# Patient Record
Sex: MALE | Race: BLACK OR AFRICAN AMERICAN | NOT HISPANIC OR LATINO | Employment: FULL TIME | URBAN - METROPOLITAN AREA
[De-identification: names, ages, dates, MRNs, and addresses within clinical notes are randomized per-mention and may not be internally consistent; named-entity substitution may affect disease eponyms.]

---

## 2019-06-07 ENCOUNTER — OUTSIDE PLACE OF SERVICE (OUTPATIENT)
Dept: CARDIOLOGY | Facility: CLINIC | Age: 59
End: 2019-06-07
Payer: COMMERCIAL

## 2019-06-07 PROCEDURE — 93010 ELECTROCARDIOGRAM REPORT: CPT | Mod: ,,, | Performed by: INTERNAL MEDICINE

## 2019-06-07 PROCEDURE — 93010 PR ELECTROCARDIOGRAM REPORT: ICD-10-PCS | Mod: ,,, | Performed by: INTERNAL MEDICINE

## 2020-09-11 DIAGNOSIS — D43.4 NEOPLASM OF UNCERTAIN BEHAVIOR OF SPINAL CORD: Primary | ICD-10-CM

## 2020-09-11 DIAGNOSIS — C90.00 MULTIPLE MYELOMA, REMISSION STATUS UNSPECIFIED: ICD-10-CM

## 2020-09-11 DIAGNOSIS — Z74.09 MOBILITY IMPAIRED: ICD-10-CM

## 2020-09-11 DIAGNOSIS — R53.1 WEAKNESS: ICD-10-CM

## 2020-09-13 ENCOUNTER — HOSPITAL ENCOUNTER (EMERGENCY)
Facility: HOSPITAL | Age: 60
End: 2020-09-14
Attending: EMERGENCY MEDICINE
Payer: COMMERCIAL

## 2020-09-13 DIAGNOSIS — C80.1 MALIGNANT NEOPLASM METASTATIC TO LUMBAR SPINE WITH UNKNOWN PRIMARY SITE: Primary | ICD-10-CM

## 2020-09-13 DIAGNOSIS — M54.31 BILATERAL SCIATICA: ICD-10-CM

## 2020-09-13 DIAGNOSIS — M54.32 BILATERAL SCIATICA: ICD-10-CM

## 2020-09-13 DIAGNOSIS — C79.51 MALIGNANT NEOPLASM METASTATIC TO LUMBAR SPINE WITH UNKNOWN PRIMARY SITE: Primary | ICD-10-CM

## 2020-09-13 PROCEDURE — 63600175 PHARM REV CODE 636 W HCPCS: Mod: ER | Performed by: EMERGENCY MEDICINE

## 2020-09-13 PROCEDURE — 99285 EMERGENCY DEPT VISIT HI MDM: CPT | Mod: 25,ER

## 2020-09-13 PROCEDURE — 96372 THER/PROPH/DIAG INJ SC/IM: CPT | Mod: ER

## 2020-09-13 RX ORDER — ATENOLOL 100 MG/1
100 TABLET ORAL DAILY
Status: ON HOLD | COMMUNITY
Start: 2020-09-04 | End: 2020-09-16 | Stop reason: HOSPADM

## 2020-09-13 RX ORDER — BENAZEPRIL HYDROCHLORIDE 20 MG/1
20 TABLET ORAL DAILY
Status: ON HOLD | COMMUNITY
Start: 2020-08-10 | End: 2020-09-16 | Stop reason: HOSPADM

## 2020-09-13 RX ORDER — HYDROCODONE BITARTRATE AND ACETAMINOPHEN 7.5; 325 MG/1; MG/1
1 TABLET ORAL 4 TIMES DAILY PRN
Status: ON HOLD | COMMUNITY
Start: 2020-09-08 | End: 2020-09-25 | Stop reason: HOSPADM

## 2020-09-13 RX ORDER — CLONIDINE HYDROCHLORIDE 0.3 MG/1
0.3 TABLET ORAL NIGHTLY
Status: ON HOLD | COMMUNITY
Start: 2020-09-04 | End: 2020-09-17 | Stop reason: HOSPADM

## 2020-09-13 RX ORDER — MORPHINE SULFATE 4 MG/ML
4 INJECTION, SOLUTION INTRAMUSCULAR; INTRAVENOUS
Status: DISCONTINUED | OUTPATIENT
Start: 2020-09-13 | End: 2020-09-13

## 2020-09-13 RX ORDER — ORPHENADRINE CITRATE 30 MG/ML
60 INJECTION INTRAMUSCULAR; INTRAVENOUS
Status: COMPLETED | OUTPATIENT
Start: 2020-09-13 | End: 2020-09-13

## 2020-09-13 RX ORDER — MORPHINE SULFATE 4 MG/ML
4 INJECTION, SOLUTION INTRAMUSCULAR; INTRAVENOUS
Status: COMPLETED | OUTPATIENT
Start: 2020-09-13 | End: 2020-09-13

## 2020-09-13 RX ADMIN — ORPHENADRINE CITRATE 60 MG: 30 INJECTION INTRAMUSCULAR; INTRAVENOUS at 11:09

## 2020-09-13 RX ADMIN — MORPHINE SULFATE 4 MG: 4 INJECTION INTRAVENOUS at 11:09

## 2020-09-14 ENCOUNTER — HOSPITAL ENCOUNTER (OUTPATIENT)
Facility: HOSPITAL | Age: 60
Discharge: HOME | End: 2020-09-17
Attending: EMERGENCY MEDICINE | Admitting: STUDENT IN AN ORGANIZED HEALTH CARE EDUCATION/TRAINING PROGRAM
Payer: COMMERCIAL

## 2020-09-14 DIAGNOSIS — C79.9 METASTATIC DISEASE: ICD-10-CM

## 2020-09-14 DIAGNOSIS — Z01.818 PRE-OP EXAM: ICD-10-CM

## 2020-09-14 DIAGNOSIS — C90.00 MULTIPLE MYELOMA, REMISSION STATUS UNSPECIFIED: ICD-10-CM

## 2020-09-14 DIAGNOSIS — R93.89 ABNORMAL FINDING ON IMAGING: Primary | ICD-10-CM

## 2020-09-14 DIAGNOSIS — F41.9 ANXIETY: ICD-10-CM

## 2020-09-14 DIAGNOSIS — E88.09 PLASMA CELL DYSCRASIA: ICD-10-CM

## 2020-09-14 DIAGNOSIS — M48.061 SPINAL STENOSIS OF LUMBAR REGION WITHOUT NEUROGENIC CLAUDICATION: ICD-10-CM

## 2020-09-14 PROBLEM — I10 ESSENTIAL HYPERTENSION: Status: ACTIVE | Noted: 2020-09-14

## 2020-09-14 LAB
ABO + RH BLD: NORMAL
ALBUMIN SERPL BCP-MCNC: 4.4 G/DL (ref 3.5–5.2)
ALP SERPL-CCNC: 81 U/L (ref 38–126)
ALT SERPL W/O P-5'-P-CCNC: 17 U/L (ref 10–44)
ANION GAP SERPL CALC-SCNC: 10 MMOL/L (ref 8–16)
APTT BLDCRRT: 24.6 SEC (ref 21–32)
AST SERPL-CCNC: 36 U/L (ref 15–46)
BASOPHILS # BLD AUTO: 0.02 K/UL (ref 0–0.2)
BASOPHILS # BLD AUTO: 0.06 K/UL (ref 0–0.2)
BASOPHILS NFR BLD: 0.3 % (ref 0–1.9)
BASOPHILS NFR BLD: 0.8 % (ref 0–1.9)
BILIRUB SERPL-MCNC: 0.9 MG/DL (ref 0.1–1)
BILIRUB UR QL STRIP: NEGATIVE
BLD GP AB SCN CELLS X3 SERPL QL: NORMAL
BUN SERPL-MCNC: 20 MG/DL (ref 2–20)
CALCIUM SERPL-MCNC: 9.5 MG/DL (ref 8.7–10.5)
CHLORIDE SERPL-SCNC: 105 MMOL/L (ref 95–110)
CLARITY UR REFRACT.AUTO: CLEAR
CO2 SERPL-SCNC: 25 MMOL/L (ref 23–29)
COLOR UR AUTO: ABNORMAL
COMPLEXED PSA SERPL-MCNC: 0.58 NG/ML (ref 0–4)
CREAT SERPL-MCNC: 1.08 MG/DL (ref 0.5–1.4)
DIFFERENTIAL METHOD: ABNORMAL
DIFFERENTIAL METHOD: ABNORMAL
EOSINOPHIL # BLD AUTO: 0 K/UL (ref 0–0.5)
EOSINOPHIL # BLD AUTO: 0.1 K/UL (ref 0–0.5)
EOSINOPHIL NFR BLD: 0.3 % (ref 0–8)
EOSINOPHIL NFR BLD: 1.3 % (ref 0–8)
ERYTHROCYTE [DISTWIDTH] IN BLOOD BY AUTOMATED COUNT: 13.2 % (ref 11.5–14.5)
ERYTHROCYTE [DISTWIDTH] IN BLOOD BY AUTOMATED COUNT: 13.2 % (ref 11.5–14.5)
EST. GFR  (AFRICAN AMERICAN): >60 ML/MIN/1.73 M^2
EST. GFR  (NON AFRICAN AMERICAN): >60 ML/MIN/1.73 M^2
GLUCOSE SERPL-MCNC: 107 MG/DL (ref 70–110)
GLUCOSE UR QL STRIP: ABNORMAL
HCT VFR BLD AUTO: 40.5 % (ref 40–54)
HCT VFR BLD AUTO: 43.6 % (ref 40–54)
HGB BLD-MCNC: 12.9 G/DL (ref 14–18)
HGB BLD-MCNC: 14 G/DL (ref 14–18)
HGB UR QL STRIP: NEGATIVE
IMM GRANULOCYTES # BLD AUTO: 0.03 K/UL (ref 0–0.04)
IMM GRANULOCYTES # BLD AUTO: 0.03 K/UL (ref 0–0.04)
IMM GRANULOCYTES NFR BLD AUTO: 0.4 % (ref 0–0.5)
IMM GRANULOCYTES NFR BLD AUTO: 0.5 % (ref 0–0.5)
INR PPP: 1.1 (ref 0.8–1.2)
KETONES UR QL STRIP: NEGATIVE
LEUKOCYTE ESTERASE UR QL STRIP: NEGATIVE
LYMPHOCYTES # BLD AUTO: 1.5 K/UL (ref 1–4.8)
LYMPHOCYTES # BLD AUTO: 3.7 K/UL (ref 1–4.8)
LYMPHOCYTES NFR BLD: 24.4 % (ref 18–48)
LYMPHOCYTES NFR BLD: 48.8 % (ref 18–48)
MCH RBC QN AUTO: 29.9 PG (ref 27–31)
MCH RBC QN AUTO: 30.1 PG (ref 27–31)
MCHC RBC AUTO-ENTMCNC: 31.9 G/DL (ref 32–36)
MCHC RBC AUTO-ENTMCNC: 32.1 G/DL (ref 32–36)
MCV RBC AUTO: 94 FL (ref 82–98)
MCV RBC AUTO: 94 FL (ref 82–98)
MONOCYTES # BLD AUTO: 0.4 K/UL (ref 0.3–1)
MONOCYTES # BLD AUTO: 0.6 K/UL (ref 0.3–1)
MONOCYTES NFR BLD: 6.8 % (ref 4–15)
MONOCYTES NFR BLD: 8.1 % (ref 4–15)
NEUTROPHILS # BLD AUTO: 3.1 K/UL (ref 1.8–7.7)
NEUTROPHILS # BLD AUTO: 4.1 K/UL (ref 1.8–7.7)
NEUTROPHILS NFR BLD: 40.6 % (ref 38–73)
NEUTROPHILS NFR BLD: 67.7 % (ref 38–73)
NITRITE UR QL STRIP: NEGATIVE
NRBC BLD-RTO: 0 /100 WBC
NRBC BLD-RTO: 0 /100 WBC
PATH REV BLD -IMP: NORMAL
PH UR STRIP: 6 [PH] (ref 5–8)
PLATELET # BLD AUTO: 168 K/UL (ref 150–350)
PLATELET # BLD AUTO: 176 K/UL (ref 150–350)
PMV BLD AUTO: 11.8 FL (ref 9.2–12.9)
PMV BLD AUTO: 12.4 FL (ref 9.2–12.9)
POTASSIUM SERPL-SCNC: 4.5 MMOL/L (ref 3.5–5.1)
PROT SERPL-MCNC: 10.7 G/DL (ref 6–8.4)
PROT UR QL STRIP: NEGATIVE
PROTHROMBIN TIME: 11.6 SEC (ref 9–12.5)
RBC # BLD AUTO: 4.31 M/UL (ref 4.6–6.2)
RBC # BLD AUTO: 4.65 M/UL (ref 4.6–6.2)
SARS-COV-2 RDRP RESP QL NAA+PROBE: NEGATIVE
SODIUM SERPL-SCNC: 140 MMOL/L (ref 136–145)
SP GR UR STRIP: 1.02 (ref 1–1.03)
URN SPEC COLLECT METH UR: ABNORMAL
WBC # BLD AUTO: 6.06 K/UL (ref 3.9–12.7)
WBC # BLD AUTO: 7.67 K/UL (ref 3.9–12.7)

## 2020-09-14 PROCEDURE — 85025 COMPLETE CBC W/AUTO DIFF WBC: CPT | Mod: 91

## 2020-09-14 PROCEDURE — G0378 HOSPITAL OBSERVATION PER HR: HCPCS

## 2020-09-14 PROCEDURE — 99285 PR EMERGENCY DEPT VISIT,LEVEL V: ICD-10-PCS | Mod: ,,, | Performed by: PHYSICIAN ASSISTANT

## 2020-09-14 PROCEDURE — 25500020 PHARM REV CODE 255: Performed by: EMERGENCY MEDICINE

## 2020-09-14 PROCEDURE — 25000003 PHARM REV CODE 250: Performed by: STUDENT IN AN ORGANIZED HEALTH CARE EDUCATION/TRAINING PROGRAM

## 2020-09-14 PROCEDURE — 25000003 PHARM REV CODE 250: Performed by: EMERGENCY MEDICINE

## 2020-09-14 PROCEDURE — 96375 TX/PRO/DX INJ NEW DRUG ADDON: CPT

## 2020-09-14 PROCEDURE — 84165 PROTEIN E-PHORESIS SERUM: CPT

## 2020-09-14 PROCEDURE — U0002 COVID-19 LAB TEST NON-CDC: HCPCS | Mod: ER

## 2020-09-14 PROCEDURE — A9585 GADOBUTROL INJECTION: HCPCS | Performed by: EMERGENCY MEDICINE

## 2020-09-14 PROCEDURE — 80053 COMPREHEN METABOLIC PANEL: CPT | Mod: ER

## 2020-09-14 PROCEDURE — 81003 URINALYSIS AUTO W/O SCOPE: CPT

## 2020-09-14 PROCEDURE — 99204 PR OFFICE/OUTPT VISIT, NEW, LEVL IV, 45-59 MIN: ICD-10-PCS | Mod: ,,, | Performed by: NEUROLOGICAL SURGERY

## 2020-09-14 PROCEDURE — 85060 PATHOLOGIST REVIEW: ICD-10-PCS | Mod: ,,, | Performed by: PATHOLOGY

## 2020-09-14 PROCEDURE — 84165 PATHOLOGIST INTERPRETATION SPE: ICD-10-PCS | Mod: 26,,, | Performed by: PATHOLOGY

## 2020-09-14 PROCEDURE — 99220 PR INITIAL OBSERVATION CARE,LEVL III: CPT | Mod: ,,, | Performed by: STUDENT IN AN ORGANIZED HEALTH CARE EDUCATION/TRAINING PROGRAM

## 2020-09-14 PROCEDURE — 99220 PR INITIAL OBSERVATION CARE,LEVL III: ICD-10-PCS | Mod: ,,, | Performed by: STUDENT IN AN ORGANIZED HEALTH CARE EDUCATION/TRAINING PROGRAM

## 2020-09-14 PROCEDURE — 96374 THER/PROPH/DIAG INJ IV PUSH: CPT

## 2020-09-14 PROCEDURE — 96376 TX/PRO/DX INJ SAME DRUG ADON: CPT | Mod: 59 | Performed by: EMERGENCY MEDICINE

## 2020-09-14 PROCEDURE — 96372 THER/PROPH/DIAG INJ SC/IM: CPT | Mod: 59 | Performed by: EMERGENCY MEDICINE

## 2020-09-14 PROCEDURE — 86334 IMMUNOFIX E-PHORESIS SERUM: CPT | Mod: 26,,, | Performed by: PATHOLOGY

## 2020-09-14 PROCEDURE — 99204 OFFICE O/P NEW MOD 45 MIN: CPT | Mod: ,,, | Performed by: NEUROLOGICAL SURGERY

## 2020-09-14 PROCEDURE — 96374 THER/PROPH/DIAG INJ IV PUSH: CPT | Mod: ER

## 2020-09-14 PROCEDURE — 97161 PT EVAL LOW COMPLEX 20 MIN: CPT

## 2020-09-14 PROCEDURE — 86850 RBC ANTIBODY SCREEN: CPT

## 2020-09-14 PROCEDURE — 83520 IMMUNOASSAY QUANT NOS NONAB: CPT | Mod: 59

## 2020-09-14 PROCEDURE — 85610 PROTHROMBIN TIME: CPT

## 2020-09-14 PROCEDURE — 63600175 PHARM REV CODE 636 W HCPCS: Mod: ER | Performed by: EMERGENCY MEDICINE

## 2020-09-14 PROCEDURE — 99285 EMERGENCY DEPT VISIT HI MDM: CPT | Mod: 25

## 2020-09-14 PROCEDURE — 63600175 PHARM REV CODE 636 W HCPCS: Performed by: PHYSICIAN ASSISTANT

## 2020-09-14 PROCEDURE — 85025 COMPLETE CBC W/AUTO DIFF WBC: CPT | Mod: ER

## 2020-09-14 PROCEDURE — 63600175 PHARM REV CODE 636 W HCPCS: Performed by: STUDENT IN AN ORGANIZED HEALTH CARE EDUCATION/TRAINING PROGRAM

## 2020-09-14 PROCEDURE — 85060 BLOOD SMEAR INTERPRETATION: CPT | Mod: ,,, | Performed by: PATHOLOGY

## 2020-09-14 PROCEDURE — 84153 ASSAY OF PSA TOTAL: CPT

## 2020-09-14 PROCEDURE — 85730 THROMBOPLASTIN TIME PARTIAL: CPT

## 2020-09-14 PROCEDURE — 25000003 PHARM REV CODE 250: Performed by: PHYSICIAN ASSISTANT

## 2020-09-14 PROCEDURE — 86334 PATHOLOGIST INTERPRETATION IFE: ICD-10-PCS | Mod: 26,,, | Performed by: PATHOLOGY

## 2020-09-14 PROCEDURE — 51798 US URINE CAPACITY MEASURE: CPT

## 2020-09-14 PROCEDURE — 96375 TX/PRO/DX INJ NEW DRUG ADDON: CPT | Mod: ER

## 2020-09-14 PROCEDURE — 86334 IMMUNOFIX E-PHORESIS SERUM: CPT

## 2020-09-14 PROCEDURE — 84165 PROTEIN E-PHORESIS SERUM: CPT | Mod: 26,,, | Performed by: PATHOLOGY

## 2020-09-14 PROCEDURE — 99285 EMERGENCY DEPT VISIT HI MDM: CPT | Mod: ,,, | Performed by: PHYSICIAN ASSISTANT

## 2020-09-14 RX ORDER — IBUPROFEN 200 MG
16 TABLET ORAL
Status: CANCELLED | OUTPATIENT
Start: 2020-09-14

## 2020-09-14 RX ORDER — GADOBUTROL 604.72 MG/ML
10 INJECTION INTRAVENOUS
Status: COMPLETED | OUTPATIENT
Start: 2020-09-14 | End: 2020-09-14

## 2020-09-14 RX ORDER — NIFEDIPINE 30 MG/1
30 TABLET, EXTENDED RELEASE ORAL DAILY
Status: DISCONTINUED | OUTPATIENT
Start: 2020-09-15 | End: 2020-09-15

## 2020-09-14 RX ORDER — ATENOLOL 50 MG/1
100 TABLET ORAL DAILY
Status: DISCONTINUED | OUTPATIENT
Start: 2020-09-14 | End: 2020-09-14

## 2020-09-14 RX ORDER — BENAZEPRIL HYDROCHLORIDE 20 MG/1
20 TABLET ORAL DAILY
Status: DISCONTINUED | OUTPATIENT
Start: 2020-09-14 | End: 2020-09-14

## 2020-09-14 RX ORDER — GLUCAGON 1 MG
1 KIT INJECTION
Status: CANCELLED | OUTPATIENT
Start: 2020-09-14

## 2020-09-14 RX ORDER — BENAZEPRIL HYDROCHLORIDE 40 MG/1
40 TABLET ORAL DAILY
Status: DISCONTINUED | OUTPATIENT
Start: 2020-09-14 | End: 2020-09-14

## 2020-09-14 RX ORDER — MORPHINE SULFATE 4 MG/ML
4 INJECTION, SOLUTION INTRAMUSCULAR; INTRAVENOUS
Status: COMPLETED | OUTPATIENT
Start: 2020-09-14 | End: 2020-09-14

## 2020-09-14 RX ORDER — LABETALOL HCL 20 MG/4 ML
10 SYRINGE (ML) INTRAVENOUS EVERY 6 HOURS PRN
Status: DISCONTINUED | OUTPATIENT
Start: 2020-09-14 | End: 2020-09-14

## 2020-09-14 RX ORDER — HYDRALAZINE HYDROCHLORIDE 20 MG/ML
10 INJECTION INTRAMUSCULAR; INTRAVENOUS
Status: COMPLETED | OUTPATIENT
Start: 2020-09-14 | End: 2020-09-14

## 2020-09-14 RX ORDER — MORPHINE SULFATE 2 MG/ML
6 INJECTION, SOLUTION INTRAMUSCULAR; INTRAVENOUS
Status: COMPLETED | OUTPATIENT
Start: 2020-09-14 | End: 2020-09-14

## 2020-09-14 RX ORDER — CARVEDILOL 25 MG/1
25 TABLET ORAL 2 TIMES DAILY
Status: DISCONTINUED | OUTPATIENT
Start: 2020-09-14 | End: 2020-09-17 | Stop reason: HOSPADM

## 2020-09-14 RX ORDER — BENAZEPRIL HYDROCHLORIDE 20 MG/1
40 TABLET ORAL DAILY
Status: DISCONTINUED | OUTPATIENT
Start: 2020-09-14 | End: 2020-09-14

## 2020-09-14 RX ORDER — CLONIDINE HYDROCHLORIDE 0.3 MG/1
0.3 TABLET ORAL
Status: DISCONTINUED | OUTPATIENT
Start: 2020-09-14 | End: 2020-09-14

## 2020-09-14 RX ORDER — LABETALOL HCL 20 MG/4 ML
10 SYRINGE (ML) INTRAVENOUS EVERY 6 HOURS PRN
Status: DISCONTINUED | OUTPATIENT
Start: 2020-09-14 | End: 2020-09-17 | Stop reason: HOSPADM

## 2020-09-14 RX ORDER — LABETALOL HCL 20 MG/4 ML
10 SYRINGE (ML) INTRAVENOUS ONCE
Status: COMPLETED | OUTPATIENT
Start: 2020-09-14 | End: 2020-09-14

## 2020-09-14 RX ORDER — IBUPROFEN 200 MG
24 TABLET ORAL
Status: CANCELLED | OUTPATIENT
Start: 2020-09-14

## 2020-09-14 RX ORDER — LABETALOL HCL 20 MG/4 ML
10 SYRINGE (ML) INTRAVENOUS
Status: COMPLETED | OUTPATIENT
Start: 2020-09-14 | End: 2020-09-14

## 2020-09-14 RX ORDER — IBUPROFEN 200 MG
16 TABLET ORAL
Status: DISCONTINUED | OUTPATIENT
Start: 2020-09-14 | End: 2020-09-17 | Stop reason: HOSPADM

## 2020-09-14 RX ORDER — ENOXAPARIN SODIUM 100 MG/ML
40 INJECTION SUBCUTANEOUS EVERY 24 HOURS
Status: DISCONTINUED | OUTPATIENT
Start: 2020-09-14 | End: 2020-09-15

## 2020-09-14 RX ORDER — LORAZEPAM 2 MG/ML
0.5 INJECTION INTRAMUSCULAR
Status: COMPLETED | OUTPATIENT
Start: 2020-09-14 | End: 2020-09-14

## 2020-09-14 RX ORDER — SODIUM CHLORIDE 0.9 % (FLUSH) 0.9 %
10 SYRINGE (ML) INJECTION
Status: CANCELLED | OUTPATIENT
Start: 2020-09-14

## 2020-09-14 RX ORDER — CLONIDINE HYDROCHLORIDE 0.3 MG/1
0.3 TABLET ORAL
Status: COMPLETED | OUTPATIENT
Start: 2020-09-14 | End: 2020-09-14

## 2020-09-14 RX ORDER — SODIUM CHLORIDE 0.9 % (FLUSH) 0.9 %
10 SYRINGE (ML) INJECTION
Status: DISCONTINUED | OUTPATIENT
Start: 2020-09-14 | End: 2020-09-17 | Stop reason: HOSPADM

## 2020-09-14 RX ORDER — LISINOPRIL 20 MG/1
20 TABLET ORAL DAILY
Status: DISCONTINUED | OUTPATIENT
Start: 2020-09-14 | End: 2020-09-15

## 2020-09-14 RX ORDER — ENOXAPARIN SODIUM 100 MG/ML
40 INJECTION SUBCUTANEOUS EVERY 24 HOURS
Status: CANCELLED | OUTPATIENT
Start: 2020-09-14

## 2020-09-14 RX ORDER — LABETALOL HCL 20 MG/4 ML
20 SYRINGE (ML) INTRAVENOUS EVERY 6 HOURS PRN
Status: DISCONTINUED | OUTPATIENT
Start: 2020-09-14 | End: 2020-09-14

## 2020-09-14 RX ORDER — GLUCAGON 1 MG
1 KIT INJECTION
Status: DISCONTINUED | OUTPATIENT
Start: 2020-09-14 | End: 2020-09-17 | Stop reason: HOSPADM

## 2020-09-14 RX ORDER — LISINOPRIL 20 MG/1
40 TABLET ORAL DAILY
Status: DISCONTINUED | OUTPATIENT
Start: 2020-09-14 | End: 2020-09-14

## 2020-09-14 RX ORDER — BENAZEPRIL HYDROCHLORIDE 20 MG/1
40 TABLET ORAL DAILY
Status: CANCELLED | OUTPATIENT
Start: 2020-09-14

## 2020-09-14 RX ORDER — IBUPROFEN 200 MG
24 TABLET ORAL
Status: DISCONTINUED | OUTPATIENT
Start: 2020-09-14 | End: 2020-09-17 | Stop reason: HOSPADM

## 2020-09-14 RX ORDER — ATENOLOL 25 MG/1
100 TABLET ORAL DAILY
Status: DISCONTINUED | OUTPATIENT
Start: 2020-09-14 | End: 2020-09-14

## 2020-09-14 RX ADMIN — Medication 10 MG: at 06:09

## 2020-09-14 RX ADMIN — CARVEDILOL 25 MG: 25 TABLET, FILM COATED ORAL at 08:09

## 2020-09-14 RX ADMIN — CLONIDINE HYDROCHLORIDE 0.3 MG: 0.3 TABLET ORAL at 05:09

## 2020-09-14 RX ADMIN — Medication 10 MG: at 04:09

## 2020-09-14 RX ADMIN — HYDRALAZINE HYDROCHLORIDE 10 MG: 20 INJECTION INTRAMUSCULAR; INTRAVENOUS at 02:09

## 2020-09-14 RX ADMIN — MORPHINE SULFATE 6 MG: 2 INJECTION, SOLUTION INTRAMUSCULAR; INTRAVENOUS at 04:09

## 2020-09-14 RX ADMIN — Medication 10 MG: at 02:09

## 2020-09-14 RX ADMIN — LORAZEPAM 0.5 MG: 2 INJECTION INTRAMUSCULAR; INTRAVENOUS at 02:09

## 2020-09-14 RX ADMIN — ENOXAPARIN SODIUM 40 MG: 40 INJECTION SUBCUTANEOUS at 04:09

## 2020-09-14 RX ADMIN — Medication 10 MG: at 12:09

## 2020-09-14 RX ADMIN — HYDRALAZINE HYDROCHLORIDE 10 MG: 20 INJECTION INTRAMUSCULAR; INTRAVENOUS at 12:09

## 2020-09-14 RX ADMIN — IOHEXOL 75 ML: 350 INJECTION, SOLUTION INTRAVENOUS at 09:09

## 2020-09-14 RX ADMIN — BENAZEPRIL HYDROCHLORIDE 40 MG: 20 TABLET ORAL at 06:09

## 2020-09-14 RX ADMIN — MORPHINE SULFATE 4 MG: 4 INJECTION INTRAVENOUS at 12:09

## 2020-09-14 RX ADMIN — GADOBUTROL 10 ML: 604.72 INJECTION INTRAVENOUS at 03:09

## 2020-09-14 RX ADMIN — ATENOLOL 100 MG: 50 TABLET ORAL at 06:09

## 2020-09-14 NOTE — ASSESSMENT & PLAN NOTE
-- hx of uncontrolled HTN  -- on atenolol, benazepril and clonidine  -- compliant with meds  -- SBP >200 in ED, given labetalol 10 mg x2, hydralazine 10 mg x1, home dose clonidine x1      Plan:  -- resume home BP meds  -- increased benazepril from 20 mg->40 mg   -- consider alternative to clonidine as can cause rebound HTN

## 2020-09-14 NOTE — ED NOTES
"Per [facility] transfer center, " [facility] [place]. Number for report  [phone]"  Julia aware of pt BP being treated at this time.   Primary nurse aware.   "

## 2020-09-14 NOTE — CONSULTS
[facility]  Neurosurgery  Consult Note    Consults  Subjective:     Chief Complaint/Reason for Admission: Lumbar sacral mass    History of Present Illness: Jaspreet comer is a 61 yo male with a PMH of HTN with no known oncologic history who presents to the ED for 1 month of back pain and muscle soreness to the lower extremities. He states that he has cramping pain to bilateral thighs and calves for about 1 month and back pain started 2 weeks ago without radiation to the midline lumbar back. Back pain is worse when sitting on the couch vs. Laying down, also somewhat worse when walking. He has no weakness in his legs and is ambulatory. No radicular component to his back pain.  No numbness or tingling in the lower extremities. No bowel or bladder incontinence. He has not had difficulty using his hand or coordinating movements of the upper extremities, no neck pain. [place] [facility] [place] revealed lumbar-sacral mass lesion concerning for malignancy. Denies any blood thinner use.     Medical history significant for occasional smoking for about 2 years total, no drinking or illicit substances. No known cancers, did not receive a screening colonoscopy. Family history of 'bone cancer' in his father who passed away in 2016.      (Not in a hospital admission)      Review of patient's allergies indicates:  No Known Allergies    Past Medical History:   Diagnosis Date    Arthritis     Hypertension      Past Surgical History:   Procedure Laterality Date    KNEE SURGERY Left 06/2019     Family History     None        Tobacco Use    Smoking status: Former Smoker   Substance and Sexual Activity    Alcohol use: Not Currently    Drug use: Never    Sexual activity: Not on file     Review of Systems   Constitutional: Positive for activity change. Negative for appetite change.   HENT: Negative for congestion and dental problem.    Eyes: Negative for discharge and itching.   Respiratory:  Negative for apnea and chest tightness.    Cardiovascular: Negative for chest pain and leg swelling.   Gastrointestinal: Negative for abdominal distention and abdominal pain.   Endocrine: Negative for cold intolerance and heat intolerance.   Genitourinary: Negative for difficulty urinating and dysuria.   Musculoskeletal: Positive for arthralgias and back pain. Negative for gait problem and joint swelling.   Allergic/Immunologic: Negative for environmental allergies and food allergies.   Neurological: Negative for dizziness and facial asymmetry.   Hematological: Negative for adenopathy. Does not bruise/bleed easily.   Psychiatric/Behavioral: Negative for agitation and behavioral problems.     Objective:     Weight: 97.5 kg (215 lb)  Body mass index is 33.67 kg/m².  Vital Signs (Most Recent):  Temp: 97.9 °F (36.6 °C) (09/14/20 0216)  Pulse: 87 (09/14/20 0443)  Resp: 16 (09/14/20 0433)  BP: (!) 158/91 (09/14/20 0443)  SpO2: 98 % (09/14/20 0443) Vital Signs (24h Range):  Temp:  [97.9 °F (36.6 °C)-98 °F (36.7 °C)] 97.9 °F (36.6 °C)  Pulse:  [56-96] 87  Resp:  [16-18] 16  SpO2:  [96 %-100 %] 98 %  BP: (158-270)/() 158/91                          Neurosurgery Physical Exam    General: well developed, well nourished, no distress.   Head: normocephalic, atraumatic  Neurologic:   GCS 15, A+Ox3  CN 2-12 intact  Skin: Skin is warm, dry and intact.  Sensory: intact to light touch throughout    Strength  Deltoids Triceps Biceps Wrist Extension Wrist Flexion Hand    Upper: R 5/5 5/5 5/5 5/5 5/5 5/5    L 5/5 5/5 5/5 5/5 5/5 5/5     Iliopsoas Quadriceps Knee  Flexion Tibialis  anterior Gastro- cnemius EHL   Lower: R 5/5 5/5 5/5 5/5 5/5 5/5    L 5/5 5/5 5/5 5/5 5/5 5/5     Reflexes:   DTR: 2+upper extremities Candelaria's: Negative.  1+ Lower extremities  Clonus: Negative.   Rectal tone present    Cervical:   Midline TTP: Negative.     Thoracic:  Midline TTP: Negative.     Lumbar:  Midline TTP:  Negative.                        Significant Labs:  Recent Labs   Lab 09/14/20  0055         K 4.5      CO2 25   BUN 20   CREATININE 1.08   CALCIUM 9.5     Recent Labs   Lab 09/14/20  0055   WBC 7.67   HGB 14.0   HCT 43.6        Recent Labs   Lab 09/14/20  0251   INR 1.1   APTT 24.6     Microbiology Results (last 7 days)     ** No results found for the last 168 hours. **        All pertinent labs from the last 24 hours have been reviewed.    Significant Diagnostics:  I have reviewed all pertinent imaging results/findings within the past 24 hours.    Assessment/Plan:     Lumbar stenosis  Jaspreet Walsh is a 59 yo male with PMH HTN who presents with 1 month of LE cramping and 2 weeks of back pain; full strength without numbness or b/b symptoms on exam, rectal tone present. CT L Spine with extensive lytic destruction of vertebral bodies with large L4-S1 mass involving the vertebral body as well as transverse processes and left posterior aspects of spinal canal extending to the sacrum. 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     --Admit to medicine with hematology consult for further work-up  --Full pre-operative labs  --Obtain CT C/A/P for further work-up; follow up hematology recommendations and lab work-up  --No emergent neurosurgical intervention, keep NPO at this time  D/w attending staff, Dr. Khan            Thank you for your consult. I will follow-up with patient. Please contact us if you have any additional questions.    Michel Wheeler MD  Neurosurgery  [facility]

## 2020-09-14 NOTE — ED PROVIDER NOTES
"Encounter Date: 9/14/2020       History     Chief Complaint   Patient presents with    Transfer     From [facility]. Newly diagnosed spinal lesions     2:17 AM  Patient is a 60-year-old male with a history of HTN compliant with three HTN medication, arthritis, [place] [facility] [place] [facility] for neurosurgery consultation. CT at outside ED showed "extensive lytic lesions throughout and high-grade spinal canal stenosis about L4-5. Additional probable pathologic fractures through the left aspect of the sacrum".      Patient states for the past 1 month he has been having left lateral thigh and sometimes knee pain.  Reports previous history of L knee surgery.  He denies ever having any severe or constant back pain, lower extremity weakness, or lower extremity paresthesias. He has had back pain in the past before (maybe last episode 2 months ago?), but it always resolved.   He states that his left leg pain was worse today which prompted him to go to outside ED.  To note, he states that he reached out to his orthopedic surgeon who did his left knee surgery who ordered an MRI of his lumbar spine in the outpatient setting, but it hasn't been done yet.  Patient denies any previous history of cancer.    He denies any weakness, fatigue, or unusual weight loss.  Has never had any abdominal pain.  He denies any urinary or bowel incontinence.    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     Currently he has little LLE pain since receiving medication at the previous ED.    No future appointments.          Review of patient's allergies indicates:  No Known Allergies  Past Medical History:   Diagnosis Date    Arthritis     Hypertension      Past Surgical History:   Procedure Laterality Date    KNEE SURGERY Left 06/2019     History reviewed. No pertinent family history.  Social History     Tobacco Use    Smoking status: Former Smoker   Substance Use Topics    Alcohol use: Not Currently    Drug use: Never "     Review of Systems   Constitutional: Negative for chills, diaphoresis, fatigue, fever and unexpected weight change.   HENT: Negative for sore throat.    Respiratory: Negative for shortness of breath.    Cardiovascular: Negative for chest pain.   Gastrointestinal: Negative for abdominal pain, diarrhea (no bowel incontinence) and nausea.   Genitourinary: Negative for difficulty urinating (no incontinence) and dysuria.   Musculoskeletal: Positive for myalgias. Negative for back pain and gait problem.   Skin: Negative for rash.   Neurological: Negative for weakness and numbness (no saddle anesthesias or LE paresthesias).   Hematological: Does not bruise/bleed easily.       Physical Exam     Initial Vitals [09/14/20 0216]   BP Pulse Resp Temp SpO2   (!) 224/105 78 16 97.9 °F (36.6 °C) 98 %      MAP       --         Physical Exam    Vitals reviewed.  Constitutional: He appears well-developed and well-nourished. He is not diaphoretic. He is cooperative.  Non-toxic appearance. He does not have a sickly appearance. He does not appear ill. No distress. Face mask in place.   HENT:   Head: Normocephalic and atraumatic.   Nose: Nose normal.   Eyes: Conjunctivae and EOM are normal.   Neck: Normal range of motion.   Cardiovascular: Normal rate.   No murmur heard.  Pulmonary/Chest: Breath sounds normal. No respiratory distress. He has no wheezes. He has no rales.   Abdominal: Soft. Bowel sounds are normal. He exhibits no distension. There is no abdominal tenderness.   Musculoskeletal: Normal range of motion.   Neurological: He is alert. He has normal strength.   Skin: Skin is warm and dry. No erythema.   Psychiatric: He has a normal mood and affect.         ED Course   Procedures  Labs Reviewed   PROTIME-INR   APTT   TYPE & SCREEN          Imaging Results          MRI Thoracic Spine W WO Cont (Final result)  Result time 09/14/20 04:57:29    Final result by Kaleb Townsend MD (09/14/20 04:57:29)                 Impression:       Significant bone marrow heterogeneity and numerous enhancing lesions throughout the thoracic and lumbar spine, as well as the visualized sacrum and pelvis, concerning for diffuse metastatic disease.    A large soft tissue mass encompasses almost the entire L4 through S1 vertebral bodies and to a lesser degree the posterior elements resulting in severe spinal canal stenosis and variable moderate to severe neural foraminal narrowing, as detailed above.    There is an ill-defined heterogeneouscollection within the posterior aspect of the intradural extramedullary space extending approximately from T3 through T8 resulting in mass effect on the cord, most prominent at T7 associated with increased cord edema signal at T6-T7.  No associated enhancement.  Findings are favored to reflect a subacute to chronic hematoma.    COMMUNICATION  This critical result was discovered/received at 04:30.  The critical information above was relayed directly by Dr. Ryland MD by telephone to Michel Calderon MD on 09/14/2020 at 04:46.    Electronically signed by resident: Meaghan Marcelino  Date:    09/14/2020  Time:    03:58    Electronically signed by: Kaleb Townsend MD  Date:    09/14/2020  Time:    04:57             Narrative:    EXAMINATION:  MRI THORACIC SPINE W WO CONTRAST; MRI LUMBAR SPINE W WO CONTRAST    CLINICAL HISTORY:  concern for metastatic disease;; L/S-spine canal stenosis;Cancer of unknown primary, assess treatment response;    TECHNIQUE:  Multiplanar, multisequence MR images were acquired of the thoracic and lumbar spine before and after the intravenous administration of 10 cc of Gadavist contrast.    COMPARISON:  CT lumbar spine 09/13/2020    FINDINGS:  THORACIC SPINE:    Thoracic alignment is within normal limits.  No significant spondylolisthesis.  There is diffuse marrow heterogeneity and numerous enhancing lesions scattered throughout the thoracic vertebral bodies including the posterior elements.  No evidence of  acute fracture.  Multilevel degenerative disc disease including disc desiccation and moderate loss of disc height space most prominent at T3-T4 through T9-T10.    There is a nonenhancing, ill-defined heterogeneous collection of increased T2/decreased T1 signal within the intradural extramedullary space posterior to the cord extending from approximately T3 through T8 resulting in ventral displacement of the spinal cord at these levels.  Mass effect is most prominent on the posterior aspect of the cord at T7 associated with subtle increased T2 signal within the central cord proximally at T6-T7.    No significant spinal canal stenosis elsewhere.  No neural foraminal narrowing.    Limited evaluation of the intrathoracic structures is unremarkable.    LUMBAR SPINE:    Diffuse marrow heterogeneity and numerous enhancing lesions throughout the lumbar and visualized sacral spine and pelvis.  There is a large enhancing soft tissue component involving essentially the entire L4 through S1 vertebral bodies with extension into the left sacral ala, posterior elements, and anterior epidural space at these levels.  There is also encroachment of the neural foramina and extension into the prevertebral soft tissues.  Mass results in severe spinal canal stenosis extending from L3-L4 through S1-S2, moderate to severe bilateral neural foraminal narrowing, right greater than left, at L4-L5 and L5-S1, and severe left S1-S2 neural foraminal narrowing.    L4-L5 and L5-S1 disc spaces are obliterated secondary to the large mass.  L3-L4 disc space remains intact but there is partial loss of the cortical margins of the L4 superior endplate.    Conus medullaris is unremarkable, terminating at L1-L2.    Limited evaluation of the intra-abdominal structures is unremarkable.                               MRI Lumbar Spine W WO Cont (Final result)  Result time 09/14/20 04:57:29    Final result by Kaleb Townsend MD (09/14/20 04:57:29)                  Impression:      Significant bone marrow heterogeneity and numerous enhancing lesions throughout the thoracic and lumbar spine, as well as the visualized sacrum and pelvis, concerning for diffuse metastatic disease.    A large soft tissue mass encompasses almost the entire L4 through S1 vertebral bodies and to a lesser degree the posterior elements resulting in severe spinal canal stenosis and variable moderate to severe neural foraminal narrowing, as detailed above.    There is an ill-defined heterogeneouscollection within the posterior aspect of the intradural extramedullary space extending approximately from T3 through T8 resulting in mass effect on the cord, most prominent at T7 associated with increased cord edema signal at T6-T7.  No associated enhancement.  Findings are favored to reflect a subacute to chronic hematoma.    COMMUNICATION  This critical result was discovered/received at 04:30.  The critical information above was relayed directly by Dr. Ryland MD by telephone to Michel Calderon MD on 09/14/2020 at 04:46.    Electronically signed by resident: Meaghan Marcelino  Date:    09/14/2020  Time:    03:58    Electronically signed by: Kaleb Townsend MD  Date:    09/14/2020  Time:    04:57             Narrative:    EXAMINATION:  MRI THORACIC SPINE W WO CONTRAST; MRI LUMBAR SPINE W WO CONTRAST    CLINICAL HISTORY:  concern for metastatic disease;; L/S-spine canal stenosis;Cancer of unknown primary, assess treatment response;    TECHNIQUE:  Multiplanar, multisequence MR images were acquired of the thoracic and lumbar spine before and after the intravenous administration of 10 cc of Gadavist contrast.    COMPARISON:  CT lumbar spine 09/13/2020    FINDINGS:  THORACIC SPINE:    Thoracic alignment is within normal limits.  No significant spondylolisthesis.  There is diffuse marrow heterogeneity and numerous enhancing lesions scattered throughout the thoracic vertebral bodies including the posterior elements.   No evidence of acute fracture.  Multilevel degenerative disc disease including disc desiccation and moderate loss of disc height space most prominent at T3-T4 through T9-T10.    There is a nonenhancing, ill-defined heterogeneous collection of increased T2/decreased T1 signal within the intradural extramedullary space posterior to the cord extending from approximately T3 through T8 resulting in ventral displacement of the spinal cord at these levels.  Mass effect is most prominent on the posterior aspect of the cord at T7 associated with subtle increased T2 signal within the central cord proximally at T6-T7.    No significant spinal canal stenosis elsewhere.  No neural foraminal narrowing.    Limited evaluation of the intrathoracic structures is unremarkable.    LUMBAR SPINE:    Diffuse marrow heterogeneity and numerous enhancing lesions throughout the lumbar and visualized sacral spine and pelvis.  There is a large enhancing soft tissue component involving essentially the entire L4 through S1 vertebral bodies with extension into the left sacral ala, posterior elements, and anterior epidural space at these levels.  There is also encroachment of the neural foramina and extension into the prevertebral soft tissues.  Mass results in severe spinal canal stenosis extending from L3-L4 through S1-S2, moderate to severe bilateral neural foraminal narrowing, right greater than left, at L4-L5 and L5-S1, and severe left S1-S2 neural foraminal narrowing.    L4-L5 and L5-S1 disc spaces are obliterated secondary to the large mass.  L3-L4 disc space remains intact but there is partial loss of the cortical margins of the L4 superior endplate.    Conus medullaris is unremarkable, terminating at L1-L2.    Limited evaluation of the intra-abdominal structures is unremarkable.                                 Medical Decision Making:   History:   Old Medical Records: I decided to obtain old medical records.  Old Records Summarized:  "records from another hospital.  Initial Assessment:   Patient is a 60-year-old male with a history of HTN compliant with three HTN medication, arthritis, presents to [facility] ED from [facility] ED for neurosurgery consultation. CT at outside ED showed "extensive lytic lesions throughout and high-grade spinal canal stenosis about L4-5. Additional probable pathologic fractures through the left aspect of the sacrum".    Differential Diagnosis:   Includes but is not limited to metastatic disease, arthritis, spinal canal stenosis, strain, sprain.  Patient does not have any red flag symptoms to suggest cauda equina syndrome at this time.  Clinical Tests:   Radiological Study: Reviewed  ED Management:  Case was discussed with Neurosurgery who recommends obtaining MRIs of thoracic and lumbar spine.  They will evaluate and give recommendations.    Neurosurgery has evaluated patient. Refer to their note. No indication for urgent intervention at this time, however due to extent of patient's findings today, they recommend admission for oncology consultation and further workup.  Case was discussed with Hospital Medicine who will place in observation for further evaluation.  Other:   I have discussed this case with another health care provider.    I have reviewed patient's chart and discussed this case with my supervising MD.     Nemo Villa PA-C  Emergent Department  [facility]  Spectralink #[id number] or #[phone]                Attending Attestation:     Physician Attestation Statement for NP/PA:   I discussed this assessment and plan of this patient with the NP/PA, but I did not personally examine the patient. The face to face encounter was performed by the NP/PA.    Other NP/PA Attestation Additions:      Medical Decision Making: No clinical evidence of cauda equina syndrome.  Case discussed with Neurosurgery, admitted to Internal Medicine for malignancy workup.                           Clinical Impression:       " ICD-10-CM ICD-9-CM   1. Abnormal finding on imaging  R93.89 793.99   2. Metastatic disease  C79.9 199.1         Disposition:   Disposition: Placed in Observation  Condition: Stable     ED Disposition Condition    Observation                             Nemo Villa PA-C  09/14/20 0534       Gianna Rai MD  09/14/20 0549

## 2020-09-14 NOTE — HPI
Jaspreet comer is a 59 yo male with a PMH of HTN with no known oncologic history who presents to the ED for 1 month of back pain and muscle soreness to the lower extremities. He states that he has cramping pain to bilateral thighs and calves for about 1 month and back pain started 2 weeks ago without radiation to the midline lumbar back. Back pain is worse when sitting on the couch vs. Laying down, also somewhat worse when walking. He has no weakness in his legs and is ambulatory. No radicular component to his back pain.  No numbness or tingling in the lower extremities. No bowel or bladder incontinence. He has not had difficulty using his hand or coordinating movements of the upper extremities, no neck pain. Transferred to [facility] after CT L spine at OSH revealed lumbar-sacral mass lesion concerning for malignancy. Denies any blood thinner use.     Medical history significant for occasional smoking for about 2 years total, no drinking or illicit substances. No known cancers, did not receive a screening colonoscopy. Family history of 'bone cancer' in his father who passed away in 2016.

## 2020-09-14 NOTE — ED NOTES
The patient is awake, alert and acting age appropriately.  No apparent distress noted. Airway is open and patent.  Respirations with normal effort and rate noted. Explanation of care provided to patient. No needs at this time. Will continue to monitor.

## 2020-09-14 NOTE — PT/OT/SLP EVAL
Physical Therapy Evaluation and Discharge    Patient Name:  Jaspreet Walsh Jr.   MRN:  [id number]  Admit Date: 9/14/2020  Admitting Diagnosis:  Metastatic disease  Length of Stay: 0 days  Recent Surgery: * No surgery found *      Recommendations:     Discharge Recommendations:  home   Discharge Equipment Recommendations: Rolling walker   Barriers to discharge: None    Assessment:     Jaspreet Walsh Jr. is a 60 y.o. male admitted with a medical diagnosis of Metastatic disease. Patient was asleep in bed upon entering. Patient stated different living situation upon questioning than what  has on file. Patient demonstrated good functional mobility and strength in BLE and needed supervision with bed mobility and all transfers. Patient demonstrated good exercise endurance and dynamic standing balance while ambulating 320ft with rolling walker. Acute PT no longer needed for this patient.    Problem List: (None noted)  Rehab Prognosis: Good; patient would benefit from acute skilled PT services to address these deficits and reach maximum level of function.      Plan:     During this hospitalization, patient to be seen 1 x/week to address the identified rehab impairments via   and progress towards the established goals.    · Plan of Care Expires:  10/14/20    Subjective   Communicated with RN prior to session.  Patient found HOB elevated upon PT entry to room, agreeable to evaluation. Jaspreet Walsh Jr. was alone present during session.    Chief Complaint: Transfer (From [facility]. Newly diagnosed spinal lesions)    Patient/Family Comments/goals: To get better and return home  Pain/Comfort:  · Pain Rating 1: 2/10  · Location 1: back  · Pain Addressed 1: Reposition, Distraction  · Pain Rating Post-Intervention 1: 2/10    Living Environment:  Patient lives with alone in a single family home with 0 AHMET.   Prior Level of Function:   Patient reports being IND with mobility & with ADLs. Hand Dominance: right.  "Patient uses DME as follows: cane, straight. DME owned (not currently used): none.  Roles/Repsonsibilities:   Work: Employed. Drive: yes. Managing Medicines/Managing Home: yes.     Patient reports they will have assistance from Mother who lives close upon discharge.    Objective:   Patient found with: (No lines attatched)     General Precautions: Standard, Cardiac fall   Orthopedic Precautions:N/A   Braces: N/A   Oxygen Device: Room Air  Vitals: /87   Pulse 67   Temp 98.3 °F (36.8 °C) (Oral)   Resp 16   Ht 5' 7" (1.702 m)   Wt 95.6 kg (210 lb 12.2 oz)   SpO2 95%   BMI 33.01 kg/m²     Exams:  · Cognition:   · Alert and Cooperative  · AxOx4  · Command following: Follows multistep  commands  · Fluency: clear/fluent  · Hearing: Intact  · Vision:  Intact visual fields    · RLE ROM: WFL  · RLE Strength: WFL  · LLE ROM: WFL  · LLE Strength: WFL    Outcome Measures:  AM-PAC 6 CLICK MOBILITY  Turning over in bed (including adjusting bedclothes, sheets and blankets)?: 4  Sitting down on and standing up from a chair with arms (e.g., wheelchair, bedside commode, etc.): 4  Moving from lying on back to sitting on the side of the bed?: 4  Moving to and from a bed to a chair (including a wheelchair)?: 4  Need to walk in hospital room?: 4  Climbing 3-5 steps with a railing?: 4  Basic Mobility Total Score: 24     Functional Mobility:  Additional staff present: N/A  Bed Mobility:  · Rolling/Turning to Left: supervision  · Scooting to HOB via supine bridge: supervision  · Supine to Sit: supervision; HOB elevated  · Scooting anteriorly to EOB to have both feet planted on floor: supervision  · Sit to Supine: supervision; HOB elevated    Sitting Balance at Edge of Bed:   Assistance Level Required: Supervision   o Patient able to don foot ware while sitting EOB    Transfers:   · Sit <> Stand Transfer: supervision with no assistive device from EOB      Gait:   · Patient ambulated: 320ft   · Patient required: standy by " assistance  · Patient used: rolling walker  · Gait Pattern observed: 2-point gait  · Gait Deviation(s): steady gait  · Comments: Patient demonstrated good reciprocal gait pattern with good speed and no LOB      Therapeutic Activities, Exercises, & Education:   Patient educated on PT role/POC  Patient educated on importance of progressive mobility      Patient left HOB elevated with call button in reach and RN notified.    GOALS:   Multidisciplinary Problems     Physical Therapy Goals     Not on file          Multidisciplinary Problems (Resolved)        Problem: Physical Therapy Goal    Goal Priority Disciplines Outcome Goal Variances Interventions   Physical Therapy Goal   (Resolved)     PT, PT/OT Met                     History:     Past Medical History:   Diagnosis Date    Arthritis     Hypertension        Past Surgical History:   Procedure Laterality Date    KNEE SURGERY Left 06/2019       Time Tracking:     PT Received On: 09/14/20  PT Start Time: 1457     PT Stop Time: 1510  PT Total Time (min): 13 min     Billable Minutes: Evaluation 13    Ayala Berman PT, DPT  9/14/2020  [phone]

## 2020-09-14 NOTE — ASSESSMENT & PLAN NOTE
"Jaspreet Walsh is a 59 y/o M with a PMHx of uncontrolled HTN on three BP medications who was transferred from [facility] presented with back and L leg pain x1 month. CT scan concern for metastatic disease of the spine and spinal stenosis. 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 Father with hx of "bone cancer." No reported oncologic hx. Never had c-scope. Former smoker. Labs with T protein of 10.7, otherwise unremarkable. Imaging findings concerning for metastatic dz vs possible MM.      Plan:  -- consult heme/onc  -- neurosurgery on board  -- CT chest/abd/pelvis w/IV and po contrast   -- daily labs  -- MM workup     "

## 2020-09-14 NOTE — CARE UPDATE
--Imaging reviewed, remains full strength in lower extremities with subacute back pain  --No immediate plan for surgery, OK for diet  --Recommend IR transpedicular biopsy to further characterize hyperintense lumbar sacral mass with diffuse spread to vertebral bodies and bony structures  --Follow up hematology recommendations / lab work-up     Michel Wheeler MD  NSGY PGY III

## 2020-09-14 NOTE — NURSING
Pt is AAOx4, arrived on the unit, able to ambulate independently, BP is elevated, pt states he is anxious, med team 2 paged, made aware about /110, PRN medications will be ordered. Will recheck BP in a little bit.

## 2020-09-14 NOTE — HPI
"Jaspreet Walsh is a 61 y/o M with a PMHx of uncontrolled HTN on three BP medications who was transferred from [facility] for neurosurgery evaluation after CT scan revealed "extensive lytic lesions throughout with high grade spinal stenosis at L4-L5. Additional probable pathologic fractures through the left aspect of the sacrum". Pt states for the past month he has been experiencing intermittent sharp lower back pain and pain to the left lateral thigh. The back pain is exacerbated with sitting for prolonged periods and with walking. Tylenol often relieves the pain. Denies similar pain previously. No reported oncologic hx. His father  of "bone cancer" in his 80s, but no other known family hx of cancer. Denies any fevers, chills, weight loss, night sweats, CP, SOB, n/v, bowel or bladder incontinence, numbness, weakness, dizziness. Former smoker x10 years, approximately 2-3 cigarettes daily, quit 8 years ago. No EtOH or illicit drug use.    In the ED, vitals significant for SBP >200. Pertinent labs include total protein of 10.7. Neurosurgery consulted--recommended MRI thoracic and lumbar spine. 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  "

## 2020-09-14 NOTE — PLAN OF CARE
SW met with patient at bedside. Patient verified information on FS. Patient was calm and cooperative during dc planning assessment. Patient arrived from [facility]. Patient denies use of illicit drugs/ETOH. Patient lives at home with his spouse. There are no steps to enter the home. Patient has family supports at home at DC. Patient has transportation at DC. SW/CM to follow and assist with needs as indicated.     Con Patel Jr, MD  No Pharmacies Listed       09/14/20 6917   Discharge Assessment   Assessment Type Discharge Planning Assessment   Confirmed/corrected address and phone number on facesheet? Yes   Assessment information obtained from? Patient   Expected Length of Stay (days) 2   Communicated expected length of stay with patient/caregiver yes   Prior to hospitilization cognitive status: Alert/Oriented   Prior to hospitalization functional status: Independent;Assistive Equipment;Needs Assistance   Current cognitive status: Alert/Oriented   Current Functional Status: Independent;Assistive Equipment;Needs Assistance   Facility Arrived From: [facility]   Lives With spouse   Able to Return to Prior Arrangements yes   Is patient able to care for self after discharge? Yes   Patient's perception of discharge disposition home or selfcare   Patient currently being followed by outpatient case management? No   Patient currently receives any other outside agency services? No   Equipment Currently Used at Home cane, straight;wheelchair;walker, rolling   Do you have any problems affording any of your prescribed medications? No   Is the patient taking medications as prescribed? yes   Does the patient have transportation home? No  (TBD)   Transportation Anticipated family or friend will provide   Does the patient receive services at the [facility]? No   Discharge Plan A Home;Home with family   Discharge Plan B Home;Home with family   DME Needed Upon Discharge  cane, straight;walker,  rolling;wheelchair   Patient/Family in Agreement with Plan yes       Jo Ann Pérez, MALGORZATASW [facility]  [id number]

## 2020-09-14 NOTE — ED PROVIDER NOTES
Encounter Date: 9/13/2020       History     Chief Complaint   Patient presents with    Leg Pain     Pt reports left leg pain d/t extreme pain. Pt reports he was seen by his PCP on Friday and was told that he had arthritis. Pt was prescribed hydrocodone 7.5mg for pain and the medication is not helping.      Patient currently presents with concern regarding BLE pain.  This has been present for several weeks but has worsened over the past few days.  Patient notes he was seen recently by an orthopedist and was advised that this was likely the result of arthritis.  Patient reports that this pain shoots down the posterolateral aspects of both thighs and appears to worsen with hip flexion.        Review of patient's allergies indicates:  No Known Allergies  Past Medical History:   Diagnosis Date    Arthritis     Hypertension      Past Surgical History:   Procedure Laterality Date    KNEE SURGERY Left 06/2019     History reviewed. No pertinent family history.  Social History     Tobacco Use    Smoking status: Former Smoker   Substance Use Topics    Alcohol use: Not Currently    Drug use: Never     Review of Systems   Constitutional: Negative for chills and fever.   HENT: Negative for congestion.    Respiratory: Negative for chest tightness and shortness of breath.    Cardiovascular: Negative for chest pain and leg swelling.   Gastrointestinal: Negative for abdominal pain, constipation, diarrhea, nausea and vomiting.   Genitourinary: Negative for dysuria, frequency and urgency.   Musculoskeletal: Positive for back pain. Negative for neck pain.   Skin: Negative for color change and rash.   Allergic/Immunologic: Negative for immunocompromised state.   Neurological: Negative for weakness and numbness.   Hematological: Negative for adenopathy. Does not bruise/bleed easily.   All other systems reviewed and are negative.      Physical Exam     Initial Vitals [09/13/20 2229]   BP Pulse Resp Temp SpO2   (!) 270/130 64 18 98  "°F (36.7 °C) 100 %      MAP       --         Vitals:    09/13/20 2229 09/13/20 2231 09/13/20 2235 09/13/20 2303   BP: (!) 270/130 (!) 250/118 (!) 250/116 (!) 213/145   Pulse: 64 64 63 (!) 56   Resp: 18      Temp: 98 °F (36.7 °C)      TempSrc: Oral      SpO2: 100% 100% 100% 99%   Weight: 97.5 kg (215 lb)      Height: 5' 7" (1.702 m)       09/13/20 2306 09/13/20 2323 09/13/20 2332 09/14/20 0022   BP:  (!) 186/87 (!) 218/100 (S) (!) 236/102   Pulse:  (!) 59 (!) 59 62   Resp: 16   17   Temp:       TempSrc:       SpO2:  98% 98% 97%   Weight:       Height:        09/14/20 0025 09/14/20 0031 09/14/20 0050 09/14/20 0108   BP: (S) (!) 225/109 (!) 241/112  (!) 241/112   Pulse: 61 64  68   Resp: 17  17    Temp:       TempSrc:       SpO2: 99% 99%  98%   Weight:       Height:        09/14/20 0118   BP: (!) 227/97   Pulse: 77   Resp: 16   Temp:    TempSrc:    SpO2: 98%   Weight:    Height:          Physical Exam    Nursing note and vitals reviewed.  Constitutional: He appears well-developed and well-nourished. He is not diaphoretic. No distress.   HENT:   Head: Normocephalic and atraumatic.   Eyes: Conjunctivae are normal. No scleral icterus.   Neck: Neck supple. No JVD present.   Cardiovascular: Normal rate, regular rhythm, normal heart sounds and intact distal pulses.   Pulmonary/Chest: Breath sounds normal. No respiratory distress.   Abdominal: Soft. He exhibits no distension. There is no abdominal tenderness.   Musculoskeletal: Normal range of motion. No edema.   Neurological: He is alert and oriented to person, place, and time. He has normal strength. No sensory deficit. Coordination and gait normal. GCS score is 15. GCS eye subscore is 4. GCS verbal subscore is 5. GCS motor subscore is 6.   Skin: Skin is warm and dry. No rash noted.   Psychiatric: He has a normal mood and affect. His behavior is normal.       ED Course   Procedures  Labs Reviewed   CBC W/ AUTO DIFFERENTIAL - Abnormal; Notable for the following components: "       Result Value    Lymph% 48.8 (*)     All other components within normal limits   COMPREHENSIVE METABOLIC PANEL - Abnormal; Notable for the following components:    Total Protein 10.7 (*)     All other components within normal limits   SARS-COV-2 RNA AMPLIFICATION, QUAL          Imaging Results          CT Lumbar Spine Without Contrast (Final result)  Result time 09/13/20 23:43:13    Final result by Brigido Rodriges MD (09/13/20 23:43:13)                 Impression:      Extensive lytic lesions throughout the spine favored to represent metastatic disease throughout the thoracolumbar spine, sacrum, and iliac bones with additional metastatic lesions in the ribs bilaterally.    The L4 through S1 vertebral bodies are largely replaced.  Pathologic fractures through these areas are suspected although the minimal remaining cortex somewhat degrades evaluation.  Likely high-grade spinal canal stenosis about L4-5.    Additional probable pathologic fractures through the left aspect of the sacrum best seen on coronal imaging.    Consultation with neurosurgery and further evaluation could be considered.    COMMUNICATION  This critical result was discovered/received at 23:32.  The critical information above was relayed directly by me by telephone to Dr. Rosas on 09/13/2020 at 23:40.      Electronically signed by: Brigido Rodriges  Date:    09/13/2020  Time:    23:43             Narrative:    EXAMINATION:  CT LUMBAR SPINE WITHOUT CONTRAST    CLINICAL HISTORY:  Back pain or radiculopathy, > 6 wks;    TECHNIQUE:  Low-dose CT images obtained throughout the region of the lumbar spine.  Axial, sagittal and coronal reformations were performed.  Contrast was not administered.    COMPARISON:  None.    FINDINGS:  Extensive lytic lesions throughout this metastatic disease throughout the thoracolumbar spine, sacrum, and iliac bones with additional metastatic lesions in the ribs bilaterally.    The L4 through S1 vertebral bodies are  largely replaced.  Pathologic fractures through these areas are suspected although the minimal remaining cortex somewhat degrades evaluation. Likely high-grade spinal canal stenosis about L4-5.    Additional probable pathologic fractures through the left aspect of the sacrum best seen on coronal imaging.    Included portion of the lungs appear unremarkable.  Liver and kidneys without definite abnormality.  Bladder appears unremarkable.                                 Medical Decision Making:   ED Management:  All historical, clinical, radiographic, and laboratory findings were reviewed with the patient/family in detail along with the indications for transfer to an outside facility (rather than admission to our facility in [place]) secondary to need for Oncology and Neurosurgical consultation given the diagnosis of extensive spinal metastases.  All remaining questions and concerns were addressed at that time and the patient/family communicates understanding and agrees to proceed accordingly.  Similarly all pertinent details of the encounter were discussed with Dr Rai at [facility] via [facility] Coordinator Julia who agrees to accept the patient in transfer based on the needs/patient preferences outlined above.  Patient will be transferred by [facility] ambulance services.  Carlyle Rosas MD  8:52 AM                                 Clinical Impression:       ICD-10-CM ICD-9-CM   1. 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 Bilateral sciatica  M54.31 724.3    M54.32              ED Disposition Condition    Transfer to Another Facility Stable                            Carlyle Rosas MD  09/14/20 0856

## 2020-09-14 NOTE — SUBJECTIVE & OBJECTIVE
(Not in a hospital admission)      Review of patient's allergies indicates:  No Known Allergies    Past Medical History:   Diagnosis Date    Arthritis     Hypertension      Past Surgical History:   Procedure Laterality Date    KNEE SURGERY Left 06/2019     Family History     None        Tobacco Use    Smoking status: Former Smoker   Substance and Sexual Activity    Alcohol use: Not Currently    Drug use: Never    Sexual activity: Not on file     Review of Systems   Constitutional: Positive for activity change. Negative for appetite change.   HENT: Negative for congestion and dental problem.    Eyes: Negative for discharge and itching.   Respiratory: Negative for apnea and chest tightness.    Cardiovascular: Negative for chest pain and leg swelling.   Gastrointestinal: Negative for abdominal distention and abdominal pain.   Endocrine: Negative for cold intolerance and heat intolerance.   Genitourinary: Negative for difficulty urinating and dysuria.   Musculoskeletal: Positive for arthralgias and back pain. Negative for gait problem and joint swelling.   Allergic/Immunologic: Negative for environmental allergies and food allergies.   Neurological: Negative for dizziness and facial asymmetry.   Hematological: Negative for adenopathy. Does not bruise/bleed easily.   Psychiatric/Behavioral: Negative for agitation and behavioral problems.     Objective:     Weight: 97.5 kg (215 lb)  Body mass index is 33.67 kg/m².  Vital Signs (Most Recent):  Temp: 97.9 °F (36.6 °C) (09/14/20 0216)  Pulse: 87 (09/14/20 0443)  Resp: 16 (09/14/20 0433)  BP: (!) 158/91 (09/14/20 0443)  SpO2: 98 % (09/14/20 0443) Vital Signs (24h Range):  Temp:  [97.9 °F (36.6 °C)-98 °F (36.7 °C)] 97.9 °F (36.6 °C)  Pulse:  [56-96] 87  Resp:  [16-18] 16  SpO2:  [96 %-100 %] 98 %  BP: (158-270)/() 158/91                          Neurosurgery Physical Exam    General: well developed, well nourished, no distress.   Head: normocephalic,  atraumatic  Neurologic:   GCS 15, A+Ox3  CN 2-12 intact  Skin: Skin is warm, dry and intact.  Sensory: intact to light touch throughout    Strength  Deltoids Triceps Biceps Wrist Extension Wrist Flexion Hand    Upper: R 5/5 5/5 5/5 5/5 5/5 5/5    L 5/5 5/5 5/5 5/5 5/5 5/5     Iliopsoas Quadriceps Knee  Flexion Tibialis  anterior Gastro- cnemius EHL   Lower: R 5/5 5/5 5/5 5/5 5/5 5/5    L 5/5 5/5 5/5 5/5 5/5 5/5     Reflexes:   DTR: 2+upper extremities Candelaria's: Negative.  1+ Lower extremities  Clonus: Negative.   Rectal tone present    Cervical:   Midline TTP: Negative.     Thoracic:  Midline TTP: Negative.     Lumbar:  Midline TTP: Negative.                        Significant Labs:  Recent Labs   Lab 09/14/20  0055         K 4.5      CO2 25   BUN 20   CREATININE 1.08   CALCIUM 9.5     Recent Labs   Lab 09/14/20  0055   WBC 7.67   HGB 14.0   HCT 43.6        Recent Labs   Lab 09/14/20  0251   INR 1.1   APTT 24.6     Microbiology Results (last 7 days)     ** No results found for the last 168 hours. **        All pertinent labs from the last 24 hours have been reviewed.    Significant Diagnostics:  I have reviewed all pertinent imaging results/findings within the past 24 hours.

## 2020-09-14 NOTE — SUBJECTIVE & OBJECTIVE
Past Medical History:   Diagnosis Date    Arthritis     Hypertension        Past Surgical History:   Procedure Laterality Date    KNEE SURGERY Left 06/2019       Review of patient's allergies indicates:  No Known Allergies    Current Facility-Administered Medications on File Prior to Encounter   Medication    [COMPLETED] hydrALAZINE injection 10 mg    [COMPLETED] morphine injection 4 mg    [COMPLETED] morphine injection 4 mg    [COMPLETED] orphenadrine injection 60 mg    [DISCONTINUED] morphine injection 4 mg     Current Outpatient Medications on File Prior to Encounter   Medication Sig    atenoloL (TENORMIN) 100 MG tablet Take 100 mg by mouth once daily.    benazepriL (LOTENSIN) 20 MG tablet Take 20 mg by mouth once daily.    cloNIDine (CATAPRES) 0.3 MG tablet Take 0.3 mg by mouth every evening.    HYDROcodone-acetaminophen (NORCO) 7.5-325 mg per tablet Take 1 tablet by mouth 4 (four) times daily as needed for Pain.     Family History     None        Tobacco Use    Smoking status: Former Smoker   Substance and Sexual Activity    Alcohol use: Not Currently    Drug use: Never    Sexual activity: Not on file     Review of Systems   Constitutional: Negative for activity change, appetite change, chills, diaphoresis, fatigue, fever and unexpected weight change.   Eyes: Negative for visual disturbance.   Respiratory: Negative for shortness of breath.    Cardiovascular: Negative for chest pain.   Gastrointestinal: Negative for abdominal pain, diarrhea, nausea and vomiting.   Genitourinary: Negative for decreased urine volume and difficulty urinating.   Musculoskeletal: Positive for arthralgias, back pain and myalgias. Negative for gait problem, joint swelling, neck pain and neck stiffness.   Neurological: Negative for dizziness, speech difficulty, weakness, light-headedness, numbness and headaches.   Hematological: Negative for adenopathy.     Objective:     Vital Signs (Most Recent):  Temp: 97.9 °F (36.6  °C) (09/14/20 0216)  Pulse: 84 (09/14/20 0622)  Resp: 16 (09/14/20 0433)  BP: (!) 176/91 (09/14/20 0622)  SpO2: 96 % (09/14/20 0622) Vital Signs (24h Range):  Temp:  [97.9 °F (36.6 °C)-98 °F (36.7 °C)] 97.9 °F (36.6 °C)  Pulse:  [56-96] 84  Resp:  [16-18] 16  SpO2:  [95 %-100 %] 96 %  BP: (158-270)/() 176/91     Weight: 97.5 kg (215 lb)  Body mass index is 33.67 kg/m².    Physical Exam  Vitals signs reviewed.   Constitutional:       General: He is not in acute distress.     Appearance: He is not ill-appearing, toxic-appearing or diaphoretic.   HENT:      Head: Normocephalic.      Nose: Nose normal.      Mouth/Throat:      Mouth: Mucous membranes are moist.   Eyes:      Extraocular Movements: Extraocular movements intact.      Pupils: Pupils are equal, round, and reactive to light.   Neck:      Musculoskeletal: Normal range of motion.   Cardiovascular:      Rate and Rhythm: Normal rate.      Pulses: Normal pulses.      Heart sounds: No murmur. No friction rub. No gallop.    Pulmonary:      Effort: Pulmonary effort is normal. No respiratory distress.      Breath sounds: Normal breath sounds.   Abdominal:      General: Bowel sounds are normal. There is no distension.      Palpations: Abdomen is soft. There is no mass.      Tenderness: There is no abdominal tenderness. There is no guarding or rebound.      Hernia: No hernia is present.   Musculoskeletal: Normal range of motion.         General: No swelling, tenderness, deformity or signs of injury.   Skin:     General: Skin is warm.      Findings: No rash.   Neurological:      Mental Status: He is alert and oriented to person, place, and time. Mental status is at baseline.      Cranial Nerves: No cranial nerve deficit.      Sensory: No sensory deficit.           CRANIAL NERVES     CN III, IV, VI   Pupils are equal, round, and reactive to light.       Significant Labs:   CBC:   Recent Labs   Lab 09/14/20  0055   WBC 7.67   HGB 14.0   HCT 43.6        CMP:    Recent Labs   Lab 09/14/20  0055      K 4.5      CO2 25      BUN 20   CREATININE 1.08   CALCIUM 9.5   PROT 10.7*   ALBUMIN 4.4   BILITOT 0.9   ALKPHOS 81   AST 36   ALT 17   ANIONGAP 10   EGFRNONAA >60.0       Significant Imaging:   CT LUMBAR SPINE WITHOUT CONTRAST     CLINICAL HISTORY:  Back pain or radiculopathy, > 6 wks;     TECHNIQUE:  Low-dose CT images obtained throughout the region of the lumbar spine.  Axial, sagittal and coronal reformations were performed.  Contrast was not administered.     COMPARISON:  None.     FINDINGS:  Extensive lytic lesions throughout this metastatic disease throughout the thoracolumbar spine, sacrum, and iliac bones with additional metastatic lesions in the ribs bilaterally.     The L4 through S1 vertebral bodies are largely replaced.  Pathologic fractures through these areas are suspected although the minimal remaining cortex somewhat degrades evaluation. Likely high-grade spinal canal stenosis about L4-5.     Additional probable pathologic fractures through the left aspect of the sacrum best seen on coronal imaging.     Included portion of the lungs appear unremarkable.  Liver and kidneys without definite abnormality.  Bladder appears unremarkable.     Impression:     Extensive lytic lesions throughout the spine favored to represent metastatic disease throughout the thoracolumbar spine, sacrum, and iliac bones with additional metastatic lesions in the ribs bilaterally.     The L4 through S1 vertebral bodies are largely replaced.  Pathologic fractures through these areas are suspected although the minimal remaining cortex somewhat degrades evaluation.  Likely high-grade spinal canal stenosis about L4-5.     Additional probable pathologic fractures through the left aspect of the sacrum best seen on coronal imaging.     Consultation with neurosurgery and further evaluation could be considered.     COMMUNICATION  This critical result was discovered/received at  23:32.  The critical information above was relayed directly by me by telephone to Dr. Rosas on 09/13/2020 at 23:40.        Electronically signed by: Brigido Rodriges  Date:                                            09/13/2020  Time:                                           23:43      MRI THORACIC SPINE W WO CONTRAST; MRI LUMBAR SPINE W WO CONTRAST     CLINICAL HISTORY:  concern for metastatic disease;; L/S-spine canal stenosis;Cancer of unknown primary, assess treatment response;     TECHNIQUE:  Multiplanar, multisequence MR images were acquired of the thoracic and lumbar spine before and after the intravenous administration of 10 cc of Gadavist contrast.     COMPARISON:  CT lumbar spine 09/13/2020     FINDINGS:  THORACIC SPINE:     Thoracic alignment is within normal limits.  No significant spondylolisthesis.  There is diffuse marrow heterogeneity and numerous enhancing lesions scattered throughout the thoracic vertebral bodies including the posterior elements.  No evidence of acute fracture.  Multilevel degenerative disc disease including disc desiccation and moderate loss of disc height space most prominent at T3-T4 through T9-T10.     There is a nonenhancing, ill-defined heterogeneous collection of increased T2/decreased T1 signal within the intradural extramedullary space posterior to the cord extending from approximately T3 through T8 resulting in ventral displacement of the spinal cord at these levels.  Mass effect is most prominent on the posterior aspect of the cord at T7 associated with subtle increased T2 signal within the central cord proximally at T6-T7.     No significant spinal canal stenosis elsewhere.  No neural foraminal narrowing.     Limited evaluation of the intrathoracic structures is unremarkable.     LUMBAR SPINE:     Diffuse marrow heterogeneity and numerous enhancing lesions throughout the lumbar and visualized sacral spine and pelvis.  There is a large enhancing soft tissue component  involving essentially the entire L4 through S1 vertebral bodies with extension into the left sacral ala, posterior elements, and anterior epidural space at these levels.  There is also encroachment of the neural foramina and extension into the prevertebral soft tissues.  Mass results in severe spinal canal stenosis extending from L3-L4 through S1-S2, moderate to severe bilateral neural foraminal narrowing, right greater than left, at L4-L5 and L5-S1, and severe left S1-S2 neural foraminal narrowing.     L4-L5 and L5-S1 disc spaces are obliterated secondary to the large mass.  L3-L4 disc space remains intact but there is partial loss of the cortical margins of the L4 superior endplate.     Conus medullaris is unremarkable, terminating at L1-L2.     Limited evaluation of the intra-abdominal structures is unremarkable.     Impression:     Significant bone marrow heterogeneity and numerous enhancing lesions throughout the thoracic and lumbar spine, as well as the visualized sacrum and pelvis, concerning for diffuse metastatic disease.     A large soft tissue mass encompasses almost the entire L4 through S1 vertebral bodies and to a lesser degree the posterior elements resulting in severe spinal canal stenosis and variable moderate to severe neural foraminal narrowing, as detailed above.     There is an ill-defined heterogeneouscollection within the posterior aspect of the intradural extramedullary space extending approximately from T3 through T8 resulting in mass effect on the cord, most prominent at T7 associated with increased cord edema signal at T6-T7.  No associated enhancement.  Findings are favored to reflect a subacute to chronic hematoma.     COMMUNICATION  This critical result was discovered/received at 04:30.  The critical information above was relayed directly by Dr. Ryland MD by telephone to Michel Calderon MD on 09/14/2020 at 04:46.     Electronically signed by resident: Meaghan Marcelino  Date:                                             09/14/2020  Time:                                           03:58     Electronically signed by: Kaleb Townsend MD  Date:                                            09/14/2020  Time:                                           04:57    MRI THORACIC SPINE W WO CONTRAST; MRI LUMBAR SPINE W WO CONTRAST     CLINICAL HISTORY:  concern for metastatic disease;; L/S-spine canal stenosis;Cancer of unknown primary, assess treatment response;     TECHNIQUE:  Multiplanar, multisequence MR images were acquired of the thoracic and lumbar spine before and after the intravenous administration of 10 cc of Gadavist contrast.     COMPARISON:  CT lumbar spine 09/13/2020     FINDINGS:  THORACIC SPINE:     Thoracic alignment is within normal limits.  No significant spondylolisthesis.  There is diffuse marrow heterogeneity and numerous enhancing lesions scattered throughout the thoracic vertebral bodies including the posterior elements.  No evidence of acute fracture.  Multilevel degenerative disc disease including disc desiccation and moderate loss of disc height space most prominent at T3-T4 through T9-T10.     There is a nonenhancing, ill-defined heterogeneous collection of increased T2/decreased T1 signal within the intradural extramedullary space posterior to the cord extending from approximately T3 through T8 resulting in ventral displacement of the spinal cord at these levels.  Mass effect is most prominent on the posterior aspect of the cord at T7 associated with subtle increased T2 signal within the central cord proximally at T6-T7.     No significant spinal canal stenosis elsewhere.  No neural foraminal narrowing.     Limited evaluation of the intrathoracic structures is unremarkable.     LUMBAR SPINE:     Diffuse marrow heterogeneity and numerous enhancing lesions throughout the lumbar and visualized sacral spine and pelvis.  There is a large enhancing soft tissue component involving essentially  the entire L4 through S1 vertebral bodies with extension into the left sacral ala, posterior elements, and anterior epidural space at these levels.  There is also encroachment of the neural foramina and extension into the prevertebral soft tissues.  Mass results in severe spinal canal stenosis extending from L3-L4 through S1-S2, moderate to severe bilateral neural foraminal narrowing, right greater than left, at L4-L5 and L5-S1, and severe left S1-S2 neural foraminal narrowing.     L4-L5 and L5-S1 disc spaces are obliterated secondary to the large mass.  L3-L4 disc space remains intact but there is partial loss of the cortical margins of the L4 superior endplate.     Conus medullaris is unremarkable, terminating at L1-L2.     Limited evaluation of the intra-abdominal structures is unremarkable.     Impression:     Significant bone marrow heterogeneity and numerous enhancing lesions throughout the thoracic and lumbar spine, as well as the visualized sacrum and pelvis, concerning for diffuse metastatic disease.     A large soft tissue mass encompasses almost the entire L4 through S1 vertebral bodies and to a lesser degree the posterior elements resulting in severe spinal canal stenosis and variable moderate to severe neural foraminal narrowing, as detailed above.     There is an ill-defined heterogeneouscollection within the posterior aspect of the intradural extramedullary space extending approximately from T3 through T8 resulting in mass effect on the cord, most prominent at T7 associated with increased cord edema signal at T6-T7.  No associated enhancement.  Findings are favored to reflect a subacute to chronic hematoma.     COMMUNICATION  This critical result was discovered/received at 04:30.  The critical information above was relayed directly by Dr. Ryland MD by telephone to Michel Calderon MD on 09/14/2020 at 04:46.     Electronically signed by resident: Meaghan Marcelino  Date:                                             09/14/2020  Time:                                           03:58     Electronically signed by: Kaleb Townsend MD  Date:                                            09/14/2020  Time:                                           04:57

## 2020-09-14 NOTE — PLAN OF CARE
Problem: Physical Therapy Goal  Goal: Physical Therapy Goal  Outcome: Met     Eval completed. Acute PT no longer needed.

## 2020-09-14 NOTE — H&P
"[facility] Medicine  History & Physical    Patient Name: Jaspreet Walsh Jr.  MRN: [id number]  Admission Date: 2020  Attending Physician: Gianna Rai MD   Primary Care Provider: Con Patel Jr, MD    [facility] Medicine Team: [facility] HOSP MED 2 Peace Smith MD     Patient information was obtained from patient and ER records.     Subjective:     Principal Problem:Metastatic disease    Chief Complaint:   Chief Complaint   Patient presents with    Transfer     From [facility]. Newly diagnosed spinal lesions        HPI: Jaspreet Walsh is a 61 y/o M with a PMHx of uncontrolled HTN on three BP medications who was transferred from [facility] for neurosurgery evaluation after CT scan revealed "extensive lytic lesions throughout with high grade spinal stenosis at L4-L5. Additional probable pathologic fractures through the left aspect of the sacrum". Pt states for the past month he has been experiencing intermittent sharp lower back pain and pain to the left lateral thigh. The back pain is exacerbated with sitting for prolonged periods and with walking. Tylenol often relieves the pain. Denies similar pain previously. No reported oncologic hx. His father  of "bone cancer" in his 80s, but no other known family hx of cancer. Denies any fevers, chills, weight loss, night sweats, CP, SOB, n/v, bowel or bladder incontinence, numbness, weakness, dizziness. Former smoker x10 years, approximately 2-3 cigarettes daily, quit 8 years ago. No EtOH or illicit drug use.    In the ED, vitals significant for SBP >200, treated with labetalol 10 mg x2, hydralazine 10 mg x1, home dose clonidine x1. Pertinent labs include total protein of 10.7. Neurosurgery consulted--recommended MRI thoracic and lumbar spine. 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    Past Medical History: "   Diagnosis Date    Arthritis     Hypertension        Past Surgical History:   Procedure Laterality Date    KNEE SURGERY Left 06/2019       Review of patient's allergies indicates:  No Known Allergies    Current Facility-Administered Medications on File Prior to Encounter   Medication    [COMPLETED] hydrALAZINE injection 10 mg    [COMPLETED] morphine injection 4 mg    [COMPLETED] morphine injection 4 mg    [COMPLETED] orphenadrine injection 60 mg    [DISCONTINUED] morphine injection 4 mg     Current Outpatient Medications on File Prior to Encounter   Medication Sig    atenoloL (TENORMIN) 100 MG tablet Take 100 mg by mouth once daily.    benazepriL (LOTENSIN) 20 MG tablet Take 20 mg by mouth once daily.    cloNIDine (CATAPRES) 0.3 MG tablet Take 0.3 mg by mouth every evening.    HYDROcodone-acetaminophen (NORCO) 7.5-325 mg per tablet Take 1 tablet by mouth 4 (four) times daily as needed for Pain.     Family History     None        Tobacco Use    Smoking status: Former Smoker   Substance and Sexual Activity    Alcohol use: Not Currently    Drug use: Never    Sexual activity: Not on file     Review of Systems   Constitutional: Negative for activity change, appetite change, chills, diaphoresis, fatigue, fever and unexpected weight change.   Eyes: Negative for visual disturbance.   Respiratory: Negative for shortness of breath.    Cardiovascular: Negative for chest pain.   Gastrointestinal: Negative for abdominal pain, diarrhea, nausea and vomiting.   Genitourinary: Negative for decreased urine volume and difficulty urinating.   Musculoskeletal: Positive for arthralgias, back pain and myalgias. Negative for gait problem, joint swelling, neck pain and neck stiffness.   Neurological: Negative for dizziness, speech difficulty, weakness, light-headedness, numbness and headaches.   Hematological: Negative for adenopathy.     Objective:     Vital Signs (Most Recent):  Temp: 97.9 °F (36.6 °C) (09/14/20  0216)  Pulse: 84 (09/14/20 0622)  Resp: 16 (09/14/20 0433)  BP: (!) 176/91 (09/14/20 0622)  SpO2: 96 % (09/14/20 0622) Vital Signs (24h Range):  Temp:  [97.9 °F (36.6 °C)-98 °F (36.7 °C)] 97.9 °F (36.6 °C)  Pulse:  [56-96] 84  Resp:  [16-18] 16  SpO2:  [95 %-100 %] 96 %  BP: (158-270)/() 176/91     Weight: 97.5 kg (215 lb)  Body mass index is 33.67 kg/m².    Physical Exam  Vitals signs reviewed.   Constitutional:       General: He is not in acute distress.     Appearance: He is not ill-appearing, toxic-appearing or diaphoretic.   HENT:      Head: Normocephalic.      Nose: Nose normal.      Mouth/Throat:      Mouth: Mucous membranes are moist.   Eyes:      Extraocular Movements: Extraocular movements intact. Arcus senilis.      Pupils: Pupils are equal, round, and reactive to light.   Neck:      Musculoskeletal: Normal range of motion.   Cardiovascular:      Rate and Rhythm: Normal rate.      Pulses: Normal pulses.      Heart sounds: No murmur. No friction rub. No gallop.    Pulmonary:      Effort: Pulmonary effort is normal. No respiratory distress.      Breath sounds: Normal breath sounds.   Abdominal:      General: Bowel sounds are normal. There is no distension.      Palpations: Abdomen is soft. There is no mass.      Tenderness: There is no abdominal tenderness. There is no guarding or rebound.      Hernia: No hernia is present.   Musculoskeletal: Normal range of motion.         General: No swelling, tenderness, deformity or signs of injury.   Skin:     General: Skin is warm.      Findings: No rash.   Neurological:      Mental Status: He is alert and oriented to person, place, and time. Mental status is at baseline.      Cranial Nerves: No cranial nerve deficit.      Sensory: No sensory deficit.           CRANIAL NERVES     CN III, IV, VI   Pupils are equal, round, and reactive to light.       Significant Labs:   CBC:   Recent Labs   Lab 09/14/20  0055   WBC 7.67   HGB 14.0   HCT 43.6        CMP:    Recent Labs   Lab 09/14/20  0055      K 4.5      CO2 25      BUN 20   CREATININE 1.08   CALCIUM 9.5   PROT 10.7*   ALBUMIN 4.4   BILITOT 0.9   ALKPHOS 81   AST 36   ALT 17   ANIONGAP 10   EGFRNONAA >60.0       Significant Imaging:   CT LUMBAR SPINE WITHOUT CONTRAST     CLINICAL HISTORY:  Back pain or radiculopathy, > 6 wks;     TECHNIQUE:  Low-dose CT images obtained throughout the region of the lumbar spine.  Axial, sagittal and coronal reformations were performed.  Contrast was not administered.     COMPARISON:  None.     FINDINGS:  Extensive lytic lesions throughout this metastatic disease throughout the thoracolumbar spine, sacrum, and iliac bones with additional metastatic lesions in the ribs bilaterally.     The L4 through S1 vertebral bodies are largely replaced.  Pathologic fractures through these areas are suspected although the minimal remaining cortex somewhat degrades evaluation. Likely high-grade spinal canal stenosis about L4-5.     Additional probable pathologic fractures through the left aspect of the sacrum best seen on coronal imaging.     Included portion of the lungs appear unremarkable.  Liver and kidneys without definite abnormality.  Bladder appears unremarkable.     Impression:     Extensive lytic lesions throughout the spine favored to represent metastatic disease throughout the thoracolumbar spine, sacrum, and iliac bones with additional metastatic lesions in the ribs bilaterally.     The L4 through S1 vertebral bodies are largely replaced.  Pathologic fractures through these areas are suspected although the minimal remaining cortex somewhat degrades evaluation.  Likely high-grade spinal canal stenosis about L4-5.     Additional probable pathologic fractures through the left aspect of the sacrum best seen on coronal imaging.     Consultation with neurosurgery and further evaluation could be considered.     COMMUNICATION  This critical result was discovered/received at  23:32.  The critical information above was relayed directly by me by telephone to Dr. Rosas on 09/13/2020 at 23:40.        Electronically signed by: Brigido Rodriges  Date:                                            09/13/2020  Time:                                           23:43      MRI THORACIC SPINE W WO CONTRAST; MRI LUMBAR SPINE W WO CONTRAST     CLINICAL HISTORY:  concern for metastatic disease;; L/S-spine canal stenosis;Cancer of unknown primary, assess treatment response;     TECHNIQUE:  Multiplanar, multisequence MR images were acquired of the thoracic and lumbar spine before and after the intravenous administration of 10 cc of Gadavist contrast.     COMPARISON:  CT lumbar spine 09/13/2020     FINDINGS:  THORACIC SPINE:     Thoracic alignment is within normal limits.  No significant spondylolisthesis.  There is diffuse marrow heterogeneity and numerous enhancing lesions scattered throughout the thoracic vertebral bodies including the posterior elements.  No evidence of acute fracture.  Multilevel degenerative disc disease including disc desiccation and moderate loss of disc height space most prominent at T3-T4 through T9-T10.     There is a nonenhancing, ill-defined heterogeneous collection of increased T2/decreased T1 signal within the intradural extramedullary space posterior to the cord extending from approximately T3 through T8 resulting in ventral displacement of the spinal cord at these levels.  Mass effect is most prominent on the posterior aspect of the cord at T7 associated with subtle increased T2 signal within the central cord proximally at T6-T7.     No significant spinal canal stenosis elsewhere.  No neural foraminal narrowing.     Limited evaluation of the intrathoracic structures is unremarkable.     LUMBAR SPINE:     Diffuse marrow heterogeneity and numerous enhancing lesions throughout the lumbar and visualized sacral spine and pelvis.  There is a large enhancing soft tissue component  involving essentially the entire L4 through S1 vertebral bodies with extension into the left sacral ala, posterior elements, and anterior epidural space at these levels.  There is also encroachment of the neural foramina and extension into the prevertebral soft tissues.  Mass results in severe spinal canal stenosis extending from L3-L4 through S1-S2, moderate to severe bilateral neural foraminal narrowing, right greater than left, at L4-L5 and L5-S1, and severe left S1-S2 neural foraminal narrowing.     L4-L5 and L5-S1 disc spaces are obliterated secondary to the large mass.  L3-L4 disc space remains intact but there is partial loss of the cortical margins of the L4 superior endplate.     Conus medullaris is unremarkable, terminating at L1-L2.     Limited evaluation of the intra-abdominal structures is unremarkable.     Impression:     Significant bone marrow heterogeneity and numerous enhancing lesions throughout the thoracic and lumbar spine, as well as the visualized sacrum and pelvis, concerning for diffuse metastatic disease.     A large soft tissue mass encompasses almost the entire L4 through S1 vertebral bodies and to a lesser degree the posterior elements resulting in severe spinal canal stenosis and variable moderate to severe neural foraminal narrowing, as detailed above.     There is an ill-defined heterogeneouscollection within the posterior aspect of the intradural extramedullary space extending approximately from T3 through T8 resulting in mass effect on the cord, most prominent at T7 associated with increased cord edema signal at T6-T7.  No associated enhancement.  Findings are favored to reflect a subacute to chronic hematoma.     COMMUNICATION  This critical result was discovered/received at 04:30.  The critical information above was relayed directly by Dr. Ryland MD by telephone to Michel Calderon MD on 09/14/2020 at 04:46.     Electronically signed by resident: Meaghan Marcelino  Date:                                             09/14/2020  Time:                                           03:58     Electronically signed by: Kaleb Townsend MD  Date:                                            09/14/2020  Time:                                           04:57    MRI THORACIC SPINE W WO CONTRAST; MRI LUMBAR SPINE W WO CONTRAST     CLINICAL HISTORY:  concern for metastatic disease;; L/S-spine canal stenosis;Cancer of unknown primary, assess treatment response;     TECHNIQUE:  Multiplanar, multisequence MR images were acquired of the thoracic and lumbar spine before and after the intravenous administration of 10 cc of Gadavist contrast.     COMPARISON:  CT lumbar spine 09/13/2020     FINDINGS:  THORACIC SPINE:     Thoracic alignment is within normal limits.  No significant spondylolisthesis.  There is diffuse marrow heterogeneity and numerous enhancing lesions scattered throughout the thoracic vertebral bodies including the posterior elements.  No evidence of acute fracture.  Multilevel degenerative disc disease including disc desiccation and moderate loss of disc height space most prominent at T3-T4 through T9-T10.     There is a nonenhancing, ill-defined heterogeneous collection of increased T2/decreased T1 signal within the intradural extramedullary space posterior to the cord extending from approximately T3 through T8 resulting in ventral displacement of the spinal cord at these levels.  Mass effect is most prominent on the posterior aspect of the cord at T7 associated with subtle increased T2 signal within the central cord proximally at T6-T7.     No significant spinal canal stenosis elsewhere.  No neural foraminal narrowing.     Limited evaluation of the intrathoracic structures is unremarkable.     LUMBAR SPINE:     Diffuse marrow heterogeneity and numerous enhancing lesions throughout the lumbar and visualized sacral spine and pelvis.  There is a large enhancing soft tissue component involving essentially  the entire L4 through S1 vertebral bodies with extension into the left sacral ala, posterior elements, and anterior epidural space at these levels.  There is also encroachment of the neural foramina and extension into the prevertebral soft tissues.  Mass results in severe spinal canal stenosis extending from L3-L4 through S1-S2, moderate to severe bilateral neural foraminal narrowing, right greater than left, at L4-L5 and L5-S1, and severe left S1-S2 neural foraminal narrowing.     L4-L5 and L5-S1 disc spaces are obliterated secondary to the large mass.  L3-L4 disc space remains intact but there is partial loss of the cortical margins of the L4 superior endplate.     Conus medullaris is unremarkable, terminating at L1-L2.     Limited evaluation of the intra-abdominal structures is unremarkable.     Impression:     Significant bone marrow heterogeneity and numerous enhancing lesions throughout the thoracic and lumbar spine, as well as the visualized sacrum and pelvis, concerning for diffuse metastatic disease.     A large soft tissue mass encompasses almost the entire L4 through S1 vertebral bodies and to a lesser degree the posterior elements resulting in severe spinal canal stenosis and variable moderate to severe neural foraminal narrowing, as detailed above.     There is an ill-defined heterogeneouscollection within the posterior aspect of the intradural extramedullary space extending approximately from T3 through T8 resulting in mass effect on the cord, most prominent at T7 associated with increased cord edema signal at T6-T7.  No associated enhancement.  Findings are favored to reflect a subacute to chronic hematoma.     COMMUNICATION  This critical result was discovered/received at 04:30.  The critical information above was relayed directly by Dr. Ryland MD by telephone to Michel Calderon MD on 09/14/2020 at 04:46.     Electronically signed by resident: Meaghan Marcelino  Date:                                   "          09/14/2020  Time:                                           03:58     Electronically signed by: Kaleb Townsend MD  Date:                                            09/14/2020  Time:                                           04:57    Assessment/Plan:     * Metastatic disease  Jaspreet Walsh is a 59 y/o M with a PMHx of uncontrolled HTN on three BP medications who was transferred from [facility] presented with back and L leg pain x1 month. CT scan concern for metastatic disease of the spine and spinal stenosis. 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 Father with hx of "bone cancer." No reported oncologic hx. Never had c-scope. Former smoker. Labs with T protein of 10.7, otherwise unremarkable. Imaging findings concerning for metastatic dz vs possible MM.      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    -- no need for emergent surgical intervention   -- recommend heme/onc consult   -- recommend CT chest/abd/plevis with po and IV contrast     Plan:  -- f/u neurosurgery recs  -- keep npo    Essential hypertension  -- hx of uncontrolled HTN  -- on atenolol, benazepril and clonidine  -- compliant with meds  -- SBP >200 in ED, given labetalol 10 " mg x2, hydralazine 10 mg x1, home dose clonidine x1      Plan:  -- resume home BP meds  -- increased benazepril from 20 mg->40 mg   -- consider alternative to clonidine as can cause rebound HTN        VTE Risk Mitigation (From admission, onward)         Ordered     enoxaparin injection 40 mg  Every 24 hours      09/14/20 0618     IP VTE HIGH RISK PATIENT  Once      09/14/20 0618     Place sequential compression device  Until discontinued      09/14/20 0618                   Peace Smith MD  Department of Hospital Medicine   [facility]

## 2020-09-14 NOTE — ED TRIAGE NOTES
Pt transferred from [facility] via [facility] for Neurosurgery Consult. Pt c/o bilat lower ext pain and weakness. Pt with newly diagnosed Extensive Lumbar Metastasis with pathologic fractures of L4, L5, S1 and worsening neuropathic pain.

## 2020-09-14 NOTE — ASSESSMENT & PLAN NOTE
Jaspreet Walsh is a 61 yo male with PMH HTN who presents with 1 month of LE cramping and 2 weeks of back pain; full strength without numbness or b/b symptoms on exam, rectal tone present. CT L Spine with extensive lytic destruction of vertebral bodies with large L4-S1 mass involving the vertebral body as well as transverse processes and left posterior aspects of spinal canal extending to the sacrum. 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     --Admit to medicine with hematology consult for further work-up  --Full pre-operative labs  --Obtain CT C/A/P for further work-up; follow up hematology recommendations and lab work-up  --No emergent neurosurgical intervention, keep NPO at this time  D/w attending staff, Dr. Khan

## 2020-09-15 VITALS
HEIGHT: 67 IN | RESPIRATION RATE: 20 BRPM | WEIGHT: 215 LBS | TEMPERATURE: 97 F | BODY MASS INDEX: 33.74 KG/M2 | DIASTOLIC BLOOD PRESSURE: 89 MMHG | SYSTOLIC BLOOD PRESSURE: 140 MMHG | OXYGEN SATURATION: 96 % | HEART RATE: 77 BPM

## 2020-09-15 DIAGNOSIS — C90.00 MULTIPLE MYELOMA, REMISSION STATUS UNSPECIFIED: Primary | ICD-10-CM

## 2020-09-15 PROBLEM — F41.9 ANXIETY: Status: ACTIVE | Noted: 2020-09-15

## 2020-09-15 PROBLEM — E88.09 PLASMA CELL DYSCRASIA: Status: ACTIVE | Noted: 2020-09-15

## 2020-09-15 LAB
ALBUMIN SERPL BCP-MCNC: 3.5 G/DL (ref 3.5–5.2)
ALBUMIN SERPL ELPH-MCNC: 4.17 G/DL (ref 3.35–5.55)
ALP SERPL-CCNC: 74 U/L (ref 55–135)
ALPHA1 GLOB SERPL ELPH-MCNC: 0.33 G/DL (ref 0.17–0.41)
ALPHA2 GLOB SERPL ELPH-MCNC: 0.73 G/DL (ref 0.43–0.99)
ALT SERPL W/O P-5'-P-CCNC: 10 U/L (ref 10–44)
ANION GAP SERPL CALC-SCNC: 8 MMOL/L (ref 8–16)
AST SERPL-CCNC: 17 U/L (ref 10–40)
B-GLOBULIN SERPL ELPH-MCNC: 0.87 G/DL (ref 0.5–1.1)
BASOPHILS # BLD AUTO: 0.03 K/UL (ref 0–0.2)
BASOPHILS NFR BLD: 0.5 % (ref 0–1.9)
BILIRUB SERPL-MCNC: 0.9 MG/DL (ref 0.1–1)
BUN SERPL-MCNC: 15 MG/DL (ref 8–23)
CALCIUM SERPL-MCNC: 9.7 MG/DL (ref 8.7–10.5)
CHLORIDE SERPL-SCNC: 99 MMOL/L (ref 95–110)
CO2 SERPL-SCNC: 27 MMOL/L (ref 23–29)
CREAT SERPL-MCNC: 1 MG/DL (ref 0.5–1.4)
DIFFERENTIAL METHOD: ABNORMAL
EOSINOPHIL # BLD AUTO: 0.1 K/UL (ref 0–0.5)
EOSINOPHIL NFR BLD: 0.8 % (ref 0–8)
ERYTHROCYTE [DISTWIDTH] IN BLOOD BY AUTOMATED COUNT: 13.6 % (ref 11.5–14.5)
EST. GFR  (AFRICAN AMERICAN): >60 ML/MIN/1.73 M^2
EST. GFR  (NON AFRICAN AMERICAN): >60 ML/MIN/1.73 M^2
GAMMA GLOB SERPL ELPH-MCNC: 3.2 G/DL (ref 0.67–1.58)
GLUCOSE SERPL-MCNC: 100 MG/DL (ref 70–110)
HCT VFR BLD AUTO: 42.5 % (ref 40–54)
HGB BLD-MCNC: 13.4 G/DL (ref 14–18)
IMM GRANULOCYTES # BLD AUTO: 0.02 K/UL (ref 0–0.04)
IMM GRANULOCYTES NFR BLD AUTO: 0.3 % (ref 0–0.5)
KAPPA LC SER QL IA: 104.43 MG/DL (ref 0.33–1.94)
KAPPA LC/LAMBDA SER IA: 171.2 (ref 0.26–1.65)
LAMBDA LC SER QL IA: 0.61 MG/DL (ref 0.57–2.63)
LYMPHOCYTES # BLD AUTO: 2.8 K/UL (ref 1–4.8)
LYMPHOCYTES NFR BLD: 43.4 % (ref 18–48)
MAGNESIUM SERPL-MCNC: 1.9 MG/DL (ref 1.6–2.6)
MCH RBC QN AUTO: 29.8 PG (ref 27–31)
MCHC RBC AUTO-ENTMCNC: 31.5 G/DL (ref 32–36)
MCV RBC AUTO: 94 FL (ref 82–98)
MONOCYTES # BLD AUTO: 0.5 K/UL (ref 0.3–1)
MONOCYTES NFR BLD: 7.3 % (ref 4–15)
NEUTROPHILS # BLD AUTO: 3 K/UL (ref 1.8–7.7)
NEUTROPHILS NFR BLD: 47.7 % (ref 38–73)
NRBC BLD-RTO: 0 /100 WBC
PATH REV BLD -IMP: NORMAL
PATHOLOGIST INTERPRETATION SPE: NORMAL
PHOSPHATE SERPL-MCNC: 4.6 MG/DL (ref 2.7–4.5)
PLATELET # BLD AUTO: 155 K/UL (ref 150–350)
PMV BLD AUTO: 11.1 FL (ref 9.2–12.9)
POTASSIUM SERPL-SCNC: 3.9 MMOL/L (ref 3.5–5.1)
PROT 24H UR-MRATE: 138 MG/SPEC (ref 0–100)
PROT SERPL-MCNC: 9.2 G/DL (ref 6–8.4)
PROT SERPL-MCNC: 9.3 G/DL (ref 6–8.4)
PROT UR-MCNC: 11 MG/DL (ref 0–15)
RBC # BLD AUTO: 4.5 M/UL (ref 4.6–6.2)
SODIUM SERPL-SCNC: 134 MMOL/L (ref 136–145)
URINE COLLECTION DURATION: 24 HR
URINE VOLUME: 1250 ML
WBC # BLD AUTO: 6.34 K/UL (ref 3.9–12.7)

## 2020-09-15 PROCEDURE — G0378 HOSPITAL OBSERVATION PER HR: HCPCS

## 2020-09-15 PROCEDURE — 99213 PR OFFICE/OUTPT VISIT, EST, LEVL III, 20-29 MIN: ICD-10-PCS | Mod: ,,, | Performed by: PHYSICIAN ASSISTANT

## 2020-09-15 PROCEDURE — 36415 COLL VENOUS BLD VENIPUNCTURE: CPT

## 2020-09-15 PROCEDURE — 99226 PR SUBSEQUENT OBSERVATION CARE,LEVEL III: CPT | Mod: ,,, | Performed by: STUDENT IN AN ORGANIZED HEALTH CARE EDUCATION/TRAINING PROGRAM

## 2020-09-15 PROCEDURE — 83735 ASSAY OF MAGNESIUM: CPT

## 2020-09-15 PROCEDURE — 84156 ASSAY OF PROTEIN URINE: CPT

## 2020-09-15 PROCEDURE — 86335 IMMUNFIX E-PHORSIS/URINE/CSF: CPT | Mod: 26,,, | Performed by: PATHOLOGY

## 2020-09-15 PROCEDURE — 90791 PR PSYCHIATRIC DIAGNOSTIC EVALUATION: ICD-10-PCS | Mod: ,,, | Performed by: PSYCHOLOGIST

## 2020-09-15 PROCEDURE — 90791 PSYCH DIAGNOSTIC EVALUATION: CPT | Mod: ,,, | Performed by: PSYCHOLOGIST

## 2020-09-15 PROCEDURE — 96372 THER/PROPH/DIAG INJ SC/IM: CPT | Mod: 59 | Performed by: EMERGENCY MEDICINE

## 2020-09-15 PROCEDURE — 25000003 PHARM REV CODE 250: Performed by: STUDENT IN AN ORGANIZED HEALTH CARE EDUCATION/TRAINING PROGRAM

## 2020-09-15 PROCEDURE — 99205 OFFICE O/P NEW HI 60 MIN: CPT | Mod: ,,, | Performed by: INTERNAL MEDICINE

## 2020-09-15 PROCEDURE — 86335 PATHOLOGIST INTERPRETATION UIFE: ICD-10-PCS | Mod: 26,,, | Performed by: PATHOLOGY

## 2020-09-15 PROCEDURE — 99226 PR SUBSEQUENT OBSERVATION CARE,LEVEL III: ICD-10-PCS | Mod: ,,, | Performed by: STUDENT IN AN ORGANIZED HEALTH CARE EDUCATION/TRAINING PROGRAM

## 2020-09-15 PROCEDURE — 97165 OT EVAL LOW COMPLEX 30 MIN: CPT

## 2020-09-15 PROCEDURE — 80053 COMPREHEN METABOLIC PANEL: CPT

## 2020-09-15 PROCEDURE — 99205 PR OFFICE/OUTPT VISIT, NEW, LEVL V, 60-74 MIN: ICD-10-PCS | Mod: ,,, | Performed by: INTERNAL MEDICINE

## 2020-09-15 PROCEDURE — 84166 PROTEIN E-PHORESIS/URINE/CSF: CPT | Mod: 26,,, | Performed by: PATHOLOGY

## 2020-09-15 PROCEDURE — 84100 ASSAY OF PHOSPHORUS: CPT

## 2020-09-15 PROCEDURE — 99213 OFFICE O/P EST LOW 20 MIN: CPT | Mod: ,,, | Performed by: PHYSICIAN ASSISTANT

## 2020-09-15 PROCEDURE — 96376 TX/PRO/DX INJ SAME DRUG ADON: CPT | Performed by: EMERGENCY MEDICINE

## 2020-09-15 PROCEDURE — 63600175 PHARM REV CODE 636 W HCPCS: Performed by: STUDENT IN AN ORGANIZED HEALTH CARE EDUCATION/TRAINING PROGRAM

## 2020-09-15 PROCEDURE — 84166 PATHOLOGIST INTERPRETATION UPE: ICD-10-PCS | Mod: 26,,, | Performed by: PATHOLOGY

## 2020-09-15 PROCEDURE — 84166 PROTEIN E-PHORESIS/URINE/CSF: CPT

## 2020-09-15 PROCEDURE — 86335 IMMUNFIX E-PHORSIS/URINE/CSF: CPT

## 2020-09-15 PROCEDURE — 85025 COMPLETE CBC W/AUTO DIFF WBC: CPT

## 2020-09-15 RX ORDER — HEPARIN SODIUM 5000 [USP'U]/ML
5000 INJECTION, SOLUTION INTRAVENOUS; SUBCUTANEOUS ONCE
Status: COMPLETED | OUTPATIENT
Start: 2020-09-15 | End: 2020-09-15

## 2020-09-15 RX ORDER — LIDOCAINE HYDROCHLORIDE 20 MG/ML
20 INJECTION, SOLUTION EPIDURAL; INFILTRATION; INTRACAUDAL; PERINEURAL ONCE
Status: COMPLETED | OUTPATIENT
Start: 2020-09-15 | End: 2020-09-15

## 2020-09-15 RX ORDER — CLONIDINE HYDROCHLORIDE 0.1 MG/1
0.1 TABLET ORAL DAILY
Status: DISCONTINUED | OUTPATIENT
Start: 2020-09-15 | End: 2020-09-17 | Stop reason: HOSPADM

## 2020-09-15 RX ORDER — NIFEDIPINE 30 MG/1
60 TABLET, EXTENDED RELEASE ORAL DAILY
Status: DISCONTINUED | OUTPATIENT
Start: 2020-09-15 | End: 2020-09-17 | Stop reason: HOSPADM

## 2020-09-15 RX ORDER — LOSARTAN POTASSIUM 25 MG/1
25 TABLET ORAL DAILY
Status: DISCONTINUED | OUTPATIENT
Start: 2020-09-15 | End: 2020-09-17

## 2020-09-15 RX ADMIN — NIFEDIPINE 60 MG: 30 TABLET, FILM COATED, EXTENDED RELEASE ORAL at 09:09

## 2020-09-15 RX ADMIN — Medication 10 MG: at 05:09

## 2020-09-15 RX ADMIN — CARVEDILOL 25 MG: 25 TABLET, FILM COATED ORAL at 09:09

## 2020-09-15 RX ADMIN — HEPARIN SODIUM 5000 UNITS: 5000 INJECTION INTRAVENOUS; SUBCUTANEOUS at 07:09

## 2020-09-15 RX ADMIN — LOSARTAN POTASSIUM 25 MG: 25 TABLET, FILM COATED ORAL at 09:09

## 2020-09-15 RX ADMIN — CLONIDINE HYDROCHLORIDE 0.1 MG: 0.1 TABLET ORAL at 12:09

## 2020-09-15 NOTE — SUBJECTIVE & OBJECTIVE
Pain: 2  Distress: 4  Sleep: Impacted by pain, improved since admission    Symptoms:   · Mood: denied  · Anxiety: worry about health, some fear of unknown  · Substance Abuse: denied  · Cognitive Functioning: denied  · Health Behaviors: noncontributory    Psychiatric History: none    Past Medical History:   Diagnosis Date    Anxiety     Arthritis     Hypertension        Family History of Psychiatric Illness: not known    Social History (marriage, employment, etc.): Patient currently lives alone in [place]. Works for the city of [place] as a . He is  with 3 adult children, and 4 grandchildren. He ascribed to S[employer] Christianity and enjoys sports, football, and fishing.     Substance Use:   Alcohol: none   Drugs: none   Tobacco: none   Caffeine: none    Current Medications and Drug Reactions (include OTC, herbal):   See medication list.     Psychotherapeutics (From admission, onward)    None          Strengths and Liabilities: Strength: Patient accepts guidance/feedback, Strength: Patient is expressive/articulate., Strength: Patient is intelligent., Strength: Patient is motivated for change., Strength: Patient is physically healthy., Strength: Patient has positive support network., Strength: Patient has reasonable judgment., Strength: Patient is stable.    Current Evaluation:     Mental Status Exam:  General Appearance:  unremarkable, age appropriate   Speech: normal tone, normal rate, normal pitch, normal volume      Level of Cooperation: cooperative      Thought Processes: normal and logical   Mood: mildly anxious but euthymic      Thought Content: normal, no suicidality, no homicidality, delusions, or paranoia   Affect: mood-congruent   Orientation: Oriented x3   Memory: recent >  intact, remote >  intact   Attention Span & Concentration: intact   Fund of General Knowledge: intact and appropriate to age and level of education   Judgment & Insight: good     Language  intact

## 2020-09-15 NOTE — PHARMACY MED REC
"Admission Medication Reconciliation - Pharmacy Consult Note    The home medication history was taken by Kari Pittman, Pharmacy Tech.     You may go to "Admission" then "Reconcile Home Medications" tabs to review and/or act upon these items.      No issues noted with the medication reconciliation.      Ana Luisa Yun, PharmD, BCPS  [id number]                  .    .          "

## 2020-09-15 NOTE — CONSULTS
Consult Note    Inpatient consult to Hematology/Oncology  Consult performed by: Steven Cornejo MD  Consult ordered by: Josiah Menon MD        SUBJECTIVE:     History of Present Illness:  Jaspreet Walsh Jr. is a 59 y/o M [place] [facility] [place] [facility] fir NSGY evaluation of spinal stenosis and several lytic lesions.     PMH significant for HTN.     Hematology have been asked to evaluate likely neoplastic cause of lytic lesions.     On interview the patient reports sharp pain in both legs for ~ 1 month. A [facility] ER CT spine revealed lumbar/sacral mass concerning for malignancy. Accepted for transfer.    MRI spine 20 revealed : numerous enhancing lesions throughout the thoracic, lumbar spine, sacrum and pelvis, concerning for diffuse metastatic disease. A large soft tissue mass encompasses L4 - S1 vertebral bodies resulting in severe spinal canal stenosis and variable moderate to severe neural foraminal narrowing.     NSGY is following closely, he is neurologically intact, no immediate surgical interventions are planned. IR have been consulted to obtain tissue sample. Tentative plan for de bulking surgery and fixation in OR on .     FLC ratio 171, involved light chains 104. SPEP and SIFE reads are pending.      On interview the pt denies recent fevers, chills, weight loss, night sweats, CP, SOB, bowel or bladder incontinence, constipation, numbness, focal weakness. Denies macroglossia, easy bruising, CTS, PN, PND, orthopnea, FARFAN.     Family history : Father  of unknown 'bone cancer'.    Social History : Lives alone in [place], about one hour from [place]. Mothers house is close to his own.     Past Medical History:   Diagnosis Date    Arthritis     Hypertension      Past Surgical History:   Procedure Laterality Date    KNEE SURGERY Left 2019     History reviewed. No pertinent family history.  Social History     Tobacco Use    Smoking status: Former  Smoker   Substance Use Topics    Alcohol use: Not Currently    Drug use: Never     Review of Systems   Constitutional: Positive for malaise/fatigue. Negative for chills, fever and weight loss.   HENT: Negative for nosebleeds.    Eyes: Negative for blurred vision and double vision.   Respiratory: Negative for shortness of breath.    Cardiovascular: Negative for chest pain and palpitations.   Gastrointestinal: Negative for abdominal pain, blood in stool and constipation.   Genitourinary: Negative for dysuria.   Musculoskeletal: Positive for back pain and myalgias.   Skin: Negative for rash.   Neurological: Negative for focal weakness, seizures, loss of consciousness and weakness.   Endo/Heme/Allergies: Does not bruise/bleed easily.   Psychiatric/Behavioral: The patient is not nervous/anxious.        OBJECTIVE:     Vital Signs:  Temp:  [96.4 °F (35.8 °C)-98.5 °F (36.9 °C)]   Pulse:  [58-81]   Resp:  [15-16]   BP: (180-239)/()   SpO2:  [94 %-99 %]     Physical Exam  Constitutional:       General: He is not in acute distress.     Appearance: He is not ill-appearing.   HENT:      Head: Normocephalic and atraumatic.      Mouth/Throat:      Mouth: Mucous membranes are moist.   Eyes:      General: No scleral icterus.  Neck:      Musculoskeletal: Normal range of motion.   Cardiovascular:      Rate and Rhythm: Normal rate and regular rhythm.   Pulmonary:      Effort: Pulmonary effort is normal. No respiratory distress.   Abdominal:      General: Abdomen is flat. There is no distension.      Tenderness: There is no abdominal tenderness.   Musculoskeletal: Normal range of motion.   Lymphadenopathy:      Cervical: No cervical adenopathy.   Skin:     General: Skin is warm and dry.   Neurological:      General: No focal deficit present.      Mental Status: He is alert and oriented to person, place, and time. Mental status is at baseline.      Motor: No weakness.   Psychiatric:         Mood and Affect: Mood normal.        Laboratory:  Recent Labs   Lab 09/15/20  0232   WBC 6.34   RBC 4.50*   HGB 13.4*   HCT 42.5      MCV 94   MCH 29.8   MCHC 31.5*     Recent Labs   Lab 09/15/20  0232   *   K 3.9   CL 99   CO2 27   BUN 15   CREATININE 1.0   MG 1.9        Diagnostic Results:    ASSESSMENT/PLAN:     Jaspreet Walsh Jr. is a  61 y/o M admitted with diffuse lytic lesions in the bone. Hematology have been consulted to evaluate likely MM.     ECOG 0 - 1  R-ISS and CG risk stratification pending.    Plan :     1. Recommend checking serum LDH, beta 2 microglobulin, uric acid and vitamin D levels (I have taken the liberty of ordering these tests).  2. Hematology will follow SPEP, SIFE, urine SADAF, UPEP and 24 hour urine protein.  3. Screen for HIV, HCV and HBV.   4. Will plan for inpatient bone marrow biopsy tomorrow to run FISH.   5. Follow biopsy of lumbar mass, suspect that mass may be a plasmacytoma and there could be a role for radiation to debulk.    6. Patient would like to follow at our center, will place order for out patient treatment with Velcade + Revlimid + Dexamethasone +/- Daratumamab.                                                  7. Pt will need either out patient PET/CT or whole body MRI/low dose CT.   8. I will arrange out patient follow up.   9. Start Allopurinol 300mg qD.  10. Start PO Dexamethasone 40mg qD for 4 days tomorrow.      Supportive care :    Will need out patient therapy plan for IV Zoledronic acid [id number] for first year after obtaining dental clearance.  Will need ppx with Acyclovir 400mg BID, Levofloxacin 500mg qD and Bactrim while undergoing induction.   Will need daily ASA 81mg qD when treated with Revlimid.   Referral to psych onc.     I have low suspicion for concurrent amyloidosis ; denies signs and symptoms of CHF, peripheral neuropathy, macroglossia but I will ask the pathologist to stain bone marrow for congo red. I do not think fat pad biopsy is needed at this point.     The  patient will be discussed with the attending physician . Recommendations outlined in this note are not final until attending attestation. Thank you for consulting hematology, we will follow, please page with questions.    Steven Cornejo MD  Clinical Fellow  Hematology and Medical Oncology.  09/15/2020

## 2020-09-15 NOTE — ASSESSMENT & PLAN NOTE
Patient with appropriate level of anxiety given new cancer diagnosis with mets. Patient reported some shock and concern about what next steps, but reported overall adjusting appropriately. No depressive symptoms at present. Declined need for medication.    Will be scheduled for follow-up outpatient with HEM/ONC/Psych in 4 weeks for continued support throughout process.

## 2020-09-15 NOTE — HPI
Jaspreet Walsh Jr. is a  59 y/o M admitted with diffuse lytic lesions in the bone. New Diagnosis likely MM.     Patient was seen at beside due to new diagnosis of likely MM with diffuse mets. Catie, patient's partner, was also at bedside for a portion of the appointment. Patient reported beign in shock to news of diagnosis, but is willing to do whatever his providers recommend for treatment. Patient denied history of non adherence tendencies, stating that he has not been chronically sick. Patient is awaiting final plan before telling his children and extended family. Patient reporting having excellent social support. Denied psych history. Some anxiety present during eval. Denied need for psych meds.

## 2020-09-15 NOTE — ASSESSMENT & PLAN NOTE
-- hx of uncontrolled HTN  -- on atenolol, benazepril and clonidine  -- compliant with meds  -- SBP >200 in ED, given labetalol 10 mg x2, hydralazine 10 mg x1, home dose clonidine x1      Plan:  -- revamped home BP meds  -- Now on Coreg, Nifedipine, Losartan, and weaning Clonidine as he is rebounding without it     We received another medication refill request for the same medication.  Denied that request since this one is still open.  Once we hear from pt on what pharmacy she would like, Suzi Rose stated we can fill for 3 months until she is able to be seen in December:  Suzi Villar APRN CNP        10:59 AM   Note      Looking at 8/30/18 encounter-patient not able to come in to be seen until home in December. OB team has been attempting to contact patient/mother to get pharmacy of preference. If we can get that information, I will do 3 month extension until she is home. Should plan visit in December. Thank you. Suzi Marcelo RN

## 2020-09-15 NOTE — ASSESSMENT & PLAN NOTE
"Jaspreet Walsh is a 61 y/o M with a PMHx of uncontrolled HTN on three BP medications who was transferred from [facility] presented with back and L leg pain x1 month. CT scan concern for metastatic disease of the spine and spinal stenosis. 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 Father with hx of "bone cancer." No reported oncologic hx. Never had c-scope. Former smoker. Labs with T protein of 10.7, otherwise unremarkable. Imaging findings concerning for metastatic dz vs possible MM.      Plan:  -- consult heme/onc - myeloma labs ordered. Plan for BM Bx tomorrow. IR bx of spine today  -- neurosurgery on board - spinal fusion + tumor de-bulking scheduled for 9/23  -- daily labs    "

## 2020-09-15 NOTE — SUBJECTIVE & OBJECTIVE
Interval History: No acute events overnight. Adjusting BP regimen today. Plan for IR bx of spine today, BM bx with H/O tomorrow. Pt neurologically intact with no major complaints.     Review of Systems   Constitutional: Negative for activity change, appetite change, chills, diaphoresis, fatigue, fever and unexpected weight change.   Eyes: Negative for visual disturbance.   Respiratory: Negative for shortness of breath.    Cardiovascular: Negative for chest pain.   Gastrointestinal: Negative for abdominal pain, diarrhea, nausea and vomiting.   Genitourinary: Negative for decreased urine volume and difficulty urinating.   Musculoskeletal: Positive for arthralgias, back pain and myalgias. Negative for gait problem, joint swelling, neck pain and neck stiffness.   Neurological: Negative for dizziness, speech difficulty, weakness, light-headedness, numbness and headaches.   Hematological: Negative for adenopathy.     Objective:     Vital Signs (Most Recent):  Temp: 98.5 °F (36.9 °C) (09/15/20 1129)  Pulse: 65 (09/15/20 1224)  Resp: 16 (09/15/20 1129)  BP: (!) 192/97 (09/15/20 1224)  SpO2: 97 % (09/15/20 1224) Vital Signs (24h Range):  Temp:  [96.4 °F (35.8 °C)-98.5 °F (36.9 °C)] 98.5 °F (36.9 °C)  Pulse:  [58-81] 65  Resp:  [15-16] 16  SpO2:  [93 %-99 %] 97 %  BP: (139-239)/() 192/97     Weight: 95.6 kg (210 lb 12.2 oz)  Body mass index is 33.01 kg/m².    Intake/Output Summary (Last 24 hours) at 9/15/2020 1249  Last data filed at 9/15/2020 0900  Gross per 24 hour   Intake --   Output 700 ml   Net -700 ml      Physical Exam  Vitals signs reviewed.   Constitutional:       General: He is not in acute distress.     Appearance: He is not ill-appearing, toxic-appearing or diaphoretic.   HENT:      Head: Normocephalic.      Nose: Nose normal.      Mouth/Throat:      Mouth: Mucous membranes are moist.   Eyes:      Extraocular Movements: Extraocular movements intact.      Pupils: Pupils are equal, round, and reactive to  light.   Neck:      Musculoskeletal: Normal range of motion.   Cardiovascular:      Rate and Rhythm: Normal rate.      Pulses: Normal pulses.      Heart sounds: No murmur. No friction rub. No gallop.    Pulmonary:      Effort: Pulmonary effort is normal. No respiratory distress.      Breath sounds: Normal breath sounds.   Abdominal:      General: Bowel sounds are normal. There is no distension.      Palpations: Abdomen is soft. There is no mass.      Tenderness: There is no abdominal tenderness. There is no guarding or rebound.      Hernia: No hernia is present.   Musculoskeletal: Normal range of motion.         General: No swelling, tenderness, deformity or signs of injury.   Skin:     General: Skin is warm.      Findings: No rash.   Neurological:      Mental Status: He is alert and oriented to person, place, and time. Mental status is at baseline.      Cranial Nerves: No cranial nerve deficit.      Sensory: No sensory deficit.         Significant Labs: All pertinent labs within the past 24 hours have been reviewed.    Significant Imaging: I have reviewed all pertinent imaging results/findings within the past 24 hours.

## 2020-09-15 NOTE — PLAN OF CARE
START ON PATHWAY REGIMEN - Multiple Myeloma and Other Plasma Cell Dyscrasias    [id number]        Bortezomib (Velcade)       Lenalidomide (Revlimid)       Dexamethasone (Decadron)           Additional Orders: Herpes zoster prophylaxis recommended. Antithrombotic   therapy: aspirin  mg PO daily for patients at low risk; enoxaparin 40 mg   subcut daily or warfarin to an INR of 2-3 for patients at higher risk.    **Always confirm dose/schedule in your pharmacy ordering system**    Patient Characteristics:  Newly Diagnosed, Transplant Eligible, Unknown or Awaiting Test Results  R-ISS Staging: Unknown  Disease Classification: Newly Diagnosed  Is Patient Eligible for Transplant<= Transplant Eligible  Risk Status: Awaiting Test Results  Intent of Therapy:  Non-Curative / Palliative Intent, Discussed with Patient

## 2020-09-15 NOTE — PROGRESS NOTES
[facility]  Neurosurgery  Progress Note    Subjective:     History of Present Illness: Jaspreet comer is a 59 yo male with a PMH of HTN with no known oncologic history who presents to the ED for 1 month of back pain and muscle soreness to the lower extremities. He states that he has cramping pain to bilateral thighs and calves for about 1 month and back pain started 2 weeks ago without radiation to the midline lumbar back. Back pain is worse when sitting on the couch vs. Laying down, also somewhat worse when walking. He has no weakness in his legs and is ambulatory. No radicular component to his back pain.  No numbness or tingling in the lower extremities. No bowel or bladder incontinence. He has not had difficulty using his hand or coordinating movements of the upper extremities, no neck pain. Transferred to [facility] after CT L spine at OSH revealed lumbar-sacral mass lesion concerning for malignancy. Denies any blood thinner use.     Medical history significant for occasional smoking for about 2 years total, no drinking or illicit substances. No known cancers, did not receive a screening colonoscopy. Family history of 'bone cancer' in his father who passed away in 2016.      Post-Op Info:  Procedure(s) (LRB):  FUSION, SPINE, LUMBAR L2-pelvis. Depuy. Plastic surgery closure w/ Dr. Bellamy (N/A)         Interval History: NAEON. Pending IR eval for transpedicular biopsy. Patient remains full strength, with no urinary/bowel issues. Denies paresthesias.     Medications:  Continuous Infusions:  Scheduled Meds:   carvediloL  25 mg Oral BID    enoxaparin  40 mg Subcutaneous Q24H    lidocaine (PF) 20 mg/ml (2%)  20 mL Other Once    losartan  25 mg Oral Daily    NIFEdipine  60 mg Oral Daily     PRN Meds:dextrose 50%, dextrose 50%, glucagon (human recombinant), glucose, glucose, labetaloL, sodium chloride 0.9%     Review of Systems  Objective:     Weight: 95.6 kg (210 lb 12.2 oz)  Body mass index is 33.01  kg/m².  Vital Signs (Most Recent):  Temp: 98.5 °F (36.9 °C) (09/15/20 1129)  Pulse: 63 (09/15/20 1129)  Resp: 16 (09/15/20 1129)  BP: (!) 199/100 (09/15/20 1129)  SpO2: (!) 94 % (09/15/20 1129) Vital Signs (24h Range):  Temp:  [96.4 °F (35.8 °C)-98.5 °F (36.9 °C)] 98.5 °F (36.9 °C)  Pulse:  [58-81] 63  Resp:  [15-16] 16  SpO2:  [93 %-99 %] 94 %  BP: (139-239)/() 199/100     Date 09/15/20 0700 - 09/16/20 0659   Shift 3177-0089 1523-4728 9870-4819 24 Hour Total   INTAKE   Shift Total(mL/kg)       OUTPUT   Urine(mL/kg/hr) 200   200   Shift Total(mL/kg) 200(2.1)   200(2.1)   Weight (kg) 95.6 95.6 95.6 95.6                        Neurosurgery Physical Exam  General: well developed, well nourished, no distress.   Head: normocephalic, atraumatic  Neurologic: Alert and oriented. Thought content appropriate.  GCS: Motor: 6/Verbal: 5/Eyes: 4 GCS Total: 15  Mental Status: Awake, Alert, Oriented x 4  Language: No aphasia  Speech: No dysarthria  Cranial nerves: face symmetric, tongue midline, CN II-XII grossly intact.   Eyes: pupils equal, round, reactive to light with accommodation, EOMI.   Pulmonary: normal respirations, no signs of respiratory distress  Abdomen: soft, non-distended, not tender to palpation  Skin: Skin is warm, dry and intact.  Sensory: intact to light touch throughout    Motor Strength:Moves all extremities spontaneously with good tone.  Full strength upper and lower extremities. No abnormal movements seen.     Strength  Deltoids Triceps Biceps Wrist Extension Wrist Flexion Hand    Upper: R 5/5 5/5 5/5 5/5 5/5 5/5    L 5/5 5/5 5/5 5/5 5/5 5/5     Iliopsoas Quadriceps Knee  Flexion Tibialis  anterior Gastro- cnemius EHL   Lower: R 5/5 5/5 5/5 5/5 5/5 5/5    L 5/5 5/5 5/5 5/5 5/5 5/5     Reflexes:   Candelaria's: Negative.  Babinski's: Negative.  Clonus: Negative.       Significant Labs:  Recent Labs   Lab 09/14/20  0055 09/15/20  0232    100    134*   K 4.5 3.9    99   CO2 25 27   BUN  20 15   CREATININE 1.08 1.0   CALCIUM 9.5 9.7   MG  --  1.9     Recent Labs   Lab 09/14/20  0055 09/14/20  0640 09/15/20  0232   WBC 7.67 6.06 6.34   HGB 14.0 12.9* 13.4*   HCT 43.6 40.5 42.5    168 155     Recent Labs   Lab 09/14/20  0251   INR 1.1   APTT 24.6     Microbiology Results (last 7 days)     ** No results found for the last 168 hours. **        All pertinent labs from the last 24 hours have been reviewed.    Significant Diagnostics:  No results found in the last 24 hours.      Assessment/Plan:     Lumbar stenosis  Jaspreet Walsh is a 61 yo male with PMH HTN who presents with 1 month of LE cramping and 2 weeks of back pain; full strength without numbness or b/b symptoms on exam, rectal tone present. CT L Spine with extensive lytic destruction of vertebral bodies with large L4-S1 mass involving the vertebral body as well as transverse processes and left posterior aspects of spinal canal extending to the sacrum. 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     - Neurologically intact on exam  - No acute neurosurgical intervention at this time  - NPO for possible IR biopsy of L4-5 today  - CT C/A/P with diffuse osseus lytic lesions  - Recommend hematology/oncology consult  - High suspicion for multiple myeloma- labs pending  - Recommend IR biopsy, tentative plan for OR 9/23 for L2-pelvis posterior fusion for tumor debulking and fixation with Dr. Coyle.   - Rest of medical management per primary     Please contact NSGY with any acute exam changes or concerns        Naomi Ibanez PA-C  Neurosurgery  [facility]

## 2020-09-15 NOTE — ASSESSMENT & PLAN NOTE
-- hx of back pain x1 month  -- minimal relief with tylenol  -- CT imaging at OSH with lytic lesions throughout and high grade spinal cord stenosis at L4-L5  -- no focal weakness, numbness, bowel or bladder incontinence     Back pain 2/2 to spinal stenosis +/- lumbar mass with extensive lytic lesions concerning for metastatic disease vs MM    Neurosurgery recs:   -- MRI T/L spine-- 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    -- no need for emergent surgical intervention    Plan:  -- f/u neurosurgery recs

## 2020-09-15 NOTE — SUBJECTIVE & OBJECTIVE
Interval History: NAEON. Pending IR eval for transpedicular biopsy. Patient remains full strength, with no urinary/bowel issues. Denies paresthesias.     Medications:  Continuous Infusions:  Scheduled Meds:   carvediloL  25 mg Oral BID    enoxaparin  40 mg Subcutaneous Q24H    lidocaine (PF) 20 mg/ml (2%)  20 mL Other Once    losartan  25 mg Oral Daily    NIFEdipine  60 mg Oral Daily     PRN Meds:dextrose 50%, dextrose 50%, glucagon (human recombinant), glucose, glucose, labetaloL, sodium chloride 0.9%     Review of Systems  Objective:     Weight: 95.6 kg (210 lb 12.2 oz)  Body mass index is 33.01 kg/m².  Vital Signs (Most Recent):  Temp: 98.5 °F (36.9 °C) (09/15/20 1129)  Pulse: 63 (09/15/20 1129)  Resp: 16 (09/15/20 1129)  BP: (!) 199/100 (09/15/20 1129)  SpO2: (!) 94 % (09/15/20 1129) Vital Signs (24h Range):  Temp:  [96.4 °F (35.8 °C)-98.5 °F (36.9 °C)] 98.5 °F (36.9 °C)  Pulse:  [58-81] 63  Resp:  [15-16] 16  SpO2:  [93 %-99 %] 94 %  BP: (139-239)/() 199/100     Date 09/15/20 0700 - 09/16/20 0659   Shift 0987-4112 8339-5136 1001-3742 24 Hour Total   INTAKE   Shift Total(mL/kg)       OUTPUT   Urine(mL/kg/hr) 200   200   Shift Total(mL/kg) 200(2.1)   200(2.1)   Weight (kg) 95.6 95.6 95.6 95.6                        Neurosurgery Physical Exam  General: well developed, well nourished, no distress.   Head: normocephalic, atraumatic  Neurologic: Alert and oriented. Thought content appropriate.  GCS: Motor: 6/Verbal: 5/Eyes: 4 GCS Total: 15  Mental Status: Awake, Alert, Oriented x 4  Language: No aphasia  Speech: No dysarthria  Cranial nerves: face symmetric, tongue midline, CN II-XII grossly intact.   Eyes: pupils equal, round, reactive to light with accommodation, EOMI.   Pulmonary: normal respirations, no signs of respiratory distress  Abdomen: soft, non-distended, not tender to palpation  Skin: Skin is warm, dry and intact.  Sensory: intact to light touch throughout    Motor Strength:Moves all  extremities spontaneously with good tone.  Full strength upper and lower extremities. No abnormal movements seen.     Strength  Deltoids Triceps Biceps Wrist Extension Wrist Flexion Hand    Upper: R 5/5 5/5 5/5 5/5 5/5 5/5    L 5/5 5/5 5/5 5/5 5/5 5/5     Iliopsoas Quadriceps Knee  Flexion Tibialis  anterior Gastro- cnemius EHL   Lower: R 5/5 5/5 5/5 5/5 5/5 5/5    L 5/5 5/5 5/5 5/5 5/5 5/5     Reflexes:   Candelaria's: Negative.  Babinski's: Negative.  Clonus: Negative.       Significant Labs:  Recent Labs   Lab 09/14/20  0055 09/15/20  0232    100    134*   K 4.5 3.9    99   CO2 25 27   BUN 20 15   CREATININE 1.08 1.0   CALCIUM 9.5 9.7   MG  --  1.9     Recent Labs   Lab 09/14/20  0055 09/14/20  0640 09/15/20  0232   WBC 7.67 6.06 6.34   HGB 14.0 12.9* 13.4*   HCT 43.6 40.5 42.5    168 155     Recent Labs   Lab 09/14/20  0251   INR 1.1   APTT 24.6     Microbiology Results (last 7 days)     ** No results found for the last 168 hours. **        All pertinent labs from the last 24 hours have been reviewed.    Significant Diagnostics:  No results found in the last 24 hours.

## 2020-09-15 NOTE — PROGRESS NOTES
"[facility] Medicine  Progress Note    Patient Name: Jaspreet Walsh Jr.  MRN: [id number]  Patient Class: OP- Observation   Admission Date: 2020  Length of Stay: 0 days  Attending Physician: Dameon Ridley MD  Primary Care Provider: Con Patel Jr, MD    [facility] Medicine Team: [facility] MED 2 Josiah Menon MD    Subjective:     Principal Problem:Metastatic disease       HPI:  Jaspreet Walsh is a 59 y/o M with a PMHx of uncontrolled HTN on three BP medications who was transferred from [facility] for neurosurgery evaluation after CT scan revealed "extensive lytic lesions throughout with high grade spinal stenosis at L4-L5. Additional probable pathologic fractures through the left aspect of the sacrum". Pt states for the past month he has been experiencing intermittent sharp lower back pain and pain to the left lateral thigh. The back pain is exacerbated with sitting for prolonged periods and with walking. Tylenol often relieves the pain. Denies similar pain previously. No reported oncologic hx. His father  of "bone cancer" in his 80s, but no other known family hx of cancer. Denies any fevers, chills, weight loss, night sweats, CP, SOB, n/v, bowel or bladder incontinence, numbness, weakness, dizziness. Former smoker x10 years, approximately 2-3 cigarettes daily, quit 8 years ago. No EtOH or illicit drug use.    In the ED, vitals significant for SBP >200. Pertinent labs include total protein of 10.7. Neurosurgery consulted--recommended MRI thoracic and lumbar spine. 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      Interval History: No acute events overnight. Adjusting BP regimen today. Plan for IR bx of spine today, BM bx with H/O tomorrow. Pt neurologically intact with no major complaints.     Review of Systems   Constitutional: Negative for " activity change, appetite change, chills, diaphoresis, fatigue, fever and unexpected weight change.   Eyes: Negative for visual disturbance.   Respiratory: Negative for shortness of breath.    Cardiovascular: Negative for chest pain.   Gastrointestinal: Negative for abdominal pain, diarrhea, nausea and vomiting.   Genitourinary: Negative for decreased urine volume and difficulty urinating.   Musculoskeletal: Positive for arthralgias, back pain and myalgias. Negative for gait problem, joint swelling, neck pain and neck stiffness.   Neurological: Negative for dizziness, speech difficulty, weakness, light-headedness, numbness and headaches.   Hematological: Negative for adenopathy.     Objective:     Vital Signs (Most Recent):  Temp: 98.5 °F (36.9 °C) (09/15/20 1129)  Pulse: 65 (09/15/20 1224)  Resp: 16 (09/15/20 1129)  BP: (!) 192/97 (09/15/20 1224)  SpO2: 97 % (09/15/20 1224) Vital Signs (24h Range):  Temp:  [96.4 °F (35.8 °C)-98.5 °F (36.9 °C)] 98.5 °F (36.9 °C)  Pulse:  [58-81] 65  Resp:  [15-16] 16  SpO2:  [93 %-99 %] 97 %  BP: (139-239)/() 192/97     Weight: 95.6 kg (210 lb 12.2 oz)  Body mass index is 33.01 kg/m².    Intake/Output Summary (Last 24 hours) at 9/15/2020 1249  Last data filed at 9/15/2020 0900  Gross per 24 hour   Intake --   Output 700 ml   Net -700 ml      Physical Exam  Vitals signs reviewed.   Constitutional:       General: He is not in acute distress.     Appearance: He is not ill-appearing, toxic-appearing or diaphoretic.   HENT:      Head: Normocephalic.      Nose: Nose normal.      Mouth/Throat:      Mouth: Mucous membranes are moist.   Eyes:      Extraocular Movements: Extraocular movements intact.      Pupils: Pupils are equal, round, and reactive to light.   Neck:      Musculoskeletal: Normal range of motion.   Cardiovascular:      Rate and Rhythm: Normal rate.      Pulses: Normal pulses.      Heart sounds: No murmur. No friction rub. No gallop.    Pulmonary:      Effort: Pulmonary  "effort is normal. No respiratory distress.      Breath sounds: Normal breath sounds.   Abdominal:      General: Bowel sounds are normal. There is no distension.      Palpations: Abdomen is soft. There is no mass.      Tenderness: There is no abdominal tenderness. There is no guarding or rebound.      Hernia: No hernia is present.   Musculoskeletal: Normal range of motion.         General: No swelling, tenderness, deformity or signs of injury.   Skin:     General: Skin is warm.      Findings: No rash.   Neurological:      Mental Status: He is alert and oriented to person, place, and time. Mental status is at baseline.      Cranial Nerves: No cranial nerve deficit.      Sensory: No sensory deficit.         Significant Labs: All pertinent labs within the past 24 hours have been reviewed.    Significant Imaging: I have reviewed all pertinent imaging results/findings within the past 24 hours.      Assessment/Plan:      * Metastatic disease  Jaspreet Walsh is a 59 y/o M with a PMHx of uncontrolled HTN on three BP medications who was transferred from [facility] presented with back and L leg pain x1 month. CT scan concern for metastatic disease of the spine and spinal stenosis. 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 Father with hx of "bone cancer." No reported oncologic hx. Never had c-scope. Former smoker. Labs with T protein of 10.7, otherwise unremarkable. Imaging findings concerning for metastatic dz vs possible MM.      Plan:  -- consult heme/onc - myeloma labs ordered. Plan for BM Bx tomorrow. IR bx of spine today  -- neurosurgery on board - spinal fusion + tumor de-bulking scheduled for 9/23  -- daily labs      Essential hypertension  -- hx of uncontrolled HTN  -- on atenolol, benazepril and clonidine  -- compliant with meds  -- SBP >200 in ED, given labetalol 10 mg x2, hydralazine 10 mg x1, home " dose clonidine x1      Plan:  -- revamped home BP meds  -- Now on Coreg, Nifedipine, Losartan, and weaning Clonidine as he is rebounding without it      Lumbar stenosis  -- hx of back pain x1 month  -- minimal relief with tylenol  -- CT imaging at [facility] with lytic lesions throughout and high grade spinal cord stenosis at L4-L5  -- no focal weakness, numbness, bowel or bladder incontinence     Back pain 2/2 to spinal stenosis +/- lumbar mass with extensive lytic lesions concerning for metastatic disease vs MM    Neurosurgery recs:   -- MRI T/L spine-- 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    -- no need for emergent surgical intervention    Plan:  -- f/u neurosurgery recs    VTE Risk Mitigation (From admission, onward)         Ordered     enoxaparin injection 40 mg  Every 24 hours      09/14/20 0618     IP VTE HIGH RISK PATIENT  Once      09/14/20 0618     Place sequential compression device  Until discontinued      09/14/20 0618                Discharge Planning   FRANCISCA: 9/16/2020     Code Status: Full Code   Is the patient medically ready for discharge?:     Reason for patient still in hospital (select all that apply): Treatment  Discharge Plan A: Home, Home with family            Josiah Menon MD  Department of Hospital Medicine   [facility]

## 2020-09-15 NOTE — PT/OT/SLP EVAL
Occupational Therapy   Evaluation and Discharge Note    Name: Jaspreet Walsh Jr.  MRN: [id number]  Admitting Diagnosis:  Metastatic disease      Recommendations:     Discharge Recommendations: home  Discharge Equipment Recommendations:  none  Barriers to discharge:  None    Assessment:     Jaspreet Walsh Jr. is a 60 y.o. male with a medical diagnosis of Metastatic disease. At this time, patient is functioning at their prior level of function and does not require further acute OT services.     Plan:     During this hospitalization, patient does not require further acute OT services.  Please re-consult if situation changes.    · Plan of Care Reviewed with: patient    Subjective     Occupational Profile:  Living Environment: Pt lives alone in a H with 3 steps/LHR and a tub setup in place.  Previous level of function: Independent - was working FT.  Equipment Used at home:  none  Assistance upon Discharge: S/O; mother    Pain/Comfort:  · Pain Rating 1: 0/10    Patients cultural, spiritual, Mormonism conflicts given the current situation:      Objective:     Communicated with: rn prior to session.  Patient found standing with   upon OT entry to room.    General Precautions: Standard,       Occupational Performance:    Bed Mobility:    Independent    Functional Mobility/Transfers:  Independent    Activities of Daily Living:  Independent    Cognitive/Visual Perceptual:  Cognitive/Psychosocial Skills:     -       Oriented to: Person, Place, Time and Situation   -       Safety awareness/insight to disability: intact     Physical Exam:  BUE AROM/MMT: WNL    AMPAC 6 Click ADL:  AMPAC Total Score: 24    Treatment & Education:  UE ROM/MMT  Bed mobility training / assessment  Functional mobility assessment  Sit/standing balance assessment  Educated on importance of sitting OOB in bedside chair to promote increased strength, endurance & breathing.  Discussed D/C of OT services.  Education:    Patient left supine with call button  in reach    GOALS:   Multidisciplinary Problems     Occupational Therapy Goals     Not on file          Multidisciplinary Problems (Resolved)        Problem: Occupational Therapy Goal    Goal Priority Disciplines Outcome Interventions   Occupational Therapy Goal   (Resolved)     OT, PT/OT Met                    History:     Past Medical History:   Diagnosis Date    Arthritis     Hypertension        Past Surgical History:   Procedure Laterality Date    KNEE SURGERY Left 06/2019       Time Tracking:     OT Date of Treatment: 09/15/20  OT Start Time: 0901  OT Stop Time: 0911  OT Total Time (min): 10 min    Billable Minutes:Evaluation 10    JANAE Mullins  9/15/2020

## 2020-09-15 NOTE — CONSULTS
[facility]  Psychology  Consult Note    Diagnostic Interview - CPT 50259    Patient Name: Jaspreet Walsh Jr.  MRN: [id number]   Patient Class: OP- Observation  Admission Date: 9/14/2020  Hospital Length of Stay: 0 days  Attending Physician: Dameon Ridley MD  Primary Care Provider: Con Patel Jr, MD    Inpatient consult to Hematology/Oncology Psychology  Consult performed by: Roxie Lobo, PhD  Consult ordered by: Steven Cornejo MD  Reason for consult: New cancer diagnosis  Assessment/Recommendations: Mild anxiety present, appropriate to situation. Patient may seem stoic/blunted affect, but after conversation this seems to be his general personality. He has appropriate knowledge, understands, and reporting high willingness to follow up. Has excellent social support. No Psych history. I will follow outpatient. Mood and protective strategies were discussed.           History of Present Illness:   Jaspreet Walsh Jr. is a  61 y/o M admitted with diffuse lytic lesions in the bone. New Diagnosis likely MM.     Patient was seen at beside due to new diagnosis of likely MM with diffuse mets. Catie, patient's partner, was also at bedside for a portion of the appointment. Patient reported beign in shock to news of diagnosis, but is willing to do whatever his providers recommend for treatment. Patient denied history of non adherence tendencies, stating that he has not been chronically sick. Patient is awaiting final plan before telling his children and extended family. Patient reporting having excellent social support. Denied psych history. Some anxiety present during eval. Denied need for psych meds.      Pain: 2  Distress: 4  Sleep: Impacted by pain, improved since admission    Symptoms:   · Mood: denied  · Anxiety: worry about health, some fear of unknown  · Substance Abuse: denied  · Cognitive Functioning: denied  · Health Behaviors: noncontributory    Psychiatric History:  none    Past Medical History:   Diagnosis Date    Anxiety     Arthritis     Hypertension        Family History of Psychiatric Illness: not known    Social History (marriage, employment, etc.): Patient currently lives alone in [place]. Works for the city of [place] as a . He is  with 3 adult children, and 4 grandchildren. He ascribed to [employer]ism and enjoys sports, football, and fishing.     Substance Use:   Alcohol: none   Drugs: none   Tobacco: none   Caffeine: none    Current Medications and Drug Reactions (include OTC, herbal):   See medication list.     Psychotherapeutics (From admission, onward)    None          Strengths and Liabilities: Strength: Patient accepts guidance/feedback, Strength: Patient is expressive/articulate., Strength: Patient is intelligent., Strength: Patient is motivated for change., Strength: Patient is physically healthy., Strength: Patient has positive support network., Strength: Patient has reasonable judgment., Strength: Patient is stable.    Current Evaluation:     Mental Status Exam:  General Appearance:  unremarkable, age appropriate   Speech: normal tone, normal rate, normal pitch, normal volume      Level of Cooperation: cooperative      Thought Processes: normal and logical   Mood: mildly anxious but euthymic      Thought Content: normal, no suicidality, no homicidality, delusions, or paranoia   Affect: mood-congruent   Orientation: Oriented x3   Memory: recent >  intact, remote >  intact   Attention Span & Concentration: intact   Fund of General Knowledge: intact and appropriate to age and level of education   Judgment & Insight: good     Language  intact     Diagnostic Impression - Plan:       ICD-10-CM ICD-9-CM   1. Abnormal finding on imaging  R93.89 793.99   2. Metastatic disease  C79.9 199.1   3. Spinal stenosis of lumbar region without neurogenic claudication  M48.061 724.02   4. Anxiety  F41.9 300.00       Anxiety  Patient with  appropriate level of anxiety given new cancer diagnosis with mets. Patient reported some shock and concern about what next steps, but reported overall adjusting appropriately. No depressive symptoms at present. Declined need for medication.    Will be scheduled for follow-up outpatient with HEM/ONC/Psych in 4 weeks for continued support throughout process.         Length of Service (minutes): 45    Roxie Lobo, PhD  Psychology  [facility]

## 2020-09-15 NOTE — H&P
Inpatient Radiology Pre-procedure Note    History of Present Illness:  Jaspreet Walsh Jr. is a 60 y.o. male who presents for biopsy of L4 - 5 vertebral body.    Admission H&P reviewed.  Past Medical History:   Diagnosis Date    Arthritis     Hypertension      Past Surgical History:   Procedure Laterality Date    KNEE SURGERY Left 06/2019       Review of Systems:   As documented in primary team H&P    Home Meds:   Prior to Admission medications    Medication Sig Start Date End Date Taking? Authorizing Provider   atenoloL (TENORMIN) 100 MG tablet Take 100 mg by mouth once daily. 9/4/20   Historical Provider   benazepriL (LOTENSIN) 20 MG tablet Take 20 mg by mouth once daily. 8/10/20   Historical Provider   cloNIDine (CATAPRES) 0.3 MG tablet Take 0.3 mg by mouth every evening. 9/4/20   Historical Provider   HYDROcodone-acetaminophen (NORCO) 7.5-325 mg per tablet Take 1 tablet by mouth 4 (four) times daily as needed for Pain. 9/8/20   Historical Provider     Scheduled Meds:    carvediloL  25 mg Oral BID    enoxaparin  40 mg Subcutaneous Q24H    lidocaine (PF) 20 mg/ml (2%)  20 mL Other Once    losartan  25 mg Oral Daily    NIFEdipine  60 mg Oral Daily     Continuous Infusions:   PRN Meds:dextrose 50%, dextrose 50%, glucagon (human recombinant), glucose, glucose, labetaloL, sodium chloride 0.9%  Anticoagulants/Antiplatelets: no anticoagulation    Allergies: Review of patient's allergies indicates:  No Known Allergies  Sedation Hx: have not been any systemic reactions    Labs:  Recent Labs   Lab 09/14/20  0251   INR 1.1       Recent Labs   Lab 09/15/20  0232   WBC 6.34   HGB 13.4*   HCT 42.5   MCV 94         Recent Labs   Lab 09/15/20  0232      *   K 3.9   CL 99   CO2 27   BUN 15   CREATININE 1.0   CALCIUM 9.7   MG 1.9   ALT 10   AST 17   ALBUMIN 3.5   BILITOT 0.9         Vitals:  Temp: 98.5 °F (36.9 °C) (09/15/20 1129)  Pulse: 63 (09/15/20 1129)  Resp: 16 (09/15/20 1129)  BP: (!) 199/100  (09/15/20 1129)  SpO2: (!) 94 % (09/15/20 1129)     Physical Exam:  ASA: 2  Mallampati: 2    General: no acute distress  Mental Status: alert and oriented to person, place and time  HEENT: normocephalic, atraumatic  Chest: unlabored breathing  Heart: regular heart rate  Abdomen: nondistended  Extremity: moves all extremities    Plan: L 4 - 5 vertebral body biopsy  Sedation Plan: moderate sedation    Cole Loera MD  NeuroIR fellow.

## 2020-09-15 NOTE — ASSESSMENT & PLAN NOTE
Jaspreet Walsh is a 61 yo male with PMH HTN who presents with 1 month of LE cramping and 2 weeks of back pain; full strength without numbness or b/b symptoms on exam, rectal tone present. CT L Spine with extensive lytic destruction of vertebral bodies with large L4-S1 mass involving the vertebral body as well as transverse processes and left posterior aspects of spinal canal extending to the sacrum. 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     - Neurologically intact on exam  - No acute neurosurgical intervention at this time  - NPO for possible IR biopsy of L4-5 today  - CT C/A/P with diffuse osseus lytic lesions  - Recommend hematology/oncology consult  - High suspicion for multiple myeloma- labs pending  - Recommend IR biopsy, tentative plan for OR 9/23 for L2-pelvis posterior fusion for tumor debulking and fixation with Dr. Coyle.   - Rest of medical management per primary     Please contact NSGY with any acute exam changes or concerns

## 2020-09-16 DIAGNOSIS — E88.09 PLASMA CELL DYSCRASIA: Primary | ICD-10-CM

## 2020-09-16 LAB
25(OH)D3+25(OH)D2 SERPL-MCNC: 12 NG/ML (ref 30–96)
ALBUMIN SERPL BCP-MCNC: 3.6 G/DL (ref 3.5–5.2)
ALP SERPL-CCNC: 78 U/L (ref 55–135)
ALT SERPL W/O P-5'-P-CCNC: 14 U/L (ref 10–44)
ANION GAP SERPL CALC-SCNC: 6 MMOL/L (ref 8–16)
AST SERPL-CCNC: 22 U/L (ref 10–40)
B2 MICROGLOB SERPL-MCNC: 2.4 UG/ML (ref 0–2.5)
BASOPHILS # BLD AUTO: 0.03 K/UL (ref 0–0.2)
BASOPHILS NFR BLD: 0.5 % (ref 0–1.9)
BILIRUB SERPL-MCNC: 0.9 MG/DL (ref 0.1–1)
BUN SERPL-MCNC: 20 MG/DL (ref 8–23)
CALCIUM SERPL-MCNC: 9.9 MG/DL (ref 8.7–10.5)
CHLORIDE SERPL-SCNC: 101 MMOL/L (ref 95–110)
CO2 SERPL-SCNC: 27 MMOL/L (ref 23–29)
CREAT SERPL-MCNC: 1 MG/DL (ref 0.5–1.4)
DIFFERENTIAL METHOD: ABNORMAL
EOSINOPHIL # BLD AUTO: 0 K/UL (ref 0–0.5)
EOSINOPHIL NFR BLD: 0.6 % (ref 0–8)
ERYTHROCYTE [DISTWIDTH] IN BLOOD BY AUTOMATED COUNT: 13.5 % (ref 11.5–14.5)
EST. GFR  (AFRICAN AMERICAN): >60 ML/MIN/1.73 M^2
EST. GFR  (NON AFRICAN AMERICAN): >60 ML/MIN/1.73 M^2
GLUCOSE SERPL-MCNC: 97 MG/DL (ref 70–110)
HBV CORE AB SERPL QL IA: NEGATIVE
HBV SURFACE AG SERPL QL IA: NEGATIVE
HCT VFR BLD AUTO: 45.4 % (ref 40–54)
HCV AB SERPL QL IA: NEGATIVE
HGB BLD-MCNC: 14.2 G/DL (ref 14–18)
HIV 1+2 AB+HIV1 P24 AG SERPL QL IA: NEGATIVE
IMM GRANULOCYTES # BLD AUTO: 0.02 K/UL (ref 0–0.04)
IMM GRANULOCYTES NFR BLD AUTO: 0.3 % (ref 0–0.5)
INTERPRETATION UR IFE-IMP: NORMAL
LDH SERPL L TO P-CCNC: 171 U/L (ref 110–260)
LYMPHOCYTES # BLD AUTO: 2.3 K/UL (ref 1–4.8)
LYMPHOCYTES NFR BLD: 35.8 % (ref 18–48)
MAGNESIUM SERPL-MCNC: 1.9 MG/DL (ref 1.6–2.6)
MCH RBC QN AUTO: 30 PG (ref 27–31)
MCHC RBC AUTO-ENTMCNC: 31.3 G/DL (ref 32–36)
MCV RBC AUTO: 96 FL (ref 82–98)
MONOCYTES # BLD AUTO: 0.5 K/UL (ref 0.3–1)
MONOCYTES NFR BLD: 8.2 % (ref 4–15)
NEUTROPHILS # BLD AUTO: 3.5 K/UL (ref 1.8–7.7)
NEUTROPHILS NFR BLD: 54.6 % (ref 38–73)
NRBC BLD-RTO: 0 /100 WBC
PATHOLOGIST INTERPRETATION UIFE: NORMAL
PHOSPHATE SERPL-MCNC: 4.2 MG/DL (ref 2.7–4.5)
PLATELET # BLD AUTO: 172 K/UL (ref 150–350)
PMV BLD AUTO: 11.2 FL (ref 9.2–12.9)
POTASSIUM SERPL-SCNC: 3.9 MMOL/L (ref 3.5–5.1)
PROT SERPL-MCNC: 9.7 G/DL (ref 6–8.4)
RBC # BLD AUTO: 4.73 M/UL (ref 4.6–6.2)
SODIUM SERPL-SCNC: 134 MMOL/L (ref 136–145)
URATE SERPL-MCNC: 6.8 MG/DL (ref 3.4–7)
WBC # BLD AUTO: 6.46 K/UL (ref 3.9–12.7)

## 2020-09-16 PROCEDURE — 88341 IMHCHEM/IMCYTCHM EA ADD ANTB: CPT | Mod: 26,,, | Performed by: PATHOLOGY

## 2020-09-16 PROCEDURE — 84550 ASSAY OF BLOOD/URIC ACID: CPT

## 2020-09-16 PROCEDURE — 86803 HEPATITIS C AB TEST: CPT

## 2020-09-16 PROCEDURE — 87340 HEPATITIS B SURFACE AG IA: CPT

## 2020-09-16 PROCEDURE — 85097 PR  BONE MARROW,SMEAR INTERPRETATION: ICD-10-PCS | Mod: ,,, | Performed by: PATHOLOGY

## 2020-09-16 PROCEDURE — 82306 VITAMIN D 25 HYDROXY: CPT

## 2020-09-16 PROCEDURE — 25000003 PHARM REV CODE 250: Performed by: STUDENT IN AN ORGANIZED HEALTH CARE EDUCATION/TRAINING PROGRAM

## 2020-09-16 PROCEDURE — 83735 ASSAY OF MAGNESIUM: CPT

## 2020-09-16 PROCEDURE — 88189 PR  FLOWCYTOMETRY/READ, 16 & > MARKERS: ICD-10-PCS | Mod: ,,, | Performed by: PATHOLOGY

## 2020-09-16 PROCEDURE — 88342 IMHCHEM/IMCYTCHM 1ST ANTB: CPT | Mod: 59 | Performed by: PATHOLOGY

## 2020-09-16 PROCEDURE — 88305 TISSUE EXAM BY PATHOLOGIST: CPT | Mod: 26,,, | Performed by: PATHOLOGY

## 2020-09-16 PROCEDURE — 99225 PR SUBSEQUENT OBSERVATION CARE,LEVEL II: CPT | Mod: ,,, | Performed by: STUDENT IN AN ORGANIZED HEALTH CARE EDUCATION/TRAINING PROGRAM

## 2020-09-16 PROCEDURE — 86704 HEP B CORE ANTIBODY TOTAL: CPT

## 2020-09-16 PROCEDURE — 88304 TISSUE EXAM BY PATHOLOGIST: CPT | Mod: 26,,, | Performed by: PATHOLOGY

## 2020-09-16 PROCEDURE — 88341 PR IHC OR ICC EACH ADD'L SINGLE ANTIBODY  STAINPR: ICD-10-PCS | Mod: 26,,, | Performed by: PATHOLOGY

## 2020-09-16 PROCEDURE — G0378 HOSPITAL OBSERVATION PER HR: HCPCS

## 2020-09-16 PROCEDURE — 99225 PR SUBSEQUENT OBSERVATION CARE,LEVEL II: ICD-10-PCS | Mod: ,,, | Performed by: STUDENT IN AN ORGANIZED HEALTH CARE EDUCATION/TRAINING PROGRAM

## 2020-09-16 PROCEDURE — 88342 IMHCHEM/IMCYTCHM 1ST ANTB: CPT | Mod: 26,,, | Performed by: PATHOLOGY

## 2020-09-16 PROCEDURE — 63600175 PHARM REV CODE 636 W HCPCS: Performed by: STUDENT IN AN ORGANIZED HEALTH CARE EDUCATION/TRAINING PROGRAM

## 2020-09-16 PROCEDURE — 99213 PR OFFICE/OUTPT VISIT, EST, LEVL III, 20-29 MIN: ICD-10-PCS | Mod: ,,, | Performed by: PHYSICIAN ASSISTANT

## 2020-09-16 PROCEDURE — 88365 INSITU HYBRIDIZATION (FISH): CPT | Performed by: PATHOLOGY

## 2020-09-16 PROCEDURE — 88365 INSITU HYBRIDIZATION (FISH): CPT | Mod: 26,,, | Performed by: PATHOLOGY

## 2020-09-16 PROCEDURE — 83615 LACTATE (LD) (LDH) ENZYME: CPT

## 2020-09-16 PROCEDURE — 88364 INSITU HYBRIDIZATION (FISH): CPT | Performed by: PATHOLOGY

## 2020-09-16 PROCEDURE — 85025 COMPLETE CBC W/AUTO DIFF WBC: CPT

## 2020-09-16 PROCEDURE — 88311 PR  DECALCIFY TISSUE: ICD-10-PCS | Mod: 26,,, | Performed by: PATHOLOGY

## 2020-09-16 PROCEDURE — 88271 CYTOGENETICS DNA PROBE: CPT

## 2020-09-16 PROCEDURE — 88305 TISSUE EXAM BY PATHOLOGIST: ICD-10-PCS | Mod: 26,,, | Performed by: PATHOLOGY

## 2020-09-16 PROCEDURE — 88237 TISSUE CULTURE BONE MARROW: CPT

## 2020-09-16 PROCEDURE — 88271 CYTOGENETICS DNA PROBE: CPT | Mod: 59

## 2020-09-16 PROCEDURE — 82232 ASSAY OF BETA-2 PROTEIN: CPT

## 2020-09-16 PROCEDURE — 88305 TISSUE EXAM BY PATHOLOGIST: CPT | Performed by: PATHOLOGY

## 2020-09-16 PROCEDURE — 88313 PR  SPECIAL STAINS,GROUP II: ICD-10-PCS | Mod: 26,,, | Performed by: PATHOLOGY

## 2020-09-16 PROCEDURE — 84100 ASSAY OF PHOSPHORUS: CPT

## 2020-09-16 PROCEDURE — 88313 SPECIAL STAINS GROUP 2: CPT | Mod: 59 | Performed by: PATHOLOGY

## 2020-09-16 PROCEDURE — 85097 BONE MARROW INTERPRETATION: CPT | Mod: ,,, | Performed by: PATHOLOGY

## 2020-09-16 PROCEDURE — 88184 FLOWCYTOMETRY/ TC 1 MARKER: CPT | Performed by: PATHOLOGY

## 2020-09-16 PROCEDURE — 88342 CHG IMMUNOCYTOCHEMISTRY: ICD-10-PCS | Mod: 26,,, | Performed by: PATHOLOGY

## 2020-09-16 PROCEDURE — 88304 PR  SURG PATH,LEVEL III: ICD-10-PCS | Mod: 26,,, | Performed by: PATHOLOGY

## 2020-09-16 PROCEDURE — 86703 HIV-1/HIV-2 1 RESULT ANTBDY: CPT

## 2020-09-16 PROCEDURE — 88311 DECALCIFY TISSUE: CPT | Mod: 26,,, | Performed by: PATHOLOGY

## 2020-09-16 PROCEDURE — 88189 FLOWCYTOMETRY/READ 16 & >: CPT | Mod: ,,, | Performed by: PATHOLOGY

## 2020-09-16 PROCEDURE — 80053 COMPREHEN METABOLIC PANEL: CPT

## 2020-09-16 PROCEDURE — 88264 CHROMOSOME ANALYSIS 20-25: CPT

## 2020-09-16 PROCEDURE — 88341 IMHCHEM/IMCYTCHM EA ADD ANTB: CPT | Performed by: PATHOLOGY

## 2020-09-16 PROCEDURE — 88299 UNLISTED CYTOGENETIC STUDY: CPT

## 2020-09-16 PROCEDURE — 88304 TISSUE EXAM BY PATHOLOGIST: CPT | Performed by: PATHOLOGY

## 2020-09-16 PROCEDURE — 88311 DECALCIFY TISSUE: CPT | Performed by: PATHOLOGY

## 2020-09-16 PROCEDURE — 38222 DX BONE MARROW BX & ASPIR: CPT

## 2020-09-16 PROCEDURE — 88365 PR  TISSUE HYBRIDIZATION: ICD-10-PCS | Mod: 26,,, | Performed by: PATHOLOGY

## 2020-09-16 PROCEDURE — 36415 COLL VENOUS BLD VENIPUNCTURE: CPT

## 2020-09-16 PROCEDURE — 88313 SPECIAL STAINS GROUP 2: CPT | Mod: 26,,, | Performed by: PATHOLOGY

## 2020-09-16 PROCEDURE — 99213 OFFICE O/P EST LOW 20 MIN: CPT | Mod: ,,, | Performed by: PHYSICIAN ASSISTANT

## 2020-09-16 PROCEDURE — 88185 FLOWCYTOMETRY/TC ADD-ON: CPT | Mod: 59 | Performed by: PATHOLOGY

## 2020-09-16 PROCEDURE — 88274 CYTOGENETICS 25-99: CPT | Mod: 59

## 2020-09-16 RX ORDER — NAPROXEN SODIUM 220 MG/1
81 TABLET, FILM COATED ORAL DAILY
Qty: 90 TABLET | Refills: 3 | Status: ON HOLD | OUTPATIENT
Start: 2020-09-16 | End: 2021-05-15

## 2020-09-16 RX ORDER — LORAZEPAM 2 MG/ML
1 INJECTION INTRAMUSCULAR
Status: DISCONTINUED | OUTPATIENT
Start: 2020-09-16 | End: 2020-09-17 | Stop reason: HOSPADM

## 2020-09-16 RX ORDER — DEXAMETHASONE 4 MG/1
40 TABLET ORAL DAILY
Status: DISCONTINUED | OUTPATIENT
Start: 2020-09-16 | End: 2020-09-17 | Stop reason: HOSPADM

## 2020-09-16 RX ORDER — LEVOFLOXACIN 500 MG/1
500 TABLET, FILM COATED ORAL DAILY
Qty: 90 TABLET | Refills: 3 | Status: SHIPPED | OUTPATIENT
Start: 2020-09-16 | End: 2020-12-21 | Stop reason: SDUPTHER

## 2020-09-16 RX ORDER — HYDROMORPHONE HYDROCHLORIDE 1 MG/ML
1 INJECTION, SOLUTION INTRAMUSCULAR; INTRAVENOUS; SUBCUTANEOUS EVERY 30 MIN PRN
Status: DISCONTINUED | OUTPATIENT
Start: 2020-09-16 | End: 2020-09-17 | Stop reason: HOSPADM

## 2020-09-16 RX ORDER — FENTANYL CITRATE 50 UG/ML
INJECTION, SOLUTION INTRAMUSCULAR; INTRAVENOUS CODE/TRAUMA/SEDATION MEDICATION
Status: COMPLETED | OUTPATIENT
Start: 2020-09-16 | End: 2020-09-16

## 2020-09-16 RX ORDER — SULFAMETHOXAZOLE AND TRIMETHOPRIM 400; 80 MG/1; MG/1
1 TABLET ORAL DAILY
Qty: 90 TABLET | Refills: 3 | Status: SHIPPED | OUTPATIENT
Start: 2020-09-16 | End: 2021-04-05 | Stop reason: CLARIF

## 2020-09-16 RX ORDER — MIDAZOLAM HYDROCHLORIDE 1 MG/ML
INJECTION INTRAMUSCULAR; INTRAVENOUS CODE/TRAUMA/SEDATION MEDICATION
Status: COMPLETED | OUTPATIENT
Start: 2020-09-16 | End: 2020-09-16

## 2020-09-16 RX ORDER — ALLOPURINOL 300 MG/1
300 TABLET ORAL DAILY
Status: DISCONTINUED | OUTPATIENT
Start: 2020-09-16 | End: 2020-09-17 | Stop reason: HOSPADM

## 2020-09-16 RX ORDER — ACYCLOVIR 400 MG/1
400 TABLET ORAL 2 TIMES DAILY
Qty: 60 TABLET | Refills: 11 | Status: ON HOLD | OUTPATIENT
Start: 2020-09-16 | End: 2021-05-31 | Stop reason: HOSPADM

## 2020-09-16 RX ORDER — LIDOCAINE HYDROCHLORIDE 20 MG/ML
20 INJECTION, SOLUTION EPIDURAL; INFILTRATION; INTRACAUDAL; PERINEURAL ONCE
Status: COMPLETED | OUTPATIENT
Start: 2020-09-16 | End: 2020-09-16

## 2020-09-16 RX ORDER — DEXAMETHASONE 4 MG/1
40 TABLET ORAL WEEKLY
Qty: 80 TABLET | Refills: 2 | OUTPATIENT
Start: 2020-09-16 | End: 2020-09-17

## 2020-09-16 RX ADMIN — LOSARTAN POTASSIUM 25 MG: 25 TABLET, FILM COATED ORAL at 08:09

## 2020-09-16 RX ADMIN — FENTANYL CITRATE 50 MCG: 50 INJECTION INTRAMUSCULAR; INTRAVENOUS at 04:09

## 2020-09-16 RX ADMIN — LIDOCAINE HYDROCHLORIDE 400 MG: 20 INJECTION, SOLUTION EPIDURAL; INFILTRATION; INTRACAUDAL; PERINEURAL at 11:09

## 2020-09-16 RX ADMIN — CARVEDILOL 25 MG: 25 TABLET, FILM COATED ORAL at 08:09

## 2020-09-16 RX ADMIN — MIDAZOLAM HYDROCHLORIDE 1 MG: 1 INJECTION, SOLUTION INTRAMUSCULAR; INTRAVENOUS at 04:09

## 2020-09-16 RX ADMIN — ALLOPURINOL 300 MG: 300 TABLET ORAL at 08:09

## 2020-09-16 RX ADMIN — DEXAMETHASONE 40 MG: 4 TABLET ORAL at 08:09

## 2020-09-16 RX ADMIN — CLONIDINE HYDROCHLORIDE 0.1 MG: 0.1 TABLET ORAL at 08:09

## 2020-09-16 RX ADMIN — NIFEDIPINE 60 MG: 30 TABLET, FILM COATED, EXTENDED RELEASE ORAL at 08:09

## 2020-09-16 NOTE — PROCEDURES
PROCEDURE NOTE:     Date of Procedure: 09/16/2020  Bone Marrow Biopsy and Aspiration   Indication: elevated serum light chains  Consent: Informed consent was obtained from patient.   Timeout: Done and documented.   Site: Right posterior illiac crest.   Prep: Betadine.   Needle used: 11 gauge Jamshidi needle.   Anesthetic: 2% lidocaine 5 cc.   Biopsy: The biopsy needle was introduced into the marrow cavity and an aspirate was obtained without complications and sent for flow cytometry and cytogenetics. Core biopsy obtained without difficulty and sent for routine histologic examination.   Complications: None.   Disposition: The patient was placed supine for 20min following procedure. RN to assess bandaid for bleeding. Can removed bandaid in 24hrs   Blood loss: Minimal.     Steven Cornejo MD  Clinical Fellow  Hematology and Medical Oncology.  09/16/2020

## 2020-09-16 NOTE — PLAN OF CARE
Pt progressing towards goals.remains on telemetry,NSR.reports adequate pain control.NPO for Bone Biopsy in am.safety precautions maintained.pt free of falls or injuries.continue plan of care.

## 2020-09-16 NOTE — SUBJECTIVE & OBJECTIVE
Interval History: NAEON. Diagnosis confirmed of multiple myeloma. Heme/onc following with plans to start chemotherapy. Bone marrow biopsy performed this AM, pending IR bone biopsy this afternoon. Scheduled for L2-pelvis 9/23 for spine stabilization.     Medications:  Continuous Infusions:  Scheduled Meds:   allopurinoL  300 mg Oral Daily    carvediloL  25 mg Oral BID    cloNIDine  0.1 mg Oral Daily    dexAMETHasone  40 mg Oral Daily    losartan  25 mg Oral Daily    NIFEdipine  60 mg Oral Daily     PRN Meds:dextrose 50%, dextrose 50%, glucagon (human recombinant), glucose, glucose, HYDROmorphone, labetaloL, lorazepam, sodium chloride 0.9%     Review of Systems  Objective:     Weight: 95.6 kg (210 lb 12.2 oz)  Body mass index is 33.01 kg/m².  Vital Signs (Most Recent):  Temp: 96.7 °F (35.9 °C) (09/16/20 1338)  Pulse: 70 (09/16/20 1338)  Resp: 16 (09/16/20 1338)  BP: (!) 147/85 (09/16/20 1338)  SpO2: (!) 92 % (09/16/20 1338) Vital Signs (24h Range):  Temp:  [96.7 °F (35.9 °C)-98.3 °F (36.8 °C)] 96.7 °F (35.9 °C)  Pulse:  [61-80] 70  Resp:  [16-20] 16  SpO2:  [92 %-99 %] 92 %  BP: (133-159)/(85-92) 147/85                          Neurosurgery Physical Exam  General: well developed, well nourished, no distress.   Head: normocephalic, atraumatic  Neurologic: Alert and oriented. Thought content appropriate.  GCS: Motor: 6/Verbal: 5/Eyes: 4 GCS Total: 15  Mental Status: Awake, Alert, Oriented x 4  Language: No aphasia  Speech: No dysarthria  Cranial nerves: face symmetric, tongue midline, CN II-XII grossly intact.   Eyes: pupils equal, round, reactive to light with accommodation, EOMI.   Pulmonary: normal respirations, no signs of respiratory distress  Abdomen: soft, non-distended, not tender to palpation  Skin: Skin is warm, dry and intact.  Sensory: intact to light touch throughout     Motor Strength:Moves all extremities spontaneously with good tone.  Full strength upper and lower extremities. No abnormal movements  seen.      Strength   Deltoids Triceps Biceps Wrist Extension Wrist Flexion Hand    Upper: R 5/5 5/5 5/5 5/5 5/5 5/5     L 5/5 5/5 5/5 5/5 5/5 5/5       Iliopsoas Quadriceps Knee  Flexion Tibialis  anterior Gastro- cnemius EHL   Lower: R 5/5 5/5 5/5 5/5 5/5 5/5     L 5/5 5/5 5/5 5/5 5/5 5/5      Reflexes:   Candelaria's: Negative.  Babinski's: Negative.  Clonus: Negative.       Significant Labs:  Recent Labs   Lab 09/15/20  0232 09/16/20  0541    97   * 134*   K 3.9 3.9   CL 99 101   CO2 27 27   BUN 15 20   CREATININE 1.0 1.0   CALCIUM 9.7 9.9   MG 1.9 1.9     Recent Labs   Lab 09/15/20  0232 09/16/20  0540   WBC 6.34 6.46   HGB 13.4* 14.2   HCT 42.5 45.4    172     No results for input(s): LABPT, INR, APTT in the last 48 hours.  Microbiology Results (last 7 days)     ** No results found for the last 168 hours. **        All pertinent labs from the last 24 hours have been reviewed.    Significant Diagnostics:  No results found in the last 24 hours.

## 2020-09-16 NOTE — PLAN OF CARE
Patient arrived to [facility]. Patient stable, VS stable, bedside report given by Meghan AGUILA. Patient has no s/s of distress or pain at this time. Patient to be monitored an hour until sent back up to his room per doctors order.

## 2020-09-16 NOTE — PLAN OF CARE
- Tolerated bone marrow aspiration and biopsy well.   - Hematology follow up appointment scheduled for 09/24 at 8AM. Will get Velcade injection after appointment.   - Explained that I have sent rx for the following medications to his pharmacy : Asprin 81mg qD, Bactrim SS qD, Levofloxacin 500mg qD and Acyclovir 400mg BID. He will  the medications but not start taking until his appointment in clinic.  - Explained that he may get Revlimid delivered to his apartment before appointment with me.   - Encouraged dental visit for bisphosphonate clearance.    - Recommend discharge on Dexamethasone 40mg qD for 4 days to prevent expansion of sacral mass.     Steven Cornejo MD  Clinical Fellow  Hematology and Medical Oncology.  09/16/2020

## 2020-09-16 NOTE — PLAN OF CARE
Patient completed recovering. Patient to be taken back to room by Christine AGUILA. Patient stable, no s/s of distress or pain at this time.

## 2020-09-16 NOTE — NURSING
Pt arrived to IR Room 173 for L4 - 5 bone biopsy . Pt oriented to unit and staff. Plan of care reviewed with patient, patient verbalizes understanding. Comfort measures utilized. Pt safely transferred from stretcher to procedural table. Fall risk reviewed with patient, fall risk interventions maintained. Safety strap applied, positioner pillows utilized to minimize pressure points. Blankets applied. Pt prepped and draped utilizing standard sterile technique. Patient placed on continuous monitoring, as required by sedation policy. Timeouts completed utilizing standard universal time-out, per department and facility policy. RN to remain at bedside, continuous monitoring maintained. Pt resting comfortably. Denies pain/discomfort. Will continue to monitor. See flow sheets for monitoring, medication administration, and updates.

## 2020-09-16 NOTE — H&P
Inpatient Radiology Pre-procedure Note    History of Present Illness:  Jaspreet Walsh Jr. is a 60 y.o. male with HTN, admitted with newly discovered diffuse osseous lesions concerning for metastatic disease, spinal stenosis and pathological fractures. IR consulted for  bone biopsy.  Admission H&P reviewed.  Past Medical History:   Diagnosis Date    Anxiety     Arthritis     Hypertension      Past Surgical History:   Procedure Laterality Date    KNEE SURGERY Left 06/2019       Review of Systems:   As documented in primary team H&P    Home Meds:   Prior to Admission medications    Medication Sig Start Date End Date Taking? Authorizing Provider   cloNIDine (CATAPRES) 0.3 MG tablet Take 0.3 mg by mouth every evening. 9/4/20  Yes Historical Provider   HYDROcodone-acetaminophen (NORCO) 7.5-325 mg per tablet Take 1 tablet by mouth 4 (four) times daily as needed for Pain. 9/8/20  Yes Historical Provider   atenoloL (TENORMIN) 100 MG tablet Take 100 mg by mouth once daily. 9/4/20 9/16/20 Yes Historical Provider   benazepriL (LOTENSIN) 20 MG tablet Take 20 mg by mouth once daily. 8/10/20 9/16/20 Yes Historical Provider   acyclovir (ZOVIRAX) 400 MG tablet Take 1 tablet (400 mg total) by mouth 2 (two) times daily. 9/16/20 9/16/21  Steven Cornejo MD   aspirin 81 MG Chew Take 1 tablet (81 mg total) by mouth once daily. 9/16/20 9/16/21  Steven Cornejo MD   dexAMETHasone (DECADRON) 4 MG Tab Take 10 tablets (40 mg total) by mouth once a week. for 10 days 9/16/20 9/26/20  Steven Cornejo MD   levoFLOXacin (LEVAQUIN) 500 MG tablet Take 1 tablet (500 mg total) by mouth once daily. 9/16/20   Steven Cornejo MD   sulfamethoxazole-trimethoprim 400-80mg (BACTRIM,SEPTRA) 400-80 mg per tablet Take 1 tablet by mouth once daily. 9/16/20   Steven Cornejo MD     Scheduled Meds:    allopurinoL  300 mg Oral Daily    carvediloL  25 mg Oral BID    cloNIDine  0.1 mg Oral Daily    dexAMETHasone  40 mg Oral Daily     losartan  25 mg Oral Daily    NIFEdipine  60 mg Oral Daily     Continuous Infusions:   PRN Meds:dextrose 50%, dextrose 50%, glucagon (human recombinant), glucose, glucose, HYDROmorphone, labetaloL, lorazepam, sodium chloride 0.9%  Anticoagulants/Antiplatelets: no anticoagulation    Allergies: Review of patient's allergies indicates:  No Known Allergies  Sedation Hx: have not been any systemic reactions    Labs:  Recent Labs   Lab 09/14/20  0251   INR 1.1       Recent Labs   Lab 09/16/20  0540   WBC 6.46   HGB 14.2   HCT 45.4   MCV 96         Recent Labs   Lab 09/16/20  0541   GLU 97   *   K 3.9      CO2 27   BUN 20   CREATININE 1.0   CALCIUM 9.9   MG 1.9   ALT 14   AST 22   ALBUMIN 3.6   BILITOT 0.9         Vitals:  Temp: 98 °F (36.7 °C) (09/16/20 1557)  Pulse: 78 (09/16/20 1557)  Resp: 18 (09/16/20 1557)  BP: (!) 146/84 (09/16/20 1557)  SpO2: 97 % (09/16/20 1557)     Physical Exam:  ASA: II  Mallampati: II    General: no acute distress  Mental Status: alert and oriented to person, place and time  HEENT: normocephalic, atraumatic  Chest: unlabored breathing  Heart: regular heart rate  Abdomen: nondistended  Extremity: moves all extremities    Plan:  Vertebral bone biopsy 9/16.  Sedation Plan: jerica Singh DO  PGY-II  Diagnostic and Interventional Radiology  [facility]

## 2020-09-16 NOTE — PROGRESS NOTES
[facility]  Neurosurgery  Progress Note    Subjective:     History of Present Illness: Jaspreet comer is a 59 yo male with a PMH of HTN with no known oncologic history who presents to the ED for 1 month of back pain and muscle soreness to the lower extremities. He states that he has cramping pain to bilateral thighs and calves for about 1 month and back pain started 2 weeks ago without radiation to the midline lumbar back. Back pain is worse when sitting on the couch vs. Laying down, also somewhat worse when walking. He has no weakness in his legs and is ambulatory. No radicular component to his back pain.  No numbness or tingling in the lower extremities. No bowel or bladder incontinence. He has not had difficulty using his hand or coordinating movements of the upper extremities, no neck pain. Transferred to [facility] after [place] L spine at OSH revealed lumbar-sacral mass lesion concerning for malignancy. Denies any blood thinner use.     Medical history significant for occasional smoking for about 2 years total, no drinking or illicit substances. No known cancers, did not receive a screening colonoscopy. Family history of 'bone cancer' in his father who passed away in 2016.      Post-Op Info:  Procedure(s) (LRB):  FUSION, SPINE, LUMBAR L2-pelvis. Depuy. Plastic surgery closure w/ Dr. Bellamy (N/A)         Interval History: NAEON. Diagnosis confirmed of multiple myeloma. Heme/onc following with plans to start chemotherapy. Bone marrow biopsy performed this AM, pending IR bone biopsy this afternoon. Scheduled for L2-pelvis 9/23 for spine stabilization.     Medications:  Continuous Infusions:  Scheduled Meds:   allopurinoL  300 mg Oral Daily    carvediloL  25 mg Oral BID    cloNIDine  0.1 mg Oral Daily    dexAMETHasone  40 mg Oral Daily    losartan  25 mg Oral Daily    NIFEdipine  60 mg Oral Daily     PRN Meds:dextrose 50%, dextrose 50%, glucagon (human recombinant), glucose, glucose, HYDROmorphone,  labetaloL, lorazepam, sodium chloride 0.9%     Review of Systems  Objective:     Weight: 95.6 kg (210 lb 12.2 oz)  Body mass index is 33.01 kg/m².  Vital Signs (Most Recent):  Temp: 96.7 °F (35.9 °C) (09/16/20 1338)  Pulse: 70 (09/16/20 1338)  Resp: 16 (09/16/20 1338)  BP: (!) 147/85 (09/16/20 1338)  SpO2: (!) 92 % (09/16/20 1338) Vital Signs (24h Range):  Temp:  [96.7 °F (35.9 °C)-98.3 °F (36.8 °C)] 96.7 °F (35.9 °C)  Pulse:  [61-80] 70  Resp:  [16-20] 16  SpO2:  [92 %-99 %] 92 %  BP: (133-159)/(85-92) 147/85                          Neurosurgery Physical Exam  General: well developed, well nourished, no distress.   Head: normocephalic, atraumatic  Neurologic: Alert and oriented. Thought content appropriate.  GCS: Motor: 6/Verbal: 5/Eyes: 4 GCS Total: 15  Mental Status: Awake, Alert, Oriented x 4  Language: No aphasia  Speech: No dysarthria  Cranial nerves: face symmetric, tongue midline, CN II-XII grossly intact.   Eyes: pupils equal, round, reactive to light with accommodation, EOMI.   Pulmonary: normal respirations, no signs of respiratory distress  Abdomen: soft, non-distended, not tender to palpation  Skin: Skin is warm, dry and intact.  Sensory: intact to light touch throughout     Motor Strength:Moves all extremities spontaneously with good tone.  Full strength upper and lower extremities. No abnormal movements seen.      Strength   Deltoids Triceps Biceps Wrist Extension Wrist Flexion Hand    Upper: R 5/5 5/5 5/5 5/5 5/5 5/5     L 5/5 5/5 5/5 5/5 5/5 5/5       Iliopsoas Quadriceps Knee  Flexion Tibialis  anterior Gastro- cnemius EHL   Lower: R 5/5 5/5 5/5 5/5 5/5 5/5     L 5/5 5/5 5/5 5/5 5/5 5/5      Reflexes:   Candelaria's: Negative.  Babinski's: Negative.  Clonus: Negative.       Significant Labs:  Recent Labs   Lab 09/15/20  0232 09/16/20  0541    97   * 134*   K 3.9 3.9   CL 99 101   CO2 27 27   BUN 15 20   CREATININE 1.0 1.0   CALCIUM 9.7 9.9   MG 1.9 1.9     Recent Labs   Lab  09/15/20  0232 09/16/20  0540   WBC 6.34 6.46   HGB 13.4* 14.2   HCT 42.5 45.4    172     No results for input(s): LABPT, INR, APTT in the last 48 hours.  Microbiology Results (last 7 days)     ** No results found for the last 168 hours. **        All pertinent labs from the last 24 hours have been reviewed.    Significant Diagnostics:  No results found in the last 24 hours.      Assessment/Plan:     Lumbar stenosis  Jaspreet Walsh is a 61 yo male with PMH HTN who presents with 1 month of LE cramping and 2 weeks of back pain; full strength without numbness or b/b symptoms on exam, rectal tone present. CT L Spine with extensive lytic destruction of vertebral bodies with large L4-S1 mass involving the vertebral body as well as transverse processes and left posterior aspects of spinal canal extending to the sacrum. 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     - Neurologically intact on exam  - NPO for IR biopsy of L4-5 today. S/p bone marrow biopsy this AM by Heme/onc  - CT C/A/P with diffuse osseus lytic lesions  - Per Heme/onc: Meets multiple myeloma diagnosis criteria with light chain burden. Bone marrow biopsy performed this AM. Plans to start chemotherapy next week.   -  Patient will require fusion for stabilization of spine. Plan for OR 9/23 for L2-pelvis posterior fusion with Dr. Coyle. Will require plastic surgery assistance for closure. Procedure explained along with risks, benefits, and alternatives. Informed consent obtained    - If patient discharged prior to surgery next week, please document medical clearance and risk stratification prior to discharge.  - Rest of medical management per primary     Please contact NSGY with any acute exam changes or concerns        Naomi Ibanez PA-C  Neurosurgery  [facility]

## 2020-09-16 NOTE — PROCEDURES
"  Pre Op Diagnosis: lumbar spine mass  Post Op Diagnosis: Same    Procedure: Lumbar spine mass biopsy    Procedure performed by: Flores    Written Informed Consent Obtained: Yes  Specimen Removed: YES 4 18 G cores  Estimated Blood Loss: Minimal    Findings:   Successful CT guided biopsy of L4 mass. 4 18g cores removed    Patient tolerated procedure well.    Tom Tanner MD ([facility])  Interventional Radiology  [phone]        "

## 2020-09-16 NOTE — ASSESSMENT & PLAN NOTE
Jaspreet Walsh is a 61 yo male with PMH HTN who presents with 1 month of LE cramping and 2 weeks of back pain; full strength without numbness or b/b symptoms on exam, rectal tone present. CT L Spine with extensive lytic destruction of vertebral bodies with large L4-S1 mass involving the vertebral body as well as transverse processes and left posterior aspects of spinal canal extending to the sacrum. 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     - Neurologically intact on exam  - NPO for IR biopsy of L4-5 today. S/p bone marrow biopsy this AM by Heme/onc  - CT C/A/P with diffuse osseus lytic lesions  - Per Heme/onc: Meets multiple myeloma diagnosis criteria with light chain burden. Bone marrow biopsy performed this AM. Plans to start chemotherapy next week.   -  Patient will require fusion for stabilization of spine. Plan for OR 9/23 for L2-pelvis posterior fusion with Dr. Coyle. Will require plastic surgery assistance for closure. Procedure explained along with risks, benefits, and alternatives. Informed consent obtained    - If patient discharged prior to surgery next week, please document medical clearance and risk stratification prior to discharge.  - Rest of medical management per primary     Please contact NSGY with any acute exam changes or concerns

## 2020-09-16 NOTE — NURSING
L4-L5 bone biopsy completed, specimen sent to pathology, pt to recover in ROCU for 1hr then back to bed, dressing CDI, no bleeding, no hematoma noted, pt tolerated well

## 2020-09-17 ENCOUNTER — TELEPHONE (OUTPATIENT)
Dept: NEUROSURGERY | Facility: CLINIC | Age: 60
End: 2020-09-17

## 2020-09-17 VITALS
SYSTOLIC BLOOD PRESSURE: 139 MMHG | OXYGEN SATURATION: 98 % | DIASTOLIC BLOOD PRESSURE: 84 MMHG | HEIGHT: 67 IN | BODY MASS INDEX: 33.08 KG/M2 | TEMPERATURE: 98 F | HEART RATE: 75 BPM | WEIGHT: 210.75 LBS | RESPIRATION RATE: 18 BRPM

## 2020-09-17 DIAGNOSIS — C90.00 MULTIPLE MYELOMA, REMISSION STATUS UNSPECIFIED: Primary | ICD-10-CM

## 2020-09-17 DIAGNOSIS — Z03.818 ENCOUNTER FOR OBSERVATION FOR SUSPECTED EXPOSURE TO OTHER BIOLOGICAL AGENTS RULED OUT: ICD-10-CM

## 2020-09-17 DIAGNOSIS — Z01.818 PRE-OPERATIVE CLEARANCE: Primary | ICD-10-CM

## 2020-09-17 PROBLEM — E55.9 VITAMIN D DEFICIENCY: Status: ACTIVE | Noted: 2020-09-17

## 2020-09-17 LAB
ALBUMIN SERPL BCP-MCNC: 3.4 G/DL (ref 3.5–5.2)
ALP SERPL-CCNC: 77 U/L (ref 55–135)
ALT SERPL W/O P-5'-P-CCNC: 17 U/L (ref 10–44)
ANION GAP SERPL CALC-SCNC: 10 MMOL/L (ref 8–16)
AST SERPL-CCNC: 23 U/L (ref 10–40)
BASOPHILS # BLD AUTO: 0.01 K/UL (ref 0–0.2)
BASOPHILS NFR BLD: 0.1 % (ref 0–1.9)
BILIRUB SERPL-MCNC: 0.6 MG/DL (ref 0.1–1)
BODY SITE - BONE MARROW: NORMAL
BUN SERPL-MCNC: 29 MG/DL (ref 8–23)
CALCIUM SERPL-MCNC: 9.1 MG/DL (ref 8.7–10.5)
CHLORIDE SERPL-SCNC: 102 MMOL/L (ref 95–110)
CLINICAL DIAGNOSIS - BONE MARROW: NORMAL
CO2 SERPL-SCNC: 22 MMOL/L (ref 23–29)
CREAT SERPL-MCNC: 1 MG/DL (ref 0.5–1.4)
DIFFERENTIAL METHOD: ABNORMAL
EOSINOPHIL # BLD AUTO: 0 K/UL (ref 0–0.5)
EOSINOPHIL NFR BLD: 0 % (ref 0–8)
ERYTHROCYTE [DISTWIDTH] IN BLOOD BY AUTOMATED COUNT: 13.2 % (ref 11.5–14.5)
EST. GFR  (AFRICAN AMERICAN): >60 ML/MIN/1.73 M^2
EST. GFR  (NON AFRICAN AMERICAN): >60 ML/MIN/1.73 M^2
FLOW CYTOMETRY ANTIBODIES ANALYZED - BONE MARROW: NORMAL
FLOW CYTOMETRY COMMENT - BONE MARROW: NORMAL
FLOW CYTOMETRY INTERPRETATION - BONE MARROW: NORMAL
GLUCOSE SERPL-MCNC: 144 MG/DL (ref 70–110)
HCT VFR BLD AUTO: 43 % (ref 40–54)
HGB BLD-MCNC: 13.8 G/DL (ref 14–18)
IMM GRANULOCYTES # BLD AUTO: 0.03 K/UL (ref 0–0.04)
IMM GRANULOCYTES NFR BLD AUTO: 0.3 % (ref 0–0.5)
INTERPRETATION SERPL IFE-IMP: NORMAL
LYMPHOCYTES # BLD AUTO: 1.3 K/UL (ref 1–4.8)
LYMPHOCYTES NFR BLD: 15 % (ref 18–48)
MAGNESIUM SERPL-MCNC: 2 MG/DL (ref 1.6–2.6)
MCH RBC QN AUTO: 30 PG (ref 27–31)
MCHC RBC AUTO-ENTMCNC: 32.1 G/DL (ref 32–36)
MCV RBC AUTO: 94 FL (ref 82–98)
MONOCYTES # BLD AUTO: 0.2 K/UL (ref 0.3–1)
MONOCYTES NFR BLD: 2.5 % (ref 4–15)
NEUTROPHILS # BLD AUTO: 7.3 K/UL (ref 1.8–7.7)
NEUTROPHILS NFR BLD: 82.1 % (ref 38–73)
NRBC BLD-RTO: 0 /100 WBC
PATHOLOGIST INTERPRETATION IFE: NORMAL
PATHOLOGIST INTERPRETATION UPE: NORMAL
PHOSPHATE SERPL-MCNC: 3.9 MG/DL (ref 2.7–4.5)
PLATELET # BLD AUTO: 181 K/UL (ref 150–350)
PMV BLD AUTO: 11.3 FL (ref 9.2–12.9)
POTASSIUM SERPL-SCNC: 3.9 MMOL/L (ref 3.5–5.1)
PROT PATTERN UR ELPH-IMP: NORMAL
PROT SERPL-MCNC: 9.3 G/DL (ref 6–8.4)
RBC # BLD AUTO: 4.6 M/UL (ref 4.6–6.2)
SODIUM SERPL-SCNC: 134 MMOL/L (ref 136–145)
WBC # BLD AUTO: 8.86 K/UL (ref 3.9–12.7)

## 2020-09-17 PROCEDURE — 83735 ASSAY OF MAGNESIUM: CPT

## 2020-09-17 PROCEDURE — 63600175 PHARM REV CODE 636 W HCPCS: Performed by: STUDENT IN AN ORGANIZED HEALTH CARE EDUCATION/TRAINING PROGRAM

## 2020-09-17 PROCEDURE — 36415 COLL VENOUS BLD VENIPUNCTURE: CPT

## 2020-09-17 PROCEDURE — G0378 HOSPITAL OBSERVATION PER HR: HCPCS

## 2020-09-17 PROCEDURE — 25000003 PHARM REV CODE 250: Performed by: STUDENT IN AN ORGANIZED HEALTH CARE EDUCATION/TRAINING PROGRAM

## 2020-09-17 PROCEDURE — 99217 PR OBSERVATION CARE DISCHARGE: CPT | Mod: ,,, | Performed by: INTERNAL MEDICINE

## 2020-09-17 PROCEDURE — 85025 COMPLETE CBC W/AUTO DIFF WBC: CPT

## 2020-09-17 PROCEDURE — 80053 COMPREHEN METABOLIC PANEL: CPT

## 2020-09-17 PROCEDURE — 84100 ASSAY OF PHOSPHORUS: CPT

## 2020-09-17 PROCEDURE — 99217 PR OBSERVATION CARE DISCHARGE: ICD-10-PCS | Mod: ,,, | Performed by: INTERNAL MEDICINE

## 2020-09-17 PROCEDURE — 99214 PR OFFICE/OUTPT VISIT, EST, LEVL IV, 30-39 MIN: ICD-10-PCS | Mod: ,,, | Performed by: PHYSICIAN ASSISTANT

## 2020-09-17 PROCEDURE — 99214 OFFICE O/P EST MOD 30 MIN: CPT | Mod: ,,, | Performed by: PHYSICIAN ASSISTANT

## 2020-09-17 RX ORDER — LOSARTAN POTASSIUM 50 MG/1
50 TABLET ORAL DAILY
Qty: 90 TABLET | Refills: 1 | Status: CANCELLED | OUTPATIENT
Start: 2020-09-18

## 2020-09-17 RX ORDER — ALLOPURINOL 300 MG/1
300 TABLET ORAL DAILY
Qty: 30 TABLET | Refills: 2 | Status: SHIPPED | OUTPATIENT
Start: 2020-09-17 | End: 2020-12-21 | Stop reason: SDUPTHER

## 2020-09-17 RX ORDER — ERGOCALCIFEROL 1.25 MG/1
50000 CAPSULE ORAL
Qty: 4 CAPSULE | Refills: 1 | Status: SHIPPED | OUTPATIENT
Start: 2020-09-17 | End: 2021-06-02 | Stop reason: SDUPTHER

## 2020-09-17 RX ORDER — CLONIDINE HYDROCHLORIDE 0.1 MG/1
0.1 TABLET ORAL DAILY
Qty: 30 TABLET | Refills: 2 | Status: CANCELLED | OUTPATIENT
Start: 2020-09-17

## 2020-09-17 RX ORDER — CARVEDILOL 25 MG/1
25 TABLET ORAL 2 TIMES DAILY
Qty: 60 TABLET | Refills: 2 | Status: SHIPPED | OUTPATIENT
Start: 2020-09-17 | End: 2020-12-21 | Stop reason: SDUPTHER

## 2020-09-17 RX ORDER — ERGOCALCIFEROL 1.25 MG/1
50000 CAPSULE ORAL
Status: DISCONTINUED | OUTPATIENT
Start: 2020-09-17 | End: 2020-09-17

## 2020-09-17 RX ORDER — LOSARTAN POTASSIUM 25 MG/1
25 TABLET ORAL DAILY
Qty: 90 TABLET | Refills: 0 | Status: CANCELLED | OUTPATIENT
Start: 2020-09-17 | End: 2020-12-16

## 2020-09-17 RX ORDER — LENALIDOMIDE 25 MG/1
25 CAPSULE ORAL DAILY
Qty: 21 EACH | Refills: 0 | Status: SHIPPED | OUTPATIENT
Start: 2020-09-17 | End: 2020-10-09

## 2020-09-17 RX ORDER — NIFEDIPINE 60 MG/1
60 TABLET, EXTENDED RELEASE ORAL DAILY
Qty: 30 TABLET | Refills: 2 | Status: SHIPPED | OUTPATIENT
Start: 2020-09-17 | End: 2020-12-16

## 2020-09-17 RX ORDER — LOSARTAN POTASSIUM 50 MG/1
50 TABLET ORAL DAILY
Qty: 90 TABLET | Refills: 3 | Status: SHIPPED | OUTPATIENT
Start: 2020-09-18 | End: 2021-04-01 | Stop reason: SDUPTHER

## 2020-09-17 RX ORDER — ERGOCALCIFEROL 1.25 MG/1
50000 CAPSULE ORAL
Status: DISCONTINUED | OUTPATIENT
Start: 2020-09-17 | End: 2020-09-17 | Stop reason: HOSPADM

## 2020-09-17 RX ORDER — LOSARTAN POTASSIUM 50 MG/1
50 TABLET ORAL DAILY
Status: DISCONTINUED | OUTPATIENT
Start: 2020-09-18 | End: 2020-09-17 | Stop reason: HOSPADM

## 2020-09-17 RX ORDER — CLONIDINE HYDROCHLORIDE 0.1 MG/1
0.1 TABLET ORAL DAILY
Qty: 30 TABLET | Refills: 11 | Status: SHIPPED | OUTPATIENT
Start: 2020-09-18 | End: 2021-02-11

## 2020-09-17 RX ORDER — DEXAMETHASONE 4 MG/1
40 TABLET ORAL DAILY
Qty: 20 TABLET | Refills: 0 | Status: SHIPPED | OUTPATIENT
Start: 2020-09-18 | End: 2020-09-20

## 2020-09-17 RX ADMIN — DEXAMETHASONE 40 MG: 4 TABLET ORAL at 08:09

## 2020-09-17 RX ADMIN — CARVEDILOL 25 MG: 25 TABLET, FILM COATED ORAL at 08:09

## 2020-09-17 RX ADMIN — LOSARTAN POTASSIUM 25 MG: 25 TABLET, FILM COATED ORAL at 08:09

## 2020-09-17 RX ADMIN — NIFEDIPINE 60 MG: 30 TABLET, FILM COATED, EXTENDED RELEASE ORAL at 08:09

## 2020-09-17 RX ADMIN — ALLOPURINOL 300 MG: 300 TABLET ORAL at 08:09

## 2020-09-17 RX ADMIN — CLONIDINE HYDROCHLORIDE 0.1 MG: 0.1 TABLET ORAL at 08:09

## 2020-09-17 NOTE — PLAN OF CARE
09/17/20 1523   Final Note   Assessment Type Final Discharge Note   Anticipated Discharge Disposition Home   Right Care Referral Info   Post Acute Recommendation No Care       Jo Ann Pérez LMSW  [facility]  [id number]

## 2020-09-17 NOTE — PROGRESS NOTES
[facility]  Neurosurgery  Progress Note    Subjective:     History of Present Illness: Jaspreet comer is a 61 yo male with a PMH of HTN with no known oncologic history who presents to the ED for 1 month of back pain and muscle soreness to the lower extremities. He states that he has cramping pain to bilateral thighs and calves for about 1 month and back pain started 2 weeks ago without radiation to the midline lumbar back. Back pain is worse when sitting on the couch vs. Laying down, also somewhat worse when walking. He has no weakness in his legs and is ambulatory. No radicular component to his back pain.  No numbness or tingling in the lower extremities. No bowel or bladder incontinence. He has not had difficulty using his hand or coordinating movements of the upper extremities, no neck pain. Transferred to [facility] [place] L spine at OSH revealed lumbar-sacral mass lesion concerning for malignancy. Denies any blood thinner use.     Medical history significant for occasional smoking for about 2 years total, no drinking or illicit substances. No known cancers, did not receive a screening colonoscopy. Family history of 'bone cancer' in his father who passed away in 2016.      Post-Op Info:  Procedure(s) (LRB):  FUSION, SPINE, LUMBAR L2-pelvis. Depuy. Plastic surgery closure w/ Dr. Bellamy (N/A)         Interval History: NAEON. IR biopsy completed yesterday. Path pending. Risk stratification for surgery to be documented by primary team prior to discharge.       Medications:  Continuous Infusions:  Scheduled Meds:   allopurinoL  300 mg Oral Daily    carvediloL  25 mg Oral BID    cloNIDine  0.1 mg Oral Daily    dexAMETHasone  40 mg Oral Daily    [START ON 9/18/2020] losartan  50 mg Oral Daily    NIFEdipine  60 mg Oral Daily     PRN Meds:dextrose 50%, dextrose 50%, glucagon (human recombinant), glucose, glucose, HYDROmorphone, labetaloL, lorazepam, sodium chloride 0.9%     Review of  Systems  Objective:     Weight: 95.6 kg (210 lb 12.2 oz)  Body mass index is 33.01 kg/m².  Vital Signs (Most Recent):  Temp: 96.9 °F (36.1 °C) (09/17/20 0545)  Pulse: 80 (09/17/20 1132)  Resp: 18 (09/17/20 0545)  BP: (!) 173/86 (09/17/20 0545)  SpO2: 98 % (09/17/20 0545) Vital Signs (24h Range):  Temp:  [96.7 °F (35.9 °C)-98.4 °F (36.9 °C)] 96.9 °F (36.1 °C)  Pulse:  [] 80  Resp:  [15-18] 18  SpO2:  [92 %-100 %] 98 %  BP: (133-184)/(80-98) 173/86                          Neurosurgery Physical Exam  General: well developed, well nourished, no distress.   Head: normocephalic, atraumatic  Neurologic: Alert and oriented. Thought content appropriate.  GCS: Motor: 6/Verbal: 5/Eyes: 4 GCS Total: 15  Mental Status: Awake, Alert, Oriented x 4  Language: No aphasia  Speech: No dysarthria  Cranial nerves: face symmetric, tongue midline, CN II-XII grossly intact.   Eyes: pupils equal, round, reactive to light with accommodation, EOMI.   Pulmonary: normal respirations, no signs of respiratory distress  Abdomen: soft, non-distended, not tender to palpation  Skin: Skin is warm, dry and intact.  Sensory: intact to light touch throughout     Motor Strength:Moves all extremities spontaneously with good tone.  Full strength upper and lower extremities. No abnormal movements seen.      Strength   Deltoids Triceps Biceps Wrist Extension Wrist Flexion Hand    Upper: R 5/5 5/5 5/5 5/5 5/5 5/5     L 5/5 5/5 5/5 5/5 5/5 5/5       Iliopsoas Quadriceps Knee  Flexion Tibialis  anterior Gastro- cnemius EHL   Lower: R 5/5 5/5 5/5 5/5 5/5 5/5     L 5/5 5/5 5/5 5/5 5/5 5/5      Reflexes:   Candelaria's: Negative.  Babinski's: Negative.  Clonus: Negative.    Significant Labs:  Recent Labs   Lab 09/16/20  0541 09/17/20  0441   GLU 97 144*   * 134*   K 3.9 3.9    102   CO2 27 22*   BUN 20 29*   CREATININE 1.0 1.0   CALCIUM 9.9 9.1   MG 1.9 2.0     Recent Labs   Lab 09/16/20  0540 09/17/20  0441   WBC 6.46 8.86   HGB 14.2 13.8*    HCT 45.4 43.0    181     No results for input(s): LABPT, INR, APTT in the last 48 hours.  Microbiology Results (last 7 days)     ** No results found for the last 168 hours. **        All pertinent labs from the last 24 hours have been reviewed.    Significant Diagnostics:  Ir Biopsy Bone Deep    Result Date: 9/16/2020  Image-guided L4 bone biopsy. Plan: Specimen(s) sent for evaluation. _______________________________________________________________ Electronically signed by: Tom Tanner Date:    09/16/2020 Time:    17:49      Assessment/Plan:     Lumbar stenosis  Jaspreet Walsh is a 59 yo male with PMH HTN who presents with 1 month of LE cramping and 2 weeks of back pain; full strength without numbness or b/b symptoms on exam, rectal tone present. CT L Spine with extensive lytic destruction of vertebral bodies with large L4-S1 mass involving the vertebral body as well as transverse processes and left posterior aspects of spinal canal extending to the sacrum. 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     - Neurologically intact on exam  - S/p L4 mass biopsy with IR. Path pending.   - CT C/A/P with diffuse osseus lytic lesions  - Per Heme/onc: Meets multiple myeloma diagnosis criteria with light chain burden. Bone marrow biopsy pending. Plans to start chemotherapy next week.   -  Patient will require fusion for stabilization of spine. Plan for OR 9/23 for L2-pelvis posterior fusion with Dr. Coyle. Will require plastic surgery assistance for closure. Procedure explained along with risks, benefits, and alternatives. Informed consent obtained    - Discussed with primary team about obtaining risk stratification prior to discharge for surgery next week. Appreciate their assistance.   - Rest of medical management per primary     Please contact NSGY with any acute exam changes or  concerns        Naomi Ibanez PA-C  Neurosurgery  [facility]

## 2020-09-17 NOTE — SUBJECTIVE & OBJECTIVE
Interval History: No acute events overnight. Continues hypertensive with SBP in 150s. IR bx of spine later today. Heme/onc bone-marrow biopsy today. Pt neurologically intact with no major complaints.     Review of Systems   Constitutional: Negative for activity change, appetite change, chills, diaphoresis, fatigue, fever and unexpected weight change.   Eyes: Negative for visual disturbance.   Respiratory: Negative for shortness of breath.    Cardiovascular: Negative for chest pain.   Gastrointestinal: Negative for abdominal pain, diarrhea, nausea and vomiting.   Genitourinary: Negative for decreased urine volume and difficulty urinating.   Musculoskeletal: Positive for arthralgias, back pain and myalgias. Negative for gait problem, joint swelling, neck pain and neck stiffness.   Neurological: Negative for dizziness, speech difficulty, weakness, light-headedness, numbness and headaches.   Hematological: Negative for adenopathy.     Objective:     Vital Signs (Most Recent):  Temp: 97.4 °F (36.3 °C) (09/16/20 1705)  Pulse: 90 (09/16/20 1750)  Resp: 15 (09/16/20 1750)  BP: (!) 154/80 (09/16/20 1750)  SpO2: 100 % (09/16/20 1750) Vital Signs (24h Range):  Temp:  [96.7 °F (35.9 °C)-98.3 °F (36.8 °C)] 97.4 °F (36.3 °C)  Pulse:  [] 90  Resp:  [15-20] 15  SpO2:  [92 %-100 %] 100 %  BP: (133-184)/(80-98) 154/80     Weight: 95.6 kg (210 lb 12.2 oz)  Body mass index is 33.01 kg/m².  No intake or output data in the 24 hours ending 09/16/20 2007   Physical Exam  Vitals signs reviewed.   Constitutional:       General: He is not in acute distress.     Appearance: He is not ill-appearing, toxic-appearing or diaphoretic.   HENT:      Head: Normocephalic.      Nose: Nose normal.      Mouth/Throat:      Mouth: Mucous membranes are moist.   Eyes:      Extraocular Movements: Extraocular movements intact.      Pupils: Pupils are equal, round, and reactive to light.   Neck:      Musculoskeletal: Normal range of motion.    Cardiovascular:      Rate and Rhythm: Normal rate.      Pulses: Normal pulses.      Heart sounds: No murmur. No friction rub. No gallop.    Pulmonary:      Effort: Pulmonary effort is normal. No respiratory distress.      Breath sounds: Normal breath sounds.   Abdominal:      General: Bowel sounds are normal. There is no distension.      Palpations: Abdomen is soft. There is no mass.      Tenderness: There is no abdominal tenderness. There is no guarding or rebound.      Hernia: No hernia is present.   Musculoskeletal: Normal range of motion.         General: No swelling, tenderness, deformity or signs of injury.   Skin:     General: Skin is warm.      Findings: No rash.   Neurological:      Mental Status: He is alert and oriented to person, place, and time. Mental status is at baseline.      Cranial Nerves: No cranial nerve deficit.      Sensory: No sensory deficit.       Significant Labs: All pertinent labs within the past 24 hours have been reviewed.    Significant Imaging: I have reviewed all pertinent imaging results/findings within the past 24 hours.

## 2020-09-17 NOTE — ASSESSMENT & PLAN NOTE
-- hx of back pain x1 month  -- minimal relief with tylenol  -- CT imaging at OSH with lytic lesions throughout and high grade spinal cord stenosis at L4-L5  -- no focal weakness, numbness, bowel or bladder incontinence     Back pain 2/2 to spinal stenosis +/- lumbar mass with extensive lytic lesions concerning for metastatic disease 2/2 MM    Neurosurgery recs:   -- MRI T/L spine-- 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    -- no need for emergent surgical intervention    Plan:  - debulking surgery scheduled 9/23 per NSGY: may be d/c   - EKG for surgical risk stratification   - f/u outpt heme/onc

## 2020-09-17 NOTE — ASSESSMENT & PLAN NOTE
"Jaspreet Walsh is a 59 y/o M with a PMHx of uncontrolled HTN on three BP medications who was transferred from [facility] presented with back and L leg pain x1 month. CT scan concern for metastatic disease of the spine and spinal stenosis. 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 Father with hx of "bone cancer." No reported oncologic hx. Never had c-scope. Former smoker. Labs with T protein of 10.7, otherwise unremarkable. Imaging findings concerning for metastatic dz, possible MM. IR biopsy of sacral mass 9/16. Heme/onc bone bx 9/16.     Plan:  - d/c with outpt heme-onc f/u, 4 days of dexamethasone 40 mg PO, and allopurinol.   - neurosurgery on board - spinal fusion + tumor de-bulking scheduled for 9/23  - daily labs    "

## 2020-09-17 NOTE — DISCHARGE INSTRUCTIONS
You were evaluated for abnormal imaging findings concerning for cancer. We will know more once after the results of the biopsies you received while here. Follow up with hematology/oncology doctors, the neurosurgeons for your scheduled tumor de-bulking surgery, and your primary care provider.   - take 2 more days of dexamethasone 40 mg (they come in 4 mg tablets so take 10 of them each day)  - we are starting you on a medication for low vitamin D, please take it as prescribed  - we changed your hypertension treatment, take medications as prescribed and check your BP at home  - if your symptoms worsen or fail to return, you may always return

## 2020-09-17 NOTE — ASSESSMENT & PLAN NOTE
Jaspreet Walsh is a 59 yo male with PMH HTN who presents with 1 month of LE cramping and 2 weeks of back pain; full strength without numbness or b/b symptoms on exam, rectal tone present. CT L Spine with extensive lytic destruction of vertebral bodies with large L4-S1 mass involving the vertebral body as well as transverse processes and left posterior aspects of spinal canal extending to the sacrum. 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     - Neurologically intact on exam  - S/p L4 mass biopsy with IR. Path pending.   - CT C/A/P with diffuse osseus lytic lesions  - Per Heme/onc: Meets multiple myeloma diagnosis criteria with light chain burden. Bone marrow biopsy pending. Plans to start chemotherapy next week.   -  Patient will require fusion for stabilization of spine. Plan for OR 9/23 for L2-pelvis posterior fusion with Dr. Coyle. Will require plastic surgery assistance for closure. Procedure explained along with risks, benefits, and alternatives. Informed consent obtained    - Discussed with primary team about obtaining risk stratification prior to discharge for surgery next week. Appreciate their assistance.   - Rest of medical management per primary     Please contact NSGY with any acute exam changes or concerns

## 2020-09-17 NOTE — PROGRESS NOTES
"[facility] Hospital Medicine  Progress Note    Patient Name: Jaspreet Walsh Jr.  MRN: [id number]  Patient Class: OP- Observation   Admission Date: 2020  Length of Stay: 0 days  Attending Physician: Dameon Ridley MD  Primary Care Provider: Con Patel Jr, MD    Hospital Medicine Team: [facility] 2 Josee Hernandez MD    Subjective:     Principal Problem:Metastatic disease        HPI:  Jaspreet Walsh is a 59 y/o M with a PMHx of uncontrolled HTN on three BP medications who was transferred from [facility] for neurosurgery evaluation after CT scan revealed "extensive lytic lesions throughout with high grade spinal stenosis at L4-L5. Additional probable pathologic fractures through the left aspect of the sacrum". Pt states for the past month he has been experiencing intermittent sharp lower back pain and pain to the left lateral thigh. The back pain is exacerbated with sitting for prolonged periods and with walking. Tylenol often relieves the pain. Denies similar pain previously. No reported oncologic hx. His father  of "bone cancer" in his 80s, but no other known family hx of cancer. Denies any fevers, chills, weight loss, night sweats, CP, SOB, n/v, bowel or bladder incontinence, numbness, weakness, dizziness. Former smoker x10 years, approximately 2-3 cigarettes daily, quit 8 years ago. No EtOH or illicit drug use.    In the ED, vitals significant for SBP >200. Pertinent labs include total protein of 10.7. Neurosurgery consulted--recommended MRI thoracic and lumbar spine. 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    Overview/Hospital Course:  No notes on file    Interval History: No acute events overnight. Continues hypertensive with SBP in 150s. IR bx of spine later today. Heme/onc bone-marrow biopsy today. Pt neurologically intact with no major " complaints.     Review of Systems   Constitutional: Negative for activity change, appetite change, chills, diaphoresis, fatigue, fever and unexpected weight change.   Eyes: Negative for visual disturbance.   Respiratory: Negative for shortness of breath.    Cardiovascular: Negative for chest pain.   Gastrointestinal: Negative for abdominal pain, diarrhea, nausea and vomiting.   Genitourinary: Negative for decreased urine volume and difficulty urinating.   Musculoskeletal: Positive for arthralgias, back pain and myalgias. Negative for gait problem, joint swelling, neck pain and neck stiffness.   Neurological: Negative for dizziness, speech difficulty, weakness, light-headedness, numbness and headaches.   Hematological: Negative for adenopathy.     Objective:     Vital Signs (Most Recent):  Temp: 97.4 °F (36.3 °C) (09/16/20 1705)  Pulse: 90 (09/16/20 1750)  Resp: 15 (09/16/20 1750)  BP: (!) 154/80 (09/16/20 1750)  SpO2: 100 % (09/16/20 1750) Vital Signs (24h Range):  Temp:  [96.7 °F (35.9 °C)-98.3 °F (36.8 °C)] 97.4 °F (36.3 °C)  Pulse:  [] 90  Resp:  [15-20] 15  SpO2:  [92 %-100 %] 100 %  BP: (133-184)/(80-98) 154/80     Weight: 95.6 kg (210 lb 12.2 oz)  Body mass index is 33.01 kg/m².  No intake or output data in the 24 hours ending 09/16/20 2007   Physical Exam  Vitals signs reviewed.   Constitutional:       General: He is not in acute distress.     Appearance: He is not ill-appearing, toxic-appearing or diaphoretic.   HENT:      Head: Normocephalic.      Nose: Nose normal.      Mouth/Throat:      Mouth: Mucous membranes are moist.   Eyes:      Extraocular Movements: Extraocular movements intact.      Pupils: Pupils are equal, round, and reactive to light.   Neck:      Musculoskeletal: Normal range of motion.   Cardiovascular:      Rate and Rhythm: Normal rate.      Pulses: Normal pulses.      Heart sounds: No murmur. No friction rub. No gallop.    Pulmonary:      Effort: Pulmonary effort is normal. No  "respiratory distress.      Breath sounds: Normal breath sounds.   Abdominal:      General: Bowel sounds are normal. There is no distension.      Palpations: Abdomen is soft. There is no mass.      Tenderness: There is no abdominal tenderness. There is no guarding or rebound.      Hernia: No hernia is present.   Musculoskeletal: Normal range of motion.         General: No swelling, tenderness, deformity or signs of injury.   Skin:     General: Skin is warm.      Findings: No rash.   Neurological:      Mental Status: He is alert and oriented to person, place, and time. Mental status is at baseline.      Cranial Nerves: No cranial nerve deficit.      Sensory: No sensory deficit.       Significant Labs: All pertinent labs within the past 24 hours have been reviewed.    Significant Imaging: I have reviewed all pertinent imaging results/findings within the past 24 hours.      Assessment/Plan:      * Metastatic disease  Jaspreet Walsh is a 61 y/o M with a PMHx of uncontrolled HTN on three BP medications who was transferred from [facility] presented with back and L leg pain x1 month. CT scan concern for metastatic disease of the spine and spinal stenosis. 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 Father with hx of "bone cancer." No reported oncologic hx. Never had c-scope. Former smoker. Labs with T protein of 10.7, otherwise unremarkable. Imaging findings concerning for metastatic dz, possible MM. IR biopsy of sacral mass 9/16. Heme/onc bone bx 9/16.     Plan:  - d/c with outpt heme-onc f/u, 4 days of dexamethasone 40 mg PO, and allopurinol.   - neurosurgery on board - spinal fusion + tumor de-bulking scheduled for 9/23  - daily labs      Multiple myeloma  See metastatic disease.      Plasma cell dyscrasia  See metastatic disease.       Anxiety  Evaluated by psych, appropriate given situation.   - f/u " heme/onc/psych in 4 wks upon d/c      Essential hypertension  -- hx of uncontrolled HTN  -- on atenolol, benazepril and clonidine  -- compliant with meds  -- SBP >200 in ED, given labetalol 10 mg x2, hydralazine 10 mg x1, home dose clonidine x1      Plan:  -- revamped home BP meds  -- Now on Coreg, Nifedipine, Losartan, and weaning Clonidine as he is rebounding without it    Lumbar stenosis  -- hx of back pain x1 month  -- minimal relief with tylenol  -- CT imaging at [facility] with lytic lesions throughout and high grade spinal cord stenosis at L4-L5  -- no focal weakness, numbness, bowel or bladder incontinence     Back pain 2/2 to spinal stenosis +/- lumbar mass with extensive lytic lesions concerning for metastatic disease 2/2 MM    Neurosurgery recs:   -- MRI T/L spine-- 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    -- no need for emergent surgical intervention    Plan:  - debulking surgery scheduled 9/23 per NSGY: may be d/c   - EKG for surgical risk stratification   - f/u outpt heme/onc      VTE Risk Mitigation (From admission, onward)         Ordered     IP VTE HIGH RISK PATIENT  Once      09/14/20 0618     Place sequential compression device  Until discontinued      09/14/20 0618                Discharge Planning   FRANCISCA: 9/17/2020     Code Status: Full Code   Is the patient medically ready for discharge?:     Reason for patient still in hospital (select all that apply): Patient trending condition  Discharge Plan A: Home, Home with family          Josee Hernandez MD  Department of Hospital Medicine   [facility]

## 2020-09-17 NOTE — SUBJECTIVE & OBJECTIVE
Interval History: NAEON. IR biopsy completed yesterday. Path pending. Risk stratification for surgery to be documented by primary team prior to discharge.       Medications:  Continuous Infusions:  Scheduled Meds:   allopurinoL  300 mg Oral Daily    carvediloL  25 mg Oral BID    cloNIDine  0.1 mg Oral Daily    dexAMETHasone  40 mg Oral Daily    [START ON 9/18/2020] losartan  50 mg Oral Daily    NIFEdipine  60 mg Oral Daily     PRN Meds:dextrose 50%, dextrose 50%, glucagon (human recombinant), glucose, glucose, HYDROmorphone, labetaloL, lorazepam, sodium chloride 0.9%     Review of Systems  Objective:     Weight: 95.6 kg (210 lb 12.2 oz)  Body mass index is 33.01 kg/m².  Vital Signs (Most Recent):  Temp: 96.9 °F (36.1 °C) (09/17/20 0545)  Pulse: 80 (09/17/20 1132)  Resp: 18 (09/17/20 0545)  BP: (!) 173/86 (09/17/20 0545)  SpO2: 98 % (09/17/20 0545) Vital Signs (24h Range):  Temp:  [96.7 °F (35.9 °C)-98.4 °F (36.9 °C)] 96.9 °F (36.1 °C)  Pulse:  [] 80  Resp:  [15-18] 18  SpO2:  [92 %-100 %] 98 %  BP: (133-184)/(80-98) 173/86                          Neurosurgery Physical Exam  General: well developed, well nourished, no distress.   Head: normocephalic, atraumatic  Neurologic: Alert and oriented. Thought content appropriate.  GCS: Motor: 6/Verbal: 5/Eyes: 4 GCS Total: 15  Mental Status: Awake, Alert, Oriented x 4  Language: No aphasia  Speech: No dysarthria  Cranial nerves: face symmetric, tongue midline, CN II-XII grossly intact.   Eyes: pupils equal, round, reactive to light with accommodation, EOMI.   Pulmonary: normal respirations, no signs of respiratory distress  Abdomen: soft, non-distended, not tender to palpation  Skin: Skin is warm, dry and intact.  Sensory: intact to light touch throughout     Motor Strength:Moves all extremities spontaneously with good tone.  Full strength upper and lower extremities. No abnormal movements seen.      Strength   Deltoids Triceps Biceps Wrist Extension Wrist  Flexion Hand    Upper: R 5/5 5/5 5/5 5/5 5/5 5/5     L 5/5 5/5 5/5 5/5 5/5 5/5       Iliopsoas Quadriceps Knee  Flexion Tibialis  anterior Gastro- cnemius EHL   Lower: R 5/5 5/5 5/5 5/5 5/5 5/5     L 5/5 5/5 5/5 5/5 5/5 5/5      Reflexes:   Candelaria's: Negative.  Babinski's: Negative.  Clonus: Negative.    Significant Labs:  Recent Labs   Lab 09/16/20  0541 09/17/20  0441   GLU 97 144*   * 134*   K 3.9 3.9    102   CO2 27 22*   BUN 20 29*   CREATININE 1.0 1.0   CALCIUM 9.9 9.1   MG 1.9 2.0     Recent Labs   Lab 09/16/20  0540 09/17/20  0441   WBC 6.46 8.86   HGB 14.2 13.8*   HCT 45.4 43.0    181     No results for input(s): LABPT, INR, APTT in the last 48 hours.  Microbiology Results (last 7 days)     ** No results found for the last 168 hours. **        All pertinent labs from the last 24 hours have been reviewed.    Significant Diagnostics:  Ir Biopsy Bone Deep    Result Date: 9/16/2020  Image-guided L4 bone biopsy. Plan: Specimen(s) sent for evaluation. _______________________________________________________________ Electronically signed by: Tom Tanner Date:    09/16/2020 Time:    17:49

## 2020-09-17 NOTE — ASSESSMENT & PLAN NOTE
-- hx of uncontrolled HTN  -- on atenolol, benazepril and clonidine  -- compliant with meds  -- SBP >200 in ED, given labetalol 10 mg x2, hydralazine 10 mg x1, home dose clonidine x1      Plan:  -- revamped home BP meds  -- Now on Coreg, Nifedipine, Losartan, and weaning Clonidine as he is rebounding without it

## 2020-09-17 NOTE — PLAN OF CARE
Pt progressing towards goals.remains on telemetry,NSR. Dressing and band aid intact to lower back.denies pain.safety precautions maintained.pt free of falls or injuries.plan for discharge this am.continue plan of care.

## 2020-09-17 NOTE — PROGRESS NOTES
60 y.o. male here for pre-op evaluation of L2-pelvis posterior fusion by Dr. Coyle.    History of prior anesthetic complications: n/a  Chronic Steroid usage: yes  former tobacco use, no EtOH, no Illicit substances     Surgical Risk Assessment     Active cardiac issues:  Active decompensated heart failure? No   Unstable angina?  No   Significant uncontrolled arrhythmias? No   Severe valvular heart disease-Aortic or Mitral Stenosis? No   Recent MI or coronary revascularization < 30 days? No     Clinical risk factors predicting perioperative major adverse cardiac events per [facility]CRI  High risk surgery (suprainguinal vascular, intraperitoneal, or intrathoracic surgery)? No   History of CAD/ischemic heart disease? No   History of cerebrovascular disease (CVA or TIA)? No   History of compensated heart failure? No   Type 2 diabetes requiring insulin? No   Serum Creatinine > 2? No   Total cardiac risk factors 0     0 predictors = 0.4%, 1 predictor = 0.9%, 2 predictors = 6.6%, ?3 predictors = >11%    According to the revised cardiac risk index, the risk of evie-procedural major cardiac complications (cardiac death, nonfatal MI, nonfatal cardiac arrest, postoperative cardiogenic pulmonary edema, complete heart block) is: 0.4%     Patient going for moderate risk procedure, low risk for adverse periop mortality.  Ok to proceed with surgery.      Sonja Orosco MD  Hospital Medicine Staff  [phone] pager

## 2020-09-18 ENCOUNTER — TELEPHONE (OUTPATIENT)
Dept: PHARMACY | Facility: CLINIC | Age: 60
End: 2020-09-18

## 2020-09-18 NOTE — DISCHARGE SUMMARY
"[facility] Hospital Medicine  Discharge Summary      Patient Name: Jaspreet Walsh Jr.  MRN: [id number]  Admission Date: 2020  Hospital Length of Stay: 0 days  Discharge Date and Time:  2020 7:40 PM  Attending Physician: No att. providers found   Discharging Provider: Josee Hernandez MD  Primary Care Provider: Con Patel Jr, MD  [facility] Medicine Team: [facility] MED 2 Josee Hernandez MD    HPI:   Jaspreet Walsh is a 59 y/o M with a PMHx of uncontrolled HTN on three BP medications who was transferred from [facility] for neurosurgery evaluation after CT scan revealed "extensive lytic lesions throughout with high grade spinal stenosis at L4-L5. Additional probable pathologic fractures through the left aspect of the sacrum". Pt states for the past month he has been experiencing intermittent sharp lower back pain and pain to the left lateral thigh. The back pain is exacerbated with sitting for prolonged periods and with walking. Tylenol often relieves the pain. Denies similar pain previously. No reported oncologic hx. His father  of "bone cancer" in his 80s, but no other known family hx of cancer. Denies any fevers, chills, weight loss, night sweats, CP, SOB, n/v, bowel or bladder incontinence, numbness, weakness, dizziness. Former smoker x10 years, approximately 2-3 cigarettes daily, quit 8 years ago. No EtOH or illicit drug use.    In the ED, vitals significant for SBP >200. Pertinent labs include total protein of 10.7. Neurosurgery consulted--recommended MRI thoracic and lumbar spine. 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    * No surgery found *      Hospital Course:   Admitted to hospital medicine for evaluation of lytic lesions and lumbar mass with metastases. Elevated protein gap and SPEP concerning for Multiple Myeloma, additional labs and bone-marrow " "biopsy as well as pathology from lumbar mass biopsy are pending. Patient remained asymptomatic aside for the chronic back pain he has been having. Hypertensive throughout stay, revamped antihypertensive management, however when attempted to d/c clonidine, patient had rebound hypertension and was restarted at a lower dose. Improved hypertension with lisinopril 20, carvedilol 25, losartan 25, and nifedipine 60. Losartan increased up to 50 with BP 140s/80s at discharge.      Consults:   Consults (From admission, onward)        Status Ordering Provider     Inpatient consult to Hematology/Oncology  Once     Provider:  (Not yet assigned)    Completed NABEEL THORNTON     Inpatient consult to Hematology/Oncology Psychology  Once     Provider:  (Not yet assigned)    Completed CHRISTOS JACOBS     Inpatient consult to Interventional Radiology  Once     Provider:  (Not yet assigned)    Completed PAT VILLA          /84   Pulse 75   Temp 98 °F (36.7 °C)   Resp 18   Ht 5' 7" (1.702 m)   Wt 95.6 kg (210 lb 12.2 oz)   SpO2 98%   BMI 33.01 kg/m²     Physical Exam   Constitutional: He appears obese.  Non-toxic appearance. He does not appear ill. No distress.   HENT:   Head: Normocephalic and atraumatic.   Nose: No rhinorrhea or nasal congestion.   Eyes: Pupils are equal, round, and reactive to light. Conjunctivae are normal. No scleral icterus.   Neck: Normal range of motion. Neck supple. No muscular tenderness present. No neck rigidity.   Cardiovascular: Normal rate, regular rhythm, normal heart sounds and normal pulses.   No murmur heard.  Pulmonary/Chest: Effort normal and breath sounds normal. No respiratory distress. He has no wheezes.   Abdominal: Soft. Normal appearance and bowel sounds are normal. He exhibits no distension. There is no abdominal tenderness.   Musculoskeletal:         General: No swelling, tenderness (none at site of biopsy) or signs of injury.   Neurological: He is alert. He " "displays no weakness (no lower extremity weakness). No cranial nerve deficit.   Skin: No jaundice or pallor.   Psychiatric: His behavior is normal. Mood, judgment and thought content normal.         * Metastatic disease  Jaspreet Walsh is a 61 y/o M with a PMHx of uncontrolled HTN on three BP medications who was transferred from [facility] presented with back and L leg pain x1 month. CT scan concern for metastatic disease of the spine and spinal stenosis. 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 Father with hx of "bone cancer." No reported oncologic hx. Never had c-scope. Former smoker. Labs with T protein of 10.7, otherwise unremarkable. Imaging findings concerning for metastatic dz, possible MM. IR biopsy of sacral mass 9/16. Heme/onc bone bx 9/16.   - s/p lumbar mass biopsy per NSGY and bone-marrow bx per heme/onc on 9/16  - d/c with outpt heme-onc f/u  - 4 total days of dexamethasone 40 mg PO, completed 2 days, d/c with rx for 2 additional days  - continue allopurinol  - start acyclovir, bactrim, levofloxacin, and aspirin at d/c per heme/onc  - neurosurgery on board - spinal fusion + tumor de-bulking scheduled for 9/23    Vitamin D deficiency  Vit D low at 12.   - ergocalciferol 09542 U every 7 days      Multiple myeloma  See metastatic disease.      Plasma cell dyscrasia  See metastatic disease.       Anxiety  Evaluated by psych, appropriate given situation.   - f/u heme/onc/psych in 4 wks upon d/c      Essential hypertension  Hx of uncontrolled HTN, takes atenolol, benazepril and clonidine pta. Does not check blood pressures at home but states compliance with meds. Rebound hypertension with attempts to d/c clonidine.      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    -- no need for emergent surgical intervention    Plan:  - debulking surgery scheduled 9/23 per NSGY: may be d/c   - f/u outpt heme/onc      Final Active Diagnoses:    Diagnosis Date Noted POA    PRINCIPAL PROBLEM:  Metastatic disease [C79.9] 09/14/2020 Yes    Vitamin D deficiency [E55.9] 09/17/2020 Unknown    Multiple myeloma [C90.00]  Yes    Anxiety [F41.9] 09/15/2020 Yes    Plasma cell dyscrasia [E88.09] 09/15/2020 No    Lumbar stenosis [M48.061] 09/14/2020 Unknown    Essential hypertension [I10] 09/14/2020 Yes      Problems Resolved During this Admission:       Discharged Condition: good    Disposition: Home or Self Care    Follow Up:  Follow-up Information     Con Patel Jr, MD.    Specialty: Family Medicine  Why: 9/24/20 at 230pm   Contact information:  [facility]  [place] [address] [phone]                 Patient Instructions:      WALKER FOR HOME USE     Order Specific Question Answer Comments   Type of Walker: Adult (5'4"-6'6")    With wheels? Yes    Height: 5' 7" (1.702 m)    Weight: 95.6 kg (210 lb 12.2 oz)    Length of need (1-99 months): 99    Does patient have medical equipment at home? none    Please check all that apply: Patient's condition impairs ambulation.    Please check all that apply: Patient is unable to safely ambulate without equipment.    Please check all that apply: Patient needs help to get in and out of chair.    Vendor: [facility]    Expected " Date of Delivery: 9/17/2020      Notify your health care provider if you experience any of the following:  temperature >100.4     Notify your health care provider if you experience any of the following:  severe uncontrolled pain     Notify your health care provider if you experience any of the following:  redness, tenderness, or signs of infection (pain, swelling, redness, odor or green/yellow discharge around incision site)     Notify your health care provider if you experience any of the following:  difficulty breathing or increased cough     Notify your health care provider if you experience any of the following:  severe persistent headache     Notify your health care provider if you experience any of the following:  persistent dizziness, light-headedness, or visual disturbances     Notify your health care provider if you experience any of the following:  increased confusion or weakness     Activity as tolerated       Significant Diagnostic Studies: Labs:   CMP   Recent Labs   Lab 09/16/20  0541 09/17/20  0441   * 134*   K 3.9 3.9    102   CO2 27 22*   GLU 97 144*   BUN 20 29*   CREATININE 1.0 1.0   CALCIUM 9.9 9.1   PROT 9.7* 9.3*   ALBUMIN 3.6 3.4*   BILITOT 0.9 0.6   ALKPHOS 78 77   AST 22 23   ALT 14 17   ANIONGAP 6* 10   ESTGFRAFRICA >60.0 >60.0   EGFRNONAA >60.0 >60.0    and CBC   Recent Labs   Lab 09/16/20  0540 09/17/20  0441   WBC 6.46 8.86   HGB 14.2 13.8*   HCT 45.4 43.0    181       Pending Diagnostic Studies:     Procedure Component Value Units Date/Time    Heme Disorders DNA/RNA Hold, Bone Marrow [[id number]] Collected: 09/16/20 1130    Order Status: Sent Lab Status: In process Updated: 09/16/20 1443    Specimen: Bone Marrow     Specimen to Pathology, Bone Marrow Aspiration/Biopsy [[id number]] Collected: 09/16/20 1131    Order Status: Sent Lab Status: In process Updated: 09/16/20 1222    Specimen to Pathology, Radiology Other, please specify (L4 - 5 bone biopsy) [[id number]]  Collected: 09/16/20 2403    Order Status: Sent Lab Status: In process Updated: 09/16/20 5985         Medications:  Reconciled Home Medications:      Medication List      START taking these medications    acyclovir 400 MG tablet  Commonly known as: ZOVIRAX  Take 1 tablet (400 mg total) by mouth 2 (two) times daily.     allopurinoL 300 MG tablet  Commonly known as: ZYLOPRIM  Take 1 tablet (300 mg total) by mouth once daily.     aspirin 81 MG Chew  Chew and swallow 1 tablet (81 mg total) by mouth once daily.     carvediloL 25 MG tablet  Commonly known as: COREG  Take 1 tablet (25 mg total) by mouth 2 (two) times daily.     dexAMETHasone 4 MG Tab  Commonly known as: DECADRON  Take 10 tablets daily for 2 days. for 2 days  Start taking on: September 18, 2020     lenalidomide 25 mg Cap  Take 1 capsule (25 mg total) by mouth once daily On Days 1-21 of a 28 Day Cycle..     levoFLOXacin 500 MG tablet  Commonly known as: LEVAQUIN  Take 1 tablet (500 mg total) by mouth once daily.     losartan 50 MG tablet  Commonly known as: COZAAR  Take 1 tablet (50 mg total) by mouth once daily.  Start taking on: September 18, 2020     NIFEdipine 60 MG (OSM) 24 hr tablet  Commonly known as: PROCARDIA-XL  Take 1 tablet (60 mg total) by mouth once daily.     sulfamethoxazole-trimethoprim 400-80mg 400-80 mg per tablet  Commonly known as: BACTRIM,SEPTRA  Take 1 tablet by mouth once daily.     VITAMIN D2 50,000 unit Cap  Generic drug: ergocalciferol  Take 1 capsule (50,000 Units total) by mouth every 7 days.        CHANGE how you take these medications    cloNIDine 0.1 MG tablet  Commonly known as: CATAPRES  Take 1 tablet (0.1 mg total) by mouth once daily.  Start taking on: September 18, 2020  What changed:   · medication strength  · how much to take  · when to take this        CONTINUE taking these medications    HYDROcodone-acetaminophen 7.5-325 mg per tablet  Commonly known as: NORCO  Take 1 tablet by mouth 4 (four) times daily as needed for  Pain.        STOP taking these medications    atenoloL 100 MG tablet  Commonly known as: TENORMIN     benazepriL 20 MG tablet  Commonly known as: LOTENSIN            Indwelling Lines/Drains at time of discharge:   Lines/Drains/Airways     None                 Time spent on the discharge of patient: 35 minutes  Patient was seen and examined on the date of discharge and determined to be suitable for discharge.      Josee Hernandez MD  Department of Hospital Medicine  [facility]

## 2020-09-18 NOTE — ASSESSMENT & PLAN NOTE
Hx of uncontrolled HTN, takes atenolol, benazepril and clonidine pta. Does not check blood pressures at home but states compliance with meds. Rebound hypertension with attempts to d/c clonidine.      Plan:  -- revamped home BP meds  -- Now on Lisinopril, Coreg, Nifedipine, Losartan, and weaning Clonidine as he is rebounding without it  - reassess at PCP f/u

## 2020-09-18 NOTE — ASSESSMENT & PLAN NOTE
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    -- no need for emergent surgical intervention    Plan:  - debulking surgery scheduled 9/23 per NSGY: may be d/c   - f/u outpt heme/onc

## 2020-09-18 NOTE — HOSPITAL COURSE
Admitted to hospital medicine for evaluation of lytic lesions and lumbar mass with metastases. Elevated protein gap and SPEP concerning for Multiple Myeloma, additional labs and bone-marrow biopsy as well as pathology from lumbar mass biopsy are pending. Patient remained asymptomatic aside for the chronic back pain he has been having. Hypertensive throughout stay, revamped antihypertensive management, however when attempted to d/c clonidine, patient had rebound hypertension and was restarted at a lower dose. Improved hypertension with lisinopril 20, carvedilol 25, losartan 25, and nifedipine 60. Losartan increased up to 50 with BP 140s/80s at discharge.

## 2020-09-18 NOTE — ASSESSMENT & PLAN NOTE
"Jaspreet Walsh is a 61 y/o M with a PMHx of uncontrolled HTN on three BP medications who was transferred from [facility] presented with back and L leg pain x1 month. CT scan concern for metastatic disease of the spine and spinal stenosis. 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 Father with hx of "bone cancer." No reported oncologic hx. Never had c-scope. Former smoker. Labs with T protein of 10.7, otherwise unremarkable. Imaging findings concerning for metastatic dz, possible MM. IR biopsy of sacral mass 9/16. Heme/onc bone bx 9/16.   - s/p lumbar mass biopsy per NSGY and bone-marrow bx per heme/onc on 9/16  - d/c with outpt heme-onc f/u  - 4 total days of dexamethasone 40 mg PO, completed 2 days, d/c with rx for 2 additional days  - continue allopurinol  - start acyclovir, bactrim, levofloxacin, and aspirin at d/c per heme/onc  - neurosurgery on board - spinal fusion + tumor de-bulking scheduled for 9/23  "

## 2020-09-22 ENCOUNTER — TELEPHONE (OUTPATIENT)
Dept: NEUROSURGERY | Facility: CLINIC | Age: 60
End: 2020-09-22

## 2020-09-22 LAB
CHROM BANDING METHOD: NORMAL
CHROMOSOME ANALYSIS BM ADDITIONAL INFORMATION: NORMAL
CHROMOSOME ANALYSIS BM RELEASED BY: NORMAL
CHROMOSOME ANALYSIS BM RESULT SUMMARY: NORMAL
CLINICAL CYTOGENETICIST REVIEW: NORMAL
DNA/RNA EXTRACT AND HOLD RESULT: NORMAL
DNA/RNA EXTRACTION: NORMAL
EXHR SPECIMEN TYPE: NORMAL
KARYOTYP MAR: NORMAL
REASON FOR REFERRAL (NARRATIVE): NORMAL
REF LAB TEST METHOD: NORMAL
SPECIMEN SOURCE: NORMAL
SPECIMEN: NORMAL

## 2020-09-22 RX ORDER — MUPIROCIN 20 MG/G
1 OINTMENT TOPICAL 2 TIMES DAILY
Status: CANCELLED | OUTPATIENT
Start: 2020-09-22 | End: 2020-09-23

## 2020-09-22 NOTE — PRE ADMISSION SCREENING
Reviewed pre-op instructions w/pt and spouse:  NPO after midnight, bathe in Dial or Hibiclens, and the visitor policy.  Pt states he was told to arrive at 0800 on 09.23.2020.  Provided driving and hospital navigation instructions.  Denies any needs at this time, V/U.

## 2020-09-22 NOTE — H&P
Jaspreet comer is a 61 yo male with a PMH of HTN with no known oncologic history who presents to the ED for 1 month of back pain and muscle soreness to the lower extremities. He states that he has cramping pain to bilateral thighs and calves for about 1 month and back pain started 2 weeks ago without radiation to the midline lumbar back. Back pain is worse when sitting on the couch vs. Laying down, also somewhat worse when walking. He has no weakness in his legs and is ambulatory. No radicular component to his back pain.  No numbness or tingling in the lower extremities. No bowel or bladder incontinence. He has not had difficulty using his hand or coordinating movements of the upper extremities, no neck pain. [facility] [facility] [place] L spine at OSH revealed lumbar-sacral mass lesion concerning for malignancy. Denies any blood thinner use.      Medical history significant for occasional smoking for about 2 years total, no drinking or illicit substances. No known cancers, did not receive a screening colonoscopy. Family history of 'bone cancer' in his father who passed away in 2016.       Post-Op Info:  Procedure(s) (LRB):  FUSION, SPINE, LUMBAR L2-pelvis. Depuy. Plastic surgery closure w/ Dr. Bellamy (N/A)         Interval History: NAEON. IR biopsy completed yesterday. Path pending. Risk stratification for surgery to be documented by primary team prior to discharge.         Medications:  Continuous Infusions:  Scheduled Meds:   allopurinoL  300 mg Oral Daily    carvediloL  25 mg Oral BID    cloNIDine  0.1 mg Oral Daily    dexAMETHasone  40 mg Oral Daily    [START ON 9/18/2020] losartan  50 mg Oral Daily    NIFEdipine  60 mg Oral Daily      PRN Meds:dextrose 50%, dextrose 50%, glucagon (human recombinant), glucose, glucose, HYDROmorphone, labetaloL, lorazepam, sodium chloride 0.9%      Review of Systems  Objective:      Weight: 95.6 kg (210 lb 12.2 oz)  Body mass index is 33.01 kg/m².  Vital Signs (Most  Recent):  Temp: 96.9 °F (36.1 °C) (09/17/20 0545)  Pulse: 80 (09/17/20 1132)  Resp: 18 (09/17/20 0545)  BP: (!) 173/86 (09/17/20 0545)  SpO2: 98 % (09/17/20 0545) Vital Signs (24h Range):  Temp:  [96.7 °F (35.9 °C)-98.4 °F (36.9 °C)] 96.9 °F (36.1 °C)  Pulse:  [] 80  Resp:  [15-18] 18  SpO2:  [92 %-100 %] 98 %  BP: (133-184)/(80-98) 173/86                               Neurosurgery Physical Exam  General: well developed, well nourished, no distress.   Head: normocephalic, atraumatic  Neurologic: Alert and oriented. Thought content appropriate.  GCS: Motor: 6/Verbal: 5/Eyes: 4 GCS Total: 15  Mental Status: Awake, Alert, Oriented x 4  Language: No aphasia  Speech: No dysarthria  Cranial nerves: face symmetric, tongue midline, CN II-XII grossly intact.   Eyes: pupils equal, round, reactive to light with accommodation, EOMI.   Pulmonary: normal respirations, no signs of respiratory distress  Abdomen: soft, non-distended, not tender to palpation  Skin: Skin is warm, dry and intact.  Sensory: intact to light touch throughout     Motor Strength:Moves all extremities spontaneously with good tone.  Full strength upper and lower extremities. No abnormal movements seen.      Strength   Deltoids Triceps Biceps Wrist Extension Wrist Flexion Hand    Upper: R 5/5 5/5 5/5 5/5 5/5 5/5     L 5/5 5/5 5/5 5/5 5/5 5/5       Iliopsoas Quadriceps Knee  Flexion Tibialis  anterior Gastro- cnemius EHL   Lower: R 5/5 5/5 5/5 5/5 5/5 5/5     L 5/5 5/5 5/5 5/5 5/5 5/5      Reflexes:   Candelaria's: Negative.  Babinski's: Negative.  Clonus: Negative.     Significant Labs:       Recent Labs   Lab 09/16/20  0541 09/17/20  0441   GLU 97 144*   * 134*   K 3.9 3.9    102   CO2 27 22*   BUN 20 29*   CREATININE 1.0 1.0   CALCIUM 9.9 9.1   MG 1.9 2.0           Recent Labs   Lab 09/16/20  0540 09/17/20  0441   WBC 6.46 8.86   HGB 14.2 13.8*   HCT 45.4 43.0    181      No results for input(s): LABPT, INR, APTT in the last 48  hours.      Microbiology Results (last 7 days)      ** No results found for the last 168 hours. **          All pertinent labs from the last 24 hours have been reviewed.     Significant Diagnostics:  Ir Biopsy Bone Deep     Result Date: 9/16/2020  Image-guided L4 bone biopsy. Plan: Specimen(s) sent for evaluation. _______________________________________________________________ Electronically signed by:          Tom Tanner Date:                                      09/16/2020 Time:                                            17:49        Assessment/Plan:      Lumbar stenosis  Jaspreet Walsh is a 61 yo male with PMH HTN who presents with 1 month of LE cramping and 2 weeks of back pain; full strength without numbness or b/b symptoms on exam, rectal tone present. CT L Spine with extensive lytic destruction of vertebral bodies with large L4-S1 mass involving the vertebral body as well as transverse processes and left posterior aspects of spinal canal extending to the sacrum. 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      - OR for L2-pelvis fusion and tumor resection.

## 2020-09-22 NOTE — PRE-PROCEDURE INSTRUCTIONS
PREOP INSTRUCTIONS:No solid food ,milk or milk products for 8 hours prior to procedure.Clear liquids are allowed up to 2 hours before procedure.Clear liquids are:water,apple juice,gatorade & powerade.Patient instructed to follow the surgeon's instructions if they differ from these.Shower instructions as well as directions to the Surgery Center were given.Patient encouraged to wear loose fitting,comfortable clothing.Medication instructions for pm prior to and am of procedure reviewed.Instructed patient to avoid taking vitamins,supplements,aspirin and ibuprofen the morning of surgery. Patient stated an understanding.Patient informed of the current visitor policy and advised patient that one visitor may accompany each patient into the hospital and wait (socially distanced) until a member of the medical team provides an update at the conclusion of the procedure.When they enter the hospital both patient and visitor will have their temperature checked.All visitors are asked to arrive with a mask and to keep their mask on throughout the visit.Each inpatient is allowed 1 visitor per day between the hours of 8am and 6pm.This visitor must remain in the patient's room and not gather in common areas such as waiting rooms or cafeterias.The visitor must be 18 years or older and arrive with a mask and keep the mask on throughout the visit.    Patient denies any side effects or issues with anesthesia or sedation.

## 2020-09-23 ENCOUNTER — HOSPITAL ENCOUNTER (INPATIENT)
Facility: HOSPITAL | Age: 60
LOS: 5 days | Discharge: HOME | DRG: 820 | End: 2020-09-28
Attending: NEUROLOGICAL SURGERY | Admitting: NEUROLOGICAL SURGERY
Payer: COMMERCIAL

## 2020-09-23 ENCOUNTER — ANESTHESIA (OUTPATIENT)
Dept: SURGERY | Facility: HOSPITAL | Age: 60
DRG: 820 | End: 2020-09-23
Payer: COMMERCIAL

## 2020-09-23 ENCOUNTER — ANESTHESIA EVENT (OUTPATIENT)
Dept: SURGERY | Facility: HOSPITAL | Age: 60
DRG: 820 | End: 2020-09-23
Payer: COMMERCIAL

## 2020-09-23 DIAGNOSIS — I95.9 HYPOTENSION: ICD-10-CM

## 2020-09-23 DIAGNOSIS — C79.49 METASTASIS OF NEOPLASM TO SPINAL CANAL: Primary | ICD-10-CM

## 2020-09-23 LAB
ABO + RH BLD: NORMAL
ANION GAP SERPL CALC-SCNC: 9 MMOL/L (ref 8–16)
APTT BLDCRRT: 21.1 SEC (ref 21–32)
BASOPHILS # BLD AUTO: 0.03 K/UL (ref 0–0.2)
BASOPHILS NFR BLD: 0.4 % (ref 0–1.9)
BLD GP AB SCN CELLS X3 SERPL QL: NORMAL
BUN SERPL-MCNC: 34 MG/DL (ref 8–23)
CALCIUM SERPL-MCNC: 9.3 MG/DL (ref 8.7–10.5)
CHLORIDE SERPL-SCNC: 104 MMOL/L (ref 95–110)
CO2 SERPL-SCNC: 23 MMOL/L (ref 23–29)
COMMENT: NORMAL
CREAT SERPL-MCNC: 1.2 MG/DL (ref 0.5–1.4)
DIFFERENTIAL METHOD: ABNORMAL
EOSINOPHIL # BLD AUTO: 0.1 K/UL (ref 0–0.5)
EOSINOPHIL NFR BLD: 1 % (ref 0–8)
ERYTHROCYTE [DISTWIDTH] IN BLOOD BY AUTOMATED COUNT: 13.2 % (ref 11.5–14.5)
EST. GFR  (AFRICAN AMERICAN): >60 ML/MIN/1.73 M^2
EST. GFR  (NON AFRICAN AMERICAN): >60 ML/MIN/1.73 M^2
FINAL PATHOLOGIC DIAGNOSIS: NORMAL
GLUCOSE SERPL-MCNC: 119 MG/DL (ref 70–110)
GROSS: NORMAL
HCT VFR BLD AUTO: 47.1 % (ref 40–54)
HGB BLD-MCNC: 15.1 G/DL (ref 14–18)
IMM GRANULOCYTES # BLD AUTO: 0.22 K/UL (ref 0–0.04)
IMM GRANULOCYTES NFR BLD AUTO: 2.7 % (ref 0–0.5)
INR PPP: 1.1 (ref 0.8–1.2)
LYMPHOCYTES # BLD AUTO: 2.4 K/UL (ref 1–4.8)
LYMPHOCYTES NFR BLD: 29.2 % (ref 18–48)
Lab: NORMAL
MCH RBC QN AUTO: 29.7 PG (ref 27–31)
MCHC RBC AUTO-ENTMCNC: 32.1 G/DL (ref 32–36)
MCV RBC AUTO: 93 FL (ref 82–98)
MICROSCOPIC EXAM: NORMAL
MONOCYTES # BLD AUTO: 0.6 K/UL (ref 0.3–1)
MONOCYTES NFR BLD: 7.8 % (ref 4–15)
NEUTROPHILS # BLD AUTO: 4.8 K/UL (ref 1.8–7.7)
NEUTROPHILS NFR BLD: 58.9 % (ref 38–73)
NRBC BLD-RTO: 0 /100 WBC
PLATELET # BLD AUTO: 223 K/UL (ref 150–350)
PMV BLD AUTO: 10.5 FL (ref 9.2–12.9)
POTASSIUM SERPL-SCNC: 4.1 MMOL/L (ref 3.5–5.1)
PROTHROMBIN TIME: 11.9 SEC (ref 9–12.5)
RBC # BLD AUTO: 5.08 M/UL (ref 4.6–6.2)
SARS-COV-2 RDRP RESP QL NAA+PROBE: NEGATIVE
SODIUM SERPL-SCNC: 136 MMOL/L (ref 136–145)
WBC # BLD AUTO: 8.21 K/UL (ref 3.9–12.7)

## 2020-09-23 PROCEDURE — 63277 BX/EXC XDRL SPINE LESN LMBR: CPT | Mod: 62,51,, | Performed by: ORTHOPAEDIC SURGERY

## 2020-09-23 PROCEDURE — 86901 BLOOD TYPING SEROLOGIC RH(D): CPT

## 2020-09-23 PROCEDURE — 22842 INSERT SPINE FIXATION DEVICE: CPT | Mod: ,,, | Performed by: NEUROLOGICAL SURGERY

## 2020-09-23 PROCEDURE — 63277 BX/EXC XDRL SPINE LESN LMBR: CPT | Mod: 62,51,, | Performed by: NEUROLOGICAL SURGERY

## 2020-09-23 PROCEDURE — 25000003 PHARM REV CODE 250: Performed by: STUDENT IN AN ORGANIZED HEALTH CARE EDUCATION/TRAINING PROGRAM

## 2020-09-23 PROCEDURE — 22614 PR ARTHRODESIS, POST/POSTLAT, SNGL INTERSPACE, EA ADDTL: ICD-10-PCS | Mod: 62,,, | Performed by: NEUROLOGICAL SURGERY

## 2020-09-23 PROCEDURE — 37000008 HC ANESTHESIA 1ST 15 MINUTES: Performed by: NEUROLOGICAL SURGERY

## 2020-09-23 PROCEDURE — 25000003 PHARM REV CODE 250: Performed by: NURSE ANESTHETIST, CERTIFIED REGISTERED

## 2020-09-23 PROCEDURE — 88305 TISSUE EXAM BY PATHOLOGIST: CPT | Mod: 59 | Performed by: PATHOLOGY

## 2020-09-23 PROCEDURE — 86920 COMPATIBILITY TEST SPIN: CPT

## 2020-09-23 PROCEDURE — 63600175 PHARM REV CODE 636 W HCPCS: Performed by: NURSE ANESTHETIST, CERTIFIED REGISTERED

## 2020-09-23 PROCEDURE — D9220A PRA ANESTHESIA: Mod: ANES,,, | Performed by: ANESTHESIOLOGY

## 2020-09-23 PROCEDURE — 63600175 PHARM REV CODE 636 W HCPCS: Performed by: STUDENT IN AN ORGANIZED HEALTH CARE EDUCATION/TRAINING PROGRAM

## 2020-09-23 PROCEDURE — 22842 PR POSTERIOR SEGMENTAL INSTRUMENTATION 3-6 VRT SEG: ICD-10-PCS | Mod: 82,,, | Performed by: ORTHOPAEDIC SURGERY

## 2020-09-23 PROCEDURE — 71000015 HC POSTOP RECOV 1ST HR: Performed by: NEUROLOGICAL SURGERY

## 2020-09-23 PROCEDURE — 88342 IMHCHEM/IMCYTCHM 1ST ANTB: CPT | Mod: 26,,, | Performed by: PATHOLOGY

## 2020-09-23 PROCEDURE — 85610 PROTHROMBIN TIME: CPT

## 2020-09-23 PROCEDURE — 97606 PR NEG PRESS WOUND THERAPY (NPWT) W/NON-DISPOSABLE WOUND VAC DEVICE (DME), >=50 CM: ICD-10-PCS | Mod: ,,, | Performed by: SURGERY

## 2020-09-23 PROCEDURE — 36000710: Performed by: NEUROLOGICAL SURGERY

## 2020-09-23 PROCEDURE — 88305 TISSUE EXAM BY PATHOLOGIST: ICD-10-PCS | Mod: 26,,, | Performed by: PATHOLOGY

## 2020-09-23 PROCEDURE — 85025 COMPLETE CBC W/AUTO DIFF WBC: CPT

## 2020-09-23 PROCEDURE — D9220A PRA ANESTHESIA: Mod: CRNA,,, | Performed by: STUDENT IN AN ORGANIZED HEALTH CARE EDUCATION/TRAINING PROGRAM

## 2020-09-23 PROCEDURE — 88364 INSITU HYBRIDIZATION (FISH): CPT | Performed by: PATHOLOGY

## 2020-09-23 PROCEDURE — 88365 INSITU HYBRIDIZATION (FISH): CPT | Performed by: PATHOLOGY

## 2020-09-23 PROCEDURE — 37000009 HC ANESTHESIA EA ADD 15 MINS: Performed by: NEUROLOGICAL SURGERY

## 2020-09-23 PROCEDURE — 22614 PR ARTHRODESIS, POST/POSTLAT, SNGL INTERSPACE, EA ADDTL: ICD-10-PCS | Mod: 62,,, | Performed by: ORTHOPAEDIC SURGERY

## 2020-09-23 PROCEDURE — 22842 PR POSTERIOR SEGMENTAL INSTRUMENTATION 3-6 VRT SEG: ICD-10-PCS | Mod: ,,, | Performed by: NEUROLOGICAL SURGERY

## 2020-09-23 PROCEDURE — 63277 PR BX/EXCIS SPINAL TUMOR,XDURAL,LUMB: ICD-10-PCS | Mod: 62,51,, | Performed by: NEUROLOGICAL SURGERY

## 2020-09-23 PROCEDURE — 27800903 OPTIME MED/SURG SUP & DEVICES OTHER IMPLANTS: Performed by: NEUROLOGICAL SURGERY

## 2020-09-23 PROCEDURE — 22848 INSERT PELV FIXATION DEVICE: CPT | Mod: ,,, | Performed by: NEUROLOGICAL SURGERY

## 2020-09-23 PROCEDURE — D9220A PRA ANESTHESIA: ICD-10-PCS | Mod: CRNA,,, | Performed by: STUDENT IN AN ORGANIZED HEALTH CARE EDUCATION/TRAINING PROGRAM

## 2020-09-23 PROCEDURE — C1713 ANCHOR/SCREW BN/BN,TIS/BN: HCPCS | Performed by: NEUROLOGICAL SURGERY

## 2020-09-23 PROCEDURE — 85730 THROMBOPLASTIN TIME PARTIAL: CPT

## 2020-09-23 PROCEDURE — 36620 INSERTION CATHETER ARTERY: CPT | Mod: 59,,, | Performed by: STUDENT IN AN ORGANIZED HEALTH CARE EDUCATION/TRAINING PROGRAM

## 2020-09-23 PROCEDURE — 22848 PR PELVIC FIXATION OTHER THAN SACRUM: ICD-10-PCS | Mod: ,,, | Performed by: NEUROLOGICAL SURGERY

## 2020-09-23 PROCEDURE — 36000711: Performed by: NEUROLOGICAL SURGERY

## 2020-09-23 PROCEDURE — 22612 PR ARTHRODESIS, POST/POSTLAT, SNGL INTERSPACE, LUMBAR: ICD-10-PCS | Mod: 22,62,, | Performed by: NEUROLOGICAL SURGERY

## 2020-09-23 PROCEDURE — 20930 SP BONE ALGRFT MORSEL ADD-ON: CPT | Mod: ,,, | Performed by: NEUROLOGICAL SURGERY

## 2020-09-23 PROCEDURE — 97606 NEG PRS WND THER DME>50 SQCM: CPT | Mod: ,,, | Performed by: SURGERY

## 2020-09-23 PROCEDURE — 22612 ARTHRD PST TQ 1NTRSPC LUMBAR: CPT | Mod: 22,62,, | Performed by: ORTHOPAEDIC SURGERY

## 2020-09-23 PROCEDURE — 22842 INSERT SPINE FIXATION DEVICE: CPT | Mod: 82,,, | Performed by: ORTHOPAEDIC SURGERY

## 2020-09-23 PROCEDURE — 25000003 PHARM REV CODE 250: Performed by: NEUROLOGICAL SURGERY

## 2020-09-23 PROCEDURE — 20930 PR ALLOGRAFT FOR SPINE SURGERY ONLY MORSELIZED: ICD-10-PCS | Mod: ,,, | Performed by: NEUROLOGICAL SURGERY

## 2020-09-23 PROCEDURE — 63600175 PHARM REV CODE 636 W HCPCS: Performed by: NEUROLOGICAL SURGERY

## 2020-09-23 PROCEDURE — 88342 IMHCHEM/IMCYTCHM 1ST ANTB: CPT | Performed by: PATHOLOGY

## 2020-09-23 PROCEDURE — 22848 INSERT PELV FIXATION DEVICE: CPT | Mod: 82,,, | Performed by: ORTHOPAEDIC SURGERY

## 2020-09-23 PROCEDURE — 63600175 PHARM REV CODE 636 W HCPCS: Performed by: ANESTHESIOLOGY

## 2020-09-23 PROCEDURE — 22848 PR PELVIC FIXATION OTHER THAN SACRUM: ICD-10-PCS | Mod: 82,,, | Performed by: ORTHOPAEDIC SURGERY

## 2020-09-23 PROCEDURE — 88365 INSITU HYBRIDIZATION (FISH): CPT | Mod: 26,,, | Performed by: PATHOLOGY

## 2020-09-23 PROCEDURE — 27201423 OPTIME MED/SURG SUP & DEVICES STERILE SUPPLY: Performed by: NEUROLOGICAL SURGERY

## 2020-09-23 PROCEDURE — 71000033 HC RECOVERY, INTIAL HOUR: Performed by: NEUROLOGICAL SURGERY

## 2020-09-23 PROCEDURE — 15734 PR MUSCLE-SKIN FLAP,TRUNK: ICD-10-PCS | Mod: ,,, | Performed by: SURGERY

## 2020-09-23 PROCEDURE — 88342 CHG IMMUNOCYTOCHEMISTRY: ICD-10-PCS | Mod: 26,,, | Performed by: PATHOLOGY

## 2020-09-23 PROCEDURE — D9220A PRA ANESTHESIA: ICD-10-PCS | Mod: ANES,,, | Performed by: ANESTHESIOLOGY

## 2020-09-23 PROCEDURE — 22614 ARTHRD PST TQ 1NTRSPC EA ADD: CPT | Mod: 62,,, | Performed by: ORTHOPAEDIC SURGERY

## 2020-09-23 PROCEDURE — 63277 PR BX/EXCIS SPINAL TUMOR,XDURAL,LUMB: ICD-10-PCS | Mod: 62,51,, | Performed by: ORTHOPAEDIC SURGERY

## 2020-09-23 PROCEDURE — 15734 MUSCLE-SKIN GRAFT TRUNK: CPT | Mod: ,,, | Performed by: SURGERY

## 2020-09-23 PROCEDURE — 11000001 HC ACUTE MED/SURG PRIVATE ROOM

## 2020-09-23 PROCEDURE — 22612 ARTHRD PST TQ 1NTRSPC LUMBAR: CPT | Mod: 22,62,, | Performed by: NEUROLOGICAL SURGERY

## 2020-09-23 PROCEDURE — 88305 TISSUE EXAM BY PATHOLOGIST: CPT | Mod: 26,,, | Performed by: PATHOLOGY

## 2020-09-23 PROCEDURE — 36620 ARTERIAL: ICD-10-PCS | Mod: 59,,, | Performed by: STUDENT IN AN ORGANIZED HEALTH CARE EDUCATION/TRAINING PROGRAM

## 2020-09-23 PROCEDURE — 27201037 HC PRESSURE MONITORING SET UP

## 2020-09-23 PROCEDURE — U0002 COVID-19 LAB TEST NON-CDC: HCPCS

## 2020-09-23 PROCEDURE — 88365 PR  TISSUE HYBRIDIZATION: ICD-10-PCS | Mod: 26,,, | Performed by: PATHOLOGY

## 2020-09-23 PROCEDURE — C1729 CATH, DRAINAGE: HCPCS | Performed by: NEUROLOGICAL SURGERY

## 2020-09-23 PROCEDURE — 80048 BASIC METABOLIC PNL TOTAL CA: CPT

## 2020-09-23 PROCEDURE — 22612 PR ARTHRODESIS, POST/POSTLAT, SNGL INTERSPACE, LUMBAR: ICD-10-PCS | Mod: 22,62,, | Performed by: ORTHOPAEDIC SURGERY

## 2020-09-23 PROCEDURE — 22614 ARTHRD PST TQ 1NTRSPC EA ADD: CPT | Mod: 62,,, | Performed by: NEUROLOGICAL SURGERY

## 2020-09-23 DEVICE — SCREW INNER SINGLE SET TITANIU: Type: IMPLANTABLE DEVICE | Site: SPINE LUMBAR | Status: FUNCTIONAL

## 2020-09-23 DEVICE — MATRIX BONE CELLR VIVIGEN 5CC: Type: IMPLANTABLE DEVICE | Site: SPINE LUMBAR | Status: FUNCTIONAL

## 2020-09-23 DEVICE — SET SCREW EXPEDIUM VERSE UNITZ: Type: IMPLANTABLE DEVICE | Site: SPINE LUMBAR | Status: FUNCTIONAL

## 2020-09-23 DEVICE — PUTTY BONE GRAFTON DBM 10CC: Type: IMPLANTABLE DEVICE | Site: SPINE LUMBAR | Status: FUNCTIONAL

## 2020-09-23 DEVICE — CONNECTOR EXPEDIUM 5.5 30MM: Type: IMPLANTABLE DEVICE | Site: SPINE LUMBAR | Status: FUNCTIONAL

## 2020-09-23 RX ORDER — SUCCINYLCHOLINE CHLORIDE 20 MG/ML
INJECTION INTRAMUSCULAR; INTRAVENOUS
Status: DISCONTINUED | OUTPATIENT
Start: 2020-09-23 | End: 2020-09-23

## 2020-09-23 RX ORDER — SODIUM CHLORIDE 9 MG/ML
INJECTION, SOLUTION INTRAVENOUS CONTINUOUS PRN
Status: DISCONTINUED | OUTPATIENT
Start: 2020-09-23 | End: 2020-09-23

## 2020-09-23 RX ORDER — LEVOFLOXACIN 500 MG/1
500 TABLET, FILM COATED ORAL DAILY
Status: DISCONTINUED | OUTPATIENT
Start: 2020-09-24 | End: 2020-09-28 | Stop reason: HOSPADM

## 2020-09-23 RX ORDER — TRANEXAMIC ACID 100 MG/ML
INJECTION, SOLUTION INTRAVENOUS CONTINUOUS PRN
Status: DISCONTINUED | OUTPATIENT
Start: 2020-09-23 | End: 2020-09-23

## 2020-09-23 RX ORDER — PROPOFOL 10 MG/ML
VIAL (ML) INTRAVENOUS CONTINUOUS PRN
Status: DISCONTINUED | OUTPATIENT
Start: 2020-09-23 | End: 2020-09-23

## 2020-09-23 RX ORDER — ROCURONIUM BROMIDE 10 MG/ML
INJECTION, SOLUTION INTRAVENOUS
Status: DISCONTINUED | OUTPATIENT
Start: 2020-09-23 | End: 2020-09-23

## 2020-09-23 RX ORDER — BISACODYL 10 MG
10 SUPPOSITORY, RECTAL RECTAL DAILY
Status: DISCONTINUED | OUTPATIENT
Start: 2020-09-24 | End: 2020-09-28 | Stop reason: HOSPADM

## 2020-09-23 RX ORDER — DIAZEPAM 5 MG/1
5 TABLET ORAL EVERY 6 HOURS PRN
Status: DISCONTINUED | OUTPATIENT
Start: 2020-09-23 | End: 2020-09-28 | Stop reason: HOSPADM

## 2020-09-23 RX ORDER — ACYCLOVIR 200 MG/1
400 CAPSULE ORAL 2 TIMES DAILY
Status: DISCONTINUED | OUTPATIENT
Start: 2020-09-23 | End: 2020-09-28 | Stop reason: HOSPADM

## 2020-09-23 RX ORDER — CEFAZOLIN SODIUM 1 G/3ML
2 INJECTION, POWDER, FOR SOLUTION INTRAMUSCULAR; INTRAVENOUS
Status: COMPLETED | OUTPATIENT
Start: 2020-09-23 | End: 2020-09-25

## 2020-09-23 RX ORDER — MAG HYDROX/ALUMINUM HYD/SIMETH 200-200-20
30 SUSPENSION, ORAL (FINAL DOSE FORM) ORAL EVERY 4 HOURS PRN
Status: DISCONTINUED | OUTPATIENT
Start: 2020-09-23 | End: 2020-09-28 | Stop reason: HOSPADM

## 2020-09-23 RX ORDER — LIDOCAINE HCL/PF 100 MG/5ML
SYRINGE (ML) INTRAVENOUS
Status: DISCONTINUED | OUTPATIENT
Start: 2020-09-23 | End: 2020-09-23

## 2020-09-23 RX ORDER — MORPHINE SULFATE 2 MG/ML
2 INJECTION, SOLUTION INTRAMUSCULAR; INTRAVENOUS EVERY 4 HOURS PRN
Status: DISCONTINUED | OUTPATIENT
Start: 2020-09-23 | End: 2020-09-28 | Stop reason: HOSPADM

## 2020-09-23 RX ORDER — MIDAZOLAM HYDROCHLORIDE 1 MG/ML
INJECTION, SOLUTION INTRAMUSCULAR; INTRAVENOUS
Status: DISCONTINUED | OUTPATIENT
Start: 2020-09-23 | End: 2020-09-23

## 2020-09-23 RX ORDER — CEFAZOLIN SODIUM 1 G/3ML
2 INJECTION, POWDER, FOR SOLUTION INTRAMUSCULAR; INTRAVENOUS
Status: COMPLETED | OUTPATIENT
Start: 2020-09-23 | End: 2020-09-23

## 2020-09-23 RX ORDER — CLONIDINE HYDROCHLORIDE 0.1 MG/1
0.1 TABLET ORAL DAILY
Status: DISCONTINUED | OUTPATIENT
Start: 2020-09-24 | End: 2020-09-28 | Stop reason: HOSPADM

## 2020-09-23 RX ORDER — NIFEDIPINE 30 MG/1
60 TABLET, EXTENDED RELEASE ORAL DAILY
Status: DISCONTINUED | OUTPATIENT
Start: 2020-09-24 | End: 2020-09-28 | Stop reason: HOSPADM

## 2020-09-23 RX ORDER — NEOSTIGMINE METHYLSULFATE 0.5 MG/ML
INJECTION, SOLUTION INTRAVENOUS
Status: DISCONTINUED | OUTPATIENT
Start: 2020-09-23 | End: 2020-09-23

## 2020-09-23 RX ORDER — VANCOMYCIN HYDROCHLORIDE 1 G/20ML
INJECTION, POWDER, LYOPHILIZED, FOR SOLUTION INTRAVENOUS
Status: DISCONTINUED | OUTPATIENT
Start: 2020-09-23 | End: 2020-09-23 | Stop reason: HOSPADM

## 2020-09-23 RX ORDER — SODIUM CHLORIDE 0.9 % (FLUSH) 0.9 %
3 SYRINGE (ML) INJECTION
Status: DISCONTINUED | OUTPATIENT
Start: 2020-09-23 | End: 2020-09-23 | Stop reason: HOSPADM

## 2020-09-23 RX ORDER — SULFAMETHOXAZOLE AND TRIMETHOPRIM 400; 80 MG/1; MG/1
1 TABLET ORAL DAILY
Status: DISCONTINUED | OUTPATIENT
Start: 2020-09-24 | End: 2020-09-28 | Stop reason: HOSPADM

## 2020-09-23 RX ORDER — MUPIROCIN 20 MG/G
OINTMENT TOPICAL 2 TIMES DAILY
Status: COMPLETED | OUTPATIENT
Start: 2020-09-23 | End: 2020-09-28

## 2020-09-23 RX ORDER — ALLOPURINOL 100 MG/1
300 TABLET ORAL DAILY
Status: DISCONTINUED | OUTPATIENT
Start: 2020-09-24 | End: 2020-09-28 | Stop reason: HOSPADM

## 2020-09-23 RX ORDER — AMOXICILLIN 250 MG
2 CAPSULE ORAL NIGHTLY PRN
Status: DISCONTINUED | OUTPATIENT
Start: 2020-09-23 | End: 2020-09-28 | Stop reason: HOSPADM

## 2020-09-23 RX ORDER — OXYCODONE HYDROCHLORIDE 10 MG/1
10 TABLET ORAL EVERY 4 HOURS PRN
Status: DISCONTINUED | OUTPATIENT
Start: 2020-09-23 | End: 2020-09-28 | Stop reason: HOSPADM

## 2020-09-23 RX ORDER — CARVEDILOL 25 MG/1
25 TABLET ORAL 2 TIMES DAILY
Status: DISCONTINUED | OUTPATIENT
Start: 2020-09-23 | End: 2020-09-28 | Stop reason: HOSPADM

## 2020-09-23 RX ORDER — DEXAMETHASONE SODIUM PHOSPHATE 4 MG/ML
INJECTION, SOLUTION INTRA-ARTICULAR; INTRALESIONAL; INTRAMUSCULAR; INTRAVENOUS; SOFT TISSUE
Status: DISCONTINUED | OUTPATIENT
Start: 2020-09-23 | End: 2020-09-23

## 2020-09-23 RX ORDER — ONDANSETRON 2 MG/ML
INJECTION INTRAMUSCULAR; INTRAVENOUS
Status: DISCONTINUED | OUTPATIENT
Start: 2020-09-23 | End: 2020-09-23

## 2020-09-23 RX ORDER — KETAMINE HCL IN 0.9 % NACL 50 MG/5 ML
SYRINGE (ML) INTRAVENOUS
Status: DISCONTINUED | OUTPATIENT
Start: 2020-09-23 | End: 2020-09-23

## 2020-09-23 RX ORDER — MUPIROCIN 20 MG/G
OINTMENT TOPICAL
Status: DISCONTINUED | OUTPATIENT
Start: 2020-09-23 | End: 2020-09-23 | Stop reason: HOSPADM

## 2020-09-23 RX ORDER — DEXMEDETOMIDINE HYDROCHLORIDE 100 UG/ML
INJECTION, SOLUTION INTRAVENOUS
Status: DISCONTINUED | OUTPATIENT
Start: 2020-09-23 | End: 2020-09-23

## 2020-09-23 RX ORDER — PROPOFOL 10 MG/ML
VIAL (ML) INTRAVENOUS
Status: DISCONTINUED | OUTPATIENT
Start: 2020-09-23 | End: 2020-09-23

## 2020-09-23 RX ORDER — ACETAMINOPHEN 325 MG/1
650 TABLET ORAL EVERY 6 HOURS PRN
Status: DISCONTINUED | OUTPATIENT
Start: 2020-09-23 | End: 2020-09-28 | Stop reason: HOSPADM

## 2020-09-23 RX ORDER — LOSARTAN POTASSIUM 50 MG/1
50 TABLET ORAL DAILY
Status: DISCONTINUED | OUTPATIENT
Start: 2020-09-24 | End: 2020-09-28 | Stop reason: HOSPADM

## 2020-09-23 RX ORDER — SODIUM CHLORIDE 9 MG/ML
INJECTION, SOLUTION INTRAVENOUS CONTINUOUS
Status: DISCONTINUED | OUTPATIENT
Start: 2020-09-23 | End: 2020-09-24

## 2020-09-23 RX ORDER — HYDROMORPHONE HYDROCHLORIDE 1 MG/ML
0.2 INJECTION, SOLUTION INTRAMUSCULAR; INTRAVENOUS; SUBCUTANEOUS EVERY 5 MIN PRN
Status: DISCONTINUED | OUTPATIENT
Start: 2020-09-23 | End: 2020-09-23 | Stop reason: HOSPADM

## 2020-09-23 RX ORDER — ONDANSETRON 8 MG/1
8 TABLET, ORALLY DISINTEGRATING ORAL EVERY 6 HOURS PRN
Status: DISCONTINUED | OUTPATIENT
Start: 2020-09-23 | End: 2020-09-28 | Stop reason: HOSPADM

## 2020-09-23 RX ORDER — HALOPERIDOL 5 MG/ML
0.5 INJECTION INTRAMUSCULAR
Status: DISCONTINUED | OUTPATIENT
Start: 2020-09-23 | End: 2020-09-23 | Stop reason: HOSPADM

## 2020-09-23 RX ORDER — FENTANYL CITRATE 50 UG/ML
INJECTION, SOLUTION INTRAMUSCULAR; INTRAVENOUS
Status: DISCONTINUED | OUTPATIENT
Start: 2020-09-23 | End: 2020-09-23

## 2020-09-23 RX ADMIN — SODIUM CHLORIDE, SODIUM GLUCONATE, SODIUM ACETATE, POTASSIUM CHLORIDE, MAGNESIUM CHLORIDE, SODIUM PHOSPHATE, DIBASIC, AND POTASSIUM PHOSPHATE: .53; .5; .37; .037; .03; .012; .00082 INJECTION, SOLUTION INTRAVENOUS at 03:09

## 2020-09-23 RX ADMIN — DEXMEDETOMIDINE HYDROCHLORIDE 8 MCG: 100 INJECTION, SOLUTION, CONCENTRATE INTRAVENOUS at 07:09

## 2020-09-23 RX ADMIN — ONDANSETRON 4 MG: 2 INJECTION, SOLUTION INTRAMUSCULAR; INTRAVENOUS at 06:09

## 2020-09-23 RX ADMIN — PROPOFOL 200 MG: 10 INJECTION, EMULSION INTRAVENOUS at 02:09

## 2020-09-23 RX ADMIN — NEOSTIGMINE METHYLSULFATE 5 MG: 0.5 INJECTION INTRAVENOUS at 04:09

## 2020-09-23 RX ADMIN — SODIUM CHLORIDE, SODIUM GLUCONATE, SODIUM ACETATE, POTASSIUM CHLORIDE, MAGNESIUM CHLORIDE, SODIUM PHOSPHATE, DIBASIC, AND POTASSIUM PHOSPHATE: .53; .5; .37; .037; .03; .012; .00082 INJECTION, SOLUTION INTRAVENOUS at 02:09

## 2020-09-23 RX ADMIN — KETAMINE HYDROCHLORIDE 2.5 MCG/KG/MIN: 50 INJECTION INTRAMUSCULAR; INTRAVENOUS at 02:09

## 2020-09-23 RX ADMIN — SODIUM CHLORIDE: 0.9 INJECTION, SOLUTION INTRAVENOUS at 07:09

## 2020-09-23 RX ADMIN — DEXMEDETOMIDINE HYDROCHLORIDE 8 MCG: 100 INJECTION, SOLUTION, CONCENTRATE INTRAVENOUS at 06:09

## 2020-09-23 RX ADMIN — TRANEXAMIC ACID 1 MG/KG/HR: 100 INJECTION, SOLUTION INTRAVENOUS at 02:09

## 2020-09-23 RX ADMIN — ACYCLOVIR 400 MG: 200 CAPSULE ORAL at 09:09

## 2020-09-23 RX ADMIN — ROCURONIUM BROMIDE 10 MG: 10 INJECTION, SOLUTION INTRAVENOUS at 03:09

## 2020-09-23 RX ADMIN — MUPIROCIN: 20 OINTMENT TOPICAL at 08:09

## 2020-09-23 RX ADMIN — PROPOFOL 125 MCG/KG/MIN: 10 INJECTION, EMULSION INTRAVENOUS at 02:09

## 2020-09-23 RX ADMIN — MIDAZOLAM HYDROCHLORIDE 2 MG: 1 INJECTION, SOLUTION INTRAMUSCULAR; INTRAVENOUS at 02:09

## 2020-09-23 RX ADMIN — SODIUM CHLORIDE: 0.9 INJECTION, SOLUTION INTRAVENOUS at 02:09

## 2020-09-23 RX ADMIN — OXYCODONE HYDROCHLORIDE 10 MG: 10 TABLET ORAL at 08:09

## 2020-09-23 RX ADMIN — FENTANYL CITRATE 100 MCG: 50 INJECTION, SOLUTION INTRAMUSCULAR; INTRAVENOUS at 02:09

## 2020-09-23 RX ADMIN — DEXAMETHASONE SODIUM PHOSPHATE 4 MG: 4 INJECTION, SOLUTION INTRAMUSCULAR; INTRAVENOUS at 03:09

## 2020-09-23 RX ADMIN — MUPIROCIN: 20 OINTMENT TOPICAL at 11:09

## 2020-09-23 RX ADMIN — ROCURONIUM BROMIDE 50 MG: 10 INJECTION, SOLUTION INTRAVENOUS at 03:09

## 2020-09-23 RX ADMIN — SUCCINYLCHOLINE CHLORIDE 140 MG: 20 INJECTION, SOLUTION INTRAMUSCULAR; INTRAVENOUS at 02:09

## 2020-09-23 RX ADMIN — CARVEDILOL 25 MG: 25 TABLET, FILM COATED ORAL at 08:09

## 2020-09-23 RX ADMIN — REMIFENTANIL HYDROCHLORIDE 0.2 MCG/KG/MIN: 1 INJECTION, POWDER, LYOPHILIZED, FOR SOLUTION INTRAVENOUS at 02:09

## 2020-09-23 RX ADMIN — PROPOFOL 100 MG: 10 INJECTION, EMULSION INTRAVENOUS at 07:09

## 2020-09-23 RX ADMIN — HYDROMORPHONE HYDROCHLORIDE 0.2 MG: 1 INJECTION, SOLUTION INTRAMUSCULAR; INTRAVENOUS; SUBCUTANEOUS at 07:09

## 2020-09-23 RX ADMIN — Medication 50 MG: at 03:09

## 2020-09-23 RX ADMIN — CEFAZOLIN 2 G: 1 INJECTION, POWDER, FOR SOLUTION INTRAMUSCULAR; INTRAVENOUS at 09:09

## 2020-09-23 RX ADMIN — CEFAZOLIN 2 G: 330 INJECTION, POWDER, FOR SOLUTION INTRAMUSCULAR; INTRAVENOUS at 02:09

## 2020-09-23 RX ADMIN — LIDOCAINE HYDROCHLORIDE 90 MG: 20 INJECTION, SOLUTION INTRAVENOUS at 02:09

## 2020-09-23 RX ADMIN — SODIUM CHLORIDE 0.3 MCG/KG/MIN: 9 INJECTION, SOLUTION INTRAVENOUS at 03:09

## 2020-09-23 NOTE — BRIEF OP NOTE
[facility]  Neurosurgery  Operative Note    SUMMARY      Date of Procedure: 9/23/2020     Procedure: Procedure(s) (LRB):  FUSION, SPINE, LUMBAR L2-pelvis. Depuy. Plastic surgery closure w/ Dr. Bellamy (N/A)  CREATION, FLAP, MUSCLE ROTATION (N/A)     Surgeon(s) and Role:  Panel 1:     * Nick Coyle MD - Primary     * Jaspreet Armstrong MD     * Vimal Braun DO  Panel 2:     * Kamlesh Bellamy MD - Primary    Assisting Surgeon: None    Pre-Operative Diagnosis: Metastatic disease [C79.9]    Post-Operative Diagnosis: Post-Op Diagnosis Codes:     * Metastatic disease [C79.9]    Anesthesia: General    Technical Procedures Used: na    Description of the Findings of the Procedure: L2-iliac fusion with tumor resection    Significant Surgical Tasks Conducted by the Assistant(s), if Applicable: na    Complications: No    Estimated Blood Loss (EBL): 200 mL           Specimens:   Specimen (12h ago, onward)    None           Implants:   Implant Name Type Inv. Item Serial No.  Lot No. LRB No. Used Action   FENERSTRATED CORTICAL FIX POLYAXIAL SCREW 7.0 X 45MM     [id number] N/A 1 Implanted   FENERSTRATED CORTICAL FIX POLYAXIAL SCREW 7.0 X45MM     [id number] N/A 1 Implanted   FENERSTRATED CORTICAL FIX POLYAXIAL SCREW 7.0 X 45MM     [id number] N/A 1 Implanted   FENERSTRATED CORTICAL FIX POLYAXIAL SCREW 7.0 45MM     [id number] N/A 1 Implanted   FENERSTRATED CORTICAL FIX POLYAXIAL SCREW 8.0 X 80MM     [id number] N/A 1 Implanted   FENERSTRATED CORTICAL FIX POLYAXIAL SCREW 8.0 80MM     [id number] N/A 1 Implanted   MATRIX BONE CELLR VIVIGEN [facility] - [id number]  MATRIX BONE CELLR VIVIGEN [facility] [id number] N/A 1 Implanted   PUTTY BONE ELOY DBM 10CC - [id number]  PUTTY BONE ELOY DBM 10 [id number] [employer] [facility]  N/A 1 Implanted              Condition: Stable    Disposition: PACU - hemodynamically stable.    Attestation: I was present and scrubbed for the entire procedure.

## 2020-09-23 NOTE — ANESTHESIA PREPROCEDURE EVALUATION
09/23/2020  Jaspreet Walsh Jr. is a 60 y.o., male c Hx/o uncontroled HTN, metastatic dz to his spine thought to be multiple myeloma, lumbar stenosis, and presents for L2-pelvis with intradural exploration around T6-T8.     Active Problem List with Overview Notes    Diagnosis Date Noted    Metastasis of neoplasm to spinal canal 09/23/2020    Vitamin D deficiency 09/17/2020    Multiple myeloma     Anxiety 09/15/2020    Plasma cell dyscrasia 09/15/2020    Lumbar stenosis 09/14/2020    Metastatic disease 09/14/2020    Essential hypertension 09/14/2020     Medications Prior to Admission   Medication Sig Dispense Refill Last Dose    acyclovir (ZOVIRAX) 400 MG tablet Take 1 tablet (400 mg total) by mouth 2 (two) times daily. 60 tablet 11 9/23/2020 at 0530    allopurinoL (ZYLOPRIM) 300 MG tablet Take 1 tablet (300 mg total) by mouth once daily. 30 tablet 2 9/23/2020 at 0530    aspirin 81 MG Chew Chew and swallow 1 tablet (81 mg total) by mouth once daily. 90 tablet 3 9/22/2020 at 0800    carvediloL (COREG) 25 MG tablet Take 1 tablet (25 mg total) by mouth 2 (two) times daily. 60 tablet 2 9/23/2020 at 0530    cloNIDine (CATAPRES) 0.1 MG tablet Take 1 tablet (0.1 mg total) by mouth once daily. 30 tablet 11 9/23/2020 at 0530    ergocalciferol (ERGOCALCIFEROL) 50,000 unit Cap Take 1 capsule (50,000 Units total) by mouth every 7 days. 4 capsule 1 Past Week at Unknown time    HYDROcodone-acetaminophen (NORCO) 7.5-325 mg per tablet Take 1 tablet by mouth 4 (four) times daily as needed for Pain.   Past Week at Unknown time    levoFLOXacin (LEVAQUIN) 500 MG tablet Take 1 tablet (500 mg total) by mouth once daily. 90 tablet 3 9/23/2020 at 0530    losartan (COZAAR) 50 MG tablet Take 1 tablet (50 mg total) by mouth once daily. 90 tablet 3 9/22/2020 at 0800    NIFEdipine (PROCARDIA-XL) 60 MG (OSM) 24 hr tablet  Take 1 tablet (60 mg total) by mouth once daily. 30 tablet 2 9/23/2020 at 0530    sulfamethoxazole-trimethoprim 400-80mg (BACTRIM,SEPTRA) 400-80 mg per tablet Take 1 tablet by mouth once daily. 90 tablet 3 9/23/2020 at 0530    lenalidomide 25 mg Cap Take 1 capsule (25 mg total) by mouth once daily On Days 1-21 of a 28 Day Cycle.. 21 each 0 Unknown at Unknown time       Review of patient's allergies indicates:  No Known Allergies    Past Medical History:   Diagnosis Date    Anxiety     Arthritis     Hypertension      Past Surgical History:   Procedure Laterality Date    KNEE SURGERY Left 06/2019     Tobacco Use    Smoking status: Former Smoker     Quit date: 2017     Years since quitting: 3.7   Substance and Sexual Activity    Alcohol use: Not Currently    Drug use: Never    Sexual activity: Yes     Partners: Female       Objective:     Vital Signs (Most Recent):  Temp: 37 °C (98.6 °F) (09/23/20 1113)  Pulse: 78 (09/23/20 1113)  Resp: 16 (09/23/20 1113)  BP: (!) 155/94 (09/23/20 1113)  SpO2: 98 % (09/23/20 1113) Vital Signs (24h Range):  Temp:  [37 °C (98.6 °F)] 37 °C (98.6 °F)  Pulse:  [78] 78  Resp:  [16] 16  SpO2:  [98 %] 98 %  BP: (155)/(94) 155/94     Weight: 97.5 kg (215 lb)  Body mass index is 33.67 kg/m².        Significant Labs:  All pertinent labs from the last 24 hours have been reviewed.    CBC:   Recent Labs     09/23/20  1130   WBC 8.21   RBC 5.08   HGB 15.1   HCT 47.1      MCV 93   MCH 29.7   MCHC 32.1       INR  Recent Labs     09/23/20  1130   INR 1.1   APTT 21.1         Anesthesia Evaluation    I have reviewed the Patient Summary Reports.    I have reviewed the Nursing Notes.    I have reviewed the Medications.     Review of Systems  Anesthesia Hx:  Denies Family Hx of Anesthesia complications.   Denies Personal Hx of Anesthesia complications.       Physical Exam  General:  Well nourished    Airway/Jaw/Neck:  Airway Findings: Mouth Opening: Normal Tongue: Normal  General Airway  Assessment: Adult  Mallampati: I  TM Distance: Normal, at least 6 cm  Jaw/Neck Findings:     Neck ROM: Normal ROM      Dental:  Dental Findings: In tact   Chest/Lungs:  Chest/Lungs Findings: Clear to auscultation, Normal Respiratory Rate     Heart/Vascular:  Heart Findings: Rate: Normal  Rhythm: Regular Rhythm  Sounds: Normal        Mental Status:  Mental Status Findings:  Cooperative, Alert and Oriented         Anesthesia Plan  Type of Anesthesia, risks & benefits discussed:  Anesthesia Type:  general  Patient's Preference:   Intra-op Monitoring Plan: standard ASA monitors and arterial line  Intra-op Monitoring Plan Comments:   Post Op Pain Control Plan: IV/PO Opioids PRN, per primary service following discharge from PACU and multimodal analgesia  Post Op Pain Control Plan Comments:   Induction:   IV  Beta Blocker:  Patient is not currently on a Beta-Blocker (No further documentation required).       Informed Consent: Patient understands risks and agrees with Anesthesia plan.  Questions answered. Anesthesia consent signed with patient.  ASA Score: 3     Day of Surgery Review of History & Physical:    H&P update referred to the surgeon.         Ready For Surgery From Anesthesia Perspective.

## 2020-09-23 NOTE — ANESTHESIA PROCEDURE NOTES
Arterial    Diagnosis: Neoplasm of spinal canal    Patient location during procedure: done in OR  Procedure start time: 9/23/2020 3:00 PM  Timeout: 9/23/2020 3:00 PM  Procedure end time: 9/23/2020 3:03 PM    Staffing  Authorizing Provider: Wale Marie MD  Performing Provider: Luli Gonzalez CRNA    Anesthesiologist was present at the time of the procedure.    Preanesthetic Checklist  Completed: patient identified, site marked, surgical consent, pre-op evaluation, timeout performed, IV checked, risks and benefits discussed, monitors and equipment checked and anesthesia consent givenArterial  Skin Prep: chlorhexidine gluconate  Local Infiltration: none  Orientation: left  Location: radial  Catheter Size: 20 G  Catheter placement by Anatomical landmarks. Heme positive aspiration all ports.Insertion Attempts: 1  Assessment  Dressing: secured with tape and tegaderm  Patient: Tolerated well

## 2020-09-23 NOTE — ANESTHESIA PROCEDURE NOTES
Intubation  Performed by: Luli Gonzalez CRNA  Authorized by: Wale Marie MD     Intubation:     Induction:  Intravenous    Intubated:  Postinduction    Mask Ventilation:  Easy mask    Attempts:  1    Attempted By:  CRNA    Method of Intubation:  Direct    Blade:  Eason 2    Laryngeal View Grade: Grade I - full view of chords      Difficult Airway Encountered?: No      Complications:  None    Airway Device:  Oral endotracheal tube    Airway Device Size:  7.5    Style/Cuff Inflation:  Cuffed (inflated to minimal occlusive pressure)    Tube secured:  23    Secured at:  The lips    Placement Verified By:  Capnometry    Complicating Factors:  None    Findings Post-Intubation:  BS equal bilateral and atraumatic/condition of teeth unchanged

## 2020-09-24 ENCOUNTER — TELEPHONE (OUTPATIENT)
Dept: PHARMACY | Facility: CLINIC | Age: 60
End: 2020-09-24

## 2020-09-24 LAB
ALBUMIN SERPL BCP-MCNC: 2.5 G/DL (ref 3.5–5.2)
ANION GAP SERPL CALC-SCNC: 2 MMOL/L (ref 8–16)
ANION GAP SERPL CALC-SCNC: 7 MMOL/L (ref 8–16)
BASOPHILS # BLD AUTO: 0.01 K/UL (ref 0–0.2)
BASOPHILS NFR BLD: 0.1 % (ref 0–1.9)
BUN SERPL-MCNC: 23 MG/DL (ref 8–23)
BUN SERPL-MCNC: 23 MG/DL (ref 8–23)
CA-I BLDV-SCNC: 1.1 MMOL/L (ref 1.06–1.42)
CA-I BLDV-SCNC: 1.13 MMOL/L (ref 1.06–1.42)
CALCIUM SERPL-MCNC: 7.4 MG/DL (ref 8.7–10.5)
CALCIUM SERPL-MCNC: 7.7 MG/DL (ref 8.7–10.5)
CHLORIDE SERPL-SCNC: 103 MMOL/L (ref 95–110)
CHLORIDE SERPL-SCNC: 105 MMOL/L (ref 95–110)
CO2 SERPL-SCNC: 23 MMOL/L (ref 23–29)
CO2 SERPL-SCNC: 25 MMOL/L (ref 23–29)
CREAT SERPL-MCNC: 0.9 MG/DL (ref 0.5–1.4)
CREAT SERPL-MCNC: 1 MG/DL (ref 0.5–1.4)
DIFFERENTIAL METHOD: ABNORMAL
EOSINOPHIL # BLD AUTO: 0 K/UL (ref 0–0.5)
EOSINOPHIL # BLD AUTO: 0.1 K/UL (ref 0–0.5)
EOSINOPHIL # BLD AUTO: 0.1 K/UL (ref 0–0.5)
EOSINOPHIL NFR BLD: 0.2 % (ref 0–8)
EOSINOPHIL NFR BLD: 1 % (ref 0–8)
EOSINOPHIL NFR BLD: 1.2 % (ref 0–8)
ERYTHROCYTE [DISTWIDTH] IN BLOOD BY AUTOMATED COUNT: 12.9 % (ref 11.5–14.5)
ERYTHROCYTE [DISTWIDTH] IN BLOOD BY AUTOMATED COUNT: 13 % (ref 11.5–14.5)
ERYTHROCYTE [DISTWIDTH] IN BLOOD BY AUTOMATED COUNT: 13.3 % (ref 11.5–14.5)
EST. GFR  (AFRICAN AMERICAN): >60 ML/MIN/1.73 M^2
EST. GFR  (AFRICAN AMERICAN): >60 ML/MIN/1.73 M^2
EST. GFR  (NON AFRICAN AMERICAN): >60 ML/MIN/1.73 M^2
EST. GFR  (NON AFRICAN AMERICAN): >60 ML/MIN/1.73 M^2
GENETICIST REVIEW: NORMAL
GLUCOSE SERPL-MCNC: 140 MG/DL (ref 70–110)
GLUCOSE SERPL-MCNC: 145 MG/DL (ref 70–110)
HCT VFR BLD AUTO: 33.6 % (ref 40–54)
HCT VFR BLD AUTO: 35.6 % (ref 40–54)
HCT VFR BLD AUTO: 36.7 % (ref 40–54)
HGB BLD-MCNC: 10.7 G/DL (ref 14–18)
HGB BLD-MCNC: 11.8 G/DL (ref 14–18)
HGB BLD-MCNC: 12.1 G/DL (ref 14–18)
IMM GRANULOCYTES # BLD AUTO: 0.14 K/UL (ref 0–0.04)
IMM GRANULOCYTES # BLD AUTO: 0.17 K/UL (ref 0–0.04)
IMM GRANULOCYTES # BLD AUTO: 0.32 K/UL (ref 0–0.04)
IMM GRANULOCYTES NFR BLD AUTO: 1.1 % (ref 0–0.5)
IMM GRANULOCYTES NFR BLD AUTO: 1.7 % (ref 0–0.5)
IMM GRANULOCYTES NFR BLD AUTO: 2.3 % (ref 0–0.5)
LYMPHOCYTES # BLD AUTO: 1.7 K/UL (ref 1–4.8)
LYMPHOCYTES # BLD AUTO: 1.8 K/UL (ref 1–4.8)
LYMPHOCYTES # BLD AUTO: 2.3 K/UL (ref 1–4.8)
LYMPHOCYTES NFR BLD: 14.6 % (ref 18–48)
LYMPHOCYTES NFR BLD: 16.3 % (ref 18–48)
LYMPHOCYTES NFR BLD: 16.6 % (ref 18–48)
MAGNESIUM SERPL-MCNC: 1.9 MG/DL (ref 1.6–2.6)
MCH RBC QN AUTO: 30.1 PG (ref 27–31)
MCH RBC QN AUTO: 30.5 PG (ref 27–31)
MCH RBC QN AUTO: 30.6 PG (ref 27–31)
MCHC RBC AUTO-ENTMCNC: 31.8 G/DL (ref 32–36)
MCHC RBC AUTO-ENTMCNC: 33 G/DL (ref 32–36)
MCHC RBC AUTO-ENTMCNC: 33.1 G/DL (ref 32–36)
MCV RBC AUTO: 92 FL (ref 82–98)
MCV RBC AUTO: 93 FL (ref 82–98)
MCV RBC AUTO: 94 FL (ref 82–98)
MONOCYTES # BLD AUTO: 0.7 K/UL (ref 0.3–1)
MONOCYTES # BLD AUTO: 0.8 K/UL (ref 0.3–1)
MONOCYTES # BLD AUTO: 1.2 K/UL (ref 0.3–1)
MONOCYTES NFR BLD: 5.7 % (ref 4–15)
MONOCYTES NFR BLD: 8.2 % (ref 4–15)
MONOCYTES NFR BLD: 8.9 % (ref 4–15)
NEUTROPHILS # BLD AUTO: 7.5 K/UL (ref 1.8–7.7)
NEUTROPHILS # BLD AUTO: 9.6 K/UL (ref 1.8–7.7)
NEUTROPHILS # BLD AUTO: 9.7 K/UL (ref 1.8–7.7)
NEUTROPHILS NFR BLD: 71.1 % (ref 38–73)
NEUTROPHILS NFR BLD: 72.5 % (ref 38–73)
NEUTROPHILS NFR BLD: 78.3 % (ref 38–73)
NRBC BLD-RTO: 0 /100 WBC
PHOSPHATE SERPL-MCNC: 3.3 MG/DL (ref 2.7–4.5)
PLASMA CELL PROLIF RELEASED BY: NORMAL
PLASMA CELL PROLIF RESULT SUMMARY: NORMAL
PLASMA CELL PROLIF RESULT TABLE: NORMAL
PLATELET # BLD AUTO: 140 K/UL (ref 150–350)
PLATELET # BLD AUTO: 144 K/UL (ref 150–350)
PLATELET # BLD AUTO: 179 K/UL (ref 150–350)
PMV BLD AUTO: 10.2 FL (ref 9.2–12.9)
PMV BLD AUTO: 10.3 FL (ref 9.2–12.9)
PMV BLD AUTO: 10.5 FL (ref 9.2–12.9)
POCT GLUCOSE: 153 MG/DL (ref 70–110)
POTASSIUM SERPL-SCNC: 4.2 MMOL/L (ref 3.5–5.1)
POTASSIUM SERPL-SCNC: 5.1 MMOL/L (ref 3.5–5.1)
RBC # BLD AUTO: 3.56 M/UL (ref 4.6–6.2)
RBC # BLD AUTO: 3.85 M/UL (ref 4.6–6.2)
RBC # BLD AUTO: 3.97 M/UL (ref 4.6–6.2)
REASON FOR REFERRAL, PLASMA CELL PROLIF (PCPD), FISH: NORMAL
REF LAB TEST METHOD: NORMAL
RESULTS, PLASMA CELL PROLIF (PCPD), FISH: NORMAL
SERVICE CMNT-IMP: NORMAL
SERVICE CMNT-IMP: NORMAL
SODIUM SERPL-SCNC: 132 MMOL/L (ref 136–145)
SODIUM SERPL-SCNC: 133 MMOL/L (ref 136–145)
SPECIMEN SOURCE: NORMAL
SPECIMEN, PLASMA CELL PROLIF (PCPD), FISH: NORMAL
WBC # BLD AUTO: 10.29 K/UL (ref 3.9–12.7)
WBC # BLD AUTO: 12.21 K/UL (ref 3.9–12.7)
WBC # BLD AUTO: 13.64 K/UL (ref 3.9–12.7)

## 2020-09-24 PROCEDURE — 83735 ASSAY OF MAGNESIUM: CPT

## 2020-09-24 PROCEDURE — P9016 RBC LEUKOCYTES REDUCED: HCPCS

## 2020-09-24 PROCEDURE — 93010 EKG 12-LEAD: ICD-10-PCS | Mod: ,,, | Performed by: INTERNAL MEDICINE

## 2020-09-24 PROCEDURE — 25000003 PHARM REV CODE 250: Performed by: STUDENT IN AN ORGANIZED HEALTH CARE EDUCATION/TRAINING PROGRAM

## 2020-09-24 PROCEDURE — 63600175 PHARM REV CODE 636 W HCPCS: Performed by: STUDENT IN AN ORGANIZED HEALTH CARE EDUCATION/TRAINING PROGRAM

## 2020-09-24 PROCEDURE — 82330 ASSAY OF CALCIUM: CPT

## 2020-09-24 PROCEDURE — 82040 ASSAY OF SERUM ALBUMIN: CPT

## 2020-09-24 PROCEDURE — 80048 BASIC METABOLIC PNL TOTAL CA: CPT | Mod: 91

## 2020-09-24 PROCEDURE — 84100 ASSAY OF PHOSPHORUS: CPT

## 2020-09-24 PROCEDURE — 36415 COLL VENOUS BLD VENIPUNCTURE: CPT

## 2020-09-24 PROCEDURE — 11000001 HC ACUTE MED/SURG PRIVATE ROOM

## 2020-09-24 PROCEDURE — 80048 BASIC METABOLIC PNL TOTAL CA: CPT

## 2020-09-24 PROCEDURE — 99024 POSTOP FOLLOW-UP VISIT: CPT | Mod: ,,, | Performed by: PHYSICIAN ASSISTANT

## 2020-09-24 PROCEDURE — 63600175 PHARM REV CODE 636 W HCPCS: Performed by: PHYSICIAN ASSISTANT

## 2020-09-24 PROCEDURE — 93005 ELECTROCARDIOGRAM TRACING: CPT

## 2020-09-24 PROCEDURE — 85025 COMPLETE CBC W/AUTO DIFF WBC: CPT | Mod: 91

## 2020-09-24 PROCEDURE — 36430 TRANSFUSION BLD/BLD COMPNT: CPT

## 2020-09-24 PROCEDURE — 94761 N-INVAS EAR/PLS OXIMETRY MLT: CPT

## 2020-09-24 PROCEDURE — 93010 ELECTROCARDIOGRAM REPORT: CPT | Mod: ,,, | Performed by: INTERNAL MEDICINE

## 2020-09-24 PROCEDURE — 82330 ASSAY OF CALCIUM: CPT | Mod: 91

## 2020-09-24 PROCEDURE — 99024 PR POST-OP FOLLOW-UP VISIT: ICD-10-PCS | Mod: ,,, | Performed by: PHYSICIAN ASSISTANT

## 2020-09-24 RX ORDER — HEPARIN SODIUM 5000 [USP'U]/ML
5000 INJECTION, SOLUTION INTRAVENOUS; SUBCUTANEOUS EVERY 8 HOURS
Status: DISCONTINUED | OUTPATIENT
Start: 2020-09-24 | End: 2020-09-28 | Stop reason: HOSPADM

## 2020-09-24 RX ORDER — HYDROCODONE BITARTRATE AND ACETAMINOPHEN 500; 5 MG/1; MG/1
TABLET ORAL
Status: DISCONTINUED | OUTPATIENT
Start: 2020-09-24 | End: 2020-09-28 | Stop reason: HOSPADM

## 2020-09-24 RX ORDER — SODIUM CHLORIDE 9 MG/ML
INJECTION, SOLUTION INTRAVENOUS CONTINUOUS
Status: ACTIVE | OUTPATIENT
Start: 2020-09-24 | End: 2020-09-24

## 2020-09-24 RX ADMIN — LEVOFLOXACIN 500 MG: 500 TABLET, FILM COATED ORAL at 08:09

## 2020-09-24 RX ADMIN — CARVEDILOL 25 MG: 25 TABLET, FILM COATED ORAL at 08:09

## 2020-09-24 RX ADMIN — SODIUM CHLORIDE 1000 ML: 0.9 INJECTION, SOLUTION INTRAVENOUS at 01:09

## 2020-09-24 RX ADMIN — ACYCLOVIR 400 MG: 200 CAPSULE ORAL at 08:09

## 2020-09-24 RX ADMIN — MUPIROCIN: 20 OINTMENT TOPICAL at 01:09

## 2020-09-24 RX ADMIN — CLONIDINE HYDROCHLORIDE 0.1 MG: 0.1 TABLET ORAL at 08:09

## 2020-09-24 RX ADMIN — SODIUM CHLORIDE: 0.9 INJECTION, SOLUTION INTRAVENOUS at 05:09

## 2020-09-24 RX ADMIN — LOSARTAN POTASSIUM 50 MG: 50 TABLET, FILM COATED ORAL at 08:09

## 2020-09-24 RX ADMIN — NIFEDIPINE 60 MG: 30 TABLET, FILM COATED, EXTENDED RELEASE ORAL at 08:09

## 2020-09-24 RX ADMIN — CEFAZOLIN 2 G: 1 INJECTION, POWDER, FOR SOLUTION INTRAMUSCULAR; INTRAVENOUS at 01:09

## 2020-09-24 RX ADMIN — ACYCLOVIR 400 MG: 200 CAPSULE ORAL at 09:09

## 2020-09-24 RX ADMIN — CEFAZOLIN 2 G: 1 INJECTION, POWDER, FOR SOLUTION INTRAMUSCULAR; INTRAVENOUS at 05:09

## 2020-09-24 RX ADMIN — SULFAMETHOXAZOLE AND TRIMETHOPRIM 1 TABLET: 400; 80 TABLET ORAL at 08:09

## 2020-09-24 RX ADMIN — CEFAZOLIN 2 G: 1 INJECTION, POWDER, FOR SOLUTION INTRAMUSCULAR; INTRAVENOUS at 09:09

## 2020-09-24 RX ADMIN — CALCIUM GLUCONATE 500 MG: 98 INJECTION, SOLUTION INTRAVENOUS at 02:09

## 2020-09-24 RX ADMIN — ACETAMINOPHEN 650 MG: 325 TABLET ORAL at 01:09

## 2020-09-24 RX ADMIN — HEPARIN SODIUM 5000 UNITS: 5000 INJECTION INTRAVENOUS; SUBCUTANEOUS at 01:09

## 2020-09-24 RX ADMIN — SODIUM CHLORIDE: 0.9 INJECTION, SOLUTION INTRAVENOUS at 02:09

## 2020-09-24 RX ADMIN — DIAZEPAM 5 MG: 5 TABLET ORAL at 05:09

## 2020-09-24 RX ADMIN — OXYCODONE HYDROCHLORIDE 10 MG: 10 TABLET ORAL at 09:09

## 2020-09-24 RX ADMIN — ALUMINUM HYDROXIDE, MAGNESIUM HYDROXIDE, AND SIMETHICONE 30 ML: 200; 200; 20 SUSPENSION ORAL at 09:09

## 2020-09-24 RX ADMIN — MUPIROCIN: 20 OINTMENT TOPICAL at 09:09

## 2020-09-24 RX ADMIN — ONDANSETRON 8 MG: 8 TABLET, ORALLY DISINTEGRATING ORAL at 10:09

## 2020-09-24 RX ADMIN — ALLOPURINOL 300 MG: 100 TABLET ORAL at 08:09

## 2020-09-24 NOTE — OP NOTE
[facility]  Plastic Surgery  Operative Note    SUMMARY     Date of Procedure: 9/23/2020     Procedure: Procedure(s) (LRB):  FUSION, SPINE, LUMBAR L2-pelvis. Depuy. Plastic surgery closure w/ Dr. Bellamy (N/A)  CREATION, FLAP, MUSCLE ROTATION (N/A)     Bilateral paraspinous muscle flap closure  Complex closure of back wound 15 cm  Prevena incisional wound vac    Surgeon(s) and Role:  Panel 1:     * Nick Coyle MD - Primary     * Jaspreet Armstrong MD     * Vimal Braun,   Panel 2:     * Kamlesh Bellamy MD - Primary    Pre-Operative Diagnosis:   Metastatic disease [C79.9]  Lytic lesion of spine  S/p spine decompression and fusion    Post-Operative Diagnosis:   Same    Anesthesia:   General    Indications:   60 year old male underwent spinal decompression.  There is a possible need for radiotherapy in the future.  I explained to the patient the indications for surgery.  Written consent was obtained.      Procedure:   Following neurosurgery portion of the procedure, I was called to examine the wound.  The iliac screws were visible through the bilateral paraspinous muscles.  There was depth under the fusion construct.  Parapsinous flaps were mobilized to obliterate dead space along the length of the incision.  Skin flaps were elevated above the fascia.  The fascia over the muscle was imbricated with looped #1 PDS suture to permit a vest over pants construct and to imbricate the muscle into the space.  Prior to this portion of the operation, we confirmed that the deep hemovac drains were mobile.  These drains were secured with a drain stitch 3-0 nylon.  Subcutaneous drains were placed and secured with 3-0 nylon.  The skin was closed with ) quilting sutures to the deep dermis.  The bolts were covered with paraspinous fascia approximated with figure of 8 0 vicryl suture.  The skin was closed with meticulous 2-0 nylon suture.  All counts were correct.  A prevena wound vac was left in  place.           Complications: No    Estimated Blood Loss (EBL): 300 mL           Implants:   Implant Name Type Inv. Item Serial No.  Lot No. LRB No. Used Action   FENERSTRATED CORTICAL FIX POLYAXIAL SCREW 7.0 X 45MM     [id number] N/A 1 Implanted   FENERSTRATED CORTICAL FIX POLYAXIAL SCREW 7.0 X45MM     [id number] N/A 1 Implanted   FENERSTRATED CORTICAL FIX POLYAXIAL SCREW 7.0 X 45MM     [id number] N/A 1 Implanted   FENERSTRATED CORTICAL FIX POLYAXIAL SCREW 7.0 45MM     [id number] N/A 1 Implanted   FENERSTRATED CORTICAL FIX POLYAXIAL SCREW 8.0 X 80MM     [id number] N/A 1 Implanted   FENERSTRATED CORTICAL FIX POLYAXIAL SCREW 8.0 80MM     [id number] N/A 1 Implanted   MATRIX BONE CELLR VIVIGEN 5CC - [id number]  MATRIX BONE CELLR VIVIGEN 5CC  [facility] [id number] N/A 1 Implanted   PUTTY BONE ELOY DBM 10CC - [id number]  PUTTY BONE ELOY DBM 10CC [id number] [facility]  N/A 1 Implanted   SET SCREW EXPEDIUM VERSE UNITZ - [id number]  SET SCREW EXPEDIUM VERSE UNITZ  [employer]  N/A 12 Implanted   SET SCREW EXPEDIUM VERSE UNITZ - [id number]  SET SCREW EXPEDIUM VERSE UNITZ  [employer]  N/A 1 Explanted   SCREW INNER SINGLE SET TITANIU - [id number]  SCREW INNER SINGLE SET TITANIU  [employer]  N/A 4 Implanted   SCREW INNER SINGLE SET TITANIU - [id number]  SCREW INNER SINGLE SET TITANIU  [employer]  N/A 3 Explanted   CONNECTOR EXPEDIUM 5.5 30MM - [id number]  CONNECTOR EXPEDIUM 5.5 30MM  [employer]  N/A 4 Implanted   CONNECTOR EXPEDIUM 5.5 30MM - [id number]  CONNECTOR EXPEDIUM 5.5 30MM  [employer]  N/A 1 Explanted   135mm KIRILL      N/A 1 Implanted   140mm KIRILL      N/A 1 Implanted   8 x70mm VERSE SCREW      N/A 2 Implanted       Specimens:   Specimen (12h ago, onward)    None                  Condition: Good    Disposition: PACU, stable    Attestation: I was present and scrubbed for the entire procedure

## 2020-09-24 NOTE — TRANSFER OF CARE
Anesthesia Transfer of Care Note    Patient: Jaspreet Walsh Jr.    Procedure(s) Performed: Procedure(s) (LRB):  FUSION, SPINE, LUMBAR L2-pelvis. Depuy. Plastic surgery closure w/ Dr. Bellamy (N/A)  CREATION, FLAP, MUSCLE ROTATION (N/A)    Patient location: PACU    Anesthesia Type: general    Transport from OR: Transported from OR on 6-10 L/min O2 by face mask with adequate spontaneous ventilation    Post pain: adequate analgesia    Post assessment: no apparent anesthetic complications and tolerated procedure well    Post vital signs: stable    Level of consciousness: awake and alert    Nausea/Vomiting: no nausea/vomiting    Complications: none    Transfer of care protocol was followed      Last vitals:   Visit Vitals  /68   Pulse 88   Temp 36.4 °C (97.5 °F) (Temporal)   Resp 14   Wt 97.5 kg (215 lb)   SpO2 100%   BMI 33.67 kg/m²

## 2020-09-24 NOTE — PT/OT/SLP PROGRESS
Physical Therapy      Patient Name:  Jaspreet Walsh Jr.   MRN:  [id number]    Patient not seen today secondary to needed TLSO in am and had low BP in pm, rapid response called and nurse held PT.  Pt's home situation and prior level of function: Pt lives with his wife in a 1 story home with 3 AHMET with L UE rail. Pt has a tub/shower combo with grab bar, low toilet, and no equipment. Pt was working prior to admit.     Jo Ann Foreman, PT 9/24/20

## 2020-09-24 NOTE — HPI
Patient is a 59 yo male with a PMH of HTN, anxiety with no known oncologic history who presented to the ED 9/14 c/o 1 month of back pain and muscle soreness to the lower extremities. He states that he has cramping pain to bilateral thighs and calves for about 1 month and back pain started 2 weeks ago without radiation to the midline lumbar back. Back pain is worse when sitting on the couch vs. Laying down, also somewhat worse when walking. He has no weakness in his legs and is ambulatory. No radicular component to his back pain.  No numbness or tingling in the lower extremities. No bowel or bladder incontinence. He has not had difficulty using his hand or coordinating movements of the upper extremities, no neck pain. Transferred to C after CT L spine at OSH revealed lumbar-sacral mass lesion concerning for malignancy. Denies any blood thinner use.      Medical history significant for occasional smoking for about 2 years total, no drinking or illicit substances. No known cancers, did not receive a screening colonoscopy. Family history of 'bone cancer' in his father who passed away in 2016.      Patient underwent bone marrow biopsy on 9/15 and L4-5 bone biopsy 9/16 with final pathology of plasma cell neoplasm. He was discharged from the hospital on 9/18 with close follow-up arranged on 10/1 with hematology/oncology. Presents today for L2-iliac fusion with tumor resection.

## 2020-09-24 NOTE — SUBJECTIVE & OBJECTIVE
Interval History: NAEON. Patient tolerating diet, no passage of flatus. Denies n/v/f/c. Otherwise doing well this AM.    Medications:  Continuous Infusions:   sodium chloride 0.9% 100 mL/hr at 09/24/20 0532     Scheduled Meds:   acyclovir  400 mg Oral BID    allopurinoL  300 mg Oral Daily    bisacodyL  10 mg Rectal Daily    carvediloL  25 mg Oral BID    ceFAZolin (ANCEF) IVPB  2 g Intravenous Q8H    cloNIDine  0.1 mg Oral Daily    levoFLOXacin  500 mg Oral Daily    losartan  50 mg Oral Daily    mupirocin   Nasal BID    NIFEdipine  60 mg Oral Daily    sulfamethoxazole-trimethoprim 400-80mg  1 tablet Oral Daily     PRN Meds:acetaminophen, aluminum-magnesium hydroxide-simethicone, diazePAM, morphine, ondansetron, oxyCODONE, promethazine (PHENERGAN) IVPB, senna-docusate 8.6-50 mg     Review of patient's allergies indicates:  No Known Allergies  Objective:     Vital Signs (Most Recent):  Temp: 98.6 °F (37 °C) (09/24/20 0330)  Pulse: 66 (09/24/20 0330)  Resp: 18 (09/24/20 0330)  BP: (!) 164/92 (09/24/20 0330)  SpO2: 97 % (09/24/20 0330) Vital Signs (24h Range):  Temp:  [97.3 °F (36.3 °C)-99.1 °F (37.3 °C)] 98.6 °F (37 °C)  Pulse:  [66-88] 66  Resp:  [14-20] 18  SpO2:  [95 %-100 %] 97 %  BP: (102-164)/(65-94) 164/92  Arterial Line BP: (116-152)/(66-81) 152/81     Weight: 97.3 kg (214 lb 8.1 oz)  Body mass index is 33.6 kg/m².    Intake/Output - Last 3 Shifts       09/22 0700 - 09/23 0659 09/23 0700 - 09/24 0659    I.V. (mL/kg)  2660 (27.3)    Total Intake(mL/kg)  2660 (27.3)    Urine (mL/kg/hr)  1575 (0.7)    Drains  257    Other  0    Stool  0    Blood  300    Total Output  2132    Net  +528          Stool Occurrence  0 x          Physical Exam  Constitutional:       General: He is not in acute distress.     Appearance: Normal appearance.   HENT:      Head: Normocephalic and atraumatic.      Right Ear: External ear normal.      Left Ear: External ear normal.      Nose: Nose normal.      Mouth/Throat:       Mouth: Mucous membranes are moist.   Eyes:      Extraocular Movements: Extraocular movements intact.   Neck:      Musculoskeletal: Normal range of motion.   Cardiovascular:      Rate and Rhythm: Normal rate.   Pulmonary:      Effort: Pulmonary effort is normal. No respiratory distress.   Abdominal:      General: There is no distension.      Palpations: Abdomen is soft.   Genitourinary:     Comments: Guadarrama catheter in place  Musculoskeletal:      Comments: 4 drains in back, all draining SS fluid. Incisional vac in place, good suction, no leak.    Skin:     General: Skin is warm and dry.   Neurological:      General: No focal deficit present.      Mental Status: He is alert and oriented to person, place, and time.   Psychiatric:         Mood and Affect: Mood normal.         Behavior: Behavior normal.         Significant Labs:  CBC:   Recent Labs   Lab 09/24/20  0550   WBC 12.21   RBC 3.97*   HGB 12.1*   HCT 36.7*      MCV 92   MCH 30.5   MCHC 33.0     CMP:   Recent Labs   Lab 09/23/20  1130   *   CALCIUM 9.3      K 4.1   CO2 23      BUN 34*   CREATININE 1.2

## 2020-09-24 NOTE — PLAN OF CARE
CM met with patient to obtain discharge planning assessment.  Patient is POD 1 for FUSION, SPINE, LUMBAR L2-pelvis. Depuy. Plastic surgery closure w/ Dr. Bellamy (N/A)  CREATION, FLAP, MUSCLE ROTATION .  Planned discharge is home with family with home health - Plan (A) or Rehab - Plan (B).    PCP:  Con Patel Jr, MD     Payor:  Payor: [employer] / Plan: [facility] CHOICE PLUS / Product Type: Commercial /      Pharmacy:    [id number] - [place] - [address] AT [facility] [address]  [address]  [place] [address]  Phone: [phone] Fax: [phone]    [facility] - [place], [place] - [address]  [address] [facility] [place] [address]  Phone: [phone] Fax: [phone]    09/24/20 4340   Discharge Assessment   Assessment Type Discharge Planning Assessment   Confirmed/corrected address and phone number on facesheet? Yes   Assessment information obtained from? Patient   Expected Length of Stay (days) 5   Communicated expected length of stay with patient/caregiver yes   Prior to hospitilization cognitive status: Alert/Oriented   Prior to hospitalization functional status: Independent   Current cognitive status: Alert/Oriented   Current Functional Status: Independent   Lives With significant other   Able to Return to Prior Arrangements yes   Is patient able to care for self after discharge? Yes   Who are your caregiver(s) and their phone number(s)? Catie Vasquez - S/O [phone]   Patient's perception of discharge disposition home health   Readmission Within the Last 30 Days planned readmission   If yes, most recent facility [facility]: [facility]   Patient currently being followed by outpatient case management? No   Patient currently receives any other outside agency services? No   Equipment Currently Used at Home none   Do you have any problems affording any of your prescribed medications? No   Is the patient taking medications as  prescribed? yes   Does the patient have transportation home? Yes   Transportation Anticipated family or friend will provide   Does the patient receive services at the [facility]? No   Discharge Plan A Home with family;Home Health   Discharge Plan B Rehab   DME Needed Upon Discharge  other (see comments)  (tbd)   Patient/Family in Agreement with Plan yes   Readmission Questionnaire   At the time of your discharge, did someone talk to you about what your health problems were? Yes   At the time of discharge, did someone talk to you about what to watch out for regarding worsening of your health problem? Yes   At the time of discharge, did someone talk to you about what to do if you experienced worsening of your health problem? Yes   At the time of discharge, did someone talk to you about which medication to take when you left the hospital and which ones to stop taking? Yes   At the time of discharge, did someone talk to you about when and where to follow up with a doctor after you left the hospital? Yes   What do you believe caused you to be sick enough to be re-admitted? planned surgery   How often do you need to have someone help you when you read instructions, pamphlets, or other written material from your doctor or pharmacy? Often   Do you have problems taking your medications as prescribed? No   Do you have any problems affording any of  your prescribed medications? No   Do you have problems obtaining/receiving your medications? No   Does the patient have transportation to healthcare appointments? Yes   Does the patient have family/friends to help with healtcare needs after discharge? yes   Does your caregiver provide all the help you need? Yes

## 2020-09-24 NOTE — NURSING
1250 Patient notified staff of feeling dizzy/lightheaded. Pt BP 73/50. Patient HOB flat--/78. Patient stated no longer feeling dizzy. Patient HOB elevated to 30 degrees and patient remained asymptomatic.    1320 patient notified staff of symptoms returning, including numbness and tingling to fingertips, and vision changes. BP 66/45. Patient placed in trendelenburg. RR called. Arnie ALCANTAR to bedside. All drains remain intact, incision appears at baseline. 1L NS bolus given. CBC, BMP, calcium, magnesium, phosphorus levels collected.     Post bolus: /86 w/ HOB at 25 degrees. Patient reporting no longer experiencing dizziness, or vision change. Patient reporting slight tingling to L fingertips, but improving.    Currently, patient receiving calcium gluconate and NS at 100ml/hr. /86.      Will continue to monitor for changes in POC and clinical condition.

## 2020-09-24 NOTE — RESPIRATORY THERAPY
Rapid Response Respiratory Therapy Note      Code Status: Prior   : 1960  Bed: 960/960 A:   MRN: [id number]  Time page Received: 1332   Time Rapid Response RT at Bedside: 1334  Time Rapid Response RT left Bedside: 1342  Report given to: Celina    SITUATION     Evaluated patient for: hypotension    BACKGROUND     Patient has a past medical history of Anxiety, Arthritis, and Hypertension.    ASSESSMENT/INTERVENTIONS     Upon arrival in room pt in trendelumburg position surrounded by RN and MDx1. Pt noted to have systolic in the 60s when previously 150s. Pt given bolus of fluid and BP resolved. Pt will be on maintenance fluids to maintain pressure.    Pulse: 75 Respiratory rate: 20 BP: BP: 117/75 SpO2:97  Level of Consciousness: Level of Consciousness (AVPU): alert  Respiratory Effort: Respiratory Effort: Unlabored  Expansion/Accessory Muscle Usage: Expansion/Accessory Muscles/Retractions: expansion symmetric, no retractions, no use of accessory muscles  All Lung Field Breath Sounds: All Lung Fields Breath Sounds: clear, equal bilaterally  O2 Device/Concentration: room air/21%  Most recent blood gas: No results for input(s): PH, PCO2, PO2, HCO3, POCSATURATED, BE, LACTATE in the last 72 hours.  Current Respiratory Care Orders:   20 193  Oxygen Continuous Continuous     Comments: Discontinue when Sp02 is greater than or equal to 95% of equal to Preop Sp02   References: Oxygen Titration Protocol   Question Answer Comment   Device type: Low flow    Device: Simple Face Mask    Titrate O2 per Oxygen Titration Protocol: Yes    Notify MD of: Inability to achieve desired SpO2        20 1929   20 1739  Incentive spirometry Every hour (31 of 151 released)    Release   Comments: While awake   References: [facility] Clinical Practice Guidelines    20 1741      IP Meds - Nasal and Inhaled  Comment  Hide  (From admission, onward)     Last edited by  on  at    Start   Stop Status Route Frequency Ordered    09/23/20 2100  mupirocin 2 % ointment    Discontinue    09/28 2059           NIPPV: No Surgical airway: No Vent: No  ETCO2 monitored:    Ambu at bedside: Ambu bag with the patient?: Yes, Adult Ambu    RECOMMENDATIONS   ?  We recommend: continue plan of care    ESCALATION      Discussed plan of care with primary RT, Celina.     FOLLOW-UP     Disposition: Remain in room 960.    Please call back the Rapid Response RT, Debbie Gonsalves, RT at x [phone] for any questions or concerns.

## 2020-09-24 NOTE — PLAN OF CARE
Problem: Adult Inpatient Plan of Care  Goal: Plan of Care Review  Outcome: Ongoing, Progressing  Flowsheets (Taken 9/24/2020 1758)  Plan of Care Reviewed With:   patient   spouse  Goal: Patient-Specific Goal (Individualization)  Outcome: Ongoing, Progressing  Flowsheets (Taken 9/24/2020 1758)  Individualized Care Needs:   Keep wife updated   loves football  Anxieties, Fears or Concerns: anxiety over low blood pressure and need for blood  Goal: Optimal Comfort and Wellbeing  Outcome: Ongoing, Progressing  Intervention: Provide Person-Centered Care  Flowsheets (Taken 9/24/2020 1758)  Trust Relationship/Rapport:   care explained   choices provided   emotional support provided   empathic listening provided   questions answered   questions encouraged   reassurance provided   thoughts/feelings acknowledged     Problem: Fall Injury Risk  Goal: Absence of Fall and Fall-Related Injury  Outcome: Ongoing, Progressing  Intervention: Identify and Manage Contributors to Fall Injury Risk  Flowsheets (Taken 9/24/2020 1758)  Self-Care Promotion:   independence encouraged   BADL personal routines maintained   safe use of adaptive equipment encouraged  Medication Review/Management:   medications reviewed   high risk medications identified   infusion initiated   provider consulted  Intervention: Promote Injury-Free Environment  Flowsheets (Taken 9/24/2020 1758)  Safety Promotion/Fall Prevention:   assistive device/personal item within reach   bed alarm set   Fall Risk reviewed with patient/family   commode/urinal/bedpan at bedside   high risk medications identified   lighting adjusted   medications reviewed   muscle strengthening facilitated   pulse ox   side rails raised x 3   toileting scheduled   supervised activity   nonskid shoes/socks when out of bed  Environmental Safety Modification:   assistive device/personal items within reach   clutter free environment maintained   lighting adjusted      Problem: Skin Injury Risk Increased  Goal: Skin Health and Integrity  Outcome: Ongoing, Progressing  Intervention: Optimize Skin Protection  Flowsheets (Taken 9/24/2020 1758)  Pressure Reduction Techniques:   frequent weight shift encouraged   weight shift assistance provided  Pressure Reduction Devices:   foam padding utilized   positioning supports utilized  Skin Protection:   adhesive use limited   electrode sites changed   incontinence pads utilized   pouching devices used   pulse oximeter probe site changed   transparent dressing maintained   tubing/devices free from skin contact  Head of Bed (HOB): HOB at 30 degrees  Intervention: Promote and Optimize Oral Intake  Flowsheets (Taken 9/24/2020 1758)  Oral Nutrition Promotion:   rest periods promoted   safe use of adaptive equipment encouraged       POC reviewed with Pt/wife and Pt/wife verbalized understanding of POC. All comments and concerns addressed. Pt progressing towards goals. Bed locked in lowest position with bed alarm set. Visimobile in place. Guadarrama removed this shift -- 225 ml urine output post removal via urinal. PIV x 1 infusing 1 unit of PRBCs. 2 ARIEL drain to bulb suction, 2 accordian drain to gravity, hemovac in place. Incision sites clean, dry, intact; transparent dressing in place. 1 L NS bolus given d/t hypotensive episode (see rapid response note for details) -- now on 100ml/h NS infusion. Encouraging PO fluid intake. Abdominal CT performed this shift, awaiting results. VS and assessment in flowsheets -- VS stable and neuro asx at baseline. Will continue to monitor for changes to POC and clinical condition.

## 2020-09-24 NOTE — ANESTHESIA POSTPROCEDURE EVALUATION
Anesthesia Post Evaluation    Patient: Jaspreet Walsh Jr.    Procedure(s) Performed: Procedure(s) (LRB):  FUSION, SPINE, LUMBAR L2-pelvis. Depuy. Plastic surgery closure w/ Dr. Bellamy (N/A)  CREATION, FLAP, MUSCLE ROTATION (N/A)    Final Anesthesia Type: general    Patient location during evaluation: PACU  Patient participation: Yes- Able to Participate  Level of consciousness: awake and alert and oriented  Post-procedure vital signs: reviewed and stable  Pain management: adequate  Airway patency: patent    PONV status at discharge: No PONV  Anesthetic complications: no      Cardiovascular status: hemodynamically stable and blood pressure returned to baseline  Respiratory status: room air, unassisted and spontaneous ventilation  Hydration status: euvolemic  Follow-up not needed.          Vitals Value Taken Time   /95 09/23/20 2102   Temp 36.3 °C (97.3 °F) 09/23/20 1955   Pulse 67 09/23/20 2109   Resp 17 09/23/20 2109   SpO2 99 % 09/23/20 2101   Vitals shown include unvalidated device data.      Event Time   Out of Recovery 20:45:00         Pain/Caro Score: Pain Rating Prior to Med Admin: 7 (9/23/2020  8:37 PM)  Pain Rating Post Med Admin: 0 (9/23/2020  8:15 PM)  Caro Score: 10 (9/23/2020  8:15 PM)

## 2020-09-24 NOTE — NURSING TRANSFER
Nursing Transfer Note      9/23/2020     Transfer to room 960    Transfer via bed    Transfer with belongings    Transported by nurse and pct    Medicines sent: iv fluids    Chart send with patient: yes    Notified: family    Patient reassessed at: 2045   17

## 2020-09-24 NOTE — PT/OT/SLP PROGRESS
Occupational Therapy      Patient Name:  Jaspreet Walsh Jr.   MRN:  [id number]    OT order for Evaluation and treatment received, chart review completed and session attempted.    Patient not seen today secondary to in the am -- awaiting TLSO, in the pm patient with low BP and rapid called, nursing hold on this date, patient not appropriate to participate with therapy. Will follow-up per schedule.    Social info: Pt lives with his wife in a 1 story home with 3 AHMET with L UE rail. Pt has a tub/shower combo with grab bar, low toilet, and no equipment. Pt was working prior to admit.     Shikha Linda, OT  9/24/2020

## 2020-09-24 NOTE — PROGRESS NOTES
[facility]  Plastic Surgery  Progress Note    Subjective:     History of Present Illness:  No notes on file    Post-Op Info:  Procedure(s) (LRB):  FUSION, SPINE, LUMBAR L2-pelvis. Depuy. Plastic surgery closure w/ Dr. Bellamy (N/A)  CREATION, FLAP, MUSCLE ROTATION (N/A)   1 Day Post-Op     Interval History: NAEON. Patient tolerating diet, no passage of flatus. Denies n/v/f/c. Otherwise doing well this AM.    Medications:  Continuous Infusions:   sodium chloride 0.9% 100 mL/hr at 09/24/20 0532     Scheduled Meds:   acyclovir  400 mg Oral BID    allopurinoL  300 mg Oral Daily    bisacodyL  10 mg Rectal Daily    carvediloL  25 mg Oral BID    ceFAZolin (ANCEF) IVPB  2 g Intravenous Q8H    cloNIDine  0.1 mg Oral Daily    levoFLOXacin  500 mg Oral Daily    losartan  50 mg Oral Daily    mupirocin   Nasal BID    NIFEdipine  60 mg Oral Daily    sulfamethoxazole-trimethoprim 400-80mg  1 tablet Oral Daily     PRN Meds:acetaminophen, aluminum-magnesium hydroxide-simethicone, diazePAM, morphine, ondansetron, oxyCODONE, promethazine (PHENERGAN) IVPB, senna-docusate 8.6-50 mg     Review of patient's allergies indicates:  No Known Allergies  Objective:     Vital Signs (Most Recent):  Temp: 98.6 °F (37 °C) (09/24/20 0330)  Pulse: 66 (09/24/20 0330)  Resp: 18 (09/24/20 0330)  BP: (!) 164/92 (09/24/20 0330)  SpO2: 97 % (09/24/20 0330) Vital Signs (24h Range):  Temp:  [97.3 °F (36.3 °C)-99.1 °F (37.3 °C)] 98.6 °F (37 °C)  Pulse:  [66-88] 66  Resp:  [14-20] 18  SpO2:  [95 %-100 %] 97 %  BP: (102-164)/(65-94) 164/92  Arterial Line BP: (116-152)/(66-81) 152/81     Weight: 97.3 kg (214 lb 8.1 oz)  Body mass index is 33.6 kg/m².    Intake/Output - Last 3 Shifts       09/22 0700 - 09/23 0659 09/23 0700 - 09/24 0659    I.V. (mL/kg)  2660 (27.3)    Total Intake(mL/kg)  2660 (27.3)    Urine (mL/kg/hr)  1575 (0.7)    Drains  257    Other  0    Stool  0    Blood  300    Total Output  2132    Net  +528           Stool Occurrence  0 x          Physical Exam  Constitutional:       General: He is not in acute distress.     Appearance: Normal appearance.   HENT:      Head: Normocephalic and atraumatic.      Right Ear: External ear normal.      Left Ear: External ear normal.      Nose: Nose normal.      Mouth/Throat:      Mouth: Mucous membranes are moist.   Eyes:      Extraocular Movements: Extraocular movements intact.   Neck:      Musculoskeletal: Normal range of motion.   Cardiovascular:      Rate and Rhythm: Normal rate.   Pulmonary:      Effort: Pulmonary effort is normal. No respiratory distress.   Abdominal:      General: There is no distension.      Palpations: Abdomen is soft.   Genitourinary:     Comments: Guadarrama catheter in place  Musculoskeletal:      Comments: 4 drains in back, all draining SS fluid. Incisional vac in place, good suction, no leak.    Skin:     General: Skin is warm and dry.   Neurological:      General: No focal deficit present.      Mental Status: He is alert and oriented to person, place, and time.   Psychiatric:         Mood and Affect: Mood normal.         Behavior: Behavior normal.         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  - continue incisional vac for at least 5 days  - continue to monitor and record drain output  - consider nutritional supplements with meals  - remainder of care per primary  - plastics to continue to follow        Kj Sapp MD  Plastic Surgery  [facility]

## 2020-09-24 NOTE — PLAN OF CARE
Problem: Adult Inpatient Plan of Care  Goal: Optimal Comfort and Wellbeing  9/24/2020 0400 by Radha Lima RN  Outcome: Ongoing, Progressing    Problem: Infection  Goal: Infection Symptom Resolution  9/24/2020 0400 by Radha Lima RN  Outcome: Ongoing, Progressing      Problem: Skin Injury Risk Increased  Goal: Skin Health and Integrity  9/24/2020 0400 by Radha Lima RN  Outcome: Ongoing, Progressing    Patient is AAO x4. POC reviewed with patient. Patient verbalized understanding. Patient's breathing is unlabored with equal chest expansion. Patient has an incision on midline back. Incision is covered with a dressing that is connected to a wound vac. Dressing is intact, dry, and clean. Patient has two accordion drains and two ARIEL drains. Patient denies any numbness and tingling. Patient remained free from falls. Patient rested well through shift. Bed in lowest position,bed alarm on, side rails up x3, no complaints or signs of distress. WCTM.  See flowsheets for full assessment and VS info. No acute occurrences happened throughout this shift.

## 2020-09-24 NOTE — ASSESSMENT & PLAN NOTE
Jaspreet Husseindmitri is a 60 year old male with a history of lytic spine lesions concerning for metastasis now s/p lumbar L2-pelvis fusion with muscle flaps for closure.  - continue incisional vac for at least 5 days  - continue to monitor and record drain output  - consider nutritional supplements with meals  - remainder of care per primary  - plastics to continue to follow

## 2020-09-24 NOTE — PROGRESS NOTES
[facility]  Neurosurgery  Progress Note    Subjective:     History of Present Illness: Patient is a 61 yo male with a PMH of HTN, anxiety with no known oncologic history who presented to the ED 9/14 c/o 1 month of back pain and muscle soreness to the lower extremities. He states that he has cramping pain to bilateral thighs and calves for about 1 month and back pain started 2 weeks ago without radiation to the midline lumbar back. Back pain is worse when sitting on the couch vs. Laying down, also somewhat worse when walking. He has no weakness in his legs and is ambulatory. No radicular component to his back pain.  No numbness or tingling in the lower extremities. No bowel or bladder incontinence. He has not had difficulty using his hand or coordinating movements of the upper extremities, no neck pain. Transferred to [facility] after CT L spine at OSH revealed lumbar-sacral mass lesion concerning for malignancy. Denies any blood thinner use.      Medical history significant for occasional smoking for about 2 years total, no drinking or illicit substances. No known cancers, did not receive a screening colonoscopy. Family history of 'bone cancer' in his father who passed away in 2016.      Patient underwent bone marrow biopsy on 9/15 and L4-5 bone biopsy 9/16 with final pathology of plasma cell neoplasm. He was discharged from the hospital on 9/18 with close follow-up arranged on 10/1 with hematology/oncology. Presents today for L2-iliac fusion with tumor resection.     Post-Op Info:  Procedure(s) (LRB):  FUSION, SPINE, LUMBAR L2-pelvis. Depuy. Plastic surgery closure w/ Dr. Bellamy (N/A)  CREATION, FLAP, MUSCLE ROTATION (N/A)   1 Day Post-Op     Interval History: ESMER. LAURE. Patient seen and examined this morning. Pain controlled with current regimen. Dc kaye today. Denies weakness, paresthesias, chest pain, SOB, calf tenderness. Hypotensive episode this afternoon after kaye removed. Patient reports  dizziness, lightheadedness, and paresthesias at the finger tips at the time of the episode. BP improved after bolus of fluids and cIVF. Repeat CBC showed continued drop in H/H, now 10/33. Patient remained neurologically stable without LOC. Tolerating PO. HV x 2, ARIEL x 2 in place.         Medications:  Continuous Infusions:   sodium chloride 0.9% 100 mL/hr at 09/24/20 1441     Scheduled Meds:   acyclovir  400 mg Oral BID    allopurinoL  300 mg Oral Daily    bisacodyL  10 mg Rectal Daily    calcium gluconate IVPB  500 mg Intravenous Once    carvediloL  25 mg Oral BID    ceFAZolin (ANCEF) IVPB  2 g Intravenous Q8H    cloNIDine  0.1 mg Oral Daily    heparin (porcine)  5,000 Units Subcutaneous Q8H    levoFLOXacin  500 mg Oral Daily    losartan  50 mg Oral Daily    mupirocin   Nasal BID    NIFEdipine  60 mg Oral Daily    sulfamethoxazole-trimethoprim 400-80mg  1 tablet Oral Daily     PRN Meds:acetaminophen, aluminum-magnesium hydroxide-simethicone, diazePAM, morphine, ondansetron, oxyCODONE, promethazine (PHENERGAN) IVPB, senna-docusate 8.6-50 mg       Objective:     Weight: 97.3 kg (214 lb 8.1 oz)  Body mass index is 33.6 kg/m².  Vital Signs (Most Recent):  Temp: 99.8 °F (37.7 °C) (09/24/20 1119)  Pulse: 83 (09/24/20 1454)  Resp: (!) 21 (09/24/20 1454)  BP: 123/86 (09/24/20 1454)  SpO2: 96 % (09/24/20 1454) Vital Signs (24h Range):  Temp:  [97.3 °F (36.3 °C)-99.8 °F (37.7 °C)] 99.8 °F (37.7 °C)  Pulse:  [66-91] 83  Resp:  [14-21] 21  SpO2:  [94 %-100 %] 96 %  BP: ()/(45-92) 123/86  Arterial Line BP: (116-152)/(66-81) 152/81     Date 09/24/20 0700 - 09/25/20 0659   Shift 0523-4551 0689-2083 7859-1772 24 Hour Total   INTAKE   P.O. 595   595   IV Piggyback 1000   1000   Shift Total(mL/kg) 1595(16.4)   1595(16.4)   OUTPUT   Urine(mL/kg/hr) 500(0.6)   500   Drains 80   80   Shift Total(mL/kg) 580(6)   580(6)   Weight (kg) 97.3 97.3 97.3 97.3                        Closed/Suction Drain 09/23/20 1724 Left  Back Accordion 10 Fr. (Active)   Site Description Healing 09/24/20 0400   Dressing Type No biopatch (patient < 2 mos) 09/24/20 0400   Dressing Status Other (Comment) 09/24/20 0400   Drainage Serosanguineous 09/24/20 0400   Status Open to gravity drainage 09/24/20 0400   Output (mL) 0 mL 09/24/20 0557            Closed/Suction Drain 09/23/20 1726 Right Back Accordion 10 Fr. (Active)   Site Description Healing 09/24/20 0400   Dressing Type No biopatch (patient < 2 mos) 09/24/20 0400   Dressing Status Other (Comment) 09/24/20 0400   Drainage Serosanguineous 09/24/20 0400   Status Open to gravity drainage 09/24/20 0400   Output (mL) 20 mL 09/24/20 0557            Closed/Suction Drain 09/23/20 1758 Back Bulb 15 Fr. (Active)   Site Description Healing 09/24/20 0400   Dressing Type No biopatch (patient < 2 mos) 09/24/20 0400   Dressing Status Other (Comment) 09/24/20 0400   Drainage Serosanguineous 09/24/20 0400   Status To bulb suction 09/24/20 0400   Output (mL) 60 mL 09/24/20 1200            Closed/Suction Drain 09/23/20 1758 Back Bulb 15 Fr. (Active)   Site Description Healing 09/24/20 0400   Dressing Type No biopatch (patient < 2 mos) 09/24/20 0400   Dressing Status Other (Comment) 09/24/20 0400   Drainage Serosanguineous 09/24/20 0400   Status To bulb suction 09/24/20 0400   Output (mL) 20 mL 09/24/20 1200       Neurosurgery Physical Exam    General: well developed, well nourished, no distress.   Head: normocephalic, atraumatic  Neck: No tracheal deviation. No palpable masses.   Neurologic: Alert and oriented. Thought content appropriate.  GCS: Motor: 6/Verbal: 5/Eyes: 4 GCS Total: 15  Mental Status: Awake, Alert, Oriented x 4  Language: No aphasia  Speech: No dysarthria  Cranial nerves: face symmetric, tongue midline, CN II-XII grossly intact.   Eyes: pupils equal, round, reactive to light with accomodation, EOMI.   Ears: No drainage.   Pulmonary: normal respirations, no signs of respiratory distress  Abdomen: soft,  non-distended, not tender to palpation  Sensory: intact to light touch throughout  Motor Strength: Moves all extremities spontaneously with good tone.  Full strength upper and lower extremities. No abnormal movements seen.     Strength  Deltoids Triceps Biceps Wrist Extension Wrist Flexion Hand    Upper: R 5/5 5/5 5/5 5/5 5/5 5/5    L 5/5 5/5 5/5 5/5 5/5 5/5     Iliopsoas Quadriceps Knee  Flexion Tibialis  anterior Gastro- cnemius EHL   Lower: R 5/5 5/5 5/5 5/5 5/5 5/5    L 5/5 5/5 5/5 5/5 5/5 5/5     Clonus: absent  Vascular: No LE edema.   Skin: Skin is warm, dry and intact.    Incisional wound vac in place with good seal. HV x 2, ARIEL x 2 in place.      Significant Labs:  Recent Labs   Lab 09/23/20  1130 09/24/20  0550   * 145*    133*   K 4.1 5.1    103   CO2 23 23   BUN 34* 23   CREATININE 1.2 1.0   CALCIUM 9.3 7.7*     Recent Labs   Lab 09/23/20  1130 09/24/20  0550 09/24/20  1400   WBC 8.21 12.21 10.29   HGB 15.1 12.1* 10.7*   HCT 47.1 36.7* 33.6*    179 144*     Recent Labs   Lab 09/23/20  1130   INR 1.1   APTT 21.1     Microbiology Results (last 7 days)     ** No results found for the last 168 hours. **        Recent Lab Results       09/24/20  1400   09/24/20  1332   09/24/20  1007   09/24/20  0550        Albumin     2.5       Anion Gap       7     Baso # 0.01     0.01     Basophil% 0.1     0.1     BUN, Bld       23     Calcium       7.7     Calcium, Ion     1.13       Chloride       103     CO2       23     Creatinine       1.0     Differential Method Automated     Automated     eGFR if        >60.0     eGFR if non        >60.0  Comment:  Calculation used to obtain the estimated glomerular filtration  rate (eGFR) is the CKD-EPI equation.        Eos # 0.1     0.0     Eosinophil% 1.2     0.2     Glucose       145     Gran # (ANC) 7.5     9.6     Gran% 72.5     78.3     Hematocrit 33.6     36.7     Hemoglobin 10.7     12.1     Immature Grans (Abs)  0.17  Comment:  Mild elevation in immature granulocytes is non specific and   can be seen in a variety of conditions including stress response,   acute inflammation, trauma and pregnancy. Correlation with other   laboratory and clinical findings is essential.       0.14  Comment:  Mild elevation in immature granulocytes is non specific and   can be seen in a variety of conditions including stress response,   acute inflammation, trauma and pregnancy. Correlation with other   laboratory and clinical findings is essential.       Immature Granulocytes 1.7     1.1     Lymph # 1.7     1.8     Lymph% 16.3     14.6     MCH 30.1     30.5     MCHC 31.8     33.0     MCV 94     92     Mono # 0.8     0.7     Mono% 8.2     5.7     MPV 10.5     10.3     nRBC 0     0     Platelets 144     179     POCT Glucose   153         Potassium       5.1     RBC 3.56     3.97     RDW 12.9     13.0     Sodium       133     WBC 10.29     12.21         All pertinent labs from the last 24 hours have been reviewed.    Significant Diagnostics:  I have reviewed all pertinent imaging results/findings within the past 24 hours.    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    - Neurologically stable  - Hypotensive episode this afternoon after kaye removed. Patient reports dizziness, lightheadedness, and paresthesias at the finger tips at the time of the episode. BP improved after bolus of fluids and cIVF. Repeat CBC showed continued drop in H/H, now 10/33. No significant output from drains. Will transfuse 1 Unit RBC. F/u stat CT abdomen/pelvis to r/o hemorrhage.   - Corrected calcium 8.9, continue to monitor.   - Pain controlled with current regimen.   - Wound vac per plastic surgery.  - Drains: Continue HV x 2. ARIEL drains per plastic surgery.  - Post op imaging showing satisfactory placement of  hardware.   - LSO brace when up/OOB.  - Continue PT/OT/OOB. OOB > 6hrs/day  - GI: Diet as tolerated. Continue bowel regimen.   - Guadarrama removed, f/u spontaneous voiding  - DVT ppx: SQH, Compression stockings, SCDs  - Atelectasis ppx: IS at bedside. Encourage use every hour while awake.    - Discussed with Dr. Coyle     - Dispo: Pending therapy recs and medical stability               Angela Giles PA-C  Neurosurgery  [facility]

## 2020-09-24 NOTE — SUBJECTIVE & OBJECTIVE
Interval History: NAEON. AFVSS. Patient seen and examined this morning. Pain controlled with current regimen. Dc kaye today. Denies weakness, paresthesias, chest pain, SOB, calf tenderness. Hypotensive episode this afternoon after kaye removed. Patient reports dizziness, lightheadedness, and paresthesias at the finger tips at the time of the episode. BP improved after bolus of fluids and cIVF. Repeat CBC showed continued drop in H/H, now 10/33. Patient remained neurologically stable without LOC. Tolerating PO. HV x 2, ARIEL x 2 in place.         Medications:  Continuous Infusions:   sodium chloride 0.9% 100 mL/hr at 09/24/20 1441     Scheduled Meds:   acyclovir  400 mg Oral BID    allopurinoL  300 mg Oral Daily    bisacodyL  10 mg Rectal Daily    calcium gluconate IVPB  500 mg Intravenous Once    carvediloL  25 mg Oral BID    ceFAZolin (ANCEF) IVPB  2 g Intravenous Q8H    cloNIDine  0.1 mg Oral Daily    heparin (porcine)  5,000 Units Subcutaneous Q8H    levoFLOXacin  500 mg Oral Daily    losartan  50 mg Oral Daily    mupirocin   Nasal BID    NIFEdipine  60 mg Oral Daily    sulfamethoxazole-trimethoprim 400-80mg  1 tablet Oral Daily     PRN Meds:acetaminophen, aluminum-magnesium hydroxide-simethicone, diazePAM, morphine, ondansetron, oxyCODONE, promethazine (PHENERGAN) IVPB, senna-docusate 8.6-50 mg       Objective:     Weight: 97.3 kg (214 lb 8.1 oz)  Body mass index is 33.6 kg/m².  Vital Signs (Most Recent):  Temp: 99.8 °F (37.7 °C) (09/24/20 1119)  Pulse: 83 (09/24/20 1454)  Resp: (!) 21 (09/24/20 1454)  BP: 123/86 (09/24/20 1454)  SpO2: 96 % (09/24/20 1454) Vital Signs (24h Range):  Temp:  [97.3 °F (36.3 °C)-99.8 °F (37.7 °C)] 99.8 °F (37.7 °C)  Pulse:  [66-91] 83  Resp:  [14-21] 21  SpO2:  [94 %-100 %] 96 %  BP: ()/(45-92) 123/86  Arterial Line BP: (116-152)/(66-81) 152/81     Date 09/24/20 0700 - 09/25/20 0659   Shift 9530-4435 9996-7724 4575-4583 24 Hour Total   INTAKE   P.O. 595   595   IV  Piggyback 1000   1000   Shift Total(mL/kg) 1595(16.4)   1595(16.4)   OUTPUT   Urine(mL/kg/hr) 500(0.6)   500   Drains 80   80   Shift Total(mL/kg) 580(6)   580(6)   Weight (kg) 97.3 97.3 97.3 97.3                        Closed/Suction Drain 09/23/20 1724 Left Back Accordion 10 Fr. (Active)   Site Description Healing 09/24/20 0400   Dressing Type No biopatch (patient < 2 mos) 09/24/20 0400   Dressing Status Other (Comment) 09/24/20 0400   Drainage Serosanguineous 09/24/20 0400   Status Open to gravity drainage 09/24/20 0400   Output (mL) 0 mL 09/24/20 0557            Closed/Suction Drain 09/23/20 1726 Right Back Accordion 10 Fr. (Active)   Site Description Healing 09/24/20 0400   Dressing Type No biopatch (patient < 2 mos) 09/24/20 0400   Dressing Status Other (Comment) 09/24/20 0400   Drainage Serosanguineous 09/24/20 0400   Status Open to gravity drainage 09/24/20 0400   Output (mL) 20 mL 09/24/20 0557            Closed/Suction Drain 09/23/20 1758 Back Bulb 15 Fr. (Active)   Site Description Healing 09/24/20 0400   Dressing Type No biopatch (patient < 2 mos) 09/24/20 0400   Dressing Status Other (Comment) 09/24/20 0400   Drainage Serosanguineous 09/24/20 0400   Status To bulb suction 09/24/20 0400   Output (mL) 60 mL 09/24/20 1200            Closed/Suction Drain 09/23/20 1758 Back Bulb 15 Fr. (Active)   Site Description Healing 09/24/20 0400   Dressing Type No biopatch (patient < 2 mos) 09/24/20 0400   Dressing Status Other (Comment) 09/24/20 0400   Drainage Serosanguineous 09/24/20 0400   Status To bulb suction 09/24/20 0400   Output (mL) 20 mL 09/24/20 1200       Neurosurgery Physical Exam    General: well developed, well nourished, no distress.   Head: normocephalic, atraumatic  Neck: No tracheal deviation. No palpable masses.   Neurologic: Alert and oriented. Thought content appropriate.  GCS: Motor: 6/Verbal: 5/Eyes: 4 GCS Total: 15  Mental Status: Awake, Alert, Oriented x 4  Language: No aphasia  Speech: No  dysarthria  Cranial nerves: face symmetric, tongue midline, CN II-XII grossly intact.   Eyes: pupils equal, round, reactive to light with accomodation, EOMI.   Ears: No drainage.   Pulmonary: normal respirations, no signs of respiratory distress  Abdomen: soft, non-distended, not tender to palpation  Sensory: intact to light touch throughout  Motor Strength: Moves all extremities spontaneously with good tone.  Full strength upper and lower extremities. No abnormal movements seen.     Strength  Deltoids Triceps Biceps Wrist Extension Wrist Flexion Hand    Upper: R 5/5 5/5 5/5 5/5 5/5 5/5    L 5/5 5/5 5/5 5/5 5/5 5/5     Iliopsoas Quadriceps Knee  Flexion Tibialis  anterior Gastro- cnemius EHL   Lower: R 5/5 5/5 5/5 5/5 5/5 5/5    L 5/5 5/5 5/5 5/5 5/5 5/5     Clonus: absent  Vascular: No LE edema.   Skin: Skin is warm, dry and intact.    Incisional wound vac in place with good seal. HV x 2, ARIEL x 2 in place.      Significant Labs:  Recent Labs   Lab 09/23/20  1130 09/24/20  0550   * 145*    133*   K 4.1 5.1    103   CO2 23 23   BUN 34* 23   CREATININE 1.2 1.0   CALCIUM 9.3 7.7*     Recent Labs   Lab 09/23/20  1130 09/24/20  0550 09/24/20  1400   WBC 8.21 12.21 10.29   HGB 15.1 12.1* 10.7*   HCT 47.1 36.7* 33.6*    179 144*     Recent Labs   Lab 09/23/20  1130   INR 1.1   APTT 21.1     Microbiology Results (last 7 days)     ** No results found for the last 168 hours. **        Recent Lab Results       09/24/20  1400   09/24/20  1332   09/24/20  1007   09/24/20  0550        Albumin     2.5       Anion Gap       7     Baso # 0.01     0.01     Basophil% 0.1     0.1     BUN, Bld       23     Calcium       7.7     Calcium, Ion     1.13       Chloride       103     CO2       23     Creatinine       1.0     Differential Method Automated     Automated     eGFR if        >60.0     eGFR if non        >60.0  Comment:  Calculation used to obtain the estimated glomerular  filtration  rate (eGFR) is the CKD-EPI equation.        Eos # 0.1     0.0     Eosinophil% 1.2     0.2     Glucose       145     Gran # (ANC) 7.5     9.6     Gran% 72.5     78.3     Hematocrit 33.6     36.7     Hemoglobin 10.7     12.1     Immature Grans (Abs) 0.17  Comment:  Mild elevation in immature granulocytes is non specific and   can be seen in a variety of conditions including stress response,   acute inflammation, trauma and pregnancy. Correlation with other   laboratory and clinical findings is essential.       0.14  Comment:  Mild elevation in immature granulocytes is non specific and   can be seen in a variety of conditions including stress response,   acute inflammation, trauma and pregnancy. Correlation with other   laboratory and clinical findings is essential.       Immature Granulocytes 1.7     1.1     Lymph # 1.7     1.8     Lymph% 16.3     14.6     MCH 30.1     30.5     MCHC 31.8     33.0     MCV 94     92     Mono # 0.8     0.7     Mono% 8.2     5.7     MPV 10.5     10.3     nRBC 0     0     Platelets 144     179     POCT Glucose   153         Potassium       5.1     RBC 3.56     3.97     RDW 12.9     13.0     Sodium       133     WBC 10.29     12.21         All pertinent labs from the last 24 hours have been reviewed.    Significant Diagnostics:  I have reviewed all pertinent imaging results/findings within the past 24 hours.

## 2020-09-24 NOTE — HOSPITAL COURSE
9/24: NAEON. AFVSS. Patient seen and examined this morning. Pain controlled with current regimen. Dc kaye today. Denies weakness, paresthesias, chest pain, SOB. Hypotensive episode this afternoon after kaye removed. Patient reports dizziness, lightheadedness, and paresthesias at the finger tips at the time of the episode. BP improved after bolus of fluids and cIVF. Repeat CBC showed continued drop in H/H, now 10/33. Patient remained neurologically stable without LOC.  9/25: NAEON. AFVSS. BP stable, home BP medications held this morning. Dc cIVF. Patient sitting up in chair in no acute distress. Denies weakness, numbness, paresthesias, headache, dizziness, lightheadedness. Pain controlled with current regimen. Voiding s/p kaye removal.   9/26: NAEON. Patient tolerating diet, no passage of flatus. Denies n/v/f/c. Otherwise doing well this AM.  9/27: NAEON. BM today. Drains in place with provena.   9/28: NAEON. Prevena discontinued by Plastics and replaced with Aquacel dressing. R HV drain removed without complication. ARIEL drains remain in place per plastics. H/H with gradual drop, patient denies SOB, chest pain, or dizziness. Iron studies reveal iron deficiency anemia. WBC down trending, leukocytosis work up negative remains afebrile.  Patient to follow up with PCP in one week. Discharge instructions reviewed with patient. He voiced understanding. All of his questions were answered.

## 2020-09-24 NOTE — ASSESSMENT & PLAN NOTE
Patient is a 59 yo male with a PMH of HTN, anxiety, and recently diagnosed multiple myeloma now s/p L2-iliac fusion with tumor resection.    - Neurologically stable  - Hypotensive episode this afternoon after kaye removed. Patient reports dizziness, lightheadedness, and paresthesias at the finger tips at the time of the episode. BP improved after bolus of fluids and cIVF. Repeat CBC showed continued drop in H/H, now 10/33. No significant output from drains. Will transfuse 1 Unit RBC. F/u stat CT abdomen/pelvis to r/o hemorrhage.   - Corrected calcium 8.9, continue to monitor.   - Pain controlled with current regimen.   - Wound vac per plastic surgery.  - Drains: Continue HV x 2. ARIEL drains per plastic surgery.  - Post op imaging showing satisfactory placement of hardware.   - LSO brace when up/OOB.  - Continue PT/OT/OOB. OOB > 6hrs/day  - GI: Diet as tolerated. Continue bowel regimen.   - Kaye removed, f/u spontaneous voiding  - DVT ppx: SQH, Compression stockings, SCDs  - Atelectasis ppx: IS at bedside. Encourage use every hour while awake.    - Discussed with Dr. Coyle     - Dispo: Pending therapy recs and medical stability

## 2020-09-24 NOTE — CODE/ RAPID DOCUMENTATION
"RAPID RESPONSE NURSE NOTE     Admit Date: 2020  LOS: 1  Code Status: Prior   Date of Consult: 2020  : 1960  Age: 60 y.o.  Weight:   Wt Readings from Last 1 Encounters:   20 97.3 kg (214 lb 8.1 oz)     Sex: male  Race: Black or    Bed: [address] [place] [address] A:   MRN: [id number]  Time Rapid Response Team page Received: 1331  Time Rapid Response Team at Bedside: 1335  Time Rapid Response Team left Bedside: 1355  Was the patient discharged from an ICU this admission?   no  Was the patient discharged from a PACU within last 24 hours?  yes  Did the patient receive conscious sedation/general anesthesia within last 24 hours?  yes  Was the patient in the ED within the past 24 hours?  no  Was the patient started on NIPPV within the past 24 hours?  no  Did this progress into an ARC or CPA:  no  Attending Physician: Nick Coyle MD  Primary Service: Networked reference to record PCT   Consult Requested By: Nick Coyle MD     SITUATION     Notified by code pager.  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    ASSESSMENT/INTERVENTIONS     /74 (BP Location: Left arm, Patient Position: Lying)   Pulse 83   Temp 99.4 °F (37.4 °C) (Oral)   Resp 18   Ht 5' 7" (1.702 m)   Wt 97.3 kg (214 lb 8.1 oz)   SpO2 (!) 91%   BMI 33.60 kg/m²     What did you find: Pt lying in reverse trendelenburg, AAOX4, calm ad cooperative. BP 66/45 before my arrival. Pt also symptomatic w/ c/o dizzinessDr Gaitan @ bedside. MD ordered 1 Liter NS bolus-currently infusing.  No evidence of bleeding, all drains intact and insertion site clean, dry, intact. CBC, BMP, and Mag/phos labs ordered. BP improving @ 90/50's after fluids started.     RECOMMENDATIONS     We recommend:   Continue fluid bolus  Monitor VS and BP very " closely.   Monitor Neuro status closely.      FOLLOW-UP/CONTINGENCY PLAN     Call the Rapid Response Nurse, Vani Andrea RN at x [phone] for additional questions or concerns.    PHYSICIAN ESCALATION     Orders received and case discussed with Dr Gaitan    Disposition: Remain in room 960.

## 2020-09-24 NOTE — TELEPHONE ENCOUNTER
DOCUMENTATION ONLY:  Prior authorization for Revlimid approved from 9/23/2020 to 9/23/2021.  Case ID# [id number]    Co-pay: estimate $45.00    Patient Assistance IS required.     Forward to patient assistance for review.      [facility]   [phone] (Phone)  [phone] (Fax)

## 2020-09-25 LAB
ANION GAP SERPL CALC-SCNC: 5 MMOL/L (ref 8–16)
BASOPHILS # BLD AUTO: 0.01 K/UL (ref 0–0.2)
BASOPHILS NFR BLD: 0.1 % (ref 0–1.9)
BUN SERPL-MCNC: 15 MG/DL (ref 8–23)
CALCIUM SERPL-MCNC: 7.6 MG/DL (ref 8.7–10.5)
CHLORIDE SERPL-SCNC: 104 MMOL/L (ref 95–110)
CO2 SERPL-SCNC: 23 MMOL/L (ref 23–29)
CREAT SERPL-MCNC: 0.8 MG/DL (ref 0.5–1.4)
DIFFERENTIAL METHOD: ABNORMAL
EOSINOPHIL # BLD AUTO: 0.2 K/UL (ref 0–0.5)
EOSINOPHIL NFR BLD: 1.4 % (ref 0–8)
ERYTHROCYTE [DISTWIDTH] IN BLOOD BY AUTOMATED COUNT: 13.5 % (ref 11.5–14.5)
EST. GFR  (AFRICAN AMERICAN): >60 ML/MIN/1.73 M^2
EST. GFR  (NON AFRICAN AMERICAN): >60 ML/MIN/1.73 M^2
GLUCOSE SERPL-MCNC: 120 MG/DL (ref 70–110)
HCT VFR BLD AUTO: 34.6 % (ref 40–54)
HGB BLD-MCNC: 11.2 G/DL (ref 14–18)
IMM GRANULOCYTES # BLD AUTO: 0.27 K/UL (ref 0–0.04)
IMM GRANULOCYTES NFR BLD AUTO: 1.8 % (ref 0–0.5)
LYMPHOCYTES # BLD AUTO: 2.4 K/UL (ref 1–4.8)
LYMPHOCYTES NFR BLD: 15.6 % (ref 18–48)
MCH RBC QN AUTO: 30.1 PG (ref 27–31)
MCHC RBC AUTO-ENTMCNC: 32.4 G/DL (ref 32–36)
MCV RBC AUTO: 93 FL (ref 82–98)
MONOCYTES # BLD AUTO: 1.5 K/UL (ref 0.3–1)
MONOCYTES NFR BLD: 9.9 % (ref 4–15)
NEUTROPHILS # BLD AUTO: 10.9 K/UL (ref 1.8–7.7)
NEUTROPHILS NFR BLD: 71.2 % (ref 38–73)
NRBC BLD-RTO: 0 /100 WBC
OSMOLALITY SERPL: 289 MOSM/KG (ref 280–300)
OSMOLALITY UR: 663 MOSM/KG (ref 50–1200)
PLATELET # BLD AUTO: 151 K/UL (ref 150–350)
PMV BLD AUTO: 10.4 FL (ref 9.2–12.9)
POTASSIUM SERPL-SCNC: 4.4 MMOL/L (ref 3.5–5.1)
RBC # BLD AUTO: 3.72 M/UL (ref 4.6–6.2)
SODIUM SERPL-SCNC: 132 MMOL/L (ref 136–145)
SODIUM UR-SCNC: 89 MMOL/L (ref 20–250)
WBC # BLD AUTO: 15.25 K/UL (ref 3.9–12.7)

## 2020-09-25 PROCEDURE — 25000003 PHARM REV CODE 250: Performed by: STUDENT IN AN ORGANIZED HEALTH CARE EDUCATION/TRAINING PROGRAM

## 2020-09-25 PROCEDURE — 36415 COLL VENOUS BLD VENIPUNCTURE: CPT

## 2020-09-25 PROCEDURE — 11000001 HC ACUTE MED/SURG PRIVATE ROOM

## 2020-09-25 PROCEDURE — 85025 COMPLETE CBC W/AUTO DIFF WBC: CPT

## 2020-09-25 PROCEDURE — 63600175 PHARM REV CODE 636 W HCPCS: Performed by: PHYSICIAN ASSISTANT

## 2020-09-25 PROCEDURE — 99024 PR POST-OP FOLLOW-UP VISIT: ICD-10-PCS | Mod: ,,, | Performed by: PHYSICIAN ASSISTANT

## 2020-09-25 PROCEDURE — 63600175 PHARM REV CODE 636 W HCPCS: Performed by: STUDENT IN AN ORGANIZED HEALTH CARE EDUCATION/TRAINING PROGRAM

## 2020-09-25 PROCEDURE — 97165 OT EVAL LOW COMPLEX 30 MIN: CPT

## 2020-09-25 PROCEDURE — 97116 GAIT TRAINING THERAPY: CPT

## 2020-09-25 PROCEDURE — 84300 ASSAY OF URINE SODIUM: CPT

## 2020-09-25 PROCEDURE — 97161 PT EVAL LOW COMPLEX 20 MIN: CPT

## 2020-09-25 PROCEDURE — 83930 ASSAY OF BLOOD OSMOLALITY: CPT

## 2020-09-25 PROCEDURE — 97535 SELF CARE MNGMENT TRAINING: CPT

## 2020-09-25 PROCEDURE — 99024 POSTOP FOLLOW-UP VISIT: CPT | Mod: ,,, | Performed by: PHYSICIAN ASSISTANT

## 2020-09-25 PROCEDURE — 25000003 PHARM REV CODE 250: Performed by: PHYSICIAN ASSISTANT

## 2020-09-25 PROCEDURE — 80048 BASIC METABOLIC PNL TOTAL CA: CPT

## 2020-09-25 PROCEDURE — 83935 ASSAY OF URINE OSMOLALITY: CPT

## 2020-09-25 RX ORDER — DIAZEPAM 5 MG/1
5 TABLET ORAL EVERY 8 HOURS PRN
Qty: 30 TABLET | Refills: 0 | Status: SHIPPED | OUTPATIENT
Start: 2020-09-25 | End: 2020-10-19 | Stop reason: SDUPTHER

## 2020-09-25 RX ORDER — AMOXICILLIN 250 MG
1 CAPSULE ORAL 2 TIMES DAILY
Status: DISCONTINUED | OUTPATIENT
Start: 2020-09-25 | End: 2020-09-28 | Stop reason: HOSPADM

## 2020-09-25 RX ORDER — CALCIUM CARBONATE 500(1250)
500 TABLET ORAL
Status: COMPLETED | OUTPATIENT
Start: 2020-09-25 | End: 2020-09-26

## 2020-09-25 RX ORDER — OXYCODONE HYDROCHLORIDE 10 MG/1
10 TABLET ORAL EVERY 6 HOURS PRN
Qty: 50 TABLET | Refills: 0 | Status: SHIPPED | OUTPATIENT
Start: 2020-09-25 | End: 2020-10-20

## 2020-09-25 RX ORDER — PANTOPRAZOLE SODIUM 40 MG/1
40 TABLET, DELAYED RELEASE ORAL DAILY
Status: DISCONTINUED | OUTPATIENT
Start: 2020-09-25 | End: 2020-09-28 | Stop reason: HOSPADM

## 2020-09-25 RX ORDER — CALCIUM CARBONATE 500(1250)
500 TABLET ORAL
Status: DISCONTINUED | OUTPATIENT
Start: 2020-09-25 | End: 2020-09-25

## 2020-09-25 RX ADMIN — SULFAMETHOXAZOLE AND TRIMETHOPRIM 1 TABLET: 400; 80 TABLET ORAL at 08:09

## 2020-09-25 RX ADMIN — ACETAMINOPHEN 650 MG: 325 TABLET ORAL at 04:09

## 2020-09-25 RX ADMIN — DOCUSATE SODIUM 50MG AND SENNOSIDES 8.6MG 1 TABLET: 8.6; 5 TABLET, FILM COATED ORAL at 08:09

## 2020-09-25 RX ADMIN — CEFAZOLIN 2 G: 1 INJECTION, POWDER, FOR SOLUTION INTRAMUSCULAR; INTRAVENOUS at 09:09

## 2020-09-25 RX ADMIN — PANTOPRAZOLE SODIUM 40 MG: 40 TABLET, DELAYED RELEASE ORAL at 08:09

## 2020-09-25 RX ADMIN — CARVEDILOL 25 MG: 25 TABLET, FILM COATED ORAL at 08:09

## 2020-09-25 RX ADMIN — CEFAZOLIN 2 G: 1 INJECTION, POWDER, FOR SOLUTION INTRAMUSCULAR; INTRAVENOUS at 02:09

## 2020-09-25 RX ADMIN — CEFAZOLIN 2 G: 1 INJECTION, POWDER, FOR SOLUTION INTRAMUSCULAR; INTRAVENOUS at 06:09

## 2020-09-25 RX ADMIN — ACYCLOVIR 400 MG: 200 CAPSULE ORAL at 08:09

## 2020-09-25 RX ADMIN — ALLOPURINOL 300 MG: 100 TABLET ORAL at 08:09

## 2020-09-25 RX ADMIN — HEPARIN SODIUM 5000 UNITS: 5000 INJECTION INTRAVENOUS; SUBCUTANEOUS at 02:09

## 2020-09-25 RX ADMIN — HEPARIN SODIUM 5000 UNITS: 5000 INJECTION INTRAVENOUS; SUBCUTANEOUS at 08:09

## 2020-09-25 RX ADMIN — MUPIROCIN: 20 OINTMENT TOPICAL at 08:09

## 2020-09-25 RX ADMIN — CALCIUM 500 MG: 500 TABLET ORAL at 11:09

## 2020-09-25 RX ADMIN — CALCIUM 500 MG: 500 TABLET ORAL at 04:09

## 2020-09-25 RX ADMIN — LEVOFLOXACIN 500 MG: 500 TABLET, FILM COATED ORAL at 08:09

## 2020-09-25 NOTE — PLAN OF CARE
Problem: Occupational Therapy Goal  Goal: Occupational Therapy Goal  Description: Goals to be met by: 10/25/2020    Patient will increase functional independence with ADLs by performing:    UE Dressing with Set-up Assistance assist for TSLO following Patient/caregiver training.  LE Dressing with Stand-by Assistance.  Grooming while standing at sink with Set-up Assistance.  Toileting from toilet with Stand-by Assistance for hygiene and clothing management.   Bathing from  standing at sink with Set-up Assistance UB and evie care sponge  Toilet transfer to toilet with Stand-by Assistance.    Outcome: Ongoing, Progressing

## 2020-09-25 NOTE — PT/OT/SLP EVAL
Physical Therapy Evaluation and treatment    Patient Name:  Jaspreet Walsh Jr.   MRN:  [id number]    Recommendations:     Discharge Recommendations:  (home no needs)   Discharge Equipment Recommendations: none   Barriers to discharge: None    Assessment:     Jaspreet Walsh Jr. is a 60 y.o. 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  He presents with the following impairments/functional limitations:  impaired endurance, impaired functional mobilty, gait instability pt wendy treatment well and will benefit from skilled PT 4x/wk to progress physically. Pt will be able to discharge home with no needs when medically stable. Pt will be able to discharge home when medically stable with no needs. Pt is s/p fusion L2-pelvis, creation muscle flap 9/23/20.    Rehab Prognosis: Good; patient would benefit from acute skilled PT services to address these deficits and reach maximum level of function.    Recent Surgery: Procedure(s) (LRB):  FUSION, SPINE, LUMBAR L2-pelvis. Depuy. Plastic surgery closure w/ Dr. Bellamy (N/A)  CREATION, FLAP, MUSCLE ROTATION (N/A) 2 Days Post-Op    Plan:     During this hospitalization, patient to be seen 4 x/week to address the identified rehab impairments via gait training, therapeutic activities and progress toward the following goals:    · Plan of Care Expires:  10/22/20    Subjective     Chief Complaint: pt c/o slight pain during treatment.   Patient/Family Comments/goals: to get better and go home.   Pain/Comfort:  · Pain Rating 1: 0/10  · Pain Rating Post-Intervention 1: 0/10    Patients cultural, spiritual, Denominational conflicts given the current situation:  none    Living Environment:  Pt works full-time maintenance for [employer]. Pt lives with his wife who is homemaker.   Prior to admission, patients level of function was Independent .  Equipment used at home: none.  DME owned (not currently used): none.  Upon discharge, patient will have  assistance from wife.    Objective:     Communicated with nurse prior to session.  Patient found supine with telemetry, pulse ox (continuous), blood pressure cuff, hemovac, ARIEL drain, wound vac, SCD(hep lock IV)  upon PT entry to room.    General Precautions: Standard, fall   Orthopedic Precautions:    Braces: TLSO     Exams:  · Cognitive Exam:  Patient is oriented to Person, Place, Time and Situation  · RLE ROM: WFL  · RLE Strength: WFL  · LLE ROM: WFL  · LLE Strength: WFL    Functional Mobility:  · Bed Mobility:   Pt received verbal cues for hand placement and sequencing for log rolling.   · Rolling Left:  contact guard assistance  · Supine to Sit: contact guard assistance  ·   · Transfers:     · Sit to Stand:  contact guard assistance with hand-held assist. Pt was total assist to don TLSO.   ·   · Gait: pt received gait training  ~ 120 ft with TSLO brace, RW, CGA and mask on.     Therapeutic Activities and Exercises:   pt received verbal cues for role of PT and POC. Pt verbalized understanding of such.     AM-PAC 6 CLICK MOBILITY  Total Score:16     Patient left up in chair with all lines intact and call button in reach.    GOALS:   Multidisciplinary Problems     Physical Therapy Goals        Problem: Physical Therapy Goal    Goal Priority Disciplines Outcome Goal Variances Interventions   Physical Therapy Goal     PT, PT/OT Ongoing, Progressing     Description: Goals to be met by: 10/2/20    Patient will increase functional independence with mobility by performin. Supine to sit with Stand by Assistance -not met  2. Sit to stand transfer with Supervision -not met  3. Gait  x 250 feet with Supervision using AD if needed. -not met  4. Ascend/descend 3 stair with left Handrails Stand-by Assistance -not met                   History:     Past Medical History:   Diagnosis Date    Anxiety     Arthritis     Hypertension        Past Surgical History:   Procedure Laterality Date    KNEE SURGERY Left 2019        Time Tracking:     PT Received On: 09/25/20  PT Start Time: 0833     PT Stop Time: 0857  PT Total Time (min): 24 min     Billable Minutes: Evaluation 8 min and Gait Training 16 min      Leticia Cochran, PT  09/25/2020

## 2020-09-25 NOTE — PLAN OF CARE
Problem: Physical Therapy Goal  Goal: Physical Therapy Goal  Description: Goals to be met by: 10/2/20    Patient will increase functional independence with mobility by performin. Supine to sit with Stand by Assistance -not met  2. Sit to stand transfer with Supervision -not met  3. Gait  x 250 feet with Supervision using AD if needed. -not met  4. Ascend/descend 3 stair with left Handrails Stand-by Assistance -not met  Outcome: Ongoing, Progressing   Evaluation completed and goals appropriate. Leticia Cochran, PT  2020

## 2020-09-25 NOTE — PROGRESS NOTES
[facility]  Neurosurgery  Progress Note    Subjective:     History of Present Illness: Patient is a 61 yo male with a PMH of HTN, anxiety with no known oncologic history who presented to the ED 9/14 c/o 1 month of back pain and muscle soreness to the lower extremities. He states that he has cramping pain to bilateral thighs and calves for about 1 month and back pain started 2 weeks ago without radiation to the midline lumbar back. Back pain is worse when sitting on the couch vs. Laying down, also somewhat worse when walking. He has no weakness in his legs and is ambulatory. No radicular component to his back pain.  No numbness or tingling in the lower extremities. No bowel or bladder incontinence. He has not had difficulty using his hand or coordinating movements of the upper extremities, no neck pain. Transferred to [facility] after CT L spine at OSH revealed lumbar-sacral mass lesion concerning for malignancy. Denies any blood thinner use.      Medical history significant for occasional smoking for about 2 years total, no drinking or illicit substances. No known cancers, did not receive a screening colonoscopy. Family history of 'bone cancer' in his father who passed away in 2016.      Patient underwent bone marrow biopsy on 9/15 and L4-5 bone biopsy 9/16 with final pathology of plasma cell neoplasm. He was discharged from the hospital on 9/18 with close follow-up arranged on 10/1 with hematology/oncology. Presents today for L2-iliac fusion with tumor resection.     Post-Op Info:  Procedure(s) (LRB):  FUSION, SPINE, LUMBAR L2-pelvis. Depuy. Plastic surgery closure w/ Dr. Bellamy (N/A)  CREATION, FLAP, MUSCLE ROTATION (N/A)   2 Days Post-Op     Interval History:  ESMER. AFVSS. BP stable, home BP medications held this morning. Dc cIVF. Patient sitting up in chair in no acute distress. Denies weakness, numbness, paresthesias, headache, dizziness, lightheadedness, chest pain, SOB. Pain controlled with  current regimen. Voiding s/p kaye removal.     Medications:  Continuous Infusions:  Scheduled Meds:   acyclovir  400 mg Oral BID    allopurinoL  300 mg Oral Daily    bisacodyL  10 mg Rectal Daily    calcium carbonate  500 mg Oral TID WM    carvediloL  25 mg Oral BID    ceFAZolin (ANCEF) IVPB  2 g Intravenous Q8H    cloNIDine  0.1 mg Oral Daily    heparin (porcine)  5,000 Units Subcutaneous Q8H    levoFLOXacin  500 mg Oral Daily    losartan  50 mg Oral Daily    mupirocin   Nasal BID    NIFEdipine  60 mg Oral Daily    pantoprazole  40 mg Oral Daily    senna-docusate 8.6-50 mg  1 tablet Oral BID    sulfamethoxazole-trimethoprim 400-80mg  1 tablet Oral Daily     PRN Meds:sodium chloride, acetaminophen, aluminum-magnesium hydroxide-simethicone, diazePAM, morphine, ondansetron, oxyCODONE, promethazine (PHENERGAN) IVPB, senna-docusate 8.6-50 mg       Objective:     Weight: 97.3 kg (214 lb 8.1 oz)  Body mass index is 33.6 kg/m².  Vital Signs (Most Recent):  Temp: 99.5 °F (37.5 °C) (09/25/20 1337)  Pulse: 87 (09/25/20 1337)  Resp: (!) 23 (09/25/20 1337)  BP: (!) 117/90 (09/25/20 1337)  SpO2: 96 % (09/25/20 1337) Vital Signs (24h Range):  Temp:  [96.9 °F (36.1 °C)-99.5 °F (37.5 °C)] 99.5 °F (37.5 °C)  Pulse:  [79-93] 87  Resp:  [15-24] 23  SpO2:  [91 %-98 %] 96 %  BP: (110-138)/(66-93) 117/90     Date 09/25/20 0700 - 09/26/20 0659   Shift 3437-4500 9533-6100 6340-3073 24 Hour Total   INTAKE   P.O. 360   360   Shift Total(mL/kg) 360(3.7)   360(3.7)   OUTPUT   Urine(mL/kg/hr) 550   550   Drains 0   0   Shift Total(mL/kg) 550(5.7)   550(5.7)   Weight (kg) 97.3 97.3 97.3 97.3                        Closed/Suction Drain 09/23/20 1724 Left Back Accordion 10 Fr. (Active)   Site Description Other (Comment) 09/25/20 0800   Dressing Type Transparent (Tegaderm) 09/25/20 0800   Dressing Status Clean;Dry;Intact 09/25/20 0800   Dressing Intervention Integrity maintained 09/25/20 0800   Drainage Serosanguineous 09/25/20 0800    Status Other (Comment) 09/25/20 0800   Output (mL) 0 mL 09/25/20 0800            Closed/Suction Drain 09/23/20 1726 Right Back Accordion 10 Fr. (Active)   Site Description Other (Comment) 09/25/20 0800   Dressing Type Transparent (Tegaderm) 09/25/20 0800   Dressing Status Clean;Dry;Intact 09/25/20 0800   Dressing Intervention Integrity maintained 09/25/20 0800   Drainage Serosanguineous 09/25/20 0800   Status Other (Comment) 09/25/20 0800   Output (mL) 0 mL 09/25/20 0800            Closed/Suction Drain 09/23/20 1758 Back Bulb 15 Fr. (Active)   Site Description Other (Comment) 09/25/20 0800   Dressing Type Transparent (Tegaderm) 09/25/20 0800   Dressing Status Clean;Dry;Intact 09/25/20 0800   Dressing Intervention Integrity maintained 09/25/20 0800   Drainage Serosanguineous 09/25/20 0800   Status To bulb suction 09/25/20 0800   Output (mL) 0 mL 09/25/20 0800            Closed/Suction Drain 09/23/20 1758 Back Bulb 15 Fr. (Active)   Site Description Other (Comment) 09/25/20 0800   Dressing Type Transparent (Tegaderm) 09/25/20 0800   Dressing Status Clean;Dry;Intact 09/25/20 0800   Dressing Intervention Integrity maintained 09/25/20 0800   Drainage Serosanguineous 09/25/20 0800   Status To bulb suction 09/25/20 0800   Output (mL) 0 mL 09/25/20 0800       Neurosurgery Physical Exam    General: well developed, well nourished, no distress.   Head: normocephalic, atraumatic  Neck: No tracheal deviation. No palpable masses.  Neurologic: Alert and oriented. Thought content appropriate.  GCS: Motor: 6/Verbal: 5/Eyes: 4 GCS Total: 15  Mental Status: Awake, Alert, Oriented x 4  Language: No aphasia  Speech: No dysarthria  Cranial nerves: face symmetric, tongue midline, CN II-XII grossly intact.   Eyes: pupils equal, round, reactive to light with accomodation, EOMI.  Ears: No drainage.   Pulmonary: normal respirations, no signs of respiratory distress  Abdomen: soft, non-distended, not tender to palpation  Sensory: intact to  light touch throughout  Motor Strength: Moves all extremities spontaneously with good tone.  Full strength upper and lower extremities. No abnormal movements seen.     Strength  Deltoids Triceps Biceps Wrist Extension Wrist Flexion Hand    Upper: R 5/5 5/5 5/5 5/5 5/5 5/5    L 5/5 5/5 5/5 5/5 5/5 5/5     Iliopsoas Quadriceps Knee  Flexion Tibialis  anterior Gastro- cnemius EHL   Lower: R 5/5 5/5 5/5 5/5 5/5 5/5    L 5/5 5/5 5/5 5/5 5/5 5/5     Vascular: No LE edema.   Skin: Skin is warm, dry and intact.    Incisional wound vac in place, good seal. No surrounding erythema or edema. No drainage from incision. No palpable hematoma.        Significant Labs:  Recent Labs   Lab 09/24/20  0550 09/24/20  1400 09/25/20  0354   * 140* 120*   * 132* 132*   K 5.1 4.2 4.4    105 104   CO2 23 25 23   BUN 23 23 15   CREATININE 1.0 0.9 0.8   CALCIUM 7.7* 7.4* 7.6*   MG  --  1.9  --      Recent Labs   Lab 09/24/20  1400 09/24/20 2119 09/25/20  0354   WBC 10.29 13.64* 15.25*   HGB 10.7* 11.8* 11.2*   HCT 33.6* 35.6* 34.6*   * 140* 151     No results for input(s): LABPT, INR, APTT in the last 48 hours.  Microbiology Results (last 7 days)     ** No results found for the last 168 hours. **        Recent Lab Results       09/25/20  0844   09/25/20  0822   09/25/20  0354   09/24/20 2119        Anion Gap     5       Baso #     0.01 0.01     Basophil%     0.1 0.1     BUN, Bld     15       Calcium     7.6       Chloride     104       CO2     23       Creatinine     0.8       Differential Method     Automated Automated     eGFR if      >60.0       eGFR if non      >60.0  Comment:  Calculation used to obtain the estimated glomerular filtration  rate (eGFR) is the CKD-EPI equation.          Eos #     0.2 0.1     Eosinophil%     1.4 1.0     Glucose     120       Gran # (ANC)     10.9 9.7     Gran%     71.2 71.1     Hematocrit     34.6 35.6     Hemoglobin     11.2 11.8     Immature  Grans (Abs)     0.27  Comment:  Mild elevation in immature granulocytes is non specific and   can be seen in a variety of conditions including stress response,   acute inflammation, trauma and pregnancy. Correlation with other   laboratory and clinical findings is essential.   0.32  Comment:  Mild elevation in immature granulocytes is non specific and   can be seen in a variety of conditions including stress response,   acute inflammation, trauma and pregnancy. Correlation with other   laboratory and clinical findings is essential.       Immature Granulocytes     1.8 2.3     Lymph #     2.4 2.3     Lymph%     15.6 16.6     MCH     30.1 30.6     MCHC     32.4 33.1     MCV     93 93     Mono #     1.5 1.2     Mono%     9.9 8.9     MPV     10.4 10.2     nRBC     0 0     Osmolality   289         Osmolality, Ur 663  Comment:  The random urine reference ranges provided were established   for 24 hour urine collections.  No reference ranges exist for  random urine specimens.  Correlate clinically.             Platelets     151 140     Potassium     4.4       RBC     3.72 3.85     RDW     13.5 13.3     Sodium     132       Sodium Urine Random 89  Comment:  The random urine reference ranges provided were established   for 24 hour urine collections.  No reference ranges exist for  random urine specimens.  Correlate clinically.             WBC     15.25 13.64         All pertinent labs from the last 24 hours have been reviewed.    Significant Diagnostics:  I have reviewed all pertinent imaging results/findings within the past 24 hours.    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    - Neurologically stable  - BP stable after 1L bolus fluids yesterday afternoon and cIVF @ 100mL/hr. Home BP medications held, /70s throughout the day. He is asymptomatic. H/H stable at  11.2/34.6 s/p transfusion of 1 unit. CT abdomen/pelvis with stable postop findings, no evidence of acute hemorrhage.   - Pain controlled with current regimen.   - Wound vac per plastic surgery.  - Drains: Will remove L HV today, continue R HV. ARIEL drains per plastic surgery.  - Post op imaging showing satisfactory placement of hardware.   - LSO brace when up/OOB.  - Continue PT/OT/OOB. OOB > 6hrs/day  - GI: Diet as tolerated. Continue bowel regimen. + flatus, no BM.   - Voiding s/p kaye removal.   - DVT ppx: SQH, Compression stockings, SCDs  - Atelectasis ppx: IS at bedside. Encourage use every hour while awake.    - Discussed with Dr. Coyle     - Dispo: Pending drain removal and medical stability              Angela Giles PA-C  Neurosurgery  [facility]

## 2020-09-25 NOTE — PLAN OF CARE
NPU Care Plan    POC reviewed with Jaspreet Walsh Jr.at 1400. Pt verbalized understanding. Questions and concerns addressed. T max 100.6- NSY team made aware. US to r/o DVT completed. CXR completed. No acute events today. Pt progressing toward goals. Will continue to monitor. See below and flowsheets for full assessment and VS info.       Neuro:  Jay Coma Scale  Best Eye Response: 4-->(E4) spontaneous  Best Motor Response: 6-->(M6) obeys commands  Best Verbal Response: 5-->(V5) oriented  [place] Coma Scale Score: 15  Assessment Qualifiers: patient not sedated/intubated  Pupil PERRLA: yes     24 hr Temp:  [96.9 °F (36.1 °C)-100.6 °F (38.1 °C)]     CV:     BP goals:   SBP < 160  BP meds held this AM per orders    Resp:   O2 Device (Oxygen Therapy): room air       Plan: N/A    GI/:     Diet/Nutrition Received: regular  Last Bowel Movement: 09/22/20  Voiding Characteristics: voids spontaneously without difficulty    Intake/Output Summary (Last 24 hours) at 9/25/2020 1847  Last data filed at 9/25/2020 1600  Gross per 24 hour   Intake 1584.17 ml   Output 2375 ml   Net -790.83 ml     Unmeasured Output  Stool Occurrence: 0    Labs/Accuchecks:  Recent Labs   Lab 09/25/20  0354   WBC 15.25*   RBC 3.72*   HGB 11.2*   HCT 34.6*         Recent Labs   Lab 09/25/20  0354   *   K 4.4   CO2 23      BUN 15   CREATININE 0.8      Recent Labs   Lab 09/23/20  1130   INR 1.1   APTT 21.1    No results for input(s): CPK, CPKMB, TROPONINI, MB in the last 168 hours.    Electrolytes: N/A - electrolytes WDL  Accuchecks: none    Gtts:       LDA/Wounds:  Lines/Drains/Airways       Drain                   Closed/Suction Drain 09/23/20 1724 Left Back Accordion 10 Fr. 2 days         Closed/Suction Drain 09/23/20 1726 Right Back Accordion 10 Fr. 2 days         Closed/Suction Drain 09/23/20 1758 Back Bulb 15 Fr. 2 days         Closed/Suction Drain 09/23/20 1758 Back Bulb 15 Fr. 2 days              Peripheral Intravenous Line                    Peripheral IV - Single Lumen 09/23/20 1445 16 G Right Wrist 2 days                  Wounds: Yes  Wound care consulted: No

## 2020-09-25 NOTE — PROGRESS NOTES
Plastic Update    Patient can follow up on Tuesday 9/29  at [facility] at 10 am for vac takedown, wound and drain check.  He can call the office Mon to confirm appointment if sicharged this weekend [phone]

## 2020-09-25 NOTE — SUBJECTIVE & OBJECTIVE
Interval History: NAEON. Patient tolerating diet, passing flatus, no BM. Able to void volitionally after kaye removed. Pain controlled with current regimen.     Medications:  Continuous Infusions:  Scheduled Meds:   acyclovir  400 mg Oral BID    allopurinoL  300 mg Oral Daily    bisacodyL  10 mg Rectal Daily    calcium carbonate  500 mg Oral TID WM    carvediloL  25 mg Oral BID    ceFAZolin (ANCEF) IVPB  2 g Intravenous Q8H    cloNIDine  0.1 mg Oral Daily    heparin (porcine)  5,000 Units Subcutaneous Q8H    levoFLOXacin  500 mg Oral Daily    losartan  50 mg Oral Daily    mupirocin   Nasal BID    NIFEdipine  60 mg Oral Daily    pantoprazole  40 mg Oral Daily    senna-docusate 8.6-50 mg  1 tablet Oral BID    sulfamethoxazole-trimethoprim 400-80mg  1 tablet Oral Daily     PRN Meds:sodium chloride, acetaminophen, aluminum-magnesium hydroxide-simethicone, diazePAM, morphine, ondansetron, oxyCODONE, promethazine (PHENERGAN) IVPB, senna-docusate 8.6-50 mg     Review of patient's allergies indicates:  No Known Allergies  Objective:     Vital Signs (Most Recent):  Temp: 96.9 °F (36.1 °C) (09/25/20 0528)  Pulse: 80 (09/25/20 0528)  Resp: 18 (09/25/20 0528)  BP: 128/78 (09/25/20 0528)  SpO2: 96 % (09/25/20 0528) Vital Signs (24h Range):  Temp:  [96.9 °F (36.1 °C)-99.8 °F (37.7 °C)] 96.9 °F (36.1 °C)  Pulse:  [68-93] 80  Resp:  [15-24] 18  SpO2:  [91 %-98 %] 96 %  BP: ()/(45-93) 128/78     Weight: 97.3 kg (214 lb 8.1 oz)  Body mass index is 33.6 kg/m².    Intake/Output - Last 3 Shifts       09/23 0700 - 09/24 0659 09/24 0700 - 09/25 0659 09/25 0700 - 09/26 0659    P.O.  950     I.V. (mL/kg) 2660 (27.3)      Blood  1094.2     IV Piggyback  1000     Total Intake(mL/kg) 2660 (27.3) 3044.2 (31.3)     Urine (mL/kg/hr) 1575 (0.7) 2000 (0.9)     Drains 257 410     Other 0 0     Stool 0      Blood 300      Total Output 2132 2410     Net +528 +634.2            Stool Occurrence 0 x            Physical  Exam  Constitutional:       General: He is not in acute distress.     Appearance: Normal appearance.   HENT:      Head: Normocephalic and atraumatic.      Right Ear: External ear normal.      Left Ear: External ear normal.      Nose: Nose normal.      Mouth/Throat:      Mouth: Mucous membranes are moist.   Eyes:      Extraocular Movements: Extraocular movements intact.   Neck:      Musculoskeletal: Normal range of motion.   Cardiovascular:      Rate and Rhythm: Normal rate.   Pulmonary:      Effort: Pulmonary effort is normal. No respiratory distress.   Abdominal:      General: There is no distension.      Palpations: Abdomen is soft.   Genitourinary:     Comments: Guadarrama catheter in place  Musculoskeletal:      Comments: 4 drains in back, all draining SS fluid. Incisional vac in place, good suction, no leak.    Skin:     General: Skin is warm and dry.   Neurological:      General: No focal deficit present.      Mental Status: He is alert and oriented to person, place, and time.   Psychiatric:         Mood and Affect: Mood normal.         Behavior: Behavior normal.         Significant Labs:  CBC:   Recent Labs   Lab 09/25/20  0354   WBC 15.25*   RBC 3.72*   HGB 11.2*   HCT 34.6*      MCV 93   MCH 30.1   MCHC 32.4     CMP:   Recent Labs   Lab 09/24/20  1007  09/25/20  0354   GLU  --    < > 120*   CALCIUM  --    < > 7.6*   ALBUMIN 2.5*  --   --    NA  --    < > 132*   K  --    < > 4.4   CO2  --    < > 23   CL  --    < > 104   BUN  --    < > 15   CREATININE  --    < > 0.8    < > = values in this interval not displayed.

## 2020-09-25 NOTE — OP NOTE
DATE OF SURGERY: 9/23/2020     PREOPERATIVE DIAGNOSIS:  1. 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 New diagnosis of multiple myeloma  4. 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     PROCEDURE PERFORMED:  1. Posterior spinal fusion, L2 to pelvis   2. Posterior segmental spinal fixation, L2 to ilium (DePuy Synthes)  3. Pelvic fixation with 4 iliac bolts (Depuy Synthes)  4. Cement augmentation of all screws and bolts  5. L4 to S1 laminectomy for resection of intradural tumor causing severe thecal sac compression  6.  Use of intraoperative fluoroscopy  7.  Use of intraoperative neuro-monitoring with MEPs  8. Vivigen and [place] putty bone grafting     PRIMARY SURGEON: Nick Coyle M.D.     CO-SURGEONS:    Jaspreet Armstrong M.D. -- Dr. Armstrong assisted with the placement of all spinal instrumentation because a qualified resident was not available for this portion of the procedure.    René Bellamy M.D. of Plastic Surgery for wound closure. See his separate note for details.     ANESTHESIA: GETA     ESTIMATED BLOOD LOSS: 300mL     COMPLICATIONS: None     DRAINS: Two deep     SPECIMENS SENT: Epidural tumor     FINDINGS: None     INDICATIONS:     60M with severe acute low back pain compromising ambulation. He was neurointact on exam but with severe pain and tenderness of the lumbosacral region. Imaging showed erosive mass lesions that had destroyed and replaced the vertebral bodies and intervertebral discs from L4 to S1. Further workup established a new diagnosis of multiple myeloma. Additionally workup revealed an intradural mass lesion at T7 causing spinal cord compression and edema, which was consistent with an incidental dorsal arachnoid web. Since he was not myelopathic but did have lumbosacral instability I recommended an instrumented fusion from L2 to the ilium,  and to continue to follow his cord compression in the thoracic spine. R/B/A/I/M were reviewed in detail and he wished to proceed. All questions were answered and no guarantees were made.  .  PROCEDURE:     The patient was brought into the operating room where he was intubated and placed under general anesthesia without difficulty.  All lines were placed. He was repositioned prone onto the operating room table with appropriate padding all pressure points. The region of interest from L2 to the pelvis was localized with AP and lateral x-ray.  The skin was marked and the area was prepped and draped in the usual sterile fashion.  A timeout was performed prior to procedure.  20 mL's of lidocaine with epinephrine were injected in the skin.     A midline linear incision was made with a 10 blade from approximately L2 to S2. Bilateral PSIS's were exposed laterally through the fascia with the Bovie in anticipation of our pelvic fixation. Next midline subfascial dissection was carried out with Bovie electrocautery.  Subperiosteal dissection was carried out with Bovie electrocautery and Weiss elevators to expose the posterior elements from L2 to the sacral plate.  A pedicle finder was placed into the presumed right L3 pedicle and confirmed on lateral x-ray. Pedicle screws were placed bilaterally at L2 and L3 using freehand technique. Next we achieved bilateral pelvic fixation with 4 iliac bolts (2 per side) using freehand technique. Xrays showed good screw and bolt position. We stimulated all hardware and found thresholds above 20mAmps. Next we augmented all hardware with cement using flouroscopy. No extravasation was seen.    Next we performed a laminectomy from L4 to S1 in standard fashion to resect his compressive epidural tumor. Extensive epidural tumor was seen at L5-S1 and was biopsied and resected. At the end of our decompression the thecal sac was no longer compressed, as confirmed by palpation with a Alfonso  probe.    Bilateral titanium rods were sized, contoured and reduced into the tulip heads. Side connectors from the iliac bolts to the primary rods were placed on both sides. Set screws were tightened.  Final xrays showed excellent position of all hardware.  The wound was copiously irrigated with sterile normal saline and a dilute Betadine solution. Exposed bony surfaces from L2 to the sacral ala on the right side were decorticated with a high-speed drill.  Vivigen and [place] putty were placed posteriorly on the right side for arthrodesis from L2 to the sacrum. Two deep Hemovacs were placed.     The wound was then handed over to Plastic surgery for closure. During my involvement all counts were correct and the patient tolerated the procedure well.    JUSTIFICATION OF CO-SURGEON AND MODIFIER 22: This was a complex spinal fixation and decompression of the lumbosacral junction in a patient with significant tumor burden, poor bone quality and few points of bony fixation. His underlying tumor and its location made the risks of wound healing problems and surgical site infection signficantly elevated. For this reason plastic surgery was enlisted for wound closure.  Two attending spine surgeons were indicated to reduce operative times, blood loss, and improve patient outcomes.

## 2020-09-25 NOTE — NURSING
Patient transported to Xray department via wheelchair, accompanied by patient escort. TLSO brace worn by patient.

## 2020-09-25 NOTE — PROGRESS NOTES
[facility]  Plastic Surgery  Progress Note    Subjective:     History of Present Illness:  No notes on file    Post-Op Info:  Procedure(s) (LRB):  FUSION, SPINE, LUMBAR L2-pelvis. Depuy. Plastic surgery closure w/ Dr. Bellamy (N/A)  CREATION, FLAP, MUSCLE ROTATION (N/A)   2 Days Post-Op     Interval History: NAEON. Patient tolerating diet, passing flatus, no BM. Able to void volitionally after kaye removed. Pain controlled with current regimen.     Medications:  Continuous Infusions:  Scheduled Meds:   acyclovir  400 mg Oral BID    allopurinoL  300 mg Oral Daily    bisacodyL  10 mg Rectal Daily    calcium carbonate  500 mg Oral TID WM    carvediloL  25 mg Oral BID    ceFAZolin (ANCEF) IVPB  2 g Intravenous Q8H    cloNIDine  0.1 mg Oral Daily    heparin (porcine)  5,000 Units Subcutaneous Q8H    levoFLOXacin  500 mg Oral Daily    losartan  50 mg Oral Daily    mupirocin   Nasal BID    NIFEdipine  60 mg Oral Daily    pantoprazole  40 mg Oral Daily    senna-docusate 8.6-50 mg  1 tablet Oral BID    sulfamethoxazole-trimethoprim 400-80mg  1 tablet Oral Daily     PRN Meds:sodium chloride, acetaminophen, aluminum-magnesium hydroxide-simethicone, diazePAM, morphine, ondansetron, oxyCODONE, promethazine (PHENERGAN) IVPB, senna-docusate 8.6-50 mg     Review of patient's allergies indicates:  No Known Allergies  Objective:     Vital Signs (Most Recent):  Temp: 96.9 °F (36.1 °C) (09/25/20 0528)  Pulse: 80 (09/25/20 0528)  Resp: 18 (09/25/20 0528)  BP: 128/78 (09/25/20 0528)  SpO2: 96 % (09/25/20 0528) Vital Signs (24h Range):  Temp:  [96.9 °F (36.1 °C)-99.8 °F (37.7 °C)] 96.9 °F (36.1 °C)  Pulse:  [68-93] 80  Resp:  [15-24] 18  SpO2:  [91 %-98 %] 96 %  BP: ()/(45-93) 128/78     Weight: 97.3 kg (214 lb 8.1 oz)  Body mass index is 33.6 kg/m².    Intake/Output - Last 3 Shifts       09/23 0700 - 09/24 0659 09/24 0700 - 09/25 0659 09/25 0700 - 09/26 0659    P.O.  950     I.V. (mL/kg) 7952  (27.3)      Blood  1094.2     IV Piggyback  1000     Total Intake(mL/kg) 2660 (27.3) 3044.2 (31.3)     Urine (mL/kg/hr) 1575 (0.7) 2000 (0.9)     Drains 257 410     Other 0 0     Stool 0      Blood 300      Total Output 2132 2410     Net +528 +634.2            Stool Occurrence 0 x            Physical Exam  Constitutional:       General: He is not in acute distress.     Appearance: Normal appearance.   HENT:      Head: Normocephalic and atraumatic.      Right Ear: External ear normal.      Left Ear: External ear normal.      Nose: Nose normal.      Mouth/Throat:      Mouth: Mucous membranes are moist.   Eyes:      Extraocular Movements: Extraocular movements intact.   Neck:      Musculoskeletal: Normal range of motion.   Cardiovascular:      Rate and Rhythm: Normal rate.   Pulmonary:      Effort: Pulmonary effort is normal. No respiratory distress.   Abdominal:      General: There is no distension.      Palpations: Abdomen is soft.   Genitourinary:     Comments: Guadarrama catheter in place  Musculoskeletal:      Comments: 4 drains in back, all draining SS fluid. Incisional vac in place, good suction, no leak.    Skin:     General: Skin is warm and dry.   Neurological:      General: No focal deficit present.      Mental Status: He is alert and oriented to person, place, and time.   Psychiatric:         Mood and Affect: Mood normal.         Behavior: Behavior normal.         Significant Labs:  CBC:   Recent Labs   Lab 09/25/20  0354   WBC 15.25*   RBC 3.72*   HGB 11.2*   HCT 34.6*      MCV 93   MCH 30.1   MCHC 32.4     CMP:   Recent Labs   Lab 09/24/20  1007  09/25/20  0354   GLU  --    < > 120*   CALCIUM  --    < > 7.6*   ALBUMIN 2.5*  --   --    NA  --    < > 132*   K  --    < > 4.4   CO2  --    < > 23   CL  --    < > 104   BUN  --    < > 15   CREATININE  --    < > 0.8    < > = values in this interval not displayed.     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  - continue incisional vac for at least 5 days  - continue to monitor and record drain output  - consider nutritional supplements with meals  - remainder of care per primary  - plastics to continue to follow        Kj Sapp MD  Plastic Surgery  [facility]

## 2020-09-25 NOTE — ASSESSMENT & PLAN NOTE
Patient is a 59 yo male with a PMH of HTN, anxiety, and recently diagnosed multiple myeloma now s/p L2-iliac fusion with tumor resection.    - Neurologically stable  - BP stable after 1L bolus fluids yesterday afternoon and cIVF @ 100mL/hr. Home BP medications held, /70s throughout the day. He is asymptomatic. H/H stable at 11.2/34.6 s/p transfusion of 1 unit. CT abdomen/pelvis with stable postop findings, no evidence of acute hemorrhage.   - Pain controlled with current regimen.   - Wound vac per plastic surgery.  - Drains: Will remove L HV today, continue R HV. ARIEL drains per plastic surgery.  - Post op imaging showing satisfactory placement of hardware.   - LSO brace when up/OOB.  - Continue PT/OT/OOB. OOB > 6hrs/day  - GI: Diet as tolerated. Continue bowel regimen. + flatus, no BM.   - Voiding s/p kaye removal.   - DVT ppx: SQH, Compression stockings, SCDs  - Atelectasis ppx: IS at bedside. Encourage use every hour while awake.    - Discussed with Dr. Coyle     - Dispo: Pending drain removal and medical stability

## 2020-09-25 NOTE — NURSING
Patient returned from Xray- assisted back in bed. No complaints at this time. Call bell placed within easy reach.

## 2020-09-25 NOTE — NURSING
1745: Patient transported per stretcher to US for BLE ultrasound, accompanied by patient escort.    1845: Patient returned in room, no acute distress noted.

## 2020-09-25 NOTE — PT/OT/SLP EVAL
"Occupational Therapy   Evaluation    Name: Jaspreet Walsh Jr.  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 is a 60 y.o. 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 Performance deficits affecting function: weakness, impaired endurance, impaired functional mobilty, impaired balance.      Rehab Prognosis: Good; patient would benefit from acute skilled OT services to address these deficits and reach maximum level of function.       Plan:     Patient to be seen 3 x/week to address the above listed problems via self-care/home management, therapeutic activities, therapeutic exercises, neuromuscular re-education  · Plan of Care Expires:    · Plan of Care Reviewed with: patient    Subjective     Chief Complaint: Brace donning difficulty, decreased LB access in it.   Patient/Family Comments/goals: Home w/ Spouse (trained caregiver)     Occupational Profile:  Living Environment: [facility] w/ employed Spouse, 3 stps, (L) side rails as one enters home, "low" step in tub, bath rails, (I) F/T employment w/ [employer] as . (I) all Self care, home management, driving.   Equipment Used at Home:  none  Assistance upon Discharge: Spouse, well, able.   Pain/Comfort:  · Pain Rating 1: 0/10    Patients cultural, spiritual, Sikhism conflicts given the current situation: no    Objective:     Communicated with: RN prior to session.  Patient found up in chair with blood pressure cuff, SCD, ARIEL drain, hemovac, TLSO upon OT entry to room.    General Precautions: Standard, aspiration, fall   Orthopedic Precautions:N/A   Braces: TLSO     Occupational Performance:    Bed Mobility:  Deferred 2* Patient encountered  up in " chair.    Activities of Daily Living:  · Feeding: set up OOB  · Grooming: set up OOB  · Bathing: max a seated UB sponge  · Upper Body Dressing: max a gown. TSLO  · Lower Body Dressing: max a non skid socks.         Toileting: NT  Cognitive/Visual Perceptual: Intact, able to follow complex direction, good POC related information integration, patient able to make complex needs known, pleasant, cooperative throughout session.     Physical Exam:  (B) UE AROM WFL in sit OOB  (B) UE MMT, not tested formally, however spontaneous for seated pressure relief WFL  (B) Fine/Gross motor coordiantion WFL.     [facility] 6   Treatment & Education:  SKILLED EDUCATION WAS PROVIDED TO PATIENT FOR:    *Patient education provided re: role of OT, and OTreatment rationales / protocols  *Importance of OOB activities daily in conjunction w/ edu related to importance of performing BUE AROM exercises even while in bed.  *Patient agreeable to therapy session only  *Lights turned on / shade open for session   *Basic self-care tasks and rudimentary functional mobility undertaken -- assist levels noted above, Safe swallow ensured, positioned on high fowlers.  *General in room safety and (S)'d mobility advised, Call button use reviewed  *Communication board up to date, questions/concerns within OT scope addressed  *Patient further engaged in there ex to BUE / BLE for all major planes of tolerable motion x 1 set 10 reps while seated for AROM seated all joints/allplanes as indicated for recovery of effective respiration in functional tasks, and to bolster proper circulation.   INITIAL TRAINING: Intro deep relaxed breathing tech as needed for non Rx pain management/MM relaxationEducation:  .    Patient left with bed in chair position with all lines intact, call button in reach and RN present    GOALS:   Multidisciplinary Problems     Occupational Therapy Goals        Problem: Occupational Therapy Goal    Goal Priority Disciplines Outcome Interventions    Occupational Therapy Goal     OT, PT/OT Ongoing, Progressing    Description: Goals to be met by: 10/25/2020    Patient will increase functional independence with ADLs by performing:    UE Dressing with Set-up Assistance assist for TSLO following Patient/caregiver training.  LE Dressing with Stand-by Assistance.  Grooming while standing at sink with Set-up Assistance.  Toileting from toilet with Stand-by Assistance for hygiene and clothing management.   Bathing from  standing at sink with Set-up Assistance UB and evie care sponge  Toilet transfer to toilet with Stand-by Assistance.                     History:     Past Medical History:   Diagnosis Date    Anxiety     Arthritis     Hypertension        Past Surgical History:   Procedure Laterality Date    KNEE SURGERY Left 06/2019       Time Tracking:     OT Date of Treatment: 09/25/20  OT Start Time: 1401  OT Stop Time: 1437  OT Total Time (min): 36 min    Billable Minutes:Evaluation 20  Self Care/Home Management 16    JANAE Guillermo  9/25/2020

## 2020-09-26 LAB
ANION GAP SERPL CALC-SCNC: 7 MMOL/L (ref 8–16)
BASOPHILS # BLD AUTO: 0.02 K/UL (ref 0–0.2)
BASOPHILS NFR BLD: 0.1 % (ref 0–1.9)
BUN SERPL-MCNC: 16 MG/DL (ref 8–23)
CALCIUM SERPL-MCNC: 8 MG/DL (ref 8.7–10.5)
CHLORIDE SERPL-SCNC: 101 MMOL/L (ref 95–110)
CO2 SERPL-SCNC: 22 MMOL/L (ref 23–29)
CREAT SERPL-MCNC: 0.8 MG/DL (ref 0.5–1.4)
DIFFERENTIAL METHOD: ABNORMAL
EOSINOPHIL # BLD AUTO: 0.1 K/UL (ref 0–0.5)
EOSINOPHIL NFR BLD: 0.5 % (ref 0–8)
ERYTHROCYTE [DISTWIDTH] IN BLOOD BY AUTOMATED COUNT: 13.2 % (ref 11.5–14.5)
EST. GFR  (AFRICAN AMERICAN): >60 ML/MIN/1.73 M^2
EST. GFR  (NON AFRICAN AMERICAN): >60 ML/MIN/1.73 M^2
GLUCOSE SERPL-MCNC: 146 MG/DL (ref 70–110)
HCT VFR BLD AUTO: 31.6 % (ref 40–54)
HGB BLD-MCNC: 10.4 G/DL (ref 14–18)
IMM GRANULOCYTES # BLD AUTO: 0.37 K/UL (ref 0–0.04)
IMM GRANULOCYTES NFR BLD AUTO: 2.5 % (ref 0–0.5)
LYMPHOCYTES # BLD AUTO: 1.6 K/UL (ref 1–4.8)
LYMPHOCYTES NFR BLD: 10.3 % (ref 18–48)
MCH RBC QN AUTO: 30.6 PG (ref 27–31)
MCHC RBC AUTO-ENTMCNC: 32.9 G/DL (ref 32–36)
MCV RBC AUTO: 93 FL (ref 82–98)
MONOCYTES # BLD AUTO: 1.1 K/UL (ref 0.3–1)
MONOCYTES NFR BLD: 7.1 % (ref 4–15)
NEUTROPHILS # BLD AUTO: 12 K/UL (ref 1.8–7.7)
NEUTROPHILS NFR BLD: 79.5 % (ref 38–73)
NRBC BLD-RTO: 0 /100 WBC
PLATELET # BLD AUTO: 147 K/UL (ref 150–350)
PMV BLD AUTO: 10.6 FL (ref 9.2–12.9)
POTASSIUM SERPL-SCNC: 3.7 MMOL/L (ref 3.5–5.1)
RBC # BLD AUTO: 3.4 M/UL (ref 4.6–6.2)
SODIUM SERPL-SCNC: 130 MMOL/L (ref 136–145)
WBC # BLD AUTO: 15.03 K/UL (ref 3.9–12.7)

## 2020-09-26 PROCEDURE — 85025 COMPLETE CBC W/AUTO DIFF WBC: CPT

## 2020-09-26 PROCEDURE — 63600175 PHARM REV CODE 636 W HCPCS: Performed by: PHYSICIAN ASSISTANT

## 2020-09-26 PROCEDURE — 80048 BASIC METABOLIC PNL TOTAL CA: CPT

## 2020-09-26 PROCEDURE — 11000001 HC ACUTE MED/SURG PRIVATE ROOM

## 2020-09-26 PROCEDURE — 25000003 PHARM REV CODE 250: Performed by: STUDENT IN AN ORGANIZED HEALTH CARE EDUCATION/TRAINING PROGRAM

## 2020-09-26 PROCEDURE — 25000003 PHARM REV CODE 250: Performed by: PHYSICIAN ASSISTANT

## 2020-09-26 PROCEDURE — 36415 COLL VENOUS BLD VENIPUNCTURE: CPT

## 2020-09-26 RX ADMIN — SULFAMETHOXAZOLE AND TRIMETHOPRIM 1 TABLET: 400; 80 TABLET ORAL at 09:09

## 2020-09-26 RX ADMIN — HEPARIN SODIUM 5000 UNITS: 5000 INJECTION INTRAVENOUS; SUBCUTANEOUS at 06:09

## 2020-09-26 RX ADMIN — HEPARIN SODIUM 5000 UNITS: 5000 INJECTION INTRAVENOUS; SUBCUTANEOUS at 11:09

## 2020-09-26 RX ADMIN — ALUMINUM HYDROXIDE, MAGNESIUM HYDROXIDE, AND SIMETHICONE 30 ML: 200; 200; 20 SUSPENSION ORAL at 02:09

## 2020-09-26 RX ADMIN — LOSARTAN POTASSIUM 50 MG: 50 TABLET, FILM COATED ORAL at 09:09

## 2020-09-26 RX ADMIN — CLONIDINE HYDROCHLORIDE 0.1 MG: 0.1 TABLET ORAL at 09:09

## 2020-09-26 RX ADMIN — CALCIUM 500 MG: 500 TABLET ORAL at 09:09

## 2020-09-26 RX ADMIN — ACYCLOVIR 400 MG: 200 CAPSULE ORAL at 09:09

## 2020-09-26 RX ADMIN — PANTOPRAZOLE SODIUM 40 MG: 40 TABLET, DELAYED RELEASE ORAL at 09:09

## 2020-09-26 RX ADMIN — MUPIROCIN: 20 OINTMENT TOPICAL at 09:09

## 2020-09-26 RX ADMIN — NIFEDIPINE 60 MG: 30 TABLET, FILM COATED, EXTENDED RELEASE ORAL at 09:09

## 2020-09-26 RX ADMIN — OXYCODONE HYDROCHLORIDE 10 MG: 10 TABLET ORAL at 02:09

## 2020-09-26 RX ADMIN — CARVEDILOL 25 MG: 25 TABLET, FILM COATED ORAL at 09:09

## 2020-09-26 RX ADMIN — DOCUSATE SODIUM 50MG AND SENNOSIDES 8.6MG 1 TABLET: 8.6; 5 TABLET, FILM COATED ORAL at 11:09

## 2020-09-26 RX ADMIN — MUPIROCIN: 20 OINTMENT TOPICAL at 11:09

## 2020-09-26 RX ADMIN — DIAZEPAM 5 MG: 5 TABLET ORAL at 02:09

## 2020-09-26 RX ADMIN — ALLOPURINOL 300 MG: 100 TABLET ORAL at 09:09

## 2020-09-26 RX ADMIN — HEPARIN SODIUM 5000 UNITS: 5000 INJECTION INTRAVENOUS; SUBCUTANEOUS at 02:09

## 2020-09-26 RX ADMIN — LEVOFLOXACIN 500 MG: 500 TABLET, FILM COATED ORAL at 09:09

## 2020-09-26 RX ADMIN — ONDANSETRON 8 MG: 8 TABLET, ORALLY DISINTEGRATING ORAL at 02:09

## 2020-09-26 RX ADMIN — DOCUSATE SODIUM 50MG AND SENNOSIDES 8.6MG 1 TABLET: 8.6; 5 TABLET, FILM COATED ORAL at 09:09

## 2020-09-26 NOTE — NURSING
"Patient states his stomach is giving him problems. Generalized description of pain/discomfort. MAR choices given. States he will try the Maalox first. Given.  Within the hour, states he is nauseated and "man it's hurting." MAR choices given again. Patient states whatever you can do to make it stop." Valium and Oxycodone given with Zofran ODT.  Patient asleep and pain controlled within minutes.       "

## 2020-09-27 LAB
ANION GAP SERPL CALC-SCNC: 6 MMOL/L (ref 8–16)
BASOPHILS # BLD AUTO: 0.04 K/UL (ref 0–0.2)
BASOPHILS NFR BLD: 0.3 % (ref 0–1.9)
BLD PROD TYP BPU: NORMAL
BLOOD UNIT EXPIRATION DATE: NORMAL
BLOOD UNIT TYPE CODE: 6200
BLOOD UNIT TYPE: NORMAL
BUN SERPL-MCNC: 24 MG/DL (ref 8–23)
CALCIUM SERPL-MCNC: 8.2 MG/DL (ref 8.7–10.5)
CHLORIDE SERPL-SCNC: 99 MMOL/L (ref 95–110)
CO2 SERPL-SCNC: 24 MMOL/L (ref 23–29)
CODING SYSTEM: NORMAL
CREAT SERPL-MCNC: 1.2 MG/DL (ref 0.5–1.4)
DIFFERENTIAL METHOD: ABNORMAL
DISPENSE STATUS: NORMAL
EOSINOPHIL # BLD AUTO: 0.2 K/UL (ref 0–0.5)
EOSINOPHIL NFR BLD: 1.2 % (ref 0–8)
ERYTHROCYTE [DISTWIDTH] IN BLOOD BY AUTOMATED COUNT: 13.2 % (ref 11.5–14.5)
EST. GFR  (AFRICAN AMERICAN): >60 ML/MIN/1.73 M^2
EST. GFR  (NON AFRICAN AMERICAN): >60 ML/MIN/1.73 M^2
GLUCOSE SERPL-MCNC: 117 MG/DL (ref 70–110)
HCT VFR BLD AUTO: 27.7 % (ref 40–54)
HGB BLD-MCNC: 9.2 G/DL (ref 14–18)
IMM GRANULOCYTES # BLD AUTO: 0.59 K/UL (ref 0–0.04)
IMM GRANULOCYTES NFR BLD AUTO: 3.8 % (ref 0–0.5)
LYMPHOCYTES # BLD AUTO: 2.6 K/UL (ref 1–4.8)
LYMPHOCYTES NFR BLD: 16.8 % (ref 18–48)
MCH RBC QN AUTO: 30.6 PG (ref 27–31)
MCHC RBC AUTO-ENTMCNC: 33.2 G/DL (ref 32–36)
MCV RBC AUTO: 92 FL (ref 82–98)
MONOCYTES # BLD AUTO: 1.3 K/UL (ref 0.3–1)
MONOCYTES NFR BLD: 8.6 % (ref 4–15)
NEUTROPHILS # BLD AUTO: 10.9 K/UL (ref 1.8–7.7)
NEUTROPHILS NFR BLD: 69.3 % (ref 38–73)
NRBC BLD-RTO: 0 /100 WBC
NUM UNITS TRANS PACKED RBC: NORMAL
PLATELET # BLD AUTO: 164 K/UL (ref 150–350)
PMV BLD AUTO: 10.5 FL (ref 9.2–12.9)
POTASSIUM SERPL-SCNC: 4 MMOL/L (ref 3.5–5.1)
RBC # BLD AUTO: 3.01 M/UL (ref 4.6–6.2)
SODIUM SERPL-SCNC: 129 MMOL/L (ref 136–145)
WBC # BLD AUTO: 15.64 K/UL (ref 3.9–12.7)

## 2020-09-27 PROCEDURE — 80048 BASIC METABOLIC PNL TOTAL CA: CPT

## 2020-09-27 PROCEDURE — 25000003 PHARM REV CODE 250: Performed by: STUDENT IN AN ORGANIZED HEALTH CARE EDUCATION/TRAINING PROGRAM

## 2020-09-27 PROCEDURE — 36415 COLL VENOUS BLD VENIPUNCTURE: CPT

## 2020-09-27 PROCEDURE — 11000001 HC ACUTE MED/SURG PRIVATE ROOM

## 2020-09-27 PROCEDURE — 97110 THERAPEUTIC EXERCISES: CPT | Mod: CQ

## 2020-09-27 PROCEDURE — 99900035 HC TECH TIME PER 15 MIN (STAT)

## 2020-09-27 PROCEDURE — 85025 COMPLETE CBC W/AUTO DIFF WBC: CPT

## 2020-09-27 PROCEDURE — 63600175 PHARM REV CODE 636 W HCPCS: Performed by: PHYSICIAN ASSISTANT

## 2020-09-27 PROCEDURE — 97116 GAIT TRAINING THERAPY: CPT | Mod: CQ

## 2020-09-27 PROCEDURE — 25000003 PHARM REV CODE 250: Performed by: PHYSICIAN ASSISTANT

## 2020-09-27 RX ADMIN — MUPIROCIN: 20 OINTMENT TOPICAL at 09:09

## 2020-09-27 RX ADMIN — ACYCLOVIR 400 MG: 200 CAPSULE ORAL at 10:09

## 2020-09-27 RX ADMIN — HEPARIN SODIUM 5000 UNITS: 5000 INJECTION INTRAVENOUS; SUBCUTANEOUS at 03:09

## 2020-09-27 RX ADMIN — CARVEDILOL 25 MG: 25 TABLET, FILM COATED ORAL at 10:09

## 2020-09-27 RX ADMIN — SULFAMETHOXAZOLE AND TRIMETHOPRIM 1 TABLET: 400; 80 TABLET ORAL at 10:09

## 2020-09-27 RX ADMIN — HEPARIN SODIUM 5000 UNITS: 5000 INJECTION INTRAVENOUS; SUBCUTANEOUS at 09:09

## 2020-09-27 RX ADMIN — ACYCLOVIR 400 MG: 200 CAPSULE ORAL at 09:09

## 2020-09-27 RX ADMIN — LEVOFLOXACIN 500 MG: 500 TABLET, FILM COATED ORAL at 10:09

## 2020-09-27 RX ADMIN — ALLOPURINOL 300 MG: 100 TABLET ORAL at 10:09

## 2020-09-27 RX ADMIN — NIFEDIPINE 60 MG: 30 TABLET, FILM COATED, EXTENDED RELEASE ORAL at 10:09

## 2020-09-27 RX ADMIN — HEPARIN SODIUM 5000 UNITS: 5000 INJECTION INTRAVENOUS; SUBCUTANEOUS at 05:09

## 2020-09-27 RX ADMIN — LOSARTAN POTASSIUM 50 MG: 50 TABLET, FILM COATED ORAL at 10:09

## 2020-09-27 RX ADMIN — CARVEDILOL 25 MG: 25 TABLET, FILM COATED ORAL at 09:09

## 2020-09-27 RX ADMIN — PANTOPRAZOLE SODIUM 40 MG: 40 TABLET, DELAYED RELEASE ORAL at 10:09

## 2020-09-27 RX ADMIN — CLONIDINE HYDROCHLORIDE 0.1 MG: 0.1 TABLET ORAL at 10:09

## 2020-09-27 RX ADMIN — DOCUSATE SODIUM 50MG AND SENNOSIDES 8.6MG 1 TABLET: 8.6; 5 TABLET, FILM COATED ORAL at 10:09

## 2020-09-27 NOTE — PROGRESS NOTES
No overnight acute events     Vitals:    09/26/20 2231   BP: 104/75   Pulse: 79   Resp: (!) 28   Temp:        Neurosurgery Physical Exam     General: well developed, well nourished, no distress.   Head: normocephalic, atraumatic  Neck: No tracheal deviation. No palpable masses.  Neurologic: Alert and oriented. Thought content appropriate.  GCS: Motor: 6/Verbal: 5/Eyes: 4 GCS Total: 15  Mental Status: Awake, Alert, Oriented x 4  Language: No aphasia  Speech: No dysarthria  Cranial nerves: face symmetric, tongue midline, CN II-XII grossly intact.   Eyes: pupils equal, round, reactive to light with accomodation, EOMI.  Ears: No drainage.   Pulmonary: normal respirations, no signs of respiratory distress  Abdomen: soft, non-distended, not tender to palpation  Sensory: intact to light touch throughout  Motor Strength: Moves all extremities spontaneously with good tone.  Full strength upper and lower extremities. No abnormal movements seen.      Strength   Deltoids Triceps Biceps Wrist Extension Wrist Flexion Hand    Upper: R 5/5 5/5 5/5 5/5 5/5 5/5     L 5/5 5/5 5/5 5/5 5/5 5/5       Iliopsoas Quadriceps Knee  Flexion Tibialis  anterior Gastro- cnemius EHL   Lower: R 5/5 5/5 5/5 5/5 5/5 5/5     L 5/5 5/5 5/5 5/5 5/5 5/5      Vascular: No LE edema.   Skin: Skin is warm, dry and intact.     Incisional wound vac in place, good seal. No surrounding erythema or edema. No drainage from incision. No palpable hematoma.        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     - Neurologically stable  - BP stable after 1L bolus fluids yesterday afternoon and cIVF @ 100mL/hr. Home BP medications held, /70s throughout the day. He is asymptomatic. H/H stable at 11.2/34.6 s/p transfusion of 1 unit. CT abdomen/pelvis with stable postop findings, no evidence of acute hemorrhage.   - Pain  controlled with current regimen.   - Wound vac per plastic surgery.  - Drains:  continue R HV. ARIEL drains per plastic surgery.  - Post op imaging showing satisfactory placement of hardware.   - LSO brace when up/OOB.  - Continue PT/OT/OOB. OOB > 6hrs/day  - GI: Diet as tolerated. Continue bowel regimen. + flatus, no BM.   - Voiding s/p kaye removal.   - DVT ppx: SQH, Compression stockings, SCDs  - Atelectasis ppx: IS at bedside. Encourage use every hour while awake.     Drain output > 100cc.  Will monitor again tomorrow.

## 2020-09-27 NOTE — CARE UPDATE
Rapid Response Nurse Chart Check     Chart check completed, abnormal VS noted. Bedside RN, Tom contacted. No concerns verbalized at this time. Instructed to call [phone] for further concerns or assistance.

## 2020-09-27 NOTE — PT/OT/SLP PROGRESS
Physical Therapy Treatment    Patient Name:  Jaspreet Walsh Jr.   MRN:  [id number]    Recommendations:     Discharge Recommendations:  home   Discharge Equipment Recommendations: none   Barriers to discharge: None    Assessment:     Jaspreet Walsh Jr. is a 60 y.o. 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  He presents with the following impairments/functional limitations:  weakness, impaired endurance, gait instability, impaired balance, impaired functional mobilty, impaired self care skills, decreased ROM, orthopedic precautions . Patient showed good initiative and participation. Patient ambulates with min unsteady pattern, but improves with the us of the RW.    Rehab Prognosis: Good; patient would benefit from acute skilled PT services to address these deficits and reach maximum level of function.    Recent Surgery: Procedure(s) (LRB):  FUSION, SPINE, LUMBAR L2-pelvis. Depuy. Plastic surgery closure w/ Dr. Bellamy (N/A)  CREATION, FLAP, MUSCLE ROTATION (N/A) 4 Days Post-Op    Plan:     During this hospitalization, patient to be seen 4 x/week to address the identified rehab impairments via gait training, therapeutic activities, therapeutic exercises, neuromuscular re-education and progress toward the following goals:    · Plan of Care Expires:  10/22/20    Subjective     Chief Complaint: min weakness  Patient/Family Comments/goals: to heal well and to go home soon.  Pain/Comfort:  · Pain Rating 1: 0/10  · Location - Orientation 1: generalized  · Location 1: back  · Pain Addressed 1: Reposition  · Pain Rating Post-Intervention 1: 0/10      Objective:     Communicated with NSG prior to session.  Patient found supine with hemovac, ARIEL drain upon PT entry to room.     General Precautions: Standard, aspiration, fall   Orthopedic Precautions:spinal precautions   Braces: TLSO     Functional Mobility:  · Bed Mobility:     · Rolling Left:  supervision  · Scooting: supervision  · Supine  to Sit: supervision  · Transfers:     · Sit to Stand:  contact guard assistance with rolling walker  · Bed to Chair: contact guard assistance with  rolling walker  using  Stand Pivot  · Gait: ~120 ft with RW and CGA, with cues to correct posture and proper RW management.      AM-PAC 6 CLICK MOBILITY  Turning over in bed (including adjusting bedclothes, sheets and blankets)?: 4  Sitting down on and standing up from a chair with arms (e.g., wheelchair, bedside commode, etc.): 4  Moving from lying on back to sitting on the side of the bed?: 4  Moving to and from a bed to a chair (including a wheelchair)?: 3  Need to walk in hospital room?: 3  Climbing 3-5 steps with a railing?: 1  Basic Mobility Total Score: 19       Therapeutic Activities and Exercises:   Donned a second gown and TLSO. Patient sat at EOB to manage medical lines. There ex in sitting: LAQ, HIP FLEX AND HEEL/TOE RAISES 2X15 REPS B LE.    Patient left up in chair with all lines intact, call button in reach and SPOUSE present..    GOALS:   Multidisciplinary Problems     Physical Therapy Goals        Problem: Physical Therapy Goal    Goal Priority Disciplines Outcome Goal Variances Interventions   Physical Therapy Goal     PT, PT/OT Ongoing, Progressing     Description: Goals to be met by: 10/2/20    Patient will increase functional independence with mobility by performin. Supine to sit with Stand by Assistance -not met  2. Sit to stand transfer with Supervision -not met  3. Gait  x 250 feet with Supervision using AD if needed. -not met  4. Ascend/descend 3 stair with left Handrails Stand-by Assistance -not met                   Time Tracking:     PT Received On: 20  PT Start Time: 1100     PT Stop Time: 1124  PT Total Time (min): 24 min     Billable Minutes: Gait Training 15 and Therapeutic Exercise 9    Treatment Type: Treatment  PT/PTA: PTA     PTA Visit Number: Kwame Bañuelos PTA  2020

## 2020-09-27 NOTE — NURSING
MD notified about sepsis alert and WBC count. No new orders received. Patient assessment is at baseline with no neuro changes.

## 2020-09-28 ENCOUNTER — TELEPHONE (OUTPATIENT)
Dept: PSYCHIATRY | Facility: CLINIC | Age: 60
End: 2020-09-28

## 2020-09-28 VITALS
WEIGHT: 214.5 LBS | HEIGHT: 67 IN | OXYGEN SATURATION: 96 % | DIASTOLIC BLOOD PRESSURE: 73 MMHG | BODY MASS INDEX: 33.67 KG/M2 | SYSTOLIC BLOOD PRESSURE: 125 MMHG | HEART RATE: 94 BPM | RESPIRATION RATE: 22 BRPM | TEMPERATURE: 99 F

## 2020-09-28 LAB
ANION GAP SERPL CALC-SCNC: 9 MMOL/L (ref 8–16)
BASOPHILS # BLD AUTO: 0.02 K/UL (ref 0–0.2)
BASOPHILS NFR BLD: 0.2 % (ref 0–1.9)
BILIRUB UR QL STRIP: NEGATIVE
BUN SERPL-MCNC: 26 MG/DL (ref 8–23)
CA-I BLDV-SCNC: 1.17 MMOL/L (ref 1.06–1.42)
CALCIUM SERPL-MCNC: 7.9 MG/DL (ref 8.7–10.5)
CHLORIDE SERPL-SCNC: 100 MMOL/L (ref 95–110)
CLARITY UR REFRACT.AUTO: CLEAR
CO2 SERPL-SCNC: 21 MMOL/L (ref 23–29)
COLOR UR AUTO: YELLOW
CREAT SERPL-MCNC: 1 MG/DL (ref 0.5–1.4)
DIFFERENTIAL METHOD: ABNORMAL
EOSINOPHIL # BLD AUTO: 0.2 K/UL (ref 0–0.5)
EOSINOPHIL NFR BLD: 1.2 % (ref 0–8)
ERYTHROCYTE [DISTWIDTH] IN BLOOD BY AUTOMATED COUNT: 13.1 % (ref 11.5–14.5)
EST. GFR  (AFRICAN AMERICAN): >60 ML/MIN/1.73 M^2
EST. GFR  (NON AFRICAN AMERICAN): >60 ML/MIN/1.73 M^2
FERRITIN SERPL-MCNC: 2221 NG/ML (ref 20–300)
GLUCOSE SERPL-MCNC: 104 MG/DL (ref 70–110)
GLUCOSE UR QL STRIP: NEGATIVE
HCT VFR BLD AUTO: 25.7 % (ref 40–54)
HGB BLD-MCNC: 8.8 G/DL (ref 14–18)
HGB UR QL STRIP: NEGATIVE
IMM GRANULOCYTES # BLD AUTO: 0.57 K/UL (ref 0–0.04)
IMM GRANULOCYTES NFR BLD AUTO: 4.4 % (ref 0–0.5)
IRON SERPL-MCNC: 35 UG/DL (ref 45–160)
KETONES UR QL STRIP: NEGATIVE
LEUKOCYTE ESTERASE UR QL STRIP: NEGATIVE
LYMPHOCYTES # BLD AUTO: 2.4 K/UL (ref 1–4.8)
LYMPHOCYTES NFR BLD: 18.3 % (ref 18–48)
MCH RBC QN AUTO: 31.1 PG (ref 27–31)
MCHC RBC AUTO-ENTMCNC: 34.2 G/DL (ref 32–36)
MCV RBC AUTO: 91 FL (ref 82–98)
MONOCYTES # BLD AUTO: 1 K/UL (ref 0.3–1)
MONOCYTES NFR BLD: 7.6 % (ref 4–15)
NEUTROPHILS # BLD AUTO: 8.9 K/UL (ref 1.8–7.7)
NEUTROPHILS NFR BLD: 68.3 % (ref 38–73)
NITRITE UR QL STRIP: NEGATIVE
NRBC BLD-RTO: 0 /100 WBC
PH UR STRIP: 6 [PH] (ref 5–8)
PLATELET # BLD AUTO: 175 K/UL (ref 150–350)
PMV BLD AUTO: 10.2 FL (ref 9.2–12.9)
POCT GLUCOSE: 141 MG/DL (ref 70–110)
POTASSIUM SERPL-SCNC: 3.5 MMOL/L (ref 3.5–5.1)
PROT UR QL STRIP: NEGATIVE
RBC # BLD AUTO: 2.83 M/UL (ref 4.6–6.2)
SATURATED IRON: 23 % (ref 20–50)
SODIUM SERPL-SCNC: 130 MMOL/L (ref 136–145)
SP GR UR STRIP: 1.02 (ref 1–1.03)
TOTAL IRON BINDING CAPACITY: 154 UG/DL (ref 250–450)
TRANSFERRIN SERPL-MCNC: 104 MG/DL (ref 200–375)
TRANSFERRIN SERPL-MCNC: 104 MG/DL (ref 200–375)
URN SPEC COLLECT METH UR: NORMAL
WBC # BLD AUTO: 13.07 K/UL (ref 3.9–12.7)

## 2020-09-28 PROCEDURE — 63600175 PHARM REV CODE 636 W HCPCS: Performed by: PHYSICIAN ASSISTANT

## 2020-09-28 PROCEDURE — 81003 URINALYSIS AUTO W/O SCOPE: CPT

## 2020-09-28 PROCEDURE — 36415 COLL VENOUS BLD VENIPUNCTURE: CPT

## 2020-09-28 PROCEDURE — 25000003 PHARM REV CODE 250: Performed by: PHYSICIAN ASSISTANT

## 2020-09-28 PROCEDURE — 25000003 PHARM REV CODE 250: Performed by: STUDENT IN AN ORGANIZED HEALTH CARE EDUCATION/TRAINING PROGRAM

## 2020-09-28 PROCEDURE — 80048 BASIC METABOLIC PNL TOTAL CA: CPT

## 2020-09-28 PROCEDURE — 99024 POSTOP FOLLOW-UP VISIT: CPT | Mod: ,,, | Performed by: PHYSICIAN ASSISTANT

## 2020-09-28 PROCEDURE — 97535 SELF CARE MNGMENT TRAINING: CPT

## 2020-09-28 PROCEDURE — 97530 THERAPEUTIC ACTIVITIES: CPT

## 2020-09-28 PROCEDURE — 99024 PR POST-OP FOLLOW-UP VISIT: ICD-10-PCS | Mod: ,,, | Performed by: PHYSICIAN ASSISTANT

## 2020-09-28 PROCEDURE — 85025 COMPLETE CBC W/AUTO DIFF WBC: CPT

## 2020-09-28 PROCEDURE — 82728 ASSAY OF FERRITIN: CPT

## 2020-09-28 PROCEDURE — 82330 ASSAY OF CALCIUM: CPT

## 2020-09-28 PROCEDURE — 83540 ASSAY OF IRON: CPT

## 2020-09-28 RX ORDER — FERROUS SULFATE 325(65) MG
325 TABLET, DELAYED RELEASE (ENTERIC COATED) ORAL DAILY
Status: DISCONTINUED | OUTPATIENT
Start: 2020-09-28 | End: 2020-09-28 | Stop reason: HOSPADM

## 2020-09-28 RX ORDER — SODIUM CHLORIDE 9 MG/ML
INJECTION, SOLUTION INTRAVENOUS CONTINUOUS
Status: DISCONTINUED | OUTPATIENT
Start: 2020-09-28 | End: 2020-09-28 | Stop reason: HOSPADM

## 2020-09-28 RX ORDER — POLYETHYLENE GLYCOL 3350 17 G/17G
17 POWDER, FOR SOLUTION ORAL DAILY
Status: DISCONTINUED | OUTPATIENT
Start: 2020-09-28 | End: 2020-09-28 | Stop reason: HOSPADM

## 2020-09-28 RX ORDER — FERROUS SULFATE 325(65) MG
325 TABLET, DELAYED RELEASE (ENTERIC COATED) ORAL DAILY
Qty: 30 TABLET | Refills: 1 | Status: SHIPPED | OUTPATIENT
Start: 2020-09-28 | End: 2020-11-27 | Stop reason: SDUPTHER

## 2020-09-28 RX ADMIN — ALLOPURINOL 300 MG: 100 TABLET ORAL at 11:09

## 2020-09-28 RX ADMIN — HEPARIN SODIUM 5000 UNITS: 5000 INJECTION INTRAVENOUS; SUBCUTANEOUS at 06:09

## 2020-09-28 RX ADMIN — SODIUM CHLORIDE: 0.9 INJECTION, SOLUTION INTRAVENOUS at 11:09

## 2020-09-28 RX ADMIN — PANTOPRAZOLE SODIUM 40 MG: 40 TABLET, DELAYED RELEASE ORAL at 11:09

## 2020-09-28 RX ADMIN — ACYCLOVIR 400 MG: 200 CAPSULE ORAL at 11:09

## 2020-09-28 RX ADMIN — NIFEDIPINE 60 MG: 30 TABLET, FILM COATED, EXTENDED RELEASE ORAL at 11:09

## 2020-09-28 RX ADMIN — CARVEDILOL 25 MG: 25 TABLET, FILM COATED ORAL at 11:09

## 2020-09-28 RX ADMIN — SULFAMETHOXAZOLE AND TRIMETHOPRIM 1 TABLET: 400; 80 TABLET ORAL at 11:09

## 2020-09-28 RX ADMIN — MUPIROCIN: 20 OINTMENT TOPICAL at 11:09

## 2020-09-28 RX ADMIN — LEVOFLOXACIN 500 MG: 500 TABLET, FILM COATED ORAL at 11:09

## 2020-09-28 RX ADMIN — BISACODYL 10 MG: 10 SUPPOSITORY RECTAL at 11:09

## 2020-09-28 RX ADMIN — LOSARTAN POTASSIUM 50 MG: 50 TABLET, FILM COATED ORAL at 11:09

## 2020-09-28 RX ADMIN — CLONIDINE HYDROCHLORIDE 0.1 MG: 0.1 TABLET ORAL at 11:09

## 2020-09-28 NOTE — SUBJECTIVE & OBJECTIVE
Interval History:  NAEON. Prevena discontinued by Plastics and replaced with Aquacel dressing. R HV drain removed without complication. ARIEL drains remain in place per plastics. H/H with gradual drop, patient denies SOB, chest pain, or dizziness. Iron studies reveal iron deficiency anemia. WBC down trending, leukocytosis work up negative remains afebrile.  Patient to follow up with PCP in one week. Discharge instructions reviewed with patient. He voiced understanding. All of his questions were answered.     Medications:  Continuous Infusions:   sodium chloride 0.9% 75 mL/hr at 09/28/20 1125     Scheduled Meds:   acyclovir  400 mg Oral BID    allopurinoL  300 mg Oral Daily    bisacodyL  10 mg Rectal Daily    carvediloL  25 mg Oral BID    cloNIDine  0.1 mg Oral Daily    ferrous sulfate  325 mg Oral Daily    heparin (porcine)  5,000 Units Subcutaneous Q8H    levoFLOXacin  500 mg Oral Daily    losartan  50 mg Oral Daily    NIFEdipine  60 mg Oral Daily    pantoprazole  40 mg Oral Daily    polyethylene glycol  17 g Oral Daily    senna-docusate 8.6-50 mg  1 tablet Oral BID    sulfamethoxazole-trimethoprim 400-80mg  1 tablet Oral Daily     PRN Meds:sodium chloride, acetaminophen, aluminum-magnesium hydroxide-simethicone, diazePAM, morphine, ondansetron, oxyCODONE, promethazine (PHENERGAN) IVPB, senna-docusate 8.6-50 mg     Review of Systems  Objective:     Weight: 97.3 kg (214 lb 8.1 oz)  Body mass index is 33.6 kg/m².  Vital Signs (Most Recent):  Temp: 98.9 °F (37.2 °C) (09/28/20 1213)  Pulse: 94 (09/28/20 1213)  Resp: (!) 22 (09/28/20 1213)  BP: 125/73 (09/28/20 1213)  SpO2: 96 % (09/28/20 1213) Vital Signs (24h Range):  Temp:  [97.8 °F (36.6 °C)-98.9 °F (37.2 °C)] 98.9 °F (37.2 °C)  Pulse:  [78-94] 94  Resp:  [17-28] 22  SpO2:  [94 %-98 %] 96 %  BP: (100-125)/(70-91) 125/73                          Closed/Suction Drain 09/23/20 1758 Back Bulb 15 Fr. (Active)   Site Description Other (Comment) 09/26/20 8913    Dressing Type Transparent (Tegaderm) 09/26/20 0815   Dressing Status Clean;Dry;Intact 09/27/20 0808   Dressing Intervention Integrity maintained 09/26/20 0815   Drainage Serosanguineous 09/26/20 0543   Status To bulb suction 09/26/20 0543   Output (mL) 18 mL 09/28/20 0636            Closed/Suction Drain 09/23/20 1758 Back Bulb 15 Fr. (Active)   Site Description Other (Comment) 09/26/20 0815   Dressing Type Transparent (Tegaderm) 09/26/20 0815   Dressing Status Clean;Dry;Intact 09/27/20 0808   Dressing Intervention Integrity maintained 09/26/20 0815   Drainage Serosanguineous 09/26/20 0815   Status To bulb suction 09/26/20 0815   Output (mL) 10 mL 09/28/20 0636       Neurosurgery Physical Exam     General: well developed, well nourished, no distress.   Head: normocephalic, atraumatic  Neck: No tracheal deviation. No palpable masses.  Neurologic: Alert and oriented. Thought content appropriate.  GCS: Motor: 6/Verbal: 5/Eyes: 4 GCS Total: 15  Mental Status: Awake, Alert, Oriented x 4  Language: No aphasia  Speech: No dysarthria  Cranial nerves: face symmetric, tongue midline, CN II-XII grossly intact.   Eyes: pupils equal, round, reactive to light with accomodation, EOMI.  Ears: No drainage.   Pulmonary: normal respirations, no signs of respiratory distress  Abdomen: soft, non-distended, not tender to palpation  Sensory: intact to light touch throughout  Motor Strength: Moves all extremities spontaneously with good tone.  Full strength upper and lower extremities. No abnormal movements seen.      Strength   Deltoids Triceps Biceps Wrist Extension Wrist Flexion Hand    Upper: R 5/5 5/5 5/5 5/5 5/5 5/5     L 5/5 5/5 5/5 5/5 5/5 5/5       Iliopsoas Quadriceps Knee  Flexion Tibialis  anterior Gastro- cnemius EHL   Lower: R 5/5 5/5 5/5 5/5 5/5 5/5     L 5/5 5/5 5/5 5/5 5/5 5/5      Vascular: No LE edema.   Skin: Skin is warm, dry and intact.     Incisional wound vac removed by plastic. Replaced with aquacel  dressing. No surrounding erythema or edema. No drainage from incision. No palpable hematoma.       Significant Labs:  Recent Labs   Lab 09/27/20  0350 09/28/20  0454   * 104   * 130*   K 4.0 3.5   CL 99 100   CO2 24 21*   BUN 24* 26*   CREATININE 1.2 1.0   CALCIUM 8.2* 7.9*     Recent Labs   Lab 09/27/20  0350 09/28/20  0454   WBC 15.64* 13.07*   HGB 9.2* 8.8*   HCT 27.7* 25.7*    175     No results for input(s): LABPT, INR, APTT in the last 48 hours.  Microbiology Results (last 7 days)     ** No results found for the last 168 hours. **        All pertinent labs from the last 24 hours have been reviewed.    Significant Diagnostics:  No results found in the last 24 hours.

## 2020-09-28 NOTE — PLAN OF CARE
Problem: Adult Inpatient Plan of Care  Goal: Plan of Care Review  Outcome: Ongoing, Progressing   Plan of care reviewed with pt. Pt voiced understanding. Pt AAOx3. Pt denies any c/o during the shift. No apparent distress noted. Fall precautions maintained. Bed in lowest position, and locked. Call light within reach and advised to call for assistance. Side rails x 2 and slip resistant socks on at this time.

## 2020-09-28 NOTE — DISCHARGE INSTRUCTIONS
Post op Spine Patient Instructions    Activity Restrictions:  [x]  Return to work will be determined on an individual basis.  [x]  No lifting greater than 5-10 pounds.  [x]  Avoid bending and twisting the area of your surgery more than 45 degrees from neutral position in any direction.  [x]  No driving or operating machinery:  [x]  until cleared by your surgeon.  [x]  while taking narcotic pain medications or muscle relaxants.  [x]  Wear your brace at all times except when lying in bed, showering, using the restroom, or performing hygiene tasks.   [x]  Increase ambulation over the next 2 weeks so that you are walking 2 miles per day at 2 weeks post-operatively.  [x]  Walk on paved surfaces only. It is okay to walk up and down stairs while holding onto a side rail.  [x]  No sexual activity for 2 weeks.    Discharge Medication/Follow-up:  [x]  Please refer to discharge medication reconciliation form.  [x]  Do not take ANY Aspirin or Aspirin containing products for 1 week after surgery.   [x]  Do not take ANY herbal supplements for 1 week after surgery.    [x]  Do not take ANY non-steroidal anti-inflammatory drugs (NSAIDS), including the following: ibuprofen, naprosyn, Aleve, Advil, Indocin, Mobic, or Celebrex for 12 weeks after surgery.   [x]  Prescriptions for appropriate medication will be given upon discharge.  [x]  Take docusate (Colace 100 mg): take one capsule a day as needed for constipation. You can get this over the counter.  [x]  Follow-up appointment:  [x]  10-14 days post-op for wound check by physician assistant/nurse  [x]  4-6 weeks with MD:  [x]  with x-rays  [x]  An appointment will be mailed to you.    Wound Care:  [x]  Aquacel dressing to incision, change every other day. You may go home with surgical drains. These will need to be emptied with the output recorded as they get full. You will follow-up 1 week after discharge from the hospital with Plastic surgery for drain removal.   [x]  You may shower  on the 2nd day after your surgery. Keep the incision clean and dry at all times. Please cover the incision while showering and REMOVE once you have completed taking your shower. Do not allow the force of water to hit the incision. If the incision gets damp, pat it dry. Do not rub or scrub the incision.  [x]  You cannot take a bath until 8 weeks after surgery.  [x]  Apply bacitracin to incision twice a day for 2 weeks.     Call your doctor or go to the Emergency Room for any signs of infection, including: increased redness, drainage, pain, or fever (temperature ?101.5 for 24 hours). Call your doctor or go to the Emergency Room if there are any localized neurological changes; problems with speech, vision, numbness, tingling, weakness, or severe headache; or for other concerns.      Special Instructions:  [x]  No use of tobacco products.  [x]  Diet: Please eat a regular diet as tolerated.      Physicians need 3 days' notice for pain medicine to be refilled. Pain medicine will only be refilled between 8 AM and 5 PM, Monday through Friday, due to Food and Drug Administration regulation of documentation.    Your iron levels were found to be low while in the hospital. Please take ferrous sulfate 325 mg daily to replace. Iron can cause constipation so it is important that you take an over the counter laxative to prevent constipation.     Please follow up with your PCP within one week for re-check of your labs.    You are scheduled for an appointment with plastic surgery on 9/29 for removal of the remaining drains. Please keep this appointment.     If you have any questions about this form, please call [phone].    Form No. 69202 (Revised 10/31/2013)

## 2020-09-28 NOTE — PROGRESS NOTES
[facility]  Neurosurgery  Progress Note    Subjective:     History of Present Illness: Patient is a 59 yo male with a PMH of HTN, anxiety with no known oncologic history who presented to the ED 9/14 c/o 1 month of back pain and muscle soreness to the lower extremities. He states that he has cramping pain to bilateral thighs and calves for about 1 month and back pain started 2 weeks ago without radiation to the midline lumbar back. Back pain is worse when sitting on the couch vs. Laying down, also somewhat worse when walking. He has no weakness in his legs and is ambulatory. No radicular component to his back pain.  No numbness or tingling in the lower extremities. No bowel or bladder incontinence. He has not had difficulty using his hand or coordinating movements of the upper extremities, no neck pain. Transferred to [facility] after CT L spine at OSH revealed lumbar-sacral mass lesion concerning for malignancy. Denies any blood thinner use.      Medical history significant for occasional smoking for about 2 years total, no drinking or illicit substances. No known cancers, did not receive a screening colonoscopy. Family history of 'bone cancer' in his father who passed away in 2016.      Patient underwent bone marrow biopsy on 9/15 and L4-5 bone biopsy 9/16 with final pathology of plasma cell neoplasm. He was discharged from the hospital on 9/18 with close follow-up arranged on 10/1 with hematology/oncology. Presents today for L2-iliac fusion with tumor resection.     Post-Op Info:  Procedure(s) (LRB):  FUSION, SPINE, LUMBAR L2-pelvis. Depuy. Plastic surgery closure w/ Dr. Bellamy (N/A)  CREATION, FLAP, MUSCLE ROTATION (N/A)   5 Days Post-Op     Interval History:  NAEON. Prevena discontinued by Plastics and replaced with Aquacel dressing. R HV drain removed without complication. ARIEL drains remain in place per plastics. H/H with gradual drop, patient denies SOB, chest pain, or dizziness. Iron studies  reveal iron deficiency anemia. WBC down trending, leukocytosis work up negative remains afebrile.  Patient to follow up with PCP in one week. Discharge instructions reviewed with patient. He voiced understanding. All of his questions were answered.     Medications:  Continuous Infusions:   sodium chloride 0.9% 75 mL/hr at 09/28/20 1125     Scheduled Meds:   acyclovir  400 mg Oral BID    allopurinoL  300 mg Oral Daily    bisacodyL  10 mg Rectal Daily    carvediloL  25 mg Oral BID    cloNIDine  0.1 mg Oral Daily    ferrous sulfate  325 mg Oral Daily    heparin (porcine)  5,000 Units Subcutaneous Q8H    levoFLOXacin  500 mg Oral Daily    losartan  50 mg Oral Daily    NIFEdipine  60 mg Oral Daily    pantoprazole  40 mg Oral Daily    polyethylene glycol  17 g Oral Daily    senna-docusate 8.6-50 mg  1 tablet Oral BID    sulfamethoxazole-trimethoprim 400-80mg  1 tablet Oral Daily     PRN Meds:sodium chloride, acetaminophen, aluminum-magnesium hydroxide-simethicone, diazePAM, morphine, ondansetron, oxyCODONE, promethazine (PHENERGAN) IVPB, senna-docusate 8.6-50 mg     Review of Systems  Objective:     Weight: 97.3 kg (214 lb 8.1 oz)  Body mass index is 33.6 kg/m².  Vital Signs (Most Recent):  Temp: 98.9 °F (37.2 °C) (09/28/20 1213)  Pulse: 94 (09/28/20 1213)  Resp: (!) 22 (09/28/20 1213)  BP: 125/73 (09/28/20 1213)  SpO2: 96 % (09/28/20 1213) Vital Signs (24h Range):  Temp:  [97.8 °F (36.6 °C)-98.9 °F (37.2 °C)] 98.9 °F (37.2 °C)  Pulse:  [78-94] 94  Resp:  [17-28] 22  SpO2:  [94 %-98 %] 96 %  BP: (100-125)/(70-91) 125/73                          Closed/Suction Drain 09/23/20 1758 Back Bulb 15 Fr. (Active)   Site Description Other (Comment) 09/26/20 0815   Dressing Type Transparent (Tegaderm) 09/26/20 0815   Dressing Status Clean;Dry;Intact 09/27/20 0808   Dressing Intervention Integrity maintained 09/26/20 0815   Drainage Serosanguineous 09/26/20 0543   Status To bulb suction 09/26/20 0543   Output (mL) 18  mL 09/28/20 0636            Closed/Suction Drain 09/23/20 1758 Back Bulb 15 Fr. (Active)   Site Description Other (Comment) 09/26/20 0815   Dressing Type Transparent (Tegaderm) 09/26/20 0815   Dressing Status Clean;Dry;Intact 09/27/20 0808   Dressing Intervention Integrity maintained 09/26/20 0815   Drainage Serosanguineous 09/26/20 0815   Status To bulb suction 09/26/20 0815   Output (mL) 10 mL 09/28/20 0636       Neurosurgery Physical Exam     General: well developed, well nourished, no distress.   Head: normocephalic, atraumatic  Neck: No tracheal deviation. No palpable masses.  Neurologic: Alert and oriented. Thought content appropriate.  GCS: Motor: 6/Verbal: 5/Eyes: 4 GCS Total: 15  Mental Status: Awake, Alert, Oriented x 4  Language: No aphasia  Speech: No dysarthria  Cranial nerves: face symmetric, tongue midline, CN II-XII grossly intact.   Eyes: pupils equal, round, reactive to light with accomodation, EOMI.  Ears: No drainage.   Pulmonary: normal respirations, no signs of respiratory distress  Abdomen: soft, non-distended, not tender to palpation  Sensory: intact to light touch throughout  Motor Strength: Moves all extremities spontaneously with good tone.  Full strength upper and lower extremities. No abnormal movements seen.      Strength   Deltoids Triceps Biceps Wrist Extension Wrist Flexion Hand    Upper: R 5/5 5/5 5/5 5/5 5/5 5/5     L 5/5 5/5 5/5 5/5 5/5 5/5       Iliopsoas Quadriceps Knee  Flexion Tibialis  anterior Gastro- cnemius EHL   Lower: R 5/5 5/5 5/5 5/5 5/5 5/5     L 5/5 5/5 5/5 5/5 5/5 5/5      Vascular: No LE edema.   Skin: Skin is warm, dry and intact.     Incisional wound vac removed by plastic. Replaced with aquacel dressing. No surrounding erythema or edema. No drainage from incision. No palpable hematoma.       Significant Labs:  Recent Labs   Lab 09/27/20  0350 09/28/20  0454   * 104   * 130*   K 4.0 3.5   CL 99 100   CO2 24 21*   BUN 24* 26*   CREATININE 1.2 1.0    CALCIUM 8.2* 7.9*     Recent Labs   Lab 09/27/20  0350 09/28/20  0454   WBC 15.64* 13.07*   HGB 9.2* 8.8*   HCT 27.7* 25.7*    175     No results for input(s): LABPT, INR, APTT in the last 48 hours.  Microbiology Results (last 7 days)     ** No results found for the last 168 hours. **        All pertinent labs from the last 24 hours have been reviewed.    Significant Diagnostics:  No results found in the last 24 hours.      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    - Neurologically stable  - Post-op anemia: H/H 8.8/25.7. Iron studies consistent with iron deficiency anemia. Begin ferrous sulfate 325 mg daily. Follow up with PCP within one week for continued management. CT abdomen/pelvis with stable postop findings, no evidence of acute hemorrhage.  - Leukocytosis: downtrending, Afebrile. CXR, BLE U/S, and UA all negative. Most likely reactive 2/2 procedure. Follow up with PCP in one week for repeat labs. Patient knows he is responsible for this appointment.    - Pain controlled with current regimen.   - Wound vac discontinued 9/28 per plastics. Replaced with Aquacel dressing.   - Drains: Remaining HV drain removed 9/28 without complication. ARIEL drains remain in place per plastics. Plastics to arrange outpt follow up for drain removal.   - Post op imaging showing satisfactory placement of hardware.   - LSO brace when up/OOB.  - Continue PT/OT/OOB. OOB > 6hrs/day  - Voiding s/p kaye removal.   - DVT ppx: SQH, Compression stockings, SCDs  - Atelectasis ppx: IS at bedside. Encourage use every hour while awake.  - Hold daily ASA until 7 days post op.  - Stable for discharge. Discharge instructions reviewed with patient and his wife. They voiced understanding. All of their questions were answered.   - Follow up with plastics for suture removal and ARIEL  drain removal. To be made by plastics.   - 6 week follow up with Dr. Coyle scheduled for 11/5 @ 1:30 pm.     - Discussed with CHARISMA AragonC  Neurosurgery  [facility]

## 2020-09-28 NOTE — ASSESSMENT & PLAN NOTE
Patient is a 61 yo male with a PMH of HTN, anxiety, and recently diagnosed multiple myeloma now s/p L2-iliac fusion with tumor resection.    - Neurologically stable  - Post-op anemia: H/H 8.8/25.7. Iron studies consistent with iron deficiency anemia. Begin ferrous sulfate 325 mg daily. Follow up with PCP within one week for continued management. CT abdomen/pelvis with stable postop findings, no evidence of acute hemorrhage.  - Leukocytosis: downtrending, Afebrile. CXR, BLE U/S, and UA all negative. Most likely reactive 2/2 procedure. Follow up with PCP in one week for repeat labs. Patient knows he is responsible for this appointment.    - Pain controlled with current regimen.   - Wound vac discontinued 9/28 per plastics. Replaced with Aquacel dressing.   - Drains: Remaining HV drain removed 9/28 without complication. ARIEL drains remain in place per plastics. Plastics to arrange outpt follow up for drain removal.   - Post op imaging showing satisfactory placement of hardware.   - LSO brace when up/OOB.  - Continue PT/OT/OOB. OOB > 6hrs/day  - Voiding s/p kaye removal.   - DVT ppx: SQH, Compression stockings, SCDs  - Atelectasis ppx: IS at bedside. Encourage use every hour while awake.  - Hold daily ASA until 7 days post op.  - Stable for discharge. Discharge instructions reviewed with patient and his wife. They voiced understanding. All of their questions were answered.   - Follow up with plastics for suture removal and ARIEL drain removal. To be made by plastics.   - 6 week follow up with Dr. Coyle scheduled for 11/5 @ 1:30 pm.     - Discussed with Dr. Coyle

## 2020-09-28 NOTE — PLAN OF CARE
09/28/20 1309   Post-Acute Status   Post-Acute Authorization Other   Other Status No Post-Acute Service Needs       No SW needs noted upon D/C. SW in contact with CM and Medical staff. Will continue to follow and offer support as needed.     Vik Barrett, JARETH  [facility]   Ext. [phone]

## 2020-09-28 NOTE — PROGRESS NOTES
[facility]  General Surgery  Progress Note    Subjective:     History of Present Illness:  No notes on file    Post-Op Info:  Procedure(s) (LRB):  FUSION, SPINE, LUMBAR L2-pelvis. Depuy. Plastic surgery closure w/ Dr. Bellamy (N/A)  CREATION, FLAP, MUSCLE ROTATION (N/A)   5 Days Post-Op     Interval History: NEAON. Sleeping, voiding well with regular diet.     Medications:  Continuous Infusions:  Scheduled Meds:   acyclovir  400 mg Oral BID    allopurinoL  300 mg Oral Daily    bisacodyL  10 mg Rectal Daily    carvediloL  25 mg Oral BID    cloNIDine  0.1 mg Oral Daily    heparin (porcine)  5,000 Units Subcutaneous Q8H    levoFLOXacin  500 mg Oral Daily    losartan  50 mg Oral Daily    mupirocin   Nasal BID    NIFEdipine  60 mg Oral Daily    pantoprazole  40 mg Oral Daily    senna-docusate 8.6-50 mg  1 tablet Oral BID    sulfamethoxazole-trimethoprim 400-80mg  1 tablet Oral Daily     PRN Meds:sodium chloride, acetaminophen, aluminum-magnesium hydroxide-simethicone, diazePAM, morphine, ondansetron, oxyCODONE, promethazine (PHENERGAN) IVPB, senna-docusate 8.6-50 mg     Review of patient's allergies indicates:  No Known Allergies  Objective:     Vital Signs (Most Recent):  Temp: 98.6 °F (37 °C) (09/28/20 0434)  Pulse: 78 (09/28/20 0434)  Resp: 19 (09/28/20 0434)  BP: 100/74 (09/28/20 0434)  SpO2: 95 % (09/28/20 0248) Vital Signs (24h Range):  Temp:  [98.2 °F (36.8 °C)-98.8 °F (37.1 °C)] 98.6 °F (37 °C)  Pulse:  [] 78  Resp:  [17-30] 19  SpO2:  [94 %-98 %] 95 %  BP: (100-154)/() 100/74     Weight: 97.3 kg (214 lb 8.1 oz)  Body mass index is 33.6 kg/m².    Intake/Output - Last 3 Shifts       09/26 0700 - 09/27 0659 09/27 0700 - 09/28 0659    P.O. 714     Total Intake(mL/kg) 714 (7.3)     Urine (mL/kg/hr) 850 (0.4) 2750 (1.2)    Drains  65    Other  0    Stool 0 0    Total Output 850 2815    Net -136 -2815          Stool Occurrence 0 x 1 x          Physical Exam  Vitals signs and  nursing note reviewed.   HENT:      Head: Normocephalic and atraumatic.   Abdominal:      General: Abdomen is flat.      Palpations: Abdomen is soft.   Musculoskeletal:      Comments: Wound vac to L2 spinal incision, seal intact without surrounding skin changes.   Right sided ARIEL drains (2) with ss output  Left sided ARIEL with ss output   Skin:     General: Skin is warm and dry.   Neurological:      General: No focal deficit present.      Mental Status: He is oriented to person, place, and time.   Psychiatric:         Mood and Affect: Mood normal.         Behavior: Behavior normal.         Significant Labs:  CBC:   Recent Labs   Lab 09/28/20  0454   WBC 13.07*   RBC 2.83*   HGB 8.8*   HCT 25.7*      MCV 91   MCH 31.1*   MCHC 34.2     CMP:   Recent Labs   Lab 09/24/20  1007  09/28/20 0454   GLU  --    < > 104   CALCIUM  --    < > 7.9*   ALBUMIN 2.5*  --   --    NA  --    < > 130*   K  --    < > 3.5   CO2  --    < > 21*   CL  --    < > 100   BUN  --    < > 26*   CREATININE  --    < > 1.0    < > = values in this interval not displayed.       Significant Diagnostics:  I have reviewed all pertinent imaging results/findings within the past 24 hours.    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  - Continue incisional vac for at least 5 days (due for take-down Tuesday 9/29); plan to have pt follow up in clinic for wound vac removal.   - Will remove wound vac tomorrow if pt still admitted   - continue to monitor and record drain output  - remainder of care per primary  - plastics to continue to follow           Flora Montgomery MD  General Surgery  [facility]

## 2020-09-28 NOTE — PHYSICIAN QUERY
PT Name: Jaspreet Walsh Jr.  MR #: [id number]    HEMATOLOGY CLARIFICATION      CDS: Leticia French RN, CCDS       Contact information: [email or web address]    This form is a permanent document in the medical record.      Query Date: September 28, 2020    By submitting this query, we are merely seeking further clarification of documentation. Please utilize your independent clinical judgment when addressing the question(s) below.    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   9/24-NSGY PN   X Treatments ferrous sulfate EC tablet 325 mg   Ordered Dose: 325 mg   Route: Oral   Frequency: Daily  Administration Dose: 325 mg     9/28-MAR   X        X        X    X Other Repeat CBC showed continued drop in H/H, now 10/33. No significant output from drains. Will transfuse 1 Unit RBC. F/u stat CT abdomen/pelvis to r/o hemorrhage.      H/H stable at 11.2/34.6 s/p transfusion of 1 unit. CT abdomen/pelvis with stable postop findings, no evidence of acute hemorrhage.     ESTIMATED BLOOD LOSS: 300mL    Iron=35, SISH=165, Sat Iron=23, Ucjmwpusnhz=380, Ferritin=2,221   9/24-NSGY PN        9/25-NSGY PN        9/23-Op Note    9/28-Labs     Provider, please specify diagnosis or diagnoses associated with above clinical findings.   [  X ] Acute blood loss anemia expected post-operatively    [   ] Iron deficiency anemia    [   ] Anemia due to neoplastic disease   [   ] Anemia due to chronic disease  (please specify): _________________   [   ] Other Hematological Diagnosis (please specify): _________________   [   ] Clinically Undetermined            Please document in your progress notes daily for the duration of treatment, until resolved, and include in your discharge summary.

## 2020-09-28 NOTE — SUBJECTIVE & OBJECTIVE
Interval History: NEAON. Sleeping, voiding well with regular diet.     Medications:  Continuous Infusions:  Scheduled Meds:   acyclovir  400 mg Oral BID    allopurinoL  300 mg Oral Daily    bisacodyL  10 mg Rectal Daily    carvediloL  25 mg Oral BID    cloNIDine  0.1 mg Oral Daily    heparin (porcine)  5,000 Units Subcutaneous Q8H    levoFLOXacin  500 mg Oral Daily    losartan  50 mg Oral Daily    mupirocin   Nasal BID    NIFEdipine  60 mg Oral Daily    pantoprazole  40 mg Oral Daily    senna-docusate 8.6-50 mg  1 tablet Oral BID    sulfamethoxazole-trimethoprim 400-80mg  1 tablet Oral Daily     PRN Meds:sodium chloride, acetaminophen, aluminum-magnesium hydroxide-simethicone, diazePAM, morphine, ondansetron, oxyCODONE, promethazine (PHENERGAN) IVPB, senna-docusate 8.6-50 mg     Review of patient's allergies indicates:  No Known Allergies  Objective:     Vital Signs (Most Recent):  Temp: 98.6 °F (37 °C) (09/28/20 0434)  Pulse: 78 (09/28/20 0434)  Resp: 19 (09/28/20 0434)  BP: 100/74 (09/28/20 0434)  SpO2: 95 % (09/28/20 0248) Vital Signs (24h Range):  Temp:  [98.2 °F (36.8 °C)-98.8 °F (37.1 °C)] 98.6 °F (37 °C)  Pulse:  [] 78  Resp:  [17-30] 19  SpO2:  [94 %-98 %] 95 %  BP: (100-154)/() 100/74     Weight: 97.3 kg (214 lb 8.1 oz)  Body mass index is 33.6 kg/m².    Intake/Output - Last 3 Shifts       09/26 0700 - 09/27 0659 09/27 0700 - 09/28 0659    P.O. 714     Total Intake(mL/kg) 714 (7.3)     Urine (mL/kg/hr) 850 (0.4) 2750 (1.2)    Drains  65    Other  0    Stool 0 0    Total Output 850 2815    Net -136 -2815          Stool Occurrence 0 x 1 x          Physical Exam  Vitals signs and nursing note reviewed.   HENT:      Head: Normocephalic and atraumatic.   Abdominal:      General: Abdomen is flat.      Palpations: Abdomen is soft.   Musculoskeletal:      Comments: Wound vac to L2 spinal incision, seal intact without surrounding skin changes.   Right sided ARIEL drains (2) with ss output  Left  sided ARIEL with ss output   Skin:     General: Skin is warm and dry.   Neurological:      General: No focal deficit present.      Mental Status: He is oriented to person, place, and time.   Psychiatric:         Mood and Affect: Mood normal.         Behavior: Behavior normal.         Significant Labs:  CBC:   Recent Labs   Lab 09/28/20 0454   WBC 13.07*   RBC 2.83*   HGB 8.8*   HCT 25.7*      MCV 91   MCH 31.1*   MCHC 34.2     CMP:   Recent Labs   Lab 09/24/20  1007  09/28/20 0454   GLU  --    < > 104   CALCIUM  --    < > 7.9*   ALBUMIN 2.5*  --   --    NA  --    < > 130*   K  --    < > 3.5   CO2  --    < > 21*   CL  --    < > 100   BUN  --    < > 26*   CREATININE  --    < > 1.0    < > = values in this interval not displayed.       Significant Diagnostics:  I have reviewed all pertinent imaging results/findings within the past 24 hours.

## 2020-09-28 NOTE — PROGRESS NOTES
No overnight acute events     Vitals:    09/27/20 1510   BP:    Pulse:    Resp:    Temp: 98.5 °F (36.9 °C)       Neurosurgery Physical Exam     General: well developed, well nourished, no distress.   Head: normocephalic, atraumatic  Neck: No tracheal deviation. No palpable masses.  Neurologic: Alert and oriented. Thought content appropriate.  GCS: Motor: 6/Verbal: 5/Eyes: 4 GCS Total: 15  Mental Status: Awake, Alert, Oriented x 4  Language: No aphasia  Speech: No dysarthria  Cranial nerves: face symmetric, tongue midline, CN II-XII grossly intact.   Eyes: pupils equal, round, reactive to light with accomodation, EOMI.  Ears: No drainage.   Pulmonary: normal respirations, no signs of respiratory distress  Abdomen: soft, non-distended, not tender to palpation  Sensory: intact to light touch throughout  Motor Strength: Moves all extremities spontaneously with good tone.  Full strength upper and lower extremities. No abnormal movements seen.      Strength   Deltoids Triceps Biceps Wrist Extension Wrist Flexion Hand    Upper: R 5/5 5/5 5/5 5/5 5/5 5/5     L 5/5 5/5 5/5 5/5 5/5 5/5       Iliopsoas Quadriceps Knee  Flexion Tibialis  anterior Gastro- cnemius EHL   Lower: R 5/5 5/5 5/5 5/5 5/5 5/5     L 5/5 5/5 5/5 5/5 5/5 5/5      Vascular: No LE edema.   Skin: Skin is warm, dry and intact.     Incisional wound vac in place, good seal. No surrounding erythema or edema. No drainage from incision. No palpable hematoma.        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     - Neurologically stable  - CT abdomen/pelvis with stable postop findings, no evidence of acute hemorrhage.   - Pain controlled with current regimen.   - Wound vac per plastic surgery.  - Drains:  continue R HV. ARIEL drains per plastic surgery.  - Post op imaging showing satisfactory placement of hardware.   - LSO brace  when up/OOB.  - Continue PT/OT/OOB. OOB > 6hrs/day  - GI: Diet as tolerated. Continue bowel regimen. + flatus, no BM.   - Voiding s/p kaye removal.   - DVT ppx: SQH, Compression stockings, SCDs  - Atelectasis ppx: IS at bedside. Encourage use every hour while awake.     Drain output > 100cc.  Will monitor again tomorrow.

## 2020-09-28 NOTE — PLAN OF CARE
Patient discharged home.  The patient does not have any home needs.  Family provided transportation home. Neurosurgery clinic to schedule follow up appointment.    Future Appointments   Date Time Provider Department Center   10/1/2020  9:00 AM Steven Cornejo MD [facility] HC BMT [facility]   10/1/2020  9:30 AM [facility]JE[facility]FUS[facility]   11/5/2020  1:30 PM Nick Coyle MD [facility] NEUROS7 [facility]       09/28/20 2502   Final Note   Assessment Type Final Discharge Note   Anticipated Discharge Disposition Home   Hospital Follow Up  Appt(s) scheduled?   (Neurosurgery clinic to schedule follow up appointment.)   Discharge plans and expectations educations in teach back method with documentation complete? Yes   Right Care Referral Info   Post Acute Recommendation No Care   Post-Acute Status   Other Status No Post-Acute Service Needs   Discharge Delays None known at this time

## 2020-09-28 NOTE — PLAN OF CARE
Problem: Occupational Therapy Goal  Goal: Occupational Therapy Goal  Description: Goals to be met by: 10/25/2020    Patient will increase functional independence with ADLs by performing:    UE Dressing with Set-up Assistance assist for TSLO following Patient/caregiver training.  LE Dressing with Stand-by Assistance.  Grooming while standing at sink with Set-up Assistance.  Toileting from toilet with Stand-by Assistance for hygiene and clothing management.   Bathing from  standing at sink with Set-up Assistance UB and evie care sponge  Toilet transfer to toilet with Stand-by Assistance.    Outcome: Ongoing, Progressing  OT goals remain     Radha Miller OT  9/28/2020

## 2020-09-28 NOTE — PSYCH
Courtesy visit to patient from [facility] PSYCH at request of patient. Patient requests to be connected with HEM/ONC SW. Note forwarded. No other needs communicated. Patient reported overall doing well- needing financial assistance. Stated he will f/u with me PRN.

## 2020-09-28 NOTE — ASSESSMENT & PLAN NOTE
Jaspreet Walsh is a 60 year old male with a history of lytic spine lesions concerning for metastasis now s/p lumbar L2-pelvis fusion with muscle flaps for closure.  - Continue incisional vac for at least 5 days (due for take-down Tuesday 9/29); plan to have pt follow up in clinic for wound vac removal.   - Will remove wound vac tomorrow if pt still admitted   - continue to monitor and record drain output  - remainder of care per primary  - plastics to continue to follow

## 2020-09-28 NOTE — PT/OT/SLP PROGRESS
Occupational Therapy   Treatment    Name: Jaspreet Walsh Jr.  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 is a 60 y.o. 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  Patient did well with OT session today. He required MIN A for donning TLSO while sitting edge of bed. Patient completed grooming task at stand by assistance standing at sink. Patient ambulated 200 feet with rolling walker at stand by assistance to increased endurance for ADLs. Performance deficits affecting function are weakness, impaired self care skills, impaired functional mobilty, gait instability, impaired balance, decreased ROM, orthopedic precautions.     Rehab Prognosis:  Good; patient would benefit from acute skilled OT services to address these deficits and reach maximum level of function.       Plan:     Patient to be seen 5 x/week to address the above listed problems via therapeutic activities, therapeutic exercises, self-care/home management  · Plan of Care Expires:    · Plan of Care Reviewed with: patient, spouse    Subjective     Patient reported he was doing well today.     Pain/Comfort:  · Pain Rating 1: 0/10    Objective:     Communicated with: RN prior to session.  Patient found supine with hemovac, ARIEL drain, telemetry, SCD, wound vac upon OT entry to room.    General Precautions: Standard, fall   Orthopedic Precautions:spinal precautions   Braces: TLSO     Occupational Performance:     Bed Mobility:  Log roll   · Patient completed Rolling/Turning to Left with  stand by assistance  · Patient completed Supine to Sit with stand by assistance     Functional Mobility/Transfers:  · Patient completed Sit <> Stand Transfer with stand by assistance  with  rolling walker    · Patient completed  chair transfer with stand by assistance with rolling walker   · Functional Mobility: patient ambulated 200 feet with stand by assistance and rolling walker to increase endurance for ADLs     Activities of Daily Living:  · Grooming: stand by assistance completed standing at sink to wash face and brush teeth  · Upper Body Dressing: stand by assistance to don gown for back, minimal assistance to don TLSO while sitting edge of bed    AMPA 6 Click ADL: 17    Treatment & Education:  -Patient educated on spinal precautions: no bending, lifting, twisting   -Patient educated to bring legs to opposite knees for lower body dressing   -Patient educated on positioning of items for grooming and toilet task to adhere to spinal precautions   -Patient educated on use of TLSO for all activities when out of bed   -Patient educated on role of OT and plan of care     Patient left up in chair with all lines intact, call button in reach and wife presentEducation:      GOALS:   Multidisciplinary Problems     Occupational Therapy Goals        Problem: Occupational Therapy Goal    Goal Priority Disciplines Outcome Interventions   Occupational Therapy Goal     OT, PT/OT Ongoing, Progressing    Description: Goals to be met by: 10/25/2020    Patient will increase functional independence with ADLs by performing:    UE Dressing with Set-up Assistance assist for TSLO following Patient/caregiver training.  LE Dressing with Stand-by Assistance.  Grooming while standing at sink with Set-up Assistance.  Toileting from toilet with Stand-by Assistance for hygiene and clothing management.   Bathing from  standing at sink with Set-up Assistance UB and evie care sponge  Toilet transfer to toilet with Stand-by Assistance.                     Time Tracking:     OT Date of Treatment: 09/28/20  OT Start Time: 1004  OT Stop Time: 1032  OT Total Time (min): 28 min    Billable Minutes:Self Care/Home Management 15  Therapeutic Activity 13    Radha Miller  OT  9/28/2020

## 2020-09-29 ENCOUNTER — DOCUMENTATION ONLY (OUTPATIENT)
Dept: HEMATOLOGY/ONCOLOGY | Facility: CLINIC | Age: 60
End: 2020-09-29

## 2020-09-29 ENCOUNTER — HOSPITAL ENCOUNTER (EMERGENCY)
Facility: HOSPITAL | Age: 60
Discharge: HOME | End: 2020-09-29
Attending: EMERGENCY MEDICINE
Payer: COMMERCIAL

## 2020-09-29 VITALS
RESPIRATION RATE: 20 BRPM | DIASTOLIC BLOOD PRESSURE: 65 MMHG | BODY MASS INDEX: 33.74 KG/M2 | OXYGEN SATURATION: 98 % | TEMPERATURE: 99 F | HEIGHT: 67 IN | WEIGHT: 215 LBS | HEART RATE: 82 BPM | SYSTOLIC BLOOD PRESSURE: 105 MMHG

## 2020-09-29 DIAGNOSIS — R07.89 ATYPICAL CHEST PAIN: ICD-10-CM

## 2020-09-29 DIAGNOSIS — R10.13 EPIGASTRIC ABDOMINAL PAIN: Primary | ICD-10-CM

## 2020-09-29 LAB
COMMENT: NORMAL
FINAL PATHOLOGIC DIAGNOSIS: NORMAL
GROSS: NORMAL
MICROSCOPIC EXAM: NORMAL
SUPPLEMENTAL DIAGNOSIS: NORMAL

## 2020-09-29 PROCEDURE — 25000003 PHARM REV CODE 250: Mod: ER | Performed by: EMERGENCY MEDICINE

## 2020-09-29 PROCEDURE — 93010 ELECTROCARDIOGRAM REPORT: CPT | Mod: ,,, | Performed by: INTERNAL MEDICINE

## 2020-09-29 PROCEDURE — 93010 EKG 12-LEAD: ICD-10-PCS | Mod: ,,, | Performed by: INTERNAL MEDICINE

## 2020-09-29 PROCEDURE — 93005 ELECTROCARDIOGRAM TRACING: CPT | Mod: ER

## 2020-09-29 PROCEDURE — 99284 EMERGENCY DEPT VISIT MOD MDM: CPT | Mod: 25,ER

## 2020-09-29 RX ORDER — ONDANSETRON 4 MG/1
8 TABLET, ORALLY DISINTEGRATING ORAL
Status: COMPLETED | OUTPATIENT
Start: 2020-09-29 | End: 2020-09-29

## 2020-09-29 RX ORDER — LIDOCAINE HYDROCHLORIDE 20 MG/ML
10 SOLUTION OROPHARYNGEAL
Status: COMPLETED | OUTPATIENT
Start: 2020-09-29 | End: 2020-09-29

## 2020-09-29 RX ORDER — PANTOPRAZOLE SODIUM 20 MG/1
40 TABLET, DELAYED RELEASE ORAL DAILY
Qty: 60 TABLET | Refills: 0 | Status: SHIPPED | OUTPATIENT
Start: 2020-09-29 | End: 2021-02-11

## 2020-09-29 RX ORDER — FAMOTIDINE 20 MG/1
20 TABLET, FILM COATED ORAL 2 TIMES DAILY
Qty: 20 TABLET | Refills: 0 | Status: SHIPPED | OUTPATIENT
Start: 2020-09-29 | End: 2021-02-11

## 2020-09-29 RX ORDER — FAMOTIDINE 20 MG/1
20 TABLET, FILM COATED ORAL
Status: COMPLETED | OUTPATIENT
Start: 2020-09-29 | End: 2020-09-29

## 2020-09-29 RX ORDER — MAG HYDROX/ALUMINUM HYD/SIMETH 200-200-20
30 SUSPENSION, ORAL (FINAL DOSE FORM) ORAL
Status: COMPLETED | OUTPATIENT
Start: 2020-09-29 | End: 2020-09-29

## 2020-09-29 RX ADMIN — ONDANSETRON 8 MG: 4 TABLET, ORALLY DISINTEGRATING ORAL at 01:09

## 2020-09-29 RX ADMIN — LIDOCAINE HYDROCHLORIDE 10 ML: 20 SOLUTION ORAL; TOPICAL at 01:09

## 2020-09-29 RX ADMIN — ALUMINUM HYDROXIDE, MAGNESIUM HYDROXIDE, AND SIMETHICONE 30 ML: 200; 200; 20 SUSPENSION ORAL at 01:09

## 2020-09-29 RX ADMIN — FAMOTIDINE 20 MG: 20 TABLET ORAL at 01:09

## 2020-09-29 NOTE — DISCHARGE SUMMARY
[facility]  Neurosurgery  Discharge Summary      Patient Name: Jaspreet Walsh Jr.  MRN: [id number]  Admission Date: 9/23/2020  Hospital Length of Stay: 5 days  Discharge Date and Time: 9/28/2020  3:53 PM  Attending Physician: No att. providers found   Discharging Provider: Naomi Ibanez PA-C  Primary Care Provider: Con Patel Jr, MD    HPI:   Patient is a 59 yo male with a PMH of HTN, anxiety with no known oncologic history who presented to the ED 9/14 c/o 1 month of back pain and muscle soreness to the lower extremities. He states that he has cramping pain to bilateral thighs and calves for about 1 month and back pain started 2 weeks ago without radiation to the midline lumbar back. Back pain is worse when sitting on the couch vs. Laying down, also somewhat worse when walking. He has no weakness in his legs and is ambulatory. No radicular component to his back pain.  No numbness or tingling in the lower extremities. No bowel or bladder incontinence. He has not had difficulty using his hand or coordinating movements of the upper extremities, no neck pain. [facility] L spine at OSH revealed lumbar-sacral mass lesion concerning for malignancy. Denies any blood thinner use.      Medical history significant for occasional smoking for about 2 years total, no drinking or illicit substances. No known cancers, did not receive a screening colonoscopy. Family history of 'bone cancer' in his father who passed away in 2016.      Patient underwent bone marrow biopsy on 9/15 and L4-5 bone biopsy 9/16 with final pathology of plasma cell neoplasm. He was discharged from the hospital on 9/18 with close follow-up arranged on 10/1 with hematology/oncology. Presents today for L2-iliac fusion with tumor resection.     Procedure(s) (LRB):  FUSION, SPINE, LUMBAR L2-pelvis. Depuy. Plastic surgery closure w/ Dr. Bellamy (N/A)  CREATION, FLAP, MUSCLE ROTATION (N/A)     Hospital Course:  9/24: NAEON. AFVSS. Patient seen and examined this morning. Pain controlled with current regimen. Dc kaye today. Denies weakness, paresthesias, chest pain, SOB. Hypotensive episode this afternoon after kaye removed. Patient reports dizziness, lightheadedness, and paresthesias at the finger tips at the time of the episode. BP improved after bolus of fluids and cIVF. Repeat CBC showed continued drop in H/H, now 10/33. Patient remained neurologically stable without LOC.  9/25: NAEON. AFVSS. BP stable, home BP medications held this morning. Dc cIVF. Patient sitting up in chair in no acute distress. Denies weakness, numbness, paresthesias, headache, dizziness, lightheadedness. Pain controlled with current regimen. Voiding s/p kaye removal.   9/26: NAEON. Patient tolerating diet, no passage of flatus. Denies n/v/f/c. Otherwise doing well this AM.  9/27: NAEON. BM today. Drains in place with provena.   9/28: NAEON. Prevena discontinued by Plastics and replaced with Aquacel dressing. R HV drain removed without complication. ARIEL drains remain in place per plastics. H/H with gradual drop, patient denies SOB, chest pain, or dizziness. Iron studies reveal iron deficiency anemia. WBC down trending, leukocytosis work up negative remains afebrile.  Patient to follow up with PCP in one week. Discharge instructions reviewed with patient. He voiced understanding. All of his questions were answered.      Consults: PT/OT    Significant Diagnostic Studies: Labs:   CMP   Recent Labs   Lab 09/28/20  0454   *   K 3.5      CO2 21*      BUN 26*   CREATININE 1.0   CALCIUM 7.9*   ANIONGAP 9   ESTGFRAFRICA >60.0   EGFRNONAA >60.0    and CBC   Recent Labs   Lab 09/28/20  0454   WBC 13.07*   HGB 8.8*   HCT 25.7*          Pending Diagnostic Studies:     Procedure Component Value Units Date/Time    Specimen to Pathology, Surgery Neurosurgery [[id number]] Collected: 09/23/20 9581    Order Status: Sent Lab Status: In  process Updated: 09/24/20 1130        Final Active Diagnoses:    Diagnosis Date Noted POA    PRINCIPAL PROBLEM:  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    Specialty: Family Medicine  Why: Outpatient Services  Contact information:  [address] [place] [address]  [phone]             [facility] - Neurosurgery 7th Fl In 2 weeks.    Specialty: Neurosurgery  Why: For wound re-check  Contact information:  [address]  [facility] [address] [phone]  Additional information:  7th Floor           Kamlesh Bellamy MD In 1 week.    Specialty: Plastic Surgery  Why: for drain removal  Contact information:  [address]  [facility] [id number] [phone]                 Patient Instructions:      Leave dressing on - Keep it clean, dry, and intact until clinic visit   Order Comments: The dressing on your incision is waterproof. You may shower tomorrow. We will take the dressing down in clinic.     Please record the daily outputs of your ARIEL bulb drains and strip them twice daily.     Notify your health care provider if you experience any of the following:  temperature >100.4     Notify your health care provider if you experience any of the following:  persistent nausea and vomiting or diarrhea     Notify your health care provider if you experience any of the following:  severe uncontrolled pain     Notify your health care provider if you experience any of the following:  redness, tenderness, or signs of infection (pain, swelling, redness, odor or green/yellow discharge around incision site)     Notify your health care provider if you experience any of the following:  difficulty breathing or increased cough     Notify your health care provider if you experience any of the following:  severe  persistent headache     Notify your health care provider if you experience any of the following:  worsening rash     Notify your health care provider if you experience any of the following:  persistent dizziness, light-headedness, or visual disturbances     Notify your health care provider if you experience any of the following:  increased confusion or weakness     Activity as tolerated     Medications:  Reconciled Home Medications:      Medication List      START taking these medications    diazePAM 5 MG tablet  Commonly known as: VALIUM  Take 1 tablet (5 mg total) by mouth every 8 (eight) hours as needed (muscle spasm).     ferrous sulfate 325 (65 FE) MG EC tablet  Take 1 tablet (325 mg total) by mouth once daily.     oxyCODONE 10 mg Tab immediate release tablet  Commonly known as: ROXICODONE  Take 1 tablet (10 mg total) by mouth every 6 (six) hours as needed (pain 6-7/10).        CONTINUE taking these medications    acyclovir 400 MG tablet  Commonly known as: ZOVIRAX  Take 1 tablet (400 mg total) by mouth 2 (two) times daily.     allopurinoL 300 MG tablet  Commonly known as: ZYLOPRIM  Take 1 tablet (300 mg total) by mouth once daily.     carvediloL 25 MG tablet  Commonly known as: COREG  Take 1 tablet (25 mg total) by mouth 2 (two) times daily.     cloNIDine 0.1 MG tablet  Commonly known as: CATAPRES  Take 1 tablet (0.1 mg total) by mouth once daily.     levoFLOXacin 500 MG tablet  Commonly known as: LEVAQUIN  Take 1 tablet (500 mg total) by mouth once daily.     losartan 50 MG tablet  Commonly known as: COZAAR  Take 1 tablet (50 mg total) by mouth once daily.     NIFEdipine 60 MG (OSM) 24 hr tablet  Commonly known as: PROCARDIA-XL  Take 1 tablet (60 mg total) by mouth once daily.     sulfamethoxazole-trimethoprim 400-80mg 400-80 mg per tablet  Commonly known as: BACTRIM,SEPTRA  Take 1 tablet by mouth once daily.     VITAMIN D2 50,000 unit Cap  Generic drug: ergocalciferol  Take 1 capsule (50,000 Units total) by  mouth every 7 days.        STOP taking these medications    HYDROcodone-acetaminophen 7.5-325 mg per tablet  Commonly known as: NORCO        ASK your doctor about these medications    aspirin 81 MG Chew  Chew and swallow 1 tablet (81 mg total) by mouth once daily.     lenalidomide 25 mg Cap  Take 1 capsule (25 mg total) by mouth once daily On Days 1-21 of a 28 Day Cycle..            Naomi Ibanez PA-C  Neurosurgery  [facility]

## 2020-09-29 NOTE — ED NOTES
Incision sites under clean dry dressing midline back with one ARIEL drain left intact with scant amount blood in bulb and one ARIEL right with saturated 2x2 gauze dressing and scant amount blood in bulb.

## 2020-09-29 NOTE — NURSING
Patient discharged. IV access was removed and instructions reviewed. The patients vitals and assessment are stable.

## 2020-09-29 NOTE — PROGRESS NOTES
Received request to assist with questions relating to FMLA, disability, and financial assistance, as well as a request for a cane.  Spoke to patient who is unsure of his degree of need for financial assistance and requests follow-up after first visit with Hematology/Oncology.  He declines the order of a cane and denies the need for the walker he has already received.  Will re-assess needs on 10/1 following visit.  Will follow.

## 2020-09-30 ENCOUNTER — OFFICE VISIT (OUTPATIENT)
Dept: PLASTIC SURGERY | Facility: CLINIC | Age: 60
End: 2020-09-30
Payer: COMMERCIAL

## 2020-09-30 ENCOUNTER — NURSE TRIAGE (OUTPATIENT)
Dept: ADMINISTRATIVE | Facility: CLINIC | Age: 60
End: 2020-09-30

## 2020-09-30 DIAGNOSIS — C79.49 METASTASIS OF NEOPLASM TO SPINAL CANAL: Primary | ICD-10-CM

## 2020-09-30 LAB
COMMENT: NORMAL
FINAL PATHOLOGIC DIAGNOSIS: NORMAL
GROSS: NORMAL
Lab: NORMAL
MICROSCOPIC EXAM: NORMAL

## 2020-09-30 PROCEDURE — 99024 PR POST-OP FOLLOW-UP VISIT: ICD-10-PCS | Mod: S$GLB,,, | Performed by: SURGERY

## 2020-09-30 PROCEDURE — 99024 POSTOP FOLLOW-UP VISIT: CPT | Mod: S$GLB,,, | Performed by: SURGERY

## 2020-09-30 PROCEDURE — 99999 PR PBB SHADOW E&M-EST. PATIENT-LVL III: ICD-10-PCS | Mod: PBBFAC,,, | Performed by: SURGERY

## 2020-09-30 PROCEDURE — 99999 PR PBB SHADOW E&M-EST. PATIENT-LVL III: CPT | Mod: PBBFAC,,, | Performed by: SURGERY

## 2020-09-30 NOTE — TELEPHONE ENCOUNTER
Patient on [facility] post procedure symptom tracker.  Patient had office visit today.  No contact required

## 2020-09-30 NOTE — TELEPHONE ENCOUNTER
Reason for Disposition   Caller has already spoken with another triager or PCP AND has further questions AND triager able to answer questions.    Additional Information   Negative: Caller is angry or rude (e.g., hangs up, verbally abusive, yelling)   Negative: Caller hangs up   Negative: Caller has already spoken with another triager and has no further questions.   Negative: Caller has already spoken with the PCP and has no further questions.    Protocols used: NO CONTACT OR DUPLICATE CONTACT CALL-A-

## 2020-09-30 NOTE — PROGRESS NOTES
Plastic Update    Incision clean, dry in tact  No fluid collection  Old blood in drains  Resolving brusing in left lower quadrant  Has concavity to his back wound    Clean wound with soap and water after removing dressing  Recommend aquacel or mepilex minimally irritating dressing to lower back  Advised against staying in bed all day with back flat  Removed 1 drain  Follow up in 1 week to examine left sided drain    Plastic & Reconstructive Surgery  [facility]  c/o Kamlesh Bellamy M.D.  [facility]  [address]  [address] [place] [address]    Work [phone]  Toll free [phone]  If no answer [phone]

## 2020-10-01 ENCOUNTER — TELEPHONE (OUTPATIENT)
Dept: HEMATOLOGY/ONCOLOGY | Facility: CLINIC | Age: 60
End: 2020-10-01

## 2020-10-01 ENCOUNTER — OFFICE VISIT (OUTPATIENT)
Dept: HEMATOLOGY/ONCOLOGY | Facility: CLINIC | Age: 60
End: 2020-10-01
Payer: COMMERCIAL

## 2020-10-01 ENCOUNTER — INFUSION (OUTPATIENT)
Dept: INFUSION THERAPY | Facility: HOSPITAL | Age: 60
End: 2020-10-01
Attending: INTERNAL MEDICINE
Payer: COMMERCIAL

## 2020-10-01 VITALS
RESPIRATION RATE: 20 BRPM | BODY MASS INDEX: 33.74 KG/M2 | DIASTOLIC BLOOD PRESSURE: 79 MMHG | WEIGHT: 215 LBS | TEMPERATURE: 98 F | HEART RATE: 91 BPM | OXYGEN SATURATION: 98 % | HEIGHT: 67 IN | SYSTOLIC BLOOD PRESSURE: 140 MMHG

## 2020-10-01 VITALS
SYSTOLIC BLOOD PRESSURE: 134 MMHG | HEART RATE: 86 BPM | DIASTOLIC BLOOD PRESSURE: 78 MMHG | RESPIRATION RATE: 20 BRPM | TEMPERATURE: 98 F

## 2020-10-01 DIAGNOSIS — E88.09 PLASMA CELL DYSCRASIA: Primary | ICD-10-CM

## 2020-10-01 DIAGNOSIS — G95.89 LUMBAR EPIDURAL MASS: ICD-10-CM

## 2020-10-01 DIAGNOSIS — C90.00 MULTIPLE MYELOMA NOT HAVING ACHIEVED REMISSION: Primary | ICD-10-CM

## 2020-10-01 PROCEDURE — 99999 PR PBB SHADOW E&M-EST. PATIENT-LVL IV: CPT | Mod: PBBFAC,,, | Performed by: STUDENT IN AN ORGANIZED HEALTH CARE EDUCATION/TRAINING PROGRAM

## 2020-10-01 PROCEDURE — 99215 OFFICE O/P EST HI 40 MIN: CPT | Mod: S$GLB,,, | Performed by: STUDENT IN AN ORGANIZED HEALTH CARE EDUCATION/TRAINING PROGRAM

## 2020-10-01 PROCEDURE — 3008F BODY MASS INDEX DOCD: CPT | Mod: CPTII,S$GLB,, | Performed by: STUDENT IN AN ORGANIZED HEALTH CARE EDUCATION/TRAINING PROGRAM

## 2020-10-01 PROCEDURE — 63600175 PHARM REV CODE 636 W HCPCS: Mod: JG | Performed by: INTERNAL MEDICINE

## 2020-10-01 PROCEDURE — 99215 PR OFFICE/OUTPT VISIT, EST, LEVL V, 40-54 MIN: ICD-10-PCS | Mod: S$GLB,,, | Performed by: STUDENT IN AN ORGANIZED HEALTH CARE EDUCATION/TRAINING PROGRAM

## 2020-10-01 PROCEDURE — 99999 PR PBB SHADOW E&M-EST. PATIENT-LVL IV: ICD-10-PCS | Mod: PBBFAC,,, | Performed by: STUDENT IN AN ORGANIZED HEALTH CARE EDUCATION/TRAINING PROGRAM

## 2020-10-01 PROCEDURE — 3008F PR BODY MASS INDEX (BMI) DOCUMENTED: ICD-10-PCS | Mod: CPTII,S$GLB,, | Performed by: STUDENT IN AN ORGANIZED HEALTH CARE EDUCATION/TRAINING PROGRAM

## 2020-10-01 PROCEDURE — 96401 CHEMO ANTI-NEOPL SQ/IM: CPT

## 2020-10-01 RX ORDER — BORTEZOMIB 3.5 MG/1
1.3 INJECTION, POWDER, LYOPHILIZED, FOR SOLUTION INTRAVENOUS; SUBCUTANEOUS
Status: CANCELLED | OUTPATIENT
Start: 2020-10-01

## 2020-10-01 RX ORDER — HEPARIN 100 UNIT/ML
500 SYRINGE INTRAVENOUS
Status: CANCELLED | OUTPATIENT
Start: 2020-10-09

## 2020-10-01 RX ORDER — HEPARIN 100 UNIT/ML
500 SYRINGE INTRAVENOUS
Status: CANCELLED | OUTPATIENT
Start: 2020-10-16

## 2020-10-01 RX ORDER — BORTEZOMIB 3.5 MG/1
1.3 INJECTION, POWDER, LYOPHILIZED, FOR SOLUTION INTRAVENOUS; SUBCUTANEOUS
Status: CANCELLED | OUTPATIENT
Start: 2020-10-16

## 2020-10-01 RX ORDER — HEPARIN 100 UNIT/ML
500 SYRINGE INTRAVENOUS
Status: CANCELLED | OUTPATIENT
Start: 2020-10-01

## 2020-10-01 RX ORDER — SODIUM CHLORIDE 0.9 % (FLUSH) 0.9 %
10 SYRINGE (ML) INJECTION
Status: CANCELLED | OUTPATIENT
Start: 2020-10-16

## 2020-10-01 RX ORDER — BORTEZOMIB 3.5 MG/1
1.3 INJECTION, POWDER, LYOPHILIZED, FOR SOLUTION INTRAVENOUS; SUBCUTANEOUS
Status: CANCELLED | OUTPATIENT
Start: 2020-10-02

## 2020-10-01 RX ORDER — HEPARIN 100 UNIT/ML
500 SYRINGE INTRAVENOUS
Status: CANCELLED | OUTPATIENT
Start: 2020-10-02

## 2020-10-01 RX ORDER — ONDANSETRON HYDROCHLORIDE 8 MG/1
8 TABLET, FILM COATED ORAL EVERY 8 HOURS PRN
Qty: 60 TABLET | Refills: 2 | Status: SHIPPED | OUTPATIENT
Start: 2020-10-01 | End: 2021-02-11

## 2020-10-01 RX ORDER — SODIUM CHLORIDE 0.9 % (FLUSH) 0.9 %
10 SYRINGE (ML) INJECTION
Status: CANCELLED | OUTPATIENT
Start: 2020-10-09

## 2020-10-01 RX ORDER — DEXAMETHASONE 4 MG/1
40 TABLET ORAL WEEKLY
Qty: 160 TABLET | Refills: 0 | Status: SHIPPED | OUTPATIENT
Start: 2020-10-01 | End: 2020-10-19 | Stop reason: SDUPTHER

## 2020-10-01 RX ORDER — SODIUM CHLORIDE 0.9 % (FLUSH) 0.9 %
10 SYRINGE (ML) INJECTION
Status: CANCELLED | OUTPATIENT
Start: 2020-10-01

## 2020-10-01 RX ORDER — BORTEZOMIB 3.5 MG/1
1.3 INJECTION, POWDER, LYOPHILIZED, FOR SOLUTION INTRAVENOUS; SUBCUTANEOUS
Status: CANCELLED | OUTPATIENT
Start: 2020-10-09

## 2020-10-01 RX ORDER — BORTEZOMIB 3.5 MG/1
1.3 INJECTION, POWDER, LYOPHILIZED, FOR SOLUTION INTRAVENOUS; SUBCUTANEOUS
Status: COMPLETED | OUTPATIENT
Start: 2020-10-01 | End: 2020-10-01

## 2020-10-01 RX ORDER — SODIUM CHLORIDE 0.9 % (FLUSH) 0.9 %
10 SYRINGE (ML) INJECTION
Status: CANCELLED | OUTPATIENT
Start: 2020-10-02

## 2020-10-01 RX ADMIN — BORTEZOMIB 2.8 MG: 3.5 INJECTION, POWDER, LYOPHILIZED, FOR SOLUTION INTRAVENOUS; SUBCUTANEOUS at 10:10

## 2020-10-01 NOTE — Clinical Note
[facility] plan :  1. Schedule velcade injection on 10/08, 10/15 10/22 and 10/29  2. Check cbc and cmp before velcade on 10/15  3. Return to clinic on 10/29 with following labs cbc, cmp, t+s, uric acid, serum free light chains, quantitative immunoglobulins, serum electropheresis, serum immunofixation and appt with Dr. Marti prior to velcade in 4 weeks    Chelsie - diaz

## 2020-10-01 NOTE — OP NOTE
DATE OF SURGERY: 9/23/2020     SURGEON: Jaspreet Armstrong M.D.     CO-SURGEON: Nick Coyle M.D., Neurosurgery.    PREOPERATIVE DIAGNOSIS:  1. 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 New diagnosis of multiple myeloma  4. 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 New diagnosis of multiple myeloma  4. 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 Posterior spinal fusion, L2 to pelvis   2. Posterior segmental spinal fixation, L2 to ilium (DePuy Synthes)  3. Pelvic fixation with 4 iliac bolts (Depuy Synthes)  4. Cement augmentation of all screws and bolts  5. L4 to S1 laminectomy for resection of intradural tumor causing severe thecal sac compression  6.  Use of intraoperative fluoroscopy  7.  Use of intraoperative neuro-monitoring with MEPs  8. Vivigen and Jaxon putty bone grafting     ANESTHESIA: GETA     ESTIMATED BLOOD LOSS: 300mL     COMPLICATIONS: None     DRAINS: per neurosurgery     SPECIMENS SENT: Epidural tumor for permanent pathology     FINDINGS: None     INDICATIONS:     Please see Dr. Coyle' note for description of the indications for this operation  .  PROCEDURE:     The patient was brought into the operating room where he was intubated and placed under general anesthesia without difficulty.  All lines were placed. He was repositioned prone onto the operating room table with appropriate padding all pressure points. The region of interest from L2 to the pelvis was localized with AP and lateral x-ray.  The skin was marked and the area was prepped and draped in the usual sterile fashion.  A timeout was performed prior to procedure.  20 mL's of lidocaine with epinephrine were  injected in the skin.     We then made a midline linear incision with a 10 blade from approximately L2 to S2. Bilateral PSIS's were exposed laterally through the fascia with the Bovie in anticipation of our pelvic fixation. Next midline subfascial dissection was carried out with Bovie electrocautery.  Subperiosteal dissection was carried out with Bovie electrocautery and Weiss elevators to expose the posterior elements from L2 to the sacral plate.  A pedicle finder was placed into the presumed right L3 pedicle and confirmed on lateral x-ray. Pedicle screws were placed bilaterally at L2 and L3 using freehand technique. Next we achieved bilateral pelvic fixation with 4 iliac bolts (2 per side) using freehand technique. Xrays showed good screw and bolt position. We stimulated all hardware and found thresholds above 20mAmps. Next we augmented all hardware with cement using flouroscopy. No extravasation was seen.     Next we performed a laminectomy from L4 to S1 in standard fashion to resect his compressive epidural tumor. Extensive epidural tumor was seen at L5-S1 and was biopsied and resected. At the end of our decompression the thecal sac was no longer compressed, as confirmed by palpation with a [place] probe.     Bilateral titanium rods were sized, contoured and reduced into the tulip heads. Side connectors from the iliac bolts to the primary rods were placed on both sides. Set screws were tightened.  Final xrays showed excellent position of all hardware.  The wound was copiously irrigated with sterile normal saline and a dilute Betadine solution. Exposed bony surfaces from L2 to the sacral ala on the right side were decorticated with a high-speed drill.  Vivigen and Jaxon putty were placed posteriorly on the right side for arthrodesis from L2 to the sacrum. Two deep Hemovacs were placed , and the wound was then closed by the Plastic surgery service     JUSTIFICATION OF CO-SURGEON AND MODIFIER 22: This was a  complex spinal fixation and decompression of the lumbosacral junction in a patient with significant tumor burden, poor bone quality and few points of bony fixation. His underlying tumor and its location made the risks of wound healing problems and surgical site infection signficantly elevated. For this reason plastic surgery was enlisted for wound closure.  Two attending spine surgeons were indicated to reduce operative times, blood loss, and improve patient outcomes.

## 2020-10-01 NOTE — PROGRESS NOTES
Agree with Dr. Cornejo's  history, physical, assessment, and plan with the following addendums.      New standard risk r-iss stage 2 IgG kappa MM; standard risk CG with trisomies  Complicated by anemia, lytic bone disease, and symptomatic extradural lumbarsacral mass  Normal Ca, Cr; some likely related low level proteinuria with +UIEF   Sp resection of epidural mass; healing nicely with improved pain; follows with neurosx  Sp pulse dex inpt  Initiate VRd C1D1 today ; prescribed dex to  and start today   Awaiting revlimid delivery   Supportive med: acyc, bactrim, levaquin, allopurinol  Requested he get DDS clearance to start zometa   Fu uric acid level next appt and dc allopurinol if uric acid normal  Discussed planned 12-16 weeks of therapy >restaging and likely recommendation for transplant; appears to be excellent transplant candidate    FU: weekly thur velvade injection for next 4 weeks  -cbc, cmp, t+S prior to velcade in 2 weeks  -cbc, cmp, serum free light chains, quantitative immunoglobulins, serum electropheresis, serum immunofixation and appt with Dr. Marti prior to velcade in 4 weeks

## 2020-10-01 NOTE — NURSING
Patient received Velcade injection SQ to ABD.  Tolerated well.  Discussed medication and when to call the MD prior to administration. All questions answered. Vitals stable.  No apparent distress noted.  Patient assisted off unit with w/c accompanied by spouse.

## 2020-10-01 NOTE — PHYSICIAN QUERY
PT Name: Jaspreet Walsh Jr.  MR #: [id number]    Pathology Findings Clarification     CDS: Leticia French RN, CCDS       Contact information: [email or web address]    This form is a permanent document in the medical record.     Query Date: October 1, 2020    By submitting this query, we are merely seeking further clarification of documentation.  Please utilize your independent clinical judgment when addressing the question(s) below.    The medical record contains the following:  Pathology Findings Location in Medical Record   Final Pathologic Diagnosis  1.  SPINE TUMOR, L4-S1, LAMINECTOMY WITH RESECTION:   -- SKELETAL MUSCLE AND ADIPOSE TISSUE.   2.  EPIDURAL TUMOR, L4-S1, LAMINECTOMY WITH RESECTION:   -- PLASMA CELL NEOPLASM (SEE COMMENT).     Comment: Flow cytometric analysis is not performed.   Immunohistochemical stains are performed on part 2 with adequate controls. Plasma cells are positive for  and show kappa light chain restriction by immunoglobulin kappa and lambda light chain in situ hybridization study. LICO in situ hybridization for EBV is negative. Recent bone marrow study (09/16/2020) showed plasma cell neoplasm.      9/23-Pathology   Final result (9/30/2020 0959)       Please clarify:  [x  ] Pathology findings noted above are ruled in/confirmed as diagnoses   [  ] Pathology findings noted above are not confirmed as diagnoses   [  ] Other diagnosis (please specify): ___________   [  ] Clinically Undetermined       Please document in your progress notes daily for the duration of treatment until resolved and include in your discharge summary.

## 2020-10-01 NOTE — TELEPHONE ENCOUNTER
Informed pt wife that prescription has been sent to [facility] in [place]. Provided wife with contact information for disability desk.

## 2020-10-01 NOTE — PROGRESS NOTES
PATIENT: Jaspreet Walsh Jr.  MRN: [id number]  DATE: 10/1/2020      Diagnosis:   1. Multiple myeloma not having achieved remission    2. Lumbar epidural mass        Chief Complaint: Follow-up    Jaspreet Walsh Jr. is a 61 y/o M who presents to clinic for evaluation of newly diagnosed IgG kappa multiple myeloma (standard risk cg per msmart criteria, r-iss stage II). Plan to initiate treatment with velcade and dexamethasone today. Still awaiting delivery of revlimid.    PMH significant for HTN.       - 2020 : Admitted to [facility] [place] for evaluation of lytic lesions. Reported sharp pain in both legs for ~ 1 month. At the [facility] ER, CT spine revealed lumbar/sacral mass concerning for malignancy. Transferred for NSGY evaluation of spinal stenosis and several lytic lesions. Hematology consult service asked to evaluate likely neoplastic cause of lytic lesions. MRI spine 20 revealed : numerous enhancing lesions throughout the thoracic, lumbar spine, sacrum and pelvis, concerning for diffuse metastatic disease. A large soft tissue mass encompasses L4 - S1 vertebral bodies resulting in severe spinal canal stenosis and variable moderate to severe neural foraminal narrowing. Neurologically intact, no immediate surgical intervention done. FLC ratio 171, involved light chains 104. SPEP and SIFE found 2.86g/dL IgG kappa para protein band. Underwent bone marrow biopsy and discharged with dexamethasone 40mg x 4 day burst.       20 : underwent debulking of spinal mass.    On interview feels well. Denies back pain after surgery. No recent fevers, chills, weight loss, night sweats, CP, SOB, bowel or bladder incontinence, constipation, numbness, focal weakness. Denies macroglossia, easy bruising, PN, PND, orthopnea, FARFAN. He is here with his wife.      Family history : Father  of unknown 'bone cancer'.     Social History : Lives in [facility] [place], about one [place] from [place]. Mothers house is close to his own.      Past Medical History:   Past Medical History:   Diagnosis Date    Anxiety     Arthritis     Hypertension        Past Surgical HIstory:   Past Surgical History:   Procedure Laterality Date    CREATION OF MUSCLE ROTATIONAL FLAP N/A 9/23/2020    Procedure: CREATION, FLAP, MUSCLE ROTATION;  Surgeon: Kamlesh Bellamy MD;  Location: [address];  Service: Plastics;  Laterality: N/A;    KNEE SURGERY Left 06/2019    LUMBAR FUSION N/A 9/23/2020    Procedure: FUSION, SPINE, LUMBAR L2-pelvis. Depuy. Plastic surgery closure w/ Dr. Bellamy;  Surgeon: Nick Coyle MD;  Location: [address];  Service: Neurosurgery;  Laterality: N/A;    Metastatic neoplasum removed from spine         Family History: No family history on file.    Social History:  reports that he quit smoking about 3 years ago. He has never used smokeless tobacco. He reports previous alcohol use. He reports that he does not use drugs.    Allergies:  Review of patient's allergies indicates:  No Known Allergies    Medications:  Current Outpatient Medications   Medication Sig Dispense Refill    acyclovir (ZOVIRAX) 400 MG tablet Take 1 tablet (400 mg total) by mouth 2 (two) times daily. 60 tablet 11    allopurinoL (ZYLOPRIM) 300 MG tablet Take 1 tablet (300 mg total) by mouth once daily. 30 tablet 2    aspirin 81 MG Chew Chew and swallow 1 tablet (81 mg total) by mouth once daily. 90 tablet 3    carvediloL (COREG) 25 MG tablet Take 1 tablet (25 mg total) by mouth 2 (two) times daily. 60 tablet 2    cloNIDine (CATAPRES) 0.1 MG tablet Take 1 tablet (0.1 mg total) by mouth once daily. 30 tablet 11    diazePAM (VALIUM) 5 MG tablet Take 1 tablet (5 mg total) by mouth every 8 (eight) hours as needed (muscle spasm). 30 tablet 0    ergocalciferol (ERGOCALCIFEROL) 50,000 unit Cap Take 1 capsule (50,000 Units total) by mouth every 7 days. 4 capsule 1    famotidine (PEPCID) 20 MG tablet Take 1 tablet (20 mg total) by mouth 2 (two) times daily. 20  tablet 0    ferrous sulfate 325 (65 FE) MG EC tablet Take 1 tablet (325 mg total) by mouth once daily. 30 tablet 1    lenalidomide 25 mg Cap Take 1 capsule (25 mg total) by mouth once daily On Days 1-21 of a 28 Day Cycle.. 21 each 0    levoFLOXacin (LEVAQUIN) 500 MG tablet Take 1 tablet (500 mg total) by mouth once daily. 90 tablet 3    losartan (COZAAR) 50 MG tablet Take 1 tablet (50 mg total) by mouth once daily. 90 tablet 3    NIFEdipine (PROCARDIA-XL) 60 MG (OSM) 24 hr tablet Take 1 tablet (60 mg total) by mouth once daily. 30 tablet 2    oxyCODONE (ROXICODONE) 10 mg Tab immediate release tablet Take 1 tablet (10 mg total) by mouth every 6 (six) hours as needed (pain 6-7/10). 50 tablet 0    pantoprazole (PROTONIX) 20 MG tablet Take 2 tablets (40 mg total) by mouth once daily. 60 tablet 0    sulfamethoxazole-trimethoprim 400-80mg (BACTRIM,SEPTRA) 400-80 mg per tablet Take 1 tablet by mouth once daily. 90 tablet 3     No current facility-administered medications for this visit.        Review of Systems   Constitutional: Positive for activity change. Negative for appetite change, chills, fatigue, fever and unexpected weight change.   HENT: Negative for congestion.    Respiratory: Negative for chest tightness and shortness of breath.    Gastrointestinal: Negative for abdominal pain and blood in stool.   Endocrine: Negative for cold intolerance and heat intolerance.   Genitourinary: Negative for hematuria.   Musculoskeletal: Negative for arthralgias.   Skin: Negative for rash.   Neurological: Negative for weakness.   Hematological: Negative for adenopathy. Does not bruise/bleed easily.   Psychiatric/Behavioral: The patient is not nervous/anxious.        ECOG Performance Status: 2   Objective:      Vitals:   Vitals:    10/01/20 0906   BP: (!) 140/79   BP Location: Left arm   Patient Position: Sitting   BP Method: Medium (Automatic)   Pulse: 91   Resp: 20   Temp: 98.1 °F (36.7 °C)   TempSrc: Oral   SpO2: 98%  "  Weight: 97.5 kg (215 lb)   Height: 5' 7" (1.702 m)     BMI: Body mass index is 33.67 kg/m².    Physical Exam  Constitutional:       General: He is not in acute distress.     Appearance: He is not ill-appearing.   HENT:      Head: Normocephalic and atraumatic.      Mouth/Throat:      Mouth: Mucous membranes are moist.   Eyes:      General: No scleral icterus.  Neck:      Musculoskeletal: Normal range of motion.   Cardiovascular:      Rate and Rhythm: Normal rate and regular rhythm.   Pulmonary:      Effort: Pulmonary effort is normal. No respiratory distress.   Abdominal:      General: Abdomen is flat. There is no distension.      Tenderness: There is no abdominal tenderness.   Musculoskeletal: Normal range of motion.   Lymphadenopathy:      Cervical: No cervical adenopathy.   Skin:     General: Skin is warm and dry.   Neurological:      General: No focal deficit present.      Mental Status: He is alert and oriented to person, place, and time. Mental status is at baseline.      Motor: No weakness.   Psychiatric:         Mood and Affect: Mood normal.         Laboratory Data:  Lab Visit on 10/01/2020   Component Date Value Ref Range Status    WBC 10/01/2020 10.84  3.90 - 12.70 K/uL Final    RBC 10/01/2020 2.53* 4.60 - 6.20 M/uL Final    Hemoglobin 10/01/2020 7.9* 14.0 - 18.0 g/dL Final    Hematocrit 10/01/2020 24.0* 40.0 - 54.0 % Final    Mean Corpuscular Volume 10/01/2020 95  82 - 98 fL Final    Mean Corpuscular Hemoglobin 10/01/2020 31.2* 27.0 - 31.0 pg Final    Mean Corpuscular Hemoglobin Conc 10/01/2020 32.9  32.0 - 36.0 g/dL Final    RDW 10/01/2020 13.5  11.5 - 14.5 % Final    Platelets 10/01/2020 205  150 - 350 K/uL Final    MPV 10/01/2020 9.3  9.2 - 12.9 fL Final    Immature Granulocytes 10/01/2020 CANCELED  0.0 - 0.5 % Final    Result canceled by the ancillary.    Immature Grans (Abs) 10/01/2020 CANCELED  0.00 - 0.04 K/uL Final    Comment: Mild elevation in immature granulocytes is non specific " and   can be seen in a variety of conditions including stress response,   acute inflammation, trauma and pregnancy. Correlation with other   laboratory and clinical findings is essential.    Result canceled by the ancillary.      Lymph # 10/01/2020 CANCELED  1.0 - 4.8 K/uL Final    Result canceled by the ancillary.    Mono # 10/01/2020 CANCELED  0.3 - 1.0 K/uL Final    Result canceled by the ancillary.    Eos # 10/01/2020 CANCELED  0.0 - 0.5 K/uL Final    Result canceled by the ancillary.    Baso # 10/01/2020 CANCELED  0.00 - 0.20 K/uL Final    Result canceled by the ancillary.    nRBC 10/01/2020 0  0 /100 WBC Final    Gran% 10/01/2020 80.0* 38.0 - 73.0 % Final    Lymph% 10/01/2020 15.0* 18.0 - 48.0 % Final    Mono% 10/01/2020 4.0  4.0 - 15.0 % Final    Eosinophil% 10/01/2020 0.0  0.0 - 8.0 % Final    Basophil% 10/01/2020 0.0  0.0 - 1.9 % Final    Metamyelocytes 10/01/2020 1.0  % Final    Differential Method 10/01/2020 Manual   Final    Sodium 10/01/2020 134* 136 - 145 mmol/L Final    Potassium 10/01/2020 4.0  3.5 - 5.1 mmol/L Final    Chloride 10/01/2020 103  95 - 110 mmol/L Final    CO2 10/01/2020 24  23 - 29 mmol/L Final    Glucose 10/01/2020 155* 70 - 110 mg/dL Final    BUN, Bld 10/01/2020 24* 8 - 23 mg/dL Final    Creatinine 10/01/2020 1.3  0.5 - 1.4 mg/dL Final    Calcium 10/01/2020 8.2* 8.7 - 10.5 mg/dL Final    Total Protein 10/01/2020 6.4  6.0 - 8.4 g/dL Final    Albumin 10/01/2020 2.1* 3.5 - 5.2 g/dL Final    Total Bilirubin 10/01/2020 0.6  0.1 - 1.0 mg/dL Final    Comment: For infants and newborns, interpretation of results should be based  on gestational age, weight and in agreement with clinical  observations.  Premature Infant recommended reference ranges:  Up to 24 hours.............<8.0 mg/dL  Up to 48 hours............<12.0 mg/dL  3-5 days..................<15.0 mg/dL  6-29 days.................<15.0 mg/dL      Alkaline Phosphatase 10/01/2020 48* 55 - 135 U/L Final    AST  10/01/2020 23  10 - 40 U/L Final    ALT 10/01/2020 25  10 - 44 U/L Final    Anion Gap 10/01/2020 7* 8 - 16 mmol/L Final    eGFR if African American 10/01/2020 >60.0  >60 mL/min/1.73 m^2 Final    eGFR if non African American 10/01/2020 58.9* >60 mL/min/1.73 m^2 Final    Comment: Calculation used to obtain the estimated glomerular filtration  rate (eGFR) is the CKD-EPI equation.      No results displayed because visit has over 200 results.          Assessment:       1. Multiple myeloma not having achieved remission    2. Lumbar epidural mass           Plan:     IgG kappa multiple myeloma     ECOG  0 - 1 (usually but now a 2-3 because of surgery)  Standard risk CG  R-ISS stage II  Initiate induction with VRd ; bortezomib subq 1.3mg/m2 + dexamethasone PO 40mg q7days.   Awaiting delivery of Revlimid 25mg daily for 3 weeks of 4 week cycle.  Consent obtained, counseling and education provided.   Discussed phases of treatment, induction followed by auto SCT and maintenance.   He is young and otherwise healthy and appears to be an excellent candidate for auto sct.   Check CBC, CMP, t+s in 2 weeks  RTC in 4 weeks with CBC, CMP, t+s and myeloma labs.    Low suspicion for concurrent amyloidosis ; denies signs and symptoms of CHF, peripheral neuropathy, macroglossia.     Supportive care:    Referral to dentist for clearance for bisphosphonate therapy.  Start infection ppx with acyclovir 400mg bid, levofloxacin 500mg daily and fluconazole 400mg daily.   Rx for zofran 8mg prn sent per pt request.   DVT ppx with aspirin 81mg daily.  VitD3 1000IU per day and Ca Carbonate 2tabs daily.     Lumbar mass    Status post resection with nsgy  Path shows plasma cell malignancy.    The patient was discussed and examined with the attending physician Dr. Baker.     Steven Cornejo MD  Clinical Fellow  Hematology and Medical Oncology.  10/01/2020

## 2020-10-01 NOTE — TELEPHONE ENCOUNTER
"----- Message from Lc Coon sent at 10/1/2020  1:51 PM CDT -----  Consult/Advisory:    Name Of Caller: Catie  Contact Preference?: [phone]    What is the nature of the call?:  Pt following up regarding prescription for nausea and steroid.  Pt states pharmacy did not receive for      Additional Notes:  "Thank you for all that you do for our patients'"           "

## 2020-10-02 NOTE — TELEPHONE ENCOUNTER
Revlimid is approved by the patient's insurance with a high copay. Patient is eligible for a LLS jose for Revlimid. We will be assisting the patient with the application process. Physicians Form is required to verify the patients diagnosis. Sending a staff message to Dr Baker regarding Physicians Form for assistance application and faxing form for his review and signature.

## 2020-10-05 ENCOUNTER — OFFICE VISIT (OUTPATIENT)
Dept: PLASTIC SURGERY | Facility: CLINIC | Age: 60
End: 2020-10-05
Payer: COMMERCIAL

## 2020-10-05 DIAGNOSIS — C79.49 METASTASIS OF NEOPLASM TO SPINAL CANAL: Primary | ICD-10-CM

## 2020-10-05 PROCEDURE — 99999 PR PBB SHADOW E&M-EST. PATIENT-LVL III: CPT | Mod: PBBFAC,,, | Performed by: SURGERY

## 2020-10-05 PROCEDURE — 99024 POSTOP FOLLOW-UP VISIT: CPT | Mod: S$GLB,,, | Performed by: SURGERY

## 2020-10-05 PROCEDURE — 99024 PR POST-OP FOLLOW-UP VISIT: ICD-10-PCS | Mod: S$GLB,,, | Performed by: SURGERY

## 2020-10-05 PROCEDURE — 99999 PR PBB SHADOW E&M-EST. PATIENT-LVL III: ICD-10-PCS | Mod: PBBFAC,,, | Performed by: SURGERY

## 2020-10-05 RX ORDER — VITAMIN A 3000 MCG
10000 CAPSULE ORAL DAILY
Qty: 30 CAPSULE | Refills: 0 | COMMUNITY
Start: 2020-10-05 | End: 2021-04-28

## 2020-10-05 NOTE — PROGRESS NOTES
Plastic Update    Interval history  59 yo male presenting for 2 week post op vist. Mr. Walsh reports doing well. His wife helps him with daily wound cleaning and dressing changes. He has just started chemotherapy of Revlimid, bortezomib, dexamethiasone. He is on abx prophylaxis.       Physical  Incision clean, dry in tact  No fluid collection  Left drain remaining with ss output  Dried blood and mild scabbing around incision line  Has concavity to his back wound    A/P  59 yo male s/p L2-iliac fusion with tumor resection by orthopedics, muscle flap creation for coverage by plastic surgery. He is doing well, recently initiated chemotherapy.     Clean wound with soap and water after removing dressing  Recommend aquacel or mepilex minimally irritating dressing to lower back  Advised against staying in bed all day with back flat  Removed final drain in office  FU in 2 weeks for wound check    Flora Montgomery MD  PGY-1, Plastic Surgery  [facility]

## 2020-10-08 ENCOUNTER — INFUSION (OUTPATIENT)
Dept: INFUSION THERAPY | Facility: HOSPITAL | Age: 60
End: 2020-10-08
Payer: COMMERCIAL

## 2020-10-08 VITALS
TEMPERATURE: 99 F | DIASTOLIC BLOOD PRESSURE: 90 MMHG | SYSTOLIC BLOOD PRESSURE: 165 MMHG | HEART RATE: 95 BPM | RESPIRATION RATE: 18 BRPM

## 2020-10-08 DIAGNOSIS — E88.09 PLASMA CELL DYSCRASIA: Primary | ICD-10-CM

## 2020-10-08 PROCEDURE — 63600175 PHARM REV CODE 636 W HCPCS: Mod: JG | Performed by: INTERNAL MEDICINE

## 2020-10-08 PROCEDURE — 96401 CHEMO ANTI-NEOPL SQ/IM: CPT

## 2020-10-08 RX ORDER — BORTEZOMIB 3.5 MG/1
1.3 INJECTION, POWDER, LYOPHILIZED, FOR SOLUTION INTRAVENOUS; SUBCUTANEOUS
Status: COMPLETED | OUTPATIENT
Start: 2020-10-08 | End: 2020-10-08

## 2020-10-08 RX ADMIN — BORTEZOMIB 2.8 MG: 3.5 INJECTION, POWDER, LYOPHILIZED, FOR SOLUTION INTRAVENOUS; SUBCUTANEOUS at 11:10

## 2020-10-09 ENCOUNTER — TELEPHONE (OUTPATIENT)
Dept: PHARMACY | Facility: CLINIC | Age: 60
End: 2020-10-09

## 2020-10-09 DIAGNOSIS — C90.00 MULTIPLE MYELOMA, REMISSION STATUS UNSPECIFIED: ICD-10-CM

## 2020-10-09 NOTE — TELEPHONE ENCOUNTER
We have received an approval for the patients Revlimid until 9/23/21 with a co-pay of $45.00. Co-pay will be $0.00 with assistance on file. This medication is a limited distribution drug, and cannot be filled with [facility]. Please send a new prescription for Revlimid to [facility]  specialty pharmacy, which is listed in the patients Epic profile. The patient has been notified.     To complete this in EPIC, the original order MUST be discontinued and re-typed as a new prescription with the updated pharmacy listed. Clicking reorder will continue to route the Rx to [facility], even if the pharmacy is changed. Please opt the patient out of [facility] when the BPA is fired.    Please enroll the patient in REMS. Sending a staff message to Dr. Baker regarding Revlimid approval and transfer.

## 2020-10-09 NOTE — TELEPHONE ENCOUNTER
FOR DOCUMENTATION ONLY:  Financial Assistance for Revlimid is approved from 10/9/20 to 9/30/21  Source: [employer]uk[employer]   BIN: [id number]  PCN: [id number]  Id: [id number]  GRP: [id number]  [place] Amount: $[id number]    Co-pay with harvey on file: $0.00

## 2020-10-13 DIAGNOSIS — C90.00 MULTIPLE MYELOMA NOT HAVING ACHIEVED REMISSION: Primary | ICD-10-CM

## 2020-10-13 RX ORDER — LENALIDOMIDE 25 MG/1
25 CAPSULE ORAL DAILY
Qty: 21 EACH | Refills: 0 | Status: SHIPPED | OUTPATIENT
Start: 2020-10-13 | End: 2020-11-13 | Stop reason: SDUPTHER

## 2020-10-13 RX ORDER — LENALIDOMIDE 25 MG/1
25 CAPSULE ORAL DAILY
Qty: 21 EACH | Refills: 0 | Status: CANCELLED | OUTPATIENT
Start: 2020-10-13

## 2020-10-15 ENCOUNTER — INFUSION (OUTPATIENT)
Dept: INFUSION THERAPY | Facility: HOSPITAL | Age: 60
End: 2020-10-15
Payer: COMMERCIAL

## 2020-10-15 ENCOUNTER — LAB VISIT (OUTPATIENT)
Dept: LAB | Facility: HOSPITAL | Age: 60
End: 2020-10-15
Payer: COMMERCIAL

## 2020-10-15 VITALS
DIASTOLIC BLOOD PRESSURE: 93 MMHG | SYSTOLIC BLOOD PRESSURE: 157 MMHG | TEMPERATURE: 99 F | RESPIRATION RATE: 18 BRPM | HEART RATE: 90 BPM

## 2020-10-15 DIAGNOSIS — E88.09 PLASMA CELL DYSCRASIA: Primary | ICD-10-CM

## 2020-10-15 DIAGNOSIS — C90.00 MULTIPLE MYELOMA NOT HAVING ACHIEVED REMISSION: ICD-10-CM

## 2020-10-15 LAB
ALBUMIN SERPL BCP-MCNC: 2.9 G/DL (ref 3.5–5.2)
ALP SERPL-CCNC: 72 U/L (ref 55–135)
ALT SERPL W/O P-5'-P-CCNC: 10 U/L (ref 10–44)
ANION GAP SERPL CALC-SCNC: 4 MMOL/L (ref 8–16)
AST SERPL-CCNC: 14 U/L (ref 10–40)
BASOPHILS # BLD AUTO: 0.02 K/UL (ref 0–0.2)
BASOPHILS NFR BLD: 0.4 % (ref 0–1.9)
BILIRUB SERPL-MCNC: 0.3 MG/DL (ref 0.1–1)
BUN SERPL-MCNC: 13 MG/DL (ref 8–23)
CALCIUM SERPL-MCNC: 8.7 MG/DL (ref 8.7–10.5)
CHLORIDE SERPL-SCNC: 109 MMOL/L (ref 95–110)
CO2 SERPL-SCNC: 28 MMOL/L (ref 23–29)
CREAT SERPL-MCNC: 0.8 MG/DL (ref 0.5–1.4)
DIFFERENTIAL METHOD: ABNORMAL
EOSINOPHIL # BLD AUTO: 0.1 K/UL (ref 0–0.5)
EOSINOPHIL NFR BLD: 1.4 % (ref 0–8)
ERYTHROCYTE [DISTWIDTH] IN BLOOD BY AUTOMATED COUNT: 15.9 % (ref 11.5–14.5)
EST. GFR  (AFRICAN AMERICAN): >60 ML/MIN/1.73 M^2
EST. GFR  (NON AFRICAN AMERICAN): >60 ML/MIN/1.73 M^2
GLUCOSE SERPL-MCNC: 110 MG/DL (ref 70–110)
HCT VFR BLD AUTO: 28.7 % (ref 40–54)
HGB BLD-MCNC: 8.9 G/DL (ref 14–18)
IMM GRANULOCYTES # BLD AUTO: 0.08 K/UL (ref 0–0.04)
IMM GRANULOCYTES NFR BLD AUTO: 1.6 % (ref 0–0.5)
LYMPHOCYTES # BLD AUTO: 1.2 K/UL (ref 1–4.8)
LYMPHOCYTES NFR BLD: 24.8 % (ref 18–48)
MCH RBC QN AUTO: 30.5 PG (ref 27–31)
MCHC RBC AUTO-ENTMCNC: 31 G/DL (ref 32–36)
MCV RBC AUTO: 98 FL (ref 82–98)
MONOCYTES # BLD AUTO: 0.2 K/UL (ref 0.3–1)
MONOCYTES NFR BLD: 3.7 % (ref 4–15)
NEUTROPHILS # BLD AUTO: 3.3 K/UL (ref 1.8–7.7)
NEUTROPHILS NFR BLD: 68.1 % (ref 38–73)
NRBC BLD-RTO: 0 /100 WBC
PLATELET # BLD AUTO: 224 K/UL (ref 150–350)
PMV BLD AUTO: 9.9 FL (ref 9.2–12.9)
POTASSIUM SERPL-SCNC: 4 MMOL/L (ref 3.5–5.1)
PROT SERPL-MCNC: 7.3 G/DL (ref 6–8.4)
RBC # BLD AUTO: 2.92 M/UL (ref 4.6–6.2)
SODIUM SERPL-SCNC: 141 MMOL/L (ref 136–145)
WBC # BLD AUTO: 4.87 K/UL (ref 3.9–12.7)

## 2020-10-15 PROCEDURE — 96401 CHEMO ANTI-NEOPL SQ/IM: CPT

## 2020-10-15 PROCEDURE — 63600175 PHARM REV CODE 636 W HCPCS: Mod: JG | Performed by: INTERNAL MEDICINE

## 2020-10-15 PROCEDURE — 80053 COMPREHEN METABOLIC PANEL: CPT

## 2020-10-15 PROCEDURE — 36415 COLL VENOUS BLD VENIPUNCTURE: CPT

## 2020-10-15 PROCEDURE — 85025 COMPLETE CBC W/AUTO DIFF WBC: CPT

## 2020-10-15 RX ORDER — BORTEZOMIB 3.5 MG/1
1.3 INJECTION, POWDER, LYOPHILIZED, FOR SOLUTION INTRAVENOUS; SUBCUTANEOUS
Status: COMPLETED | OUTPATIENT
Start: 2020-10-15 | End: 2020-10-15

## 2020-10-15 RX ADMIN — BORTEZOMIB 2.8 MG: 3.5 INJECTION, POWDER, LYOPHILIZED, FOR SOLUTION INTRAVENOUS; SUBCUTANEOUS at 11:10

## 2020-10-15 NOTE — NURSING
Pt arrived for velcade. Labs reviewed with pt.  Pt tolerated injection SQ to LLA.  Discharged to in wheelchair with wife and appt calendar.

## 2020-10-19 ENCOUNTER — OFFICE VISIT (OUTPATIENT)
Dept: PLASTIC SURGERY | Facility: CLINIC | Age: 60
End: 2020-10-19
Payer: COMMERCIAL

## 2020-10-19 DIAGNOSIS — C79.49 METASTASIS OF NEOPLASM TO SPINAL CANAL: Primary | ICD-10-CM

## 2020-10-19 DIAGNOSIS — C90.00 MULTIPLE MYELOMA NOT HAVING ACHIEVED REMISSION: ICD-10-CM

## 2020-10-19 DIAGNOSIS — Z98.1 S/P LUMBAR SPINAL FUSION: Primary | ICD-10-CM

## 2020-10-19 PROCEDURE — 99024 PR POST-OP FOLLOW-UP VISIT: ICD-10-PCS | Mod: S$GLB,,, | Performed by: SURGERY

## 2020-10-19 PROCEDURE — 99999 PR PBB SHADOW E&M-EST. PATIENT-LVL III: CPT | Mod: PBBFAC,,, | Performed by: SURGERY

## 2020-10-19 PROCEDURE — 99999 PR PBB SHADOW E&M-EST. PATIENT-LVL III: ICD-10-PCS | Mod: PBBFAC,,, | Performed by: SURGERY

## 2020-10-19 PROCEDURE — 99024 POSTOP FOLLOW-UP VISIT: CPT | Mod: S$GLB,,, | Performed by: SURGERY

## 2020-10-20 RX ORDER — DIAZEPAM 5 MG/1
5 TABLET ORAL EVERY 12 HOURS PRN
Qty: 20 TABLET | Refills: 0 | Status: SHIPPED | OUTPATIENT
Start: 2020-10-20 | End: 2021-02-11

## 2020-10-20 RX ORDER — DEXAMETHASONE 4 MG/1
40 TABLET ORAL WEEKLY
Qty: 160 TABLET | Refills: 0 | Status: SHIPPED | OUTPATIENT
Start: 2020-10-20 | End: 2020-12-24 | Stop reason: SDUPTHER

## 2020-10-20 RX ORDER — OXYCODONE HYDROCHLORIDE 5 MG/1
5 TABLET ORAL EVERY 8 HOURS PRN
Qty: 40 TABLET | Refills: 0 | Status: SHIPPED | OUTPATIENT
Start: 2020-10-20 | End: 2020-10-22

## 2020-10-20 RX ORDER — OXYCODONE HYDROCHLORIDE 10 MG/1
10 TABLET ORAL EVERY 6 HOURS PRN
Qty: 50 TABLET | Refills: 0 | Status: CANCELLED | OUTPATIENT
Start: 2020-10-20

## 2020-10-22 ENCOUNTER — INFUSION (OUTPATIENT)
Dept: INFUSION THERAPY | Facility: HOSPITAL | Age: 60
End: 2020-10-22
Payer: COMMERCIAL

## 2020-10-22 ENCOUNTER — TELEPHONE (OUTPATIENT)
Dept: NEUROSURGERY | Facility: CLINIC | Age: 60
End: 2020-10-22

## 2020-10-22 VITALS
DIASTOLIC BLOOD PRESSURE: 89 MMHG | HEART RATE: 81 BPM | RESPIRATION RATE: 18 BRPM | SYSTOLIC BLOOD PRESSURE: 163 MMHG | HEIGHT: 67 IN | TEMPERATURE: 98 F | WEIGHT: 218.69 LBS | BODY MASS INDEX: 34.33 KG/M2

## 2020-10-22 DIAGNOSIS — Z98.1 S/P LUMBAR SPINAL FUSION: ICD-10-CM

## 2020-10-22 DIAGNOSIS — E88.09 PLASMA CELL DYSCRASIA: Primary | ICD-10-CM

## 2020-10-22 PROCEDURE — 63600175 PHARM REV CODE 636 W HCPCS: Mod: JG | Performed by: INTERNAL MEDICINE

## 2020-10-22 PROCEDURE — 96401 CHEMO ANTI-NEOPL SQ/IM: CPT

## 2020-10-22 RX ORDER — BORTEZOMIB 3.5 MG/1
1.3 INJECTION, POWDER, LYOPHILIZED, FOR SOLUTION INTRAVENOUS; SUBCUTANEOUS
Status: COMPLETED | OUTPATIENT
Start: 2020-10-22 | End: 2020-10-22

## 2020-10-22 RX ORDER — METHOCARBAMOL 750 MG/1
750 TABLET, FILM COATED ORAL 3 TIMES DAILY PRN
Qty: 30 TABLET | Refills: 0 | Status: SHIPPED | OUTPATIENT
Start: 2020-10-22 | End: 2020-11-01

## 2020-10-22 RX ORDER — HEPARIN 100 UNIT/ML
500 SYRINGE INTRAVENOUS
Status: DISCONTINUED | OUTPATIENT
Start: 2020-10-22 | End: 2020-10-22 | Stop reason: HOSPADM

## 2020-10-22 RX ORDER — OXYCODONE HYDROCHLORIDE 5 MG/1
5 TABLET ORAL EVERY 8 HOURS PRN
Qty: 42 TABLET | Refills: 0 | Status: SHIPPED | OUTPATIENT
Start: 2020-10-22 | End: 2020-11-05

## 2020-10-22 RX ORDER — SODIUM CHLORIDE 0.9 % (FLUSH) 0.9 %
10 SYRINGE (ML) INJECTION
Status: DISCONTINUED | OUTPATIENT
Start: 2020-10-22 | End: 2020-10-22 | Stop reason: HOSPADM

## 2020-10-22 RX ADMIN — BORTEZOMIB 2.8 MG: 3.5 INJECTION, POWDER, LYOPHILIZED, FOR SOLUTION INTRAVENOUS; SUBCUTANEOUS at 11:10

## 2020-10-22 NOTE — NURSING
Patient received Velcade injection SQ to ABD.  Tolerated well.  Vitals stable.  No apparent distress noted.  Patient ambulated off unit via w/c accompanied by spouse.

## 2020-10-23 NOTE — PROGRESS NOTES
Plastic Update    Stitches removed  Incision approximated, with some adherent eschar  No drainage  No evidence of infection  Advised to place xeroform on incision line  Cover with ABD pad  Replace brace  Notified them that they should call us for follow up if there are issues with drainage, new pain.  He is undergoing chemotherapy  Advised that he take justina tid with meals    Plastic & Reconstructive Surgery  [facility]  c/o Kamlesh Bellamy M.D.  [facility]  [address]  [address] [place] [address]    Work [phone]  Toll free [phone]  If no answer [phone]

## 2020-10-28 ENCOUNTER — TELEPHONE (OUTPATIENT)
Dept: HEMATOLOGY/ONCOLOGY | Facility: CLINIC | Age: 60
End: 2020-10-28

## 2020-10-29 ENCOUNTER — OFFICE VISIT (OUTPATIENT)
Dept: HEMATOLOGY/ONCOLOGY | Facility: CLINIC | Age: 60
End: 2020-10-29
Payer: COMMERCIAL

## 2020-10-29 ENCOUNTER — INFUSION (OUTPATIENT)
Dept: INFUSION THERAPY | Facility: HOSPITAL | Age: 60
End: 2020-10-29
Payer: COMMERCIAL

## 2020-10-29 VITALS
HEART RATE: 91 BPM | SYSTOLIC BLOOD PRESSURE: 169 MMHG | RESPIRATION RATE: 16 BRPM | OXYGEN SATURATION: 97 % | TEMPERATURE: 99 F | DIASTOLIC BLOOD PRESSURE: 98 MMHG

## 2020-10-29 VITALS
DIASTOLIC BLOOD PRESSURE: 85 MMHG | TEMPERATURE: 99 F | HEIGHT: 67 IN | RESPIRATION RATE: 17 BRPM | OXYGEN SATURATION: 98 % | BODY MASS INDEX: 34.17 KG/M2 | WEIGHT: 217.69 LBS | SYSTOLIC BLOOD PRESSURE: 160 MMHG | HEART RATE: 83 BPM

## 2020-10-29 DIAGNOSIS — F41.9 ANXIETY: ICD-10-CM

## 2020-10-29 DIAGNOSIS — E55.9 VITAMIN D DEFICIENCY: ICD-10-CM

## 2020-10-29 DIAGNOSIS — C90.00 MULTIPLE MYELOMA NOT HAVING ACHIEVED REMISSION: Primary | ICD-10-CM

## 2020-10-29 DIAGNOSIS — E88.09 PLASMA CELL DYSCRASIA: Primary | ICD-10-CM

## 2020-10-29 PROCEDURE — 63600175 PHARM REV CODE 636 W HCPCS: Mod: JG | Performed by: INTERNAL MEDICINE

## 2020-10-29 PROCEDURE — 99214 PR OFFICE/OUTPT VISIT, EST, LEVL IV, 30-39 MIN: ICD-10-PCS | Mod: S$GLB,,, | Performed by: STUDENT IN AN ORGANIZED HEALTH CARE EDUCATION/TRAINING PROGRAM

## 2020-10-29 PROCEDURE — 99999 PR PBB SHADOW E&M-EST. PATIENT-LVL V: ICD-10-PCS | Mod: PBBFAC,,, | Performed by: STUDENT IN AN ORGANIZED HEALTH CARE EDUCATION/TRAINING PROGRAM

## 2020-10-29 PROCEDURE — 99214 OFFICE O/P EST MOD 30 MIN: CPT | Mod: S$GLB,,, | Performed by: STUDENT IN AN ORGANIZED HEALTH CARE EDUCATION/TRAINING PROGRAM

## 2020-10-29 PROCEDURE — 3008F PR BODY MASS INDEX (BMI) DOCUMENTED: ICD-10-PCS | Mod: CPTII,S$GLB,, | Performed by: STUDENT IN AN ORGANIZED HEALTH CARE EDUCATION/TRAINING PROGRAM

## 2020-10-29 PROCEDURE — 96402 CHEMO HORMON ANTINEOPL SQ/IM: CPT

## 2020-10-29 PROCEDURE — 99999 PR PBB SHADOW E&M-EST. PATIENT-LVL V: CPT | Mod: PBBFAC,,, | Performed by: STUDENT IN AN ORGANIZED HEALTH CARE EDUCATION/TRAINING PROGRAM

## 2020-10-29 PROCEDURE — 3008F BODY MASS INDEX DOCD: CPT | Mod: CPTII,S$GLB,, | Performed by: STUDENT IN AN ORGANIZED HEALTH CARE EDUCATION/TRAINING PROGRAM

## 2020-10-29 RX ORDER — HEPARIN 100 UNIT/ML
500 SYRINGE INTRAVENOUS
Status: CANCELLED | OUTPATIENT
Start: 2020-11-06

## 2020-10-29 RX ORDER — SODIUM CHLORIDE 0.9 % (FLUSH) 0.9 %
10 SYRINGE (ML) INJECTION
Status: CANCELLED | OUTPATIENT
Start: 2020-10-29

## 2020-10-29 RX ORDER — HEPARIN 100 UNIT/ML
500 SYRINGE INTRAVENOUS
Status: DISCONTINUED | OUTPATIENT
Start: 2020-10-29 | End: 2020-10-29 | Stop reason: HOSPADM

## 2020-10-29 RX ORDER — SODIUM CHLORIDE 0.9 % (FLUSH) 0.9 %
10 SYRINGE (ML) INJECTION
Status: CANCELLED | OUTPATIENT
Start: 2020-10-30

## 2020-10-29 RX ORDER — BORTEZOMIB 3.5 MG/1
1.3 INJECTION, POWDER, LYOPHILIZED, FOR SOLUTION INTRAVENOUS; SUBCUTANEOUS
Status: COMPLETED | OUTPATIENT
Start: 2020-10-29 | End: 2020-10-29

## 2020-10-29 RX ORDER — HEPARIN 100 UNIT/ML
500 SYRINGE INTRAVENOUS
Status: CANCELLED | OUTPATIENT
Start: 2020-11-13

## 2020-10-29 RX ORDER — SODIUM CHLORIDE 0.9 % (FLUSH) 0.9 %
10 SYRINGE (ML) INJECTION
Status: DISCONTINUED | OUTPATIENT
Start: 2020-10-29 | End: 2020-10-29 | Stop reason: HOSPADM

## 2020-10-29 RX ORDER — SODIUM CHLORIDE 0.9 % (FLUSH) 0.9 %
10 SYRINGE (ML) INJECTION
Status: CANCELLED | OUTPATIENT
Start: 2020-11-06

## 2020-10-29 RX ORDER — BORTEZOMIB 3.5 MG/1
1.3 INJECTION, POWDER, LYOPHILIZED, FOR SOLUTION INTRAVENOUS; SUBCUTANEOUS
Status: CANCELLED | OUTPATIENT
Start: 2020-11-13

## 2020-10-29 RX ORDER — HEPARIN 100 UNIT/ML
500 SYRINGE INTRAVENOUS
Status: CANCELLED | OUTPATIENT
Start: 2020-10-29

## 2020-10-29 RX ORDER — BORTEZOMIB 3.5 MG/1
1.3 INJECTION, POWDER, LYOPHILIZED, FOR SOLUTION INTRAVENOUS; SUBCUTANEOUS
Status: CANCELLED | OUTPATIENT
Start: 2020-10-29

## 2020-10-29 RX ORDER — BORTEZOMIB 3.5 MG/1
1.3 INJECTION, POWDER, LYOPHILIZED, FOR SOLUTION INTRAVENOUS; SUBCUTANEOUS
Status: CANCELLED | OUTPATIENT
Start: 2020-10-30

## 2020-10-29 RX ORDER — BORTEZOMIB 3.5 MG/1
1.3 INJECTION, POWDER, LYOPHILIZED, FOR SOLUTION INTRAVENOUS; SUBCUTANEOUS
Status: CANCELLED | OUTPATIENT
Start: 2020-11-06

## 2020-10-29 RX ORDER — SODIUM CHLORIDE 0.9 % (FLUSH) 0.9 %
10 SYRINGE (ML) INJECTION
Status: CANCELLED | OUTPATIENT
Start: 2020-11-13

## 2020-10-29 RX ORDER — HEPARIN 100 UNIT/ML
500 SYRINGE INTRAVENOUS
Status: CANCELLED | OUTPATIENT
Start: 2020-10-30

## 2020-10-29 RX ADMIN — BORTEZOMIB 2.8 MG: 3.5 INJECTION, POWDER, LYOPHILIZED, FOR SOLUTION INTRAVENOUS; SUBCUTANEOUS at 11:10

## 2020-10-29 NOTE — NURSING
1115 Pt arrived for Velcade injection. NAD on assessment. Pt ambulatory to chair independently w support of WC. Injection administered sub q To LLA, tolerated well. Pt to return tomorrow for D2, declined AVS. Aware of when to contact provider's office.

## 2020-10-29 NOTE — Clinical Note
Hello fu plan for Jaspreet Walsh Jr.  1. Schedule velcade inj on 11/05, 11/12, 11/19, 11/26.  2. RTC to see me on 11/26 with CBC,  CMP, spep, serum immunofixation, serum free light chains checked before.  Thanks - diaz

## 2020-10-29 NOTE — PROGRESS NOTES
PATIENT: Jaspreet Walsh Jr.  MRN: [id number]  DATE: 10/29/2020      Diagnosis:   1. Multiple myeloma not having achieved remission    2. Vitamin D deficiency    3. Anxiety        Chief Complaint: No chief complaint on file.    Jaspreet Walsh Jr. is a 59 y/o M who presents to clinic for evaluation of IgG kappa multiple myeloma (standard risk cg per msmart criteria, r-iss stage II) s/p C1 VRd. Revlimid delivered last week.     PMH significant for HTN.       - 2020 : Admitted to [facility] for evaluation of lytic lesions. Large soft tissue mass encompassing L4 - S1 vertebral bodies seen. FLC ratio 171, involved light chains 104. SPEP and SIFE found 2.86g/dL, IgG kappa para protein band. Underwent bone marrow biopsy and discharged with dexamethasone 40mg x 4 day burst.     20 : Underwent debulking of spinal mass.  10/01 - 10/22/20 : C1 VRd. Revlimid delivered in third week of cycle.  10/29/20 : C2D1 planned.     On interview he reports feeling well. feels well.   Denies back pain. No neuropathy, or rash.    No recent fevers, chills, weight loss, night sweats, CP, SOB, bowel or bladder incontinence, constipation, numbness, focal weakness. Denies macroglossia, easy bruising, PN, PND, orthopnea, FARFAN.   He is here with his wife.      Family history : Father  of unknown 'bone cancer'.     Social History : Lives in [facility], about one hour from [place]. Mothers house is close to his own.     Pathology :   20 : Bone marrow biopsy : Solid sheets of -positive plasma cells are seen in some areas. In average, plasma cells comprise approximately 25-30% of the total cellularity. The plasma cells are negative for cyclin D1.  Congo red staining fails to detect amyloid deposits.      Past Medical History:   Past Medical History:   Diagnosis Date    Anxiety     Arthritis     Hypertension        Past Surgical HIstory:   Past Surgical History:   Procedure Laterality Date    CREATION OF MUSCLE ROTATIONAL  FLAP N/A 9/23/2020    Procedure: CREATION, FLAP, MUSCLE ROTATION;  Surgeon: Kamlesh Bellamy MD;  [facility];  Service: Plastics;  Laterality: N/A;    KNEE SURGERY Left 06/2019    LUMBAR FUSION N/A 9/23/2020    Procedure: FUSION, SPINE, LUMBAR L2-pelvis. Depuy. Plastic surgery closure w/ Dr. Bellamy;  Surgeon: Nick Coyle MD;  [facility];  Service: Neurosurgery;  Laterality: N/A;    Metastatic neoplasum removed from spine         Family History: History reviewed. No pertinent family history.    Social History:  reports that he quit smoking about 3 years ago. He has never used smokeless tobacco. He reports previous alcohol use. He reports that he does not use drugs.    Allergies:  Review of patient's allergies indicates:  No Known Allergies    Medications:  Current Outpatient Medications   Medication Sig Dispense Refill    acyclovir (ZOVIRAX) 400 MG tablet Take 1 tablet (400 mg total) by mouth 2 (two) times daily. 60 tablet 11    allopurinoL (ZYLOPRIM) 300 MG tablet Take 1 tablet (300 mg total) by mouth once daily. 30 tablet 2    aspirin 81 MG Chew Chew and swallow 1 tablet (81 mg total) by mouth once daily. 90 tablet 3    carvediloL (COREG) 25 MG tablet Take 1 tablet (25 mg total) by mouth 2 (two) times daily. 60 tablet 2    dexAMETHasone (DECADRON) 4 MG Tab Take 10 tablets (40 mg total) by mouth once a week. 160 tablet 0    diazePAM (VALIUM) 5 MG tablet Take 1 tablet (5 mg total) by mouth every 12 (twelve) hours as needed (muscle spasm). 20 tablet 0    ergocalciferol (ERGOCALCIFEROL) 50,000 unit Cap Take 1 capsule (50,000 Units total) by mouth every 7 days. 4 capsule 1    famotidine (PEPCID) 20 MG tablet Take 1 tablet (20 mg total) by mouth 2 (two) times daily. 20 tablet 0    ferrous sulfate 325 (65 FE) MG EC tablet Take 1 tablet (325 mg total) by mouth once daily. 30 tablet 1    lenalidomide (REVLIMID) 25 mg Cap Take 1 capsule (25 mg total) by mouth once daily On Days  1-21 of a 28 Day Cycle. [facility] #[id number] 10/9/2020. 21 each 0    levoFLOXacin (LEVAQUIN) 500 MG tablet Take 1 tablet (500 mg total) by mouth once daily. 90 tablet 3    losartan (COZAAR) 50 MG tablet Take 1 tablet (50 mg total) by mouth once daily. 90 tablet 3    methocarbamoL (ROBAXIN) 750 MG Tab Take 1 tablet (750 mg total) by mouth 3 (three) times daily as needed (muscle spasms). 30 tablet 0    NIFEdipine (PROCARDIA-XL) 60 MG (OSM) 24 hr tablet Take 1 tablet (60 mg total) by mouth once daily. 30 tablet 2    ondansetron (ZOFRAN) 8 MG tablet Take 1 tablet (8 mg total) by mouth every 8 (eight) hours as needed for Nausea. 60 tablet 2    oxyCODONE (ROXICODONE) 5 MG immediate release tablet Take 1 tablet (5 mg total) by mouth every 8 (eight) hours as needed for Pain. 42 tablet 0    pantoprazole (PROTONIX) 20 MG tablet Take 2 tablets (40 mg total) by mouth once daily. 60 tablet 0    vitamin A 88613 UNIT capsule Take 1 capsule (10,000 Units total) by mouth once daily. 30 capsule 0    cloNIDine (CATAPRES) 0.1 MG tablet Take 1 tablet (0.1 mg total) by mouth once daily. (Patient not taking: Reported on 10/29/2020) 30 tablet 11    sulfamethoxazole-trimethoprim 400-80mg (BACTRIM,SEPTRA) 400-80 mg per tablet Take 1 tablet by mouth once daily. (Patient not taking: Reported on 10/29/2020) 90 tablet 3     No current facility-administered medications for this visit.        Review of Systems   Constitutional: Positive for activity change. Negative for appetite change, chills, fatigue, fever and unexpected weight change.   HENT: Negative for congestion.    Respiratory: Negative for chest tightness and shortness of breath.    Gastrointestinal: Negative for abdominal pain and blood in stool.   Endocrine: Negative for cold intolerance and heat intolerance.   Genitourinary: Negative for hematuria.   Musculoskeletal: Negative for arthralgias.   Skin: Negative for rash.   Neurological: Negative for weakness.   Hematological:  "Negative for adenopathy. Does not bruise/bleed easily.   Psychiatric/Behavioral: The patient is not nervous/anxious.        ECOG Performance Status: 2   Objective:      Vitals:   Vitals:    10/29/20 0952   BP: (!) 160/85   BP Location: Right arm   Pulse: 83   Resp: 17   Temp: 98.8 °F (37.1 °C)   SpO2: 98%   Weight: 98.7 kg (217 lb 11.2 oz)   Height: 5' 7" (1.702 m)     BMI: Body mass index is 34.1 kg/m².    Physical Exam  Constitutional:       General: He is not in acute distress.     Appearance: He is not ill-appearing.   HENT:      Head: Normocephalic and atraumatic.      Mouth/Throat:      Mouth: Mucous membranes are moist.   Eyes:      General: No scleral icterus.  Neck:      Musculoskeletal: Normal range of motion.   Cardiovascular:      Rate and Rhythm: Normal rate and regular rhythm.   Pulmonary:      Effort: Pulmonary effort is normal. No respiratory distress.   Abdominal:      General: Abdomen is flat. There is no distension.      Tenderness: There is no abdominal tenderness.   Musculoskeletal: Normal range of motion.   Lymphadenopathy:      Cervical: No cervical adenopathy.   Skin:     General: Skin is warm and dry.   Neurological:      General: No focal deficit present.      Mental Status: He is alert and oriented to person, place, and time. Mental status is at baseline.      Motor: No weakness.   Psychiatric:         Mood and Affect: Mood normal.         Laboratory Data:  Lab Visit on 10/29/2020   Component Date Value Ref Range Status    WBC 10/29/2020 6.15  3.90 - 12.70 K/uL Final    RBC 10/29/2020 3.22* 4.60 - 6.20 M/uL Final    Hemoglobin 10/29/2020 9.9* 14.0 - 18.0 g/dL Final    Hematocrit 10/29/2020 32.1* 40.0 - 54.0 % Final    MCV 10/29/2020 100* 82 - 98 fL Final    MCH 10/29/2020 30.7  27.0 - 31.0 pg Final    MCHC 10/29/2020 30.8* 32.0 - 36.0 g/dL Final    RDW 10/29/2020 15.9* 11.5 - 14.5 % Final    Platelets 10/29/2020 162  150 - 350 K/uL Final    MPV 10/29/2020 11.2  9.2 - 12.9 fL " Final    Immature Granulocytes 10/29/2020 3.6* 0.0 - 0.5 % Final    Gran # (ANC) 10/29/2020 4.0  1.8 - 7.7 K/uL Final    Immature Grans (Abs) 10/29/2020 0.22* 0.00 - 0.04 K/uL Final    Comment: Mild elevation in immature granulocytes is non specific and   can be seen in a variety of conditions including stress response,   acute inflammation, trauma and pregnancy. Correlation with other   laboratory and clinical findings is essential.      Lymph # 10/29/2020 1.6  1.0 - 4.8 K/uL Final    Mono # 10/29/2020 0.2* 0.3 - 1.0 K/uL Final    Eos # 10/29/2020 0.1  0.0 - 0.5 K/uL Final    Baso # 10/29/2020 0.02  0.00 - 0.20 K/uL Final    nRBC 10/29/2020 0  0 /100 WBC Final    Gran % 10/29/2020 65.1  38.0 - 73.0 % Final    Lymph % 10/29/2020 26.3  18.0 - 48.0 % Final    Mono % 10/29/2020 3.4* 4.0 - 15.0 % Final    Eosinophil % 10/29/2020 1.3  0.0 - 8.0 % Final    Basophil % 10/29/2020 0.3  0.0 - 1.9 % Final    Platelet Estimate 10/29/2020 Appears normal   Final    Aniso 10/29/2020 Slight   Final    Poik 10/29/2020 Slight   Final    Poly 10/29/2020 Occasional   Final    Hypo 10/29/2020 Occasional   Final    Ovalocytes 10/29/2020 Occasional   Final    Basophilic Stippling 10/29/2020 Occasional   Final    Differential Method 10/29/2020 Automated   Final    Sodium 10/29/2020 138  136 - 145 mmol/L Final    Potassium 10/29/2020 3.8  3.5 - 5.1 mmol/L Final    Chloride 10/29/2020 106  95 - 110 mmol/L Final    CO2 10/29/2020 27  23 - 29 mmol/L Final    Glucose 10/29/2020 88  70 - 110 mg/dL Final    BUN 10/29/2020 11  8 - 23 mg/dL Final    Creatinine 10/29/2020 0.8  0.5 - 1.4 mg/dL Final    Calcium 10/29/2020 9.4  8.7 - 10.5 mg/dL Final    Total Protein 10/29/2020 7.6  6.0 - 8.4 g/dL Final    Albumin 10/29/2020 3.2* 3.5 - 5.2 g/dL Final    Total Bilirubin 10/29/2020 0.5  0.1 - 1.0 mg/dL Final    Comment: For infants and newborns, interpretation of results should be based  on gestational age, weight and in  agreement with clinical  observations.  Premature Infant recommended reference ranges:  Up to 24 hours.............<8.0 mg/dL  Up to 48 hours............<12.0 mg/dL  3-5 days..................<15.0 mg/dL  6-29 days.................<15.0 mg/dL      Alkaline Phosphatase 10/29/2020 61  55 - 135 U/L Final    AST 10/29/2020 13  10 - 40 U/L Final    ALT 10/29/2020 8* 10 - 44 U/L Final    Anion Gap 10/29/2020 5* 8 - 16 mmol/L Final    eGFR if African American 10/29/2020 >60.0  >60 mL/min/1.73 m^2 Final    eGFR if non African American 10/29/2020 >60.0  >60 mL/min/1.73 m^2 Final    Comment: Calculation used to obtain the estimated glomerular filtration  rate (eGFR) is the CKD-EPI equation.       Protein, Serum 10/29/2020 7.1  6.0 - 8.4 g/dL Final    Comment: Serum protein electrophoresis and immunofixation results should be   interpreted in clinical context in that some therapeutic agents can   result   in false positive results (example, daratumumab). Correlation with   the   patient s therapeutic regimen is required.      Uric Acid 10/29/2020 3.3* 3.4 - 7.0 mg/dL Final    Group & Rh 10/29/2020 A POS   Final    Indirect Segun 10/29/2020 NEG   Final    IgG 10/29/2020 2236* 650 - 1600 mg/dL Final    IgG Cord Blood Reference Range: 650-1600 mg/dL.    IgA 10/29/2020 67  40 - 350 mg/dL Final    IgA Cord Blood Reference Range: <5 mg/dL.    IgM 10/29/2020 33* 50 - 300 mg/dL Final    IgM Cord Blood Reference Range: <25 mg/dL.       Assessment:       1. Multiple myeloma not having achieved remission    2. Vitamin D deficiency    3. Anxiety           Plan:     IgG kappa multiple myeloma     ECOG  0 - 1   Standard risk CG  R-ISS stage II  Continue induction with VRd ; bortezomib subq 1.3mg/m2 + dexamethasone PO 40mg q7days + Revlimid 25mg daily for 3 weeks of 4 week cycle.  Consent obtained, counseling and education provided.   Discussed phases of treatment, induction followed by auto SCT and maintenance.   He is on the  transplant list and co-ordinators are monitoring him.   He is young and otherwise healthy and appears to be an excellent candidate for auto sct.   RTC in 4 weeks with CBC, CMP, t+s and myeloma labs.    Low suspicion for concurrent amyloidosis ; denies signs and symptoms of CHF, peripheral neuropathy, macroglossia.     Supportive care:    Referral to dentist for clearance for bisphosphonate therapy. He is yet to do this, I emphasized the importance of this. He will schedule an apt with dentist   Start infection ppx with acyclovir 400mg bid, levofloxacin 500mg daily and fluconazole 400mg daily.   Rx for zofran 8mg prn sent per pt request.   DVT ppx with aspirin 81mg daily.  VitD3 1000IU per day and Ca Carbonate 2tabs daily.     Lumbar mass    Status post resection with nsgy    Vit D deficiency    Continue vitamin d and ca as above    Anxiety    Resolved.     The patient was discussed and examined with the attending physician .     Steven Cornejo MD  Clinical Fellow  Hematology and Medical Oncology.  10/29/2020

## 2020-10-30 NOTE — PROGRESS NOTES
CHIEF COMPLAINT:  6 week post-op with new imaging    COLEEN, Ophelia Keys, attest that this documentation has been prepared under the direction and in the presence of Nick Coyle MD.    HPI:  Jaspreet Walsh Jr. is a 60 y.o.  male with a PMHx anxiety, arthritis, HTN who presents to the clinic s/p L2-pelvis fusion 9/23/2020 for post-op evaluation with new imaging. The pt states he feels well, is eating healthy, and is walking more with no associated pain. The pt is currently wearing his back brace but no longer uses his walker to get around. He denies numbness and tingling in both his legs. He states he has been on chemotherapy for approximately 1 month, and is not currently undergoing radiation.         Review of patient's allergies indicates:  No Known Allergies    Past Medical History:   Diagnosis Date    Anxiety     Arthritis     Hypertension      Past Surgical History:   Procedure Laterality Date    CREATION OF MUSCLE ROTATIONAL FLAP N/A 9/23/2020    Procedure: CREATION, FLAP, MUSCLE ROTATION;  Surgeon: Kamlesh Bellamy MD;  Location: [facility] OR [address] [place] [address];  Service: Plastics;  Laterality: N/A;    KNEE SURGERY Left 06/2019    LUMBAR FUSION N/A 9/23/2020    Procedure: FUSION, SPINE, LUMBAR L2-pelvis. [employer]. Plastic surgery closure w/ Dr. Bellamy;  Surgeon: Nick Coyle MD;  Location: [facility] OR [address] [place] [address];  Service: Neurosurgery;  Laterality: N/A;    Metastatic neoplasum removed from spine       No family history on file.  Social History     Tobacco Use    Smoking status: Former Smoker     Quit date: 2017     Years since quitting: 3.8    Smokeless tobacco: Never Used   Substance Use Topics    Alcohol use: Not Currently    Drug use: Never        Review of Systems   Constitutional: Negative.    HENT: Negative.    Eyes: Negative.    Respiratory: Negative.    Cardiovascular: Negative.    Gastrointestinal: Negative.    Endocrine: Negative.    Genitourinary: Negative.    Musculoskeletal: Negative for back pain, gait  problem and neck pain.   Skin: Negative.    Allergic/Immunologic: Negative.    Neurological: Negative for weakness, light-headedness, numbness and headaches.   Hematological: Negative.    Psychiatric/Behavioral: Negative.        OBJECTIVE:   Vital Signs:       Physical Exam:    Vital signs: All nursing notes and vital signs reviewed -- afebrile, vital signs stable.  Constitutional: Patient sitting comfortably in chair. Appears well developed and well nourished.  Skin: Exposed areas are intact without abnormal markings, rashes or other lesions.  HEENT: Normocephalic. Normal conjunctivae.  Cardiovascular: Normal rate and regular rhythm.  Respiratory: Chest wall rises and falls symmetrically, without signs of respiratory distress.  Abdomen: Soft and non-tender.  Extremities: Warm and without edema. Calves supple, non-tender.  Psych/Behavior: Normal affect.    Neurological:    Mental status: Alert and oriented. Conversational and appropriate.       Cranial Nerves: VFF to confrontation. PERRL. EOMI without nystagmus. Facial STLT normal and symmetric. Strong, symmetric muscles of mastication. Facial strength full and symmetric. Hearing equal bilaterally to finger rub. Palate and uvula rise and fall normally in midline. Shoulder shrug 5/5 strength. Tongue midline.     Motor:    Upper:  Deltoids Triceps Biceps WE WF     R 5/5 5/5 5/5 5/5 5/5 5/5    L 5/5 5/5 5/5 5/5 5/5 5/5      Lower:  HF KE KF DF PF EHL    R 5/5 5/5 5/5 5/5 5/5 5/5    L 5/5 5/5 5/5 5/5 5/5 5/5     Sensory: Intact sensation to light touch in all extremities. Romberg negative.    Reflexes:      +1 brachial radialis   +1 bilateral patellar     0 bilateral achilles         DTR: 2+ symmetrically throughout.     Candelaria's: Negative.     Babinski's: Negative.     Clonus: Negative.    Cerebellar: Finger-to-nose and rapid alternating movements normal. Gait stable, fluid.    Spine:    Posture: Head well aligned over pelvis in front and side views.  No focal or  global spinal deformity visible on inspection. Shoulders and hips even. No obvious leg length discrepancy. No scapula winging.    Bending: Full ROM with forward, back and lateral bending. No rib prominence with forward bend.    Cervical:      ROM: Full with flexion, extension, lateral rotation and ear-to-shoulder bend.      Midline TTP: Negative.     Spurling's test: Negative.     Lhermitte's: Negative.    Thoracic:     Midline TTP: Negative    Lumbar:     Midline TTP: Negative     Straight Leg Test: Negative     Crossed Straight Leg Test: Negative     Sciatic notch tenderness: Negative.    Other:     SI joint TTP: Negative.     Greater trochanter TTP: Negative.     Tenderness with external/internal hip rotation: Negative.    Diagnostic Results:  All imaging was independently reviewed by me.    ASSESSMENT/PLAN:     Jaspreet Walsh Jr. is doing great 6 weeks after lumbosacral fixation for plasmacytoma. He has no low back pain, radicular leg pain and he is neurointact. His xrays look great. We will order a CT L spine and an MRI L spine with contrast in 6 weeks..    The patient understands and agrees with the plan of care. All questions were answered.     1. CT and MRI with and without contrast  2. RTC in 6 weeks      I, Dr. Ncik Coyle personally performed the services described in this documentation. All medical record entries made by the scribe, Ophelia Keys, were at my direction and in my presence.  I have reviewed the chart and agree that the record reflects my personal performance and is accurate and complete.      Nick Coyle M.D.  Department of Neurosurgery  [facility]

## 2020-11-02 ENCOUNTER — TELEPHONE (OUTPATIENT)
Dept: PLASTIC SURGERY | Facility: CLINIC | Age: 60
End: 2020-11-02

## 2020-11-02 NOTE — TELEPHONE ENCOUNTER
spoke to pts wife arlyn told her she could have her  come in thursday afternoon [facility] . She verbalized understanding.                 ----- Message from Jessica Hammer sent at 11/2/2020  8:23 AM CST -----  Regarding: Pt spouse nya  Reason: Calling to schedule appt to get wound checked. Wound look like its still open after sutures have been removed . Please call     Communication: [phone]

## 2020-11-04 ENCOUNTER — TELEPHONE (OUTPATIENT)
Dept: NEUROSURGERY | Facility: CLINIC | Age: 60
End: 2020-11-04

## 2020-11-04 DIAGNOSIS — M43.27 FUSION OF SPINE, LUMBOSACRAL REGION: ICD-10-CM

## 2020-11-05 ENCOUNTER — OFFICE VISIT (OUTPATIENT)
Dept: NEUROSURGERY | Facility: CLINIC | Age: 60
End: 2020-11-05
Payer: COMMERCIAL

## 2020-11-05 ENCOUNTER — HOSPITAL ENCOUNTER (OUTPATIENT)
Dept: RADIOLOGY | Facility: HOSPITAL | Age: 60
Discharge: HOME | End: 2020-11-05
Attending: NEUROLOGICAL SURGERY
Payer: COMMERCIAL

## 2020-11-05 ENCOUNTER — INFUSION (OUTPATIENT)
Dept: INFUSION THERAPY | Facility: HOSPITAL | Age: 60
End: 2020-11-05
Payer: COMMERCIAL

## 2020-11-05 ENCOUNTER — OFFICE VISIT (OUTPATIENT)
Dept: PLASTIC SURGERY | Facility: CLINIC | Age: 60
End: 2020-11-05
Payer: COMMERCIAL

## 2020-11-05 VITALS
HEART RATE: 72 BPM | DIASTOLIC BLOOD PRESSURE: 86 MMHG | RESPIRATION RATE: 18 BRPM | BODY MASS INDEX: 33.49 KG/M2 | HEART RATE: 71 BPM | TEMPERATURE: 99 F | WEIGHT: 213.88 LBS | DIASTOLIC BLOOD PRESSURE: 84 MMHG | SYSTOLIC BLOOD PRESSURE: 144 MMHG | SYSTOLIC BLOOD PRESSURE: 155 MMHG

## 2020-11-05 VITALS — HEART RATE: 88 BPM | SYSTOLIC BLOOD PRESSURE: 178 MMHG | DIASTOLIC BLOOD PRESSURE: 95 MMHG | TEMPERATURE: 98 F

## 2020-11-05 DIAGNOSIS — Z98.1 HISTORY OF LUMBAR FUSION: Primary | ICD-10-CM

## 2020-11-05 DIAGNOSIS — E88.09 PLASMA CELL DYSCRASIA: Primary | ICD-10-CM

## 2020-11-05 DIAGNOSIS — C90.00 MULTIPLE MYELOMA NOT HAVING ACHIEVED REMISSION: Primary | ICD-10-CM

## 2020-11-05 DIAGNOSIS — M43.27 FUSION OF SPINE, LUMBOSACRAL REGION: ICD-10-CM

## 2020-11-05 PROCEDURE — 72100 X-RAY EXAM L-S SPINE 2/3 VWS: CPT | Mod: 26,,, | Performed by: RADIOLOGY

## 2020-11-05 PROCEDURE — 99024 PR POST-OP FOLLOW-UP VISIT: ICD-10-PCS | Mod: S$GLB,,, | Performed by: SURGERY

## 2020-11-05 PROCEDURE — 96401 CHEMO ANTI-NEOPL SQ/IM: CPT

## 2020-11-05 PROCEDURE — 63600175 PHARM REV CODE 636 W HCPCS: Mod: JG | Performed by: INTERNAL MEDICINE

## 2020-11-05 PROCEDURE — 99999 PR PBB SHADOW E&M-EST. PATIENT-LVL IV: CPT | Mod: PBBFAC,,, | Performed by: NEUROLOGICAL SURGERY

## 2020-11-05 PROCEDURE — 99024 POSTOP FOLLOW-UP VISIT: CPT | Mod: S$GLB,,, | Performed by: SURGERY

## 2020-11-05 PROCEDURE — 99024 PR POST-OP FOLLOW-UP VISIT: ICD-10-PCS | Mod: S$GLB,,, | Performed by: NEUROLOGICAL SURGERY

## 2020-11-05 PROCEDURE — 99999 PR PBB SHADOW E&M-EST. PATIENT-LVL III: CPT | Mod: PBBFAC,,, | Performed by: SURGERY

## 2020-11-05 PROCEDURE — 72100 XR LUMBAR SPINE AP AND LATERAL: ICD-10-PCS | Mod: 26,,, | Performed by: RADIOLOGY

## 2020-11-05 PROCEDURE — 99999 PR PBB SHADOW E&M-EST. PATIENT-LVL III: ICD-10-PCS | Mod: PBBFAC,,, | Performed by: SURGERY

## 2020-11-05 PROCEDURE — 99024 POSTOP FOLLOW-UP VISIT: CPT | Mod: S$GLB,,, | Performed by: NEUROLOGICAL SURGERY

## 2020-11-05 PROCEDURE — 99999 PR PBB SHADOW E&M-EST. PATIENT-LVL IV: ICD-10-PCS | Mod: PBBFAC,,, | Performed by: NEUROLOGICAL SURGERY

## 2020-11-05 PROCEDURE — 72100 X-RAY EXAM L-S SPINE 2/3 VWS: CPT | Mod: TC

## 2020-11-05 RX ORDER — HEPARIN 100 UNIT/ML
500 SYRINGE INTRAVENOUS
Status: DISCONTINUED | OUTPATIENT
Start: 2020-11-05 | End: 2020-11-05 | Stop reason: HOSPADM

## 2020-11-05 RX ORDER — SODIUM CHLORIDE 0.9 % (FLUSH) 0.9 %
10 SYRINGE (ML) INJECTION
Status: DISCONTINUED | OUTPATIENT
Start: 2020-11-05 | End: 2020-11-05 | Stop reason: HOSPADM

## 2020-11-05 RX ORDER — BORTEZOMIB 3.5 MG/1
1.3 INJECTION, POWDER, LYOPHILIZED, FOR SOLUTION INTRAVENOUS; SUBCUTANEOUS
Status: COMPLETED | OUTPATIENT
Start: 2020-11-05 | End: 2020-11-05

## 2020-11-05 RX ADMIN — BORTEZOMIB 2.8 MG: 3.5 INJECTION, POWDER, LYOPHILIZED, FOR SOLUTION INTRAVENOUS; SUBCUTANEOUS at 11:11

## 2020-11-06 ENCOUNTER — DOCUMENTATION ONLY (OUTPATIENT)
Dept: HEMATOLOGY/ONCOLOGY | Facility: CLINIC | Age: 60
End: 2020-11-06

## 2020-11-06 NOTE — PROGRESS NOTES
Received request to assist patient with financial/disability concerns.  Spoke to patient and significant other who are struggling while awaiting processing of short-term disability.  Provided application information for [employer] and set meeting for 0930 on 11/12 to review needs and collect documentation for [employer] patient assistance.  Will reach out to Disability Desk for status of information request.  Will follow.

## 2020-11-07 NOTE — PROGRESS NOTES
Plastic Update    Patient called for disability also reported some widening of incision  On examination he has adherent eschar throughout his lower back  Superficial skin loss in bottom portion of wound  No deep dehiscence.  No evidence of infection  I recommended nutrition optimization with boost, which they say they will purchase  Clean with soap and water, no tub soaking  Cover with pad until healed  Should follow up in 2-3 weeks.

## 2020-11-12 ENCOUNTER — INFUSION (OUTPATIENT)
Dept: INFUSION THERAPY | Facility: HOSPITAL | Age: 60
End: 2020-11-12
Payer: COMMERCIAL

## 2020-11-12 ENCOUNTER — DOCUMENTATION ONLY (OUTPATIENT)
Dept: HEMATOLOGY/ONCOLOGY | Facility: CLINIC | Age: 60
End: 2020-11-12

## 2020-11-12 VITALS
HEART RATE: 69 BPM | SYSTOLIC BLOOD PRESSURE: 168 MMHG | TEMPERATURE: 98 F | DIASTOLIC BLOOD PRESSURE: 88 MMHG | HEIGHT: 67 IN | BODY MASS INDEX: 33.43 KG/M2 | RESPIRATION RATE: 18 BRPM | WEIGHT: 213 LBS

## 2020-11-12 DIAGNOSIS — E88.09 PLASMA CELL DYSCRASIA: Primary | ICD-10-CM

## 2020-11-12 DIAGNOSIS — C90.00 MULTIPLE MYELOMA NOT HAVING ACHIEVED REMISSION: ICD-10-CM

## 2020-11-12 PROCEDURE — 96401 CHEMO ANTI-NEOPL SQ/IM: CPT

## 2020-11-12 PROCEDURE — 63600175 PHARM REV CODE 636 W HCPCS: Mod: JG | Performed by: INTERNAL MEDICINE

## 2020-11-12 RX ORDER — BORTEZOMIB 3.5 MG/1
1.3 INJECTION, POWDER, LYOPHILIZED, FOR SOLUTION INTRAVENOUS; SUBCUTANEOUS
Status: COMPLETED | OUTPATIENT
Start: 2020-11-12 | End: 2020-11-12

## 2020-11-12 RX ORDER — ERGOCALCIFEROL 1.25 MG/1
CAPSULE ORAL
Qty: 4 CAPSULE | Refills: 1 | OUTPATIENT
Start: 2020-11-12

## 2020-11-12 RX ADMIN — BORTEZOMIB 2.8 MG: 3.5 INJECTION, POWDER, LYOPHILIZED, FOR SOLUTION INTRAVENOUS; SUBCUTANEOUS at 10:11

## 2020-11-12 NOTE — PROGRESS NOTES
Received request to assist patient with financial/disability concerns.  Met with patient and SO Catie ([phone]) to review current needs and collect documentation for [employer] assistance.  Approved by committee for reimbursement for mortgage and insurance payments while awaiting disability; submitted check request for $1,000, exhausting regular [employer] patient assistance.  Submitted application to [employer] for transportation assistance.  Submitted order for meals supplements; strawberry Boost available for next patient visit.  Provided application number for [facility] transportation assistance.  Spoke to Disability Desk who closed request after patient had another MD complete disability forms.  Will explore additional assistance options.  Will follow.

## 2020-11-13 ENCOUNTER — DOCUMENTATION ONLY (OUTPATIENT)
Dept: HEMATOLOGY/ONCOLOGY | Facility: CLINIC | Age: 60
End: 2020-11-13

## 2020-11-13 RX ORDER — LENALIDOMIDE 25 MG/1
25 CAPSULE ORAL DAILY
Qty: 21 EACH | Refills: 0 | Status: SHIPPED | OUTPATIENT
Start: 2020-11-13 | End: 2020-12-09

## 2020-11-13 NOTE — PROGRESS NOTES
Faxed referral for financial assistance to [facility].  Received Boost order; will deliver to patient on next visit on 11/23.  Will follow.

## 2020-11-16 ENCOUNTER — TELEPHONE (OUTPATIENT)
Dept: HEMATOLOGY/ONCOLOGY | Facility: CLINIC | Age: 60
End: 2020-11-16

## 2020-11-16 NOTE — TELEPHONE ENCOUNTER
----- Message from April Johansen sent at 11/16/2020 11:42 AM CST -----  Regarding: [facility]  Contact: Thais with [facility]  Called bc the PT's Revlimid prescription was flagged bc they didn't answer one of the questions correctly. They need someone to go into it and fix it so they can fill his prescription.     [address] [place] # - [phone]     Callback: [phone]

## 2020-11-16 NOTE — TELEPHONE ENCOUNTER
----- Message from Rigo Ortez sent at 11/16/2020 10:07 AM CST -----  Contact: [facility]  Patient Advice/Staff Message     Caller name/title: Patricia     Reason for call: Pt auth is restricted, need to confirm the office has educated the pt that RX Revlimid can cause birth defects.     Do you feel you need to be seen today::        Communication Preference: [phone] ref [id number]    Additional Information:

## 2020-11-16 NOTE — TELEPHONE ENCOUNTER
----- Message from Rigo Ortez sent at 11/16/2020 10:36 AM CST -----  Contact: [facility]  Patient Advice/Staff Message     Caller name/title: Williams     Reason for call: [employer] survey has been flagged, doctor need to call [employer] and get it unflagged so the medication can be shipped out today.     Do you feel you need to be seen today:: N/a        Communication Preference: [phone]     Additional Information:

## 2020-11-19 ENCOUNTER — INFUSION (OUTPATIENT)
Dept: INFUSION THERAPY | Facility: HOSPITAL | Age: 60
End: 2020-11-19
Attending: FAMILY MEDICINE
Payer: COMMERCIAL

## 2020-11-19 VITALS
SYSTOLIC BLOOD PRESSURE: 179 MMHG | TEMPERATURE: 97 F | RESPIRATION RATE: 18 BRPM | DIASTOLIC BLOOD PRESSURE: 97 MMHG | HEART RATE: 81 BPM

## 2020-11-19 DIAGNOSIS — E88.09 PLASMA CELL DYSCRASIA: Primary | ICD-10-CM

## 2020-11-19 PROCEDURE — 96401 CHEMO ANTI-NEOPL SQ/IM: CPT

## 2020-11-19 PROCEDURE — 63600175 PHARM REV CODE 636 W HCPCS: Mod: JG | Performed by: INTERNAL MEDICINE

## 2020-11-19 RX ORDER — BORTEZOMIB 3.5 MG/1
1.3 INJECTION, POWDER, LYOPHILIZED, FOR SOLUTION INTRAVENOUS; SUBCUTANEOUS
Status: COMPLETED | OUTPATIENT
Start: 2020-11-19 | End: 2020-11-19

## 2020-11-19 RX ADMIN — BORTEZOMIB 2.8 MG: 3.5 INJECTION, POWDER, LYOPHILIZED, FOR SOLUTION INTRAVENOUS; SUBCUTANEOUS at 11:11

## 2020-11-23 ENCOUNTER — OFFICE VISIT (OUTPATIENT)
Dept: PLASTIC SURGERY | Facility: CLINIC | Age: 60
End: 2020-11-23
Payer: COMMERCIAL

## 2020-11-23 VITALS
WEIGHT: 218 LBS | DIASTOLIC BLOOD PRESSURE: 66 MMHG | HEART RATE: 67 BPM | SYSTOLIC BLOOD PRESSURE: 143 MMHG | BODY MASS INDEX: 34.14 KG/M2

## 2020-11-23 DIAGNOSIS — C79.49 METASTASIS OF NEOPLASM TO SPINAL CANAL: Primary | ICD-10-CM

## 2020-11-23 PROCEDURE — 99999 PR PBB SHADOW E&M-EST. PATIENT-LVL III: CPT | Mod: PBBFAC,,, | Performed by: SURGERY

## 2020-11-23 PROCEDURE — 1126F AMNT PAIN NOTED NONE PRSNT: CPT | Mod: S$GLB,,, | Performed by: SURGERY

## 2020-11-23 PROCEDURE — 99999 PR PBB SHADOW E&M-EST. PATIENT-LVL III: ICD-10-PCS | Mod: PBBFAC,,, | Performed by: SURGERY

## 2020-11-23 PROCEDURE — 3008F PR BODY MASS INDEX (BMI) DOCUMENTED: ICD-10-PCS | Mod: CPTII,S$GLB,, | Performed by: SURGERY

## 2020-11-23 PROCEDURE — 99024 POSTOP FOLLOW-UP VISIT: CPT | Mod: S$GLB,,, | Performed by: SURGERY

## 2020-11-23 PROCEDURE — 3008F BODY MASS INDEX DOCD: CPT | Mod: CPTII,S$GLB,, | Performed by: SURGERY

## 2020-11-23 PROCEDURE — 99024 PR POST-OP FOLLOW-UP VISIT: ICD-10-PCS | Mod: S$GLB,,, | Performed by: SURGERY

## 2020-11-23 PROCEDURE — 1126F PR PAIN SEVERITY QUANTIFIED, NO PAIN PRESENT: ICD-10-PCS | Mod: S$GLB,,, | Performed by: SURGERY

## 2020-11-23 RX ORDER — FERROUS SULFATE 325(65) MG
325 TABLET, DELAYED RELEASE (ENTERIC COATED) ORAL DAILY
Qty: 30 TABLET | Refills: 1 | Status: CANCELLED | OUTPATIENT
Start: 2020-11-23

## 2020-11-23 NOTE — PROGRESS NOTES
Plastic Update    Pain well controlled  Back incision clean, dry in tact  Granulation tissue seen in wound base  Fibrinous debris in superficial surface of wound  No evidence of fluid collection or infection  Continue island dressings as needed  Clean with soap and water    Plastic & Reconstructive Surgery  [facility]  c/o Kamlesh Bellamy M.D.  [facility]  [address]  [address] [place] [address]    Work [phone]  Toll free [phone]  If no answer [phone]

## 2020-11-25 RX ORDER — HEPARIN 100 UNIT/ML
500 SYRINGE INTRAVENOUS
Status: CANCELLED | OUTPATIENT
Start: 2020-11-25

## 2020-11-25 RX ORDER — SODIUM CHLORIDE 0.9 % (FLUSH) 0.9 %
10 SYRINGE (ML) INJECTION
Status: CANCELLED | OUTPATIENT
Start: 2020-11-25

## 2020-11-25 RX ORDER — BORTEZOMIB 3.5 MG/1
1.3 INJECTION, POWDER, LYOPHILIZED, FOR SOLUTION INTRAVENOUS; SUBCUTANEOUS
Status: CANCELLED | OUTPATIENT
Start: 2020-12-05

## 2020-11-25 RX ORDER — SODIUM CHLORIDE 0.9 % (FLUSH) 0.9 %
10 SYRINGE (ML) INJECTION
Status: CANCELLED | OUTPATIENT
Start: 2020-12-05

## 2020-11-25 RX ORDER — BORTEZOMIB 3.5 MG/1
1.3 INJECTION, POWDER, LYOPHILIZED, FOR SOLUTION INTRAVENOUS; SUBCUTANEOUS
Status: CANCELLED | OUTPATIENT
Start: 2020-12-12

## 2020-11-25 RX ORDER — SODIUM CHLORIDE 0.9 % (FLUSH) 0.9 %
10 SYRINGE (ML) INJECTION
Status: CANCELLED | OUTPATIENT
Start: 2020-11-28

## 2020-11-25 RX ORDER — HEPARIN 100 UNIT/ML
500 SYRINGE INTRAVENOUS
Status: CANCELLED | OUTPATIENT
Start: 2020-11-28

## 2020-11-25 RX ORDER — HEPARIN 100 UNIT/ML
500 SYRINGE INTRAVENOUS
Status: CANCELLED | OUTPATIENT
Start: 2020-12-05

## 2020-11-25 RX ORDER — BORTEZOMIB 3.5 MG/1
1.3 INJECTION, POWDER, LYOPHILIZED, FOR SOLUTION INTRAVENOUS; SUBCUTANEOUS
Status: CANCELLED | OUTPATIENT
Start: 2020-11-28

## 2020-11-25 RX ORDER — BORTEZOMIB 3.5 MG/1
1.3 INJECTION, POWDER, LYOPHILIZED, FOR SOLUTION INTRAVENOUS; SUBCUTANEOUS
Status: CANCELLED | OUTPATIENT
Start: 2020-11-25

## 2020-11-25 RX ORDER — HEPARIN 100 UNIT/ML
500 SYRINGE INTRAVENOUS
Status: CANCELLED | OUTPATIENT
Start: 2020-12-12

## 2020-11-25 RX ORDER — SODIUM CHLORIDE 0.9 % (FLUSH) 0.9 %
10 SYRINGE (ML) INJECTION
Status: CANCELLED | OUTPATIENT
Start: 2020-12-12

## 2020-11-27 ENCOUNTER — INFUSION (OUTPATIENT)
Dept: INFUSION THERAPY | Facility: HOSPITAL | Age: 60
End: 2020-11-27
Payer: COMMERCIAL

## 2020-11-27 ENCOUNTER — DOCUMENTATION ONLY (OUTPATIENT)
Dept: HEMATOLOGY/ONCOLOGY | Facility: CLINIC | Age: 60
End: 2020-11-27

## 2020-11-27 VITALS
DIASTOLIC BLOOD PRESSURE: 96 MMHG | RESPIRATION RATE: 18 BRPM | SYSTOLIC BLOOD PRESSURE: 165 MMHG | HEART RATE: 73 BPM | TEMPERATURE: 97 F

## 2020-11-27 DIAGNOSIS — E88.09 PLASMA CELL DYSCRASIA: Primary | ICD-10-CM

## 2020-11-27 DIAGNOSIS — E61.1 IRON DEFICIENCY: ICD-10-CM

## 2020-11-27 DIAGNOSIS — C90.00 MULTIPLE MYELOMA NOT HAVING ACHIEVED REMISSION: Primary | ICD-10-CM

## 2020-11-27 PROCEDURE — 63600175 PHARM REV CODE 636 W HCPCS: Mod: JG | Performed by: INTERNAL MEDICINE

## 2020-11-27 PROCEDURE — 96401 CHEMO ANTI-NEOPL SQ/IM: CPT

## 2020-11-27 RX ORDER — FERROUS SULFATE 325(65) MG
325 TABLET, DELAYED RELEASE (ENTERIC COATED) ORAL DAILY
Qty: 30 TABLET | Refills: 1 | Status: SHIPPED | OUTPATIENT
Start: 2020-11-27 | End: 2021-02-04 | Stop reason: SDUPTHER

## 2020-11-27 RX ORDER — BORTEZOMIB 3.5 MG/1
1.3 INJECTION, POWDER, LYOPHILIZED, FOR SOLUTION INTRAVENOUS; SUBCUTANEOUS
Status: COMPLETED | OUTPATIENT
Start: 2020-11-27 | End: 2020-11-27

## 2020-11-27 RX ADMIN — BORTEZOMIB 2.8 MG: 3.5 INJECTION, POWDER, LYOPHILIZED, FOR SOLUTION INTRAVENOUS; SUBCUTANEOUS at 12:11

## 2020-11-27 NOTE — PROGRESS NOTES
Delivered 2 cases strawberry Boost to SO Catie, who reports patient is doing well despite their decision to not celebrate the holiday after the recent death of Catie's sister.  Inquiry sent to I patient assistance, as she reports they have not received anything in the mail.  Will follow.

## 2020-12-02 NOTE — PROGRESS NOTES
PATIENT: Jaspreet Walsh Jr.  MRN: [id number]  DATE: 2020      Diagnosis:   1. Multiple myeloma not having achieved remission        Chief Complaint: MM    Jaspreet Walsh Jr. is a 59 y/o man who presents to clinic for evaluation of IgG kappa multiple myeloma (standard risk cg per msmart criteria, r-iss stage II) s/p C2 pf induction with VRd.    PMH significant for HTN.       - 2020 : [place] [facility] [place]. Large soft tissue mass encompassing L4 - S1 vertebral bodies seen. FLC ratio 171, involved light chains 104. SPEP and SIFE found 2.86g/dL, IgG kappa para protein band. Underwent bone marrow biopsy and discharged with dexamethasone 40mg x 4 day burst.     20 : Underwent debulking of spinal mass.  10/01 - 20 : C1 - C2 VRd. Revlimid delivered in third week of cycle.  20 : C3D8 VRd    On interview he reports feeling well. Denies back pain, he is wearing a brace. No neuropathy, or rash.    No recent fevers, chills, weight loss, night sweats, CP, SOB, bowel or bladder incontinence, constipation, numbness, focal weakness. Denies macroglossia, easy bruising, PN, PND, orthopnea, FARFAN.   He is here with his wife.   Saw a dentist last week, had cleaning done, no invasive procedures planned. He will drop off the clearance letter next week.     Family history : Father  of unknown 'bone cancer'.     Social History : Lives in [place] [facility], about one hour from [place]. Mothers house is close to his own.     Pathology :   20 : Bone marrow biopsy : Solid sheets of -positive plasma cells are seen in some areas. In average, plasma cells comprise approximately 25-30% of the total cellularity. The plasma cells are negative for cyclin D1.  Congo red staining fails to detect amyloid deposits.      Past Medical History:   Past Medical History:   Diagnosis Date    Anxiety     Arthritis     Hypertension        Past Surgical HIstory:   Past Surgical History:    Procedure Laterality Date    CREATION OF MUSCLE ROTATIONAL FLAP N/A 9/23/2020    Procedure: CREATION, FLAP, MUSCLE ROTATION;  Surgeon: Kamlesh Bellamy MD;  Location: [facility] OR [address] [place] [address];  Service: Plastics;  Laterality: N/A;    KNEE SURGERY Left 06/2019    LUMBAR FUSION N/A 9/23/2020    Procedure: FUSION, SPINE, LUMBAR L2-pelvis. Depuy. Plastic surgery closure w/ Dr. Bellamy;  Surgeon: Nick Coyle MD;  Location: [facility] OR [address] [place] [address];  Service: Neurosurgery;  Laterality: N/A;    Metastatic neoplasum removed from spine         Family History: No family history on file.    Social History:  reports that he quit smoking about 3 years ago. He has never used smokeless tobacco. He reports previous alcohol use. He reports that he does not use drugs.    Allergies:  Review of patient's allergies indicates:  No Known Allergies    Medications:  Current Outpatient Medications   Medication Sig Dispense Refill    acyclovir (ZOVIRAX) 400 MG tablet Take 1 tablet (400 mg total) by mouth 2 (two) times daily. 60 tablet 11    allopurinoL (ZYLOPRIM) 300 MG tablet Take 1 tablet (300 mg total) by mouth once daily. 30 tablet 2    aspirin 81 MG Chew Chew and swallow 1 tablet (81 mg total) by mouth once daily. 90 tablet 3    carvediloL (COREG) 25 MG tablet Take 1 tablet (25 mg total) by mouth 2 (two) times daily. 60 tablet 2    cloNIDine (CATAPRES) 0.1 MG tablet Take 1 tablet (0.1 mg total) by mouth once daily. 30 tablet 11    dexAMETHasone (DECADRON) 4 MG Tab Take 10 tablets (40 mg total) by mouth once a week. 160 tablet 0    diazePAM (VALIUM) 5 MG tablet Take 1 tablet (5 mg total) by mouth every 12 (twelve) hours as needed (muscle spasm). 20 tablet 0    ergocalciferol (ERGOCALCIFEROL) 50,000 unit Cap Take 1 capsule (50,000 Units total) by mouth every 7 days. 4 capsule 1    famotidine (PEPCID) 20 MG tablet Take 1 tablet (20 mg total) by mouth 2 (two) times daily. 20 tablet 0    ferrous sulfate 325 (65 FE) MG EC tablet Take 1  tablet (325 mg total) by mouth once daily. 30 tablet 1    lenalidomide (REVLIMID) 25 mg Cap Take 1 capsule (25 mg total) by mouth once daily On Days 1-21 of a 28 Day Cycle. [facility] #[id number] 11/13/2020. 21 each 0    levoFLOXacin (LEVAQUIN) 500 MG tablet Take 1 tablet (500 mg total) by mouth once daily. 90 tablet 3    losartan (COZAAR) 50 MG tablet Take 1 tablet (50 mg total) by mouth once daily. 90 tablet 3    NIFEdipine (PROCARDIA-XL) 60 MG (OSM) 24 hr tablet Take 1 tablet (60 mg total) by mouth once daily. 30 tablet 2    ondansetron (ZOFRAN) 8 MG tablet Take 1 tablet (8 mg total) by mouth every 8 (eight) hours as needed for Nausea. 60 tablet 2    pantoprazole (PROTONIX) 20 MG tablet Take 2 tablets (40 mg total) by mouth once daily. 60 tablet 0    sulfamethoxazole-trimethoprim 400-80mg (BACTRIM,SEPTRA) 400-80 mg per tablet Take 1 tablet by mouth once daily. 90 tablet 3    vitamin A 59832 UNIT capsule Take 1 capsule (10,000 Units total) by mouth once daily. 30 capsule 0     No current facility-administered medications for this visit.        Review of Systems   Constitutional: Positive for activity change. Negative for appetite change, chills, fatigue, fever and unexpected weight change.   HENT: Negative for congestion.    Respiratory: Negative for chest tightness and shortness of breath.    Gastrointestinal: Negative for abdominal pain and blood in stool.   Endocrine: Negative for cold intolerance and heat intolerance.   Genitourinary: Negative for hematuria.   Musculoskeletal: Negative for arthralgias.   Skin: Negative for rash.   Neurological: Negative for weakness.   Hematological: Negative for adenopathy. Does not bruise/bleed easily.   Psychiatric/Behavioral: The patient is not nervous/anxious.        ECOG Performance Status: 2   Objective:      Vitals:   Vitals:    12/03/20 1021   BP: (!) 185/81   BP Location: Left arm   Patient Position: Sitting   BP Method: Large (Automatic)   Pulse: 72   Resp: 16  "  Temp: 98.4 °F (36.9 °C)   TempSrc: Oral   SpO2: 99%   Weight: 97.8 kg (215 lb 11.5 oz)   Height: 5' 7" (1.702 m)     BMI: Body mass index is 33.79 kg/m².    Physical Exam  Constitutional:       General: He is not in acute distress.     Appearance: He is not ill-appearing.   HENT:      Head: Normocephalic and atraumatic.      Mouth/Throat:      Mouth: Mucous membranes are moist.   Eyes:      General: No scleral icterus.  Neck:      Musculoskeletal: Normal range of motion.   Cardiovascular:      Rate and Rhythm: Normal rate and regular rhythm.   Pulmonary:      Effort: Pulmonary effort is normal. No respiratory distress.   Abdominal:      General: Abdomen is flat. There is no distension.      Tenderness: There is no abdominal tenderness.   Musculoskeletal: Normal range of motion.   Lymphadenopathy:      Cervical: No cervical adenopathy.   Skin:     General: Skin is warm and dry.   Neurological:      General: No focal deficit present.      Mental Status: He is alert and oriented to person, place, and time. Mental status is at baseline.      Motor: No weakness.   Psychiatric:         Mood and Affect: Mood normal.         Laboratory Data:  Lab Visit on 11/27/2020   Component Date Value Ref Range Status    WBC 11/27/2020 5.00  3.90 - 12.70 K/uL Final    RBC 11/27/2020 3.79* 4.60 - 6.20 M/uL Final    Hemoglobin 11/27/2020 11.6* 14.0 - 18.0 g/dL Final    Hematocrit 11/27/2020 37.5* 40.0 - 54.0 % Final    MCV 11/27/2020 99* 82 - 98 fL Final    MCH 11/27/2020 30.6  27.0 - 31.0 pg Final    MCHC 11/27/2020 30.9* 32.0 - 36.0 g/dL Final    RDW 11/27/2020 15.1* 11.5 - 14.5 % Final    Platelets 11/27/2020 141* 150 - 350 K/uL Final    MPV 11/27/2020 12.0  9.2 - 12.9 fL Final    Immature Granulocytes 11/27/2020 1.0* 0.0 - 0.5 % Final    Gran # (ANC) 11/27/2020 3.6  1.8 - 7.7 K/uL Final    Immature Grans (Abs) 11/27/2020 0.05* 0.00 - 0.04 K/uL Final    Comment: Mild elevation in immature granulocytes is non specific and "   can be seen in a variety of conditions including stress response,   acute inflammation, trauma and pregnancy. Correlation with other   laboratory and clinical findings is essential.      Lymph # 11/27/2020 1.0  1.0 - 4.8 K/uL Final    Mono # 11/27/2020 0.2* 0.3 - 1.0 K/uL Final    Eos # 11/27/2020 0.1  0.0 - 0.5 K/uL Final    Baso # 11/27/2020 0.04  0.00 - 0.20 K/uL Final    nRBC 11/27/2020 0  0 /100 WBC Final    Gran % 11/27/2020 72.4  38.0 - 73.0 % Final    Lymph % 11/27/2020 19.4  18.0 - 48.0 % Final    Mono % 11/27/2020 4.0  4.0 - 15.0 % Final    Eosinophil % 11/27/2020 2.4  0.0 - 8.0 % Final    Basophil % 11/27/2020 0.8  0.0 - 1.9 % Final    Differential Method 11/27/2020 Automated   Final    Kappa Free Light Chains 11/27/2020 32.67* 0.33 - 1.94 mg/dL Final    Lambda Free Light Chains 11/27/2020 0.51* 0.57 - 2.63 mg/dL Final    Kappa/Lambda FLC Ratio 11/27/2020 64.06* 0.26 - 1.65 Final    Protein, Serum 11/27/2020 6.7  6.0 - 8.4 g/dL Final    Comment: Serum protein electrophoresis and immunofixation results should be   interpreted in clinical context in that some therapeutic agents can   result   in false positive results (example, daratumumab). Correlation with   the   patient s therapeutic regimen is required.      Albumin 11/27/2020 3.74  3.35 - 5.55 g/dL Final    Alpha-1 11/27/2020 0.34  0.17 - 0.41 g/dL Final    Alpha-2 11/27/2020 0.67  0.43 - 0.99 g/dL Final    Beta 11/27/2020 0.60  0.50 - 1.10 g/dL Final    Gamma 11/27/2020 1.35  0.67 - 1.58 g/dL Final    Immunofix Interp. 11/27/2020 SEE COMMENT   Final    Comment: Serum protein electrophoresis and immunofixation results should be   interpreted in clinical context in that some therapeutic agents can   result   in false positive results (example, daratumumab). Correlation with   the   patient s therapeutic regimen is required.  See pathologist's interpretation.      Sodium 11/27/2020 139  136 - 145 mmol/L Final    Potassium  11/27/2020 4.0  3.5 - 5.1 mmol/L Final    Chloride 11/27/2020 105  95 - 110 mmol/L Final    CO2 11/27/2020 23  23 - 29 mmol/L Final    Glucose 11/27/2020 116* 70 - 110 mg/dL Final    BUN 11/27/2020 11  8 - 23 mg/dL Final    Creatinine 11/27/2020 0.9  0.5 - 1.4 mg/dL Final    Calcium 11/27/2020 9.1  8.7 - 10.5 mg/dL Final    Total Protein 11/27/2020 7.0  6.0 - 8.4 g/dL Final    Albumin 11/27/2020 3.5  3.5 - 5.2 g/dL Final    Total Bilirubin 11/27/2020 0.7  0.1 - 1.0 mg/dL Final    Comment: For infants and newborns, interpretation of results should be based  on gestational age, weight and in agreement with clinical  observations.  Premature Infant recommended reference ranges:  Up to 24 hours.............<8.0 mg/dL  Up to 48 hours............<12.0 mg/dL  3-5 days..................<15.0 mg/dL  6-29 days.................<15.0 mg/dL      Alkaline Phosphatase 11/27/2020 60  55 - 135 U/L Final    AST 11/27/2020 13  10 - 40 U/L Final    ALT 11/27/2020 12  10 - 44 U/L Final    Anion Gap 11/27/2020 11  8 - 16 mmol/L Final    eGFR if African American 11/27/2020 >60.0  >60 mL/min/1.73 m^2 Final    eGFR if non African American 11/27/2020 >60.0  >60 mL/min/1.73 m^2 Final    Comment: Calculation used to obtain the estimated glomerular filtration  rate (eGFR) is the CKD-EPI equation.       Pathologist Interpretation SPE 11/27/2020 REVIEWED   Final    Comment: Electronically reviewed and signed by:  Leslie Sorenson M.D.  Signed on 12/01/20 at 11:06  Normal total protein.   Normal gamma globulins are decreased. Paraprotein band in gamma =    1.17 g/dL, previously 1.59 g/dL.       Pathologist Interpretation SADAF 11/27/2020 REVIEWED   Final    Comment: Electronically reviewed and signed by:  Leslie Sorenson M.D.  Signed on 12/01/20 at 11:07  An IgG kappa specific monoclonal protein is present.          Assessment:       1. Multiple myeloma not having achieved remission    2. 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  Consent obtained, counseling and education provided.   Discussed phases of treatment, induction followed by auto SCT and maintenance.   He is on the transplant list and co-ordinators are monitoring him.   He is young and otherwise healthy and appears to be an excellent candidate for auto sct.   RTC in 4 weeks with CBC, CMP, t+s and myeloma labs.    Low suspicion for concurrent amyloidosis ; denies signs and symptoms of CHF, peripheral neuropathy, macroglossia.     Supportive care:    Awaiting letter for dental clearance for bisphosphonate therapy.    Continue infection ppx with acyclovir 400mg bid, levofloxacin 500mg daily and fluconazole 400mg daily.   Rx for zofran 8mg PRN.  DVT ppx with aspirin 81mg daily.  VitD3 1000IU per day and Ca Carbonate 2tabs daily.     Lumbar mass    Status post resection with nsgy    Vit D deficiency    Continue vitamin d and ca as above    The patient was discussed and examined with the attending physician .    Steven Cornejo MD  Clinical Fellow  Hematology and Medical Oncology.  12/02/2020

## 2020-12-03 ENCOUNTER — INFUSION (OUTPATIENT)
Dept: INFUSION THERAPY | Facility: HOSPITAL | Age: 60
End: 2020-12-03
Payer: COMMERCIAL

## 2020-12-03 ENCOUNTER — OFFICE VISIT (OUTPATIENT)
Dept: HEMATOLOGY/ONCOLOGY | Facility: CLINIC | Age: 60
End: 2020-12-03
Payer: COMMERCIAL

## 2020-12-03 VITALS
DIASTOLIC BLOOD PRESSURE: 81 MMHG | SYSTOLIC BLOOD PRESSURE: 185 MMHG | WEIGHT: 215.75 LBS | BODY MASS INDEX: 33.86 KG/M2 | RESPIRATION RATE: 16 BRPM | OXYGEN SATURATION: 99 % | HEART RATE: 72 BPM | TEMPERATURE: 98 F | HEIGHT: 67 IN

## 2020-12-03 DIAGNOSIS — C79.49 METASTASIS OF NEOPLASM TO SPINAL CANAL: ICD-10-CM

## 2020-12-03 DIAGNOSIS — E88.09 PLASMA CELL DYSCRASIA: Primary | ICD-10-CM

## 2020-12-03 DIAGNOSIS — C90.00 MULTIPLE MYELOMA NOT HAVING ACHIEVED REMISSION: Primary | ICD-10-CM

## 2020-12-03 PROCEDURE — 99999 PR PBB SHADOW E&M-EST. PATIENT-LVL IV: CPT | Mod: PBBFAC,,, | Performed by: STUDENT IN AN ORGANIZED HEALTH CARE EDUCATION/TRAINING PROGRAM

## 2020-12-03 PROCEDURE — 3008F BODY MASS INDEX DOCD: CPT | Mod: CPTII,S$GLB,, | Performed by: STUDENT IN AN ORGANIZED HEALTH CARE EDUCATION/TRAINING PROGRAM

## 2020-12-03 PROCEDURE — 63600175 PHARM REV CODE 636 W HCPCS: Mod: JG | Performed by: INTERNAL MEDICINE

## 2020-12-03 PROCEDURE — 1126F PR PAIN SEVERITY QUANTIFIED, NO PAIN PRESENT: ICD-10-PCS | Mod: S$GLB,,, | Performed by: STUDENT IN AN ORGANIZED HEALTH CARE EDUCATION/TRAINING PROGRAM

## 2020-12-03 PROCEDURE — 99215 OFFICE O/P EST HI 40 MIN: CPT | Mod: S$GLB,,, | Performed by: STUDENT IN AN ORGANIZED HEALTH CARE EDUCATION/TRAINING PROGRAM

## 2020-12-03 PROCEDURE — 99215 PR OFFICE/OUTPT VISIT, EST, LEVL V, 40-54 MIN: ICD-10-PCS | Mod: S$GLB,,, | Performed by: STUDENT IN AN ORGANIZED HEALTH CARE EDUCATION/TRAINING PROGRAM

## 2020-12-03 PROCEDURE — 1126F AMNT PAIN NOTED NONE PRSNT: CPT | Mod: S$GLB,,, | Performed by: STUDENT IN AN ORGANIZED HEALTH CARE EDUCATION/TRAINING PROGRAM

## 2020-12-03 PROCEDURE — 96401 CHEMO ANTI-NEOPL SQ/IM: CPT

## 2020-12-03 PROCEDURE — 99999 PR PBB SHADOW E&M-EST. PATIENT-LVL IV: ICD-10-PCS | Mod: PBBFAC,,, | Performed by: STUDENT IN AN ORGANIZED HEALTH CARE EDUCATION/TRAINING PROGRAM

## 2020-12-03 PROCEDURE — 3008F PR BODY MASS INDEX (BMI) DOCUMENTED: ICD-10-PCS | Mod: CPTII,S$GLB,, | Performed by: STUDENT IN AN ORGANIZED HEALTH CARE EDUCATION/TRAINING PROGRAM

## 2020-12-03 RX ORDER — BORTEZOMIB 3.5 MG/1
1.3 INJECTION, POWDER, LYOPHILIZED, FOR SOLUTION INTRAVENOUS; SUBCUTANEOUS
Status: COMPLETED | OUTPATIENT
Start: 2020-12-03 | End: 2020-12-03

## 2020-12-03 RX ADMIN — BORTEZOMIB 2.8 MG: 3.5 INJECTION, POWDER, LYOPHILIZED, FOR SOLUTION INTRAVENOUS; SUBCUTANEOUS at 11:12

## 2020-12-03 NOTE — NURSING
1141 patient completed and tolerated velcade inj. Pt voiced no new complaints or concerns at this time. NAD noted. Pt d/c home

## 2020-12-03 NOTE — Clinical Note
Hello fu plan :  1. Schedule velcade inj on 12/10, 12/17 and 12/24.  2. RTC on 12/24 at 9AM with CBC, CMP, protein electrophoresis, light chains, immunofixation and immunoglobulins.   Thanks - diaz

## 2020-12-10 ENCOUNTER — INFUSION (OUTPATIENT)
Dept: INFUSION THERAPY | Facility: HOSPITAL | Age: 60
End: 2020-12-10
Payer: COMMERCIAL

## 2020-12-10 ENCOUNTER — TELEPHONE (OUTPATIENT)
Dept: NEUROSURGERY | Facility: CLINIC | Age: 60
End: 2020-12-10

## 2020-12-10 VITALS
TEMPERATURE: 99 F | RESPIRATION RATE: 18 BRPM | HEART RATE: 67 BPM | SYSTOLIC BLOOD PRESSURE: 162 MMHG | DIASTOLIC BLOOD PRESSURE: 85 MMHG

## 2020-12-10 DIAGNOSIS — E88.09 PLASMA CELL DYSCRASIA: Primary | ICD-10-CM

## 2020-12-10 PROCEDURE — 63600175 PHARM REV CODE 636 W HCPCS: Mod: JG | Performed by: INTERNAL MEDICINE

## 2020-12-10 PROCEDURE — 96401 CHEMO ANTI-NEOPL SQ/IM: CPT

## 2020-12-10 RX ORDER — BORTEZOMIB 3.5 MG/1
1.3 INJECTION, POWDER, LYOPHILIZED, FOR SOLUTION INTRAVENOUS; SUBCUTANEOUS
Status: COMPLETED | OUTPATIENT
Start: 2020-12-10 | End: 2020-12-10

## 2020-12-10 RX ADMIN — BORTEZOMIB 2.8 MG: 3.5 INJECTION, POWDER, LYOPHILIZED, FOR SOLUTION INTRAVENOUS; SUBCUTANEOUS at 11:12

## 2020-12-14 ENCOUNTER — OFFICE VISIT (OUTPATIENT)
Dept: PLASTIC SURGERY | Facility: CLINIC | Age: 60
End: 2020-12-14
Payer: COMMERCIAL

## 2020-12-14 VITALS
BODY MASS INDEX: 33.84 KG/M2 | DIASTOLIC BLOOD PRESSURE: 80 MMHG | HEIGHT: 67 IN | WEIGHT: 215.63 LBS | HEART RATE: 65 BPM | SYSTOLIC BLOOD PRESSURE: 160 MMHG

## 2020-12-14 DIAGNOSIS — C90.00 MULTIPLE MYELOMA NOT HAVING ACHIEVED REMISSION: ICD-10-CM

## 2020-12-14 DIAGNOSIS — C90.00 MULTIPLE MYELOMA, REMISSION STATUS UNSPECIFIED: Primary | ICD-10-CM

## 2020-12-14 PROCEDURE — 99999 PR PBB SHADOW E&M-EST. PATIENT-LVL III: CPT | Mod: PBBFAC,,, | Performed by: SURGERY

## 2020-12-14 PROCEDURE — 1126F AMNT PAIN NOTED NONE PRSNT: CPT | Mod: S$GLB,,, | Performed by: SURGERY

## 2020-12-14 PROCEDURE — 99999 PR PBB SHADOW E&M-EST. PATIENT-LVL III: ICD-10-PCS | Mod: PBBFAC,,, | Performed by: SURGERY

## 2020-12-14 PROCEDURE — 1126F PR PAIN SEVERITY QUANTIFIED, NO PAIN PRESENT: ICD-10-PCS | Mod: S$GLB,,, | Performed by: SURGERY

## 2020-12-14 PROCEDURE — 99024 POSTOP FOLLOW-UP VISIT: CPT | Mod: S$GLB,,, | Performed by: SURGERY

## 2020-12-14 PROCEDURE — 3008F PR BODY MASS INDEX (BMI) DOCUMENTED: ICD-10-PCS | Mod: CPTII,S$GLB,, | Performed by: SURGERY

## 2020-12-14 PROCEDURE — 3008F BODY MASS INDEX DOCD: CPT | Mod: CPTII,S$GLB,, | Performed by: SURGERY

## 2020-12-14 PROCEDURE — 99024 PR POST-OP FOLLOW-UP VISIT: ICD-10-PCS | Mod: S$GLB,,, | Performed by: SURGERY

## 2020-12-14 RX ORDER — LENALIDOMIDE 25 MG/1
CAPSULE ORAL
Qty: 21 CAPSULE | Refills: 0 | Status: SHIPPED | OUTPATIENT
Start: 2020-12-14 | End: 2021-01-12 | Stop reason: SDUPTHER

## 2020-12-14 NOTE — TELEPHONE ENCOUNTER
----- Message from Rigo Ortez sent at 12/14/2020 11:27 AM CST -----  Contact: [facility]  Patient Advice/Staff Message     Reason for call: Pharmacy is requesting a new celgene auth for Revlimid 25 mg     Communication Preference: [phone]    Additional Information:

## 2020-12-14 NOTE — PROGRESS NOTES
Plastic Update    Back incision is healed  No evidence of further eschar  There was one area with residual suture  Covered the area with bandaid  Moisturize the evie wound with aquaphor  Can follow up as needed    Plastic & Reconstructive Surgery  [facility]  c/o Kamlesh Bellamy M.D.  [facility]  [address]  [address] [place] [address]    Work [phone]  Toll free [phone]  If no answer [phone]

## 2020-12-16 ENCOUNTER — TELEPHONE (OUTPATIENT)
Dept: NEUROSURGERY | Facility: CLINIC | Age: 60
End: 2020-12-16

## 2020-12-17 ENCOUNTER — OFFICE VISIT (OUTPATIENT)
Dept: NEUROSURGERY | Facility: CLINIC | Age: 60
End: 2020-12-17
Payer: COMMERCIAL

## 2020-12-17 ENCOUNTER — HOSPITAL ENCOUNTER (OUTPATIENT)
Dept: RADIOLOGY | Facility: HOSPITAL | Age: 60
Discharge: HOME | End: 2020-12-17
Attending: NEUROLOGICAL SURGERY
Payer: COMMERCIAL

## 2020-12-17 ENCOUNTER — INFUSION (OUTPATIENT)
Dept: INFUSION THERAPY | Facility: HOSPITAL | Age: 60
End: 2020-12-17
Payer: COMMERCIAL

## 2020-12-17 VITALS
HEART RATE: 8 BPM | SYSTOLIC BLOOD PRESSURE: 145 MMHG | DIASTOLIC BLOOD PRESSURE: 95 MMHG | HEART RATE: 90 BPM | BODY MASS INDEX: 34.07 KG/M2 | DIASTOLIC BLOOD PRESSURE: 88 MMHG | SYSTOLIC BLOOD PRESSURE: 135 MMHG | WEIGHT: 217.06 LBS | RESPIRATION RATE: 18 BRPM | HEIGHT: 67 IN

## 2020-12-17 DIAGNOSIS — E88.09 PLASMA CELL DYSCRASIA: Primary | ICD-10-CM

## 2020-12-17 DIAGNOSIS — M43.27 FUSION OF SPINE, LUMBOSACRAL REGION: Primary | ICD-10-CM

## 2020-12-17 DIAGNOSIS — Z98.1 HISTORY OF LUMBAR FUSION: ICD-10-CM

## 2020-12-17 PROCEDURE — 72131 CT LUMBAR SPINE WITHOUT CONTRAST: ICD-10-PCS | Mod: 26,,, | Performed by: RADIOLOGY

## 2020-12-17 PROCEDURE — 72131 CT LUMBAR SPINE W/O DYE: CPT | Mod: TC

## 2020-12-17 PROCEDURE — 72131 CT LUMBAR SPINE W/O DYE: CPT | Mod: 26,,, | Performed by: RADIOLOGY

## 2020-12-17 PROCEDURE — A9585 GADOBUTROL INJECTION: HCPCS | Performed by: NEUROLOGICAL SURGERY

## 2020-12-17 PROCEDURE — 99024 PR POST-OP FOLLOW-UP VISIT: ICD-10-PCS | Mod: S$GLB,,, | Performed by: NEUROLOGICAL SURGERY

## 2020-12-17 PROCEDURE — 1126F PR PAIN SEVERITY QUANTIFIED, NO PAIN PRESENT: ICD-10-PCS | Mod: S$GLB,,, | Performed by: NEUROLOGICAL SURGERY

## 2020-12-17 PROCEDURE — 72158 MRI LUMBAR SPINE W WO CONTRAST: ICD-10-PCS | Mod: 26,,, | Performed by: RADIOLOGY

## 2020-12-17 PROCEDURE — 72158 MRI LUMBAR SPINE W/O & W/DYE: CPT | Mod: 26,,, | Performed by: RADIOLOGY

## 2020-12-17 PROCEDURE — 99024 POSTOP FOLLOW-UP VISIT: CPT | Mod: S$GLB,,, | Performed by: NEUROLOGICAL SURGERY

## 2020-12-17 PROCEDURE — 1126F AMNT PAIN NOTED NONE PRSNT: CPT | Mod: S$GLB,,, | Performed by: NEUROLOGICAL SURGERY

## 2020-12-17 PROCEDURE — 99999 PR PBB SHADOW E&M-EST. PATIENT-LVL IV: CPT | Mod: PBBFAC,,, | Performed by: NEUROLOGICAL SURGERY

## 2020-12-17 PROCEDURE — 99999 PR PBB SHADOW E&M-EST. PATIENT-LVL IV: ICD-10-PCS | Mod: PBBFAC,,, | Performed by: NEUROLOGICAL SURGERY

## 2020-12-17 PROCEDURE — 72158 MRI LUMBAR SPINE W/O & W/DYE: CPT | Mod: TC

## 2020-12-17 PROCEDURE — 63600175 PHARM REV CODE 636 W HCPCS: Mod: JG | Performed by: INTERNAL MEDICINE

## 2020-12-17 PROCEDURE — 3008F PR BODY MASS INDEX (BMI) DOCUMENTED: ICD-10-PCS | Mod: CPTII,S$GLB,, | Performed by: NEUROLOGICAL SURGERY

## 2020-12-17 PROCEDURE — 96401 CHEMO ANTI-NEOPL SQ/IM: CPT

## 2020-12-17 PROCEDURE — 25500020 PHARM REV CODE 255: Performed by: NEUROLOGICAL SURGERY

## 2020-12-17 PROCEDURE — 3008F BODY MASS INDEX DOCD: CPT | Mod: CPTII,S$GLB,, | Performed by: NEUROLOGICAL SURGERY

## 2020-12-17 RX ORDER — NIFEDIPINE 60 MG/1
30 TABLET, EXTENDED RELEASE ORAL DAILY
COMMUNITY
End: 2020-12-21 | Stop reason: SDUPTHER

## 2020-12-17 RX ORDER — BORTEZOMIB 3.5 MG/1
1.3 INJECTION, POWDER, LYOPHILIZED, FOR SOLUTION INTRAVENOUS; SUBCUTANEOUS
Status: COMPLETED | OUTPATIENT
Start: 2020-12-17 | End: 2020-12-17

## 2020-12-17 RX ORDER — GADOBUTROL 604.72 MG/ML
10 INJECTION INTRAVENOUS
Status: COMPLETED | OUTPATIENT
Start: 2020-12-17 | End: 2020-12-17

## 2020-12-17 RX ADMIN — BORTEZOMIB 2.8 MG: 3.5 INJECTION, POWDER, LYOPHILIZED, FOR SOLUTION INTRAVENOUS; SUBCUTANEOUS at 02:12

## 2020-12-17 RX ADMIN — GADOBUTROL 10 ML: 604.72 INJECTION INTRAVENOUS at 12:12

## 2020-12-17 NOTE — PROGRESS NOTES
CHIEF COMPLAINT:  Follow-up    I, Valorie Kellogg, attest that this documentation has been prepared under the direction and in the presence of Nick Coyle MD.    HPI:  Jaspreet Walsh Jr. is a 60 y.o.  male with with PMHx of arthritis, HTN, knee surgery, who presents for a 3-month follow-up S/P lumbar spinal fusion performed on 09/23/20.      The patient reports he is recovering well from the back surgery. He is currently being treated with chemotherapy, and he had a visit for treatment today. He has been wearing his brace. 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     Review of patient's allergies indicates:  No Known Allergies    Past Medical History:   Diagnosis Date    Anxiety     Arthritis     Hypertension      Past Surgical History:   Procedure Laterality Date    CREATION OF MUSCLE ROTATIONAL FLAP N/A 9/23/2020    Procedure: CREATION, FLAP, MUSCLE ROTATION;  Surgeon: Kamlesh Bellamy MD;  Location: [facility] OR [address] [place] [address];  Service: Plastics;  Laterality: N/A;    KNEE SURGERY Left 06/2019    LUMBAR FUSION N/A 9/23/2020    Procedure: FUSION, SPINE, LUMBAR L2-pelvis. Thermal Nomad. Plastic surgery closure w/ Dr. Bellamy;  Surgeon: Nick Coyle MD;  Location: [facility] OR [address] [place] [address];  Service: Neurosurgery;  Laterality: N/A;    Metastatic neoplasum removed from spine       History reviewed. No pertinent family history.  Social History     Tobacco Use    Smoking status: Former Smoker     Quit date: 2017     Years since quitting: 3.9    Smokeless tobacco: Never Used   Substance Use Topics    Alcohol use: Not Currently    Drug use: Never        Review of Systems  Constitutional: Negative.    HENT: Negative.    Eyes: Negative.    Respiratory: Negative.    Cardiovascular: Negative.    Gastrointestinal: Negative.    Endocrine: Negative.    Genitourinary: Negative.    Musculoskeletal: Negative for back pain, gait problem and neck pain.   Skin:  Negative.    Allergic/Immunologic: Negative.    Neurological: Negative for weakness, light-headedness, numbness and headaches.   Hematological: Negative.    Psychiatric/Behavioral: Negative.     OBJECTIVE:   Vital Signs:  Pulse: (!) 8 (12/17/20 1451)  BP: (!) 145/95 (12/17/20 1451)    Physical Exam:    Vital signs: All nursing notes and vital signs reviewed -- afebrile, vital signs stable.  Constitutional: Patient sitting comfortably in chair. Appears well developed and well nourished.  Skin: Exposed areas are intact without abnormal markings, rashes or other lesions.  HEENT: Normocephalic. Normal conjunctivae.  Cardiovascular: Normal rate and regular rhythm.  Respiratory: Chest wall rises and falls symmetrically, without signs of respiratory distress.  Abdomen: Soft and non-tender.  Extremities: Warm and without edema. Calves supple, non-tender.  Psych/Behavior: Normal affect.    Neurological:    Mental status: Alert and oriented. Conversational and appropriate.       Cranial Nerves: VFF to confrontation. PERRL. EOMI without nystagmus. Facial STLT normal and symmetric. Strong, symmetric muscles of mastication. Facial strength full and symmetric. Hearing equal bilaterally to finger rub. Palate and uvula rise and fall normally in midline. Shoulder shrug 5/5 strength. Tongue midline.     Motor:    Upper:  Deltoids Triceps Biceps WE WF     R 5/5 5/5 5/5 5/5 5/5 5/5    L 5/5 5/5 5/5 5/5 5/5 5/5      Lower:  HF KE KF DF PF EHL    R 5/5 5/5 5/5 5/5 5/5 5/5    L 5/5 5/5 5/5 5/5 5/5 5/5     Sensory: Intact sensation to light touch in all extremities. Romberg negative.    Reflexes:          DTR: No DTR in lower extremities.     Candelaria's: Negative.     Babinski's: Negative.     Clonus: Negative.    Cerebellar: Finger-to-nose and rapid alternating movements normal. Gait stable, fluid.    Spine:    Posture: Head well aligned over pelvis in front and side views.  No focal or global spinal deformity visible on inspection.  Shoulders and hips even. No obvious leg length discrepancy. No scapula winging.    Bending: Full ROM with forward, back and lateral bending. No rib prominence with forward bend.    Cervical:      ROM: Full with flexion, extension, lateral rotation and ear-to-shoulder bend.      Midline TTP: Negative.     Spurling's test: Negative.     Lhermitte's: Negative.    Thoracic:     Midline TTP: Negative    Lumbar:     Midline TTP: Negative     Straight Leg Test: Negative     Crossed Straight Leg Test: Negative     Sciatic notch tenderness: Negative.    Other:     SI joint TTP: Negative.     Greater trochanter TTP: Negative.     Tenderness with external/internal hip rotation: Negative.    Diagnostic Results:  All imaging was independently reviewed by me.    MRI lumbar spine, dated 12/17/20:  1. No sign neural compression.    CT lumbar spine, dated 12/17/20:  1. Good spinal alignment and hardware position. Probable fusion on left of set joints from L2-3 through L4-5. No evidence of fusion L5-S1.    ASSESSMENT/PLAN:     Jaspreet Walsh Jr. is doing well 3 months after lumbar fusion. His CT shows good spinal alignment and hardware position. He has early but incomplete bony fusion. I will see him at 1 year postop with xrays.    The patient understands and agrees with the plan of care. All questions were answered.     1. L xrays in 9 months  2. RTC in 9 months      I, Dr. Nick Coyle personally performed the services described in this documentation. All medical record entries made by the scribe, Valorie Kellogg, were at my direction and in my presence.  I have reviewed the chart and agree that the record reflects my personal performance and is accurate and complete.      Nick Coyle M.D.  Department of Neurosurgery  [facility]

## 2020-12-21 ENCOUNTER — DOCUMENTATION ONLY (OUTPATIENT)
Dept: ONCOLOGY | Facility: HOSPITAL | Age: 60
End: 2020-12-21

## 2020-12-21 DIAGNOSIS — E88.09 PLASMA CELL DYSCRASIA: Primary | ICD-10-CM

## 2020-12-21 RX ORDER — NIFEDIPINE 30 MG/1
30 TABLET, FILM COATED, EXTENDED RELEASE ORAL DAILY
Qty: 60 TABLET | Refills: 3 | Status: SHIPPED | OUTPATIENT
Start: 2020-12-21 | End: 2022-04-08 | Stop reason: SDUPTHER

## 2020-12-21 RX ORDER — ALLOPURINOL 300 MG/1
300 TABLET ORAL DAILY
Qty: 30 TABLET | Refills: 2 | Status: SHIPPED | OUTPATIENT
Start: 2020-12-21 | End: 2021-02-25

## 2020-12-21 RX ORDER — LEVOFLOXACIN 500 MG/1
500 TABLET, FILM COATED ORAL DAILY
Qty: 90 TABLET | Refills: 3 | Status: SHIPPED | OUTPATIENT
Start: 2020-12-21 | End: 2021-04-08 | Stop reason: SDUPTHER

## 2020-12-21 RX ORDER — CARVEDILOL 25 MG/1
25 TABLET ORAL 2 TIMES DAILY
Qty: 60 TABLET | Refills: 2 | Status: SHIPPED | OUTPATIENT
Start: 2020-12-21 | End: 2021-03-25 | Stop reason: SDUPTHER

## 2020-12-21 NOTE — PROGRESS NOTES
Received message from patient; called back and spoke to FILIPE Haddad ([phone]).  [employer] check has been received.  They will be pursuing disability through Social Security; oriented to process and coordination by [facility].  She was unsure if new coverage would be in place through Medicare/Medicaid.  [place] Medicaid system shows now record of patient.  Will consider [facility] referral if current coverage terminates.  Will follow.

## 2020-12-21 NOTE — TELEPHONE ENCOUNTER
----- Message from Rigo Ortez sent at 12/21/2020  1:16 PM CST -----  Contact: Pt  Refill or New Rx: Refill    RX Name and Strength:  -carvediloL (COREG) 25 MG tablet  -levoFLOXacin (LEVAQUIN) 500 MG tablet  -allopurinoL (ZYLOPRIM) 300 MG tablet  -NIFEdipine (ADALAT CC) 60 MG TbSR    Preferred Pharmacy with phone number:  [id number] - [place], [place] - [address] [facility] AT [facility] [address]  [address] [facility] [address]  Phone: [phone] Fax: [phone]        Additional Information:

## 2020-12-24 ENCOUNTER — INFUSION (OUTPATIENT)
Dept: INFUSION THERAPY | Facility: HOSPITAL | Age: 60
End: 2020-12-24
Payer: COMMERCIAL

## 2020-12-24 ENCOUNTER — OFFICE VISIT (OUTPATIENT)
Dept: HEMATOLOGY/ONCOLOGY | Facility: CLINIC | Age: 60
End: 2020-12-24
Payer: COMMERCIAL

## 2020-12-24 VITALS
DIASTOLIC BLOOD PRESSURE: 75 MMHG | RESPIRATION RATE: 20 BRPM | HEART RATE: 77 BPM | OXYGEN SATURATION: 98 % | TEMPERATURE: 98 F | BODY MASS INDEX: 34.66 KG/M2 | SYSTOLIC BLOOD PRESSURE: 149 MMHG | HEIGHT: 67 IN | WEIGHT: 220.81 LBS

## 2020-12-24 VITALS
DIASTOLIC BLOOD PRESSURE: 75 MMHG | RESPIRATION RATE: 18 BRPM | TEMPERATURE: 99 F | SYSTOLIC BLOOD PRESSURE: 149 MMHG | HEART RATE: 79 BPM

## 2020-12-24 DIAGNOSIS — E88.09 PLASMA CELL DYSCRASIA: Primary | ICD-10-CM

## 2020-12-24 DIAGNOSIS — C90.00 MULTIPLE MYELOMA NOT HAVING ACHIEVED REMISSION: Primary | ICD-10-CM

## 2020-12-24 PROCEDURE — 99215 OFFICE O/P EST HI 40 MIN: CPT | Mod: S$GLB,,, | Performed by: STUDENT IN AN ORGANIZED HEALTH CARE EDUCATION/TRAINING PROGRAM

## 2020-12-24 PROCEDURE — 63600175 PHARM REV CODE 636 W HCPCS: Mod: JG | Performed by: INTERNAL MEDICINE

## 2020-12-24 PROCEDURE — 99999 PR PBB SHADOW E&M-EST. PATIENT-LVL IV: ICD-10-PCS | Mod: PBBFAC,,, | Performed by: STUDENT IN AN ORGANIZED HEALTH CARE EDUCATION/TRAINING PROGRAM

## 2020-12-24 PROCEDURE — 99999 PR PBB SHADOW E&M-EST. PATIENT-LVL IV: CPT | Mod: PBBFAC,,, | Performed by: STUDENT IN AN ORGANIZED HEALTH CARE EDUCATION/TRAINING PROGRAM

## 2020-12-24 PROCEDURE — 3008F BODY MASS INDEX DOCD: CPT | Mod: CPTII,S$GLB,, | Performed by: STUDENT IN AN ORGANIZED HEALTH CARE EDUCATION/TRAINING PROGRAM

## 2020-12-24 PROCEDURE — 96401 CHEMO ANTI-NEOPL SQ/IM: CPT

## 2020-12-24 PROCEDURE — 1126F AMNT PAIN NOTED NONE PRSNT: CPT | Mod: S$GLB,,, | Performed by: STUDENT IN AN ORGANIZED HEALTH CARE EDUCATION/TRAINING PROGRAM

## 2020-12-24 PROCEDURE — 3008F PR BODY MASS INDEX (BMI) DOCUMENTED: ICD-10-PCS | Mod: CPTII,S$GLB,, | Performed by: STUDENT IN AN ORGANIZED HEALTH CARE EDUCATION/TRAINING PROGRAM

## 2020-12-24 PROCEDURE — 99215 PR OFFICE/OUTPT VISIT, EST, LEVL V, 40-54 MIN: ICD-10-PCS | Mod: S$GLB,,, | Performed by: STUDENT IN AN ORGANIZED HEALTH CARE EDUCATION/TRAINING PROGRAM

## 2020-12-24 PROCEDURE — 1126F PR PAIN SEVERITY QUANTIFIED, NO PAIN PRESENT: ICD-10-PCS | Mod: S$GLB,,, | Performed by: STUDENT IN AN ORGANIZED HEALTH CARE EDUCATION/TRAINING PROGRAM

## 2020-12-24 RX ORDER — SODIUM CHLORIDE 0.9 % (FLUSH) 0.9 %
10 SYRINGE (ML) INJECTION
Status: CANCELLED | OUTPATIENT
Start: 2021-01-02

## 2020-12-24 RX ORDER — BORTEZOMIB 3.5 MG/1
1.3 INJECTION, POWDER, LYOPHILIZED, FOR SOLUTION INTRAVENOUS; SUBCUTANEOUS
Status: CANCELLED | OUTPATIENT
Start: 2021-01-02

## 2020-12-24 RX ORDER — DEXAMETHASONE 4 MG/1
40 TABLET ORAL WEEKLY
Qty: 160 TABLET | Refills: 0 | Status: SHIPPED | OUTPATIENT
Start: 2020-12-24 | End: 2021-01-28 | Stop reason: SDUPTHER

## 2020-12-24 RX ORDER — HEPARIN 100 UNIT/ML
500 SYRINGE INTRAVENOUS
Status: CANCELLED | OUTPATIENT
Start: 2020-12-24

## 2020-12-24 RX ORDER — HEPARIN 100 UNIT/ML
500 SYRINGE INTRAVENOUS
Status: CANCELLED | OUTPATIENT
Start: 2021-01-09

## 2020-12-24 RX ORDER — BORTEZOMIB 3.5 MG/1
1.3 INJECTION, POWDER, LYOPHILIZED, FOR SOLUTION INTRAVENOUS; SUBCUTANEOUS
Status: CANCELLED | OUTPATIENT
Start: 2020-12-26

## 2020-12-24 RX ORDER — SODIUM CHLORIDE 0.9 % (FLUSH) 0.9 %
10 SYRINGE (ML) INJECTION
Status: CANCELLED | OUTPATIENT
Start: 2020-12-26

## 2020-12-24 RX ORDER — SODIUM CHLORIDE 0.9 % (FLUSH) 0.9 %
10 SYRINGE (ML) INJECTION
Status: CANCELLED | OUTPATIENT
Start: 2021-01-09

## 2020-12-24 RX ORDER — BORTEZOMIB 3.5 MG/1
1.3 INJECTION, POWDER, LYOPHILIZED, FOR SOLUTION INTRAVENOUS; SUBCUTANEOUS
Status: CANCELLED | OUTPATIENT
Start: 2020-12-24

## 2020-12-24 RX ORDER — BORTEZOMIB 3.5 MG/1
1.3 INJECTION, POWDER, LYOPHILIZED, FOR SOLUTION INTRAVENOUS; SUBCUTANEOUS
Status: CANCELLED | OUTPATIENT
Start: 2021-01-09

## 2020-12-24 RX ORDER — HEPARIN 100 UNIT/ML
500 SYRINGE INTRAVENOUS
Status: CANCELLED | OUTPATIENT
Start: 2021-01-02

## 2020-12-24 RX ORDER — HEPARIN 100 UNIT/ML
500 SYRINGE INTRAVENOUS
Status: CANCELLED | OUTPATIENT
Start: 2020-12-26

## 2020-12-24 RX ORDER — BORTEZOMIB 3.5 MG/1
1.3 INJECTION, POWDER, LYOPHILIZED, FOR SOLUTION INTRAVENOUS; SUBCUTANEOUS
Status: COMPLETED | OUTPATIENT
Start: 2020-12-24 | End: 2020-12-24

## 2020-12-24 RX ORDER — SODIUM CHLORIDE 0.9 % (FLUSH) 0.9 %
10 SYRINGE (ML) INJECTION
Status: CANCELLED | OUTPATIENT
Start: 2020-12-24

## 2020-12-24 RX ADMIN — BORTEZOMIB 2.8 MG: 3.5 INJECTION, POWDER, LYOPHILIZED, FOR SOLUTION INTRAVENOUS; SUBCUTANEOUS at 10:12

## 2020-12-24 NOTE — NURSING
1057-VS stable. Velcade injection administered SQ to abdomen, pt tolerated well. Band-aid applied to site. Pt discharged with no distress noted, ambulating independently, accompanied by .

## 2020-12-24 NOTE — PROGRESS NOTES
MM; responidng tolerting well  c4d1  Got dds letter zometa either next for followign thurs; reviewed se  Weekly chet  Labs, md appt prior to velcade in 4 weeks  scheduel pet, marrow week of 1/25 but check with cooridnators works out with limit  Other supportive meds

## 2020-12-24 NOTE — Clinical Note
Hello  plan:  1. Please schedule velcade injection on 12/31, 01/07, 01/14 and 01/21.  2. Clinic apt with me on 01/21 with cbc, cmp, serum protein electrophoresis, immunofixation, and free light chain.  3. Schedule pet ct and bone marrow biopsy in week of 01/25.    Thanks - diaz

## 2020-12-24 NOTE — PROGRESS NOTES
PATIENT: Jaspreet Walsh Jr.  MRN: [id number]  DATE: 2020      Diagnosis:   1. Multiple myeloma not having achieved remission      Chief Complaint: MM    Jaspreet Walsh Jr. is a 60 year old man who presents to clinic for evaluation of IgG kappa multiple myeloma (standard risk CG per msmart criteria, r-iss stage II) s/p C3 of induction with VRd.    PMH significant for HTN.       - 2020 : Admitted to [facility] for evaluation of lytic lesions. Large soft tissue mass encompassing L4 - S1 vertebral bodies seen. FLC ratio 171, involved light chains 104. SPEP and SIFE found 2.86g/dL, IgG kappa para protein band. Underwent bone marrow biopsy and discharged with dexamethasone 40mg x 4 day burst.     20 : Underwent debulking of spinal mass.  10/01 - 20 : C1 - C3 VRd. Revlimid delivered in third week of cycle.  20 : C4D1 VRd.    On interview he reports feeling well. Denies back pain, he is wearing a brace. No neuropathy, or rash.    No recent fevers, chills, weight loss, night sweats, CP, SOB, bowel or bladder incontinence, constipation, numbness, focal weakness. Denies macroglossia, easy bruising, PN, PND, orthopnea, FARFAN.   He is here with his wife.      Family history : Father  of unknown 'bone cancer'.     Social History : Lives in [facility], about one hour from [place]. Mothers house is close to his own.     Pathology :     20 : Bone marrow biopsy : Solid sheets of -positive plasma cells are seen in some areas. In average, plasma cells comprise approximately 25-30% of the total cellularity. The plasma cells are negative for cyclin D1.  Congo red staining fails to detect amyloid deposits.      Past Medical History:   Past Medical History:   Diagnosis Date    Anxiety     Arthritis     Hypertension        Past Surgical HIstory:   Past Surgical History:   Procedure Laterality Date    CREATION OF MUSCLE ROTATIONAL FLAP N/A 2020    Procedure: CREATION, FLAP, MUSCLE  ROTATION;  Surgeon: Kamlesh Bellamy MD;  Location: [address];  Service: Plastics;  Laterality: N/A;    KNEE SURGERY Left 06/2019    LUMBAR FUSION N/A 9/23/2020    Procedure: FUSION, SPINE, LUMBAR L2-pelvis. Depuy. Plastic surgery closure w/ Dr. 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    Social History:  reports that he quit smoking about 3 years ago. He has never used smokeless tobacco. He reports previous alcohol use. He reports that he does not use drugs.    Allergies:  Review of patient's allergies indicates:  No Known Allergies    Medications:  Current Outpatient Medications   Medication Sig Dispense Refill    acyclovir (ZOVIRAX) 400 MG tablet Take 1 tablet (400 mg total) by mouth 2 (two) times daily. 60 tablet 11    allopurinoL (ZYLOPRIM) 300 MG tablet Take 1 tablet (300 mg total) by mouth once daily. 30 tablet 2    aspirin 81 MG Chew Chew and swallow 1 tablet (81 mg total) by mouth once daily. 90 tablet 3    carvediloL (COREG) 25 MG tablet Take 1 tablet (25 mg total) by mouth 2 (two) times daily. 60 tablet 2    cloNIDine (CATAPRES) 0.1 MG tablet Take 1 tablet (0.1 mg total) by mouth once daily. (Patient not taking: Reported on 12/17/2020) 30 tablet 11    dexAMETHasone (DECADRON) 4 MG Tab Take 10 tablets (40 mg total) by mouth once a week. 160 tablet 0    diazePAM (VALIUM) 5 MG tablet Take 1 tablet (5 mg total) by mouth every 12 (twelve) hours as needed (muscle spasm). (Patient not taking: Reported on 12/17/2020) 20 tablet 0    ergocalciferol (ERGOCALCIFEROL) 50,000 unit Cap Take 1 capsule (50,000 Units total) by mouth every 7 days. (Patient not taking: Reported on 12/17/2020) 4 capsule 1    famotidine (PEPCID) 20 MG tablet Take 1 tablet (20 mg total) by mouth 2 (two) times daily. (Patient not taking: Reported on 12/17/2020) 20  tablet 0    ferrous sulfate 325 (65 FE) MG EC tablet Take 1 tablet (325 mg total) by mouth once daily. 30 tablet 1    lenalidomide (REVLIMID) 25 mg Cap TAKE 1 CAPSULE BY MOUTH  DAILY FOR 21 DAYS ON, THEN  7 DAYS OFF. Auth #[id number] 12/14/2020. (Patient not taking: Reported on 12/17/2020.) 21 capsule 0    levoFLOXacin (LEVAQUIN) 500 MG tablet Take 1 tablet (500 mg total) by mouth once daily. 90 tablet 3    losartan (COZAAR) 50 MG tablet Take 1 tablet (50 mg total) by mouth once daily. 90 tablet 3    NIFEdipine (ADALAT CC) 30 MG TbSR Take 1 tablet (30 mg total) by mouth once daily. 60 tablet 3    ondansetron (ZOFRAN) 8 MG tablet Take 1 tablet (8 mg total) by mouth every 8 (eight) hours as needed for Nausea. (Patient not taking: Reported on 12/17/2020) 60 tablet 2    pantoprazole (PROTONIX) 20 MG tablet Take 2 tablets (40 mg total) by mouth once daily. 60 tablet 0    sulfamethoxazole-trimethoprim 400-80mg (BACTRIM,SEPTRA) 400-80 mg per tablet Take 1 tablet by mouth once daily. 90 tablet 3    vitamin A 78331 UNIT capsule Take 1 capsule (10,000 Units total) by mouth once daily. (Patient not taking: Reported on 12/17/2020) 30 capsule 0     No current facility-administered medications for this visit.        Review of Systems   Constitutional: Positive for activity change. Negative for appetite change, chills, fatigue, fever and unexpected weight change.   HENT: Negative for congestion.    Respiratory: Negative for chest tightness and shortness of breath.    Gastrointestinal: Negative for abdominal pain and blood in stool.   Endocrine: Negative for cold intolerance and heat intolerance.   Genitourinary: Negative for hematuria.   Musculoskeletal: Negative for arthralgias.   Skin: Negative for rash.   Neurological: Negative for weakness.   Hematological: Negative for adenopathy. Does not bruise/bleed easily.   Psychiatric/Behavioral: The patient is not nervous/anxious.        ECOG Performance Status: 2   Objective:       Vitals:   There were no vitals filed for this visit.  BMI: There is no height or weight on file to calculate BMI.    Physical Exam  Constitutional:       General: He is not in acute distress.     Appearance: He is not ill-appearing.   HENT:      Head: Normocephalic and atraumatic.      Mouth/Throat:      Mouth: Mucous membranes are moist.   Eyes:      General: No scleral icterus.  Neck:      Musculoskeletal: Normal range of motion.   Cardiovascular:      Rate and Rhythm: Normal rate and regular rhythm.   Pulmonary:      Effort: Pulmonary effort is normal. No respiratory distress.   Abdominal:      General: Abdomen is flat. There is no distension.      Tenderness: There is no abdominal tenderness.   Musculoskeletal: Normal range of motion.   Lymphadenopathy:      Cervical: No cervical adenopathy.   Skin:     General: Skin is warm and dry.   Neurological:      General: No focal deficit present.      Mental Status: He is alert and oriented to person, place, and time. Mental status is at baseline.      Motor: No weakness.   Psychiatric:         Mood and Affect: Mood normal.         Laboratory Data:  No visits with results within 1 Week(s) from this visit.   Latest known visit with results is:   Lab Visit on 11/27/2020   Component Date Value Ref Range Status    WBC 11/27/2020 5.00  3.90 - 12.70 K/uL Final    RBC 11/27/2020 3.79* 4.60 - 6.20 M/uL Final    Hemoglobin 11/27/2020 11.6* 14.0 - 18.0 g/dL Final    Hematocrit 11/27/2020 37.5* 40.0 - 54.0 % Final    MCV 11/27/2020 99* 82 - 98 fL Final    MCH 11/27/2020 30.6  27.0 - 31.0 pg Final    MCHC 11/27/2020 30.9* 32.0 - 36.0 g/dL Final    RDW 11/27/2020 15.1* 11.5 - 14.5 % Final    Platelets 11/27/2020 141* 150 - 350 K/uL Final    MPV 11/27/2020 12.0  9.2 - 12.9 fL Final    Immature Granulocytes 11/27/2020 1.0* 0.0 - 0.5 % Final    Gran # (ANC) 11/27/2020 3.6  1.8 - 7.7 K/uL Final    Immature Grans (Abs) 11/27/2020 0.05* 0.00 - 0.04 K/uL Final    Comment:  Mild elevation in immature granulocytes is non specific and   can be seen in a variety of conditions including stress response,   acute inflammation, trauma and pregnancy. Correlation with other   laboratory and clinical findings is essential.      Lymph # 11/27/2020 1.0  1.0 - 4.8 K/uL Final    Mono # 11/27/2020 0.2* 0.3 - 1.0 K/uL Final    Eos # 11/27/2020 0.1  0.0 - 0.5 K/uL Final    Baso # 11/27/2020 0.04  0.00 - 0.20 K/uL Final    nRBC 11/27/2020 0  0 /100 WBC Final    Gran % 11/27/2020 72.4  38.0 - 73.0 % Final    Lymph % 11/27/2020 19.4  18.0 - 48.0 % Final    Mono % 11/27/2020 4.0  4.0 - 15.0 % Final    Eosinophil % 11/27/2020 2.4  0.0 - 8.0 % Final    Basophil % 11/27/2020 0.8  0.0 - 1.9 % Final    Differential Method 11/27/2020 Automated   Final    Kappa Free Light Chains 11/27/2020 32.67* 0.33 - 1.94 mg/dL Final    Lambda Free Light Chains 11/27/2020 0.51* 0.57 - 2.63 mg/dL Final    Kappa/Lambda FLC Ratio 11/27/2020 64.06* 0.26 - 1.65 Final    Protein, Serum 11/27/2020 6.7  6.0 - 8.4 g/dL Final    Comment: Serum protein electrophoresis and immunofixation results should be   interpreted in clinical context in that some therapeutic agents can   result   in false positive results (example, daratumumab). Correlation with   the   patient s therapeutic regimen is required.      Albumin 11/27/2020 3.74  3.35 - 5.55 g/dL Final    Alpha-1 11/27/2020 0.34  0.17 - 0.41 g/dL Final    Alpha-2 11/27/2020 0.67  0.43 - 0.99 g/dL Final    Beta 11/27/2020 0.60  0.50 - 1.10 g/dL Final    Gamma 11/27/2020 1.35  0.67 - 1.58 g/dL Final    Immunofix Interp. 11/27/2020 SEE COMMENT   Final    Comment: Serum protein electrophoresis and immunofixation results should be   interpreted in clinical context in that some therapeutic agents can   result   in false positive results (example, daratumumab). Correlation with   the   patient s therapeutic regimen is required.  See pathologist's interpretation.      Sodium  11/27/2020 139  136 - 145 mmol/L Final    Potassium 11/27/2020 4.0  3.5 - 5.1 mmol/L Final    Chloride 11/27/2020 105  95 - 110 mmol/L Final    CO2 11/27/2020 23  23 - 29 mmol/L Final    Glucose 11/27/2020 116* 70 - 110 mg/dL Final    BUN 11/27/2020 11  8 - 23 mg/dL Final    Creatinine 11/27/2020 0.9  0.5 - 1.4 mg/dL Final    Calcium 11/27/2020 9.1  8.7 - 10.5 mg/dL Final    Total Protein 11/27/2020 7.0  6.0 - 8.4 g/dL Final    Albumin 11/27/2020 3.5  3.5 - 5.2 g/dL Final    Total Bilirubin 11/27/2020 0.7  0.1 - 1.0 mg/dL Final    Comment: For infants and newborns, interpretation of results should be based  on gestational age, weight and in agreement with clinical  observations.  Premature Infant recommended reference ranges:  Up to 24 hours.............<8.0 mg/dL  Up to 48 hours............<12.0 mg/dL  3-5 days..................<15.0 mg/dL  6-29 days.................<15.0 mg/dL      Alkaline Phosphatase 11/27/2020 60  55 - 135 U/L Final    AST 11/27/2020 13  10 - 40 U/L Final    ALT 11/27/2020 12  10 - 44 U/L Final    Anion Gap 11/27/2020 11  8 - 16 mmol/L Final    eGFR if African American 11/27/2020 >60.0  >60 mL/min/1.73 m^2 Final    eGFR if non African American 11/27/2020 >60.0  >60 mL/min/1.73 m^2 Final    Comment: Calculation used to obtain the estimated glomerular filtration  rate (eGFR) is the CKD-EPI equation.       Pathologist Interpretation SPE 11/27/2020 REVIEWED   Final    Comment: Electronically reviewed and signed by:  Leslie Sorenson M.D.  Signed on 12/01/20 at 11:06  Normal total protein.   Normal gamma globulins are decreased. Paraprotein band in gamma =    1.17 g/dL, previously 1.59 g/dL.       Pathologist Interpretation SADAF 11/27/2020 REVIEWED   Final    Comment: Electronically reviewed and signed by:  Leslie Sorenson M.D.  Signed on 12/01/20 at 11:07  An IgG kappa specific monoclonal protein is present.          Assessment:       1. Multiple myeloma not having achieved  remission           Plan:     IgG kappa multiple myeloma     ECOG  0 - 1   Standard risk CG  R-ISS stage II  Continue induction with VRd ; bortezomib subq 1.3mg/m2 + dexamethasone PO 40mg q7days + Revlimid 25mg daily for 3 weeks of 4 week cycle.  Cycle 4 day 1 planned for today.  Consent obtained, counseling and education provided.   Discussed phases of treatment, induction followed by auto SCT and maintenance.   He is on the transplant list and co-ordinators are monitoring him.   He is young and otherwise healthy and appears to be an excellent candidate for auto sct.   RTC in 4 weeks with CBC, CMP and myeloma labs.    Will schedule end of treatment bone marrow biopsy and PET CT.   Low suspicion for concurrent amyloidosis ; denies signs and symptoms of CHF, peripheral neuropathy, macroglossia.     Supportive care:    Awaiting letter for dental clearance for bisphosphonate therapy. Pt states that this was provided to our clinic last week but cannot find in chart, will ask pt to drop off another copy.   Continue infection ppx with acyclovir 400mg bid, levofloxacin 500mg daily and bactrim daily.   Rx for zofran 8mg PRN. and f  DVT ppx with aspirin 81mg daily.  VitD3 1000IU per day and Ca Carbonate 2tabs daily.     Lumbar mass    Status post resection with nsgy        The patient was discussed and examined with the attending physician .    Steven Cornejo MD  Clinical Fellow  Hematology and Medical Oncology.  12/24/2020

## 2020-12-28 DIAGNOSIS — C90.00 MULTIPLE MYELOMA, REMISSION STATUS UNSPECIFIED: Primary | ICD-10-CM

## 2020-12-31 ENCOUNTER — LAB VISIT (OUTPATIENT)
Dept: LAB | Facility: HOSPITAL | Age: 60
End: 2020-12-31
Payer: COMMERCIAL

## 2020-12-31 ENCOUNTER — INFUSION (OUTPATIENT)
Dept: INFUSION THERAPY | Facility: HOSPITAL | Age: 60
End: 2020-12-31
Payer: COMMERCIAL

## 2020-12-31 DIAGNOSIS — C90.00 MULTIPLE MYELOMA, REMISSION STATUS UNSPECIFIED: ICD-10-CM

## 2020-12-31 DIAGNOSIS — E88.09 PLASMA CELL DYSCRASIA: Primary | ICD-10-CM

## 2020-12-31 PROCEDURE — 63600175 PHARM REV CODE 636 W HCPCS: Mod: JG | Performed by: INTERNAL MEDICINE

## 2020-12-31 PROCEDURE — 36415 COLL VENOUS BLD VENIPUNCTURE: CPT

## 2020-12-31 PROCEDURE — 83520 IMMUNOASSAY QUANT NOS NONAB: CPT

## 2020-12-31 PROCEDURE — 96401 CHEMO ANTI-NEOPL SQ/IM: CPT

## 2020-12-31 RX ORDER — BORTEZOMIB 3.5 MG/1
1.3 INJECTION, POWDER, LYOPHILIZED, FOR SOLUTION INTRAVENOUS; SUBCUTANEOUS
Status: COMPLETED | OUTPATIENT
Start: 2020-12-31 | End: 2020-12-31

## 2020-12-31 RX ADMIN — BORTEZOMIB 2.8 MG: 3.5 INJECTION, POWDER, LYOPHILIZED, FOR SOLUTION INTRAVENOUS; SUBCUTANEOUS at 12:12

## 2021-01-04 LAB
KAPPA LC SER QL IA: 21.78 MG/DL (ref 0.33–1.94)
KAPPA LC/LAMBDA SER IA: 27.92 (ref 0.26–1.65)
LAMBDA LC SER QL IA: 0.78 MG/DL (ref 0.57–2.63)

## 2021-01-07 ENCOUNTER — INFUSION (OUTPATIENT)
Dept: INFUSION THERAPY | Facility: HOSPITAL | Age: 61
End: 2021-01-07
Payer: COMMERCIAL

## 2021-01-07 VITALS — HEART RATE: 82 BPM | SYSTOLIC BLOOD PRESSURE: 169 MMHG | RESPIRATION RATE: 18 BRPM | DIASTOLIC BLOOD PRESSURE: 81 MMHG

## 2021-01-07 DIAGNOSIS — E88.09 PLASMA CELL DYSCRASIA: Primary | ICD-10-CM

## 2021-01-07 PROCEDURE — 63600175 PHARM REV CODE 636 W HCPCS: Mod: JG | Performed by: INTERNAL MEDICINE

## 2021-01-07 PROCEDURE — 96401 CHEMO ANTI-NEOPL SQ/IM: CPT

## 2021-01-07 RX ORDER — BORTEZOMIB 3.5 MG/1
1.3 INJECTION, POWDER, LYOPHILIZED, FOR SOLUTION INTRAVENOUS; SUBCUTANEOUS
Status: COMPLETED | OUTPATIENT
Start: 2021-01-07 | End: 2021-01-07

## 2021-01-07 RX ADMIN — BORTEZOMIB 2.8 MG: 3.5 INJECTION, POWDER, LYOPHILIZED, FOR SOLUTION INTRAVENOUS; SUBCUTANEOUS at 01:01

## 2021-01-08 DIAGNOSIS — Z76.82 STEM CELL TRANSPLANT CANDIDATE: Primary | ICD-10-CM

## 2021-01-08 DIAGNOSIS — C90.00 MULTIPLE MYELOMA, REMISSION STATUS UNSPECIFIED: ICD-10-CM

## 2021-01-11 DIAGNOSIS — Z12.11 SPECIAL SCREENING FOR MALIGNANT NEOPLASMS, COLON: Primary | ICD-10-CM

## 2021-01-11 DIAGNOSIS — Z01.818 PRE-OP TESTING: ICD-10-CM

## 2021-01-11 RX ORDER — SODIUM, POTASSIUM,MAG SULFATES 17.5-3.13G
1 SOLUTION, RECONSTITUTED, ORAL ORAL DAILY
Qty: 1 KIT | Refills: 0 | Status: SHIPPED | OUTPATIENT
Start: 2021-01-11 | End: 2021-01-13

## 2021-01-12 DIAGNOSIS — C90.00 MULTIPLE MYELOMA NOT HAVING ACHIEVED REMISSION: ICD-10-CM

## 2021-01-12 RX ORDER — LENALIDOMIDE 25 MG/1
CAPSULE ORAL
Qty: 21 CAPSULE | Refills: 0 | Status: SHIPPED | OUTPATIENT
Start: 2021-01-12 | End: 2021-02-08

## 2021-01-14 ENCOUNTER — INFUSION (OUTPATIENT)
Dept: INFUSION THERAPY | Facility: HOSPITAL | Age: 61
End: 2021-01-14
Attending: OPHTHALMOLOGY
Payer: COMMERCIAL

## 2021-01-14 VITALS
RESPIRATION RATE: 18 BRPM | SYSTOLIC BLOOD PRESSURE: 141 MMHG | TEMPERATURE: 98 F | DIASTOLIC BLOOD PRESSURE: 83 MMHG | HEART RATE: 65 BPM

## 2021-01-14 DIAGNOSIS — E88.09 PLASMA CELL DYSCRASIA: Primary | ICD-10-CM

## 2021-01-14 PROCEDURE — 63600175 PHARM REV CODE 636 W HCPCS: Mod: JG | Performed by: INTERNAL MEDICINE

## 2021-01-14 PROCEDURE — 96401 CHEMO ANTI-NEOPL SQ/IM: CPT

## 2021-01-14 RX ORDER — SODIUM CHLORIDE 0.9 % (FLUSH) 0.9 %
10 SYRINGE (ML) INJECTION
Status: DISCONTINUED | OUTPATIENT
Start: 2021-01-14 | End: 2021-01-14 | Stop reason: HOSPADM

## 2021-01-14 RX ORDER — HEPARIN 100 UNIT/ML
500 SYRINGE INTRAVENOUS
Status: DISCONTINUED | OUTPATIENT
Start: 2021-01-14 | End: 2021-01-14 | Stop reason: HOSPADM

## 2021-01-14 RX ORDER — BORTEZOMIB 3.5 MG/1
1.3 INJECTION, POWDER, LYOPHILIZED, FOR SOLUTION INTRAVENOUS; SUBCUTANEOUS
Status: COMPLETED | OUTPATIENT
Start: 2021-01-14 | End: 2021-01-14

## 2021-01-14 RX ADMIN — BORTEZOMIB 2.8 MG: 3.5 INJECTION, POWDER, LYOPHILIZED, FOR SOLUTION INTRAVENOUS; SUBCUTANEOUS at 01:01

## 2021-01-20 ENCOUNTER — TELEPHONE (OUTPATIENT)
Dept: HEMATOLOGY/ONCOLOGY | Facility: CLINIC | Age: 61
End: 2021-01-20

## 2021-01-21 ENCOUNTER — OFFICE VISIT (OUTPATIENT)
Dept: HEMATOLOGY/ONCOLOGY | Facility: CLINIC | Age: 61
End: 2021-01-21
Payer: COMMERCIAL

## 2021-01-21 ENCOUNTER — DOCUMENTATION ONLY (OUTPATIENT)
Dept: HEMATOLOGY/ONCOLOGY | Facility: CLINIC | Age: 61
End: 2021-01-21

## 2021-01-21 ENCOUNTER — INFUSION (OUTPATIENT)
Dept: INFUSION THERAPY | Facility: HOSPITAL | Age: 61
End: 2021-01-21
Payer: COMMERCIAL

## 2021-01-21 ENCOUNTER — LAB VISIT (OUTPATIENT)
Dept: LAB | Facility: HOSPITAL | Age: 61
End: 2021-01-21
Payer: COMMERCIAL

## 2021-01-21 VITALS
BODY MASS INDEX: 34.48 KG/M2 | SYSTOLIC BLOOD PRESSURE: 161 MMHG | DIASTOLIC BLOOD PRESSURE: 82 MMHG | HEART RATE: 73 BPM | WEIGHT: 219.69 LBS | TEMPERATURE: 99 F | HEIGHT: 67 IN | RESPIRATION RATE: 16 BRPM | OXYGEN SATURATION: 98 %

## 2021-01-21 DIAGNOSIS — C90.00 MULTIPLE MYELOMA, REMISSION STATUS UNSPECIFIED: ICD-10-CM

## 2021-01-21 DIAGNOSIS — E88.09 PLASMA CELL DYSCRASIA: Primary | ICD-10-CM

## 2021-01-21 DIAGNOSIS — R06.09 DOE (DYSPNEA ON EXERTION): ICD-10-CM

## 2021-01-21 DIAGNOSIS — G95.89 LUMBAR EPIDURAL MASS: ICD-10-CM

## 2021-01-21 DIAGNOSIS — C90.00 MULTIPLE MYELOMA, REMISSION STATUS UNSPECIFIED: Primary | ICD-10-CM

## 2021-01-21 DIAGNOSIS — Z76.82 STEM CELL TRANSPLANT CANDIDATE: Primary | ICD-10-CM

## 2021-01-21 DIAGNOSIS — C90.00 MULTIPLE MYELOMA NOT HAVING ACHIEVED REMISSION: ICD-10-CM

## 2021-01-21 LAB
ALBUMIN SERPL BCP-MCNC: 3.6 G/DL (ref 3.5–5.2)
ALP SERPL-CCNC: 60 U/L (ref 55–135)
ALT SERPL W/O P-5'-P-CCNC: 19 U/L (ref 10–44)
ANION GAP SERPL CALC-SCNC: 9 MMOL/L (ref 8–16)
AST SERPL-CCNC: 17 U/L (ref 10–40)
BASOPHILS # BLD AUTO: 0.04 K/UL (ref 0–0.2)
BASOPHILS NFR BLD: 0.9 % (ref 0–1.9)
BILIRUB SERPL-MCNC: 0.4 MG/DL (ref 0.1–1)
BUN SERPL-MCNC: 15 MG/DL (ref 8–23)
CALCIUM SERPL-MCNC: 8.7 MG/DL (ref 8.7–10.5)
CHLORIDE SERPL-SCNC: 106 MMOL/L (ref 95–110)
CO2 SERPL-SCNC: 23 MMOL/L (ref 23–29)
CREAT SERPL-MCNC: 0.9 MG/DL (ref 0.5–1.4)
DIFFERENTIAL METHOD: ABNORMAL
EOSINOPHIL # BLD AUTO: 0.2 K/UL (ref 0–0.5)
EOSINOPHIL NFR BLD: 3.5 % (ref 0–8)
ERYTHROCYTE [DISTWIDTH] IN BLOOD BY AUTOMATED COUNT: 15.3 % (ref 11.5–14.5)
EST. GFR  (AFRICAN AMERICAN): >60 ML/MIN/1.73 M^2
EST. GFR  (NON AFRICAN AMERICAN): >60 ML/MIN/1.73 M^2
GLUCOSE SERPL-MCNC: 113 MG/DL (ref 70–110)
HCT VFR BLD AUTO: 40 % (ref 40–54)
HGB BLD-MCNC: 12.9 G/DL (ref 14–18)
IMM GRANULOCYTES # BLD AUTO: 0.05 K/UL (ref 0–0.04)
IMM GRANULOCYTES NFR BLD AUTO: 1.2 % (ref 0–0.5)
LYMPHOCYTES # BLD AUTO: 0.9 K/UL (ref 1–4.8)
LYMPHOCYTES NFR BLD: 19.8 % (ref 18–48)
MCH RBC QN AUTO: 30.5 PG (ref 27–31)
MCHC RBC AUTO-ENTMCNC: 32.3 G/DL (ref 32–36)
MCV RBC AUTO: 95 FL (ref 82–98)
MONOCYTES # BLD AUTO: 0.3 K/UL (ref 0.3–1)
MONOCYTES NFR BLD: 6.7 % (ref 4–15)
NEUTROPHILS # BLD AUTO: 2.9 K/UL (ref 1.8–7.7)
NEUTROPHILS NFR BLD: 67.9 % (ref 38–73)
NRBC BLD-RTO: 0 /100 WBC
PLATELET # BLD AUTO: 138 K/UL (ref 150–350)
PMV BLD AUTO: 12.2 FL (ref 9.2–12.9)
POTASSIUM SERPL-SCNC: 4 MMOL/L (ref 3.5–5.1)
PROT SERPL-MCNC: 6.7 G/DL (ref 6–8.4)
RBC # BLD AUTO: 4.23 M/UL (ref 4.6–6.2)
SODIUM SERPL-SCNC: 138 MMOL/L (ref 136–145)
WBC # BLD AUTO: 4.3 K/UL (ref 3.9–12.7)

## 2021-01-21 PROCEDURE — 3008F PR BODY MASS INDEX (BMI) DOCUMENTED: ICD-10-PCS | Mod: CPTII,S$GLB,, | Performed by: STUDENT IN AN ORGANIZED HEALTH CARE EDUCATION/TRAINING PROGRAM

## 2021-01-21 PROCEDURE — 3008F BODY MASS INDEX DOCD: CPT | Mod: CPTII,S$GLB,, | Performed by: STUDENT IN AN ORGANIZED HEALTH CARE EDUCATION/TRAINING PROGRAM

## 2021-01-21 PROCEDURE — 1126F AMNT PAIN NOTED NONE PRSNT: CPT | Mod: S$GLB,,, | Performed by: STUDENT IN AN ORGANIZED HEALTH CARE EDUCATION/TRAINING PROGRAM

## 2021-01-21 PROCEDURE — 86334 IMMUNOFIX E-PHORESIS SERUM: CPT | Mod: 26,,, | Performed by: PATHOLOGY

## 2021-01-21 PROCEDURE — 84165 PROTEIN E-PHORESIS SERUM: CPT

## 2021-01-21 PROCEDURE — 84165 PROTEIN E-PHORESIS SERUM: CPT | Mod: 26,,, | Performed by: PATHOLOGY

## 2021-01-21 PROCEDURE — 80053 COMPREHEN METABOLIC PANEL: CPT

## 2021-01-21 PROCEDURE — 99999 PR PBB SHADOW E&M-EST. PATIENT-LVL V: ICD-10-PCS | Mod: PBBFAC,,, | Performed by: STUDENT IN AN ORGANIZED HEALTH CARE EDUCATION/TRAINING PROGRAM

## 2021-01-21 PROCEDURE — 99215 OFFICE O/P EST HI 40 MIN: CPT | Mod: S$GLB,,, | Performed by: STUDENT IN AN ORGANIZED HEALTH CARE EDUCATION/TRAINING PROGRAM

## 2021-01-21 PROCEDURE — 99215 PR OFFICE/OUTPT VISIT, EST, LEVL V, 40-54 MIN: ICD-10-PCS | Mod: S$GLB,,, | Performed by: STUDENT IN AN ORGANIZED HEALTH CARE EDUCATION/TRAINING PROGRAM

## 2021-01-21 PROCEDURE — 96401 CHEMO ANTI-NEOPL SQ/IM: CPT

## 2021-01-21 PROCEDURE — 84165 PATHOLOGIST INTERPRETATION SPE: ICD-10-PCS | Mod: 26,,, | Performed by: PATHOLOGY

## 2021-01-21 PROCEDURE — 86334 IMMUNOFIX E-PHORESIS SERUM: CPT

## 2021-01-21 PROCEDURE — 86334 PATHOLOGIST INTERPRETATION IFE: ICD-10-PCS | Mod: 26,,, | Performed by: PATHOLOGY

## 2021-01-21 PROCEDURE — 1126F PR PAIN SEVERITY QUANTIFIED, NO PAIN PRESENT: ICD-10-PCS | Mod: S$GLB,,, | Performed by: STUDENT IN AN ORGANIZED HEALTH CARE EDUCATION/TRAINING PROGRAM

## 2021-01-21 PROCEDURE — 85025 COMPLETE CBC W/AUTO DIFF WBC: CPT

## 2021-01-21 PROCEDURE — 36415 COLL VENOUS BLD VENIPUNCTURE: CPT

## 2021-01-21 PROCEDURE — 83520 IMMUNOASSAY QUANT NOS NONAB: CPT

## 2021-01-21 PROCEDURE — 63600175 PHARM REV CODE 636 W HCPCS: Mod: JG | Performed by: INTERNAL MEDICINE

## 2021-01-21 PROCEDURE — 99999 PR PBB SHADOW E&M-EST. PATIENT-LVL V: CPT | Mod: PBBFAC,,, | Performed by: STUDENT IN AN ORGANIZED HEALTH CARE EDUCATION/TRAINING PROGRAM

## 2021-01-21 RX ORDER — HEPARIN 100 UNIT/ML
500 SYRINGE INTRAVENOUS
Status: CANCELLED | OUTPATIENT
Start: 2021-01-21

## 2021-01-21 RX ORDER — SODIUM CHLORIDE 0.9 % (FLUSH) 0.9 %
10 SYRINGE (ML) INJECTION
Status: CANCELLED | OUTPATIENT
Start: 2021-01-21

## 2021-01-21 RX ORDER — HEPARIN 100 UNIT/ML
500 SYRINGE INTRAVENOUS
Status: CANCELLED | OUTPATIENT
Start: 2021-02-06

## 2021-01-21 RX ORDER — BORTEZOMIB 3.5 MG/1
1.3 INJECTION, POWDER, LYOPHILIZED, FOR SOLUTION INTRAVENOUS; SUBCUTANEOUS
Status: COMPLETED | OUTPATIENT
Start: 2021-01-21 | End: 2021-01-21

## 2021-01-21 RX ORDER — BORTEZOMIB 3.5 MG/1
1.3 INJECTION, POWDER, LYOPHILIZED, FOR SOLUTION INTRAVENOUS; SUBCUTANEOUS
Status: CANCELLED | OUTPATIENT
Start: 2021-01-30

## 2021-01-21 RX ORDER — HEPARIN 100 UNIT/ML
500 SYRINGE INTRAVENOUS
Status: CANCELLED | OUTPATIENT
Start: 2021-01-23

## 2021-01-21 RX ORDER — BORTEZOMIB 3.5 MG/1
1.3 INJECTION, POWDER, LYOPHILIZED, FOR SOLUTION INTRAVENOUS; SUBCUTANEOUS
Status: CANCELLED | OUTPATIENT
Start: 2021-01-23

## 2021-01-21 RX ORDER — SODIUM CHLORIDE 0.9 % (FLUSH) 0.9 %
10 SYRINGE (ML) INJECTION
Status: CANCELLED | OUTPATIENT
Start: 2021-02-06

## 2021-01-21 RX ORDER — SODIUM CHLORIDE 0.9 % (FLUSH) 0.9 %
10 SYRINGE (ML) INJECTION
Status: CANCELLED | OUTPATIENT
Start: 2021-01-23

## 2021-01-21 RX ORDER — BORTEZOMIB 3.5 MG/1
1.3 INJECTION, POWDER, LYOPHILIZED, FOR SOLUTION INTRAVENOUS; SUBCUTANEOUS
Status: CANCELLED | OUTPATIENT
Start: 2021-02-06

## 2021-01-21 RX ORDER — SODIUM CHLORIDE 0.9 % (FLUSH) 0.9 %
10 SYRINGE (ML) INJECTION
Status: CANCELLED | OUTPATIENT
Start: 2021-01-30

## 2021-01-21 RX ORDER — BORTEZOMIB 3.5 MG/1
1.3 INJECTION, POWDER, LYOPHILIZED, FOR SOLUTION INTRAVENOUS; SUBCUTANEOUS
Status: CANCELLED | OUTPATIENT
Start: 2021-01-21

## 2021-01-21 RX ORDER — HEPARIN 100 UNIT/ML
500 SYRINGE INTRAVENOUS
Status: CANCELLED | OUTPATIENT
Start: 2021-01-30

## 2021-01-21 RX ADMIN — BORTEZOMIB 2.8 MG: 3.5 INJECTION, POWDER, LYOPHILIZED, FOR SOLUTION INTRAVENOUS; SUBCUTANEOUS at 11:01

## 2021-01-22 ENCOUNTER — LAB VISIT (OUTPATIENT)
Dept: FAMILY MEDICINE | Facility: CLINIC | Age: 61
End: 2021-01-22
Payer: MEDICAID

## 2021-01-22 DIAGNOSIS — Z01.818 PRE-OP TESTING: ICD-10-CM

## 2021-01-22 LAB
INTERPRETATION SERPL IFE-IMP: NORMAL
KAPPA LC SER QL IA: 15.88 MG/DL (ref 0.33–1.94)
KAPPA LC/LAMBDA SER IA: 21.75 (ref 0.26–1.65)
LAMBDA LC SER QL IA: 0.73 MG/DL (ref 0.57–2.63)
PATHOLOGIST INTERPRETATION IFE: NORMAL

## 2021-01-22 PROCEDURE — U0003 INFECTIOUS AGENT DETECTION BY NUCLEIC ACID (DNA OR RNA); SEVERE ACUTE RESPIRATORY SYNDROME CORONAVIRUS 2 (SARS-COV-2) (CORONAVIRUS DISEASE [COVID-19]), AMPLIFIED PROBE TECHNIQUE, MAKING USE OF HIGH THROUGHPUT TECHNOLOGIES AS DESCRIBED BY CMS-2020-01-R: HCPCS

## 2021-01-23 LAB — SARS-COV-2 RNA RESP QL NAA+PROBE: NOT DETECTED

## 2021-01-25 ENCOUNTER — TELEPHONE (OUTPATIENT)
Dept: ENDOSCOPY | Facility: HOSPITAL | Age: 61
End: 2021-01-25

## 2021-01-25 ENCOUNTER — ANESTHESIA (OUTPATIENT)
Dept: ENDOSCOPY | Facility: HOSPITAL | Age: 61
End: 2021-01-25
Payer: COMMERCIAL

## 2021-01-25 ENCOUNTER — ANESTHESIA EVENT (OUTPATIENT)
Dept: ENDOSCOPY | Facility: HOSPITAL | Age: 61
End: 2021-01-25
Payer: COMMERCIAL

## 2021-01-25 ENCOUNTER — HOSPITAL ENCOUNTER (OUTPATIENT)
Facility: HOSPITAL | Age: 61
Discharge: HOME | End: 2021-01-25
Attending: COLON & RECTAL SURGERY | Admitting: COLON & RECTAL SURGERY
Payer: COMMERCIAL

## 2021-01-25 VITALS
WEIGHT: 219 LBS | OXYGEN SATURATION: 96 % | BODY MASS INDEX: 34.37 KG/M2 | RESPIRATION RATE: 18 BRPM | HEIGHT: 67 IN | HEART RATE: 57 BPM | TEMPERATURE: 98 F | DIASTOLIC BLOOD PRESSURE: 74 MMHG | SYSTOLIC BLOOD PRESSURE: 125 MMHG

## 2021-01-25 DIAGNOSIS — Z01.812 PRE-PROCEDURE LAB EXAM: Primary | ICD-10-CM

## 2021-01-25 DIAGNOSIS — C90.00 MULTIPLE MYELOMA, REMISSION STATUS UNSPECIFIED: ICD-10-CM

## 2021-01-25 DIAGNOSIS — Z76.82 STEM CELL TRANSPLANT CANDIDATE: Primary | ICD-10-CM

## 2021-01-25 DIAGNOSIS — Z12.11 SPECIAL SCREENING FOR MALIGNANT NEOPLASMS, COLON: Primary | ICD-10-CM

## 2021-01-25 PROCEDURE — 37000009 HC ANESTHESIA EA ADD 15 MINS: Performed by: COLON & RECTAL SURGERY

## 2021-01-25 PROCEDURE — G0121 COLON CA SCRN NOT HI RSK IND: ICD-10-PCS | Mod: 53,,, | Performed by: COLON & RECTAL SURGERY

## 2021-01-25 PROCEDURE — 63600175 PHARM REV CODE 636 W HCPCS: Performed by: NURSE ANESTHETIST, CERTIFIED REGISTERED

## 2021-01-25 PROCEDURE — 37000008 HC ANESTHESIA 1ST 15 MINUTES: Performed by: COLON & RECTAL SURGERY

## 2021-01-25 PROCEDURE — G0121 COLON CA SCRN NOT HI RSK IND: HCPCS | Mod: 53,,, | Performed by: COLON & RECTAL SURGERY

## 2021-01-25 PROCEDURE — E9220 PRA ENDO ANESTHESIA: ICD-10-PCS | Mod: 53,,, | Performed by: NURSE ANESTHETIST, CERTIFIED REGISTERED

## 2021-01-25 PROCEDURE — 25000003 PHARM REV CODE 250: Performed by: COLON & RECTAL SURGERY

## 2021-01-25 PROCEDURE — G0121 COLON CA SCRN NOT HI RSK IND: HCPCS | Performed by: COLON & RECTAL SURGERY

## 2021-01-25 PROCEDURE — E9220 PRA ENDO ANESTHESIA: HCPCS | Mod: 53,,, | Performed by: NURSE ANESTHETIST, CERTIFIED REGISTERED

## 2021-01-25 PROCEDURE — 25000003 PHARM REV CODE 250: Performed by: NURSE ANESTHETIST, CERTIFIED REGISTERED

## 2021-01-25 RX ORDER — SODIUM CHLORIDE 9 MG/ML
INJECTION, SOLUTION INTRAVENOUS CONTINUOUS
Status: DISCONTINUED | OUTPATIENT
Start: 2021-01-25 | End: 2021-01-25 | Stop reason: HOSPADM

## 2021-01-25 RX ORDER — PROPOFOL 10 MG/ML
VIAL (ML) INTRAVENOUS
Status: DISCONTINUED | OUTPATIENT
Start: 2021-01-25 | End: 2021-01-25

## 2021-01-25 RX ORDER — PROPOFOL 10 MG/ML
VIAL (ML) INTRAVENOUS CONTINUOUS PRN
Status: DISCONTINUED | OUTPATIENT
Start: 2021-01-25 | End: 2021-01-25

## 2021-01-25 RX ORDER — SODIUM, POTASSIUM,MAG SULFATES 17.5-3.13G
1 SOLUTION, RECONSTITUTED, ORAL ORAL DAILY
Qty: 1 KIT | Refills: 0 | Status: SHIPPED | OUTPATIENT
Start: 2021-01-25 | End: 2021-01-27 | Stop reason: SDUPTHER

## 2021-01-25 RX ORDER — LIDOCAINE HYDROCHLORIDE 20 MG/ML
INJECTION INTRAVENOUS
Status: DISCONTINUED | OUTPATIENT
Start: 2021-01-25 | End: 2021-01-25

## 2021-01-25 RX ADMIN — SODIUM CHLORIDE: 0.9 INJECTION, SOLUTION INTRAVENOUS at 09:01

## 2021-01-25 RX ADMIN — PROPOFOL 70 MG: 10 INJECTION, EMULSION INTRAVENOUS at 09:01

## 2021-01-25 RX ADMIN — LIDOCAINE HYDROCHLORIDE 60 MG: 20 INJECTION, SOLUTION INTRAVENOUS at 09:01

## 2021-01-25 RX ADMIN — PROPOFOL 150 MCG/KG/MIN: 10 INJECTION, EMULSION INTRAVENOUS at 09:01

## 2021-01-26 ENCOUNTER — PROCEDURE VISIT (OUTPATIENT)
Dept: HEMATOLOGY/ONCOLOGY | Facility: CLINIC | Age: 61
End: 2021-01-26
Payer: COMMERCIAL

## 2021-01-26 ENCOUNTER — HOSPITAL ENCOUNTER (OUTPATIENT)
Dept: RADIOLOGY | Facility: HOSPITAL | Age: 61
Discharge: HOME | End: 2021-01-26
Attending: STUDENT IN AN ORGANIZED HEALTH CARE EDUCATION/TRAINING PROGRAM
Payer: MEDICAID

## 2021-01-26 ENCOUNTER — TELEPHONE (OUTPATIENT)
Dept: HEMATOLOGY/ONCOLOGY | Facility: CLINIC | Age: 61
End: 2021-01-26

## 2021-01-26 VITALS
DIASTOLIC BLOOD PRESSURE: 88 MMHG | HEART RATE: 73 BPM | OXYGEN SATURATION: 98 % | RESPIRATION RATE: 17 BRPM | SYSTOLIC BLOOD PRESSURE: 159 MMHG | TEMPERATURE: 99 F

## 2021-01-26 DIAGNOSIS — C90.00 MULTIPLE MYELOMA, REMISSION STATUS UNSPECIFIED: ICD-10-CM

## 2021-01-26 LAB — POCT GLUCOSE: 106 MG/DL (ref 70–110)

## 2021-01-26 PROCEDURE — 88189 FLOWCYTOMETRY/READ 16 & >: CPT | Mod: ,,, | Performed by: PATHOLOGY

## 2021-01-26 PROCEDURE — 88237 TISSUE CULTURE BONE MARROW: CPT

## 2021-01-26 PROCEDURE — 78816 PET IMAGE W/CT FULL BODY: CPT | Mod: TC

## 2021-01-26 PROCEDURE — 88311 PR  DECALCIFY TISSUE: ICD-10-PCS | Mod: 26,,, | Performed by: PATHOLOGY

## 2021-01-26 PROCEDURE — 85097 PR  BONE MARROW,SMEAR INTERPRETATION: ICD-10-PCS | Mod: ,,, | Performed by: PATHOLOGY

## 2021-01-26 PROCEDURE — 88342 CHG IMMUNOCYTOCHEMISTRY: ICD-10-PCS | Mod: 26,59,, | Performed by: PATHOLOGY

## 2021-01-26 PROCEDURE — 88271 CYTOGENETICS DNA PROBE: CPT | Mod: 59

## 2021-01-26 PROCEDURE — 85097 BONE MARROW INTERPRETATION: CPT | Mod: ,,, | Performed by: PATHOLOGY

## 2021-01-26 PROCEDURE — 88313 SPECIAL STAINS GROUP 2: CPT | Mod: 26,,, | Performed by: PATHOLOGY

## 2021-01-26 PROCEDURE — 78816 NM PET CT WHOLE BODY: ICD-10-PCS | Mod: 26,PS,, | Performed by: RADIOLOGY

## 2021-01-26 PROCEDURE — 88305 TISSUE EXAM BY PATHOLOGIST: CPT | Performed by: PATHOLOGY

## 2021-01-26 PROCEDURE — 88185 FLOWCYTOMETRY/TC ADD-ON: CPT | Mod: 59 | Performed by: PATHOLOGY

## 2021-01-26 PROCEDURE — 88305 TISSUE EXAM BY PATHOLOGIST: CPT | Mod: 26,,, | Performed by: PATHOLOGY

## 2021-01-26 PROCEDURE — 88313 PR  SPECIAL STAINS,GROUP II: ICD-10-PCS | Mod: 26,,, | Performed by: PATHOLOGY

## 2021-01-26 PROCEDURE — 38222 DX BONE MARROW BX & ASPIR: CPT | Mod: LT,S$GLB,, | Performed by: NURSE PRACTITIONER

## 2021-01-26 PROCEDURE — 78816 PET IMAGE W/CT FULL BODY: CPT | Mod: 26,PS,, | Performed by: RADIOLOGY

## 2021-01-26 PROCEDURE — 88313 SPECIAL STAINS GROUP 2: CPT | Performed by: PATHOLOGY

## 2021-01-26 PROCEDURE — 38222 PR BONE MARROW BIOPSY(IES) W/ASPIRATION(S); DIAGNOSTIC: ICD-10-PCS | Mod: LT,S$GLB,, | Performed by: NURSE PRACTITIONER

## 2021-01-26 PROCEDURE — 88311 DECALCIFY TISSUE: CPT | Mod: 26,,, | Performed by: PATHOLOGY

## 2021-01-26 PROCEDURE — 88305 TISSUE EXAM BY PATHOLOGIST: ICD-10-PCS | Mod: 26,,, | Performed by: PATHOLOGY

## 2021-01-26 PROCEDURE — 88184 FLOWCYTOMETRY/ TC 1 MARKER: CPT | Performed by: PATHOLOGY

## 2021-01-26 PROCEDURE — 88342 IMHCHEM/IMCYTCHM 1ST ANTB: CPT | Mod: 26,59,, | Performed by: PATHOLOGY

## 2021-01-26 PROCEDURE — 88342 IMHCHEM/IMCYTCHM 1ST ANTB: CPT | Performed by: PATHOLOGY

## 2021-01-26 PROCEDURE — 88311 DECALCIFY TISSUE: CPT | Performed by: PATHOLOGY

## 2021-01-26 PROCEDURE — 88189 PR  FLOWCYTOMETRY/READ, 16 & > MARKERS: ICD-10-PCS | Mod: ,,, | Performed by: PATHOLOGY

## 2021-01-26 RX ORDER — LIDOCAINE HYDROCHLORIDE 20 MG/ML
5 INJECTION, SOLUTION INFILTRATION; PERINEURAL ONCE
Status: COMPLETED | OUTPATIENT
Start: 2021-01-26 | End: 2021-01-26

## 2021-01-26 RX ADMIN — LIDOCAINE HYDROCHLORIDE 5 ML: 20 INJECTION, SOLUTION INFILTRATION; PERINEURAL at 02:01

## 2021-01-27 ENCOUNTER — TELEPHONE (OUTPATIENT)
Dept: HEMATOLOGY/ONCOLOGY | Facility: CLINIC | Age: 61
End: 2021-01-27

## 2021-01-27 DIAGNOSIS — Z12.11 SPECIAL SCREENING FOR MALIGNANT NEOPLASMS, COLON: Primary | ICD-10-CM

## 2021-01-27 LAB
CHROM BANDING METHOD: NORMAL
CHROMOSOME ANALYSIS BM ADDITIONAL INFORMATION: NORMAL
CHROMOSOME ANALYSIS BM RELEASED BY: NORMAL
CHROMOSOME ANALYSIS BM RESULT SUMMARY: NORMAL
CLINICAL CYTOGENETICIST REVIEW: NORMAL
GENETICIST REVIEW: NORMAL
KARYOTYP MAR: NORMAL
PLASMA CELL PROLIF RELEASED BY: NORMAL
PLASMA CELL PROLIF RESULT SUMMARY: NORMAL
PLASMA CELL PROLIF RESULT TABLE: NORMAL
REASON FOR REFERRAL (NARRATIVE): NORMAL
REASON FOR REFERRAL, PLASMA CELL PROLIF (PCPD), FISH: NORMAL
REF LAB TEST METHOD: NORMAL
REF LAB TEST METHOD: NORMAL
RESULTS, PLASMA CELL PROLIF (PCPD), FISH: NORMAL
SERVICE CMNT-IMP: NORMAL
SERVICE CMNT-IMP: NORMAL
SPECIMEN SOURCE: NORMAL
SPECIMEN SOURCE: NORMAL
SPECIMEN, PLASMA CELL PROLIF (PCPD), FISH: NORMAL
SPECIMEN: NORMAL

## 2021-01-27 RX ORDER — SODIUM, POTASSIUM,MAG SULFATES 17.5-3.13G
1 SOLUTION, RECONSTITUTED, ORAL ORAL DAILY
Qty: 1 KIT | Refills: 0 | Status: SHIPPED | OUTPATIENT
Start: 2021-01-27 | End: 2021-01-30

## 2021-01-28 ENCOUNTER — INFUSION (OUTPATIENT)
Dept: INFUSION THERAPY | Facility: HOSPITAL | Age: 61
End: 2021-01-28
Payer: COMMERCIAL

## 2021-01-28 VITALS — SYSTOLIC BLOOD PRESSURE: 139 MMHG | RESPIRATION RATE: 18 BRPM | DIASTOLIC BLOOD PRESSURE: 87 MMHG | HEART RATE: 66 BPM

## 2021-01-28 DIAGNOSIS — C90.00 MULTIPLE MYELOMA NOT HAVING ACHIEVED REMISSION: ICD-10-CM

## 2021-01-28 DIAGNOSIS — E88.09 PLASMA CELL DYSCRASIA: Primary | ICD-10-CM

## 2021-01-28 LAB
ALBUMIN SERPL ELPH-MCNC: 3.79 G/DL (ref 3.35–5.55)
ALPHA1 GLOB SERPL ELPH-MCNC: 0.3 G/DL (ref 0.17–0.41)
ALPHA2 GLOB SERPL ELPH-MCNC: 0.66 G/DL (ref 0.43–0.99)
B-GLOBULIN SERPL ELPH-MCNC: 0.61 G/DL (ref 0.5–1.1)
GAMMA GLOB SERPL ELPH-MCNC: 0.95 G/DL (ref 0.67–1.58)
PATHOLOGIST INTERPRETATION SPE: NORMAL
PROT SERPL-MCNC: 6.3 G/DL (ref 6–8.4)

## 2021-01-28 PROCEDURE — 96401 CHEMO ANTI-NEOPL SQ/IM: CPT

## 2021-01-28 PROCEDURE — 63600175 PHARM REV CODE 636 W HCPCS: Mod: JG | Performed by: INTERNAL MEDICINE

## 2021-01-28 RX ORDER — DEXAMETHASONE 4 MG/1
40 TABLET ORAL WEEKLY
Qty: 160 TABLET | Refills: 0 | Status: SHIPPED | OUTPATIENT
Start: 2021-01-28 | End: 2021-04-05 | Stop reason: CLARIF

## 2021-01-28 RX ORDER — BORTEZOMIB 3.5 MG/1
1.3 INJECTION, POWDER, LYOPHILIZED, FOR SOLUTION INTRAVENOUS; SUBCUTANEOUS
Status: COMPLETED | OUTPATIENT
Start: 2021-01-28 | End: 2021-01-28

## 2021-01-28 RX ORDER — DEXAMETHASONE 4 MG/1
40 TABLET ORAL DAILY
Qty: 20 TABLET | Refills: 0 | Status: CANCELLED | OUTPATIENT
Start: 2021-01-28 | End: 2021-01-30

## 2021-01-28 RX ADMIN — BORTEZOMIB 2.8 MG: 3.5 INJECTION, POWDER, LYOPHILIZED, FOR SOLUTION INTRAVENOUS; SUBCUTANEOUS at 11:01

## 2021-01-29 ENCOUNTER — LAB VISIT (OUTPATIENT)
Dept: INTERNAL MEDICINE | Facility: CLINIC | Age: 61
End: 2021-01-29
Payer: COMMERCIAL

## 2021-01-29 DIAGNOSIS — Z01.812 PRE-PROCEDURE LAB EXAM: ICD-10-CM

## 2021-01-29 LAB
BODY SITE - BONE MARROW: NORMAL
CLINICAL DIAGNOSIS - BONE MARROW: NORMAL
FLOW CYTOMETRY ANTIBODIES ANALYZED - BONE MARROW: NORMAL
FLOW CYTOMETRY COMMENT - BONE MARROW: NORMAL
FLOW CYTOMETRY INTERPRETATION - BONE MARROW: NORMAL

## 2021-01-29 PROCEDURE — U0003 INFECTIOUS AGENT DETECTION BY NUCLEIC ACID (DNA OR RNA); SEVERE ACUTE RESPIRATORY SYNDROME CORONAVIRUS 2 (SARS-COV-2) (CORONAVIRUS DISEASE [COVID-19]), AMPLIFIED PROBE TECHNIQUE, MAKING USE OF HIGH THROUGHPUT TECHNOLOGIES AS DESCRIBED BY CMS-2020-01-R: HCPCS

## 2021-01-30 LAB — SARS-COV-2 RNA RESP QL NAA+PROBE: NOT DETECTED

## 2021-02-01 ENCOUNTER — ANESTHESIA (OUTPATIENT)
Dept: ENDOSCOPY | Facility: HOSPITAL | Age: 61
End: 2021-02-01
Payer: COMMERCIAL

## 2021-02-01 ENCOUNTER — ANESTHESIA EVENT (OUTPATIENT)
Dept: ENDOSCOPY | Facility: HOSPITAL | Age: 61
End: 2021-02-01
Payer: COMMERCIAL

## 2021-02-01 ENCOUNTER — HOSPITAL ENCOUNTER (OUTPATIENT)
Facility: HOSPITAL | Age: 61
Discharge: HOME | End: 2021-02-01
Attending: COLON & RECTAL SURGERY | Admitting: COLON & RECTAL SURGERY
Payer: COMMERCIAL

## 2021-02-01 VITALS
BODY MASS INDEX: 34.37 KG/M2 | OXYGEN SATURATION: 97 % | WEIGHT: 219 LBS | HEIGHT: 67 IN | SYSTOLIC BLOOD PRESSURE: 122 MMHG | HEART RATE: 68 BPM | RESPIRATION RATE: 13 BRPM | DIASTOLIC BLOOD PRESSURE: 75 MMHG | TEMPERATURE: 98 F

## 2021-02-01 DIAGNOSIS — Z12.11 ENCOUNTER FOR SCREENING COLONOSCOPY: ICD-10-CM

## 2021-02-01 PROCEDURE — 45385 PR COLONOSCOPY,REMV LESN,SNARE: ICD-10-PCS | Mod: 33,,, | Performed by: COLON & RECTAL SURGERY

## 2021-02-01 PROCEDURE — 45385 COLONOSCOPY W/LESION REMOVAL: CPT | Mod: 33,,, | Performed by: COLON & RECTAL SURGERY

## 2021-02-01 PROCEDURE — 25000003 PHARM REV CODE 250: Performed by: COLON & RECTAL SURGERY

## 2021-02-01 PROCEDURE — 37000009 HC ANESTHESIA EA ADD 15 MINS: Performed by: COLON & RECTAL SURGERY

## 2021-02-01 PROCEDURE — 88305 TISSUE EXAM BY PATHOLOGIST: ICD-10-PCS | Mod: 26,,, | Performed by: PATHOLOGY

## 2021-02-01 PROCEDURE — E9220 PRA ENDO ANESTHESIA: HCPCS | Mod: ,,, | Performed by: NURSE ANESTHETIST, CERTIFIED REGISTERED

## 2021-02-01 PROCEDURE — 88305 TISSUE EXAM BY PATHOLOGIST: CPT | Performed by: PATHOLOGY

## 2021-02-01 PROCEDURE — 63600175 PHARM REV CODE 636 W HCPCS: Performed by: NURSE ANESTHETIST, CERTIFIED REGISTERED

## 2021-02-01 PROCEDURE — 37000008 HC ANESTHESIA 1ST 15 MINUTES: Performed by: COLON & RECTAL SURGERY

## 2021-02-01 PROCEDURE — 27201089 HC SNARE, DISP (ANY): Performed by: COLON & RECTAL SURGERY

## 2021-02-01 PROCEDURE — 25000003 PHARM REV CODE 250: Performed by: NURSE ANESTHETIST, CERTIFIED REGISTERED

## 2021-02-01 PROCEDURE — E9220 PRA ENDO ANESTHESIA: ICD-10-PCS | Mod: ,,, | Performed by: NURSE ANESTHETIST, CERTIFIED REGISTERED

## 2021-02-01 PROCEDURE — 45385 COLONOSCOPY W/LESION REMOVAL: CPT | Performed by: COLON & RECTAL SURGERY

## 2021-02-01 PROCEDURE — 88305 TISSUE EXAM BY PATHOLOGIST: CPT | Mod: 26,,, | Performed by: PATHOLOGY

## 2021-02-01 RX ORDER — PROPOFOL 10 MG/ML
VIAL (ML) INTRAVENOUS
Status: DISCONTINUED | OUTPATIENT
Start: 2021-02-01 | End: 2021-02-01

## 2021-02-01 RX ORDER — PROPOFOL 10 MG/ML
VIAL (ML) INTRAVENOUS CONTINUOUS PRN
Status: DISCONTINUED | OUTPATIENT
Start: 2021-02-01 | End: 2021-02-01

## 2021-02-01 RX ORDER — SODIUM CHLORIDE 9 MG/ML
INJECTION, SOLUTION INTRAVENOUS CONTINUOUS
Status: DISCONTINUED | OUTPATIENT
Start: 2021-02-01 | End: 2021-02-01 | Stop reason: HOSPADM

## 2021-02-01 RX ORDER — LIDOCAINE HYDROCHLORIDE 20 MG/ML
INJECTION, SOLUTION EPIDURAL; INFILTRATION; INTRACAUDAL; PERINEURAL
Status: DISCONTINUED | OUTPATIENT
Start: 2021-02-01 | End: 2021-02-01

## 2021-02-01 RX ORDER — PHENYLEPHRINE HCL IN 0.9% NACL 1 MG/10 ML
SYRINGE (ML) INTRAVENOUS
Status: DISCONTINUED | OUTPATIENT
Start: 2021-02-01 | End: 2021-02-01

## 2021-02-01 RX ADMIN — SODIUM CHLORIDE: 0.9 INJECTION, SOLUTION INTRAVENOUS at 08:02

## 2021-02-01 RX ADMIN — Medication 100 MCG: at 08:02

## 2021-02-01 RX ADMIN — PROPOFOL 150 MCG/KG/MIN: 10 INJECTION, EMULSION INTRAVENOUS at 07:02

## 2021-02-01 RX ADMIN — PROPOFOL 80 MG: 10 INJECTION, EMULSION INTRAVENOUS at 07:02

## 2021-02-01 RX ADMIN — SODIUM CHLORIDE: 0.9 INJECTION, SOLUTION INTRAVENOUS at 07:02

## 2021-02-01 RX ADMIN — LIDOCAINE HYDROCHLORIDE 60 MG: 20 INJECTION, SOLUTION EPIDURAL; INFILTRATION; INTRACAUDAL at 07:02

## 2021-02-01 RX ADMIN — PROPOFOL 20 MG: 10 INJECTION, EMULSION INTRAVENOUS at 07:02

## 2021-02-01 RX ADMIN — GLYCOPYRROLATE 0.2 MCG: 0.2 INJECTION INTRAMUSCULAR; INTRAVENOUS at 08:02

## 2021-02-04 ENCOUNTER — INFUSION (OUTPATIENT)
Dept: INFUSION THERAPY | Facility: HOSPITAL | Age: 61
End: 2021-02-04
Payer: COMMERCIAL

## 2021-02-04 VITALS — HEART RATE: 60 BPM | RESPIRATION RATE: 18 BRPM | DIASTOLIC BLOOD PRESSURE: 79 MMHG | SYSTOLIC BLOOD PRESSURE: 142 MMHG

## 2021-02-04 DIAGNOSIS — E61.1 IRON DEFICIENCY: ICD-10-CM

## 2021-02-04 DIAGNOSIS — E88.09 PLASMA CELL DYSCRASIA: Primary | ICD-10-CM

## 2021-02-04 DIAGNOSIS — C90.00 MULTIPLE MYELOMA NOT HAVING ACHIEVED REMISSION: ICD-10-CM

## 2021-02-04 PROCEDURE — 63600175 PHARM REV CODE 636 W HCPCS: Mod: JG | Performed by: INTERNAL MEDICINE

## 2021-02-04 PROCEDURE — 96401 CHEMO ANTI-NEOPL SQ/IM: CPT

## 2021-02-04 RX ORDER — BORTEZOMIB 3.5 MG/1
1.3 INJECTION, POWDER, LYOPHILIZED, FOR SOLUTION INTRAVENOUS; SUBCUTANEOUS
Status: COMPLETED | OUTPATIENT
Start: 2021-02-04 | End: 2021-02-04

## 2021-02-04 RX ORDER — FERROUS SULFATE 325(65) MG
325 TABLET, DELAYED RELEASE (ENTERIC COATED) ORAL DAILY
Qty: 30 TABLET | Refills: 1 | Status: SHIPPED | OUTPATIENT
Start: 2021-02-04 | End: 2021-04-01 | Stop reason: SDUPTHER

## 2021-02-04 RX ADMIN — BORTEZOMIB 2.8 MG: 3.5 INJECTION, POWDER, LYOPHILIZED, FOR SOLUTION INTRAVENOUS; SUBCUTANEOUS at 09:02

## 2021-02-08 LAB
FINAL PATHOLOGIC DIAGNOSIS: NORMAL
Lab: NORMAL

## 2021-02-10 ENCOUNTER — INITIAL CONSULT (OUTPATIENT)
Dept: PSYCHIATRY | Facility: CLINIC | Age: 61
End: 2021-02-10
Payer: COMMERCIAL

## 2021-02-10 ENCOUNTER — HOSPITAL ENCOUNTER (OUTPATIENT)
Dept: CARDIOLOGY | Facility: HOSPITAL | Age: 61
Discharge: HOME | End: 2021-02-10
Attending: INTERNAL MEDICINE
Payer: COMMERCIAL

## 2021-02-10 ENCOUNTER — CLINICAL SUPPORT (OUTPATIENT)
Dept: HEMATOLOGY/ONCOLOGY | Facility: CLINIC | Age: 61
End: 2021-02-10
Payer: COMMERCIAL

## 2021-02-10 ENCOUNTER — HOSPITAL ENCOUNTER (OUTPATIENT)
Dept: PULMONOLOGY | Facility: CLINIC | Age: 61
Discharge: HOME | End: 2021-02-10
Payer: MEDICAID

## 2021-02-10 ENCOUNTER — HOSPITAL ENCOUNTER (OUTPATIENT)
Dept: RADIOLOGY | Facility: HOSPITAL | Age: 61
Discharge: HOME | End: 2021-02-10
Attending: INTERNAL MEDICINE
Payer: COMMERCIAL

## 2021-02-10 ENCOUNTER — HOSPITAL ENCOUNTER (OUTPATIENT)
Dept: PULMONOLOGY | Facility: CLINIC | Age: 61
Discharge: HOME | End: 2021-02-10
Payer: COMMERCIAL

## 2021-02-10 VITALS
WEIGHT: 219 LBS | DIASTOLIC BLOOD PRESSURE: 80 MMHG | SYSTOLIC BLOOD PRESSURE: 146 MMHG | BODY MASS INDEX: 34.37 KG/M2 | HEART RATE: 72 BPM | HEIGHT: 67 IN

## 2021-02-10 DIAGNOSIS — C90.00 MULTIPLE MYELOMA NOT HAVING ACHIEVED REMISSION: ICD-10-CM

## 2021-02-10 DIAGNOSIS — C90.00 MULTIPLE MYELOMA, REMISSION STATUS UNSPECIFIED: ICD-10-CM

## 2021-02-10 DIAGNOSIS — Z01.818 PRE-TRANSPLANT EVALUATION FOR STEM CELL TRANSPLANT: Primary | ICD-10-CM

## 2021-02-10 DIAGNOSIS — Z76.82 STEM CELL TRANSPLANT CANDIDATE: ICD-10-CM

## 2021-02-10 DIAGNOSIS — R06.09 DOE (DYSPNEA ON EXERTION): ICD-10-CM

## 2021-02-10 DIAGNOSIS — Z76.82 STEM CELL TRANSPLANT CANDIDATE: Primary | ICD-10-CM

## 2021-02-10 LAB
ASCENDING AORTA: 3.71 CM
AV INDEX (PROSTH): 0.79
AV MEAN GRADIENT: 6 MMHG
AV PEAK GRADIENT: 11 MMHG
AV VALVE AREA: 2.91 CM2
AV VELOCITY RATIO: 0.76
BSA FOR ECHO PROCEDURE: 2.17 M2
CV ECHO LV RWT: 0.62 CM
DOP CALC AO PEAK VEL: 1.64 M/S
DOP CALC AO VTI: 31.25 CM
DOP CALC LVOT AREA: 3.7 CM2
DOP CALC LVOT DIAMETER: 2.16 CM
DOP CALC LVOT PEAK VEL: 1.25 M/S
DOP CALC LVOT STROKE VOLUME: 90.9 CM3
DOP CALCLVOT PEAK VEL VTI: 24.82 CM
E WAVE DECELERATION TIME: 290.46 MSEC
E/A RATIO: 0.88
E/E' RATIO: 9.09 M/S
ECHO LV POSTERIOR WALL: 1.33 CM (ref 0.6–1.1)
FRACTIONAL SHORTENING: 24 % (ref 28–44)
INTERVENTRICULAR SEPTUM: 1.24 CM (ref 0.6–1.1)
LA MAJOR: 5.05 CM
LA MINOR: 4.67 CM
LA WIDTH: 4.13 CM
LEFT ATRIUM SIZE: 3.88 CM
LEFT ATRIUM VOLUME INDEX MOD: 24.8 ML/M2
LEFT ATRIUM VOLUME INDEX: 31.5 ML/M2
LEFT ATRIUM VOLUME MOD: 51.98 CM3
LEFT ATRIUM VOLUME: 66.1 CM3
LEFT INTERNAL DIMENSION IN SYSTOLE: 3.26 CM (ref 2.1–4)
LEFT VENTRICLE DIASTOLIC VOLUME INDEX: 39.88 ML/M2
LEFT VENTRICLE DIASTOLIC VOLUME: 83.74 ML
LEFT VENTRICLE MASS INDEX: 98 G/M2
LEFT VENTRICLE SYSTOLIC VOLUME INDEX: 20.4 ML/M2
LEFT VENTRICLE SYSTOLIC VOLUME: 42.9 ML
LEFT VENTRICULAR INTERNAL DIMENSION IN DIASTOLE: 4.31 CM (ref 3.5–6)
LEFT VENTRICULAR MASS: 204.94 G
LV LATERAL E/E' RATIO: 8.33 M/S
LV SEPTAL E/E' RATIO: 10 M/S
MV A" WAVE DURATION": 9.99 MSEC
MV PEAK A VEL: 0.57 M/S
MV PEAK E VEL: 0.5 M/S
PISA TR MAX VEL: 2.47 M/S
PULM VEIN S/D RATIO: 1.83
PV PEAK D VEL: 0.4 M/S
PV PEAK S VEL: 0.73 M/S
RA MAJOR: 4.93 CM
RA PRESSURE: 3 MMHG
RA WIDTH: 3.76 CM
RIGHT VENTRICULAR END-DIASTOLIC DIMENSION: 4.09 CM
RV TISSUE DOPPLER FREE WALL SYSTOLIC VELOCITY 1 (APICAL 4 CHAMBER VIEW): 20.01 CM/S
SARS-COV-2 RDRP RESP QL NAA+PROBE: NEGATIVE
SINUS: 3.22 CM
STJ: 2.88 CM
TDI LATERAL: 0.06 M/S
TDI SEPTAL: 0.05 M/S
TDI: 0.06 M/S
TR MAX PG: 24 MMHG
TRICUSPID ANNULAR PLANE SYSTOLIC EXCURSION: 2.7 CM
TV REST PULMONARY ARTERY PRESSURE: 27 MMHG

## 2021-02-10 PROCEDURE — 93306 ECHO (CUPID ONLY): ICD-10-PCS | Mod: 26,,, | Performed by: INTERNAL MEDICINE

## 2021-02-10 PROCEDURE — 94010 BREATHING CAPACITY TEST: ICD-10-PCS | Mod: S$GLB,,, | Performed by: INTERNAL MEDICINE

## 2021-02-10 PROCEDURE — 99999 PR PBB SHADOW E&M-EST. PATIENT-LVL II: CPT | Mod: PBBFAC,,, | Performed by: PSYCHOLOGIST

## 2021-02-10 PROCEDURE — 99999 PR PBB SHADOW E&M-EST. PATIENT-LVL II: ICD-10-PCS | Mod: PBBFAC,,, | Performed by: PSYCHOLOGIST

## 2021-02-10 PROCEDURE — 94729 PR C02/MEMBANE DIFFUSE CAPACITY: ICD-10-PCS | Mod: S$GLB,,, | Performed by: INTERNAL MEDICINE

## 2021-02-10 PROCEDURE — 71046 X-RAY EXAM CHEST 2 VIEWS: CPT | Mod: 26,,, | Performed by: RADIOLOGY

## 2021-02-10 PROCEDURE — 94010 BREATHING CAPACITY TEST: CPT | Mod: S$GLB,,, | Performed by: INTERNAL MEDICINE

## 2021-02-10 PROCEDURE — 71046 X-RAY EXAM CHEST 2 VIEWS: CPT | Mod: TC,FY

## 2021-02-10 PROCEDURE — 71046 XR CHEST PA AND LATERAL: ICD-10-PCS | Mod: 26,,, | Performed by: RADIOLOGY

## 2021-02-10 PROCEDURE — 90791 PSYCH DIAGNOSTIC EVALUATION: CPT | Mod: S$GLB,,, | Performed by: PSYCHOLOGIST

## 2021-02-10 PROCEDURE — 93306 TTE W/DOPPLER COMPLETE: CPT | Mod: 26,,, | Performed by: INTERNAL MEDICINE

## 2021-02-10 PROCEDURE — 94729 DIFFUSING CAPACITY: CPT | Mod: S$GLB,,, | Performed by: INTERNAL MEDICINE

## 2021-02-10 PROCEDURE — 90791 PR PSYCHIATRIC DIAGNOSTIC EVALUATION: ICD-10-PCS | Mod: S$GLB,,, | Performed by: PSYCHOLOGIST

## 2021-02-10 PROCEDURE — U0002 COVID-19 LAB TEST NON-CDC: HCPCS

## 2021-02-10 PROCEDURE — 93306 TTE W/DOPPLER COMPLETE: CPT

## 2021-02-10 PROCEDURE — 96136 PSYCL/NRPSYC TST PHY/QHP 1ST: CPT | Mod: S$GLB,,, | Performed by: PSYCHOLOGIST

## 2021-02-10 PROCEDURE — 96136 PR PSYCH/NEUROPSYCH TEST ADMIN/SCORING, 2+ TESTS, 1ST 30 MIN: ICD-10-PCS | Mod: S$GLB,,, | Performed by: PSYCHOLOGIST

## 2021-02-10 RX ORDER — LENALIDOMIDE 25 MG/1
CAPSULE ORAL
Qty: 21 CAPSULE | Refills: 0 | Status: SHIPPED | OUTPATIENT
Start: 2021-02-10 | End: 2021-03-04

## 2021-02-11 ENCOUNTER — CLINICAL SUPPORT (OUTPATIENT)
Dept: HEMATOLOGY/ONCOLOGY | Facility: CLINIC | Age: 61
End: 2021-02-11
Payer: COMMERCIAL

## 2021-02-11 ENCOUNTER — HOSPITAL ENCOUNTER (OUTPATIENT)
Dept: CARDIOLOGY | Facility: CLINIC | Age: 61
Discharge: HOME | End: 2021-02-11
Payer: COMMERCIAL

## 2021-02-11 ENCOUNTER — INFUSION (OUTPATIENT)
Dept: INFUSION THERAPY | Facility: HOSPITAL | Age: 61
End: 2021-02-11
Payer: COMMERCIAL

## 2021-02-11 DIAGNOSIS — C90.00 MULTIPLE MYELOMA, REMISSION STATUS UNSPECIFIED: ICD-10-CM

## 2021-02-11 DIAGNOSIS — E88.09 PLASMA CELL DYSCRASIA: Primary | ICD-10-CM

## 2021-02-11 DIAGNOSIS — Z76.82 STEM CELL TRANSPLANT CANDIDATE: ICD-10-CM

## 2021-02-11 DIAGNOSIS — C90.00 MULTIPLE MYELOMA, REMISSION STATUS UNSPECIFIED: Primary | ICD-10-CM

## 2021-02-11 DIAGNOSIS — Z71.3 NUTRITIONAL COUNSELING: ICD-10-CM

## 2021-02-11 LAB
DLCO ADJ PRE: 24.55 ML/(MIN*MMHG) (ref 19.24–33.1)
DLCO SINGLE BREATH LLN: 19.24
DLCO SINGLE BREATH PRE REF: 89.9 %
DLCO SINGLE BREATH REF: 26.17
DLCOC SBVA LLN: 2.74
DLCOC SBVA PRE REF: 126.7 %
DLCOC SBVA REF: 4.02
DLCOC SINGLE BREATH LLN: 19.24
DLCOC SINGLE BREATH PRE REF: 93.8 %
DLCOC SINGLE BREATH REF: 26.17
DLCOCSBVAULN: 5.31
DLCOCSINGLEBREATHULN: 33.1
DLCOSINGLEBREATHULN: 33.1
DLCOVA LLN: 2.74
DLCOVA PRE REF: 121.4 %
DLCOVA PRE: 4.88 ML/(MIN*MMHG*L) (ref 2.74–5.31)
DLCOVA REF: 4.02
DLCOVAULN: 5.31
DLVAADJ PRE: 5.1 ML/(MIN*MMHG*L) (ref 2.74–5.31)
FEF 25 75 LLN: 0.94
FEF 25 75 PRE REF: 109.7 %
FEF 25 75 REF: 2.31
FEV05 LLN: 1.35
FEV05 REF: 2.49
FEV1 FVC LLN: 66
FEV1 FVC PRE REF: 100.7 %
FEV1 FVC REF: 78
FEV1 LLN: 1.94
FEV1 PRE REF: 75.4 %
FEV1 REF: 2.71
FVC LLN: 2.55
FVC PRE REF: 74.9 %
FVC REF: 3.46
IVC PRE: 2.72 L (ref 2.55–4.36)
IVC SINGLE BREATH LLN: 2.55
IVC SINGLE BREATH PRE REF: 78.6 %
IVC SINGLE BREATH REF: 3.46
IVCSINGLEBREATHULN: 4.36
PEF LLN: 5.17
PEF PRE REF: 108.4 %
PEF REF: 7.59
PHYSICIAN COMMENT: ABNORMAL
PRE DLCO: 23.52 ML/(MIN*MMHG) (ref 19.24–33.1)
PRE FEF 25 75: 2.54 L/S (ref 0.94–3.68)
PRE FET 100: 5.98 SEC
PRE FEV05 REF: 73.3 %
PRE FEV1 FVC: 78.85 % (ref 66.37–90.29)
PRE FEV1: 2.04 L (ref 1.94–3.48)
PRE FEV5: 1.82 L (ref 1.35–3.63)
PRE FVC: 2.59 L (ref 2.55–4.36)
PRE PEF: 8.23 L/S (ref 5.17–10.02)
VA PRE: 4.82 L (ref 6.35–6.35)
VA SINGLE BREATH LLN: 6.35
VA SINGLE BREATH PRE REF: 75.8 %
VA SINGLE BREATH REF: 6.35
VASINGLEBREATHULN: 6.35

## 2021-02-11 PROCEDURE — 96401 CHEMO ANTI-NEOPL SQ/IM: CPT

## 2021-02-11 PROCEDURE — 99999 PR PBB SHADOW E&M-EST. PATIENT-LVL II: ICD-10-PCS | Mod: PBBFAC,,,

## 2021-02-11 PROCEDURE — 93005 ELECTROCARDIOGRAM TRACING: CPT | Mod: S$GLB,,, | Performed by: INTERNAL MEDICINE

## 2021-02-11 PROCEDURE — 63600175 PHARM REV CODE 636 W HCPCS: Mod: JG | Performed by: INTERNAL MEDICINE

## 2021-02-11 PROCEDURE — 93010 EKG 12-LEAD: ICD-10-PCS | Mod: S$GLB,,, | Performed by: INTERNAL MEDICINE

## 2021-02-11 PROCEDURE — 99999 PR PBB SHADOW E&M-EST. PATIENT-LVL II: CPT | Mod: PBBFAC,,,

## 2021-02-11 PROCEDURE — 93010 ELECTROCARDIOGRAM REPORT: CPT | Mod: S$GLB,,, | Performed by: INTERNAL MEDICINE

## 2021-02-11 PROCEDURE — 97802 MEDICAL NUTRITION INDIV IN: CPT | Mod: S$GLB,,, | Performed by: DIETITIAN, REGISTERED

## 2021-02-11 PROCEDURE — 93005 EKG 12-LEAD: ICD-10-PCS | Mod: S$GLB,,, | Performed by: INTERNAL MEDICINE

## 2021-02-11 PROCEDURE — 97802 PR MED NUTR THER, 1ST, INDIV, EA 15 MIN: ICD-10-PCS | Mod: S$GLB,,, | Performed by: DIETITIAN, REGISTERED

## 2021-02-11 RX ORDER — BORTEZOMIB 3.5 MG/1
1.3 INJECTION, POWDER, LYOPHILIZED, FOR SOLUTION INTRAVENOUS; SUBCUTANEOUS
Status: COMPLETED | OUTPATIENT
Start: 2021-02-11 | End: 2021-02-11

## 2021-02-11 RX ADMIN — BORTEZOMIB 2.8 MG: 3.5 INJECTION, POWDER, LYOPHILIZED, FOR SOLUTION INTRAVENOUS; SUBCUTANEOUS at 10:02

## 2021-02-15 ENCOUNTER — SOCIAL WORK (OUTPATIENT)
Dept: HEMATOLOGY/ONCOLOGY | Facility: CLINIC | Age: 61
End: 2021-02-15

## 2021-02-18 ENCOUNTER — INFUSION (OUTPATIENT)
Dept: INFUSION THERAPY | Facility: HOSPITAL | Age: 61
End: 2021-02-18
Payer: COMMERCIAL

## 2021-02-18 ENCOUNTER — OFFICE VISIT (OUTPATIENT)
Dept: HEMATOLOGY/ONCOLOGY | Facility: CLINIC | Age: 61
End: 2021-02-18
Payer: COMMERCIAL

## 2021-02-18 ENCOUNTER — LAB VISIT (OUTPATIENT)
Dept: LAB | Facility: HOSPITAL | Age: 61
End: 2021-02-18
Payer: COMMERCIAL

## 2021-02-18 VITALS
HEIGHT: 67 IN | TEMPERATURE: 99 F | DIASTOLIC BLOOD PRESSURE: 78 MMHG | BODY MASS INDEX: 34.69 KG/M2 | OXYGEN SATURATION: 98 % | WEIGHT: 221 LBS | HEART RATE: 75 BPM | SYSTOLIC BLOOD PRESSURE: 135 MMHG | RESPIRATION RATE: 17 BRPM

## 2021-02-18 VITALS
TEMPERATURE: 98 F | RESPIRATION RATE: 18 BRPM | DIASTOLIC BLOOD PRESSURE: 86 MMHG | SYSTOLIC BLOOD PRESSURE: 132 MMHG | HEART RATE: 76 BPM

## 2021-02-18 DIAGNOSIS — C79.51 MALIGNANT NEOPLASM METASTATIC TO BONE: ICD-10-CM

## 2021-02-18 DIAGNOSIS — C90.00 MULTIPLE MYELOMA, REMISSION STATUS UNSPECIFIED: Primary | ICD-10-CM

## 2021-02-18 DIAGNOSIS — Z76.82 STEM CELL TRANSPLANT CANDIDATE: ICD-10-CM

## 2021-02-18 DIAGNOSIS — E88.09 PLASMA CELL DYSCRASIA: Primary | ICD-10-CM

## 2021-02-18 DIAGNOSIS — Z86.010 HX OF ADENOMATOUS COLONIC POLYPS: ICD-10-CM

## 2021-02-18 DIAGNOSIS — C90.00 MULTIPLE MYELOMA, REMISSION STATUS UNSPECIFIED: ICD-10-CM

## 2021-02-18 LAB
ALBUMIN SERPL BCP-MCNC: 3.4 G/DL (ref 3.5–5.2)
ALP SERPL-CCNC: 66 U/L (ref 55–135)
ALT SERPL W/O P-5'-P-CCNC: 17 U/L (ref 10–44)
ANION GAP SERPL CALC-SCNC: 6 MMOL/L (ref 8–16)
ANISOCYTOSIS BLD QL SMEAR: SLIGHT
AST SERPL-CCNC: 12 U/L (ref 10–40)
BASOPHILS # BLD AUTO: 0.04 K/UL (ref 0–0.2)
BASOPHILS NFR BLD: 0.8 % (ref 0–1.9)
BILIRUB SERPL-MCNC: 0.5 MG/DL (ref 0.1–1)
BUN SERPL-MCNC: 14 MG/DL (ref 8–23)
BURR CELLS BLD QL SMEAR: ABNORMAL
CALCIUM SERPL-MCNC: 8.5 MG/DL (ref 8.7–10.5)
CHLORIDE SERPL-SCNC: 107 MMOL/L (ref 95–110)
CO2 SERPL-SCNC: 25 MMOL/L (ref 23–29)
CREAT SERPL-MCNC: 0.8 MG/DL (ref 0.5–1.4)
DIFFERENTIAL METHOD: ABNORMAL
EOSINOPHIL # BLD AUTO: 0.2 K/UL (ref 0–0.5)
EOSINOPHIL NFR BLD: 3.1 % (ref 0–8)
ERYTHROCYTE [DISTWIDTH] IN BLOOD BY AUTOMATED COUNT: 16.3 % (ref 11.5–14.5)
EST. GFR  (AFRICAN AMERICAN): >60 ML/MIN/1.73 M^2
EST. GFR  (NON AFRICAN AMERICAN): >60 ML/MIN/1.73 M^2
GLUCOSE SERPL-MCNC: 145 MG/DL (ref 70–110)
HCT VFR BLD AUTO: 38.2 % (ref 40–54)
HGB BLD-MCNC: 12.5 G/DL (ref 14–18)
HYPOCHROMIA BLD QL SMEAR: ABNORMAL
IGA SERPL-MCNC: 31 MG/DL (ref 40–350)
IGG SERPL-MCNC: 826 MG/DL (ref 650–1600)
IGM SERPL-MCNC: 36 MG/DL (ref 50–300)
IMM GRANULOCYTES # BLD AUTO: 0.11 K/UL (ref 0–0.04)
IMM GRANULOCYTES NFR BLD AUTO: 2.3 % (ref 0–0.5)
LYMPHOCYTES # BLD AUTO: 0.8 K/UL (ref 1–4.8)
LYMPHOCYTES NFR BLD: 17 % (ref 18–48)
MCH RBC QN AUTO: 30.9 PG (ref 27–31)
MCHC RBC AUTO-ENTMCNC: 32.7 G/DL (ref 32–36)
MCV RBC AUTO: 95 FL (ref 82–98)
MONOCYTES # BLD AUTO: 0.3 K/UL (ref 0.3–1)
MONOCYTES NFR BLD: 6.2 % (ref 4–15)
NEUTROPHILS # BLD AUTO: 3.4 K/UL (ref 1.8–7.7)
NEUTROPHILS NFR BLD: 70.6 % (ref 38–73)
NRBC BLD-RTO: 0 /100 WBC
OVALOCYTES BLD QL SMEAR: ABNORMAL
PLATELET # BLD AUTO: 116 K/UL (ref 150–350)
PMV BLD AUTO: 12.6 FL (ref 9.2–12.9)
POIKILOCYTOSIS BLD QL SMEAR: SLIGHT
POLYCHROMASIA BLD QL SMEAR: ABNORMAL
POTASSIUM SERPL-SCNC: 4 MMOL/L (ref 3.5–5.1)
PROT SERPL-MCNC: 6 G/DL (ref 6–8.4)
RBC # BLD AUTO: 4.04 M/UL (ref 4.6–6.2)
SODIUM SERPL-SCNC: 138 MMOL/L (ref 136–145)
WBC # BLD AUTO: 4.81 K/UL (ref 3.9–12.7)

## 2021-02-18 PROCEDURE — 99999 PR PBB SHADOW E&M-EST. PATIENT-LVL IV: ICD-10-PCS | Mod: PBBFAC,,, | Performed by: STUDENT IN AN ORGANIZED HEALTH CARE EDUCATION/TRAINING PROGRAM

## 2021-02-18 PROCEDURE — 3008F BODY MASS INDEX DOCD: CPT | Mod: CPTII,S$GLB,, | Performed by: STUDENT IN AN ORGANIZED HEALTH CARE EDUCATION/TRAINING PROGRAM

## 2021-02-18 PROCEDURE — 84165 PROTEIN E-PHORESIS SERUM: CPT | Mod: 26,,, | Performed by: PATHOLOGY

## 2021-02-18 PROCEDURE — 85025 COMPLETE CBC W/AUTO DIFF WBC: CPT

## 2021-02-18 PROCEDURE — 1126F PR PAIN SEVERITY QUANTIFIED, NO PAIN PRESENT: ICD-10-PCS | Mod: S$GLB,,, | Performed by: STUDENT IN AN ORGANIZED HEALTH CARE EDUCATION/TRAINING PROGRAM

## 2021-02-18 PROCEDURE — 86334 IMMUNOFIX E-PHORESIS SERUM: CPT | Mod: 26,,, | Performed by: PATHOLOGY

## 2021-02-18 PROCEDURE — 63600175 PHARM REV CODE 636 W HCPCS: Mod: JG | Performed by: INTERNAL MEDICINE

## 2021-02-18 PROCEDURE — 99215 OFFICE O/P EST HI 40 MIN: CPT | Mod: S$GLB,,, | Performed by: STUDENT IN AN ORGANIZED HEALTH CARE EDUCATION/TRAINING PROGRAM

## 2021-02-18 PROCEDURE — 84165 PROTEIN E-PHORESIS SERUM: CPT

## 2021-02-18 PROCEDURE — 84165 PATHOLOGIST INTERPRETATION SPE: ICD-10-PCS | Mod: 26,,, | Performed by: PATHOLOGY

## 2021-02-18 PROCEDURE — 83520 IMMUNOASSAY QUANT NOS NONAB: CPT

## 2021-02-18 PROCEDURE — 99999 PR PBB SHADOW E&M-EST. PATIENT-LVL IV: CPT | Mod: PBBFAC,,, | Performed by: STUDENT IN AN ORGANIZED HEALTH CARE EDUCATION/TRAINING PROGRAM

## 2021-02-18 PROCEDURE — 86334 IMMUNOFIX E-PHORESIS SERUM: CPT

## 2021-02-18 PROCEDURE — 96401 CHEMO ANTI-NEOPL SQ/IM: CPT

## 2021-02-18 PROCEDURE — 99215 PR OFFICE/OUTPT VISIT, EST, LEVL V, 40-54 MIN: ICD-10-PCS | Mod: S$GLB,,, | Performed by: STUDENT IN AN ORGANIZED HEALTH CARE EDUCATION/TRAINING PROGRAM

## 2021-02-18 PROCEDURE — 86334 PATHOLOGIST INTERPRETATION IFE: ICD-10-PCS | Mod: 26,,, | Performed by: PATHOLOGY

## 2021-02-18 PROCEDURE — 1126F AMNT PAIN NOTED NONE PRSNT: CPT | Mod: S$GLB,,, | Performed by: STUDENT IN AN ORGANIZED HEALTH CARE EDUCATION/TRAINING PROGRAM

## 2021-02-18 PROCEDURE — 36415 COLL VENOUS BLD VENIPUNCTURE: CPT

## 2021-02-18 PROCEDURE — 82784 ASSAY IGA/IGD/IGG/IGM EACH: CPT | Mod: 59

## 2021-02-18 PROCEDURE — 80053 COMPREHEN METABOLIC PANEL: CPT

## 2021-02-18 PROCEDURE — 3008F PR BODY MASS INDEX (BMI) DOCUMENTED: ICD-10-PCS | Mod: CPTII,S$GLB,, | Performed by: STUDENT IN AN ORGANIZED HEALTH CARE EDUCATION/TRAINING PROGRAM

## 2021-02-18 RX ORDER — HEPARIN 100 UNIT/ML
500 SYRINGE INTRAVENOUS
Status: CANCELLED | OUTPATIENT
Start: 2021-02-18

## 2021-02-18 RX ORDER — BORTEZOMIB 3.5 MG/1
1.3 INJECTION, POWDER, LYOPHILIZED, FOR SOLUTION INTRAVENOUS; SUBCUTANEOUS
Status: COMPLETED | OUTPATIENT
Start: 2021-02-18 | End: 2021-02-18

## 2021-02-18 RX ORDER — BORTEZOMIB 3.5 MG/1
1.3 INJECTION, POWDER, LYOPHILIZED, FOR SOLUTION INTRAVENOUS; SUBCUTANEOUS
Status: CANCELLED | OUTPATIENT
Start: 2021-02-18

## 2021-02-18 RX ORDER — SODIUM CHLORIDE 0.9 % (FLUSH) 0.9 %
10 SYRINGE (ML) INJECTION
Status: CANCELLED | OUTPATIENT
Start: 2021-02-18

## 2021-02-18 RX ADMIN — BORTEZOMIB 2.8 MG: 3.5 INJECTION, POWDER, LYOPHILIZED, FOR SOLUTION INTRAVENOUS; SUBCUTANEOUS at 10:02

## 2021-02-19 LAB
ALBUMIN SERPL ELPH-MCNC: 3.61 G/DL (ref 3.35–5.55)
ALPHA1 GLOB SERPL ELPH-MCNC: 0.29 G/DL (ref 0.17–0.41)
ALPHA2 GLOB SERPL ELPH-MCNC: 0.59 G/DL (ref 0.43–0.99)
B-GLOBULIN SERPL ELPH-MCNC: 0.57 G/DL (ref 0.5–1.1)
GAMMA GLOB SERPL ELPH-MCNC: 0.74 G/DL (ref 0.67–1.58)
INTERPRETATION SERPL IFE-IMP: NORMAL
KAPPA LC SER QL IA: 11.4 MG/DL (ref 0.33–1.94)
KAPPA LC/LAMBDA SER IA: 15.2 (ref 0.26–1.65)
LAMBDA LC SER QL IA: 0.75 MG/DL (ref 0.57–2.63)
PATHOLOGIST INTERPRETATION IFE: NORMAL
PATHOLOGIST INTERPRETATION SPE: NORMAL
PROT SERPL-MCNC: 5.8 G/DL (ref 6–8.4)

## 2021-02-25 ENCOUNTER — INFUSION (OUTPATIENT)
Dept: INFUSION THERAPY | Facility: HOSPITAL | Age: 61
End: 2021-02-25
Attending: INTERNAL MEDICINE
Payer: COMMERCIAL

## 2021-02-25 ENCOUNTER — OFFICE VISIT (OUTPATIENT)
Dept: HEMATOLOGY/ONCOLOGY | Facility: CLINIC | Age: 61
End: 2021-02-25
Payer: COMMERCIAL

## 2021-02-25 VITALS
HEART RATE: 68 BPM | RESPIRATION RATE: 12 BRPM | BODY MASS INDEX: 34.37 KG/M2 | TEMPERATURE: 99 F | DIASTOLIC BLOOD PRESSURE: 75 MMHG | HEIGHT: 67 IN | WEIGHT: 219 LBS | SYSTOLIC BLOOD PRESSURE: 128 MMHG | OXYGEN SATURATION: 97 %

## 2021-02-25 VITALS — HEART RATE: 63 BPM | SYSTOLIC BLOOD PRESSURE: 127 MMHG | RESPIRATION RATE: 18 BRPM | DIASTOLIC BLOOD PRESSURE: 76 MMHG

## 2021-02-25 DIAGNOSIS — Z76.82 STEM CELL TRANSPLANT CANDIDATE: ICD-10-CM

## 2021-02-25 DIAGNOSIS — E88.09 PLASMA CELL DYSCRASIA: Primary | ICD-10-CM

## 2021-02-25 DIAGNOSIS — C90.00 MULTIPLE MYELOMA, REMISSION STATUS UNSPECIFIED: Primary | ICD-10-CM

## 2021-02-25 PROCEDURE — 3008F BODY MASS INDEX DOCD: CPT | Mod: CPTII,S$GLB,, | Performed by: INTERNAL MEDICINE

## 2021-02-25 PROCEDURE — 99214 OFFICE O/P EST MOD 30 MIN: CPT | Mod: S$GLB,,, | Performed by: INTERNAL MEDICINE

## 2021-02-25 PROCEDURE — 63600175 PHARM REV CODE 636 W HCPCS: Mod: JW,JG | Performed by: INTERNAL MEDICINE

## 2021-02-25 PROCEDURE — 99999 PR PBB SHADOW E&M-EST. PATIENT-LVL IV: CPT | Mod: PBBFAC,,, | Performed by: INTERNAL MEDICINE

## 2021-02-25 PROCEDURE — 1126F PR PAIN SEVERITY QUANTIFIED, NO PAIN PRESENT: ICD-10-PCS | Mod: S$GLB,,, | Performed by: INTERNAL MEDICINE

## 2021-02-25 PROCEDURE — 99214 PR OFFICE/OUTPT VISIT, EST, LEVL IV, 30-39 MIN: ICD-10-PCS | Mod: S$GLB,,, | Performed by: INTERNAL MEDICINE

## 2021-02-25 PROCEDURE — 3008F PR BODY MASS INDEX (BMI) DOCUMENTED: ICD-10-PCS | Mod: CPTII,S$GLB,, | Performed by: INTERNAL MEDICINE

## 2021-02-25 PROCEDURE — 1126F AMNT PAIN NOTED NONE PRSNT: CPT | Mod: S$GLB,,, | Performed by: INTERNAL MEDICINE

## 2021-02-25 PROCEDURE — 99999 PR PBB SHADOW E&M-EST. PATIENT-LVL IV: ICD-10-PCS | Mod: PBBFAC,,, | Performed by: INTERNAL MEDICINE

## 2021-02-25 PROCEDURE — 96401 CHEMO ANTI-NEOPL SQ/IM: CPT

## 2021-02-25 RX ORDER — HEPARIN 100 UNIT/ML
500 SYRINGE INTRAVENOUS
Status: CANCELLED | OUTPATIENT
Start: 2021-03-11

## 2021-02-25 RX ORDER — HYDROCODONE BITARTRATE AND ACETAMINOPHEN 7.5; 325 MG/1; MG/1
1 TABLET ORAL EVERY 6 HOURS PRN
COMMUNITY
Start: 2021-02-20 | End: 2021-04-05 | Stop reason: CLARIF

## 2021-02-25 RX ORDER — BORTEZOMIB 3.5 MG/1
1.3 INJECTION, POWDER, LYOPHILIZED, FOR SOLUTION INTRAVENOUS; SUBCUTANEOUS
Status: COMPLETED | OUTPATIENT
Start: 2021-02-25 | End: 2021-02-25

## 2021-02-25 RX ORDER — BORTEZOMIB 3.5 MG/1
1.3 INJECTION, POWDER, LYOPHILIZED, FOR SOLUTION INTRAVENOUS; SUBCUTANEOUS
Status: CANCELLED | OUTPATIENT
Start: 2021-03-11

## 2021-02-25 RX ORDER — SODIUM CHLORIDE 0.9 % (FLUSH) 0.9 %
10 SYRINGE (ML) INJECTION
Status: CANCELLED | OUTPATIENT
Start: 2021-03-04

## 2021-02-25 RX ORDER — BORTEZOMIB 3.5 MG/1
1.3 INJECTION, POWDER, LYOPHILIZED, FOR SOLUTION INTRAVENOUS; SUBCUTANEOUS
Status: CANCELLED | OUTPATIENT
Start: 2021-02-25

## 2021-02-25 RX ORDER — HEPARIN 100 UNIT/ML
500 SYRINGE INTRAVENOUS
Status: CANCELLED | OUTPATIENT
Start: 2021-03-04

## 2021-02-25 RX ORDER — ONDANSETRON 4 MG/1
4 TABLET, ORALLY DISINTEGRATING ORAL EVERY 6 HOURS PRN
COMMUNITY
Start: 2021-02-20 | End: 2021-04-28

## 2021-02-25 RX ORDER — SODIUM CHLORIDE 0.9 % (FLUSH) 0.9 %
10 SYRINGE (ML) INJECTION
Status: CANCELLED | OUTPATIENT
Start: 2021-03-11

## 2021-02-25 RX ORDER — HEPARIN 100 UNIT/ML
500 SYRINGE INTRAVENOUS
Status: CANCELLED | OUTPATIENT
Start: 2021-02-25

## 2021-02-25 RX ORDER — BORTEZOMIB 3.5 MG/1
1.3 INJECTION, POWDER, LYOPHILIZED, FOR SOLUTION INTRAVENOUS; SUBCUTANEOUS
Status: CANCELLED | OUTPATIENT
Start: 2021-03-04

## 2021-02-25 RX ORDER — DIAZEPAM 5 MG/1
TABLET ORAL
COMMUNITY
Start: 2021-02-20 | End: 2021-04-05 | Stop reason: CLARIF

## 2021-02-25 RX ORDER — SODIUM CHLORIDE 0.9 % (FLUSH) 0.9 %
10 SYRINGE (ML) INJECTION
Status: CANCELLED | OUTPATIENT
Start: 2021-02-25

## 2021-02-25 RX ADMIN — BORTEZOMIB 2.8 MG: 3.5 INJECTION, POWDER, LYOPHILIZED, FOR SOLUTION INTRAVENOUS; SUBCUTANEOUS at 02:02

## 2021-03-04 ENCOUNTER — INFUSION (OUTPATIENT)
Dept: INFUSION THERAPY | Facility: HOSPITAL | Age: 61
End: 2021-03-04
Attending: INTERNAL MEDICINE
Payer: MEDICAID

## 2021-03-04 VITALS
HEART RATE: 65 BPM | TEMPERATURE: 99 F | DIASTOLIC BLOOD PRESSURE: 82 MMHG | SYSTOLIC BLOOD PRESSURE: 166 MMHG | RESPIRATION RATE: 18 BRPM

## 2021-03-04 DIAGNOSIS — E88.09 PLASMA CELL DYSCRASIA: Primary | ICD-10-CM

## 2021-03-04 PROCEDURE — 96401 CHEMO ANTI-NEOPL SQ/IM: CPT

## 2021-03-04 PROCEDURE — 63600175 PHARM REV CODE 636 W HCPCS: Mod: JG | Performed by: INTERNAL MEDICINE

## 2021-03-04 RX ORDER — BORTEZOMIB 3.5 MG/1
1.3 INJECTION, POWDER, LYOPHILIZED, FOR SOLUTION INTRAVENOUS; SUBCUTANEOUS
Status: COMPLETED | OUTPATIENT
Start: 2021-03-04 | End: 2021-03-04

## 2021-03-04 RX ADMIN — BORTEZOMIB 2.8 MG: 3.5 INJECTION, POWDER, LYOPHILIZED, FOR SOLUTION INTRAVENOUS; SUBCUTANEOUS at 09:03

## 2021-03-11 ENCOUNTER — INFUSION (OUTPATIENT)
Dept: INFUSION THERAPY | Facility: HOSPITAL | Age: 61
End: 2021-03-11
Attending: STUDENT IN AN ORGANIZED HEALTH CARE EDUCATION/TRAINING PROGRAM
Payer: MEDICAID

## 2021-03-11 VITALS — DIASTOLIC BLOOD PRESSURE: 78 MMHG | RESPIRATION RATE: 18 BRPM | SYSTOLIC BLOOD PRESSURE: 134 MMHG | HEART RATE: 66 BPM

## 2021-03-11 DIAGNOSIS — E88.09 PLASMA CELL DYSCRASIA: Primary | ICD-10-CM

## 2021-03-11 DIAGNOSIS — C90.00 MULTIPLE MYELOMA NOT HAVING ACHIEVED REMISSION: ICD-10-CM

## 2021-03-11 PROCEDURE — 63600175 PHARM REV CODE 636 W HCPCS: Mod: JG | Performed by: INTERNAL MEDICINE

## 2021-03-11 PROCEDURE — 96401 CHEMO ANTI-NEOPL SQ/IM: CPT

## 2021-03-11 RX ORDER — LENALIDOMIDE 25 MG/1
CAPSULE ORAL
Qty: 21 CAPSULE | Refills: 0 | Status: SHIPPED | OUTPATIENT
Start: 2021-03-11 | End: 2021-03-11 | Stop reason: SDUPTHER

## 2021-03-11 RX ORDER — LENALIDOMIDE 25 MG/1
CAPSULE ORAL
Qty: 21 CAPSULE | Refills: 0 | Status: SHIPPED | OUTPATIENT
Start: 2021-03-11 | End: 2021-04-06 | Stop reason: SDUPTHER

## 2021-03-11 RX ORDER — BORTEZOMIB 3.5 MG/1
1.3 INJECTION, POWDER, LYOPHILIZED, FOR SOLUTION INTRAVENOUS; SUBCUTANEOUS
Status: COMPLETED | OUTPATIENT
Start: 2021-03-11 | End: 2021-03-11

## 2021-03-11 RX ADMIN — BORTEZOMIB 2.8 MG: 3.5 INJECTION, POWDER, LYOPHILIZED, FOR SOLUTION INTRAVENOUS; SUBCUTANEOUS at 09:03

## 2021-03-18 ENCOUNTER — INFUSION (OUTPATIENT)
Dept: INFUSION THERAPY | Facility: HOSPITAL | Age: 61
End: 2021-03-18
Attending: STUDENT IN AN ORGANIZED HEALTH CARE EDUCATION/TRAINING PROGRAM
Payer: COMMERCIAL

## 2021-03-18 VITALS
RESPIRATION RATE: 18 BRPM | DIASTOLIC BLOOD PRESSURE: 90 MMHG | TEMPERATURE: 99 F | SYSTOLIC BLOOD PRESSURE: 159 MMHG | HEART RATE: 71 BPM

## 2021-03-18 DIAGNOSIS — E88.09 PLASMA CELL DYSCRASIA: Primary | ICD-10-CM

## 2021-03-18 PROCEDURE — 63600175 PHARM REV CODE 636 W HCPCS: Mod: JG | Performed by: INTERNAL MEDICINE

## 2021-03-18 PROCEDURE — 96401 CHEMO ANTI-NEOPL SQ/IM: CPT

## 2021-03-18 RX ORDER — HEPARIN 100 UNIT/ML
500 SYRINGE INTRAVENOUS
Status: DISCONTINUED | OUTPATIENT
Start: 2021-03-18 | End: 2021-03-18 | Stop reason: HOSPADM

## 2021-03-18 RX ORDER — SODIUM CHLORIDE 0.9 % (FLUSH) 0.9 %
10 SYRINGE (ML) INJECTION
Status: CANCELLED | OUTPATIENT
Start: 2021-03-25

## 2021-03-18 RX ORDER — BORTEZOMIB 3.5 MG/1
1.3 INJECTION, POWDER, LYOPHILIZED, FOR SOLUTION INTRAVENOUS; SUBCUTANEOUS
Status: CANCELLED | OUTPATIENT
Start: 2021-04-01

## 2021-03-18 RX ORDER — HEPARIN 100 UNIT/ML
500 SYRINGE INTRAVENOUS
Status: CANCELLED | OUTPATIENT
Start: 2021-03-25

## 2021-03-18 RX ORDER — BORTEZOMIB 3.5 MG/1
1.3 INJECTION, POWDER, LYOPHILIZED, FOR SOLUTION INTRAVENOUS; SUBCUTANEOUS
Status: CANCELLED | OUTPATIENT
Start: 2021-04-08

## 2021-03-18 RX ORDER — HEPARIN 100 UNIT/ML
500 SYRINGE INTRAVENOUS
Status: CANCELLED | OUTPATIENT
Start: 2021-04-08

## 2021-03-18 RX ORDER — BORTEZOMIB 3.5 MG/1
1.3 INJECTION, POWDER, LYOPHILIZED, FOR SOLUTION INTRAVENOUS; SUBCUTANEOUS
Status: CANCELLED | OUTPATIENT
Start: 2021-03-25

## 2021-03-18 RX ORDER — HEPARIN 100 UNIT/ML
500 SYRINGE INTRAVENOUS
Status: CANCELLED | OUTPATIENT
Start: 2021-04-01

## 2021-03-18 RX ORDER — HEPARIN 100 UNIT/ML
500 SYRINGE INTRAVENOUS
Status: CANCELLED | OUTPATIENT
Start: 2021-03-18

## 2021-03-18 RX ORDER — SODIUM CHLORIDE 0.9 % (FLUSH) 0.9 %
10 SYRINGE (ML) INJECTION
Status: CANCELLED | OUTPATIENT
Start: 2021-03-18

## 2021-03-18 RX ORDER — SODIUM CHLORIDE 0.9 % (FLUSH) 0.9 %
10 SYRINGE (ML) INJECTION
Status: CANCELLED | OUTPATIENT
Start: 2021-04-08

## 2021-03-18 RX ORDER — SODIUM CHLORIDE 0.9 % (FLUSH) 0.9 %
10 SYRINGE (ML) INJECTION
Status: CANCELLED | OUTPATIENT
Start: 2021-04-01

## 2021-03-18 RX ORDER — SODIUM CHLORIDE 0.9 % (FLUSH) 0.9 %
10 SYRINGE (ML) INJECTION
Status: DISCONTINUED | OUTPATIENT
Start: 2021-03-18 | End: 2021-03-18 | Stop reason: HOSPADM

## 2021-03-18 RX ORDER — BORTEZOMIB 3.5 MG/1
1.3 INJECTION, POWDER, LYOPHILIZED, FOR SOLUTION INTRAVENOUS; SUBCUTANEOUS
Status: COMPLETED | OUTPATIENT
Start: 2021-03-18 | End: 2021-03-18

## 2021-03-18 RX ORDER — BORTEZOMIB 3.5 MG/1
1.3 INJECTION, POWDER, LYOPHILIZED, FOR SOLUTION INTRAVENOUS; SUBCUTANEOUS
Status: CANCELLED | OUTPATIENT
Start: 2021-03-18

## 2021-03-18 RX ADMIN — BORTEZOMIB 2.8 MG: 3.5 INJECTION, POWDER, LYOPHILIZED, FOR SOLUTION INTRAVENOUS; SUBCUTANEOUS at 09:03

## 2021-03-25 ENCOUNTER — OFFICE VISIT (OUTPATIENT)
Dept: HEMATOLOGY/ONCOLOGY | Facility: CLINIC | Age: 61
End: 2021-03-25
Payer: MEDICAID

## 2021-03-25 ENCOUNTER — INFUSION (OUTPATIENT)
Dept: INFUSION THERAPY | Facility: HOSPITAL | Age: 61
End: 2021-03-25
Payer: MEDICAID

## 2021-03-25 VITALS
SYSTOLIC BLOOD PRESSURE: 167 MMHG | DIASTOLIC BLOOD PRESSURE: 76 MMHG | TEMPERATURE: 98 F | HEART RATE: 92 BPM | RESPIRATION RATE: 18 BRPM

## 2021-03-25 VITALS
BODY MASS INDEX: 35.57 KG/M2 | RESPIRATION RATE: 20 BRPM | SYSTOLIC BLOOD PRESSURE: 170 MMHG | OXYGEN SATURATION: 96 % | HEART RATE: 95 BPM | WEIGHT: 226.63 LBS | DIASTOLIC BLOOD PRESSURE: 88 MMHG | HEIGHT: 67 IN | TEMPERATURE: 98 F

## 2021-03-25 DIAGNOSIS — Z76.82 STEM CELL TRANSPLANT CANDIDATE: Primary | ICD-10-CM

## 2021-03-25 DIAGNOSIS — C79.49 METASTASIS OF NEOPLASM TO SPINAL CANAL: ICD-10-CM

## 2021-03-25 DIAGNOSIS — I10 ESSENTIAL HYPERTENSION: ICD-10-CM

## 2021-03-25 DIAGNOSIS — C90.00 MULTIPLE MYELOMA, REMISSION STATUS UNSPECIFIED: ICD-10-CM

## 2021-03-25 DIAGNOSIS — E88.09 PLASMA CELL DYSCRASIA: Primary | ICD-10-CM

## 2021-03-25 DIAGNOSIS — Z76.82 STEM CELL TRANSPLANT CANDIDATE: ICD-10-CM

## 2021-03-25 PROCEDURE — 96401 CHEMO ANTI-NEOPL SQ/IM: CPT

## 2021-03-25 PROCEDURE — 99215 OFFICE O/P EST HI 40 MIN: CPT | Mod: S$PBB,,, | Performed by: STUDENT IN AN ORGANIZED HEALTH CARE EDUCATION/TRAINING PROGRAM

## 2021-03-25 PROCEDURE — 99214 OFFICE O/P EST MOD 30 MIN: CPT | Mod: PBBFAC,25 | Performed by: STUDENT IN AN ORGANIZED HEALTH CARE EDUCATION/TRAINING PROGRAM

## 2021-03-25 PROCEDURE — 99999 PR PBB SHADOW E&M-EST. PATIENT-LVL IV: CPT | Mod: PBBFAC,,, | Performed by: STUDENT IN AN ORGANIZED HEALTH CARE EDUCATION/TRAINING PROGRAM

## 2021-03-25 PROCEDURE — G0463 HOSPITAL OUTPT CLINIC VISIT: HCPCS | Mod: PBBFAC,25 | Performed by: STUDENT IN AN ORGANIZED HEALTH CARE EDUCATION/TRAINING PROGRAM

## 2021-03-25 PROCEDURE — 99215 PR OFFICE/OUTPT VISIT, EST, LEVL V, 40-54 MIN: ICD-10-PCS | Mod: S$PBB,,, | Performed by: STUDENT IN AN ORGANIZED HEALTH CARE EDUCATION/TRAINING PROGRAM

## 2021-03-25 PROCEDURE — 99999 PR PBB SHADOW E&M-EST. PATIENT-LVL IV: ICD-10-PCS | Mod: PBBFAC,,, | Performed by: STUDENT IN AN ORGANIZED HEALTH CARE EDUCATION/TRAINING PROGRAM

## 2021-03-25 PROCEDURE — 63600175 PHARM REV CODE 636 W HCPCS: Mod: JG | Performed by: INTERNAL MEDICINE

## 2021-03-25 RX ORDER — BORTEZOMIB 3.5 MG/1
1.3 INJECTION, POWDER, LYOPHILIZED, FOR SOLUTION INTRAVENOUS; SUBCUTANEOUS
Status: COMPLETED | OUTPATIENT
Start: 2021-03-25 | End: 2021-03-25

## 2021-03-25 RX ORDER — ALLOPURINOL 300 MG/1
300 TABLET ORAL DAILY
Qty: 60 TABLET | Refills: 1 | Status: SHIPPED | OUTPATIENT
Start: 2021-03-25 | End: 2021-05-14 | Stop reason: ALTCHOICE

## 2021-03-25 RX ORDER — CARVEDILOL 25 MG/1
25 TABLET ORAL 2 TIMES DAILY
Qty: 60 TABLET | Refills: 2 | Status: SHIPPED | OUTPATIENT
Start: 2021-03-25 | End: 2021-07-09 | Stop reason: SDUPTHER

## 2021-03-25 RX ADMIN — BORTEZOMIB 2.8 MG: 3.5 INJECTION, POWDER, LYOPHILIZED, FOR SOLUTION INTRAVENOUS; SUBCUTANEOUS at 10:03

## 2021-04-01 ENCOUNTER — INFUSION (OUTPATIENT)
Dept: INFUSION THERAPY | Facility: HOSPITAL | Age: 61
End: 2021-04-01
Payer: MEDICAID

## 2021-04-01 VITALS
DIASTOLIC BLOOD PRESSURE: 69 MMHG | HEART RATE: 73 BPM | TEMPERATURE: 99 F | RESPIRATION RATE: 18 BRPM | SYSTOLIC BLOOD PRESSURE: 120 MMHG

## 2021-04-01 DIAGNOSIS — E61.1 IRON DEFICIENCY: ICD-10-CM

## 2021-04-01 DIAGNOSIS — E88.09 PLASMA CELL DYSCRASIA: Primary | ICD-10-CM

## 2021-04-01 DIAGNOSIS — C90.00 MULTIPLE MYELOMA NOT HAVING ACHIEVED REMISSION: ICD-10-CM

## 2021-04-01 PROCEDURE — 96401 CHEMO ANTI-NEOPL SQ/IM: CPT

## 2021-04-01 PROCEDURE — 63600175 PHARM REV CODE 636 W HCPCS: Mod: JG | Performed by: INTERNAL MEDICINE

## 2021-04-01 RX ORDER — BORTEZOMIB 3.5 MG/1
1.3 INJECTION, POWDER, LYOPHILIZED, FOR SOLUTION INTRAVENOUS; SUBCUTANEOUS
Status: COMPLETED | OUTPATIENT
Start: 2021-04-01 | End: 2021-04-01

## 2021-04-01 RX ORDER — ERGOCALCIFEROL 1.25 MG/1
CAPSULE ORAL
Qty: 4 CAPSULE | Refills: 1 | OUTPATIENT
Start: 2021-04-01

## 2021-04-01 RX ORDER — FERROUS SULFATE 325(65) MG
325 TABLET, DELAYED RELEASE (ENTERIC COATED) ORAL DAILY
Qty: 30 TABLET | Refills: 1 | Status: SHIPPED | OUTPATIENT
Start: 2021-04-01 | End: 2021-04-28

## 2021-04-01 RX ORDER — LOSARTAN POTASSIUM 50 MG/1
50 TABLET ORAL DAILY
Qty: 90 TABLET | Refills: 3 | Status: SHIPPED | OUTPATIENT
Start: 2021-04-01 | End: 2021-09-09 | Stop reason: SDUPTHER

## 2021-04-01 RX ADMIN — BORTEZOMIB 2.8 MG: 3.5 INJECTION, POWDER, LYOPHILIZED, FOR SOLUTION INTRAVENOUS; SUBCUTANEOUS at 09:04

## 2021-04-05 ENCOUNTER — HOSPITAL ENCOUNTER (EMERGENCY)
Facility: HOSPITAL | Age: 61
Discharge: HOME | End: 2021-04-05
Attending: EMERGENCY MEDICINE
Payer: MEDICAID

## 2021-04-05 VITALS
OXYGEN SATURATION: 97 % | HEART RATE: 62 BPM | RESPIRATION RATE: 18 BRPM | WEIGHT: 227 LBS | SYSTOLIC BLOOD PRESSURE: 152 MMHG | DIASTOLIC BLOOD PRESSURE: 78 MMHG | BODY MASS INDEX: 35.55 KG/M2 | TEMPERATURE: 98 F

## 2021-04-05 DIAGNOSIS — M89.8X6 BILATERAL TIBIAL PAIN: Primary | ICD-10-CM

## 2021-04-05 PROCEDURE — 99283 EMERGENCY DEPT VISIT LOW MDM: CPT | Mod: 25,ER

## 2021-04-05 RX ORDER — HYDROCODONE BITARTRATE AND ACETAMINOPHEN 10; 325 MG/1; MG/1
1 TABLET ORAL EVERY 6 HOURS PRN
Qty: 12 TABLET | Refills: 0 | Status: SHIPPED | OUTPATIENT
Start: 2021-04-05 | End: 2021-04-08

## 2021-04-06 DIAGNOSIS — C90.00 MULTIPLE MYELOMA NOT HAVING ACHIEVED REMISSION: ICD-10-CM

## 2021-04-06 RX ORDER — LENALIDOMIDE 25 MG/1
CAPSULE ORAL
Qty: 21 CAPSULE | Refills: 0 | Status: SHIPPED | OUTPATIENT
Start: 2021-04-06 | End: 2021-05-05 | Stop reason: SDUPTHER

## 2021-04-08 ENCOUNTER — INFUSION (OUTPATIENT)
Dept: INFUSION THERAPY | Facility: HOSPITAL | Age: 61
End: 2021-04-08
Payer: MEDICAID

## 2021-04-08 VITALS
SYSTOLIC BLOOD PRESSURE: 149 MMHG | DIASTOLIC BLOOD PRESSURE: 83 MMHG | RESPIRATION RATE: 18 BRPM | HEART RATE: 62 BPM | TEMPERATURE: 98 F

## 2021-04-08 DIAGNOSIS — C90.00 MULTIPLE MYELOMA NOT HAVING ACHIEVED REMISSION: ICD-10-CM

## 2021-04-08 DIAGNOSIS — R06.09 DOE (DYSPNEA ON EXERTION): ICD-10-CM

## 2021-04-08 DIAGNOSIS — E88.09 PLASMA CELL DYSCRASIA: ICD-10-CM

## 2021-04-08 DIAGNOSIS — Z76.82 STEM CELL TRANSPLANT CANDIDATE: Primary | ICD-10-CM

## 2021-04-08 DIAGNOSIS — C90.00 MULTIPLE MYELOMA, REMISSION STATUS UNSPECIFIED: ICD-10-CM

## 2021-04-08 DIAGNOSIS — E88.09 PLASMA CELL DYSCRASIA: Primary | ICD-10-CM

## 2021-04-08 PROCEDURE — 96401 CHEMO ANTI-NEOPL SQ/IM: CPT

## 2021-04-08 PROCEDURE — 63600175 PHARM REV CODE 636 W HCPCS: Mod: JW,JG | Performed by: INTERNAL MEDICINE

## 2021-04-08 RX ORDER — LEVOFLOXACIN 500 MG/1
500 TABLET, FILM COATED ORAL DAILY
Qty: 90 TABLET | Refills: 3 | Status: ON HOLD | OUTPATIENT
Start: 2021-04-08 | End: 2021-05-31 | Stop reason: HOSPADM

## 2021-04-08 RX ORDER — BORTEZOMIB 3.5 MG/1
1.3 INJECTION, POWDER, LYOPHILIZED, FOR SOLUTION INTRAVENOUS; SUBCUTANEOUS
Status: COMPLETED | OUTPATIENT
Start: 2021-04-08 | End: 2021-04-08

## 2021-04-08 RX ADMIN — BORTEZOMIB 2.8 MG: 3.5 INJECTION, POWDER, LYOPHILIZED, FOR SOLUTION INTRAVENOUS; SUBCUTANEOUS at 09:04

## 2021-04-14 ENCOUNTER — TELEPHONE (OUTPATIENT)
Dept: HEMATOLOGY/ONCOLOGY | Facility: CLINIC | Age: 61
End: 2021-04-14

## 2021-04-14 ENCOUNTER — INFUSION (OUTPATIENT)
Dept: INFUSION THERAPY | Facility: HOSPITAL | Age: 61
End: 2021-04-14
Attending: INTERNAL MEDICINE
Payer: MEDICAID

## 2021-04-14 ENCOUNTER — PROCEDURE VISIT (OUTPATIENT)
Dept: HEMATOLOGY/ONCOLOGY | Facility: CLINIC | Age: 61
End: 2021-04-14
Payer: MEDICAID

## 2021-04-14 ENCOUNTER — HOSPITAL ENCOUNTER (OUTPATIENT)
Dept: RADIOLOGY | Facility: HOSPITAL | Age: 61
Discharge: HOME | End: 2021-04-14
Attending: INTERNAL MEDICINE
Payer: MEDICAID

## 2021-04-14 VITALS
OXYGEN SATURATION: 97 % | DIASTOLIC BLOOD PRESSURE: 71 MMHG | TEMPERATURE: 98 F | HEART RATE: 66 BPM | SYSTOLIC BLOOD PRESSURE: 128 MMHG | RESPIRATION RATE: 16 BRPM

## 2021-04-14 DIAGNOSIS — C90.00 MULTIPLE MYELOMA NOT HAVING ACHIEVED REMISSION: ICD-10-CM

## 2021-04-14 DIAGNOSIS — C90.00 MULTIPLE MYELOMA, REMISSION STATUS UNSPECIFIED: ICD-10-CM

## 2021-04-14 DIAGNOSIS — E88.09 PLASMA CELL DYSCRASIA: Primary | ICD-10-CM

## 2021-04-14 DIAGNOSIS — Z76.82 STEM CELL TRANSPLANT CANDIDATE: ICD-10-CM

## 2021-04-14 LAB — POCT GLUCOSE: 129 MG/DL (ref 70–110)

## 2021-04-14 PROCEDURE — 96401 CHEMO ANTI-NEOPL SQ/IM: CPT

## 2021-04-14 PROCEDURE — 88342 IMHCHEM/IMCYTCHM 1ST ANTB: CPT | Mod: 26,59,, | Performed by: PATHOLOGY

## 2021-04-14 PROCEDURE — 78815 NM PET CT ROUTINE: ICD-10-PCS | Mod: 26,PS,, | Performed by: RADIOLOGY

## 2021-04-14 PROCEDURE — 88185 FLOWCYTOMETRY/TC ADD-ON: CPT | Mod: 59 | Performed by: PATHOLOGY

## 2021-04-14 PROCEDURE — 88311 DECALCIFY TISSUE: CPT | Mod: 26,,, | Performed by: PATHOLOGY

## 2021-04-14 PROCEDURE — 88313 SPECIAL STAINS GROUP 2: CPT | Mod: 26,,, | Performed by: PATHOLOGY

## 2021-04-14 PROCEDURE — 88341 IMHCHEM/IMCYTCHM EA ADD ANTB: CPT | Mod: 59 | Performed by: PATHOLOGY

## 2021-04-14 PROCEDURE — 38222 DX BONE MARROW BX & ASPIR: CPT | Mod: PBBFAC | Performed by: NURSE PRACTITIONER

## 2021-04-14 PROCEDURE — 88271 CYTOGENETICS DNA PROBE: CPT | Mod: 59 | Performed by: STUDENT IN AN ORGANIZED HEALTH CARE EDUCATION/TRAINING PROGRAM

## 2021-04-14 PROCEDURE — 88364 CHG INSITU HYBRIDIZATION (FISH: ICD-10-PCS | Mod: 26,,, | Performed by: PATHOLOGY

## 2021-04-14 PROCEDURE — 38222 DX BONE MARROW BX & ASPIR: CPT | Mod: S$PBB,LT,, | Performed by: NURSE PRACTITIONER

## 2021-04-14 PROCEDURE — 88237 TISSUE CULTURE BONE MARROW: CPT | Performed by: STUDENT IN AN ORGANIZED HEALTH CARE EDUCATION/TRAINING PROGRAM

## 2021-04-14 PROCEDURE — 88313 SPECIAL STAINS GROUP 2: CPT | Performed by: PATHOLOGY

## 2021-04-14 PROCEDURE — 88341 IMHCHEM/IMCYTCHM EA ADD ANTB: CPT | Mod: 26,59,, | Performed by: PATHOLOGY

## 2021-04-14 PROCEDURE — 88365 INSITU HYBRIDIZATION (FISH): CPT | Performed by: PATHOLOGY

## 2021-04-14 PROCEDURE — 88189 FLOWCYTOMETRY/READ 16 & >: CPT | Mod: ,,, | Performed by: PATHOLOGY

## 2021-04-14 PROCEDURE — 88342 IMHCHEM/IMCYTCHM 1ST ANTB: CPT | Mod: 59 | Performed by: PATHOLOGY

## 2021-04-14 PROCEDURE — 88364 INSITU HYBRIDIZATION (FISH): CPT | Mod: 26,,, | Performed by: PATHOLOGY

## 2021-04-14 PROCEDURE — 38221 DX BONE MARROW BIOPSIES: CPT | Mod: PBBFAC | Performed by: NURSE PRACTITIONER

## 2021-04-14 PROCEDURE — 85097 PR  BONE MARROW,SMEAR INTERPRETATION: ICD-10-PCS | Mod: ,,, | Performed by: PATHOLOGY

## 2021-04-14 PROCEDURE — 78815 PET IMAGE W/CT SKULL-THIGH: CPT | Mod: 26,PS,, | Performed by: RADIOLOGY

## 2021-04-14 PROCEDURE — 88313 PR  SPECIAL STAINS,GROUP II: ICD-10-PCS | Mod: 26,,, | Performed by: PATHOLOGY

## 2021-04-14 PROCEDURE — 78815 PET IMAGE W/CT SKULL-THIGH: CPT | Mod: TC

## 2021-04-14 PROCEDURE — 88311 DECALCIFY TISSUE: CPT | Performed by: PATHOLOGY

## 2021-04-14 PROCEDURE — 88364 INSITU HYBRIDIZATION (FISH): CPT | Performed by: PATHOLOGY

## 2021-04-14 PROCEDURE — 88341 PR IHC OR ICC EACH ADD'L SINGLE ANTIBODY  STAINPR: ICD-10-PCS | Mod: 26,59,, | Performed by: PATHOLOGY

## 2021-04-14 PROCEDURE — 88365 INSITU HYBRIDIZATION (FISH): CPT | Mod: 26,,, | Performed by: PATHOLOGY

## 2021-04-14 PROCEDURE — 88305 TISSUE EXAM BY PATHOLOGIST: CPT | Mod: 26,,, | Performed by: PATHOLOGY

## 2021-04-14 PROCEDURE — 38222 PR BONE MARROW BIOPSY(IES) W/ASPIRATION(S); DIAGNOSTIC: ICD-10-PCS | Mod: S$PBB,LT,, | Performed by: NURSE PRACTITIONER

## 2021-04-14 PROCEDURE — 88184 FLOWCYTOMETRY/ TC 1 MARKER: CPT | Performed by: PATHOLOGY

## 2021-04-14 PROCEDURE — 88365 PR  TISSUE HYBRIDIZATION: ICD-10-PCS | Mod: 26,,, | Performed by: PATHOLOGY

## 2021-04-14 PROCEDURE — 88311 PR  DECALCIFY TISSUE: ICD-10-PCS | Mod: 26,,, | Performed by: PATHOLOGY

## 2021-04-14 PROCEDURE — 63600175 PHARM REV CODE 636 W HCPCS: Mod: JG | Performed by: INTERNAL MEDICINE

## 2021-04-14 PROCEDURE — 88342 CHG IMMUNOCYTOCHEMISTRY: ICD-10-PCS | Mod: 26,59,, | Performed by: PATHOLOGY

## 2021-04-14 PROCEDURE — 85097 BONE MARROW INTERPRETATION: CPT | Mod: ,,, | Performed by: PATHOLOGY

## 2021-04-14 PROCEDURE — 88189 PR  FLOWCYTOMETRY/READ, 16 & > MARKERS: ICD-10-PCS | Mod: ,,, | Performed by: PATHOLOGY

## 2021-04-14 PROCEDURE — 88305 TISSUE EXAM BY PATHOLOGIST: CPT | Mod: 59 | Performed by: PATHOLOGY

## 2021-04-14 PROCEDURE — 88305 TISSUE EXAM BY PATHOLOGIST: ICD-10-PCS | Mod: 26,,, | Performed by: PATHOLOGY

## 2021-04-14 RX ORDER — BORTEZOMIB 3.5 MG/1
1.3 INJECTION, POWDER, LYOPHILIZED, FOR SOLUTION INTRAVENOUS; SUBCUTANEOUS
Status: CANCELLED | OUTPATIENT
Start: 2021-04-28

## 2021-04-14 RX ORDER — SODIUM CHLORIDE 0.9 % (FLUSH) 0.9 %
10 SYRINGE (ML) INJECTION
Status: CANCELLED | OUTPATIENT
Start: 2021-05-05

## 2021-04-14 RX ORDER — SODIUM CHLORIDE 0.9 % (FLUSH) 0.9 %
10 SYRINGE (ML) INJECTION
Status: CANCELLED | OUTPATIENT
Start: 2021-04-21

## 2021-04-14 RX ORDER — SODIUM CHLORIDE 0.9 % (FLUSH) 0.9 %
10 SYRINGE (ML) INJECTION
Status: CANCELLED | OUTPATIENT
Start: 2021-04-14

## 2021-04-14 RX ORDER — LIDOCAINE HYDROCHLORIDE 20 MG/ML
5 INJECTION, SOLUTION INFILTRATION; PERINEURAL ONCE
Status: COMPLETED | OUTPATIENT
Start: 2021-04-14 | End: 2021-04-14

## 2021-04-14 RX ORDER — SODIUM CHLORIDE 0.9 % (FLUSH) 0.9 %
10 SYRINGE (ML) INJECTION
Status: CANCELLED | OUTPATIENT
Start: 2021-04-28

## 2021-04-14 RX ORDER — HEPARIN 100 UNIT/ML
500 SYRINGE INTRAVENOUS
Status: CANCELLED | OUTPATIENT
Start: 2021-04-28

## 2021-04-14 RX ORDER — HEPARIN 100 UNIT/ML
500 SYRINGE INTRAVENOUS
Status: CANCELLED | OUTPATIENT
Start: 2021-05-05

## 2021-04-14 RX ORDER — HEPARIN 100 UNIT/ML
500 SYRINGE INTRAVENOUS
Status: CANCELLED | OUTPATIENT
Start: 2021-04-14

## 2021-04-14 RX ORDER — BORTEZOMIB 3.5 MG/1
1.3 INJECTION, POWDER, LYOPHILIZED, FOR SOLUTION INTRAVENOUS; SUBCUTANEOUS
Status: COMPLETED | OUTPATIENT
Start: 2021-04-14 | End: 2021-04-14

## 2021-04-14 RX ORDER — BORTEZOMIB 3.5 MG/1
1.3 INJECTION, POWDER, LYOPHILIZED, FOR SOLUTION INTRAVENOUS; SUBCUTANEOUS
Status: CANCELLED | OUTPATIENT
Start: 2021-05-05

## 2021-04-14 RX ORDER — HEPARIN 100 UNIT/ML
500 SYRINGE INTRAVENOUS
Status: CANCELLED | OUTPATIENT
Start: 2021-04-21

## 2021-04-14 RX ORDER — BORTEZOMIB 3.5 MG/1
1.3 INJECTION, POWDER, LYOPHILIZED, FOR SOLUTION INTRAVENOUS; SUBCUTANEOUS
Status: CANCELLED | OUTPATIENT
Start: 2021-04-14

## 2021-04-14 RX ORDER — BORTEZOMIB 3.5 MG/1
1.3 INJECTION, POWDER, LYOPHILIZED, FOR SOLUTION INTRAVENOUS; SUBCUTANEOUS
Status: CANCELLED | OUTPATIENT
Start: 2021-04-21

## 2021-04-14 RX ADMIN — BORTEZOMIB 2.8 MG: 3.5 INJECTION, POWDER, LYOPHILIZED, FOR SOLUTION INTRAVENOUS; SUBCUTANEOUS at 11:04

## 2021-04-14 RX ADMIN — LIDOCAINE HYDROCHLORIDE 5 ML: 20 INJECTION, SOLUTION INFILTRATION; PERINEURAL at 08:04

## 2021-04-20 LAB
BODY SITE - BONE MARROW: NORMAL
CLINICAL DIAGNOSIS - BONE MARROW: NORMAL
COMMENT: NORMAL
FINAL PATHOLOGIC DIAGNOSIS: NORMAL
FLOW CYTOMETRY ANTIBODIES ANALYZED - BONE MARROW: NORMAL
FLOW CYTOMETRY COMMENT - BONE MARROW: NORMAL
FLOW CYTOMETRY INTERPRETATION - BONE MARROW: NORMAL
GROSS: NORMAL
Lab: NORMAL
MICROSCOPIC EXAM: NORMAL

## 2021-04-22 ENCOUNTER — OFFICE VISIT (OUTPATIENT)
Dept: HEMATOLOGY/ONCOLOGY | Facility: CLINIC | Age: 61
End: 2021-04-22
Payer: MEDICAID

## 2021-04-22 ENCOUNTER — INFUSION (OUTPATIENT)
Dept: INFUSION THERAPY | Facility: HOSPITAL | Age: 61
End: 2021-04-22
Payer: MEDICAID

## 2021-04-22 VITALS
RESPIRATION RATE: 17 BRPM | BODY MASS INDEX: 36.36 KG/M2 | TEMPERATURE: 98 F | OXYGEN SATURATION: 97 % | HEART RATE: 66 BPM | HEIGHT: 67 IN | WEIGHT: 231.69 LBS | SYSTOLIC BLOOD PRESSURE: 175 MMHG | DIASTOLIC BLOOD PRESSURE: 85 MMHG

## 2021-04-22 DIAGNOSIS — Z79.899 IMMUNODEFICIENCY SECONDARY TO CHEMOTHERAPY: ICD-10-CM

## 2021-04-22 DIAGNOSIS — D84.81 IMMUNODEFICIENCY SECONDARY TO NEOPLASM: ICD-10-CM

## 2021-04-22 DIAGNOSIS — Z76.82 STEM CELL TRANSPLANT CANDIDATE: ICD-10-CM

## 2021-04-22 DIAGNOSIS — D84.821 IMMUNODEFICIENCY SECONDARY TO CHEMOTHERAPY: ICD-10-CM

## 2021-04-22 DIAGNOSIS — E88.09 PLASMA CELL DYSCRASIA: Primary | ICD-10-CM

## 2021-04-22 DIAGNOSIS — C79.49 METASTASIS OF NEOPLASM TO SPINAL CANAL: ICD-10-CM

## 2021-04-22 DIAGNOSIS — C79.51 MALIGNANT NEOPLASM METASTATIC TO BONE: ICD-10-CM

## 2021-04-22 DIAGNOSIS — C90.00 MULTIPLE MYELOMA, REMISSION STATUS UNSPECIFIED: Primary | ICD-10-CM

## 2021-04-22 DIAGNOSIS — T45.1X5A IMMUNODEFICIENCY SECONDARY TO CHEMOTHERAPY: ICD-10-CM

## 2021-04-22 DIAGNOSIS — I10 ESSENTIAL HYPERTENSION: ICD-10-CM

## 2021-04-22 DIAGNOSIS — D49.9 IMMUNODEFICIENCY SECONDARY TO NEOPLASM: ICD-10-CM

## 2021-04-22 PROBLEM — Z79.69 IMMUNODEFICIENCY SECONDARY TO CHEMOTHERAPY: Status: ACTIVE | Noted: 2021-04-22

## 2021-04-22 PROCEDURE — 99215 OFFICE O/P EST HI 40 MIN: CPT | Mod: S$PBB,,, | Performed by: STUDENT IN AN ORGANIZED HEALTH CARE EDUCATION/TRAINING PROGRAM

## 2021-04-22 PROCEDURE — 96401 CHEMO ANTI-NEOPL SQ/IM: CPT

## 2021-04-22 PROCEDURE — 63600175 PHARM REV CODE 636 W HCPCS: Mod: JG | Performed by: INTERNAL MEDICINE

## 2021-04-22 PROCEDURE — 99999 PR PBB SHADOW E&M-EST. PATIENT-LVL IV: CPT | Mod: PBBFAC,,, | Performed by: STUDENT IN AN ORGANIZED HEALTH CARE EDUCATION/TRAINING PROGRAM

## 2021-04-22 PROCEDURE — 99999 PR PBB SHADOW E&M-EST. PATIENT-LVL IV: ICD-10-PCS | Mod: PBBFAC,,, | Performed by: STUDENT IN AN ORGANIZED HEALTH CARE EDUCATION/TRAINING PROGRAM

## 2021-04-22 PROCEDURE — 99215 PR OFFICE/OUTPT VISIT, EST, LEVL V, 40-54 MIN: ICD-10-PCS | Mod: S$PBB,,, | Performed by: STUDENT IN AN ORGANIZED HEALTH CARE EDUCATION/TRAINING PROGRAM

## 2021-04-22 PROCEDURE — 99214 OFFICE O/P EST MOD 30 MIN: CPT | Mod: PBBFAC,25 | Performed by: STUDENT IN AN ORGANIZED HEALTH CARE EDUCATION/TRAINING PROGRAM

## 2021-04-22 RX ORDER — BORTEZOMIB 3.5 MG/1
1.3 INJECTION, POWDER, LYOPHILIZED, FOR SOLUTION INTRAVENOUS; SUBCUTANEOUS
Status: COMPLETED | OUTPATIENT
Start: 2021-04-22 | End: 2021-04-22

## 2021-04-22 RX ORDER — DEXAMETHASONE 4 MG/1
TABLET ORAL
Status: ON HOLD | COMMUNITY
Start: 2021-04-15 | End: 2021-05-15

## 2021-04-22 RX ORDER — SULFAMETHOXAZOLE AND TRIMETHOPRIM 400; 80 MG/1; MG/1
1 TABLET ORAL DAILY
COMMUNITY
Start: 2021-04-15 | End: 2021-05-14 | Stop reason: ALTCHOICE

## 2021-04-22 RX ADMIN — BORTEZOMIB 2.8 MG: 3.5 INJECTION, POWDER, LYOPHILIZED, FOR SOLUTION INTRAVENOUS; SUBCUTANEOUS at 09:04

## 2021-04-28 ENCOUNTER — CLINICAL SUPPORT (OUTPATIENT)
Dept: HEMATOLOGY/ONCOLOGY | Facility: CLINIC | Age: 61
End: 2021-04-28
Payer: MEDICAID

## 2021-04-28 ENCOUNTER — HOSPITAL ENCOUNTER (OUTPATIENT)
Dept: PULMONOLOGY | Facility: CLINIC | Age: 61
Discharge: HOME | End: 2021-04-28
Payer: MEDICAID

## 2021-04-28 ENCOUNTER — HOSPITAL ENCOUNTER (OUTPATIENT)
Dept: CARDIOLOGY | Facility: HOSPITAL | Age: 61
Discharge: HOME | End: 2021-04-28
Attending: INTERNAL MEDICINE
Payer: MEDICAID

## 2021-04-28 ENCOUNTER — HOSPITAL ENCOUNTER (OUTPATIENT)
Dept: CARDIOLOGY | Facility: CLINIC | Age: 61
Discharge: HOME | End: 2021-04-28
Payer: MEDICAID

## 2021-04-28 ENCOUNTER — HOSPITAL ENCOUNTER (OUTPATIENT)
Dept: RADIOLOGY | Facility: HOSPITAL | Age: 61
Discharge: HOME | End: 2021-04-28
Attending: INTERNAL MEDICINE
Payer: MEDICAID

## 2021-04-28 VITALS
SYSTOLIC BLOOD PRESSURE: 150 MMHG | DIASTOLIC BLOOD PRESSURE: 80 MMHG | HEART RATE: 49 BPM | BODY MASS INDEX: 36.41 KG/M2 | WEIGHT: 232 LBS | HEIGHT: 67 IN

## 2021-04-28 DIAGNOSIS — C90.00 MULTIPLE MYELOMA, REMISSION STATUS UNSPECIFIED: ICD-10-CM

## 2021-04-28 DIAGNOSIS — Z76.82 STEM CELL TRANSPLANT CANDIDATE: ICD-10-CM

## 2021-04-28 DIAGNOSIS — Z76.82 STEM CELL TRANSPLANT CANDIDATE: Primary | ICD-10-CM

## 2021-04-28 DIAGNOSIS — R06.09 DOE (DYSPNEA ON EXERTION): ICD-10-CM

## 2021-04-28 LAB
ASCENDING AORTA: 3.75 CM
AV INDEX (PROSTH): 0.78
AV MEAN GRADIENT: 8 MMHG
AV PEAK GRADIENT: 17 MMHG
AV VALVE AREA: 3.23 CM2
AV VELOCITY RATIO: 0.66
BSA FOR ECHO PROCEDURE: 2.23 M2
CV ECHO LV RWT: 0.37 CM
DLCO ADJ PRE: 26.79 ML/(MIN*MMHG) (ref 19.24–33.1)
DLCO SINGLE BREATH LLN: 19.24
DLCO SINGLE BREATH PRE REF: 94.3 %
DLCO SINGLE BREATH REF: 26.17
DLCOC SBVA LLN: 2.74
DLCOC SBVA PRE REF: 116.4 %
DLCOC SBVA REF: 4.02
DLCOC SINGLE BREATH LLN: 19.24
DLCOC SINGLE BREATH PRE REF: 102.4 %
DLCOC SINGLE BREATH REF: 26.17
DLCOCSBVAULN: 5.31
DLCOCSINGLEBREATHULN: 33.1
DLCOSINGLEBREATHULN: 33.1
DLCOVA LLN: 2.74
DLCOVA PRE REF: 107.2 %
DLCOVA PRE: 4.32 ML/(MIN*MMHG*L) (ref 2.74–5.31)
DLCOVA REF: 4.02
DLCOVAULN: 5.31
DLVAADJ PRE: 4.68 ML/(MIN*MMHG*L) (ref 2.74–5.31)
DOP CALC AO PEAK VEL: 2.09 M/S
DOP CALC AO VTI: 38.52 CM
DOP CALC LVOT AREA: 4.1 CM2
DOP CALC LVOT DIAMETER: 2.29 CM
DOP CALC LVOT PEAK VEL: 1.38 M/S
DOP CALC LVOT STROKE VOLUME: 124.36 CM3
DOP CALCLVOT PEAK VEL VTI: 30.21 CM
E WAVE DECELERATION TIME: 191.63 MSEC
E/A RATIO: 1.21
E/E' RATIO: 13.64 M/S
ECHO LV POSTERIOR WALL: 1.03 CM (ref 0.6–1.1)
EJECTION FRACTION: 65 %
FEF 25 75 LLN: 0.94
FEF 25 75 PRE REF: 66.9 %
FEF 25 75 REF: 2.3
FEV05 LLN: 1.35
FEV05 REF: 2.49
FEV1 FVC LLN: 66
FEV1 FVC PRE REF: 94.5 %
FEV1 FVC REF: 78
FEV1 LLN: 1.94
FEV1 PRE REF: 77.1 %
FEV1 REF: 2.7
FRACTIONAL SHORTENING: 33 % (ref 28–44)
FVC LLN: 2.55
FVC PRE REF: 81.6 %
FVC REF: 3.45
INTERVENTRICULAR SEPTUM: 1.08 CM (ref 0.6–1.1)
IVC PRE: 3.32 L (ref 2.55–4.35)
IVC SINGLE BREATH LLN: 2.55
IVC SINGLE BREATH PRE REF: 96.1 %
IVC SINGLE BREATH REF: 3.45
IVCSINGLEBREATHULN: 4.35
LA MAJOR: 5 CM
LA MINOR: 5 CM
LA WIDTH: 4.2 CM
LEFT ATRIUM SIZE: 4.41 CM
LEFT ATRIUM VOLUME INDEX MOD: 38 ML/M2
LEFT ATRIUM VOLUME INDEX: 36.6 ML/M2
LEFT ATRIUM VOLUME MOD: 81.71 CM3
LEFT ATRIUM VOLUME: 78.72 CM3
LEFT INTERNAL DIMENSION IN SYSTOLE: 3.69 CM (ref 2.1–4)
LEFT VENTRICLE DIASTOLIC VOLUME INDEX: 68.55 ML/M2
LEFT VENTRICLE DIASTOLIC VOLUME: 147.39 ML
LEFT VENTRICLE MASS INDEX: 106 G/M2
LEFT VENTRICLE SYSTOLIC VOLUME INDEX: 26.9 ML/M2
LEFT VENTRICLE SYSTOLIC VOLUME: 57.82 ML
LEFT VENTRICULAR INTERNAL DIMENSION IN DIASTOLE: 5.5 CM (ref 3.5–6)
LEFT VENTRICULAR MASS: 228.85 G
LV LATERAL E/E' RATIO: 15 M/S
LV SEPTAL E/E' RATIO: 12.5 M/S
MV A" WAVE DURATION": 11.42 MSEC
MV PEAK A VEL: 0.62 M/S
MV PEAK E VEL: 0.75 M/S
MV STENOSIS PRESSURE HALF TIME: 55.57 MS
MV VALVE AREA P 1/2 METHOD: 3.96 CM2
PEF LLN: 5.17
PEF PRE REF: 94.2 %
PEF REF: 7.59
PHYSICIAN COMMENT: ABNORMAL
PISA TR MAX VEL: 2.92 M/S
PRE DLCO: 24.69 ML/(MIN*MMHG) (ref 19.24–33.1)
PRE FEF 25 75: 1.54 L/S (ref 0.94–3.67)
PRE FET 100: 6.17 SEC
PRE FEV05 REF: 67.6 %
PRE FEV1 FVC: 73.99 % (ref 66.31–90.28)
PRE FEV1: 2.08 L (ref 1.94–3.47)
PRE FEV5: 1.68 L (ref 1.35–3.63)
PRE FVC: 2.82 L (ref 2.55–4.35)
PRE PEF: 7.15 L/S (ref 5.17–10.02)
PULM VEIN S/D RATIO: 1.22
PV PEAK D VEL: 0.58 M/S
PV PEAK S VEL: 0.71 M/S
QEF: 66 %
RA MAJOR: 4.55 CM
RA PRESSURE: 3 MMHG
RA WIDTH: 2.64 CM
RIGHT VENTRICULAR END-DIASTOLIC DIMENSION: 2.75 CM
SARS-COV-2 RDRP RESP QL NAA+PROBE: NEGATIVE
SINUS: 3.31 CM
STJ: 3.11 CM
TDI LATERAL: 0.05 M/S
TDI SEPTAL: 0.06 M/S
TDI: 0.06 M/S
TR MAX PG: 34 MMHG
TRICUSPID ANNULAR PLANE SYSTOLIC EXCURSION: 2.83 CM
TV REST PULMONARY ARTERY PRESSURE: 37 MMHG
VA PRE: 5.72 L (ref 6.35–6.35)
VA SINGLE BREATH LLN: 6.35
VA SINGLE BREATH PRE REF: 90 %
VA SINGLE BREATH REF: 6.35
VASINGLEBREATHULN: 6.35

## 2021-04-28 PROCEDURE — 94010 BREATHING CAPACITY TEST: CPT | Mod: PBBFAC | Performed by: INTERNAL MEDICINE

## 2021-04-28 PROCEDURE — 93010 ELECTROCARDIOGRAM REPORT: CPT | Mod: S$PBB,,, | Performed by: INTERNAL MEDICINE

## 2021-04-28 PROCEDURE — 93306 TTE W/DOPPLER COMPLETE: CPT | Mod: 26,,, | Performed by: INTERNAL MEDICINE

## 2021-04-28 PROCEDURE — 93306 ECHO (CUPID ONLY): ICD-10-PCS | Mod: 26,,, | Performed by: INTERNAL MEDICINE

## 2021-04-28 PROCEDURE — 71046 XR CHEST PA AND LATERAL: ICD-10-PCS | Mod: 26,,, | Performed by: RADIOLOGY

## 2021-04-28 PROCEDURE — 93356 MYOCRD STRAIN IMG SPCKL TRCK: CPT | Mod: ,,, | Performed by: INTERNAL MEDICINE

## 2021-04-28 PROCEDURE — 94729 DIFFUSING CAPACITY: CPT | Mod: PBBFAC | Performed by: INTERNAL MEDICINE

## 2021-04-28 PROCEDURE — 93010 EKG 12-LEAD: ICD-10-PCS | Mod: S$PBB,,, | Performed by: INTERNAL MEDICINE

## 2021-04-28 PROCEDURE — 71046 X-RAY EXAM CHEST 2 VIEWS: CPT | Mod: 26,,, | Performed by: RADIOLOGY

## 2021-04-28 PROCEDURE — 93306 TTE W/DOPPLER COMPLETE: CPT

## 2021-04-28 PROCEDURE — U0002 COVID-19 LAB TEST NON-CDC: HCPCS | Performed by: INTERNAL MEDICINE

## 2021-04-28 PROCEDURE — 94010 BREATHING CAPACITY TEST: ICD-10-PCS | Mod: 26,S$PBB,, | Performed by: INTERNAL MEDICINE

## 2021-04-28 PROCEDURE — 71046 X-RAY EXAM CHEST 2 VIEWS: CPT | Mod: TC,FY

## 2021-04-28 PROCEDURE — 93356 ECHO (CUPID ONLY): ICD-10-PCS | Mod: ,,, | Performed by: INTERNAL MEDICINE

## 2021-04-28 PROCEDURE — 94729 DIFFUSING CAPACITY: CPT | Mod: 26,S$PBB,, | Performed by: INTERNAL MEDICINE

## 2021-04-28 PROCEDURE — 93005 ELECTROCARDIOGRAM TRACING: CPT | Mod: PBBFAC | Performed by: INTERNAL MEDICINE

## 2021-04-28 PROCEDURE — 94729 PR C02/MEMBANE DIFFUSE CAPACITY: ICD-10-PCS | Mod: 26,S$PBB,, | Performed by: INTERNAL MEDICINE

## 2021-04-28 PROCEDURE — 94010 BREATHING CAPACITY TEST: CPT | Mod: 26,S$PBB,, | Performed by: INTERNAL MEDICINE

## 2021-04-29 DIAGNOSIS — Z76.82 STEM CELL TRANSPLANT CANDIDATE: Primary | ICD-10-CM

## 2021-04-29 DIAGNOSIS — C90.00 MULTIPLE MYELOMA, REMISSION STATUS UNSPECIFIED: ICD-10-CM

## 2021-04-29 RX ORDER — ERGOCALCIFEROL 1.25 MG/1
CAPSULE ORAL
Qty: 4 CAPSULE | Refills: 1 | OUTPATIENT
Start: 2021-04-29

## 2021-04-30 DIAGNOSIS — Z76.82 STEM CELL TRANSPLANT CANDIDATE: Primary | ICD-10-CM

## 2021-04-30 DIAGNOSIS — C90.00 MULTIPLE MYELOMA, REMISSION STATUS UNSPECIFIED: ICD-10-CM

## 2021-05-03 RX ORDER — PLERIXAFOR 24 MG/1.2ML
0.24 SOLUTION SUBCUTANEOUS ONCE AS NEEDED
Status: CANCELLED | OUTPATIENT
Start: 2021-05-11 | End: 2032-10-07

## 2021-05-03 RX ORDER — SODIUM,POTASSIUM PHOSPHATES 280-250MG
2 POWDER IN PACKET (EA) ORAL ONCE AS NEEDED
Status: CANCELLED | OUTPATIENT
Start: 2021-05-10

## 2021-05-03 RX ORDER — ACETAMINOPHEN 325 MG/1
650 TABLET ORAL ONCE AS NEEDED
Status: CANCELLED | OUTPATIENT
Start: 2021-05-09

## 2021-05-03 RX ORDER — POTASSIUM CHLORIDE 20 MEQ/1
40 TABLET, EXTENDED RELEASE ORAL ONCE AS NEEDED
Status: CANCELLED | OUTPATIENT
Start: 2021-05-11

## 2021-05-03 RX ORDER — POTASSIUM CHLORIDE 20 MEQ/1
40 TABLET, EXTENDED RELEASE ORAL ONCE AS NEEDED
Status: CANCELLED | OUTPATIENT
Start: 2021-05-08

## 2021-05-03 RX ORDER — ACETAMINOPHEN 325 MG/1
650 TABLET ORAL ONCE AS NEEDED
Status: CANCELLED | OUTPATIENT
Start: 2021-05-11

## 2021-05-03 RX ORDER — LANOLIN ALCOHOL/MO/W.PET/CERES
800 CREAM (GRAM) TOPICAL ONCE AS NEEDED
Status: CANCELLED | OUTPATIENT
Start: 2021-05-10

## 2021-05-03 RX ORDER — ACETAMINOPHEN 325 MG/1
650 TABLET ORAL ONCE AS NEEDED
Status: CANCELLED | OUTPATIENT
Start: 2021-05-10

## 2021-05-03 RX ORDER — DIPHENHYDRAMINE HCL 25 MG
25 CAPSULE ORAL ONCE AS NEEDED
Status: CANCELLED | OUTPATIENT
Start: 2021-05-09

## 2021-05-03 RX ORDER — SODIUM,POTASSIUM PHOSPHATES 280-250MG
2 POWDER IN PACKET (EA) ORAL ONCE AS NEEDED
Status: CANCELLED | OUTPATIENT
Start: 2021-05-12

## 2021-05-03 RX ORDER — SODIUM,POTASSIUM PHOSPHATES 280-250MG
2 POWDER IN PACKET (EA) ORAL ONCE AS NEEDED
Status: CANCELLED | OUTPATIENT
Start: 2021-05-08

## 2021-05-03 RX ORDER — LANOLIN ALCOHOL/MO/W.PET/CERES
800 CREAM (GRAM) TOPICAL ONCE AS NEEDED
Status: CANCELLED | OUTPATIENT
Start: 2021-05-08

## 2021-05-03 RX ORDER — DIPHENHYDRAMINE HCL 25 MG
25 CAPSULE ORAL ONCE AS NEEDED
Status: CANCELLED | OUTPATIENT
Start: 2021-05-11

## 2021-05-03 RX ORDER — ACETAMINOPHEN 325 MG/1
650 TABLET ORAL ONCE AS NEEDED
Status: CANCELLED | OUTPATIENT
Start: 2021-05-13

## 2021-05-03 RX ORDER — ACETAMINOPHEN 325 MG/1
650 TABLET ORAL ONCE AS NEEDED
Status: CANCELLED | OUTPATIENT
Start: 2021-05-08

## 2021-05-03 RX ORDER — POTASSIUM CHLORIDE 20 MEQ/1
40 TABLET, EXTENDED RELEASE ORAL ONCE AS NEEDED
Status: CANCELLED | OUTPATIENT
Start: 2021-05-12

## 2021-05-03 RX ORDER — SODIUM,POTASSIUM PHOSPHATES 280-250MG
2 POWDER IN PACKET (EA) ORAL ONCE AS NEEDED
Status: CANCELLED | OUTPATIENT
Start: 2021-05-13

## 2021-05-03 RX ORDER — LANOLIN ALCOHOL/MO/W.PET/CERES
800 CREAM (GRAM) TOPICAL ONCE AS NEEDED
Status: CANCELLED | OUTPATIENT
Start: 2021-05-13

## 2021-05-03 RX ORDER — SODIUM,POTASSIUM PHOSPHATES 280-250MG
2 POWDER IN PACKET (EA) ORAL ONCE AS NEEDED
Status: CANCELLED | OUTPATIENT
Start: 2021-05-11

## 2021-05-03 RX ORDER — POTASSIUM CHLORIDE 20 MEQ/1
40 TABLET, EXTENDED RELEASE ORAL ONCE AS NEEDED
Status: CANCELLED | OUTPATIENT
Start: 2021-05-10

## 2021-05-03 RX ORDER — LANOLIN ALCOHOL/MO/W.PET/CERES
800 CREAM (GRAM) TOPICAL ONCE AS NEEDED
Status: CANCELLED | OUTPATIENT
Start: 2021-05-09

## 2021-05-03 RX ORDER — LANOLIN ALCOHOL/MO/W.PET/CERES
800 CREAM (GRAM) TOPICAL ONCE AS NEEDED
Status: CANCELLED | OUTPATIENT
Start: 2021-05-11

## 2021-05-03 RX ORDER — LANOLIN ALCOHOL/MO/W.PET/CERES
800 CREAM (GRAM) TOPICAL ONCE AS NEEDED
Status: CANCELLED | OUTPATIENT
Start: 2021-05-12

## 2021-05-03 RX ORDER — DIPHENHYDRAMINE HCL 25 MG
25 CAPSULE ORAL ONCE AS NEEDED
Status: CANCELLED | OUTPATIENT
Start: 2021-05-08

## 2021-05-03 RX ORDER — SODIUM,POTASSIUM PHOSPHATES 280-250MG
2 POWDER IN PACKET (EA) ORAL ONCE AS NEEDED
Status: CANCELLED | OUTPATIENT
Start: 2021-05-09

## 2021-05-03 RX ORDER — POTASSIUM CHLORIDE 20 MEQ/1
40 TABLET, EXTENDED RELEASE ORAL ONCE AS NEEDED
Status: CANCELLED | OUTPATIENT
Start: 2021-05-13

## 2021-05-03 RX ORDER — DIPHENHYDRAMINE HCL 25 MG
25 CAPSULE ORAL ONCE AS NEEDED
Status: CANCELLED | OUTPATIENT
Start: 2021-05-13

## 2021-05-03 RX ORDER — DIPHENHYDRAMINE HCL 25 MG
25 CAPSULE ORAL ONCE AS NEEDED
Status: CANCELLED | OUTPATIENT
Start: 2021-05-10

## 2021-05-03 RX ORDER — DIPHENHYDRAMINE HCL 25 MG
25 CAPSULE ORAL ONCE AS NEEDED
Status: CANCELLED | OUTPATIENT
Start: 2021-05-12

## 2021-05-03 RX ORDER — POTASSIUM CHLORIDE 20 MEQ/1
40 TABLET, EXTENDED RELEASE ORAL ONCE AS NEEDED
Status: CANCELLED | OUTPATIENT
Start: 2021-05-09

## 2021-05-03 RX ORDER — ACETAMINOPHEN 325 MG/1
650 TABLET ORAL ONCE AS NEEDED
Status: CANCELLED | OUTPATIENT
Start: 2021-05-12

## 2021-05-04 ENCOUNTER — LAB VISIT (OUTPATIENT)
Dept: LAB | Facility: HOSPITAL | Age: 61
End: 2021-05-04
Payer: MEDICAID

## 2021-05-04 ENCOUNTER — OFFICE VISIT (OUTPATIENT)
Dept: HEMATOLOGY/ONCOLOGY | Facility: CLINIC | Age: 61
End: 2021-05-04
Payer: MEDICAID

## 2021-05-04 VITALS
TEMPERATURE: 98 F | DIASTOLIC BLOOD PRESSURE: 80 MMHG | BODY MASS INDEX: 35.71 KG/M2 | WEIGHT: 227.5 LBS | RESPIRATION RATE: 17 BRPM | SYSTOLIC BLOOD PRESSURE: 147 MMHG | OXYGEN SATURATION: 96 % | HEIGHT: 67 IN | HEART RATE: 62 BPM

## 2021-05-04 DIAGNOSIS — Z76.82 STEM CELL TRANSPLANT CANDIDATE: ICD-10-CM

## 2021-05-04 DIAGNOSIS — C90.00 MULTIPLE MYELOMA, REMISSION STATUS UNSPECIFIED: ICD-10-CM

## 2021-05-04 DIAGNOSIS — C90.00 MULTIPLE MYELOMA, REMISSION STATUS UNSPECIFIED: Primary | ICD-10-CM

## 2021-05-04 DIAGNOSIS — Z01.818 PRE-OP TESTING: ICD-10-CM

## 2021-05-04 DIAGNOSIS — D50.8 OTHER IRON DEFICIENCY ANEMIA: Primary | ICD-10-CM

## 2021-05-04 LAB
ALBUMIN SERPL BCP-MCNC: 3.5 G/DL (ref 3.5–5.2)
ALP SERPL-CCNC: 61 U/L (ref 55–135)
ALT SERPL W/O P-5'-P-CCNC: 15 U/L (ref 10–44)
ANION GAP SERPL CALC-SCNC: 8 MMOL/L (ref 8–16)
AST SERPL-CCNC: 15 U/L (ref 10–40)
BILIRUB SERPL-MCNC: 0.6 MG/DL (ref 0.1–1)
BUN SERPL-MCNC: 11 MG/DL (ref 8–23)
CALCIUM SERPL-MCNC: 9.2 MG/DL (ref 8.7–10.5)
CHLORIDE SERPL-SCNC: 107 MMOL/L (ref 95–110)
CO2 SERPL-SCNC: 23 MMOL/L (ref 23–29)
CREAT SERPL-MCNC: 0.7 MG/DL (ref 0.5–1.4)
EST. GFR  (AFRICAN AMERICAN): >60 ML/MIN/1.73 M^2
EST. GFR  (NON AFRICAN AMERICAN): >60 ML/MIN/1.73 M^2
GLUCOSE SERPL-MCNC: 138 MG/DL (ref 70–110)
POTASSIUM SERPL-SCNC: 4.1 MMOL/L (ref 3.5–5.1)
PROT SERPL-MCNC: 6.3 G/DL (ref 6–8.4)
SODIUM SERPL-SCNC: 138 MMOL/L (ref 136–145)

## 2021-05-04 PROCEDURE — 99215 OFFICE O/P EST HI 40 MIN: CPT | Mod: S$PBB,,, | Performed by: INTERNAL MEDICINE

## 2021-05-04 PROCEDURE — 99213 OFFICE O/P EST LOW 20 MIN: CPT | Mod: PBBFAC | Performed by: INTERNAL MEDICINE

## 2021-05-04 PROCEDURE — 36415 COLL VENOUS BLD VENIPUNCTURE: CPT | Performed by: INTERNAL MEDICINE

## 2021-05-04 PROCEDURE — 82955 ASSAY OF G6PD ENZYME: CPT | Performed by: INTERNAL MEDICINE

## 2021-05-04 PROCEDURE — 99999 PR PBB SHADOW E&M-EST. PATIENT-LVL III: CPT | Mod: PBBFAC,,, | Performed by: INTERNAL MEDICINE

## 2021-05-04 PROCEDURE — 99999 PR PBB SHADOW E&M-EST. PATIENT-LVL III: ICD-10-PCS | Mod: PBBFAC,,, | Performed by: INTERNAL MEDICINE

## 2021-05-04 PROCEDURE — 80053 COMPREHEN METABOLIC PANEL: CPT | Performed by: INTERNAL MEDICINE

## 2021-05-04 PROCEDURE — 99215 PR OFFICE/OUTPT VISIT, EST, LEVL V, 40-54 MIN: ICD-10-PCS | Mod: S$PBB,,, | Performed by: INTERNAL MEDICINE

## 2021-05-04 RX ORDER — FERROUS GLUCONATE 324(38)MG
324 TABLET ORAL
Qty: 30 TABLET | Refills: 3 | Status: SHIPPED | OUTPATIENT
Start: 2021-05-04 | End: 2021-09-09 | Stop reason: SDUPTHER

## 2021-05-04 RX ORDER — FERROUS GLUCONATE 324(38)MG
324 TABLET ORAL
COMMUNITY
End: 2021-05-04 | Stop reason: SDUPTHER

## 2021-05-05 ENCOUNTER — SOCIAL WORK (OUTPATIENT)
Dept: HEMATOLOGY/ONCOLOGY | Facility: CLINIC | Age: 61
End: 2021-05-05

## 2021-05-05 DIAGNOSIS — C90.00 MULTIPLE MYELOMA NOT HAVING ACHIEVED REMISSION: ICD-10-CM

## 2021-05-05 RX ORDER — LENALIDOMIDE 25 MG/1
CAPSULE ORAL
Qty: 21 CAPSULE | Refills: 0 | Status: ON HOLD | OUTPATIENT
Start: 2021-05-05 | End: 2021-05-15

## 2021-05-07 ENCOUNTER — LAB VISIT (OUTPATIENT)
Dept: FAMILY MEDICINE | Facility: CLINIC | Age: 61
End: 2021-05-07
Payer: MEDICAID

## 2021-05-07 ENCOUNTER — TELEPHONE (OUTPATIENT)
Dept: SURGERY | Facility: CLINIC | Age: 61
End: 2021-05-07

## 2021-05-07 DIAGNOSIS — Z76.82 STEM CELL TRANSPLANT CANDIDATE: ICD-10-CM

## 2021-05-07 DIAGNOSIS — C90.00 MULTIPLE MYELOMA, REMISSION STATUS UNSPECIFIED: ICD-10-CM

## 2021-05-07 LAB — G6PD RBC-CCNT: 8.6 U/G HGB (ref 7–20.5)

## 2021-05-07 PROCEDURE — U0003 INFECTIOUS AGENT DETECTION BY NUCLEIC ACID (DNA OR RNA); SEVERE ACUTE RESPIRATORY SYNDROME CORONAVIRUS 2 (SARS-COV-2) (CORONAVIRUS DISEASE [COVID-19]), AMPLIFIED PROBE TECHNIQUE, MAKING USE OF HIGH THROUGHPUT TECHNOLOGIES AS DESCRIBED BY CMS-2020-01-R: HCPCS | Performed by: SURGERY

## 2021-05-07 PROCEDURE — U0005 INFEC AGEN DETEC AMPLI PROBE: HCPCS | Performed by: SURGERY

## 2021-05-08 ENCOUNTER — INFUSION (OUTPATIENT)
Dept: INFUSION THERAPY | Facility: HOSPITAL | Age: 61
End: 2021-05-08
Attending: INTERNAL MEDICINE
Payer: MEDICAID

## 2021-05-08 DIAGNOSIS — Z76.82 STEM CELL TRANSPLANT CANDIDATE: Primary | ICD-10-CM

## 2021-05-08 LAB — SARS-COV-2 RNA RESP QL NAA+PROBE: NOT DETECTED

## 2021-05-08 PROCEDURE — 96372 THER/PROPH/DIAG INJ SC/IM: CPT

## 2021-05-08 PROCEDURE — 63600175 PHARM REV CODE 636 W HCPCS: Mod: JG | Performed by: NURSE PRACTITIONER

## 2021-05-08 RX ORDER — POTASSIUM CHLORIDE 20 MEQ/1
40 TABLET, EXTENDED RELEASE ORAL ONCE AS NEEDED
Status: DISCONTINUED | OUTPATIENT
Start: 2021-05-08 | End: 2021-05-08 | Stop reason: HOSPADM

## 2021-05-08 RX ORDER — LANOLIN ALCOHOL/MO/W.PET/CERES
800 CREAM (GRAM) TOPICAL ONCE AS NEEDED
Status: DISCONTINUED | OUTPATIENT
Start: 2021-05-08 | End: 2021-05-08 | Stop reason: HOSPADM

## 2021-05-08 RX ORDER — SODIUM,POTASSIUM PHOSPHATES 280-250MG
2 POWDER IN PACKET (EA) ORAL ONCE AS NEEDED
Status: DISCONTINUED | OUTPATIENT
Start: 2021-05-08 | End: 2021-05-08 | Stop reason: HOSPADM

## 2021-05-08 RX ORDER — ACETAMINOPHEN 325 MG/1
650 TABLET ORAL ONCE AS NEEDED
Status: DISCONTINUED | OUTPATIENT
Start: 2021-05-08 | End: 2021-05-08 | Stop reason: HOSPADM

## 2021-05-08 RX ORDER — DIPHENHYDRAMINE HCL 25 MG
25 CAPSULE ORAL ONCE AS NEEDED
Status: DISCONTINUED | OUTPATIENT
Start: 2021-05-08 | End: 2021-05-08 | Stop reason: HOSPADM

## 2021-05-08 RX ADMIN — FILGRASTIM 1080 MCG: 480 INJECTION, SOLUTION INTRAVENOUS; SUBCUTANEOUS at 08:05

## 2021-05-09 ENCOUNTER — INFUSION (OUTPATIENT)
Dept: INFUSION THERAPY | Facility: HOSPITAL | Age: 61
End: 2021-05-09
Attending: INTERNAL MEDICINE
Payer: MEDICAID

## 2021-05-09 DIAGNOSIS — Z76.82 STEM CELL TRANSPLANT CANDIDATE: Primary | ICD-10-CM

## 2021-05-09 PROCEDURE — 96372 THER/PROPH/DIAG INJ SC/IM: CPT

## 2021-05-09 PROCEDURE — 63600175 PHARM REV CODE 636 W HCPCS: Mod: JG | Performed by: NURSE PRACTITIONER

## 2021-05-09 RX ADMIN — FILGRASTIM 1080 MCG: 480 INJECTION, SOLUTION INTRAVENOUS; SUBCUTANEOUS at 08:05

## 2021-05-10 ENCOUNTER — ANESTHESIA (OUTPATIENT)
Dept: SURGERY | Facility: HOSPITAL | Age: 61
End: 2021-05-10
Payer: MEDICAID

## 2021-05-10 ENCOUNTER — INFUSION (OUTPATIENT)
Dept: INFUSION THERAPY | Facility: HOSPITAL | Age: 61
End: 2021-05-10
Payer: MEDICAID

## 2021-05-10 ENCOUNTER — ANESTHESIA EVENT (OUTPATIENT)
Dept: SURGERY | Facility: HOSPITAL | Age: 61
End: 2021-05-10
Payer: MEDICAID

## 2021-05-10 DIAGNOSIS — Z76.82 STEM CELL TRANSPLANT CANDIDATE: Primary | ICD-10-CM

## 2021-05-10 PROCEDURE — D9220A PRA ANESTHESIA: Mod: ANES,,, | Performed by: SURGERY

## 2021-05-10 PROCEDURE — 25000003 PHARM REV CODE 250: Performed by: STUDENT IN AN ORGANIZED HEALTH CARE EDUCATION/TRAINING PROGRAM

## 2021-05-10 PROCEDURE — 96372 THER/PROPH/DIAG INJ SC/IM: CPT

## 2021-05-10 PROCEDURE — D9220A PRA ANESTHESIA: ICD-10-PCS | Mod: ANES,,, | Performed by: SURGERY

## 2021-05-10 PROCEDURE — 25000003 PHARM REV CODE 250: Performed by: NURSE ANESTHETIST, CERTIFIED REGISTERED

## 2021-05-10 PROCEDURE — 63600175 PHARM REV CODE 636 W HCPCS: Performed by: STUDENT IN AN ORGANIZED HEALTH CARE EDUCATION/TRAINING PROGRAM

## 2021-05-10 PROCEDURE — D9220A PRA ANESTHESIA: Mod: CRNA,,, | Performed by: NURSE ANESTHETIST, CERTIFIED REGISTERED

## 2021-05-10 PROCEDURE — D9220A PRA ANESTHESIA: ICD-10-PCS | Mod: CRNA,,, | Performed by: NURSE ANESTHETIST, CERTIFIED REGISTERED

## 2021-05-10 PROCEDURE — 63600175 PHARM REV CODE 636 W HCPCS: Mod: JG | Performed by: NURSE PRACTITIONER

## 2021-05-10 PROCEDURE — 63600175 PHARM REV CODE 636 W HCPCS: Performed by: NURSE ANESTHETIST, CERTIFIED REGISTERED

## 2021-05-10 RX ORDER — PROPOFOL 10 MG/ML
VIAL (ML) INTRAVENOUS
Status: DISCONTINUED | OUTPATIENT
Start: 2021-05-10 | End: 2021-05-10

## 2021-05-10 RX ORDER — LIDOCAINE HCL/PF 100 MG/5ML
SYRINGE (ML) INTRAVENOUS
Status: DISCONTINUED | OUTPATIENT
Start: 2021-05-10 | End: 2021-05-10

## 2021-05-10 RX ORDER — FENTANYL CITRATE 50 UG/ML
INJECTION, SOLUTION INTRAMUSCULAR; INTRAVENOUS
Status: DISCONTINUED | OUTPATIENT
Start: 2021-05-10 | End: 2021-05-10

## 2021-05-10 RX ORDER — MIDAZOLAM HYDROCHLORIDE 1 MG/ML
INJECTION INTRAMUSCULAR; INTRAVENOUS
Status: DISCONTINUED | OUTPATIENT
Start: 2021-05-10 | End: 2021-05-10

## 2021-05-10 RX ORDER — PROPOFOL 10 MG/ML
VIAL (ML) INTRAVENOUS CONTINUOUS PRN
Status: DISCONTINUED | OUTPATIENT
Start: 2021-05-10 | End: 2021-05-10

## 2021-05-10 RX ORDER — ONDANSETRON HYDROCHLORIDE 2 MG/ML
INJECTION, SOLUTION INTRAMUSCULAR; INTRAVENOUS
Status: DISCONTINUED | OUTPATIENT
Start: 2021-05-10 | End: 2021-05-10

## 2021-05-10 RX ADMIN — SODIUM CHLORIDE: 0.9 INJECTION, SOLUTION INTRAVENOUS at 09:05

## 2021-05-10 RX ADMIN — CEFAZOLIN 2 G: 330 INJECTION, POWDER, FOR SOLUTION INTRAMUSCULAR; INTRAVENOUS at 10:05

## 2021-05-10 RX ADMIN — LIDOCAINE HYDROCHLORIDE 80 MG: 20 INJECTION, SOLUTION INTRAVENOUS at 10:05

## 2021-05-10 RX ADMIN — Medication 100 MCG/KG/MIN: at 10:05

## 2021-05-10 RX ADMIN — FILGRASTIM 1080 MCG: 480 INJECTION, SOLUTION INTRAVENOUS; SUBCUTANEOUS at 01:05

## 2021-05-10 RX ADMIN — FENTANYL CITRATE 25 MCG: 50 INJECTION, SOLUTION INTRAMUSCULAR; INTRAVENOUS at 10:05

## 2021-05-10 RX ADMIN — PROPOFOL 100 MG: 10 INJECTION, EMULSION INTRAVENOUS at 10:05

## 2021-05-10 RX ADMIN — ONDANSETRON 4 MG: 2 INJECTION, SOLUTION INTRAMUSCULAR; INTRAVENOUS at 10:05

## 2021-05-10 RX ADMIN — MIDAZOLAM HYDROCHLORIDE 2 MG: 1 INJECTION, SOLUTION INTRAMUSCULAR; INTRAVENOUS at 09:05

## 2021-05-10 RX ADMIN — GLYCOPYRROLATE 0.2 MG: 0.2 INJECTION, SOLUTION INTRAMUSCULAR; INTRAVITREAL at 10:05

## 2021-05-11 ENCOUNTER — INFUSION (OUTPATIENT)
Dept: INFUSION THERAPY | Facility: HOSPITAL | Age: 61
End: 2021-05-11
Attending: INTERNAL MEDICINE
Payer: MEDICAID

## 2021-05-11 VITALS
TEMPERATURE: 98 F | WEIGHT: 227 LBS | RESPIRATION RATE: 18 BRPM | SYSTOLIC BLOOD PRESSURE: 186 MMHG | HEIGHT: 67 IN | DIASTOLIC BLOOD PRESSURE: 86 MMHG | BODY MASS INDEX: 35.63 KG/M2 | HEART RATE: 80 BPM

## 2021-05-11 DIAGNOSIS — C90.00 MULTIPLE MYELOMA, REMISSION STATUS UNSPECIFIED: Primary | ICD-10-CM

## 2021-05-11 DIAGNOSIS — Z76.82 STEM CELL TRANSPLANT CANDIDATE: ICD-10-CM

## 2021-05-11 DIAGNOSIS — D49.9 IMMUNODEFICIENCY SECONDARY TO NEOPLASM: ICD-10-CM

## 2021-05-11 DIAGNOSIS — D84.81 IMMUNODEFICIENCY SECONDARY TO NEOPLASM: ICD-10-CM

## 2021-05-11 DIAGNOSIS — Z76.82 STEM CELL TRANSPLANT CANDIDATE: Primary | ICD-10-CM

## 2021-05-11 LAB
ABO + RH BLD: NORMAL
ALBUMIN SERPL BCP-MCNC: 3.6 G/DL (ref 3.5–5.2)
ALP SERPL-CCNC: 129 U/L (ref 55–135)
ALT SERPL W/O P-5'-P-CCNC: 9 U/L (ref 10–44)
ANION GAP SERPL CALC-SCNC: 7 MMOL/L (ref 8–16)
APTT BLDCRRT: 25.1 SEC (ref 21–32)
AST SERPL-CCNC: 17 U/L (ref 10–40)
BASOPHILS # BLD AUTO: 0.24 K/UL (ref 0–0.2)
BASOPHILS NFR BLD: 2 % (ref 0–1.9)
BILIRUB SERPL-MCNC: 0.6 MG/DL (ref 0.1–1)
BLD GP AB SCN CELLS X3 SERPL QL: NORMAL
BUN SERPL-MCNC: 10 MG/DL (ref 8–23)
CA-I BLDV-SCNC: 1.21 MMOL/L (ref 1.06–1.42)
CALCIUM SERPL-MCNC: 9 MG/DL (ref 8.7–10.5)
CD34 %: 0.1 %
CD34 ABSOLUTE: 12.26 CELLS/UL
CD34 VIABILITY: 99.1 %
CHLORIDE SERPL-SCNC: 109 MMOL/L (ref 95–110)
CO2 SERPL-SCNC: 24 MMOL/L (ref 23–29)
CREAT SERPL-MCNC: 0.9 MG/DL (ref 0.5–1.4)
DIFFERENTIAL METHOD: ABNORMAL
EOSINOPHIL # BLD AUTO: 0.7 K/UL (ref 0–0.5)
EOSINOPHIL NFR BLD: 5.7 % (ref 0–8)
ERYTHROCYTE [DISTWIDTH] IN BLOOD BY AUTOMATED COUNT: 14.6 % (ref 11.5–14.5)
EST. GFR  (AFRICAN AMERICAN): >60 ML/MIN/1.73 M^2
EST. GFR  (NON AFRICAN AMERICAN): >60 ML/MIN/1.73 M^2
GLUCOSE SERPL-MCNC: 117 MG/DL (ref 70–110)
HCT VFR BLD AUTO: 37.5 % (ref 40–54)
HGB BLD-MCNC: 12.5 G/DL (ref 14–18)
IMM GRANULOCYTES # BLD AUTO: 0.5 K/UL (ref 0–0.04)
IMM GRANULOCYTES NFR BLD AUTO: 4.2 % (ref 0–0.5)
INR PPP: 1.1 (ref 0.8–1.2)
LYMPHOCYTES # BLD AUTO: 1.8 K/UL (ref 1–4.8)
LYMPHOCYTES NFR BLD: 15 % (ref 18–48)
MAGNESIUM SERPL-MCNC: 1.8 MG/DL (ref 1.6–2.6)
MCH RBC QN AUTO: 32.2 PG (ref 27–31)
MCHC RBC AUTO-ENTMCNC: 33.3 G/DL (ref 32–36)
MCV RBC AUTO: 97 FL (ref 82–98)
MONOCYTES # BLD AUTO: 1.9 K/UL (ref 0.3–1)
MONOCYTES NFR BLD: 15.7 % (ref 4–15)
NEUTROPHILS # BLD AUTO: 6.9 K/UL (ref 1.8–7.7)
NEUTROPHILS NFR BLD: 57.4 % (ref 38–73)
NRBC BLD-RTO: 0 /100 WBC
PHOSPHATE SERPL-MCNC: 3.3 MG/DL (ref 2.7–4.5)
PLATELET # BLD AUTO: 160 K/UL (ref 150–450)
PLATELET BLD QL SMEAR: ABNORMAL
PMV BLD AUTO: 10.3 FL (ref 9.2–12.9)
POTASSIUM SERPL-SCNC: 4 MMOL/L (ref 3.5–5.1)
PROT SERPL-MCNC: 6.5 G/DL (ref 6–8.4)
PROTHROMBIN TIME: 11.9 SEC (ref 9–12.5)
RBC # BLD AUTO: 3.88 M/UL (ref 4.6–6.2)
SODIUM SERPL-SCNC: 140 MMOL/L (ref 136–145)
WBC # BLD AUTO: 12.02 K/UL (ref 3.9–12.7)

## 2021-05-11 PROCEDURE — 85610 PROTHROMBIN TIME: CPT | Performed by: INTERNAL MEDICINE

## 2021-05-11 PROCEDURE — 85730 THROMBOPLASTIN TIME PARTIAL: CPT | Performed by: INTERNAL MEDICINE

## 2021-05-11 PROCEDURE — A4216 STERILE WATER/SALINE, 10 ML: HCPCS | Performed by: INTERNAL MEDICINE

## 2021-05-11 PROCEDURE — 86367 STEM CELLS TOTAL COUNT: CPT | Performed by: INTERNAL MEDICINE

## 2021-05-11 PROCEDURE — 25000003 PHARM REV CODE 250: Performed by: INTERNAL MEDICINE

## 2021-05-11 PROCEDURE — 36415 COLL VENOUS BLD VENIPUNCTURE: CPT | Performed by: INTERNAL MEDICINE

## 2021-05-11 PROCEDURE — 63600175 PHARM REV CODE 636 W HCPCS: Performed by: INTERNAL MEDICINE

## 2021-05-11 PROCEDURE — 86900 BLOOD TYPING SEROLOGIC ABO: CPT | Performed by: INTERNAL MEDICINE

## 2021-05-11 PROCEDURE — 96372 THER/PROPH/DIAG INJ SC/IM: CPT

## 2021-05-11 PROCEDURE — 36592 COLLECT BLOOD FROM PICC: CPT

## 2021-05-11 PROCEDURE — 36591 DRAW BLOOD OFF VENOUS DEVICE: CPT

## 2021-05-11 PROCEDURE — 80053 COMPREHEN METABOLIC PANEL: CPT | Performed by: INTERNAL MEDICINE

## 2021-05-11 PROCEDURE — 84100 ASSAY OF PHOSPHORUS: CPT | Performed by: INTERNAL MEDICINE

## 2021-05-11 PROCEDURE — 85025 COMPLETE CBC W/AUTO DIFF WBC: CPT | Performed by: INTERNAL MEDICINE

## 2021-05-11 PROCEDURE — 63600175 PHARM REV CODE 636 W HCPCS: Mod: JG | Performed by: NURSE PRACTITIONER

## 2021-05-11 PROCEDURE — 83735 ASSAY OF MAGNESIUM: CPT | Performed by: INTERNAL MEDICINE

## 2021-05-11 PROCEDURE — 82330 ASSAY OF CALCIUM: CPT | Performed by: INTERNAL MEDICINE

## 2021-05-11 PROCEDURE — 63600175 PHARM REV CODE 636 W HCPCS: Mod: JG | Performed by: INTERNAL MEDICINE

## 2021-05-11 RX ORDER — PLERIXAFOR 24 MG/1.2ML
0.24 SOLUTION SUBCUTANEOUS ONCE AS NEEDED
Status: COMPLETED | OUTPATIENT
Start: 2021-05-11 | End: 2021-05-11

## 2021-05-11 RX ORDER — SODIUM CHLORIDE 0.9 % (FLUSH) 0.9 %
10 SYRINGE (ML) INJECTION
Status: DISCONTINUED | OUTPATIENT
Start: 2021-05-11 | End: 2021-05-11 | Stop reason: HOSPADM

## 2021-05-11 RX ORDER — HEPARIN 100 UNIT/ML
500 SYRINGE INTRAVENOUS
Status: CANCELLED | OUTPATIENT
Start: 2021-05-11

## 2021-05-11 RX ORDER — HEPARIN 100 UNIT/ML
500 SYRINGE INTRAVENOUS
Status: DISCONTINUED | OUTPATIENT
Start: 2021-05-11 | End: 2021-05-11 | Stop reason: HOSPADM

## 2021-05-11 RX ORDER — SODIUM CHLORIDE 0.9 % (FLUSH) 0.9 %
10 SYRINGE (ML) INJECTION
Status: CANCELLED | OUTPATIENT
Start: 2021-05-11

## 2021-05-11 RX ADMIN — FILGRASTIM 1080 MCG: 480 INJECTION, SOLUTION INTRAVENOUS; SUBCUTANEOUS at 08:05

## 2021-05-11 RX ADMIN — Medication 10 ML: at 08:05

## 2021-05-11 RX ADMIN — PLERIXAFOR 26 MG: 24 SOLUTION SUBCUTANEOUS at 05:05

## 2021-05-11 RX ADMIN — HEPARIN 500 UNITS: 100 SYRINGE at 08:05

## 2021-05-12 ENCOUNTER — INFUSION (OUTPATIENT)
Dept: INFUSION THERAPY | Facility: HOSPITAL | Age: 61
End: 2021-05-12
Attending: INTERNAL MEDICINE
Payer: MEDICAID

## 2021-05-12 ENCOUNTER — HOSPITAL ENCOUNTER (OUTPATIENT)
Dept: TRANSFUSION MEDICINE | Facility: HOSPITAL | Age: 61
Discharge: HOME | End: 2021-05-12
Attending: INTERNAL MEDICINE
Payer: MEDICAID

## 2021-05-12 ENCOUNTER — DOCUMENTATION ONLY (OUTPATIENT)
Dept: HEMATOLOGY/ONCOLOGY | Facility: CLINIC | Age: 61
End: 2021-05-12

## 2021-05-12 VITALS
SYSTOLIC BLOOD PRESSURE: 184 MMHG | HEART RATE: 66 BPM | RESPIRATION RATE: 16 BRPM | DIASTOLIC BLOOD PRESSURE: 99 MMHG | TEMPERATURE: 98 F

## 2021-05-12 VITALS — SYSTOLIC BLOOD PRESSURE: 166 MMHG | DIASTOLIC BLOOD PRESSURE: 92 MMHG | HEART RATE: 76 BPM | RESPIRATION RATE: 18 BRPM

## 2021-05-12 DIAGNOSIS — C90.00 MULTIPLE MYELOMA: ICD-10-CM

## 2021-05-12 DIAGNOSIS — E88.09 PLASMA CELL DYSCRASIA: ICD-10-CM

## 2021-05-12 DIAGNOSIS — M48.061 LUMBAR STENOSIS: ICD-10-CM

## 2021-05-12 DIAGNOSIS — Z76.82 STEM CELL TRANSPLANT CANDIDATE: ICD-10-CM

## 2021-05-12 DIAGNOSIS — Z12.11 ENCOUNTER FOR SCREENING COLONOSCOPY: ICD-10-CM

## 2021-05-12 DIAGNOSIS — D84.81 IMMUNODEFICIENCY SECONDARY TO NEOPLASM: ICD-10-CM

## 2021-05-12 DIAGNOSIS — C79.49 METASTASIS OF NEOPLASM TO SPINAL CANAL: ICD-10-CM

## 2021-05-12 DIAGNOSIS — C79.9 METASTATIC DISEASE: ICD-10-CM

## 2021-05-12 DIAGNOSIS — T45.1X5A IMMUNODEFICIENCY SECONDARY TO CHEMOTHERAPY: Primary | ICD-10-CM

## 2021-05-12 DIAGNOSIS — D84.821 IMMUNODEFICIENCY SECONDARY TO CHEMOTHERAPY: Primary | ICD-10-CM

## 2021-05-12 DIAGNOSIS — I10 ESSENTIAL HYPERTENSION: ICD-10-CM

## 2021-05-12 DIAGNOSIS — F41.9 ANXIETY: ICD-10-CM

## 2021-05-12 DIAGNOSIS — E55.9 VITAMIN D DEFICIENCY: ICD-10-CM

## 2021-05-12 DIAGNOSIS — D49.9 IMMUNODEFICIENCY SECONDARY TO NEOPLASM: ICD-10-CM

## 2021-05-12 DIAGNOSIS — Z79.899 IMMUNODEFICIENCY SECONDARY TO CHEMOTHERAPY: Primary | ICD-10-CM

## 2021-05-12 DIAGNOSIS — R06.09 DOE (DYSPNEA ON EXERTION): ICD-10-CM

## 2021-05-12 LAB
ALBUMIN SERPL BCP-MCNC: 2.9 G/DL (ref 3.5–5.2)
ALBUMIN SERPL BCP-MCNC: 3 G/DL (ref 3.5–5.2)
ALBUMIN SERPL BCP-MCNC: 3.6 G/DL (ref 3.5–5.2)
ALP SERPL-CCNC: 120 U/L (ref 55–135)
ALP SERPL-CCNC: 128 U/L (ref 55–135)
ALP SERPL-CCNC: 144 U/L (ref 55–135)
ALT SERPL W/O P-5'-P-CCNC: 7 U/L (ref 10–44)
ALT SERPL W/O P-5'-P-CCNC: 8 U/L (ref 10–44)
ALT SERPL W/O P-5'-P-CCNC: 8 U/L (ref 10–44)
ANION GAP SERPL CALC-SCNC: 10 MMOL/L (ref 8–16)
ANION GAP SERPL CALC-SCNC: 6 MMOL/L (ref 8–16)
ANION GAP SERPL CALC-SCNC: 8 MMOL/L (ref 8–16)
ANISOCYTOSIS BLD QL SMEAR: SLIGHT
ANISOCYTOSIS BLD QL SMEAR: SLIGHT
AST SERPL-CCNC: 12 U/L (ref 10–40)
AST SERPL-CCNC: 14 U/L (ref 10–40)
AST SERPL-CCNC: 16 U/L (ref 10–40)
BASOPHILS # BLD AUTO: ABNORMAL K/UL (ref 0–0.2)
BASOPHILS # BLD AUTO: ABNORMAL K/UL (ref 0–0.2)
BASOPHILS NFR BLD: 0 % (ref 0–1.9)
BILIRUB SERPL-MCNC: 0.3 MG/DL (ref 0.1–1)
BILIRUB SERPL-MCNC: 0.3 MG/DL (ref 0.1–1)
BILIRUB SERPL-MCNC: 0.5 MG/DL (ref 0.1–1)
BUN SERPL-MCNC: 7 MG/DL (ref 8–23)
BUN SERPL-MCNC: 8 MG/DL (ref 8–23)
BUN SERPL-MCNC: 9 MG/DL (ref 8–23)
CA-I BLDV-SCNC: 1.16 MMOL/L (ref 1.06–1.42)
CA-I BLDV-SCNC: 1.21 MMOL/L (ref 1.06–1.42)
CA-I BLDV-SCNC: 1.23 MMOL/L (ref 1.06–1.42)
CALCIUM SERPL-MCNC: 10 MG/DL (ref 8.7–10.5)
CALCIUM SERPL-MCNC: 9 MG/DL (ref 8.7–10.5)
CALCIUM SERPL-MCNC: 9.4 MG/DL (ref 8.7–10.5)
CHLORIDE SERPL-SCNC: 108 MMOL/L (ref 95–110)
CO2 SERPL-SCNC: 25 MMOL/L (ref 23–29)
CO2 SERPL-SCNC: 27 MMOL/L (ref 23–29)
CO2 SERPL-SCNC: 28 MMOL/L (ref 23–29)
CREAT SERPL-MCNC: 0.7 MG/DL (ref 0.5–1.4)
CREAT SERPL-MCNC: 0.8 MG/DL (ref 0.5–1.4)
CREAT SERPL-MCNC: 0.9 MG/DL (ref 0.5–1.4)
DIFFERENTIAL METHOD: ABNORMAL
EOSINOPHIL # BLD AUTO: ABNORMAL K/UL (ref 0–0.5)
EOSINOPHIL # BLD AUTO: ABNORMAL K/UL (ref 0–0.5)
EOSINOPHIL NFR BLD: 3 % (ref 0–8)
EOSINOPHIL NFR BLD: 8 % (ref 0–8)
EOSINOPHIL NFR BLD: 9 % (ref 0–8)
ERYTHROCYTE [DISTWIDTH] IN BLOOD BY AUTOMATED COUNT: 14.8 % (ref 11.5–14.5)
EST. GFR  (AFRICAN AMERICAN): >60 ML/MIN/1.73 M^2
EST. GFR  (NON AFRICAN AMERICAN): >60 ML/MIN/1.73 M^2
GLUCOSE SERPL-MCNC: 132 MG/DL (ref 70–110)
GLUCOSE SERPL-MCNC: 142 MG/DL (ref 70–110)
GLUCOSE SERPL-MCNC: 91 MG/DL (ref 70–110)
HCT VFR BLD AUTO: 32.6 % (ref 40–54)
HCT VFR BLD AUTO: 33.2 % (ref 40–54)
HCT VFR BLD AUTO: 36.3 % (ref 40–54)
HGB BLD-MCNC: 10.9 G/DL (ref 14–18)
HGB BLD-MCNC: 11.1 G/DL (ref 14–18)
HGB BLD-MCNC: 12.2 G/DL (ref 14–18)
HYPOCHROMIA BLD QL SMEAR: ABNORMAL
IMM GRANULOCYTES # BLD AUTO: ABNORMAL K/UL (ref 0–0.04)
IMM GRANULOCYTES NFR BLD AUTO: ABNORMAL % (ref 0–0.5)
LYMPHOCYTES # BLD AUTO: ABNORMAL K/UL (ref 1–4.8)
LYMPHOCYTES # BLD AUTO: ABNORMAL K/UL (ref 1–4.8)
LYMPHOCYTES NFR BLD: 12 % (ref 18–48)
LYMPHOCYTES NFR BLD: 20 % (ref 18–48)
LYMPHOCYTES NFR BLD: 8 % (ref 18–48)
MAGNESIUM SERPL-MCNC: 1.7 MG/DL (ref 1.6–2.6)
MAGNESIUM SERPL-MCNC: 1.7 MG/DL (ref 1.6–2.6)
MAGNESIUM SERPL-MCNC: 1.8 MG/DL (ref 1.6–2.6)
MCH RBC QN AUTO: 31.8 PG (ref 27–31)
MCH RBC QN AUTO: 32 PG (ref 27–31)
MCH RBC QN AUTO: 32.7 PG (ref 27–31)
MCHC RBC AUTO-ENTMCNC: 32.8 G/DL (ref 32–36)
MCHC RBC AUTO-ENTMCNC: 33.6 G/DL (ref 32–36)
MCHC RBC AUTO-ENTMCNC: 34 G/DL (ref 32–36)
MCV RBC AUTO: 95 FL (ref 82–98)
MCV RBC AUTO: 96 FL (ref 82–98)
MCV RBC AUTO: 97 FL (ref 82–98)
METAMYELOCYTES NFR BLD MANUAL: 2 %
MONOCYTES # BLD AUTO: ABNORMAL K/UL (ref 0.3–1)
MONOCYTES # BLD AUTO: ABNORMAL K/UL (ref 0.3–1)
MONOCYTES NFR BLD: 11 % (ref 4–15)
MONOCYTES NFR BLD: 9 % (ref 4–15)
MONOCYTES NFR BLD: 9 % (ref 4–15)
MYELOCYTES NFR BLD MANUAL: 1 %
MYELOCYTES NFR BLD MANUAL: 2 %
NEUTROPHILS # BLD AUTO: ABNORMAL K/UL (ref 1.8–7.7)
NEUTROPHILS # BLD AUTO: ABNORMAL K/UL (ref 1.8–7.7)
NEUTROPHILS NFR BLD: 53 % (ref 38–73)
NEUTROPHILS NFR BLD: 73 % (ref 38–73)
NEUTROPHILS NFR BLD: 74 % (ref 38–73)
NEUTS BAND NFR BLD MANUAL: 6 %
NRBC BLD-RTO: 0 /100 WBC
OVALOCYTES BLD QL SMEAR: ABNORMAL
OVALOCYTES BLD QL SMEAR: ABNORMAL
PHOSPHATE SERPL-MCNC: 1.9 MG/DL (ref 2.7–4.5)
PHOSPHATE SERPL-MCNC: 2.6 MG/DL (ref 2.7–4.5)
PHOSPHATE SERPL-MCNC: 2.6 MG/DL (ref 2.7–4.5)
PLATELET # BLD AUTO: 114 K/UL (ref 150–450)
PLATELET # BLD AUTO: 155 K/UL (ref 150–450)
PLATELET # BLD AUTO: 95 K/UL (ref 150–450)
PLATELET BLD QL SMEAR: ABNORMAL
PMV BLD AUTO: 10.1 FL (ref 9.2–12.9)
PMV BLD AUTO: 10.4 FL (ref 9.2–12.9)
PMV BLD AUTO: 9.7 FL (ref 9.2–12.9)
POIKILOCYTOSIS BLD QL SMEAR: SLIGHT
POIKILOCYTOSIS BLD QL SMEAR: SLIGHT
POLYCHROMASIA BLD QL SMEAR: ABNORMAL
POTASSIUM SERPL-SCNC: 3.4 MMOL/L (ref 3.5–5.1)
POTASSIUM SERPL-SCNC: 3.4 MMOL/L (ref 3.5–5.1)
POTASSIUM SERPL-SCNC: 3.5 MMOL/L (ref 3.5–5.1)
PROT SERPL-MCNC: 5.2 G/DL (ref 6–8.4)
PROT SERPL-MCNC: 5.4 G/DL (ref 6–8.4)
PROT SERPL-MCNC: 6.5 G/DL (ref 6–8.4)
RBC # BLD AUTO: 3.39 M/UL (ref 4.6–6.2)
RBC # BLD AUTO: 3.43 M/UL (ref 4.6–6.2)
RBC # BLD AUTO: 3.81 M/UL (ref 4.6–6.2)
SODIUM SERPL-SCNC: 141 MMOL/L (ref 136–145)
SODIUM SERPL-SCNC: 142 MMOL/L (ref 136–145)
SODIUM SERPL-SCNC: 145 MMOL/L (ref 136–145)
TOXIC GRANULES BLD QL SMEAR: PRESENT
WBC # BLD AUTO: 21.12 K/UL (ref 3.9–12.7)
WBC # BLD AUTO: 22.47 K/UL (ref 3.9–12.7)
WBC # BLD AUTO: 28.45 K/UL (ref 3.9–12.7)

## 2021-05-12 PROCEDURE — 63600175 PHARM REV CODE 636 W HCPCS: Mod: JG | Performed by: NURSE PRACTITIONER

## 2021-05-12 PROCEDURE — 38206 HARVEST AUTO STEM CELLS: CPT | Mod: ,,, | Performed by: PATHOLOGY

## 2021-05-12 PROCEDURE — 80053 COMPREHEN METABOLIC PANEL: CPT | Performed by: INTERNAL MEDICINE

## 2021-05-12 PROCEDURE — 63600175 PHARM REV CODE 636 W HCPCS: Performed by: PATHOLOGY

## 2021-05-12 PROCEDURE — 38214 VOLUME DEPLETE OF HARVEST: CPT

## 2021-05-12 PROCEDURE — 84100 ASSAY OF PHOSPHORUS: CPT | Performed by: INTERNAL MEDICINE

## 2021-05-12 PROCEDURE — 38206 HARVEST AUTO STEM CELLS: CPT

## 2021-05-12 PROCEDURE — 85027 COMPLETE CBC AUTOMATED: CPT | Mod: 91 | Performed by: INTERNAL MEDICINE

## 2021-05-12 PROCEDURE — 82330 ASSAY OF CALCIUM: CPT | Mod: 91 | Performed by: INTERNAL MEDICINE

## 2021-05-12 PROCEDURE — 96372 THER/PROPH/DIAG INJ SC/IM: CPT

## 2021-05-12 PROCEDURE — 25000003 PHARM REV CODE 250: Performed by: INTERNAL MEDICINE

## 2021-05-12 PROCEDURE — 25000003 PHARM REV CODE 250: Performed by: PATHOLOGY

## 2021-05-12 PROCEDURE — 25000003 PHARM REV CODE 250: Performed by: NURSE PRACTITIONER

## 2021-05-12 PROCEDURE — 38207 CRYOPRESERVE STEM CELLS: CPT

## 2021-05-12 PROCEDURE — 38206 PR PROG CELL HARVEST,TRANSPLANT,AUTOLOGOUS: ICD-10-PCS | Mod: ,,, | Performed by: PATHOLOGY

## 2021-05-12 PROCEDURE — 85007 BL SMEAR W/DIFF WBC COUNT: CPT | Mod: 91 | Performed by: INTERNAL MEDICINE

## 2021-05-12 PROCEDURE — 83735 ASSAY OF MAGNESIUM: CPT | Mod: 91 | Performed by: INTERNAL MEDICINE

## 2021-05-12 RX ORDER — HEPARIN SODIUM 1000 [USP'U]/ML
3100 INJECTION, SOLUTION INTRAVENOUS; SUBCUTANEOUS ONCE
Status: COMPLETED | OUTPATIENT
Start: 2021-05-12 | End: 2021-05-12

## 2021-05-12 RX ORDER — POTASSIUM CHLORIDE 20 MEQ/1
40 TABLET, EXTENDED RELEASE ORAL
Status: DISCONTINUED | OUTPATIENT
Start: 2021-05-12 | End: 2021-05-12 | Stop reason: HOSPADM

## 2021-05-12 RX ORDER — SODIUM,POTASSIUM PHOSPHATES 280-250MG
2 POWDER IN PACKET (EA) ORAL ONCE AS NEEDED
Status: COMPLETED | OUTPATIENT
Start: 2021-05-12 | End: 2021-05-12

## 2021-05-12 RX ORDER — LANOLIN ALCOHOL/MO/W.PET/CERES
800 CREAM (GRAM) TOPICAL ONCE AS NEEDED
Status: DISCONTINUED | OUTPATIENT
Start: 2021-05-12 | End: 2021-05-12 | Stop reason: HOSPADM

## 2021-05-12 RX ORDER — ACETAMINOPHEN 325 MG/1
650 TABLET ORAL ONCE AS NEEDED
Status: DISCONTINUED | OUTPATIENT
Start: 2021-05-12 | End: 2021-05-12 | Stop reason: HOSPADM

## 2021-05-12 RX ORDER — DIPHENHYDRAMINE HCL 25 MG
25 CAPSULE ORAL ONCE AS NEEDED
Status: DISCONTINUED | OUTPATIENT
Start: 2021-05-12 | End: 2021-05-12 | Stop reason: HOSPADM

## 2021-05-12 RX ORDER — POTASSIUM CHLORIDE 20 MEQ/1
40 TABLET, EXTENDED RELEASE ORAL ONCE AS NEEDED
Status: COMPLETED | OUTPATIENT
Start: 2021-05-12 | End: 2021-05-12

## 2021-05-12 RX ADMIN — POTASSIUM & SODIUM PHOSPHATES POWDER PACK 280-160-250 MG 2 PACKET: 280-160-250 PACK at 02:05

## 2021-05-12 RX ADMIN — CALCIUM GLUCONATE 4000 MG: 98 INJECTION, SOLUTION INTRAVENOUS at 09:05

## 2021-05-12 RX ADMIN — HEPARIN SODIUM 3100 UNITS: 1000 INJECTION, SOLUTION INTRAVENOUS; SUBCUTANEOUS at 01:05

## 2021-05-12 RX ADMIN — POTASSIUM CHLORIDE 40 MEQ: 1500 TABLET, EXTENDED RELEASE ORAL at 10:05

## 2021-05-12 RX ADMIN — POTASSIUM CHLORIDE 40 MEQ: 1500 TABLET, EXTENDED RELEASE ORAL at 02:05

## 2021-05-12 RX ADMIN — FILGRASTIM 1080 MCG: 480 INJECTION, SOLUTION INTRAVENOUS; SUBCUTANEOUS at 08:05

## 2021-05-13 PROBLEM — D69.59 CHEMOTHERAPY-INDUCED THROMBOCYTOPENIA: Status: ACTIVE | Noted: 2021-05-13

## 2021-05-13 PROBLEM — T45.1X5A ANTINEOPLASTIC CHEMOTHERAPY INDUCED ANEMIA: Status: ACTIVE | Noted: 2021-05-13

## 2021-05-13 PROBLEM — T45.1X5A CHEMOTHERAPY-INDUCED THROMBOCYTOPENIA: Status: ACTIVE | Noted: 2021-05-13

## 2021-05-13 PROBLEM — D64.81 ANTINEOPLASTIC CHEMOTHERAPY INDUCED ANEMIA: Status: ACTIVE | Noted: 2021-05-13

## 2021-05-13 RX ORDER — DIPHENHYDRAMINE HYDROCHLORIDE 50 MG/ML
25 INJECTION INTRAMUSCULAR; INTRAVENOUS
Status: DISCONTINUED | OUTPATIENT
Start: 2021-05-13 | End: 2021-05-26

## 2021-05-13 RX ORDER — SODIUM,POTASSIUM PHOSPHATES 280-250MG
1 POWDER IN PACKET (EA) ORAL EVERY 4 HOURS PRN
Status: DISCONTINUED | OUTPATIENT
Start: 2021-05-13 | End: 2021-05-31 | Stop reason: HOSPADM

## 2021-05-13 RX ORDER — LANOLIN ALCOHOL/MO/W.PET/CERES
400 CREAM (GRAM) TOPICAL EVERY 4 HOURS PRN
Status: DISCONTINUED | OUTPATIENT
Start: 2021-05-13 | End: 2021-05-31 | Stop reason: HOSPADM

## 2021-05-13 RX ORDER — MAG HYDROX/ALUMINUM HYD/SIMETH 200-200-20
30 SUSPENSION, ORAL (FINAL DOSE FORM) ORAL EVERY 6 HOURS PRN
Status: DISCONTINUED | OUTPATIENT
Start: 2021-05-13 | End: 2021-05-31 | Stop reason: HOSPADM

## 2021-05-13 RX ORDER — POTASSIUM CHLORIDE 20 MEQ/1
20 TABLET, EXTENDED RELEASE ORAL
Status: DISCONTINUED | OUTPATIENT
Start: 2021-05-13 | End: 2021-05-31 | Stop reason: HOSPADM

## 2021-05-13 RX ORDER — SODIUM,POTASSIUM PHOSPHATES 280-250MG
2 POWDER IN PACKET (EA) ORAL EVERY 4 HOURS PRN
Status: DISCONTINUED | OUTPATIENT
Start: 2021-05-13 | End: 2021-05-31 | Stop reason: HOSPADM

## 2021-05-13 RX ORDER — PANTOPRAZOLE SODIUM 40 MG/1
40 TABLET, DELAYED RELEASE ORAL DAILY
Status: DISCONTINUED | OUTPATIENT
Start: 2021-05-14 | End: 2021-05-31 | Stop reason: HOSPADM

## 2021-05-13 RX ORDER — LANOLIN ALCOHOL/MO/W.PET/CERES
800 CREAM (GRAM) TOPICAL EVERY 4 HOURS PRN
Status: DISCONTINUED | OUTPATIENT
Start: 2021-05-13 | End: 2021-05-31 | Stop reason: HOSPADM

## 2021-05-14 ENCOUNTER — OFFICE VISIT (OUTPATIENT)
Dept: HEMATOLOGY/ONCOLOGY | Facility: CLINIC | Age: 61
End: 2021-05-14
Payer: MEDICAID

## 2021-05-14 ENCOUNTER — INFUSION (OUTPATIENT)
Dept: INFUSION THERAPY | Facility: HOSPITAL | Age: 61
End: 2021-05-14
Payer: MEDICAID

## 2021-05-14 VITALS
BODY MASS INDEX: 35.54 KG/M2 | RESPIRATION RATE: 18 BRPM | WEIGHT: 226.44 LBS | DIASTOLIC BLOOD PRESSURE: 84 MMHG | SYSTOLIC BLOOD PRESSURE: 137 MMHG | TEMPERATURE: 98 F | HEART RATE: 68 BPM | HEIGHT: 67 IN | OXYGEN SATURATION: 97 %

## 2021-05-14 DIAGNOSIS — D84.81 IMMUNODEFICIENCY SECONDARY TO NEOPLASM: ICD-10-CM

## 2021-05-14 DIAGNOSIS — Z76.82 STEM CELL TRANSPLANT CANDIDATE: Primary | ICD-10-CM

## 2021-05-14 DIAGNOSIS — C90.00 MULTIPLE MYELOMA, REMISSION STATUS UNSPECIFIED: Primary | ICD-10-CM

## 2021-05-14 DIAGNOSIS — D75.9 DRUG-INDUCED CYTOPENIA: ICD-10-CM

## 2021-05-14 DIAGNOSIS — T50.905A DRUG-INDUCED CYTOPENIA: ICD-10-CM

## 2021-05-14 DIAGNOSIS — C90.00 MULTIPLE MYELOMA, REMISSION STATUS UNSPECIFIED: ICD-10-CM

## 2021-05-14 DIAGNOSIS — Z01.818 PRE-OP TESTING: ICD-10-CM

## 2021-05-14 DIAGNOSIS — Z76.82 STEM CELL TRANSPLANT CANDIDATE: ICD-10-CM

## 2021-05-14 DIAGNOSIS — I10 ESSENTIAL HYPERTENSION: ICD-10-CM

## 2021-05-14 DIAGNOSIS — D49.9 IMMUNODEFICIENCY SECONDARY TO NEOPLASM: ICD-10-CM

## 2021-05-14 LAB
ABO + RH BLD: NORMAL
ACANTHOCYTES BLD QL SMEAR: PRESENT
ALBUMIN SERPL BCP-MCNC: 3.7 G/DL (ref 3.5–5.2)
ALP SERPL-CCNC: 206 U/L (ref 55–135)
ALT SERPL W/O P-5'-P-CCNC: 13 U/L (ref 10–44)
ANION GAP SERPL CALC-SCNC: 6 MMOL/L (ref 8–16)
ANISOCYTOSIS BLD QL SMEAR: SLIGHT
AST SERPL-CCNC: 22 U/L (ref 10–40)
BASOPHILS # BLD AUTO: ABNORMAL K/UL (ref 0–0.2)
BASOPHILS NFR BLD: 1 % (ref 0–1.9)
BILIRUB SERPL-MCNC: 0.3 MG/DL (ref 0.1–1)
BLD GP AB SCN CELLS X3 SERPL QL: NORMAL
BUN SERPL-MCNC: 13 MG/DL (ref 8–23)
CALCIUM SERPL-MCNC: 9.3 MG/DL (ref 8.7–10.5)
CHLORIDE SERPL-SCNC: 108 MMOL/L (ref 95–110)
CO2 SERPL-SCNC: 27 MMOL/L (ref 23–29)
CREAT SERPL-MCNC: 1.1 MG/DL (ref 0.5–1.4)
DIFFERENTIAL METHOD: ABNORMAL
DOHLE BOD BLD QL SMEAR: PRESENT
EOSINOPHIL # BLD AUTO: ABNORMAL K/UL (ref 0–0.5)
EOSINOPHIL NFR BLD: 0 % (ref 0–8)
ERYTHROCYTE [DISTWIDTH] IN BLOOD BY AUTOMATED COUNT: 15.1 % (ref 11.5–14.5)
EST. GFR  (AFRICAN AMERICAN): >60 ML/MIN/1.73 M^2
EST. GFR  (NON AFRICAN AMERICAN): >60 ML/MIN/1.73 M^2
GLUCOSE SERPL-MCNC: 94 MG/DL (ref 70–110)
HCT VFR BLD AUTO: 37.5 % (ref 40–54)
HGB BLD-MCNC: 12.4 G/DL (ref 14–18)
HYPOCHROMIA BLD QL SMEAR: ABNORMAL
IMM GRANULOCYTES # BLD AUTO: ABNORMAL K/UL (ref 0–0.04)
IMM GRANULOCYTES NFR BLD AUTO: ABNORMAL % (ref 0–0.5)
LYMPHOCYTES # BLD AUTO: ABNORMAL K/UL (ref 1–4.8)
LYMPHOCYTES NFR BLD: 11 % (ref 18–48)
MCH RBC QN AUTO: 32.1 PG (ref 27–31)
MCHC RBC AUTO-ENTMCNC: 33.1 G/DL (ref 32–36)
MCV RBC AUTO: 97 FL (ref 82–98)
METAMYELOCYTES NFR BLD MANUAL: 1 %
MONOCYTES # BLD AUTO: ABNORMAL K/UL (ref 0.3–1)
MONOCYTES NFR BLD: 13 % (ref 4–15)
NEUTROPHILS NFR BLD: 69 % (ref 38–73)
NEUTS BAND NFR BLD MANUAL: 5 %
NRBC BLD-RTO: 0 /100 WBC
OVALOCYTES BLD QL SMEAR: ABNORMAL
PLATELET # BLD AUTO: 116 K/UL (ref 150–450)
PLATELET BLD QL SMEAR: ABNORMAL
PMV BLD AUTO: 10.2 FL (ref 9.2–12.9)
POIKILOCYTOSIS BLD QL SMEAR: SLIGHT
POLYCHROMASIA BLD QL SMEAR: ABNORMAL
POTASSIUM SERPL-SCNC: 4.4 MMOL/L (ref 3.5–5.1)
PROT SERPL-MCNC: 6.5 G/DL (ref 6–8.4)
RBC # BLD AUTO: 3.86 M/UL (ref 4.6–6.2)
SODIUM SERPL-SCNC: 141 MMOL/L (ref 136–145)
TOXIC GRANULES BLD QL SMEAR: PRESENT
WBC # BLD AUTO: 19.48 K/UL (ref 3.9–12.7)

## 2021-05-14 PROCEDURE — 85007 BL SMEAR W/DIFF WBC COUNT: CPT | Performed by: INTERNAL MEDICINE

## 2021-05-14 PROCEDURE — 86900 BLOOD TYPING SEROLOGIC ABO: CPT | Performed by: INTERNAL MEDICINE

## 2021-05-14 PROCEDURE — 86900 BLOOD TYPING SEROLOGIC ABO: CPT | Mod: 91 | Performed by: NURSE PRACTITIONER

## 2021-05-14 PROCEDURE — 99999 PR PBB SHADOW E&M-EST. PATIENT-LVL V: ICD-10-PCS | Mod: PBBFAC,BMT,, | Performed by: NURSE PRACTITIONER

## 2021-05-14 PROCEDURE — A4216 STERILE WATER/SALINE, 10 ML: HCPCS | Performed by: INTERNAL MEDICINE

## 2021-05-14 PROCEDURE — 99215 OFFICE O/P EST HI 40 MIN: CPT | Mod: S$PBB,BMT,, | Performed by: NURSE PRACTITIONER

## 2021-05-14 PROCEDURE — 99215 OFFICE O/P EST HI 40 MIN: CPT | Mod: PBBFAC,25 | Performed by: NURSE PRACTITIONER

## 2021-05-14 PROCEDURE — 99215 PR OFFICE/OUTPT VISIT, EST, LEVL V, 40-54 MIN: ICD-10-PCS | Mod: S$PBB,BMT,, | Performed by: NURSE PRACTITIONER

## 2021-05-14 PROCEDURE — 80053 COMPREHEN METABOLIC PANEL: CPT | Performed by: INTERNAL MEDICINE

## 2021-05-14 PROCEDURE — 85027 COMPLETE CBC AUTOMATED: CPT | Performed by: INTERNAL MEDICINE

## 2021-05-14 PROCEDURE — 36592 COLLECT BLOOD FROM PICC: CPT

## 2021-05-14 PROCEDURE — 99999 PR PBB SHADOW E&M-EST. PATIENT-LVL V: CPT | Mod: PBBFAC,BMT,, | Performed by: NURSE PRACTITIONER

## 2021-05-14 PROCEDURE — 25000003 PHARM REV CODE 250: Performed by: INTERNAL MEDICINE

## 2021-05-14 PROCEDURE — 63600175 PHARM REV CODE 636 W HCPCS: Performed by: INTERNAL MEDICINE

## 2021-05-14 RX ORDER — DIPHENHYDRAMINE HYDROCHLORIDE 50 MG/ML
50 INJECTION INTRAMUSCULAR; INTRAVENOUS ONCE AS NEEDED
Status: COMPLETED | OUTPATIENT
Start: 2021-05-17 | End: 2021-05-17

## 2021-05-14 RX ORDER — PROCHLORPERAZINE EDISYLATE 5 MG/ML
10 INJECTION INTRAMUSCULAR; INTRAVENOUS EVERY 6 HOURS PRN
Status: CANCELLED | OUTPATIENT
Start: 2021-05-15

## 2021-05-14 RX ORDER — SODIUM CHLORIDE AND POTASSIUM CHLORIDE 150; 900 MG/100ML; MG/100ML
150 INJECTION, SOLUTION INTRAVENOUS CONTINUOUS
Status: DISCONTINUED | OUTPATIENT
Start: 2021-05-17 | End: 2021-05-15

## 2021-05-14 RX ORDER — ONDANSETRON 4 MG/1
16 TABLET, ORALLY DISINTEGRATING ORAL
Status: CANCELLED | OUTPATIENT
Start: 2021-05-16

## 2021-05-14 RX ORDER — HYDROCODONE BITARTRATE AND ACETAMINOPHEN 500; 5 MG/1; MG/1
TABLET ORAL
Status: DISCONTINUED | OUTPATIENT
Start: 2021-05-17 | End: 2021-05-26

## 2021-05-14 RX ORDER — DEXAMETHASONE 0.5 MG/1
12 TABLET ORAL
Status: CANCELLED | OUTPATIENT
Start: 2021-05-16

## 2021-05-14 RX ORDER — SODIUM CHLORIDE 0.9 % (FLUSH) 0.9 %
10 SYRINGE (ML) INJECTION
Status: CANCELLED | OUTPATIENT
Start: 2021-05-15

## 2021-05-14 RX ORDER — HYDROCODONE BITARTRATE AND ACETAMINOPHEN 500; 5 MG/1; MG/1
TABLET ORAL
Status: DISCONTINUED | OUTPATIENT
Start: 2021-05-17 | End: 2021-05-16

## 2021-05-14 RX ORDER — MEPERIDINE HYDROCHLORIDE 50 MG/ML
50 INJECTION INTRAMUSCULAR; INTRAVENOUS; SUBCUTANEOUS ONCE AS NEEDED
Status: COMPLETED | OUTPATIENT
Start: 2021-05-17 | End: 2021-05-17

## 2021-05-14 RX ORDER — SODIUM CHLORIDE 0.9 % (FLUSH) 0.9 %
10 SYRINGE (ML) INJECTION
Status: DISCONTINUED | OUTPATIENT
Start: 2021-05-14 | End: 2021-05-14 | Stop reason: HOSPADM

## 2021-05-14 RX ORDER — LORAZEPAM 2 MG/ML
1 INJECTION INTRAMUSCULAR EVERY 6 HOURS PRN
Status: CANCELLED | OUTPATIENT
Start: 2021-05-15

## 2021-05-14 RX ORDER — FUROSEMIDE 10 MG/ML
100 INJECTION INTRAMUSCULAR; INTRAVENOUS ONCE AS NEEDED
Status: COMPLETED | OUTPATIENT
Start: 2021-05-17 | End: 2021-05-17

## 2021-05-14 RX ORDER — SODIUM CHLORIDE AND POTASSIUM CHLORIDE 150; 900 MG/100ML; MG/100ML
INJECTION, SOLUTION INTRAVENOUS CONTINUOUS
Status: CANCELLED | OUTPATIENT
Start: 2021-05-15

## 2021-05-14 RX ORDER — LEVOFLOXACIN 500 MG/1
500 TABLET, FILM COATED ORAL DAILY
Status: CANCELLED | OUTPATIENT
Start: 2021-05-16

## 2021-05-14 RX ORDER — HEPARIN 100 UNIT/ML
500 SYRINGE INTRAVENOUS
Status: CANCELLED | OUTPATIENT
Start: 2021-05-14

## 2021-05-14 RX ORDER — DIPHENHYDRAMINE HYDROCHLORIDE 50 MG/ML
50 INJECTION INTRAMUSCULAR; INTRAVENOUS ONCE
Status: COMPLETED | OUTPATIENT
Start: 2021-05-17 | End: 2021-05-17

## 2021-05-14 RX ORDER — CLONIDINE HYDROCHLORIDE 0.1 MG/1
0.1 TABLET ORAL ONCE AS NEEDED
Status: COMPLETED | OUTPATIENT
Start: 2021-05-17 | End: 2021-05-17

## 2021-05-14 RX ORDER — NITROGLYCERIN 0.4 MG/1
0.4 TABLET SUBLINGUAL ONCE AS NEEDED
Status: DISCONTINUED | OUTPATIENT
Start: 2021-05-17 | End: 2021-05-18

## 2021-05-14 RX ORDER — ACYCLOVIR 200 MG/1
800 CAPSULE ORAL 2 TIMES DAILY
Status: CANCELLED | OUTPATIENT
Start: 2021-05-16

## 2021-05-14 RX ORDER — ONDANSETRON 4 MG/1
8 TABLET, ORALLY DISINTEGRATING ORAL EVERY 8 HOURS PRN
Status: CANCELLED | OUTPATIENT
Start: 2021-05-15

## 2021-05-14 RX ORDER — HEPARIN 100 UNIT/ML
500 SYRINGE INTRAVENOUS
Status: DISCONTINUED | OUTPATIENT
Start: 2021-05-14 | End: 2021-05-14 | Stop reason: HOSPADM

## 2021-05-14 RX ORDER — HEPARIN SODIUM 1000 [USP'U]/ML
1600 INJECTION, SOLUTION INTRAVENOUS; SUBCUTANEOUS
Status: CANCELLED | OUTPATIENT
Start: 2021-05-15

## 2021-05-14 RX ORDER — SODIUM CHLORIDE 0.9 % (FLUSH) 0.9 %
10 SYRINGE (ML) INJECTION
Status: CANCELLED | OUTPATIENT
Start: 2021-05-14

## 2021-05-14 RX ORDER — FLUCONAZOLE 50 MG/1
400 TABLET ORAL DAILY
Status: CANCELLED | OUTPATIENT
Start: 2021-05-16

## 2021-05-14 RX ORDER — EPINEPHRINE 1 MG/ML
0.5 INJECTION, SOLUTION INTRACARDIAC; INTRAMUSCULAR; INTRAVENOUS; SUBCUTANEOUS ONCE AS NEEDED
Status: COMPLETED | OUTPATIENT
Start: 2021-05-17 | End: 2021-05-17

## 2021-05-14 RX ADMIN — Medication 10 ML: at 01:05

## 2021-05-14 RX ADMIN — HEPARIN 500 UNITS: 100 SYRINGE at 01:05

## 2021-05-15 ENCOUNTER — HOSPITAL ENCOUNTER (INPATIENT)
Facility: HOSPITAL | Age: 61
LOS: 16 days | Discharge: HOME | DRG: 016 | End: 2021-05-31
Attending: INTERNAL MEDICINE | Admitting: INTERNAL MEDICINE
Payer: MEDICAID

## 2021-05-15 DIAGNOSIS — D69.59 CHEMOTHERAPY-INDUCED THROMBOCYTOPENIA: Primary | ICD-10-CM

## 2021-05-15 DIAGNOSIS — E87.8 ELECTROLYTE ABNORMALITY: ICD-10-CM

## 2021-05-15 DIAGNOSIS — C90.00 MULTIPLE MYELOMA, REMISSION STATUS UNSPECIFIED: ICD-10-CM

## 2021-05-15 DIAGNOSIS — Z76.82 STEM CELL TRANSPLANT CANDIDATE: ICD-10-CM

## 2021-05-15 DIAGNOSIS — T45.1X5A CHEMOTHERAPY-INDUCED THROMBOCYTOPENIA: Primary | ICD-10-CM

## 2021-05-15 DIAGNOSIS — C90.00 MULTIPLE MYELOMA: ICD-10-CM

## 2021-05-15 DIAGNOSIS — R07.9 CHEST PAIN: ICD-10-CM

## 2021-05-15 DIAGNOSIS — Z94.84 HISTORY OF AUTO STEM CELL TRANSPLANT: ICD-10-CM

## 2021-05-15 DIAGNOSIS — F41.9 ANXIETY: ICD-10-CM

## 2021-05-15 LAB
ALBUMIN SERPL BCP-MCNC: 3.5 G/DL (ref 3.5–5.2)
ALP SERPL-CCNC: 109 U/L (ref 55–135)
ALT SERPL W/O P-5'-P-CCNC: 13 U/L (ref 10–44)
ANION GAP SERPL CALC-SCNC: 9 MMOL/L (ref 8–16)
AST SERPL-CCNC: 18 U/L (ref 10–40)
BASOPHILS # BLD AUTO: 0.14 K/UL (ref 0–0.2)
BASOPHILS NFR BLD: 2.1 % (ref 0–1.9)
BILIRUB SERPL-MCNC: 0.3 MG/DL (ref 0.1–1)
BUN SERPL-MCNC: 16 MG/DL (ref 8–23)
CALCIUM SERPL-MCNC: 8.9 MG/DL (ref 8.7–10.5)
CHLORIDE SERPL-SCNC: 110 MMOL/L (ref 95–110)
CO2 SERPL-SCNC: 23 MMOL/L (ref 23–29)
CREAT SERPL-MCNC: 1 MG/DL (ref 0.5–1.4)
DIFFERENTIAL METHOD: ABNORMAL
DOHLE BOD BLD QL SMEAR: PRESENT
EOSINOPHIL # BLD AUTO: 0.3 K/UL (ref 0–0.5)
EOSINOPHIL NFR BLD: 3.7 % (ref 0–8)
ERYTHROCYTE [DISTWIDTH] IN BLOOD BY AUTOMATED COUNT: 15.1 % (ref 11.5–14.5)
EST. GFR  (AFRICAN AMERICAN): >60 ML/MIN/1.73 M^2
EST. GFR  (NON AFRICAN AMERICAN): >60 ML/MIN/1.73 M^2
GLUCOSE SERPL-MCNC: 86 MG/DL (ref 70–110)
HCT VFR BLD AUTO: 38 % (ref 40–54)
HGB BLD-MCNC: 12.6 G/DL (ref 14–18)
IMM GRANULOCYTES # BLD AUTO: 0.31 K/UL (ref 0–0.04)
IMM GRANULOCYTES NFR BLD AUTO: 4.6 % (ref 0–0.5)
LYMPHOCYTES # BLD AUTO: 1.6 K/UL (ref 1–4.8)
LYMPHOCYTES NFR BLD: 23.4 % (ref 18–48)
MAGNESIUM SERPL-MCNC: 1.8 MG/DL (ref 1.6–2.6)
MCH RBC QN AUTO: 31.9 PG (ref 27–31)
MCHC RBC AUTO-ENTMCNC: 33.2 G/DL (ref 32–36)
MCV RBC AUTO: 96 FL (ref 82–98)
MONOCYTES # BLD AUTO: 0.8 K/UL (ref 0.3–1)
MONOCYTES NFR BLD: 11.5 % (ref 4–15)
NEUTROPHILS # BLD AUTO: 3.7 K/UL (ref 1.8–7.7)
NEUTROPHILS NFR BLD: 54.7 % (ref 38–73)
NRBC BLD-RTO: 0 /100 WBC
PHOSPHATE SERPL-MCNC: 3.7 MG/DL (ref 2.7–4.5)
PLATELET # BLD AUTO: 117 K/UL (ref 150–450)
PLATELET BLD QL SMEAR: ABNORMAL
PMV BLD AUTO: 10.3 FL (ref 9.2–12.9)
POTASSIUM SERPL-SCNC: 4.2 MMOL/L (ref 3.5–5.1)
PROT SERPL-MCNC: 6.4 G/DL (ref 6–8.4)
RBC # BLD AUTO: 3.95 M/UL (ref 4.6–6.2)
SODIUM SERPL-SCNC: 142 MMOL/L (ref 136–145)
TOXIC GRANULES BLD QL SMEAR: PRESENT
WBC # BLD AUTO: 6.68 K/UL (ref 3.9–12.7)
WBC TOXIC VACUOLES BLD QL SMEAR: PRESENT

## 2021-05-15 PROCEDURE — 36415 COLL VENOUS BLD VENIPUNCTURE: CPT | Performed by: NURSE PRACTITIONER

## 2021-05-15 PROCEDURE — 63600175 PHARM REV CODE 636 W HCPCS: Performed by: STUDENT IN AN ORGANIZED HEALTH CARE EDUCATION/TRAINING PROGRAM

## 2021-05-15 PROCEDURE — 25000003 PHARM REV CODE 250: Performed by: INTERNAL MEDICINE

## 2021-05-15 PROCEDURE — 99223 PR INITIAL HOSPITAL CARE,LEVL III: ICD-10-PCS | Mod: ,,, | Performed by: INTERNAL MEDICINE

## 2021-05-15 PROCEDURE — 25000003 PHARM REV CODE 250: Performed by: STUDENT IN AN ORGANIZED HEALTH CARE EDUCATION/TRAINING PROGRAM

## 2021-05-15 PROCEDURE — 83735 ASSAY OF MAGNESIUM: CPT | Performed by: NURSE PRACTITIONER

## 2021-05-15 PROCEDURE — 99223 1ST HOSP IP/OBS HIGH 75: CPT | Mod: ,,, | Performed by: INTERNAL MEDICINE

## 2021-05-15 PROCEDURE — 84100 ASSAY OF PHOSPHORUS: CPT | Performed by: NURSE PRACTITIONER

## 2021-05-15 PROCEDURE — 63600175 PHARM REV CODE 636 W HCPCS: Performed by: INTERNAL MEDICINE

## 2021-05-15 PROCEDURE — 20600001 HC STEP DOWN PRIVATE ROOM

## 2021-05-15 PROCEDURE — 80053 COMPREHEN METABOLIC PANEL: CPT | Performed by: NURSE PRACTITIONER

## 2021-05-15 PROCEDURE — 94799 UNLISTED PULMONARY SVC/PX: CPT

## 2021-05-15 PROCEDURE — 99900035 HC TECH TIME PER 15 MIN (STAT)

## 2021-05-15 PROCEDURE — 85025 COMPLETE CBC W/AUTO DIFF WBC: CPT | Performed by: NURSE PRACTITIONER

## 2021-05-15 RX ORDER — LEVOFLOXACIN 500 MG/1
500 TABLET, FILM COATED ORAL DAILY
Status: DISCONTINUED | OUTPATIENT
Start: 2021-05-16 | End: 2021-05-15

## 2021-05-15 RX ORDER — ONDANSETRON 8 MG/1
8 TABLET, ORALLY DISINTEGRATING ORAL EVERY 8 HOURS PRN
Status: DISCONTINUED | OUTPATIENT
Start: 2021-05-15 | End: 2021-05-21

## 2021-05-15 RX ORDER — IBUPROFEN 200 MG
24 TABLET ORAL
Status: DISCONTINUED | OUTPATIENT
Start: 2021-05-15 | End: 2021-05-31 | Stop reason: HOSPADM

## 2021-05-15 RX ORDER — LORAZEPAM 2 MG/ML
1 INJECTION INTRAMUSCULAR EVERY 6 HOURS PRN
Status: DISCONTINUED | OUTPATIENT
Start: 2021-05-15 | End: 2021-05-31 | Stop reason: HOSPADM

## 2021-05-15 RX ORDER — PROCHLORPERAZINE EDISYLATE 5 MG/ML
10 INJECTION INTRAMUSCULAR; INTRAVENOUS EVERY 6 HOURS PRN
Status: DISCONTINUED | OUTPATIENT
Start: 2021-05-15 | End: 2021-05-31 | Stop reason: HOSPADM

## 2021-05-15 RX ORDER — SODIUM CHLORIDE 0.9 % (FLUSH) 0.9 %
10 SYRINGE (ML) INJECTION
Status: DISCONTINUED | OUTPATIENT
Start: 2021-05-15 | End: 2021-05-31 | Stop reason: HOSPADM

## 2021-05-15 RX ORDER — ONDANSETRON 8 MG/1
16 TABLET, ORALLY DISINTEGRATING ORAL
Status: COMPLETED | OUTPATIENT
Start: 2021-05-16 | End: 2021-05-17

## 2021-05-15 RX ORDER — FLUCONAZOLE 200 MG/1
400 TABLET ORAL DAILY
Status: DISCONTINUED | OUTPATIENT
Start: 2021-05-16 | End: 2021-05-15

## 2021-05-15 RX ORDER — HEPARIN SODIUM 1000 [USP'U]/ML
1600 INJECTION, SOLUTION INTRAVENOUS; SUBCUTANEOUS
Status: DISCONTINUED | OUTPATIENT
Start: 2021-05-15 | End: 2021-05-16

## 2021-05-15 RX ORDER — ONDANSETRON 2 MG/ML
4 INJECTION INTRAMUSCULAR; INTRAVENOUS EVERY 8 HOURS PRN
Status: DISCONTINUED | OUTPATIENT
Start: 2021-05-15 | End: 2021-05-21

## 2021-05-15 RX ORDER — SODIUM CHLORIDE AND POTASSIUM CHLORIDE 150; 900 MG/100ML; MG/100ML
INJECTION, SOLUTION INTRAVENOUS CONTINUOUS
Status: DISCONTINUED | OUTPATIENT
Start: 2021-05-15 | End: 2021-05-18

## 2021-05-15 RX ORDER — IBUPROFEN 200 MG
16 TABLET ORAL
Status: DISCONTINUED | OUTPATIENT
Start: 2021-05-15 | End: 2021-05-31 | Stop reason: HOSPADM

## 2021-05-15 RX ORDER — FLUCONAZOLE 200 MG/1
400 TABLET ORAL DAILY
Status: DISCONTINUED | OUTPATIENT
Start: 2021-05-16 | End: 2021-05-30

## 2021-05-15 RX ORDER — NIFEDIPINE 30 MG/1
30 TABLET, EXTENDED RELEASE ORAL DAILY
Refills: 3 | Status: DISCONTINUED | OUTPATIENT
Start: 2021-05-16 | End: 2021-05-31 | Stop reason: HOSPADM

## 2021-05-15 RX ORDER — ENOXAPARIN SODIUM 100 MG/ML
40 INJECTION SUBCUTANEOUS EVERY 24 HOURS
Status: DISCONTINUED | OUTPATIENT
Start: 2021-05-15 | End: 2021-05-23

## 2021-05-15 RX ORDER — DEXAMETHASONE 4 MG/1
12 TABLET ORAL
Status: COMPLETED | OUTPATIENT
Start: 2021-05-16 | End: 2021-05-16

## 2021-05-15 RX ORDER — SODIUM CHLORIDE AND POTASSIUM CHLORIDE 150; 900 MG/100ML; MG/100ML
150 INJECTION, SOLUTION INTRAVENOUS CONTINUOUS
Status: DISCONTINUED | OUTPATIENT
Start: 2021-05-15 | End: 2021-05-18

## 2021-05-15 RX ORDER — MUPIROCIN 20 MG/G
OINTMENT TOPICAL 2 TIMES DAILY
Status: COMPLETED | OUTPATIENT
Start: 2021-05-15 | End: 2021-05-20

## 2021-05-15 RX ORDER — ACYCLOVIR 200 MG/1
800 CAPSULE ORAL 2 TIMES DAILY
Status: DISCONTINUED | OUTPATIENT
Start: 2021-05-16 | End: 2021-05-31 | Stop reason: HOSPADM

## 2021-05-15 RX ORDER — HEPARIN SODIUM 1000 [USP'U]/ML
1600 INJECTION, SOLUTION INTRAVENOUS; SUBCUTANEOUS
Status: DISCONTINUED | OUTPATIENT
Start: 2021-05-15 | End: 2021-05-31 | Stop reason: HOSPADM

## 2021-05-15 RX ORDER — CARVEDILOL 25 MG/1
25 TABLET ORAL 2 TIMES DAILY
Status: DISCONTINUED | OUTPATIENT
Start: 2021-05-15 | End: 2021-05-31 | Stop reason: HOSPADM

## 2021-05-15 RX ORDER — ACYCLOVIR 800 MG/1
800 TABLET ORAL 2 TIMES DAILY
Status: DISCONTINUED | OUTPATIENT
Start: 2021-05-16 | End: 2021-05-15

## 2021-05-15 RX ORDER — LOSARTAN POTASSIUM 50 MG/1
50 TABLET ORAL DAILY
Status: DISCONTINUED | OUTPATIENT
Start: 2021-05-16 | End: 2021-05-31 | Stop reason: HOSPADM

## 2021-05-15 RX ORDER — LEVOFLOXACIN 500 MG/1
500 TABLET, FILM COATED ORAL DAILY
Status: DISCONTINUED | OUTPATIENT
Start: 2021-05-16 | End: 2021-05-30

## 2021-05-15 RX ORDER — SODIUM CHLORIDE 0.9 % (FLUSH) 0.9 %
10 SYRINGE (ML) INJECTION
Status: DISCONTINUED | OUTPATIENT
Start: 2021-05-15 | End: 2021-05-17

## 2021-05-15 RX ORDER — GLUCAGON 1 MG
1 KIT INJECTION
Status: DISCONTINUED | OUTPATIENT
Start: 2021-05-15 | End: 2021-05-31 | Stop reason: HOSPADM

## 2021-05-15 RX ADMIN — HEPARIN SODIUM 1600 UNITS: 1000 INJECTION, SOLUTION INTRAVENOUS; SUBCUTANEOUS at 05:05

## 2021-05-15 RX ADMIN — SODIUM CHLORIDE AND POTASSIUM CHLORIDE: 9; 1.49 INJECTION, SOLUTION INTRAVENOUS at 10:05

## 2021-05-15 RX ADMIN — ENOXAPARIN SODIUM 40 MG: 40 INJECTION SUBCUTANEOUS at 05:05

## 2021-05-15 RX ADMIN — CARVEDILOL 25 MG: 25 TABLET, FILM COATED ORAL at 08:05

## 2021-05-15 RX ADMIN — MUPIROCIN: 20 OINTMENT TOPICAL at 08:05

## 2021-05-16 LAB
ALBUMIN SERPL BCP-MCNC: 3.1 G/DL (ref 3.5–5.2)
ALP SERPL-CCNC: 93 U/L (ref 55–135)
ALT SERPL W/O P-5'-P-CCNC: 12 U/L (ref 10–44)
ANION GAP SERPL CALC-SCNC: 7 MMOL/L (ref 8–16)
ANISOCYTOSIS BLD QL SMEAR: SLIGHT
APTT BLDCRRT: 23.9 SEC (ref 21–32)
AST SERPL-CCNC: 16 U/L (ref 10–40)
BASOPHILS NFR BLD: 1 % (ref 0–1.9)
BILIRUB SERPL-MCNC: 0.2 MG/DL (ref 0.1–1)
BUN SERPL-MCNC: 15 MG/DL (ref 8–23)
CALCIUM SERPL-MCNC: 8 MG/DL (ref 8.7–10.5)
CHLORIDE SERPL-SCNC: 110 MMOL/L (ref 95–110)
CO2 SERPL-SCNC: 24 MMOL/L (ref 23–29)
CREAT SERPL-MCNC: 0.8 MG/DL (ref 0.5–1.4)
DIFFERENTIAL METHOD: ABNORMAL
DOHLE BOD BLD QL SMEAR: PRESENT
EOSINOPHIL NFR BLD: 7 % (ref 0–8)
ERYTHROCYTE [DISTWIDTH] IN BLOOD BY AUTOMATED COUNT: 15.2 % (ref 11.5–14.5)
EST. GFR  (AFRICAN AMERICAN): >60 ML/MIN/1.73 M^2
EST. GFR  (NON AFRICAN AMERICAN): >60 ML/MIN/1.73 M^2
GLUCOSE SERPL-MCNC: 110 MG/DL (ref 70–110)
HCT VFR BLD AUTO: 34.1 % (ref 40–54)
HGB BLD-MCNC: 10.9 G/DL (ref 14–18)
IMM GRANULOCYTES # BLD AUTO: ABNORMAL K/UL (ref 0–0.04)
IMM GRANULOCYTES NFR BLD AUTO: ABNORMAL % (ref 0–0.5)
INR PPP: 1 (ref 0.8–1.2)
LYMPHOCYTES NFR BLD: 24 % (ref 18–48)
MAGNESIUM SERPL-MCNC: 1.8 MG/DL (ref 1.6–2.6)
MCH RBC QN AUTO: 31.3 PG (ref 27–31)
MCHC RBC AUTO-ENTMCNC: 32 G/DL (ref 32–36)
MCV RBC AUTO: 98 FL (ref 82–98)
METAMYELOCYTES NFR BLD MANUAL: 2 %
MONOCYTES NFR BLD: 11 % (ref 4–15)
MYELOCYTES NFR BLD MANUAL: 1 %
NEUTROPHILS NFR BLD: 46 % (ref 38–73)
NEUTS BAND NFR BLD MANUAL: 8 %
NRBC BLD-RTO: 0 /100 WBC
OVALOCYTES BLD QL SMEAR: ABNORMAL
PHOSPHATE SERPL-MCNC: 3.1 MG/DL (ref 2.7–4.5)
PLATELET # BLD AUTO: 104 K/UL (ref 150–450)
PLATELET BLD QL SMEAR: ABNORMAL
PMV BLD AUTO: 10.5 FL (ref 9.2–12.9)
POIKILOCYTOSIS BLD QL SMEAR: SLIGHT
POTASSIUM SERPL-SCNC: 3.9 MMOL/L (ref 3.5–5.1)
PROT SERPL-MCNC: 5.7 G/DL (ref 6–8.4)
PROTHROMBIN TIME: 11.2 SEC (ref 9–12.5)
RBC # BLD AUTO: 3.48 M/UL (ref 4.6–6.2)
SODIUM SERPL-SCNC: 141 MMOL/L (ref 136–145)
TOXIC GRANULES BLD QL SMEAR: PRESENT
WBC # BLD AUTO: 4.31 K/UL (ref 3.9–12.7)

## 2021-05-16 PROCEDURE — 99232 SBSQ HOSP IP/OBS MODERATE 35: CPT | Mod: ,,, | Performed by: INTERNAL MEDICINE

## 2021-05-16 PROCEDURE — 85610 PROTHROMBIN TIME: CPT | Performed by: STUDENT IN AN ORGANIZED HEALTH CARE EDUCATION/TRAINING PROGRAM

## 2021-05-16 PROCEDURE — 85007 BL SMEAR W/DIFF WBC COUNT: CPT | Performed by: NURSE PRACTITIONER

## 2021-05-16 PROCEDURE — 85730 THROMBOPLASTIN TIME PARTIAL: CPT | Performed by: STUDENT IN AN ORGANIZED HEALTH CARE EDUCATION/TRAINING PROGRAM

## 2021-05-16 PROCEDURE — 25000003 PHARM REV CODE 250: Performed by: STUDENT IN AN ORGANIZED HEALTH CARE EDUCATION/TRAINING PROGRAM

## 2021-05-16 PROCEDURE — 20600001 HC STEP DOWN PRIVATE ROOM

## 2021-05-16 PROCEDURE — 97535 SELF CARE MNGMENT TRAINING: CPT

## 2021-05-16 PROCEDURE — 25000003 PHARM REV CODE 250: Performed by: INTERNAL MEDICINE

## 2021-05-16 PROCEDURE — 99232 PR SUBSEQUENT HOSPITAL CARE,LEVL II: ICD-10-PCS | Mod: ,,, | Performed by: INTERNAL MEDICINE

## 2021-05-16 PROCEDURE — 97161 PT EVAL LOW COMPLEX 20 MIN: CPT

## 2021-05-16 PROCEDURE — 80053 COMPREHEN METABOLIC PANEL: CPT | Performed by: NURSE PRACTITIONER

## 2021-05-16 PROCEDURE — 85027 COMPLETE CBC AUTOMATED: CPT | Performed by: NURSE PRACTITIONER

## 2021-05-16 PROCEDURE — 63600175 PHARM REV CODE 636 W HCPCS: Performed by: INTERNAL MEDICINE

## 2021-05-16 PROCEDURE — 97165 OT EVAL LOW COMPLEX 30 MIN: CPT

## 2021-05-16 PROCEDURE — 25000003 PHARM REV CODE 250: Performed by: NURSE PRACTITIONER

## 2021-05-16 PROCEDURE — 83735 ASSAY OF MAGNESIUM: CPT | Performed by: NURSE PRACTITIONER

## 2021-05-16 PROCEDURE — 84100 ASSAY OF PHOSPHORUS: CPT | Performed by: NURSE PRACTITIONER

## 2021-05-16 PROCEDURE — 63600175 PHARM REV CODE 636 W HCPCS: Performed by: STUDENT IN AN ORGANIZED HEALTH CARE EDUCATION/TRAINING PROGRAM

## 2021-05-16 RX ADMIN — PANTOPRAZOLE SODIUM 40 MG: 40 TABLET, DELAYED RELEASE ORAL at 08:05

## 2021-05-16 RX ADMIN — ONDANSETRON 16 MG: 8 TABLET, ORALLY DISINTEGRATING ORAL at 08:05

## 2021-05-16 RX ADMIN — LOSARTAN POTASSIUM 50 MG: 50 TABLET, FILM COATED ORAL at 08:05

## 2021-05-16 RX ADMIN — ACYCLOVIR 800 MG: 200 CAPSULE ORAL at 08:05

## 2021-05-16 RX ADMIN — FLUCONAZOLE 400 MG: 200 TABLET ORAL at 08:05

## 2021-05-16 RX ADMIN — CARVEDILOL 25 MG: 25 TABLET, FILM COATED ORAL at 08:05

## 2021-05-16 RX ADMIN — Medication 1 DOSE: at 08:05

## 2021-05-16 RX ADMIN — MUPIROCIN: 20 OINTMENT TOPICAL at 08:05

## 2021-05-16 RX ADMIN — Medication 1 DOSE: at 05:05

## 2021-05-16 RX ADMIN — NIFEDIPINE 30 MG: 30 TABLET, FILM COATED, EXTENDED RELEASE ORAL at 08:05

## 2021-05-16 RX ADMIN — LEVOFLOXACIN 500 MG: 500 TABLET, FILM COATED ORAL at 08:05

## 2021-05-16 RX ADMIN — MELPHALAN 440 MG: 50 INJECTION, POWDER, LYOPHILIZED, FOR SOLUTION INTRAVENOUS at 09:05

## 2021-05-16 RX ADMIN — SODIUM CHLORIDE AND POTASSIUM CHLORIDE 75 ML/HR: 9; 1.49 INJECTION, SOLUTION INTRAVENOUS at 09:05

## 2021-05-16 RX ADMIN — Medication 400 MG: at 06:05

## 2021-05-16 RX ADMIN — SODIUM CHLORIDE AND POTASSIUM CHLORIDE: 9; 1.49 INJECTION, SOLUTION INTRAVENOUS at 05:05

## 2021-05-16 RX ADMIN — ENOXAPARIN SODIUM 40 MG: 40 INJECTION SUBCUTANEOUS at 04:05

## 2021-05-16 RX ADMIN — Medication 400 MG: at 12:05

## 2021-05-16 RX ADMIN — DEXAMETHASONE 12 MG: 4 TABLET ORAL at 08:05

## 2021-05-16 RX ADMIN — SODIUM CHLORIDE AND POTASSIUM CHLORIDE 150 ML/HR: .9; .15 SOLUTION INTRAVENOUS at 02:05

## 2021-05-17 LAB
ABO + RH BLD: NORMAL
ALBUMIN SERPL BCP-MCNC: 3.7 G/DL (ref 3.5–5.2)
ALP SERPL-CCNC: 103 U/L (ref 55–135)
ALT SERPL W/O P-5'-P-CCNC: 18 U/L (ref 10–44)
ANION GAP SERPL CALC-SCNC: 9 MMOL/L (ref 8–16)
ANISOCYTOSIS BLD QL SMEAR: SLIGHT
APTT BLDCRRT: 23.5 SEC (ref 21–32)
AST SERPL-CCNC: 20 U/L (ref 10–40)
BASOPHILS # BLD AUTO: ABNORMAL K/UL (ref 0–0.2)
BASOPHILS NFR BLD: 0 % (ref 0–1.9)
BILIRUB SERPL-MCNC: 0.5 MG/DL (ref 0.1–1)
BLD GP AB SCN CELLS X3 SERPL QL: NORMAL
BLD PROD TYP BPU: NORMAL
BLOOD UNIT EXPIRATION DATE: NORMAL
BLOOD UNIT TYPE CODE: 6200
BLOOD UNIT TYPE: NORMAL
BUN SERPL-MCNC: 14 MG/DL (ref 8–23)
BURR CELLS BLD QL SMEAR: ABNORMAL
CALCIUM SERPL-MCNC: 8.9 MG/DL (ref 8.7–10.5)
CHLORIDE SERPL-SCNC: 105 MMOL/L (ref 95–110)
CO2 SERPL-SCNC: 23 MMOL/L (ref 23–29)
CODING SYSTEM: NORMAL
CREAT SERPL-MCNC: 0.8 MG/DL (ref 0.5–1.4)
DIFFERENTIAL METHOD: ABNORMAL
DISPENSE STATUS: NORMAL
EOSINOPHIL # BLD AUTO: ABNORMAL K/UL (ref 0–0.5)
EOSINOPHIL NFR BLD: 0 % (ref 0–8)
ERYTHROCYTE [DISTWIDTH] IN BLOOD BY AUTOMATED COUNT: 14.6 % (ref 11.5–14.5)
EST. GFR  (AFRICAN AMERICAN): >60 ML/MIN/1.73 M^2
EST. GFR  (NON AFRICAN AMERICAN): >60 ML/MIN/1.73 M^2
GLUCOSE SERPL-MCNC: 134 MG/DL (ref 70–110)
HCT VFR BLD AUTO: 37.1 % (ref 40–54)
HGB BLD-MCNC: 12.4 G/DL (ref 14–18)
HYPOCHROMIA BLD QL SMEAR: ABNORMAL
IMM GRANULOCYTES # BLD AUTO: ABNORMAL K/UL (ref 0–0.04)
IMM GRANULOCYTES NFR BLD AUTO: ABNORMAL % (ref 0–0.5)
INR PPP: 1 (ref 0.8–1.2)
LYMPHOCYTES # BLD AUTO: ABNORMAL K/UL (ref 1–4.8)
LYMPHOCYTES NFR BLD: 8 % (ref 18–48)
MAGNESIUM SERPL-MCNC: 1.8 MG/DL (ref 1.6–2.6)
MCH RBC QN AUTO: 32.3 PG (ref 27–31)
MCHC RBC AUTO-ENTMCNC: 33.4 G/DL (ref 32–36)
MCV RBC AUTO: 97 FL (ref 82–98)
MONOCYTES # BLD AUTO: ABNORMAL K/UL (ref 0.3–1)
MONOCYTES NFR BLD: 2 % (ref 4–15)
MYELOCYTES NFR BLD MANUAL: 1 %
NEUTROPHILS NFR BLD: 89 % (ref 38–73)
NRBC BLD-RTO: 0 /100 WBC
OVALOCYTES BLD QL SMEAR: ABNORMAL
PHOSPHATE SERPL-MCNC: 3.5 MG/DL (ref 2.7–4.5)
PLATELET # BLD AUTO: 120 K/UL (ref 150–450)
PLATELET BLD QL SMEAR: ABNORMAL
PMV BLD AUTO: 10.7 FL (ref 9.2–12.9)
POIKILOCYTOSIS BLD QL SMEAR: SLIGHT
POLYCHROMASIA BLD QL SMEAR: ABNORMAL
POTASSIUM SERPL-SCNC: 4.2 MMOL/L (ref 3.5–5.1)
PROT SERPL-MCNC: 6.7 G/DL (ref 6–8.4)
PROTHROMBIN TIME: 11 SEC (ref 9–12.5)
RBC # BLD AUTO: 3.84 M/UL (ref 4.6–6.2)
SODIUM SERPL-SCNC: 137 MMOL/L (ref 136–145)
UNIT NUMBER: NORMAL
WBC # BLD AUTO: 4.92 K/UL (ref 3.9–12.7)

## 2021-05-17 PROCEDURE — 63600175 PHARM REV CODE 636 W HCPCS: Performed by: INTERNAL MEDICINE

## 2021-05-17 PROCEDURE — 38208 THAW PRESERVED STEM CELLS: CPT

## 2021-05-17 PROCEDURE — 85027 COMPLETE CBC AUTOMATED: CPT | Performed by: NURSE PRACTITIONER

## 2021-05-17 PROCEDURE — 85007 BL SMEAR W/DIFF WBC COUNT: CPT | Performed by: NURSE PRACTITIONER

## 2021-05-17 PROCEDURE — 83735 ASSAY OF MAGNESIUM: CPT | Performed by: NURSE PRACTITIONER

## 2021-05-17 PROCEDURE — 25000003 PHARM REV CODE 250: Performed by: INTERNAL MEDICINE

## 2021-05-17 PROCEDURE — 86900 BLOOD TYPING SEROLOGIC ABO: CPT | Performed by: NURSE PRACTITIONER

## 2021-05-17 PROCEDURE — 80053 COMPREHEN METABOLIC PANEL: CPT | Performed by: NURSE PRACTITIONER

## 2021-05-17 PROCEDURE — 85730 THROMBOPLASTIN TIME PARTIAL: CPT | Performed by: STUDENT IN AN ORGANIZED HEALTH CARE EDUCATION/TRAINING PROGRAM

## 2021-05-17 PROCEDURE — 63600175 PHARM REV CODE 636 W HCPCS: Performed by: NURSE PRACTITIONER

## 2021-05-17 PROCEDURE — 63600175 PHARM REV CODE 636 W HCPCS: Performed by: STUDENT IN AN ORGANIZED HEALTH CARE EDUCATION/TRAINING PROGRAM

## 2021-05-17 PROCEDURE — 20600001 HC STEP DOWN PRIVATE ROOM

## 2021-05-17 PROCEDURE — 99900035 HC TECH TIME PER 15 MIN (STAT)

## 2021-05-17 PROCEDURE — 85610 PROTHROMBIN TIME: CPT | Performed by: STUDENT IN AN ORGANIZED HEALTH CARE EDUCATION/TRAINING PROGRAM

## 2021-05-17 PROCEDURE — 25000003 PHARM REV CODE 250: Performed by: STUDENT IN AN ORGANIZED HEALTH CARE EDUCATION/TRAINING PROGRAM

## 2021-05-17 PROCEDURE — 99233 SBSQ HOSP IP/OBS HIGH 50: CPT | Mod: ,,, | Performed by: INTERNAL MEDICINE

## 2021-05-17 PROCEDURE — 99233 PR SUBSEQUENT HOSPITAL CARE,LEVL III: ICD-10-PCS | Mod: ,,, | Performed by: INTERNAL MEDICINE

## 2021-05-17 PROCEDURE — 84100 ASSAY OF PHOSPHORUS: CPT | Performed by: NURSE PRACTITIONER

## 2021-05-17 PROCEDURE — 25000003 PHARM REV CODE 250: Performed by: NURSE PRACTITIONER

## 2021-05-17 PROCEDURE — 94761 N-INVAS EAR/PLS OXIMETRY MLT: CPT

## 2021-05-17 RX ORDER — HYDRALAZINE HYDROCHLORIDE 20 MG/ML
10 INJECTION INTRAMUSCULAR; INTRAVENOUS EVERY 6 HOURS PRN
Status: DISCONTINUED | OUTPATIENT
Start: 2021-05-17 | End: 2021-05-31 | Stop reason: HOSPADM

## 2021-05-17 RX ADMIN — LEVOFLOXACIN 500 MG: 500 TABLET, FILM COATED ORAL at 08:05

## 2021-05-17 RX ADMIN — CARVEDILOL 25 MG: 25 TABLET, FILM COATED ORAL at 10:05

## 2021-05-17 RX ADMIN — ONDANSETRON 16 MG: 8 TABLET, ORALLY DISINTEGRATING ORAL at 07:05

## 2021-05-17 RX ADMIN — ACYCLOVIR 800 MG: 200 CAPSULE ORAL at 09:05

## 2021-05-17 RX ADMIN — NIFEDIPINE 30 MG: 30 TABLET, FILM COATED, EXTENDED RELEASE ORAL at 10:05

## 2021-05-17 RX ADMIN — HYDROCORTISONE SODIUM SUCCINATE 250 MG: 250 INJECTION, POWDER, FOR SOLUTION INTRAMUSCULAR; INTRAVENOUS at 10:05

## 2021-05-17 RX ADMIN — ENOXAPARIN SODIUM 40 MG: 40 INJECTION SUBCUTANEOUS at 05:05

## 2021-05-17 RX ADMIN — HEPARIN SODIUM 1600 UNITS: 1000 INJECTION, SOLUTION INTRAVENOUS; SUBCUTANEOUS at 11:05

## 2021-05-17 RX ADMIN — ACYCLOVIR 800 MG: 200 CAPSULE ORAL at 08:05

## 2021-05-17 RX ADMIN — SODIUM CHLORIDE AND POTASSIUM CHLORIDE: 9; 1.49 INJECTION, SOLUTION INTRAVENOUS at 09:05

## 2021-05-17 RX ADMIN — LOSARTAN POTASSIUM 50 MG: 50 TABLET, FILM COATED ORAL at 10:05

## 2021-05-17 RX ADMIN — CARVEDILOL 25 MG: 25 TABLET, FILM COATED ORAL at 09:05

## 2021-05-17 RX ADMIN — LORAZEPAM 1 MG: 2 INJECTION INTRAMUSCULAR; INTRAVENOUS at 10:05

## 2021-05-17 RX ADMIN — SODIUM CHLORIDE AND POTASSIUM CHLORIDE: 9; 1.49 INJECTION, SOLUTION INTRAVENOUS at 06:05

## 2021-05-17 RX ADMIN — FLUCONAZOLE 400 MG: 200 TABLET ORAL at 08:05

## 2021-05-17 RX ADMIN — Medication 400 MG: at 12:05

## 2021-05-17 RX ADMIN — DIPHENHYDRAMINE HYDROCHLORIDE 50 MG: 50 INJECTION, SOLUTION INTRAMUSCULAR; INTRAVENOUS at 10:05

## 2021-05-17 RX ADMIN — Medication 400 MG: at 06:05

## 2021-05-17 RX ADMIN — SODIUM CHLORIDE AND POTASSIUM CHLORIDE: 9; 1.49 INJECTION, SOLUTION INTRAVENOUS at 01:05

## 2021-05-17 RX ADMIN — PANTOPRAZOLE SODIUM 40 MG: 40 TABLET, DELAYED RELEASE ORAL at 08:05

## 2021-05-17 RX ADMIN — MUPIROCIN: 20 OINTMENT TOPICAL at 09:05

## 2021-05-17 RX ADMIN — MUPIROCIN: 20 OINTMENT TOPICAL at 08:05

## 2021-05-18 LAB
ALBUMIN SERPL BCP-MCNC: 3.3 G/DL (ref 3.5–5.2)
ALP SERPL-CCNC: 87 U/L (ref 55–135)
ALT SERPL W/O P-5'-P-CCNC: 20 U/L (ref 10–44)
ANION GAP SERPL CALC-SCNC: 8 MMOL/L (ref 8–16)
AST SERPL-CCNC: 29 U/L (ref 10–40)
BASOPHILS # BLD AUTO: 0 K/UL (ref 0–0.2)
BASOPHILS NFR BLD: 0 % (ref 0–1.9)
BILIRUB SERPL-MCNC: 0.4 MG/DL (ref 0.1–1)
BUN SERPL-MCNC: 14 MG/DL (ref 8–23)
CALCIUM SERPL-MCNC: 8.5 MG/DL (ref 8.7–10.5)
CHLORIDE SERPL-SCNC: 109 MMOL/L (ref 95–110)
CO2 SERPL-SCNC: 23 MMOL/L (ref 23–29)
CREAT SERPL-MCNC: 0.8 MG/DL (ref 0.5–1.4)
DIFFERENTIAL METHOD: ABNORMAL
EOSINOPHIL # BLD AUTO: 0 K/UL (ref 0–0.5)
EOSINOPHIL NFR BLD: 0 % (ref 0–8)
ERYTHROCYTE [DISTWIDTH] IN BLOOD BY AUTOMATED COUNT: 15 % (ref 11.5–14.5)
EST. GFR  (AFRICAN AMERICAN): >60 ML/MIN/1.73 M^2
EST. GFR  (NON AFRICAN AMERICAN): >60 ML/MIN/1.73 M^2
GLUCOSE SERPL-MCNC: 94 MG/DL (ref 70–110)
HCT VFR BLD AUTO: 32.9 % (ref 40–54)
HGB BLD-MCNC: 11.1 G/DL (ref 14–18)
IMM GRANULOCYTES # BLD AUTO: 0.02 K/UL (ref 0–0.04)
IMM GRANULOCYTES NFR BLD AUTO: 0.9 % (ref 0–0.5)
LYMPHOCYTES # BLD AUTO: 0.2 K/UL (ref 1–4.8)
LYMPHOCYTES NFR BLD: 9.7 % (ref 18–48)
MAGNESIUM SERPL-MCNC: 2.1 MG/DL (ref 1.6–2.6)
MCH RBC QN AUTO: 31.6 PG (ref 27–31)
MCHC RBC AUTO-ENTMCNC: 33.7 G/DL (ref 32–36)
MCV RBC AUTO: 94 FL (ref 82–98)
MONOCYTES # BLD AUTO: 0.2 K/UL (ref 0.3–1)
MONOCYTES NFR BLD: 6.9 % (ref 4–15)
NEUTROPHILS # BLD AUTO: 1.8 K/UL (ref 1.8–7.7)
NEUTROPHILS NFR BLD: 82.5 % (ref 38–73)
NRBC BLD-RTO: 0 /100 WBC
PHOSPHATE SERPL-MCNC: 3.5 MG/DL (ref 2.7–4.5)
PLATELET # BLD AUTO: 104 K/UL (ref 150–450)
PMV BLD AUTO: 10.7 FL (ref 9.2–12.9)
POTASSIUM SERPL-SCNC: 3.9 MMOL/L (ref 3.5–5.1)
PROT SERPL-MCNC: 5.9 G/DL (ref 6–8.4)
RBC # BLD AUTO: 3.51 M/UL (ref 4.6–6.2)
SODIUM SERPL-SCNC: 140 MMOL/L (ref 136–145)
WBC # BLD AUTO: 2.16 K/UL (ref 3.9–12.7)

## 2021-05-18 PROCEDURE — 63600175 PHARM REV CODE 636 W HCPCS: Performed by: INTERNAL MEDICINE

## 2021-05-18 PROCEDURE — 25000003 PHARM REV CODE 250: Performed by: STUDENT IN AN ORGANIZED HEALTH CARE EDUCATION/TRAINING PROGRAM

## 2021-05-18 PROCEDURE — 85025 COMPLETE CBC W/AUTO DIFF WBC: CPT | Performed by: NURSE PRACTITIONER

## 2021-05-18 PROCEDURE — 99233 PR SUBSEQUENT HOSPITAL CARE,LEVL III: ICD-10-PCS | Mod: ,,, | Performed by: INTERNAL MEDICINE

## 2021-05-18 PROCEDURE — 99233 SBSQ HOSP IP/OBS HIGH 50: CPT | Mod: ,,, | Performed by: INTERNAL MEDICINE

## 2021-05-18 PROCEDURE — 25000003 PHARM REV CODE 250: Performed by: NURSE PRACTITIONER

## 2021-05-18 PROCEDURE — 80053 COMPREHEN METABOLIC PANEL: CPT | Performed by: NURSE PRACTITIONER

## 2021-05-18 PROCEDURE — 25000003 PHARM REV CODE 250: Performed by: INTERNAL MEDICINE

## 2021-05-18 PROCEDURE — 63600175 PHARM REV CODE 636 W HCPCS: Performed by: STUDENT IN AN ORGANIZED HEALTH CARE EDUCATION/TRAINING PROGRAM

## 2021-05-18 PROCEDURE — 84100 ASSAY OF PHOSPHORUS: CPT | Performed by: NURSE PRACTITIONER

## 2021-05-18 PROCEDURE — 83735 ASSAY OF MAGNESIUM: CPT | Performed by: NURSE PRACTITIONER

## 2021-05-18 PROCEDURE — 20600001 HC STEP DOWN PRIVATE ROOM

## 2021-05-18 RX ADMIN — ENOXAPARIN SODIUM 40 MG: 40 INJECTION SUBCUTANEOUS at 05:05

## 2021-05-18 RX ADMIN — HEPARIN SODIUM 1600 UNITS: 1000 INJECTION, SOLUTION INTRAVENOUS; SUBCUTANEOUS at 09:05

## 2021-05-18 RX ADMIN — SODIUM CHLORIDE AND POTASSIUM CHLORIDE: 9; 1.49 INJECTION, SOLUTION INTRAVENOUS at 08:05

## 2021-05-18 RX ADMIN — Medication 1 DOSE: at 09:05

## 2021-05-18 RX ADMIN — LEVOFLOXACIN 500 MG: 500 TABLET, FILM COATED ORAL at 08:05

## 2021-05-18 RX ADMIN — ACYCLOVIR 800 MG: 200 CAPSULE ORAL at 08:05

## 2021-05-18 RX ADMIN — LOSARTAN POTASSIUM 50 MG: 50 TABLET, FILM COATED ORAL at 08:05

## 2021-05-18 RX ADMIN — MUPIROCIN: 20 OINTMENT TOPICAL at 08:05

## 2021-05-18 RX ADMIN — CARVEDILOL 25 MG: 25 TABLET, FILM COATED ORAL at 08:05

## 2021-05-18 RX ADMIN — MUPIROCIN: 20 OINTMENT TOPICAL at 09:05

## 2021-05-18 RX ADMIN — FLUCONAZOLE 400 MG: 200 TABLET ORAL at 08:05

## 2021-05-18 RX ADMIN — PANTOPRAZOLE SODIUM 40 MG: 40 TABLET, DELAYED RELEASE ORAL at 08:05

## 2021-05-18 RX ADMIN — NIFEDIPINE 30 MG: 30 TABLET, FILM COATED, EXTENDED RELEASE ORAL at 08:05

## 2021-05-19 PROBLEM — T45.1X5A CHEMOTHERAPY-INDUCED THROMBOCYTOPENIA: Status: RESOLVED | Noted: 2021-05-13 | Resolved: 2021-05-19

## 2021-05-19 PROBLEM — D69.59 CHEMOTHERAPY-INDUCED THROMBOCYTOPENIA: Status: RESOLVED | Noted: 2021-05-13 | Resolved: 2021-05-19

## 2021-05-19 PROBLEM — D61.810 ANTINEOPLASTIC CHEMOTHERAPY INDUCED PANCYTOPENIA: Status: ACTIVE | Noted: 2021-05-13

## 2021-05-19 LAB
ALBUMIN SERPL BCP-MCNC: 3.3 G/DL (ref 3.5–5.2)
ALP SERPL-CCNC: 81 U/L (ref 55–135)
ALT SERPL W/O P-5'-P-CCNC: 23 U/L (ref 10–44)
ANION GAP SERPL CALC-SCNC: 7 MMOL/L (ref 8–16)
ANISOCYTOSIS BLD QL SMEAR: SLIGHT
AST SERPL-CCNC: 23 U/L (ref 10–40)
BASOPHILS NFR BLD: 0 % (ref 0–1.9)
BILIRUB SERPL-MCNC: 0.7 MG/DL (ref 0.1–1)
BUN SERPL-MCNC: 19 MG/DL (ref 8–23)
BURR CELLS BLD QL SMEAR: ABNORMAL
CALCIUM SERPL-MCNC: 8.8 MG/DL (ref 8.7–10.5)
CHLORIDE SERPL-SCNC: 106 MMOL/L (ref 95–110)
CO2 SERPL-SCNC: 25 MMOL/L (ref 23–29)
CREAT SERPL-MCNC: 0.9 MG/DL (ref 0.5–1.4)
DIFFERENTIAL METHOD: ABNORMAL
EOSINOPHIL NFR BLD: 0 % (ref 0–8)
ERYTHROCYTE [DISTWIDTH] IN BLOOD BY AUTOMATED COUNT: 14.9 % (ref 11.5–14.5)
EST. GFR  (AFRICAN AMERICAN): >60 ML/MIN/1.73 M^2
EST. GFR  (NON AFRICAN AMERICAN): >60 ML/MIN/1.73 M^2
GLUCOSE SERPL-MCNC: 108 MG/DL (ref 70–110)
HCT VFR BLD AUTO: 34 % (ref 40–54)
HGB BLD-MCNC: 11.3 G/DL (ref 14–18)
HYPOCHROMIA BLD QL SMEAR: ABNORMAL
IMM GRANULOCYTES # BLD AUTO: ABNORMAL K/UL (ref 0–0.04)
IMM GRANULOCYTES NFR BLD AUTO: ABNORMAL % (ref 0–0.5)
LYMPHOCYTES NFR BLD: 18.8 % (ref 18–48)
MAGNESIUM SERPL-MCNC: 2 MG/DL (ref 1.6–2.6)
MCH RBC QN AUTO: 31.6 PG (ref 27–31)
MCHC RBC AUTO-ENTMCNC: 33.2 G/DL (ref 32–36)
MCV RBC AUTO: 95 FL (ref 82–98)
MONOCYTES NFR BLD: 0 % (ref 4–15)
NEUTROPHILS NFR BLD: 81.2 % (ref 38–73)
NRBC BLD-RTO: 0 /100 WBC
OVALOCYTES BLD QL SMEAR: ABNORMAL
PHOSPHATE SERPL-MCNC: 3.7 MG/DL (ref 2.7–4.5)
PLATELET # BLD AUTO: 113 K/UL (ref 150–450)
PMV BLD AUTO: 10.6 FL (ref 9.2–12.9)
POIKILOCYTOSIS BLD QL SMEAR: SLIGHT
POLYCHROMASIA BLD QL SMEAR: ABNORMAL
POTASSIUM SERPL-SCNC: 4.2 MMOL/L (ref 3.5–5.1)
PROT SERPL-MCNC: 5.8 G/DL (ref 6–8.4)
RBC # BLD AUTO: 3.58 M/UL (ref 4.6–6.2)
SCHISTOCYTES BLD QL SMEAR: ABNORMAL
SODIUM SERPL-SCNC: 138 MMOL/L (ref 136–145)
WBC # BLD AUTO: 0.65 K/UL (ref 3.9–12.7)

## 2021-05-19 PROCEDURE — 25000003 PHARM REV CODE 250: Performed by: INTERNAL MEDICINE

## 2021-05-19 PROCEDURE — 85007 BL SMEAR W/DIFF WBC COUNT: CPT | Performed by: NURSE PRACTITIONER

## 2021-05-19 PROCEDURE — 20600001 HC STEP DOWN PRIVATE ROOM

## 2021-05-19 PROCEDURE — 80053 COMPREHEN METABOLIC PANEL: CPT | Performed by: NURSE PRACTITIONER

## 2021-05-19 PROCEDURE — 25000003 PHARM REV CODE 250: Performed by: STUDENT IN AN ORGANIZED HEALTH CARE EDUCATION/TRAINING PROGRAM

## 2021-05-19 PROCEDURE — 84100 ASSAY OF PHOSPHORUS: CPT | Performed by: NURSE PRACTITIONER

## 2021-05-19 PROCEDURE — 63600175 PHARM REV CODE 636 W HCPCS: Performed by: STUDENT IN AN ORGANIZED HEALTH CARE EDUCATION/TRAINING PROGRAM

## 2021-05-19 PROCEDURE — 99233 PR SUBSEQUENT HOSPITAL CARE,LEVL III: ICD-10-PCS | Mod: ,,, | Performed by: INTERNAL MEDICINE

## 2021-05-19 PROCEDURE — 85027 COMPLETE CBC AUTOMATED: CPT | Performed by: NURSE PRACTITIONER

## 2021-05-19 PROCEDURE — 83735 ASSAY OF MAGNESIUM: CPT | Performed by: NURSE PRACTITIONER

## 2021-05-19 PROCEDURE — 99233 SBSQ HOSP IP/OBS HIGH 50: CPT | Mod: ,,, | Performed by: INTERNAL MEDICINE

## 2021-05-19 PROCEDURE — 25000003 PHARM REV CODE 250: Performed by: NURSE PRACTITIONER

## 2021-05-19 RX ADMIN — NIFEDIPINE 30 MG: 30 TABLET, FILM COATED, EXTENDED RELEASE ORAL at 09:05

## 2021-05-19 RX ADMIN — CARVEDILOL 25 MG: 25 TABLET, FILM COATED ORAL at 09:05

## 2021-05-19 RX ADMIN — ACYCLOVIR 800 MG: 200 CAPSULE ORAL at 09:05

## 2021-05-19 RX ADMIN — Medication 1 DOSE: at 05:05

## 2021-05-19 RX ADMIN — CARVEDILOL 25 MG: 25 TABLET, FILM COATED ORAL at 08:05

## 2021-05-19 RX ADMIN — Medication 1 DOSE: at 08:05

## 2021-05-19 RX ADMIN — ACYCLOVIR 800 MG: 200 CAPSULE ORAL at 08:05

## 2021-05-19 RX ADMIN — MUPIROCIN: 20 OINTMENT TOPICAL at 09:05

## 2021-05-19 RX ADMIN — MUPIROCIN: 20 OINTMENT TOPICAL at 08:05

## 2021-05-19 RX ADMIN — Medication 1 DOSE: at 01:05

## 2021-05-19 RX ADMIN — ENOXAPARIN SODIUM 40 MG: 40 INJECTION SUBCUTANEOUS at 05:05

## 2021-05-19 RX ADMIN — LOSARTAN POTASSIUM 50 MG: 50 TABLET, FILM COATED ORAL at 09:05

## 2021-05-19 RX ADMIN — PANTOPRAZOLE SODIUM 40 MG: 40 TABLET, DELAYED RELEASE ORAL at 09:05

## 2021-05-19 RX ADMIN — LEVOFLOXACIN 500 MG: 500 TABLET, FILM COATED ORAL at 09:05

## 2021-05-19 RX ADMIN — ONDANSETRON 8 MG: 8 TABLET, ORALLY DISINTEGRATING ORAL at 06:05

## 2021-05-19 RX ADMIN — FLUCONAZOLE 400 MG: 200 TABLET ORAL at 09:05

## 2021-05-20 LAB
ABO + RH BLD: NORMAL
ALBUMIN SERPL BCP-MCNC: 3.2 G/DL (ref 3.5–5.2)
ALP SERPL-CCNC: 78 U/L (ref 55–135)
ALT SERPL W/O P-5'-P-CCNC: 19 U/L (ref 10–44)
ANION GAP SERPL CALC-SCNC: 7 MMOL/L (ref 8–16)
ANISOCYTOSIS BLD QL SMEAR: SLIGHT
AST SERPL-CCNC: 21 U/L (ref 10–40)
BASOPHILS # BLD AUTO: 0.01 K/UL (ref 0–0.2)
BASOPHILS NFR BLD: 3.1 % (ref 0–1.9)
BILIRUB SERPL-MCNC: 0.8 MG/DL (ref 0.1–1)
BLD GP AB SCN CELLS X3 SERPL QL: NORMAL
BUN SERPL-MCNC: 14 MG/DL (ref 8–23)
CALCIUM SERPL-MCNC: 8.2 MG/DL (ref 8.7–10.5)
CHLORIDE SERPL-SCNC: 106 MMOL/L (ref 95–110)
CO2 SERPL-SCNC: 24 MMOL/L (ref 23–29)
CREAT SERPL-MCNC: 0.8 MG/DL (ref 0.5–1.4)
DIFFERENTIAL METHOD: ABNORMAL
EOSINOPHIL # BLD AUTO: 0 K/UL (ref 0–0.5)
EOSINOPHIL NFR BLD: 0 % (ref 0–8)
ERYTHROCYTE [DISTWIDTH] IN BLOOD BY AUTOMATED COUNT: 14.9 % (ref 11.5–14.5)
EST. GFR  (AFRICAN AMERICAN): >60 ML/MIN/1.73 M^2
EST. GFR  (NON AFRICAN AMERICAN): >60 ML/MIN/1.73 M^2
GLUCOSE SERPL-MCNC: 110 MG/DL (ref 70–110)
HCT VFR BLD AUTO: 33 % (ref 40–54)
HGB BLD-MCNC: 11.1 G/DL (ref 14–18)
HYPOCHROMIA BLD QL SMEAR: ABNORMAL
IMM GRANULOCYTES # BLD AUTO: 0 K/UL (ref 0–0.04)
IMM GRANULOCYTES NFR BLD AUTO: 0 % (ref 0–0.5)
LYMPHOCYTES # BLD AUTO: 0.1 K/UL (ref 1–4.8)
LYMPHOCYTES NFR BLD: 21.9 % (ref 18–48)
MAGNESIUM SERPL-MCNC: 1.9 MG/DL (ref 1.6–2.6)
MCH RBC QN AUTO: 31.7 PG (ref 27–31)
MCHC RBC AUTO-ENTMCNC: 33.6 G/DL (ref 32–36)
MCV RBC AUTO: 94 FL (ref 82–98)
MONOCYTES # BLD AUTO: 0 K/UL (ref 0.3–1)
MONOCYTES NFR BLD: 3.1 % (ref 4–15)
NEUTROPHILS # BLD AUTO: 0.2 K/UL (ref 1.8–7.7)
NEUTROPHILS NFR BLD: 71.9 % (ref 38–73)
NRBC BLD-RTO: 0 /100 WBC
OVALOCYTES BLD QL SMEAR: ABNORMAL
PHOSPHATE SERPL-MCNC: 3.4 MG/DL (ref 2.7–4.5)
PLATELET # BLD AUTO: 107 K/UL (ref 150–450)
PMV BLD AUTO: 10.4 FL (ref 9.2–12.9)
POIKILOCYTOSIS BLD QL SMEAR: SLIGHT
POLYCHROMASIA BLD QL SMEAR: ABNORMAL
POTASSIUM SERPL-SCNC: 3.8 MMOL/L (ref 3.5–5.1)
PROT SERPL-MCNC: 5.7 G/DL (ref 6–8.4)
RBC # BLD AUTO: 3.5 M/UL (ref 4.6–6.2)
SODIUM SERPL-SCNC: 137 MMOL/L (ref 136–145)
WBC # BLD AUTO: 0.32 K/UL (ref 3.9–12.7)

## 2021-05-20 PROCEDURE — 63600175 PHARM REV CODE 636 W HCPCS: Performed by: INTERNAL MEDICINE

## 2021-05-20 PROCEDURE — 80053 COMPREHEN METABOLIC PANEL: CPT | Performed by: NURSE PRACTITIONER

## 2021-05-20 PROCEDURE — 84100 ASSAY OF PHOSPHORUS: CPT | Performed by: NURSE PRACTITIONER

## 2021-05-20 PROCEDURE — 20600001 HC STEP DOWN PRIVATE ROOM

## 2021-05-20 PROCEDURE — 83735 ASSAY OF MAGNESIUM: CPT | Performed by: NURSE PRACTITIONER

## 2021-05-20 PROCEDURE — 86900 BLOOD TYPING SEROLOGIC ABO: CPT | Performed by: NURSE PRACTITIONER

## 2021-05-20 PROCEDURE — 85025 COMPLETE CBC W/AUTO DIFF WBC: CPT | Performed by: NURSE PRACTITIONER

## 2021-05-20 PROCEDURE — 25000003 PHARM REV CODE 250: Performed by: NURSE PRACTITIONER

## 2021-05-20 PROCEDURE — 25000003 PHARM REV CODE 250: Performed by: STUDENT IN AN ORGANIZED HEALTH CARE EDUCATION/TRAINING PROGRAM

## 2021-05-20 PROCEDURE — 99233 PR SUBSEQUENT HOSPITAL CARE,LEVL III: ICD-10-PCS | Mod: ,,, | Performed by: INTERNAL MEDICINE

## 2021-05-20 PROCEDURE — 25000003 PHARM REV CODE 250: Performed by: INTERNAL MEDICINE

## 2021-05-20 PROCEDURE — 99233 SBSQ HOSP IP/OBS HIGH 50: CPT | Mod: ,,, | Performed by: INTERNAL MEDICINE

## 2021-05-20 PROCEDURE — 63600175 PHARM REV CODE 636 W HCPCS: Performed by: STUDENT IN AN ORGANIZED HEALTH CARE EDUCATION/TRAINING PROGRAM

## 2021-05-20 RX ADMIN — MUPIROCIN: 20 OINTMENT TOPICAL at 08:05

## 2021-05-20 RX ADMIN — CARVEDILOL 25 MG: 25 TABLET, FILM COATED ORAL at 08:05

## 2021-05-20 RX ADMIN — HEPARIN SODIUM 1600 UNITS: 1000 INJECTION, SOLUTION INTRAVENOUS; SUBCUTANEOUS at 04:05

## 2021-05-20 RX ADMIN — Medication 400 MG: at 08:05

## 2021-05-20 RX ADMIN — ACYCLOVIR 800 MG: 200 CAPSULE ORAL at 08:05

## 2021-05-20 RX ADMIN — Medication 1 DOSE: at 01:05

## 2021-05-20 RX ADMIN — LOSARTAN POTASSIUM 50 MG: 50 TABLET, FILM COATED ORAL at 08:05

## 2021-05-20 RX ADMIN — Medication 400 MG: at 01:05

## 2021-05-20 RX ADMIN — LEVOFLOXACIN 500 MG: 500 TABLET, FILM COATED ORAL at 08:05

## 2021-05-20 RX ADMIN — ONDANSETRON 8 MG: 8 TABLET, ORALLY DISINTEGRATING ORAL at 08:05

## 2021-05-20 RX ADMIN — NIFEDIPINE 30 MG: 30 TABLET, FILM COATED, EXTENDED RELEASE ORAL at 08:05

## 2021-05-20 RX ADMIN — FLUCONAZOLE 400 MG: 200 TABLET ORAL at 08:05

## 2021-05-20 RX ADMIN — Medication 1 DOSE: at 06:05

## 2021-05-20 RX ADMIN — ENOXAPARIN SODIUM 40 MG: 40 INJECTION SUBCUTANEOUS at 06:05

## 2021-05-20 RX ADMIN — Medication 400 MG: at 07:05

## 2021-05-20 RX ADMIN — Medication 1 DOSE: at 08:05

## 2021-05-20 RX ADMIN — PANTOPRAZOLE SODIUM 40 MG: 40 TABLET, DELAYED RELEASE ORAL at 08:05

## 2021-05-20 RX ADMIN — POTASSIUM CHLORIDE 20 MEQ: 1500 TABLET, EXTENDED RELEASE ORAL at 07:05

## 2021-05-21 PROBLEM — R11.2 CHEMOTHERAPY-INDUCED NAUSEA AND VOMITING: Status: RESOLVED | Noted: 2021-05-21 | Resolved: 2021-05-21

## 2021-05-21 PROBLEM — T45.1X5A CHEMOTHERAPY-INDUCED NAUSEA AND VOMITING: Status: RESOLVED | Noted: 2021-05-21 | Resolved: 2021-05-21

## 2021-05-21 PROBLEM — R11.2 CHEMOTHERAPY-INDUCED NAUSEA AND VOMITING: Status: ACTIVE | Noted: 2021-05-21

## 2021-05-21 PROBLEM — T45.1X5A CHEMOTHERAPY-INDUCED NAUSEA AND VOMITING: Status: ACTIVE | Noted: 2021-05-21

## 2021-05-21 LAB
ALBUMIN SERPL BCP-MCNC: 3.2 G/DL (ref 3.5–5.2)
ALP SERPL-CCNC: 76 U/L (ref 55–135)
ALT SERPL W/O P-5'-P-CCNC: 28 U/L (ref 10–44)
ANION GAP SERPL CALC-SCNC: 7 MMOL/L (ref 8–16)
AST SERPL-CCNC: 26 U/L (ref 10–40)
BASOPHILS # BLD AUTO: 0.01 K/UL (ref 0–0.2)
BASOPHILS NFR BLD: 4.8 % (ref 0–1.9)
BILIRUB SERPL-MCNC: 0.8 MG/DL (ref 0.1–1)
BUN SERPL-MCNC: 17 MG/DL (ref 8–23)
CALCIUM SERPL-MCNC: 8.3 MG/DL (ref 8.7–10.5)
CHLORIDE SERPL-SCNC: 108 MMOL/L (ref 95–110)
CO2 SERPL-SCNC: 23 MMOL/L (ref 23–29)
CREAT SERPL-MCNC: 0.8 MG/DL (ref 0.5–1.4)
DIFFERENTIAL METHOD: ABNORMAL
EOSINOPHIL # BLD AUTO: 0 K/UL (ref 0–0.5)
EOSINOPHIL NFR BLD: 0 % (ref 0–8)
ERYTHROCYTE [DISTWIDTH] IN BLOOD BY AUTOMATED COUNT: 14.7 % (ref 11.5–14.5)
EST. GFR  (AFRICAN AMERICAN): >60 ML/MIN/1.73 M^2
EST. GFR  (NON AFRICAN AMERICAN): >60 ML/MIN/1.73 M^2
GLUCOSE SERPL-MCNC: 106 MG/DL (ref 70–110)
HCT VFR BLD AUTO: 32.1 % (ref 40–54)
HGB BLD-MCNC: 10.7 G/DL (ref 14–18)
IMM GRANULOCYTES # BLD AUTO: 0 K/UL (ref 0–0.04)
IMM GRANULOCYTES NFR BLD AUTO: 0 % (ref 0–0.5)
LYMPHOCYTES # BLD AUTO: 0 K/UL (ref 1–4.8)
LYMPHOCYTES NFR BLD: 19 % (ref 18–48)
MAGNESIUM SERPL-MCNC: 2 MG/DL (ref 1.6–2.6)
MCH RBC QN AUTO: 31.4 PG (ref 27–31)
MCHC RBC AUTO-ENTMCNC: 33.3 G/DL (ref 32–36)
MCV RBC AUTO: 94 FL (ref 82–98)
MONOCYTES # BLD AUTO: 0 K/UL (ref 0.3–1)
MONOCYTES NFR BLD: 4.8 % (ref 4–15)
NEUTROPHILS # BLD AUTO: 0.2 K/UL (ref 1.8–7.7)
NEUTROPHILS NFR BLD: 71.4 % (ref 38–73)
NRBC BLD-RTO: 0 /100 WBC
PHOSPHATE SERPL-MCNC: 3.4 MG/DL (ref 2.7–4.5)
PLATELET # BLD AUTO: 91 K/UL (ref 150–450)
PMV BLD AUTO: 10.9 FL (ref 9.2–12.9)
POTASSIUM SERPL-SCNC: 3.8 MMOL/L (ref 3.5–5.1)
PROT SERPL-MCNC: 5.7 G/DL (ref 6–8.4)
RBC # BLD AUTO: 3.41 M/UL (ref 4.6–6.2)
SODIUM SERPL-SCNC: 138 MMOL/L (ref 136–145)
WBC # BLD AUTO: 0.21 K/UL (ref 3.9–12.7)

## 2021-05-21 PROCEDURE — 99233 PR SUBSEQUENT HOSPITAL CARE,LEVL III: ICD-10-PCS | Mod: ,,, | Performed by: INTERNAL MEDICINE

## 2021-05-21 PROCEDURE — 63600175 PHARM REV CODE 636 W HCPCS: Performed by: NURSE PRACTITIONER

## 2021-05-21 PROCEDURE — 63600175 PHARM REV CODE 636 W HCPCS: Performed by: STUDENT IN AN ORGANIZED HEALTH CARE EDUCATION/TRAINING PROGRAM

## 2021-05-21 PROCEDURE — 20600001 HC STEP DOWN PRIVATE ROOM

## 2021-05-21 PROCEDURE — 83735 ASSAY OF MAGNESIUM: CPT | Performed by: NURSE PRACTITIONER

## 2021-05-21 PROCEDURE — 94761 N-INVAS EAR/PLS OXIMETRY MLT: CPT

## 2021-05-21 PROCEDURE — 97530 THERAPEUTIC ACTIVITIES: CPT

## 2021-05-21 PROCEDURE — 80053 COMPREHEN METABOLIC PANEL: CPT | Performed by: NURSE PRACTITIONER

## 2021-05-21 PROCEDURE — 84100 ASSAY OF PHOSPHORUS: CPT | Performed by: NURSE PRACTITIONER

## 2021-05-21 PROCEDURE — 25000003 PHARM REV CODE 250: Performed by: NURSE PRACTITIONER

## 2021-05-21 PROCEDURE — 85025 COMPLETE CBC W/AUTO DIFF WBC: CPT | Performed by: NURSE PRACTITIONER

## 2021-05-21 PROCEDURE — 25000003 PHARM REV CODE 250: Performed by: INTERNAL MEDICINE

## 2021-05-21 PROCEDURE — 25000003 PHARM REV CODE 250: Performed by: STUDENT IN AN ORGANIZED HEALTH CARE EDUCATION/TRAINING PROGRAM

## 2021-05-21 PROCEDURE — 99233 SBSQ HOSP IP/OBS HIGH 50: CPT | Mod: ,,, | Performed by: INTERNAL MEDICINE

## 2021-05-21 RX ORDER — ONDANSETRON 2 MG/ML
8 INJECTION INTRAMUSCULAR; INTRAVENOUS EVERY 8 HOURS
Status: DISCONTINUED | OUTPATIENT
Start: 2021-05-21 | End: 2021-05-30

## 2021-05-21 RX ADMIN — Medication 1 DOSE: at 01:05

## 2021-05-21 RX ADMIN — ENOXAPARIN SODIUM 40 MG: 40 INJECTION SUBCUTANEOUS at 04:05

## 2021-05-21 RX ADMIN — ACYCLOVIR 800 MG: 200 CAPSULE ORAL at 08:05

## 2021-05-21 RX ADMIN — LOSARTAN POTASSIUM 50 MG: 50 TABLET, FILM COATED ORAL at 08:05

## 2021-05-21 RX ADMIN — Medication 1 DOSE: at 08:05

## 2021-05-21 RX ADMIN — CARVEDILOL 25 MG: 25 TABLET, FILM COATED ORAL at 08:05

## 2021-05-21 RX ADMIN — ACYCLOVIR 800 MG: 200 CAPSULE ORAL at 09:05

## 2021-05-21 RX ADMIN — ONDANSETRON 8 MG: 2 INJECTION INTRAMUSCULAR; INTRAVENOUS at 01:05

## 2021-05-21 RX ADMIN — FLUCONAZOLE 400 MG: 200 TABLET ORAL at 08:05

## 2021-05-21 RX ADMIN — CARVEDILOL 25 MG: 25 TABLET, FILM COATED ORAL at 09:05

## 2021-05-21 RX ADMIN — LEVOFLOXACIN 500 MG: 500 TABLET, FILM COATED ORAL at 08:05

## 2021-05-21 RX ADMIN — Medication 1 DOSE: at 04:05

## 2021-05-21 RX ADMIN — ONDANSETRON 8 MG: 2 INJECTION INTRAMUSCULAR; INTRAVENOUS at 09:05

## 2021-05-21 RX ADMIN — Medication 1 DOSE: at 09:05

## 2021-05-21 RX ADMIN — NIFEDIPINE 30 MG: 30 TABLET, FILM COATED, EXTENDED RELEASE ORAL at 08:05

## 2021-05-21 RX ADMIN — POTASSIUM CHLORIDE 20 MEQ: 1500 TABLET, EXTENDED RELEASE ORAL at 06:05

## 2021-05-21 RX ADMIN — PANTOPRAZOLE SODIUM 40 MG: 40 TABLET, DELAYED RELEASE ORAL at 08:05

## 2021-05-22 LAB
ALBUMIN SERPL BCP-MCNC: 3.1 G/DL (ref 3.5–5.2)
ALP SERPL-CCNC: 73 U/L (ref 55–135)
ALT SERPL W/O P-5'-P-CCNC: 32 U/L (ref 10–44)
ANION GAP SERPL CALC-SCNC: 5 MMOL/L (ref 8–16)
ANISOCYTOSIS BLD QL SMEAR: SLIGHT
AST SERPL-CCNC: 26 U/L (ref 10–40)
BASOPHILS # BLD AUTO: 0 K/UL (ref 0–0.2)
BASOPHILS NFR BLD: 0 % (ref 0–1.9)
BILIRUB SERPL-MCNC: 0.5 MG/DL (ref 0.1–1)
BUN SERPL-MCNC: 15 MG/DL (ref 8–23)
BURR CELLS BLD QL SMEAR: ABNORMAL
CALCIUM SERPL-MCNC: 8.5 MG/DL (ref 8.7–10.5)
CHLORIDE SERPL-SCNC: 110 MMOL/L (ref 95–110)
CO2 SERPL-SCNC: 24 MMOL/L (ref 23–29)
CREAT SERPL-MCNC: 0.8 MG/DL (ref 0.5–1.4)
DACRYOCYTES BLD QL SMEAR: ABNORMAL
DIFFERENTIAL METHOD: ABNORMAL
EOSINOPHIL # BLD AUTO: 0 K/UL (ref 0–0.5)
EOSINOPHIL NFR BLD: 14.3 % (ref 0–8)
ERYTHROCYTE [DISTWIDTH] IN BLOOD BY AUTOMATED COUNT: 14.4 % (ref 11.5–14.5)
EST. GFR  (AFRICAN AMERICAN): >60 ML/MIN/1.73 M^2
EST. GFR  (NON AFRICAN AMERICAN): >60 ML/MIN/1.73 M^2
GLUCOSE SERPL-MCNC: 114 MG/DL (ref 70–110)
HCT VFR BLD AUTO: 30.6 % (ref 40–54)
HGB BLD-MCNC: 10.5 G/DL (ref 14–18)
IMM GRANULOCYTES # BLD AUTO: 0 K/UL (ref 0–0.04)
IMM GRANULOCYTES NFR BLD AUTO: 0 % (ref 0–0.5)
LYMPHOCYTES # BLD AUTO: 0 K/UL (ref 1–4.8)
LYMPHOCYTES NFR BLD: 14.3 % (ref 18–48)
MAGNESIUM SERPL-MCNC: 1.8 MG/DL (ref 1.6–2.6)
MCH RBC QN AUTO: 32.4 PG (ref 27–31)
MCHC RBC AUTO-ENTMCNC: 34.3 G/DL (ref 32–36)
MCV RBC AUTO: 94 FL (ref 82–98)
MONOCYTES # BLD AUTO: 0 K/UL (ref 0.3–1)
MONOCYTES NFR BLD: 7.1 % (ref 4–15)
NEUTROPHILS # BLD AUTO: 0.1 K/UL (ref 1.8–7.7)
NEUTROPHILS NFR BLD: 64.3 % (ref 38–73)
NRBC BLD-RTO: 0 /100 WBC
OVALOCYTES BLD QL SMEAR: ABNORMAL
PHOSPHATE SERPL-MCNC: 3.2 MG/DL (ref 2.7–4.5)
PLATELET # BLD AUTO: 65 K/UL (ref 150–450)
PLATELET BLD QL SMEAR: ABNORMAL
PMV BLD AUTO: 11 FL (ref 9.2–12.9)
POIKILOCYTOSIS BLD QL SMEAR: SLIGHT
POTASSIUM SERPL-SCNC: 4 MMOL/L (ref 3.5–5.1)
PROT SERPL-MCNC: 5.4 G/DL (ref 6–8.4)
RBC # BLD AUTO: 3.24 M/UL (ref 4.6–6.2)
SODIUM SERPL-SCNC: 139 MMOL/L (ref 136–145)
WBC # BLD AUTO: 0.14 K/UL (ref 3.9–12.7)

## 2021-05-22 PROCEDURE — 85025 COMPLETE CBC W/AUTO DIFF WBC: CPT | Performed by: NURSE PRACTITIONER

## 2021-05-22 PROCEDURE — 99233 SBSQ HOSP IP/OBS HIGH 50: CPT | Mod: ,,, | Performed by: INTERNAL MEDICINE

## 2021-05-22 PROCEDURE — 20600001 HC STEP DOWN PRIVATE ROOM

## 2021-05-22 PROCEDURE — 63600175 PHARM REV CODE 636 W HCPCS: Performed by: STUDENT IN AN ORGANIZED HEALTH CARE EDUCATION/TRAINING PROGRAM

## 2021-05-22 PROCEDURE — 84100 ASSAY OF PHOSPHORUS: CPT | Performed by: NURSE PRACTITIONER

## 2021-05-22 PROCEDURE — 63600175 PHARM REV CODE 636 W HCPCS: Performed by: INTERNAL MEDICINE

## 2021-05-22 PROCEDURE — 25000003 PHARM REV CODE 250: Performed by: STUDENT IN AN ORGANIZED HEALTH CARE EDUCATION/TRAINING PROGRAM

## 2021-05-22 PROCEDURE — 63600175 PHARM REV CODE 636 W HCPCS: Performed by: NURSE PRACTITIONER

## 2021-05-22 PROCEDURE — 80053 COMPREHEN METABOLIC PANEL: CPT | Performed by: NURSE PRACTITIONER

## 2021-05-22 PROCEDURE — 25000003 PHARM REV CODE 250: Performed by: NURSE PRACTITIONER

## 2021-05-22 PROCEDURE — 83735 ASSAY OF MAGNESIUM: CPT | Performed by: NURSE PRACTITIONER

## 2021-05-22 PROCEDURE — 25000003 PHARM REV CODE 250: Performed by: INTERNAL MEDICINE

## 2021-05-22 PROCEDURE — 99233 PR SUBSEQUENT HOSPITAL CARE,LEVL III: ICD-10-PCS | Mod: ,,, | Performed by: INTERNAL MEDICINE

## 2021-05-22 RX ADMIN — Medication 400 MG: at 06:05

## 2021-05-22 RX ADMIN — HEPARIN SODIUM 1500 UNITS: 1000 INJECTION, SOLUTION INTRAVENOUS; SUBCUTANEOUS at 09:05

## 2021-05-22 RX ADMIN — LEVOFLOXACIN 500 MG: 500 TABLET, FILM COATED ORAL at 09:05

## 2021-05-22 RX ADMIN — ACYCLOVIR 800 MG: 200 CAPSULE ORAL at 09:05

## 2021-05-22 RX ADMIN — Medication 1 DOSE: at 05:05

## 2021-05-22 RX ADMIN — CARVEDILOL 25 MG: 25 TABLET, FILM COATED ORAL at 09:05

## 2021-05-22 RX ADMIN — PANTOPRAZOLE SODIUM 40 MG: 40 TABLET, DELAYED RELEASE ORAL at 09:05

## 2021-05-22 RX ADMIN — ONDANSETRON 8 MG: 2 INJECTION INTRAMUSCULAR; INTRAVENOUS at 01:05

## 2021-05-22 RX ADMIN — NIFEDIPINE 30 MG: 30 TABLET, FILM COATED, EXTENDED RELEASE ORAL at 09:05

## 2021-05-22 RX ADMIN — ENOXAPARIN SODIUM 40 MG: 40 INJECTION SUBCUTANEOUS at 05:05

## 2021-05-22 RX ADMIN — LOSARTAN POTASSIUM 50 MG: 50 TABLET, FILM COATED ORAL at 09:05

## 2021-05-22 RX ADMIN — FLUCONAZOLE 400 MG: 200 TABLET ORAL at 09:05

## 2021-05-22 RX ADMIN — Medication 1 DOSE: at 09:05

## 2021-05-22 RX ADMIN — ONDANSETRON 8 MG: 2 INJECTION INTRAMUSCULAR; INTRAVENOUS at 09:05

## 2021-05-22 RX ADMIN — Medication 400 MG: at 09:05

## 2021-05-22 RX ADMIN — Medication 1 DOSE: at 01:05

## 2021-05-22 RX ADMIN — ONDANSETRON 8 MG: 2 INJECTION INTRAMUSCULAR; INTRAVENOUS at 05:05

## 2021-05-23 LAB
ABO + RH BLD: NORMAL
ALBUMIN SERPL BCP-MCNC: 3.1 G/DL (ref 3.5–5.2)
ALP SERPL-CCNC: 71 U/L (ref 55–135)
ALT SERPL W/O P-5'-P-CCNC: 30 U/L (ref 10–44)
ANION GAP SERPL CALC-SCNC: 7 MMOL/L (ref 8–16)
ANISOCYTOSIS BLD QL SMEAR: SLIGHT
AST SERPL-CCNC: 20 U/L (ref 10–40)
BASOPHILS # BLD AUTO: 0.01 K/UL (ref 0–0.2)
BASOPHILS NFR BLD: 16.7 % (ref 0–1.9)
BILIRUB SERPL-MCNC: 0.8 MG/DL (ref 0.1–1)
BLD GP AB SCN CELLS X3 SERPL QL: NORMAL
BUN SERPL-MCNC: 10 MG/DL (ref 8–23)
CALCIUM SERPL-MCNC: 8.3 MG/DL (ref 8.7–10.5)
CHLORIDE SERPL-SCNC: 106 MMOL/L (ref 95–110)
CO2 SERPL-SCNC: 24 MMOL/L (ref 23–29)
CREAT SERPL-MCNC: 0.8 MG/DL (ref 0.5–1.4)
DACRYOCYTES BLD QL SMEAR: ABNORMAL
DIFFERENTIAL METHOD: ABNORMAL
EOSINOPHIL # BLD AUTO: 0 K/UL (ref 0–0.5)
EOSINOPHIL NFR BLD: 16.7 % (ref 0–8)
ERYTHROCYTE [DISTWIDTH] IN BLOOD BY AUTOMATED COUNT: 14.1 % (ref 11.5–14.5)
EST. GFR  (AFRICAN AMERICAN): >60 ML/MIN/1.73 M^2
EST. GFR  (NON AFRICAN AMERICAN): >60 ML/MIN/1.73 M^2
GLUCOSE SERPL-MCNC: 105 MG/DL (ref 70–110)
HCT VFR BLD AUTO: 30.6 % (ref 40–54)
HGB BLD-MCNC: 10.3 G/DL (ref 14–18)
IMM GRANULOCYTES # BLD AUTO: 0 K/UL (ref 0–0.04)
IMM GRANULOCYTES NFR BLD AUTO: 0 % (ref 0–0.5)
LYMPHOCYTES # BLD AUTO: 0 K/UL (ref 1–4.8)
LYMPHOCYTES NFR BLD: 50 % (ref 18–48)
MAGNESIUM SERPL-MCNC: 1.7 MG/DL (ref 1.6–2.6)
MCH RBC QN AUTO: 32.2 PG (ref 27–31)
MCHC RBC AUTO-ENTMCNC: 33.7 G/DL (ref 32–36)
MCV RBC AUTO: 96 FL (ref 82–98)
MONOCYTES # BLD AUTO: 0 K/UL (ref 0.3–1)
MONOCYTES NFR BLD: 0 % (ref 4–15)
NEUTROPHILS # BLD AUTO: 0 K/UL (ref 1.8–7.7)
NEUTROPHILS NFR BLD: 16.6 % (ref 38–73)
NRBC BLD-RTO: 0 /100 WBC
OVALOCYTES BLD QL SMEAR: ABNORMAL
PHOSPHATE SERPL-MCNC: 3.1 MG/DL (ref 2.7–4.5)
PLATELET # BLD AUTO: 45 K/UL (ref 150–450)
PLATELET BLD QL SMEAR: ABNORMAL
PMV BLD AUTO: 10.8 FL (ref 9.2–12.9)
POIKILOCYTOSIS BLD QL SMEAR: SLIGHT
POTASSIUM SERPL-SCNC: 3.9 MMOL/L (ref 3.5–5.1)
PROT SERPL-MCNC: 5.4 G/DL (ref 6–8.4)
RBC # BLD AUTO: 3.2 M/UL (ref 4.6–6.2)
SODIUM SERPL-SCNC: 137 MMOL/L (ref 136–145)
WBC # BLD AUTO: 0.06 K/UL (ref 3.9–12.7)

## 2021-05-23 PROCEDURE — 99233 SBSQ HOSP IP/OBS HIGH 50: CPT | Mod: ,,, | Performed by: INTERNAL MEDICINE

## 2021-05-23 PROCEDURE — 99233 PR SUBSEQUENT HOSPITAL CARE,LEVL III: ICD-10-PCS | Mod: ,,, | Performed by: INTERNAL MEDICINE

## 2021-05-23 PROCEDURE — 63600175 PHARM REV CODE 636 W HCPCS: Performed by: NURSE PRACTITIONER

## 2021-05-23 PROCEDURE — 20600001 HC STEP DOWN PRIVATE ROOM

## 2021-05-23 PROCEDURE — 85025 COMPLETE CBC W/AUTO DIFF WBC: CPT | Performed by: NURSE PRACTITIONER

## 2021-05-23 PROCEDURE — 25000003 PHARM REV CODE 250: Performed by: INTERNAL MEDICINE

## 2021-05-23 PROCEDURE — 83735 ASSAY OF MAGNESIUM: CPT | Performed by: NURSE PRACTITIONER

## 2021-05-23 PROCEDURE — 25000003 PHARM REV CODE 250: Performed by: NURSE PRACTITIONER

## 2021-05-23 PROCEDURE — 84100 ASSAY OF PHOSPHORUS: CPT | Performed by: NURSE PRACTITIONER

## 2021-05-23 PROCEDURE — 25000003 PHARM REV CODE 250: Performed by: STUDENT IN AN ORGANIZED HEALTH CARE EDUCATION/TRAINING PROGRAM

## 2021-05-23 PROCEDURE — 86900 BLOOD TYPING SEROLOGIC ABO: CPT | Performed by: NURSE PRACTITIONER

## 2021-05-23 PROCEDURE — 80053 COMPREHEN METABOLIC PANEL: CPT | Performed by: NURSE PRACTITIONER

## 2021-05-23 RX ADMIN — Medication 1 DOSE: at 09:05

## 2021-05-23 RX ADMIN — LOSARTAN POTASSIUM 50 MG: 50 TABLET, FILM COATED ORAL at 09:05

## 2021-05-23 RX ADMIN — CARVEDILOL 25 MG: 25 TABLET, FILM COATED ORAL at 09:05

## 2021-05-23 RX ADMIN — ONDANSETRON 8 MG: 2 INJECTION INTRAMUSCULAR; INTRAVENOUS at 05:05

## 2021-05-23 RX ADMIN — ACYCLOVIR 800 MG: 200 CAPSULE ORAL at 09:05

## 2021-05-23 RX ADMIN — LEVOFLOXACIN 500 MG: 500 TABLET, FILM COATED ORAL at 09:05

## 2021-05-23 RX ADMIN — PANTOPRAZOLE SODIUM 40 MG: 40 TABLET, DELAYED RELEASE ORAL at 09:05

## 2021-05-23 RX ADMIN — NIFEDIPINE 30 MG: 30 TABLET, FILM COATED, EXTENDED RELEASE ORAL at 09:05

## 2021-05-23 RX ADMIN — Medication 400 MG: at 06:05

## 2021-05-23 RX ADMIN — FLUCONAZOLE 400 MG: 200 TABLET ORAL at 09:05

## 2021-05-23 RX ADMIN — Medication 1 DOSE: at 06:05

## 2021-05-23 RX ADMIN — Medication 400 MG: at 09:05

## 2021-05-24 LAB
ALBUMIN SERPL BCP-MCNC: 3.1 G/DL (ref 3.5–5.2)
ALP SERPL-CCNC: 68 U/L (ref 55–135)
ALT SERPL W/O P-5'-P-CCNC: 25 U/L (ref 10–44)
ANION GAP SERPL CALC-SCNC: 9 MMOL/L (ref 8–16)
ANISOCYTOSIS BLD QL SMEAR: SLIGHT
AST SERPL-CCNC: 16 U/L (ref 10–40)
BASOPHILS # BLD AUTO: 0 K/UL (ref 0–0.2)
BASOPHILS NFR BLD: 0 % (ref 0–1.9)
BILIRUB SERPL-MCNC: 0.3 MG/DL (ref 0.1–1)
BUN SERPL-MCNC: 12 MG/DL (ref 8–23)
CALCIUM SERPL-MCNC: 8.1 MG/DL (ref 8.7–10.5)
CHLORIDE SERPL-SCNC: 109 MMOL/L (ref 95–110)
CO2 SERPL-SCNC: 22 MMOL/L (ref 23–29)
CREAT SERPL-MCNC: 0.8 MG/DL (ref 0.5–1.4)
DIFFERENTIAL METHOD: ABNORMAL
EOSINOPHIL # BLD AUTO: 0 K/UL (ref 0–0.5)
EOSINOPHIL NFR BLD: 0 % (ref 0–8)
ERYTHROCYTE [DISTWIDTH] IN BLOOD BY AUTOMATED COUNT: 14 % (ref 11.5–14.5)
EST. GFR  (AFRICAN AMERICAN): >60 ML/MIN/1.73 M^2
EST. GFR  (NON AFRICAN AMERICAN): >60 ML/MIN/1.73 M^2
GLUCOSE SERPL-MCNC: 96 MG/DL (ref 70–110)
HCT VFR BLD AUTO: 29.8 % (ref 40–54)
HGB BLD-MCNC: 10 G/DL (ref 14–18)
HYPOCHROMIA BLD QL SMEAR: ABNORMAL
IMM GRANULOCYTES # BLD AUTO: 0 K/UL (ref 0–0.04)
IMM GRANULOCYTES NFR BLD AUTO: 0 % (ref 0–0.5)
LYMPHOCYTES # BLD AUTO: 0 K/UL (ref 1–4.8)
LYMPHOCYTES NFR BLD: 50 % (ref 18–48)
MAGNESIUM SERPL-MCNC: 1.8 MG/DL (ref 1.6–2.6)
MCH RBC QN AUTO: 32.1 PG (ref 27–31)
MCHC RBC AUTO-ENTMCNC: 33.6 G/DL (ref 32–36)
MCV RBC AUTO: 96 FL (ref 82–98)
MONOCYTES # BLD AUTO: 0 K/UL (ref 0.3–1)
MONOCYTES NFR BLD: 25 % (ref 4–15)
NEUTROPHILS # BLD AUTO: 0 K/UL (ref 1.8–7.7)
NEUTROPHILS NFR BLD: 25 % (ref 38–73)
NRBC BLD-RTO: 0 /100 WBC
OVALOCYTES BLD QL SMEAR: ABNORMAL
PHOSPHATE SERPL-MCNC: 3.2 MG/DL (ref 2.7–4.5)
PLATELET # BLD AUTO: 29 K/UL (ref 150–450)
PLATELET BLD QL SMEAR: ABNORMAL
PMV BLD AUTO: 12 FL (ref 9.2–12.9)
POIKILOCYTOSIS BLD QL SMEAR: SLIGHT
POLYCHROMASIA BLD QL SMEAR: ABNORMAL
POTASSIUM SERPL-SCNC: 3.9 MMOL/L (ref 3.5–5.1)
PROT SERPL-MCNC: 5.2 G/DL (ref 6–8.4)
RBC # BLD AUTO: 3.12 M/UL (ref 4.6–6.2)
SODIUM SERPL-SCNC: 140 MMOL/L (ref 136–145)
WBC # BLD AUTO: 0.04 K/UL (ref 3.9–12.7)

## 2021-05-24 PROCEDURE — 83735 ASSAY OF MAGNESIUM: CPT | Performed by: NURSE PRACTITIONER

## 2021-05-24 PROCEDURE — 97116 GAIT TRAINING THERAPY: CPT | Mod: CQ

## 2021-05-24 PROCEDURE — 85025 COMPLETE CBC W/AUTO DIFF WBC: CPT | Performed by: NURSE PRACTITIONER

## 2021-05-24 PROCEDURE — 99233 SBSQ HOSP IP/OBS HIGH 50: CPT | Mod: ,,, | Performed by: INTERNAL MEDICINE

## 2021-05-24 PROCEDURE — 63600175 PHARM REV CODE 636 W HCPCS: Mod: JG | Performed by: INTERNAL MEDICINE

## 2021-05-24 PROCEDURE — 99233 PR SUBSEQUENT HOSPITAL CARE,LEVL III: ICD-10-PCS | Mod: ,,, | Performed by: INTERNAL MEDICINE

## 2021-05-24 PROCEDURE — 80053 COMPREHEN METABOLIC PANEL: CPT | Performed by: NURSE PRACTITIONER

## 2021-05-24 PROCEDURE — 25000003 PHARM REV CODE 250: Performed by: INTERNAL MEDICINE

## 2021-05-24 PROCEDURE — 84100 ASSAY OF PHOSPHORUS: CPT | Performed by: NURSE PRACTITIONER

## 2021-05-24 PROCEDURE — 25000003 PHARM REV CODE 250: Performed by: STUDENT IN AN ORGANIZED HEALTH CARE EDUCATION/TRAINING PROGRAM

## 2021-05-24 PROCEDURE — 25000003 PHARM REV CODE 250: Performed by: NURSE PRACTITIONER

## 2021-05-24 PROCEDURE — 63600175 PHARM REV CODE 636 W HCPCS: Performed by: NURSE PRACTITIONER

## 2021-05-24 PROCEDURE — 20600001 HC STEP DOWN PRIVATE ROOM

## 2021-05-24 RX ADMIN — ACYCLOVIR 800 MG: 200 CAPSULE ORAL at 08:05

## 2021-05-24 RX ADMIN — Medication 1 DOSE: at 08:05

## 2021-05-24 RX ADMIN — FLUCONAZOLE 400 MG: 200 TABLET ORAL at 08:05

## 2021-05-24 RX ADMIN — Medication 400 MG: at 05:05

## 2021-05-24 RX ADMIN — ACYCLOVIR 800 MG: 200 CAPSULE ORAL at 09:05

## 2021-05-24 RX ADMIN — Medication 1 DOSE: at 05:05

## 2021-05-24 RX ADMIN — LEVOFLOXACIN 500 MG: 500 TABLET, FILM COATED ORAL at 08:05

## 2021-05-24 RX ADMIN — ONDANSETRON 8 MG: 2 INJECTION INTRAMUSCULAR; INTRAVENOUS at 09:05

## 2021-05-24 RX ADMIN — NIFEDIPINE 30 MG: 30 TABLET, FILM COATED, EXTENDED RELEASE ORAL at 08:05

## 2021-05-24 RX ADMIN — HEPARIN SODIUM 1600 UNITS: 1000 INJECTION, SOLUTION INTRAVENOUS; SUBCUTANEOUS at 09:05

## 2021-05-24 RX ADMIN — Medication 1 DOSE: at 12:05

## 2021-05-24 RX ADMIN — CARVEDILOL 25 MG: 25 TABLET, FILM COATED ORAL at 08:05

## 2021-05-24 RX ADMIN — PANTOPRAZOLE SODIUM 40 MG: 40 TABLET, DELAYED RELEASE ORAL at 08:05

## 2021-05-24 RX ADMIN — CARVEDILOL 25 MG: 25 TABLET, FILM COATED ORAL at 09:05

## 2021-05-24 RX ADMIN — ONDANSETRON 8 MG: 2 INJECTION INTRAMUSCULAR; INTRAVENOUS at 05:05

## 2021-05-24 RX ADMIN — LOSARTAN POTASSIUM 50 MG: 50 TABLET, FILM COATED ORAL at 08:05

## 2021-05-24 RX ADMIN — FILGRASTIM 480 MCG: 480 INJECTION, SOLUTION INTRAVENOUS; SUBCUTANEOUS at 08:05

## 2021-05-24 RX ADMIN — Medication 1 DOSE: at 09:05

## 2021-05-24 RX ADMIN — ONDANSETRON 8 MG: 2 INJECTION INTRAMUSCULAR; INTRAVENOUS at 03:05

## 2021-05-24 RX ADMIN — Medication 400 MG: at 08:05

## 2021-05-25 LAB
ALBUMIN SERPL BCP-MCNC: 3 G/DL (ref 3.5–5.2)
ALP SERPL-CCNC: 70 U/L (ref 55–135)
ALT SERPL W/O P-5'-P-CCNC: 21 U/L (ref 10–44)
ANION GAP SERPL CALC-SCNC: 5 MMOL/L (ref 8–16)
AST SERPL-CCNC: 12 U/L (ref 10–40)
BASOPHILS # BLD AUTO: 0 K/UL (ref 0–0.2)
BASOPHILS NFR BLD: 0 % (ref 0–1.9)
BILIRUB SERPL-MCNC: 0.5 MG/DL (ref 0.1–1)
BUN SERPL-MCNC: 10 MG/DL (ref 8–23)
C DIFF GDH STL QL: NEGATIVE
C DIFF TOX A+B STL QL IA: NEGATIVE
CALCIUM SERPL-MCNC: 8.3 MG/DL (ref 8.7–10.5)
CHLORIDE SERPL-SCNC: 106 MMOL/L (ref 95–110)
CO2 SERPL-SCNC: 24 MMOL/L (ref 23–29)
CREAT SERPL-MCNC: 0.8 MG/DL (ref 0.5–1.4)
DIFFERENTIAL METHOD: ABNORMAL
EOSINOPHIL # BLD AUTO: 0 K/UL (ref 0–0.5)
EOSINOPHIL NFR BLD: 0 % (ref 0–8)
ERYTHROCYTE [DISTWIDTH] IN BLOOD BY AUTOMATED COUNT: 13.7 % (ref 11.5–14.5)
EST. GFR  (AFRICAN AMERICAN): >60 ML/MIN/1.73 M^2
EST. GFR  (NON AFRICAN AMERICAN): >60 ML/MIN/1.73 M^2
GLUCOSE SERPL-MCNC: 98 MG/DL (ref 70–110)
HCT VFR BLD AUTO: 28.4 % (ref 40–54)
HGB BLD-MCNC: 9.8 G/DL (ref 14–18)
IMM GRANULOCYTES # BLD AUTO: 0 K/UL (ref 0–0.04)
IMM GRANULOCYTES NFR BLD AUTO: 0 % (ref 0–0.5)
LYMPHOCYTES # BLD AUTO: 0 K/UL (ref 1–4.8)
LYMPHOCYTES NFR BLD: 50 % (ref 18–48)
MAGNESIUM SERPL-MCNC: 1.7 MG/DL (ref 1.6–2.6)
MCH RBC QN AUTO: 32.3 PG (ref 27–31)
MCHC RBC AUTO-ENTMCNC: 34.5 G/DL (ref 32–36)
MCV RBC AUTO: 94 FL (ref 82–98)
MONOCYTES # BLD AUTO: 0 K/UL (ref 0.3–1)
MONOCYTES NFR BLD: 50 % (ref 4–15)
NEUTROPHILS # BLD AUTO: 0 K/UL (ref 1.8–7.7)
NEUTROPHILS NFR BLD: 0 % (ref 38–73)
NRBC BLD-RTO: 0 /100 WBC
PHOSPHATE SERPL-MCNC: 3.2 MG/DL (ref 2.7–4.5)
PLATELET # BLD AUTO: 16 K/UL (ref 150–450)
PLATELET BLD QL SMEAR: ABNORMAL
PMV BLD AUTO: 12.9 FL (ref 9.2–12.9)
POTASSIUM SERPL-SCNC: 3.6 MMOL/L (ref 3.5–5.1)
PROT SERPL-MCNC: 5.3 G/DL (ref 6–8.4)
RBC # BLD AUTO: 3.03 M/UL (ref 4.6–6.2)
SODIUM SERPL-SCNC: 135 MMOL/L (ref 136–145)
WBC # BLD AUTO: 0.04 K/UL (ref 3.9–12.7)

## 2021-05-25 PROCEDURE — 87324 CLOSTRIDIUM AG IA: CPT | Performed by: INTERNAL MEDICINE

## 2021-05-25 PROCEDURE — 84100 ASSAY OF PHOSPHORUS: CPT | Performed by: NURSE PRACTITIONER

## 2021-05-25 PROCEDURE — 99233 PR SUBSEQUENT HOSPITAL CARE,LEVL III: ICD-10-PCS | Mod: ,,, | Performed by: INTERNAL MEDICINE

## 2021-05-25 PROCEDURE — 20600001 HC STEP DOWN PRIVATE ROOM

## 2021-05-25 PROCEDURE — 80053 COMPREHEN METABOLIC PANEL: CPT | Performed by: NURSE PRACTITIONER

## 2021-05-25 PROCEDURE — 87449 NOS EACH ORGANISM AG IA: CPT | Performed by: INTERNAL MEDICINE

## 2021-05-25 PROCEDURE — 97530 THERAPEUTIC ACTIVITIES: CPT

## 2021-05-25 PROCEDURE — 99233 SBSQ HOSP IP/OBS HIGH 50: CPT | Mod: ,,, | Performed by: INTERNAL MEDICINE

## 2021-05-25 PROCEDURE — 83735 ASSAY OF MAGNESIUM: CPT | Performed by: NURSE PRACTITIONER

## 2021-05-25 PROCEDURE — 25000003 PHARM REV CODE 250: Performed by: INTERNAL MEDICINE

## 2021-05-25 PROCEDURE — 85025 COMPLETE CBC W/AUTO DIFF WBC: CPT | Performed by: NURSE PRACTITIONER

## 2021-05-25 PROCEDURE — 25000003 PHARM REV CODE 250: Performed by: STUDENT IN AN ORGANIZED HEALTH CARE EDUCATION/TRAINING PROGRAM

## 2021-05-25 PROCEDURE — 25000003 PHARM REV CODE 250: Performed by: NURSE PRACTITIONER

## 2021-05-25 PROCEDURE — 63600175 PHARM REV CODE 636 W HCPCS: Performed by: NURSE PRACTITIONER

## 2021-05-25 PROCEDURE — 63600175 PHARM REV CODE 636 W HCPCS: Performed by: INTERNAL MEDICINE

## 2021-05-25 RX ORDER — LOPERAMIDE HYDROCHLORIDE 2 MG/1
2 CAPSULE ORAL 4 TIMES DAILY PRN
Status: DISCONTINUED | OUTPATIENT
Start: 2021-05-25 | End: 2021-05-31 | Stop reason: HOSPADM

## 2021-05-25 RX ADMIN — ONDANSETRON 8 MG: 2 INJECTION INTRAMUSCULAR; INTRAVENOUS at 05:05

## 2021-05-25 RX ADMIN — FLUCONAZOLE 400 MG: 200 TABLET ORAL at 08:05

## 2021-05-25 RX ADMIN — LOSARTAN POTASSIUM 50 MG: 50 TABLET, FILM COATED ORAL at 08:05

## 2021-05-25 RX ADMIN — Medication 1 DOSE: at 12:05

## 2021-05-25 RX ADMIN — FILGRASTIM 480 MCG: 480 INJECTION, SOLUTION INTRAVENOUS; SUBCUTANEOUS at 08:05

## 2021-05-25 RX ADMIN — LOPERAMIDE HYDROCHLORIDE 2 MG: 2 CAPSULE ORAL at 04:05

## 2021-05-25 RX ADMIN — ACYCLOVIR 800 MG: 200 CAPSULE ORAL at 08:05

## 2021-05-25 RX ADMIN — NIFEDIPINE 30 MG: 30 TABLET, FILM COATED, EXTENDED RELEASE ORAL at 08:05

## 2021-05-25 RX ADMIN — Medication 400 MG: at 05:05

## 2021-05-25 RX ADMIN — HEPARIN SODIUM 1600 UNITS: 1000 INJECTION, SOLUTION INTRAVENOUS; SUBCUTANEOUS at 03:05

## 2021-05-25 RX ADMIN — Medication 1 DOSE: at 08:05

## 2021-05-25 RX ADMIN — Medication 1 DOSE: at 04:05

## 2021-05-25 RX ADMIN — CARVEDILOL 25 MG: 25 TABLET, FILM COATED ORAL at 08:05

## 2021-05-25 RX ADMIN — POTASSIUM CHLORIDE 20 MEQ: 1500 TABLET, EXTENDED RELEASE ORAL at 05:05

## 2021-05-25 RX ADMIN — LEVOFLOXACIN 500 MG: 500 TABLET, FILM COATED ORAL at 08:05

## 2021-05-25 RX ADMIN — PANTOPRAZOLE SODIUM 40 MG: 40 TABLET, DELAYED RELEASE ORAL at 08:05

## 2021-05-25 RX ADMIN — ONDANSETRON 8 MG: 2 INJECTION INTRAMUSCULAR; INTRAVENOUS at 01:05

## 2021-05-25 RX ADMIN — Medication 400 MG: at 10:05

## 2021-05-26 LAB
ABO + RH BLD: NORMAL
ALBUMIN SERPL BCP-MCNC: 3 G/DL (ref 3.5–5.2)
ALP SERPL-CCNC: 66 U/L (ref 55–135)
ALT SERPL W/O P-5'-P-CCNC: 18 U/L (ref 10–44)
ANION GAP SERPL CALC-SCNC: 8 MMOL/L (ref 8–16)
ANISOCYTOSIS BLD QL SMEAR: SLIGHT
AST SERPL-CCNC: 12 U/L (ref 10–40)
BASOPHILS # BLD AUTO: 0 K/UL (ref 0–0.2)
BASOPHILS NFR BLD: 0 % (ref 0–1.9)
BILIRUB SERPL-MCNC: 0.5 MG/DL (ref 0.1–1)
BLD GP AB SCN CELLS X3 SERPL QL: NORMAL
BLD PROD TYP BPU: NORMAL
BLOOD UNIT EXPIRATION DATE: NORMAL
BLOOD UNIT TYPE CODE: 9500
BLOOD UNIT TYPE: NORMAL
BUN SERPL-MCNC: 8 MG/DL (ref 8–23)
CALCIUM SERPL-MCNC: 8.4 MG/DL (ref 8.7–10.5)
CHLORIDE SERPL-SCNC: 107 MMOL/L (ref 95–110)
CO2 SERPL-SCNC: 22 MMOL/L (ref 23–29)
CODING SYSTEM: NORMAL
CREAT SERPL-MCNC: 0.8 MG/DL (ref 0.5–1.4)
DIFFERENTIAL METHOD: ABNORMAL
DISPENSE STATUS: NORMAL
EOSINOPHIL # BLD AUTO: 0 K/UL (ref 0–0.5)
EOSINOPHIL NFR BLD: 0 % (ref 0–8)
ERYTHROCYTE [DISTWIDTH] IN BLOOD BY AUTOMATED COUNT: 13.7 % (ref 11.5–14.5)
EST. GFR  (AFRICAN AMERICAN): >60 ML/MIN/1.73 M^2
EST. GFR  (NON AFRICAN AMERICAN): >60 ML/MIN/1.73 M^2
GLUCOSE SERPL-MCNC: 102 MG/DL (ref 70–110)
HCT VFR BLD AUTO: 28.2 % (ref 40–54)
HGB BLD-MCNC: 9.6 G/DL (ref 14–18)
HYPOCHROMIA BLD QL SMEAR: ABNORMAL
IMM GRANULOCYTES # BLD AUTO: 0 K/UL (ref 0–0.04)
IMM GRANULOCYTES NFR BLD AUTO: 0 % (ref 0–0.5)
LYMPHOCYTES # BLD AUTO: 0.1 K/UL (ref 1–4.8)
LYMPHOCYTES NFR BLD: 80 % (ref 18–48)
MAGNESIUM SERPL-MCNC: 1.7 MG/DL (ref 1.6–2.6)
MCH RBC QN AUTO: 31.4 PG (ref 27–31)
MCHC RBC AUTO-ENTMCNC: 34 G/DL (ref 32–36)
MCV RBC AUTO: 92 FL (ref 82–98)
MONOCYTES # BLD AUTO: 0 K/UL (ref 0.3–1)
MONOCYTES NFR BLD: 10 % (ref 4–15)
NEUTROPHILS # BLD AUTO: 0 K/UL (ref 1.8–7.7)
NEUTROPHILS NFR BLD: 10 % (ref 38–73)
NRBC BLD-RTO: 0 /100 WBC
NUM UNITS TRANS WBC-POOR PLATPHERESIS: NORMAL
OVALOCYTES BLD QL SMEAR: ABNORMAL
PHOSPHATE SERPL-MCNC: 3 MG/DL (ref 2.7–4.5)
PLATELET # BLD AUTO: 9 K/UL (ref 150–450)
PLATELET BLD QL SMEAR: ABNORMAL
PMV BLD AUTO: 11.5 FL (ref 9.2–12.9)
POIKILOCYTOSIS BLD QL SMEAR: SLIGHT
POLYCHROMASIA BLD QL SMEAR: ABNORMAL
POTASSIUM SERPL-SCNC: 3.7 MMOL/L (ref 3.5–5.1)
PROT SERPL-MCNC: 5.2 G/DL (ref 6–8.4)
RBC # BLD AUTO: 3.06 M/UL (ref 4.6–6.2)
SODIUM SERPL-SCNC: 137 MMOL/L (ref 136–145)
WBC # BLD AUTO: 0.1 K/UL (ref 3.9–12.7)

## 2021-05-26 PROCEDURE — 25000003 PHARM REV CODE 250: Performed by: NURSE PRACTITIONER

## 2021-05-26 PROCEDURE — 36430 TRANSFUSION BLD/BLD COMPNT: CPT

## 2021-05-26 PROCEDURE — 25000003 PHARM REV CODE 250: Performed by: INTERNAL MEDICINE

## 2021-05-26 PROCEDURE — P9037 PLATE PHERES LEUKOREDU IRRAD: HCPCS | Performed by: NURSE PRACTITIONER

## 2021-05-26 PROCEDURE — 20600001 HC STEP DOWN PRIVATE ROOM

## 2021-05-26 PROCEDURE — 80053 COMPREHEN METABOLIC PANEL: CPT | Performed by: NURSE PRACTITIONER

## 2021-05-26 PROCEDURE — 84100 ASSAY OF PHOSPHORUS: CPT | Performed by: NURSE PRACTITIONER

## 2021-05-26 PROCEDURE — 99233 SBSQ HOSP IP/OBS HIGH 50: CPT | Mod: ,,, | Performed by: INTERNAL MEDICINE

## 2021-05-26 PROCEDURE — 63600175 PHARM REV CODE 636 W HCPCS: Performed by: NURSE PRACTITIONER

## 2021-05-26 PROCEDURE — 63600175 PHARM REV CODE 636 W HCPCS: Performed by: INTERNAL MEDICINE

## 2021-05-26 PROCEDURE — 86900 BLOOD TYPING SEROLOGIC ABO: CPT | Performed by: NURSE PRACTITIONER

## 2021-05-26 PROCEDURE — 99233 PR SUBSEQUENT HOSPITAL CARE,LEVL III: ICD-10-PCS | Mod: ,,, | Performed by: INTERNAL MEDICINE

## 2021-05-26 PROCEDURE — 83735 ASSAY OF MAGNESIUM: CPT | Performed by: NURSE PRACTITIONER

## 2021-05-26 PROCEDURE — 85025 COMPLETE CBC W/AUTO DIFF WBC: CPT | Performed by: NURSE PRACTITIONER

## 2021-05-26 RX ORDER — ACETAMINOPHEN 325 MG/1
650 TABLET ORAL ONCE AS NEEDED
Status: COMPLETED | OUTPATIENT
Start: 2021-05-26 | End: 2021-05-26

## 2021-05-26 RX ORDER — HYDROCODONE BITARTRATE AND ACETAMINOPHEN 500; 5 MG/1; MG/1
TABLET ORAL
Status: DISCONTINUED | OUTPATIENT
Start: 2021-05-26 | End: 2021-05-31 | Stop reason: HOSPADM

## 2021-05-26 RX ORDER — DIPHENHYDRAMINE HCL 25 MG
25 CAPSULE ORAL ONCE AS NEEDED
Status: DISCONTINUED | OUTPATIENT
Start: 2021-05-26 | End: 2021-05-31 | Stop reason: HOSPADM

## 2021-05-26 RX ORDER — ACETAMINOPHEN 325 MG/1
650 TABLET ORAL EVERY 6 HOURS PRN
Status: DISCONTINUED | OUTPATIENT
Start: 2021-05-26 | End: 2021-05-31 | Stop reason: HOSPADM

## 2021-05-26 RX ORDER — DIPHENHYDRAMINE HCL 25 MG
25 CAPSULE ORAL EVERY 6 HOURS PRN
Status: DISCONTINUED | OUTPATIENT
Start: 2021-05-26 | End: 2021-05-31 | Stop reason: HOSPADM

## 2021-05-26 RX ADMIN — Medication 400 MG: at 09:05

## 2021-05-26 RX ADMIN — PANTOPRAZOLE SODIUM 40 MG: 40 TABLET, DELAYED RELEASE ORAL at 09:05

## 2021-05-26 RX ADMIN — NIFEDIPINE 30 MG: 30 TABLET, FILM COATED, EXTENDED RELEASE ORAL at 12:05

## 2021-05-26 RX ADMIN — ONDANSETRON 8 MG: 2 INJECTION INTRAMUSCULAR; INTRAVENOUS at 05:05

## 2021-05-26 RX ADMIN — HEPARIN SODIUM 1600 UNITS: 1000 INJECTION, SOLUTION INTRAVENOUS; SUBCUTANEOUS at 11:05

## 2021-05-26 RX ADMIN — CARVEDILOL 25 MG: 25 TABLET, FILM COATED ORAL at 08:05

## 2021-05-26 RX ADMIN — Medication 1 DOSE: at 09:05

## 2021-05-26 RX ADMIN — ACYCLOVIR 800 MG: 200 CAPSULE ORAL at 08:05

## 2021-05-26 RX ADMIN — ONDANSETRON 8 MG: 2 INJECTION INTRAMUSCULAR; INTRAVENOUS at 01:05

## 2021-05-26 RX ADMIN — Medication 1 DOSE: at 01:05

## 2021-05-26 RX ADMIN — FILGRASTIM 480 MCG: 480 INJECTION, SOLUTION INTRAVENOUS; SUBCUTANEOUS at 09:05

## 2021-05-26 RX ADMIN — Medication 1 DOSE: at 04:05

## 2021-05-26 RX ADMIN — ONDANSETRON 8 MG: 2 INJECTION INTRAMUSCULAR; INTRAVENOUS at 10:05

## 2021-05-26 RX ADMIN — LOSARTAN POTASSIUM 50 MG: 50 TABLET, FILM COATED ORAL at 12:05

## 2021-05-26 RX ADMIN — HEPARIN SODIUM 1600 UNITS: 1000 INJECTION, SOLUTION INTRAVENOUS; SUBCUTANEOUS at 01:05

## 2021-05-26 RX ADMIN — POTASSIUM CHLORIDE 20 MEQ: 1500 TABLET, EXTENDED RELEASE ORAL at 05:05

## 2021-05-26 RX ADMIN — Medication 400 MG: at 05:05

## 2021-05-26 RX ADMIN — ACETAMINOPHEN 650 MG: 325 TABLET ORAL at 10:05

## 2021-05-26 RX ADMIN — LOPERAMIDE HYDROCHLORIDE 2 MG: 2 CAPSULE ORAL at 02:05

## 2021-05-26 RX ADMIN — FLUCONAZOLE 400 MG: 200 TABLET ORAL at 09:05

## 2021-05-26 RX ADMIN — DIPHENHYDRAMINE HYDROCHLORIDE 25 MG: 25 CAPSULE ORAL at 10:05

## 2021-05-26 RX ADMIN — CARVEDILOL 25 MG: 25 TABLET, FILM COATED ORAL at 12:05

## 2021-05-26 RX ADMIN — ACYCLOVIR 800 MG: 200 CAPSULE ORAL at 09:05

## 2021-05-26 RX ADMIN — LEVOFLOXACIN 500 MG: 500 TABLET, FILM COATED ORAL at 09:05

## 2021-05-27 PROBLEM — K52.1 CHEMOTHERAPY-INDUCED DIARRHEA: Status: ACTIVE | Noted: 2021-05-27

## 2021-05-27 PROBLEM — T45.1X5A CHEMOTHERAPY-INDUCED DIARRHEA: Status: ACTIVE | Noted: 2021-05-27

## 2021-05-27 LAB
ALBUMIN SERPL BCP-MCNC: 3 G/DL (ref 3.5–5.2)
ALP SERPL-CCNC: 63 U/L (ref 55–135)
ALT SERPL W/O P-5'-P-CCNC: 16 U/L (ref 10–44)
ANION GAP SERPL CALC-SCNC: 11 MMOL/L (ref 8–16)
AST SERPL-CCNC: 10 U/L (ref 10–40)
BASOPHILS # BLD AUTO: 0 K/UL (ref 0–0.2)
BASOPHILS NFR BLD: 0 % (ref 0–1.9)
BILIRUB SERPL-MCNC: 0.4 MG/DL (ref 0.1–1)
BUN SERPL-MCNC: 8 MG/DL (ref 8–23)
CALCIUM SERPL-MCNC: 8.6 MG/DL (ref 8.7–10.5)
CHLORIDE SERPL-SCNC: 104 MMOL/L (ref 95–110)
CO2 SERPL-SCNC: 21 MMOL/L (ref 23–29)
CREAT SERPL-MCNC: 0.8 MG/DL (ref 0.5–1.4)
DIFFERENTIAL METHOD: ABNORMAL
EOSINOPHIL # BLD AUTO: 0 K/UL (ref 0–0.5)
EOSINOPHIL NFR BLD: 0 % (ref 0–8)
ERYTHROCYTE [DISTWIDTH] IN BLOOD BY AUTOMATED COUNT: 13.8 % (ref 11.5–14.5)
EST. GFR  (AFRICAN AMERICAN): >60 ML/MIN/1.73 M^2
EST. GFR  (NON AFRICAN AMERICAN): >60 ML/MIN/1.73 M^2
GLUCOSE SERPL-MCNC: 105 MG/DL (ref 70–110)
HCT VFR BLD AUTO: 27.6 % (ref 40–54)
HGB BLD-MCNC: 9.6 G/DL (ref 14–18)
IMM GRANULOCYTES # BLD AUTO: 0 K/UL (ref 0–0.04)
IMM GRANULOCYTES NFR BLD AUTO: 0 % (ref 0–0.5)
LYMPHOCYTES # BLD AUTO: 0.2 K/UL (ref 1–4.8)
LYMPHOCYTES NFR BLD: 71.4 % (ref 18–48)
MAGNESIUM SERPL-MCNC: 1.9 MG/DL (ref 1.6–2.6)
MCH RBC QN AUTO: 32 PG (ref 27–31)
MCHC RBC AUTO-ENTMCNC: 34.8 G/DL (ref 32–36)
MCV RBC AUTO: 92 FL (ref 82–98)
MONOCYTES # BLD AUTO: 0.1 K/UL (ref 0.3–1)
MONOCYTES NFR BLD: 23.8 % (ref 4–15)
NEUTROPHILS # BLD AUTO: 0 K/UL (ref 1.8–7.7)
NEUTROPHILS NFR BLD: 4.8 % (ref 38–73)
NRBC BLD-RTO: 0 /100 WBC
PHOSPHATE SERPL-MCNC: 3.4 MG/DL (ref 2.7–4.5)
PLATELET # BLD AUTO: 28 K/UL (ref 150–450)
PLATELET BLD QL SMEAR: ABNORMAL
PMV BLD AUTO: 12.7 FL (ref 9.2–12.9)
POTASSIUM SERPL-SCNC: 3.8 MMOL/L (ref 3.5–5.1)
PROT SERPL-MCNC: 5.4 G/DL (ref 6–8.4)
RBC # BLD AUTO: 3 M/UL (ref 4.6–6.2)
SODIUM SERPL-SCNC: 136 MMOL/L (ref 136–145)
WBC # BLD AUTO: 0.21 K/UL (ref 3.9–12.7)

## 2021-05-27 PROCEDURE — 25000003 PHARM REV CODE 250: Performed by: NURSE PRACTITIONER

## 2021-05-27 PROCEDURE — 25000003 PHARM REV CODE 250: Performed by: INTERNAL MEDICINE

## 2021-05-27 PROCEDURE — 85025 COMPLETE CBC W/AUTO DIFF WBC: CPT | Performed by: NURSE PRACTITIONER

## 2021-05-27 PROCEDURE — 20600001 HC STEP DOWN PRIVATE ROOM

## 2021-05-27 PROCEDURE — 80053 COMPREHEN METABOLIC PANEL: CPT | Performed by: NURSE PRACTITIONER

## 2021-05-27 PROCEDURE — 63600175 PHARM REV CODE 636 W HCPCS: Performed by: NURSE PRACTITIONER

## 2021-05-27 PROCEDURE — 63600175 PHARM REV CODE 636 W HCPCS: Performed by: INTERNAL MEDICINE

## 2021-05-27 PROCEDURE — 99233 PR SUBSEQUENT HOSPITAL CARE,LEVL III: ICD-10-PCS | Mod: ,,, | Performed by: INTERNAL MEDICINE

## 2021-05-27 PROCEDURE — 84100 ASSAY OF PHOSPHORUS: CPT | Performed by: NURSE PRACTITIONER

## 2021-05-27 PROCEDURE — 83735 ASSAY OF MAGNESIUM: CPT | Performed by: NURSE PRACTITIONER

## 2021-05-27 PROCEDURE — 99233 SBSQ HOSP IP/OBS HIGH 50: CPT | Mod: ,,, | Performed by: INTERNAL MEDICINE

## 2021-05-27 RX ADMIN — Medication 1 DOSE: at 01:05

## 2021-05-27 RX ADMIN — Medication 400 MG: at 09:05

## 2021-05-27 RX ADMIN — NIFEDIPINE 30 MG: 30 TABLET, FILM COATED, EXTENDED RELEASE ORAL at 11:05

## 2021-05-27 RX ADMIN — PANTOPRAZOLE SODIUM 40 MG: 40 TABLET, DELAYED RELEASE ORAL at 09:05

## 2021-05-27 RX ADMIN — POTASSIUM CHLORIDE 20 MEQ: 1500 TABLET, EXTENDED RELEASE ORAL at 06:05

## 2021-05-27 RX ADMIN — CARVEDILOL 25 MG: 25 TABLET, FILM COATED ORAL at 11:05

## 2021-05-27 RX ADMIN — ACYCLOVIR 800 MG: 200 CAPSULE ORAL at 09:05

## 2021-05-27 RX ADMIN — HEPARIN SODIUM 1600 UNITS: 1000 INJECTION, SOLUTION INTRAVENOUS; SUBCUTANEOUS at 06:05

## 2021-05-27 RX ADMIN — ONDANSETRON 8 MG: 2 INJECTION INTRAMUSCULAR; INTRAVENOUS at 06:05

## 2021-05-27 RX ADMIN — Medication 400 MG: at 06:05

## 2021-05-27 RX ADMIN — FLUCONAZOLE 400 MG: 200 TABLET ORAL at 09:05

## 2021-05-27 RX ADMIN — HEPARIN SODIUM 1600 UNITS: 1000 INJECTION, SOLUTION INTRAVENOUS; SUBCUTANEOUS at 02:05

## 2021-05-27 RX ADMIN — Medication 1 DOSE: at 05:05

## 2021-05-27 RX ADMIN — Medication 1 DOSE: at 09:05

## 2021-05-27 RX ADMIN — LEVOFLOXACIN 500 MG: 500 TABLET, FILM COATED ORAL at 09:05

## 2021-05-27 RX ADMIN — ACYCLOVIR 800 MG: 200 CAPSULE ORAL at 08:05

## 2021-05-27 RX ADMIN — ONDANSETRON 8 MG: 2 INJECTION INTRAMUSCULAR; INTRAVENOUS at 02:05

## 2021-05-27 RX ADMIN — LOSARTAN POTASSIUM 50 MG: 50 TABLET, FILM COATED ORAL at 11:05

## 2021-05-27 RX ADMIN — FILGRASTIM 480 MCG: 480 INJECTION, SOLUTION INTRAVENOUS; SUBCUTANEOUS at 09:05

## 2021-05-27 RX ADMIN — LOPERAMIDE HYDROCHLORIDE 2 MG: 2 CAPSULE ORAL at 09:05

## 2021-05-27 RX ADMIN — CARVEDILOL 25 MG: 25 TABLET, FILM COATED ORAL at 08:05

## 2021-05-28 LAB
ALBUMIN SERPL BCP-MCNC: 2.9 G/DL (ref 3.5–5.2)
ALP SERPL-CCNC: 59 U/L (ref 55–135)
ALT SERPL W/O P-5'-P-CCNC: 13 U/L (ref 10–44)
ANION GAP SERPL CALC-SCNC: 10 MMOL/L (ref 8–16)
ANISOCYTOSIS BLD QL SMEAR: SLIGHT
AST SERPL-CCNC: 9 U/L (ref 10–40)
BASOPHILS # BLD AUTO: 0.01 K/UL (ref 0–0.2)
BASOPHILS NFR BLD: 1.9 % (ref 0–1.9)
BILIRUB SERPL-MCNC: 0.5 MG/DL (ref 0.1–1)
BUN SERPL-MCNC: 8 MG/DL (ref 8–23)
CALCIUM SERPL-MCNC: 8.6 MG/DL (ref 8.7–10.5)
CHLORIDE SERPL-SCNC: 106 MMOL/L (ref 95–110)
CO2 SERPL-SCNC: 22 MMOL/L (ref 23–29)
CREAT SERPL-MCNC: 0.8 MG/DL (ref 0.5–1.4)
DIFFERENTIAL METHOD: ABNORMAL
EOSINOPHIL # BLD AUTO: 0 K/UL (ref 0–0.5)
EOSINOPHIL NFR BLD: 0 % (ref 0–8)
ERYTHROCYTE [DISTWIDTH] IN BLOOD BY AUTOMATED COUNT: 13.7 % (ref 11.5–14.5)
EST. GFR  (AFRICAN AMERICAN): >60 ML/MIN/1.73 M^2
EST. GFR  (NON AFRICAN AMERICAN): >60 ML/MIN/1.73 M^2
GLUCOSE SERPL-MCNC: 103 MG/DL (ref 70–110)
HCT VFR BLD AUTO: 27 % (ref 40–54)
HGB BLD-MCNC: 9.4 G/DL (ref 14–18)
HYPOCHROMIA BLD QL SMEAR: ABNORMAL
IMM GRANULOCYTES # BLD AUTO: 0 K/UL (ref 0–0.04)
IMM GRANULOCYTES NFR BLD AUTO: 0 % (ref 0–0.5)
LYMPHOCYTES # BLD AUTO: 0.4 K/UL (ref 1–4.8)
LYMPHOCYTES NFR BLD: 69.8 % (ref 18–48)
MAGNESIUM SERPL-MCNC: 1.8 MG/DL (ref 1.6–2.6)
MCH RBC QN AUTO: 32.1 PG (ref 27–31)
MCHC RBC AUTO-ENTMCNC: 34.8 G/DL (ref 32–36)
MCV RBC AUTO: 92 FL (ref 82–98)
MONOCYTES # BLD AUTO: 0.1 K/UL (ref 0.3–1)
MONOCYTES NFR BLD: 24.5 % (ref 4–15)
NEUTROPHILS # BLD AUTO: 0 K/UL (ref 1.8–7.7)
NEUTROPHILS NFR BLD: 3.8 % (ref 38–73)
NRBC BLD-RTO: 0 /100 WBC
OVALOCYTES BLD QL SMEAR: ABNORMAL
PHOSPHATE SERPL-MCNC: 3.5 MG/DL (ref 2.7–4.5)
PLATELET # BLD AUTO: 29 K/UL (ref 150–450)
PMV BLD AUTO: 11.9 FL (ref 9.2–12.9)
POIKILOCYTOSIS BLD QL SMEAR: SLIGHT
POLYCHROMASIA BLD QL SMEAR: ABNORMAL
POTASSIUM SERPL-SCNC: 3.7 MMOL/L (ref 3.5–5.1)
PROT SERPL-MCNC: 5.2 G/DL (ref 6–8.4)
RBC # BLD AUTO: 2.93 M/UL (ref 4.6–6.2)
SODIUM SERPL-SCNC: 138 MMOL/L (ref 136–145)
WBC # BLD AUTO: 0.53 K/UL (ref 3.9–12.7)

## 2021-05-28 PROCEDURE — 25000003 PHARM REV CODE 250: Performed by: NURSE PRACTITIONER

## 2021-05-28 PROCEDURE — 20600001 HC STEP DOWN PRIVATE ROOM

## 2021-05-28 PROCEDURE — 83735 ASSAY OF MAGNESIUM: CPT | Performed by: NURSE PRACTITIONER

## 2021-05-28 PROCEDURE — 84100 ASSAY OF PHOSPHORUS: CPT | Performed by: NURSE PRACTITIONER

## 2021-05-28 PROCEDURE — 80053 COMPREHEN METABOLIC PANEL: CPT | Performed by: NURSE PRACTITIONER

## 2021-05-28 PROCEDURE — 85025 COMPLETE CBC W/AUTO DIFF WBC: CPT | Performed by: NURSE PRACTITIONER

## 2021-05-28 PROCEDURE — 63600175 PHARM REV CODE 636 W HCPCS: Performed by: NURSE PRACTITIONER

## 2021-05-28 PROCEDURE — 99233 SBSQ HOSP IP/OBS HIGH 50: CPT | Mod: ,,, | Performed by: INTERNAL MEDICINE

## 2021-05-28 PROCEDURE — 63600175 PHARM REV CODE 636 W HCPCS: Performed by: INTERNAL MEDICINE

## 2021-05-28 PROCEDURE — 25000003 PHARM REV CODE 250: Performed by: INTERNAL MEDICINE

## 2021-05-28 PROCEDURE — 99233 PR SUBSEQUENT HOSPITAL CARE,LEVL III: ICD-10-PCS | Mod: ,,, | Performed by: INTERNAL MEDICINE

## 2021-05-28 RX ADMIN — LOPERAMIDE HYDROCHLORIDE 2 MG: 2 CAPSULE ORAL at 04:05

## 2021-05-28 RX ADMIN — Medication 1 DOSE: at 09:05

## 2021-05-28 RX ADMIN — Medication 400 MG: at 09:05

## 2021-05-28 RX ADMIN — ONDANSETRON 8 MG: 2 INJECTION INTRAMUSCULAR; INTRAVENOUS at 02:05

## 2021-05-28 RX ADMIN — Medication 1 DOSE: at 04:05

## 2021-05-28 RX ADMIN — CARVEDILOL 25 MG: 25 TABLET, FILM COATED ORAL at 08:05

## 2021-05-28 RX ADMIN — CARVEDILOL 25 MG: 25 TABLET, FILM COATED ORAL at 12:05

## 2021-05-28 RX ADMIN — POTASSIUM CHLORIDE 20 MEQ: 1500 TABLET, EXTENDED RELEASE ORAL at 06:05

## 2021-05-28 RX ADMIN — LEVOFLOXACIN 500 MG: 500 TABLET, FILM COATED ORAL at 09:05

## 2021-05-28 RX ADMIN — NIFEDIPINE 30 MG: 30 TABLET, FILM COATED, EXTENDED RELEASE ORAL at 12:05

## 2021-05-28 RX ADMIN — ONDANSETRON 8 MG: 2 INJECTION INTRAMUSCULAR; INTRAVENOUS at 10:05

## 2021-05-28 RX ADMIN — ACYCLOVIR 800 MG: 200 CAPSULE ORAL at 09:05

## 2021-05-28 RX ADMIN — Medication 1 DOSE: at 12:05

## 2021-05-28 RX ADMIN — ONDANSETRON 8 MG: 2 INJECTION INTRAMUSCULAR; INTRAVENOUS at 06:05

## 2021-05-28 RX ADMIN — Medication 1 DOSE: at 08:05

## 2021-05-28 RX ADMIN — PANTOPRAZOLE SODIUM 40 MG: 40 TABLET, DELAYED RELEASE ORAL at 09:05

## 2021-05-28 RX ADMIN — LOSARTAN POTASSIUM 50 MG: 50 TABLET, FILM COATED ORAL at 12:05

## 2021-05-28 RX ADMIN — FILGRASTIM 480 MCG: 480 INJECTION, SOLUTION INTRAVENOUS; SUBCUTANEOUS at 09:05

## 2021-05-28 RX ADMIN — FLUCONAZOLE 400 MG: 200 TABLET ORAL at 09:05

## 2021-05-28 RX ADMIN — ACYCLOVIR 800 MG: 200 CAPSULE ORAL at 08:05

## 2021-05-28 RX ADMIN — HEPARIN SODIUM 1600 UNITS: 1000 INJECTION, SOLUTION INTRAVENOUS; SUBCUTANEOUS at 02:05

## 2021-05-28 RX ADMIN — Medication 400 MG: at 06:05

## 2021-05-29 LAB
ABO + RH BLD: NORMAL
ALBUMIN SERPL BCP-MCNC: 2.9 G/DL (ref 3.5–5.2)
ALP SERPL-CCNC: 58 U/L (ref 55–135)
ALT SERPL W/O P-5'-P-CCNC: 11 U/L (ref 10–44)
ANION GAP SERPL CALC-SCNC: 10 MMOL/L (ref 8–16)
ANISOCYTOSIS BLD QL SMEAR: SLIGHT
AST SERPL-CCNC: 9 U/L (ref 10–40)
BASOPHILS # BLD AUTO: 0.02 K/UL (ref 0–0.2)
BASOPHILS NFR BLD: 1.5 % (ref 0–1.9)
BILIRUB SERPL-MCNC: 0.5 MG/DL (ref 0.1–1)
BLD GP AB SCN CELLS X3 SERPL QL: NORMAL
BUN SERPL-MCNC: 7 MG/DL (ref 8–23)
CALCIUM SERPL-MCNC: 8.4 MG/DL (ref 8.7–10.5)
CHLORIDE SERPL-SCNC: 106 MMOL/L (ref 95–110)
CO2 SERPL-SCNC: 22 MMOL/L (ref 23–29)
CREAT SERPL-MCNC: 0.9 MG/DL (ref 0.5–1.4)
DIFFERENTIAL METHOD: ABNORMAL
EOSINOPHIL # BLD AUTO: 0 K/UL (ref 0–0.5)
EOSINOPHIL NFR BLD: 0 % (ref 0–8)
ERYTHROCYTE [DISTWIDTH] IN BLOOD BY AUTOMATED COUNT: 14 % (ref 11.5–14.5)
EST. GFR  (AFRICAN AMERICAN): >60 ML/MIN/1.73 M^2
EST. GFR  (NON AFRICAN AMERICAN): >60 ML/MIN/1.73 M^2
GLUCOSE SERPL-MCNC: 106 MG/DL (ref 70–110)
HCT VFR BLD AUTO: 27.5 % (ref 40–54)
HGB BLD-MCNC: 9.2 G/DL (ref 14–18)
HYPOCHROMIA BLD QL SMEAR: ABNORMAL
IMM GRANULOCYTES # BLD AUTO: 0.01 K/UL (ref 0–0.04)
IMM GRANULOCYTES NFR BLD AUTO: 0.7 % (ref 0–0.5)
LYMPHOCYTES # BLD AUTO: 0.6 K/UL (ref 1–4.8)
LYMPHOCYTES NFR BLD: 44 % (ref 18–48)
MAGNESIUM SERPL-MCNC: 1.7 MG/DL (ref 1.6–2.6)
MCH RBC QN AUTO: 30.8 PG (ref 27–31)
MCHC RBC AUTO-ENTMCNC: 33.5 G/DL (ref 32–36)
MCV RBC AUTO: 92 FL (ref 82–98)
MONOCYTES # BLD AUTO: 0.4 K/UL (ref 0.3–1)
MONOCYTES NFR BLD: 26.9 % (ref 4–15)
NEUTROPHILS # BLD AUTO: 0.4 K/UL (ref 1.8–7.7)
NEUTROPHILS NFR BLD: 26.9 % (ref 38–73)
NRBC BLD-RTO: 0 /100 WBC
OVALOCYTES BLD QL SMEAR: ABNORMAL
PHOSPHATE SERPL-MCNC: 3.6 MG/DL (ref 2.7–4.5)
PLATELET # BLD AUTO: 34 K/UL (ref 150–450)
PLATELET BLD QL SMEAR: ABNORMAL
PMV BLD AUTO: 12.4 FL (ref 9.2–12.9)
POIKILOCYTOSIS BLD QL SMEAR: SLIGHT
POLYCHROMASIA BLD QL SMEAR: ABNORMAL
POTASSIUM SERPL-SCNC: 3.6 MMOL/L (ref 3.5–5.1)
PROT SERPL-MCNC: 5.3 G/DL (ref 6–8.4)
RBC # BLD AUTO: 2.99 M/UL (ref 4.6–6.2)
SODIUM SERPL-SCNC: 138 MMOL/L (ref 136–145)
WBC # BLD AUTO: 1.34 K/UL (ref 3.9–12.7)

## 2021-05-29 PROCEDURE — 86900 BLOOD TYPING SEROLOGIC ABO: CPT | Performed by: NURSE PRACTITIONER

## 2021-05-29 PROCEDURE — 25000003 PHARM REV CODE 250: Performed by: NURSE PRACTITIONER

## 2021-05-29 PROCEDURE — 20600001 HC STEP DOWN PRIVATE ROOM

## 2021-05-29 PROCEDURE — 63600175 PHARM REV CODE 636 W HCPCS: Mod: JG | Performed by: INTERNAL MEDICINE

## 2021-05-29 PROCEDURE — 63600175 PHARM REV CODE 636 W HCPCS: Performed by: NURSE PRACTITIONER

## 2021-05-29 PROCEDURE — 25000003 PHARM REV CODE 250: Performed by: INTERNAL MEDICINE

## 2021-05-29 PROCEDURE — 99233 PR SUBSEQUENT HOSPITAL CARE,LEVL III: ICD-10-PCS | Mod: ,,, | Performed by: INTERNAL MEDICINE

## 2021-05-29 PROCEDURE — 99233 SBSQ HOSP IP/OBS HIGH 50: CPT | Mod: ,,, | Performed by: INTERNAL MEDICINE

## 2021-05-29 PROCEDURE — 85025 COMPLETE CBC W/AUTO DIFF WBC: CPT | Performed by: NURSE PRACTITIONER

## 2021-05-29 PROCEDURE — 84100 ASSAY OF PHOSPHORUS: CPT | Performed by: NURSE PRACTITIONER

## 2021-05-29 PROCEDURE — 83735 ASSAY OF MAGNESIUM: CPT | Performed by: NURSE PRACTITIONER

## 2021-05-29 PROCEDURE — 80053 COMPREHEN METABOLIC PANEL: CPT | Performed by: NURSE PRACTITIONER

## 2021-05-29 RX ADMIN — Medication 400 MG: at 05:05

## 2021-05-29 RX ADMIN — Medication 1 DOSE: at 05:05

## 2021-05-29 RX ADMIN — NIFEDIPINE 30 MG: 30 TABLET, FILM COATED, EXTENDED RELEASE ORAL at 08:05

## 2021-05-29 RX ADMIN — LEVOFLOXACIN 500 MG: 500 TABLET, FILM COATED ORAL at 08:05

## 2021-05-29 RX ADMIN — Medication 1 DOSE: at 08:05

## 2021-05-29 RX ADMIN — Medication 1 DOSE: at 09:05

## 2021-05-29 RX ADMIN — CARVEDILOL 25 MG: 25 TABLET, FILM COATED ORAL at 09:05

## 2021-05-29 RX ADMIN — FILGRASTIM 480 MCG: 480 INJECTION, SOLUTION INTRAVENOUS; SUBCUTANEOUS at 08:05

## 2021-05-29 RX ADMIN — LOSARTAN POTASSIUM 50 MG: 50 TABLET, FILM COATED ORAL at 08:05

## 2021-05-29 RX ADMIN — ONDANSETRON 8 MG: 2 INJECTION INTRAMUSCULAR; INTRAVENOUS at 09:05

## 2021-05-29 RX ADMIN — ACYCLOVIR 800 MG: 200 CAPSULE ORAL at 08:05

## 2021-05-29 RX ADMIN — ONDANSETRON 8 MG: 2 INJECTION INTRAMUSCULAR; INTRAVENOUS at 05:05

## 2021-05-29 RX ADMIN — CARVEDILOL 25 MG: 25 TABLET, FILM COATED ORAL at 08:05

## 2021-05-29 RX ADMIN — Medication 400 MG: at 11:05

## 2021-05-29 RX ADMIN — PANTOPRAZOLE SODIUM 40 MG: 40 TABLET, DELAYED RELEASE ORAL at 08:05

## 2021-05-29 RX ADMIN — Medication 1 DOSE: at 01:05

## 2021-05-29 RX ADMIN — ACYCLOVIR 800 MG: 200 CAPSULE ORAL at 09:05

## 2021-05-29 RX ADMIN — ONDANSETRON 8 MG: 2 INJECTION INTRAMUSCULAR; INTRAVENOUS at 02:05

## 2021-05-29 RX ADMIN — FLUCONAZOLE 400 MG: 200 TABLET ORAL at 08:05

## 2021-05-30 LAB
ALBUMIN SERPL BCP-MCNC: 2.9 G/DL (ref 3.5–5.2)
ALP SERPL-CCNC: 59 U/L (ref 55–135)
ALT SERPL W/O P-5'-P-CCNC: 11 U/L (ref 10–44)
ANION GAP SERPL CALC-SCNC: 9 MMOL/L (ref 8–16)
ANISOCYTOSIS BLD QL SMEAR: SLIGHT
AST SERPL-CCNC: 10 U/L (ref 10–40)
BASOPHILS NFR BLD: 0 % (ref 0–1.9)
BILIRUB SERPL-MCNC: 0.3 MG/DL (ref 0.1–1)
BUN SERPL-MCNC: 8 MG/DL (ref 8–23)
BURR CELLS BLD QL SMEAR: ABNORMAL
CALCIUM SERPL-MCNC: 8.3 MG/DL (ref 8.7–10.5)
CHLORIDE SERPL-SCNC: 105 MMOL/L (ref 95–110)
CO2 SERPL-SCNC: 23 MMOL/L (ref 23–29)
CREAT SERPL-MCNC: 1 MG/DL (ref 0.5–1.4)
DACRYOCYTES BLD QL SMEAR: ABNORMAL
DIFFERENTIAL METHOD: ABNORMAL
DOHLE BOD BLD QL SMEAR: PRESENT
EOSINOPHIL NFR BLD: 1 % (ref 0–8)
ERYTHROCYTE [DISTWIDTH] IN BLOOD BY AUTOMATED COUNT: 13.9 % (ref 11.5–14.5)
EST. GFR  (AFRICAN AMERICAN): >60 ML/MIN/1.73 M^2
EST. GFR  (NON AFRICAN AMERICAN): >60 ML/MIN/1.73 M^2
GLUCOSE SERPL-MCNC: 95 MG/DL (ref 70–110)
HCT VFR BLD AUTO: 27.3 % (ref 40–54)
HGB BLD-MCNC: 9.3 G/DL (ref 14–18)
IMM GRANULOCYTES # BLD AUTO: ABNORMAL K/UL (ref 0–0.04)
IMM GRANULOCYTES NFR BLD AUTO: ABNORMAL % (ref 0–0.5)
LYMPHOCYTES NFR BLD: 26 % (ref 18–48)
MAGNESIUM SERPL-MCNC: 1.7 MG/DL (ref 1.6–2.6)
MCH RBC QN AUTO: 31.8 PG (ref 27–31)
MCHC RBC AUTO-ENTMCNC: 34.1 G/DL (ref 32–36)
MCV RBC AUTO: 94 FL (ref 82–98)
MONOCYTES NFR BLD: 6 % (ref 4–15)
NEUTROPHILS NFR BLD: 66 % (ref 38–73)
NEUTS BAND NFR BLD MANUAL: 1 %
NRBC BLD-RTO: 0 /100 WBC
OVALOCYTES BLD QL SMEAR: ABNORMAL
PHOSPHATE SERPL-MCNC: 3.7 MG/DL (ref 2.7–4.5)
PLATELET # BLD AUTO: 46 K/UL (ref 150–450)
PLATELET BLD QL SMEAR: ABNORMAL
PMV BLD AUTO: 12.2 FL (ref 9.2–12.9)
POIKILOCYTOSIS BLD QL SMEAR: SLIGHT
POTASSIUM SERPL-SCNC: 3.4 MMOL/L (ref 3.5–5.1)
PROT SERPL-MCNC: 5.1 G/DL (ref 6–8.4)
RBC # BLD AUTO: 2.92 M/UL (ref 4.6–6.2)
SODIUM SERPL-SCNC: 137 MMOL/L (ref 136–145)
TOXIC GRANULES BLD QL SMEAR: PRESENT
WBC # BLD AUTO: 3.95 K/UL (ref 3.9–12.7)

## 2021-05-30 PROCEDURE — 25000003 PHARM REV CODE 250: Performed by: NURSE PRACTITIONER

## 2021-05-30 PROCEDURE — 99233 SBSQ HOSP IP/OBS HIGH 50: CPT | Mod: ,,, | Performed by: INTERNAL MEDICINE

## 2021-05-30 PROCEDURE — 20600001 HC STEP DOWN PRIVATE ROOM

## 2021-05-30 PROCEDURE — 63600175 PHARM REV CODE 636 W HCPCS: Performed by: NURSE PRACTITIONER

## 2021-05-30 PROCEDURE — 99233 PR SUBSEQUENT HOSPITAL CARE,LEVL III: ICD-10-PCS | Mod: ,,, | Performed by: INTERNAL MEDICINE

## 2021-05-30 PROCEDURE — 84100 ASSAY OF PHOSPHORUS: CPT | Performed by: NURSE PRACTITIONER

## 2021-05-30 PROCEDURE — 80053 COMPREHEN METABOLIC PANEL: CPT | Performed by: NURSE PRACTITIONER

## 2021-05-30 PROCEDURE — 85007 BL SMEAR W/DIFF WBC COUNT: CPT | Performed by: NURSE PRACTITIONER

## 2021-05-30 PROCEDURE — 25000003 PHARM REV CODE 250: Performed by: INTERNAL MEDICINE

## 2021-05-30 PROCEDURE — 63600175 PHARM REV CODE 636 W HCPCS: Performed by: INTERNAL MEDICINE

## 2021-05-30 PROCEDURE — 83735 ASSAY OF MAGNESIUM: CPT | Performed by: NURSE PRACTITIONER

## 2021-05-30 PROCEDURE — 85027 COMPLETE CBC AUTOMATED: CPT | Performed by: NURSE PRACTITIONER

## 2021-05-30 RX ORDER — ONDANSETRON 2 MG/ML
8 INJECTION INTRAMUSCULAR; INTRAVENOUS EVERY 8 HOURS PRN
Status: DISCONTINUED | OUTPATIENT
Start: 2021-05-30 | End: 2021-05-31 | Stop reason: HOSPADM

## 2021-05-30 RX ADMIN — ACYCLOVIR 800 MG: 200 CAPSULE ORAL at 08:05

## 2021-05-30 RX ADMIN — POTASSIUM CHLORIDE 20 MEQ: 1500 TABLET, EXTENDED RELEASE ORAL at 06:05

## 2021-05-30 RX ADMIN — LOSARTAN POTASSIUM 50 MG: 50 TABLET, FILM COATED ORAL at 08:05

## 2021-05-30 RX ADMIN — Medication 400 MG: at 10:05

## 2021-05-30 RX ADMIN — Medication 1 DOSE: at 01:05

## 2021-05-30 RX ADMIN — ONDANSETRON 8 MG: 2 INJECTION INTRAMUSCULAR; INTRAVENOUS at 06:05

## 2021-05-30 RX ADMIN — CARVEDILOL 25 MG: 25 TABLET, FILM COATED ORAL at 08:05

## 2021-05-30 RX ADMIN — POTASSIUM CHLORIDE 20 MEQ: 1500 TABLET, EXTENDED RELEASE ORAL at 08:05

## 2021-05-30 RX ADMIN — PANTOPRAZOLE SODIUM 40 MG: 40 TABLET, DELAYED RELEASE ORAL at 08:05

## 2021-05-30 RX ADMIN — NIFEDIPINE 30 MG: 30 TABLET, FILM COATED, EXTENDED RELEASE ORAL at 08:05

## 2021-05-30 RX ADMIN — HEPARIN SODIUM 1600 UNITS: 1000 INJECTION, SOLUTION INTRAVENOUS; SUBCUTANEOUS at 03:05

## 2021-05-30 RX ADMIN — Medication 1 DOSE: at 05:05

## 2021-05-30 RX ADMIN — Medication 400 MG: at 06:05

## 2021-05-30 RX ADMIN — Medication 1 DOSE: at 08:05

## 2021-05-31 VITALS
SYSTOLIC BLOOD PRESSURE: 123 MMHG | WEIGHT: 219.44 LBS | DIASTOLIC BLOOD PRESSURE: 74 MMHG | OXYGEN SATURATION: 96 % | BODY MASS INDEX: 34.44 KG/M2 | RESPIRATION RATE: 18 BRPM | TEMPERATURE: 99 F | HEIGHT: 67 IN | HEART RATE: 76 BPM

## 2021-05-31 LAB
ALBUMIN SERPL BCP-MCNC: 3.1 G/DL (ref 3.5–5.2)
ALP SERPL-CCNC: 72 U/L (ref 55–135)
ALT SERPL W/O P-5'-P-CCNC: 10 U/L (ref 10–44)
ANION GAP SERPL CALC-SCNC: 10 MMOL/L (ref 8–16)
ANISOCYTOSIS BLD QL SMEAR: SLIGHT
AST SERPL-CCNC: 12 U/L (ref 10–40)
BASOPHILS NFR BLD: 0 % (ref 0–1.9)
BILIRUB SERPL-MCNC: 0.3 MG/DL (ref 0.1–1)
BUN SERPL-MCNC: 7 MG/DL (ref 8–23)
BURR CELLS BLD QL SMEAR: ABNORMAL
CALCIUM SERPL-MCNC: 8.4 MG/DL (ref 8.7–10.5)
CHLORIDE SERPL-SCNC: 106 MMOL/L (ref 95–110)
CO2 SERPL-SCNC: 22 MMOL/L (ref 23–29)
CREAT SERPL-MCNC: 1 MG/DL (ref 0.5–1.4)
DIFFERENTIAL METHOD: ABNORMAL
DOHLE BOD BLD QL SMEAR: PRESENT
EOSINOPHIL NFR BLD: 3 % (ref 0–8)
ERYTHROCYTE [DISTWIDTH] IN BLOOD BY AUTOMATED COUNT: 14.3 % (ref 11.5–14.5)
EST. GFR  (AFRICAN AMERICAN): >60 ML/MIN/1.73 M^2
EST. GFR  (NON AFRICAN AMERICAN): >60 ML/MIN/1.73 M^2
GLUCOSE SERPL-MCNC: 101 MG/DL (ref 70–110)
HCT VFR BLD AUTO: 27.8 % (ref 40–54)
HGB BLD-MCNC: 9.6 G/DL (ref 14–18)
HYPOCHROMIA BLD QL SMEAR: ABNORMAL
IMM GRANULOCYTES # BLD AUTO: ABNORMAL K/UL (ref 0–0.04)
IMM GRANULOCYTES NFR BLD AUTO: ABNORMAL % (ref 0–0.5)
LYMPHOCYTES NFR BLD: 24 % (ref 18–48)
MAGNESIUM SERPL-MCNC: 1.8 MG/DL (ref 1.6–2.6)
MCH RBC QN AUTO: 31.9 PG (ref 27–31)
MCHC RBC AUTO-ENTMCNC: 34.5 G/DL (ref 32–36)
MCV RBC AUTO: 92 FL (ref 82–98)
MONOCYTES NFR BLD: 4 % (ref 4–15)
NEUTROPHILS NFR BLD: 64 % (ref 38–73)
NEUTS BAND NFR BLD MANUAL: 5 %
NRBC BLD-RTO: 0 /100 WBC
OVALOCYTES BLD QL SMEAR: ABNORMAL
PHOSPHATE SERPL-MCNC: 4.1 MG/DL (ref 2.7–4.5)
PLATELET # BLD AUTO: 71 K/UL (ref 150–450)
PLATELET BLD QL SMEAR: ABNORMAL
PMV BLD AUTO: 11.3 FL (ref 9.2–12.9)
POIKILOCYTOSIS BLD QL SMEAR: SLIGHT
POTASSIUM SERPL-SCNC: 3.5 MMOL/L (ref 3.5–5.1)
PROT SERPL-MCNC: 5.5 G/DL (ref 6–8.4)
RBC # BLD AUTO: 3.01 M/UL (ref 4.6–6.2)
SODIUM SERPL-SCNC: 138 MMOL/L (ref 136–145)
TOXIC GRANULES BLD QL SMEAR: PRESENT
WBC # BLD AUTO: 6.2 K/UL (ref 3.9–12.7)
WBC TOXIC VACUOLES BLD QL SMEAR: PRESENT

## 2021-05-31 PROCEDURE — 25000003 PHARM REV CODE 250: Performed by: INTERNAL MEDICINE

## 2021-05-31 PROCEDURE — 99239 HOSP IP/OBS DSCHRG MGMT >30: CPT | Mod: ,,, | Performed by: INTERNAL MEDICINE

## 2021-05-31 PROCEDURE — 84100 ASSAY OF PHOSPHORUS: CPT | Performed by: NURSE PRACTITIONER

## 2021-05-31 PROCEDURE — 25000003 PHARM REV CODE 250: Performed by: NURSE PRACTITIONER

## 2021-05-31 PROCEDURE — 85007 BL SMEAR W/DIFF WBC COUNT: CPT | Performed by: NURSE PRACTITIONER

## 2021-05-31 PROCEDURE — 83735 ASSAY OF MAGNESIUM: CPT | Performed by: NURSE PRACTITIONER

## 2021-05-31 PROCEDURE — 85027 COMPLETE CBC AUTOMATED: CPT | Performed by: NURSE PRACTITIONER

## 2021-05-31 PROCEDURE — 99239 PR HOSPITAL DISCHARGE DAY,>30 MIN: ICD-10-PCS | Mod: ,,, | Performed by: INTERNAL MEDICINE

## 2021-05-31 PROCEDURE — 80053 COMPREHEN METABOLIC PANEL: CPT | Performed by: NURSE PRACTITIONER

## 2021-05-31 RX ORDER — ONDANSETRON HYDROCHLORIDE 8 MG/1
8 TABLET, FILM COATED ORAL EVERY 8 HOURS PRN
Qty: 30 TABLET | Refills: 0 | Status: SHIPPED | OUTPATIENT
Start: 2021-05-31 | End: 2023-04-06

## 2021-05-31 RX ORDER — ACYCLOVIR 800 MG/1
800 TABLET ORAL 2 TIMES DAILY
Qty: 60 TABLET | Refills: 11 | Status: SHIPPED | OUTPATIENT
Start: 2021-05-31 | End: 2021-06-10 | Stop reason: SDUPTHER

## 2021-05-31 RX ADMIN — POTASSIUM CHLORIDE 20 MEQ: 1500 TABLET, EXTENDED RELEASE ORAL at 06:05

## 2021-05-31 RX ADMIN — ACYCLOVIR 800 MG: 200 CAPSULE ORAL at 08:05

## 2021-05-31 RX ADMIN — PANTOPRAZOLE SODIUM 40 MG: 40 TABLET, DELAYED RELEASE ORAL at 08:05

## 2021-05-31 RX ADMIN — POTASSIUM CHLORIDE 20 MEQ: 1500 TABLET, EXTENDED RELEASE ORAL at 08:05

## 2021-05-31 RX ADMIN — Medication 400 MG: at 06:05

## 2021-05-31 RX ADMIN — CARVEDILOL 25 MG: 25 TABLET, FILM COATED ORAL at 08:05

## 2021-05-31 RX ADMIN — Medication 1 DOSE: at 08:05

## 2021-05-31 RX ADMIN — Medication 400 MG: at 10:05

## 2021-05-31 RX ADMIN — NIFEDIPINE 30 MG: 30 TABLET, FILM COATED, EXTENDED RELEASE ORAL at 08:05

## 2021-05-31 RX ADMIN — LOSARTAN POTASSIUM 50 MG: 50 TABLET, FILM COATED ORAL at 08:05

## 2021-06-01 ENCOUNTER — TELEPHONE (OUTPATIENT)
Dept: HEMATOLOGY/ONCOLOGY | Facility: CLINIC | Age: 61
End: 2021-06-01

## 2021-06-02 ENCOUNTER — LAB VISIT (OUTPATIENT)
Dept: LAB | Facility: HOSPITAL | Age: 61
End: 2021-06-02
Payer: MEDICAID

## 2021-06-02 ENCOUNTER — OFFICE VISIT (OUTPATIENT)
Dept: HEMATOLOGY/ONCOLOGY | Facility: CLINIC | Age: 61
End: 2021-06-02
Payer: MEDICAID

## 2021-06-02 VITALS
HEIGHT: 67 IN | DIASTOLIC BLOOD PRESSURE: 64 MMHG | WEIGHT: 225.31 LBS | SYSTOLIC BLOOD PRESSURE: 118 MMHG | HEART RATE: 80 BPM | RESPIRATION RATE: 24 BRPM | OXYGEN SATURATION: 97 % | TEMPERATURE: 99 F | BODY MASS INDEX: 35.36 KG/M2

## 2021-06-02 DIAGNOSIS — E55.9 VITAMIN D DEFICIENCY: ICD-10-CM

## 2021-06-02 DIAGNOSIS — Z94.84 HISTORY OF AUTO STEM CELL TRANSPLANT: Primary | ICD-10-CM

## 2021-06-02 DIAGNOSIS — C90.00 MULTIPLE MYELOMA, REMISSION STATUS UNSPECIFIED: ICD-10-CM

## 2021-06-02 DIAGNOSIS — Z94.84 HISTORY OF AUTO STEM CELL TRANSPLANT: ICD-10-CM

## 2021-06-02 DIAGNOSIS — C79.49 METASTASIS OF NEOPLASM TO SPINAL CANAL: ICD-10-CM

## 2021-06-02 DIAGNOSIS — T45.1X5A ANTINEOPLASTIC CHEMOTHERAPY INDUCED PANCYTOPENIA: ICD-10-CM

## 2021-06-02 DIAGNOSIS — I10 ESSENTIAL HYPERTENSION: ICD-10-CM

## 2021-06-02 DIAGNOSIS — D61.810 ANTINEOPLASTIC CHEMOTHERAPY INDUCED PANCYTOPENIA: ICD-10-CM

## 2021-06-02 LAB
ABO + RH BLD: NORMAL
ALBUMIN SERPL BCP-MCNC: 3.3 G/DL (ref 3.5–5.2)
ALP SERPL-CCNC: 66 U/L (ref 55–135)
ALT SERPL W/O P-5'-P-CCNC: 10 U/L (ref 10–44)
ANION GAP SERPL CALC-SCNC: 10 MMOL/L (ref 8–16)
ANISOCYTOSIS BLD QL SMEAR: SLIGHT
AST SERPL-CCNC: 15 U/L (ref 10–40)
BASOPHILS NFR BLD: 0 % (ref 0–1.9)
BILIRUB SERPL-MCNC: 0.2 MG/DL (ref 0.1–1)
BLD GP AB SCN CELLS X3 SERPL QL: NORMAL
BUN SERPL-MCNC: 9 MG/DL (ref 8–23)
BURR CELLS BLD QL SMEAR: ABNORMAL
CALCIUM SERPL-MCNC: 8.5 MG/DL (ref 8.7–10.5)
CHLORIDE SERPL-SCNC: 109 MMOL/L (ref 95–110)
CO2 SERPL-SCNC: 22 MMOL/L (ref 23–29)
CREAT SERPL-MCNC: 0.9 MG/DL (ref 0.5–1.4)
DIFFERENTIAL METHOD: ABNORMAL
EOSINOPHIL NFR BLD: 0 % (ref 0–8)
ERYTHROCYTE [DISTWIDTH] IN BLOOD BY AUTOMATED COUNT: 14.5 % (ref 11.5–14.5)
EST. GFR  (AFRICAN AMERICAN): >60 ML/MIN/1.73 M^2
EST. GFR  (NON AFRICAN AMERICAN): >60 ML/MIN/1.73 M^2
GLUCOSE SERPL-MCNC: 127 MG/DL (ref 70–110)
HCT VFR BLD AUTO: 31 % (ref 40–54)
HGB BLD-MCNC: 10.1 G/DL (ref 14–18)
IMM GRANULOCYTES # BLD AUTO: ABNORMAL K/UL (ref 0–0.04)
IMM GRANULOCYTES NFR BLD AUTO: ABNORMAL % (ref 0–0.5)
LYMPHOCYTES NFR BLD: 17 % (ref 18–48)
MAGNESIUM SERPL-MCNC: 1.6 MG/DL (ref 1.6–2.6)
MCH RBC QN AUTO: 31.8 PG (ref 27–31)
MCHC RBC AUTO-ENTMCNC: 32.6 G/DL (ref 32–36)
MCV RBC AUTO: 98 FL (ref 82–98)
METAMYELOCYTES NFR BLD MANUAL: 1 %
MONOCYTES NFR BLD: 17 % (ref 4–15)
MYELOCYTES NFR BLD MANUAL: 1 %
NEUTROPHILS NFR BLD: 63 % (ref 38–73)
NEUTS BAND NFR BLD MANUAL: 1 %
NRBC BLD-RTO: 0 /100 WBC
PHOSPHATE SERPL-MCNC: 4 MG/DL (ref 2.7–4.5)
PLATELET # BLD AUTO: 122 K/UL (ref 150–450)
PMV BLD AUTO: 11 FL (ref 9.2–12.9)
POIKILOCYTOSIS BLD QL SMEAR: SLIGHT
POLYCHROMASIA BLD QL SMEAR: ABNORMAL
POTASSIUM SERPL-SCNC: 4 MMOL/L (ref 3.5–5.1)
PROT SERPL-MCNC: 5.7 G/DL (ref 6–8.4)
RBC # BLD AUTO: 3.18 M/UL (ref 4.6–6.2)
SCHISTOCYTES BLD QL SMEAR: ABNORMAL
SODIUM SERPL-SCNC: 141 MMOL/L (ref 136–145)
WBC # BLD AUTO: 3.89 K/UL (ref 3.9–12.7)

## 2021-06-02 PROCEDURE — 99215 PR OFFICE/OUTPT VISIT, EST, LEVL V, 40-54 MIN: ICD-10-PCS | Mod: S$PBB,BMT,, | Performed by: NURSE PRACTITIONER

## 2021-06-02 PROCEDURE — 99999 PR PBB SHADOW E&M-EST. PATIENT-LVL III: ICD-10-PCS | Mod: PBBFAC,BMT,, | Performed by: NURSE PRACTITIONER

## 2021-06-02 PROCEDURE — 36415 COLL VENOUS BLD VENIPUNCTURE: CPT | Performed by: NURSE PRACTITIONER

## 2021-06-02 PROCEDURE — 85007 BL SMEAR W/DIFF WBC COUNT: CPT | Performed by: NURSE PRACTITIONER

## 2021-06-02 PROCEDURE — 84100 ASSAY OF PHOSPHORUS: CPT | Performed by: NURSE PRACTITIONER

## 2021-06-02 PROCEDURE — 85027 COMPLETE CBC AUTOMATED: CPT | Performed by: NURSE PRACTITIONER

## 2021-06-02 PROCEDURE — 83735 ASSAY OF MAGNESIUM: CPT | Performed by: NURSE PRACTITIONER

## 2021-06-02 PROCEDURE — 80053 COMPREHEN METABOLIC PANEL: CPT | Performed by: NURSE PRACTITIONER

## 2021-06-02 PROCEDURE — 99213 OFFICE O/P EST LOW 20 MIN: CPT | Mod: PBBFAC,25 | Performed by: NURSE PRACTITIONER

## 2021-06-02 PROCEDURE — 99215 OFFICE O/P EST HI 40 MIN: CPT | Mod: S$PBB,BMT,, | Performed by: NURSE PRACTITIONER

## 2021-06-02 PROCEDURE — 86900 BLOOD TYPING SEROLOGIC ABO: CPT | Performed by: NURSE PRACTITIONER

## 2021-06-02 PROCEDURE — 99999 PR PBB SHADOW E&M-EST. PATIENT-LVL III: CPT | Mod: PBBFAC,BMT,, | Performed by: NURSE PRACTITIONER

## 2021-06-02 RX ORDER — ERGOCALCIFEROL 1.25 MG/1
50000 CAPSULE ORAL
Qty: 4 CAPSULE | Refills: 1 | Status: SHIPPED | OUTPATIENT
Start: 2021-06-02 | End: 2021-07-27

## 2021-06-10 ENCOUNTER — LAB VISIT (OUTPATIENT)
Dept: LAB | Facility: HOSPITAL | Age: 61
End: 2021-06-10
Payer: MEDICAID

## 2021-06-10 ENCOUNTER — OFFICE VISIT (OUTPATIENT)
Dept: HEMATOLOGY/ONCOLOGY | Facility: CLINIC | Age: 61
End: 2021-06-10
Payer: MEDICAID

## 2021-06-10 VITALS
BODY MASS INDEX: 34.67 KG/M2 | HEIGHT: 67 IN | RESPIRATION RATE: 12 BRPM | OXYGEN SATURATION: 97 % | WEIGHT: 220.88 LBS | HEART RATE: 73 BPM | DIASTOLIC BLOOD PRESSURE: 69 MMHG | TEMPERATURE: 98 F | SYSTOLIC BLOOD PRESSURE: 118 MMHG

## 2021-06-10 DIAGNOSIS — D75.9 DRUG-INDUCED CYTOPENIA: ICD-10-CM

## 2021-06-10 DIAGNOSIS — R11.0 CHEMOTHERAPY-INDUCED NAUSEA: ICD-10-CM

## 2021-06-10 DIAGNOSIS — I10 ESSENTIAL HYPERTENSION: ICD-10-CM

## 2021-06-10 DIAGNOSIS — E55.9 VITAMIN D DEFICIENCY: ICD-10-CM

## 2021-06-10 DIAGNOSIS — C79.49 METASTASIS OF NEOPLASM TO SPINAL CANAL: ICD-10-CM

## 2021-06-10 DIAGNOSIS — T45.1X5A CHEMOTHERAPY-INDUCED NAUSEA: ICD-10-CM

## 2021-06-10 DIAGNOSIS — C90.00 MULTIPLE MYELOMA, REMISSION STATUS UNSPECIFIED: ICD-10-CM

## 2021-06-10 DIAGNOSIS — Z76.82 STEM CELL TRANSPLANT CANDIDATE: ICD-10-CM

## 2021-06-10 DIAGNOSIS — F41.9 ANXIETY: ICD-10-CM

## 2021-06-10 DIAGNOSIS — Z94.84 HISTORY OF AUTO STEM CELL TRANSPLANT: Primary | ICD-10-CM

## 2021-06-10 DIAGNOSIS — T50.905A DRUG-INDUCED CYTOPENIA: ICD-10-CM

## 2021-06-10 LAB
ALBUMIN SERPL BCP-MCNC: 3.4 G/DL (ref 3.5–5.2)
ALP SERPL-CCNC: 57 U/L (ref 55–135)
ALT SERPL W/O P-5'-P-CCNC: 16 U/L (ref 10–44)
ANION GAP SERPL CALC-SCNC: 11 MMOL/L (ref 8–16)
AST SERPL-CCNC: 19 U/L (ref 10–40)
BASOPHILS # BLD AUTO: 0.05 K/UL (ref 0–0.2)
BASOPHILS NFR BLD: 1.4 % (ref 0–1.9)
BILIRUB SERPL-MCNC: 0.4 MG/DL (ref 0.1–1)
BUN SERPL-MCNC: 11 MG/DL (ref 8–23)
CALCIUM SERPL-MCNC: 9.1 MG/DL (ref 8.7–10.5)
CHLORIDE SERPL-SCNC: 110 MMOL/L (ref 95–110)
CO2 SERPL-SCNC: 22 MMOL/L (ref 23–29)
CREAT SERPL-MCNC: 0.9 MG/DL (ref 0.5–1.4)
DIFFERENTIAL METHOD: ABNORMAL
EOSINOPHIL # BLD AUTO: 0.2 K/UL (ref 0–0.5)
EOSINOPHIL NFR BLD: 4.6 % (ref 0–8)
ERYTHROCYTE [DISTWIDTH] IN BLOOD BY AUTOMATED COUNT: 14.7 % (ref 11.5–14.5)
EST. GFR  (AFRICAN AMERICAN): >60 ML/MIN/1.73 M^2
EST. GFR  (NON AFRICAN AMERICAN): >60 ML/MIN/1.73 M^2
GLUCOSE SERPL-MCNC: 131 MG/DL (ref 70–110)
HCT VFR BLD AUTO: 33.2 % (ref 40–54)
HGB BLD-MCNC: 11.2 G/DL (ref 14–18)
IMM GRANULOCYTES # BLD AUTO: 0.02 K/UL (ref 0–0.04)
IMM GRANULOCYTES NFR BLD AUTO: 0.6 % (ref 0–0.5)
LYMPHOCYTES # BLD AUTO: 1.2 K/UL (ref 1–4.8)
LYMPHOCYTES NFR BLD: 33.1 % (ref 18–48)
MAGNESIUM SERPL-MCNC: 1.7 MG/DL (ref 1.6–2.6)
MCH RBC QN AUTO: 31.9 PG (ref 27–31)
MCHC RBC AUTO-ENTMCNC: 33.7 G/DL (ref 32–36)
MCV RBC AUTO: 95 FL (ref 82–98)
MONOCYTES # BLD AUTO: 0.4 K/UL (ref 0.3–1)
MONOCYTES NFR BLD: 12.1 % (ref 4–15)
NEUTROPHILS # BLD AUTO: 1.7 K/UL (ref 1.8–7.7)
NEUTROPHILS NFR BLD: 48.2 % (ref 38–73)
NRBC BLD-RTO: 0 /100 WBC
PHOSPHATE SERPL-MCNC: 4.1 MG/DL (ref 2.7–4.5)
PLATELET # BLD AUTO: 221 K/UL (ref 150–450)
PMV BLD AUTO: 10.3 FL (ref 9.2–12.9)
POTASSIUM SERPL-SCNC: 4.1 MMOL/L (ref 3.5–5.1)
PROT SERPL-MCNC: 6.3 G/DL (ref 6–8.4)
RBC # BLD AUTO: 3.51 M/UL (ref 4.6–6.2)
SODIUM SERPL-SCNC: 143 MMOL/L (ref 136–145)
WBC # BLD AUTO: 3.47 K/UL (ref 3.9–12.7)

## 2021-06-10 PROCEDURE — 99215 PR OFFICE/OUTPT VISIT, EST, LEVL V, 40-54 MIN: ICD-10-PCS | Mod: S$PBB,BMT,, | Performed by: NURSE PRACTITIONER

## 2021-06-10 PROCEDURE — 99213 OFFICE O/P EST LOW 20 MIN: CPT | Mod: PBBFAC | Performed by: NURSE PRACTITIONER

## 2021-06-10 PROCEDURE — 99999 PR PBB SHADOW E&M-EST. PATIENT-LVL III: ICD-10-PCS | Mod: PBBFAC,BMT,, | Performed by: NURSE PRACTITIONER

## 2021-06-10 PROCEDURE — 83735 ASSAY OF MAGNESIUM: CPT | Performed by: INTERNAL MEDICINE

## 2021-06-10 PROCEDURE — 99215 OFFICE O/P EST HI 40 MIN: CPT | Mod: S$PBB,BMT,, | Performed by: NURSE PRACTITIONER

## 2021-06-10 PROCEDURE — 85025 COMPLETE CBC W/AUTO DIFF WBC: CPT | Performed by: INTERNAL MEDICINE

## 2021-06-10 PROCEDURE — 84100 ASSAY OF PHOSPHORUS: CPT | Performed by: INTERNAL MEDICINE

## 2021-06-10 PROCEDURE — 36415 COLL VENOUS BLD VENIPUNCTURE: CPT | Performed by: INTERNAL MEDICINE

## 2021-06-10 PROCEDURE — 99999 PR PBB SHADOW E&M-EST. PATIENT-LVL III: CPT | Mod: PBBFAC,BMT,, | Performed by: NURSE PRACTITIONER

## 2021-06-10 PROCEDURE — 80053 COMPREHEN METABOLIC PANEL: CPT | Performed by: INTERNAL MEDICINE

## 2021-06-10 RX ORDER — ALLOPURINOL 300 MG/1
300 TABLET ORAL DAILY
COMMUNITY
Start: 2021-05-23 | End: 2021-06-10

## 2021-06-10 RX ORDER — ACYCLOVIR 800 MG/1
800 TABLET ORAL 2 TIMES DAILY
Qty: 60 TABLET | Refills: 11
Start: 2021-06-10 | End: 2021-07-14 | Stop reason: SDUPTHER

## 2021-06-17 ENCOUNTER — LAB VISIT (OUTPATIENT)
Dept: LAB | Facility: HOSPITAL | Age: 61
End: 2021-06-17
Payer: MEDICAID

## 2021-06-17 ENCOUNTER — OFFICE VISIT (OUTPATIENT)
Dept: HEMATOLOGY/ONCOLOGY | Facility: CLINIC | Age: 61
End: 2021-06-17
Payer: MEDICAID

## 2021-06-17 VITALS
BODY MASS INDEX: 34.95 KG/M2 | OXYGEN SATURATION: 97 % | SYSTOLIC BLOOD PRESSURE: 187 MMHG | TEMPERATURE: 98 F | WEIGHT: 222.69 LBS | HEART RATE: 63 BPM | HEIGHT: 67 IN | DIASTOLIC BLOOD PRESSURE: 93 MMHG | RESPIRATION RATE: 17 BRPM

## 2021-06-17 DIAGNOSIS — Z94.84 HISTORY OF AUTO STEM CELL TRANSPLANT: ICD-10-CM

## 2021-06-17 DIAGNOSIS — Z79.899 IMMUNODEFICIENCY SECONDARY TO CHEMOTHERAPY: ICD-10-CM

## 2021-06-17 DIAGNOSIS — Z76.82 STEM CELL TRANSPLANT CANDIDATE: ICD-10-CM

## 2021-06-17 DIAGNOSIS — C90.00 MULTIPLE MYELOMA, REMISSION STATUS UNSPECIFIED: ICD-10-CM

## 2021-06-17 DIAGNOSIS — D84.821 IMMUNODEFICIENCY SECONDARY TO CHEMOTHERAPY: ICD-10-CM

## 2021-06-17 DIAGNOSIS — C79.49 METASTASIS OF NEOPLASM TO SPINAL CANAL: ICD-10-CM

## 2021-06-17 DIAGNOSIS — D84.81 IMMUNODEFICIENCY SECONDARY TO NEOPLASM: ICD-10-CM

## 2021-06-17 DIAGNOSIS — T45.1X5A IMMUNODEFICIENCY SECONDARY TO CHEMOTHERAPY: ICD-10-CM

## 2021-06-17 DIAGNOSIS — I10 ESSENTIAL HYPERTENSION: ICD-10-CM

## 2021-06-17 DIAGNOSIS — C90.00 MULTIPLE MYELOMA, REMISSION STATUS UNSPECIFIED: Primary | ICD-10-CM

## 2021-06-17 DIAGNOSIS — D49.9 IMMUNODEFICIENCY SECONDARY TO NEOPLASM: ICD-10-CM

## 2021-06-17 LAB
ALBUMIN SERPL BCP-MCNC: 3.5 G/DL (ref 3.5–5.2)
ALP SERPL-CCNC: 60 U/L (ref 55–135)
ALT SERPL W/O P-5'-P-CCNC: 10 U/L (ref 10–44)
ANION GAP SERPL CALC-SCNC: 9 MMOL/L (ref 8–16)
AST SERPL-CCNC: 18 U/L (ref 10–40)
BASOPHILS # BLD AUTO: 0.03 K/UL (ref 0–0.2)
BASOPHILS NFR BLD: 0.9 % (ref 0–1.9)
BILIRUB SERPL-MCNC: 0.3 MG/DL (ref 0.1–1)
BUN SERPL-MCNC: 8 MG/DL (ref 8–23)
CALCIUM SERPL-MCNC: 9.2 MG/DL (ref 8.7–10.5)
CHLORIDE SERPL-SCNC: 109 MMOL/L (ref 95–110)
CO2 SERPL-SCNC: 22 MMOL/L (ref 23–29)
CREAT SERPL-MCNC: 0.8 MG/DL (ref 0.5–1.4)
DIFFERENTIAL METHOD: ABNORMAL
EOSINOPHIL # BLD AUTO: 0.3 K/UL (ref 0–0.5)
EOSINOPHIL NFR BLD: 7.9 % (ref 0–8)
ERYTHROCYTE [DISTWIDTH] IN BLOOD BY AUTOMATED COUNT: 14.8 % (ref 11.5–14.5)
EST. GFR  (AFRICAN AMERICAN): >60 ML/MIN/1.73 M^2
EST. GFR  (NON AFRICAN AMERICAN): >60 ML/MIN/1.73 M^2
GLUCOSE SERPL-MCNC: 122 MG/DL (ref 70–110)
HCT VFR BLD AUTO: 32.8 % (ref 40–54)
HGB BLD-MCNC: 10.9 G/DL (ref 14–18)
IMM GRANULOCYTES # BLD AUTO: 0.02 K/UL (ref 0–0.04)
IMM GRANULOCYTES NFR BLD AUTO: 0.6 % (ref 0–0.5)
LYMPHOCYTES # BLD AUTO: 1 K/UL (ref 1–4.8)
LYMPHOCYTES NFR BLD: 29.6 % (ref 18–48)
MAGNESIUM SERPL-MCNC: 1.9 MG/DL (ref 1.6–2.6)
MCH RBC QN AUTO: 31 PG (ref 27–31)
MCHC RBC AUTO-ENTMCNC: 33.2 G/DL (ref 32–36)
MCV RBC AUTO: 93 FL (ref 82–98)
MONOCYTES # BLD AUTO: 0.6 K/UL (ref 0.3–1)
MONOCYTES NFR BLD: 19.3 % (ref 4–15)
NEUTROPHILS # BLD AUTO: 1.4 K/UL (ref 1.8–7.7)
NEUTROPHILS NFR BLD: 41.7 % (ref 38–73)
NRBC BLD-RTO: 0 /100 WBC
PHOSPHATE SERPL-MCNC: 3.4 MG/DL (ref 2.7–4.5)
PLATELET # BLD AUTO: 186 K/UL (ref 150–450)
PMV BLD AUTO: 10 FL (ref 9.2–12.9)
POTASSIUM SERPL-SCNC: 4 MMOL/L (ref 3.5–5.1)
PROT SERPL-MCNC: 6.4 G/DL (ref 6–8.4)
RBC # BLD AUTO: 3.52 M/UL (ref 4.6–6.2)
SODIUM SERPL-SCNC: 140 MMOL/L (ref 136–145)
WBC # BLD AUTO: 3.31 K/UL (ref 3.9–12.7)

## 2021-06-17 PROCEDURE — 99999 PR PBB SHADOW E&M-EST. PATIENT-LVL III: ICD-10-PCS | Mod: PBBFAC,BMT,, | Performed by: STUDENT IN AN ORGANIZED HEALTH CARE EDUCATION/TRAINING PROGRAM

## 2021-06-17 PROCEDURE — 36415 COLL VENOUS BLD VENIPUNCTURE: CPT | Performed by: INTERNAL MEDICINE

## 2021-06-17 PROCEDURE — 84100 ASSAY OF PHOSPHORUS: CPT | Performed by: INTERNAL MEDICINE

## 2021-06-17 PROCEDURE — 99999 PR PBB SHADOW E&M-EST. PATIENT-LVL III: CPT | Mod: PBBFAC,BMT,, | Performed by: STUDENT IN AN ORGANIZED HEALTH CARE EDUCATION/TRAINING PROGRAM

## 2021-06-17 PROCEDURE — 99215 OFFICE O/P EST HI 40 MIN: CPT | Mod: S$PBB,BMT,, | Performed by: STUDENT IN AN ORGANIZED HEALTH CARE EDUCATION/TRAINING PROGRAM

## 2021-06-17 PROCEDURE — 80053 COMPREHEN METABOLIC PANEL: CPT | Performed by: INTERNAL MEDICINE

## 2021-06-17 PROCEDURE — 85025 COMPLETE CBC W/AUTO DIFF WBC: CPT | Performed by: INTERNAL MEDICINE

## 2021-06-17 PROCEDURE — 99215 PR OFFICE/OUTPT VISIT, EST, LEVL V, 40-54 MIN: ICD-10-PCS | Mod: S$PBB,BMT,, | Performed by: STUDENT IN AN ORGANIZED HEALTH CARE EDUCATION/TRAINING PROGRAM

## 2021-06-17 PROCEDURE — 99213 OFFICE O/P EST LOW 20 MIN: CPT | Mod: PBBFAC | Performed by: STUDENT IN AN ORGANIZED HEALTH CARE EDUCATION/TRAINING PROGRAM

## 2021-06-17 PROCEDURE — 83735 ASSAY OF MAGNESIUM: CPT | Performed by: INTERNAL MEDICINE

## 2021-07-01 ENCOUNTER — LAB VISIT (OUTPATIENT)
Dept: LAB | Facility: HOSPITAL | Age: 61
End: 2021-07-01
Payer: MEDICAID

## 2021-07-01 ENCOUNTER — OFFICE VISIT (OUTPATIENT)
Dept: HEMATOLOGY/ONCOLOGY | Facility: CLINIC | Age: 61
End: 2021-07-01
Payer: MEDICAID

## 2021-07-01 VITALS
HEIGHT: 67 IN | RESPIRATION RATE: 18 BRPM | BODY MASS INDEX: 34.73 KG/M2 | DIASTOLIC BLOOD PRESSURE: 92 MMHG | WEIGHT: 221.25 LBS | TEMPERATURE: 98 F | HEART RATE: 65 BPM | SYSTOLIC BLOOD PRESSURE: 176 MMHG | OXYGEN SATURATION: 99 %

## 2021-07-01 DIAGNOSIS — D84.821 IMMUNODEFICIENCY SECONDARY TO CHEMOTHERAPY: ICD-10-CM

## 2021-07-01 DIAGNOSIS — D49.9 IMMUNODEFICIENCY SECONDARY TO NEOPLASM: ICD-10-CM

## 2021-07-01 DIAGNOSIS — Z79.899 IMMUNODEFICIENCY SECONDARY TO CHEMOTHERAPY: ICD-10-CM

## 2021-07-01 DIAGNOSIS — T45.1X5A IMMUNODEFICIENCY SECONDARY TO CHEMOTHERAPY: ICD-10-CM

## 2021-07-01 DIAGNOSIS — C90.00 MULTIPLE MYELOMA, REMISSION STATUS UNSPECIFIED: ICD-10-CM

## 2021-07-01 DIAGNOSIS — G62.9 NEUROPATHY: ICD-10-CM

## 2021-07-01 DIAGNOSIS — D84.81 IMMUNODEFICIENCY SECONDARY TO NEOPLASM: ICD-10-CM

## 2021-07-01 DIAGNOSIS — C90.00 MULTIPLE MYELOMA, REMISSION STATUS UNSPECIFIED: Primary | ICD-10-CM

## 2021-07-01 LAB
ALBUMIN SERPL BCP-MCNC: 3.5 G/DL (ref 3.5–5.2)
ALP SERPL-CCNC: 61 U/L (ref 55–135)
ALT SERPL W/O P-5'-P-CCNC: 8 U/L (ref 10–44)
ANION GAP SERPL CALC-SCNC: 11 MMOL/L (ref 8–16)
AST SERPL-CCNC: 16 U/L (ref 10–40)
BASOPHILS # BLD AUTO: 0.04 K/UL (ref 0–0.2)
BASOPHILS NFR BLD: 1.1 % (ref 0–1.9)
BILIRUB SERPL-MCNC: 0.5 MG/DL (ref 0.1–1)
BUN SERPL-MCNC: 11 MG/DL (ref 8–23)
CALCIUM SERPL-MCNC: 9.1 MG/DL (ref 8.7–10.5)
CHLORIDE SERPL-SCNC: 110 MMOL/L (ref 95–110)
CO2 SERPL-SCNC: 21 MMOL/L (ref 23–29)
CREAT SERPL-MCNC: 0.9 MG/DL (ref 0.5–1.4)
DIFFERENTIAL METHOD: ABNORMAL
EOSINOPHIL # BLD AUTO: 0.3 K/UL (ref 0–0.5)
EOSINOPHIL NFR BLD: 6.7 % (ref 0–8)
ERYTHROCYTE [DISTWIDTH] IN BLOOD BY AUTOMATED COUNT: 14.6 % (ref 11.5–14.5)
EST. GFR  (AFRICAN AMERICAN): >60 ML/MIN/1.73 M^2
EST. GFR  (NON AFRICAN AMERICAN): >60 ML/MIN/1.73 M^2
GLUCOSE SERPL-MCNC: 135 MG/DL (ref 70–110)
HCT VFR BLD AUTO: 36.8 % (ref 40–54)
HGB BLD-MCNC: 11.7 G/DL (ref 14–18)
IMM GRANULOCYTES # BLD AUTO: 0.01 K/UL (ref 0–0.04)
IMM GRANULOCYTES NFR BLD AUTO: 0.3 % (ref 0–0.5)
LYMPHOCYTES # BLD AUTO: 1.2 K/UL (ref 1–4.8)
LYMPHOCYTES NFR BLD: 31.4 % (ref 18–48)
MAGNESIUM SERPL-MCNC: 1.8 MG/DL (ref 1.6–2.6)
MCH RBC QN AUTO: 30.5 PG (ref 27–31)
MCHC RBC AUTO-ENTMCNC: 31.8 G/DL (ref 32–36)
MCV RBC AUTO: 96 FL (ref 82–98)
MONOCYTES # BLD AUTO: 0.5 K/UL (ref 0.3–1)
MONOCYTES NFR BLD: 13.7 % (ref 4–15)
NEUTROPHILS # BLD AUTO: 1.8 K/UL (ref 1.8–7.7)
NEUTROPHILS NFR BLD: 46.8 % (ref 38–73)
NRBC BLD-RTO: 0 /100 WBC
PHOSPHATE SERPL-MCNC: 3.4 MG/DL (ref 2.7–4.5)
PLATELET # BLD AUTO: 181 K/UL (ref 150–450)
PMV BLD AUTO: 10.2 FL (ref 9.2–12.9)
POTASSIUM SERPL-SCNC: 4 MMOL/L (ref 3.5–5.1)
PROT SERPL-MCNC: 6.6 G/DL (ref 6–8.4)
RBC # BLD AUTO: 3.83 M/UL (ref 4.6–6.2)
SODIUM SERPL-SCNC: 142 MMOL/L (ref 136–145)
WBC # BLD AUTO: 3.73 K/UL (ref 3.9–12.7)

## 2021-07-01 PROCEDURE — 99999 PR PBB SHADOW E&M-EST. PATIENT-LVL III: ICD-10-PCS | Mod: PBBFAC,BMT,, | Performed by: STUDENT IN AN ORGANIZED HEALTH CARE EDUCATION/TRAINING PROGRAM

## 2021-07-01 PROCEDURE — 84100 ASSAY OF PHOSPHORUS: CPT | Performed by: STUDENT IN AN ORGANIZED HEALTH CARE EDUCATION/TRAINING PROGRAM

## 2021-07-01 PROCEDURE — 36415 COLL VENOUS BLD VENIPUNCTURE: CPT | Performed by: STUDENT IN AN ORGANIZED HEALTH CARE EDUCATION/TRAINING PROGRAM

## 2021-07-01 PROCEDURE — 85025 COMPLETE CBC W/AUTO DIFF WBC: CPT | Performed by: STUDENT IN AN ORGANIZED HEALTH CARE EDUCATION/TRAINING PROGRAM

## 2021-07-01 PROCEDURE — 99999 PR PBB SHADOW E&M-EST. PATIENT-LVL III: CPT | Mod: PBBFAC,BMT,, | Performed by: STUDENT IN AN ORGANIZED HEALTH CARE EDUCATION/TRAINING PROGRAM

## 2021-07-01 PROCEDURE — 83735 ASSAY OF MAGNESIUM: CPT | Performed by: STUDENT IN AN ORGANIZED HEALTH CARE EDUCATION/TRAINING PROGRAM

## 2021-07-01 PROCEDURE — 80053 COMPREHEN METABOLIC PANEL: CPT | Performed by: STUDENT IN AN ORGANIZED HEALTH CARE EDUCATION/TRAINING PROGRAM

## 2021-07-01 PROCEDURE — 99213 OFFICE O/P EST LOW 20 MIN: CPT | Mod: PBBFAC | Performed by: STUDENT IN AN ORGANIZED HEALTH CARE EDUCATION/TRAINING PROGRAM

## 2021-07-01 PROCEDURE — 99215 OFFICE O/P EST HI 40 MIN: CPT | Mod: S$PBB,BMT,, | Performed by: STUDENT IN AN ORGANIZED HEALTH CARE EDUCATION/TRAINING PROGRAM

## 2021-07-01 PROCEDURE — 99215 PR OFFICE/OUTPT VISIT, EST, LEVL V, 40-54 MIN: ICD-10-PCS | Mod: S$PBB,BMT,, | Performed by: STUDENT IN AN ORGANIZED HEALTH CARE EDUCATION/TRAINING PROGRAM

## 2021-07-01 RX ORDER — GABAPENTIN 300 MG/1
300 CAPSULE ORAL 2 TIMES DAILY
Qty: 60 CAPSULE | Refills: 3 | Status: SHIPPED | OUTPATIENT
Start: 2021-07-01 | End: 2021-11-11 | Stop reason: SDUPTHER

## 2021-07-09 DIAGNOSIS — E88.09 PLASMA CELL DYSCRASIA: ICD-10-CM

## 2021-07-09 RX ORDER — CARVEDILOL 25 MG/1
25 TABLET ORAL 2 TIMES DAILY
Qty: 60 TABLET | Refills: 2 | Status: SHIPPED | OUTPATIENT
Start: 2021-07-09 | End: 2021-09-09 | Stop reason: SDUPTHER

## 2021-07-14 DIAGNOSIS — Z94.84 HISTORY OF AUTO STEM CELL TRANSPLANT: ICD-10-CM

## 2021-07-14 RX ORDER — ACYCLOVIR 800 MG/1
800 TABLET ORAL 2 TIMES DAILY
Qty: 60 TABLET | Refills: 11
Start: 2021-07-14 | End: 2022-04-05 | Stop reason: SDUPTHER

## 2021-07-29 ENCOUNTER — LAB VISIT (OUTPATIENT)
Dept: LAB | Facility: HOSPITAL | Age: 61
End: 2021-07-29
Payer: MEDICAID

## 2021-07-29 ENCOUNTER — OFFICE VISIT (OUTPATIENT)
Dept: HEMATOLOGY/ONCOLOGY | Facility: CLINIC | Age: 61
End: 2021-07-29
Payer: MEDICAID

## 2021-07-29 VITALS
HEIGHT: 67 IN | DIASTOLIC BLOOD PRESSURE: 110 MMHG | RESPIRATION RATE: 16 BRPM | HEART RATE: 62 BPM | BODY MASS INDEX: 36.04 KG/M2 | TEMPERATURE: 98 F | SYSTOLIC BLOOD PRESSURE: 212 MMHG | WEIGHT: 229.63 LBS | OXYGEN SATURATION: 97 %

## 2021-07-29 DIAGNOSIS — D84.821 IMMUNODEFICIENCY SECONDARY TO CHEMOTHERAPY: ICD-10-CM

## 2021-07-29 DIAGNOSIS — Z79.899 IMMUNODEFICIENCY SECONDARY TO CHEMOTHERAPY: ICD-10-CM

## 2021-07-29 DIAGNOSIS — D49.9 IMMUNODEFICIENCY SECONDARY TO NEOPLASM: ICD-10-CM

## 2021-07-29 DIAGNOSIS — T45.1X5A IMMUNODEFICIENCY SECONDARY TO CHEMOTHERAPY: ICD-10-CM

## 2021-07-29 DIAGNOSIS — C79.49 METASTASIS OF NEOPLASM TO SPINAL CANAL: ICD-10-CM

## 2021-07-29 DIAGNOSIS — D84.81 IMMUNODEFICIENCY SECONDARY TO NEOPLASM: ICD-10-CM

## 2021-07-29 DIAGNOSIS — C90.00 MULTIPLE MYELOMA, REMISSION STATUS UNSPECIFIED: Primary | ICD-10-CM

## 2021-07-29 DIAGNOSIS — C90.00 MULTIPLE MYELOMA, REMISSION STATUS UNSPECIFIED: ICD-10-CM

## 2021-07-29 DIAGNOSIS — Z76.82 STEM CELL TRANSPLANT CANDIDATE: ICD-10-CM

## 2021-07-29 DIAGNOSIS — I10 ESSENTIAL HYPERTENSION: ICD-10-CM

## 2021-07-29 LAB
ALBUMIN SERPL BCP-MCNC: 3.8 G/DL (ref 3.5–5.2)
ALP SERPL-CCNC: 73 U/L (ref 55–135)
ALT SERPL W/O P-5'-P-CCNC: 11 U/L (ref 10–44)
ANION GAP SERPL CALC-SCNC: 8 MMOL/L (ref 8–16)
AST SERPL-CCNC: 17 U/L (ref 10–40)
BASOPHILS # BLD AUTO: 0.02 K/UL (ref 0–0.2)
BASOPHILS NFR BLD: 0.7 % (ref 0–1.9)
BILIRUB SERPL-MCNC: 0.8 MG/DL (ref 0.1–1)
BUN SERPL-MCNC: 10 MG/DL (ref 8–23)
CALCIUM SERPL-MCNC: 9.6 MG/DL (ref 8.7–10.5)
CHLORIDE SERPL-SCNC: 111 MMOL/L (ref 95–110)
CO2 SERPL-SCNC: 23 MMOL/L (ref 23–29)
CREAT SERPL-MCNC: 0.9 MG/DL (ref 0.5–1.4)
DIFFERENTIAL METHOD: ABNORMAL
EOSINOPHIL # BLD AUTO: 0.1 K/UL (ref 0–0.5)
EOSINOPHIL NFR BLD: 3.7 % (ref 0–8)
ERYTHROCYTE [DISTWIDTH] IN BLOOD BY AUTOMATED COUNT: 14.3 % (ref 11.5–14.5)
EST. GFR  (AFRICAN AMERICAN): >60 ML/MIN/1.73 M^2
EST. GFR  (NON AFRICAN AMERICAN): >60 ML/MIN/1.73 M^2
GLUCOSE SERPL-MCNC: 111 MG/DL (ref 70–110)
HCT VFR BLD AUTO: 38.5 % (ref 40–54)
HGB BLD-MCNC: 12.3 G/DL (ref 14–18)
IMM GRANULOCYTES # BLD AUTO: 0 K/UL (ref 0–0.04)
IMM GRANULOCYTES NFR BLD AUTO: 0 % (ref 0–0.5)
LYMPHOCYTES # BLD AUTO: 0.9 K/UL (ref 1–4.8)
LYMPHOCYTES NFR BLD: 29.9 % (ref 18–48)
MAGNESIUM SERPL-MCNC: 2 MG/DL (ref 1.6–2.6)
MCH RBC QN AUTO: 30.2 PG (ref 27–31)
MCHC RBC AUTO-ENTMCNC: 31.9 G/DL (ref 32–36)
MCV RBC AUTO: 95 FL (ref 82–98)
MONOCYTES # BLD AUTO: 0.5 K/UL (ref 0.3–1)
MONOCYTES NFR BLD: 16.8 % (ref 4–15)
NEUTROPHILS # BLD AUTO: 1.5 K/UL (ref 1.8–7.7)
NEUTROPHILS NFR BLD: 48.9 % (ref 38–73)
NRBC BLD-RTO: 0 /100 WBC
PHOSPHATE SERPL-MCNC: 3.5 MG/DL (ref 2.7–4.5)
PLATELET # BLD AUTO: 151 K/UL (ref 150–450)
PMV BLD AUTO: 10.7 FL (ref 9.2–12.9)
POTASSIUM SERPL-SCNC: 4 MMOL/L (ref 3.5–5.1)
PROT SERPL-MCNC: 6.9 G/DL (ref 6–8.4)
RBC # BLD AUTO: 4.07 M/UL (ref 4.6–6.2)
SODIUM SERPL-SCNC: 142 MMOL/L (ref 136–145)
WBC # BLD AUTO: 2.98 K/UL (ref 3.9–12.7)

## 2021-07-29 PROCEDURE — 83735 ASSAY OF MAGNESIUM: CPT | Performed by: INTERNAL MEDICINE

## 2021-07-29 PROCEDURE — 99215 PR OFFICE/OUTPT VISIT, EST, LEVL V, 40-54 MIN: ICD-10-PCS | Mod: S$PBB,BMT,, | Performed by: STUDENT IN AN ORGANIZED HEALTH CARE EDUCATION/TRAINING PROGRAM

## 2021-07-29 PROCEDURE — 36415 COLL VENOUS BLD VENIPUNCTURE: CPT | Performed by: STUDENT IN AN ORGANIZED HEALTH CARE EDUCATION/TRAINING PROGRAM

## 2021-07-29 PROCEDURE — 99999 PR PBB SHADOW E&M-EST. PATIENT-LVL IV: ICD-10-PCS | Mod: PBBFAC,BMT,, | Performed by: STUDENT IN AN ORGANIZED HEALTH CARE EDUCATION/TRAINING PROGRAM

## 2021-07-29 PROCEDURE — 99999 PR PBB SHADOW E&M-EST. PATIENT-LVL IV: CPT | Mod: PBBFAC,BMT,, | Performed by: STUDENT IN AN ORGANIZED HEALTH CARE EDUCATION/TRAINING PROGRAM

## 2021-07-29 PROCEDURE — 80053 COMPREHEN METABOLIC PANEL: CPT | Performed by: STUDENT IN AN ORGANIZED HEALTH CARE EDUCATION/TRAINING PROGRAM

## 2021-07-29 PROCEDURE — 84100 ASSAY OF PHOSPHORUS: CPT | Performed by: INTERNAL MEDICINE

## 2021-07-29 PROCEDURE — 99214 OFFICE O/P EST MOD 30 MIN: CPT | Mod: PBBFAC | Performed by: STUDENT IN AN ORGANIZED HEALTH CARE EDUCATION/TRAINING PROGRAM

## 2021-07-29 PROCEDURE — 99215 OFFICE O/P EST HI 40 MIN: CPT | Mod: S$PBB,BMT,, | Performed by: STUDENT IN AN ORGANIZED HEALTH CARE EDUCATION/TRAINING PROGRAM

## 2021-07-29 PROCEDURE — 85025 COMPLETE CBC W/AUTO DIFF WBC: CPT | Performed by: STUDENT IN AN ORGANIZED HEALTH CARE EDUCATION/TRAINING PROGRAM

## 2021-08-10 ENCOUNTER — TELEPHONE (OUTPATIENT)
Dept: NEUROSURGERY | Facility: CLINIC | Age: 61
End: 2021-08-10

## 2021-09-03 ENCOUNTER — TELEPHONE (OUTPATIENT)
Dept: HEMATOLOGY/ONCOLOGY | Facility: CLINIC | Age: 61
End: 2021-09-03

## 2021-09-05 ENCOUNTER — TELEPHONE (OUTPATIENT)
Dept: NEUROSURGERY | Facility: CLINIC | Age: 61
End: 2021-09-05

## 2021-09-05 DIAGNOSIS — M43.27 FUSION OF SPINE, LUMBOSACRAL REGION: ICD-10-CM

## 2021-09-09 ENCOUNTER — HOSPITAL ENCOUNTER (OUTPATIENT)
Dept: RADIOLOGY | Facility: HOSPITAL | Age: 61
Discharge: HOME | End: 2021-09-09
Attending: NEUROLOGICAL SURGERY
Payer: MEDICAID

## 2021-09-09 ENCOUNTER — OFFICE VISIT (OUTPATIENT)
Dept: HEMATOLOGY/ONCOLOGY | Facility: CLINIC | Age: 61
End: 2021-09-09
Payer: MEDICAID

## 2021-09-09 ENCOUNTER — OFFICE VISIT (OUTPATIENT)
Dept: NEUROSURGERY | Facility: CLINIC | Age: 61
End: 2021-09-09
Payer: MEDICAID

## 2021-09-09 VITALS
HEART RATE: 79 BPM | RESPIRATION RATE: 18 BRPM | HEIGHT: 67 IN | SYSTOLIC BLOOD PRESSURE: 148 MMHG | BODY MASS INDEX: 35.08 KG/M2 | DIASTOLIC BLOOD PRESSURE: 89 MMHG

## 2021-09-09 VITALS
SYSTOLIC BLOOD PRESSURE: 178 MMHG | RESPIRATION RATE: 16 BRPM | OXYGEN SATURATION: 98 % | TEMPERATURE: 98 F | DIASTOLIC BLOOD PRESSURE: 92 MMHG | HEART RATE: 64 BPM | BODY MASS INDEX: 35.16 KG/M2 | HEIGHT: 67 IN | WEIGHT: 224 LBS

## 2021-09-09 DIAGNOSIS — M43.27 FUSION OF SPINE, LUMBOSACRAL REGION: Primary | ICD-10-CM

## 2021-09-09 DIAGNOSIS — E61.1 IRON DEFICIENCY: ICD-10-CM

## 2021-09-09 DIAGNOSIS — E55.9 VITAMIN D DEFICIENCY: ICD-10-CM

## 2021-09-09 DIAGNOSIS — C79.49 METASTASIS OF NEOPLASM TO SPINAL CANAL: ICD-10-CM

## 2021-09-09 DIAGNOSIS — D50.8 OTHER IRON DEFICIENCY ANEMIA: ICD-10-CM

## 2021-09-09 DIAGNOSIS — C90.00 MULTIPLE MYELOMA NOT HAVING ACHIEVED REMISSION: Primary | ICD-10-CM

## 2021-09-09 DIAGNOSIS — M43.27 FUSION OF SPINE, LUMBOSACRAL REGION: ICD-10-CM

## 2021-09-09 DIAGNOSIS — E88.09 PLASMA CELL DYSCRASIA: ICD-10-CM

## 2021-09-09 PROCEDURE — 99215 PR OFFICE/OUTPT VISIT, EST, LEVL V, 40-54 MIN: ICD-10-PCS | Mod: S$PBB,BMT,, | Performed by: STUDENT IN AN ORGANIZED HEALTH CARE EDUCATION/TRAINING PROGRAM

## 2021-09-09 PROCEDURE — 99999 PR PBB SHADOW E&M-EST. PATIENT-LVL III: ICD-10-PCS | Mod: PBBFAC,BMT,, | Performed by: STUDENT IN AN ORGANIZED HEALTH CARE EDUCATION/TRAINING PROGRAM

## 2021-09-09 PROCEDURE — 72100 XR LUMBAR SPINE AP AND LATERAL: ICD-10-PCS | Mod: 26,BMT,, | Performed by: RADIOLOGY

## 2021-09-09 PROCEDURE — 99213 OFFICE O/P EST LOW 20 MIN: CPT | Mod: PBBFAC | Performed by: NURSE PRACTITIONER

## 2021-09-09 PROCEDURE — 99213 OFFICE O/P EST LOW 20 MIN: CPT | Mod: PBBFAC,27 | Performed by: STUDENT IN AN ORGANIZED HEALTH CARE EDUCATION/TRAINING PROGRAM

## 2021-09-09 PROCEDURE — 99999 PR PBB SHADOW E&M-EST. PATIENT-LVL III: ICD-10-PCS | Mod: PBBFAC,BMT,, | Performed by: NURSE PRACTITIONER

## 2021-09-09 PROCEDURE — 99215 OFFICE O/P EST HI 40 MIN: CPT | Mod: S$PBB,BMT,, | Performed by: STUDENT IN AN ORGANIZED HEALTH CARE EDUCATION/TRAINING PROGRAM

## 2021-09-09 PROCEDURE — 99213 OFFICE O/P EST LOW 20 MIN: CPT | Mod: S$PBB,BMT,, | Performed by: NURSE PRACTITIONER

## 2021-09-09 PROCEDURE — 72100 X-RAY EXAM L-S SPINE 2/3 VWS: CPT | Mod: 26,BMT,, | Performed by: RADIOLOGY

## 2021-09-09 PROCEDURE — 72100 X-RAY EXAM L-S SPINE 2/3 VWS: CPT | Mod: TC

## 2021-09-09 PROCEDURE — 99213 PR OFFICE/OUTPT VISIT, EST, LEVL III, 20-29 MIN: ICD-10-PCS | Mod: S$PBB,BMT,, | Performed by: NURSE PRACTITIONER

## 2021-09-09 PROCEDURE — 99999 PR PBB SHADOW E&M-EST. PATIENT-LVL III: CPT | Mod: PBBFAC,BMT,, | Performed by: STUDENT IN AN ORGANIZED HEALTH CARE EDUCATION/TRAINING PROGRAM

## 2021-09-09 PROCEDURE — 99999 PR PBB SHADOW E&M-EST. PATIENT-LVL III: CPT | Mod: PBBFAC,BMT,, | Performed by: NURSE PRACTITIONER

## 2021-09-09 RX ORDER — CARVEDILOL 25 MG/1
25 TABLET ORAL 2 TIMES DAILY
Qty: 60 TABLET | Refills: 2 | Status: SHIPPED | OUTPATIENT
Start: 2021-09-09 | End: 2021-11-03 | Stop reason: SDUPTHER

## 2021-09-09 RX ORDER — ERGOCALCIFEROL 1.25 MG/1
50000 CAPSULE ORAL
Qty: 4 CAPSULE | Refills: 1 | Status: SHIPPED | OUTPATIENT
Start: 2021-09-09 | End: 2021-11-29 | Stop reason: SDUPTHER

## 2021-09-09 RX ORDER — LOSARTAN POTASSIUM 50 MG/1
50 TABLET ORAL DAILY
Qty: 90 TABLET | Refills: 3 | Status: SHIPPED | OUTPATIENT
Start: 2021-09-09 | End: 2021-11-03 | Stop reason: SDUPTHER

## 2021-09-09 RX ORDER — FERROUS GLUCONATE 324(38)MG
324 TABLET ORAL
Qty: 30 TABLET | Refills: 3 | Status: SHIPPED | OUTPATIENT
Start: 2021-09-09 | End: 2022-01-10 | Stop reason: SDUPTHER

## 2021-09-21 ENCOUNTER — HOSPITAL ENCOUNTER (OUTPATIENT)
Dept: RADIOLOGY | Facility: HOSPITAL | Age: 61
Discharge: HOME | End: 2021-09-21
Attending: STUDENT IN AN ORGANIZED HEALTH CARE EDUCATION/TRAINING PROGRAM
Payer: MEDICAID

## 2021-09-21 DIAGNOSIS — C90.00 MULTIPLE MYELOMA, REMISSION STATUS UNSPECIFIED: ICD-10-CM

## 2021-09-21 LAB — POCT GLUCOSE: 110 MG/DL (ref 70–110)

## 2021-09-21 PROCEDURE — 78816 NM PET CT WHOLE BODY: ICD-10-PCS | Mod: 26,BMT,PS, | Performed by: RADIOLOGY

## 2021-09-21 PROCEDURE — 78816 PET IMAGE W/CT FULL BODY: CPT | Mod: 26,BMT,PS, | Performed by: RADIOLOGY

## 2021-09-21 PROCEDURE — 78816 PET IMAGE W/CT FULL BODY: CPT | Mod: TC

## 2021-09-21 PROCEDURE — 25500020 PHARM REV CODE 255: Performed by: STUDENT IN AN ORGANIZED HEALTH CARE EDUCATION/TRAINING PROGRAM

## 2021-09-21 PROCEDURE — A9698 NON-RAD CONTRAST MATERIALNOC: HCPCS | Performed by: STUDENT IN AN ORGANIZED HEALTH CARE EDUCATION/TRAINING PROGRAM

## 2021-09-21 RX ADMIN — IOHEXOL 1000 ML: 9 SOLUTION ORAL at 11:09

## 2021-10-05 ENCOUNTER — CLINICAL SUPPORT (OUTPATIENT)
Dept: HEMATOLOGY/ONCOLOGY | Facility: CLINIC | Age: 61
End: 2021-10-05
Payer: MEDICAID

## 2021-10-05 ENCOUNTER — PROCEDURE VISIT (OUTPATIENT)
Dept: HEMATOLOGY/ONCOLOGY | Facility: CLINIC | Age: 61
End: 2021-10-05
Payer: MEDICAID

## 2021-10-05 VITALS
TEMPERATURE: 99 F | OXYGEN SATURATION: 99 % | SYSTOLIC BLOOD PRESSURE: 160 MMHG | RESPIRATION RATE: 16 BRPM | DIASTOLIC BLOOD PRESSURE: 86 MMHG | HEART RATE: 66 BPM

## 2021-10-05 DIAGNOSIS — C90.00 MULTIPLE MYELOMA, REMISSION STATUS UNSPECIFIED: ICD-10-CM

## 2021-10-05 LAB — SARS-COV-2 RDRP RESP QL NAA+PROBE: NEGATIVE

## 2021-10-05 PROCEDURE — U0002 COVID-19 LAB TEST NON-CDC: HCPCS | Performed by: INTERNAL MEDICINE

## 2021-10-05 RX ORDER — LIDOCAINE HYDROCHLORIDE 20 MG/ML
10 INJECTION, SOLUTION EPIDURAL; INFILTRATION; INTRACAUDAL; PERINEURAL ONCE
Status: DISCONTINUED | OUTPATIENT
Start: 2021-10-05 | End: 2023-04-06

## 2021-10-19 DIAGNOSIS — Z94.84 HISTORY OF AUTO STEM CELL TRANSPLANT: Primary | ICD-10-CM

## 2021-10-20 ENCOUNTER — HOSPITAL ENCOUNTER (OUTPATIENT)
Facility: HOSPITAL | Age: 61
Discharge: HOME | End: 2021-10-20
Attending: INTERNAL MEDICINE | Admitting: INTERNAL MEDICINE
Payer: MEDICAID

## 2021-10-20 ENCOUNTER — ANESTHESIA EVENT (OUTPATIENT)
Dept: ENDOSCOPY | Facility: HOSPITAL | Age: 61
End: 2021-10-20
Payer: MEDICAID

## 2021-10-20 ENCOUNTER — ANESTHESIA (OUTPATIENT)
Dept: ENDOSCOPY | Facility: HOSPITAL | Age: 61
End: 2021-10-20
Payer: MEDICAID

## 2021-10-20 VITALS
RESPIRATION RATE: 18 BRPM | SYSTOLIC BLOOD PRESSURE: 163 MMHG | HEIGHT: 67 IN | DIASTOLIC BLOOD PRESSURE: 93 MMHG | TEMPERATURE: 98 F | OXYGEN SATURATION: 97 % | HEART RATE: 59 BPM | WEIGHT: 228 LBS | BODY MASS INDEX: 35.79 KG/M2

## 2021-10-20 DIAGNOSIS — Z94.84 HISTORY OF AUTOLOGOUS STEM CELL TRANSPLANT: ICD-10-CM

## 2021-10-20 PROCEDURE — 88364 CHG INSITU HYBRIDIZATION (FISH: ICD-10-PCS | Mod: 26,BMT,, | Performed by: PATHOLOGY

## 2021-10-20 PROCEDURE — 38222 PR BONE MARROW BIOPSY(IES) W/ASPIRATION(S); DIAGNOSTIC: ICD-10-PCS | Mod: BMT,RT,, | Performed by: NURSE PRACTITIONER

## 2021-10-20 PROCEDURE — 88184 FLOWCYTOMETRY/ TC 1 MARKER: CPT | Performed by: PATHOLOGY

## 2021-10-20 PROCEDURE — E9220 PRA ENDO ANESTHESIA: HCPCS | Mod: BMT,,, | Performed by: STUDENT IN AN ORGANIZED HEALTH CARE EDUCATION/TRAINING PROGRAM

## 2021-10-20 PROCEDURE — 88342 IMHCHEM/IMCYTCHM 1ST ANTB: CPT | Performed by: PATHOLOGY

## 2021-10-20 PROCEDURE — 88305 TISSUE EXAM BY PATHOLOGIST: CPT | Performed by: PATHOLOGY

## 2021-10-20 PROCEDURE — 88311 DECALCIFY TISSUE: CPT | Performed by: PATHOLOGY

## 2021-10-20 PROCEDURE — 88313 SPECIAL STAINS GROUP 2: CPT | Mod: 59 | Performed by: PATHOLOGY

## 2021-10-20 PROCEDURE — E9220 PRA ENDO ANESTHESIA: ICD-10-PCS | Mod: BMT,,, | Performed by: STUDENT IN AN ORGANIZED HEALTH CARE EDUCATION/TRAINING PROGRAM

## 2021-10-20 PROCEDURE — 88364 INSITU HYBRIDIZATION (FISH): CPT | Performed by: PATHOLOGY

## 2021-10-20 PROCEDURE — 88189 FLOWCYTOMETRY/READ 16 & >: CPT | Mod: BMT,,, | Performed by: PATHOLOGY

## 2021-10-20 PROCEDURE — 85097 PR  BONE MARROW,SMEAR INTERPRETATION: ICD-10-PCS | Mod: BMT,,, | Performed by: PATHOLOGY

## 2021-10-20 PROCEDURE — 88189 PR  FLOWCYTOMETRY/READ, 16 & > MARKERS: ICD-10-PCS | Mod: BMT,,, | Performed by: PATHOLOGY

## 2021-10-20 PROCEDURE — 85097 BONE MARROW INTERPRETATION: CPT | Mod: BMT,,, | Performed by: PATHOLOGY

## 2021-10-20 PROCEDURE — 88342 IMHCHEM/IMCYTCHM 1ST ANTB: CPT | Mod: 26,59,BMT, | Performed by: PATHOLOGY

## 2021-10-20 PROCEDURE — 88185 FLOWCYTOMETRY/TC ADD-ON: CPT | Performed by: PATHOLOGY

## 2021-10-20 PROCEDURE — 88311 PR  DECALCIFY TISSUE: ICD-10-PCS | Mod: 26,BMT,, | Performed by: PATHOLOGY

## 2021-10-20 PROCEDURE — 88364 INSITU HYBRIDIZATION (FISH): CPT | Mod: 26,BMT,, | Performed by: PATHOLOGY

## 2021-10-20 PROCEDURE — 88313 PR  SPECIAL STAINS,GROUP II: ICD-10-PCS | Mod: 26,BMT,, | Performed by: PATHOLOGY

## 2021-10-20 PROCEDURE — 88342 CHG IMMUNOCYTOCHEMISTRY: ICD-10-PCS | Mod: 26,59,BMT, | Performed by: PATHOLOGY

## 2021-10-20 PROCEDURE — 88313 SPECIAL STAINS GROUP 2: CPT | Mod: 26,BMT,, | Performed by: PATHOLOGY

## 2021-10-20 PROCEDURE — 37000009 HC ANESTHESIA EA ADD 15 MINS: Performed by: INTERNAL MEDICINE

## 2021-10-20 PROCEDURE — 88365 INSITU HYBRIDIZATION (FISH): CPT | Mod: 26,BMT,, | Performed by: PATHOLOGY

## 2021-10-20 PROCEDURE — 88264 CHROMOSOME ANALYSIS 20-25: CPT | Performed by: NURSE PRACTITIONER

## 2021-10-20 PROCEDURE — 88365 INSITU HYBRIDIZATION (FISH): CPT | Performed by: PATHOLOGY

## 2021-10-20 PROCEDURE — 37000008 HC ANESTHESIA 1ST 15 MINUTES: Performed by: INTERNAL MEDICINE

## 2021-10-20 PROCEDURE — 88305 TISSUE EXAM BY PATHOLOGIST: ICD-10-PCS | Mod: 26,BMT,, | Performed by: PATHOLOGY

## 2021-10-20 PROCEDURE — 88311 DECALCIFY TISSUE: CPT | Mod: 26,BMT,, | Performed by: PATHOLOGY

## 2021-10-20 PROCEDURE — 25000003 PHARM REV CODE 250: Performed by: STUDENT IN AN ORGANIZED HEALTH CARE EDUCATION/TRAINING PROGRAM

## 2021-10-20 PROCEDURE — 38222 DX BONE MARROW BX & ASPIR: CPT | Mod: BMT,RT,, | Performed by: NURSE PRACTITIONER

## 2021-10-20 PROCEDURE — 88365 PR  TISSUE HYBRIDIZATION: ICD-10-PCS | Mod: 26,BMT,, | Performed by: PATHOLOGY

## 2021-10-20 PROCEDURE — 88237 TISSUE CULTURE BONE MARROW: CPT | Performed by: NURSE PRACTITIONER

## 2021-10-20 PROCEDURE — 63600175 PHARM REV CODE 636 W HCPCS: Performed by: STUDENT IN AN ORGANIZED HEALTH CARE EDUCATION/TRAINING PROGRAM

## 2021-10-20 PROCEDURE — 38222 DX BONE MARROW BX & ASPIR: CPT | Performed by: INTERNAL MEDICINE

## 2021-10-20 PROCEDURE — 88305 TISSUE EXAM BY PATHOLOGIST: CPT | Mod: 26,BMT,, | Performed by: PATHOLOGY

## 2021-10-20 RX ORDER — LIDOCAINE HYDROCHLORIDE 20 MG/ML
INJECTION INTRAVENOUS
Status: DISCONTINUED | OUTPATIENT
Start: 2021-10-20 | End: 2021-10-20

## 2021-10-20 RX ORDER — PROPOFOL 10 MG/ML
VIAL (ML) INTRAVENOUS
Status: DISCONTINUED | OUTPATIENT
Start: 2021-10-20 | End: 2021-10-20

## 2021-10-20 RX ORDER — PROPOFOL 10 MG/ML
VIAL (ML) INTRAVENOUS CONTINUOUS PRN
Status: DISCONTINUED | OUTPATIENT
Start: 2021-10-20 | End: 2021-10-20

## 2021-10-20 RX ADMIN — LIDOCAINE HYDROCHLORIDE 80 MG: 20 INJECTION, SOLUTION INTRAVENOUS at 02:10

## 2021-10-20 RX ADMIN — PROPOFOL 70 MG: 10 INJECTION, EMULSION INTRAVENOUS at 02:10

## 2021-10-20 RX ADMIN — SODIUM CHLORIDE: 0.9 INJECTION, SOLUTION INTRAVENOUS at 02:10

## 2021-10-20 RX ADMIN — Medication 100 MCG/KG/MIN: at 02:10

## 2021-10-28 ENCOUNTER — OFFICE VISIT (OUTPATIENT)
Dept: HEMATOLOGY/ONCOLOGY | Facility: CLINIC | Age: 61
End: 2021-10-28
Payer: MEDICAID

## 2021-10-28 ENCOUNTER — LAB VISIT (OUTPATIENT)
Dept: LAB | Facility: HOSPITAL | Age: 61
End: 2021-10-28
Payer: MEDICAID

## 2021-10-28 VITALS
HEART RATE: 77 BPM | HEIGHT: 67 IN | SYSTOLIC BLOOD PRESSURE: 129 MMHG | TEMPERATURE: 99 F | OXYGEN SATURATION: 95 % | WEIGHT: 224 LBS | DIASTOLIC BLOOD PRESSURE: 74 MMHG | BODY MASS INDEX: 35.16 KG/M2 | RESPIRATION RATE: 20 BRPM

## 2021-10-28 DIAGNOSIS — D84.81 IMMUNODEFICIENCY SECONDARY TO NEOPLASM: ICD-10-CM

## 2021-10-28 DIAGNOSIS — D84.821 IMMUNODEFICIENCY SECONDARY TO CHEMOTHERAPY: ICD-10-CM

## 2021-10-28 DIAGNOSIS — C90.01 MULTIPLE MYELOMA IN REMISSION: Primary | ICD-10-CM

## 2021-10-28 DIAGNOSIS — D49.9 IMMUNODEFICIENCY SECONDARY TO NEOPLASM: ICD-10-CM

## 2021-10-28 DIAGNOSIS — Z79.899 IMMUNODEFICIENCY SECONDARY TO CHEMOTHERAPY: ICD-10-CM

## 2021-10-28 DIAGNOSIS — C79.49 METASTASIS OF NEOPLASM TO SPINAL CANAL: ICD-10-CM

## 2021-10-28 DIAGNOSIS — C90.00 MULTIPLE MYELOMA, REMISSION STATUS UNSPECIFIED: ICD-10-CM

## 2021-10-28 DIAGNOSIS — I10 ESSENTIAL HYPERTENSION: ICD-10-CM

## 2021-10-28 DIAGNOSIS — T45.1X5A IMMUNODEFICIENCY SECONDARY TO CHEMOTHERAPY: ICD-10-CM

## 2021-10-28 LAB
ALBUMIN SERPL BCP-MCNC: 3.9 G/DL (ref 3.5–5.2)
ALP SERPL-CCNC: 92 U/L (ref 55–135)
ALT SERPL W/O P-5'-P-CCNC: 9 U/L (ref 10–44)
ANION GAP SERPL CALC-SCNC: 8 MMOL/L (ref 8–16)
AST SERPL-CCNC: 16 U/L (ref 10–40)
BASOPHILS # BLD AUTO: 0.04 K/UL (ref 0–0.2)
BASOPHILS NFR BLD: 0.7 % (ref 0–1.9)
BILIRUB SERPL-MCNC: 0.9 MG/DL (ref 0.1–1)
BUN SERPL-MCNC: 10 MG/DL (ref 8–23)
CALCIUM SERPL-MCNC: 9.7 MG/DL (ref 8.7–10.5)
CHLORIDE SERPL-SCNC: 106 MMOL/L (ref 95–110)
CHROM BANDING METHOD: NORMAL
CHROMOSOME ANALYSIS BM ADDITIONAL INFORMATION: NORMAL
CHROMOSOME ANALYSIS BM RELEASED BY: NORMAL
CHROMOSOME ANALYSIS BM RESULT SUMMARY: NORMAL
CLINICAL CYTOGENETICIST REVIEW: NORMAL
CO2 SERPL-SCNC: 26 MMOL/L (ref 23–29)
CREAT SERPL-MCNC: 1 MG/DL (ref 0.5–1.4)
DIFFERENTIAL METHOD: ABNORMAL
EOSINOPHIL # BLD AUTO: 0.1 K/UL (ref 0–0.5)
EOSINOPHIL NFR BLD: 1.7 % (ref 0–8)
ERYTHROCYTE [DISTWIDTH] IN BLOOD BY AUTOMATED COUNT: 14.7 % (ref 11.5–14.5)
EST. GFR  (AFRICAN AMERICAN): >60 ML/MIN/1.73 M^2
EST. GFR  (NON AFRICAN AMERICAN): >60 ML/MIN/1.73 M^2
GLUCOSE SERPL-MCNC: 116 MG/DL (ref 70–110)
HCT VFR BLD AUTO: 37.5 % (ref 40–54)
HGB BLD-MCNC: 12.7 G/DL (ref 14–18)
IGA SERPL-MCNC: 61 MG/DL (ref 40–350)
IGG SERPL-MCNC: 1288 MG/DL (ref 650–1600)
IGM SERPL-MCNC: 63 MG/DL (ref 50–300)
IMM GRANULOCYTES # BLD AUTO: 0.02 K/UL (ref 0–0.04)
IMM GRANULOCYTES NFR BLD AUTO: 0.4 % (ref 0–0.5)
KARYOTYP MAR: NORMAL
LYMPHOCYTES # BLD AUTO: 0.7 K/UL (ref 1–4.8)
LYMPHOCYTES NFR BLD: 12.9 % (ref 18–48)
MCH RBC QN AUTO: 29.8 PG (ref 27–31)
MCHC RBC AUTO-ENTMCNC: 33.9 G/DL (ref 32–36)
MCV RBC AUTO: 88 FL (ref 82–98)
MONOCYTES # BLD AUTO: 0.5 K/UL (ref 0.3–1)
MONOCYTES NFR BLD: 8.8 % (ref 4–15)
NEUTROPHILS # BLD AUTO: 4.1 K/UL (ref 1.8–7.7)
NEUTROPHILS NFR BLD: 75.5 % (ref 38–73)
NRBC BLD-RTO: 0 /100 WBC
PLATELET # BLD AUTO: 161 K/UL (ref 150–450)
PMV BLD AUTO: 10 FL (ref 9.2–12.9)
POTASSIUM SERPL-SCNC: 3.7 MMOL/L (ref 3.5–5.1)
PROT SERPL-MCNC: 7.2 G/DL (ref 6–8.4)
RBC # BLD AUTO: 4.26 M/UL (ref 4.6–6.2)
REASON FOR REFERRAL (NARRATIVE): NORMAL
REF LAB TEST METHOD: NORMAL
SODIUM SERPL-SCNC: 140 MMOL/L (ref 136–145)
SPECIMEN SOURCE: NORMAL
SPECIMEN: NORMAL
WBC # BLD AUTO: 5.43 K/UL (ref 3.9–12.7)

## 2021-10-28 PROCEDURE — 99214 OFFICE O/P EST MOD 30 MIN: CPT | Mod: PBBFAC | Performed by: STUDENT IN AN ORGANIZED HEALTH CARE EDUCATION/TRAINING PROGRAM

## 2021-10-28 PROCEDURE — 99215 PR OFFICE/OUTPT VISIT, EST, LEVL V, 40-54 MIN: ICD-10-PCS | Mod: S$PBB,BMT,, | Performed by: STUDENT IN AN ORGANIZED HEALTH CARE EDUCATION/TRAINING PROGRAM

## 2021-10-28 PROCEDURE — 84165 PATHOLOGIST INTERPRETATION SPE: ICD-10-PCS | Mod: 26,BMT,, | Performed by: PATHOLOGY

## 2021-10-28 PROCEDURE — 86334 IMMUNOFIX E-PHORESIS SERUM: CPT | Performed by: STUDENT IN AN ORGANIZED HEALTH CARE EDUCATION/TRAINING PROGRAM

## 2021-10-28 PROCEDURE — 80053 COMPREHEN METABOLIC PANEL: CPT | Performed by: STUDENT IN AN ORGANIZED HEALTH CARE EDUCATION/TRAINING PROGRAM

## 2021-10-28 PROCEDURE — 84165 PROTEIN E-PHORESIS SERUM: CPT | Performed by: STUDENT IN AN ORGANIZED HEALTH CARE EDUCATION/TRAINING PROGRAM

## 2021-10-28 PROCEDURE — 36415 COLL VENOUS BLD VENIPUNCTURE: CPT | Performed by: STUDENT IN AN ORGANIZED HEALTH CARE EDUCATION/TRAINING PROGRAM

## 2021-10-28 PROCEDURE — 82784 ASSAY IGA/IGD/IGG/IGM EACH: CPT | Performed by: STUDENT IN AN ORGANIZED HEALTH CARE EDUCATION/TRAINING PROGRAM

## 2021-10-28 PROCEDURE — 99215 OFFICE O/P EST HI 40 MIN: CPT | Mod: S$PBB,BMT,, | Performed by: STUDENT IN AN ORGANIZED HEALTH CARE EDUCATION/TRAINING PROGRAM

## 2021-10-28 PROCEDURE — 99999 PR PBB SHADOW E&M-EST. PATIENT-LVL IV: CPT | Mod: PBBFAC,BMT,, | Performed by: STUDENT IN AN ORGANIZED HEALTH CARE EDUCATION/TRAINING PROGRAM

## 2021-10-28 PROCEDURE — 99999 PR PBB SHADOW E&M-EST. PATIENT-LVL IV: ICD-10-PCS | Mod: PBBFAC,BMT,, | Performed by: STUDENT IN AN ORGANIZED HEALTH CARE EDUCATION/TRAINING PROGRAM

## 2021-10-28 PROCEDURE — 86334 IMMUNOFIX E-PHORESIS SERUM: CPT | Mod: 26,BMT,, | Performed by: PATHOLOGY

## 2021-10-28 PROCEDURE — 85025 COMPLETE CBC W/AUTO DIFF WBC: CPT | Performed by: STUDENT IN AN ORGANIZED HEALTH CARE EDUCATION/TRAINING PROGRAM

## 2021-10-28 PROCEDURE — 83520 IMMUNOASSAY QUANT NOS NONAB: CPT | Performed by: STUDENT IN AN ORGANIZED HEALTH CARE EDUCATION/TRAINING PROGRAM

## 2021-10-28 PROCEDURE — 84165 PROTEIN E-PHORESIS SERUM: CPT | Mod: 26,BMT,, | Performed by: PATHOLOGY

## 2021-10-28 PROCEDURE — 86334 PATHOLOGIST INTERPRETATION IFE: ICD-10-PCS | Mod: 26,BMT,, | Performed by: PATHOLOGY

## 2021-10-28 RX ORDER — LENALIDOMIDE 10 MG/1
10 CAPSULE ORAL DAILY
Qty: 28 EACH | Refills: 0 | Status: SHIPPED | OUTPATIENT
Start: 2021-10-28 | End: 2021-11-05 | Stop reason: SDUPTHER

## 2021-10-29 LAB
ALBUMIN SERPL ELPH-MCNC: 3.77 G/DL (ref 3.35–5.55)
ALPHA1 GLOB SERPL ELPH-MCNC: 0.38 G/DL (ref 0.17–0.41)
ALPHA2 GLOB SERPL ELPH-MCNC: 0.8 G/DL (ref 0.43–0.99)
B-GLOBULIN SERPL ELPH-MCNC: 0.63 G/DL (ref 0.5–1.1)
COMMENT: NORMAL
FINAL PATHOLOGIC DIAGNOSIS: NORMAL
GAMMA GLOB SERPL ELPH-MCNC: 1.13 G/DL (ref 0.67–1.58)
GROSS: NORMAL
INTERPRETATION SERPL IFE-IMP: NORMAL
KAPPA LC SER QL IA: 3.24 MG/DL (ref 0.33–1.94)
KAPPA LC/LAMBDA SER IA: 3.21 (ref 0.26–1.65)
LAMBDA LC SER QL IA: 1.01 MG/DL (ref 0.57–2.63)
Lab: NORMAL
MICROSCOPIC EXAM: NORMAL
PATHOLOGIST INTERPRETATION IFE: NORMAL
PATHOLOGIST INTERPRETATION SPE: NORMAL
PROT SERPL-MCNC: 6.7 G/DL (ref 6–8.4)
SUPPLEMENTAL DIAGNOSIS: NORMAL

## 2021-11-01 ENCOUNTER — SPECIALTY PHARMACY (OUTPATIENT)
Dept: PHARMACY | Facility: CLINIC | Age: 61
End: 2021-11-01
Payer: MEDICAID

## 2021-11-02 ENCOUNTER — TELEPHONE (OUTPATIENT)
Dept: HEMATOLOGY/ONCOLOGY | Facility: CLINIC | Age: 61
End: 2021-11-02
Payer: MEDICAID

## 2021-11-03 DIAGNOSIS — C90.00 MULTIPLE MYELOMA NOT HAVING ACHIEVED REMISSION: ICD-10-CM

## 2021-11-03 DIAGNOSIS — E88.09 PLASMA CELL DYSCRASIA: ICD-10-CM

## 2021-11-03 DIAGNOSIS — E61.1 IRON DEFICIENCY: ICD-10-CM

## 2021-11-04 RX ORDER — LOSARTAN POTASSIUM 50 MG/1
50 TABLET ORAL DAILY
Qty: 90 TABLET | Refills: 3 | Status: SHIPPED | OUTPATIENT
Start: 2021-11-04 | End: 2021-11-11 | Stop reason: SDUPTHER

## 2021-11-04 RX ORDER — CARVEDILOL 25 MG/1
25 TABLET ORAL 2 TIMES DAILY
Qty: 60 TABLET | Refills: 2 | Status: SHIPPED | OUTPATIENT
Start: 2021-11-04 | End: 2022-02-07 | Stop reason: SDUPTHER

## 2021-11-05 DIAGNOSIS — C90.01 MULTIPLE MYELOMA IN REMISSION: ICD-10-CM

## 2021-11-05 RX ORDER — LENALIDOMIDE 10 MG/1
10 CAPSULE ORAL DAILY
Qty: 28 EACH | Refills: 0 | Status: SHIPPED | OUTPATIENT
Start: 2021-11-05 | End: 2021-11-29

## 2021-11-11 ENCOUNTER — INFUSION (OUTPATIENT)
Dept: INFUSION THERAPY | Facility: HOSPITAL | Age: 61
End: 2021-11-11
Payer: MEDICAID

## 2021-11-11 VITALS
TEMPERATURE: 98 F | HEIGHT: 67 IN | WEIGHT: 224 LBS | BODY MASS INDEX: 35.16 KG/M2 | SYSTOLIC BLOOD PRESSURE: 147 MMHG | HEART RATE: 64 BPM | RESPIRATION RATE: 18 BRPM | OXYGEN SATURATION: 99 % | DIASTOLIC BLOOD PRESSURE: 83 MMHG

## 2021-11-11 DIAGNOSIS — G62.9 NEUROPATHY: ICD-10-CM

## 2021-11-11 DIAGNOSIS — C90.01 MULTIPLE MYELOMA IN REMISSION: ICD-10-CM

## 2021-11-11 DIAGNOSIS — C90.00 MULTIPLE MYELOMA NOT HAVING ACHIEVED REMISSION: ICD-10-CM

## 2021-11-11 DIAGNOSIS — Z94.84 HISTORY OF AUTO STEM CELL TRANSPLANT: Primary | ICD-10-CM

## 2021-11-11 DIAGNOSIS — E61.1 IRON DEFICIENCY: ICD-10-CM

## 2021-11-11 PROCEDURE — 25000003 PHARM REV CODE 250: Performed by: INTERNAL MEDICINE

## 2021-11-11 PROCEDURE — 63600175 PHARM REV CODE 636 W HCPCS: Performed by: INTERNAL MEDICINE

## 2021-11-11 PROCEDURE — 96365 THER/PROPH/DIAG IV INF INIT: CPT

## 2021-11-11 RX ORDER — SODIUM CHLORIDE 0.9 % (FLUSH) 0.9 %
10 SYRINGE (ML) INJECTION
Status: DISCONTINUED | OUTPATIENT
Start: 2021-11-11 | End: 2021-11-11 | Stop reason: HOSPADM

## 2021-11-11 RX ORDER — SODIUM CHLORIDE 0.9 % (FLUSH) 0.9 %
10 SYRINGE (ML) INJECTION
Status: CANCELLED | OUTPATIENT
Start: 2021-11-11

## 2021-11-11 RX ORDER — HEPARIN 100 UNIT/ML
500 SYRINGE INTRAVENOUS
Status: DISCONTINUED | OUTPATIENT
Start: 2021-11-11 | End: 2021-11-11 | Stop reason: HOSPADM

## 2021-11-11 RX ORDER — HEPARIN 100 UNIT/ML
500 SYRINGE INTRAVENOUS
Status: CANCELLED | OUTPATIENT
Start: 2021-11-11

## 2021-11-11 RX ORDER — ZOLEDRONIC ACID 0.04 MG/ML
4 INJECTION, SOLUTION INTRAVENOUS
Status: COMPLETED | OUTPATIENT
Start: 2021-11-11 | End: 2021-11-11

## 2021-11-11 RX ORDER — ZOLEDRONIC ACID 0.04 MG/ML
4 INJECTION, SOLUTION INTRAVENOUS
Status: CANCELLED | OUTPATIENT
Start: 2021-11-11

## 2021-11-11 RX ADMIN — SODIUM CHLORIDE: 0.9 INJECTION, SOLUTION INTRAVENOUS at 10:11

## 2021-11-11 RX ADMIN — ZOLEDRONIC ACID 4 MG: 0.04 INJECTION, SOLUTION INTRAVENOUS at 10:11

## 2021-11-12 RX ORDER — GABAPENTIN 300 MG/1
300 CAPSULE ORAL 2 TIMES DAILY
Qty: 60 CAPSULE | Refills: 3 | Status: SHIPPED | OUTPATIENT
Start: 2021-11-12 | End: 2022-03-08 | Stop reason: SDUPTHER

## 2021-11-12 RX ORDER — LOSARTAN POTASSIUM 50 MG/1
100 TABLET ORAL DAILY
Qty: 180 TABLET | Refills: 3 | Status: SHIPPED | OUTPATIENT
Start: 2021-11-12 | End: 2023-04-06

## 2021-12-01 DIAGNOSIS — C90.01 MULTIPLE MYELOMA IN REMISSION: ICD-10-CM

## 2021-12-01 RX ORDER — LENALIDOMIDE 10 MG/1
1 CAPSULE ORAL DAILY
Qty: 28 EACH | Refills: 0 | Status: SHIPPED | OUTPATIENT
Start: 2021-12-01 | End: 2021-12-03 | Stop reason: SDUPTHER

## 2021-12-02 ENCOUNTER — TELEPHONE (OUTPATIENT)
Dept: HEMATOLOGY/ONCOLOGY | Facility: CLINIC | Age: 61
End: 2021-12-02
Payer: MEDICAID

## 2021-12-03 ENCOUNTER — TELEPHONE (OUTPATIENT)
Dept: HEMATOLOGY/ONCOLOGY | Facility: CLINIC | Age: 61
End: 2021-12-03
Payer: MEDICAID

## 2021-12-03 DIAGNOSIS — C90.01 MULTIPLE MYELOMA IN REMISSION: ICD-10-CM

## 2021-12-05 RX ORDER — LENALIDOMIDE 10 MG/1
1 CAPSULE ORAL DAILY
Qty: 28 EACH | Refills: 0
Start: 2021-12-05 | End: 2021-12-27

## 2021-12-09 ENCOUNTER — LAB VISIT (OUTPATIENT)
Dept: LAB | Facility: HOSPITAL | Age: 61
End: 2021-12-09
Payer: MEDICAID

## 2021-12-09 ENCOUNTER — OFFICE VISIT (OUTPATIENT)
Dept: HEMATOLOGY/ONCOLOGY | Facility: CLINIC | Age: 61
End: 2021-12-09
Payer: MEDICAID

## 2021-12-09 ENCOUNTER — OFFICE VISIT (OUTPATIENT)
Dept: INFECTIOUS DISEASES | Facility: CLINIC | Age: 61
End: 2021-12-09
Payer: MEDICAID

## 2021-12-09 VITALS
TEMPERATURE: 99 F | WEIGHT: 223.31 LBS | SYSTOLIC BLOOD PRESSURE: 180 MMHG | HEIGHT: 67 IN | HEART RATE: 66 BPM | DIASTOLIC BLOOD PRESSURE: 92 MMHG | BODY MASS INDEX: 35.05 KG/M2

## 2021-12-09 VITALS
HEIGHT: 67 IN | WEIGHT: 222.44 LBS | HEART RATE: 49 BPM | DIASTOLIC BLOOD PRESSURE: 80 MMHG | RESPIRATION RATE: 16 BRPM | SYSTOLIC BLOOD PRESSURE: 157 MMHG | OXYGEN SATURATION: 99 % | BODY MASS INDEX: 34.91 KG/M2

## 2021-12-09 DIAGNOSIS — I10 ESSENTIAL HYPERTENSION: ICD-10-CM

## 2021-12-09 DIAGNOSIS — Z94.84 HISTORY OF AUTO STEM CELL TRANSPLANT: ICD-10-CM

## 2021-12-09 DIAGNOSIS — T45.1X5A IMMUNODEFICIENCY SECONDARY TO CHEMOTHERAPY: ICD-10-CM

## 2021-12-09 DIAGNOSIS — C90.01 MULTIPLE MYELOMA IN REMISSION: Primary | ICD-10-CM

## 2021-12-09 DIAGNOSIS — C90.01 MULTIPLE MYELOMA IN REMISSION: ICD-10-CM

## 2021-12-09 DIAGNOSIS — D84.821 IMMUNODEFICIENCY SECONDARY TO CHEMOTHERAPY: ICD-10-CM

## 2021-12-09 DIAGNOSIS — C79.49 METASTASIS OF NEOPLASM TO SPINAL CANAL: ICD-10-CM

## 2021-12-09 DIAGNOSIS — Z71.85 VACCINE COUNSELING: Primary | ICD-10-CM

## 2021-12-09 DIAGNOSIS — Z79.899 IMMUNODEFICIENCY SECONDARY TO CHEMOTHERAPY: ICD-10-CM

## 2021-12-09 DIAGNOSIS — D84.81 IMMUNODEFICIENCY SECONDARY TO NEOPLASM: ICD-10-CM

## 2021-12-09 DIAGNOSIS — D49.9 IMMUNODEFICIENCY SECONDARY TO NEOPLASM: ICD-10-CM

## 2021-12-09 LAB
ALBUMIN SERPL BCP-MCNC: 3.8 G/DL (ref 3.5–5.2)
ALP SERPL-CCNC: 70 U/L (ref 55–135)
ALT SERPL W/O P-5'-P-CCNC: 19 U/L (ref 10–44)
ANION GAP SERPL CALC-SCNC: 11 MMOL/L (ref 8–16)
AST SERPL-CCNC: 22 U/L (ref 10–40)
BASOPHILS # BLD AUTO: 0.09 K/UL (ref 0–0.2)
BASOPHILS NFR BLD: 2.9 % (ref 0–1.9)
BILIRUB SERPL-MCNC: 0.8 MG/DL (ref 0.1–1)
BUN SERPL-MCNC: 13 MG/DL (ref 8–23)
CALCIUM SERPL-MCNC: 9.1 MG/DL (ref 8.7–10.5)
CHLORIDE SERPL-SCNC: 109 MMOL/L (ref 95–110)
CO2 SERPL-SCNC: 23 MMOL/L (ref 23–29)
CREAT SERPL-MCNC: 1 MG/DL (ref 0.5–1.4)
DIFFERENTIAL METHOD: ABNORMAL
EOSINOPHIL # BLD AUTO: 0.2 K/UL (ref 0–0.5)
EOSINOPHIL NFR BLD: 5.6 % (ref 0–8)
ERYTHROCYTE [DISTWIDTH] IN BLOOD BY AUTOMATED COUNT: 15.2 % (ref 11.5–14.5)
EST. GFR  (AFRICAN AMERICAN): >60 ML/MIN/1.73 M^2
EST. GFR  (NON AFRICAN AMERICAN): >60 ML/MIN/1.73 M^2
GLUCOSE SERPL-MCNC: 158 MG/DL (ref 70–110)
HCT VFR BLD AUTO: 39.9 % (ref 40–54)
HGB BLD-MCNC: 12.8 G/DL (ref 14–18)
IGA SERPL-MCNC: 101 MG/DL (ref 40–350)
IGG SERPL-MCNC: 1305 MG/DL (ref 650–1600)
IGM SERPL-MCNC: 76 MG/DL (ref 50–300)
IMM GRANULOCYTES # BLD AUTO: 0.03 K/UL (ref 0–0.04)
IMM GRANULOCYTES NFR BLD AUTO: 1 % (ref 0–0.5)
LYMPHOCYTES # BLD AUTO: 0.8 K/UL (ref 1–4.8)
LYMPHOCYTES NFR BLD: 27.5 % (ref 18–48)
MCH RBC QN AUTO: 30.1 PG (ref 27–31)
MCHC RBC AUTO-ENTMCNC: 32.1 G/DL (ref 32–36)
MCV RBC AUTO: 94 FL (ref 82–98)
MONOCYTES # BLD AUTO: 0.4 K/UL (ref 0.3–1)
MONOCYTES NFR BLD: 12.7 % (ref 4–15)
NEUTROPHILS # BLD AUTO: 1.5 K/UL (ref 1.8–7.7)
NEUTROPHILS NFR BLD: 50.3 % (ref 38–73)
NRBC BLD-RTO: 0 /100 WBC
PLATELET # BLD AUTO: 175 K/UL (ref 150–450)
PMV BLD AUTO: 10.7 FL (ref 9.2–12.9)
POTASSIUM SERPL-SCNC: 3.7 MMOL/L (ref 3.5–5.1)
PROT SERPL-MCNC: 7.1 G/DL (ref 6–8.4)
RBC # BLD AUTO: 4.25 M/UL (ref 4.6–6.2)
SODIUM SERPL-SCNC: 143 MMOL/L (ref 136–145)
WBC # BLD AUTO: 3.06 K/UL (ref 3.9–12.7)

## 2021-12-09 PROCEDURE — 36415 COLL VENOUS BLD VENIPUNCTURE: CPT | Performed by: STUDENT IN AN ORGANIZED HEALTH CARE EDUCATION/TRAINING PROGRAM

## 2021-12-09 PROCEDURE — 90472 IMMUNIZATION ADMIN EACH ADD: CPT | Mod: PBBFAC

## 2021-12-09 PROCEDURE — 86334 IMMUNOFIX E-PHORESIS SERUM: CPT | Performed by: STUDENT IN AN ORGANIZED HEALTH CARE EDUCATION/TRAINING PROGRAM

## 2021-12-09 PROCEDURE — 90471 IMMUNIZATION ADMIN: CPT | Mod: PBBFAC

## 2021-12-09 PROCEDURE — 99214 PR OFFICE/OUTPT VISIT, EST, LEVL IV, 30-39 MIN: ICD-10-PCS | Mod: S$PBB,BMT,, | Performed by: STUDENT IN AN ORGANIZED HEALTH CARE EDUCATION/TRAINING PROGRAM

## 2021-12-09 PROCEDURE — 86334 IMMUNOFIX E-PHORESIS SERUM: CPT | Mod: 26,BMT,, | Performed by: PATHOLOGY

## 2021-12-09 PROCEDURE — 99213 OFFICE O/P EST LOW 20 MIN: CPT | Mod: PBBFAC,27 | Performed by: STUDENT IN AN ORGANIZED HEALTH CARE EDUCATION/TRAINING PROGRAM

## 2021-12-09 PROCEDURE — 99213 OFFICE O/P EST LOW 20 MIN: CPT | Mod: PBBFAC | Performed by: INTERNAL MEDICINE

## 2021-12-09 PROCEDURE — 80053 COMPREHEN METABOLIC PANEL: CPT | Performed by: STUDENT IN AN ORGANIZED HEALTH CARE EDUCATION/TRAINING PROGRAM

## 2021-12-09 PROCEDURE — 99999 PR PBB SHADOW E&M-EST. PATIENT-LVL III: ICD-10-PCS | Mod: PBBFAC,BMT,, | Performed by: STUDENT IN AN ORGANIZED HEALTH CARE EDUCATION/TRAINING PROGRAM

## 2021-12-09 PROCEDURE — 84165 PATHOLOGIST INTERPRETATION SPE: ICD-10-PCS | Mod: 26,BMT,, | Performed by: PATHOLOGY

## 2021-12-09 PROCEDURE — 84165 PROTEIN E-PHORESIS SERUM: CPT | Mod: 26,BMT,, | Performed by: PATHOLOGY

## 2021-12-09 PROCEDURE — 99999 PR PBB SHADOW E&M-EST. PATIENT-LVL III: ICD-10-PCS | Mod: PBBFAC,BMT,, | Performed by: INTERNAL MEDICINE

## 2021-12-09 PROCEDURE — 99205 PR OFFICE/OUTPT VISIT, NEW, LEVL V, 60-74 MIN: ICD-10-PCS | Mod: S$PBB,BMT,, | Performed by: INTERNAL MEDICINE

## 2021-12-09 PROCEDURE — 85025 COMPLETE CBC W/AUTO DIFF WBC: CPT | Performed by: STUDENT IN AN ORGANIZED HEALTH CARE EDUCATION/TRAINING PROGRAM

## 2021-12-09 PROCEDURE — 83520 IMMUNOASSAY QUANT NOS NONAB: CPT | Mod: 59 | Performed by: STUDENT IN AN ORGANIZED HEALTH CARE EDUCATION/TRAINING PROGRAM

## 2021-12-09 PROCEDURE — 84165 PROTEIN E-PHORESIS SERUM: CPT | Performed by: STUDENT IN AN ORGANIZED HEALTH CARE EDUCATION/TRAINING PROGRAM

## 2021-12-09 PROCEDURE — 99214 OFFICE O/P EST MOD 30 MIN: CPT | Mod: S$PBB,BMT,, | Performed by: STUDENT IN AN ORGANIZED HEALTH CARE EDUCATION/TRAINING PROGRAM

## 2021-12-09 PROCEDURE — 99999 PR PBB SHADOW E&M-EST. PATIENT-LVL III: CPT | Mod: PBBFAC,BMT,, | Performed by: STUDENT IN AN ORGANIZED HEALTH CARE EDUCATION/TRAINING PROGRAM

## 2021-12-09 PROCEDURE — 99999 PR PBB SHADOW E&M-EST. PATIENT-LVL III: CPT | Mod: PBBFAC,BMT,, | Performed by: INTERNAL MEDICINE

## 2021-12-09 PROCEDURE — 86334 PATHOLOGIST INTERPRETATION IFE: ICD-10-PCS | Mod: 26,BMT,, | Performed by: PATHOLOGY

## 2021-12-09 PROCEDURE — 99205 OFFICE O/P NEW HI 60 MIN: CPT | Mod: S$PBB,BMT,, | Performed by: INTERNAL MEDICINE

## 2021-12-09 PROCEDURE — 82784 ASSAY IGA/IGD/IGG/IGM EACH: CPT | Mod: 59 | Performed by: STUDENT IN AN ORGANIZED HEALTH CARE EDUCATION/TRAINING PROGRAM

## 2021-12-10 LAB
ALBUMIN SERPL ELPH-MCNC: 3.74 G/DL (ref 3.35–5.55)
ALPHA1 GLOB SERPL ELPH-MCNC: 0.36 G/DL (ref 0.17–0.41)
ALPHA2 GLOB SERPL ELPH-MCNC: 0.65 G/DL (ref 0.43–0.99)
B-GLOBULIN SERPL ELPH-MCNC: 0.65 G/DL (ref 0.5–1.1)
GAMMA GLOB SERPL ELPH-MCNC: 1.2 G/DL (ref 0.67–1.58)
INTERPRETATION SERPL IFE-IMP: NORMAL
KAPPA LC SER QL IA: 3.28 MG/DL (ref 0.33–1.94)
KAPPA LC/LAMBDA SER IA: 1.92 (ref 0.26–1.65)
LAMBDA LC SER QL IA: 1.71 MG/DL (ref 0.57–2.63)
PATHOLOGIST INTERPRETATION IFE: NORMAL
PATHOLOGIST INTERPRETATION SPE: NORMAL
PROT SERPL-MCNC: 6.6 G/DL (ref 6–8.4)

## 2021-12-16 ENCOUNTER — CLINICAL SUPPORT (OUTPATIENT)
Dept: INFECTIOUS DISEASES | Facility: CLINIC | Age: 61
End: 2021-12-16
Payer: MEDICAID

## 2021-12-16 ENCOUNTER — LAB VISIT (OUTPATIENT)
Dept: LAB | Facility: HOSPITAL | Age: 61
End: 2021-12-16
Payer: MEDICAID

## 2021-12-16 DIAGNOSIS — Z71.85 VACCINE COUNSELING: ICD-10-CM

## 2021-12-16 LAB
VARICELLA INTERPRETATION: POSITIVE
VARICELLA ZOSTER IGG: 1.41 ISR (ref 0–0.9)

## 2021-12-16 PROCEDURE — 86787 VARICELLA-ZOSTER ANTIBODY: CPT | Performed by: INTERNAL MEDICINE

## 2021-12-16 PROCEDURE — 36415 COLL VENOUS BLD VENIPUNCTURE: CPT | Performed by: INTERNAL MEDICINE

## 2021-12-16 PROCEDURE — 90472 IMMUNIZATION ADMIN EACH ADD: CPT | Mod: PBBFAC

## 2021-12-16 PROCEDURE — 90471 IMMUNIZATION ADMIN: CPT | Mod: PBBFAC

## 2021-12-16 PROCEDURE — 99999 PR PBB SHADOW E&M-EST. PATIENT-LVL II: CPT | Mod: PBBFAC,BMT,,

## 2021-12-16 PROCEDURE — 99212 OFFICE O/P EST SF 10 MIN: CPT | Mod: PBBFAC

## 2021-12-16 PROCEDURE — 99999 PR PBB SHADOW E&M-EST. PATIENT-LVL II: ICD-10-PCS | Mod: PBBFAC,BMT,,

## 2021-12-23 ENCOUNTER — CLINICAL SUPPORT (OUTPATIENT)
Dept: INFECTIOUS DISEASES | Facility: CLINIC | Age: 61
End: 2021-12-23
Payer: MEDICAID

## 2021-12-23 PROCEDURE — 90472 IMMUNIZATION ADMIN EACH ADD: CPT | Mod: PBBFAC

## 2021-12-23 PROCEDURE — 90471 IMMUNIZATION ADMIN: CPT | Mod: PBBFAC

## 2021-12-29 DIAGNOSIS — C90.01 MULTIPLE MYELOMA IN REMISSION: ICD-10-CM

## 2021-12-29 RX ORDER — LENALIDOMIDE 10 MG/1
CAPSULE ORAL
Qty: 28 EACH | Refills: 0 | Status: SHIPPED | OUTPATIENT
Start: 2021-12-29 | End: 2022-01-24

## 2021-12-30 ENCOUNTER — CLINICAL SUPPORT (OUTPATIENT)
Dept: INFECTIOUS DISEASES | Facility: CLINIC | Age: 61
End: 2021-12-30
Payer: MEDICAID

## 2021-12-30 DIAGNOSIS — Z71.85 VACCINE COUNSELING: ICD-10-CM

## 2021-12-30 PROCEDURE — 90471 IMMUNIZATION ADMIN: CPT | Mod: PBBFAC

## 2021-12-30 PROCEDURE — 90472 IMMUNIZATION ADMIN EACH ADD: CPT | Mod: PBBFAC

## 2021-12-30 PROCEDURE — 90734 MENACWYD/MENACWYCRM VACC IM: CPT | Mod: PBBFAC

## 2022-01-10 ENCOUNTER — LAB VISIT (OUTPATIENT)
Dept: LAB | Facility: HOSPITAL | Age: 62
End: 2022-01-10
Payer: MEDICAID

## 2022-01-10 ENCOUNTER — CLINICAL SUPPORT (OUTPATIENT)
Dept: INFECTIOUS DISEASES | Facility: CLINIC | Age: 62
End: 2022-01-10
Payer: MEDICAID

## 2022-01-10 ENCOUNTER — TELEPHONE (OUTPATIENT)
Dept: HEMATOLOGY/ONCOLOGY | Facility: CLINIC | Age: 62
End: 2022-01-10
Payer: MEDICAID

## 2022-01-10 DIAGNOSIS — C90.01 MULTIPLE MYELOMA IN REMISSION: Primary | ICD-10-CM

## 2022-01-10 DIAGNOSIS — C90.01 MULTIPLE MYELOMA IN REMISSION: ICD-10-CM

## 2022-01-10 DIAGNOSIS — C79.49 METASTASIS OF NEOPLASM TO SPINAL CANAL: ICD-10-CM

## 2022-01-10 DIAGNOSIS — D50.8 OTHER IRON DEFICIENCY ANEMIA: ICD-10-CM

## 2022-01-10 LAB
ALBUMIN SERPL BCP-MCNC: 3.6 G/DL (ref 3.5–5.2)
ALP SERPL-CCNC: 58 U/L (ref 55–135)
ALT SERPL W/O P-5'-P-CCNC: 24 U/L (ref 10–44)
ANION GAP SERPL CALC-SCNC: 7 MMOL/L (ref 8–16)
AST SERPL-CCNC: 27 U/L (ref 10–40)
BASOPHILS # BLD AUTO: 0.11 K/UL (ref 0–0.2)
BASOPHILS NFR BLD: 3.5 % (ref 0–1.9)
BILIRUB SERPL-MCNC: 0.8 MG/DL (ref 0.1–1)
BUN SERPL-MCNC: 11 MG/DL (ref 8–23)
CALCIUM SERPL-MCNC: 9.2 MG/DL (ref 8.7–10.5)
CHLORIDE SERPL-SCNC: 106 MMOL/L (ref 95–110)
CO2 SERPL-SCNC: 25 MMOL/L (ref 23–29)
CREAT SERPL-MCNC: 0.8 MG/DL (ref 0.5–1.4)
DIFFERENTIAL METHOD: ABNORMAL
EOSINOPHIL # BLD AUTO: 0.3 K/UL (ref 0–0.5)
EOSINOPHIL NFR BLD: 10.9 % (ref 0–8)
ERYTHROCYTE [DISTWIDTH] IN BLOOD BY AUTOMATED COUNT: 14.3 % (ref 11.5–14.5)
EST. GFR  (AFRICAN AMERICAN): >60 ML/MIN/1.73 M^2
EST. GFR  (NON AFRICAN AMERICAN): >60 ML/MIN/1.73 M^2
GLUCOSE SERPL-MCNC: 106 MG/DL (ref 70–110)
HCT VFR BLD AUTO: 40.4 % (ref 40–54)
HGB BLD-MCNC: 13.2 G/DL (ref 14–18)
IMM GRANULOCYTES # BLD AUTO: 0.02 K/UL (ref 0–0.04)
IMM GRANULOCYTES NFR BLD AUTO: 0.6 % (ref 0–0.5)
LYMPHOCYTES # BLD AUTO: 1.1 K/UL (ref 1–4.8)
LYMPHOCYTES NFR BLD: 33.7 % (ref 18–48)
MCH RBC QN AUTO: 29.9 PG (ref 27–31)
MCHC RBC AUTO-ENTMCNC: 32.7 G/DL (ref 32–36)
MCV RBC AUTO: 91 FL (ref 82–98)
MONOCYTES # BLD AUTO: 0.3 K/UL (ref 0.3–1)
MONOCYTES NFR BLD: 10.3 % (ref 4–15)
NEUTROPHILS # BLD AUTO: 1.3 K/UL (ref 1.8–7.7)
NEUTROPHILS NFR BLD: 41 % (ref 38–73)
NRBC BLD-RTO: 0 /100 WBC
PLATELET # BLD AUTO: 150 K/UL (ref 150–450)
PMV BLD AUTO: 11.3 FL (ref 9.2–12.9)
POTASSIUM SERPL-SCNC: 3.1 MMOL/L (ref 3.5–5.1)
PROT SERPL-MCNC: 6.6 G/DL (ref 6–8.4)
RBC # BLD AUTO: 4.42 M/UL (ref 4.6–6.2)
SODIUM SERPL-SCNC: 138 MMOL/L (ref 136–145)
WBC # BLD AUTO: 3.12 K/UL (ref 3.9–12.7)

## 2022-01-10 PROCEDURE — 36415 COLL VENOUS BLD VENIPUNCTURE: CPT | Performed by: STUDENT IN AN ORGANIZED HEALTH CARE EDUCATION/TRAINING PROGRAM

## 2022-01-10 PROCEDURE — 90750 HZV VACC RECOMBINANT IM: CPT | Mod: PBBFAC

## 2022-01-10 PROCEDURE — 84165 PATHOLOGIST INTERPRETATION SPE: ICD-10-PCS | Mod: 26,,, | Performed by: PATHOLOGY

## 2022-01-10 PROCEDURE — 99999 PR PBB SHADOW E&M-EST. PATIENT-LVL II: ICD-10-PCS | Mod: PBBFAC,,,

## 2022-01-10 PROCEDURE — 80053 COMPREHEN METABOLIC PANEL: CPT | Performed by: STUDENT IN AN ORGANIZED HEALTH CARE EDUCATION/TRAINING PROGRAM

## 2022-01-10 PROCEDURE — 99999 PR PBB SHADOW E&M-EST. PATIENT-LVL II: CPT | Mod: PBBFAC,,,

## 2022-01-10 PROCEDURE — 86334 IMMUNOFIX E-PHORESIS SERUM: CPT | Mod: 26,,, | Performed by: PATHOLOGY

## 2022-01-10 PROCEDURE — 86334 PATHOLOGIST INTERPRETATION IFE: ICD-10-PCS | Mod: 26,,, | Performed by: PATHOLOGY

## 2022-01-10 PROCEDURE — 84165 PROTEIN E-PHORESIS SERUM: CPT | Mod: 26,,, | Performed by: PATHOLOGY

## 2022-01-10 PROCEDURE — 83520 IMMUNOASSAY QUANT NOS NONAB: CPT | Performed by: STUDENT IN AN ORGANIZED HEALTH CARE EDUCATION/TRAINING PROGRAM

## 2022-01-10 PROCEDURE — 85025 COMPLETE CBC W/AUTO DIFF WBC: CPT | Performed by: STUDENT IN AN ORGANIZED HEALTH CARE EDUCATION/TRAINING PROGRAM

## 2022-01-10 PROCEDURE — 86334 IMMUNOFIX E-PHORESIS SERUM: CPT | Performed by: STUDENT IN AN ORGANIZED HEALTH CARE EDUCATION/TRAINING PROGRAM

## 2022-01-10 PROCEDURE — 84165 PROTEIN E-PHORESIS SERUM: CPT | Performed by: STUDENT IN AN ORGANIZED HEALTH CARE EDUCATION/TRAINING PROGRAM

## 2022-01-10 PROCEDURE — 99212 OFFICE O/P EST SF 10 MIN: CPT | Mod: PBBFAC

## 2022-01-10 RX ORDER — FERROUS GLUCONATE 324(38)MG
324 TABLET ORAL
Qty: 30 TABLET | Refills: 3 | Status: SHIPPED | OUTPATIENT
Start: 2022-01-10 | End: 2022-05-10 | Stop reason: SDUPTHER

## 2022-01-10 RX ORDER — POTASSIUM CHLORIDE 20 MEQ/1
20 TABLET, EXTENDED RELEASE ORAL ONCE
Qty: 2 TABLET | Refills: 0 | Status: SHIPPED | OUTPATIENT
Start: 2022-01-10 | End: 2022-01-10

## 2022-01-10 NOTE — TELEPHONE ENCOUNTER
Called pt to discuss labs. Sent rx for KCL 20meq x 2 tabs. Pt and wife expressed understanding.     Steven Cornejo MD  Clinical Fellow  Hematology and Medical Oncology.  01/10/2022

## 2022-01-10 NOTE — TELEPHONE ENCOUNTER
----- Message from Markel Pepe sent at 1/10/2022  8:43 AM CST -----  Regarding: Prescription Refill    ferrous gluconate  ferrous gluconate (FERGON) 324 MG tablet        How is the patient currently taking it? Take 1 tablet (324 mg total) by mouth daily with breakfast.,         Is this a 30 day or 90 day Rx? 30 day         Preferred Pharmacy with phone number:[id number] [place] [id number]  [facility] (Ph: [phone])        Local or Mail Order: Local         Ordering Provider: Allan Cornejo        Contact Preference: [phone] (home)               Additional Information:

## 2022-01-11 LAB
KAPPA LC SER QL IA: 3.1 MG/DL (ref 0.33–1.94)
KAPPA LC/LAMBDA SER IA: 1.63 (ref 0.26–1.65)
LAMBDA LC SER QL IA: 1.9 MG/DL (ref 0.57–2.63)

## 2022-01-12 LAB
ALBUMIN SERPL ELPH-MCNC: 3.58 G/DL (ref 3.35–5.55)
ALPHA1 GLOB SERPL ELPH-MCNC: 0.33 G/DL (ref 0.17–0.41)
ALPHA2 GLOB SERPL ELPH-MCNC: 0.64 G/DL (ref 0.43–0.99)
B-GLOBULIN SERPL ELPH-MCNC: 0.6 G/DL (ref 0.5–1.1)
GAMMA GLOB SERPL ELPH-MCNC: 1.13 G/DL (ref 0.67–1.58)
INTERPRETATION SERPL IFE-IMP: NORMAL
PROT SERPL-MCNC: 6.3 G/DL (ref 6–8.4)

## 2022-01-13 LAB
PATHOLOGIST INTERPRETATION IFE: NORMAL
PATHOLOGIST INTERPRETATION SPE: NORMAL

## 2022-01-20 DIAGNOSIS — E55.9 VITAMIN D DEFICIENCY: ICD-10-CM

## 2022-01-20 RX ORDER — ERGOCALCIFEROL 1.25 MG/1
50000 CAPSULE ORAL
Qty: 4 CAPSULE | Refills: 1 | Status: SHIPPED | OUTPATIENT
Start: 2022-01-20 | End: 2022-03-18 | Stop reason: SDUPTHER

## 2022-01-20 NOTE — TELEPHONE ENCOUNTER
"----- Message from Kim Morales sent at 1/20/2022  8:56 AM CST -----  Regarding: refill  RX Name and Strength:   ergocalciferol (ERGOCALCIFEROL) 50,000 unit Cap           How is the patient currently taking it?   Route: Take 1 capsule (50,000 Units total) by mouth every 7 days         Is this a 30 day or 90 day Rx?  4 capsules         Preferred Pharmacy with phone number:    [email or web address][id number] - [facility] [address] [facility] AT [facility] [address]  [address] [facility] [address]  Phone: [phone] Fax: [phone]         Local or Mail Order:  local            Contact Preference:  [phone]                   Additional Information:  "Thank you for all that you do for our patients"     "

## 2022-01-24 DIAGNOSIS — C90.01 MULTIPLE MYELOMA IN REMISSION: ICD-10-CM

## 2022-01-24 RX ORDER — POTASSIUM CHLORIDE 20 MEQ/1
20 TABLET, EXTENDED RELEASE ORAL DAILY
Qty: 30 TABLET | Refills: 0 | Status: SHIPPED | OUTPATIENT
Start: 2022-01-24 | End: 2022-02-24 | Stop reason: SDUPTHER

## 2022-01-24 RX ORDER — LENALIDOMIDE 10 MG/1
CAPSULE ORAL
Qty: 28 EACH | Refills: 0 | Status: SHIPPED | OUTPATIENT
Start: 2022-01-24 | End: 2022-01-24 | Stop reason: SDUPTHER

## 2022-01-24 NOTE — TELEPHONE ENCOUNTER
"----- Message from Alicia Vegas sent at 1/24/2022  9:37 AM CST -----  Regarding: Consult/Advisory:  Name Of Caller: Jaspreet    Contact Preference?: [phone]     What is the nature of the call?: inquiring if he needs to continue taking Potassium pills. If so, he needs a refill sent to [facility] in [place]           Additional Notes:  "Thank you for all that you do for our patients'"       "

## 2022-01-25 RX ORDER — LENALIDOMIDE 10 MG/1
CAPSULE ORAL
Qty: 28 EACH | Refills: 0 | Status: SHIPPED | OUTPATIENT
Start: 2022-01-25 | End: 2022-01-28 | Stop reason: SDUPTHER

## 2022-01-27 DIAGNOSIS — C90.01 MULTIPLE MYELOMA IN REMISSION: ICD-10-CM

## 2022-01-27 NOTE — TELEPHONE ENCOUNTER
----- Message from Kim Morales sent at 1/27/2022 12:30 PM CST -----  Regarding: Authorization  Reason for Call:  Need plan authorization for Revlimid prescription      Name of caller:  Bharath    Pharmacy name and phone number: [facility], [phone]

## 2022-01-28 NOTE — TELEPHONE ENCOUNTER
"----- Message from Alicia Vegas sent at 1/28/2022  3:10 PM CST -----  Regarding: prescription auth  Name Of Caller: Bharath with [facility]    Contact Preference?: [phone]      What is the nature of the call?: following up on prior authorization           Additional Notes:  "Thank you for all that you do for our patients'"     "

## 2022-01-31 RX ORDER — LENALIDOMIDE 10 MG/1
CAPSULE ORAL
Qty: 28 EACH | Refills: 0 | Status: SHIPPED | OUTPATIENT
Start: 2022-01-31 | End: 2022-02-28 | Stop reason: SDUPTHER

## 2022-02-03 ENCOUNTER — CLINICAL SUPPORT (OUTPATIENT)
Dept: INFECTIOUS DISEASES | Facility: CLINIC | Age: 62
End: 2022-02-03
Payer: MEDICAID

## 2022-02-03 PROCEDURE — 90472 IMMUNIZATION ADMIN EACH ADD: CPT | Mod: PBBFAC

## 2022-02-03 PROCEDURE — 90648 HIB PRP-T VACCINE 4 DOSE IM: CPT | Mod: PBBFAC

## 2022-02-03 PROCEDURE — 90670 PCV13 VACCINE IM: CPT | Mod: PBBFAC

## 2022-02-03 NOTE — PROGRESS NOTES
Patient received 2 vaccines IM to the right deltoid, HIB anterior, and Prevnar posterior.  Tolerated well and left in NAD

## 2022-02-07 DIAGNOSIS — E88.09 PLASMA CELL DYSCRASIA: ICD-10-CM

## 2022-02-07 RX ORDER — CARVEDILOL 25 MG/1
25 TABLET ORAL 2 TIMES DAILY
Qty: 60 TABLET | Refills: 2 | Status: SHIPPED | OUTPATIENT
Start: 2022-02-07 | End: 2022-05-10 | Stop reason: SDUPTHER

## 2022-02-07 NOTE — TELEPHONE ENCOUNTER
----- Message from Neeta Munoz sent at 2/7/2022  8:33 AM CST -----  Pt calling in regards to medication    Needs refill: carvediloL (COREG) 25 MG tablet  Pharmacy: [facility] #[id number] - [place], [place] - [address] [facility] AT [facility] [address]  [address] [facility] [address]  Phone: [phone] Fax: [phone]

## 2022-02-09 DIAGNOSIS — D84.9 IMMUNOSUPPRESSED STATUS: ICD-10-CM

## 2022-02-10 ENCOUNTER — TELEPHONE (OUTPATIENT)
Dept: INFECTIOUS DISEASES | Facility: CLINIC | Age: 62
End: 2022-02-10
Payer: MEDICAID

## 2022-02-10 ENCOUNTER — CLINICAL SUPPORT (OUTPATIENT)
Dept: INFECTIOUS DISEASES | Facility: CLINIC | Age: 62
End: 2022-02-10
Payer: MEDICAID

## 2022-02-10 ENCOUNTER — INFUSION (OUTPATIENT)
Dept: INFUSION THERAPY | Facility: HOSPITAL | Age: 62
End: 2022-02-10
Payer: MEDICAID

## 2022-02-10 ENCOUNTER — OFFICE VISIT (OUTPATIENT)
Dept: HEMATOLOGY/ONCOLOGY | Facility: CLINIC | Age: 62
End: 2022-02-10
Payer: MEDICAID

## 2022-02-10 VITALS
DIASTOLIC BLOOD PRESSURE: 76 MMHG | TEMPERATURE: 98 F | WEIGHT: 229.19 LBS | HEIGHT: 67 IN | HEART RATE: 59 BPM | OXYGEN SATURATION: 98 % | BODY MASS INDEX: 35.97 KG/M2 | RESPIRATION RATE: 16 BRPM | SYSTOLIC BLOOD PRESSURE: 139 MMHG

## 2022-02-10 VITALS
TEMPERATURE: 98 F | DIASTOLIC BLOOD PRESSURE: 79 MMHG | SYSTOLIC BLOOD PRESSURE: 157 MMHG | HEIGHT: 67 IN | HEART RATE: 50 BPM | BODY MASS INDEX: 35.97 KG/M2 | WEIGHT: 229.19 LBS | RESPIRATION RATE: 18 BRPM

## 2022-02-10 DIAGNOSIS — T45.1X5A IMMUNODEFICIENCY SECONDARY TO CHEMOTHERAPY: ICD-10-CM

## 2022-02-10 DIAGNOSIS — I10 ESSENTIAL HYPERTENSION: ICD-10-CM

## 2022-02-10 DIAGNOSIS — D84.821 IMMUNODEFICIENCY SECONDARY TO CHEMOTHERAPY: ICD-10-CM

## 2022-02-10 DIAGNOSIS — Z79.899 IMMUNODEFICIENCY SECONDARY TO CHEMOTHERAPY: ICD-10-CM

## 2022-02-10 DIAGNOSIS — C90.01 MULTIPLE MYELOMA IN REMISSION: Primary | ICD-10-CM

## 2022-02-10 DIAGNOSIS — D84.81 IMMUNODEFICIENCY SECONDARY TO NEOPLASM: ICD-10-CM

## 2022-02-10 DIAGNOSIS — D49.9 IMMUNODEFICIENCY SECONDARY TO NEOPLASM: ICD-10-CM

## 2022-02-10 DIAGNOSIS — Z94.84 HISTORY OF AUTO STEM CELL TRANSPLANT: Primary | ICD-10-CM

## 2022-02-10 DIAGNOSIS — C79.49 METASTASIS OF NEOPLASM TO SPINAL CANAL: ICD-10-CM

## 2022-02-10 DIAGNOSIS — C90.01 MULTIPLE MYELOMA IN REMISSION: ICD-10-CM

## 2022-02-10 DIAGNOSIS — Z94.84 HISTORY OF AUTOLOGOUS STEM CELL TRANSPLANT: ICD-10-CM

## 2022-02-10 DIAGNOSIS — C90.00 MULTIPLE MYELOMA: ICD-10-CM

## 2022-02-10 PROCEDURE — 96374 THER/PROPH/DIAG INJ IV PUSH: CPT

## 2022-02-10 PROCEDURE — 25000003 PHARM REV CODE 250: Performed by: STUDENT IN AN ORGANIZED HEALTH CARE EDUCATION/TRAINING PROGRAM

## 2022-02-10 PROCEDURE — 99999 PR PBB SHADOW E&M-EST. PATIENT-LVL III: ICD-10-PCS | Mod: PBBFAC,,, | Performed by: STUDENT IN AN ORGANIZED HEALTH CARE EDUCATION/TRAINING PROGRAM

## 2022-02-10 PROCEDURE — 63600175 PHARM REV CODE 636 W HCPCS: Performed by: STUDENT IN AN ORGANIZED HEALTH CARE EDUCATION/TRAINING PROGRAM

## 2022-02-10 PROCEDURE — 99213 OFFICE O/P EST LOW 20 MIN: CPT | Mod: PBBFAC,25 | Performed by: STUDENT IN AN ORGANIZED HEALTH CARE EDUCATION/TRAINING PROGRAM

## 2022-02-10 PROCEDURE — 90700 DTAP VACCINE < 7 YRS IM: CPT | Mod: PBBFAC

## 2022-02-10 PROCEDURE — 99999 PR PBB SHADOW E&M-EST. PATIENT-LVL III: CPT | Mod: PBBFAC,,, | Performed by: STUDENT IN AN ORGANIZED HEALTH CARE EDUCATION/TRAINING PROGRAM

## 2022-02-10 PROCEDURE — 99215 PR OFFICE/OUTPT VISIT, EST, LEVL V, 40-54 MIN: ICD-10-PCS | Mod: S$PBB,,, | Performed by: STUDENT IN AN ORGANIZED HEALTH CARE EDUCATION/TRAINING PROGRAM

## 2022-02-10 PROCEDURE — 90713 POLIOVIRUS IPV SC/IM: CPT | Mod: PBBFAC

## 2022-02-10 PROCEDURE — 99215 OFFICE O/P EST HI 40 MIN: CPT | Mod: S$PBB,,, | Performed by: STUDENT IN AN ORGANIZED HEALTH CARE EDUCATION/TRAINING PROGRAM

## 2022-02-10 RX ORDER — ZOLEDRONIC ACID 0.04 MG/ML
4 INJECTION, SOLUTION INTRAVENOUS
Status: COMPLETED | OUTPATIENT
Start: 2022-02-10 | End: 2022-02-10

## 2022-02-10 RX ORDER — ZOLEDRONIC ACID 0.04 MG/ML
4 INJECTION, SOLUTION INTRAVENOUS
Status: CANCELLED | OUTPATIENT
Start: 2022-02-10

## 2022-02-10 RX ORDER — SODIUM CHLORIDE 0.9 % (FLUSH) 0.9 %
10 SYRINGE (ML) INJECTION
Status: CANCELLED | OUTPATIENT
Start: 2022-02-10

## 2022-02-10 RX ORDER — HEPARIN 100 UNIT/ML
500 SYRINGE INTRAVENOUS
Status: CANCELLED | OUTPATIENT
Start: 2022-02-10

## 2022-02-10 RX ORDER — HEPARIN 100 UNIT/ML
500 SYRINGE INTRAVENOUS
Status: DISCONTINUED | OUTPATIENT
Start: 2022-02-10 | End: 2022-02-10 | Stop reason: HOSPADM

## 2022-02-10 RX ORDER — SODIUM CHLORIDE 0.9 % (FLUSH) 0.9 %
10 SYRINGE (ML) INJECTION
Status: DISCONTINUED | OUTPATIENT
Start: 2022-02-10 | End: 2022-02-10 | Stop reason: HOSPADM

## 2022-02-10 RX ADMIN — SODIUM CHLORIDE: 0.9 INJECTION, SOLUTION INTRAVENOUS at 10:02

## 2022-02-10 RX ADMIN — ZOLEDRONIC ACID 4 MG: 0.04 INJECTION, SOLUTION INTRAVENOUS at 10:02

## 2022-02-10 NOTE — PLAN OF CARE
1020-Labs , hx, and medications reviewed, patient was seen by MD prior to arrival. Assessment completed, patient denies any recent or upcoming invasive dental work, denies any jaw pain for zometa. Infusion tolerated without issue, peripheral IV removed after infusion and site dressed.  Patient discharged to home setting and instructed to contact MD with any issues or concerns.

## 2022-02-10 NOTE — Clinical Note
Hello, Follow up : Schedule monthly CBC, CMP, serum protein electrophoresis, serum protein immunofixation, free light chains for March, April and May. Schedule monthly Zometa center for 3months, next will be in one month. Schedule labs and clinic appt with me in 3 months followed by zometa infusion.  Thanks - diaz

## 2022-02-10 NOTE — PROGRESS NOTES
SECTION OF HEMATOLOGY AND BONE MARROW TRANSPLANT  Return Patient Visit   02/10/2022    CHIEF COMPLAINT:   Multiple Myeloma    HISTORY OF PRESENT ILLNESS: Patient of /  62 y.o.male; pmh of IgG kappa multiple myeloma (standard risk CG per msmart criteria, r-iss stage II); diagnosed September 2019 after presenting with symptomatic perispinal plasmacytoma;He has subsequently received  8 cycles of VRd therapy. Was in midst or pre transplant evaluation but due to insurance coverage issues transplant was delayed so was maintained on therapy and those issues have been resolved.    Transplant Course  Patient with IgG Kappa MM and pmh HTN, lytic bone lesion, arthritis and vitamin D deficiency. Admitted 5/15/21 for planned Alea auto SCT for MM. He received 3 bags and a CD34 dose of 3.35 x 10^6 on 5/17/21. Tolerated chemo and transplant well. Admission complicated by n/v controlled with scheduled anti-emetics and c-diff neg diarrhea controlled with prn imodium. He engrafted on Day +13 (5/30/21) with an ANC of 2607. He was discharged home on Day +14 (5/31/21).     Day +100 restaging marrow indicated that he is in PRN. He reports feeling well. He has been taking Revlimid 10mg daily along with ASA 81mg and Acyclovir. Today he will receive Zometa. Labs that have resulted so far are normal.   Denies fever, chills, nightsweats, bleeding, brusing, lymphadenopathy, signs/symptoms of splenomegaly, focal pain and feels well.    PAST MEDICAL HISTORY:   Past Medical History:   Diagnosis Date    Anxiety     Arthritis     Cancer     Hypertension        PAST SURGICAL HISTORY:   Past Surgical History:   Procedure Laterality Date    BACK SURGERY      BONE MARROW BIOPSY Left 10/20/2021    Procedure: Biopsy-bone marrow;  Surgeon: Summer Cartwright MD;  Location: [facility] ([address] [place] [address]);  Service: Oncology;  Laterality: Left;    COLONOSCOPY N/A 1/25/2021    Procedure: COLONOSCOPY;  Surgeon: Braxton Lees MD;  Location: [facility]  (4TH FLR);  Service: Endoscopy;  Laterality: N/A;  1/22-covid kristopher-inst mailed-tb  1/20/21-pt to remain on schedule with Dr. Lees, and confirmed updated arrival time of 0835-BB    COLONOSCOPY N/A 2/1/2021    Procedure: COLONOSCOPY;  Surgeon: Jo Ann Robledo MD;  Location: [facility] (4TH FLR);  Service: Endoscopy;  Laterality: N/A;  rescheduled due to poor bowel prep, to be rescheduled with first available provider-BB  covid-1/29/21-pcw-BB    CREATION OF MUSCLE ROTATIONAL FLAP N/A 9/23/2020    Procedure: CREATION, FLAP, MUSCLE ROTATION;  Surgeon: Kamlesh Bellamy MD;  Location: [facility] OR 2ND FLR;  Service: Plastics;  Laterality: N/A;    KNEE SURGERY Left 06/2019    LUMBAR FUSION N/A 9/23/2020    Procedure: FUSION, SPINE, LUMBAR L2-pelvis. Depuy. Plastic surgery closure w/ Dr. Bellamy;  Surgeon: Nick Coyle MD;  [facility];  Service: Neurosurgery;  Laterality: N/A;    Metastatic neoplasum removed from spine         PAST SOCIAL HISTORY:   reports that he quit smoking about 5 years ago. He has a 10.00 pack-year smoking history. He has never used smokeless tobacco. He reports previous alcohol use. He reports that he does not use drugs.    FAMILY HISTORY:  No family history on file.    CURRENT MEDICATIONS:   Current Outpatient Medications   Medication Sig    acyclovir (ZOVIRAX) 800 MG Tab Take 1 tablet (800 mg total) by mouth 2 (two) times daily.    carvediloL (COREG) 25 MG tablet Take 1 tablet (25 mg total) by mouth 2 (two) times daily.    ergocalciferol (ERGOCALCIFEROL) 50,000 unit Cap Take 1 capsule (50,000 Units total) by mouth every 7 days.    ferrous gluconate (FERGON) 324 MG tablet Take 1 tablet (324 mg total) by mouth daily with breakfast.    gabapentin (NEURONTIN) 300 MG capsule Take 1 capsule (300 mg total) by mouth 2 (two) times daily.    lenalidomide (REVLIMID) 10 mg Cap TAKE 1 CAPSULE BY MOUTH  DAILY FOR 28 DAYS Auth #[id number] 1/24/2022.    losartan (COZAAR) 50 MG tablet  "Take 2 tablets (100 mg total) by mouth once daily.    NIFEdipine (ADALAT CC) 30 MG TbSR Take 1 tablet (30 mg total) by mouth once daily.    ondansetron (ZOFRAN) 8 MG tablet Take 1 tablet (8 mg total) by mouth every 8 (eight) hours as needed for Nausea.    potassium chloride SA (K-DUR,KLOR-CON) 20 MEQ tablet Take 1 tablet (20 mEq total) by mouth once daily.     Current Facility-Administered Medications   Medication    LIDOcaine (PF) 20 mg/mL (2%) injection 200 mg     ALLERGIES:   Review of patient's allergies indicates:  No Known Allergies  Review of Systems   Constitutional: Negative for fever, malaise/fatigue and weight loss.   Respiratory: Negative for cough and shortness of breath.    Cardiovascular: Negative for chest pain and leg swelling.   Gastrointestinal: Negative for constipation, diarrhea and vomiting.   Skin: Negative for rash.   Neurological: Negative for seizures and weakness.   Psychiatric/Behavioral: The patient is not nervous/anxious.          PHYSICAL EXAM:   Vitals:    02/10/22 0835   BP: 139/76   Pulse: (!) 59   Resp: 16   Temp: 98.1 °F (36.7 °C)   SpO2: 98%   Weight: 103.9 kg (229 lb 2.7 oz)   Height: 5' 7" (1.702 m)   PainSc: 0-No pain     Physical Exam  Constitutional:       Appearance: He is well-developed.   HENT:      Head: Normocephalic and atraumatic.      Mouth/Throat:      Mouth: Mucous membranes are moist. No oral lesions.      Pharynx: Oropharynx is clear.   Eyes:      Conjunctiva/sclera: Conjunctivae normal.   Cardiovascular:      Rate and Rhythm: Normal rate and regular rhythm.      Heart sounds: Normal heart sounds.   Pulmonary:      Effort: No respiratory distress.      Breath sounds: Normal breath sounds.   Abdominal:      General: Bowel sounds are normal.      Palpations: Abdomen is soft.   Musculoskeletal:         General: Normal range of motion.      Cervical back: Normal range of motion.   Skin:     General: Skin is warm and dry.      Comments: RCW line removed, site " healed   Neurological:      Mental Status: He is alert and oriented to person, place, and time.   Psychiatric:         Behavior: Behavior normal.         Thought Content: Thought content normal.         Judgment: Judgment normal.       ECOG Performance Status: (foot note - ECOG PS provided by Eastern Cooperative Oncology Group) 1 - Symptomatic but completely ambulatory    Karnofsky Performance Score:  90%- Able to Carry on Normal Activity: Minor Symptoms of Disease  DATA:   See results review tab  Complete restaging results and pre transplant evaluation testing reviewed by     1. Multiple myeloma in remission     2. 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 Immunodeficiency secondary to neoplasm     4. Immunodeficiency secondary to chemotherapy     5. Essential hypertension       History of auto stem cell transplant  Today is Day + 269  He received 3 bags with a CD34 dose of 3.35 x 10^6 on 5/17/21  Engrafted Day +13 with and [place] 57202       Multiple myeloma   A. 9/14 - 9/17/2020 : Admitted to [facility] for evaluation of lytic lesions. Large soft tissue mass encompassing L4 - S1 vertebral bodies seen. FLC ratio 171, involved light chains 104. SPEP and SIFE found 2.86g/dL, IgG kappa para protein band. Underwent bone marrow biopsy and discharged with dexamethasone 40mg x 4 day burst.     B. 9/23/20 : Underwent debulking of spinal mass.  C. 10/1/20 - 4/14/21 : C1 - C8D1 VRd. Revlimid delivered in third week of cycle.  D. 2/25/21 : Presented for consent signing for autoSCT but his insurance had lapsed. Plan for transplant pushed back 1-2 months while he applys for medicaid.   E. 4/22/21 : C8D8 VRd planned (last treatment before mobilization).   Received Alea ASCT on 05/17/21, see above    Day +100 restaging marrow indicated that he is in [place].  He reports feeling well. He has been taking Revlimid 10mg daily along with ASA 81mg and Acyclovir.   Today he will receive Zometa q1m.  Labs that have resulted so far  are normal.     Follow up : Schedule monthly CBC, CMP, serum protein electrophoresis, serum protein immunofixation, free light chains for March, April and May.  Schedule monthly Zometa center for 3months, next will be in one month.  Schedule labs and clinic appt with me in 3 months followed by zometa infusion.      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     Vitamin D deficiency  Continue ergocalciferol      Essential hypertension  Patient is taking losartan, coreg, and nifedipine  Reports BP is well controlled at home.     The patient was seen and examined with the attending physician Dr. Baker.    Steven Cornejo MD  Clinical Fellow  Hematology and Medical Oncology.  02/10/2022

## 2022-02-10 NOTE — PROGRESS NOTES
Patient received IPV and Dtap  vaccines in the left deltoid. Pt tolerated well. Pt asked to wait in the clinic 15 minutes after injection in the event of an allergic reaction. Pt verbalized understanding. Pt left in NAD.

## 2022-02-14 ENCOUNTER — TELEPHONE (OUTPATIENT)
Dept: HEMATOLOGY/ONCOLOGY | Facility: CLINIC | Age: 62
End: 2022-02-14
Payer: MEDICAID

## 2022-02-17 ENCOUNTER — CLINICAL SUPPORT (OUTPATIENT)
Dept: INFECTIOUS DISEASES | Facility: CLINIC | Age: 62
End: 2022-02-17
Payer: MEDICAID

## 2022-02-17 DIAGNOSIS — Z71.85 VACCINE COUNSELING: ICD-10-CM

## 2022-02-17 PROCEDURE — 90471 IMMUNIZATION ADMIN: CPT | Mod: PBBFAC

## 2022-02-17 PROCEDURE — 99999 PR PBB SHADOW E&M-EST. PATIENT-LVL II: CPT | Mod: PBBFAC,,,

## 2022-02-17 PROCEDURE — 90734 MENACWYD/MENACWYCRM VACC IM: CPT | Mod: PBBFAC

## 2022-02-17 PROCEDURE — 99999 PR PBB SHADOW E&M-EST. PATIENT-LVL II: ICD-10-PCS | Mod: PBBFAC,,,

## 2022-02-17 PROCEDURE — 99212 OFFICE O/P EST SF 10 MIN: CPT | Mod: PBBFAC

## 2022-02-24 DIAGNOSIS — C90.01 MULTIPLE MYELOMA IN REMISSION: ICD-10-CM

## 2022-02-24 RX ORDER — POTASSIUM CHLORIDE 20 MEQ/1
20 TABLET, EXTENDED RELEASE ORAL DAILY
Qty: 30 TABLET | Refills: 0 | Status: SHIPPED | OUTPATIENT
Start: 2022-02-24 | End: 2022-04-05 | Stop reason: SDUPTHER

## 2022-02-24 NOTE — TELEPHONE ENCOUNTER
Reached out to pt and informed him that potassium prescription has been sent in to pharmacy. Pt was appreciative of call.

## 2022-02-24 NOTE — TELEPHONE ENCOUNTER
"----- Message from Ezio Gayle sent at 2/24/2022  8:34 AM CST -----  RX Name and Strength:  potassium chloride SA (K-DUR,KLOR-CON) 20 MEQ tablet        How is the patient currently taking it?  Take 1 tablet (20 mEq total) by mouth once daily. - Oral        Is this a 30 day or 90 day Rx?  30 tablet        Preferred Pharmacy with phone number:  [id number] - [place], [place] - [address] [facility] AT [facility] [address]  [address] [facility] [address]  Phone: [phone] Fax: [phone]      Local or Mail Order: Local        Ordering Provider: Clemente        Contact Preference: [phone]            Additional Information:  "Thank you for all that you do for our patients"       "

## 2022-02-28 ENCOUNTER — TELEPHONE (OUTPATIENT)
Dept: HEMATOLOGY/ONCOLOGY | Facility: CLINIC | Age: 62
End: 2022-02-28
Payer: MEDICAID

## 2022-02-28 DIAGNOSIS — C90.01 MULTIPLE MYELOMA IN REMISSION: ICD-10-CM

## 2022-02-28 RX ORDER — LENALIDOMIDE 10 MG/1
CAPSULE ORAL
Qty: 28 EACH | Refills: 0 | Status: SHIPPED | OUTPATIENT
Start: 2022-02-28 | End: 2022-03-18 | Stop reason: SDUPTHER

## 2022-02-28 NOTE — TELEPHONE ENCOUNTER
"----- Message from Alicia Vegas sent at 2/28/2022  8:42 AM CST -----  RX Name and Strength: lenalidomide (REVLIMID) 10 mg Cap     How is the patient currently taking it? TAKE 1 CAPSULE BY MOUTH  DAILY FOR 28 DAYS Auth #[id number] 1/24/2022    Is this a 30 day or 90 day Rx? 28 tablets         Preferred Pharmacy with phone number:   [facility] - [address] [place] [address]  Phone: [phone] Fax: [phone]     Local or Mail Order: mail     Ordering Provider: Dr Baker         Contact Preference: [phone]                   Additional Information:  "Thank you for all that you do for our patients"     "

## 2022-02-28 NOTE — TELEPHONE ENCOUNTER
"----- Message from German Lee sent at 2/28/2022  8:34 AM CST -----  Regarding: lenalidomide (REVLIMID) 10 mg Cap  Contact: Jaspreet  RX Name and Strength:lenalidomide (REVLIMID) 10 mg Cap                   How is the patient currently taking it? Sig: TAKE 1 CAPSULE BY MOUTH  DAILY FOR 28 DAYS Auth #[id number] 1/24/2022.         Is this a 30 day or 90 day Rx? 28 days         Preferred Pharmacy with phone number:     [facility] - [address] [address]  Phone: [phone] Fax: [phone]             Local or Mail Order: Mail         Ordering Provider: Olivia         Contact Preference: [phone]                   Additional Information:  "Thank you for all that you do for our patients"     "

## 2022-03-08 DIAGNOSIS — G62.9 NEUROPATHY: ICD-10-CM

## 2022-03-08 RX ORDER — GABAPENTIN 300 MG/1
300 CAPSULE ORAL 2 TIMES DAILY
Qty: 60 CAPSULE | Refills: 3 | Status: SHIPPED | OUTPATIENT
Start: 2022-03-08 | End: 2022-04-05 | Stop reason: SDUPTHER

## 2022-03-08 NOTE — TELEPHONE ENCOUNTER
----- Message from Neeta Munoz sent at 3/8/2022  9:10 AM CST -----  Pt calling in regards to medication    Needs refill: gabapentin (NEURONTIN) 300 MG capsule  Pharmacy: [facility] #[id number] - [facility] [address] [place] AT [facility] & [facility]

## 2022-03-10 ENCOUNTER — INFUSION (OUTPATIENT)
Dept: INFUSION THERAPY | Facility: HOSPITAL | Age: 62
End: 2022-03-10
Payer: MEDICAID

## 2022-03-10 ENCOUNTER — CLINICAL SUPPORT (OUTPATIENT)
Dept: INFECTIOUS DISEASES | Facility: CLINIC | Age: 62
End: 2022-03-10
Payer: MEDICAID

## 2022-03-10 VITALS
TEMPERATURE: 98 F | HEART RATE: 53 BPM | SYSTOLIC BLOOD PRESSURE: 149 MMHG | DIASTOLIC BLOOD PRESSURE: 79 MMHG | RESPIRATION RATE: 17 BRPM

## 2022-03-10 DIAGNOSIS — C90.01 MULTIPLE MYELOMA IN REMISSION: ICD-10-CM

## 2022-03-10 DIAGNOSIS — Z94.84 HISTORY OF AUTO STEM CELL TRANSPLANT: Primary | ICD-10-CM

## 2022-03-10 PROCEDURE — 96374 THER/PROPH/DIAG INJ IV PUSH: CPT

## 2022-03-10 PROCEDURE — 63600175 PHARM REV CODE 636 W HCPCS: Performed by: INTERNAL MEDICINE

## 2022-03-10 PROCEDURE — 90750 HZV VACC RECOMBINANT IM: CPT | Mod: PBBFAC

## 2022-03-10 PROCEDURE — 25000003 PHARM REV CODE 250: Performed by: INTERNAL MEDICINE

## 2022-03-10 RX ORDER — HEPARIN 100 UNIT/ML
500 SYRINGE INTRAVENOUS
Status: DISCONTINUED | OUTPATIENT
Start: 2022-03-10 | End: 2022-03-10 | Stop reason: HOSPADM

## 2022-03-10 RX ORDER — ZOLEDRONIC ACID 0.04 MG/ML
4 INJECTION, SOLUTION INTRAVENOUS
Status: COMPLETED | OUTPATIENT
Start: 2022-03-10 | End: 2022-03-10

## 2022-03-10 RX ORDER — SODIUM CHLORIDE 0.9 % (FLUSH) 0.9 %
10 SYRINGE (ML) INJECTION
Status: DISCONTINUED | OUTPATIENT
Start: 2022-03-10 | End: 2022-03-10 | Stop reason: HOSPADM

## 2022-03-10 RX ORDER — HEPARIN 100 UNIT/ML
500 SYRINGE INTRAVENOUS
Status: CANCELLED | OUTPATIENT
Start: 2022-03-10

## 2022-03-10 RX ORDER — ZOLEDRONIC ACID 0.04 MG/ML
4 INJECTION, SOLUTION INTRAVENOUS
Status: CANCELLED | OUTPATIENT
Start: 2022-03-10

## 2022-03-10 RX ORDER — SODIUM CHLORIDE 0.9 % (FLUSH) 0.9 %
10 SYRINGE (ML) INJECTION
Status: CANCELLED | OUTPATIENT
Start: 2022-03-10

## 2022-03-10 RX ADMIN — SODIUM CHLORIDE: 0.9 INJECTION, SOLUTION INTRAVENOUS at 09:03

## 2022-03-10 RX ADMIN — ZOLEDRONIC ACID 4 MG: 0.04 INJECTION, SOLUTION INTRAVENOUS at 10:03

## 2022-03-10 NOTE — PROGRESS NOTES
Patient received zoster #1vaccine in the left deltoid. Pt tolerated well. Pt asked to wait in the clinic 15 minutes after injection in the event of an allergic reaction. Pt verbalized understanding. Pt left in NAD.

## 2022-03-18 DIAGNOSIS — E55.9 VITAMIN D DEFICIENCY: ICD-10-CM

## 2022-03-18 DIAGNOSIS — C90.01 MULTIPLE MYELOMA IN REMISSION: ICD-10-CM

## 2022-03-18 RX ORDER — ERGOCALCIFEROL 1.25 MG/1
50000 CAPSULE ORAL
Qty: 4 CAPSULE | Refills: 1 | Status: SHIPPED | OUTPATIENT
Start: 2022-03-18 | End: 2022-03-18

## 2022-03-18 RX ORDER — ERGOCALCIFEROL 1.25 MG/1
50000 CAPSULE ORAL
Qty: 4 CAPSULE | Refills: 1 | Status: ON HOLD | OUTPATIENT
Start: 2022-03-18 | End: 2023-10-18 | Stop reason: HOSPADM

## 2022-03-18 RX ORDER — LENALIDOMIDE 10 MG/1
CAPSULE ORAL
Qty: 28 EACH | Refills: 0 | Status: SHIPPED | OUTPATIENT
Start: 2022-03-18 | End: 2022-04-19 | Stop reason: SDUPTHER

## 2022-03-23 ENCOUNTER — TELEPHONE (OUTPATIENT)
Dept: HEMATOLOGY/ONCOLOGY | Facility: CLINIC | Age: 62
End: 2022-03-23
Payer: MEDICAID

## 2022-03-23 NOTE — TELEPHONE ENCOUNTER
----- Message from Marciano Pearce sent at 3/23/2022  9:20 AM CDT -----  Regarding: Les with [facility]    The caller states that the Rx-lenalidomide (REVLIMID) 10 mg Cap - that this was sent over to them with the authorization that was used for last month and will need to have the new one sent over for this month.    This request was faxed to you on 3/18/2022    Caller can take the verbal at Ph # [phone]

## 2022-04-05 DIAGNOSIS — Z94.84 HISTORY OF AUTO STEM CELL TRANSPLANT: ICD-10-CM

## 2022-04-05 DIAGNOSIS — G62.9 NEUROPATHY: ICD-10-CM

## 2022-04-05 DIAGNOSIS — C90.01 MULTIPLE MYELOMA IN REMISSION: ICD-10-CM

## 2022-04-05 RX ORDER — ACYCLOVIR 800 MG/1
800 TABLET ORAL 2 TIMES DAILY
Qty: 60 TABLET | Refills: 11
Start: 2022-04-05 | End: 2022-04-08 | Stop reason: SDUPTHER

## 2022-04-05 RX ORDER — POTASSIUM CHLORIDE 20 MEQ/1
20 TABLET, EXTENDED RELEASE ORAL DAILY
Qty: 30 TABLET | Refills: 0 | Status: SHIPPED | OUTPATIENT
Start: 2022-04-05 | End: 2022-05-23 | Stop reason: SDUPTHER

## 2022-04-05 RX ORDER — GABAPENTIN 300 MG/1
300 CAPSULE ORAL 2 TIMES DAILY
Qty: 60 CAPSULE | Refills: 3 | Status: SHIPPED | OUTPATIENT
Start: 2022-04-05 | End: 2022-07-07 | Stop reason: SDUPTHER

## 2022-04-05 NOTE — TELEPHONE ENCOUNTER
"----- Message from Alicia Vegas sent at 4/5/2022  1:57 PM CDT -----  Regarding: Refill  RX Name and Strength: acyclovir (ZOVIRAX) 800 MG Tab    How is the patient currently taking it? Take 1 tablet (800 mg total) by mouth 2 (two) times daily. - Oral     Is this a 30 day or 90 day Rx? 60 tablets        RX Name and Strength: gabapentin (NEURONTIN) 300 MG capsule    How is the patient currently taking it? Take 1 capsule (300 mg total) by mouth 2 (two) times daily. - Oral    Is this a 30 day or 90 day Rx? 60 tablets        RX Name and Strength: potassium chloride SA (K-DUR,KLOR-CON) 20 MEQ tablet    How is the patient currently taking it?  Take 1 tablet (20 mEq total) by mouth once daily. - Oral     Is this a 30 day or 90 day Rx? 30 tablets           Preferred Pharmacy with phone number:   [facility] #[id number] - [facility] [address] [facility] AT [facility] [address]  [address] [facility] [address]  Phone: [phone] Fax: [phone]    Local or Mail Order: local         Ordering Provider: Dr Cornejo         Contact Preference: [phone]                   Additional Information:  "Thank you for all that you do for our patients"     "

## 2022-04-07 ENCOUNTER — INFUSION (OUTPATIENT)
Dept: INFUSION THERAPY | Facility: HOSPITAL | Age: 62
End: 2022-04-07
Payer: MEDICAID

## 2022-04-07 ENCOUNTER — CLINICAL SUPPORT (OUTPATIENT)
Dept: INFECTIOUS DISEASES | Facility: CLINIC | Age: 62
End: 2022-04-07
Payer: MEDICAID

## 2022-04-07 ENCOUNTER — LAB VISIT (OUTPATIENT)
Dept: LAB | Facility: HOSPITAL | Age: 62
End: 2022-04-07
Payer: MEDICAID

## 2022-04-07 VITALS
TEMPERATURE: 98 F | RESPIRATION RATE: 17 BRPM | HEART RATE: 50 BPM | DIASTOLIC BLOOD PRESSURE: 88 MMHG | SYSTOLIC BLOOD PRESSURE: 160 MMHG

## 2022-04-07 DIAGNOSIS — C90.00 MULTIPLE MYELOMA NOT HAVING ACHIEVED REMISSION: ICD-10-CM

## 2022-04-07 DIAGNOSIS — C90.00 MULTIPLE MYELOMA, REMISSION STATUS UNSPECIFIED: ICD-10-CM

## 2022-04-07 DIAGNOSIS — C90.01 MULTIPLE MYELOMA IN REMISSION: ICD-10-CM

## 2022-04-07 DIAGNOSIS — Z94.84 HISTORY OF AUTO STEM CELL TRANSPLANT: Primary | ICD-10-CM

## 2022-04-07 DIAGNOSIS — Z76.82 STEM CELL TRANSPLANT CANDIDATE: ICD-10-CM

## 2022-04-07 DIAGNOSIS — C79.49 METASTASIS OF NEOPLASM TO SPINAL CANAL: ICD-10-CM

## 2022-04-07 DIAGNOSIS — Z94.84 HISTORY OF AUTO STEM CELL TRANSPLANT: ICD-10-CM

## 2022-04-07 LAB
ABO + RH BLD: NORMAL
ABO + RH BLD: NORMAL
ALBUMIN SERPL BCP-MCNC: 3.7 G/DL (ref 3.5–5.2)
ALP SERPL-CCNC: 59 U/L (ref 55–135)
ALT SERPL W/O P-5'-P-CCNC: 11 U/L (ref 10–44)
ANION GAP SERPL CALC-SCNC: 7 MMOL/L (ref 8–16)
AST SERPL-CCNC: 15 U/L (ref 10–40)
BASOPHILS # BLD AUTO: 0.08 K/UL (ref 0–0.2)
BASOPHILS NFR BLD: 2.9 % (ref 0–1.9)
BILIRUB SERPL-MCNC: 0.9 MG/DL (ref 0.1–1)
BLD GP AB SCN CELLS X3 SERPL QL: NORMAL
BLD GP AB SCN CELLS X3 SERPL QL: NORMAL
BUN SERPL-MCNC: 9 MG/DL (ref 8–23)
CALCIUM SERPL-MCNC: 9.1 MG/DL (ref 8.7–10.5)
CHLORIDE SERPL-SCNC: 107 MMOL/L (ref 95–110)
CO2 SERPL-SCNC: 25 MMOL/L (ref 23–29)
CREAT SERPL-MCNC: 0.9 MG/DL (ref 0.5–1.4)
DIFFERENTIAL METHOD: ABNORMAL
EOSINOPHIL # BLD AUTO: 0.2 K/UL (ref 0–0.5)
EOSINOPHIL NFR BLD: 7.9 % (ref 0–8)
ERYTHROCYTE [DISTWIDTH] IN BLOOD BY AUTOMATED COUNT: 15.5 % (ref 11.5–14.5)
EST. GFR  (AFRICAN AMERICAN): >60 ML/MIN/1.73 M^2
EST. GFR  (NON AFRICAN AMERICAN): >60 ML/MIN/1.73 M^2
GLUCOSE SERPL-MCNC: 119 MG/DL (ref 70–110)
HCT VFR BLD AUTO: 41.1 % (ref 40–54)
HGB BLD-MCNC: 13.7 G/DL (ref 14–18)
IGA SERPL-MCNC: 116 MG/DL (ref 40–350)
IGG SERPL-MCNC: 1387 MG/DL (ref 650–1600)
IGM SERPL-MCNC: 45 MG/DL (ref 50–300)
IMM GRANULOCYTES # BLD AUTO: 0.01 K/UL (ref 0–0.04)
IMM GRANULOCYTES NFR BLD AUTO: 0.4 % (ref 0–0.5)
INR PPP: 1.1 (ref 0.8–1.2)
INR PPP: 1.1 (ref 0.8–1.2)
LYMPHOCYTES # BLD AUTO: 1 K/UL (ref 1–4.8)
LYMPHOCYTES NFR BLD: 36.5 % (ref 18–48)
MAGNESIUM SERPL-MCNC: 1.8 MG/DL (ref 1.6–2.6)
MCH RBC QN AUTO: 30 PG (ref 27–31)
MCHC RBC AUTO-ENTMCNC: 33.3 G/DL (ref 32–36)
MCV RBC AUTO: 90 FL (ref 82–98)
MONOCYTES # BLD AUTO: 0.4 K/UL (ref 0.3–1)
MONOCYTES NFR BLD: 14.4 % (ref 4–15)
NEUTROPHILS # BLD AUTO: 1.1 K/UL (ref 1.8–7.7)
NEUTROPHILS NFR BLD: 37.9 % (ref 38–73)
NRBC BLD-RTO: 0 /100 WBC
PHOSPHATE SERPL-MCNC: 3.4 MG/DL (ref 2.7–4.5)
PLATELET # BLD AUTO: 133 K/UL (ref 150–450)
PMV BLD AUTO: 11.1 FL (ref 9.2–12.9)
POTASSIUM SERPL-SCNC: 3.7 MMOL/L (ref 3.5–5.1)
PROT SERPL-MCNC: 6.9 G/DL (ref 6–8.4)
PROTHROMBIN TIME: 10.9 SEC (ref 9–12.5)
PROTHROMBIN TIME: 10.9 SEC (ref 9–12.5)
RBC # BLD AUTO: 4.57 M/UL (ref 4.6–6.2)
SODIUM SERPL-SCNC: 139 MMOL/L (ref 136–145)
WBC # BLD AUTO: 2.77 K/UL (ref 3.9–12.7)

## 2022-04-07 PROCEDURE — 83735 ASSAY OF MAGNESIUM: CPT | Performed by: INTERNAL MEDICINE

## 2022-04-07 PROCEDURE — 86850 RBC ANTIBODY SCREEN: CPT | Mod: 91 | Performed by: INTERNAL MEDICINE

## 2022-04-07 PROCEDURE — 86334 IMMUNOFIX E-PHORESIS SERUM: CPT | Performed by: INTERNAL MEDICINE

## 2022-04-07 PROCEDURE — 96374 THER/PROPH/DIAG INJ IV PUSH: CPT

## 2022-04-07 PROCEDURE — 90670 PCV13 VACCINE IM: CPT | Mod: PBBFAC

## 2022-04-07 PROCEDURE — 90472 IMMUNIZATION ADMIN EACH ADD: CPT | Mod: PBBFAC

## 2022-04-07 PROCEDURE — 85025 COMPLETE CBC W/AUTO DIFF WBC: CPT | Performed by: INTERNAL MEDICINE

## 2022-04-07 PROCEDURE — 82784 ASSAY IGA/IGD/IGG/IGM EACH: CPT | Mod: 59 | Performed by: INTERNAL MEDICINE

## 2022-04-07 PROCEDURE — 63600175 PHARM REV CODE 636 W HCPCS: Performed by: STUDENT IN AN ORGANIZED HEALTH CARE EDUCATION/TRAINING PROGRAM

## 2022-04-07 PROCEDURE — 84100 ASSAY OF PHOSPHORUS: CPT | Performed by: INTERNAL MEDICINE

## 2022-04-07 PROCEDURE — 83520 IMMUNOASSAY QUANT NOS NONAB: CPT | Mod: 59 | Performed by: INTERNAL MEDICINE

## 2022-04-07 PROCEDURE — 85610 PROTHROMBIN TIME: CPT | Performed by: INTERNAL MEDICINE

## 2022-04-07 PROCEDURE — 90648 HIB PRP-T VACCINE 4 DOSE IM: CPT | Mod: PBBFAC

## 2022-04-07 PROCEDURE — 86334 PATHOLOGIST INTERPRETATION IFE: ICD-10-PCS | Mod: 26,,, | Performed by: PATHOLOGY

## 2022-04-07 PROCEDURE — 86901 BLOOD TYPING SEROLOGIC RH(D): CPT | Performed by: INTERNAL MEDICINE

## 2022-04-07 PROCEDURE — 84165 PROTEIN E-PHORESIS SERUM: CPT | Performed by: INTERNAL MEDICINE

## 2022-04-07 PROCEDURE — 86334 IMMUNOFIX E-PHORESIS SERUM: CPT | Mod: 26,,, | Performed by: PATHOLOGY

## 2022-04-07 PROCEDURE — 25000003 PHARM REV CODE 250: Performed by: STUDENT IN AN ORGANIZED HEALTH CARE EDUCATION/TRAINING PROGRAM

## 2022-04-07 PROCEDURE — 80053 COMPREHEN METABOLIC PANEL: CPT | Performed by: INTERNAL MEDICINE

## 2022-04-07 PROCEDURE — 84165 PATHOLOGIST INTERPRETATION SPE: ICD-10-PCS | Mod: 26,,, | Performed by: PATHOLOGY

## 2022-04-07 PROCEDURE — 84165 PROTEIN E-PHORESIS SERUM: CPT | Mod: 26,,, | Performed by: PATHOLOGY

## 2022-04-07 RX ORDER — ZOLEDRONIC ACID 0.04 MG/ML
4 INJECTION, SOLUTION INTRAVENOUS
Status: COMPLETED | OUTPATIENT
Start: 2022-04-07 | End: 2022-04-07

## 2022-04-07 RX ORDER — HEPARIN 100 UNIT/ML
500 SYRINGE INTRAVENOUS
Status: CANCELLED | OUTPATIENT
Start: 2022-04-07

## 2022-04-07 RX ORDER — HEPARIN 100 UNIT/ML
500 SYRINGE INTRAVENOUS
Status: DISCONTINUED | OUTPATIENT
Start: 2022-04-07 | End: 2022-04-07 | Stop reason: HOSPADM

## 2022-04-07 RX ORDER — SODIUM CHLORIDE 0.9 % (FLUSH) 0.9 %
10 SYRINGE (ML) INJECTION
Status: DISCONTINUED | OUTPATIENT
Start: 2022-04-07 | End: 2022-04-07 | Stop reason: HOSPADM

## 2022-04-07 RX ORDER — ZOLEDRONIC ACID 0.04 MG/ML
4 INJECTION, SOLUTION INTRAVENOUS
Status: CANCELLED | OUTPATIENT
Start: 2022-04-07

## 2022-04-07 RX ORDER — SODIUM CHLORIDE 0.9 % (FLUSH) 0.9 %
10 SYRINGE (ML) INJECTION
Status: CANCELLED | OUTPATIENT
Start: 2022-04-07

## 2022-04-07 RX ADMIN — SODIUM CHLORIDE: 9 INJECTION, SOLUTION INTRAVENOUS at 10:04

## 2022-04-07 RX ADMIN — ZOLEDRONIC ACID 4 MG: 0.04 INJECTION, SOLUTION INTRAVENOUS at 10:04

## 2022-04-07 NOTE — PROGRESS NOTES
Patient received HIB and Prevnar 13 vaccines in the left deltoid. Pt tolerated well. Pt asked to wait in the clinic 15 minutes after injection in the event of an allergic reaction. Pt verbalized understanding. Pt left in NAD.

## 2022-04-07 NOTE — PLAN OF CARE
1010 pt here for zometa infusion, labs, hx, meds, allergies reviewed, wk=415/98 manual 160/88 ok per dr barone to give zometa, instructed pt to monitor BP at home which he does and notify PCP if continues to run high >160/90, pt verbalizes understanding, pt recline din chair, continue to monitor

## 2022-04-07 NOTE — PLAN OF CARE
1130 pt tolerated zometa infusion without issue, pt to rtc 5/5/22, no distress noted upon d/c to home

## 2022-04-08 DIAGNOSIS — Z94.84 HISTORY OF AUTO STEM CELL TRANSPLANT: ICD-10-CM

## 2022-04-08 DIAGNOSIS — E88.09 PLASMA CELL DYSCRASIA: ICD-10-CM

## 2022-04-08 LAB
ALBUMIN SERPL ELPH-MCNC: 3.74 G/DL (ref 3.35–5.55)
ALPHA1 GLOB SERPL ELPH-MCNC: 0.31 G/DL (ref 0.17–0.41)
ALPHA2 GLOB SERPL ELPH-MCNC: 0.54 G/DL (ref 0.43–0.99)
B-GLOBULIN SERPL ELPH-MCNC: 0.66 G/DL (ref 0.5–1.1)
GAMMA GLOB SERPL ELPH-MCNC: 1.25 G/DL (ref 0.67–1.58)
INTERPRETATION SERPL IFE-IMP: NORMAL
KAPPA LC SER QL IA: 2.76 MG/DL (ref 0.33–1.94)
KAPPA LC/LAMBDA SER IA: 1.53 (ref 0.26–1.65)
LAMBDA LC SER QL IA: 1.8 MG/DL (ref 0.57–2.63)
PATHOLOGIST INTERPRETATION IFE: NORMAL
PATHOLOGIST INTERPRETATION IFE: NORMAL
PATHOLOGIST INTERPRETATION SPE: NORMAL
PATHOLOGIST INTERPRETATION SPE: NORMAL
PROT SERPL-MCNC: 6.5 G/DL (ref 6–8.4)

## 2022-04-08 RX ORDER — ACYCLOVIR 800 MG/1
800 TABLET ORAL 2 TIMES DAILY
Qty: 60 TABLET | Refills: 11
Start: 2022-04-08 | End: 2022-07-07 | Stop reason: SDUPTHER

## 2022-04-08 NOTE — TELEPHONE ENCOUNTER
----- Message from Neeta Munoz sent at 4/8/2022  3:54 PM CDT -----  Pt calling in regards to medication    Needs refill: NIFEdipine (ADALAT CC) 30 MG TbSR  Pharmacy: [id number], [place] - [address] [facility]

## 2022-04-08 NOTE — TELEPHONE ENCOUNTER
"----- Message from Alicia Vegas sent at 4/8/2022  9:13 AM CDT -----  Regarding: Refill  RX Name and Strength: acyclovir (ZOVIRAX) 800 MG Tab    How is the patient currently taking it? Take 1 tablet (800 mg total) by mouth 2 (two) times daily. - Oral     Is this a 30 day or 90 day Rx? 60 tablets         Preferred Pharmacy with phone number:   [facility] #[id number] - [facility] [address] [facility] AT [facility] [address]  [address] [facility] [address]  Phone: [phone] Fax: [phone]       Local or Mail Order: local         Ordering Provider: Dr Cornejo         Contact Preference: [phone]                   Additional Information:  "Thank you for all that you do for our patients"     "

## 2022-04-11 RX ORDER — NIFEDIPINE 30 MG/1
30 TABLET, FILM COATED, EXTENDED RELEASE ORAL DAILY
Qty: 60 TABLET | Refills: 3 | Status: SHIPPED | OUTPATIENT
Start: 2022-04-11 | End: 2023-04-06

## 2022-04-14 ENCOUNTER — CLINICAL SUPPORT (OUTPATIENT)
Dept: INFECTIOUS DISEASES | Facility: CLINIC | Age: 62
End: 2022-04-14
Payer: MEDICAID

## 2022-04-14 PROCEDURE — 90714 TD VACC NO PRESV 7 YRS+ IM: CPT | Mod: PBBFAC

## 2022-04-14 PROCEDURE — 90713 POLIOVIRUS IPV SC/IM: CPT | Mod: PBBFAC

## 2022-04-14 NOTE — PROGRESS NOTES
Patient received 2 vaccines IM to the right deltoid, Polio anterior, and Dtap posterior.  Tolerated well and left in NAD

## 2022-04-19 DIAGNOSIS — C90.01 MULTIPLE MYELOMA IN REMISSION: ICD-10-CM

## 2022-04-20 RX ORDER — LENALIDOMIDE 10 MG/1
CAPSULE ORAL
Qty: 28 EACH | Refills: 0 | Status: SHIPPED | OUTPATIENT
Start: 2022-04-20 | End: 2022-05-17 | Stop reason: SDUPTHER

## 2022-05-02 ENCOUNTER — HOSPITAL ENCOUNTER (EMERGENCY)
Facility: HOSPITAL | Age: 62
Discharge: HOME | End: 2022-05-02
Attending: FAMILY MEDICINE
Payer: MEDICAID

## 2022-05-02 VITALS
WEIGHT: 230 LBS | TEMPERATURE: 98 F | DIASTOLIC BLOOD PRESSURE: 86 MMHG | BODY MASS INDEX: 36.1 KG/M2 | OXYGEN SATURATION: 99 % | SYSTOLIC BLOOD PRESSURE: 180 MMHG | RESPIRATION RATE: 17 BRPM | HEIGHT: 67 IN | HEART RATE: 67 BPM

## 2022-05-02 DIAGNOSIS — I10 HTN (HYPERTENSION): ICD-10-CM

## 2022-05-02 LAB
ALBUMIN SERPL BCP-MCNC: 3.8 G/DL (ref 3.5–5.2)
ALP SERPL-CCNC: 53 U/L (ref 38–126)
ALT SERPL W/O P-5'-P-CCNC: 15 U/L (ref 10–44)
ANION GAP SERPL CALC-SCNC: 9 MMOL/L (ref 8–16)
AST SERPL-CCNC: 22 U/L (ref 15–46)
BASOPHILS # BLD AUTO: 0.07 K/UL (ref 0–0.2)
BASOPHILS NFR BLD: 2.1 % (ref 0–1.9)
BILIRUB SERPL-MCNC: 1.1 MG/DL (ref 0.1–1)
CALCIUM SERPL-MCNC: 8 MG/DL (ref 8.7–10.5)
CHLORIDE SERPL-SCNC: 108 MMOL/L (ref 95–110)
CO2 SERPL-SCNC: 25 MMOL/L (ref 23–29)
CREAT SERPL-MCNC: 0.8 MG/DL (ref 0.5–1.4)
DIFFERENTIAL METHOD: ABNORMAL
EOSINOPHIL # BLD AUTO: 0.3 K/UL (ref 0–0.5)
EOSINOPHIL NFR BLD: 7.6 % (ref 0–8)
ERYTHROCYTE [DISTWIDTH] IN BLOOD BY AUTOMATED COUNT: 15 % (ref 11.5–14.5)
EST. GFR  (AFRICAN AMERICAN): >60 ML/MIN/1.73 M^2
EST. GFR  (NON AFRICAN AMERICAN): >60 ML/MIN/1.73 M^2
GLUCOSE SERPL-MCNC: 96 MG/DL (ref 70–110)
HCT VFR BLD AUTO: 40.5 % (ref 40–54)
HGB BLD-MCNC: 13.6 G/DL (ref 14–18)
IMM GRANULOCYTES # BLD AUTO: 0 K/UL (ref 0–0.04)
IMM GRANULOCYTES NFR BLD AUTO: 0 % (ref 0–0.5)
LYMPHOCYTES # BLD AUTO: 1.1 K/UL (ref 1–4.8)
LYMPHOCYTES NFR BLD: 33.9 % (ref 18–48)
MCH RBC QN AUTO: 30.7 PG (ref 27–31)
MCHC RBC AUTO-ENTMCNC: 33.6 G/DL (ref 32–36)
MCV RBC AUTO: 91 FL (ref 82–98)
MONOCYTES # BLD AUTO: 0.5 K/UL (ref 0.3–1)
MONOCYTES NFR BLD: 15.8 % (ref 4–15)
NEUTROPHILS # BLD AUTO: 1.3 K/UL (ref 1.8–7.7)
NEUTROPHILS NFR BLD: 40.6 % (ref 38–73)
NRBC BLD-RTO: 0 /100 WBC
NT-PROBNP SERPL-MCNC: 501 PG/ML (ref 5–900)
PLATELET # BLD AUTO: 150 K/UL (ref 150–450)
PMV BLD AUTO: 11.3 FL (ref 9.2–12.9)
POTASSIUM SERPL-SCNC: 3.7 MMOL/L (ref 3.5–5.1)
PROT SERPL-MCNC: 6.9 G/DL (ref 6–8.4)
RBC # BLD AUTO: 4.43 M/UL (ref 4.6–6.2)
SODIUM SERPL-SCNC: 142 MMOL/L (ref 136–145)
TROPONIN I SERPL-MCNC: <0.012 NG/ML (ref 0.01–0.03)
UUN UR-MCNC: 10 MG/DL (ref 2–20)
WBC # BLD AUTO: 3.3 K/UL (ref 3.9–12.7)

## 2022-05-02 PROCEDURE — 93005 ELECTROCARDIOGRAM TRACING: CPT | Mod: ER

## 2022-05-02 PROCEDURE — 63600175 PHARM REV CODE 636 W HCPCS: Mod: ER | Performed by: PHYSICIAN ASSISTANT

## 2022-05-02 PROCEDURE — 85025 COMPLETE CBC W/AUTO DIFF WBC: CPT | Mod: ER | Performed by: FAMILY MEDICINE

## 2022-05-02 PROCEDURE — 83880 ASSAY OF NATRIURETIC PEPTIDE: CPT | Mod: ER | Performed by: FAMILY MEDICINE

## 2022-05-02 PROCEDURE — 80053 COMPREHEN METABOLIC PANEL: CPT | Mod: ER | Performed by: FAMILY MEDICINE

## 2022-05-02 PROCEDURE — 99284 EMERGENCY DEPT VISIT MOD MDM: CPT | Mod: 25,ER

## 2022-05-02 PROCEDURE — 96374 THER/PROPH/DIAG INJ IV PUSH: CPT | Mod: ER

## 2022-05-02 PROCEDURE — 25000242 PHARM REV CODE 250 ALT 637 W/ HCPCS: Mod: ER | Performed by: PHYSICIAN ASSISTANT

## 2022-05-02 PROCEDURE — 84484 ASSAY OF TROPONIN QUANT: CPT | Mod: ER | Performed by: FAMILY MEDICINE

## 2022-05-02 PROCEDURE — 93010 ELECTROCARDIOGRAM REPORT: CPT | Mod: ,,, | Performed by: INTERNAL MEDICINE

## 2022-05-02 PROCEDURE — 25000003 PHARM REV CODE 250: Mod: ER | Performed by: PHYSICIAN ASSISTANT

## 2022-05-02 PROCEDURE — 93010 EKG 12-LEAD: ICD-10-PCS | Mod: ,,, | Performed by: INTERNAL MEDICINE

## 2022-05-02 RX ORDER — HYDRALAZINE HYDROCHLORIDE 20 MG/ML
10 INJECTION INTRAMUSCULAR; INTRAVENOUS
Status: COMPLETED | OUTPATIENT
Start: 2022-05-02 | End: 2022-05-02

## 2022-05-02 RX ORDER — NITROGLYCERIN 0.4 MG/1
0.4 TABLET SUBLINGUAL
Status: COMPLETED | OUTPATIENT
Start: 2022-05-02 | End: 2022-05-02

## 2022-05-02 RX ORDER — ACETAMINOPHEN 325 MG/1
650 TABLET ORAL
Status: COMPLETED | OUTPATIENT
Start: 2022-05-02 | End: 2022-05-02

## 2022-05-02 RX ADMIN — HYDRALAZINE HYDROCHLORIDE 10 MG: 20 INJECTION, SOLUTION INTRAMUSCULAR; INTRAVENOUS at 02:05

## 2022-05-02 RX ADMIN — ACETAMINOPHEN 650 MG: 325 TABLET ORAL at 03:05

## 2022-05-02 RX ADMIN — NITROGLYCERIN 0.4 MG: 0.4 TABLET, ORALLY DISINTEGRATING SUBLINGUAL at 03:05

## 2022-05-02 RX ADMIN — NITROGLYCERIN 0.4 MG: 0.4 TABLET, ORALLY DISINTEGRATING SUBLINGUAL at 05:05

## 2022-05-02 NOTE — ED PROVIDER NOTES
"Encounter Date: 5/2/2022       History     Chief Complaint   Patient presents with    Hypertension     Pt states "I checked my blood pressure it was like 166/102" at home so I came in" PT denies headache, dizziness, light headed or visual disturbances.      62-year-old male complains elevated blood pressure at home.  His blood pressure was 166/102.  No chest pain, shortness of breath.  No cough or congestion.  No headache or dizziness.  No change in vision.    The history is provided by the patient.     Review of patient's allergies indicates:  No Known Allergies  Past Medical History:   Diagnosis Date    Anxiety     Arthritis     Cancer     Hypertension      Past Surgical History:   Procedure Laterality Date    BACK SURGERY      BONE MARROW BIOPSY Left 10/20/2021    Procedure: Biopsy-bone marrow;  Surgeon: Summer Cartwright MD;  [facility]  Service: Oncology;  Laterality: Left;    COLONOSCOPY N/A 1/25/2021    Procedure: COLONOSCOPY;  Surgeon: Braxton Lees MD;  Location: [facility] (4TH FLR);  Service: Endoscopy;  Laterality: N/A;  1/22-covid kristopher-inst mailed-tb  1/20/21-pt to remain on schedule with Dr. Lees, and confirmed updated arrival time of 0835-BB    COLONOSCOPY N/A 2/1/2021    Procedure: COLONOSCOPY;  Surgeon: Jo Ann Robledo MD;  [facility]  Service: Endoscopy;  Laterality: N/A;  rescheduled due to poor bowel prep, to be rescheduled with first available provider-BB  covid-1/29/21-pcw-BB    CREATION OF MUSCLE ROTATIONAL FLAP N/A 9/23/2020    Procedure: CREATION, FLAP, MUSCLE ROTATION;  Surgeon: Kamlesh Bellamy MD;  [facility];  Service: Plastics;  Laterality: N/A;    KNEE SURGERY Left 06/2019    LUMBAR FUSION N/A 9/23/2020    Procedure: FUSION, SPINE, LUMBAR L2-pelvis. Depuy. Plastic surgery closure w/ Dr. Bellamy;  Surgeon: Nick Coyle MD;  [facility];  Service: Neurosurgery;  Laterality: N/A;    Metastatic " neoplasum removed from spine       No family history on file.  Social History     Tobacco Use    Smoking status: Former Smoker     Packs/day: 0.25     Years: 40.00     Pack years: 10.00     Quit date:      Years since quittin.3    Smokeless tobacco: Never Used   Substance Use Topics    Alcohol use: Not Currently    Drug use: Never     Review of Systems   Constitutional: Negative for fever.   HENT: Negative for sore throat.    Respiratory: Negative for shortness of breath.    Cardiovascular: Negative for chest pain.   Gastrointestinal: Negative for nausea.   Genitourinary: Negative for dysuria.   Musculoskeletal: Negative for back pain.   Skin: Negative for rash.   Neurological: Negative for weakness.   Hematological: Does not bruise/bleed easily.   All other systems reviewed and are negative.      Physical Exam     Initial Vitals [22 1358]   BP Pulse Resp Temp SpO2   (!) 235/107 (!) 53 20 98.4 °F (36.9 °C) 99 %      MAP       --         Physical Exam    Nursing note and vitals reviewed.  Constitutional: Vital signs are normal. He appears well-developed and well-nourished. He is active. No distress.   HENT:   Head: Normocephalic.   Nose: Nose normal.   Mouth/Throat: Oropharynx is clear and moist and mucous membranes are normal.   Eyes: Conjunctivae, EOM and lids are normal.   Neck: Neck supple.   Normal range of motion.  Cardiovascular: Normal rate, regular rhythm, S1 normal, S2 normal and normal heart sounds.   Pulmonary/Chest: Breath sounds normal. No respiratory distress. He has no wheezes. He has no rhonchi. He has no rales. He exhibits no tenderness.   Abdominal: Abdomen is soft. Bowel sounds are normal.   Musculoskeletal:         General: Normal range of motion.      Right upper arm: Normal.      Left upper arm: Normal.      Cervical back: Normal range of motion and neck supple.      Right lower leg: Normal.      Left lower leg: Normal.     Neurological: He is alert and oriented to person,  place, and time. He has normal strength and normal reflexes. GCS score is 15. GCS eye subscore is 4. GCS verbal subscore is 5. GCS motor subscore is 6.   Skin: Skin is warm. Capillary refill takes less than 2 seconds.   Psychiatric: He has a normal mood and affect. His speech is normal and behavior is normal. Thought content normal. Cognition and memory are normal.         ED Course   Procedures  Labs Reviewed   CBC W/ AUTO DIFFERENTIAL - Abnormal; Notable for the following components:       Result Value    WBC 3.30 (*)     RBC 4.43 (*)     Hemoglobin 13.6 (*)     RDW 15.0 (*)     Gran # (ANC) 1.3 (*)     Mono % 15.8 (*)     Basophil % 2.1 (*)     All other components within normal limits   COMPREHENSIVE METABOLIC PANEL - Abnormal; Notable for the following components:    Calcium 8.0 (*)     Total Bilirubin 1.1 (*)     All other components within normal limits   NT-PRO NATRIURETIC PEPTIDE   TROPONIN I        ECG Results          EKG 12-lead (Final result)  Result time 05/02/22 21:25:15    Final result by Interface, Lab In [facility] (05/02/22 21:25:15)                 Narrative:    Test Reason : I10,    Vent. Rate : 046 BPM     Atrial Rate : 046 BPM     P-R Int : 150 ms          QRS Dur : 104 ms      QT Int : 508 ms       P-R-T Axes : 025 046 011 degrees     QTc Int : 444 ms    Sinus bradycardia  Otherwise normal ECG  When compared with ECG of 28-APR-2021 15:35,  No significant change was found  Confirmed by Vimal Madrigal MD ([id number]) on 5/2/2022 9:25:03 PM    Referred By: [email or web address]   SELF           Confirmed By:Vimal Madrigal MD                            Imaging Results    None          Medications   hydrALAZINE injection 10 mg (10 mg Intravenous Given 5/2/22 9296)   nitroGLYCERIN SL tablet 0.4 mg (0.4 mg Sublingual Given 5/2/22 1543)   acetaminophen tablet 650 mg (650 mg Oral Given 5/2/22 1542)   nitroGLYCERIN SL tablet 0.4 mg (0.4 mg Sublingual Given 5/2/22 1724)     Medical Decision Making:   Clinical Tests:    Lab Tests: Ordered and Reviewed  Medical Tests: Ordered and Reviewed  ED Management:  Patient blood pressure much child in ED treated with hydralazine and nitroglycerin.  Cardiac workup is negative.  Patient seems to be noncompliant and advised to continue his medication.  Follow-up ED with any worsening symptoms.                      Clinical Impression:   Final diagnoses:  [I10] HTN (hypertension)          ED Disposition Condition    Discharge Stable        ED Prescriptions     None        Follow-up Information     Follow up With Specialties Details Why Contact Info    Con Patel Jr., MD Family Medicine   [address] [address] [phone]             El Boucher MD  05/04/22 2401

## 2022-05-02 NOTE — ED PROVIDER NOTES
"Encounter Date: 5/2/2022       History     Chief Complaint   Patient presents with    Hypertension     Pt states "I checked my blood pressure it was like 166/102" at home so I came in" PT denies headache, dizziness, light headed or visual disturbances.      62-year-old male presents to ED with concern of high blood pressure.  Patient reports significant history of hypertension and takes Coreg 25 mg twice daily and losartan 100 mg daily.  He reports he has been compliant with his medications, stating I never missed a dose.  He denies any current symptoms or complaints.  No headaches, lightheadedness or dizziness, visual changes, numbness, focal weakness, chest pain, shortness of breath.  When speaking patient's wife, she reports his pressure always increases before he has to go see his primary care doctor.  No other acute complaints at this time.    The history is provided by the patient.     Review of patient's allergies indicates:  No Known Allergies  Past Medical History:   Diagnosis Date    Anxiety     Arthritis     Cancer     Hypertension      Past Surgical History:   Procedure Laterality Date    BACK SURGERY      BONE MARROW BIOPSY Left 10/20/2021    Procedure: Biopsy-bone marrow;  Surgeon: Summer Cartwright MD;  Location: [address]);  Service: Oncology;  Laterality: Left;    COLONOSCOPY N/A 1/25/2021    Procedure: COLONOSCOPY;  Surgeon: Braxton Lees MD;  Location: [facility] ([address] [place] [address]);  Service: Endoscopy;  Laterality: N/A;  1/22-covid kristopher-inst mailed-tb  1/20/21-pt to remain on schedule with Dr. Lees, and confirmed updated arrival time of 0835-BB    COLONOSCOPY N/A 2/1/2021    Procedure: COLONOSCOPY;  Surgeon: Jo Ann Robledo MD;  Location: [address]);  Service: Endoscopy;  Laterality: N/A;  rescheduled due to poor bowel prep, to be rescheduled with first available provider-BB  covid-1/29/21-pcw-BB    CREATION OF MUSCLE ROTATIONAL FLAP N/A 9/23/2020    Procedure: CREATION, " FLAP, MUSCLE ROTATION;  Surgeon: Kamlesh Bellamy MD;  Location: [facility] OR [address] [place] [address];  Service: Plastics;  Laterality: N/A;    KNEE SURGERY Left 2019    LUMBAR FUSION N/A 2020    Procedure: FUSION, SPINE, LUMBAR L2-pelvis. Depuy. Plastic surgery closure w/ Dr. Bellamy;  Surgeon: Nick Coyle MD;  Location: [facility] OR [address] [place] [address];  Service: Neurosurgery;  Laterality: N/A;    Metastatic neoplasum removed from spine       No family history on file.  Social History     Tobacco Use    Smoking status: Former Smoker     Packs/day: 0.25     Years: 40.00     Pack years: 10.00     Quit date:      Years since quittin.3    Smokeless tobacco: Never Used   Substance Use Topics    Alcohol use: Not Currently    Drug use: Never     Review of Systems   Constitutional: Negative for appetite change, chills and fever.   Eyes: Negative for visual disturbance.   Respiratory: Negative for cough and shortness of breath.    Cardiovascular: Negative for chest pain.   Gastrointestinal: Negative for abdominal pain, nausea and vomiting.   Musculoskeletal: Negative for neck pain and neck stiffness.   Neurological: Negative for dizziness, weakness, light-headedness, numbness and headaches.       Physical Exam     Initial Vitals [22 1358]   BP Pulse Resp Temp SpO2   (!) 235/107 (!) 53 20 98.4 °F (36.9 °C) 99 %      MAP       --         Physical Exam    Nursing note and vitals reviewed.  Constitutional: He appears well-developed and well-nourished. He is cooperative. He does not have a sickly appearance. He does not appear ill. No distress.   Pleasant, resting comfortably on bed, no apparent distress   HENT:   Head: Normocephalic and atraumatic.   Eyes: Conjunctivae and EOM are normal. Pupils are equal, round, and reactive to light.   Neck: Neck supple.   Normal range of motion.  Cardiovascular: Regular rhythm and normal heart sounds.   Mildly bradycardic   Pulmonary/Chest: Effort normal and breath sounds normal.   Abdominal:  Abdomen is soft. There is no abdominal tenderness.   Musculoskeletal:      Cervical back: Normal range of motion and neck supple.     Neurological: He is alert. GCS score is 15. GCS eye subscore is 4. GCS verbal subscore is 5. GCS motor subscore is 6.   Skin: Skin is warm and dry.   Psychiatric: He has a normal mood and affect. His speech is normal and behavior is normal. Thought content normal.         ED Course   Procedures  Labs Reviewed   CBC W/ AUTO DIFFERENTIAL - Abnormal; Notable for the following components:       Result Value    WBC 3.30 (*)     RBC 4.43 (*)     Hemoglobin 13.6 (*)     RDW 15.0 (*)     Gran # (ANC) 1.3 (*)     Mono % 15.8 (*)     Basophil % 2.1 (*)     All other components within normal limits   COMPREHENSIVE METABOLIC PANEL - Abnormal; Notable for the following components:    Calcium 8.0 (*)     Total Bilirubin 1.1 (*)     All other components within normal limits   NT-PRO NATRIURETIC PEPTIDE   TROPONIN I        ECG Results          EKG 12-lead (In process)  Result time 05/02/22 16:13:31    In process by Interface, Lab In [facility] (05/02/22 16:13:31)                 Narrative:    Test Reason : I10,    Vent. Rate : 046 BPM     Atrial Rate : 046 BPM     P-R Int : 150 ms          QRS Dur : 104 ms      QT Int : 508 ms       P-R-T Axes : 025 046 011 degrees     QTc Int : 444 ms    Sinus bradycardia  Otherwise normal ECG  When compared with ECG of 28-APR-2021 15:35,  No significant change was found    Referred By: [email or web address]   SELF           Confirmed By:                             Imaging Results    None          Medications   hydrALAZINE injection 10 mg (10 mg Intravenous Given 5/2/22 1456)   nitroGLYCERIN SL tablet 0.4 mg (0.4 mg Sublingual Given 5/2/22 1543)   acetaminophen tablet 650 mg (650 mg Oral Given 5/2/22 1542)   nitroGLYCERIN SL tablet 0.4 mg (0.4 mg Sublingual Given 5/2/22 1724)     Medical Decision Making:   Initial Assessment:   Patient presents with concern of elevated blood  pressure, stating he has been compliant with his hypertension medications.  He denies any current symptoms or complaints.  Afebrile, noting BP of 222/104 on arrival.  Patient otherwise well-appearing on exam, no apparent distress  Differential Diagnosis:   Hypertension, hypertensive urgency, hypertensive emergency, electrolyte abnormality  ED Management:  CBC, CMP, troponin, BNP, EKG    EKG sinus Bladimir with no acute ST changes.  CBC and CMP grossly unremarkable near baseline.  . Troponin WNL.    Patient remaining asymptomatic while in ID.  He did receive hydralazine and nitro x2 with improvement of pressure.  Remaining asymptomatic.  Stable for discharge home this time.  Encouraged to continue monitoring symptoms closely at home, take his BP medications as instructed with very close PCP follow-up.  ED return precautions were discussed at length.  Patient states his understanding and agrees with plan.    Patient discussed with attending, Dr. Boucher, who agrees with ED course and dispo.                      Clinical Impression:   Final diagnoses:  [I10] HTN (hypertension)          ED Disposition Condition    Discharge Stable        ED Prescriptions     None        Follow-up Information     Follow up With Specialties Details Why Contact Info    Con Patel Jr., MD Family Medicine   [address] [facility] [address] [phone]             Naveed Vogt PA-C  05/02/22 2431

## 2022-05-02 NOTE — DISCHARGE INSTRUCTIONS

## 2022-05-05 ENCOUNTER — OFFICE VISIT (OUTPATIENT)
Dept: HEMATOLOGY/ONCOLOGY | Facility: CLINIC | Age: 62
End: 2022-05-05
Payer: MEDICAID

## 2022-05-05 ENCOUNTER — INFUSION (OUTPATIENT)
Dept: INFUSION THERAPY | Facility: HOSPITAL | Age: 62
End: 2022-05-05
Payer: MEDICAID

## 2022-05-05 VITALS
WEIGHT: 225.31 LBS | SYSTOLIC BLOOD PRESSURE: 150 MMHG | OXYGEN SATURATION: 100 % | BODY MASS INDEX: 35.36 KG/M2 | DIASTOLIC BLOOD PRESSURE: 100 MMHG | RESPIRATION RATE: 16 BRPM | HEART RATE: 56 BPM | HEIGHT: 67 IN

## 2022-05-05 VITALS
OXYGEN SATURATION: 99 % | DIASTOLIC BLOOD PRESSURE: 90 MMHG | HEART RATE: 47 BPM | TEMPERATURE: 98 F | SYSTOLIC BLOOD PRESSURE: 180 MMHG | RESPIRATION RATE: 17 BRPM

## 2022-05-05 DIAGNOSIS — D84.81 IMMUNODEFICIENCY SECONDARY TO NEOPLASM: ICD-10-CM

## 2022-05-05 DIAGNOSIS — Z94.84 HISTORY OF AUTO STEM CELL TRANSPLANT: Primary | ICD-10-CM

## 2022-05-05 DIAGNOSIS — D84.821 IMMUNODEFICIENCY SECONDARY TO CHEMOTHERAPY: ICD-10-CM

## 2022-05-05 DIAGNOSIS — D49.9 IMMUNODEFICIENCY SECONDARY TO NEOPLASM: ICD-10-CM

## 2022-05-05 DIAGNOSIS — C90.01 MULTIPLE MYELOMA IN REMISSION: Primary | ICD-10-CM

## 2022-05-05 DIAGNOSIS — C90.01 MULTIPLE MYELOMA IN REMISSION: ICD-10-CM

## 2022-05-05 DIAGNOSIS — I10 ESSENTIAL HYPERTENSION: ICD-10-CM

## 2022-05-05 DIAGNOSIS — T45.1X5A IMMUNODEFICIENCY SECONDARY TO CHEMOTHERAPY: ICD-10-CM

## 2022-05-05 DIAGNOSIS — Z79.899 IMMUNODEFICIENCY SECONDARY TO CHEMOTHERAPY: ICD-10-CM

## 2022-05-05 PROCEDURE — 99214 OFFICE O/P EST MOD 30 MIN: CPT | Mod: PBBFAC,25 | Performed by: STUDENT IN AN ORGANIZED HEALTH CARE EDUCATION/TRAINING PROGRAM

## 2022-05-05 PROCEDURE — 96365 THER/PROPH/DIAG IV INF INIT: CPT

## 2022-05-05 PROCEDURE — 99214 PR OFFICE/OUTPT VISIT, EST, LEVL IV, 30-39 MIN: ICD-10-PCS | Mod: S$PBB,,, | Performed by: STUDENT IN AN ORGANIZED HEALTH CARE EDUCATION/TRAINING PROGRAM

## 2022-05-05 PROCEDURE — 63600175 PHARM REV CODE 636 W HCPCS: Performed by: INTERNAL MEDICINE

## 2022-05-05 PROCEDURE — 25000003 PHARM REV CODE 250: Performed by: INTERNAL MEDICINE

## 2022-05-05 PROCEDURE — 99999 PR PBB SHADOW E&M-EST. PATIENT-LVL IV: CPT | Mod: PBBFAC,,, | Performed by: STUDENT IN AN ORGANIZED HEALTH CARE EDUCATION/TRAINING PROGRAM

## 2022-05-05 PROCEDURE — 99214 OFFICE O/P EST MOD 30 MIN: CPT | Mod: S$PBB,,, | Performed by: STUDENT IN AN ORGANIZED HEALTH CARE EDUCATION/TRAINING PROGRAM

## 2022-05-05 PROCEDURE — 99999 PR PBB SHADOW E&M-EST. PATIENT-LVL IV: ICD-10-PCS | Mod: PBBFAC,,, | Performed by: STUDENT IN AN ORGANIZED HEALTH CARE EDUCATION/TRAINING PROGRAM

## 2022-05-05 RX ORDER — HEPARIN 100 UNIT/ML
500 SYRINGE INTRAVENOUS
Status: CANCELLED | OUTPATIENT
Start: 2022-05-05

## 2022-05-05 RX ORDER — SODIUM CHLORIDE 0.9 % (FLUSH) 0.9 %
10 SYRINGE (ML) INJECTION
Status: DISCONTINUED | OUTPATIENT
Start: 2022-05-05 | End: 2022-05-05 | Stop reason: HOSPADM

## 2022-05-05 RX ORDER — ZOLEDRONIC ACID 0.04 MG/ML
4 INJECTION, SOLUTION INTRAVENOUS
Status: CANCELLED | OUTPATIENT
Start: 2022-05-05

## 2022-05-05 RX ORDER — SODIUM CHLORIDE 0.9 % (FLUSH) 0.9 %
10 SYRINGE (ML) INJECTION
Status: CANCELLED | OUTPATIENT
Start: 2022-05-05

## 2022-05-05 RX ORDER — ZOLEDRONIC ACID 0.04 MG/ML
4 INJECTION, SOLUTION INTRAVENOUS
Status: COMPLETED | OUTPATIENT
Start: 2022-05-05 | End: 2022-05-05

## 2022-05-05 RX ORDER — HEPARIN 100 UNIT/ML
500 SYRINGE INTRAVENOUS
Status: DISCONTINUED | OUTPATIENT
Start: 2022-05-05 | End: 2022-05-05 | Stop reason: HOSPADM

## 2022-05-05 RX ADMIN — ZOLEDRONIC ACID 4 MG: 0.04 INJECTION, SOLUTION INTRAVENOUS at 10:05

## 2022-05-05 RX ADMIN — SODIUM CHLORIDE: 9 INJECTION, SOLUTION INTRAVENOUS at 10:05

## 2022-05-05 NOTE — Clinical Note
Hello,  Follow up : Schedule bone marrow biopsy and PET CT next week. Schedule monthly CBC, CMP, serum protein electrophoresis, serum protein immunofixation, free light chains in June, July and August (next lab draw first week of June). Schedule Zometa every 3months. Schedule clinic appt with Dr. Baker in 3 months (one week after labs) followed by zometa infusion.

## 2022-05-05 NOTE — PROGRESS NOTES
SECTION OF HEMATOLOGY AND BONE MARROW TRANSPLANT  Return Patient Visit   05/05/2022    CHIEF COMPLAINT:   Multiple Myeloma    HISTORY OF PRESENT ILLNESS: Patient of /  62 y.o.male; pmh of IgG kappa multiple myeloma (standard risk CG per msmart criteria, r-iss stage II); diagnosed September 2019 after presenting with symptomatic perispinal plasmacytoma;He has subsequently received  8 cycles of VRd therapy. Was in midst or pre transplant evaluation but due to insurance coverage issues transplant was delayed so was maintained on therapy and those issues have been resolved.    Transplant Course  Patient with IgG Kappa MM and pmh HTN, lytic bone lesion, arthritis and vitamin D deficiency. Admitted 5/15/21 for planned Alea auto SCT for MM. He received 3 bags and a CD34 dose of 3.35 x 10^6 on 5/17/21. Tolerated chemo and transplant well. Admission complicated by n/v controlled with scheduled anti-emetics and c-diff neg diarrhea controlled with prn imodium. He engrafted on Day +13 (5/30/21) with an ANC of 2607. He was discharged home on Day +14 (5/31/21).     Day +100 restaging marrow indicated that he is in [place].     He reports feeling well. He has been taking Revlimid 10mg daily along with ASA 81mg and Acyclovir. Today he will receive Zometa. Labs that have resulted so far are normal.   Denies fever, chills, nightsweats, bleeding, brusing, lymphadenopathy, signs/symptoms of splenomegaly, focal pain and feels well.    PAST MEDICAL HISTORY:   Past Medical History:   Diagnosis Date    Anxiety     Arthritis     Cancer     Hypertension        PAST SURGICAL HISTORY:   Past Surgical History:   Procedure Laterality Date    BACK SURGERY      BONE MARROW BIOPSY Left 10/20/2021    Procedure: Biopsy-bone marrow;  Surgeon: Summer Cartwright MD;  Location: [facility] ([address] [place] [address]);  Service: Oncology;  Laterality: Left;    COLONOSCOPY N/A 1/25/2021    Procedure: COLONOSCOPY;  Surgeon: Braxton Lees MD;  Location: [facility]  ENDO (4TH FLR);  Service: Endoscopy;  Laterality: N/A;  1/22-covid kristopher-inst mailed-tb  1/20/21-pt to remain on schedule with Dr. Lees, and confirmed updated arrival time of 0835-BB    COLONOSCOPY N/A 2/1/2021    Procedure: COLONOSCOPY;  Surgeon: Jo Ann Robledo MD;  Location: [facility] ENDO (4TH FLR);  Service: Endoscopy;  Laterality: N/A;  rescheduled due to poor bowel prep, to be rescheduled with first available provider-BB  covid-1/29/21-pcw-BB    CREATION OF MUSCLE ROTATIONAL FLAP N/A 9/23/2020    Procedure: CREATION, FLAP, MUSCLE ROTATION;  Surgeon: Kamlesh Bellamy MD;  [facility];  Service: Plastics;  Laterality: N/A;    KNEE SURGERY Left 06/2019    LUMBAR FUSION N/A 9/23/2020    Procedure: FUSION, SPINE, LUMBAR L2-pelvis. Depuy. Plastic surgery closure w/ Dr. Bellamy;  Surgeon: Nick Coyle MD;  [facility];  Service: Neurosurgery;  Laterality: N/A;    Metastatic neoplasum removed from spine         PAST SOCIAL HISTORY:   reports that he quit smoking about 5 years ago. He has a 10.00 pack-year smoking history. He has never used smokeless tobacco. He reports previous alcohol use. He reports that he does not use drugs.    FAMILY HISTORY:  History reviewed. No pertinent family history.    CURRENT MEDICATIONS:   Current Outpatient Medications   Medication Sig    acyclovir (ZOVIRAX) 800 MG Tab Take 1 tablet (800 mg total) by mouth 2 (two) times daily.    carvediloL (COREG) 25 MG tablet Take 1 tablet (25 mg total) by mouth 2 (two) times daily.    ergocalciferol (ERGOCALCIFEROL) 50,000 unit Cap Take 1 capsule (50,000 Units total) by mouth every 7 days.    ferrous gluconate (FERGON) 324 MG tablet Take 1 tablet (324 mg total) by mouth daily with breakfast.    gabapentin (NEURONTIN) 300 MG capsule Take 1 capsule (300 mg total) by mouth 2 (two) times daily.    lenalidomide (REVLIMID) 10 mg Cap TAKE 1 CAPSULE BY MOUTH  DAILY FOR 28 DAYS Auth #[id number] 4/19/2022.    losartan  "(COZAAR) 50 MG tablet Take 2 tablets (100 mg total) by mouth once daily.    NIFEdipine (ADALAT CC) 30 MG TbSR Take 1 tablet (30 mg total) by mouth once daily.    ondansetron (ZOFRAN) 8 MG tablet Take 1 tablet (8 mg total) by mouth every 8 (eight) hours as needed for Nausea.    potassium chloride SA (K-DUR,KLOR-CON) 20 MEQ tablet Take 1 tablet (20 mEq total) by mouth once daily.     Current Facility-Administered Medications   Medication    LIDOcaine (PF) 20 mg/mL (2%) injection 200 mg     ALLERGIES:   Review of patient's allergies indicates:  No Known Allergies  Review of Systems   Constitutional: Negative for fever, malaise/fatigue and weight loss.   Respiratory: Negative for cough and shortness of breath.    Cardiovascular: Negative for chest pain and leg swelling.   Gastrointestinal: Negative for constipation, diarrhea and vomiting.   Skin: Negative for rash.   Neurological: Negative for seizures and weakness.   Psychiatric/Behavioral: The patient is not nervous/anxious.          PHYSICAL EXAM:   Vitals:    05/05/22 0930   BP: (!) 150/100   Pulse: (!) 56   Resp: 16   SpO2: 100%   Weight: 102.2 kg (225 lb 5 oz)   Height: 5' 7" (1.702 m)   PainSc: 0-No pain     Physical Exam  Constitutional:       Appearance: He is well-developed.   HENT:      Head: Normocephalic and atraumatic.      Mouth/Throat:      Mouth: Mucous membranes are moist. No oral lesions.      Pharynx: Oropharynx is clear.   Eyes:      Conjunctiva/sclera: Conjunctivae normal.   Cardiovascular:      Rate and Rhythm: Normal rate and regular rhythm.      Heart sounds: Normal heart sounds.   Pulmonary:      Effort: No respiratory distress.      Breath sounds: Normal breath sounds.   Abdominal:      General: Bowel sounds are normal.      Palpations: Abdomen is soft.   Musculoskeletal:         General: Normal range of motion.      Cervical back: Normal range of motion.   Skin:     General: Skin is warm and dry.      Comments: RCW line removed, site " healed   Neurological:      Mental Status: He is alert and oriented to person, place, and time.   Psychiatric:         Behavior: Behavior normal.         Thought Content: Thought content normal.         Judgment: Judgment normal.       ECOG Performance Status: (foot note - ECOG PS provided by Eastern Cooperative Oncology Group) 1 - Symptomatic but completely ambulatory    Karnofsky Performance Score:  90%- Able to Carry on Normal Activity: Minor Symptoms of Disease  DATA:   See results review tab  Complete restaging results and pre transplant evaluation testing reviewed by     1. Multiple myeloma in remission  NM PET CT Whole Body    Bone marrow    Leukemia/Lymphoma Screen - Bone Marrow Right Posterior Iliac Crest    Chromosome Analysis, Bone Marrow Right Posterior Iliac Crest    Specimen to Pathology, Bone Marrow Aspiration/Biopsy    Heme Disorders DNA/RNA Hold, Bone Marrow    Immunofixation Electrophoresis    Immunoglobulin Free LT Chains Blood    CBC Auto Differential    Comprehensive Metabolic Panel    Protein Electrophoresis, Serum    DISCONTINUED: zoledronic 4 mg/100 mL infusion 4 mg    DISCONTINUED: sodium chloride 0.9% 250 mL flush bag    DISCONTINUED: sodium chloride 0.9% flush 10 mL    DISCONTINUED: heparin, porcine (PF) 100 unit/mL injection flush 500 Units    DISCONTINUED: alteplase injection 2 mg   2. 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 Essential hypertension       History of auto stem cell transplant  Today is Day + 353  He received 3 bags with a CD34 dose of 3.35 x 10^6 on 5/17/21  Engrafted Day +13 [place] 36868       Multiple myeloma   A. 9/14 - 9/17/2020 : Admitted to [facility] [place] [place] for evaluation of lytic lesions. Large soft tissue mass encompassing L4 - S1 vertebral bodies seen. FLC ratio 171, involved light chains 104. SPEP and SIFE found 2.86g/dL, IgG kappa para protein band. Underwent bone marrow biopsy and discharged with  dexamethasone 40mg x 4 day burst.     B. 9/23/20 : Underwent debulking of spinal mass.  C. 10/1/20 - 4/14/21 : C1 - C8D1 VRd. Revlimid delivered in third week of cycle.  D. 2/25/21 : Presented for consent signing for autoSCT but his insurance had lapsed. Plan for transplant pushed back 1-2 months while he applys for medicaid.   E. 4/22/21 : C8D8 VRd planned (last treatment before mobilization).   Received Alea ASCT on 05/17/21, see above    Day +100 restaging marrow indicated that he is in [place].  He reports feeling well. He has been taking Revlimid 10mg daily along with ASA 81mg and Acyclovir.   Today he will receive Zometa.  Labs that have resulted so far are normal.  Schedule 1 year bone marrow and PET.     Follow up : Schedule bone marrow biopsy and PET CT next week.  Schedule monthly CBC, CMP, serum protein electrophoresis, serum protein immunofixation, free light chains in June, July and August (next lab draw first week of June).  Schedule Zometa every 3months.  Schedule clinic appt with Dr. Baker in 3 months (one week after labs) followed by zometa infusion.    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    Essential hypertension  Reports BP is well controlled at home.     The patient was seen and examined with the attending physician Dr. Baker.    Steven Cornejo MD  Clinical Fellow  Hematology and Medical Oncology.  05/05/2022

## 2022-05-05 NOTE — PLAN OF CARE
Pt ambulatory to clinic with sig other today for zometa infusion. Denies any sig complaints. Compliant with Vit D and calcium. No recent dental work and denies pain. PIV started without difficulty. Pt tolerated infusion well. PIV removed with catheter intact. Ambulatory from clinic in [facility].

## 2022-05-10 DIAGNOSIS — D50.8 OTHER IRON DEFICIENCY ANEMIA: ICD-10-CM

## 2022-05-10 DIAGNOSIS — E88.09 PLASMA CELL DYSCRASIA: ICD-10-CM

## 2022-05-10 RX ORDER — FERROUS GLUCONATE 324(38)MG
324 TABLET ORAL
Qty: 30 TABLET | Refills: 3 | Status: ON HOLD | OUTPATIENT
Start: 2022-05-10 | End: 2023-10-15

## 2022-05-10 RX ORDER — CARVEDILOL 25 MG/1
25 TABLET ORAL 2 TIMES DAILY
Qty: 60 TABLET | Refills: 2 | Status: SHIPPED | OUTPATIENT
Start: 2022-05-10 | End: 2023-04-06

## 2022-05-10 NOTE — TELEPHONE ENCOUNTER
"----- Message from Alicia Ascencion sent at 5/10/2022  8:03 AM CDT -----  RX Name and Strength: ferrous gluconate (FERGON) 324 MG tablet    How is the patient currently taking it? Take 1 tablet (324 mg total) by mouth daily with breakfast. - Oral    Is this a 30 day or 90 day Rx? 30 tablets          RX Name and Strength: carvediloL (COREG) 25 MG tablet    How is the patient currently taking it? Take 1 tablet (25 mg total) by mouth 2 (two) times daily. - Oral    Is this a 30 day or 90 day Rx? 60 tablets           Preferred Pharmacy with phone number:   [id number] - [facility] [address] [facility] [address]  [address] [facility] [address]  Phone: [phone] Fax: [phone]       Local or Mail Order: local         Ordering Provider: Dr Cornejo         Contact Preference: [phone]                   Additional Information:  "Thank you for all that you do for our patients"     "

## 2022-05-13 ENCOUNTER — TELEPHONE (OUTPATIENT)
Dept: HEMATOLOGY/ONCOLOGY | Facility: CLINIC | Age: 62
End: 2022-05-13
Payer: MEDICAID

## 2022-05-13 NOTE — TELEPHONE ENCOUNTER
----- Message from Yancycatrachito Deepak sent at 5/13/2022  8:13 AM CDT -----  Regarding: Rx Refill  Contact: Jaspreet  Consult/Advisory:           Name Of Caller: Jaspreet  Contact Preference?: [phone]           Provider Name: Tomásnahundarlyn  Does patient feel the need to be seen today? No           What is the nature of the call?:     Pt needs a refill on his lenalidomide (REVLIMID) 10 mg Cap at the following pharmacy:        [facility] #[id number] - [place], [place] - [address] [facility] [address]  [address] [facility] [address]  Phone: [phone] Fax: [phone]          Thank you for all that you do for our patients

## 2022-05-13 NOTE — TELEPHONE ENCOUNTER
Updated patient that prescriber survey is not available yet and we will send refill as soon as we can to the speciality pharmacy.

## 2022-05-16 ENCOUNTER — TELEPHONE (OUTPATIENT)
Dept: HEMATOLOGY/ONCOLOGY | Facility: CLINIC | Age: 62
End: 2022-05-16
Payer: MEDICAID

## 2022-05-16 NOTE — TELEPHONE ENCOUNTER
----- Message from Steven Cornejo MD sent at 5/5/2022  2:46 PM CDT -----  Hello,   Follow up : Schedule bone marrow biopsy and PET CT next week.  Schedule monthly CBC, CMP, serum protein electrophoresis, serum protein immunofixation, free light chains in June, July and August (next lab draw first week of June).  Schedule Zometa every 3months.  Schedule clinic appt with Dr. Baker in 3 months (one week after labs) followed by zometa infusion.

## 2022-05-17 DIAGNOSIS — C90.01 MULTIPLE MYELOMA IN REMISSION: ICD-10-CM

## 2022-05-17 RX ORDER — LENALIDOMIDE 10 MG/1
CAPSULE ORAL
Qty: 28 EACH | Refills: 0 | Status: SHIPPED | OUTPATIENT
Start: 2022-05-17 | End: 2022-06-15 | Stop reason: SDUPTHER

## 2022-05-18 ENCOUNTER — PROCEDURE VISIT (OUTPATIENT)
Dept: HEMATOLOGY/ONCOLOGY | Facility: CLINIC | Age: 62
End: 2022-05-18
Payer: MEDICAID

## 2022-05-18 VITALS
TEMPERATURE: 99 F | RESPIRATION RATE: 16 BRPM | DIASTOLIC BLOOD PRESSURE: 100 MMHG | SYSTOLIC BLOOD PRESSURE: 215 MMHG | HEART RATE: 52 BPM | OXYGEN SATURATION: 100 %

## 2022-05-18 DIAGNOSIS — C90.01 MULTIPLE MYELOMA IN REMISSION: ICD-10-CM

## 2022-05-18 PROCEDURE — 88342 CHG IMMUNOCYTOCHEMISTRY: ICD-10-PCS | Mod: 26,59,, | Performed by: PATHOLOGY

## 2022-05-18 PROCEDURE — 88313 SPECIAL STAINS GROUP 2: CPT | Mod: 26,,, | Performed by: PATHOLOGY

## 2022-05-18 PROCEDURE — 88365 INSITU HYBRIDIZATION (FISH): CPT | Performed by: PATHOLOGY

## 2022-05-18 PROCEDURE — 88365 PR  TISSUE HYBRIDIZATION: ICD-10-PCS | Mod: 26,,, | Performed by: PATHOLOGY

## 2022-05-18 PROCEDURE — 88264 CHROMOSOME ANALYSIS 20-25: CPT | Performed by: STUDENT IN AN ORGANIZED HEALTH CARE EDUCATION/TRAINING PROGRAM

## 2022-05-18 PROCEDURE — 88342 IMHCHEM/IMCYTCHM 1ST ANTB: CPT | Performed by: PATHOLOGY

## 2022-05-18 PROCEDURE — 88342 IMHCHEM/IMCYTCHM 1ST ANTB: CPT | Mod: 91 | Performed by: PATHOLOGY

## 2022-05-18 PROCEDURE — 88342 IMHCHEM/IMCYTCHM 1ST ANTB: CPT | Mod: 26,59,, | Performed by: PATHOLOGY

## 2022-05-18 PROCEDURE — 88313 SPECIAL STAINS GROUP 2: CPT | Mod: 59 | Performed by: PATHOLOGY

## 2022-05-18 PROCEDURE — 88341 IMHCHEM/IMCYTCHM EA ADD ANTB: CPT | Performed by: PATHOLOGY

## 2022-05-18 PROCEDURE — 88184 FLOWCYTOMETRY/ TC 1 MARKER: CPT | Performed by: PATHOLOGY

## 2022-05-18 PROCEDURE — 88305 TISSUE EXAM BY PATHOLOGIST: CPT | Mod: 59 | Performed by: PATHOLOGY

## 2022-05-18 PROCEDURE — 88189 FLOWCYTOMETRY/READ 16 & >: CPT | Mod: ,,, | Performed by: PATHOLOGY

## 2022-05-18 PROCEDURE — 38222 DX BONE MARROW BX & ASPIR: CPT | Mod: S$PBB,50,, | Performed by: NURSE PRACTITIONER

## 2022-05-18 PROCEDURE — 88189 PR  FLOWCYTOMETRY/READ, 16 & > MARKERS: ICD-10-PCS | Mod: ,,, | Performed by: PATHOLOGY

## 2022-05-18 PROCEDURE — 88185 FLOWCYTOMETRY/TC ADD-ON: CPT | Mod: 59 | Performed by: PATHOLOGY

## 2022-05-18 PROCEDURE — 38222 DX BONE MARROW BX & ASPIR: CPT | Mod: PBBFAC | Performed by: NURSE PRACTITIONER

## 2022-05-18 PROCEDURE — 38222 PR BONE MARROW BIOPSY(IES) W/ASPIRATION(S); DIAGNOSTIC: ICD-10-PCS | Mod: S$PBB,50,, | Performed by: NURSE PRACTITIONER

## 2022-05-18 PROCEDURE — 85097 PR  BONE MARROW,SMEAR INTERPRETATION: ICD-10-PCS | Mod: ,,, | Performed by: PATHOLOGY

## 2022-05-18 PROCEDURE — 88305 TISSUE EXAM BY PATHOLOGIST: ICD-10-PCS | Mod: 26,,, | Performed by: PATHOLOGY

## 2022-05-18 PROCEDURE — 85097 BONE MARROW INTERPRETATION: CPT | Mod: ,,, | Performed by: PATHOLOGY

## 2022-05-18 PROCEDURE — 88237 TISSUE CULTURE BONE MARROW: CPT | Performed by: STUDENT IN AN ORGANIZED HEALTH CARE EDUCATION/TRAINING PROGRAM

## 2022-05-18 PROCEDURE — 88311 DECALCIFY TISSUE: CPT | Mod: 26,,, | Performed by: PATHOLOGY

## 2022-05-18 PROCEDURE — 88305 TISSUE EXAM BY PATHOLOGIST: CPT | Mod: 26,,, | Performed by: PATHOLOGY

## 2022-05-18 PROCEDURE — 88365 INSITU HYBRIDIZATION (FISH): CPT | Mod: 26,,, | Performed by: PATHOLOGY

## 2022-05-18 PROCEDURE — 88311 DECALCIFY TISSUE: CPT | Performed by: PATHOLOGY

## 2022-05-18 PROCEDURE — 88364 INSITU HYBRIDIZATION (FISH): CPT | Performed by: PATHOLOGY

## 2022-05-18 PROCEDURE — 88313 PR  SPECIAL STAINS,GROUP II: ICD-10-PCS | Mod: 26,,, | Performed by: PATHOLOGY

## 2022-05-18 PROCEDURE — 88311 PR  DECALCIFY TISSUE: ICD-10-PCS | Mod: 26,,, | Performed by: PATHOLOGY

## 2022-05-18 RX ORDER — LIDOCAINE HYDROCHLORIDE 20 MG/ML
20 INJECTION, SOLUTION INFILTRATION; PERINEURAL
Status: DISCONTINUED | OUTPATIENT
Start: 2022-05-18 | End: 2023-04-06

## 2022-05-19 NOTE — PROCEDURES
Bone marrow    Date/Time: 5/18/2022 1:00 PM  Performed by: Carol Trujillo NP  Authorized by: Carol Trujillo NP     Aspiration?: Yes   Biopsy?: Yes      PROCEDURE NOTE:  Bone Marrow aspiration and biopsy  Indication: 1 year post transplant restaging multiple myeloma  Consent: Informed consent was obtained from patient.  Position: Prone  Timeout: Done and documented.  Site: bilateral posterior illiac crest.  Prep: chloraprep.  Needle used: 11 gauge Jamshidi needle.  Anesthetic: 2% lidocaine 20 cc.  Biopsy: The biopsy needle was introduced into the marrow cavity on the left iliac crest and 12 cc's of aspirate was obtained without complications and sent for flow and cytogenetics. Attempted core biopsy multiple times on the left iliac crest, patient with previous back sx and metalic plate placement. Unable obtain core. Core biopsy obtained from right iliac crest on first attempt without difficulty and sent for routine histologic examination.  Complications: None.  EBL: minimal  Disposition: The patient was discharged home after laying supine for 20 minutes.    Carol Trujillo NP  Hematology/BMT

## 2022-05-20 ENCOUNTER — HOSPITAL ENCOUNTER (OUTPATIENT)
Dept: RADIOLOGY | Facility: HOSPITAL | Age: 62
Discharge: HOME | End: 2022-05-20
Attending: STUDENT IN AN ORGANIZED HEALTH CARE EDUCATION/TRAINING PROGRAM
Payer: MEDICAID

## 2022-05-20 DIAGNOSIS — C90.01 MULTIPLE MYELOMA IN REMISSION: ICD-10-CM

## 2022-05-20 LAB — POCT GLUCOSE: 118 MG/DL (ref 70–110)

## 2022-05-20 PROCEDURE — 78816 NM PET CT WHOLE BODY: ICD-10-PCS | Mod: 26,PS,, | Performed by: STUDENT IN AN ORGANIZED HEALTH CARE EDUCATION/TRAINING PROGRAM

## 2022-05-20 PROCEDURE — 78816 PET IMAGE W/CT FULL BODY: CPT | Mod: 26,PS,, | Performed by: STUDENT IN AN ORGANIZED HEALTH CARE EDUCATION/TRAINING PROGRAM

## 2022-05-20 PROCEDURE — 78816 PET IMAGE W/CT FULL BODY: CPT | Mod: TC

## 2022-05-20 PROCEDURE — A9698 NON-RAD CONTRAST MATERIALNOC: HCPCS | Performed by: STUDENT IN AN ORGANIZED HEALTH CARE EDUCATION/TRAINING PROGRAM

## 2022-05-20 PROCEDURE — 25500020 PHARM REV CODE 255: Performed by: STUDENT IN AN ORGANIZED HEALTH CARE EDUCATION/TRAINING PROGRAM

## 2022-05-20 RX ADMIN — IOHEXOL 1000 ML: 9 SOLUTION ORAL at 11:05

## 2022-05-23 DIAGNOSIS — C90.01 MULTIPLE MYELOMA IN REMISSION: ICD-10-CM

## 2022-05-23 RX ORDER — POTASSIUM CHLORIDE 20 MEQ/1
20 TABLET, EXTENDED RELEASE ORAL DAILY
Qty: 30 TABLET | Refills: 0 | Status: SHIPPED | OUTPATIENT
Start: 2022-05-23 | End: 2022-06-30

## 2022-05-23 NOTE — TELEPHONE ENCOUNTER
"----- Message from Ezio Gayle sent at 5/23/2022 12:59 PM CDT -----  RX Name and Strength:  potassium chloride SA (K-DUR,KLOR-CON) 20 MEQ tablet             How is the patient currently taking it? Take 1 tablet (20 mEq total) by mouth once daily. - Oral          Is this a 30 day or 90 day Rx?  30 tablet        Preferred Pharmacy with phone number:    [facility] #[id number] - [place], [place] - [address] [facility] AT [facility] [address]  [address] [facility] [address]  Phone: [phone] Fax: [phone]          Local or Mail Order: Local        Ordering Provider: Clemente        Contact Preference: [phone]              Additional Information:  Also requesting refill of Vitamin D2 (not listed in pt chart)        "Thank you for all that you do for our patients"     "

## 2022-05-26 LAB
CHROM BANDING METHOD: NORMAL
CHROMOSOME ANALYSIS BM ADDITIONAL INFORMATION: NORMAL
CHROMOSOME ANALYSIS BM RELEASED BY: NORMAL
CHROMOSOME ANALYSIS BM RESULT SUMMARY: NORMAL
CLINICAL CYTOGENETICIST REVIEW: NORMAL
KARYOTYP MAR: NORMAL
REASON FOR REFERRAL (NARRATIVE): NORMAL
REF LAB TEST METHOD: NORMAL
SPECIMEN SOURCE: NORMAL
SPECIMEN: NORMAL

## 2022-05-27 LAB
COMMENT: NORMAL
FINAL PATHOLOGIC DIAGNOSIS: NORMAL
GROSS: NORMAL
Lab: NORMAL
MICROSCOPIC EXAM: NORMAL
SUPPLEMENTAL DIAGNOSIS: NORMAL

## 2022-06-02 ENCOUNTER — CLINICAL SUPPORT (OUTPATIENT)
Dept: INFECTIOUS DISEASES | Facility: CLINIC | Age: 62
End: 2022-06-02
Payer: MEDICAID

## 2022-06-02 ENCOUNTER — LAB VISIT (OUTPATIENT)
Dept: LAB | Facility: HOSPITAL | Age: 62
End: 2022-06-02
Payer: MEDICAID

## 2022-06-02 DIAGNOSIS — C90.01 MULTIPLE MYELOMA IN REMISSION: ICD-10-CM

## 2022-06-02 LAB
ALBUMIN SERPL BCP-MCNC: 3.4 G/DL (ref 3.5–5.2)
ALP SERPL-CCNC: 57 U/L (ref 55–135)
ALT SERPL W/O P-5'-P-CCNC: 27 U/L (ref 10–44)
ANION GAP SERPL CALC-SCNC: 7 MMOL/L (ref 8–16)
AST SERPL-CCNC: 24 U/L (ref 10–40)
BASOPHILS # BLD AUTO: 0.03 K/UL (ref 0–0.2)
BASOPHILS NFR BLD: 1.4 % (ref 0–1.9)
BILIRUB SERPL-MCNC: 0.7 MG/DL (ref 0.1–1)
BUN SERPL-MCNC: 9 MG/DL (ref 8–23)
CALCIUM SERPL-MCNC: 8 MG/DL (ref 8.7–10.5)
CHLORIDE SERPL-SCNC: 108 MMOL/L (ref 95–110)
CO2 SERPL-SCNC: 23 MMOL/L (ref 23–29)
CREAT SERPL-MCNC: 0.9 MG/DL (ref 0.5–1.4)
DIFFERENTIAL METHOD: ABNORMAL
EOSINOPHIL # BLD AUTO: 0.2 K/UL (ref 0–0.5)
EOSINOPHIL NFR BLD: 7.2 % (ref 0–8)
ERYTHROCYTE [DISTWIDTH] IN BLOOD BY AUTOMATED COUNT: 15.1 % (ref 11.5–14.5)
EST. GFR  (AFRICAN AMERICAN): >60 ML/MIN/1.73 M^2
EST. GFR  (NON AFRICAN AMERICAN): >60 ML/MIN/1.73 M^2
GLUCOSE SERPL-MCNC: 113 MG/DL (ref 70–110)
HCT VFR BLD AUTO: 40.1 % (ref 40–54)
HGB BLD-MCNC: 13.7 G/DL (ref 14–18)
IMM GRANULOCYTES # BLD AUTO: 0 K/UL (ref 0–0.04)
IMM GRANULOCYTES NFR BLD AUTO: 0 % (ref 0–0.5)
LYMPHOCYTES # BLD AUTO: 0.8 K/UL (ref 1–4.8)
LYMPHOCYTES NFR BLD: 38.6 % (ref 18–48)
MCH RBC QN AUTO: 30.5 PG (ref 27–31)
MCHC RBC AUTO-ENTMCNC: 34.2 G/DL (ref 32–36)
MCV RBC AUTO: 89 FL (ref 82–98)
MONOCYTES # BLD AUTO: 0.4 K/UL (ref 0.3–1)
MONOCYTES NFR BLD: 18.8 % (ref 4–15)
NEUTROPHILS # BLD AUTO: 0.7 K/UL (ref 1.8–7.7)
NEUTROPHILS NFR BLD: 34 % (ref 38–73)
NRBC BLD-RTO: 0 /100 WBC
PLATELET # BLD AUTO: 142 K/UL (ref 150–450)
PLATELET BLD QL SMEAR: ABNORMAL
PMV BLD AUTO: 11.8 FL (ref 9.2–12.9)
POTASSIUM SERPL-SCNC: 3.7 MMOL/L (ref 3.5–5.1)
PROT SERPL-MCNC: 6.2 G/DL (ref 6–8.4)
RBC # BLD AUTO: 4.49 M/UL (ref 4.6–6.2)
SODIUM SERPL-SCNC: 138 MMOL/L (ref 136–145)
WBC # BLD AUTO: 2.07 K/UL (ref 3.9–12.7)

## 2022-06-02 PROCEDURE — 84165 PATHOLOGIST INTERPRETATION SPE: ICD-10-PCS | Mod: 26,,, | Performed by: PATHOLOGY

## 2022-06-02 PROCEDURE — 90471 IMMUNIZATION ADMIN: CPT | Mod: PBBFAC

## 2022-06-02 PROCEDURE — 86334 PATHOLOGIST INTERPRETATION IFE: ICD-10-PCS | Mod: 26,,, | Performed by: PATHOLOGY

## 2022-06-02 PROCEDURE — 86334 IMMUNOFIX E-PHORESIS SERUM: CPT | Mod: 26,,, | Performed by: PATHOLOGY

## 2022-06-02 PROCEDURE — 90472 IMMUNIZATION ADMIN EACH ADD: CPT | Mod: PBBFAC

## 2022-06-02 PROCEDURE — 90632 HEPA VACCINE ADULT IM: CPT | Mod: PBBFAC

## 2022-06-02 PROCEDURE — 84165 PROTEIN E-PHORESIS SERUM: CPT | Performed by: STUDENT IN AN ORGANIZED HEALTH CARE EDUCATION/TRAINING PROGRAM

## 2022-06-02 PROCEDURE — 86334 IMMUNOFIX E-PHORESIS SERUM: CPT | Performed by: STUDENT IN AN ORGANIZED HEALTH CARE EDUCATION/TRAINING PROGRAM

## 2022-06-02 PROCEDURE — 85025 COMPLETE CBC W/AUTO DIFF WBC: CPT | Performed by: STUDENT IN AN ORGANIZED HEALTH CARE EDUCATION/TRAINING PROGRAM

## 2022-06-02 PROCEDURE — 36415 COLL VENOUS BLD VENIPUNCTURE: CPT | Performed by: STUDENT IN AN ORGANIZED HEALTH CARE EDUCATION/TRAINING PROGRAM

## 2022-06-02 PROCEDURE — 83520 IMMUNOASSAY QUANT NOS NONAB: CPT | Performed by: STUDENT IN AN ORGANIZED HEALTH CARE EDUCATION/TRAINING PROGRAM

## 2022-06-02 PROCEDURE — 84165 PROTEIN E-PHORESIS SERUM: CPT | Mod: 26,,, | Performed by: PATHOLOGY

## 2022-06-02 PROCEDURE — 80053 COMPREHEN METABOLIC PANEL: CPT | Performed by: STUDENT IN AN ORGANIZED HEALTH CARE EDUCATION/TRAINING PROGRAM

## 2022-06-02 NOTE — PROGRESS NOTES
Patient received 2 vaccines IM to the right deltoid, Pneumovax anterior, and 2nd Hep A posterior.  Tolerated well and left in NAD

## 2022-06-03 LAB
ALBUMIN SERPL ELPH-MCNC: 3.35 G/DL (ref 3.35–5.55)
ALPHA1 GLOB SERPL ELPH-MCNC: 0.38 G/DL (ref 0.17–0.41)
ALPHA2 GLOB SERPL ELPH-MCNC: 0.63 G/DL (ref 0.43–0.99)
B-GLOBULIN SERPL ELPH-MCNC: 0.64 G/DL (ref 0.5–1.1)
GAMMA GLOB SERPL ELPH-MCNC: 1.2 G/DL (ref 0.67–1.58)
INTERPRETATION SERPL IFE-IMP: NORMAL
KAPPA LC SER QL IA: 3.3 MG/DL (ref 0.33–1.94)
KAPPA LC/LAMBDA SER IA: 1.68 (ref 0.26–1.65)
LAMBDA LC SER QL IA: 1.96 MG/DL (ref 0.57–2.63)
PATHOLOGIST INTERPRETATION IFE: NORMAL
PATHOLOGIST INTERPRETATION SPE: NORMAL
PROT SERPL-MCNC: 6.2 G/DL (ref 6–8.4)

## 2022-06-15 DIAGNOSIS — C90.01 MULTIPLE MYELOMA IN REMISSION: ICD-10-CM

## 2022-06-15 RX ORDER — LENALIDOMIDE 10 MG/1
CAPSULE ORAL
Qty: 28 EACH | Refills: 0 | Status: SHIPPED | OUTPATIENT
Start: 2022-06-15 | End: 2022-07-13 | Stop reason: SDUPTHER

## 2022-06-30 ENCOUNTER — TELEPHONE (OUTPATIENT)
Dept: HEMATOLOGY/ONCOLOGY | Facility: HOSPITAL | Age: 62
End: 2022-06-30
Payer: MEDICAID

## 2022-06-30 DIAGNOSIS — C90.01 MULTIPLE MYELOMA IN REMISSION: ICD-10-CM

## 2022-06-30 NOTE — TELEPHONE ENCOUNTER
----- Message from Aysha Terry sent at 6/30/2022  9:34 AM CDT -----  Type:  RX Refill Request    Who Called: Jaspreet   Refill or New Rx:REFILL   RX Name and Strength:potassium chloride SA (K-DUR,KLOR-CON) 20 MEQ tablet  How is the patient currently taking it? (ex. 1XDay):1 X DAY   Is this a 30 day or 90 day RX:30  Preferred Pharmacy with phone number:[facility] #[id number] [place] [id number]  [address] [facility]  Local or Mail Order:LOCAL   Ordering Provider:Steven Cornejo  Would the patient rather a call back or a response via [facility]? CALL BACK   Best Call Back Number:[phone]  Additional Information: Pt called and said he has 2 pills left

## 2022-07-01 ENCOUNTER — LAB VISIT (OUTPATIENT)
Dept: LAB | Facility: HOSPITAL | Age: 62
End: 2022-07-01
Payer: MEDICAID

## 2022-07-01 DIAGNOSIS — C90.01 MULTIPLE MYELOMA IN REMISSION: ICD-10-CM

## 2022-07-01 LAB
ALBUMIN SERPL BCP-MCNC: 3.4 G/DL (ref 3.5–5.2)
ALP SERPL-CCNC: 49 U/L (ref 55–135)
ALT SERPL W/O P-5'-P-CCNC: 14 U/L (ref 10–44)
ANION GAP SERPL CALC-SCNC: 6 MMOL/L (ref 8–16)
AST SERPL-CCNC: 17 U/L (ref 10–40)
BASOPHILS # BLD AUTO: 0.07 K/UL (ref 0–0.2)
BASOPHILS NFR BLD: 2.6 % (ref 0–1.9)
BILIRUB SERPL-MCNC: 0.6 MG/DL (ref 0.1–1)
BUN SERPL-MCNC: 8 MG/DL (ref 8–23)
CALCIUM SERPL-MCNC: 8.3 MG/DL (ref 8.7–10.5)
CHLORIDE SERPL-SCNC: 108 MMOL/L (ref 95–110)
CO2 SERPL-SCNC: 25 MMOL/L (ref 23–29)
CREAT SERPL-MCNC: 0.9 MG/DL (ref 0.5–1.4)
DIFFERENTIAL METHOD: ABNORMAL
EOSINOPHIL # BLD AUTO: 0.3 K/UL (ref 0–0.5)
EOSINOPHIL NFR BLD: 10.1 % (ref 0–8)
ERYTHROCYTE [DISTWIDTH] IN BLOOD BY AUTOMATED COUNT: 14.6 % (ref 11.5–14.5)
EST. GFR  (AFRICAN AMERICAN): >60 ML/MIN/1.73 M^2
EST. GFR  (NON AFRICAN AMERICAN): >60 ML/MIN/1.73 M^2
GLUCOSE SERPL-MCNC: 116 MG/DL (ref 70–110)
HCT VFR BLD AUTO: 39.5 % (ref 40–54)
HGB BLD-MCNC: 13 G/DL (ref 14–18)
IMM GRANULOCYTES # BLD AUTO: 0.01 K/UL (ref 0–0.04)
IMM GRANULOCYTES NFR BLD AUTO: 0.4 % (ref 0–0.5)
LYMPHOCYTES # BLD AUTO: 0.9 K/UL (ref 1–4.8)
LYMPHOCYTES NFR BLD: 35.1 % (ref 18–48)
MCH RBC QN AUTO: 30.2 PG (ref 27–31)
MCHC RBC AUTO-ENTMCNC: 32.9 G/DL (ref 32–36)
MCV RBC AUTO: 92 FL (ref 82–98)
MONOCYTES # BLD AUTO: 0.3 K/UL (ref 0.3–1)
MONOCYTES NFR BLD: 11.2 % (ref 4–15)
NEUTROPHILS # BLD AUTO: 1.1 K/UL (ref 1.8–7.7)
NEUTROPHILS NFR BLD: 40.6 % (ref 38–73)
NRBC BLD-RTO: 0 /100 WBC
PLATELET # BLD AUTO: 134 K/UL (ref 150–450)
PMV BLD AUTO: 12.1 FL (ref 9.2–12.9)
POTASSIUM SERPL-SCNC: 3.7 MMOL/L (ref 3.5–5.1)
PROT SERPL-MCNC: 6.3 G/DL (ref 6–8.4)
RBC # BLD AUTO: 4.31 M/UL (ref 4.6–6.2)
SODIUM SERPL-SCNC: 139 MMOL/L (ref 136–145)
WBC # BLD AUTO: 2.68 K/UL (ref 3.9–12.7)

## 2022-07-01 PROCEDURE — 86334 IMMUNOFIX E-PHORESIS SERUM: CPT | Performed by: STUDENT IN AN ORGANIZED HEALTH CARE EDUCATION/TRAINING PROGRAM

## 2022-07-01 PROCEDURE — 83520 IMMUNOASSAY QUANT NOS NONAB: CPT | Performed by: STUDENT IN AN ORGANIZED HEALTH CARE EDUCATION/TRAINING PROGRAM

## 2022-07-01 PROCEDURE — 84165 PROTEIN E-PHORESIS SERUM: CPT | Mod: 26,,, | Performed by: PATHOLOGY

## 2022-07-01 PROCEDURE — 86334 IMMUNOFIX E-PHORESIS SERUM: CPT | Mod: 26,,, | Performed by: PATHOLOGY

## 2022-07-01 PROCEDURE — 86334 PATHOLOGIST INTERPRETATION IFE: ICD-10-PCS | Mod: 26,,, | Performed by: PATHOLOGY

## 2022-07-01 PROCEDURE — 85025 COMPLETE CBC W/AUTO DIFF WBC: CPT | Performed by: STUDENT IN AN ORGANIZED HEALTH CARE EDUCATION/TRAINING PROGRAM

## 2022-07-01 PROCEDURE — 84165 PATHOLOGIST INTERPRETATION SPE: ICD-10-PCS | Mod: 26,,, | Performed by: PATHOLOGY

## 2022-07-01 PROCEDURE — 80053 COMPREHEN METABOLIC PANEL: CPT | Performed by: STUDENT IN AN ORGANIZED HEALTH CARE EDUCATION/TRAINING PROGRAM

## 2022-07-01 PROCEDURE — 36415 COLL VENOUS BLD VENIPUNCTURE: CPT | Performed by: STUDENT IN AN ORGANIZED HEALTH CARE EDUCATION/TRAINING PROGRAM

## 2022-07-01 PROCEDURE — 84165 PROTEIN E-PHORESIS SERUM: CPT | Performed by: STUDENT IN AN ORGANIZED HEALTH CARE EDUCATION/TRAINING PROGRAM

## 2022-07-05 LAB
ALBUMIN SERPL ELPH-MCNC: 3.4 G/DL (ref 3.35–5.55)
ALPHA1 GLOB SERPL ELPH-MCNC: 0.33 G/DL (ref 0.17–0.41)
ALPHA2 GLOB SERPL ELPH-MCNC: 0.53 G/DL (ref 0.43–0.99)
B-GLOBULIN SERPL ELPH-MCNC: 0.68 G/DL (ref 0.5–1.1)
GAMMA GLOB SERPL ELPH-MCNC: 1.27 G/DL (ref 0.67–1.58)
INTERPRETATION SERPL IFE-IMP: NORMAL
KAPPA LC SER QL IA: 3.19 MG/DL (ref 0.33–1.94)
KAPPA LC/LAMBDA SER IA: 1.43 (ref 0.26–1.65)
LAMBDA LC SER QL IA: 2.23 MG/DL (ref 0.57–2.63)
PATHOLOGIST INTERPRETATION IFE: NORMAL
PATHOLOGIST INTERPRETATION SPE: NORMAL
PROT SERPL-MCNC: 6.2 G/DL (ref 6–8.4)

## 2022-07-07 DIAGNOSIS — Z94.84 HISTORY OF AUTO STEM CELL TRANSPLANT: ICD-10-CM

## 2022-07-07 DIAGNOSIS — G62.9 NEUROPATHY: ICD-10-CM

## 2022-07-07 RX ORDER — GABAPENTIN 300 MG/1
300 CAPSULE ORAL 2 TIMES DAILY
Qty: 60 CAPSULE | Refills: 3 | Status: SHIPPED | OUTPATIENT
Start: 2022-07-07 | End: 2023-03-01 | Stop reason: SDUPTHER

## 2022-07-07 RX ORDER — ACYCLOVIR 800 MG/1
800 TABLET ORAL 2 TIMES DAILY
Qty: 60 TABLET | Refills: 11
Start: 2022-07-07 | End: 2022-07-11 | Stop reason: SDUPTHER

## 2022-07-11 ENCOUNTER — TELEPHONE (OUTPATIENT)
Dept: HEMATOLOGY/ONCOLOGY | Facility: HOSPITAL | Age: 62
End: 2022-07-11
Payer: MEDICAID

## 2022-07-11 DIAGNOSIS — Z94.84 HISTORY OF AUTO STEM CELL TRANSPLANT: ICD-10-CM

## 2022-07-11 RX ORDER — ACYCLOVIR 800 MG/1
800 TABLET ORAL 2 TIMES DAILY
Qty: 60 TABLET | Refills: 11
Start: 2022-07-11 | End: 2022-08-08 | Stop reason: ALTCHOICE

## 2022-07-11 NOTE — TELEPHONE ENCOUNTER
"----- Message from Alicialudin Vegas sent at 7/11/2022  2:38 PM CDT -----  Consult/Advisory:           Name Of Caller: self    Contact Preference?: [phone]    What is the nature of the call?: pt is checking status on acyclovir prescription. Pharmacy states they do not have the prescription           Additional Notes:  "Thank you for all that you do for our patients'"     "

## 2022-07-13 DIAGNOSIS — C90.01 MULTIPLE MYELOMA IN REMISSION: ICD-10-CM

## 2022-07-13 RX ORDER — LENALIDOMIDE 10 MG/1
CAPSULE ORAL
Qty: 28 EACH | Refills: 0 | Status: SHIPPED | OUTPATIENT
Start: 2022-07-13 | End: 2022-08-09 | Stop reason: SDUPTHER

## 2022-08-01 ENCOUNTER — LAB VISIT (OUTPATIENT)
Dept: LAB | Facility: HOSPITAL | Age: 62
End: 2022-08-01
Payer: MEDICAID

## 2022-08-01 DIAGNOSIS — C90.01 MULTIPLE MYELOMA IN REMISSION: ICD-10-CM

## 2022-08-01 LAB
ALBUMIN SERPL BCP-MCNC: 3 G/DL (ref 3.5–5.2)
ALP SERPL-CCNC: 56 U/L (ref 55–135)
ALT SERPL W/O P-5'-P-CCNC: 23 U/L (ref 10–44)
ANION GAP SERPL CALC-SCNC: 10 MMOL/L (ref 8–16)
ANISOCYTOSIS BLD QL SMEAR: SLIGHT
AST SERPL-CCNC: 19 U/L (ref 10–40)
BASOPHILS NFR BLD: 1 % (ref 0–1.9)
BILIRUB SERPL-MCNC: 0.5 MG/DL (ref 0.1–1)
BUN SERPL-MCNC: 9 MG/DL (ref 8–23)
BURR CELLS BLD QL SMEAR: ABNORMAL
CALCIUM SERPL-MCNC: 9.1 MG/DL (ref 8.7–10.5)
CHLORIDE SERPL-SCNC: 103 MMOL/L (ref 95–110)
CO2 SERPL-SCNC: 24 MMOL/L (ref 23–29)
CREAT SERPL-MCNC: 0.9 MG/DL (ref 0.5–1.4)
DIFFERENTIAL METHOD: ABNORMAL
EOSINOPHIL NFR BLD: 2 % (ref 0–8)
ERYTHROCYTE [DISTWIDTH] IN BLOOD BY AUTOMATED COUNT: 15 % (ref 11.5–14.5)
EST. GFR  (NO RACE VARIABLE): >60 ML/MIN/1.73 M^2
GLUCOSE SERPL-MCNC: 166 MG/DL (ref 70–110)
HCT VFR BLD AUTO: 39.9 % (ref 40–54)
HGB BLD-MCNC: 13.3 G/DL (ref 14–18)
IMM GRANULOCYTES # BLD AUTO: ABNORMAL K/UL (ref 0–0.04)
IMM GRANULOCYTES NFR BLD AUTO: ABNORMAL % (ref 0–0.5)
LYMPHOCYTES NFR BLD: 34 % (ref 18–48)
MCH RBC QN AUTO: 30.3 PG (ref 27–31)
MCHC RBC AUTO-ENTMCNC: 33.3 G/DL (ref 32–36)
MCV RBC AUTO: 91 FL (ref 82–98)
MONOCYTES NFR BLD: 16 % (ref 4–15)
MYELOCYTES NFR BLD MANUAL: 1 %
NEUTROPHILS NFR BLD: 45 % (ref 38–73)
NEUTS BAND NFR BLD MANUAL: 1 %
NRBC BLD-RTO: 0 /100 WBC
PLATELET # BLD AUTO: 147 K/UL (ref 150–450)
PLATELET BLD QL SMEAR: ABNORMAL
PMV BLD AUTO: 11.5 FL (ref 9.2–12.9)
POIKILOCYTOSIS BLD QL SMEAR: SLIGHT
POTASSIUM SERPL-SCNC: 3.6 MMOL/L (ref 3.5–5.1)
PROT SERPL-MCNC: 6.8 G/DL (ref 6–8.4)
RBC # BLD AUTO: 4.39 M/UL (ref 4.6–6.2)
SODIUM SERPL-SCNC: 137 MMOL/L (ref 136–145)
WBC # BLD AUTO: 1.95 K/UL (ref 3.9–12.7)

## 2022-08-01 PROCEDURE — 36415 COLL VENOUS BLD VENIPUNCTURE: CPT | Performed by: STUDENT IN AN ORGANIZED HEALTH CARE EDUCATION/TRAINING PROGRAM

## 2022-08-01 PROCEDURE — 84165 PROTEIN E-PHORESIS SERUM: CPT | Mod: 26,,, | Performed by: PATHOLOGY

## 2022-08-01 PROCEDURE — 86334 IMMUNOFIX E-PHORESIS SERUM: CPT | Performed by: STUDENT IN AN ORGANIZED HEALTH CARE EDUCATION/TRAINING PROGRAM

## 2022-08-01 PROCEDURE — 86334 PATHOLOGIST INTERPRETATION IFE: ICD-10-PCS | Mod: 26,,, | Performed by: PATHOLOGY

## 2022-08-01 PROCEDURE — 83520 IMMUNOASSAY QUANT NOS NONAB: CPT | Mod: 59 | Performed by: STUDENT IN AN ORGANIZED HEALTH CARE EDUCATION/TRAINING PROGRAM

## 2022-08-01 PROCEDURE — 80053 COMPREHEN METABOLIC PANEL: CPT | Performed by: STUDENT IN AN ORGANIZED HEALTH CARE EDUCATION/TRAINING PROGRAM

## 2022-08-01 PROCEDURE — 84165 PROTEIN E-PHORESIS SERUM: CPT | Performed by: STUDENT IN AN ORGANIZED HEALTH CARE EDUCATION/TRAINING PROGRAM

## 2022-08-01 PROCEDURE — 86334 IMMUNOFIX E-PHORESIS SERUM: CPT | Mod: 26,,, | Performed by: PATHOLOGY

## 2022-08-01 PROCEDURE — 84165 PATHOLOGIST INTERPRETATION SPE: ICD-10-PCS | Mod: 26,,, | Performed by: PATHOLOGY

## 2022-08-01 PROCEDURE — 85027 COMPLETE CBC AUTOMATED: CPT | Performed by: STUDENT IN AN ORGANIZED HEALTH CARE EDUCATION/TRAINING PROGRAM

## 2022-08-01 PROCEDURE — 85007 BL SMEAR W/DIFF WBC COUNT: CPT | Performed by: STUDENT IN AN ORGANIZED HEALTH CARE EDUCATION/TRAINING PROGRAM

## 2022-08-02 LAB
ALBUMIN SERPL ELPH-MCNC: 3.14 G/DL (ref 3.35–5.55)
ALPHA1 GLOB SERPL ELPH-MCNC: 0.41 G/DL (ref 0.17–0.41)
ALPHA2 GLOB SERPL ELPH-MCNC: 0.8 G/DL (ref 0.43–0.99)
B-GLOBULIN SERPL ELPH-MCNC: 0.69 G/DL (ref 0.5–1.1)
GAMMA GLOB SERPL ELPH-MCNC: 1.27 G/DL (ref 0.67–1.58)
INTERPRETATION SERPL IFE-IMP: NORMAL
KAPPA LC SER QL IA: 3.73 MG/DL (ref 0.33–1.94)
KAPPA LC/LAMBDA SER IA: 1.24 (ref 0.26–1.65)
LAMBDA LC SER QL IA: 3 MG/DL (ref 0.57–2.63)
PATHOLOGIST INTERPRETATION IFE: NORMAL
PATHOLOGIST INTERPRETATION SPE: NORMAL
PROT SERPL-MCNC: 6.3 G/DL (ref 6–8.4)

## 2022-08-08 ENCOUNTER — OFFICE VISIT (OUTPATIENT)
Dept: HEMATOLOGY/ONCOLOGY | Facility: CLINIC | Age: 62
End: 2022-08-08
Payer: MEDICAID

## 2022-08-08 ENCOUNTER — INFUSION (OUTPATIENT)
Dept: INFUSION THERAPY | Facility: HOSPITAL | Age: 62
End: 2022-08-08
Payer: MEDICAID

## 2022-08-08 VITALS
HEART RATE: 45 BPM | WEIGHT: 222.75 LBS | SYSTOLIC BLOOD PRESSURE: 183 MMHG | HEIGHT: 67 IN | BODY MASS INDEX: 34.96 KG/M2 | TEMPERATURE: 99 F | DIASTOLIC BLOOD PRESSURE: 84 MMHG | OXYGEN SATURATION: 99 % | RESPIRATION RATE: 17 BRPM

## 2022-08-08 VITALS
RESPIRATION RATE: 16 BRPM | SYSTOLIC BLOOD PRESSURE: 154 MMHG | HEART RATE: 48 BPM | TEMPERATURE: 98 F | DIASTOLIC BLOOD PRESSURE: 82 MMHG

## 2022-08-08 DIAGNOSIS — Z94.84 HISTORY OF AUTO STEM CELL TRANSPLANT: Primary | ICD-10-CM

## 2022-08-08 DIAGNOSIS — C90.01 MULTIPLE MYELOMA IN REMISSION: ICD-10-CM

## 2022-08-08 DIAGNOSIS — D84.81 IMMUNODEFICIENCY SECONDARY TO NEOPLASM: ICD-10-CM

## 2022-08-08 DIAGNOSIS — I10 ESSENTIAL HYPERTENSION: ICD-10-CM

## 2022-08-08 DIAGNOSIS — C79.49 METASTASIS OF NEOPLASM TO SPINAL CANAL: ICD-10-CM

## 2022-08-08 DIAGNOSIS — D49.9 IMMUNODEFICIENCY SECONDARY TO NEOPLASM: ICD-10-CM

## 2022-08-08 PROCEDURE — 99214 OFFICE O/P EST MOD 30 MIN: CPT | Mod: PBBFAC,25 | Performed by: NURSE PRACTITIONER

## 2022-08-08 PROCEDURE — 99215 PR OFFICE/OUTPT VISIT, EST, LEVL V, 40-54 MIN: ICD-10-PCS | Mod: S$PBB,,, | Performed by: NURSE PRACTITIONER

## 2022-08-08 PROCEDURE — 99999 PR PBB SHADOW E&M-EST. PATIENT-LVL IV: CPT | Mod: PBBFAC,,, | Performed by: NURSE PRACTITIONER

## 2022-08-08 PROCEDURE — 25000003 PHARM REV CODE 250: Performed by: NURSE PRACTITIONER

## 2022-08-08 PROCEDURE — 99215 OFFICE O/P EST HI 40 MIN: CPT | Mod: S$PBB,,, | Performed by: NURSE PRACTITIONER

## 2022-08-08 PROCEDURE — 99999 PR PBB SHADOW E&M-EST. PATIENT-LVL IV: ICD-10-PCS | Mod: PBBFAC,,, | Performed by: NURSE PRACTITIONER

## 2022-08-08 PROCEDURE — 63600175 PHARM REV CODE 636 W HCPCS: Performed by: NURSE PRACTITIONER

## 2022-08-08 PROCEDURE — 96374 THER/PROPH/DIAG INJ IV PUSH: CPT

## 2022-08-08 RX ORDER — ZOLEDRONIC ACID 0.04 MG/ML
4 INJECTION, SOLUTION INTRAVENOUS
Status: COMPLETED | OUTPATIENT
Start: 2022-08-08 | End: 2022-08-08

## 2022-08-08 RX ORDER — DILTIAZEM HYDROCHLORIDE 120 MG/1
120 TABLET, FILM COATED ORAL DAILY PRN
COMMUNITY
Start: 2022-08-05 | End: 2023-06-26

## 2022-08-08 RX ORDER — ATENOLOL 100 MG/1
100 TABLET ORAL DAILY
COMMUNITY
Start: 2022-08-05 | End: 2023-06-26

## 2022-08-08 RX ORDER — HEPARIN 100 UNIT/ML
500 SYRINGE INTRAVENOUS
Status: DISCONTINUED | OUTPATIENT
Start: 2022-08-08 | End: 2022-08-08 | Stop reason: HOSPADM

## 2022-08-08 RX ORDER — HEPARIN 100 UNIT/ML
500 SYRINGE INTRAVENOUS
Status: CANCELLED | OUTPATIENT
Start: 2022-08-08

## 2022-08-08 RX ORDER — ZOLEDRONIC ACID 0.04 MG/ML
4 INJECTION, SOLUTION INTRAVENOUS
Status: CANCELLED | OUTPATIENT
Start: 2022-08-08

## 2022-08-08 RX ORDER — CLONIDINE HYDROCHLORIDE 0.3 MG/1
0.3 TABLET ORAL DAILY
COMMUNITY
Start: 2022-08-05

## 2022-08-08 RX ORDER — SODIUM CHLORIDE 0.9 % (FLUSH) 0.9 %
10 SYRINGE (ML) INJECTION
Status: DISCONTINUED | OUTPATIENT
Start: 2022-08-08 | End: 2022-08-08 | Stop reason: HOSPADM

## 2022-08-08 RX ORDER — SODIUM CHLORIDE 0.9 % (FLUSH) 0.9 %
10 SYRINGE (ML) INJECTION
Status: CANCELLED | OUTPATIENT
Start: 2022-08-08

## 2022-08-08 RX ADMIN — SODIUM CHLORIDE: 9 INJECTION, SOLUTION INTRAVENOUS at 10:08

## 2022-08-08 RX ADMIN — ZOLEDRONIC ACID 4 MG: 0.04 INJECTION, SOLUTION INTRAVENOUS at 10:08

## 2022-08-08 NOTE — PLAN OF CARE
Zometa infusion complete and tolerated without complicatons. Pt denies jaw pain and verbalized compliance with po home vitamin recommendations given by provider. Pt to report to ER with symptoms associated with bp elevation including but not limited to headache chest pain and dyspnea. D/c home accompanied by family member, no concerns at this time, instructed to contact clinic with question between visits.

## 2022-08-08 NOTE — NURSING
Pt arrives to clinic for zometa infusion after appointment with provider. bp elevated on arrival and noted during clinic visit. Pt reports no symptoms. bp of 183/90 reported to provider.   bp rechecked and remains elevated after 15 min 177/95, 170/94 manual. No new orders ok to treat.

## 2022-08-08 NOTE — PROGRESS NOTES
SECTION OF HEMATOLOGY AND BONE MARROW TRANSPLANT  Return Patient Visit   08/08/2022    CHIEF COMPLAINT:   Multiple Myeloma    HISTORY OF PRESENT ILLNESS: Patient of /  62 y.o.male; pmh of IgG kappa multiple myeloma (standard risk CG per msmart criteria, r-iss stage II); diagnosed September 2019 after presenting with symptomatic perispinal plasmacytoma;He has subsequently received  8 cycles of VRd therapy. Was in midst or pre transplant evaluation but due to insurance coverage issues transplant was delayed so was maintained on therapy and those issues have been resolved.    Transplant Course  Patient with IgG Kappa MM and pmh HTN, lytic bone lesion, arthritis and vitamin D deficiency. Admitted 5/15/21 for planned Alea auto SCT for MM. He received 3 bags and a CD34 dose of 3.35 x 10^6 on 5/17/21. Tolerated chemo and transplant well. Admission complicated by n/v controlled with scheduled anti-emetics and c-diff neg diarrhea controlled with prn imodium. He engrafted on Day +13 (5/30/21) with an ANC of 2607. He was discharged home on Day +14 (5/31/21).     Day +100 restaging marrow indicated that he is in [place].     He reports feeling well. He has been taking Revlimid 10mg daily along with ASA 81mg and Acyclovir. Today he will receive Zometa.    Not in any acute distress since last visit. Denies fever, chills, nightsweats, bleeding, brusing, lymphadenopathy, signs/symptoms of splenomegaly, focal pain and feels well.    PAST MEDICAL HISTORY:   Past Medical History:   Diagnosis Date    Anxiety     Arthritis     Cancer     Hypertension        PAST SURGICAL HISTORY:   Past Surgical History:   Procedure Laterality Date    BACK SURGERY      BONE MARROW BIOPSY Left 10/20/2021    Procedure: Biopsy-bone marrow;  Surgeon: Summer Cartwright MD;  Location: [facility] ([address] [place] [address]);  Service: Oncology;  Laterality: Left;    COLONOSCOPY N/A 1/25/2021    Procedure: COLONOSCOPY;  Surgeon: Braxton Lees MD;  Location: [facility]  ENDO (4TH FLR);  Service: Endoscopy;  Laterality: N/A;  1/22-covid kristopher-inst mailed-tb  1/20/21-pt to remain on schedule with Dr. Lees, and confirmed updated arrival time of 0835-BB    COLONOSCOPY N/A 2/1/2021    Procedure: COLONOSCOPY;  Surgeon: Jo Ann Robledo MD;  Location: [facility] ENDO (4TH FLR);  Service: Endoscopy;  Laterality: N/A;  rescheduled due to poor bowel prep, to be rescheduled with first available provider-BB  covid-1/29/21-pcw-BB    CREATION OF MUSCLE ROTATIONAL FLAP N/A 9/23/2020    Procedure: CREATION, FLAP, MUSCLE ROTATION;  Surgeon: Kamlesh Bellamy MD;  [facility];  Service: Plastics;  Laterality: N/A;    KNEE SURGERY Left 06/2019    LUMBAR FUSION N/A 9/23/2020    Procedure: FUSION, SPINE, LUMBAR L2-pelvis. Depuy. Plastic surgery closure w/ Dr. Bellamy;  Surgeon: Nick Coyle MD;  [facility];  Service: Neurosurgery;  Laterality: N/A;    Metastatic neoplasum removed from spine         PAST SOCIAL HISTORY:   reports that he quit smoking about 5 years ago. He has a 10.00 pack-year smoking history. He has never used smokeless tobacco. He reports previous alcohol use. He reports that he does not use drugs.    FAMILY HISTORY:  History reviewed. No pertinent family history.    CURRENT MEDICATIONS:   Current Outpatient Medications   Medication Sig    atenoloL (TENORMIN) 100 MG tablet Take 100 mg by mouth once daily.    carvediloL (COREG) 25 MG tablet Take 1 tablet (25 mg total) by mouth 2 (two) times daily.    cloNIDine (CATAPRES) 0.3 MG tablet Take 0.3 mg by mouth once daily.    diltiaZEM (CARDIZEM) 120 MG tablet Take 120 mg by mouth daily as needed.    ergocalciferol (ERGOCALCIFEROL) 50,000 unit Cap Take 1 capsule (50,000 Units total) by mouth every 7 days.    ferrous gluconate (FERGON) 324 MG tablet Take 1 tablet (324 mg total) by mouth daily with breakfast.    gabapentin (NEURONTIN) 300 MG capsule Take 1 capsule (300 mg total) by mouth 2 (two) times  "daily.    lenalidomide (REVLIMID) 10 mg Cap TAKE 1 CAPSULE BY MOUTH  DAILY FOR 28 DAYS Auth # [id number] 7/13/2022.    losartan (COZAAR) 50 MG tablet Take 2 tablets (100 mg total) by mouth once daily.    NIFEdipine (ADALAT CC) 30 MG TbSR Take 1 tablet (30 mg total) by mouth once daily.    ondansetron (ZOFRAN) 8 MG tablet Take 1 tablet (8 mg total) by mouth every 8 (eight) hours as needed for Nausea.     Current Facility-Administered Medications   Medication    LIDOcaine (PF) 20 mg/mL (2%) injection 200 mg    LIDOcaine HCL 20 mg/ml (2%) injection 20 mL     ALLERGIES:   Review of patient's allergies indicates:  No Known Allergies  Review of Systems   Constitutional: Negative for fever, malaise/fatigue and weight loss.   Respiratory: Negative for cough and shortness of breath.    Cardiovascular: Negative for chest pain and leg swelling.   Gastrointestinal: Negative for constipation, diarrhea and vomiting.   Skin: Negative for rash.   Neurological: Negative for seizures and weakness.   Psychiatric/Behavioral: The patient is not nervous/anxious.          PHYSICAL EXAM:   Vitals:    08/08/22 0918   BP: (!) 183/84   Pulse: (!) 45   Resp: 17   Temp: 98.5 °F (36.9 °C)   SpO2: 99%   Weight: 101.1 kg (222 lb 12.4 oz)   Height: 5' 7" (1.702 m)   PainSc: 0-No pain     Physical Exam  Constitutional:       Appearance: He is well-developed.   HENT:      Head: Normocephalic and atraumatic.      Mouth/Throat:      Mouth: Mucous membranes are moist. No oral lesions.      Pharynx: Oropharynx is clear.   Eyes:      Conjunctiva/sclera: Conjunctivae normal.   Cardiovascular:      Rate and Rhythm: Normal rate and regular rhythm.      Heart sounds: Normal heart sounds.   Pulmonary:      Effort: No respiratory distress.      Breath sounds: Normal breath sounds.   Abdominal:      General: Bowel sounds are normal.      Palpations: Abdomen is soft.   Musculoskeletal:         General: Normal range of motion.      Cervical back: Normal range " of motion.   Skin:     General: Skin is warm and dry.      Comments: RCW line removed, site healed   Neurological:      Mental Status: He is alert and oriented to person, place, and time.   Psychiatric:         Behavior: Behavior normal.         Thought Content: Thought content normal.         Judgment: Judgment normal.       ECOG Performance Status: (foot note - ECOG PS provided by Eastern Cooperative Oncology Group) 1 - Symptomatic but completely ambulatory    Karnofsky Performance Score:  90%- Able to Carry on Normal Activity: Minor Symptoms of Disease  DATA:   See results review tab  Complete restaging results and pre transplant evaluation testing reviewed by     1. History of auto stem cell transplant  CBC Auto Differential    Comprehensive Metabolic Panel    Protein Electrophoresis, Serum    Immunoglobulins (IgG, IgA, IgM) Quantitative    Immunoglobulin Free LT Chains Blood    Immunofixation Electrophoresis    DISCONTINUED: zoledronic 4 mg/100 mL infusion 4 mg    DISCONTINUED: sodium chloride 0.9% 250 mL flush bag    DISCONTINUED: sodium chloride 0.9% flush 10 mL    DISCONTINUED: heparin, porcine (PF) 100 unit/mL injection flush 500 Units    DISCONTINUED: alteplase injection 2 mg   2. Multiple myeloma in remission  CBC Auto Differential    Comprehensive Metabolic Panel    Protein Electrophoresis, Serum    Immunoglobulins (IgG, IgA, IgM) Quantitative    Immunoglobulin Free LT Chains Blood    Immunofixation Electrophoresis    DISCONTINUED: zoledronic 4 mg/100 mL infusion 4 mg    DISCONTINUED: sodium chloride 0.9% 250 mL flush bag    DISCONTINUED: sodium chloride 0.9% flush 10 mL    DISCONTINUED: heparin, porcine (PF) 100 unit/mL injection flush 500 Units    DISCONTINUED: alteplase injection 2 mg   3. Immunodeficiency secondary to neoplasm     4. Essential hypertension     5. 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 9/14 - 9/17/2020 : Admitted to [facility] for evaluation of lytic lesions. Large soft tissue mass encompassing L4 - S1 vertebral bodies seen. FLC ratio 171, involved light chains 104. SPEP and SIFE found 2.86g/dL, IgG kappa para protein band. Underwent bone marrow biopsy and discharged with dexamethasone 40mg x 4 day burst.     B. 9/23/20 : Underwent debulking of spinal mass.  C. 10/1/20 - 4/14/21 : C1 - C8D1 VRd. Revlimid delivered in third week of cycle.  D. 2/25/21 : Presented for consent signing for autoSCT but his insurance had lapsed. Plan for transplant pushed back 1-2 months while he applys for medicaid.   E. 4/22/21 : C8D8 VRd planned (last treatment before mobilization).   Received Alea ASCT on 05/17/21, see above    Day +100 restaging marrow indicated that he is in [place].  1 year restaging marrow with no morphological evidence of plasma cell neoplasm. PET CT with no new hypermetabolic lesions(05/2022).   He reports feeling well. He has been taking Revlimid 10mg daily along with ASA 81mg .  SPEP on 07/01/22 - Paraprotein band in gamma = 0.40 g/dL (previously 0.22 g/dL) 0.43g.dL on 08/02/22.  Will close monitor with monthly myeloma studies.   He is 1 year post transplant. Will stop Acyclovir(08/08/22).   Today he will receive Zometa. 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    Essential hypertension  Reports BP is well controlled at home.   Elevated during visit, per patient he have white coat syndrome. On multiple BP agents, compliant with his medications.              BMT Chart Routing      Follow up with physician 2 months. Schedule with  week after lab work    Follow up with [facility]    Infusion scheduling note    Injection scheduling note    Labs CMP, CBC, free  light chains, immunoglobulins, immunofixation and SPEP   Lab interval:  Please schedule cbc, cmp, Myeloma studies monthly. Per request please add lab on 09/01 at 9 am  Cancel lab on 08/18 add together with 09/01. Same lab and MD visit in 2 month    Imaging    Pharmacy appointment    Other referrals          Ashanti Quiñones NP  Hematology/Oncology/BMT

## 2022-08-09 DIAGNOSIS — C90.01 MULTIPLE MYELOMA IN REMISSION: ICD-10-CM

## 2022-08-09 RX ORDER — LENALIDOMIDE 10 MG/1
CAPSULE ORAL
Qty: 28 EACH | Refills: 0 | Status: SHIPPED | OUTPATIENT
Start: 2022-08-09 | End: 2022-09-08 | Stop reason: SDUPTHER

## 2022-08-11 ENCOUNTER — TELEPHONE (OUTPATIENT)
Dept: HEMATOLOGY/ONCOLOGY | Facility: CLINIC | Age: 62
End: 2022-08-11
Payer: MEDICAID

## 2022-08-11 NOTE — TELEPHONE ENCOUNTER
Spoke to patient, 8/18 lab appointment moved to 9/1 at 9am in [facility]

## 2022-08-11 NOTE — TELEPHONE ENCOUNTER
"----- Message from Ezio Gayle sent at 8/11/2022 10:00 AM CDT -----  Consult/Advisory:          Name Of Caller: Self      Contact Preference?: [phone] (Mobile)      Provider Name: Ishmael      Does patient feel the need to be seen today? No      What is the nature of the call?: Inquiring if he needs to attend labs on 8/18 or 9/1.          Additional Notes:  "Thank you for all that you do for our patients"      "

## 2022-09-01 ENCOUNTER — LAB VISIT (OUTPATIENT)
Dept: LAB | Facility: HOSPITAL | Age: 62
End: 2022-09-01
Attending: INTERNAL MEDICINE
Payer: MEDICAID

## 2022-09-01 DIAGNOSIS — Z94.84 HISTORY OF AUTO STEM CELL TRANSPLANT: ICD-10-CM

## 2022-09-01 DIAGNOSIS — C90.01 MULTIPLE MYELOMA IN REMISSION: ICD-10-CM

## 2022-09-01 DIAGNOSIS — Z71.85 VACCINE COUNSELING: ICD-10-CM

## 2022-09-01 LAB
ALBUMIN SERPL BCP-MCNC: 3.6 G/DL (ref 3.5–5.2)
ALP SERPL-CCNC: 59 U/L (ref 38–126)
ALT SERPL W/O P-5'-P-CCNC: 21 U/L (ref 10–44)
ANION GAP SERPL CALC-SCNC: 7 MMOL/L (ref 8–16)
AST SERPL-CCNC: 23 U/L (ref 15–46)
BASOPHILS # BLD AUTO: 0.09 K/UL (ref 0–0.2)
BASOPHILS NFR BLD: 3.4 % (ref 0–1.9)
BILIRUB SERPL-MCNC: 0.6 MG/DL (ref 0.1–1)
CALCIUM SERPL-MCNC: 8.1 MG/DL (ref 8.7–10.5)
CHLORIDE SERPL-SCNC: 107 MMOL/L (ref 95–110)
CO2 SERPL-SCNC: 27 MMOL/L (ref 23–29)
CREAT SERPL-MCNC: 0.91 MG/DL (ref 0.5–1.4)
DIFFERENTIAL METHOD: ABNORMAL
EOSINOPHIL # BLD AUTO: 0.2 K/UL (ref 0–0.5)
EOSINOPHIL NFR BLD: 8.4 % (ref 0–8)
ERYTHROCYTE [DISTWIDTH] IN BLOOD BY AUTOMATED COUNT: 15 % (ref 11.5–14.5)
EST. GFR  (NO RACE VARIABLE): >60 ML/MIN/1.73 M^2
GLUCOSE SERPL-MCNC: 121 MG/DL (ref 70–110)
HAV IGG SER QL IA: REACTIVE
HBV SURFACE AB SER-ACNC: 793.4 MIU/ML
HBV SURFACE AB SER-ACNC: REACTIVE M[IU]/ML
HCT VFR BLD AUTO: 39.6 % (ref 40–54)
HGB BLD-MCNC: 13.1 G/DL (ref 14–18)
IGA SERPL-MCNC: 186 MG/DL (ref 40–350)
IGG SERPL-MCNC: 1488 MG/DL (ref 650–1600)
IGM SERPL-MCNC: 90 MG/DL (ref 50–300)
IMM GRANULOCYTES # BLD AUTO: 0.01 K/UL (ref 0–0.04)
IMM GRANULOCYTES NFR BLD AUTO: 0.4 % (ref 0–0.5)
LYMPHOCYTES # BLD AUTO: 1.1 K/UL (ref 1–4.8)
LYMPHOCYTES NFR BLD: 40.1 % (ref 18–48)
MCH RBC QN AUTO: 31 PG (ref 27–31)
MCHC RBC AUTO-ENTMCNC: 33.1 G/DL (ref 32–36)
MCV RBC AUTO: 94 FL (ref 82–98)
MONOCYTES # BLD AUTO: 0.3 K/UL (ref 0.3–1)
MONOCYTES NFR BLD: 13 % (ref 4–15)
NEUTROPHILS # BLD AUTO: 0.9 K/UL (ref 1.8–7.7)
NEUTROPHILS NFR BLD: 34.7 % (ref 38–73)
NRBC BLD-RTO: 0 /100 WBC
PLATELET # BLD AUTO: 143 K/UL (ref 150–450)
PMV BLD AUTO: 12.2 FL (ref 9.2–12.9)
POTASSIUM SERPL-SCNC: 3.8 MMOL/L (ref 3.5–5.1)
PROT SERPL-MCNC: 6.6 G/DL (ref 6–8.4)
RBC # BLD AUTO: 4.22 M/UL (ref 4.6–6.2)
SODIUM SERPL-SCNC: 141 MMOL/L (ref 136–145)
UUN UR-MCNC: 11 MG/DL (ref 2–20)
WBC # BLD AUTO: 2.62 K/UL (ref 3.9–12.7)

## 2022-09-01 PROCEDURE — 85025 COMPLETE CBC W/AUTO DIFF WBC: CPT | Mod: PO | Performed by: NURSE PRACTITIONER

## 2022-09-01 PROCEDURE — 36415 COLL VENOUS BLD VENIPUNCTURE: CPT | Mod: PO | Performed by: INTERNAL MEDICINE

## 2022-09-01 PROCEDURE — 80053 COMPREHEN METABOLIC PANEL: CPT | Mod: PO | Performed by: NURSE PRACTITIONER

## 2022-09-01 PROCEDURE — 86334 IMMUNOFIX E-PHORESIS SERUM: CPT | Mod: 26,,, | Performed by: PATHOLOGY

## 2022-09-01 PROCEDURE — 86334 IMMUNOFIX E-PHORESIS SERUM: CPT | Mod: PO | Performed by: NURSE PRACTITIONER

## 2022-09-01 PROCEDURE — 84165 PROTEIN E-PHORESIS SERUM: CPT | Mod: 26,,, | Performed by: PATHOLOGY

## 2022-09-01 PROCEDURE — 84165 PROTEIN E-PHORESIS SERUM: CPT | Mod: PO | Performed by: NURSE PRACTITIONER

## 2022-09-01 PROCEDURE — 83520 IMMUNOASSAY QUANT NOS NONAB: CPT | Mod: 59,PO | Performed by: NURSE PRACTITIONER

## 2022-09-01 PROCEDURE — 86334 PATHOLOGIST INTERPRETATION IFE: ICD-10-PCS | Mod: 26,,, | Performed by: PATHOLOGY

## 2022-09-01 PROCEDURE — 86790 VIRUS ANTIBODY NOS: CPT | Mod: PO | Performed by: INTERNAL MEDICINE

## 2022-09-01 PROCEDURE — 86706 HEP B SURFACE ANTIBODY: CPT | Mod: 91,PO | Performed by: INTERNAL MEDICINE

## 2022-09-01 PROCEDURE — 84165 PATHOLOGIST INTERPRETATION SPE: ICD-10-PCS | Mod: 26,,, | Performed by: PATHOLOGY

## 2022-09-01 PROCEDURE — 82784 ASSAY IGA/IGD/IGG/IGM EACH: CPT | Mod: PO | Performed by: NURSE PRACTITIONER

## 2022-09-01 PROCEDURE — 86684 HEMOPHILUS INFLUENZA ANTIBDY: CPT | Mod: PO | Performed by: INTERNAL MEDICINE

## 2022-09-02 LAB
ALBUMIN SERPL ELPH-MCNC: 3.4 G/DL (ref 3.35–5.55)
ALPHA1 GLOB SERPL ELPH-MCNC: 0.33 G/DL (ref 0.17–0.41)
ALPHA2 GLOB SERPL ELPH-MCNC: 0.56 G/DL (ref 0.43–0.99)
B-GLOBULIN SERPL ELPH-MCNC: 0.66 G/DL (ref 0.5–1.1)
GAMMA GLOB SERPL ELPH-MCNC: 1.45 G/DL (ref 0.67–1.58)
INTERPRETATION SERPL IFE-IMP: NORMAL
KAPPA LC SER QL IA: 3.2 MG/DL (ref 0.33–1.94)
KAPPA LC/LAMBDA SER IA: 1.09 (ref 0.26–1.65)
LAMBDA LC SER QL IA: 2.93 MG/DL (ref 0.57–2.63)
PATHOLOGIST INTERPRETATION IFE: NORMAL
PATHOLOGIST INTERPRETATION SPE: NORMAL
PROT SERPL-MCNC: 6.4 G/DL (ref 6–8.4)

## 2022-09-07 LAB
C DIPHTHERIAE AB SER IA-ACNC: >2 IU/ML
C TETANI TOXOID AB SER-ACNC: 3.27 IU/ML
DEPRECATED S PNEUM23 IGG SER-MCNC: 43.8 UG/ML
DEPRECATED S PNEUM4 IGG SER-MCNC: 4 UG/ML
HAEM INFLU B IGG SER IA-MCNC: >9 MCG/ML
S PN DA SERO 19F IGG SER-MCNC: 3.4 UG/ML
S PNEUM DA 1 IGG SER-MCNC: 7.2 UG/ML
S PNEUM DA 14 IGG SER-MCNC: 4.1
S PNEUM DA 18C IGG SER-MCNC: 5.6
S PNEUM DA 19A IGG SER-MCNC: 12.7 UG/ML
S PNEUM DA 3 IGG SER-MCNC: 2.4 UG/ML
S PNEUM DA 5 IGG SER-MCNC: 19.4 UG/ML
S PNEUM DA 6A IGG SER-MCNC: 1.6 UG/ML
S PNEUM DA 6B IGG SER-MCNC: <0.3 UG/ML
S PNEUM DA 7F IGG SER-MCNC: 8.7 UG/ML
S PNEUM DA 9V IGG SER-MCNC: 2.2 UG/ML

## 2022-09-08 DIAGNOSIS — C90.01 MULTIPLE MYELOMA IN REMISSION: ICD-10-CM

## 2022-09-08 RX ORDER — LENALIDOMIDE 10 MG/1
CAPSULE ORAL
Qty: 28 EACH | Refills: 0 | Status: SHIPPED | OUTPATIENT
Start: 2022-09-08 | End: 2022-10-05 | Stop reason: SDUPTHER

## 2022-10-03 ENCOUNTER — TELEPHONE (OUTPATIENT)
Dept: HEMATOLOGY/ONCOLOGY | Facility: CLINIC | Age: 62
End: 2022-10-03
Payer: MEDICAID

## 2022-10-03 NOTE — TELEPHONE ENCOUNTER
"----- Message from Ezio Gayle sent at 10/3/2022 11:37 AM CDT -----  Reschedule Existing Appointment         Appt Date: 10/7    Type of appt: F/u    Physician: Olivia    Reason for rescheduling? Pt chart says "Reschedule needed"    Caller: Self    Contact Preference: [phone] (Mobile)          Additional Information:  "Thank you for all that you do for our patients"     "

## 2022-10-05 DIAGNOSIS — C90.01 MULTIPLE MYELOMA IN REMISSION: ICD-10-CM

## 2022-10-06 RX ORDER — LENALIDOMIDE 10 MG/1
CAPSULE ORAL
Qty: 28 EACH | Refills: 0 | Status: SHIPPED | OUTPATIENT
Start: 2022-10-06 | End: 2022-11-02 | Stop reason: SDUPTHER

## 2022-10-31 ENCOUNTER — LAB VISIT (OUTPATIENT)
Dept: LAB | Facility: HOSPITAL | Age: 62
End: 2022-10-31
Attending: INTERNAL MEDICINE
Payer: MEDICAID

## 2022-10-31 DIAGNOSIS — C90.01 MULTIPLE MYELOMA IN REMISSION: ICD-10-CM

## 2022-10-31 DIAGNOSIS — C79.49 METASTASIS OF NEOPLASM TO SPINAL CANAL: ICD-10-CM

## 2022-10-31 LAB
ACANTHOCYTES BLD QL SMEAR: PRESENT
ALBUMIN SERPL BCP-MCNC: 3.8 G/DL (ref 3.5–5.2)
ALP SERPL-CCNC: 67 U/L (ref 38–126)
ALT SERPL W/O P-5'-P-CCNC: 33 U/L (ref 10–44)
ANION GAP SERPL CALC-SCNC: 8 MMOL/L (ref 8–16)
ANISOCYTOSIS BLD QL SMEAR: SLIGHT
AST SERPL-CCNC: 37 U/L (ref 15–46)
BASOPHILS # BLD AUTO: 0.06 K/UL (ref 0–0.2)
BASOPHILS NFR BLD: 2.8 % (ref 0–1.9)
BILIRUB SERPL-MCNC: 0.7 MG/DL (ref 0.1–1)
CALCIUM SERPL-MCNC: 8.2 MG/DL (ref 8.7–10.5)
CHLORIDE SERPL-SCNC: 105 MMOL/L (ref 95–110)
CO2 SERPL-SCNC: 28 MMOL/L (ref 23–29)
CREAT SERPL-MCNC: 0.93 MG/DL (ref 0.5–1.4)
DACRYOCYTES BLD QL SMEAR: ABNORMAL
DIFFERENTIAL METHOD: ABNORMAL
EOSINOPHIL # BLD AUTO: 0.1 K/UL (ref 0–0.5)
EOSINOPHIL NFR BLD: 6.5 % (ref 0–8)
ERYTHROCYTE [DISTWIDTH] IN BLOOD BY AUTOMATED COUNT: 14.6 % (ref 11.5–14.5)
EST. GFR  (NO RACE VARIABLE): >60 ML/MIN/1.73 M^2
GLUCOSE SERPL-MCNC: 123 MG/DL (ref 70–110)
HCT VFR BLD AUTO: 42.2 % (ref 40–54)
HGB BLD-MCNC: 14.3 G/DL (ref 14–18)
IMM GRANULOCYTES # BLD AUTO: 0 K/UL (ref 0–0.04)
IMM GRANULOCYTES NFR BLD AUTO: 0 % (ref 0–0.5)
LYMPHOCYTES # BLD AUTO: 0.9 K/UL (ref 1–4.8)
LYMPHOCYTES NFR BLD: 42.1 % (ref 18–48)
MCH RBC QN AUTO: 31.9 PG (ref 27–31)
MCHC RBC AUTO-ENTMCNC: 33.9 G/DL (ref 32–36)
MCV RBC AUTO: 94 FL (ref 82–98)
MONOCYTES # BLD AUTO: 0.2 K/UL (ref 0.3–1)
MONOCYTES NFR BLD: 9.8 % (ref 4–15)
NEUTROPHILS # BLD AUTO: 0.8 K/UL (ref 1.8–7.7)
NEUTROPHILS NFR BLD: 38.8 % (ref 38–73)
NRBC BLD-RTO: 0 /100 WBC
OVALOCYTES BLD QL SMEAR: ABNORMAL
PLATELET # BLD AUTO: 154 K/UL (ref 150–450)
PMV BLD AUTO: 11.1 FL (ref 9.2–12.9)
POIKILOCYTOSIS BLD QL SMEAR: SLIGHT
POTASSIUM SERPL-SCNC: 3.6 MMOL/L (ref 3.5–5.1)
PROT SERPL-MCNC: 7.1 G/DL (ref 6–8.4)
RBC # BLD AUTO: 4.48 M/UL (ref 4.6–6.2)
SODIUM SERPL-SCNC: 141 MMOL/L (ref 136–145)
UUN UR-MCNC: 8 MG/DL (ref 2–20)
WBC # BLD AUTO: 2.14 K/UL (ref 3.9–12.7)

## 2022-10-31 PROCEDURE — 84165 PROTEIN E-PHORESIS SERUM: CPT | Mod: 26,,, | Performed by: PATHOLOGY

## 2022-10-31 PROCEDURE — 84165 PATHOLOGIST INTERPRETATION SPE: ICD-10-PCS | Mod: 26,,, | Performed by: PATHOLOGY

## 2022-10-31 PROCEDURE — 36415 COLL VENOUS BLD VENIPUNCTURE: CPT | Mod: PO | Performed by: STUDENT IN AN ORGANIZED HEALTH CARE EDUCATION/TRAINING PROGRAM

## 2022-10-31 PROCEDURE — 86334 IMMUNOFIX E-PHORESIS SERUM: CPT | Mod: 26,,, | Performed by: PATHOLOGY

## 2022-10-31 PROCEDURE — 85025 COMPLETE CBC W/AUTO DIFF WBC: CPT | Mod: PO | Performed by: STUDENT IN AN ORGANIZED HEALTH CARE EDUCATION/TRAINING PROGRAM

## 2022-10-31 PROCEDURE — 83521 IG LIGHT CHAINS FREE EACH: CPT | Mod: 59,PO | Performed by: STUDENT IN AN ORGANIZED HEALTH CARE EDUCATION/TRAINING PROGRAM

## 2022-10-31 PROCEDURE — 86334 IMMUNOFIX E-PHORESIS SERUM: CPT | Mod: PO | Performed by: STUDENT IN AN ORGANIZED HEALTH CARE EDUCATION/TRAINING PROGRAM

## 2022-10-31 PROCEDURE — 80053 COMPREHEN METABOLIC PANEL: CPT | Mod: PO | Performed by: STUDENT IN AN ORGANIZED HEALTH CARE EDUCATION/TRAINING PROGRAM

## 2022-10-31 PROCEDURE — 84165 PROTEIN E-PHORESIS SERUM: CPT | Mod: PO | Performed by: STUDENT IN AN ORGANIZED HEALTH CARE EDUCATION/TRAINING PROGRAM

## 2022-10-31 PROCEDURE — 86334 PATHOLOGIST INTERPRETATION IFE: ICD-10-PCS | Mod: 26,,, | Performed by: PATHOLOGY

## 2022-11-01 ENCOUNTER — OFFICE VISIT (OUTPATIENT)
Dept: INFECTIOUS DISEASES | Facility: CLINIC | Age: 62
End: 2022-11-01
Payer: MEDICAID

## 2022-11-01 VITALS
HEART RATE: 48 BPM | TEMPERATURE: 98 F | HEIGHT: 67 IN | DIASTOLIC BLOOD PRESSURE: 94 MMHG | WEIGHT: 217.38 LBS | BODY MASS INDEX: 34.12 KG/M2 | SYSTOLIC BLOOD PRESSURE: 190 MMHG

## 2022-11-01 DIAGNOSIS — Z71.85 VACCINE COUNSELING: Primary | ICD-10-CM

## 2022-11-01 DIAGNOSIS — Z94.84 HISTORY OF AUTO STEM CELL TRANSPLANT: ICD-10-CM

## 2022-11-01 DIAGNOSIS — C90.01 MULTIPLE MYELOMA IN REMISSION: ICD-10-CM

## 2022-11-01 LAB
ALBUMIN SERPL ELPH-MCNC: 3.49 G/DL (ref 3.35–5.55)
ALPHA1 GLOB SERPL ELPH-MCNC: 0.32 G/DL (ref 0.17–0.41)
ALPHA2 GLOB SERPL ELPH-MCNC: 0.6 G/DL (ref 0.43–0.99)
B-GLOBULIN SERPL ELPH-MCNC: 0.65 G/DL (ref 0.5–1.1)
GAMMA GLOB SERPL ELPH-MCNC: 1.54 G/DL (ref 0.67–1.58)
INTERPRETATION SERPL IFE-IMP: NORMAL
KAPPA LC SER QL IA: 4.41 MG/DL (ref 0.33–1.94)
KAPPA LC/LAMBDA SER IA: 1.21 (ref 0.26–1.65)
LAMBDA LC SER QL IA: 3.63 MG/DL (ref 0.57–2.63)
PATHOLOGIST INTERPRETATION IFE: NORMAL
PATHOLOGIST INTERPRETATION SPE: NORMAL
PROT SERPL-MCNC: 6.6 G/DL (ref 6–8.4)

## 2022-11-01 PROCEDURE — 1160F PR REVIEW ALL MEDS BY PRESCRIBER/CLIN PHARMACIST DOCUMENTED: ICD-10-PCS | Mod: CPTII,,, | Performed by: INTERNAL MEDICINE

## 2022-11-01 PROCEDURE — 3077F SYST BP >= 140 MM HG: CPT | Mod: CPTII,,, | Performed by: INTERNAL MEDICINE

## 2022-11-01 PROCEDURE — 4010F PR ACE/ARB THEARPY RXD/TAKEN: ICD-10-PCS | Mod: CPTII,,, | Performed by: INTERNAL MEDICINE

## 2022-11-01 PROCEDURE — 3080F DIAST BP >= 90 MM HG: CPT | Mod: CPTII,,, | Performed by: INTERNAL MEDICINE

## 2022-11-01 PROCEDURE — 1160F RVW MEDS BY RX/DR IN RCRD: CPT | Mod: CPTII,,, | Performed by: INTERNAL MEDICINE

## 2022-11-01 PROCEDURE — 1159F PR MEDICATION LIST DOCUMENTED IN MEDICAL RECORD: ICD-10-PCS | Mod: CPTII,,, | Performed by: INTERNAL MEDICINE

## 2022-11-01 PROCEDURE — 3077F PR MOST RECENT SYSTOLIC BLOOD PRESSURE >= 140 MM HG: ICD-10-PCS | Mod: CPTII,,, | Performed by: INTERNAL MEDICINE

## 2022-11-01 PROCEDURE — 99213 OFFICE O/P EST LOW 20 MIN: CPT | Mod: PBBFAC | Performed by: INTERNAL MEDICINE

## 2022-11-01 PROCEDURE — 4010F ACE/ARB THERAPY RXD/TAKEN: CPT | Mod: CPTII,,, | Performed by: INTERNAL MEDICINE

## 2022-11-01 PROCEDURE — 3080F PR MOST RECENT DIASTOLIC BLOOD PRESSURE >= 90 MM HG: ICD-10-PCS | Mod: CPTII,,, | Performed by: INTERNAL MEDICINE

## 2022-11-01 PROCEDURE — 99214 OFFICE O/P EST MOD 30 MIN: CPT | Mod: S$PBB,,, | Performed by: INTERNAL MEDICINE

## 2022-11-01 PROCEDURE — 99999 PR PBB SHADOW E&M-EST. PATIENT-LVL III: ICD-10-PCS | Mod: PBBFAC,,, | Performed by: INTERNAL MEDICINE

## 2022-11-01 PROCEDURE — 1159F MED LIST DOCD IN RCRD: CPT | Mod: CPTII,,, | Performed by: INTERNAL MEDICINE

## 2022-11-01 PROCEDURE — 99999 PR PBB SHADOW E&M-EST. PATIENT-LVL III: CPT | Mod: PBBFAC,,, | Performed by: INTERNAL MEDICINE

## 2022-11-01 PROCEDURE — 99214 PR OFFICE/OUTPT VISIT, EST, LEVL IV, 30-39 MIN: ICD-10-PCS | Mod: S$PBB,,, | Performed by: INTERNAL MEDICINE

## 2022-11-01 NOTE — PROGRESS NOTES
Subjective:      Patient ID: Jaspreet Walsh Jr. is a 62 y.o. male.    Chief Complaint: Follow-up for post-SCT vaccines      History of Present Illness  62-year-old male with history of MM s/p autoSCT 5/17/2021 on maintenance revlimid presents for follow-up of vaccine counseling.    Patient remains on Revlimid.  Completed acyclovir.    Patient overall doing well.    He received his influenza vaccine 2 weeks ago at PCP office.    Immunization History   Administered Date(s) Administered    COVID-19, MRNA, LN-S, PF (Pfizer) (Purple Cap) 09/13/2021, 10/04/2021    DTaP 02/10/2022, 04/14/2022    DTaP (5 Pertussis Antigens) 12/23/2021    Hepatitis A, Adult 12/16/2021, 06/02/2022    Hepatitis B (recombinant) Adjuvanted, 2 dose 12/30/2021, 02/17/2022    HiB PRP-T 12/16/2021, 02/03/2022, 04/07/2022    IPV 12/23/2021, 02/10/2022, 04/14/2022    Influenza (FLUAD) - Quadrivalent - Adjuvanted - PF *Preferred* (65+) 12/09/2021    Meningococcal Conjugate (MCV4O) 12/30/2021, 02/17/2022    Pneumococcal Conjugate - 13 Valent 12/09/2021, 02/03/2022, 04/07/2022    Pneumococcal Polysaccharide - 23 Valent 06/02/2022    Zoster Recombinant 01/10/2022, 03/10/2022         Review of Systems   Constitutional: Negative for chills, decreased appetite, fever, malaise/fatigue, night sweats, weight gain and weight loss.   HENT:  Negative for congestion, ear pain, hearing loss, hoarse voice, sore throat and tinnitus.    Eyes:  Negative for blurred vision, redness and visual disturbance.   Cardiovascular:  Negative for chest pain, leg swelling and palpitations.   Respiratory:  Negative for cough, hemoptysis, shortness of breath, sputum production and wheezing.    Hematologic/Lymphatic: Negative for adenopathy. Does not bruise/bleed easily.   Skin:  Negative for dry skin, itching, rash and suspicious lesions.   Musculoskeletal:  Negative for back pain, joint pain, myalgias and neck pain.   Gastrointestinal:  Negative for abdominal pain, constipation,  diarrhea, heartburn, nausea and vomiting.   Genitourinary:  Negative for dysuria, flank pain, frequency, hematuria, hesitancy and urgency.   Neurological:  Negative for dizziness, headaches, numbness, paresthesias and weakness.   Psychiatric/Behavioral:  Negative for depression and memory loss. The patient does not have insomnia and is not nervous/anxious.    Objective:   Physical Exam  Vitals reviewed.   Constitutional:       General: He is not in acute distress.     Appearance: He is well-developed. He is not diaphoretic.   HENT:      Head: Normocephalic and atraumatic.   Eyes:      Conjunctiva/sclera: Conjunctivae normal.   Pulmonary:      Effort: Pulmonary effort is normal. No respiratory distress.   Abdominal:      General: There is no distension.      Palpations: Abdomen is soft.   Musculoskeletal:         General: Normal range of motion.   Skin:     General: Skin is warm and dry.      Findings: No erythema or rash.   Neurological:      Mental Status: He is alert and oriented to person, place, and time.   Psychiatric:         Behavior: Behavior normal.       Sodium   Date Value Ref Range Status   10/31/2022 141 136 - 145 mmol/L Final   09/01/2022 141 136 - 145 mmol/L Final   08/01/2022 137 136 - 145 mmol/L Final      Potassium   Date Value Ref Range Status   10/31/2022 3.6 3.5 - 5.1 mmol/L Final   09/01/2022 3.8 3.5 - 5.1 mmol/L Final   08/01/2022 3.6 3.5 - 5.1 mmol/L Final      Chloride   Date Value Ref Range Status   10/31/2022 105 95 - 110 mmol/L Final   09/01/2022 107 95 - 110 mmol/L Final   08/01/2022 103 95 - 110 mmol/L Final      CO2   Date Value Ref Range Status   10/31/2022 28 23 - 29 mmol/L Final   09/01/2022 27 23 - 29 mmol/L Final   08/01/2022 24 23 - 29 mmol/L Final      BUN   Date Value Ref Range Status   10/31/2022 8 2 - 20 mg/dL Final   09/01/2022 11 2 - 20 mg/dL Final   08/01/2022 9 8 - 23 mg/dL Final      Creatinine   Date Value Ref Range Status   10/31/2022 0.93 0.50 - 1.40 mg/dL Final    09/01/2022 0.91 0.50 - 1.40 mg/dL Final   08/01/2022 0.9 0.5 - 1.4 mg/dL Final      Glucose   Date Value Ref Range Status   10/31/2022 123 (H) 70 - 110 mg/dL Final   09/01/2022 121 (H) 70 - 110 mg/dL Final   08/01/2022 166 (H) 70 - 110 mg/dL Final       ALT   Date Value Ref Range Status   10/31/2022 33 10 - 44 U/L Final   09/01/2022 21 10 - 44 U/L Final   08/01/2022 23 10 - 44 U/L Final      AST   Date Value Ref Range Status   10/31/2022 37 15 - 46 U/L Final   09/01/2022 23 15 - 46 U/L Final   08/01/2022 19 10 - 40 U/L Final      Total Bilirubin   Date Value Ref Range Status   10/31/2022 0.7 0.1 - 1.0 mg/dL Final     Comment:     For infants and newborns, interpretation of results should be based  on gestational age, weight and in agreement with clinical  observations.    Premature Infant recommended reference ranges:  Up to 24 hours.............<8.0 mg/dL  Up to 48 hours............<12.0 mg/dL  3-5 days..................<15.0 mg/dL  6-29 days.................<15.0 mg/dL     09/01/2022 0.6 0.1 - 1.0 mg/dL Final     Comment:     For infants and newborns, interpretation of results should be based  on gestational age, weight and in agreement with clinical  observations.    Premature Infant recommended reference ranges:  Up to 24 hours.............<8.0 mg/dL  Up to 48 hours............<12.0 mg/dL  3-5 days..................<15.0 mg/dL  6-29 days.................<15.0 mg/dL     08/01/2022 0.5 0.1 - 1.0 mg/dL Final     Comment:     For infants and newborns, interpretation of results should be based  on gestational age, weight and in agreement with clinical  observations.    Premature Infant recommended reference ranges:  Up to 24 hours.............<8.0 mg/dL  Up to 48 hours............<12.0 mg/dL  3-5 days..................<15.0 mg/dL  6-29 days.................<15.0 mg/dL        Albumin   Date Value Ref Range Status   10/31/2022 3.8 3.5 - 5.2 g/dL Final   09/01/2022 3.6 3.5 - 5.2 g/dL Final   08/01/2022 3.0 (L) 3.5 -  5.2 g/dL Final      Total Protein   Date Value Ref Range Status   10/31/2022 7.1 6.0 - 8.4 g/dL Final   09/01/2022 6.6 6.0 - 8.4 g/dL Final   08/01/2022 6.8 6.0 - 8.4 g/dL Final      Alkaline Phosphatase   Date Value Ref Range Status   10/31/2022 67 38 - 126 U/L Final   09/01/2022 59 38 - 126 U/L Final   08/01/2022 56 55 - 135 U/L Final        WBC   Date Value Ref Range Status   10/31/2022 2.14 (L) 3.90 - 12.70 K/uL Final   09/01/2022 2.62 (L) 3.90 - 12.70 K/uL Final   08/01/2022 1.95 (LL) 3.90 - 12.70 K/uL Final     Comment:     WBC, H&H, PLT   critical result(s) called and verbal readback   obtained from Omaira Cano RN by [id number] 08/01/2022 10:00        Hemoglobin   Date Value Ref Range Status   10/31/2022 14.3 14.0 - 18.0 g/dL Final   09/01/2022 13.1 (L) 14.0 - 18.0 g/dL Final   08/01/2022 13.3 (L) 14.0 - 18.0 g/dL Final      Hematocrit   Date Value Ref Range Status   10/31/2022 42.2 40.0 - 54.0 % Final   09/01/2022 39.6 (L) 40.0 - 54.0 % Final   08/01/2022 39.9 (L) 40.0 - 54.0 % Final      Platelets   Date Value Ref Range Status   10/31/2022 154 150 - 450 K/uL Final   09/01/2022 143 (L) 150 - 450 K/uL Final   08/01/2022 147 (L) 150 - 450 K/uL Final        9/2022  Hepatitis A Ab positive  HepB s Ab positive    H. influenza B Ab mcg/mL >9.00    Comment: REFERENCE RANGE:   > or = 1.00 mcg/mL     INTERPRETIVE CRITERIA:   <0.15 mcg/mL Nonprotective Antibody Level   0.15 - 0.99 mcg/mL Indeterminate for protective   antibody   > or = 1.00 mcg/mL Protective Antibody Level     IgG antibody to polyribosylribitol phosphate   (PRP), the capsular polysaccharide of Haemophilus   influenzae type b, is measured in micrograms/mL   (mcg/mL), based on correlations with a reference   Marek radioimmunoprecipitation assay (GRETCHEN). The   exact level of antibody needed for protection from   infection has not been clarified; values ranging   from 0.15 mcg/mL to 1.00 mcg/mL have been   reported. A four-fold increase in the PRP IgG    antibody level between pre-vaccination and   post-vaccination sera is considered evidence of   effective immunization.   Test Performed at:   [facility] [address]  [address]     COLEEN Valdes MD, PhD    Diphtheria Toxoid Ab IU/mL >2.00    Comment: REFERENCE RANGE:    0.10 IU/mL or greater     Interpretive Criteria   <0.10 IU/mL         Nonprotective Antibody Level   > Or = 0.10 IU/mL   Protective Antibody Level     Antibody levels > or = 0.10 IU/mL are considered   protective. After a primary series of three   properly spaced diphtheria toxoid doses in adults   or four doses in infants, a protective level of   antitoxin (defined as > or = 0.10 IU of   antitoxin/mL) is reached in more than 95% of   immunized persons.     This test was developed and its analytical   performance characteristics have been determined   by [facility]. It has not been cleared or   approved by FDA. This assay has been validated   pursuant to the CLIA regulations and is used for   clinical purposes.     Test Performed at:   [facility] [address]  [address]     COLEEN Valdes MD, PhD    Tetanus Ab IU/mL 3.27    Comment: REFERENCE RANGE:    0.10 IU/mL or greater     Antibody levels > or = 0.10 IU/mL are considered   protective. However, tetanus can still occur in   some individuals with such antibody levels. These   results should not be used to determine the   necessity to administer antitoxin when clinically   indicated.     This test was developed and its analytical   performance characteristics have been determined   by [facility]. It has not been cleared or   approved by FDA. This assay has been validated   pursuant to the CLIA regulations and is used for   clinical purposes.     Test Performed at:   [facility] [address]  [address]     I  Keisha ALCANTAR, PhD    S.pneumoniae Type 1  7.2    S.pneumoniae Type 3  2.4    Strep pneumo Type 4  4.0    S.pneumoniae Type 5  19.4    Serotype 6 (6A)  1.6    Strep pneumo Type 14  4.1    S.pneumoniae Type 19F  3.4    S.pneumoniae Type 23F  43.8    S.pneumoniae Type 6B  <0.3    S.pneumoniae Type 7F  8.7    Serotype 56 (18C)  5.6    Serotype 57 (19A)  12.7    S.pneumoniae Type 9V Abs  2.2        Assessment:   62-year-old male with history of MM s/p autoSCT 5/17/2021 on maintenance revlimid presents for follow-up of vaccine counseling. Patient has completed all inactive vaccines and responded appropriately.    Plan:   - COVID-19 bivalent booster due  - As long as patient health remains stable, due for MMR x2 at 2 years after transplant 5/2023  - Moving forward, needs yearly high-dose influenza vaccine    Liliane Leonard MD MPH  Infectious Diseases [facility]    30 minutes of total time spent on the encounter, which includes face to face time and non-face to face time preparing to see the patient (eg, review of tests), Obtaining and/or reviewing separately obtained history, Documenting clinical information in the electronic or other health record, Independently interpreting results (not separately reported) and communicating results to the patient/family/caregiver, or Care coordination (not separately reported).

## 2022-11-01 NOTE — PROGRESS NOTES
Subjective:      Patient ID: Jaspreet Walsh Jr. is a 62 y.o. male.    Chief Complaint:Follow-up      History of Present Illness    {ID HPI BLOCKS:19436}    Review of Systems   Constitutional: Negative for chills, decreased appetite, fever, malaise/fatigue, night sweats, weight gain and weight loss.   HENT:  Negative for congestion, ear pain, hearing loss, hoarse voice, sore throat and tinnitus.    Eyes:  Negative for blurred vision, redness and visual disturbance.   Cardiovascular:  Negative for chest pain, leg swelling and palpitations.   Respiratory:  Negative for cough, hemoptysis, shortness of breath, sputum production and wheezing.    Hematologic/Lymphatic: Negative for adenopathy. Does not bruise/bleed easily.   Skin:  Negative for dry skin, itching, rash and suspicious lesions.   Musculoskeletal:  Negative for back pain, joint pain, myalgias and neck pain.   Gastrointestinal:  Negative for abdominal pain, constipation, diarrhea, heartburn, nausea and vomiting.   Genitourinary:  Negative for dysuria, flank pain, frequency, hematuria, hesitancy and urgency.   Neurological:  Negative for dizziness, headaches, numbness, paresthesias and weakness.   Psychiatric/Behavioral:  Negative for depression and memory loss. The patient does not have insomnia and is not nervous/anxious.    Objective:   Physical Exam  Assessment:       No diagnosis found.      Plan:       ***

## 2022-11-02 DIAGNOSIS — C90.01 MULTIPLE MYELOMA IN REMISSION: ICD-10-CM

## 2022-11-04 RX ORDER — LENALIDOMIDE 10 MG/1
CAPSULE ORAL
Qty: 28 EACH | Refills: 0 | Status: SHIPPED | OUTPATIENT
Start: 2022-11-04 | End: 2022-11-07 | Stop reason: SDUPTHER

## 2022-11-07 ENCOUNTER — TELEPHONE (OUTPATIENT)
Dept: HEMATOLOGY/ONCOLOGY | Facility: CLINIC | Age: 62
End: 2022-11-07
Payer: MEDICAID

## 2022-11-07 DIAGNOSIS — C90.01 MULTIPLE MYELOMA IN REMISSION: ICD-10-CM

## 2022-11-07 RX ORDER — LENALIDOMIDE 10 MG/1
CAPSULE ORAL
Qty: 28 EACH | Refills: 0 | Status: SHIPPED | OUTPATIENT
Start: 2022-11-07 | End: 2022-12-06 | Stop reason: SDUPTHER

## 2022-11-07 NOTE — TELEPHONE ENCOUNTER
"----- Message from Alicia Vegas sent at 11/7/2022  3:07 PM CST -----  Regarding: Out of medication  RX Name and Strength: Revlamid   lenalidomide (REVLIMID) 10 mg Cap    How is the patient currently taking it? TAKE 1 CAPSULE BY MOUTH  DAILY FOR 28 DAYS Auth #[id number] 11/2/2022.     Is this a 30 day or 90 day Rx?  28 capsules      Preferred Pharmacy with phone number:     [facility] [address] [facility]  [address] [place] [address] [place] [address]  Phone: [phone] Fax: [phone]      Local or Mail Order: mail     Ordering Provider: Lizz     Contact Preference: [phone]                   Additional Information:  "Thank you for all that you do for our patients"     "

## 2022-11-08 ENCOUNTER — OFFICE VISIT (OUTPATIENT)
Dept: HEMATOLOGY/ONCOLOGY | Facility: CLINIC | Age: 62
End: 2022-11-08
Payer: MEDICAID

## 2022-11-08 VITALS
WEIGHT: 220.81 LBS | HEIGHT: 67 IN | RESPIRATION RATE: 18 BRPM | OXYGEN SATURATION: 97 % | BODY MASS INDEX: 34.66 KG/M2 | HEART RATE: 51 BPM | SYSTOLIC BLOOD PRESSURE: 186 MMHG | DIASTOLIC BLOOD PRESSURE: 82 MMHG | TEMPERATURE: 98 F

## 2022-11-08 DIAGNOSIS — T45.1X5A CHEMOTHERAPY-INDUCED NEUTROPENIA: ICD-10-CM

## 2022-11-08 DIAGNOSIS — C90.01 MULTIPLE MYELOMA IN REMISSION: ICD-10-CM

## 2022-11-08 DIAGNOSIS — D84.821 IMMUNODEFICIENCY SECONDARY TO CHEMOTHERAPY: ICD-10-CM

## 2022-11-08 DIAGNOSIS — Z79.899 IMMUNODEFICIENCY SECONDARY TO CHEMOTHERAPY: ICD-10-CM

## 2022-11-08 DIAGNOSIS — D70.1 CHEMOTHERAPY-INDUCED NEUTROPENIA: ICD-10-CM

## 2022-11-08 DIAGNOSIS — C79.49 METASTASIS OF NEOPLASM TO SPINAL CANAL: ICD-10-CM

## 2022-11-08 DIAGNOSIS — T45.1X5A IMMUNODEFICIENCY SECONDARY TO CHEMOTHERAPY: ICD-10-CM

## 2022-11-08 DIAGNOSIS — I10 ESSENTIAL HYPERTENSION: ICD-10-CM

## 2022-11-08 DIAGNOSIS — Z94.84 HISTORY OF AUTO STEM CELL TRANSPLANT: Primary | ICD-10-CM

## 2022-11-08 PROCEDURE — 99999 PR PBB SHADOW E&M-EST. PATIENT-LVL V: ICD-10-PCS | Mod: PBBFAC,,, | Performed by: NURSE PRACTITIONER

## 2022-11-08 PROCEDURE — 1159F PR MEDICATION LIST DOCUMENTED IN MEDICAL RECORD: ICD-10-PCS | Mod: CPTII,,, | Performed by: NURSE PRACTITIONER

## 2022-11-08 PROCEDURE — 3079F PR MOST RECENT DIASTOLIC BLOOD PRESSURE 80-89 MM HG: ICD-10-PCS | Mod: CPTII,,, | Performed by: NURSE PRACTITIONER

## 2022-11-08 PROCEDURE — 1160F PR REVIEW ALL MEDS BY PRESCRIBER/CLIN PHARMACIST DOCUMENTED: ICD-10-PCS | Mod: CPTII,,, | Performed by: NURSE PRACTITIONER

## 2022-11-08 PROCEDURE — 3008F BODY MASS INDEX DOCD: CPT | Mod: CPTII,,, | Performed by: NURSE PRACTITIONER

## 2022-11-08 PROCEDURE — 3077F PR MOST RECENT SYSTOLIC BLOOD PRESSURE >= 140 MM HG: ICD-10-PCS | Mod: CPTII,,, | Performed by: NURSE PRACTITIONER

## 2022-11-08 PROCEDURE — 3077F SYST BP >= 140 MM HG: CPT | Mod: CPTII,,, | Performed by: NURSE PRACTITIONER

## 2022-11-08 PROCEDURE — 99215 OFFICE O/P EST HI 40 MIN: CPT | Mod: S$PBB,,, | Performed by: NURSE PRACTITIONER

## 2022-11-08 PROCEDURE — 1159F MED LIST DOCD IN RCRD: CPT | Mod: CPTII,,, | Performed by: NURSE PRACTITIONER

## 2022-11-08 PROCEDURE — 3008F PR BODY MASS INDEX (BMI) DOCUMENTED: ICD-10-PCS | Mod: CPTII,,, | Performed by: NURSE PRACTITIONER

## 2022-11-08 PROCEDURE — 4010F PR ACE/ARB THEARPY RXD/TAKEN: ICD-10-PCS | Mod: CPTII,,, | Performed by: NURSE PRACTITIONER

## 2022-11-08 PROCEDURE — 3079F DIAST BP 80-89 MM HG: CPT | Mod: CPTII,,, | Performed by: NURSE PRACTITIONER

## 2022-11-08 PROCEDURE — 99215 OFFICE O/P EST HI 40 MIN: CPT | Mod: PBBFAC | Performed by: NURSE PRACTITIONER

## 2022-11-08 PROCEDURE — 99215 PR OFFICE/OUTPT VISIT, EST, LEVL V, 40-54 MIN: ICD-10-PCS | Mod: S$PBB,,, | Performed by: NURSE PRACTITIONER

## 2022-11-08 PROCEDURE — 1160F RVW MEDS BY RX/DR IN RCRD: CPT | Mod: CPTII,,, | Performed by: NURSE PRACTITIONER

## 2022-11-08 PROCEDURE — 4010F ACE/ARB THERAPY RXD/TAKEN: CPT | Mod: CPTII,,, | Performed by: NURSE PRACTITIONER

## 2022-11-08 PROCEDURE — 99999 PR PBB SHADOW E&M-EST. PATIENT-LVL V: CPT | Mod: PBBFAC,,, | Performed by: NURSE PRACTITIONER

## 2022-11-08 RX ORDER — ZOLEDRONIC ACID 0.04 MG/ML
4 INJECTION, SOLUTION INTRAVENOUS
Status: CANCELLED | OUTPATIENT
Start: 2022-11-08

## 2022-11-08 RX ORDER — FERROUS SULFATE, DRIED 160(50) MG
1 TABLET, EXTENDED RELEASE ORAL DAILY
Qty: 30 TABLET | Refills: 0 | Status: SHIPPED | OUTPATIENT
Start: 2022-11-08 | End: 2024-04-03

## 2022-11-08 RX ORDER — HEPARIN 100 UNIT/ML
500 SYRINGE INTRAVENOUS
Status: CANCELLED | OUTPATIENT
Start: 2022-11-08

## 2022-11-08 RX ORDER — SODIUM CHLORIDE 0.9 % (FLUSH) 0.9 %
10 SYRINGE (ML) INJECTION
Status: CANCELLED | OUTPATIENT
Start: 2022-11-08

## 2022-11-08 NOTE — PROGRESS NOTES
SECTION OF HEMATOLOGY AND BONE MARROW TRANSPLANT  Return Patient Visit   11/09/2022    CHIEF COMPLAINT:   Multiple Myeloma    HISTORY OF PRESENT ILLNESS: Patient of /  62 y.o.male; pmh of IgG kappa multiple myeloma (standard risk CG per msmart criteria, r-iss stage II); diagnosed September 2019 after presenting with symptomatic perispinal plasmacytoma;He has subsequently received  8 cycles of VRd therapy. Was in midst or pre transplant evaluation but due to insurance coverage issues transplant was delayed so was maintained on therapy and those issues have been resolved.    Transplant Course  Patient with IgG Kappa MM and pmh HTN, lytic bone lesion, arthritis and vitamin D deficiency. Admitted 5/15/21 for planned Alea auto SCT for MM. He received 3 bags and a CD34 dose of 3.35 x 10^6 on 5/17/21. Tolerated chemo and transplant well. Admission complicated by n/v controlled with scheduled anti-emetics and c-diff neg diarrhea controlled with prn imodium. He engrafted on Day +13 (5/30/21) with an ANC of 2607. He was discharged home on Day +14 (5/31/21).     Day +100 restaging marrow indicated that he is in [place].     He reports feeling well. He has been taking Revlimid 10mg daily along with ASA 81mg and Acyclovir.   Not in any acute distress since last visit. Denies fever, chills, nightsweats, bleeding, brusing, lymphadenopathy, signs/symptoms of splenomegaly, focal pain and feels well.    PAST MEDICAL HISTORY:   Past Medical History:   Diagnosis Date    Anxiety     Arthritis     Cancer     Hypertension        PAST SURGICAL HISTORY:   Past Surgical History:   Procedure Laterality Date    BACK SURGERY      BONE MARROW BIOPSY Left 10/20/2021    Procedure: Biopsy-bone marrow;  Surgeon: Summer Cartwright MD;  [facility]  Service: Oncology;  Laterality: Left;    COLONOSCOPY N/A 1/25/2021    Procedure: COLONOSCOPY;  Surgeon: Braxton Lees MD;  [facility]  Service: Endoscopy;   Laterality: N/A;  1/22-covid kristopher-inst mailed-tb  1/20/21-pt to remain on schedule with Dr. Lees, and confirmed updated arrival time of 0835-BB    COLONOSCOPY N/A 2/1/2021    Procedure: COLONOSCOPY;  Surgeon: Jo Ann Robledo MD;  Location: [facility] (4TH FLR);  Service: Endoscopy;  Laterality: N/A;  rescheduled due to poor bowel prep, to be rescheduled with first available provider-BB  covid-1/29/21-pcw-BB    CREATION OF MUSCLE ROTATIONAL FLAP N/A 9/23/2020    Procedure: CREATION, FLAP, MUSCLE ROTATION;  Surgeon: Kamlesh Bellamy MD;  Location: [facility] OR 2ND FLR;  Service: Plastics;  Laterality: N/A;    KNEE SURGERY Left 06/2019    LUMBAR FUSION N/A 9/23/2020    Procedure: FUSION, SPINE, LUMBAR L2-pelvis. Depuy. Plastic surgery closure w/ Dr. Bellamy;  Surgeon: Nick Coyle MD;  Location: [facility] OR 2ND FLR;  Service: Neurosurgery;  Laterality: N/A;    Metastatic neoplasum removed from spine         PAST SOCIAL HISTORY:   reports that he quit smoking about 5 years ago. He has a 10.00 pack-year smoking history. He has never used smokeless tobacco. He reports that he does not currently use alcohol. He reports that he does not use drugs.    FAMILY HISTORY:  History reviewed. No pertinent family history.    CURRENT MEDICATIONS:   Current Outpatient Medications   Medication Sig    atenoloL (TENORMIN) 100 MG tablet Take 100 mg by mouth once daily.    carvediloL (COREG) 25 MG tablet Take 1 tablet (25 mg total) by mouth 2 (two) times daily.    cloNIDine (CATAPRES) 0.3 MG tablet Take 0.3 mg by mouth once daily.    diltiaZEM (CARDIZEM) 120 MG tablet Take 120 mg by mouth daily as needed.    gabapentin (NEURONTIN) 300 MG capsule Take 1 capsule (300 mg total) by mouth 2 (two) times daily.    lenalidomide (REVLIMID) 10 mg Cap TAKE 1 CAPSULE BY MOUTH  DAILY FOR 28 DAYS Auth #[id number] 11/2/2022.    calcium-vitamin D3 (OYSTER SHELL CALCIUM-VIT D3) 500 mg-5 mcg (200 unit) per tablet Take 1 tablet by mouth once daily.     "ergocalciferol (ERGOCALCIFEROL) 50,000 unit Cap Take 1 capsule (50,000 Units total) by mouth every 7 days. (Patient not taking: Reported on 11/8/2022)    ferrous gluconate (FERGON) 324 MG tablet Take 1 tablet (324 mg total) by mouth daily with breakfast. (Patient not taking: Reported on 11/8/2022)    losartan (COZAAR) 50 MG tablet Take 2 tablets (100 mg total) by mouth once daily. (Patient not taking: Reported on 11/8/2022)    NIFEdipine (ADALAT CC) 30 MG TbSR Take 1 tablet (30 mg total) by mouth once daily. (Patient not taking: Reported on 11/8/2022)    ondansetron (ZOFRAN) 8 MG tablet Take 1 tablet (8 mg total) by mouth every 8 (eight) hours as needed for Nausea. (Patient not taking: Reported on 11/8/2022)     Current Facility-Administered Medications   Medication    LIDOcaine (PF) 20 mg/mL (2%) injection 200 mg    LIDOcaine HCL 20 mg/ml (2%) injection 20 mL     ALLERGIES:   Review of patient's allergies indicates:  No Known Allergies  Review of Systems   Constitutional: Negative for fever, malaise/fatigue and weight loss.   Respiratory: Negative for cough and shortness of breath.    Cardiovascular: Negative for chest pain and leg swelling.   Gastrointestinal: Negative for constipation, diarrhea and vomiting.   Skin: Negative for rash.   Neurological: Negative for seizures and weakness.   Psychiatric/Behavioral: The patient is not nervous/anxious.          PHYSICAL EXAM:   Vitals:    11/08/22 0817 11/08/22 0828   BP: (!) 202/97 (!) 186/82   Pulse: (!) 51    Resp: 18    Temp: 98.3 °F (36.8 °C)    TempSrc: Oral    SpO2: 97%    Weight: 100.1 kg (220 lb 12.7 oz)    Height: 5' 7" (1.702 m)    PainSc: 0-No pain      Physical Exam  Constitutional:       Appearance: He is well-developed.   HENT:      Head: Normocephalic and atraumatic.      Mouth/Throat:      Mouth: Mucous membranes are moist. No oral lesions.      Pharynx: Oropharynx is clear.   Eyes:      Conjunctiva/sclera: Conjunctivae normal.   Cardiovascular:      " Rate and Rhythm: Normal rate and regular rhythm.      Heart sounds: Normal heart sounds.   Pulmonary:      Effort: No respiratory distress.      Breath sounds: Normal breath sounds.   Abdominal:      General: Bowel sounds are normal.      Palpations: Abdomen is soft.   Musculoskeletal:         General: Normal range of motion.      Cervical back: Normal range of motion.   Skin:     General: Skin is warm and dry.     Neurological:      Mental Status: He is alert and oriented to person, place, and time.   Psychiatric:         Behavior: Behavior normal.         Thought Content: Thought content normal.         Judgment: Judgment normal.       ECOG Performance Status: (foot note - ECOG PS provided by Eastern Cooperative Oncology Group) 1 - Symptomatic but completely ambulatory    Karnofsky Performance Score:  90%- Able to Carry on Normal Activity: Minor Symptoms of Disease  DATA:   See results review tab  Complete restaging results and pre transplant evaluation testing reviewed by     1. History of auto stem cell transplant  calcium-vitamin D3 (OYSTER SHELL CALCIUM-VIT D3) 500 mg-5 mcg (200 unit) per tablet      2. Essential hypertension  Ambulatory referral/consult to Cardiology      3. Multiple myeloma in remission        4. 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 Chemotherapy-induced neutropenia            History of auto stem cell transplant  Today is 1 year and 5 months from transplant  He received 3 bags with a CD34 dose of 3.35 x 10^6 on 5/17/21  Engrafted Day +13 [place] [id number]       Multiple myeloma   A. 9/14 - 9/17/2020 : Admitted to [facility] for evaluation of lytic lesions. Large soft tissue mass encompassing L4 - S1 vertebral bodies seen. FLC ratio 171, involved light chains 104. SPEP and SIFE found 2.86g/dL, IgG kappa para protein band. Underwent bone marrow biopsy and discharged with dexamethasone 40mg x 4 day burst.     B. 9/23/20 :  Underwent debulking of spinal mass.  C. 10/1/20 - 4/14/21 : C1 - C8D1 VRd. Revlimid delivered in third week of cycle.  D. 2/25/21 : Presented for consent signing for autoSCT but his insurance had lapsed. Plan for transplant pushed back 1-2 months while he applys for medicaid.   E. 4/22/21 : C8D8 VRd planned (last treatment before mobilization).   Received Alea ASCT on 05/17/21, see above    Day +100 restaging marrow indicated that he is in [place].  1 year restaging marrow with no morphological evidence of plasma cell neoplasm. PET CT with no new hypermetabolic lesions(05/2022).   He reports feeling well. He has been taking Revlimid 10mg daily along with ASA 81mg .  SPEP on 10/31/22 - Paraprotein band in gamma = 0.51 g/dL (previously 0.36 g/dL) 0.22 g.dL on 06/22.  Slowly trending up level, will close monitor with monthly myeloma studies.   He is 1 year post transplant. Will stop Acyclovir(08/08/22).   He is receiving Zometa every 3 month for 2 years, initiated on 11/21.  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   Elevated during visit, per patient he have white coat syndrome. On multiple BP agents, compliant with his medications.   Patient to keep log for daily bp monitoring and report to PCP for elevated numbers         BMT Chart Routing      Follow up with physician . /AXEL in 8 weeks   Follow up with AXEL    Provider visit type    Infusion scheduling note    Injection scheduling note    Labs   Lab interval:  Cbc, cmp, myeloma studies in 4 weeks and 8 weeks   Imaging    Pharmacy appointment    Other referrals        Ashanti Quiñones NP  Hematology/Oncology/BMT

## 2022-11-09 ENCOUNTER — TELEPHONE (OUTPATIENT)
Dept: HEMATOLOGY/ONCOLOGY | Facility: CLINIC | Age: 62
End: 2022-11-09
Payer: MEDICAID

## 2022-11-09 NOTE — TELEPHONE ENCOUNTER
Spoke to patient let him know that medication needs a new auth for the year. As soon as we have that his pharmacy will send the medication.

## 2022-11-09 NOTE — TELEPHONE ENCOUNTER
"----- Message from Calista Olvera sent at 11/9/2022  1:29 PM CST -----  Regarding: speak with office  Contact: donavon  Consult/Advisory    Name Of Caller:donavon    Contact Preference?:[phone] (home)            Does patient feel the need to be seen today? no           What is the nature of the call?: calling tos peak with office about medication refills            Additional Notes:  "Thank you for all that you do for our patients'"     "

## 2022-12-06 DIAGNOSIS — C90.01 MULTIPLE MYELOMA IN REMISSION: ICD-10-CM

## 2022-12-06 RX ORDER — LENALIDOMIDE 10 MG/1
CAPSULE ORAL
Qty: 28 EACH | Refills: 0 | Status: SHIPPED | OUTPATIENT
Start: 2022-12-06 | End: 2023-01-04 | Stop reason: SDUPTHER

## 2022-12-07 ENCOUNTER — LAB VISIT (OUTPATIENT)
Dept: LAB | Facility: HOSPITAL | Age: 62
End: 2022-12-07
Attending: STUDENT IN AN ORGANIZED HEALTH CARE EDUCATION/TRAINING PROGRAM
Payer: MEDICAID

## 2022-12-07 DIAGNOSIS — C90.01 MULTIPLE MYELOMA IN REMISSION: ICD-10-CM

## 2022-12-07 LAB
ALBUMIN SERPL BCP-MCNC: 4.1 G/DL (ref 3.5–5.2)
ALP SERPL-CCNC: 70 U/L (ref 38–126)
ALT SERPL W/O P-5'-P-CCNC: 17 U/L (ref 10–44)
ANION GAP SERPL CALC-SCNC: 6 MMOL/L (ref 8–16)
AST SERPL-CCNC: 27 U/L (ref 15–46)
BASOPHILS # BLD AUTO: 0.05 K/UL (ref 0–0.2)
BASOPHILS NFR BLD: 1.7 % (ref 0–1.9)
BILIRUB SERPL-MCNC: 0.7 MG/DL (ref 0.1–1)
CALCIUM SERPL-MCNC: 8.5 MG/DL (ref 8.7–10.5)
CHLORIDE SERPL-SCNC: 106 MMOL/L (ref 95–110)
CO2 SERPL-SCNC: 29 MMOL/L (ref 23–29)
CREAT SERPL-MCNC: 0.92 MG/DL (ref 0.5–1.4)
DIFFERENTIAL METHOD: ABNORMAL
EOSINOPHIL # BLD AUTO: 0.2 K/UL (ref 0–0.5)
EOSINOPHIL NFR BLD: 5.6 % (ref 0–8)
ERYTHROCYTE [DISTWIDTH] IN BLOOD BY AUTOMATED COUNT: 14.4 % (ref 11.5–14.5)
EST. GFR  (NO RACE VARIABLE): >60 ML/MIN/1.73 M^2
GLUCOSE SERPL-MCNC: 124 MG/DL (ref 70–110)
HCT VFR BLD AUTO: 41.2 % (ref 40–54)
HGB BLD-MCNC: 13.7 G/DL (ref 14–18)
IMM GRANULOCYTES # BLD AUTO: 0.01 K/UL (ref 0–0.04)
IMM GRANULOCYTES NFR BLD AUTO: 0.3 % (ref 0–0.5)
LYMPHOCYTES # BLD AUTO: 1.1 K/UL (ref 1–4.8)
LYMPHOCYTES NFR BLD: 38.1 % (ref 18–48)
MCH RBC QN AUTO: 31.8 PG (ref 27–31)
MCHC RBC AUTO-ENTMCNC: 33.3 G/DL (ref 32–36)
MCV RBC AUTO: 96 FL (ref 82–98)
MONOCYTES # BLD AUTO: 0.4 K/UL (ref 0.3–1)
MONOCYTES NFR BLD: 13.3 % (ref 4–15)
NEUTROPHILS # BLD AUTO: 1.2 K/UL (ref 1.8–7.7)
NEUTROPHILS NFR BLD: 41 % (ref 38–73)
NRBC BLD-RTO: 0 /100 WBC
PLATELET # BLD AUTO: 139 K/UL (ref 150–450)
PMV BLD AUTO: 11.8 FL (ref 9.2–12.9)
POTASSIUM SERPL-SCNC: 3.9 MMOL/L (ref 3.5–5.1)
PROT SERPL-MCNC: 7.6 G/DL (ref 6–8.4)
RBC # BLD AUTO: 4.31 M/UL (ref 4.6–6.2)
SODIUM SERPL-SCNC: 141 MMOL/L (ref 136–145)
UUN UR-MCNC: 13 MG/DL (ref 2–20)
WBC # BLD AUTO: 2.86 K/UL (ref 3.9–12.7)

## 2022-12-07 PROCEDURE — 86334 IMMUNOFIX E-PHORESIS SERUM: CPT | Mod: 26,,, | Performed by: PATHOLOGY

## 2022-12-07 PROCEDURE — 84165 PATHOLOGIST INTERPRETATION SPE: ICD-10-PCS | Mod: 26,,, | Performed by: PATHOLOGY

## 2022-12-07 PROCEDURE — 86334 IMMUNOFIX E-PHORESIS SERUM: CPT | Mod: PO | Performed by: STUDENT IN AN ORGANIZED HEALTH CARE EDUCATION/TRAINING PROGRAM

## 2022-12-07 PROCEDURE — 83521 IG LIGHT CHAINS FREE EACH: CPT | Mod: 59,PO | Performed by: STUDENT IN AN ORGANIZED HEALTH CARE EDUCATION/TRAINING PROGRAM

## 2022-12-07 PROCEDURE — 86334 PATHOLOGIST INTERPRETATION IFE: ICD-10-PCS | Mod: 26,,, | Performed by: PATHOLOGY

## 2022-12-07 PROCEDURE — 84165 PROTEIN E-PHORESIS SERUM: CPT | Mod: 26,,, | Performed by: PATHOLOGY

## 2022-12-07 PROCEDURE — 85025 COMPLETE CBC W/AUTO DIFF WBC: CPT | Mod: PO | Performed by: STUDENT IN AN ORGANIZED HEALTH CARE EDUCATION/TRAINING PROGRAM

## 2022-12-07 PROCEDURE — 80053 COMPREHEN METABOLIC PANEL: CPT | Mod: PO | Performed by: STUDENT IN AN ORGANIZED HEALTH CARE EDUCATION/TRAINING PROGRAM

## 2022-12-07 PROCEDURE — 36415 COLL VENOUS BLD VENIPUNCTURE: CPT | Mod: PO | Performed by: STUDENT IN AN ORGANIZED HEALTH CARE EDUCATION/TRAINING PROGRAM

## 2022-12-07 PROCEDURE — 84165 PROTEIN E-PHORESIS SERUM: CPT | Mod: PO | Performed by: STUDENT IN AN ORGANIZED HEALTH CARE EDUCATION/TRAINING PROGRAM

## 2022-12-08 LAB
ALBUMIN SERPL ELPH-MCNC: 4.01 G/DL (ref 3.35–5.55)
ALPHA1 GLOB SERPL ELPH-MCNC: 0.33 G/DL (ref 0.17–0.41)
ALPHA2 GLOB SERPL ELPH-MCNC: 0.61 G/DL (ref 0.43–0.99)
B-GLOBULIN SERPL ELPH-MCNC: 0.8 G/DL (ref 0.5–1.1)
GAMMA GLOB SERPL ELPH-MCNC: 1.86 G/DL (ref 0.67–1.58)
INTERPRETATION SERPL IFE-IMP: NORMAL
KAPPA LC SER QL IA: 6.15 MG/DL (ref 0.33–1.94)
KAPPA LC/LAMBDA SER IA: 1.36 (ref 0.26–1.65)
LAMBDA LC SER QL IA: 4.52 MG/DL (ref 0.57–2.63)
PROT SERPL-MCNC: 7.6 G/DL (ref 6–8.4)

## 2022-12-09 LAB
PATHOLOGIST INTERPRETATION IFE: NORMAL
PATHOLOGIST INTERPRETATION SPE: NORMAL

## 2023-01-04 DIAGNOSIS — Z94.84 HISTORY OF AUTO STEM CELL TRANSPLANT: Primary | ICD-10-CM

## 2023-01-04 DIAGNOSIS — C90.01 MULTIPLE MYELOMA IN REMISSION: ICD-10-CM

## 2023-01-04 RX ORDER — LENALIDOMIDE 10 MG/1
CAPSULE ORAL
Qty: 28 EACH | Refills: 0 | Status: SHIPPED | OUTPATIENT
Start: 2023-01-04 | End: 2023-02-02 | Stop reason: SDUPTHER

## 2023-01-06 ENCOUNTER — LAB VISIT (OUTPATIENT)
Dept: LAB | Facility: HOSPITAL | Age: 63
End: 2023-01-06
Payer: MEDICAID

## 2023-01-06 ENCOUNTER — OFFICE VISIT (OUTPATIENT)
Dept: HEMATOLOGY/ONCOLOGY | Facility: CLINIC | Age: 63
End: 2023-01-06
Payer: MEDICAID

## 2023-01-06 VITALS
RESPIRATION RATE: 18 BRPM | WEIGHT: 221.81 LBS | OXYGEN SATURATION: 98 % | HEART RATE: 50 BPM | BODY MASS INDEX: 33.62 KG/M2 | SYSTOLIC BLOOD PRESSURE: 186 MMHG | HEIGHT: 68 IN | DIASTOLIC BLOOD PRESSURE: 83 MMHG

## 2023-01-06 DIAGNOSIS — T45.1X5A CHEMOTHERAPY-INDUCED NEUTROPENIA: ICD-10-CM

## 2023-01-06 DIAGNOSIS — Z94.84 HISTORY OF AUTO STEM CELL TRANSPLANT: Primary | ICD-10-CM

## 2023-01-06 DIAGNOSIS — C90.01 MULTIPLE MYELOMA IN REMISSION: ICD-10-CM

## 2023-01-06 DIAGNOSIS — D70.1 CHEMOTHERAPY-INDUCED NEUTROPENIA: ICD-10-CM

## 2023-01-06 DIAGNOSIS — Z94.84 HISTORY OF AUTO STEM CELL TRANSPLANT: ICD-10-CM

## 2023-01-06 DIAGNOSIS — I10 ESSENTIAL HYPERTENSION: ICD-10-CM

## 2023-01-06 LAB
ALBUMIN SERPL BCP-MCNC: 3.5 G/DL (ref 3.5–5.2)
ALP SERPL-CCNC: 64 U/L (ref 55–135)
ALT SERPL W/O P-5'-P-CCNC: 14 U/L (ref 10–44)
ANION GAP SERPL CALC-SCNC: 6 MMOL/L (ref 8–16)
AST SERPL-CCNC: 20 U/L (ref 10–40)
BASOPHILS # BLD AUTO: 0.09 K/UL (ref 0–0.2)
BASOPHILS NFR BLD: 2.8 % (ref 0–1.9)
BILIRUB SERPL-MCNC: 1 MG/DL (ref 0.1–1)
BUN SERPL-MCNC: 10 MG/DL (ref 8–23)
CALCIUM SERPL-MCNC: 9.1 MG/DL (ref 8.7–10.5)
CHLORIDE SERPL-SCNC: 103 MMOL/L (ref 95–110)
CO2 SERPL-SCNC: 27 MMOL/L (ref 23–29)
CREAT SERPL-MCNC: 1 MG/DL (ref 0.5–1.4)
DACRYOCYTES BLD QL SMEAR: ABNORMAL
DIFFERENTIAL METHOD: ABNORMAL
EOSINOPHIL # BLD AUTO: 0.1 K/UL (ref 0–0.5)
EOSINOPHIL NFR BLD: 2.5 % (ref 0–8)
ERYTHROCYTE [DISTWIDTH] IN BLOOD BY AUTOMATED COUNT: 14.6 % (ref 11.5–14.5)
EST. GFR  (NO RACE VARIABLE): >60 ML/MIN/1.73 M^2
GLUCOSE SERPL-MCNC: 125 MG/DL (ref 70–110)
HCT VFR BLD AUTO: 42.2 % (ref 40–54)
HGB BLD-MCNC: 14 G/DL (ref 14–18)
HYPOCHROMIA BLD QL SMEAR: ABNORMAL
IGA SERPL-MCNC: 255 MG/DL (ref 40–350)
IGG SERPL-MCNC: 2095 MG/DL (ref 650–1600)
IGM SERPL-MCNC: 88 MG/DL (ref 50–300)
IMM GRANULOCYTES # BLD AUTO: 0.01 K/UL (ref 0–0.04)
IMM GRANULOCYTES NFR BLD AUTO: 0.3 % (ref 0–0.5)
LYMPHOCYTES # BLD AUTO: 1 K/UL (ref 1–4.8)
LYMPHOCYTES NFR BLD: 32.1 % (ref 18–48)
MCH RBC QN AUTO: 32.2 PG (ref 27–31)
MCHC RBC AUTO-ENTMCNC: 33.2 G/DL (ref 32–36)
MCV RBC AUTO: 97 FL (ref 82–98)
MONOCYTES # BLD AUTO: 0.4 K/UL (ref 0.3–1)
MONOCYTES NFR BLD: 13.1 % (ref 4–15)
NEUTROPHILS # BLD AUTO: 1.6 K/UL (ref 1.8–7.7)
NEUTROPHILS NFR BLD: 49.2 % (ref 38–73)
NRBC BLD-RTO: 0 /100 WBC
OVALOCYTES BLD QL SMEAR: ABNORMAL
PLATELET # BLD AUTO: 134 K/UL (ref 150–450)
PMV BLD AUTO: 11.2 FL (ref 9.2–12.9)
POIKILOCYTOSIS BLD QL SMEAR: SLIGHT
POLYCHROMASIA BLD QL SMEAR: ABNORMAL
POTASSIUM SERPL-SCNC: 3.6 MMOL/L (ref 3.5–5.1)
PROT SERPL-MCNC: 7.8 G/DL (ref 6–8.4)
RBC # BLD AUTO: 4.35 M/UL (ref 4.6–6.2)
SODIUM SERPL-SCNC: 136 MMOL/L (ref 136–145)
WBC # BLD AUTO: 3.21 K/UL (ref 3.9–12.7)

## 2023-01-06 PROCEDURE — 86334 IMMUNOFIX E-PHORESIS SERUM: CPT | Mod: 26,,, | Performed by: PATHOLOGY

## 2023-01-06 PROCEDURE — 84165 PROTEIN E-PHORESIS SERUM: CPT | Performed by: NURSE PRACTITIONER

## 2023-01-06 PROCEDURE — 82784 ASSAY IGA/IGD/IGG/IGM EACH: CPT | Mod: 59 | Performed by: NURSE PRACTITIONER

## 2023-01-06 PROCEDURE — 99999 PR PBB SHADOW E&M-EST. PATIENT-LVL III: CPT | Mod: PBBFAC,,, | Performed by: INTERNAL MEDICINE

## 2023-01-06 PROCEDURE — 3079F DIAST BP 80-89 MM HG: CPT | Mod: CPTII,,, | Performed by: INTERNAL MEDICINE

## 2023-01-06 PROCEDURE — 84165 PROTEIN E-PHORESIS SERUM: CPT | Mod: 26,,, | Performed by: PATHOLOGY

## 2023-01-06 PROCEDURE — 85025 COMPLETE CBC W/AUTO DIFF WBC: CPT | Performed by: NURSE PRACTITIONER

## 2023-01-06 PROCEDURE — 3008F PR BODY MASS INDEX (BMI) DOCUMENTED: ICD-10-PCS | Mod: CPTII,,, | Performed by: INTERNAL MEDICINE

## 2023-01-06 PROCEDURE — 99214 OFFICE O/P EST MOD 30 MIN: CPT | Mod: S$PBB,,, | Performed by: INTERNAL MEDICINE

## 2023-01-06 PROCEDURE — 99214 PR OFFICE/OUTPT VISIT, EST, LEVL IV, 30-39 MIN: ICD-10-PCS | Mod: S$PBB,,, | Performed by: INTERNAL MEDICINE

## 2023-01-06 PROCEDURE — 3008F BODY MASS INDEX DOCD: CPT | Mod: CPTII,,, | Performed by: INTERNAL MEDICINE

## 2023-01-06 PROCEDURE — 99999 PR PBB SHADOW E&M-EST. PATIENT-LVL III: ICD-10-PCS | Mod: PBBFAC,,, | Performed by: INTERNAL MEDICINE

## 2023-01-06 PROCEDURE — 86334 PATHOLOGIST INTERPRETATION IFE: ICD-10-PCS | Mod: 26,,, | Performed by: PATHOLOGY

## 2023-01-06 PROCEDURE — 83521 IG LIGHT CHAINS FREE EACH: CPT | Performed by: NURSE PRACTITIONER

## 2023-01-06 PROCEDURE — 84165 PATHOLOGIST INTERPRETATION SPE: ICD-10-PCS | Mod: 26,,, | Performed by: PATHOLOGY

## 2023-01-06 PROCEDURE — 99213 OFFICE O/P EST LOW 20 MIN: CPT | Mod: PBBFAC | Performed by: INTERNAL MEDICINE

## 2023-01-06 PROCEDURE — 86334 IMMUNOFIX E-PHORESIS SERUM: CPT | Performed by: NURSE PRACTITIONER

## 2023-01-06 PROCEDURE — 1159F PR MEDICATION LIST DOCUMENTED IN MEDICAL RECORD: ICD-10-PCS | Mod: CPTII,,, | Performed by: INTERNAL MEDICINE

## 2023-01-06 PROCEDURE — 80053 COMPREHEN METABOLIC PANEL: CPT | Performed by: NURSE PRACTITIONER

## 2023-01-06 PROCEDURE — 3079F PR MOST RECENT DIASTOLIC BLOOD PRESSURE 80-89 MM HG: ICD-10-PCS | Mod: CPTII,,, | Performed by: INTERNAL MEDICINE

## 2023-01-06 PROCEDURE — 3077F PR MOST RECENT SYSTOLIC BLOOD PRESSURE >= 140 MM HG: ICD-10-PCS | Mod: CPTII,,, | Performed by: INTERNAL MEDICINE

## 2023-01-06 PROCEDURE — 36415 COLL VENOUS BLD VENIPUNCTURE: CPT | Performed by: NURSE PRACTITIONER

## 2023-01-06 PROCEDURE — 3077F SYST BP >= 140 MM HG: CPT | Mod: CPTII,,, | Performed by: INTERNAL MEDICINE

## 2023-01-06 PROCEDURE — 1159F MED LIST DOCD IN RCRD: CPT | Mod: CPTII,,, | Performed by: INTERNAL MEDICINE

## 2023-01-06 NOTE — PROGRESS NOTES
SECTION OF HEMATOLOGY AND BONE MARROW TRANSPLANT  Return Patient Visit   01/06/2023    CHIEF COMPLAINT:   Multiple Myeloma    HISTORY OF PRESENT ILLNESS: Patient of /  63 y.o.male; pmh of IgG kappa multiple myeloma (standard risk CG per msmart criteria, r-iss stage II); diagnosed September 2019 after presenting with symptomatic perispinal plasmacytoma;He has subsequently received  8 cycles of VRd therapy. Was in midst or pre transplant evaluation but due to insurance coverage issues transplant was delayed so was maintained on therapy and those issues have been resolved.    Transplant Course  Patient with IgG Kappa MM and pmh HTN, lytic bone lesion, arthritis and vitamin D deficiency. Admitted 5/15/21 for planned Alea auto SCT for MM. He received 3 bags and a CD34 dose of 3.35 x 10^6 on 5/17/21. Tolerated chemo and transplant well. Admission complicated by n/v controlled with scheduled anti-emetics and c-diff neg diarrhea controlled with prn imodium. He engrafted on Day +13 (5/30/21) with an ANC of 2607. He was discharged home on Day +14 (5/31/21).     Day +100 restaging marrow indicated that he is in [place].     He reports feeling well. He has been taking Revlimid 10mg daily along with ASA 81mg and Acyclovir. No problems with medications. Denies fever, chills, nightsweats, bleeding, brusing, lymphadenopathy, signs/symptoms of splenomegaly, focal pain and feels well.    Present today with wife, Catie.     PAST MEDICAL HISTORY:   Past Medical History:   Diagnosis Date    Anxiety     Arthritis     Cancer     Hypertension        PAST SURGICAL HISTORY:   Past Surgical History:   Procedure Laterality Date    BACK SURGERY      BONE MARROW BIOPSY Left 10/20/2021    Procedure: Biopsy-bone marrow;  Surgeon: Summer Cartwright MD;  Location: [facility] (4TH [facility]);  Service: Oncology;  Laterality: Left;    COLONOSCOPY N/A 1/25/2021    Procedure: COLONOSCOPY;  Surgeon: Braxton Lees MD;  Location: [facility] (4TH  FLR);  Service: Endoscopy;  Laterality: N/A;  1/22-covid kristopher-inst mailed-tb  1/20/21-pt to remain on schedule with Dr. Lees, and confirmed updated arrival time of 0835-BB    COLONOSCOPY N/A 2/1/2021    Procedure: COLONOSCOPY;  Surgeon: Jo Ann Robledo MD;  Location: [facility] (4TH FLR);  Service: Endoscopy;  Laterality: N/A;  rescheduled due to poor bowel prep, to be rescheduled with first available provider-BB  covid-1/29/21-pcw-BB    CREATION OF MUSCLE ROTATIONAL FLAP N/A 9/23/2020    Procedure: CREATION, FLAP, MUSCLE ROTATION;  Surgeon: Kamlesh Bellamy MD;  Location: [facility] OR 2ND FLR;  Service: Plastics;  Laterality: N/A;    KNEE SURGERY Left 06/2019    LUMBAR FUSION N/A 9/23/2020    Procedure: FUSION, SPINE, LUMBAR L2-pelvis. Depuy. Plastic surgery closure w/ Dr. Bellamy;  Surgeon: Nick Coyle MD;  Location: [facility] OR 2ND FLR;  Service: Neurosurgery;  Laterality: N/A;    Metastatic neoplasum removed from spine         PAST SOCIAL HISTORY:   reports that he quit smoking about 6 years ago. He has a 10.00 pack-year smoking history. He has never used smokeless tobacco. He reports that he does not currently use alcohol. He reports that he does not use drugs.    FAMILY HISTORY:  History reviewed. No pertinent family history.    CURRENT MEDICATIONS:   Current Outpatient Medications   Medication Sig    atenoloL (TENORMIN) 100 MG tablet Take 100 mg by mouth once daily.    carvediloL (COREG) 25 MG tablet Take 1 tablet (25 mg total) by mouth 2 (two) times daily.    cloNIDine (CATAPRES) 0.3 MG tablet Take 0.3 mg by mouth once daily.    diltiaZEM (CARDIZEM) 120 MG tablet Take 120 mg by mouth daily as needed.    ergocalciferol (ERGOCALCIFEROL) 50,000 unit Cap Take 1 capsule (50,000 Units total) by mouth every 7 days.    ferrous gluconate (FERGON) 324 MG tablet Take 1 tablet (324 mg total) by mouth daily with breakfast.    gabapentin (NEURONTIN) 300 MG capsule Take 1 capsule (300 mg total) by mouth 2  "(two) times daily.    lenalidomide (REVLIMID) 10 mg Cap TAKE 1 CAPSULE BY MOUTH  DAILY FOR 28 DAYS Auth [id number] 1/4/23.    NIFEdipine (ADALAT CC) 30 MG TbSR Take 1 tablet (30 mg total) by mouth once daily.    ondansetron (ZOFRAN) 8 MG tablet Take 1 tablet (8 mg total) by mouth every 8 (eight) hours as needed for Nausea.    calcium-vitamin D3 (OYSTER SHELL CALCIUM-VIT D3) 500 mg-5 mcg (200 unit) per tablet Take 1 tablet by mouth once daily.    losartan (COZAAR) 50 MG tablet Take 2 tablets (100 mg total) by mouth once daily. (Patient not taking: Reported on 11/8/2022)     Current Facility-Administered Medications   Medication    LIDOcaine (PF) 20 mg/mL (2%) injection 200 mg    LIDOcaine HCL 20 mg/ml (2%) injection 20 mL     ALLERGIES:   Review of patient's allergies indicates:  No Known Allergies  Review of Systems   Constitutional: Negative for fever, malaise/fatigue and weight loss.   Respiratory: Negative for cough and shortness of breath.    Cardiovascular: Negative for chest pain and leg swelling.   Gastrointestinal: Negative for constipation, diarrhea and vomiting.   Skin: Negative for rash.   Neurological: Negative for seizures and weakness.   Psychiatric/Behavioral: The patient is not nervous/anxious.      PHYSICAL EXAM:   Vitals:    01/06/23 0949   BP: (!) 186/83   Pulse: (!) 50   Resp: 18   SpO2: 98%   Weight: 100.6 kg (221 lb 12.5 oz)   Height: 5' 8" (1.727 m)   PainSc: 0-No pain       Physical Exam  Constitutional:       Appearance: He is well-developed.   HENT:      Head: Normocephalic and atraumatic.      Mouth/Throat:      Mouth: Mucous membranes are moist. No oral lesions.      Pharynx: Oropharynx is clear.   Eyes:      Conjunctiva/sclera: Conjunctivae normal.   Cardiovascular:      Rate and Rhythm: Normal rate and regular rhythm.      Heart sounds: Normal heart sounds.   Pulmonary:      Effort: No respiratory distress.      Breath sounds: Normal breath sounds.   Abdominal:      General: Bowel " sounds are normal.      Palpations: Abdomen is soft.   Musculoskeletal:         General: Normal range of motion.      Cervical back: Normal range of motion.   Skin:     General: Skin is warm and dry.     Neurological:      Mental Status: He is alert and oriented to person, place, and time.   Psychiatric:         Behavior: Behavior normal.         Thought Content: Thought content normal.         Judgment: Judgment normal.       ECOG Performance Status: (foot note - ECOG PS provided by Eastern Cooperative Oncology Group) 1 - Symptomatic but completely ambulatory    Karnofsky Performance Score:  90%- Able to Carry on Normal Activity: Minor Symptoms of Disease  DATA:   See results review tab  Complete restaging results and pre transplant evaluation testing reviewed by     1. History of auto stem cell transplant        2. Multiple myeloma in remission        3. Chemotherapy-induced neutropenia        4. Essential hypertension              History of auto stem cell transplant  Today is 1 year and 7 months from transplant  He received 3 bags with a CD34 dose of 3.35 x 10^6 on 5/17/21  Engrafted Day +13 [place] [address]       Multiple myeloma   A. 9/14 - 9/17/2020 : Admitted to [facility] for evaluation of lytic lesions. Large soft tissue mass encompassing L4 - S1 vertebral bodies seen. FLC ratio 171, involved light chains 104. SPEP and SIFE found 2.86g/dL, IgG kappa para protein band. Underwent bone marrow biopsy and discharged with dexamethasone 40mg x 4 day burst.     B. 9/23/20 : Underwent debulking of spinal mass.  C. 10/1/20 - 4/14/21 : C1 - C8D1 VRd. Revlimid delivered in third week of cycle.  D. 2/25/21 : Presented for consent signing for autoSCT but his insurance had lapsed. Plan for transplant pushed back 1-2 months while he applys for medicaid.   E. 4/22/21 : C8D8 VRd planned (last treatment before mobilization).   Received Alea ASCT on 05/17/21, see above    Day +100 restaging marrow indicated that he  is in [place].  1 year restaging marrow with no morphological evidence of plasma cell neoplasm. PET CT with no new hypermetabolic lesions(05/2022).   He reports feeling well. He has been taking Revlimid 10mg daily along with ASA 81mg .  SPEP on 10/31/22 - Paraprotein band in gamma = 0.51 g/dL (previously 0.36 g/dL) 0.22 g.dL on 06/22.  Paraprotein stable 0.51, will close monitor with monthly myeloma studies.   He is 1 year post transplant. Will stop Acyclovir(08/08/22).   He is receiving Zometa every 3 month for 2 years, initiated on 11/21. Will schedule at next visit 3/2023.     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   Elevated during visit, per patient he have white coat syndrome. On multiple BP agents, compliant with his medications.   Patient to keep log for daily bp monitoring and report to PCP for elevated numbers    Patient discussed with attending physician, Dr. Olivia Go, PGY-VI  Hematology/Oncology Fellow      BMT Chart Routing      Follow up with physician 2 months. Dr. Baker 3/3/23   Follow up with AXEL    Provider visit type    Infusion scheduling note    Injection scheduling note Zometa after Dr. LATHA jasso 3/3   Labs CBC, CMP, SPEP, immunofixation, immunoglobulins and free light chains   Lab interval:  lab apt 2/3 in [place], 3/3 prior to Dr. LATHA jasso (all labs ordered)   Imaging    Pharmacy appointment    Other referrals

## 2023-01-09 LAB
ALBUMIN SERPL ELPH-MCNC: 3.75 G/DL (ref 3.35–5.55)
ALPHA1 GLOB SERPL ELPH-MCNC: 0.33 G/DL (ref 0.17–0.41)
ALPHA2 GLOB SERPL ELPH-MCNC: 0.56 G/DL (ref 0.43–0.99)
B-GLOBULIN SERPL ELPH-MCNC: 0.73 G/DL (ref 0.5–1.1)
GAMMA GLOB SERPL ELPH-MCNC: 2.03 G/DL (ref 0.67–1.58)
INTERPRETATION SERPL IFE-IMP: NORMAL
KAPPA LC SER QL IA: 7.77 MG/DL (ref 0.33–1.94)
KAPPA LC/LAMBDA SER IA: 1.46 (ref 0.26–1.65)
LAMBDA LC SER QL IA: 5.32 MG/DL (ref 0.57–2.63)
PROT SERPL-MCNC: 7.4 G/DL (ref 6–8.4)

## 2023-01-10 LAB
PATHOLOGIST INTERPRETATION IFE: NORMAL
PATHOLOGIST INTERPRETATION SPE: NORMAL

## 2023-02-02 DIAGNOSIS — C90.01 MULTIPLE MYELOMA IN REMISSION: ICD-10-CM

## 2023-02-02 RX ORDER — LENALIDOMIDE 10 MG/1
CAPSULE ORAL
Qty: 28 EACH | Refills: 0 | Status: SHIPPED | OUTPATIENT
Start: 2023-02-02 | End: 2023-02-27

## 2023-02-02 NOTE — TELEPHONE ENCOUNTER
"----- Message from Ezio Gayle sent at 2/2/2023  2:42 PM CST -----  RX Name and Strength:   lenalidomide (REVLIMID) 10 mg Cap           How is the patient currently taking it?  TAKE 1 CAPSULE BY MOUTH  DAILY FOR 28 DAYS         Is this a 30 day or 90 day Rx?  28 each        Preferred Pharmacy with phone number:    [facility] [address] [facility]  [address] [place] [address] [place] [address]  Phone: [phone] Fax: [phone]        Local or Mail Order: Mail        Ordering Provider: Olivia        Contact Preference: [phone] (Mobile)            Additional Information: Pharmacy also requesting celgene      "Thank you for all that you do for our patients"     "

## 2023-02-03 ENCOUNTER — LAB VISIT (OUTPATIENT)
Dept: LAB | Facility: HOSPITAL | Age: 63
End: 2023-02-03
Attending: STUDENT IN AN ORGANIZED HEALTH CARE EDUCATION/TRAINING PROGRAM
Payer: MEDICAID

## 2023-02-03 DIAGNOSIS — C90.01 MULTIPLE MYELOMA IN REMISSION: ICD-10-CM

## 2023-02-03 DIAGNOSIS — Z94.84 HISTORY OF AUTO STEM CELL TRANSPLANT: ICD-10-CM

## 2023-02-03 LAB
ALBUMIN SERPL BCP-MCNC: 4 G/DL (ref 3.5–5.2)
ALP SERPL-CCNC: 70 U/L (ref 38–126)
ALT SERPL W/O P-5'-P-CCNC: 17 U/L (ref 10–44)
ANION GAP SERPL CALC-SCNC: 7 MMOL/L (ref 8–16)
AST SERPL-CCNC: 26 U/L (ref 15–46)
BASOPHILS # BLD AUTO: 0.06 K/UL (ref 0–0.2)
BASOPHILS NFR BLD: 1.6 % (ref 0–1.9)
BILIRUB SERPL-MCNC: 0.5 MG/DL (ref 0.1–1)
CALCIUM SERPL-MCNC: 8.6 MG/DL (ref 8.7–10.5)
CHLORIDE SERPL-SCNC: 105 MMOL/L (ref 95–110)
CO2 SERPL-SCNC: 28 MMOL/L (ref 23–29)
CREAT SERPL-MCNC: 0.89 MG/DL (ref 0.5–1.4)
DIFFERENTIAL METHOD: ABNORMAL
EOSINOPHIL # BLD AUTO: 0.2 K/UL (ref 0–0.5)
EOSINOPHIL NFR BLD: 6.1 % (ref 0–8)
ERYTHROCYTE [DISTWIDTH] IN BLOOD BY AUTOMATED COUNT: 13.7 % (ref 11.5–14.5)
EST. GFR  (NO RACE VARIABLE): >60 ML/MIN/1.73 M^2
GLUCOSE SERPL-MCNC: 149 MG/DL (ref 70–110)
HCT VFR BLD AUTO: 40 % (ref 40–54)
HGB BLD-MCNC: 13.6 G/DL (ref 14–18)
IGA SERPL-MCNC: 271 MG/DL (ref 40–350)
IGG SERPL-MCNC: 1814 MG/DL (ref 650–1600)
IGM SERPL-MCNC: 92 MG/DL (ref 50–300)
IMM GRANULOCYTES # BLD AUTO: 0.01 K/UL (ref 0–0.04)
IMM GRANULOCYTES NFR BLD AUTO: 0.3 % (ref 0–0.5)
LYMPHOCYTES # BLD AUTO: 1.7 K/UL (ref 1–4.8)
LYMPHOCYTES NFR BLD: 46 % (ref 18–48)
MCH RBC QN AUTO: 31.9 PG (ref 27–31)
MCHC RBC AUTO-ENTMCNC: 34 G/DL (ref 32–36)
MCV RBC AUTO: 94 FL (ref 82–98)
MONOCYTES # BLD AUTO: 0.4 K/UL (ref 0.3–1)
MONOCYTES NFR BLD: 10.7 % (ref 4–15)
NEUTROPHILS # BLD AUTO: 1.3 K/UL (ref 1.8–7.7)
NEUTROPHILS NFR BLD: 35.3 % (ref 38–73)
NRBC BLD-RTO: 0 /100 WBC
PLATELET # BLD AUTO: 149 K/UL (ref 150–450)
PMV BLD AUTO: 11.9 FL (ref 9.2–12.9)
POTASSIUM SERPL-SCNC: 3.5 MMOL/L (ref 3.5–5.1)
PROT SERPL-MCNC: 7.7 G/DL (ref 6–8.4)
RBC # BLD AUTO: 4.26 M/UL (ref 4.6–6.2)
SODIUM SERPL-SCNC: 140 MMOL/L (ref 136–145)
UUN UR-MCNC: 10 MG/DL (ref 2–20)
WBC # BLD AUTO: 3.74 K/UL (ref 3.9–12.7)

## 2023-02-03 PROCEDURE — 83521 IG LIGHT CHAINS FREE EACH: CPT | Mod: PO | Performed by: STUDENT IN AN ORGANIZED HEALTH CARE EDUCATION/TRAINING PROGRAM

## 2023-02-03 PROCEDURE — 86334 IMMUNOFIX E-PHORESIS SERUM: CPT | Mod: PO | Performed by: STUDENT IN AN ORGANIZED HEALTH CARE EDUCATION/TRAINING PROGRAM

## 2023-02-03 PROCEDURE — 86334 IMMUNOFIX E-PHORESIS SERUM: CPT | Mod: 26,,, | Performed by: PATHOLOGY

## 2023-02-03 PROCEDURE — 80053 COMPREHEN METABOLIC PANEL: CPT | Mod: PO | Performed by: STUDENT IN AN ORGANIZED HEALTH CARE EDUCATION/TRAINING PROGRAM

## 2023-02-03 PROCEDURE — 84165 PROTEIN E-PHORESIS SERUM: CPT | Mod: 26,,, | Performed by: PATHOLOGY

## 2023-02-03 PROCEDURE — 82784 ASSAY IGA/IGD/IGG/IGM EACH: CPT | Mod: 59,PO | Performed by: STUDENT IN AN ORGANIZED HEALTH CARE EDUCATION/TRAINING PROGRAM

## 2023-02-03 PROCEDURE — 84165 PATHOLOGIST INTERPRETATION SPE: ICD-10-PCS | Mod: 26,,, | Performed by: PATHOLOGY

## 2023-02-03 PROCEDURE — 85025 COMPLETE CBC W/AUTO DIFF WBC: CPT | Mod: PO | Performed by: STUDENT IN AN ORGANIZED HEALTH CARE EDUCATION/TRAINING PROGRAM

## 2023-02-03 PROCEDURE — 86334 PATHOLOGIST INTERPRETATION IFE: ICD-10-PCS | Mod: 26,,, | Performed by: PATHOLOGY

## 2023-02-03 PROCEDURE — 84165 PROTEIN E-PHORESIS SERUM: CPT | Mod: PO | Performed by: STUDENT IN AN ORGANIZED HEALTH CARE EDUCATION/TRAINING PROGRAM

## 2023-02-03 PROCEDURE — 36415 COLL VENOUS BLD VENIPUNCTURE: CPT | Mod: PO | Performed by: STUDENT IN AN ORGANIZED HEALTH CARE EDUCATION/TRAINING PROGRAM

## 2023-02-06 LAB
ALBUMIN SERPL ELPH-MCNC: 3.6 G/DL (ref 3.35–5.55)
ALPHA1 GLOB SERPL ELPH-MCNC: 0.33 G/DL (ref 0.17–0.41)
ALPHA2 GLOB SERPL ELPH-MCNC: 0.6 G/DL (ref 0.43–0.99)
B-GLOBULIN SERPL ELPH-MCNC: 0.73 G/DL (ref 0.5–1.1)
GAMMA GLOB SERPL ELPH-MCNC: 1.84 G/DL (ref 0.67–1.58)
INTERPRETATION SERPL IFE-IMP: NORMAL
KAPPA LC SER QL IA: 7.94 MG/DL (ref 0.33–1.94)
KAPPA LC/LAMBDA SER IA: 1.85 (ref 0.26–1.65)
LAMBDA LC SER QL IA: 4.3 MG/DL (ref 0.57–2.63)
PROT SERPL-MCNC: 7.1 G/DL (ref 6–8.4)

## 2023-02-07 LAB
PATHOLOGIST INTERPRETATION IFE: NORMAL
PATHOLOGIST INTERPRETATION SPE: NORMAL

## 2023-03-01 ENCOUNTER — INFUSION (OUTPATIENT)
Dept: INFUSION THERAPY | Facility: HOSPITAL | Age: 63
End: 2023-03-01
Payer: MEDICARE

## 2023-03-01 ENCOUNTER — OFFICE VISIT (OUTPATIENT)
Dept: HEMATOLOGY/ONCOLOGY | Facility: CLINIC | Age: 63
End: 2023-03-01
Payer: MEDICARE

## 2023-03-01 VITALS
HEIGHT: 68 IN | DIASTOLIC BLOOD PRESSURE: 94 MMHG | BODY MASS INDEX: 34.04 KG/M2 | RESPIRATION RATE: 18 BRPM | SYSTOLIC BLOOD PRESSURE: 136 MMHG | HEART RATE: 45 BPM | WEIGHT: 224.63 LBS

## 2023-03-01 VITALS
TEMPERATURE: 98 F | BODY MASS INDEX: 34.04 KG/M2 | OXYGEN SATURATION: 97 % | RESPIRATION RATE: 18 BRPM | WEIGHT: 224.63 LBS | HEART RATE: 56 BPM | DIASTOLIC BLOOD PRESSURE: 83 MMHG | HEIGHT: 68 IN | SYSTOLIC BLOOD PRESSURE: 172 MMHG

## 2023-03-01 DIAGNOSIS — T45.1X5A IMMUNODEFICIENCY SECONDARY TO CHEMOTHERAPY: ICD-10-CM

## 2023-03-01 DIAGNOSIS — Z94.84 HISTORY OF AUTO STEM CELL TRANSPLANT: Primary | ICD-10-CM

## 2023-03-01 DIAGNOSIS — D70.1 CHEMOTHERAPY-INDUCED NEUTROPENIA: ICD-10-CM

## 2023-03-01 DIAGNOSIS — C90.01 MULTIPLE MYELOMA IN REMISSION: ICD-10-CM

## 2023-03-01 DIAGNOSIS — Z79.899 IMMUNODEFICIENCY SECONDARY TO CHEMOTHERAPY: ICD-10-CM

## 2023-03-01 DIAGNOSIS — D84.821 IMMUNODEFICIENCY SECONDARY TO CHEMOTHERAPY: ICD-10-CM

## 2023-03-01 DIAGNOSIS — T45.1X5A CHEMOTHERAPY-INDUCED NEUTROPENIA: ICD-10-CM

## 2023-03-01 DIAGNOSIS — G62.9 NEUROPATHY: ICD-10-CM

## 2023-03-01 PROCEDURE — 99999 PR PBB SHADOW E&M-EST. PATIENT-LVL IV: CPT | Mod: PBBFAC,,, | Performed by: NURSE PRACTITIONER

## 2023-03-01 PROCEDURE — 63600175 PHARM REV CODE 636 W HCPCS: Performed by: NURSE PRACTITIONER

## 2023-03-01 PROCEDURE — 99214 OFFICE O/P EST MOD 30 MIN: CPT | Mod: PBBFAC,25 | Performed by: NURSE PRACTITIONER

## 2023-03-01 PROCEDURE — 25000003 PHARM REV CODE 250: Performed by: NURSE PRACTITIONER

## 2023-03-01 PROCEDURE — 96374 THER/PROPH/DIAG INJ IV PUSH: CPT

## 2023-03-01 PROCEDURE — 99215 PR OFFICE/OUTPT VISIT, EST, LEVL V, 40-54 MIN: ICD-10-PCS | Mod: S$GLB,,, | Performed by: NURSE PRACTITIONER

## 2023-03-01 PROCEDURE — 99215 OFFICE O/P EST HI 40 MIN: CPT | Mod: S$GLB,,, | Performed by: NURSE PRACTITIONER

## 2023-03-01 PROCEDURE — 99999 PR PBB SHADOW E&M-EST. PATIENT-LVL IV: ICD-10-PCS | Mod: PBBFAC,,, | Performed by: NURSE PRACTITIONER

## 2023-03-01 RX ORDER — SODIUM CHLORIDE 0.9 % (FLUSH) 0.9 %
10 SYRINGE (ML) INJECTION
Status: DISCONTINUED | OUTPATIENT
Start: 2023-03-01 | End: 2023-03-01 | Stop reason: HOSPADM

## 2023-03-01 RX ORDER — GABAPENTIN 300 MG/1
300 CAPSULE ORAL 2 TIMES DAILY
Qty: 60 CAPSULE | Refills: 3 | Status: SHIPPED | OUTPATIENT
Start: 2023-03-01 | End: 2023-07-03

## 2023-03-01 RX ORDER — ASPIRIN 81 MG/1
81 TABLET ORAL DAILY
COMMUNITY
End: 2023-07-28

## 2023-03-01 RX ORDER — ZOLEDRONIC ACID 0.04 MG/ML
4 INJECTION, SOLUTION INTRAVENOUS
Status: COMPLETED | OUTPATIENT
Start: 2023-03-01 | End: 2023-03-01

## 2023-03-01 RX ADMIN — ZOLEDRONIC ACID 4 MG: 0.04 INJECTION, SOLUTION INTRAVENOUS at 10:03

## 2023-03-01 RX ADMIN — SODIUM CHLORIDE: 9 INJECTION, SOLUTION INTRAVENOUS at 10:03

## 2023-03-01 NOTE — NURSING
0943  Pt here for Zometa infusion, no complaints or concerns; reports taking oral Ca+ daily; discussed treatment plan for today, all questions answered and pt agrees to proceed

## 2023-03-01 NOTE — PLAN OF CARE
1126  Infusion completed, pt tolerated; pt instructed to increase water hydration daily and especially over next 3 days r/t Zometa and reason for same; reviewed why oral Ca+ ordered and importance of taking as instructed; discussed when to contact MD, when to report to ER; next appt not yet scheduled, pt verbalized understanding of all discussed

## 2023-03-01 NOTE — PROGRESS NOTES
SECTION OF HEMATOLOGY AND BONE MARROW TRANSPLANT  Return Patient Visit   03/01/2023    CHIEF COMPLAINT:   Multiple Myeloma    HISTORY OF PRESENT ILLNESS: Patient of /  63 y.o.male; pmh of IgG kappa multiple myeloma (standard risk CG per msmart criteria, r-iss stage II); diagnosed September 2019 after presenting with symptomatic perispinal plasmacytoma;He has subsequently received  8 cycles of VRd therapy. Was in midst or pre transplant evaluation but due to insurance coverage issues transplant was delayed so was maintained on therapy and those issues have been resolved.    Transplant Course  Patient with IgG Kappa MM and pmh HTN, lytic bone lesion, arthritis and vitamin D deficiency. Admitted 5/15/21 for planned Alea auto SCT for MM. He received 3 bags and a CD34 dose of 3.35 x 10^6 on 5/17/21. Tolerated chemo and transplant well. Admission complicated by n/v controlled with scheduled anti-emetics and c-diff neg diarrhea controlled with prn imodium. He engrafted on Day +13 (5/30/21) with an ANC of 2607. He was discharged home on Day +14 (5/31/21).     Day +100 restaging marrow indicated that he is in [place].   Interval History:   Jaspreet Walsh Jr. reports feeling well. He has been taking Revlimid 10mg daily along with ASA 81mg and Acyclovir. No problems with medications. Denies fever, chills, nightsweats, bleeding, brusing, lymphadenopathy, signs/symptoms of splenomegaly, focal pain and feels well.  Comes to clinic alone.     PAST MEDICAL HISTORY:   Past Medical History:   Diagnosis Date    Anxiety     Arthritis     Cancer     Hypertension        PAST SURGICAL HISTORY:   Past Surgical History:   Procedure Laterality Date    BACK SURGERY      BONE MARROW BIOPSY Left 10/20/2021    Procedure: Biopsy-bone marrow;  Surgeon: Summer Cartwright MD;  Location: [facility] ([address] [place] [address]);  Service: Oncology;  Laterality: Left;    COLONOSCOPY N/A 1/25/2021    Procedure: COLONOSCOPY;  Surgeon: Braxton Lees MD;   Location: [facility] ENDO (4TH FLR);  Service: Endoscopy;  Laterality: N/A;  1/22-covid kristopher-inst mailed-tb  1/20/21-pt to remain on schedule with Dr. Lees, and confirmed updated arrival time of 0835-BB    COLONOSCOPY N/A 2/1/2021    Procedure: COLONOSCOPY;  Surgeon: Jo Ann Robledo MD;  Location: [facility] ENDO (4TH FLR);  Service: Endoscopy;  Laterality: N/A;  rescheduled due to poor bowel prep, to be rescheduled with first available provider-BB  covid-1/29/21-pcw-BB    CREATION OF MUSCLE ROTATIONAL FLAP N/A 9/23/2020    Procedure: CREATION, FLAP, MUSCLE ROTATION;  Surgeon: Kamlesh Bellamy MD;  Location: [facility] OR 2ND FLR;  Service: Plastics;  Laterality: N/A;    KNEE SURGERY Left 06/2019    LUMBAR FUSION N/A 9/23/2020    Procedure: FUSION, SPINE, LUMBAR L2-pelvis. Depuy. Plastic surgery closure w/ Dr. Bellamy;  Surgeon: Nick Coyle MD;  [facility];  Service: Neurosurgery;  Laterality: N/A;    Metastatic neoplasum removed from spine         PAST SOCIAL HISTORY:   reports that he quit smoking about 6 years ago. He has a 10.00 pack-year smoking history. He has never used smokeless tobacco. He reports that he does not currently use alcohol. He reports that he does not use drugs.    FAMILY HISTORY:  History reviewed. No pertinent family history.    CURRENT MEDICATIONS:   Current Outpatient Medications   Medication Sig    aspirin (ECOTRIN) 81 MG EC tablet Take 81 mg by mouth once daily.    atenoloL (TENORMIN) 100 MG tablet Take 100 mg by mouth once daily.    carvediloL (COREG) 25 MG tablet Take 1 tablet (25 mg total) by mouth 2 (two) times daily.    cloNIDine (CATAPRES) 0.3 MG tablet Take 0.3 mg by mouth once daily.    diltiaZEM (CARDIZEM) 120 MG tablet Take 120 mg by mouth daily as needed.    ergocalciferol (ERGOCALCIFEROL) 50,000 unit Cap Take 1 capsule (50,000 Units total) by mouth every 7 days.    ferrous gluconate (FERGON) 324 MG tablet Take 1 tablet (324 mg total) by mouth daily with breakfast.     "lenalidomide (REVLIMID) 10 mg Cap TAKE 1 CAPSULE BY MOUTH DAILY  FOR 28 DAYS.    NIFEdipine (ADALAT CC) 30 MG TbSR Take 1 tablet (30 mg total) by mouth once daily.    ondansetron (ZOFRAN) 8 MG tablet Take 1 tablet (8 mg total) by mouth every 8 (eight) hours as needed for Nausea.    calcium-vitamin D3 (OYSTER SHELL CALCIUM-VIT D3) 500 mg-5 mcg (200 unit) per tablet Take 1 tablet by mouth once daily.    gabapentin (NEURONTIN) 300 MG capsule Take 1 capsule (300 mg total) by mouth 2 (two) times daily.    losartan (COZAAR) 50 MG tablet Take 2 tablets (100 mg total) by mouth once daily. (Patient not taking: Reported on 11/8/2022)     Current Facility-Administered Medications   Medication    LIDOcaine (PF) 20 mg/mL (2%) injection 200 mg    LIDOcaine HCL 20 mg/ml (2%) injection 20 mL     ALLERGIES:   Review of patient's allergies indicates:  No Known Allergies  Review of Systems   Constitutional: Negative for fever, malaise/fatigue and weight loss.   Respiratory: Negative for cough and shortness of breath.    Cardiovascular: Negative for chest pain and leg swelling.   Gastrointestinal: Negative for constipation, diarrhea and vomiting.   Skin: Negative for rash.   Neurological: Negative for seizures and weakness.   Psychiatric/Behavioral: The patient is not nervous/anxious.      PHYSICAL EXAM:   Vitals:    03/01/23 0834   BP: (!) 172/83   Pulse: (!) 56   Resp: 18   Temp: 98.1 °F (36.7 °C)   SpO2: 97%   Weight: 101.9 kg (224 lb 10.4 oz)   Height: 5' 8" (1.727 m)   PainSc: 0-No pain       Physical Exam  Constitutional:       Appearance: He is well-developed.   HENT:      Head: Normocephalic and atraumatic.      Mouth/Throat:      Mouth: Mucous membranes are moist. No oral lesions.      Pharynx: Oropharynx is clear.   Eyes:      Conjunctiva/sclera: Conjunctivae normal.   Cardiovascular:      Rate and Rhythm: Normal rate and regular rhythm.      Heart sounds: Normal heart sounds.   Pulmonary:      Effort: No respiratory " distress.      Breath sounds: Normal breath sounds.   Abdominal:      General: Bowel sounds are normal.      Palpations: Abdomen is soft.   Musculoskeletal:         General: Normal range of motion.      Cervical back: Normal range of motion.   Skin:     General: Skin is warm and dry.     Neurological:      Mental Status: He is alert and oriented to person, place, and time.   Psychiatric:         Behavior: Behavior normal.         Thought Content: Thought content normal.         Judgment: Judgment normal.       ECOG Performance Status: (foot note - ECOG PS provided by Eastern Cooperative Oncology Group) 1 - Symptomatic but completely ambulatory    Karnofsky Performance Score:  90%- Able to Carry on Normal Activity: Minor Symptoms of Disease  DATA:   See results review tab  Complete restaging results and pre transplant evaluation testing reviewed by     1. History of auto stem cell transplant        2. Neuropathy  gabapentin (NEURONTIN) 300 MG capsule      3. Multiple myeloma in remission        4. 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 9/14 - 9/17/2020 : Admitted to [facility] for evaluation of lytic lesions. Large soft tissue mass encompassing L4 - S1 vertebral bodies seen. FLC ratio 171, involved light chains 104. SPEP and SIFE found 2.86g/dL, IgG kappa para protein band. Underwent bone marrow biopsy and discharged with dexamethasone 40mg x 4 day burst.     B. 9/23/20 : Underwent debulking of spinal mass.  C. 10/1/20 - 4/14/21 : C1 - C8D1 VRd. Revlimid delivered in third week of cycle.  D. 2/25/21 : Presented for consent signing for autoSCT but his insurance had lapsed. Plan for transplant pushed back 1-2 months while he applys for  medicaid.   E. 4/22/21 : C8D8 VRd planned (last treatment before mobilization).   Received Alea ASCT on 05/17/21, see above    Day +100 restaging marrow indicated that he is in [place].  1 year restaging marrow with no morphological evidence of plasma cell neoplasm. PET CT with no new hypermetabolic lesions(05/2022).   He reports feeling well. He has been taking Revlimid 10mg daily along with ASA 81mg .  SPEP on 10/31/22 - Paraprotein band in gamma = 0.51 g/dL (previously 0.36 g/dL) 0.22 g.dL on 06/22.  Paraprotein stable 0.51, will close monitor with monthly myeloma studies.   Stopped  Acyclovir on 08/08/22.    He is receiving Zometa every 3 month for 2 years, initiated on 11/21. Receiving next dose today, next due on 06/23.     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   Elevated during visit, per patient he have white coat syndrome. On multiple BP agents, compliant with his medications.   Patient to keep log for daily bp monitoring and report to PCP for elevated numbers    Neuropathy  Controlled with gabapentin          BMT Chart Routing      Follow up with physician . /AXEL in 3 month   Follow up with AXEL    Provider visit type    Infusion scheduling note    Injection scheduling note Zometa in 3 month prior visit   Labs   Lab interval:  Cbc, cmp, Myeloma labs in 3 month prior visit   Imaging    Pharmacy appointment    Other referrals          Advance Care Planning     Date: 03/01/2023  We previously had discussions regarding his underlying diagnosis, natural history, prognosis, and treatment options. No evidence of relapse from recent labs. Continue maintenance chemo.  Will continue chemo therapy/ posttransplant monitoring.  Total time of this visit was 30 minutes, including time spent face to face with patient and/or via  video/audio, and also in preparing for today's visit for MDM and documentation. (Medical Decision Making, including consideration of possible diagnoses, management options, complex medical record review, review of diagnostic tests and information, consideration and discussion of significant complications based on comorbidities, and discussion with providers involved with the care of the patient). Greater than 50% was spent face to face with the patient counseling and coordinating care.      Ashanti Quiñones NP  Hematology/Oncology/BMT

## 2023-03-20 DIAGNOSIS — C90.00 MULTIPLE MYELOMA, REMISSION STATUS UNSPECIFIED: Primary | ICD-10-CM

## 2023-04-06 ENCOUNTER — OFFICE VISIT (OUTPATIENT)
Dept: CARDIOLOGY | Facility: CLINIC | Age: 63
End: 2023-04-06
Payer: MEDICARE

## 2023-04-06 VITALS
WEIGHT: 224 LBS | HEART RATE: 51 BPM | SYSTOLIC BLOOD PRESSURE: 150 MMHG | DIASTOLIC BLOOD PRESSURE: 84 MMHG | OXYGEN SATURATION: 98 % | BODY MASS INDEX: 34.06 KG/M2

## 2023-04-06 DIAGNOSIS — I50.32 CHRONIC DIASTOLIC HEART FAILURE: ICD-10-CM

## 2023-04-06 DIAGNOSIS — I10 PRIMARY HYPERTENSION: ICD-10-CM

## 2023-04-06 DIAGNOSIS — I10 ESSENTIAL HYPERTENSION: ICD-10-CM

## 2023-04-06 DIAGNOSIS — C90.00 MULTIPLE MYELOMA, REMISSION STATUS UNSPECIFIED: Primary | ICD-10-CM

## 2023-04-06 PROCEDURE — 99204 PR OFFICE/OUTPT VISIT, NEW, LEVL IV, 45-59 MIN: ICD-10-PCS | Mod: S$GLB,,, | Performed by: INTERNAL MEDICINE

## 2023-04-06 PROCEDURE — 3008F BODY MASS INDEX DOCD: CPT | Mod: CPTII,S$GLB,, | Performed by: INTERNAL MEDICINE

## 2023-04-06 PROCEDURE — 1159F PR MEDICATION LIST DOCUMENTED IN MEDICAL RECORD: ICD-10-PCS | Mod: CPTII,S$GLB,, | Performed by: INTERNAL MEDICINE

## 2023-04-06 PROCEDURE — 3079F PR MOST RECENT DIASTOLIC BLOOD PRESSURE 80-89 MM HG: ICD-10-PCS | Mod: CPTII,S$GLB,, | Performed by: INTERNAL MEDICINE

## 2023-04-06 PROCEDURE — 99999 PR PBB SHADOW E&M-EST. PATIENT-LVL III: CPT | Mod: PBBFAC,,, | Performed by: INTERNAL MEDICINE

## 2023-04-06 PROCEDURE — 1160F RVW MEDS BY RX/DR IN RCRD: CPT | Mod: CPTII,S$GLB,, | Performed by: INTERNAL MEDICINE

## 2023-04-06 PROCEDURE — 99999 PR PBB SHADOW E&M-EST. PATIENT-LVL III: ICD-10-PCS | Mod: PBBFAC,,, | Performed by: INTERNAL MEDICINE

## 2023-04-06 PROCEDURE — 3077F SYST BP >= 140 MM HG: CPT | Mod: CPTII,S$GLB,, | Performed by: INTERNAL MEDICINE

## 2023-04-06 PROCEDURE — 3079F DIAST BP 80-89 MM HG: CPT | Mod: CPTII,S$GLB,, | Performed by: INTERNAL MEDICINE

## 2023-04-06 PROCEDURE — 3077F PR MOST RECENT SYSTOLIC BLOOD PRESSURE >= 140 MM HG: ICD-10-PCS | Mod: CPTII,S$GLB,, | Performed by: INTERNAL MEDICINE

## 2023-04-06 PROCEDURE — 1159F MED LIST DOCD IN RCRD: CPT | Mod: CPTII,S$GLB,, | Performed by: INTERNAL MEDICINE

## 2023-04-06 PROCEDURE — 3008F PR BODY MASS INDEX (BMI) DOCUMENTED: ICD-10-PCS | Mod: CPTII,S$GLB,, | Performed by: INTERNAL MEDICINE

## 2023-04-06 PROCEDURE — 99204 OFFICE O/P NEW MOD 45 MIN: CPT | Mod: S$GLB,,, | Performed by: INTERNAL MEDICINE

## 2023-04-06 PROCEDURE — 1160F PR REVIEW ALL MEDS BY PRESCRIBER/CLIN PHARMACIST DOCUMENTED: ICD-10-PCS | Mod: CPTII,S$GLB,, | Performed by: INTERNAL MEDICINE

## 2023-04-06 RX ORDER — AMLODIPINE BESYLATE 10 MG/1
10 TABLET ORAL DAILY
Qty: 90 TABLET | Refills: 1 | Status: SHIPPED | OUTPATIENT
Start: 2023-04-06 | End: 2023-09-19 | Stop reason: SDUPTHER

## 2023-04-06 RX ORDER — HYDROCHLOROTHIAZIDE 25 MG/1
25 TABLET ORAL DAILY
Qty: 90 TABLET | Refills: 1 | Status: SHIPPED | OUTPATIENT
Start: 2023-04-06 | End: 2023-09-22 | Stop reason: SDUPTHER

## 2023-04-06 NOTE — PATIENT INSTRUCTIONS
Stop diltiazem  Replace with amlodipine 10 mg   Continue atenolol   Start HCTZ 25 mg   5. Continue clonidine   6Monitor blood pressures daily

## 2023-04-06 NOTE — PROGRESS NOTES
[facility] [address]     SUBJECTIVE:     History of Present Illness:  Patient is a 63 y.o. male presents with hypertension and here for this per consult..     Primary Diagnosis:   Hypertension  DM: none  Nonsmoker  Family history of early CAD; none  Heart disease: none   Multiple myeloma diagnosed 2021   ROS  No chest pains  No shortness of breath; no PND or orthopnea  No palpitations  No syncope  Blood pressures at home: 153; 130's;   Review of patient's allergies indicates:  No Known Allergies    Past Medical History:   Diagnosis Date    Anxiety     Arthritis     Cancer     Hypertension        Past Surgical History:   Procedure Laterality Date    BACK SURGERY      BONE MARROW BIOPSY Left 10/20/2021    Procedure: Biopsy-bone marrow;  Surgeon: Summer Cartwright MD;  Location: [facility] ENDO (4TH FLR);  Service: Oncology;  Laterality: Left;    COLONOSCOPY N/A 1/25/2021    Procedure: COLONOSCOPY;  Surgeon: Braxton Lees MD;  Location: [facility] (4TH FLR);  Service: Endoscopy;  Laterality: N/A;  1/22-covid kristopher-inst mailed-tb  1/20/21-pt to remain on schedule with Dr. Lees, and confirmed updated arrival time of 0835-BB    COLONOSCOPY N/A 2/1/2021    Procedure: COLONOSCOPY;  Surgeon: Jo Ann Robledo MD;  Location: [facility] (4TH FLR);  Service: Endoscopy;  Laterality: N/A;  rescheduled due to poor bowel prep, to be rescheduled with first available provider-BB  covid-1/29/21-pcw-BB    CREATION OF MUSCLE ROTATIONAL FLAP N/A 9/23/2020    Procedure: CREATION, FLAP, MUSCLE ROTATION;  Surgeon: Kamlesh Bellamy MD;  [facility];  Service: Plastics;  Laterality: N/A;    KNEE SURGERY Left 06/2019    LUMBAR FUSION N/A 9/23/2020    Procedure: FUSION, SPINE, LUMBAR L2-pelvis. Depuy. Plastic surgery closure w/ Dr. Bellamy;  Surgeon: Nick Coyle MD;  Location: [facility] OR [address] [place] [address];  Service: Neurosurgery;  Laterality: N/A;    Metastatic neoplasum removed from spine         No family history on file.    Social History      Tobacco Use    Smoking status: Former     Packs/day: 0.25     Years: 40.00     Pack years: 10.00     Types: Cigarettes     Quit date: 2017     Years since quittin.2    Smokeless tobacco: Never   Substance Use Topics    Alcohol use: Not Currently    Drug use: Never        Past Hospitalization:     Home meds:  Current Outpatient Medications on File Prior to Visit   Medication Sig Dispense Refill    aspirin (ECOTRIN) 81 MG EC tablet Take 81 mg by mouth once daily.      atenoloL (TENORMIN) 100 MG tablet Take 100 mg by mouth once daily.      calcium-vitamin D3 (OYSTER SHELL CALCIUM-VIT D3) 500 mg-5 mcg (200 unit) per tablet Take 1 tablet by mouth once daily. 30 tablet 0    cloNIDine (CATAPRES) 0.3 MG tablet Take 0.3 mg by mouth once daily.      diltiaZEM (CARDIZEM) 120 MG tablet Take 120 mg by mouth daily as needed.      gabapentin (NEURONTIN) 300 MG capsule Take 1 capsule (300 mg total) by mouth 2 (two) times daily. 60 capsule 3    lenalidomide (REVLIMID) 10 mg Cap TAKE 1 CAPSULE BY MOUTH DAILY  FOR 28 DAYS. 30 each 0    ergocalciferol (ERGOCALCIFEROL) 50,000 unit Cap Take 1 capsule (50,000 Units total) by mouth every 7 days. (Patient not taking: Reported on 2023) 4 capsule 1    ferrous gluconate (FERGON) 324 MG tablet Take 1 tablet (324 mg total) by mouth daily with breakfast. (Patient not taking: Reported on 2023) 30 tablet 3    [DISCONTINUED] carvediloL (COREG) 25 MG tablet Take 1 tablet (25 mg total) by mouth 2 (two) times daily. (Patient not taking: Reported on 2023) 60 tablet 2    [DISCONTINUED] losartan (COZAAR) 50 MG tablet Take 2 tablets (100 mg total) by mouth once daily. (Patient not taking: Reported on 2022) 180 tablet 3    [DISCONTINUED] NIFEdipine (ADALAT CC) 30 MG TbSR Take 1 tablet (30 mg total) by mouth once daily. 60 tablet 3    [DISCONTINUED] ondansetron (ZOFRAN) 8 MG tablet Take 1 tablet (8 mg total) by mouth every 8 (eight) hours as needed for Nausea. 30 tablet 0     Current  Facility-Administered Medications on File Prior to Visit   Medication Dose Route Frequency Provider Last Rate Last Admin    [DISCONTINUED] LIDOcaine (PF) 20 mg/mL (2%) injection 200 mg  10 mL Other Once Ashanti Quiñones NP        [DISCONTINUED] LIDOcaine HCL 20 mg/ml (2%) injection 20 mL  20 mL Other 1 time in Clinic/HOD Carol Trujilol NP           Cardiac meds:  ASA 81 mg  Atenolol 100 mg  Clonidine 0.3 mg daily  Diltiazem 120 mg        OBJECTIVE:     Vital Signs (Most Recent)  Vitals:    04/06/23 1336   BP: (!) 150/84   Pulse: (!) 51   SpO2: 98%   Weight: 101.6 kg (224 lb)         Physical Exam:  Neck: normal carotids, no bruits; normal JVP  Lungs :clear  Heart: RR, normal S1,S2, systolic ejection  murmurs, no gallops  Abd: no masses; no bruits;   Exts: normal DP and PT pulses bilaterally, no edema noted           LABS    CMP  Recent Labs   Lab Result Units 03/01/23  0739   Potassium mmol/L 3.6       LIPID  No results for input(s): HDL, CHOL, TRIG, LDLCALC, CHOLHDL, NONHDL, TOTALCHOLEST in the last 2160 hours.      Diagnostic Results:  3/23: normal GFR     Chart review:    Echo: 2021: normal EF; diastolic dysfunction;     EKGs: 5/22: sinus bradycardia     ASSESSMENT/PLAN:     Hypertension: poorly controlled     Plan: 1. Replace diltiazem with amlodipine 10 mg daily  2. Continue atenolol for now  3. Continue clonidine for now and once better, will try to discontinue this  4. HCTZ 25 mg daily with bananas   5. Monitor blood pressures and return 1 month     Gaurang Johnson MD

## 2023-04-17 ENCOUNTER — TELEPHONE (OUTPATIENT)
Dept: CARDIOLOGY | Facility: CLINIC | Age: 63
End: 2023-04-17
Payer: MEDICARE

## 2023-05-14 DIAGNOSIS — C90.01 MULTIPLE MYELOMA IN REMISSION: ICD-10-CM

## 2023-05-15 RX ORDER — LENALIDOMIDE 10 MG/1
CAPSULE ORAL
Qty: 28 EACH | Refills: 0 | Status: SHIPPED | OUTPATIENT
Start: 2023-05-15 | End: 2023-06-15 | Stop reason: SDUPTHER

## 2023-06-02 ENCOUNTER — OFFICE VISIT (OUTPATIENT)
Dept: HEMATOLOGY/ONCOLOGY | Facility: CLINIC | Age: 63
End: 2023-06-02
Payer: MEDICARE

## 2023-06-02 ENCOUNTER — CLINICAL SUPPORT (OUTPATIENT)
Dept: INFECTIOUS DISEASES | Facility: CLINIC | Age: 63
End: 2023-06-02
Payer: MEDICARE

## 2023-06-02 ENCOUNTER — OFFICE VISIT (OUTPATIENT)
Dept: INFECTIOUS DISEASES | Facility: CLINIC | Age: 63
End: 2023-06-02
Payer: MEDICARE

## 2023-06-02 ENCOUNTER — INFUSION (OUTPATIENT)
Dept: INFUSION THERAPY | Facility: HOSPITAL | Age: 63
End: 2023-06-02
Payer: MEDICARE

## 2023-06-02 VITALS
SYSTOLIC BLOOD PRESSURE: 129 MMHG | TEMPERATURE: 98 F | OXYGEN SATURATION: 98 % | BODY MASS INDEX: 34.08 KG/M2 | WEIGHT: 224.19 LBS | DIASTOLIC BLOOD PRESSURE: 82 MMHG | WEIGHT: 224.88 LBS | BODY MASS INDEX: 33.98 KG/M2 | SYSTOLIC BLOOD PRESSURE: 133 MMHG | HEIGHT: 68 IN | HEART RATE: 62 BPM | TEMPERATURE: 98 F | DIASTOLIC BLOOD PRESSURE: 79 MMHG | HEART RATE: 60 BPM | RESPIRATION RATE: 18 BRPM | HEIGHT: 68 IN

## 2023-06-02 VITALS
HEART RATE: 78 BPM | WEIGHT: 224.13 LBS | BODY MASS INDEX: 33.97 KG/M2 | RESPIRATION RATE: 16 BRPM | DIASTOLIC BLOOD PRESSURE: 88 MMHG | OXYGEN SATURATION: 96 % | TEMPERATURE: 98 F | HEIGHT: 68 IN | SYSTOLIC BLOOD PRESSURE: 142 MMHG

## 2023-06-02 DIAGNOSIS — C90.01 MULTIPLE MYELOMA IN REMISSION: ICD-10-CM

## 2023-06-02 DIAGNOSIS — Z71.85 VACCINE COUNSELING: Primary | ICD-10-CM

## 2023-06-02 DIAGNOSIS — Z94.84 HISTORY OF AUTO STEM CELL TRANSPLANT: Primary | ICD-10-CM

## 2023-06-02 DIAGNOSIS — Z94.84 HISTORY OF AUTO STEM CELL TRANSPLANT: ICD-10-CM

## 2023-06-02 DIAGNOSIS — C90.00 MULTIPLE MYELOMA, REMISSION STATUS UNSPECIFIED: Primary | ICD-10-CM

## 2023-06-02 PROCEDURE — 3079F PR MOST RECENT DIASTOLIC BLOOD PRESSURE 80-89 MM HG: ICD-10-PCS | Mod: CPTII,S$GLB,, | Performed by: INTERNAL MEDICINE

## 2023-06-02 PROCEDURE — 3008F BODY MASS INDEX DOCD: CPT | Mod: CPTII,S$GLB,, | Performed by: INTERNAL MEDICINE

## 2023-06-02 PROCEDURE — 99214 OFFICE O/P EST MOD 30 MIN: CPT | Mod: 25,S$GLB,, | Performed by: INTERNAL MEDICINE

## 2023-06-02 PROCEDURE — 90707 MMR VACCINE SC: CPT | Mod: S$GLB,,, | Performed by: INTERNAL MEDICINE

## 2023-06-02 PROCEDURE — 99215 PR OFFICE/OUTPT VISIT, EST, LEVL V, 40-54 MIN: ICD-10-PCS | Mod: S$GLB,,, | Performed by: INTERNAL MEDICINE

## 2023-06-02 PROCEDURE — 1160F RVW MEDS BY RX/DR IN RCRD: CPT | Mod: CPTII,S$GLB,, | Performed by: INTERNAL MEDICINE

## 2023-06-02 PROCEDURE — 96365 THER/PROPH/DIAG IV INF INIT: CPT

## 2023-06-02 PROCEDURE — 25000003 PHARM REV CODE 250: Performed by: INTERNAL MEDICINE

## 2023-06-02 PROCEDURE — 3074F SYST BP LT 130 MM HG: CPT | Mod: CPTII,S$GLB,, | Performed by: INTERNAL MEDICINE

## 2023-06-02 PROCEDURE — 99214 PR OFFICE/OUTPT VISIT, EST, LEVL IV, 30-39 MIN: ICD-10-PCS | Mod: 25,S$GLB,, | Performed by: INTERNAL MEDICINE

## 2023-06-02 PROCEDURE — 90471 MMR VACCINE SQ: ICD-10-PCS | Mod: S$GLB,,, | Performed by: INTERNAL MEDICINE

## 2023-06-02 PROCEDURE — 3008F PR BODY MASS INDEX (BMI) DOCUMENTED: ICD-10-PCS | Mod: CPTII,S$GLB,, | Performed by: INTERNAL MEDICINE

## 2023-06-02 PROCEDURE — 99999 PR PBB SHADOW E&M-EST. PATIENT-LVL III: ICD-10-PCS | Mod: PBBFAC,,, | Performed by: INTERNAL MEDICINE

## 2023-06-02 PROCEDURE — 1159F PR MEDICATION LIST DOCUMENTED IN MEDICAL RECORD: ICD-10-PCS | Mod: CPTII,S$GLB,, | Performed by: INTERNAL MEDICINE

## 2023-06-02 PROCEDURE — 3074F PR MOST RECENT SYSTOLIC BLOOD PRESSURE < 130 MM HG: ICD-10-PCS | Mod: CPTII,S$GLB,, | Performed by: INTERNAL MEDICINE

## 2023-06-02 PROCEDURE — 3079F DIAST BP 80-89 MM HG: CPT | Mod: CPTII,S$GLB,, | Performed by: INTERNAL MEDICINE

## 2023-06-02 PROCEDURE — 1159F MED LIST DOCD IN RCRD: CPT | Mod: CPTII,S$GLB,, | Performed by: INTERNAL MEDICINE

## 2023-06-02 PROCEDURE — 63600175 PHARM REV CODE 636 W HCPCS: Performed by: INTERNAL MEDICINE

## 2023-06-02 PROCEDURE — 99999 PR PBB SHADOW E&M-EST. PATIENT-LVL IV: ICD-10-PCS | Mod: PBBFAC,,, | Performed by: INTERNAL MEDICINE

## 2023-06-02 PROCEDURE — 99215 OFFICE O/P EST HI 40 MIN: CPT | Mod: S$GLB,,, | Performed by: INTERNAL MEDICINE

## 2023-06-02 PROCEDURE — 99999 PR PBB SHADOW E&M-EST. PATIENT-LVL IV: CPT | Mod: PBBFAC,,, | Performed by: INTERNAL MEDICINE

## 2023-06-02 PROCEDURE — 90707 MMR VACCINE SQ: ICD-10-PCS | Mod: S$GLB,,, | Performed by: INTERNAL MEDICINE

## 2023-06-02 PROCEDURE — 3077F PR MOST RECENT SYSTOLIC BLOOD PRESSURE >= 140 MM HG: ICD-10-PCS | Mod: CPTII,S$GLB,, | Performed by: INTERNAL MEDICINE

## 2023-06-02 PROCEDURE — 3077F SYST BP >= 140 MM HG: CPT | Mod: CPTII,S$GLB,, | Performed by: INTERNAL MEDICINE

## 2023-06-02 PROCEDURE — 99999 PR PBB SHADOW E&M-EST. PATIENT-LVL III: CPT | Mod: PBBFAC,,, | Performed by: INTERNAL MEDICINE

## 2023-06-02 PROCEDURE — 1160F PR REVIEW ALL MEDS BY PRESCRIBER/CLIN PHARMACIST DOCUMENTED: ICD-10-PCS | Mod: CPTII,S$GLB,, | Performed by: INTERNAL MEDICINE

## 2023-06-02 PROCEDURE — 90471 IMMUNIZATION ADMIN: CPT | Mod: S$GLB,,, | Performed by: INTERNAL MEDICINE

## 2023-06-02 RX ORDER — HEPARIN 100 UNIT/ML
500 SYRINGE INTRAVENOUS
Status: DISCONTINUED | OUTPATIENT
Start: 2023-06-02 | End: 2023-06-02 | Stop reason: HOSPADM

## 2023-06-02 RX ORDER — ZOLEDRONIC ACID 0.04 MG/ML
4 INJECTION, SOLUTION INTRAVENOUS
Status: COMPLETED | OUTPATIENT
Start: 2023-06-02 | End: 2023-06-02

## 2023-06-02 RX ORDER — ZOLEDRONIC ACID 0.04 MG/ML
4 INJECTION, SOLUTION INTRAVENOUS
Status: CANCELLED | OUTPATIENT
Start: 2023-06-02

## 2023-06-02 RX ORDER — SODIUM CHLORIDE 0.9 % (FLUSH) 0.9 %
10 SYRINGE (ML) INJECTION
Status: DISCONTINUED | OUTPATIENT
Start: 2023-06-02 | End: 2023-06-02 | Stop reason: HOSPADM

## 2023-06-02 RX ORDER — HEPARIN 100 UNIT/ML
500 SYRINGE INTRAVENOUS
Status: CANCELLED | OUTPATIENT
Start: 2023-06-02

## 2023-06-02 RX ORDER — SODIUM CHLORIDE 0.9 % (FLUSH) 0.9 %
10 SYRINGE (ML) INJECTION
Status: CANCELLED | OUTPATIENT
Start: 2023-06-02

## 2023-06-02 RX ADMIN — SODIUM CHLORIDE: 0.9 INJECTION, SOLUTION INTRAVENOUS at 10:06

## 2023-06-02 RX ADMIN — ZOLEDRONIC ACID 4 MG: 0.04 INJECTION, SOLUTION INTRAVENOUS at 10:06

## 2023-06-02 NOTE — PLAN OF CARE
Pt received Zometa today and tolerated well, without complications. VSS. Educated patient about Zometa (indications, side effects, possible reactions, precautions) and verbalized understanding. PIV positive for blood return, saline locked and removed prior to DC, catheter tip intact. Pt DC with no distress noted, ambulated off of unit, w/ family, w/o event, pleased.

## 2023-06-02 NOTE — PROGRESS NOTES
Patient received MMR vaccine in the right deltoid. Pt tolerated well. Pt asked to wait in the clinic 15 minutes after injection in the event of an allergic reaction. Pt verbalized understanding. Pt left in NAD.

## 2023-06-02 NOTE — PROGRESS NOTES
Subjective:      Patient ID: Jaspreet Walsh Jr. is a 63 y.o. male.    Chief Complaint:Follow-up and Immunizations      History of Present Illness  62-year-old male with history of MM s/p autoSCT 5/17/2021 on maintenance revlimid presents for follow-up of vaccine counseling.    Patient has been on revlimid and aspirin. He receives zometa infusions. He completed acyclovir 8/2022.    He completed all vaccine series and he responded appropriately.    He is now 2 years post SCT.     Immunization History   Administered Date(s) Administered    COVID-19, MRNA, LN-S, PF (Pfizer) (Purple Cap) 09/13/2021, 10/04/2021    DTaP 02/10/2022, 04/14/2022    DTaP (5 Pertussis Antigens) 12/23/2021    Hepatitis A, Adult 12/16/2021, 06/02/2022    Hepatitis B (recombinant) Adjuvanted, 2 dose 12/30/2021, 02/17/2022    HiB PRP-T 12/16/2021, 02/03/2022, 04/07/2022    IPV 12/23/2021, 02/10/2022, 04/14/2022    Influenza (FLUAD) - Quadrivalent - Adjuvanted - PF *Preferred* (65+) 12/09/2021    Meningococcal Conjugate (MCV4O) 12/30/2021, 02/17/2022    Pneumococcal Conjugate - 13 Valent 12/09/2021, 02/03/2022, 04/07/2022    Pneumococcal Polysaccharide - 23 Valent 06/02/2022    Zoster Recombinant 01/10/2022, 03/10/2022         Review of Systems   Constitutional: Negative for chills, decreased appetite, fever, malaise/fatigue, night sweats, weight gain and weight loss.   HENT:  Negative for congestion, ear pain, hearing loss, hoarse voice, sore throat and tinnitus.    Eyes:  Negative for blurred vision, redness and visual disturbance.   Cardiovascular:  Negative for chest pain, leg swelling and palpitations.   Respiratory:  Negative for cough, hemoptysis, shortness of breath, sputum production and wheezing.    Hematologic/Lymphatic: Negative for adenopathy. Does not bruise/bleed easily.   Skin:  Negative for dry skin, itching, rash and suspicious lesions.   Musculoskeletal:  Negative for back pain, joint pain, myalgias and neck pain.    Gastrointestinal:  Negative for abdominal pain, constipation, diarrhea, heartburn, nausea and vomiting.   Genitourinary:  Negative for dysuria, flank pain, frequency, hematuria, hesitancy and urgency.   Neurological:  Negative for dizziness, headaches, numbness, paresthesias and weakness.   Psychiatric/Behavioral:  Negative for depression and memory loss. The patient does not have insomnia and is not nervous/anxious.    Objective:   Physical Exam  Constitutional:       General: He is not in acute distress.     Appearance: He is well-developed. He is not diaphoretic.   HENT:      Head: Normocephalic and atraumatic.      Nose: Nose normal.   Eyes:      Conjunctiva/sclera: Conjunctivae normal.   Pulmonary:      Effort: Pulmonary effort is normal. No respiratory distress.   Abdominal:      General: Abdomen is flat. There is no distension.   Musculoskeletal:      Cervical back: Normal range of motion.      Right lower leg: No edema.      Left lower leg: No edema.   Skin:     General: Skin is warm and dry.      Findings: No erythema or rash.   Neurological:      Mental Status: He is alert.   Psychiatric:         Behavior: Behavior normal.       Sodium   Date Value Ref Range Status   06/02/2023 135 (L) 136 - 145 mmol/L Final   03/01/2023 140 136 - 145 mmol/L Final   02/03/2023 140 136 - 145 mmol/L Final      Potassium   Date Value Ref Range Status   06/02/2023 3.3 (L) 3.5 - 5.1 mmol/L Final   03/01/2023 3.6 3.5 - 5.1 mmol/L Final   02/03/2023 3.5 3.5 - 5.1 mmol/L Final      Chloride   Date Value Ref Range Status   06/02/2023 100 95 - 110 mmol/L Final   03/01/2023 107 95 - 110 mmol/L Final   02/03/2023 105 95 - 110 mmol/L Final      CO2   Date Value Ref Range Status   06/02/2023 25 23 - 29 mmol/L Final   03/01/2023 25 23 - 29 mmol/L Final   02/03/2023 28 23 - 29 mmol/L Final      BUN   Date Value Ref Range Status   06/02/2023 15 8 - 23 mg/dL Final   03/01/2023 11 8 - 23 mg/dL Final   02/03/2023 10 2 - 20 mg/dL Final       Creatinine   Date Value Ref Range Status   06/02/2023 1.2 0.5 - 1.4 mg/dL Final   03/01/2023 1.1 0.5 - 1.4 mg/dL Final   02/03/2023 0.89 0.50 - 1.40 mg/dL Final      Glucose   Date Value Ref Range Status   06/02/2023 156 (H) 70 - 110 mg/dL Final   03/01/2023 139 (H) 70 - 110 mg/dL Final   02/03/2023 149 (H) 70 - 110 mg/dL Final       ALT   Date Value Ref Range Status   06/02/2023 15 10 - 44 U/L Final   03/01/2023 15 10 - 44 U/L Final   02/03/2023 17 10 - 44 U/L Final      AST   Date Value Ref Range Status   06/02/2023 24 10 - 40 U/L Final   03/01/2023 20 10 - 40 U/L Final   02/03/2023 26 15 - 46 U/L Final      Total Bilirubin   Date Value Ref Range Status   06/02/2023 0.8 0.1 - 1.0 mg/dL Final     Comment:     For infants and newborns, interpretation of results should be based  on gestational age, weight and in agreement with clinical  observations.    Premature Infant recommended reference ranges:  Up to 24 hours.............<8.0 mg/dL  Up to 48 hours............<12.0 mg/dL  3-5 days..................<15.0 mg/dL  6-29 days.................<15.0 mg/dL     03/01/2023 0.8 0.1 - 1.0 mg/dL Final     Comment:     For infants and newborns, interpretation of results should be based  on gestational age, weight and in agreement with clinical  observations.    Premature Infant recommended reference ranges:  Up to 24 hours.............<8.0 mg/dL  Up to 48 hours............<12.0 mg/dL  3-5 days..................<15.0 mg/dL  6-29 days.................<15.0 mg/dL     02/03/2023 0.5 0.1 - 1.0 mg/dL Final     Comment:     For infants and newborns, interpretation of results should be based  on gestational age, weight and in agreement with clinical  observations.    Premature Infant recommended reference ranges:  Up to 24 hours.............<8.0 mg/dL  Up to 48 hours............<12.0 mg/dL  3-5 days..................<15.0 mg/dL  6-29 days.................<15.0 mg/dL        Albumin   Date Value Ref Range Status   06/02/2023 3.5 3.5 -  5.2 g/dL Final   03/01/2023 3.4 (L) 3.5 - 5.2 g/dL Final   02/03/2023 4.0 3.5 - 5.2 g/dL Final      Total Protein   Date Value Ref Range Status   06/02/2023 8.0 6.0 - 8.4 g/dL Final   03/01/2023 7.5 6.0 - 8.4 g/dL Final   02/03/2023 7.7 6.0 - 8.4 g/dL Final      Alkaline Phosphatase   Date Value Ref Range Status   06/02/2023 65 55 - 135 U/L Final   03/01/2023 63 55 - 135 U/L Final   02/03/2023 70 38 - 126 U/L Final        WBC   Date Value Ref Range Status   06/02/2023 3.23 (L) 3.90 - 12.70 K/uL Final   03/01/2023 3.06 (L) 3.90 - 12.70 K/uL Final   02/03/2023 3.74 (L) 3.90 - 12.70 K/uL Final      Hemoglobin   Date Value Ref Range Status   06/02/2023 13.4 (L) 14.0 - 18.0 g/dL Final   03/01/2023 14.0 14.0 - 18.0 g/dL Final   02/03/2023 13.6 (L) 14.0 - 18.0 g/dL Final      Hematocrit   Date Value Ref Range Status   06/02/2023 39.4 (L) 40.0 - 54.0 % Final   03/01/2023 43.3 40.0 - 54.0 % Final   02/03/2023 40.0 40.0 - 54.0 % Final      Platelets   Date Value Ref Range Status   06/02/2023 137 (L) 150 - 450 K/uL Final   03/01/2023 134 (L) 150 - 450 K/uL Final   02/03/2023 149 (L) 150 - 450 K/uL Final      Component      Latest Ref Rng & Units 9/1/2022   H. influenza B Ab      mcg/mL >9.00   Diphtheria Toxoid Ab      IU/mL >2.00   Tetanus Ab      IU/mL 3.27   S.pneumoniae Type 1       7.2   S.pneumoniae Type 3       2.4   Strep pneumo Type 4       4.0   S.pneumoniae Type 5       19.4   Serotype 6 (6A)       1.6   Strep pneumo Type 14       4.1   S.pneumoniae Type 19F       3.4   S.pneumoniae Type 23F       43.8   S.pneumoniae Type 6B       <0.3   S.pneumoniae Type 7F       8.7   Serotype 56 (18C)       5.6   Serotype 57 (19A)       12.7   S.pneumoniae Type 9V Abs       2.2     12/2021 VZV IgG positive    9/2022 HepBsAb positive  HepA Ab positive      Assessment:   62-year-old male with history of MM s/p autoSCT 5/17/2021 on maintenance revlimid presents for follow-up of vaccine counseling.    Patient completed all inactive  vaccines series, responded appropriately to vaccines.    He is >2 years transplant with no chemo in the last year - he is cleared to receive live vaccines.    Plan:   - MMR x2, due 6/2023, 7/2023  - COVID 19 bivalent booster, due 6/2023, #2 due 8/2023

## 2023-06-02 NOTE — Clinical Note
Please cancel my previous follow up instructions and schedule pt as below: -whole body pet scan, cbc, cmp, serum free light chains, quantitative immunoglobulins, serum electropheresis, serum immunofixation lab in 2-3 weeks MD Appt with me 2-3 days after labs/pet; needs to be an MD not np appt

## 2023-06-02 NOTE — PROGRESS NOTES
SECTION OF HEMATOLOGY AND BONE MARROW TRANSPLANT  Return Patient Visit   06/08/2023    CHIEF COMPLAINT:   Multiple Myeloma    HISTORY OF PRESENT ILLNESS: Patient of /  63 y.o.male; pmh of IgG kappa multiple myeloma (standard risk CG per msmart criteria, r-iss stage II); diagnosed September 2019 after presenting with symptomatic perispinal plasmacytoma;He has subsequently received  8 cycles of VRd therapy. Was in midst or pre transplant evaluation but due to insurance coverage issues transplant was delayed so was maintained on therapy and those issues have been resolved.    Transplant Course  Patient with IgG Kappa MM and pmh HTN, lytic bone lesion, arthritis and vitamin D deficiency. Admitted 5/15/21 for planned Yajaira auto SCT for MM. He received 3 bags and a CD34 dose of 3.35 x 10^6 on 5/17/21. Tolerated chemo and transplant well. Admission complicated by n/v controlled with scheduled anti-emetics and c-diff neg diarrhea controlled with prn imodium. He engrafted on Day +13 (5/30/21) with an ANC of 2607. He was discharged home on Day +14 (5/31/21).     Day +100 restaging marrow indicated that he is in [place].   Interval History:   Jaspreet Husseindmitri Reardon reports feeling well. He is currently 2 years 1 months post yajaira 200 autosCT.  He has been taking Revlimid 10mg daily along with ASA 81mg and Acyclovir. No problems with medications. Denies fever, chills, nightsweats, bleeding, brusing, lymphadenopathy, signs/symptoms of splenomegaly, focal pain and feels well.  Comes to clinic alone.     PAST MEDICAL HISTORY:   Past Medical History:   Diagnosis Date    Anxiety     Arthritis     Cancer     Hypertension        PAST SURGICAL HISTORY:   Past Surgical History:   Procedure Laterality Date    BACK SURGERY      BONE MARROW BIOPSY Left 10/20/2021    Procedure: Biopsy-bone marrow;  Surgeon: Summer Cartwright MD;  Location: [facility] ([address] [place] [address]);  Service: Oncology;  Laterality: Left;    COLONOSCOPY N/A 1/25/2021     Procedure: COLONOSCOPY;  Surgeon: Braxton Lees MD;  Location: [facility] ENDO (4TH FLR);  Service: Endoscopy;  Laterality: N/A;  1/22-covid kristopher-inst mailed-tb  1/20/21-pt to remain on schedule with Dr. Lees, and confirmed updated arrival time of 0835-BB    COLONOSCOPY N/A 2/1/2021    Procedure: COLONOSCOPY;  Surgeon: Jo Ann Robledo MD;  Location: [facility] ENDO (4TH FLR);  Service: Endoscopy;  Laterality: N/A;  rescheduled due to poor bowel prep, to be rescheduled with first available provider-BB  covid-1/29/21-pcw-BB    CREATION OF MUSCLE ROTATIONAL FLAP N/A 9/23/2020    Procedure: CREATION, FLAP, MUSCLE ROTATION;  Surgeon: Kamlesh Bellamy MD;  Location: [facility] OR 2ND FLR;  Service: Plastics;  Laterality: N/A;    KNEE SURGERY Left 06/2019    LUMBAR FUSION N/A 9/23/2020    Procedure: FUSION, SPINE, LUMBAR L2-pelvis. Depuy. Plastic surgery closure w/ Dr. Bellamy;  Surgeon: Nick Coyle MD;  [facility];  Service: Neurosurgery;  Laterality: N/A;    Metastatic neoplasum removed from spine         PAST SOCIAL HISTORY:   reports that he quit smoking about 6 years ago. His smoking use included cigarettes. He has a 10.00 pack-year smoking history. He has never used smokeless tobacco. He reports that he does not currently use alcohol. He reports that he does not use drugs.    FAMILY HISTORY:  No family history on file.    CURRENT MEDICATIONS:   Current Outpatient Medications   Medication Sig    amLODIPine (NORVASC) 10 MG tablet Take 1 tablet (10 mg total) by mouth once daily.    aspirin (ECOTRIN) 81 MG EC tablet Take 81 mg by mouth once daily.    calcium-vitamin D3 (OYSTER SHELL CALCIUM-VIT D3) 500 mg-5 mcg (200 unit) per tablet Take 1 tablet by mouth once daily.    cloNIDine (CATAPRES) 0.3 MG tablet Take 0.3 mg by mouth once daily.    gabapentin (NEURONTIN) 300 MG capsule Take 1 capsule (300 mg total) by mouth 2 (two) times daily.    hydroCHLOROthiazide (HYDRODIURIL) 25 MG tablet Take 1 tablet (25 mg  "total) by mouth once daily.    lenalidomide (REVLIMID) 10 mg Cap TAKE 1 CAPSULE BY MOUTH DAILY  FOR 28 DAYS.    atenoloL (TENORMIN) 100 MG tablet Take 100 mg by mouth once daily.    diltiaZEM (CARDIZEM) 120 MG tablet Take 120 mg by mouth daily as needed.    ergocalciferol (ERGOCALCIFEROL) 50,000 unit Cap Take 1 capsule (50,000 Units total) by mouth every 7 days.    ferrous gluconate (FERGON) 324 MG tablet Take 1 tablet (324 mg total) by mouth daily with breakfast.     No current facility-administered medications for this visit.     ALLERGIES:   Review of patient's allergies indicates:  No Known Allergies  Review of Systems   Constitutional: Negative for fever, malaise/fatigue and weight loss.   Respiratory: Negative for cough and shortness of breath.    Cardiovascular: Negative for chest pain and leg swelling.   Gastrointestinal: Negative for constipation, diarrhea and vomiting.   Skin: Negative for rash.   Neurological: Negative for seizures and weakness.   Psychiatric/Behavioral: The patient is not nervous/anxious.      PHYSICAL EXAM:   Vitals:    06/02/23 0823   BP: (!) 142/88   Pulse: 78   Resp: 16   Temp: 98.1 °F (36.7 °C)   TempSrc: Oral   SpO2: 96%   Weight: 101.6 kg (224 lb 1.6 oz)   Height: 5' 8" (1.727 m)   PainSc: 0-No pain       Physical Exam  Constitutional:       Appearance: He is well-developed.   HENT:      Head: Normocephalic and atraumatic.      Mouth/Throat:      Mouth: Mucous membranes are moist. No oral lesions.      Pharynx: Oropharynx is clear.   Eyes:      Conjunctiva/sclera: Conjunctivae normal.   Cardiovascular:      Rate and Rhythm: Normal rate and regular rhythm.      Heart sounds: Normal heart sounds.   Pulmonary:      Effort: No respiratory distress.      Breath sounds: Normal breath sounds.   Abdominal:      General: Bowel sounds are normal.      Palpations: Abdomen is soft.   Musculoskeletal:         General: Normal range of motion.      Cervical back: Normal range of motion. "   Skin:     General: Skin is warm and dry.     Neurological:      Mental Status: He is alert and oriented to person, place, and time.   Psychiatric:         Behavior: Behavior normal.         Thought Content: Thought content normal.         Judgment: Judgment normal.       ECOG Performance Status: (foot note - ECOG PS provided by Eastern Cooperative Oncology Group) 1 - Symptomatic but completely ambulatory    Karnofsky Performance Score:  90%- Able to Carry on Normal Activity: Minor Symptoms of Disease  DATA:   Lab Results   Component Value Date    WBC 3.23 (L) 06/02/2023    HGB 13.4 (L) 06/02/2023    HCT 39.4 (L) 06/02/2023    MCV 93 06/02/2023     (L) 06/02/2023       Gran # (ANC)   Date Value Ref Range Status   06/02/2023 1.5 (L) 1.8 - 7.7 K/uL Final     Gran %   Date Value Ref Range Status   06/02/2023 45.2 38.0 - 73.0 % Final     CMP  Sodium   Date Value Ref Range Status   06/02/2023 135 (L) 136 - 145 mmol/L Final     Potassium   Date Value Ref Range Status   06/02/2023 3.3 (L) 3.5 - 5.1 mmol/L Final     Chloride   Date Value Ref Range Status   06/02/2023 100 95 - 110 mmol/L Final     CO2   Date Value Ref Range Status   06/02/2023 25 23 - 29 mmol/L Final     Glucose   Date Value Ref Range Status   06/02/2023 156 (H) 70 - 110 mg/dL Final     BUN   Date Value Ref Range Status   06/02/2023 15 8 - 23 mg/dL Final     Creatinine   Date Value Ref Range Status   06/02/2023 1.2 0.5 - 1.4 mg/dL Final     Calcium   Date Value Ref Range Status   06/02/2023 9.8 8.7 - 10.5 mg/dL Final     Total Protein   Date Value Ref Range Status   06/02/2023 8.0 6.0 - 8.4 g/dL Final     Albumin   Date Value Ref Range Status   06/02/2023 3.5 3.5 - 5.2 g/dL Final     Total Bilirubin   Date Value Ref Range Status   06/02/2023 0.8 0.1 - 1.0 mg/dL Final     Comment:     For infants and newborns, interpretation of results should be based  on gestational age, weight and in agreement with clinical  observations.    Premature Infant  recommended reference ranges:  Up to 24 hours.............<8.0 mg/dL  Up to 48 hours............<12.0 mg/dL  3-5 days..................<15.0 mg/dL  6-29 days.................<15.0 mg/dL       Alkaline Phosphatase   Date Value Ref Range Status   06/02/2023 65 55 - 135 U/L Final     AST   Date Value Ref Range Status   06/02/2023 24 10 - 40 U/L Final     ALT   Date Value Ref Range Status   06/02/2023 15 10 - 44 U/L Final     Anion Gap   Date Value Ref Range Status   06/02/2023 10 8 - 16 mmol/L Final     eGFR   Date Value Ref Range Status   06/02/2023 >60.0 >60 mL/min/1.73 m^2 Final     IgG   Date Value Ref Range Status   06/02/2023 2353 (H) 650 - 1600 mg/dL Final     Comment:     IgG Cord Blood Reference Range: 650-1600 mg/dL.     IgA   Date Value Ref Range Status   06/02/2023 283 40 - 350 mg/dL Final     Comment:     IgA Cord Blood Reference Range: <5 mg/dL.     IgM   Date Value Ref Range Status   06/02/2023 60 50 - 300 mg/dL Final     Comment:     IgM Cord Blood Reference Range: <25 mg/dL.     Kappa Free Light Chains   Date Value Ref Range Status   06/02/2023 36.13 (H) 0.33 - 1.94 mg/dL Final   03/01/2023 11.40 (H) 0.33 - 1.94 mg/dL Final   02/03/2023 7.94 (H) 0.33 - 1.94 mg/dL Final     Lambda Free Light Chains   Date Value Ref Range Status   06/02/2023 3.43 (H) 0.57 - 2.63 mg/dL Final   03/01/2023 4.68 (H) 0.57 - 2.63 mg/dL Final   02/03/2023 4.30 (H) 0.57 - 2.63 mg/dL Final     Kappa/Lambda FLC Ratio   Date Value Ref Range Status   06/02/2023 10.53 (H) 0.26 - 1.65 Final     Comment:     Undetected antigen excess is a rare event but cannot   be excluded. If these free light chain results do not   agree with other clinical or laboratory findings or   if the sample is from a patient that has previously   demonstrated antigen excess, discuss with the testing   laboratory.   Results should always be interpreted in conjunction   with other laboratory tests and clinical evidence.     03/01/2023 2.44 (H) 0.26 - 1.65 Final      Comment:     Undetected antigen excess is a rare event but cannot   be excluded. If these free light chain results do not   agree with other clinical or laboratory findings or   if the sample is from a patient that has previously   demonstrated antigen excess, discuss with the testing   laboratory.   Results should always be interpreted in conjunction   with other laboratory tests and clinical evidence.     02/03/2023 1.85 (H) 0.26 - 1.65 Final     Comment:     Undetected antigen excess is a rare event but cannot   be excluded. If these free light chain results do not   agree with other clinical or laboratory findings or   if the sample is from a patient that has previously   demonstrated antigen excess, discuss with the testing   laboratory.   Results should always be interpreted in conjunction   with other laboratory tests and clinical evidence.       Pathologist Interpretation SPE   Date Value Ref Range Status   06/02/2023 REVIEWED  Final     Comment:       Electronically reviewed and signed by:  Joaquin Sabillon MD  Signed on 06/06/23 at 16:08  Normal total protein. Paraprotein peak in gamma = 1.30 g/dL   (previously 0.72 g/dL).     03/01/2023 REVIEWED  Final     Comment:       Electronically reviewed and signed by:  Angelita Begum D.O.  Signed on 03/05/23 at 16:53  Normal total protein.  Paraprotein peak in gamma = 0.72 g/dL (previously 0.62 g/dL).      02/03/2023 REVIEWED  Final     Comment:       Electronically reviewed and signed by:  Leslie Sorenson M.D.  Signed on 02/07/23 at 10:53  Normal total protein. Paraprotein in gamma= 0.62 g/dL, previously   0.81 g/dL.        Pathologist Interpretation SADAF   Date Value Ref Range Status   06/02/2023 REVIEWED  Final     Comment:       Electronically reviewed and signed by:  Joaquin Sabillon MD  Signed on 06/06/23 at 16:07  Oligoclonal pattern with an IgG kappa specific dominant band.              1. Multiple myeloma, remission status unspecified        2.  History of auto stem cell transplant  DISCONTINUED: zoledronic 4 mg/100 mL infusion 4 mg    DISCONTINUED: sodium chloride 0.9% 250 mL flush bag    DISCONTINUED: sodium chloride 0.9% flush 10 mL    DISCONTINUED: heparin, porcine (PF) 100 unit/mL injection flush 500 Units    DISCONTINUED: alteplase injection 2 mg                History of auto stem cell transplant  Today is2 year and  1 months from transplant  He received 3 bags with a CD34 dose of 3.35 x 10^6 on 5/17/21  Engrafted Day +13 [place] [id number]       Multiple myeloma   A. 9/14 - 9/17/2020 : [place] [facility] [place]. Large soft tissue mass encompassing L4 - S1 vertebral bodies seen. FLC ratio 171, involved light chains 104. SPEP and SIFE found 2.86g/dL, IgG kappa para protein band. Underwent bone marrow biopsy and discharged with dexamethasone 40mg x 4 day burst.     B. 9/23/20 : Underwent debulking of spinal mass.  C. 10/1/20 - 4/14/21 : C1 - C8D1 VRd. Revlimid delivered in third week of cycle.  D. 2/25/21 : Presented for consent signing for autoSCT but his insurance had lapsed. Plan for transplant pushed back 1-2 months while he applys for medicaid.   E. 4/22/21 : C8D8 VRd planned (last treatment before mobilization).   Received Alea ASCT on 05/17/21, see above    Day +100 restaging marrow indicated that he is in [place].  1 year restaging marrow with no morphological evidence of plasma cell neoplasm. PET CT with no new hypermetabolic lesions(05/2022).   He reports feeling well. He has been taking Revlimid 10mg daily along with ASA 81mg .  Of note his surveillance biochemical studies from today 6/2/23 resulted subsequent to our appt and show increased SFLC And mspike as above  He is asymptomatic  I will recommended staging pet scan and likely transition to DKd salvage    Stopped  Acyclovir on 08/08/22.    He is receiving Zometa every 3 month for 2 years, initiated on 11/21. Receiving next dose today, next due on 06/23.      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   Fu with pcp     Neuropathy  Controlled with gabapentin        FU: -whole body pet scan, cbc, cmp, serum free light chains, quantitative immunoglobulins, serum electropheresis, serum immunofixation lab in 2-3 weeks  MD Appt with me 2-3 days after labs/pet; needs to be an MD not np appt

## 2023-06-08 DIAGNOSIS — C90.00 MULTIPLE MYELOMA, REMISSION STATUS UNSPECIFIED: Primary | ICD-10-CM

## 2023-06-15 DIAGNOSIS — C90.01 MULTIPLE MYELOMA IN REMISSION: ICD-10-CM

## 2023-06-15 RX ORDER — LENALIDOMIDE 10 MG/1
CAPSULE ORAL
Qty: 28 EACH | Refills: 0 | Status: SHIPPED | OUTPATIENT
Start: 2023-06-15 | End: 2023-07-13

## 2023-06-16 ENCOUNTER — CLINICAL SUPPORT (OUTPATIENT)
Dept: INFECTIOUS DISEASES | Facility: CLINIC | Age: 63
End: 2023-06-16
Payer: MEDICARE

## 2023-06-16 PROCEDURE — 91313 COVID-19, MRNA, LNP-S, BIVALENT BOOSTER, PF, 50 MCG/0.5 ML: CPT | Mod: S$GLB,,, | Performed by: INTERNAL MEDICINE

## 2023-06-16 PROCEDURE — 0134A COVID-19, MRNA, LNP-S, BIVALENT BOOSTER, PF, 50 MCG/0.5 ML: ICD-10-PCS | Mod: S$GLB,,, | Performed by: INTERNAL MEDICINE

## 2023-06-16 PROCEDURE — 0134A COVID-19, MRNA, LNP-S, BIVALENT BOOSTER, PF, 50 MCG/0.5 ML: CPT | Mod: S$GLB,,, | Performed by: INTERNAL MEDICINE

## 2023-06-16 PROCEDURE — 91313 COVID-19, MRNA, LNP-S, BIVALENT BOOSTER, PF, 50 MCG/0.5 ML: ICD-10-PCS | Mod: S$GLB,,, | Performed by: INTERNAL MEDICINE

## 2023-06-16 NOTE — PROGRESS NOTES
Patient received the first Moderna Omicron Booster vaccine in the left deltoid. Pt tolerated well. Pt asked to wait in the clinic 15 minutes after injection in the event of an allergic reaction. Pt verbalized understanding. Pt left in NAD.

## 2023-06-26 ENCOUNTER — OFFICE VISIT (OUTPATIENT)
Dept: CARDIOLOGY | Facility: CLINIC | Age: 63
End: 2023-06-26
Payer: MEDICARE

## 2023-06-26 VITALS
DIASTOLIC BLOOD PRESSURE: 75 MMHG | OXYGEN SATURATION: 94 % | BODY MASS INDEX: 33.19 KG/M2 | SYSTOLIC BLOOD PRESSURE: 136 MMHG | HEIGHT: 68 IN | HEART RATE: 70 BPM | WEIGHT: 219 LBS

## 2023-06-26 DIAGNOSIS — I50.32 CHRONIC DIASTOLIC HEART FAILURE: ICD-10-CM

## 2023-06-26 DIAGNOSIS — I10 ESSENTIAL HYPERTENSION: Primary | ICD-10-CM

## 2023-06-26 PROCEDURE — 3075F PR MOST RECENT SYSTOLIC BLOOD PRESS GE 130-139MM HG: ICD-10-PCS | Mod: CPTII,S$GLB,, | Performed by: INTERNAL MEDICINE

## 2023-06-26 PROCEDURE — 3078F DIAST BP <80 MM HG: CPT | Mod: CPTII,S$GLB,, | Performed by: INTERNAL MEDICINE

## 2023-06-26 PROCEDURE — 1160F PR REVIEW ALL MEDS BY PRESCRIBER/CLIN PHARMACIST DOCUMENTED: ICD-10-PCS | Mod: CPTII,S$GLB,, | Performed by: INTERNAL MEDICINE

## 2023-06-26 PROCEDURE — 3075F SYST BP GE 130 - 139MM HG: CPT | Mod: CPTII,S$GLB,, | Performed by: INTERNAL MEDICINE

## 2023-06-26 PROCEDURE — 99999 PR PBB SHADOW E&M-EST. PATIENT-LVL III: ICD-10-PCS | Mod: PBBFAC,,, | Performed by: INTERNAL MEDICINE

## 2023-06-26 PROCEDURE — 99213 PR OFFICE/OUTPT VISIT, EST, LEVL III, 20-29 MIN: ICD-10-PCS | Mod: S$GLB,,, | Performed by: INTERNAL MEDICINE

## 2023-06-26 PROCEDURE — 3008F PR BODY MASS INDEX (BMI) DOCUMENTED: ICD-10-PCS | Mod: CPTII,S$GLB,, | Performed by: INTERNAL MEDICINE

## 2023-06-26 PROCEDURE — 1159F MED LIST DOCD IN RCRD: CPT | Mod: CPTII,S$GLB,, | Performed by: INTERNAL MEDICINE

## 2023-06-26 PROCEDURE — 99213 OFFICE O/P EST LOW 20 MIN: CPT | Mod: S$GLB,,, | Performed by: INTERNAL MEDICINE

## 2023-06-26 PROCEDURE — 99999 PR PBB SHADOW E&M-EST. PATIENT-LVL III: CPT | Mod: PBBFAC,,, | Performed by: INTERNAL MEDICINE

## 2023-06-26 PROCEDURE — 3078F PR MOST RECENT DIASTOLIC BLOOD PRESSURE < 80 MM HG: ICD-10-PCS | Mod: CPTII,S$GLB,, | Performed by: INTERNAL MEDICINE

## 2023-06-26 PROCEDURE — 3008F BODY MASS INDEX DOCD: CPT | Mod: CPTII,S$GLB,, | Performed by: INTERNAL MEDICINE

## 2023-06-26 PROCEDURE — 1160F RVW MEDS BY RX/DR IN RCRD: CPT | Mod: CPTII,S$GLB,, | Performed by: INTERNAL MEDICINE

## 2023-06-26 PROCEDURE — 1159F PR MEDICATION LIST DOCUMENTED IN MEDICAL RECORD: ICD-10-PCS | Mod: CPTII,S$GLB,, | Performed by: INTERNAL MEDICINE

## 2023-06-26 RX ORDER — POTASSIUM CHLORIDE 750 MG/1
10 TABLET, EXTENDED RELEASE ORAL DAILY
Qty: 90 TABLET | Refills: 1 | Status: SHIPPED | OUTPATIENT
Start: 2023-06-26 | End: 2023-12-22

## 2023-06-26 NOTE — PROGRESS NOTES
[facility] Cardiology 701     SUBJECTIVE:     History of Present Illness:  Patient is a 63 y.o. male presents with hypertension and here for this per consult..     Primary Diagnosis:   Hypertension  DM: none  Nonsmoker  Family history of early CAD; none  Heart disease: none   Multiple myeloma diagnosed 2021   ROS  Since last visit 4/23:   No chest pains  No shortness of breath; no PND or orthopnea  No palpitations  No syncope  Blood pressures at home: 120's since the last few months   Review of patient's allergies indicates:  No Known Allergies    Past Medical History:   Diagnosis Date    Anxiety     Arthritis     Cancer     Hypertension        Past Surgical History:   Procedure Laterality Date    BACK SURGERY      BONE MARROW BIOPSY Left 10/20/2021    Procedure: Biopsy-bone marrow;  Surgeon: Summer Cartwright MD;  Location: [facility] ENDO (4TH FLR);  Service: Oncology;  Laterality: Left;    COLONOSCOPY N/A 1/25/2021    Procedure: COLONOSCOPY;  Surgeon: Braxton Lees MD;  Location: [facility] (4TH FLR);  Service: Endoscopy;  Laterality: N/A;  1/22-covid kristopher-inst mailed-tb  1/20/21-pt to remain on schedule with Dr. Lees, and confirmed updated arrival time of 0835-BB    COLONOSCOPY N/A 2/1/2021    Procedure: COLONOSCOPY;  Surgeon: Jo Ann Robledo MD;  Location: [facility] (4TH FLR);  Service: Endoscopy;  Laterality: N/A;  rescheduled due to poor bowel prep, to be rescheduled with first available provider-BB  covid-1/29/21-pcw-BB    CREATION OF MUSCLE ROTATIONAL FLAP N/A 9/23/2020    Procedure: CREATION, FLAP, MUSCLE ROTATION;  Surgeon: Kamlesh Bellamy MD;  [facility];  Service: Plastics;  Laterality: N/A;    KNEE SURGERY Left 06/2019    LUMBAR FUSION N/A 9/23/2020    Procedure: FUSION, SPINE, LUMBAR L2-pelvis. Depuy. Plastic surgery closure w/ Dr. Bellamy;  Surgeon: Nick Coyle MD;  [facility];  Service: Neurosurgery;  Laterality: N/A;    Metastatic neoplasum removed from spine         No  family history on file.    Social History     Tobacco Use    Smoking status: Former     Packs/day: 0.25     Years: 40.00     Pack years: 10.00     Types: Cigarettes     Quit date: 2017     Years since quittin.4    Smokeless tobacco: Never   Substance Use Topics    Alcohol use: Not Currently    Drug use: Never        Past Hospitalization:     Home meds:  Current Outpatient Medications on File Prior to Visit   Medication Sig Dispense Refill    amLODIPine (NORVASC) 10 MG tablet Take 1 tablet (10 mg total) by mouth once daily. 90 tablet 1    aspirin (ECOTRIN) 81 MG EC tablet Take 81 mg by mouth once daily.      calcium-vitamin D3 (OYSTER SHELL CALCIUM-VIT D3) 500 mg-5 mcg (200 unit) per tablet Take 1 tablet by mouth once daily. 30 tablet 0    cloNIDine (CATAPRES) 0.3 MG tablet Take 0.3 mg by mouth once daily.      gabapentin (NEURONTIN) 300 MG capsule Take 1 capsule (300 mg total) by mouth 2 (two) times daily. 60 capsule 3    hydroCHLOROthiazide (HYDRODIURIL) 25 MG tablet Take 1 tablet (25 mg total) by mouth once daily. 90 tablet 1    lenalidomide (REVLIMID) 10 mg Cap TAKE 1 CAPSULE BY MOUTH DAILY  FOR 28 DAYS. (Patient taking differently: TAKE 1 CAPSULE BY MOUTH DAILY  FOR 28 DAYS.) 28 each 0    ergocalciferol (ERGOCALCIFEROL) 50,000 unit Cap Take 1 capsule (50,000 Units total) by mouth every 7 days. (Patient not taking: Reported on 2023) 4 capsule 1    ferrous gluconate (FERGON) 324 MG tablet Take 1 tablet (324 mg total) by mouth daily with breakfast. (Patient not taking: Reported on 2023) 30 tablet 3    [DISCONTINUED] atenoloL (TENORMIN) 100 MG tablet Take 100 mg by mouth once daily.      [DISCONTINUED] diltiaZEM (CARDIZEM) 120 MG tablet Take 120 mg by mouth daily as needed.       No current facility-administered medications on file prior to visit.       Cardiac meds:  ASA 81 mg  Atenolol 100 mg - not on this   Clonidine 0.3 mg daily  Amlodipine 10 mg   HCTZ 25 mg       OBJECTIVE:     Vital Signs (Most  "Recent)  Vitals:    06/26/23 0952   BP: 136/75   Pulse: 70   SpO2: (!) 94%   Weight: 99.3 kg (219 lb 0.4 oz)   Height: 5' 8" (1.727 m)           Physical Exam:  Neck: normal carotids, no bruits; normal JVP  Lungs :clear  Heart: RR, normal S1,S2, systolic ejection  murmurs, no gallops  Abd: no masses; no bruits;   Exts: normal DP and PT pulses bilaterally, no edema noted           LABS    3/23: normal GFR   6/23: normal GFR  Chart review:    Echo: 2021: normal EF; diastolic dysfunction;     EKGs: 5/22: sinus bradycardia     ASSESSMENT/PLAN:     Hypertension: controlled  Low K: will replace with K     Plan: 1. Start K 10 meq daily  2. Continue the others  3. Return 6 months     Gaurang Johnson MD      "

## 2023-07-03 ENCOUNTER — HOSPITAL ENCOUNTER (OUTPATIENT)
Dept: RADIOLOGY | Facility: HOSPITAL | Age: 63
Discharge: HOME | End: 2023-07-03
Attending: INTERNAL MEDICINE
Payer: MEDICARE

## 2023-07-03 DIAGNOSIS — C90.00 MULTIPLE MYELOMA, REMISSION STATUS UNSPECIFIED: ICD-10-CM

## 2023-07-03 DIAGNOSIS — G62.9 NEUROPATHY: ICD-10-CM

## 2023-07-03 LAB — POCT GLUCOSE: 129 MG/DL (ref 70–110)

## 2023-07-03 PROCEDURE — A9552 F18 FDG: HCPCS

## 2023-07-03 PROCEDURE — 78816 PET IMAGE W/CT FULL BODY: CPT | Mod: TC,PS

## 2023-07-03 PROCEDURE — 78816 PET IMAGE W/CT FULL BODY: CPT | Mod: 26,PS,, | Performed by: STUDENT IN AN ORGANIZED HEALTH CARE EDUCATION/TRAINING PROGRAM

## 2023-07-03 PROCEDURE — 78816 NM PET CT WHOLE BODY: ICD-10-PCS | Mod: 26,PS,, | Performed by: STUDENT IN AN ORGANIZED HEALTH CARE EDUCATION/TRAINING PROGRAM

## 2023-07-03 RX ORDER — GABAPENTIN 300 MG/1
CAPSULE ORAL
Qty: 60 CAPSULE | Refills: 3 | Status: SHIPPED | OUTPATIENT
Start: 2023-07-03 | End: 2023-11-01 | Stop reason: SDUPTHER

## 2023-07-06 ENCOUNTER — OFFICE VISIT (OUTPATIENT)
Dept: HEMATOLOGY/ONCOLOGY | Facility: CLINIC | Age: 63
End: 2023-07-06
Payer: MEDICARE

## 2023-07-06 VITALS
HEIGHT: 68 IN | TEMPERATURE: 98 F | SYSTOLIC BLOOD PRESSURE: 143 MMHG | OXYGEN SATURATION: 97 % | RESPIRATION RATE: 17 BRPM | HEART RATE: 81 BPM | DIASTOLIC BLOOD PRESSURE: 79 MMHG | WEIGHT: 217.5 LBS | BODY MASS INDEX: 32.96 KG/M2

## 2023-07-06 DIAGNOSIS — C90.02 MULTIPLE MYELOMA IN RELAPSE: Primary | ICD-10-CM

## 2023-07-06 DIAGNOSIS — C90.00 MULTIPLE MYELOMA, REMISSION STATUS UNSPECIFIED: ICD-10-CM

## 2023-07-06 DIAGNOSIS — Z94.84 HISTORY OF AUTO STEM CELL TRANSPLANT: ICD-10-CM

## 2023-07-06 PROCEDURE — 3077F SYST BP >= 140 MM HG: CPT | Mod: CPTII,GC,S$GLB, | Performed by: STUDENT IN AN ORGANIZED HEALTH CARE EDUCATION/TRAINING PROGRAM

## 2023-07-06 PROCEDURE — 3077F PR MOST RECENT SYSTOLIC BLOOD PRESSURE >= 140 MM HG: ICD-10-PCS | Mod: CPTII,GC,S$GLB, | Performed by: STUDENT IN AN ORGANIZED HEALTH CARE EDUCATION/TRAINING PROGRAM

## 2023-07-06 PROCEDURE — 3078F PR MOST RECENT DIASTOLIC BLOOD PRESSURE < 80 MM HG: ICD-10-PCS | Mod: CPTII,GC,S$GLB, | Performed by: STUDENT IN AN ORGANIZED HEALTH CARE EDUCATION/TRAINING PROGRAM

## 2023-07-06 PROCEDURE — 99999 PR PBB SHADOW E&M-EST. PATIENT-LVL III: ICD-10-PCS | Mod: PBBFAC,GC,, | Performed by: STUDENT IN AN ORGANIZED HEALTH CARE EDUCATION/TRAINING PROGRAM

## 2023-07-06 PROCEDURE — 3008F PR BODY MASS INDEX (BMI) DOCUMENTED: ICD-10-PCS | Mod: CPTII,GC,S$GLB, | Performed by: STUDENT IN AN ORGANIZED HEALTH CARE EDUCATION/TRAINING PROGRAM

## 2023-07-06 PROCEDURE — 3078F DIAST BP <80 MM HG: CPT | Mod: CPTII,GC,S$GLB, | Performed by: STUDENT IN AN ORGANIZED HEALTH CARE EDUCATION/TRAINING PROGRAM

## 2023-07-06 PROCEDURE — 3008F BODY MASS INDEX DOCD: CPT | Mod: CPTII,GC,S$GLB, | Performed by: STUDENT IN AN ORGANIZED HEALTH CARE EDUCATION/TRAINING PROGRAM

## 2023-07-06 PROCEDURE — 99999 PR PBB SHADOW E&M-EST. PATIENT-LVL III: CPT | Mod: PBBFAC,GC,, | Performed by: STUDENT IN AN ORGANIZED HEALTH CARE EDUCATION/TRAINING PROGRAM

## 2023-07-06 PROCEDURE — 99215 OFFICE O/P EST HI 40 MIN: CPT | Mod: GC,S$GLB,, | Performed by: STUDENT IN AN ORGANIZED HEALTH CARE EDUCATION/TRAINING PROGRAM

## 2023-07-06 PROCEDURE — 99215 PR OFFICE/OUTPT VISIT, EST, LEVL V, 40-54 MIN: ICD-10-PCS | Mod: GC,S$GLB,, | Performed by: STUDENT IN AN ORGANIZED HEALTH CARE EDUCATION/TRAINING PROGRAM

## 2023-07-06 NOTE — PROGRESS NOTES
Biochemical and pet progression confirmed at 2 years post SCT  Recommend transition to DKd salvage; discussed CANDOR data with pt  Carlfilzomib dosing day 1, 8,  15 kyprolis per ARROW trial   Stop revlimid   Schedule EKG/TTE before kyprolis   Hep b and t+S  before jame       Pharm d to do teaching   Restart acyclovir  Continue zometa q 3 months; give next dose with treatment on 7/20/23    Labs, MD appt and C1D1 DKd on 7/20/23

## 2023-07-07 ENCOUNTER — DOCUMENTATION ONLY (OUTPATIENT)
Dept: HEMATOLOGY/ONCOLOGY | Facility: CLINIC | Age: 63
End: 2023-07-07
Payer: MEDICARE

## 2023-07-07 NOTE — PROGRESS NOTES
Received request to assist following insurance change.  Referred for financial assistance screening after [facility] Dual medicare advantage coverage added to in-network [facility] medicaid coverage.  Awaiting determination.  Patient will have option to change coverage or switch to an in-network provider.  Will follow.

## 2023-07-07 NOTE — PROGRESS NOTES
BMT Chart Routing  Urgent    Follow up with physician 2 weeks. Follow up 7/20   Follow up with AXEL    Provider visit type    Infusion scheduling note   carfilzomib, daratumumab on 7/20   Injection scheduling note    Labs   Scheduling:  Preferred lab:  Lab interval:  CBC, CMP, SPEP, FLC, SADAF, Quant immunoglobulins, Hep B surface antigen, surface antibody, and core. Type and screen 7/17   Imaging ECHO and EKG   7/19   Pharmacy appointment    Other referrals             HEMATOLOGY ONCOLOGY FELLOW CLINIC    IDENTIFYING STATEMENT   63 y.o.male; pmh of IgG kappa multiple myeloma (standard risk CG per msmart criteria, r-iss stage II); diagnosed September 2019 after presenting with symptomatic perispinal plasmacytoma;He has subsequently received  8 cycles of VRd therapy. Was in midst or pre transplant evaluation but due to insurance coverage issues transplant was delayed so was maintained on therapy and those issues have been resolved.     Transplant Course  Patient with IgG Kappa MM and pmh HTN, lytic bone lesion, arthritis and vitamin D deficiency. Admitted 5/15/21 for planned Alea auto SCT for MM. He received 3 bags and a CD34 dose of 3.35 x 10^6 on 5/17/21. Tolerated chemo and transplant well. Admission complicated by n/v controlled with scheduled anti-emetics and c-diff neg diarrhea controlled with prn imodium. He engrafted on Day +13 (5/30/21) with an ANC of 2607. He was discharged home on Day +14 (5/31/21).      Day +100 restaging marrow indicated that he is in [place].   ------------------------------------------------------------------------------------------------------------------------------  Patient returns to clinic for follow up, he is 2y 1m from his auto sct for MM. On previous visit, patient with labs concerning for biochemical progression. He underwent PET scan which showed significant interval disease progression of multiple myeloma. Numerous diffuse hypermetabolic osseous lytic lesions primarily throughout  the axial skeleton.  Multiple hypermetabolic splenic lesions and two suspected hypermetabolic periportal lymph nodes concerning for extramedullary disease. Despite these findings patient denies any new systemic symptoms, he denies any bone pains. He denies any other systemic symptoms of headache, dizziness, chest pain, palpitations, abdominal pain, n/v/d, fevers or chills. He has good performance status and good PO intake.     HISTORY OF PRESENT ILLNESS:        Past Medical History:   Diagnosis Date    Anxiety     Arthritis     Cancer     Hypertension        No family history on file.    Social History     Socioeconomic History    Marital status: Significant Other    Number of children: 3   Tobacco Use    Smoking status: Former     Packs/day: 0.25     Years: 40.00     Pack years: 10.00     Types: Cigarettes     Quit date:      Years since quittin.5    Smokeless tobacco: Never   Substance and Sexual Activity    Alcohol use: Not Currently    Drug use: Never    Sexual activity: Yes     Partners: Female     Birth control/protection: None   Social History Narrative    Patient is intimate relationship with longtime partner (Catie)    Has 3 children, 1 live in [facility], 1 in [place], 1 in [place]; 4 grandchildren    2 brothers, 1 sister    Mother still living         MEDICATIONS:     Current Outpatient Medications on File Prior to Visit   Medication Sig Dispense Refill    amLODIPine (NORVASC) 10 MG tablet Take 1 tablet (10 mg total) by mouth once daily. 90 tablet 1    aspirin (ECOTRIN) 81 MG EC tablet Take 81 mg by mouth once daily.      cloNIDine (CATAPRES) 0.3 MG tablet Take 0.3 mg by mouth once daily.      ergocalciferol (ERGOCALCIFEROL) 50,000 unit Cap Take 1 capsule (50,000 Units total) by mouth every 7 days. 4 capsule 1    ferrous gluconate (FERGON) 324 MG tablet Take 1 tablet (324 mg total) by mouth daily with breakfast. 30 tablet 3    gabapentin (NEURONTIN) 300 MG capsule TAKE 1 CAPSULE(300 MG) BY MOUTH TWICE DAILY 60  "capsule 3    hydroCHLOROthiazide (HYDRODIURIL) 25 MG tablet Take 1 tablet (25 mg total) by mouth once daily. 90 tablet 1    lenalidomide (REVLIMID) 10 mg Cap TAKE 1 CAPSULE BY MOUTH DAILY  FOR 28 DAYS. (Patient taking differently: TAKE 1 CAPSULE BY MOUTH DAILY  FOR 28 DAYS.) 28 each 0    potassium chloride SA (K-DUR,KLOR-CON M) 10 MEQ tablet Take 1 tablet (10 mEq total) by mouth once daily. 90 tablet 1    calcium-vitamin D3 (OYSTER SHELL CALCIUM-VIT D3) 500 mg-5 mcg (200 unit) per tablet Take 1 tablet by mouth once daily. 30 tablet 0     No current facility-administered medications on file prior to visit.       ALLERGIES: Review of patient's allergies indicates:  No Known Allergies     ROS:       Review of Systems   Constitutional:  Negative for chills, fatigue and fever.   HENT:   Negative for hearing loss and sore throat.    Respiratory:  Negative for cough and shortness of breath.    Cardiovascular:  Negative for chest pain and palpitations.   Gastrointestinal:  Negative for abdominal pain, diarrhea, nausea and vomiting.   Genitourinary:  Negative for difficulty urinating and dysuria.    Musculoskeletal:  Negative for arthralgias and myalgias.   Skin:  Negative for rash.   Neurological:  Negative for dizziness and headaches.   Psychiatric/Behavioral:  Negative for confusion and decreased concentration.      PHYSICAL EXAM:  Vitals:    07/06/23 1120   BP: (!) 143/79   Pulse: 81   Resp: 17   Temp: 98.4 °F (36.9 °C)   SpO2: 97%   Weight: 98.6 kg (217 lb 7.7 oz)   Height: 5' 8" (1.727 m)   PainSc: 0-No pain       Physical Exam  Constitutional:       General: He is not in acute distress.     Appearance: He is well-developed. He is not toxic-appearing.   HENT:      Head: Normocephalic and atraumatic.      Right Ear: External ear normal.      Left Ear: External ear normal.      Nose: Nose normal.      Mouth/Throat:      Mouth: Mucous membranes are moist.      Pharynx: Oropharynx is clear. No oropharyngeal exudate. "   Eyes:      General: No scleral icterus.     Extraocular Movements: Extraocular movements intact.      Conjunctiva/sclera: Conjunctivae normal.   Cardiovascular:      Rate and Rhythm: Normal rate and regular rhythm.      Heart sounds: Normal heart sounds. No murmur heard.  Pulmonary:      Effort: Pulmonary effort is normal.      Breath sounds: Normal breath sounds. No rales.   Abdominal:      General: Bowel sounds are normal. There is no distension.      Palpations: Abdomen is soft.      Tenderness: There is no abdominal tenderness.   Musculoskeletal:         General: Normal range of motion.      Cervical back: Normal range of motion and neck supple.      Right lower leg: No edema.      Left lower leg: No edema.   Skin:     General: Skin is warm and dry.   Neurological:      Mental Status: He is alert and oriented to person, place, and time.   Psychiatric:         Behavior: Behavior normal.       LAB:   Results for orders placed or performed during the hospital encounter of 07/03/23   POCT glucose   Result Value Ref Range    POCT Glucose 129 (H) 70 - 110 mg/dL       PROBLEMS ASSESSED THIS VISIT:    1. Multiple myeloma in relapse    2. Multiple myeloma, remission status unspecified    3. History of auto stem cell transplant        ASSESSMENT AND PLAN    1-3  A. 9/14 - 9/17/2020 : [place] [facility] [place] lytic lesions. Large soft tissue mass encompassing L4 - S1 vertebral bodies seen. FLC ratio 171, involved light chains 104. SPEP and SIFE found 2.86g/dL, IgG kappa para protein band. Underwent bone marrow biopsy and discharged with dexamethasone 40mg x 4 day burst.     B. 9/23/20 : Underwent debulking of spinal mass.  C. 10/1/20 - 4/14/21 : C1 - C8D1 VRd. Revlimid delivered in third week of cycle.  D. 2/25/21 : Presented for consent signing for autoSCT but his insurance had lapsed. Plan for transplant pushed back 1-2 months while he applys for medicaid.   E. 4/22/21 : C8D8 VRd planned (last treatment before  mobilization).   Received Alea ASCT on 05/17/21, see above  Day +100 restaging marrow indicated that he is in [place].  1 year restaging marrow with no morphological evidence of plasma cell neoplasm. PET CT with no new hypermetabolic lesions(05/2022).         Today is 2 year and  1 months from transplant  He received 3 bags with a CD34 dose of 3.35 x 10^6 on 5/17/21  Engrafted Day +13 with [place] 21789     - Surveillance biochemical studies from 6/2 showed increased free light chains and increasing mspike  - PET scan concerning for diffuse osseus lytic legions suggesting progression of disease  - Plan for Melinda-Kd salvage therapy (1,8,15 kyprolis)  - Patient will have to restart acyclovir while on daratumumab. Hold ASA as he will no longer be taking revlimid  - He will also require echo and ecg as last was done 2 years ago  - Repeat Hep B studies, type and screen  - He continues to take zometa, next dose 7/20 with treatment  -Follow up in 2 weeks, plan to start treatment at this time. Pharmacy appointment same day     Discussed with Dr. Olivia Jimenez MD  PGY-V  Hematology/Oncology Fellow

## 2023-07-11 DIAGNOSIS — C90.01 MULTIPLE MYELOMA IN REMISSION: ICD-10-CM

## 2023-07-13 ENCOUNTER — DOCUMENTATION ONLY (OUTPATIENT)
Dept: HEMATOLOGY/ONCOLOGY | Facility: CLINIC | Age: 63
End: 2023-07-13
Payer: MEDICARE

## 2023-07-13 RX ORDER — LENALIDOMIDE 10 MG/1
CAPSULE ORAL
Qty: 28 EACH | Refills: 0 | Status: SHIPPED | OUTPATIENT
Start: 2023-07-13 | End: 2023-07-28

## 2023-07-13 NOTE — PROGRESS NOTES
Followed up on request to assist following insurance change.  Confirmed need was medically urgent for financial assistance screening to proceed.  Awaiting determination.  Spoke to partner Catie regarding process for changing plan; provided contact information for Nancy Diamond at [place] [employer] ([phone]) who can assist with plan selection based on current providers and current medications to ensure coverage with lowest costs.  Looked for alternate in-network options for EKG; [facility] Dual website lists only Dr. Ana Thomas[facility] family practice in [place] for the procedure; team notified.  Will follow.

## 2023-07-17 ENCOUNTER — LAB VISIT (OUTPATIENT)
Dept: LAB | Facility: HOSPITAL | Age: 63
End: 2023-07-17
Attending: INTERNAL MEDICINE
Payer: MEDICARE

## 2023-07-17 DIAGNOSIS — C90.00 MULTIPLE MYELOMA, REMISSION STATUS UNSPECIFIED: ICD-10-CM

## 2023-07-17 DIAGNOSIS — C90.02 MULTIPLE MYELOMA IN RELAPSE: ICD-10-CM

## 2023-07-17 LAB
ABO + RH BLD: NORMAL
ALBUMIN SERPL BCP-MCNC: 3.9 G/DL (ref 3.5–5.2)
ALP SERPL-CCNC: 62 U/L (ref 38–126)
ALT SERPL W/O P-5'-P-CCNC: 21 U/L (ref 10–44)
ANION GAP SERPL CALC-SCNC: 8 MMOL/L (ref 8–16)
AST SERPL-CCNC: 30 U/L (ref 15–46)
BASOPHILS # BLD AUTO: 0.06 K/UL (ref 0–0.2)
BASOPHILS NFR BLD: 1.8 % (ref 0–1.9)
BILIRUB SERPL-MCNC: 0.8 MG/DL (ref 0.1–1)
BLD GP AB SCN CELLS X3 SERPL QL: NORMAL
CALCIUM SERPL-MCNC: 8.9 MG/DL (ref 8.7–10.5)
CHLORIDE SERPL-SCNC: 104 MMOL/L (ref 95–110)
CO2 SERPL-SCNC: 26 MMOL/L (ref 23–29)
CREAT SERPL-MCNC: 1.17 MG/DL (ref 0.5–1.4)
DIFFERENTIAL METHOD: ABNORMAL
EOSINOPHIL # BLD AUTO: 0 K/UL (ref 0–0.5)
EOSINOPHIL NFR BLD: 1.2 % (ref 0–8)
ERYTHROCYTE [DISTWIDTH] IN BLOOD BY AUTOMATED COUNT: 15 % (ref 11.5–14.5)
EST. GFR  (NO RACE VARIABLE): >60 ML/MIN/1.73 M^2
GLUCOSE SERPL-MCNC: 137 MG/DL (ref 70–110)
HBV CORE AB SERPL QL IA: NORMAL
HBV SURFACE AB SER-ACNC: 335.16 MIU/ML
HBV SURFACE AB SER-ACNC: REACTIVE M[IU]/ML
HBV SURFACE AG SERPL QL IA: NORMAL
HCT VFR BLD AUTO: 37.8 % (ref 40–54)
HGB BLD-MCNC: 12.9 G/DL (ref 14–18)
IGA SERPL-MCNC: 200 MG/DL (ref 40–350)
IGG SERPL-MCNC: 3047 MG/DL (ref 650–1600)
IGM SERPL-MCNC: 45 MG/DL (ref 50–300)
IMM GRANULOCYTES # BLD AUTO: 0.01 K/UL (ref 0–0.04)
IMM GRANULOCYTES NFR BLD AUTO: 0.3 % (ref 0–0.5)
LYMPHOCYTES # BLD AUTO: 1.7 K/UL (ref 1–4.8)
LYMPHOCYTES NFR BLD: 51.5 % (ref 18–48)
MCH RBC QN AUTO: 33.1 PG (ref 27–31)
MCHC RBC AUTO-ENTMCNC: 34.1 G/DL (ref 32–36)
MCV RBC AUTO: 97 FL (ref 82–98)
MONOCYTES # BLD AUTO: 0.3 K/UL (ref 0.3–1)
MONOCYTES NFR BLD: 9.7 % (ref 4–15)
NEUTROPHILS # BLD AUTO: 1.2 K/UL (ref 1.8–7.7)
NEUTROPHILS NFR BLD: 35.5 % (ref 38–73)
NRBC BLD-RTO: 0 /100 WBC
PLATELET # BLD AUTO: 145 K/UL (ref 150–450)
PMV BLD AUTO: 10.7 FL (ref 9.2–12.9)
POTASSIUM SERPL-SCNC: 4 MMOL/L (ref 3.5–5.1)
PROT SERPL-MCNC: 8.5 G/DL (ref 6–8.4)
RBC # BLD AUTO: 3.9 M/UL (ref 4.6–6.2)
SODIUM SERPL-SCNC: 138 MMOL/L (ref 136–145)
SPECIMEN OUTDATE: NORMAL
UUN UR-MCNC: 18 MG/DL (ref 2–20)
WBC # BLD AUTO: 3.3 K/UL (ref 3.9–12.7)

## 2023-07-17 PROCEDURE — 84165 PATHOLOGIST INTERPRETATION SPE: ICD-10-PCS | Mod: 26,,, | Performed by: PATHOLOGY

## 2023-07-17 PROCEDURE — 83521 IG LIGHT CHAINS FREE EACH: CPT | Mod: 59,PO | Performed by: INTERNAL MEDICINE

## 2023-07-17 PROCEDURE — 86706 HEP B SURFACE ANTIBODY: CPT | Mod: 91,PO | Performed by: STUDENT IN AN ORGANIZED HEALTH CARE EDUCATION/TRAINING PROGRAM

## 2023-07-17 PROCEDURE — 87340 HEPATITIS B SURFACE AG IA: CPT | Mod: PO | Performed by: STUDENT IN AN ORGANIZED HEALTH CARE EDUCATION/TRAINING PROGRAM

## 2023-07-17 PROCEDURE — 84165 PROTEIN E-PHORESIS SERUM: CPT | Mod: 26,,, | Performed by: PATHOLOGY

## 2023-07-17 PROCEDURE — 86704 HEP B CORE ANTIBODY TOTAL: CPT | Mod: PO | Performed by: STUDENT IN AN ORGANIZED HEALTH CARE EDUCATION/TRAINING PROGRAM

## 2023-07-17 PROCEDURE — 85025 COMPLETE CBC W/AUTO DIFF WBC: CPT | Mod: PO | Performed by: INTERNAL MEDICINE

## 2023-07-17 PROCEDURE — 86900 BLOOD TYPING SEROLOGIC ABO: CPT | Performed by: STUDENT IN AN ORGANIZED HEALTH CARE EDUCATION/TRAINING PROGRAM

## 2023-07-17 PROCEDURE — 84165 PROTEIN E-PHORESIS SERUM: CPT | Mod: PO | Performed by: INTERNAL MEDICINE

## 2023-07-17 PROCEDURE — 86334 IMMUNOFIX E-PHORESIS SERUM: CPT | Mod: 26,,, | Performed by: PATHOLOGY

## 2023-07-17 PROCEDURE — 86334 PATHOLOGIST INTERPRETATION IFE: ICD-10-PCS | Mod: 26,,, | Performed by: PATHOLOGY

## 2023-07-17 PROCEDURE — 86334 IMMUNOFIX E-PHORESIS SERUM: CPT | Mod: PO | Performed by: INTERNAL MEDICINE

## 2023-07-17 PROCEDURE — 80053 COMPREHEN METABOLIC PANEL: CPT | Mod: PO | Performed by: INTERNAL MEDICINE

## 2023-07-17 PROCEDURE — 82784 ASSAY IGA/IGD/IGG/IGM EACH: CPT | Mod: PO | Performed by: INTERNAL MEDICINE

## 2023-07-18 LAB
ALBUMIN SERPL ELPH-MCNC: 3.78 G/DL (ref 3.35–5.55)
ALPHA1 GLOB SERPL ELPH-MCNC: 0.3 G/DL (ref 0.17–0.41)
ALPHA2 GLOB SERPL ELPH-MCNC: 0.65 G/DL (ref 0.43–0.99)
B-GLOBULIN SERPL ELPH-MCNC: 0.71 G/DL (ref 0.5–1.1)
GAMMA GLOB SERPL ELPH-MCNC: 2.55 G/DL (ref 0.67–1.58)
INTERPRETATION SERPL IFE-IMP: NORMAL
KAPPA LC SER QL IA: 55.03 MG/DL (ref 0.33–1.94)
KAPPA LC/LAMBDA SER IA: 27.52 (ref 0.26–1.65)
LAMBDA LC SER QL IA: 2 MG/DL (ref 0.57–2.63)
PATHOLOGIST INTERPRETATION IFE: NORMAL
PROT SERPL-MCNC: 8 G/DL (ref 6–8.4)

## 2023-07-19 ENCOUNTER — HOSPITAL ENCOUNTER (OUTPATIENT)
Dept: CARDIOLOGY | Facility: CLINIC | Age: 63
Discharge: HOME | End: 2023-07-19
Payer: MEDICARE

## 2023-07-19 DIAGNOSIS — C90.02 MULTIPLE MYELOMA IN RELAPSE: ICD-10-CM

## 2023-07-19 LAB — PATHOLOGIST INTERPRETATION SPE: NORMAL

## 2023-07-19 PROCEDURE — 93010 EKG 12-LEAD: ICD-10-PCS | Mod: S$GLB,,, | Performed by: INTERNAL MEDICINE

## 2023-07-19 PROCEDURE — 93005 EKG 12-LEAD: ICD-10-PCS | Mod: S$GLB,,, | Performed by: STUDENT IN AN ORGANIZED HEALTH CARE EDUCATION/TRAINING PROGRAM

## 2023-07-19 PROCEDURE — 93010 ELECTROCARDIOGRAM REPORT: CPT | Mod: S$GLB,,, | Performed by: INTERNAL MEDICINE

## 2023-07-19 PROCEDURE — 93005 ELECTROCARDIOGRAM TRACING: CPT | Mod: S$GLB,,, | Performed by: STUDENT IN AN ORGANIZED HEALTH CARE EDUCATION/TRAINING PROGRAM

## 2023-07-28 ENCOUNTER — HOSPITAL ENCOUNTER (EMERGENCY)
Facility: HOSPITAL | Age: 63
Discharge: HOME | End: 2023-07-28
Attending: STUDENT IN AN ORGANIZED HEALTH CARE EDUCATION/TRAINING PROGRAM
Payer: MEDICARE

## 2023-07-28 VITALS
TEMPERATURE: 100 F | DIASTOLIC BLOOD PRESSURE: 86 MMHG | RESPIRATION RATE: 16 BRPM | WEIGHT: 223 LBS | BODY MASS INDEX: 35 KG/M2 | OXYGEN SATURATION: 97 % | SYSTOLIC BLOOD PRESSURE: 135 MMHG | HEIGHT: 67 IN | HEART RATE: 96 BPM

## 2023-07-28 DIAGNOSIS — U07.1 COVID: Primary | ICD-10-CM

## 2023-07-28 DIAGNOSIS — U07.1 COVID-19: Primary | ICD-10-CM

## 2023-07-28 DIAGNOSIS — R05.9 COUGH: ICD-10-CM

## 2023-07-28 LAB
INFLUENZA A, MOLECULAR: NEGATIVE
INFLUENZA B, MOLECULAR: NEGATIVE
SARS-COV-2 RDRP RESP QL NAA+PROBE: POSITIVE
SPECIMEN SOURCE: NORMAL

## 2023-07-28 PROCEDURE — 87502 INFLUENZA DNA AMP PROBE: CPT | Mod: ER | Performed by: STUDENT IN AN ORGANIZED HEALTH CARE EDUCATION/TRAINING PROGRAM

## 2023-07-28 PROCEDURE — 99283 EMERGENCY DEPT VISIT LOW MDM: CPT | Mod: 25,ER

## 2023-07-28 PROCEDURE — U0002 COVID-19 LAB TEST NON-CDC: HCPCS | Mod: ER | Performed by: STUDENT IN AN ORGANIZED HEALTH CARE EDUCATION/TRAINING PROGRAM

## 2023-07-28 NOTE — DISCHARGE INSTRUCTIONS

## 2023-07-28 NOTE — Clinical Note
"Jaspreet Carlton"Jillian was seen and treated in our emergency department on 7/28/2023.  He may return to work on 07/31/2023.  Please isolate for 5 days.      If you have any questions or concerns, please don't hesitate to call.      Bao Estrada MD"

## 2023-07-28 NOTE — ED PROVIDER NOTES
"Encounter Date: 7/28/2023       History     Chief Complaint   Patient presents with    Cough     Patient states he started with a cough and congestion on Wednesday; states yesterday he woke up feeling " worse" denies any sick contacts.      63 year old male with cough, URI symptoms beginning a few days ago. No nausea, vomiting or fevers. Able to eat and drink. No chest pain or shortness of breath. No orthopnea, PND or leg swelling. No hx of DVT or PE. Reports taking robitussin with marked improvement but just wanted to get checked out. Reports being vaccinated against covid-19.    Review of patient's allergies indicates:  No Known Allergies  Past Medical History:   Diagnosis Date    Anxiety     Arthritis     Cancer     Hypertension      Past Surgical History:   Procedure Laterality Date    BACK SURGERY      BONE MARROW BIOPSY Left 10/20/2021    Procedure: Biopsy-bone marrow;  Surgeon: Summer Cartwright MD;  Location: [facility] (4TH FLR);  Service: Oncology;  Laterality: Left;    COLONOSCOPY N/A 1/25/2021    Procedure: COLONOSCOPY;  Surgeon: Braxton Lees MD;  Location: [facility] (4TH FLR);  Service: Endoscopy;  Laterality: N/A;  1/22-covid kristopher-inst mailed-tb  1/20/21-pt to remain on schedule with Dr. Lees, and confirmed updated arrival time of 0835-BB    COLONOSCOPY N/A 2/1/2021    Procedure: COLONOSCOPY;  Surgeon: Jo Ann Robledo MD;  Location: [facility] (4TH FLR);  Service: Endoscopy;  Laterality: N/A;  rescheduled due to poor bowel prep, to be rescheduled with first available provider-BB  covid-1/29/21-pcw-BB    CREATION OF MUSCLE ROTATIONAL FLAP N/A 9/23/2020    Procedure: CREATION, FLAP, MUSCLE ROTATION;  Surgeon: Kamlesh Bellamy MD;  [facility]  Service: Plastics;  Laterality: N/A;    KNEE SURGERY Left 06/2019    LUMBAR FUSION N/A 9/23/2020    Procedure: FUSION, SPINE, LUMBAR L2-pelvis. Depuy. Plastic surgery closure w/ Dr. Bellamy;  Surgeon: Nick Coyle MD;  [facility] F[facility];  Service: Neurosurgery;  Laterality: N/A;    Metastatic neoplasum removed from spine       History reviewed. No pertinent family history.  Social History     Tobacco Use    Smoking status: Former     Packs/day: 0.25     Years: 40.00     Pack years: 10.00     Types: Cigarettes     Quit date:      Years since quittin.5    Smokeless tobacco: Never   Substance Use Topics    Alcohol use: Not Currently    Drug use: Never     Review of Systems   All other systems reviewed and are negative.    Physical Exam     Initial Vitals [23 0751]   BP Pulse Resp Temp SpO2   135/86 96 16 99.9 °F (37.7 °C) 97 %      MAP       --         Physical Exam    Nursing note and vitals reviewed.  Constitutional: He appears well-developed and well-nourished.   HENT:   Head: Normocephalic and atraumatic.   Eyes: EOM are normal. Pupils are equal, round, and reactive to light.   Neck: Neck supple. No JVD present.   Normal range of motion.  Cardiovascular:  Normal rate, regular rhythm, normal heart sounds and intact distal pulses.     Exam reveals no gallop and no friction rub.       No murmur heard.  Pulmonary/Chest: Breath sounds normal. No stridor. No respiratory distress. He has no wheezes. He has no rhonchi. He has no rales.   Abdominal: Abdomen is soft. There is no abdominal tenderness.   Musculoskeletal:         General: No tenderness or edema. Normal range of motion.      Cervical back: Normal range of motion and neck supple.     Neurological: He is alert and oriented to person, place, and time. GCS score is 15. GCS eye subscore is 4. GCS verbal subscore is 5. GCS motor subscore is 6.   Skin: Skin is warm and dry. Capillary refill takes less than 2 seconds.   Psychiatric: He has a normal mood and affect. Thought content normal.       ED Course   Procedures  Labs Reviewed   SARS-COV-2 RNA AMPLIFICATION, QUAL - Abnormal; Notable for the following components:       Result Value    SARS-CoV-2 RNA, Amplification, Qual Positive  (*)     All other components within normal limits    Narrative:     Is the patient symptomatic?->Yes   INFLUENZA A & B BY MOLECULAR          Imaging Results              X-Ray Chest AP Portable (Final result)  Result time 07/28/23 08:39:48      Final result by Payam Winters MD (07/28/23 08:39:48)                   Impression:      No acute process seen.      Electronically signed by: Payam Winters MD  Date:    07/28/2023  Time:    08:39               Narrative:    EXAMINATION:  XR CHEST AP PORTABLE    CLINICAL HISTORY:  Cough, unspecified    FINDINGS:  Single view of the chest.  Comparison 05/10/21    Cardiac silhouette is normal.  The lungs demonstrate no evidence of active disease.  No evidence of pleural effusion or pneumothorax.  Bones appear intact.  Mild degenerative change.  Aorta demonstrates atherosclerotic disease.                                       Medications - No data to display  Medical Decision Making:   Initial Assessment:   Hemodynamically stable. Afebrile. Phonating and protecting the airway spontaneously. No clinical evidence for cardiovascular instability or impending airway compromise. Examination as above. Prior medical records reviewed. Heme/onc notes reviewed - pt with MM and recent relapse. Due to initiate tretment .. Current co-morbidities considered that will impact clinical decision making include multiple myeloma.    Plan:  Swabs, CXR due to hx of immunocompromise.             ED Course as of 07/28/23 0849   Fri Jul 28, 2023   0818 Swabs reviewed. I sent a secure message to the pt's medical oncologist, Dr. Thompson notifying him of the pt's diagnosis to help coordinate outpatient followup. [BG]   0846 CXR reviewed. [BG]   0846 The patient was reassessed and on subsequent re-evaluation, they were subjectively feeling better. They were resting comfortably and in no acute distress. I discussed the laboratory and diagnostic findings with the patient. Education was provided and all  questions were answered. As discussed, they were recommended to follow up with their heme/onc specialist, primary care physician within the next few days and to return to the emergency department sooner for any new or worsening. They acknowledged and verbalized agreement to the treatment plan. The patient was discharged home in stable condition.     DISCLAIMER: This note was prepared with [employer] voice recognition transcription software. Garbled syntax, mangled pronouns, and other bizarre constructions may be attributed to that software system.     [BG]      ED Course User Index  [BG] Bao Estrada MD          Referral placed for outpatient COVID antibody therapy. He has an appointment with his heme/onc next week to initiate therapy for MM. I discussed this with pt. He will follow with his heme/onc.        Clinical Impression:   Final diagnoses:  [R05.9] Cough  [U07.1] COVID (Primary)        ED Disposition Condition    Discharge Stable          ED Prescriptions    None       Follow-up Information       Follow up With Specialties Details Why Contact Info    Con Patel Jr., MD Family Medicine Go to  As needed [id number] [id number] [phone]               Bao Estrada MD  07/28/23 0803

## 2023-08-02 ENCOUNTER — LAB VISIT (OUTPATIENT)
Dept: LAB | Facility: HOSPITAL | Age: 63
End: 2023-08-02
Attending: INTERNAL MEDICINE
Payer: MEDICARE

## 2023-08-02 DIAGNOSIS — C90.00 MULTIPLE MYELOMA, REMISSION STATUS UNSPECIFIED: ICD-10-CM

## 2023-08-02 LAB
ALBUMIN SERPL BCP-MCNC: 4.1 G/DL (ref 3.5–5.2)
ALP SERPL-CCNC: 62 U/L (ref 38–126)
ALT SERPL W/O P-5'-P-CCNC: 30 U/L (ref 10–44)
ANION GAP SERPL CALC-SCNC: 10 MMOL/L (ref 8–16)
AST SERPL-CCNC: 31 U/L (ref 15–46)
BASOPHILS # BLD AUTO: 0.01 K/UL (ref 0–0.2)
BASOPHILS NFR BLD: 0.3 % (ref 0–1.9)
BILIRUB SERPL-MCNC: 0.5 MG/DL (ref 0.1–1)
CALCIUM SERPL-MCNC: 9.8 MG/DL (ref 8.7–10.5)
CHLORIDE SERPL-SCNC: 105 MMOL/L (ref 95–110)
CO2 SERPL-SCNC: 26 MMOL/L (ref 23–29)
CREAT SERPL-MCNC: 1.64 MG/DL (ref 0.5–1.4)
DIFFERENTIAL METHOD: ABNORMAL
EOSINOPHIL # BLD AUTO: 0 K/UL (ref 0–0.5)
EOSINOPHIL NFR BLD: 1.3 % (ref 0–8)
ERYTHROCYTE [DISTWIDTH] IN BLOOD BY AUTOMATED COUNT: 14.6 % (ref 11.5–14.5)
EST. GFR  (NO RACE VARIABLE): 46.4 ML/MIN/1.73 M^2
GLUCOSE SERPL-MCNC: 143 MG/DL (ref 70–110)
HCT VFR BLD AUTO: 37.2 % (ref 40–54)
HGB BLD-MCNC: 12.4 G/DL (ref 14–18)
IGA SERPL-MCNC: 165 MG/DL (ref 40–350)
IGG SERPL-MCNC: 3712 MG/DL (ref 650–1600)
IGM SERPL-MCNC: 37 MG/DL (ref 50–300)
IMM GRANULOCYTES # BLD AUTO: 0 K/UL (ref 0–0.04)
IMM GRANULOCYTES NFR BLD AUTO: 0 % (ref 0–0.5)
LYMPHOCYTES # BLD AUTO: 1.4 K/UL (ref 1–4.8)
LYMPHOCYTES NFR BLD: 44 % (ref 18–48)
MCH RBC QN AUTO: 32 PG (ref 27–31)
MCHC RBC AUTO-ENTMCNC: 33.3 G/DL (ref 32–36)
MCV RBC AUTO: 96 FL (ref 82–98)
MONOCYTES # BLD AUTO: 0.2 K/UL (ref 0.3–1)
MONOCYTES NFR BLD: 5.9 % (ref 4–15)
NEUTROPHILS # BLD AUTO: 1.5 K/UL (ref 1.8–7.7)
NEUTROPHILS NFR BLD: 48.5 % (ref 38–73)
NRBC BLD-RTO: 0 /100 WBC
PLATELET # BLD AUTO: 140 K/UL (ref 150–450)
PMV BLD AUTO: 10.9 FL (ref 9.2–12.9)
POTASSIUM SERPL-SCNC: 4.1 MMOL/L (ref 3.5–5.1)
PROT SERPL-MCNC: 9.9 G/DL (ref 6–8.4)
RBC # BLD AUTO: 3.87 M/UL (ref 4.6–6.2)
SODIUM SERPL-SCNC: 141 MMOL/L (ref 136–145)
UUN UR-MCNC: 20 MG/DL (ref 2–20)
WBC # BLD AUTO: 3.07 K/UL (ref 3.9–12.7)

## 2023-08-02 PROCEDURE — 84165 PROTEIN E-PHORESIS SERUM: CPT | Mod: 26,,, | Performed by: PATHOLOGY

## 2023-08-02 PROCEDURE — 84165 PROTEIN E-PHORESIS SERUM: CPT | Mod: PO | Performed by: INTERNAL MEDICINE

## 2023-08-02 PROCEDURE — 84165 PATHOLOGIST INTERPRETATION SPE: ICD-10-PCS | Mod: 26,,, | Performed by: PATHOLOGY

## 2023-08-02 PROCEDURE — 86334 IMMUNOFIX E-PHORESIS SERUM: CPT | Mod: 26,,, | Performed by: PATHOLOGY

## 2023-08-02 PROCEDURE — 80053 COMPREHEN METABOLIC PANEL: CPT | Mod: PO | Performed by: INTERNAL MEDICINE

## 2023-08-02 PROCEDURE — 82784 ASSAY IGA/IGD/IGG/IGM EACH: CPT | Mod: PO | Performed by: INTERNAL MEDICINE

## 2023-08-02 PROCEDURE — 83521 IG LIGHT CHAINS FREE EACH: CPT | Mod: 59,PO | Performed by: INTERNAL MEDICINE

## 2023-08-02 PROCEDURE — 86334 PATHOLOGIST INTERPRETATION IFE: ICD-10-PCS | Mod: 26,,, | Performed by: PATHOLOGY

## 2023-08-02 PROCEDURE — 85025 COMPLETE CBC W/AUTO DIFF WBC: CPT | Mod: PO | Performed by: INTERNAL MEDICINE

## 2023-08-02 PROCEDURE — 36415 COLL VENOUS BLD VENIPUNCTURE: CPT | Mod: PO | Performed by: INTERNAL MEDICINE

## 2023-08-02 PROCEDURE — 86334 IMMUNOFIX E-PHORESIS SERUM: CPT | Mod: PO | Performed by: INTERNAL MEDICINE

## 2023-08-03 LAB
ALBUMIN SERPL ELPH-MCNC: 3.84 G/DL (ref 3.35–5.55)
ALPHA1 GLOB SERPL ELPH-MCNC: 0.36 G/DL (ref 0.17–0.41)
ALPHA2 GLOB SERPL ELPH-MCNC: 0.73 G/DL (ref 0.43–0.99)
B-GLOBULIN SERPL ELPH-MCNC: 0.71 G/DL (ref 0.5–1.1)
GAMMA GLOB SERPL ELPH-MCNC: 2.96 G/DL (ref 0.67–1.58)
INTERPRETATION SERPL IFE-IMP: NORMAL
KAPPA LC SER QL IA: 92.4 MG/DL (ref 0.33–1.94)
KAPPA LC/LAMBDA SER IA: 60 (ref 0.26–1.65)
LAMBDA LC SER QL IA: 1.54 MG/DL (ref 0.57–2.63)
PROT SERPL-MCNC: 8.6 G/DL (ref 6–8.4)

## 2023-08-04 ENCOUNTER — HOSPITAL ENCOUNTER (OUTPATIENT)
Dept: CARDIOLOGY | Facility: HOSPITAL | Age: 63
Discharge: HOME | End: 2023-08-04
Attending: STUDENT IN AN ORGANIZED HEALTH CARE EDUCATION/TRAINING PROGRAM
Payer: MEDICARE

## 2023-08-04 ENCOUNTER — OFFICE VISIT (OUTPATIENT)
Dept: HEMATOLOGY/ONCOLOGY | Facility: CLINIC | Age: 63
End: 2023-08-04
Payer: MEDICARE

## 2023-08-04 ENCOUNTER — INFUSION (OUTPATIENT)
Dept: INFUSION THERAPY | Facility: HOSPITAL | Age: 63
End: 2023-08-04
Payer: MEDICARE

## 2023-08-04 VITALS
DIASTOLIC BLOOD PRESSURE: 80 MMHG | BODY MASS INDEX: 36.41 KG/M2 | WEIGHT: 232 LBS | HEIGHT: 67 IN | HEART RATE: 70 BPM | SYSTOLIC BLOOD PRESSURE: 140 MMHG

## 2023-08-04 VITALS
HEIGHT: 67 IN | SYSTOLIC BLOOD PRESSURE: 145 MMHG | OXYGEN SATURATION: 99 % | TEMPERATURE: 98 F | DIASTOLIC BLOOD PRESSURE: 80 MMHG | WEIGHT: 214.06 LBS | HEART RATE: 83 BPM | RESPIRATION RATE: 16 BRPM | BODY MASS INDEX: 33.6 KG/M2

## 2023-08-04 VITALS
TEMPERATURE: 98 F | RESPIRATION RATE: 16 BRPM | SYSTOLIC BLOOD PRESSURE: 123 MMHG | OXYGEN SATURATION: 100 % | DIASTOLIC BLOOD PRESSURE: 79 MMHG | HEART RATE: 69 BPM

## 2023-08-04 DIAGNOSIS — C90.00 MULTIPLE MYELOMA, REMISSION STATUS UNSPECIFIED: Primary | ICD-10-CM

## 2023-08-04 PROCEDURE — 3079F DIAST BP 80-89 MM HG: CPT | Mod: CPTII,S$GLB,, | Performed by: NURSE PRACTITIONER

## 2023-08-04 PROCEDURE — 99215 PR OFFICE/OUTPT VISIT, EST, LEVL V, 40-54 MIN: ICD-10-PCS | Mod: S$GLB,,, | Performed by: NURSE PRACTITIONER

## 2023-08-04 PROCEDURE — 93306 TTE W/DOPPLER COMPLETE: CPT | Mod: 26,,, | Performed by: INTERNAL MEDICINE

## 2023-08-04 PROCEDURE — 1159F MED LIST DOCD IN RCRD: CPT | Mod: CPTII,S$GLB,, | Performed by: NURSE PRACTITIONER

## 2023-08-04 PROCEDURE — 3008F PR BODY MASS INDEX (BMI) DOCUMENTED: ICD-10-PCS | Mod: CPTII,S$GLB,, | Performed by: NURSE PRACTITIONER

## 2023-08-04 PROCEDURE — 3077F SYST BP >= 140 MM HG: CPT | Mod: CPTII,S$GLB,, | Performed by: NURSE PRACTITIONER

## 2023-08-04 PROCEDURE — 96413 CHEMO IV INFUSION 1 HR: CPT

## 2023-08-04 PROCEDURE — 1160F RVW MEDS BY RX/DR IN RCRD: CPT | Mod: CPTII,S$GLB,, | Performed by: NURSE PRACTITIONER

## 2023-08-04 PROCEDURE — 93306 ECHO (CUPID ONLY): ICD-10-PCS | Mod: 26,,, | Performed by: INTERNAL MEDICINE

## 2023-08-04 PROCEDURE — 3077F PR MOST RECENT SYSTOLIC BLOOD PRESSURE >= 140 MM HG: ICD-10-PCS | Mod: CPTII,S$GLB,, | Performed by: NURSE PRACTITIONER

## 2023-08-04 PROCEDURE — 99999 PR PBB SHADOW E&M-EST. PATIENT-LVL V: ICD-10-PCS | Mod: PBBFAC,,, | Performed by: NURSE PRACTITIONER

## 2023-08-04 PROCEDURE — 96361 HYDRATE IV INFUSION ADD-ON: CPT

## 2023-08-04 PROCEDURE — 1159F PR MEDICATION LIST DOCUMENTED IN MEDICAL RECORD: ICD-10-PCS | Mod: CPTII,S$GLB,, | Performed by: NURSE PRACTITIONER

## 2023-08-04 PROCEDURE — 25000003 PHARM REV CODE 250: Performed by: INTERNAL MEDICINE

## 2023-08-04 PROCEDURE — 99215 OFFICE O/P EST HI 40 MIN: CPT | Mod: S$GLB,,, | Performed by: NURSE PRACTITIONER

## 2023-08-04 PROCEDURE — 1160F PR REVIEW ALL MEDS BY PRESCRIBER/CLIN PHARMACIST DOCUMENTED: ICD-10-PCS | Mod: CPTII,S$GLB,, | Performed by: NURSE PRACTITIONER

## 2023-08-04 PROCEDURE — 96401 CHEMO ANTI-NEOPL SQ/IM: CPT

## 2023-08-04 PROCEDURE — 93306 TTE W/DOPPLER COMPLETE: CPT

## 2023-08-04 PROCEDURE — 63600175 PHARM REV CODE 636 W HCPCS: Mod: JZ,TB | Performed by: INTERNAL MEDICINE

## 2023-08-04 PROCEDURE — 3079F PR MOST RECENT DIASTOLIC BLOOD PRESSURE 80-89 MM HG: ICD-10-PCS | Mod: CPTII,S$GLB,, | Performed by: NURSE PRACTITIONER

## 2023-08-04 PROCEDURE — 99999 PR PBB SHADOW E&M-EST. PATIENT-LVL V: CPT | Mod: PBBFAC,,, | Performed by: NURSE PRACTITIONER

## 2023-08-04 PROCEDURE — 3008F BODY MASS INDEX DOCD: CPT | Mod: CPTII,S$GLB,, | Performed by: NURSE PRACTITIONER

## 2023-08-04 RX ORDER — EPINEPHRINE 0.3 MG/.3ML
0.3 INJECTION SUBCUTANEOUS ONCE AS NEEDED
Status: CANCELLED | OUTPATIENT
Start: 2023-08-04

## 2023-08-04 RX ORDER — DIPHENHYDRAMINE HCL 50 MG
50 CAPSULE ORAL
Status: CANCELLED
Start: 2023-08-04

## 2023-08-04 RX ORDER — HEPARIN 100 UNIT/ML
500 SYRINGE INTRAVENOUS
Status: CANCELLED | OUTPATIENT
Start: 2023-09-06

## 2023-08-04 RX ORDER — DIPHENHYDRAMINE HYDROCHLORIDE 50 MG/ML
50 INJECTION INTRAMUSCULAR; INTRAVENOUS ONCE AS NEEDED
Status: CANCELLED | OUTPATIENT
Start: 2023-08-30

## 2023-08-04 RX ORDER — SODIUM CHLORIDE 0.9 % (FLUSH) 0.9 %
10 SYRINGE (ML) INJECTION
Status: CANCELLED | OUTPATIENT
Start: 2023-08-04

## 2023-08-04 RX ORDER — EPINEPHRINE 0.3 MG/.3ML
0.3 INJECTION SUBCUTANEOUS ONCE AS NEEDED
Status: DISCONTINUED | OUTPATIENT
Start: 2023-08-04 | End: 2023-08-04 | Stop reason: HOSPADM

## 2023-08-04 RX ORDER — HEPARIN 100 UNIT/ML
500 SYRINGE INTRAVENOUS
Status: CANCELLED | OUTPATIENT
Start: 2023-08-30

## 2023-08-04 RX ORDER — DEXAMETHASONE 4 MG/1
40 TABLET ORAL
Qty: 40 TABLET | Refills: 11 | Status: SHIPPED | OUTPATIENT
Start: 2023-08-04

## 2023-08-04 RX ORDER — EPINEPHRINE 0.3 MG/.3ML
0.3 INJECTION SUBCUTANEOUS ONCE AS NEEDED
Status: CANCELLED | OUTPATIENT
Start: 2023-08-30

## 2023-08-04 RX ORDER — DIPHENHYDRAMINE HCL 50 MG
50 CAPSULE ORAL
Status: CANCELLED
Start: 2023-08-23

## 2023-08-04 RX ORDER — ACETAMINOPHEN 325 MG/1
650 TABLET ORAL
Status: COMPLETED | OUTPATIENT
Start: 2023-08-04 | End: 2023-08-04

## 2023-08-04 RX ORDER — SODIUM CHLORIDE 0.9 % (FLUSH) 0.9 %
10 SYRINGE (ML) INJECTION
Status: CANCELLED | OUTPATIENT
Start: 2023-09-06

## 2023-08-04 RX ORDER — ACETAMINOPHEN 325 MG/1
650 TABLET ORAL
Status: CANCELLED | OUTPATIENT
Start: 2023-08-23

## 2023-08-04 RX ORDER — EPINEPHRINE 0.3 MG/.3ML
0.3 INJECTION SUBCUTANEOUS ONCE AS NEEDED
Status: CANCELLED | OUTPATIENT
Start: 2023-08-23

## 2023-08-04 RX ORDER — DIPHENHYDRAMINE HYDROCHLORIDE 50 MG/ML
50 INJECTION INTRAMUSCULAR; INTRAVENOUS ONCE AS NEEDED
Status: CANCELLED | OUTPATIENT
Start: 2023-09-06

## 2023-08-04 RX ORDER — DEXAMETHASONE 4 MG/1
4 TABLET ORAL EVERY 12 HOURS
Qty: 10 TABLET | Refills: 0 | Status: SHIPPED | OUTPATIENT
Start: 2023-08-04 | End: 2023-08-14

## 2023-08-04 RX ORDER — DIPHENHYDRAMINE HCL 50 MG
50 CAPSULE ORAL
Status: CANCELLED
Start: 2023-08-30

## 2023-08-04 RX ORDER — ACETAMINOPHEN 325 MG/1
650 TABLET ORAL
Status: CANCELLED | OUTPATIENT
Start: 2023-08-04

## 2023-08-04 RX ORDER — SODIUM CHLORIDE 0.9 % (FLUSH) 0.9 %
10 SYRINGE (ML) INJECTION
Status: CANCELLED | OUTPATIENT
Start: 2023-08-30

## 2023-08-04 RX ORDER — SODIUM CHLORIDE 0.9 % (FLUSH) 0.9 %
10 SYRINGE (ML) INJECTION
Status: DISCONTINUED | OUTPATIENT
Start: 2023-08-04 | End: 2023-08-04 | Stop reason: HOSPADM

## 2023-08-04 RX ORDER — DIPHENHYDRAMINE HYDROCHLORIDE 50 MG/ML
50 INJECTION INTRAMUSCULAR; INTRAVENOUS ONCE AS NEEDED
Status: CANCELLED | OUTPATIENT
Start: 2023-08-04

## 2023-08-04 RX ORDER — DIPHENHYDRAMINE HYDROCHLORIDE 50 MG/ML
50 INJECTION INTRAMUSCULAR; INTRAVENOUS ONCE AS NEEDED
Status: DISCONTINUED | OUTPATIENT
Start: 2023-08-04 | End: 2023-08-04 | Stop reason: HOSPADM

## 2023-08-04 RX ORDER — HEPARIN 100 UNIT/ML
500 SYRINGE INTRAVENOUS
Status: CANCELLED | OUTPATIENT
Start: 2023-08-23

## 2023-08-04 RX ORDER — SODIUM CHLORIDE 0.9 % (FLUSH) 0.9 %
10 SYRINGE (ML) INJECTION
Status: CANCELLED | OUTPATIENT
Start: 2023-08-23

## 2023-08-04 RX ORDER — ACETAMINOPHEN 325 MG/1
650 TABLET ORAL
Status: CANCELLED | OUTPATIENT
Start: 2023-08-30

## 2023-08-04 RX ORDER — DIPHENHYDRAMINE HCL 50 MG
50 CAPSULE ORAL
Status: COMPLETED | OUTPATIENT
Start: 2023-08-04 | End: 2023-08-04

## 2023-08-04 RX ORDER — HEPARIN 100 UNIT/ML
500 SYRINGE INTRAVENOUS
Status: DISCONTINUED | OUTPATIENT
Start: 2023-08-04 | End: 2023-08-04 | Stop reason: HOSPADM

## 2023-08-04 RX ORDER — EPINEPHRINE 0.3 MG/.3ML
0.3 INJECTION SUBCUTANEOUS ONCE AS NEEDED
Status: CANCELLED | OUTPATIENT
Start: 2023-09-06

## 2023-08-04 RX ORDER — DIPHENHYDRAMINE HYDROCHLORIDE 50 MG/ML
50 INJECTION INTRAMUSCULAR; INTRAVENOUS ONCE AS NEEDED
Status: CANCELLED | OUTPATIENT
Start: 2023-08-23

## 2023-08-04 RX ORDER — HEPARIN 100 UNIT/ML
500 SYRINGE INTRAVENOUS
Status: CANCELLED | OUTPATIENT
Start: 2023-08-04

## 2023-08-04 RX ADMIN — DARATUMUMAB AND HYALURONIDASE-FIHJ (HUMAN RECOMBINANT) 1800 MG: 1800; 30000 INJECTION SUBCUTANEOUS at 03:08

## 2023-08-04 RX ADMIN — ACETAMINOPHEN 650 MG: 325 TABLET ORAL at 02:08

## 2023-08-04 RX ADMIN — SODIUM CHLORIDE 250 ML: 9 INJECTION, SOLUTION INTRAVENOUS at 03:08

## 2023-08-04 RX ADMIN — DIPHENHYDRAMINE HYDROCHLORIDE 50 MG: 50 CAPSULE ORAL at 02:08

## 2023-08-04 RX ADMIN — CARFILZOMIB 44 MG: 10 INJECTION, POWDER, LYOPHILIZED, FOR SOLUTION INTRAVENOUS at 03:08

## 2023-08-04 RX ADMIN — SODIUM CHLORIDE 250 ML: 9 INJECTION, SOLUTION INTRAVENOUS at 02:08

## 2023-08-04 NOTE — PLAN OF CARE
Pt ambulatory to clinic with wife for C1 D1 Melinda SQ and Kyprolis. Denies any complaints. PIV started without difficulty. Pt tolerated Kyprolis well. Melinda given and OBS for two hours post injection. Tolerated treatment well. No adverse reaction noted. Discussed care for at home. PIV removed with catheter intact. Ambulatory from clinic in [facility].

## 2023-08-04 NOTE — PROGRESS NOTES
BMT Chart Routing      Follow up with physician    Follow up with AXEL . Visit on next friday onn 08/ for symptom check   Provider visit type    Infusion scheduling note    Injection scheduling note schedule chemo on Fridays for 8 weeks Melinda/Kyprolis on 08/11,08/18, melinda on 08/25   Labs   Scheduling:  Preferred lab:  Lab interval:  Cbc, cmp weekly prior chemo   Imaging    Pharmacy appointment    Other referrals             Jaspreet Walsh Jr. was consented for chemotherapy/immunotherapy to treat relapsed MM. Jaspreet Walsh Jr. was consented to receive melinda KD.   The consent was discussed and reviewed with patient and spouse.     2. Patient was education on what to expect when receiving chemotherapy including: checking in, receiving an ID band, 1 guest allowed in the infusion suite during infusion, can alternate people as well, pole going with you once you are hooked up, warm blankets are available, you may bring lunch and snacks, minimal snacks are available, take medications as regularly unless told otherwise, warm blankets, will be available. Education about what to expect during their chemo cycle and how often their regimen is given.     3. An extensive discussion was had which included a thorough discussion of the risk and benefits of treatment and alternatives.  Risks, including but not limited to, possible hair loss, bone marrow damage (anemia, thrombocytopenia, immune suppression, neutropenia), damage to body organs (brain, heart, liver, kidney, lungs, nervous system, skin, and others), allergic reactions, sterility, nausea/vomiting, constipation/diarrhea, sores in the mouth, secondary cancers, local damage at possible injection sites, and rarely death were all discussed. Specific side effects pertaining to their chemotherapy/immunotherapy medications were discussed as well.The patient agrees with the plan, and all questions and their support system's questions have been answered to their satisfaction.  Contraindications and potential side effects discussed as listed in micromedx.     4. Patient was given binder which includes: contact information for the [facility], an immunotherapy side effect guide (if applicable to patient), resources including, but not limited to: women's wellness, acupuncture, physical therapy, , urgent care within oncology and financial assistance.     5. Patient was educated on when to call (and given the numbers to call and knows to message via [facility] if possible) or notify the provider including, but not limited to:   Persistent Nausea and/or Vomiting  Dehydration  Persistent Diarrhea  Fever of 100.4 > 1 hour in duration or any isolated fever > 101   Rash (while on active chemotherapy or immunotherapy)   Severe pain or new onset pain not controlled by current medication regimen  Or any other symptom you feel is related to your current hematology or oncology treatment    6. Patient was provided with additional resources that [facility] offers including, but not limited to: financial counseling, , psychologist, palliative care, support groups, transportation, dietician, rehab services, women's wellness and urgent care visits within the oncology department.     7. Patient was offered and refused chemo care companion at this time.     8. Patient has an established PCP  9. Patient was offered a virtual visit or a phone call 1 week post their Cycle 1 Day 1 chemotherapy and agreed    10. Advance Care Planning     Date: 08/04/2023       Patient will Proceed with cycle 1 day 1 of Melinda KD. Patient will be seen ~1 week for a post chemo visit with AXEL.        HEMATOLOGY ONCOLOGY FELLOW CLINIC    IDENTIFYING STATEMENT   63 y.o.male; pmh of IgG kappa multiple myeloma (standard risk CG per msmart criteria, r-iss stage II); diagnosed September 2019 after presenting with symptomatic perispinal plasmacytoma;He has subsequently received  8 cycles of VRd therapy. Was in  midst or pre transplant evaluation but due to insurance coverage issues transplant was delayed so was maintained on therapy and those issues have been resolved.     Transplant Course  Patient with IgG Kappa MM and pmh HTN, lytic bone lesion, arthritis and vitamin D deficiency. Admitted 5/15/21 for planned Alea auto SCT for MM. He received 3 bags and a CD34 dose of 3.35 x 10^6 on 5/17/21. Tolerated chemo and transplant well. Admission complicated by n/v controlled with scheduled anti-emetics and c-diff neg diarrhea controlled with prn imodium. He engrafted on Day +13 (5/30/21) with an ANC of 2607. He was discharged home on Day +14 (5/31/21).      Day +100 restaging marrow indicated that he is in [place].   ------------------------------------------------------------------------------------------------------------------------------  Patient returns to clinic for follow up prior C1D1 Melinda-KD, he is 2y 2 m from his auto sct for MM. On previous visit, patient with labs concerning for biochemical progression. He underwent PET scan which showed significant interval disease progression of multiple myeloma. Numerous diffuse hypermetabolic osseous lytic lesions primarily throughout the axial skeleton.  Multiple hypermetabolic splenic lesions and two suspected hypermetabolic periportal lymph nodes concerning for extramedullary disease. Despite these findings patient denies any new systemic symptoms, he denies any bone pains. He denies any other systemic symptoms of headache, dizziness, chest pain, palpitations, abdominal pain, n/v/d, fevers or chills. He has good performance status and good PO intake.   He was tested covid positive last week. Asymptomatic, okayed to proceed with today's treatment. He was not scheduled for echo prior visit, but will obtain prior treatment. Dex was not ordered, but will receive dose at infusion today. Upcoming treatments not scheduled, will f/u    HISTORY OF PRESENT ILLNESS:        Past Medical  History:   Diagnosis Date    Anxiety     Arthritis     Cancer     Hypertension        History reviewed. No pertinent family history.    Social History     Socioeconomic History    Marital status: Significant Other    Number of children: 3   Tobacco Use    Smoking status: Former     Current packs/day: 0.00     Average packs/day: 0.3 packs/day for 40.0 years (10.0 ttl pk-yrs)     Types: Cigarettes     Start date:      Quit date: 2017     Years since quittin.5    Smokeless tobacco: Never   Substance and Sexual Activity    Alcohol use: Not Currently    Drug use: Never    Sexual activity: Yes     Partners: Female     Birth control/protection: None   Social History Narrative    Patient is intimate relationship with longtime partner (Catie)    Has 3 children, 1 live in [facility], 1 in [place], 1 in [place]; 4 grandchildren    2 brothers, 1 sister    Mother still living     MEDICATIONS:     Current Outpatient Medications on File Prior to Visit   Medication Sig Dispense Refill    amLODIPine (NORVASC) 10 MG tablet Take 1 tablet (10 mg total) by mouth once daily. 90 tablet 1    cloNIDine (CATAPRES) 0.3 MG tablet Take 0.3 mg by mouth once daily.      ergocalciferol (ERGOCALCIFEROL) 50,000 unit Cap Take 1 capsule (50,000 Units total) by mouth every 7 days. 4 capsule 1    ferrous gluconate (FERGON) 324 MG tablet Take 1 tablet (324 mg total) by mouth daily with breakfast. 30 tablet 3    gabapentin (NEURONTIN) 300 MG capsule TAKE 1 CAPSULE(300 MG) BY MOUTH TWICE DAILY 60 capsule 3    hydroCHLOROthiazide (HYDRODIURIL) 25 MG tablet Take 1 tablet (25 mg total) by mouth once daily. 90 tablet 1    potassium chloride SA (K-DUR,KLOR-CON M) 10 MEQ tablet Take 1 tablet (10 mEq total) by mouth once daily. 90 tablet 1    calcium-vitamin D3 (OYSTER SHELL CALCIUM-VIT D3) 500 mg-5 mcg (200 unit) per tablet Take 1 tablet by mouth once daily. 30 tablet 0     No current facility-administered medications on file prior to visit.       ALLERGIES: Review of  "patient's allergies indicates:  No Known Allergies     ROS:       Review of Systems   Constitutional:  Negative for chills, fatigue and fever.   HENT:   Negative for hearing loss and sore throat.    Respiratory:  Negative for cough and shortness of breath.    Cardiovascular:  Negative for chest pain and palpitations.   Gastrointestinal:  Negative for abdominal pain, diarrhea, nausea and vomiting.   Genitourinary:  Negative for difficulty urinating and dysuria.    Musculoskeletal:  Negative for arthralgias and myalgias.   Skin:  Negative for rash.   Neurological:  Negative for dizziness and headaches.   Psychiatric/Behavioral:  Negative for confusion and decreased concentration.        PHYSICAL EXAM:  Vitals:    08/04/23 0842   BP: (!) 145/80   Pulse: 83   Resp: 16   Temp: 98.2 °F (36.8 °C)   SpO2: 99%   Weight: 97.1 kg (214 lb 1.1 oz)   Height: 5' 7" (1.702 m)   PainSc: 0-No pain       Physical Exam  Constitutional:       General: He is not in acute distress.     Appearance: He is well-developed. He is not toxic-appearing.   HENT:      Head: Normocephalic and atraumatic.      Right Ear: External ear normal.      Left Ear: External ear normal.      Nose: Nose normal.      Mouth/Throat:      Mouth: Mucous membranes are moist.      Pharynx: Oropharynx is clear. No oropharyngeal exudate.   Eyes:      General: No scleral icterus.     Extraocular Movements: Extraocular movements intact.      Conjunctiva/sclera: Conjunctivae normal.   Cardiovascular:      Rate and Rhythm: Normal rate and regular rhythm.      Heart sounds: Normal heart sounds. No murmur heard.  Pulmonary:      Effort: Pulmonary effort is normal.      Breath sounds: Normal breath sounds. No rales.   Abdominal:      General: Bowel sounds are normal. There is no distension.      Palpations: Abdomen is soft.      Tenderness: There is no abdominal tenderness.   Musculoskeletal:         General: Normal range of motion.      Cervical back: Normal range of motion " and neck supple.      Right lower leg: No edema.      Left lower leg: No edema.   Skin:     General: Skin is warm and dry.   Neurological:      Mental Status: He is alert and oriented to person, place, and time.   Psychiatric:         Behavior: Behavior normal.         LAB:   Results for orders placed or performed in visit on 08/02/23   Comprehensive Metabolic Panel   Result Value Ref Range    Sodium 141 136 - 145 mmol/L    Potassium 4.1 3.5 - 5.1 mmol/L    Chloride 105 95 - 110 mmol/L    CO2 26 23 - 29 mmol/L    Glucose 143 (H) 70 - 110 mg/dL    BUN 20 2 - 20 mg/dL    Creatinine 1.64 (H) 0.50 - 1.40 mg/dL    Calcium 9.8 8.7 - 10.5 mg/dL    Total Protein 9.9 (H) 6.0 - 8.4 g/dL    Albumin 4.1 3.5 - 5.2 g/dL    Total Bilirubin 0.5 0.1 - 1.0 mg/dL    Alkaline Phosphatase 62 38 - 126 U/L    AST 31 15 - 46 U/L    ALT 30 10 - 44 U/L    Anion Gap 10 8 - 16 mmol/L    eGFR 46.4 (A) >60 mL/min/1.73 m^2   CBC Auto Differential   Result Value Ref Range    WBC 3.07 (L) 3.90 - 12.70 K/uL    RBC 3.87 (L) 4.60 - 6.20 M/uL    Hemoglobin 12.4 (L) 14.0 - 18.0 g/dL    Hematocrit 37.2 (L) 40.0 - 54.0 %    MCV 96 82 - 98 fL    MCH 32.0 (H) 27.0 - 31.0 pg    MCHC 33.3 32.0 - 36.0 g/dL    RDW 14.6 (H) 11.5 - 14.5 %    Platelets 140 (L) 150 - 450 K/uL    MPV 10.9 9.2 - 12.9 fL    Immature Granulocytes 0.0 0.0 - 0.5 %    Gran # (ANC) 1.5 (L) 1.8 - 7.7 K/uL    Immature Grans (Abs) 0.00 0.00 - 0.04 K/uL    Lymph # 1.4 1.0 - 4.8 K/uL    Mono # 0.2 (L) 0.3 - 1.0 K/uL    Eos # 0.0 0.0 - 0.5 K/uL    Baso # 0.01 0.00 - 0.20 K/uL    nRBC 0 0 /100 WBC    Gran % 48.5 38.0 - 73.0 %    Lymph % 44.0 18.0 - 48.0 %    Mono % 5.9 4.0 - 15.0 %    Eosinophil % 1.3 0.0 - 8.0 %    Basophil % 0.3 0.0 - 1.9 %    Differential Method Automated    Protein Electrophoresis, Serum   Result Value Ref Range    Protein, Serum 8.6 (H) 6.0 - 8.4 g/dL    Albumin 3.84 3.35 - 5.55 g/dL    Alpha-1 0.36 0.17 - 0.41 g/dL    Alpha-2 0.73 0.43 - 0.99 g/dL    Beta 0.71 0.50 - 1.10  g/dL    Gamma 2.96 (H) 0.67 - 1.58 g/dL   Immunoglobulins (IgG, IgA, IgM) Quantitative   Result Value Ref Range    IgG 3712 (H) 650 - 1600 mg/dL    IgA 165 40 - 350 mg/dL    IgM 37 (L) 50 - 300 mg/dL   Immunoglobulin Free LT Chains Blood   Result Value Ref Range    Kappa Free Light Chains 92.40 (H) 0.33 - 1.94 mg/dL    Lambda Free Light Chains 1.54 0.57 - 2.63 mg/dL    Kappa/Lambda FLC Ratio 60.00 (H) 0.26 - 1.65   Immunofixation Electrophoresis   Result Value Ref Range    Immunofix Interp. SEE COMMENT      *Note: Due to a large number of results and/or encounters for the requested time period, some results have not been displayed. A complete set of results can be found in Results Review.       PROBLEMS ASSESSED THIS VISIT:    1. Multiple myeloma, remission status unspecified          ASSESSMENT AND PLAN    1-3  A. 9/14 - 9/17/2020 : [place] [facility]. Large soft tissue mass encompassing L4 - S1 vertebral bodies seen. FLC ratio 171, involved light chains 104. SPEP and SIFE found 2.86g/dL, IgG kappa para protein band. Underwent bone marrow biopsy and discharged with dexamethasone 40mg x 4 day burst.     B. 9/23/20 : Underwent debulking of spinal mass.  C. 10/1/20 - 4/14/21 : C1 - C8D1 VRd. Revlimid delivered in third week of cycle.  D. 2/25/21 : Presented for consent signing for autoSCT but his insurance had lapsed. Plan for transplant pushed back 1-2 months while he applys for medicaid.   E. 4/22/21 : C8D8 VRd planned (last treatment before mobilization).   Received Alea ASCT on 05/17/21, see above  Day +100 restaging marrow indicated that he is in [place].  1 year restaging marrow with no morphological evidence of plasma cell neoplasm. PET CT with no new hypermetabolic lesions(05/2022).         Today is 2 year and  2 months from transplant  He received 3 bags with a CD34 dose of 3.35 x 10^6 on 5/17/21  Engrafted Day +13 with [place] 43384     - Surveillance biochemical studies from 6/2  showed increased free light chains and increasing mspike  - PET scan concerning for diffuse osseus lytic legions suggesting progression of disease  - Initiating Melinda-Kd salvage therapy (1,8,15 kyprolis). C1D1 on 08/04/23  - Chemo teaching given and consent obtained. Realized not received auth, but informed Josiah Avalos, who agreed to proceed with today's treatment      Ashanti Quiñones NP  Hematology/Oncology/BMT    - Patient will have to restart acyclovir while on daratumumab. Hold ASA as he will no longer be taking revlimid  - Echo (08/04/23) EF-  64%, EKG - NSR  - Repeated Hep B studies, type and screen  - He continues to take zometa, next dose not scheduled, will plan with next treatment  -Follow up in 2 weeks for symptom check      - Dex 40 mg sent to pharmacy

## 2023-08-06 LAB
PATHOLOGIST INTERPRETATION IFE: NORMAL
PATHOLOGIST INTERPRETATION SPE: NORMAL

## 2023-08-09 ENCOUNTER — TELEPHONE (OUTPATIENT)
Dept: HEMATOLOGY/ONCOLOGY | Facility: CLINIC | Age: 63
End: 2023-08-09
Payer: MEDICARE

## 2023-08-09 NOTE — TELEPHONE ENCOUNTER
----- Message from [place] Chevalier sent at 8/9/2023 11:13 AM CDT -----  Regarding: pt appt  Name Of Caller: self    Name of Provider: Dr. Baker    Contact Preference: [phone] or [phone]    Nature of call: pt calling to verify if he needs to schedule any appt or lab between today and 09/05. Pt sys started new treatment and just want to make certain.    Does patient use [employer]?

## 2023-08-09 NOTE — TELEPHONE ENCOUNTER
Spoke to patient and informed him that our scheduling team will reach out t to get chemo appointments scheduled for the month of August.

## 2023-08-17 ENCOUNTER — TELEPHONE (OUTPATIENT)
Dept: HEMATOLOGY/ONCOLOGY | Facility: CLINIC | Age: 63
End: 2023-08-17
Payer: MEDICARE

## 2023-08-22 ENCOUNTER — TELEPHONE (OUTPATIENT)
Dept: HEMATOLOGY/ONCOLOGY | Facility: CLINIC | Age: 63
End: 2023-08-22
Payer: MEDICARE

## 2023-08-22 DIAGNOSIS — Z94.84 HISTORY OF AUTO STEM CELL TRANSPLANT: Primary | ICD-10-CM

## 2023-08-22 DIAGNOSIS — C90.00 MULTIPLE MYELOMA, REMISSION STATUS UNSPECIFIED: ICD-10-CM

## 2023-08-22 RX ORDER — ACYCLOVIR 400 MG/1
400 TABLET ORAL 2 TIMES DAILY
Qty: 60 TABLET | Refills: 11 | Status: SHIPPED | OUTPATIENT
Start: 2023-08-22 | End: 2024-08-21

## 2023-08-22 NOTE — TELEPHONE ENCOUNTER
"----- Message from [place] sent at 8/22/2023 12:01 PM CDT -----  Regarding: Pt advice  Contact: Pt     Pt requesting call back in regards to meds he needs to take before appt  Please call and adv @       Confirmed contact below:   Contact Name: Jaspreet Walsh Jr.  Phone Number: [phone]               Additional Notes:  "Thank you for all that you do for our patients"                                           "

## 2023-08-23 ENCOUNTER — INFUSION (OUTPATIENT)
Dept: INFUSION THERAPY | Facility: HOSPITAL | Age: 63
End: 2023-08-23
Payer: MEDICARE

## 2023-08-23 VITALS
WEIGHT: 212.88 LBS | TEMPERATURE: 98 F | HEART RATE: 78 BPM | DIASTOLIC BLOOD PRESSURE: 66 MMHG | BODY MASS INDEX: 33.34 KG/M2 | RESPIRATION RATE: 16 BRPM | SYSTOLIC BLOOD PRESSURE: 120 MMHG | OXYGEN SATURATION: 99 %

## 2023-08-23 DIAGNOSIS — C90.00 MULTIPLE MYELOMA, REMISSION STATUS UNSPECIFIED: Primary | ICD-10-CM

## 2023-08-23 PROCEDURE — 63600175 PHARM REV CODE 636 W HCPCS: Mod: JZ,TB | Performed by: INTERNAL MEDICINE

## 2023-08-23 PROCEDURE — 25000003 PHARM REV CODE 250: Performed by: INTERNAL MEDICINE

## 2023-08-23 PROCEDURE — 96413 CHEMO IV INFUSION 1 HR: CPT

## 2023-08-23 PROCEDURE — 96401 CHEMO ANTI-NEOPL SQ/IM: CPT

## 2023-08-23 RX ORDER — DIPHENHYDRAMINE HCL 50 MG
50 CAPSULE ORAL
Status: COMPLETED | OUTPATIENT
Start: 2023-08-23 | End: 2023-08-23

## 2023-08-23 RX ORDER — DIPHENHYDRAMINE HYDROCHLORIDE 50 MG/ML
50 INJECTION INTRAMUSCULAR; INTRAVENOUS ONCE AS NEEDED
Status: DISCONTINUED | OUTPATIENT
Start: 2023-08-23 | End: 2023-08-23 | Stop reason: HOSPADM

## 2023-08-23 RX ORDER — EPINEPHRINE 0.3 MG/.3ML
0.3 INJECTION SUBCUTANEOUS ONCE AS NEEDED
Status: DISCONTINUED | OUTPATIENT
Start: 2023-08-23 | End: 2023-08-23 | Stop reason: HOSPADM

## 2023-08-23 RX ORDER — ACETAMINOPHEN 325 MG/1
650 TABLET ORAL
Status: COMPLETED | OUTPATIENT
Start: 2023-08-23 | End: 2023-08-23

## 2023-08-23 RX ORDER — SODIUM CHLORIDE 0.9 % (FLUSH) 0.9 %
10 SYRINGE (ML) INJECTION
Status: DISCONTINUED | OUTPATIENT
Start: 2023-08-23 | End: 2023-08-23 | Stop reason: HOSPADM

## 2023-08-23 RX ORDER — HEPARIN 100 UNIT/ML
500 SYRINGE INTRAVENOUS
Status: DISCONTINUED | OUTPATIENT
Start: 2023-08-23 | End: 2023-08-23 | Stop reason: HOSPADM

## 2023-08-23 RX ADMIN — SODIUM CHLORIDE 250 ML: 9 INJECTION, SOLUTION INTRAVENOUS at 10:08

## 2023-08-23 RX ADMIN — DARATUMUMAB AND HYALURONIDASE-FIHJ (HUMAN RECOMBINANT) 1800 MG: 1800; 30000 INJECTION SUBCUTANEOUS at 11:08

## 2023-08-23 RX ADMIN — CARFILZOMIB 150 MG: 30 INJECTION, POWDER, LYOPHILIZED, FOR SOLUTION INTRAVENOUS at 11:08

## 2023-08-23 RX ADMIN — DIPHENHYDRAMINE HYDROCHLORIDE 50 MG: 50 CAPSULE ORAL at 10:08

## 2023-08-23 RX ADMIN — SODIUM CHLORIDE 250 ML: 9 INJECTION, SOLUTION INTRAVENOUS at 12:08

## 2023-08-23 RX ADMIN — ACETAMINOPHEN 650 MG: 325 TABLET ORAL at 10:08

## 2023-08-23 NOTE — PLAN OF CARE
Patient tolerated C1D8 Melinda/Kyprolis today. Pt reports taking Dex at home this morning. Pt given calendar with next appts and AVS, instructed pt how to set up [facility].  Discharged home, ambulated independently.

## 2023-08-24 ENCOUNTER — TELEPHONE (OUTPATIENT)
Dept: HEMATOLOGY/ONCOLOGY | Facility: CLINIC | Age: 63
End: 2023-08-24
Payer: MEDICARE

## 2023-08-24 NOTE — TELEPHONE ENCOUNTER
Spoke to pt via phone and clarified that pt is to continue both gabapentin and acyclovir per Ashanti Quiñones NP.

## 2023-08-24 NOTE — TELEPHONE ENCOUNTER
----- Message from Catie Coon sent at 8/24/2023 12:38 PM CDT -----  Type:  Patient Returning Call    Who Called: Pt  Would the patient rather a call back or a response via [facility]? Call  Best Call Back Number: [phone]  Additional Information: Pt would like to speak to providers nurse in reference to his medications.  He would like to know if he should continue taking the acyclovir (ZOVIRAX) 400 MG tablet along with the gabapentin (NEURONTIN) 300 MG capsuleor should he discontinue the Gabapentin.

## 2023-08-30 ENCOUNTER — CLINICAL SUPPORT (OUTPATIENT)
Dept: INFECTIOUS DISEASES | Facility: CLINIC | Age: 63
End: 2023-08-30
Payer: MEDICARE

## 2023-08-30 DIAGNOSIS — Z94.84 HISTORY OF AUTOLOGOUS STEM CELL TRANSPLANT: Primary | ICD-10-CM

## 2023-08-30 PROCEDURE — 91313 COVID-19, MRNA, LNP-S, BIVALENT BOOSTER, PF, 50 MCG/0.5 ML: CPT | Mod: S$GLB,,, | Performed by: INTERNAL MEDICINE

## 2023-08-30 PROCEDURE — 91313 COVID-19, MRNA, LNP-S, BIVALENT BOOSTER, PF, 50 MCG/0.5 ML: ICD-10-PCS | Mod: S$GLB,,, | Performed by: INTERNAL MEDICINE

## 2023-08-30 PROCEDURE — 0134A COVID-19, MRNA, LNP-S, BIVALENT BOOSTER, PF, 50 MCG/0.5 ML: CPT | Mod: S$GLB,,, | Performed by: INTERNAL MEDICINE

## 2023-08-30 PROCEDURE — 0134A COVID-19, MRNA, LNP-S, BIVALENT BOOSTER, PF, 50 MCG/0.5 ML: ICD-10-PCS | Mod: S$GLB,,, | Performed by: INTERNAL MEDICINE

## 2023-08-30 NOTE — PROGRESS NOTES
Patient received the Covid bivalent booster vaccine in the left deltoid. Pt tolerated well. Pt asked to wait in the clinic 15 minutes after injection in the event of an allergic reaction. Pt verbalized understanding. Pt left in NAD.

## 2023-08-31 ENCOUNTER — INFUSION (OUTPATIENT)
Dept: INFUSION THERAPY | Facility: HOSPITAL | Age: 63
End: 2023-08-31
Attending: INTERNAL MEDICINE
Payer: MEDICARE

## 2023-08-31 VITALS
BODY MASS INDEX: 33.41 KG/M2 | RESPIRATION RATE: 18 BRPM | WEIGHT: 212.88 LBS | OXYGEN SATURATION: 98 % | TEMPERATURE: 98 F | HEIGHT: 67 IN | SYSTOLIC BLOOD PRESSURE: 157 MMHG | DIASTOLIC BLOOD PRESSURE: 81 MMHG | HEART RATE: 97 BPM

## 2023-08-31 DIAGNOSIS — C90.00 MULTIPLE MYELOMA, REMISSION STATUS UNSPECIFIED: Primary | ICD-10-CM

## 2023-08-31 PROCEDURE — 96413 CHEMO IV INFUSION 1 HR: CPT

## 2023-08-31 PROCEDURE — A4216 STERILE WATER/SALINE, 10 ML: HCPCS | Performed by: INTERNAL MEDICINE

## 2023-08-31 PROCEDURE — 63600175 PHARM REV CODE 636 W HCPCS: Mod: JZ,JG | Performed by: INTERNAL MEDICINE

## 2023-08-31 PROCEDURE — 96401 CHEMO ANTI-NEOPL SQ/IM: CPT

## 2023-08-31 PROCEDURE — 96361 HYDRATE IV INFUSION ADD-ON: CPT

## 2023-08-31 PROCEDURE — 25000003 PHARM REV CODE 250: Performed by: INTERNAL MEDICINE

## 2023-08-31 RX ORDER — DIPHENHYDRAMINE HCL 25 MG
50 CAPSULE ORAL
Status: COMPLETED | OUTPATIENT
Start: 2023-08-31 | End: 2023-08-31

## 2023-08-31 RX ORDER — ACETAMINOPHEN 325 MG/1
650 TABLET ORAL
Status: COMPLETED | OUTPATIENT
Start: 2023-08-31 | End: 2023-08-31

## 2023-08-31 RX ORDER — SODIUM CHLORIDE 0.9 % (FLUSH) 0.9 %
10 SYRINGE (ML) INJECTION
Status: DISCONTINUED | OUTPATIENT
Start: 2023-08-31 | End: 2023-08-31 | Stop reason: HOSPADM

## 2023-08-31 RX ORDER — EPINEPHRINE 0.3 MG/.3ML
0.3 INJECTION SUBCUTANEOUS ONCE AS NEEDED
Status: DISCONTINUED | OUTPATIENT
Start: 2023-08-31 | End: 2023-08-31 | Stop reason: HOSPADM

## 2023-08-31 RX ORDER — DIPHENHYDRAMINE HYDROCHLORIDE 50 MG/ML
50 INJECTION INTRAMUSCULAR; INTRAVENOUS ONCE AS NEEDED
Status: DISCONTINUED | OUTPATIENT
Start: 2023-08-31 | End: 2023-08-31 | Stop reason: HOSPADM

## 2023-08-31 RX ADMIN — SODIUM CHLORIDE 250 ML: 9 INJECTION, SOLUTION INTRAVENOUS at 11:08

## 2023-08-31 RX ADMIN — ACETAMINOPHEN 650 MG: 325 TABLET ORAL at 09:08

## 2023-08-31 RX ADMIN — Medication 10 ML: at 12:08

## 2023-08-31 RX ADMIN — CARFILZOMIB 150 MG: 30 INJECTION, POWDER, LYOPHILIZED, FOR SOLUTION INTRAVENOUS at 10:08

## 2023-08-31 RX ADMIN — SODIUM CHLORIDE 250 ML: 9 INJECTION, SOLUTION INTRAVENOUS at 09:08

## 2023-08-31 RX ADMIN — DIPHENHYDRAMINE HYDROCHLORIDE 50 MG: 25 CAPSULE ORAL at 09:08

## 2023-08-31 RX ADMIN — DARATUMUMAB AND HYALURONIDASE-FIHJ (HUMAN RECOMBINANT) 1800 MG: 1800; 30000 INJECTION SUBCUTANEOUS at 10:08

## 2023-08-31 NOTE — NURSING
Pt tolerated Cycle 1 Day 15 Darzalex FasPro and Kyprolis infusion with pre and post hydration well.  No adverse reaction noted. No complaints at this time. Reviewed s/s to monitor and when to report. PIV flushed with NS and D/C per protocol, blood return noted and catheter tip intact. Treatment calendar and avs printed and reviewed. Patient left clinic in no acute distress.

## 2023-09-01 ENCOUNTER — LAB VISIT (OUTPATIENT)
Dept: LAB | Facility: HOSPITAL | Age: 63
End: 2023-09-01
Attending: INTERNAL MEDICINE
Payer: MEDICARE

## 2023-09-01 DIAGNOSIS — C90.00 MULTIPLE MYELOMA, REMISSION STATUS UNSPECIFIED: ICD-10-CM

## 2023-09-01 LAB
ALBUMIN SERPL BCP-MCNC: 4.2 G/DL (ref 3.5–5.2)
ALP SERPL-CCNC: 94 U/L (ref 38–126)
ALT SERPL W/O P-5'-P-CCNC: 49 U/L (ref 10–44)
ANION GAP SERPL CALC-SCNC: 13 MMOL/L (ref 8–16)
ANISOCYTOSIS BLD QL SMEAR: SLIGHT
AST SERPL-CCNC: 55 U/L (ref 15–46)
BASOPHILS # BLD AUTO: 0.01 K/UL (ref 0–0.2)
BASOPHILS NFR BLD: 0.1 % (ref 0–1.9)
BILIRUB SERPL-MCNC: 0.4 MG/DL (ref 0.1–1)
CALCIUM SERPL-MCNC: 9.7 MG/DL (ref 8.7–10.5)
CHLORIDE SERPL-SCNC: 105 MMOL/L (ref 95–110)
CO2 SERPL-SCNC: 22 MMOL/L (ref 23–29)
CREAT SERPL-MCNC: 1.18 MG/DL (ref 0.5–1.4)
DIFFERENTIAL METHOD: ABNORMAL
EOSINOPHIL # BLD AUTO: 0 K/UL (ref 0–0.5)
EOSINOPHIL NFR BLD: 0 % (ref 0–8)
ERYTHROCYTE [DISTWIDTH] IN BLOOD BY AUTOMATED COUNT: 14.7 % (ref 11.5–14.5)
EST. GFR  (NO RACE VARIABLE): >60 ML/MIN/1.73 M^2
GLUCOSE SERPL-MCNC: 252 MG/DL (ref 70–110)
HCT VFR BLD AUTO: 33.9 % (ref 40–54)
HGB BLD-MCNC: 11.6 G/DL (ref 14–18)
IGA SERPL-MCNC: 25 MG/DL (ref 40–350)
IGG SERPL-MCNC: 2812 MG/DL (ref 650–1600)
IGM SERPL-MCNC: 26 MG/DL (ref 50–300)
IMM GRANULOCYTES # BLD AUTO: 0.07 K/UL (ref 0–0.04)
IMM GRANULOCYTES NFR BLD AUTO: 0.8 % (ref 0–0.5)
LYMPHOCYTES # BLD AUTO: 1.2 K/UL (ref 1–4.8)
LYMPHOCYTES NFR BLD: 14.1 % (ref 18–48)
MCH RBC QN AUTO: 32.1 PG (ref 27–31)
MCHC RBC AUTO-ENTMCNC: 34.2 G/DL (ref 32–36)
MCV RBC AUTO: 94 FL (ref 82–98)
MONOCYTES # BLD AUTO: 0.4 K/UL (ref 0.3–1)
MONOCYTES NFR BLD: 5.1 % (ref 4–15)
NEUTROPHILS # BLD AUTO: 6.8 K/UL (ref 1.8–7.7)
NEUTROPHILS NFR BLD: 79.9 % (ref 38–73)
NRBC BLD-RTO: 0 /100 WBC
OVALOCYTES BLD QL SMEAR: ABNORMAL
PLATELET # BLD AUTO: 104 K/UL (ref 150–450)
PMV BLD AUTO: 11.1 FL (ref 9.2–12.9)
POIKILOCYTOSIS BLD QL SMEAR: SLIGHT
POTASSIUM SERPL-SCNC: 3.8 MMOL/L (ref 3.5–5.1)
PROT SERPL-MCNC: 9.6 G/DL (ref 6–8.4)
RBC # BLD AUTO: 3.61 M/UL (ref 4.6–6.2)
SODIUM SERPL-SCNC: 140 MMOL/L (ref 136–145)
UUN UR-MCNC: 27 MG/DL (ref 2–20)
WBC # BLD AUTO: 8.57 K/UL (ref 3.9–12.7)

## 2023-09-01 PROCEDURE — 84165 PROTEIN E-PHORESIS SERUM: CPT | Mod: 26,,, | Performed by: PATHOLOGY

## 2023-09-01 PROCEDURE — 36415 COLL VENOUS BLD VENIPUNCTURE: CPT | Mod: PO | Performed by: INTERNAL MEDICINE

## 2023-09-01 PROCEDURE — 86334 PATHOLOGIST INTERPRETATION IFE: ICD-10-PCS | Mod: 26,,, | Performed by: PATHOLOGY

## 2023-09-01 PROCEDURE — 85025 COMPLETE CBC W/AUTO DIFF WBC: CPT | Mod: PO | Performed by: INTERNAL MEDICINE

## 2023-09-01 PROCEDURE — 86334 IMMUNOFIX E-PHORESIS SERUM: CPT | Mod: PO | Performed by: INTERNAL MEDICINE

## 2023-09-01 PROCEDURE — 80053 COMPREHEN METABOLIC PANEL: CPT | Mod: PO | Performed by: INTERNAL MEDICINE

## 2023-09-01 PROCEDURE — 84165 PROTEIN E-PHORESIS SERUM: CPT | Mod: PO | Performed by: INTERNAL MEDICINE

## 2023-09-01 PROCEDURE — 83521 IG LIGHT CHAINS FREE EACH: CPT | Mod: 59,PO | Performed by: INTERNAL MEDICINE

## 2023-09-01 PROCEDURE — 86334 IMMUNOFIX E-PHORESIS SERUM: CPT | Mod: 26,,, | Performed by: PATHOLOGY

## 2023-09-01 PROCEDURE — 82784 ASSAY IGA/IGD/IGG/IGM EACH: CPT | Mod: PO | Performed by: INTERNAL MEDICINE

## 2023-09-01 PROCEDURE — 84165 PATHOLOGIST INTERPRETATION SPE: ICD-10-PCS | Mod: 26,,, | Performed by: PATHOLOGY

## 2023-09-05 ENCOUNTER — OFFICE VISIT (OUTPATIENT)
Dept: HEMATOLOGY/ONCOLOGY | Facility: CLINIC | Age: 63
End: 2023-09-05
Payer: MEDICARE

## 2023-09-05 VITALS
TEMPERATURE: 99 F | WEIGHT: 217.81 LBS | DIASTOLIC BLOOD PRESSURE: 87 MMHG | HEART RATE: 88 BPM | BODY MASS INDEX: 34.19 KG/M2 | SYSTOLIC BLOOD PRESSURE: 143 MMHG | HEIGHT: 67 IN | OXYGEN SATURATION: 98 %

## 2023-09-05 DIAGNOSIS — Z94.84 HISTORY OF AUTO STEM CELL TRANSPLANT: ICD-10-CM

## 2023-09-05 DIAGNOSIS — Z79.899 IMMUNODEFICIENCY DUE TO DRUGS: Primary | ICD-10-CM

## 2023-09-05 DIAGNOSIS — D84.821 IMMUNODEFICIENCY DUE TO DRUGS: Primary | ICD-10-CM

## 2023-09-05 DIAGNOSIS — C90.00 MULTIPLE MYELOMA, REMISSION STATUS UNSPECIFIED: ICD-10-CM

## 2023-09-05 LAB
ALBUMIN SERPL ELPH-MCNC: 3.66 G/DL (ref 3.35–5.55)
ALPHA1 GLOB SERPL ELPH-MCNC: 0.52 G/DL (ref 0.17–0.41)
ALPHA2 GLOB SERPL ELPH-MCNC: 1.03 G/DL (ref 0.43–0.99)
B-GLOBULIN SERPL ELPH-MCNC: 0.73 G/DL (ref 0.5–1.1)
GAMMA GLOB SERPL ELPH-MCNC: 2.26 G/DL (ref 0.67–1.58)
INTERPRETATION SERPL IFE-IMP: NORMAL
KAPPA LC SER QL IA: 37.6 MG/DL (ref 0.33–1.94)
KAPPA LC/LAMBDA SER IA: 208.9 (ref 0.26–1.65)
LAMBDA LC SER QL IA: 0.18 MG/DL (ref 0.57–2.63)
PATHOLOGIST INTERPRETATION IFE: NORMAL
PATHOLOGIST INTERPRETATION SPE: NORMAL
PROT SERPL-MCNC: 8.2 G/DL (ref 6–8.4)

## 2023-09-05 PROCEDURE — 99999 PR PBB SHADOW E&M-EST. PATIENT-LVL III: ICD-10-PCS | Mod: PBBFAC,,, | Performed by: INTERNAL MEDICINE

## 2023-09-05 PROCEDURE — 1159F MED LIST DOCD IN RCRD: CPT | Mod: CPTII,S$GLB,, | Performed by: INTERNAL MEDICINE

## 2023-09-05 PROCEDURE — 99215 OFFICE O/P EST HI 40 MIN: CPT | Mod: S$GLB,,, | Performed by: INTERNAL MEDICINE

## 2023-09-05 PROCEDURE — 3008F PR BODY MASS INDEX (BMI) DOCUMENTED: ICD-10-PCS | Mod: CPTII,S$GLB,, | Performed by: INTERNAL MEDICINE

## 2023-09-05 PROCEDURE — 99215 PR OFFICE/OUTPT VISIT, EST, LEVL V, 40-54 MIN: ICD-10-PCS | Mod: S$GLB,,, | Performed by: INTERNAL MEDICINE

## 2023-09-05 PROCEDURE — 3077F SYST BP >= 140 MM HG: CPT | Mod: CPTII,S$GLB,, | Performed by: INTERNAL MEDICINE

## 2023-09-05 PROCEDURE — 3077F PR MOST RECENT SYSTOLIC BLOOD PRESSURE >= 140 MM HG: ICD-10-PCS | Mod: CPTII,S$GLB,, | Performed by: INTERNAL MEDICINE

## 2023-09-05 PROCEDURE — 3079F PR MOST RECENT DIASTOLIC BLOOD PRESSURE 80-89 MM HG: ICD-10-PCS | Mod: CPTII,S$GLB,, | Performed by: INTERNAL MEDICINE

## 2023-09-05 PROCEDURE — 99999 PR PBB SHADOW E&M-EST. PATIENT-LVL III: CPT | Mod: PBBFAC,,, | Performed by: INTERNAL MEDICINE

## 2023-09-05 PROCEDURE — 3008F BODY MASS INDEX DOCD: CPT | Mod: CPTII,S$GLB,, | Performed by: INTERNAL MEDICINE

## 2023-09-05 PROCEDURE — 3079F DIAST BP 80-89 MM HG: CPT | Mod: CPTII,S$GLB,, | Performed by: INTERNAL MEDICINE

## 2023-09-05 PROCEDURE — 1159F PR MEDICATION LIST DOCUMENTED IN MEDICAL RECORD: ICD-10-PCS | Mod: CPTII,S$GLB,, | Performed by: INTERNAL MEDICINE

## 2023-09-05 RX ORDER — HEPARIN 100 UNIT/ML
500 SYRINGE INTRAVENOUS
Status: CANCELLED | OUTPATIENT
Start: 2023-09-20

## 2023-09-05 RX ORDER — SODIUM CHLORIDE 0.9 % (FLUSH) 0.9 %
10 SYRINGE (ML) INJECTION
Status: CANCELLED | OUTPATIENT
Start: 2023-09-20

## 2023-09-05 RX ORDER — ZOLEDRONIC ACID 0.04 MG/ML
4 INJECTION, SOLUTION INTRAVENOUS
Status: CANCELLED | OUTPATIENT
Start: 2023-09-20

## 2023-09-05 NOTE — PROGRESS NOTES
SECTION OF HEMATOLOGY AND BONE MARROW TRANSPLANT  Return Patient Visit   09/05/2023    CHIEF COMPLAINT:   Multiple Myeloma    HISTORY OF PRESENT ILLNESS: Patient of /  63 y.o.male; pmh of IgG kappa multiple myeloma (standard risk CG per msmart criteria, r-iss stage II); diagnosed September 2019 after presenting with symptomatic perispinal plasmacytoma;He has subsequently received  8 cycles of VRd therapy. Was in midst or pre transplant evaluation but due to insurance coverage issues transplant was delayed so was maintained on therapy and those issues have been resolved.    Transplant Course  Patient with IgG Kappa MM and pmh HTN, lytic bone lesion, arthritis and vitamin D deficiency. Admitted 5/15/21 for planned Alea auto SCT for MM. He received 3 bags and a CD34 dose of 3.35 x 10^6 on 5/17/21. Tolerated chemo and transplant well. Admission complicated by n/v controlled with scheduled anti-emetics and c-diff neg diarrhea controlled with prn imodium. He engrafted on Day +13 (5/30/21) with an ANC of 2607. He was discharged home on Day +14 (5/31/21).     Day +100 restaging marrow indicated that he was in DC.   Interval History:      Found to be in relapse biochemically (June/July 2023 ) and with new lytic disease at 2 years post SCT.  Initiated DKd salvage 8/4/34.  He has been tolerating with no significant AE's.  Accompanied by wife today prior to C1D22; of note due to scheduling errors he missed 2 weeks of cycle 1.     PAST MEDICAL HISTORY:   Past Medical History:   Diagnosis Date    Anxiety     Arthritis     Cancer     Hypertension        PAST SURGICAL HISTORY:   Past Surgical History:   Procedure Laterality Date    BACK SURGERY      BONE MARROW BIOPSY Left 10/20/2021    Procedure: Biopsy-bone marrow;  Surgeon: Summer Cartwright MD;  Location: [facility] ([address] [place] [address]);  Service: Oncology;  Laterality: Left;    COLONOSCOPY N/A 1/25/2021    Procedure: COLONOSCOPY;  Surgeon: Braxton Lees MD;  Location: [facility]  ENDO (4TH FLR);  Service: Endoscopy;  Laterality: N/A;  1/22-covid kristopher-inst mailed-tb  1/20/21-pt to remain on schedule with Dr. Lees, and confirmed updated arrival time of 0835-BB    COLONOSCOPY N/A 2/1/2021    Procedure: COLONOSCOPY;  Surgeon: Jo Ann Robledo MD;  Location: [facility] ENDO (4TH FLR);  Service: Endoscopy;  Laterality: N/A;  rescheduled due to poor bowel prep, to be rescheduled with first available provider-BB  covid-1/29/21-pcw-BB    CREATION OF MUSCLE ROTATIONAL FLAP N/A 9/23/2020    Procedure: CREATION, FLAP, MUSCLE ROTATION;  Surgeon: Kamlesh Bellamy MD;  [facility];  Service: Plastics;  Laterality: N/A;    KNEE SURGERY Left 06/2019    LUMBAR FUSION N/A 9/23/2020    Procedure: FUSION, SPINE, LUMBAR L2-pelvis. Depuy. Plastic surgery closure w/ Dr. Bellamy;  Surgeon: Nick Coyle MD;  [facility];  Service: Neurosurgery;  Laterality: N/A;    Metastatic neoplasum removed from spine         PAST SOCIAL HISTORY:   reports that he quit smoking about 6 years ago. His smoking use included cigarettes. He started smoking about 46 years ago. He has a 10.0 pack-year smoking history. He has never used smokeless tobacco. He reports that he does not currently use alcohol. He reports that he does not use drugs.    FAMILY HISTORY:  No family history on file.    CURRENT MEDICATIONS:   Current Outpatient Medications   Medication Sig    acyclovir (ZOVIRAX) 400 MG tablet Take 1 tablet (400 mg total) by mouth 2 (two) times daily.    amLODIPine (NORVASC) 10 MG tablet Take 1 tablet (10 mg total) by mouth once daily.    calcium-vitamin D3 (OYSTER SHELL CALCIUM-VIT D3) 500 mg-5 mcg (200 unit) per tablet Take 1 tablet by mouth once daily.    cloNIDine (CATAPRES) 0.3 MG tablet Take 0.3 mg by mouth once daily.    dexAMETHasone (DECADRON) 4 MG Tab Take 10 tablets (40 mg total) by mouth every 7 days. Take with food before chemotherapy appointments    ergocalciferol (ERGOCALCIFEROL) 50,000  "unit Cap Take 1 capsule (50,000 Units total) by mouth every 7 days.    ferrous gluconate (FERGON) 324 MG tablet Take 1 tablet (324 mg total) by mouth daily with breakfast.    gabapentin (NEURONTIN) 300 MG capsule TAKE 1 CAPSULE(300 MG) BY MOUTH TWICE DAILY    hydroCHLOROthiazide (HYDRODIURIL) 25 MG tablet Take 1 tablet (25 mg total) by mouth once daily.    potassium chloride SA (K-DUR,KLOR-CON M) 10 MEQ tablet Take 1 tablet (10 mEq total) by mouth once daily.     No current facility-administered medications for this visit.     ALLERGIES:   Review of patient's allergies indicates:  No Known Allergies  Review of Systems   Constitutional: Negative for fever, malaise/fatigue and weight loss.   Respiratory: Negative for cough and shortness of breath.    Cardiovascular: Negative for chest pain and leg swelling.   Gastrointestinal: Negative for constipation, diarrhea and vomiting.   Skin: Negative for rash.   Neurological: Negative for seizures and weakness.   Psychiatric/Behavioral: The patient is not nervous/anxious.      PHYSICAL EXAM:   Vitals:    09/05/23 0745   BP: (!) 143/87   Pulse: 88   Temp: 98.5 °F (36.9 °C)   TempSrc: Oral   SpO2: 98%   Weight: 98.8 kg (217 lb 13 oz)   Height: 5' 7" (1.702 m)   PainSc: 0-No pain   General - well developed, well nourished, no apparent distress  HEENT - oropharynx clear  Chest and Lung - clear to auscultation bilaterally   Cardiovascular - RRR with no MGR, normal S1 and S2  Abdomen-  soft, nontender, no palpable hepatomegaly or splenomegaly  Lymph - no palpable lymphadenopathy  Heme - no bruising, petechiae, pallor  Skin - no rashes or lesions  Psych - appropriate mood and affect        ECOG Performance Status: (foot note - ECOG PS provided by Eastern Cooperative Oncology Group) 1 - Symptomatic but completely ambulatory    Karnofsky Performance Score:  90%- Able to Carry on Normal Activity: Minor Symptoms of Disease  DATA:   Lab Results   Component Value Date    WBC 8.57 " 09/01/2023    HGB 11.6 (L) 09/01/2023    HCT 33.9 (L) 09/01/2023    MCV 94 09/01/2023     (L) 09/01/2023       Gran # (ANC)   Date Value Ref Range Status   09/01/2023 6.8 1.8 - 7.7 K/uL Final     Gran %   Date Value Ref Range Status   09/01/2023 79.9 (H) 38.0 - 73.0 % Final     CMP  Sodium   Date Value Ref Range Status   09/01/2023 140 136 - 145 mmol/L Final     Potassium   Date Value Ref Range Status   09/01/2023 3.8 3.5 - 5.1 mmol/L Final     Chloride   Date Value Ref Range Status   09/01/2023 105 95 - 110 mmol/L Final     CO2   Date Value Ref Range Status   09/01/2023 22 (L) 23 - 29 mmol/L Final     Glucose   Date Value Ref Range Status   09/01/2023 252 (H) 70 - 110 mg/dL Final     BUN   Date Value Ref Range Status   09/01/2023 27 (H) 2 - 20 mg/dL Final     Creatinine   Date Value Ref Range Status   09/01/2023 1.18 0.50 - 1.40 mg/dL Final     Calcium   Date Value Ref Range Status   09/01/2023 9.7 8.7 - 10.5 mg/dL Final     Total Protein   Date Value Ref Range Status   09/01/2023 9.6 (H) 6.0 - 8.4 g/dL Final     Albumin   Date Value Ref Range Status   09/01/2023 4.2 3.5 - 5.2 g/dL Final     Total Bilirubin   Date Value Ref Range Status   09/01/2023 0.4 0.1 - 1.0 mg/dL Final     Comment:     For infants and newborns, interpretation of results should be based  on gestational age, weight and in agreement with clinical  observations.    Premature Infant recommended reference ranges:  Up to 24 hours.............<8.0 mg/dL  Up to 48 hours............<12.0 mg/dL  3-5 days..................<15.0 mg/dL  6-29 days.................<15.0 mg/dL       Alkaline Phosphatase   Date Value Ref Range Status   09/01/2023 94 38 - 126 U/L Final     AST   Date Value Ref Range Status   09/01/2023 55 (H) 15 - 46 U/L Final     ALT   Date Value Ref Range Status   09/01/2023 49 (H) 10 - 44 U/L Final     Anion Gap   Date Value Ref Range Status   09/01/2023 13 8 - 16 mmol/L Final     eGFR   Date Value Ref Range Status   09/01/2023 >60.0  >60 mL/min/1.73 m^2 Final     IgG   Date Value Ref Range Status   09/01/2023 2812 (H) 650 - 1600 mg/dL Final     Comment:     IgG Cord Blood Reference Range: 650-1600 mg/dL.     IgA   Date Value Ref Range Status   09/01/2023 25 (L) 40 - 350 mg/dL Final     Comment:     IgA Cord Blood Reference Range: <5 mg/dL.     IgM   Date Value Ref Range Status   09/01/2023 26 (L) 50 - 300 mg/dL Final     Comment:     IgM Cord Blood Reference Range: <25 mg/dL.     Kappa Free Light Chains   Date Value Ref Range Status   09/01/2023 37.60 (H) 0.33 - 1.94 mg/dL Final   08/02/2023 92.40 (H) 0.33 - 1.94 mg/dL Final   07/17/2023 55.03 (H) 0.33 - 1.94 mg/dL Final     Lambda Free Light Chains   Date Value Ref Range Status   09/01/2023 0.18 (L) 0.57 - 2.63 mg/dL Final   08/02/2023 1.54 0.57 - 2.63 mg/dL Final   07/17/2023 2.00 0.57 - 2.63 mg/dL Final     Kappa/Lambda FLC Ratio   Date Value Ref Range Status   09/01/2023 208.90 (H) 0.26 - 1.65 Final     Comment:     Undetected antigen excess is a rare event but cannot   be excluded. If these free light chain results do not   agree with other clinical or laboratory findings or   if the sample is from a patient that has previously   demonstrated antigen excess, discuss with the testing   laboratory.   Results should always be interpreted in conjunction   with other laboratory tests and clinical evidence.     08/02/2023 60.00 (H) 0.26 - 1.65 Final     Comment:     Undetected antigen excess is a rare event but cannot   be excluded. If these free light chain results do not   agree with other clinical or laboratory findings or   if the sample is from a patient that has previously   demonstrated antigen excess, discuss with the testing   laboratory.   Results should always be interpreted in conjunction   with other laboratory tests and clinical evidence.     07/17/2023 27.52 (H) 0.26 - 1.65 Final     Comment:     Undetected antigen excess is a rare event but cannot   be excluded. If these free  light chain results do not   agree with other clinical or laboratory findings or   if the sample is from a patient that has previously   demonstrated antigen excess, discuss with the testing   laboratory.   Results should always be interpreted in conjunction   with other laboratory tests and clinical evidence.       Pathologist Interpretation SPE   Date Value Ref Range Status   08/02/2023 REVIEWED  Final     Comment:       Electronically reviewed and signed by:  Angelita Begum D.O.  Signed on 08/06/23 at 21:51  Increased total protein.  Paraprotein peak in gamma = 2.25 g/dL (previously, 1.85 g/dL).     07/17/2023 REVIEWED  Final     Comment:       Electronically reviewed and signed by:  Tasha Mills MD  Signed on 07/19/23 at 10:56  Normal total protein. Paraprotein peak in gamma = 1.85 g/dL   (previously 1.30 g/dL).      06/02/2023 REVIEWED  Final     Comment:       Electronically reviewed and signed by:  Joaquin Sabillon MD  Signed on 06/06/23 at 16:08  Normal total protein. Paraprotein peak in gamma = 1.30 g/dL   (previously 0.72 g/dL).       Pathologist Interpretation [facility]   Date Value Ref Range Status   08/02/2023 REVIEWED  Final     Comment:       Electronically reviewed and signed by:  Angelita Beugm D.O.  Signed on 08/06/23 at 21:57  IgG kappa specific monoclonal protein band in gamma identified.              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 Multiple myeloma, remission status unspecified        3. History of auto stem cell transplant            Multiple myeloma/sp autoSCT  - standard risk MM diagnosed sept 2020 after presenting with symptomatic lytic disease  -sp VRd induction followed by yajaira 200 autoSCT on 5/17/23 achieving/mainting CA post SCT; was on revlimid maintenance but relapsed biochemically and with new lytic disease approximately 2 years post SCT (June/July 2023)  -initiated Melinda-Kd salvage on 8/4/23; tolerating will no significant AE's; had had CA by [facility] burden to just 2 doses  -melinda  subq weekly 8>EOW x 8 doses> q 4 week; kyprolis 70mg/m2 day 1, 8, 15 of 28 day cycle; dex 40mg po weekly   -plan to continue until intolerance or progression   -supportive meds: acyclovir, ca +vit d, zometa (q 3 month zometa through December 2023)  -can transition to q 4 weeks lab monitoring and provider appt     Neuropathy  Controlled with gabapentin    ID  Completed post SCT vaccines  Acyclovir as above while in PI       Essential hypertension  -well controled      FU:    -cancel weekly lab appts and  just leave the 9/26 lab appt and make sure cbc, cmp, serum free light chains, quantitative immunoglobulins, serum electropheresis, serum immunofixation labs are linked to this lab draw on 9/27   -weekly wed treatments  9/6, 9/13, 9/20, 9/27, 10/4   -add zometa infusion to treatment on 9/20  -NP appt prior to treatment on 10/4; same day

## 2023-09-05 NOTE — Clinical Note
-cancel weekly lab appts and  just leave the 9/26 lab appt and make sure cbc, cmp, serum free light chains, quantitative immunoglobulins, serum electropheresis, serum immunofixation labs are linked to this lab draw on 9/27 -weekly wed treatments  9/6, 9/13, 9/20, 9/27, 10/4 -NP appt prior to treatment on 10/4; same day

## 2023-09-05 NOTE — PROGRESS NOTES
BMT Route Chart for Scheduling   Jaspreet Walsh Jr. was consented for chemotherapy/immunotherapy to treat relapsed MM. Jaspreet Walsh Jr. was consented to receive jame KD.   The consent was discussed and reviewed with patient and spouse.     2. Patient was education on what to expect when receiving chemotherapy including: checking in, receiving an ID band, 1 guest allowed in the infusion suite during infusion, can alternate people as well, pole going with you once you are hooked up, warm blankets are available, you may bring lunch and snacks, minimal snacks are available, take medications as regularly unless told otherwise, warm blankets, will be available. Education about what to expect during their chemo cycle and how often their regimen is given.     3. An extensive discussion was had which included a thorough discussion of the risk and benefits of treatment and alternatives.  Risks, including but not limited to, possible hair loss, bone marrow damage (anemia, thrombocytopenia, immune suppression, neutropenia), damage to body organs (brain, heart, liver, kidney, lungs, nervous system, skin, and others), allergic reactions, sterility, nausea/vomiting, constipation/diarrhea, sores in the mouth, secondary cancers, local damage at possible injection sites, and rarely death were all discussed. Specific side effects pertaining to their chemotherapy/immunotherapy medications were discussed as well.The patient agrees with the plan, and all questions and their support system's questions have been answered to their satisfaction. Contraindications and potential side effects discussed as listed in micromedx.     4. Patient was given binder which includes: contact information for the [facility], an immunotherapy side effect guide (if applicable to patient), resources including, but not limited to: women's wellness, acupuncture, physical therapy, , urgent care within oncology and financial assistance.      5. Patient was educated on when to call (and given the numbers to call and knows to message via [facility] if possible) or notify the provider including, but not limited to:   Persistent Nausea and/or Vomiting  Dehydration  Persistent Diarrhea  Fever of 100.4 > 1 hour in duration or any isolated fever > 101   Rash (while on active chemotherapy or immunotherapy)   Severe pain or new onset pain not controlled by current medication regimen  Or any other symptom you feel is related to your current hematology or oncology treatment    6. Patient was provided with additional resources that [facility] offers including, but not limited to: financial counseling, , psychologist, palliative care, support groups, transportation, dietician, rehab services, women's wellness and urgent care visits within the oncology department.     7. Patient was offered and refused chemo care companion at this time.     8. Patient has an established PCP  9. Patient was offered a virtual visit or a phone call 1 week post their Cycle 1 Day 1 chemotherapy and agreed    10. Advance Care Planning     Date: 08/04/2023       Patient will Proceed with cycle 1 day 1 of Melinda KD. Patient will be seen ~1 week for a post chemo visit with AX[facility].        HEMATOLOGY ONCOLOGY FELLOW CLINIC    IDENTIFYING STATEMENT   63 y.o.male; pmh of IgG kappa multiple myeloma (standard risk CG per msmart criteria, r-iss stage II); diagnosed September 2019 after presenting with symptomatic perispinal plasmacytoma;He has subsequently received  8 cycles of VRd therapy. Was in midst or pre transplant evaluation but due to insurance coverage issues transplant was delayed so was maintained on therapy and those issues have been resolved.     Transplant Course  Patient with IgG Kappa MM and pmh HTN, lytic bone lesion, arthritis and vitamin D deficiency. Admitted 5/15/21 for planned Alea auto SCT for MM. He received 3 bags and a CD34 dose of 3.35 x 10^6 on 5/17/21. Tolerated  chemo and transplant well. Admission complicated by n/v controlled with scheduled anti-emetics and c-diff neg diarrhea controlled with prn imodium. He engrafted on Day +13 (5/30/21) with an ANC of 2607. He was discharged home on Day +14 (5/31/21).      Day +100 restaging marrow indicated that he is in [place].   ------------------------------------------------------------------------------------------------------------------------------  Patient returns to clinic for follow up prior C1D1 Melinda-KD, he is 2y 2 m from his auto sct for MM. On previous visit, patient with labs concerning for biochemical progression. He underwent PET scan which showed significant interval disease progression of multiple myeloma. Numerous diffuse hypermetabolic osseous lytic lesions primarily throughout the axial skeleton.  Multiple hypermetabolic splenic lesions and two suspected hypermetabolic periportal lymph nodes concerning for extramedullary disease. Despite these findings patient denies any new systemic symptoms, he denies any bone pains. He denies any other systemic symptoms of headache, dizziness, chest pain, palpitations, abdominal pain, n/v/d, fevers or chills. He has good performance status and good PO intake.   He was tested covid positive last week. Asymptomatic, okayed to proceed with today's treatment. He was not scheduled for echo prior visit, but will obtain prior treatment. Dex was not ordered, but will receive dose at infusion today. Upcoming treatments not scheduled, will f/u    HISTORY OF PRESENT ILLNESS:        Past Medical History:   Diagnosis Date    Anxiety     Arthritis     Cancer     Hypertension        No family history on file.    Social History     Socioeconomic History    Marital status: Significant Other    Number of children: 3   Tobacco Use    Smoking status: Former     Current packs/day: 0.00     Average packs/day: 0.3 packs/day for 40.0 years (10.0 ttl pk-yrs)     Types: Cigarettes     Start date: 1977      Quit date:      Years since quittin.6    Smokeless tobacco: Never   Substance and Sexual Activity    Alcohol use: Not Currently    Drug use: Never    Sexual activity: Yes     Partners: Female     Birth control/protection: None   Social History Narrative    Patient is intimate relationship with longtime partner (Catie)    Has 3 children, 1 live in [facility], 1 in [place], 1 in [place]; 4 grandchildren    2 brothers, 1 sister    Mother still living     MEDICATIONS:     Current Outpatient Medications on File Prior to Visit   Medication Sig Dispense Refill    acyclovir (ZOVIRAX) 400 MG tablet Take 1 tablet (400 mg total) by mouth 2 (two) times daily. 60 tablet 11    amLODIPine (NORVASC) 10 MG tablet Take 1 tablet (10 mg total) by mouth once daily. 90 tablet 1    calcium-vitamin D3 (OYSTER SHELL CALCIUM-VIT D3) 500 mg-5 mcg (200 unit) per tablet Take 1 tablet by mouth once daily. 30 tablet 0    cloNIDine (CATAPRES) 0.3 MG tablet Take 0.3 mg by mouth once daily.      dexAMETHasone (DECADRON) 4 MG Tab Take 10 tablets (40 mg total) by mouth every 7 days. Take with food before chemotherapy appointments 40 tablet 11    ergocalciferol (ERGOCALCIFEROL) 50,000 unit Cap Take 1 capsule (50,000 Units total) by mouth every 7 days. 4 capsule 1    ferrous gluconate (FERGON) 324 MG tablet Take 1 tablet (324 mg total) by mouth daily with breakfast. 30 tablet 3    gabapentin (NEURONTIN) 300 MG capsule TAKE 1 CAPSULE(300 MG) BY MOUTH TWICE DAILY 60 capsule 3    hydroCHLOROthiazide (HYDRODIURIL) 25 MG tablet Take 1 tablet (25 mg total) by mouth once daily. 90 tablet 1    potassium chloride SA (K-DUR,KLOR-CON M) 10 MEQ tablet Take 1 tablet (10 mEq total) by mouth once daily. 90 tablet 1     No current facility-administered medications on file prior to visit.       ALLERGIES: Review of patient's allergies indicates:  No Known Allergies     ROS:       Review of Systems   Constitutional:  Negative for chills, fatigue and fever.   HENT:   Negative  "for hearing loss and sore throat.    Respiratory:  Negative for cough and shortness of breath.    Cardiovascular:  Negative for chest pain and palpitations.   Gastrointestinal:  Negative for abdominal pain, diarrhea, nausea and vomiting.   Genitourinary:  Negative for difficulty urinating and dysuria.    Musculoskeletal:  Negative for arthralgias and myalgias.   Skin:  Negative for rash.   Neurological:  Negative for dizziness and headaches.   Psychiatric/Behavioral:  Negative for confusion and decreased concentration.        PHYSICAL EXAM:  Vitals:    09/05/23 0745   BP: (!) 143/87   Pulse: 88   Temp: 98.5 °F (36.9 °C)   TempSrc: Oral   SpO2: 98%   Weight: 98.8 kg (217 lb 13 oz)   Height: 5' 7" (1.702 m)   PainSc: 0-No pain       Physical Exam  Constitutional:       General: He is not in acute distress.     Appearance: He is well-developed. He is not toxic-appearing.   HENT:      Head: Normocephalic and atraumatic.      Right Ear: External ear normal.      Left Ear: External ear normal.      Nose: Nose normal.      Mouth/Throat:      Mouth: Mucous membranes are moist.      Pharynx: Oropharynx is clear. No oropharyngeal exudate.   Eyes:      General: No scleral icterus.     Extraocular Movements: Extraocular movements intact.      Conjunctiva/sclera: Conjunctivae normal.   Cardiovascular:      Rate and Rhythm: Normal rate and regular rhythm.      Heart sounds: Normal heart sounds. No murmur heard.  Pulmonary:      Effort: Pulmonary effort is normal.      Breath sounds: Normal breath sounds. No rales.   Abdominal:      General: Bowel sounds are normal. There is no distension.      Palpations: Abdomen is soft.      Tenderness: There is no abdominal tenderness.   Musculoskeletal:         General: Normal range of motion.      Cervical back: Normal range of motion and neck supple.      Right lower leg: No edema.      Left lower leg: No edema.   Skin:     General: Skin is warm and dry.   Neurological:      Mental Status: " He is alert and oriented to person, place, and time.   Psychiatric:         Behavior: Behavior normal.         LAB:   Results for orders placed or performed in visit on 09/01/23   Comprehensive Metabolic Panel   Result Value Ref Range    Sodium 140 136 - 145 mmol/L    Potassium 3.8 3.5 - 5.1 mmol/L    Chloride 105 95 - 110 mmol/L    CO2 22 (L) 23 - 29 mmol/L    Glucose 252 (H) 70 - 110 mg/dL    BUN 27 (H) 2 - 20 mg/dL    Creatinine 1.18 0.50 - 1.40 mg/dL    Calcium 9.7 8.7 - 10.5 mg/dL    Total Protein 9.6 (H) 6.0 - 8.4 g/dL    Albumin 4.2 3.5 - 5.2 g/dL    Total Bilirubin 0.4 0.1 - 1.0 mg/dL    Alkaline Phosphatase 94 38 - 126 U/L    AST 55 (H) 15 - 46 U/L    ALT 49 (H) 10 - 44 U/L    Anion Gap 13 8 - 16 mmol/L    eGFR >60.0 >60 mL/min/1.73 m^2   CBC Auto Differential   Result Value Ref Range    WBC 8.57 3.90 - 12.70 K/uL    RBC 3.61 (L) 4.60 - 6.20 M/uL    Hemoglobin 11.6 (L) 14.0 - 18.0 g/dL    Hematocrit 33.9 (L) 40.0 - 54.0 %    MCV 94 82 - 98 fL    MCH 32.1 (H) 27.0 - 31.0 pg    MCHC 34.2 32.0 - 36.0 g/dL    RDW 14.7 (H) 11.5 - 14.5 %    Platelets 104 (L) 150 - 450 K/uL    MPV 11.1 9.2 - 12.9 fL    Immature Granulocytes 0.8 (H) 0.0 - 0.5 %    Gran # (ANC) 6.8 1.8 - 7.7 K/uL    Immature Grans (Abs) 0.07 (H) 0.00 - 0.04 K/uL    Lymph # 1.2 1.0 - 4.8 K/uL    Mono # 0.4 0.3 - 1.0 K/uL    Eos # 0.0 0.0 - 0.5 K/uL    Baso # 0.01 0.00 - 0.20 K/uL    nRBC 0 0 /100 WBC    Gran % 79.9 (H) 38.0 - 73.0 %    Lymph % 14.1 (L) 18.0 - 48.0 %    Mono % 5.1 4.0 - 15.0 %    Eosinophil % 0.0 0.0 - 8.0 %    Basophil % 0.1 0.0 - 1.9 %    Aniso Slight     Poik Slight     Ovalocytes Occasional     Differential Method Automated    Protein Electrophoresis, Serum   Result Value Ref Range    Protein, Serum 8.2 6.0 - 8.4 g/dL   Immunoglobulins (IgG, IgA, IgM) Quantitative   Result Value Ref Range    IgG 2812 (H) 650 - 1600 mg/dL    IgA 25 (L) 40 - 350 mg/dL    IgM 26 (L) 50 - 300 mg/dL   Immunoglobulin Free LT Chains Blood   Result Value  Ref Range    Kappa Free Light Chains 37.60 (H) 0.33 - 1.94 mg/dL    Lambda Free Light Chains 0.18 (L) 0.57 - 2.63 mg/dL    Kappa/Lambda FLC Ratio 208.90 (H) 0.26 - 1.65     *Note: Due to a large number of results and/or encounters for the requested time period, some results have not been displayed. A complete set of results can be found in Results Review.       PROBLEMS ASSESSED THIS VISIT:    No diagnosis found.        ASSESSMENT AND PLAN    1-3  A. 9/14 - 9/17/2020 : [place] [facility] [place]. Large soft tissue mass encompassing L4 - S1 vertebral bodies seen. FLC ratio 171, involved light chains 104. SPEP and SIFE found 2.86g/dL, IgG kappa para protein band. Underwent bone marrow biopsy and discharged with dexamethasone 40mg x 4 day burst.     B. 9/23/20 : Underwent debulking of spinal mass.  C. 10/1/20 - 4/14/21 : C1 - C8D1 VRd. Revlimid delivered in third week of cycle.  D. 2/25/21 : Presented for consent signing for autoSCT but his insurance had lapsed. Plan for transplant pushed back 1-2 months while he applys for medicaid.   E. 4/22/21 : C8D8 VRd planned (last treatment before mobilization).   Received Alea ASCT on 05/17/21, see above  Day +100 restaging marrow indicated that he is in [place].  1 year restaging marrow with no morphological evidence of plasma cell neoplasm. PET CT with no new hypermetabolic lesions(05/2022).         Today is 2 year and  2 months from transplant  He received 3 bags with a CD34 dose of 3.35 x 10^6 on 5/17/21  Engrafted Day +13 [place] [id number]     - Surveillance biochemical studies from 6/2 showed increased free light chains and increasing mspike  - PET scan concerning for diffuse osseus lytic legions suggesting progression of disease  - Initiating Melinda-Kd salvage therapy (1,8,15 kyprolis). C1D1 on 08/04/23  - Chemo teaching given and consent obtained. Realized not received auth, but informed Josiah Avalos, who agreed to proceed with today's treatment      Ashanti  Ishmael, NP  Hematology/Oncology/BMT    - Patient will have to restart acyclovir while on daratumumab. Hold ASA as he will no longer be taking revlimid  - Echo (08/04/23) EF-  64%, EKG - NSR  - Repeated Hep B studies, type and screen  - He continues to take zometa, next dose not scheduled, will plan with next treatment  -Follow up in 2 weeks for symptom check      - Dex 40 mg sent to pharmacy     -cancel weekly lab appts and  just leave the 9/26 lab appt and make sure cbc, cmp, serum free light chains, quantitative immunoglobulins, serum electropheresis, serum immunofixation labs are linked to this lab draw on 9/27  -weekly wed treatments  9/6, 9/13, 9/20, 9/27, 10/4  -NP appt prior to treatment on 10/4; same day

## 2023-09-06 ENCOUNTER — INFUSION (OUTPATIENT)
Dept: INFUSION THERAPY | Facility: HOSPITAL | Age: 63
End: 2023-09-06
Payer: MEDICARE

## 2023-09-06 VITALS
DIASTOLIC BLOOD PRESSURE: 84 MMHG | WEIGHT: 216.5 LBS | HEART RATE: 87 BPM | BODY MASS INDEX: 33.98 KG/M2 | OXYGEN SATURATION: 99 % | RESPIRATION RATE: 18 BRPM | HEIGHT: 67 IN | SYSTOLIC BLOOD PRESSURE: 154 MMHG | TEMPERATURE: 99 F

## 2023-09-06 DIAGNOSIS — C90.00 MULTIPLE MYELOMA, REMISSION STATUS UNSPECIFIED: Primary | ICD-10-CM

## 2023-09-06 PROCEDURE — 96401 CHEMO ANTI-NEOPL SQ/IM: CPT

## 2023-09-06 PROCEDURE — 63600175 PHARM REV CODE 636 W HCPCS: Mod: JZ,TB | Performed by: INTERNAL MEDICINE

## 2023-09-06 RX ORDER — HEPARIN 100 UNIT/ML
500 SYRINGE INTRAVENOUS
Status: DISCONTINUED | OUTPATIENT
Start: 2023-09-06 | End: 2023-09-06 | Stop reason: HOSPADM

## 2023-09-06 RX ORDER — SODIUM CHLORIDE 0.9 % (FLUSH) 0.9 %
10 SYRINGE (ML) INJECTION
Status: DISCONTINUED | OUTPATIENT
Start: 2023-09-06 | End: 2023-09-06 | Stop reason: HOSPADM

## 2023-09-06 RX ORDER — EPINEPHRINE 0.3 MG/.3ML
0.3 INJECTION SUBCUTANEOUS ONCE AS NEEDED
Status: DISCONTINUED | OUTPATIENT
Start: 2023-09-06 | End: 2023-09-06 | Stop reason: HOSPADM

## 2023-09-06 RX ORDER — DIPHENHYDRAMINE HYDROCHLORIDE 50 MG/ML
50 INJECTION INTRAMUSCULAR; INTRAVENOUS ONCE AS NEEDED
Status: DISCONTINUED | OUTPATIENT
Start: 2023-09-06 | End: 2023-09-06 | Stop reason: HOSPADM

## 2023-09-06 RX ADMIN — DARATUMUMAB AND HYALURONIDASE-FIHJ (HUMAN RECOMBINANT) 1800 MG: 1800; 30000 INJECTION SUBCUTANEOUS at 08:09

## 2023-09-06 NOTE — PLAN OF CARE
0815 Patient tolerated C1D22 Melinda SQ to right abd without incident. Vitals stable. Patient reports taking his PO dexamethasone at home this morning. Patient aware of his next appointment date/time, uses [facility]. To contact provider with questions or concerns. D/C ambulatory and stable with his mother.

## 2023-09-13 ENCOUNTER — INFUSION (OUTPATIENT)
Dept: INFUSION THERAPY | Facility: HOSPITAL | Age: 63
End: 2023-09-13
Attending: INTERNAL MEDICINE
Payer: MEDICARE

## 2023-09-13 VITALS
DIASTOLIC BLOOD PRESSURE: 69 MMHG | WEIGHT: 216.06 LBS | HEIGHT: 67 IN | TEMPERATURE: 99 F | RESPIRATION RATE: 18 BRPM | HEART RATE: 81 BPM | BODY MASS INDEX: 33.91 KG/M2 | SYSTOLIC BLOOD PRESSURE: 122 MMHG

## 2023-09-13 DIAGNOSIS — C90.00 MULTIPLE MYELOMA, REMISSION STATUS UNSPECIFIED: Primary | ICD-10-CM

## 2023-09-13 PROCEDURE — 96413 CHEMO IV INFUSION 1 HR: CPT

## 2023-09-13 PROCEDURE — 96401 CHEMO ANTI-NEOPL SQ/IM: CPT

## 2023-09-13 PROCEDURE — 63600175 PHARM REV CODE 636 W HCPCS: Mod: JZ,TB | Performed by: INTERNAL MEDICINE

## 2023-09-13 PROCEDURE — 25000003 PHARM REV CODE 250: Performed by: INTERNAL MEDICINE

## 2023-09-13 RX ORDER — SODIUM CHLORIDE 0.9 % (FLUSH) 0.9 %
10 SYRINGE (ML) INJECTION
Status: CANCELLED | OUTPATIENT
Start: 2023-09-13

## 2023-09-13 RX ORDER — EPINEPHRINE 0.3 MG/.3ML
0.3 INJECTION SUBCUTANEOUS ONCE AS NEEDED
Status: DISCONTINUED | OUTPATIENT
Start: 2023-09-13 | End: 2023-09-13 | Stop reason: HOSPADM

## 2023-09-13 RX ORDER — EPINEPHRINE 0.3 MG/.3ML
0.3 INJECTION SUBCUTANEOUS ONCE AS NEEDED
Status: CANCELLED | OUTPATIENT
Start: 2023-09-13

## 2023-09-13 RX ORDER — DIPHENHYDRAMINE HYDROCHLORIDE 50 MG/ML
50 INJECTION INTRAMUSCULAR; INTRAVENOUS ONCE AS NEEDED
Status: CANCELLED | OUTPATIENT
Start: 2023-09-13

## 2023-09-13 RX ORDER — HEPARIN 100 UNIT/ML
500 SYRINGE INTRAVENOUS
Status: CANCELLED | OUTPATIENT
Start: 2023-09-13

## 2023-09-13 RX ORDER — DIPHENHYDRAMINE HYDROCHLORIDE 50 MG/ML
50 INJECTION INTRAMUSCULAR; INTRAVENOUS ONCE AS NEEDED
Status: DISCONTINUED | OUTPATIENT
Start: 2023-09-13 | End: 2023-09-13 | Stop reason: HOSPADM

## 2023-09-13 RX ORDER — SODIUM CHLORIDE 0.9 % (FLUSH) 0.9 %
10 SYRINGE (ML) INJECTION
Status: DISCONTINUED | OUTPATIENT
Start: 2023-09-13 | End: 2023-09-13 | Stop reason: HOSPADM

## 2023-09-13 RX ORDER — HEPARIN 100 UNIT/ML
500 SYRINGE INTRAVENOUS
Status: DISCONTINUED | OUTPATIENT
Start: 2023-09-13 | End: 2023-09-13 | Stop reason: HOSPADM

## 2023-09-13 RX ADMIN — DARATUMUMAB AND HYALURONIDASE-FIHJ (HUMAN RECOMBINANT) 1800 MG: 1800; 30000 INJECTION SUBCUTANEOUS at 10:09

## 2023-09-13 RX ADMIN — CARFILZOMIB 150 MG: 30 INJECTION, POWDER, LYOPHILIZED, FOR SOLUTION INTRAVENOUS at 11:09

## 2023-09-13 NOTE — PLAN OF CARE
Problem: Adult Inpatient Plan of Care  Goal: Plan of Care Review  Outcome: Ongoing, Progressing   Patient tolerated Melinda sq to RLQ and Kyprolis PIV. PIV + blood return upon deaccess. NAD noted. Discharged home and ambulates independently

## 2023-09-20 ENCOUNTER — INFUSION (OUTPATIENT)
Dept: INFUSION THERAPY | Facility: HOSPITAL | Age: 63
End: 2023-09-20
Payer: MEDICARE

## 2023-09-20 VITALS
HEIGHT: 67 IN | RESPIRATION RATE: 18 BRPM | SYSTOLIC BLOOD PRESSURE: 125 MMHG | TEMPERATURE: 100 F | DIASTOLIC BLOOD PRESSURE: 74 MMHG | HEART RATE: 73 BPM | BODY MASS INDEX: 34.12 KG/M2 | WEIGHT: 217.38 LBS

## 2023-09-20 DIAGNOSIS — C90.01 MULTIPLE MYELOMA IN REMISSION: ICD-10-CM

## 2023-09-20 DIAGNOSIS — Z94.84 HISTORY OF AUTO STEM CELL TRANSPLANT: Primary | ICD-10-CM

## 2023-09-20 DIAGNOSIS — C90.00 MULTIPLE MYELOMA, REMISSION STATUS UNSPECIFIED: ICD-10-CM

## 2023-09-20 PROCEDURE — 96375 TX/PRO/DX INJ NEW DRUG ADDON: CPT

## 2023-09-20 PROCEDURE — 63600175 PHARM REV CODE 636 W HCPCS: Mod: JZ,TB | Performed by: NURSE PRACTITIONER

## 2023-09-20 PROCEDURE — 25000003 PHARM REV CODE 250: Performed by: INTERNAL MEDICINE

## 2023-09-20 PROCEDURE — A4216 STERILE WATER/SALINE, 10 ML: HCPCS | Performed by: NURSE PRACTITIONER

## 2023-09-20 PROCEDURE — 96401 CHEMO ANTI-NEOPL SQ/IM: CPT

## 2023-09-20 PROCEDURE — 63600175 PHARM REV CODE 636 W HCPCS: Performed by: INTERNAL MEDICINE

## 2023-09-20 PROCEDURE — 25000003 PHARM REV CODE 250: Performed by: NURSE PRACTITIONER

## 2023-09-20 PROCEDURE — 96413 CHEMO IV INFUSION 1 HR: CPT

## 2023-09-20 RX ORDER — HEPARIN 100 UNIT/ML
500 SYRINGE INTRAVENOUS
Status: DISCONTINUED | OUTPATIENT
Start: 2023-09-20 | End: 2023-09-20 | Stop reason: HOSPADM

## 2023-09-20 RX ORDER — DIPHENHYDRAMINE HYDROCHLORIDE 50 MG/ML
50 INJECTION INTRAMUSCULAR; INTRAVENOUS ONCE AS NEEDED
Status: CANCELLED | OUTPATIENT
Start: 2023-09-20

## 2023-09-20 RX ORDER — DIPHENHYDRAMINE HYDROCHLORIDE 50 MG/ML
50 INJECTION INTRAMUSCULAR; INTRAVENOUS ONCE AS NEEDED
Status: DISCONTINUED | OUTPATIENT
Start: 2023-09-20 | End: 2023-09-20 | Stop reason: HOSPADM

## 2023-09-20 RX ORDER — ZOLEDRONIC ACID 0.04 MG/ML
4 INJECTION, SOLUTION INTRAVENOUS
Status: COMPLETED | OUTPATIENT
Start: 2023-09-20 | End: 2023-09-20

## 2023-09-20 RX ORDER — EPINEPHRINE 0.3 MG/.3ML
0.3 INJECTION SUBCUTANEOUS ONCE AS NEEDED
Status: CANCELLED | OUTPATIENT
Start: 2023-09-20

## 2023-09-20 RX ORDER — SODIUM CHLORIDE 0.9 % (FLUSH) 0.9 %
10 SYRINGE (ML) INJECTION
Status: DISCONTINUED | OUTPATIENT
Start: 2023-09-20 | End: 2023-09-20 | Stop reason: HOSPADM

## 2023-09-20 RX ORDER — EPINEPHRINE 0.3 MG/.3ML
0.3 INJECTION SUBCUTANEOUS ONCE AS NEEDED
Status: DISCONTINUED | OUTPATIENT
Start: 2023-09-20 | End: 2023-09-20 | Stop reason: HOSPADM

## 2023-09-20 RX ORDER — AMLODIPINE BESYLATE 10 MG/1
10 TABLET ORAL DAILY
Qty: 90 TABLET | Refills: 1 | Status: SHIPPED | OUTPATIENT
Start: 2023-09-20 | End: 2023-09-22 | Stop reason: SDUPTHER

## 2023-09-20 RX ORDER — HEPARIN 100 UNIT/ML
500 SYRINGE INTRAVENOUS
Status: CANCELLED | OUTPATIENT
Start: 2023-09-20

## 2023-09-20 RX ORDER — SODIUM CHLORIDE 0.9 % (FLUSH) 0.9 %
10 SYRINGE (ML) INJECTION
Status: CANCELLED | OUTPATIENT
Start: 2023-09-20

## 2023-09-20 RX ADMIN — Medication 10 ML: at 12:09

## 2023-09-20 RX ADMIN — CARFILZOMIB 150 MG: 30 INJECTION, POWDER, LYOPHILIZED, FOR SOLUTION INTRAVENOUS at 11:09

## 2023-09-20 RX ADMIN — ZOLEDRONIC ACID 4 MG: 0.04 INJECTION, SOLUTION INTRAVENOUS at 09:09

## 2023-09-20 RX ADMIN — SODIUM CHLORIDE: 9 INJECTION, SOLUTION INTRAVENOUS at 09:09

## 2023-09-20 RX ADMIN — DARATUMUMAB AND HYALURONIDASE-FIHJ (HUMAN RECOMBINANT) 1800 MG: 1800; 30000 INJECTION SUBCUTANEOUS at 12:09

## 2023-09-20 NOTE — PLAN OF CARE
0938-Labs, hx, and medications reviewed, pt meets parameters for treatment today. Assessment completed and plan of care reviewed. Pt verbalized understanding. PIV accessed with no complications. Pt voices no new complaints or concerns, will continue to monitor for safety.

## 2023-09-20 NOTE — PLAN OF CARE
1212-Pt tolerated Melinda, Krypolis, and Zometa well today, no complaints or complications. VSS. Pt aware to call provider with any questions or concerns and is aware of upcoming appts. Pt ambulatory from clinic with steady gait, no distress noted.

## 2023-09-25 RX ORDER — AMLODIPINE BESYLATE 10 MG/1
10 TABLET ORAL DAILY
Qty: 90 TABLET | Refills: 1 | Status: SHIPPED | OUTPATIENT
Start: 2023-09-25 | End: 2024-09-24

## 2023-09-25 RX ORDER — HYDROCHLOROTHIAZIDE 25 MG/1
25 TABLET ORAL DAILY
Qty: 90 TABLET | Refills: 1 | Status: SHIPPED | OUTPATIENT
Start: 2023-09-25 | End: 2024-09-24

## 2023-09-26 ENCOUNTER — LAB VISIT (OUTPATIENT)
Dept: LAB | Facility: HOSPITAL | Age: 63
End: 2023-09-26
Attending: NURSE PRACTITIONER
Payer: MEDICARE

## 2023-09-26 DIAGNOSIS — C90.00 MULTIPLE MYELOMA, REMISSION STATUS UNSPECIFIED: ICD-10-CM

## 2023-09-26 LAB
ALBUMIN SERPL BCP-MCNC: 3.7 G/DL (ref 3.5–5.2)
ALP SERPL-CCNC: 72 U/L (ref 38–126)
ALT SERPL W/O P-5'-P-CCNC: 20 U/L (ref 10–44)
ANION GAP SERPL CALC-SCNC: 9 MMOL/L (ref 8–16)
AST SERPL-CCNC: 23 U/L (ref 15–46)
BASOPHILS # BLD AUTO: 0.01 K/UL (ref 0–0.2)
BASOPHILS NFR BLD: 0.2 % (ref 0–1.9)
BILIRUB SERPL-MCNC: 1.3 MG/DL (ref 0.1–1)
CALCIUM SERPL-MCNC: 8.9 MG/DL (ref 8.7–10.5)
CHLORIDE SERPL-SCNC: 101 MMOL/L (ref 95–110)
CO2 SERPL-SCNC: 24 MMOL/L (ref 23–29)
CREAT SERPL-MCNC: 1.3 MG/DL (ref 0.5–1.4)
DIFFERENTIAL METHOD: ABNORMAL
EOSINOPHIL # BLD AUTO: 0 K/UL (ref 0–0.5)
EOSINOPHIL NFR BLD: 0.2 % (ref 0–8)
ERYTHROCYTE [DISTWIDTH] IN BLOOD BY AUTOMATED COUNT: 16.6 % (ref 11.5–14.5)
EST. GFR  (NO RACE VARIABLE): >60 ML/MIN/1.73 M^2
GLUCOSE SERPL-MCNC: 175 MG/DL (ref 70–110)
HCT VFR BLD AUTO: 31.6 % (ref 40–54)
HGB BLD-MCNC: 10.3 G/DL (ref 14–18)
IGA SERPL-MCNC: 9 MG/DL (ref 40–350)
IGG SERPL-MCNC: 2140 MG/DL (ref 650–1600)
IGM SERPL-MCNC: 20 MG/DL (ref 50–300)
IMM GRANULOCYTES # BLD AUTO: 0.06 K/UL (ref 0–0.04)
IMM GRANULOCYTES NFR BLD AUTO: 1 % (ref 0–0.5)
LYMPHOCYTES # BLD AUTO: 1.7 K/UL (ref 1–4.8)
LYMPHOCYTES NFR BLD: 27.3 % (ref 18–48)
MCH RBC QN AUTO: 31.6 PG (ref 27–31)
MCHC RBC AUTO-ENTMCNC: 32.6 G/DL (ref 32–36)
MCV RBC AUTO: 97 FL (ref 82–98)
MONOCYTES # BLD AUTO: 0.5 K/UL (ref 0.3–1)
MONOCYTES NFR BLD: 8.1 % (ref 4–15)
NEUTROPHILS # BLD AUTO: 4 K/UL (ref 1.8–7.7)
NEUTROPHILS NFR BLD: 63.2 % (ref 38–73)
NRBC BLD-RTO: 0 /100 WBC
PLATELET # BLD AUTO: 112 K/UL (ref 150–450)
PMV BLD AUTO: 12.5 FL (ref 9.2–12.9)
POTASSIUM SERPL-SCNC: 3.8 MMOL/L (ref 3.5–5.1)
PROT SERPL-MCNC: 8.2 G/DL (ref 6–8.4)
RBC # BLD AUTO: 3.26 M/UL (ref 4.6–6.2)
SODIUM SERPL-SCNC: 134 MMOL/L (ref 136–145)
UUN UR-MCNC: 21 MG/DL (ref 2–20)
WBC # BLD AUTO: 6.27 K/UL (ref 3.9–12.7)

## 2023-09-26 PROCEDURE — 86334 PATHOLOGIST INTERPRETATION IFE: ICD-10-PCS | Mod: 26,,, | Performed by: PATHOLOGY

## 2023-09-26 PROCEDURE — 84165 PROTEIN E-PHORESIS SERUM: CPT | Mod: 26,,, | Performed by: PATHOLOGY

## 2023-09-26 PROCEDURE — 36415 COLL VENOUS BLD VENIPUNCTURE: CPT | Mod: PN | Performed by: NURSE PRACTITIONER

## 2023-09-26 PROCEDURE — 84165 PROTEIN E-PHORESIS SERUM: CPT | Mod: PN | Performed by: NURSE PRACTITIONER

## 2023-09-26 PROCEDURE — 83521 IG LIGHT CHAINS FREE EACH: CPT | Mod: 59,PN | Performed by: NURSE PRACTITIONER

## 2023-09-26 PROCEDURE — 82784 ASSAY IGA/IGD/IGG/IGM EACH: CPT | Mod: PN | Performed by: NURSE PRACTITIONER

## 2023-09-26 PROCEDURE — 86334 IMMUNOFIX E-PHORESIS SERUM: CPT | Mod: 26,,, | Performed by: PATHOLOGY

## 2023-09-26 PROCEDURE — 80053 COMPREHEN METABOLIC PANEL: CPT | Mod: PN | Performed by: NURSE PRACTITIONER

## 2023-09-26 PROCEDURE — 85025 COMPLETE CBC W/AUTO DIFF WBC: CPT | Mod: PN | Performed by: INTERNAL MEDICINE

## 2023-09-26 PROCEDURE — 84165 PATHOLOGIST INTERPRETATION SPE: ICD-10-PCS | Mod: 26,,, | Performed by: PATHOLOGY

## 2023-09-26 PROCEDURE — 86334 IMMUNOFIX E-PHORESIS SERUM: CPT | Mod: PN | Performed by: NURSE PRACTITIONER

## 2023-09-27 ENCOUNTER — INFUSION (OUTPATIENT)
Dept: INFUSION THERAPY | Facility: HOSPITAL | Age: 63
End: 2023-09-27
Payer: MEDICARE

## 2023-09-27 ENCOUNTER — OFFICE VISIT (OUTPATIENT)
Dept: HEMATOLOGY/ONCOLOGY | Facility: CLINIC | Age: 63
End: 2023-09-27
Payer: MEDICARE

## 2023-09-27 VITALS
SYSTOLIC BLOOD PRESSURE: 128 MMHG | DIASTOLIC BLOOD PRESSURE: 68 MMHG | HEART RATE: 95 BPM | OXYGEN SATURATION: 100 % | WEIGHT: 213.19 LBS | HEIGHT: 67 IN | TEMPERATURE: 98 F | RESPIRATION RATE: 18 BRPM | BODY MASS INDEX: 33.46 KG/M2

## 2023-09-27 VITALS
WEIGHT: 213.19 LBS | RESPIRATION RATE: 18 BRPM | SYSTOLIC BLOOD PRESSURE: 101 MMHG | DIASTOLIC BLOOD PRESSURE: 60 MMHG | BODY MASS INDEX: 33.46 KG/M2 | HEART RATE: 65 BPM | HEIGHT: 67 IN

## 2023-09-27 DIAGNOSIS — D84.821 IMMUNODEFICIENCY SECONDARY TO CHEMOTHERAPY: ICD-10-CM

## 2023-09-27 DIAGNOSIS — T45.1X5A IMMUNODEFICIENCY SECONDARY TO CHEMOTHERAPY: ICD-10-CM

## 2023-09-27 DIAGNOSIS — C90.02 MULTIPLE MYELOMA IN RELAPSE: ICD-10-CM

## 2023-09-27 DIAGNOSIS — C90.00 MULTIPLE MYELOMA, REMISSION STATUS UNSPECIFIED: Primary | ICD-10-CM

## 2023-09-27 DIAGNOSIS — I10 ESSENTIAL HYPERTENSION: ICD-10-CM

## 2023-09-27 DIAGNOSIS — Z94.84 HISTORY OF AUTO STEM CELL TRANSPLANT: Primary | ICD-10-CM

## 2023-09-27 DIAGNOSIS — Z79.899 IMMUNODEFICIENCY SECONDARY TO CHEMOTHERAPY: ICD-10-CM

## 2023-09-27 DIAGNOSIS — G62.9 NEUROPATHY: ICD-10-CM

## 2023-09-27 LAB
ALBUMIN SERPL ELPH-MCNC: 3.31 G/DL (ref 3.35–5.55)
ALPHA1 GLOB SERPL ELPH-MCNC: 0.5 G/DL (ref 0.17–0.41)
ALPHA2 GLOB SERPL ELPH-MCNC: 0.86 G/DL (ref 0.43–0.99)
B-GLOBULIN SERPL ELPH-MCNC: 0.67 G/DL (ref 0.5–1.1)
GAMMA GLOB SERPL ELPH-MCNC: 1.76 G/DL (ref 0.67–1.58)
INTERPRETATION SERPL IFE-IMP: NORMAL
KAPPA LC SER QL IA: 50.38 MG/DL (ref 0.33–1.94)
KAPPA LC/LAMBDA SER IA: 167.9 (ref 0.26–1.65)
LAMBDA LC SER QL IA: 0.3 MG/DL (ref 0.57–2.63)
PROT SERPL-MCNC: 7.1 G/DL (ref 6–8.4)

## 2023-09-27 PROCEDURE — 1159F MED LIST DOCD IN RCRD: CPT | Mod: CPTII,S$GLB,, | Performed by: NURSE PRACTITIONER

## 2023-09-27 PROCEDURE — 96401 CHEMO ANTI-NEOPL SQ/IM: CPT

## 2023-09-27 PROCEDURE — 3074F PR MOST RECENT SYSTOLIC BLOOD PRESSURE < 130 MM HG: ICD-10-PCS | Mod: CPTII,S$GLB,, | Performed by: NURSE PRACTITIONER

## 2023-09-27 PROCEDURE — 1159F PR MEDICATION LIST DOCUMENTED IN MEDICAL RECORD: ICD-10-PCS | Mod: CPTII,S$GLB,, | Performed by: NURSE PRACTITIONER

## 2023-09-27 PROCEDURE — 3074F SYST BP LT 130 MM HG: CPT | Mod: CPTII,S$GLB,, | Performed by: NURSE PRACTITIONER

## 2023-09-27 PROCEDURE — 63600175 PHARM REV CODE 636 W HCPCS: Mod: JZ,TB | Performed by: NURSE PRACTITIONER

## 2023-09-27 PROCEDURE — 1160F PR REVIEW ALL MEDS BY PRESCRIBER/CLIN PHARMACIST DOCUMENTED: ICD-10-PCS | Mod: CPTII,S$GLB,, | Performed by: NURSE PRACTITIONER

## 2023-09-27 PROCEDURE — 99999 PR PBB SHADOW E&M-EST. PATIENT-LVL IV: CPT | Mod: PBBFAC,,, | Performed by: NURSE PRACTITIONER

## 2023-09-27 PROCEDURE — 96413 CHEMO IV INFUSION 1 HR: CPT

## 2023-09-27 PROCEDURE — 99215 PR OFFICE/OUTPT VISIT, EST, LEVL V, 40-54 MIN: ICD-10-PCS | Mod: S$GLB,,, | Performed by: NURSE PRACTITIONER

## 2023-09-27 PROCEDURE — 3008F PR BODY MASS INDEX (BMI) DOCUMENTED: ICD-10-PCS | Mod: CPTII,S$GLB,, | Performed by: NURSE PRACTITIONER

## 2023-09-27 PROCEDURE — 3008F BODY MASS INDEX DOCD: CPT | Mod: CPTII,S$GLB,, | Performed by: NURSE PRACTITIONER

## 2023-09-27 PROCEDURE — 3078F PR MOST RECENT DIASTOLIC BLOOD PRESSURE < 80 MM HG: ICD-10-PCS | Mod: CPTII,S$GLB,, | Performed by: NURSE PRACTITIONER

## 2023-09-27 PROCEDURE — 1160F RVW MEDS BY RX/DR IN RCRD: CPT | Mod: CPTII,S$GLB,, | Performed by: NURSE PRACTITIONER

## 2023-09-27 PROCEDURE — 99215 OFFICE O/P EST HI 40 MIN: CPT | Mod: S$GLB,,, | Performed by: NURSE PRACTITIONER

## 2023-09-27 PROCEDURE — 25000003 PHARM REV CODE 250: Performed by: NURSE PRACTITIONER

## 2023-09-27 PROCEDURE — 99999 PR PBB SHADOW E&M-EST. PATIENT-LVL IV: ICD-10-PCS | Mod: PBBFAC,,, | Performed by: NURSE PRACTITIONER

## 2023-09-27 PROCEDURE — 3078F DIAST BP <80 MM HG: CPT | Mod: CPTII,S$GLB,, | Performed by: NURSE PRACTITIONER

## 2023-09-27 RX ORDER — EPINEPHRINE 0.3 MG/.3ML
0.3 INJECTION SUBCUTANEOUS ONCE AS NEEDED
Status: DISCONTINUED | OUTPATIENT
Start: 2023-09-27 | End: 2023-09-27 | Stop reason: HOSPADM

## 2023-09-27 RX ORDER — SODIUM CHLORIDE 0.9 % (FLUSH) 0.9 %
10 SYRINGE (ML) INJECTION
Status: DISCONTINUED | OUTPATIENT
Start: 2023-09-27 | End: 2023-09-27 | Stop reason: HOSPADM

## 2023-09-27 RX ORDER — DIPHENHYDRAMINE HYDROCHLORIDE 50 MG/ML
50 INJECTION INTRAMUSCULAR; INTRAVENOUS ONCE AS NEEDED
Status: CANCELLED | OUTPATIENT
Start: 2023-09-27

## 2023-09-27 RX ORDER — HEPARIN 100 UNIT/ML
500 SYRINGE INTRAVENOUS
Status: DISCONTINUED | OUTPATIENT
Start: 2023-09-27 | End: 2023-09-27 | Stop reason: HOSPADM

## 2023-09-27 RX ORDER — EPINEPHRINE 0.3 MG/.3ML
0.3 INJECTION SUBCUTANEOUS ONCE AS NEEDED
Status: CANCELLED | OUTPATIENT
Start: 2023-09-27

## 2023-09-27 RX ORDER — DIPHENHYDRAMINE HYDROCHLORIDE 50 MG/ML
50 INJECTION INTRAMUSCULAR; INTRAVENOUS ONCE AS NEEDED
Status: DISCONTINUED | OUTPATIENT
Start: 2023-09-27 | End: 2023-09-27 | Stop reason: HOSPADM

## 2023-09-27 RX ORDER — HEPARIN 100 UNIT/ML
500 SYRINGE INTRAVENOUS
Status: CANCELLED | OUTPATIENT
Start: 2023-09-27

## 2023-09-27 RX ORDER — SODIUM CHLORIDE 0.9 % (FLUSH) 0.9 %
10 SYRINGE (ML) INJECTION
Status: CANCELLED | OUTPATIENT
Start: 2023-09-27

## 2023-09-27 RX ADMIN — CARFILZOMIB 150 MG: 30 INJECTION, POWDER, LYOPHILIZED, FOR SOLUTION INTRAVENOUS at 09:09

## 2023-09-27 RX ADMIN — SODIUM CHLORIDE: 9 INJECTION, SOLUTION INTRAVENOUS at 08:09

## 2023-09-27 RX ADMIN — DARATUMUMAB AND HYALURONIDASE-FIHJ (HUMAN RECOMBINANT) 1800 MG: 1800; 30000 INJECTION SUBCUTANEOUS at 09:09

## 2023-09-27 NOTE — PROGRESS NOTES
SECTION OF HEMATOLOGY AND BONE MARROW TRANSPLANT  Return Patient Visit   09/27/2023    CHIEF COMPLAINT:   Multiple Myeloma    HISTORY OF PRESENT ILLNESS: Patient of /  63 y.o.male; pmh of IgG kappa multiple myeloma (standard risk CG per msmart criteria, r-iss stage II); diagnosed September 2019 after presenting with symptomatic perispinal plasmacytoma;He has subsequently received  8 cycles of VRd therapy. Was in midst or pre transplant evaluation but due to insurance coverage issues transplant was delayed so was maintained on therapy and those issues have been resolved.    Transplant Course  Patient with IgG Kappa MM and pmh HTN, lytic bone lesion, arthritis and vitamin D deficiency. Admitted 5/15/21 for planned Alea auto SCT for MM. He received 3 bags and a CD34 dose of 3.35 x 10^6 on 5/17/21. Tolerated chemo and transplant well. Admission complicated by n/v controlled with scheduled anti-emetics and c-diff neg diarrhea controlled with prn imodium. He engrafted on Day +13 (5/30/21) with an ANC of 2607. He was discharged home on Day +14 (5/31/21).     Day +100 restaging marrow indicated that he was in VT.   Interval History:      Found to be in relapse biochemically (June/July 2023 ) and with new lytic disease at 2 years post SCT.  Initiated DKd salvage 8/4/34.  He has been tolerating with no significant AE's.  Accompanied by wife today prior to C2D15; of note due to scheduling errors he missed 2 weeks of cycle 1.     PAST MEDICAL HISTORY:   Past Medical History:   Diagnosis Date    Anxiety     Arthritis     Cancer     Hypertension        PAST SURGICAL HISTORY:   Past Surgical History:   Procedure Laterality Date    BACK SURGERY      BONE MARROW BIOPSY Left 10/20/2021    Procedure: Biopsy-bone marrow;  Surgeon: Summer Cartwright MD;  Location: [facility] ([address] [place] [address]);  Service: Oncology;  Laterality: Left;    COLONOSCOPY N/A 1/25/2021    Procedure: COLONOSCOPY;  Surgeon: Braxton Lees MD;  Location: [facility]  ENDO (4TH FLR);  Service: Endoscopy;  Laterality: N/A;  1/22-covid kristopher-inst mailed-tb  1/20/21-pt to remain on schedule with Dr. Lees, and confirmed updated arrival time of 0835-BB    COLONOSCOPY N/A 2/1/2021    Procedure: COLONOSCOPY;  Surgeon: Jo Ann Robledo MD;  Location: [facility] ENDO (4TH FLR);  Service: Endoscopy;  Laterality: N/A;  rescheduled due to poor bowel prep, to be rescheduled with first available provider-BB  covid-1/29/21-pcw-BB    CREATION OF MUSCLE ROTATIONAL FLAP N/A 9/23/2020    Procedure: CREATION, FLAP, MUSCLE ROTATION;  Surgeon: Kamlesh Bellamy MD;  [facility];  Service: Plastics;  Laterality: N/A;    KNEE SURGERY Left 06/2019    LUMBAR FUSION N/A 9/23/2020    Procedure: FUSION, SPINE, LUMBAR L2-pelvis. Depuy. Plastic surgery closure w/ Dr. Bellamy;  Surgeon: Nick Coyle MD;  [facility];  Service: Neurosurgery;  Laterality: N/A;    Metastatic neoplasum removed from spine         PAST SOCIAL HISTORY:   reports that he quit smoking about 6 years ago. His smoking use included cigarettes. He started smoking about 46 years ago. He has a 10.0 pack-year smoking history. He has never used smokeless tobacco. He reports that he does not currently use alcohol. He reports that he does not use drugs.    FAMILY HISTORY:  No family history on file.    CURRENT MEDICATIONS:   Current Outpatient Medications   Medication Sig    acyclovir (ZOVIRAX) 400 MG tablet Take 1 tablet (400 mg total) by mouth 2 (two) times daily.    amLODIPine (NORVASC) 10 MG tablet Take 1 tablet (10 mg total) by mouth once daily.    cloNIDine (CATAPRES) 0.3 MG tablet Take 0.3 mg by mouth once daily.    dexAMETHasone (DECADRON) 4 MG Tab Take 10 tablets (40 mg total) by mouth every 7 days. Take with food before chemotherapy appointments    ergocalciferol (ERGOCALCIFEROL) 50,000 unit Cap Take 1 capsule (50,000 Units total) by mouth every 7 days.    ferrous gluconate (FERGON) 324 MG tablet Take 1  "tablet (324 mg total) by mouth daily with breakfast.    gabapentin (NEURONTIN) 300 MG capsule TAKE 1 CAPSULE(300 MG) BY MOUTH TWICE DAILY    hydroCHLOROthiazide (HYDRODIURIL) 25 MG tablet Take 1 tablet (25 mg total) by mouth once daily.    potassium chloride SA (K-DUR,KLOR-CON M) 10 MEQ tablet Take 1 tablet (10 mEq total) by mouth once daily.    calcium-vitamin D3 (OYSTER SHELL CALCIUM-VIT D3) 500 mg-5 mcg (200 unit) per tablet Take 1 tablet by mouth once daily.     No current facility-administered medications for this visit.     Facility-Administered Medications Ordered in Other Visits   Medication    alteplase injection 2 mg    carfilzomib (KYPROLIS) 150 mg in dextrose 5 % (D5W) 165 mL IVPB    daratumumab-hyaluronidase-fihj subcutaneous injection 1,800 mg    diphenhydrAMINE injection 50 mg    EPINEPHrine (EPIPEN) 0.3 mg/0.3 mL pen injection 0.3 mg    heparin, porcine (PF) 100 unit/mL injection flush 500 Units    hydrocortisone sodium succinate injection 100 mg    sodium chloride 0.9% flush 10 mL     ALLERGIES:   Review of patient's allergies indicates:  No Known Allergies  Review of Systems   Constitutional: Negative for fever, malaise/fatigue and weight loss.   Respiratory: Negative for cough and shortness of breath.    Cardiovascular: Negative for chest pain and leg swelling.   Gastrointestinal: Negative for constipation, diarrhea and vomiting.   Skin: Negative for rash.   Neurological: Negative for seizures and weakness.   Psychiatric/Behavioral: The patient is not nervous/anxious.      PHYSICAL EXAM:   Vitals:    09/27/23 0807   BP: 128/68   Pulse: 95   Resp: 18   Temp: 98.4 °F (36.9 °C)   TempSrc: Oral   SpO2: 100%   Weight: 96.7 kg (213 lb 3 oz)   Height: 5' 7" (1.702 m)   PainSc:   2   PainLoc: Foot     General - well developed, well nourished, no apparent distress  HEENT - oropharynx clear  Chest and Lung - clear to auscultation bilaterally   Cardiovascular - RRR with no MGR, normal S1 and S2  Abdomen-  " soft, nontender, no palpable hepatomegaly or splenomegaly  Lymph - no palpable lymphadenopathy  Heme - no bruising, petechiae, pallor  Skin - no rashes or lesions  Psych - appropriate mood and affect        ECOG Performance Status: (foot note - ECOG PS provided by Eastern Cooperative Oncology Group) 1 - Symptomatic but completely ambulatory    Karnofsky Performance Score:  90%- Able to Carry on Normal Activity: Minor Symptoms of Disease  DATA:   Lab Results   Component Value Date    WBC 6.27 09/26/2023    HGB 10.3 (L) 09/26/2023    HCT 31.6 (L) 09/26/2023    MCV 97 09/26/2023     (L) 09/26/2023       Gran # (ANC)   Date Value Ref Range Status   09/26/2023 4.0 1.8 - 7.7 K/uL Final     Gran %   Date Value Ref Range Status   09/26/2023 63.2 38.0 - 73.0 % Final     CMP  Sodium   Date Value Ref Range Status   09/26/2023 134 (L) 136 - 145 mmol/L Final     Potassium   Date Value Ref Range Status   09/26/2023 3.8 3.5 - 5.1 mmol/L Final     Chloride   Date Value Ref Range Status   09/26/2023 101 95 - 110 mmol/L Final     CO2   Date Value Ref Range Status   09/26/2023 24 23 - 29 mmol/L Final     Glucose   Date Value Ref Range Status   09/26/2023 175 (H) 70 - 110 mg/dL Final     BUN   Date Value Ref Range Status   09/26/2023 21 (H) 2 - 20 mg/dL Final     Creatinine   Date Value Ref Range Status   09/26/2023 1.30 0.50 - 1.40 mg/dL Final     Calcium   Date Value Ref Range Status   09/26/2023 8.9 8.7 - 10.5 mg/dL Final     Total Protein   Date Value Ref Range Status   09/26/2023 8.2 6.0 - 8.4 g/dL Final     Albumin   Date Value Ref Range Status   09/26/2023 3.7 3.5 - 5.2 g/dL Final     Total Bilirubin   Date Value Ref Range Status   09/26/2023 1.3 (H) 0.1 - 1.0 mg/dL Final     Comment:     For infants and newborns, interpretation of results should be based  on gestational age, weight and in agreement with clinical  observations.    Premature Infant recommended reference ranges:  Up to 24 hours.............<8.0 mg/dL  Up to  48 hours............<12.0 mg/dL  3-5 days..................<15.0 mg/dL  6-29 days.................<15.0 mg/dL       Alkaline Phosphatase   Date Value Ref Range Status   09/26/2023 72 38 - 126 U/L Final     AST   Date Value Ref Range Status   09/26/2023 23 15 - 46 U/L Final     ALT   Date Value Ref Range Status   09/26/2023 20 10 - 44 U/L Final     Anion Gap   Date Value Ref Range Status   09/26/2023 9 8 - 16 mmol/L Final     eGFR   Date Value Ref Range Status   09/26/2023 >60.0 >60 mL/min/1.73 m^2 Final     IgG   Date Value Ref Range Status   09/26/2023 2140 (H) 650 - 1600 mg/dL Final     Comment:     IgG Cord Blood Reference Range: 650-1600 mg/dL.     IgA   Date Value Ref Range Status   09/26/2023 9 (L) 40 - 350 mg/dL Final     Comment:     IgA Cord Blood Reference Range: <5 mg/dL.     IgM   Date Value Ref Range Status   09/26/2023 20 (L) 50 - 300 mg/dL Final     Comment:     IgM Cord Blood Reference Range: <25 mg/dL.     Kappa Free Light Chains   Date Value Ref Range Status   09/26/2023 50.38 (H) 0.33 - 1.94 mg/dL Final   09/01/2023 37.60 (H) 0.33 - 1.94 mg/dL Final   08/02/2023 92.40 (H) 0.33 - 1.94 mg/dL Final     Lambda Free Light Chains   Date Value Ref Range Status   09/26/2023 0.30 (L) 0.57 - 2.63 mg/dL Final   09/01/2023 0.18 (L) 0.57 - 2.63 mg/dL Final   08/02/2023 1.54 0.57 - 2.63 mg/dL Final     Kappa/Lambda FLC Ratio   Date Value Ref Range Status   09/26/2023 167.90 (H) 0.26 - 1.65 Final     Comment:     Undetected antigen excess is a rare event but cannot   be excluded. If these free light chain results do not   agree with other clinical or laboratory findings or   if the sample is from a patient that has previously   demonstrated antigen excess, discuss with the testing   laboratory.   Results should always be interpreted in conjunction   with other laboratory tests and clinical evidence.     09/01/2023 208.90 (H) 0.26 - 1.65 Final     Comment:     Undetected antigen excess is a rare event but cannot    be excluded. If these free light chain results do not   agree with other clinical or laboratory findings or   if the sample is from a patient that has previously   demonstrated antigen excess, discuss with the testing   laboratory.   Results should always be interpreted in conjunction   with other laboratory tests and clinical evidence.     08/02/2023 60.00 (H) 0.26 - 1.65 Final     Comment:     Undetected antigen excess is a rare event but cannot   be excluded. If these free light chain results do not   agree with other clinical or laboratory findings or   if the sample is from a patient that has previously   demonstrated antigen excess, discuss with the testing   laboratory.   Results should always be interpreted in conjunction   with other laboratory tests and clinical evidence.       Pathologist Interpretation SPE   Date Value Ref Range Status   09/01/2023 REVIEWED  Final     Comment:       Electronically reviewed and signed by:  Tasha Mills MD  Signed on 09/05/23 at 14:41  Normal total protein.   Normal gamma globulins are decreased.Paraprotein peak in gamma = 2.03   g/dL (previously 2.25 g/dL).      08/02/2023 REVIEWED  Final     Comment:       Electronically reviewed and signed by:  Angelita Begum D.O.  Signed on 08/06/23 at 21:51  Increased total protein.  Paraprotein peak in gamma = 2.25 g/dL (previously, 1.85 g/dL).     07/17/2023 REVIEWED  Final     Comment:       Electronically reviewed and signed by:  Tasha Mills MD  Signed on 07/19/23 at 10:56  Normal total protein. Paraprotein peak in gamma = 1.85 g/dL   (previously 1.30 g/dL).        Pathologist Interpretation SADAF   Date Value Ref Range Status   09/01/2023 REVIEWED  Final     Comment:       Electronically reviewed and signed by:  Tasha Mills MD  Signed on 09/05/23 at 14:40  IgG kappa specific monoclonal band present.              1. History of auto stem cell transplant  Rapid BMT CBC with Diff    Comprehensive Metabolic Panel     Protein Electrophoresis, Serum    Immunoglobulins (IgG, IgA, IgM) Quantitative    Immunoglobulin Free LT Chains Blood    Immunofixation Electrophoresis      2. Multiple myeloma in relapse        3. Neuropathy        4. 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 Mm labs pending now.   -melinda subq weekly 8>EOW x 8 doses> q 4 week; kyprolis 70mg/m2 day 1, 8, 15 of 28 day cycle; dex 40mg po weekly   -plan to continue until intolerance or progression   -supportive meds: acyclovir, ca +vit d, zometa (q 3 month zometa through December 2023)  -can transition to q 4 weeks lab monitoring and provider appt     Neuropathy  Controlled with gabapentin    ID  Completed post SCT vaccines  Acyclovir as above while in PI       Essential hypertension  -well controled      FU:    -cbc, cmp, serum free light chains, quantitative immunoglobulins, serum electropheresis, serum immunofixation labs are linked to this lab draw on 10/25  -weekly wed treatments  melinda on 10/04,10/11,10/25,11/08  kyprolis - 10/11,10/18,10/25,11/08  -add zometa infusion to treatment on 10/18  -NP appt prior to treatment on 10/25; same day     BMT Chart Routing      Follow up with physician . /NP appt prior to treatment on 10/25; same day   Follow up with [facility]    Provider visit type    Infusion scheduling note   weekly wed treatments  melinda on 10/04,10/11,10/25,11/08  kyprolis - 10/11,10/18,10/25,11/08   Injection scheduling note add zometa infusion to treatment  on 10/18   Labs   Scheduling:  Preferred lab:  Lab interval:  Cbc, cmp, serum free light chains, quantitative immunoglobulins, serum electropheresis, serum immunofixation labs are linked to this lab draw on 10/25   Imaging    Pharmacy appointment    Other referrals              Advance Care Planning         Patient did not wish to name a surrogate decision maker or provide an Advance Care Plan.    Ashanti Quiñones NP  Hematology/Oncology/BMT

## 2023-09-27 NOTE — PLAN OF CARE
1007 Patient tolerated C2D15 Kyprolis/ Melinda SQ without incident. Seen by Ishmael CHAMBERS prior to treatment. Labs reviewed and within parameters for treatment. Vitals stable before and after treatment.  PIV flushed without resistance and positive for blood return before, during and after infusion. Patient reports taking his PO dexamethasone at home this morning. Melinda SQ to right abd: tolerated  well. Patient aware of his next appointment date/time, uses [email or web address] and printed AVS provided. To contact provider with questions or concerns. D/C ambulatory and stable with his family.

## 2023-09-28 LAB
PATHOLOGIST INTERPRETATION IFE: NORMAL
PATHOLOGIST INTERPRETATION SPE: NORMAL

## 2023-10-04 ENCOUNTER — INFUSION (OUTPATIENT)
Dept: INFUSION THERAPY | Facility: HOSPITAL | Age: 63
End: 2023-10-04
Payer: MEDICARE

## 2023-10-04 VITALS
HEART RATE: 83 BPM | SYSTOLIC BLOOD PRESSURE: 113 MMHG | BODY MASS INDEX: 33.64 KG/M2 | DIASTOLIC BLOOD PRESSURE: 64 MMHG | WEIGHT: 214.31 LBS | RESPIRATION RATE: 18 BRPM | TEMPERATURE: 98 F | HEIGHT: 67 IN

## 2023-10-04 DIAGNOSIS — C90.00 MULTIPLE MYELOMA, REMISSION STATUS UNSPECIFIED: Primary | ICD-10-CM

## 2023-10-04 PROCEDURE — 63600175 PHARM REV CODE 636 W HCPCS: Mod: JZ,JG | Performed by: INTERNAL MEDICINE

## 2023-10-04 PROCEDURE — 96401 CHEMO ANTI-NEOPL SQ/IM: CPT

## 2023-10-04 RX ORDER — EPINEPHRINE 0.3 MG/.3ML
0.3 INJECTION SUBCUTANEOUS ONCE AS NEEDED
Status: CANCELLED | OUTPATIENT
Start: 2023-10-04

## 2023-10-04 RX ORDER — DIPHENHYDRAMINE HYDROCHLORIDE 50 MG/ML
50 INJECTION INTRAMUSCULAR; INTRAVENOUS ONCE AS NEEDED
Status: CANCELLED | OUTPATIENT
Start: 2023-10-04

## 2023-10-04 RX ORDER — HEPARIN 100 UNIT/ML
500 SYRINGE INTRAVENOUS
Status: DISCONTINUED | OUTPATIENT
Start: 2023-10-04 | End: 2023-10-04 | Stop reason: HOSPADM

## 2023-10-04 RX ORDER — SODIUM CHLORIDE 0.9 % (FLUSH) 0.9 %
10 SYRINGE (ML) INJECTION
Status: DISCONTINUED | OUTPATIENT
Start: 2023-10-04 | End: 2023-10-04 | Stop reason: HOSPADM

## 2023-10-04 RX ORDER — SODIUM CHLORIDE 0.9 % (FLUSH) 0.9 %
10 SYRINGE (ML) INJECTION
Status: CANCELLED | OUTPATIENT
Start: 2023-10-04

## 2023-10-04 RX ORDER — DIPHENHYDRAMINE HYDROCHLORIDE 50 MG/ML
50 INJECTION INTRAMUSCULAR; INTRAVENOUS ONCE AS NEEDED
Status: DISCONTINUED | OUTPATIENT
Start: 2023-10-04 | End: 2023-10-04 | Stop reason: HOSPADM

## 2023-10-04 RX ORDER — EPINEPHRINE 0.3 MG/.3ML
0.3 INJECTION SUBCUTANEOUS ONCE AS NEEDED
Status: DISCONTINUED | OUTPATIENT
Start: 2023-10-04 | End: 2023-10-04 | Stop reason: HOSPADM

## 2023-10-04 RX ORDER — HEPARIN 100 UNIT/ML
500 SYRINGE INTRAVENOUS
Status: CANCELLED | OUTPATIENT
Start: 2023-10-04

## 2023-10-04 RX ADMIN — DARATUMUMAB AND HYALURONIDASE-FIHJ (HUMAN RECOMBINANT) 1800 MG: 1800; 30000 INJECTION SUBCUTANEOUS at 09:10

## 2023-10-09 ENCOUNTER — HOSPITAL ENCOUNTER (EMERGENCY)
Facility: HOSPITAL | Age: 63
Discharge: HOME | End: 2023-10-09
Attending: EMERGENCY MEDICINE
Payer: MEDICARE

## 2023-10-09 VITALS
OXYGEN SATURATION: 100 % | RESPIRATION RATE: 20 BRPM | SYSTOLIC BLOOD PRESSURE: 124 MMHG | TEMPERATURE: 98 F | HEART RATE: 74 BPM | BODY MASS INDEX: 34.14 KG/M2 | DIASTOLIC BLOOD PRESSURE: 75 MMHG | WEIGHT: 218 LBS

## 2023-10-09 DIAGNOSIS — K21.9 GASTROESOPHAGEAL REFLUX DISEASE, UNSPECIFIED WHETHER ESOPHAGITIS PRESENT: ICD-10-CM

## 2023-10-09 DIAGNOSIS — R10.10 UPPER ABDOMINAL PAIN: ICD-10-CM

## 2023-10-09 DIAGNOSIS — R10.13 EPIGASTRIC PAIN: ICD-10-CM

## 2023-10-09 DIAGNOSIS — R10.13 EPIGASTRIC ABDOMINAL PAIN: Primary | ICD-10-CM

## 2023-10-09 LAB
ALBUMIN SERPL BCP-MCNC: 3.2 G/DL (ref 3.5–5.2)
ALP SERPL-CCNC: 73 U/L (ref 38–126)
ALT SERPL W/O P-5'-P-CCNC: 21 U/L (ref 10–44)
ANION GAP SERPL CALC-SCNC: 9 MMOL/L (ref 8–16)
AST SERPL-CCNC: 23 U/L (ref 15–46)
BASOPHILS # BLD AUTO: 0.01 K/UL (ref 0–0.2)
BASOPHILS NFR BLD: 0.2 % (ref 0–1.9)
BILIRUB SERPL-MCNC: 1 MG/DL (ref 0.1–1)
CALCIUM SERPL-MCNC: 8.5 MG/DL (ref 8.7–10.5)
CHLORIDE SERPL-SCNC: 98 MMOL/L (ref 95–110)
CO2 SERPL-SCNC: 24 MMOL/L (ref 23–29)
CREAT SERPL-MCNC: 1.31 MG/DL (ref 0.5–1.4)
DIFFERENTIAL METHOD: ABNORMAL
EOSINOPHIL # BLD AUTO: 0 K/UL (ref 0–0.5)
EOSINOPHIL NFR BLD: 0 % (ref 0–8)
ERYTHROCYTE [DISTWIDTH] IN BLOOD BY AUTOMATED COUNT: 17.3 % (ref 11.5–14.5)
EST. GFR  (NO RACE VARIABLE): >60 ML/MIN/1.73 M^2
GLUCOSE SERPL-MCNC: 270 MG/DL (ref 70–110)
HCT VFR BLD AUTO: 25.2 % (ref 40–54)
HGB BLD-MCNC: 8.3 G/DL (ref 14–18)
IMM GRANULOCYTES # BLD AUTO: 0.05 K/UL (ref 0–0.04)
IMM GRANULOCYTES NFR BLD AUTO: 1 % (ref 0–0.5)
LIPASE SERPL-CCNC: 109 U/L (ref 23–300)
LYMPHOCYTES # BLD AUTO: 0.9 K/UL (ref 1–4.8)
LYMPHOCYTES NFR BLD: 17.7 % (ref 18–48)
MCH RBC QN AUTO: 31.2 PG (ref 27–31)
MCHC RBC AUTO-ENTMCNC: 32.9 G/DL (ref 32–36)
MCV RBC AUTO: 95 FL (ref 82–98)
MONOCYTES # BLD AUTO: 0.3 K/UL (ref 0.3–1)
MONOCYTES NFR BLD: 5.7 % (ref 4–15)
NEUTROPHILS # BLD AUTO: 3.9 K/UL (ref 1.8–7.7)
NEUTROPHILS NFR BLD: 75.4 % (ref 38–73)
NRBC BLD-RTO: 0 /100 WBC
PLATELET # BLD AUTO: 155 K/UL (ref 150–450)
PMV BLD AUTO: 11.3 FL (ref 9.2–12.9)
POTASSIUM SERPL-SCNC: 3.8 MMOL/L (ref 3.5–5.1)
PROT SERPL-MCNC: 7.1 G/DL (ref 6–8.4)
RBC # BLD AUTO: 2.66 M/UL (ref 4.6–6.2)
SODIUM SERPL-SCNC: 131 MMOL/L (ref 136–145)
UUN UR-MCNC: 25 MG/DL (ref 2–20)
WBC # BLD AUTO: 5.13 K/UL (ref 3.9–12.7)

## 2023-10-09 PROCEDURE — 99285 EMERGENCY DEPT VISIT HI MDM: CPT | Mod: 25,ER

## 2023-10-09 PROCEDURE — 83690 ASSAY OF LIPASE: CPT | Mod: ER | Performed by: NURSE PRACTITIONER

## 2023-10-09 PROCEDURE — 93005 ELECTROCARDIOGRAM TRACING: CPT | Mod: ER

## 2023-10-09 PROCEDURE — 96374 THER/PROPH/DIAG INJ IV PUSH: CPT | Mod: ER

## 2023-10-09 PROCEDURE — 25000003 PHARM REV CODE 250: Mod: ER | Performed by: NURSE PRACTITIONER

## 2023-10-09 PROCEDURE — 99900035 HC TECH TIME PER 15 MIN (STAT): Mod: ER

## 2023-10-09 PROCEDURE — 25000003 PHARM REV CODE 250: Mod: ER | Performed by: PHYSICIAN ASSISTANT

## 2023-10-09 PROCEDURE — 85025 COMPLETE CBC W/AUTO DIFF WBC: CPT | Mod: ER | Performed by: NURSE PRACTITIONER

## 2023-10-09 PROCEDURE — 96361 HYDRATE IV INFUSION ADD-ON: CPT | Mod: ER

## 2023-10-09 PROCEDURE — 93010 EKG 12-LEAD: ICD-10-PCS | Mod: ,,, | Performed by: INTERNAL MEDICINE

## 2023-10-09 PROCEDURE — 93010 ELECTROCARDIOGRAM REPORT: CPT | Mod: ,,, | Performed by: INTERNAL MEDICINE

## 2023-10-09 PROCEDURE — 80053 COMPREHEN METABOLIC PANEL: CPT | Mod: ER | Performed by: NURSE PRACTITIONER

## 2023-10-09 RX ORDER — FAMOTIDINE 10 MG/ML
20 INJECTION INTRAVENOUS
Status: COMPLETED | OUTPATIENT
Start: 2023-10-09 | End: 2023-10-09

## 2023-10-09 RX ORDER — MAG HYDROX/ALUMINUM HYD/SIMETH 200-200-20
30 SUSPENSION, ORAL (FINAL DOSE FORM) ORAL ONCE
Status: COMPLETED | OUTPATIENT
Start: 2023-10-09 | End: 2023-10-09

## 2023-10-09 RX ADMIN — SODIUM CHLORIDE 1000 ML: 9 INJECTION, SOLUTION INTRAVENOUS at 12:10

## 2023-10-09 RX ADMIN — ALUMINUM HYDROXIDE, MAGNESIUM HYDROXIDE, AND SIMETHICONE 30 ML: 200; 200; 20 SUSPENSION ORAL at 11:10

## 2023-10-09 RX ADMIN — FAMOTIDINE 20 MG: 10 INJECTION, SOLUTION INTRAVENOUS at 12:10

## 2023-10-09 NOTE — ED PROVIDER NOTES
Encounter Date: 10/9/2023       History     Chief Complaint   Patient presents with    Abdominal Pain     Epigastric abd pain      This is a 63-year-old  male with significant past medical history of anxiety, arthritis, cancer, and hypertension that presents to ED with complaint of acute onset of generalized upper abdominal pain that began 2 days ago.  He states the pain is intermittent and comes and goes.  He rates his pain currently a 1/10 but is unable to describe it.  The pain does not radiate.  He is taken no medications for his pain.  Eating tends to make the pain worse.  He denies any fever, cold symptoms, nausea, vomiting, diarrhea, chest pain, or shortness breath.  He denies any history of pancreatitis or alcohol use.  He denies any previous abdominal surgeries.      Review of patient's allergies indicates:  No Known Allergies  Past Medical History:   Diagnosis Date    Anxiety     Arthritis     Cancer     Hypertension      Past Surgical History:   Procedure Laterality Date    BACK SURGERY      BONE MARROW BIOPSY Left 10/20/2021    Procedure: Biopsy-bone marrow;  Surgeon: Summer Cartwright MD;  [facility]  Service: Oncology;  Laterality: Left;    COLONOSCOPY N/A 1/25/2021    Procedure: COLONOSCOPY;  Surgeon: Braxton Lees MD;  Location: [facility] (4TH FLR);  Service: Endoscopy;  Laterality: N/A;  1/22-covid kristopher-inst mailed-tb  1/20/21-pt to remain on schedule with Dr. Lees, and confirmed updated arrival time of 0835-BB    COLONOSCOPY N/A 2/1/2021    Procedure: COLONOSCOPY;  Surgeon: Jo Ann Robledo MD;  Location: [facility] (4TH FLR);  Service: Endoscopy;  Laterality: N/A;  rescheduled due to poor bowel prep, to be rescheduled with first available provider-BB  covid-1/29/21-pcw-BB    CREATION OF MUSCLE ROTATIONAL FLAP N/A 9/23/2020    Procedure: CREATION, FLAP, MUSCLE ROTATION;  Surgeon: Kamlesh Bellamy MD;  [facility];  Service: Plastics;   Laterality: N/A;    KNEE SURGERY Left 2019    LUMBAR FUSION N/A 2020    Procedure: FUSION, SPINE, LUMBAR L2-pelvis. Depuy. Plastic surgery closure w/ Dr. Bellamy;  Surgeon: Nick Coyle MD;  Location: [facility] OR [address] [place] [address];  Service: Neurosurgery;  Laterality: N/A;    Metastatic neoplasum removed from spine       No family history on file.  Social History     Tobacco Use    Smoking status: Former     Current packs/day: 0.00     Average packs/day: 0.3 packs/day for 40.0 years (10.0 ttl pk-yrs)     Types: Cigarettes     Start date:      Quit date: 2017     Years since quittin.7    Smokeless tobacco: Never   Substance Use Topics    Alcohol use: Not Currently    Drug use: Never     Review of Systems   Constitutional:  Negative for fever.   HENT:  Negative for congestion and rhinorrhea.    Respiratory:  Negative for cough and shortness of breath.    Cardiovascular:  Negative for chest pain and palpitations.   Gastrointestinal:  Positive for abdominal pain. Negative for diarrhea, nausea and vomiting.   Genitourinary:  Negative for dysuria, flank pain and hematuria.       Physical Exam     Initial Vitals [10/09/23 1107]   BP Pulse Resp Temp SpO2   120/74 98 20 98 °F (36.7 °C) 98 %      MAP       --         Physical Exam    Constitutional: He appears well-developed and well-nourished.   HENT:   Head: Normocephalic and atraumatic.   Mouth/Throat: Oropharynx is clear and moist.   Cardiovascular:  Normal rate, regular rhythm and normal heart sounds.           Pulmonary/Chest: Breath sounds normal. He has no wheezes.   Abdominal: Abdomen is soft and protuberant. Bowel sounds are normal. There is abdominal tenderness in the epigastric area. No hernia.   No right CVA tenderness.  No left CVA tenderness. There is no rebound, no guarding, no tenderness at McBurney's point and negative Freitas's sign. negative obturator sign, negative psoas sign and negative Rovsing's sign    Neurological: He is alert and oriented to  person, place, and time.   Skin: Capillary refill takes less than 2 seconds.   Psychiatric: He has a normal mood and affect. Thought content normal.         ED Course   Procedures  Labs Reviewed   COMPREHENSIVE METABOLIC PANEL - Abnormal; Notable for the following components:       Result Value    Sodium 131 (*)     Glucose 270 (*)     BUN 25 (*)     Calcium 8.5 (*)     Albumin 3.2 (*)     All other components within normal limits   CBC W/ AUTO DIFFERENTIAL - Abnormal; Notable for the following components:    RBC 2.66 (*)     Hemoglobin 8.3 (*)     Hematocrit 25.2 (*)     MCH 31.2 (*)     RDW 17.3 (*)     All other components within normal limits   LIPASE     EKG Readings: (Independently Interpreted)   Initial Reading: No STEMI. Rhythm: Normal Sinus Rhythm. Heart Rate: 89. Clinical Impression: Normal Sinus Rhythm     ECG Results              EKG 12-lead (Final result)  Result time 10/09/23 11:56:44      Final result by Interface, Lab In [facility] (10/09/23 11:56:44)                   Narrative:    Test Reason : R10.13,    Vent. Rate : 089 BPM     Atrial Rate : 089 BPM     P-R Int : 158 ms          QRS Dur : 100 ms      QT Int : 374 ms       P-R-T Axes : 065 059 022 degrees     QTc Int : 455 ms    Normal sinus rhythm  Normal ECG  When compared with ECG of 19-JUL-2023 15:05,  No significant change was found  Confirmed by Vimal Madrigal MD ([id number]) on 10/9/2023 11:56:40 AM    Referred By: [email or web address]   SELF           Confirmed By:Vimal Madrigal MD                                  Imaging Results              US Abdomen Limited (Gallbladder) (Final result)  Result time 10/09/23 12:00:56      Final result by Payam Winters MD (10/09/23 12:00:56)                   Impression:      No acute findings    Suspect small hemangiomas within the liver.      Electronically signed by: Payam Winters MD  Date:    10/09/2023  Time:    12:00               Narrative:    EXAMINATION:  US ABDOMEN LIMITED    CLINICAL HISTORY:  Upper  abdominal pain, unspecified    COMPARISON:  None    FINDINGS:  Limited right upper quadrant ultrasound performed.  The liver measures  cm in length and is homogeneous in echogenicity.  Ten mm echogenic lesion within the left hepatic lobe and 9 mm echogenic focus within the right hepatic lobe most consistent with small hemangioma.  common bile duct is within normal limits at 2 mm..  No gallstones, gallbladder wall thickening, or focal tenderness over the gallbladder.  The visualized portions of the pancreas and IVC are within normal limits.  The right kidney is normal in length measuring 11.7 cm. No right sided hydronephrosis or renal masses identified.                                       Medications   sodium chloride 0.9% bolus 1,000 mL 1,000 mL (1,000 mLs Intravenous New Bag 10/9/23 1229)   aluminum-magnesium hydroxide-simethicone 200-200-20 mg/5 mL suspension 30 mL (30 mLs Oral Given 10/9/23 1141)   famotidine (PF) injection 20 mg (20 mg Intravenous Given 10/9/23 1208)     Medical Decision Making  This is a 63-year-old  male with significant past medical history of anxiety, arthritis, and hypertension that presents the ED with complaint of epigastric abdominal pain for the last 2 days.  On arrival the patient is afebrile and nontoxic-appearing with stable vital signs.  Differential diagnoses include but are not limited to: GERD, pancreatitis, cholelithiasis, cholecystitis.  Please see physical exam for further details.  The patient was given IV Pepcid and a GI cocktail in his pain completely resolved.  CBC shows no leukocytosis.  CMP does show hyponatremia with a sodium of 131 in the patient appears to be mildly dehydrated and has a BUN of 25.  His glucose is also 270 which was discussed with the patient and he has been instructed to follow up with his PCP regarding this finding..  Lipase is normal.  EKG shows no STEMI.  Limited abdominal ultrasound shows no acute findings.  IV fluids were given  since the patient was hyponatremic and had a slightly elevated BUN..  Given that the patient responded to a GI cocktail and IV Pepcid I believe this is likely GERD.  I have extremely low suspicion for any emergent, dangerous, or life-threatening intra-abdominal disease process.  He has been instructed to purchase Zantac or Pepcid to take as needed for his GERD.  He is to have PCP follow up in the next 2-3 days or return to the ED for worsening.  He verbalized understanding and was agreeable to the treatment plan.    Risk  Prescription drug management.                               Clinical Impression:   Final diagnoses:  [R10.13] Epigastric pain  [R10.10] Upper abdominal pain  [R10.13] Epigastric abdominal pain (Primary)  [K21.9] Gastroesophageal reflux disease, unspecified whether esophagitis present        ED Disposition Condition    Discharge Stable          ED Prescriptions    None       Follow-up Information       Follow up With Specialties Details Why Contact Info    Con Patel Jr., MD Family Medicine In 2 days  [address] [facility] [address] [phone]      [facility] - Emergency Dept Emergency Medicine  If symptoms worsen [address] [facility] [address] [phone]             Sushil Banegas PA-C  10/09/23 9485

## 2023-10-09 NOTE — ED NOTES
PT presents to the ED with C/O 1/10 mid epigastric pain x 3 days. Pain is intermittent. Denies N/V/D/C. LBM-this AM. Denies any other C/O. VSS. AAOx4.

## 2023-10-11 ENCOUNTER — INFUSION (OUTPATIENT)
Dept: INFUSION THERAPY | Facility: HOSPITAL | Age: 63
End: 2023-10-11
Payer: MEDICARE

## 2023-10-11 VITALS
RESPIRATION RATE: 18 BRPM | TEMPERATURE: 99 F | HEART RATE: 100 BPM | DIASTOLIC BLOOD PRESSURE: 68 MMHG | SYSTOLIC BLOOD PRESSURE: 123 MMHG | OXYGEN SATURATION: 97 %

## 2023-10-11 DIAGNOSIS — C90.00 MULTIPLE MYELOMA, REMISSION STATUS UNSPECIFIED: Primary | ICD-10-CM

## 2023-10-11 PROCEDURE — 96401 CHEMO ANTI-NEOPL SQ/IM: CPT

## 2023-10-11 PROCEDURE — 96413 CHEMO IV INFUSION 1 HR: CPT

## 2023-10-11 PROCEDURE — 25000003 PHARM REV CODE 250: Performed by: NURSE PRACTITIONER

## 2023-10-11 PROCEDURE — 63600175 PHARM REV CODE 636 W HCPCS: Mod: JZ,JG | Performed by: NURSE PRACTITIONER

## 2023-10-11 RX ORDER — DIPHENHYDRAMINE HYDROCHLORIDE 50 MG/ML
50 INJECTION INTRAMUSCULAR; INTRAVENOUS ONCE AS NEEDED
Status: CANCELLED | OUTPATIENT
Start: 2023-10-11

## 2023-10-11 RX ORDER — EPINEPHRINE 0.3 MG/.3ML
0.3 INJECTION SUBCUTANEOUS ONCE AS NEEDED
Status: DISCONTINUED | OUTPATIENT
Start: 2023-10-11 | End: 2023-10-11 | Stop reason: HOSPADM

## 2023-10-11 RX ORDER — HEPARIN 100 UNIT/ML
500 SYRINGE INTRAVENOUS
Status: CANCELLED | OUTPATIENT
Start: 2023-10-11

## 2023-10-11 RX ORDER — SODIUM CHLORIDE 0.9 % (FLUSH) 0.9 %
10 SYRINGE (ML) INJECTION
Status: CANCELLED | OUTPATIENT
Start: 2023-10-11

## 2023-10-11 RX ORDER — HEPARIN 100 UNIT/ML
500 SYRINGE INTRAVENOUS
Status: DISCONTINUED | OUTPATIENT
Start: 2023-10-11 | End: 2023-10-11 | Stop reason: HOSPADM

## 2023-10-11 RX ORDER — SODIUM CHLORIDE 0.9 % (FLUSH) 0.9 %
10 SYRINGE (ML) INJECTION
Status: DISCONTINUED | OUTPATIENT
Start: 2023-10-11 | End: 2023-10-11 | Stop reason: HOSPADM

## 2023-10-11 RX ORDER — EPINEPHRINE 0.3 MG/.3ML
0.3 INJECTION SUBCUTANEOUS ONCE AS NEEDED
Status: CANCELLED | OUTPATIENT
Start: 2023-10-11

## 2023-10-11 RX ORDER — DIPHENHYDRAMINE HYDROCHLORIDE 50 MG/ML
50 INJECTION INTRAMUSCULAR; INTRAVENOUS ONCE AS NEEDED
Status: DISCONTINUED | OUTPATIENT
Start: 2023-10-11 | End: 2023-10-11 | Stop reason: HOSPADM

## 2023-10-11 RX ADMIN — SODIUM CHLORIDE: 9 INJECTION, SOLUTION INTRAVENOUS at 10:10

## 2023-10-11 RX ADMIN — CARFILZOMIB 150 MG: 30 INJECTION, POWDER, LYOPHILIZED, FOR SOLUTION INTRAVENOUS at 10:10

## 2023-10-11 RX ADMIN — DARATUMUMAB AND HYALURONIDASE-FIHJ (HUMAN RECOMBINANT) 1800 MG: 1800; 30000 INJECTION SUBCUTANEOUS at 10:10

## 2023-10-11 NOTE — PLAN OF CARE
1105 Pt tolerated Melinda subq and Kyprolis infusion well today, no complaints or complications,. VSS through duration of treatment. Pt aware to call provider with any questions or concerns and is aware of upcoming appts. Pt ambulatory from clinic with steady gait, no distress noted.

## 2023-10-15 ENCOUNTER — HOSPITAL ENCOUNTER (INPATIENT)
Facility: HOSPITAL | Age: 63
LOS: 3 days | Discharge: HOME | DRG: 868 | End: 2023-10-18
Attending: INTERNAL MEDICINE | Admitting: INTERNAL MEDICINE
Payer: MEDICARE

## 2023-10-15 ENCOUNTER — HOSPITAL ENCOUNTER (EMERGENCY)
Facility: HOSPITAL | Age: 63
Discharge: ANOTHER HOSPITAL | End: 2023-10-15
Attending: FAMILY MEDICINE
Payer: MEDICARE

## 2023-10-15 VITALS
WEIGHT: 217 LBS | DIASTOLIC BLOOD PRESSURE: 71 MMHG | RESPIRATION RATE: 19 BRPM | OXYGEN SATURATION: 100 % | TEMPERATURE: 99 F | SYSTOLIC BLOOD PRESSURE: 113 MMHG | HEIGHT: 67 IN | HEART RATE: 91 BPM | BODY MASS INDEX: 34.06 KG/M2

## 2023-10-15 DIAGNOSIS — R07.9 CHEST PAIN: ICD-10-CM

## 2023-10-15 DIAGNOSIS — R00.0 TACHYCARDIA: ICD-10-CM

## 2023-10-15 DIAGNOSIS — R78.81 BACTEREMIA: ICD-10-CM

## 2023-10-15 DIAGNOSIS — E11.9 TYPE 2 DIABETES MELLITUS WITHOUT COMPLICATION, WITHOUT LONG-TERM CURRENT USE OF INSULIN: ICD-10-CM

## 2023-10-15 DIAGNOSIS — N12 PYELONEPHRITIS: ICD-10-CM

## 2023-10-15 DIAGNOSIS — R50.9 FEVER AND CHILLS: ICD-10-CM

## 2023-10-15 DIAGNOSIS — R50.9 FEVER, UNSPECIFIED FEVER CAUSE: ICD-10-CM

## 2023-10-15 DIAGNOSIS — R78.81 SALMONELLA BACTEREMIA: Primary | ICD-10-CM

## 2023-10-15 DIAGNOSIS — Z94.84 HISTORY OF AUTO STEM CELL TRANSPLANT: ICD-10-CM

## 2023-10-15 DIAGNOSIS — N12 PYELONEPHRITIS: Primary | ICD-10-CM

## 2023-10-15 LAB
ADENOVIRUS: NOT DETECTED
ALBUMIN SERPL BCP-MCNC: 3.4 G/DL (ref 3.5–5.2)
ALP SERPL-CCNC: 104 U/L (ref 38–126)
ALT SERPL W/O P-5'-P-CCNC: 29 U/L (ref 10–44)
ANION GAP SERPL CALC-SCNC: 11 MMOL/L (ref 8–16)
AST SERPL-CCNC: 28 U/L (ref 15–46)
BACTERIA #/AREA URNS AUTO: ABNORMAL /HPF
BASOPHILS # BLD AUTO: 0.01 K/UL (ref 0–0.2)
BASOPHILS NFR BLD: 0.1 % (ref 0–1.9)
BILIRUB SERPL-MCNC: 1.6 MG/DL (ref 0.1–1)
BILIRUB UR QL STRIP: ABNORMAL
BORDETELLA PARAPERTUSSIS (IS1001): NOT DETECTED
BORDETELLA PERTUSSIS (PTXP): NOT DETECTED
CALCIUM SERPL-MCNC: 8.8 MG/DL (ref 8.7–10.5)
CHLAMYDIA PNEUMONIAE: NOT DETECTED
CHLORIDE SERPL-SCNC: 99 MMOL/L (ref 95–110)
CLARITY UR REFRACT.AUTO: CLEAR
CO2 SERPL-SCNC: 21 MMOL/L (ref 23–29)
COLOR UR AUTO: YELLOW
CORONAVIRUS 229E, COMMON COLD VIRUS: NOT DETECTED
CORONAVIRUS HKU1, COMMON COLD VIRUS: NOT DETECTED
CORONAVIRUS NL63, COMMON COLD VIRUS: NOT DETECTED
CORONAVIRUS OC43, COMMON COLD VIRUS: NOT DETECTED
CREAT SERPL-MCNC: 1.27 MG/DL (ref 0.5–1.4)
DIFFERENTIAL METHOD: ABNORMAL
EOSINOPHIL # BLD AUTO: 0 K/UL (ref 0–0.5)
EOSINOPHIL NFR BLD: 0.1 % (ref 0–8)
ERYTHROCYTE [DISTWIDTH] IN BLOOD BY AUTOMATED COUNT: 17.5 % (ref 11.5–14.5)
EST. GFR  (NO RACE VARIABLE): >60 ML/MIN/1.73 M^2
FLUBV RNA NPH QL NAA+NON-PROBE: NOT DETECTED
GLUCOSE SERPL-MCNC: 277 MG/DL (ref 70–110)
GLUCOSE UR QL STRIP: ABNORMAL
HCT VFR BLD AUTO: 26 % (ref 40–54)
HGB BLD-MCNC: 8.7 G/DL (ref 14–18)
HGB UR QL STRIP: ABNORMAL
HPIV1 RNA NPH QL NAA+NON-PROBE: NOT DETECTED
HPIV2 RNA NPH QL NAA+NON-PROBE: NOT DETECTED
HPIV3 RNA NPH QL NAA+NON-PROBE: NOT DETECTED
HPIV4 RNA NPH QL NAA+NON-PROBE: NOT DETECTED
HUMAN METAPNEUMOVIRUS: NOT DETECTED
HYALINE CASTS UR QL AUTO: 0 /LPF
IMM GRANULOCYTES # BLD AUTO: 0.19 K/UL (ref 0–0.04)
IMM GRANULOCYTES NFR BLD AUTO: 2 % (ref 0–0.5)
INFLUENZA A (SUBTYPES H1,H1-2009,H3): NOT DETECTED
INFLUENZA A, MOLECULAR: NEGATIVE
INFLUENZA B, MOLECULAR: NEGATIVE
KETONES UR QL STRIP: NEGATIVE
LACTATE SERPL-SCNC: 2 MMOL/L (ref 0.5–2.2)
LEUKOCYTE ESTERASE UR QL STRIP: ABNORMAL
LYMPHOCYTES # BLD AUTO: 0.8 K/UL (ref 1–4.8)
LYMPHOCYTES NFR BLD: 8.7 % (ref 18–48)
MCH RBC QN AUTO: 31.3 PG (ref 27–31)
MCHC RBC AUTO-ENTMCNC: 33.5 G/DL (ref 32–36)
MCV RBC AUTO: 94 FL (ref 82–98)
MICROSCOPIC COMMENT: ABNORMAL
MONOCYTES # BLD AUTO: 0.3 K/UL (ref 0.3–1)
MONOCYTES NFR BLD: 3.1 % (ref 4–15)
MYCOPLASMA PNEUMONIAE: NOT DETECTED
NEUTROPHILS # BLD AUTO: 8.1 K/UL (ref 1.8–7.7)
NEUTROPHILS NFR BLD: 86 % (ref 38–73)
NITRITE UR QL STRIP: NEGATIVE
NRBC BLD-RTO: 1 /100 WBC
PH UR STRIP: 6 [PH] (ref 5–8)
PLATELET # BLD AUTO: 76 K/UL (ref 150–450)
PMV BLD AUTO: ABNORMAL FL (ref 9.2–12.9)
POTASSIUM SERPL-SCNC: 4.1 MMOL/L (ref 3.5–5.1)
PROCALCITONIN SERPL IA-MCNC: 0.67 NG/ML
PROT SERPL-MCNC: 7.8 G/DL (ref 6–8.4)
PROT UR QL STRIP: ABNORMAL
RBC # BLD AUTO: 2.78 M/UL (ref 4.6–6.2)
RBC #/AREA URNS AUTO: 10 /HPF (ref 0–4)
RESPIRATORY INFECTION PANEL SOURCE: NORMAL
RSV RNA NPH QL NAA+NON-PROBE: NOT DETECTED
RV+EV RNA NPH QL NAA+NON-PROBE: NOT DETECTED
SARS-COV-2 RDRP RESP QL NAA+PROBE: NEGATIVE
SARS-COV-2 RNA RESP QL NAA+PROBE: NOT DETECTED
SODIUM SERPL-SCNC: 131 MMOL/L (ref 136–145)
SP GR UR STRIP: 1.01 (ref 1–1.03)
SPECIMEN SOURCE: NORMAL
URN SPEC COLLECT METH UR: ABNORMAL
UROBILINOGEN UR STRIP-ACNC: 1 EU/DL
UUN UR-MCNC: 31 MG/DL (ref 2–20)
WBC # BLD AUTO: 9.36 K/UL (ref 3.9–12.7)
WBC #/AREA URNS AUTO: 5 /HPF (ref 0–5)

## 2023-10-15 PROCEDURE — 87077 CULTURE AEROBIC IDENTIFY: CPT | Mod: ER | Performed by: NURSE PRACTITIONER

## 2023-10-15 PROCEDURE — 96365 THER/PROPH/DIAG IV INF INIT: CPT | Mod: 59,ER

## 2023-10-15 PROCEDURE — 81000 URINALYSIS NONAUTO W/SCOPE: CPT | Mod: ER | Performed by: NURSE PRACTITIONER

## 2023-10-15 PROCEDURE — U0002 COVID-19 LAB TEST NON-CDC: HCPCS | Mod: ER | Performed by: NURSE PRACTITIONER

## 2023-10-15 PROCEDURE — 63600175 PHARM REV CODE 636 W HCPCS: Performed by: STUDENT IN AN ORGANIZED HEALTH CARE EDUCATION/TRAINING PROGRAM

## 2023-10-15 PROCEDURE — 80053 COMPREHEN METABOLIC PANEL: CPT | Mod: ER | Performed by: NURSE PRACTITIONER

## 2023-10-15 PROCEDURE — 93010 EKG 12-LEAD: ICD-10-PCS | Mod: ,,, | Performed by: INTERNAL MEDICINE

## 2023-10-15 PROCEDURE — 87798 DETECT AGENT NOS DNA AMP: CPT | Performed by: STUDENT IN AN ORGANIZED HEALTH CARE EDUCATION/TRAINING PROGRAM

## 2023-10-15 PROCEDURE — 99285 EMERGENCY DEPT VISIT HI MDM: CPT | Mod: 25,ER

## 2023-10-15 PROCEDURE — 84145 PROCALCITONIN (PCT): CPT | Performed by: NURSE PRACTITIONER

## 2023-10-15 PROCEDURE — 99900035 HC TECH TIME PER 15 MIN (STAT): Mod: ER

## 2023-10-15 PROCEDURE — 63600175 PHARM REV CODE 636 W HCPCS: Mod: ER | Performed by: NURSE PRACTITIONER

## 2023-10-15 PROCEDURE — 20600001 HC STEP DOWN PRIVATE ROOM

## 2023-10-15 PROCEDURE — 94760 N-INVAS EAR/PLS OXIMETRY 1: CPT | Mod: ER

## 2023-10-15 PROCEDURE — 87040 BLOOD CULTURE FOR BACTERIA: CPT | Mod: ER | Performed by: NURSE PRACTITIONER

## 2023-10-15 PROCEDURE — 83605 ASSAY OF LACTIC ACID: CPT | Mod: ER | Performed by: NURSE PRACTITIONER

## 2023-10-15 PROCEDURE — 87502 INFLUENZA DNA AMP PROBE: CPT | Mod: ER | Performed by: NURSE PRACTITIONER

## 2023-10-15 PROCEDURE — 85025 COMPLETE CBC W/AUTO DIFF WBC: CPT | Mod: ER | Performed by: NURSE PRACTITIONER

## 2023-10-15 PROCEDURE — 25000003 PHARM REV CODE 250: Mod: ER | Performed by: NURSE PRACTITIONER

## 2023-10-15 PROCEDURE — 93005 ELECTROCARDIOGRAM TRACING: CPT | Mod: ER

## 2023-10-15 PROCEDURE — 25500020 PHARM REV CODE 255: Mod: ER | Performed by: FAMILY MEDICINE

## 2023-10-15 PROCEDURE — 87186 SC STD MICRODIL/AGAR DIL: CPT | Mod: ER | Performed by: NURSE PRACTITIONER

## 2023-10-15 PROCEDURE — 87154 CUL TYP ID BLD PTHGN 6+ TRGT: CPT | Mod: 59,ER | Performed by: NURSE PRACTITIONER

## 2023-10-15 PROCEDURE — 25000003 PHARM REV CODE 250: Performed by: STUDENT IN AN ORGANIZED HEALTH CARE EDUCATION/TRAINING PROGRAM

## 2023-10-15 PROCEDURE — 93010 ELECTROCARDIOGRAM REPORT: CPT | Mod: ,,, | Performed by: INTERNAL MEDICINE

## 2023-10-15 RX ORDER — AMLODIPINE BESYLATE 10 MG/1
10 TABLET ORAL DAILY
Status: DISCONTINUED | OUTPATIENT
Start: 2023-10-16 | End: 2023-10-16

## 2023-10-15 RX ORDER — NALOXONE HCL 0.4 MG/ML
0.02 VIAL (ML) INJECTION
Status: DISCONTINUED | OUTPATIENT
Start: 2023-10-15 | End: 2023-10-18 | Stop reason: HOSPADM

## 2023-10-15 RX ORDER — ONDANSETRON 2 MG/ML
4 INJECTION INTRAMUSCULAR; INTRAVENOUS EVERY 8 HOURS PRN
Status: DISCONTINUED | OUTPATIENT
Start: 2023-10-15 | End: 2023-10-18 | Stop reason: HOSPADM

## 2023-10-15 RX ORDER — FERROUS SULFATE, DRIED 160(50) MG
1 TABLET, EXTENDED RELEASE ORAL DAILY
Status: DISCONTINUED | OUTPATIENT
Start: 2023-10-16 | End: 2023-10-15

## 2023-10-15 RX ORDER — ACYCLOVIR 400 MG/1
400 TABLET ORAL 2 TIMES DAILY
Status: DISCONTINUED | OUTPATIENT
Start: 2023-10-15 | End: 2023-10-15

## 2023-10-15 RX ORDER — GABAPENTIN 300 MG/1
300 CAPSULE ORAL 2 TIMES DAILY
Status: DISCONTINUED | OUTPATIENT
Start: 2023-10-15 | End: 2023-10-18 | Stop reason: HOSPADM

## 2023-10-15 RX ORDER — CLONIDINE HYDROCHLORIDE 0.1 MG/1
0.3 TABLET ORAL DAILY
Status: DISCONTINUED | OUTPATIENT
Start: 2023-10-16 | End: 2023-10-18 | Stop reason: HOSPADM

## 2023-10-15 RX ORDER — LEVOFLOXACIN 5 MG/ML
750 INJECTION, SOLUTION INTRAVENOUS
Status: COMPLETED | OUTPATIENT
Start: 2023-10-15 | End: 2023-10-15

## 2023-10-15 RX ORDER — ENOXAPARIN SODIUM 100 MG/ML
40 INJECTION SUBCUTANEOUS EVERY 24 HOURS
Status: DISCONTINUED | OUTPATIENT
Start: 2023-10-15 | End: 2023-10-18 | Stop reason: HOSPADM

## 2023-10-15 RX ORDER — IBUPROFEN 200 MG
24 TABLET ORAL
Status: DISCONTINUED | OUTPATIENT
Start: 2023-10-15 | End: 2023-10-18 | Stop reason: HOSPADM

## 2023-10-15 RX ORDER — SODIUM CHLORIDE 0.9 % (FLUSH) 0.9 %
10 SYRINGE (ML) INJECTION EVERY 12 HOURS PRN
Status: DISCONTINUED | OUTPATIENT
Start: 2023-10-15 | End: 2023-10-18 | Stop reason: HOSPADM

## 2023-10-15 RX ORDER — ONDANSETRON 2 MG/ML
4 INJECTION INTRAMUSCULAR; INTRAVENOUS
Status: DISCONTINUED | OUTPATIENT
Start: 2023-10-15 | End: 2023-10-15

## 2023-10-15 RX ORDER — ACYCLOVIR 200 MG/1
400 CAPSULE ORAL 2 TIMES DAILY
Status: DISCONTINUED | OUTPATIENT
Start: 2023-10-15 | End: 2023-10-18 | Stop reason: HOSPADM

## 2023-10-15 RX ORDER — ACETAMINOPHEN 500 MG
1000 TABLET ORAL
Status: COMPLETED | OUTPATIENT
Start: 2023-10-15 | End: 2023-10-15

## 2023-10-15 RX ORDER — HYDROCHLOROTHIAZIDE 25 MG/1
25 TABLET ORAL DAILY
Status: DISCONTINUED | OUTPATIENT
Start: 2023-10-16 | End: 2023-10-16

## 2023-10-15 RX ORDER — ACETAMINOPHEN 325 MG/1
650 TABLET ORAL EVERY 8 HOURS PRN
Status: DISCONTINUED | OUTPATIENT
Start: 2023-10-15 | End: 2023-10-18 | Stop reason: HOSPADM

## 2023-10-15 RX ORDER — ERGOCALCIFEROL 1.25 MG/1
50000 CAPSULE ORAL
Status: DISCONTINUED | OUTPATIENT
Start: 2023-10-16 | End: 2023-10-15

## 2023-10-15 RX ORDER — GLUCAGON 1 MG
1 KIT INJECTION
Status: DISCONTINUED | OUTPATIENT
Start: 2023-10-15 | End: 2023-10-18 | Stop reason: HOSPADM

## 2023-10-15 RX ORDER — PROCHLORPERAZINE EDISYLATE 5 MG/ML
5 INJECTION INTRAMUSCULAR; INTRAVENOUS EVERY 6 HOURS PRN
Status: DISCONTINUED | OUTPATIENT
Start: 2023-10-15 | End: 2023-10-18 | Stop reason: HOSPADM

## 2023-10-15 RX ORDER — IBUPROFEN 200 MG
16 TABLET ORAL
Status: DISCONTINUED | OUTPATIENT
Start: 2023-10-15 | End: 2023-10-18 | Stop reason: HOSPADM

## 2023-10-15 RX ORDER — ALUMINUM HYDROXIDE, MAGNESIUM HYDROXIDE, AND SIMETHICONE 2400; 240; 2400 MG/30ML; MG/30ML; MG/30ML
30 SUSPENSION ORAL EVERY 6 HOURS PRN
Status: DISCONTINUED | OUTPATIENT
Start: 2023-10-15 | End: 2023-10-18 | Stop reason: HOSPADM

## 2023-10-15 RX ADMIN — CEFEPIME 2 G: 2 INJECTION, POWDER, FOR SOLUTION INTRAVENOUS at 07:10

## 2023-10-15 RX ADMIN — SODIUM CHLORIDE 1000 ML: 9 INJECTION, SOLUTION INTRAVENOUS at 03:10

## 2023-10-15 RX ADMIN — ACETAMINOPHEN 650 MG: 325 TABLET ORAL at 07:10

## 2023-10-15 RX ADMIN — ENOXAPARIN SODIUM 40 MG: 40 INJECTION SUBCUTANEOUS at 07:10

## 2023-10-15 RX ADMIN — ACETAMINOPHEN 1000 MG: 500 TABLET ORAL at 10:10

## 2023-10-15 RX ADMIN — LEVOFLOXACIN 750 MG: 750 INJECTION, SOLUTION INTRAVENOUS at 11:10

## 2023-10-15 RX ADMIN — IOHEXOL 100 ML: 350 INJECTION, SOLUTION INTRAVENOUS at 01:10

## 2023-10-15 NOTE — ED PROVIDER NOTES
Encounter Date: 10/15/2023       History     Chief Complaint   Patient presents with    Abdominal Pain     Pt C/O ABD pain X 2 days.  Pt denies N/V/D/C, denies fever.      64 y/o male with anxiety, arthritis, HTN, and multiple myeloma (currently being treated with carfilzomib infusions and daratumumab-hyaluronidase-fihj SQ injections) which presents to the ED with intermittent abdominal pain for 1 week. Denies N/V/D. Denies any other symptoms. He was not aware that he had fever.     The history is provided by the patient.     Review of patient's allergies indicates:  No Known Allergies  Past Medical History:   Diagnosis Date    Anxiety     Arthritis     Cancer     Hypertension      Past Surgical History:   Procedure Laterality Date    BACK SURGERY      BONE MARROW BIOPSY Left 10/20/2021    Procedure: Biopsy-bone marrow;  Surgeon: Summer Cartwright MD;  Location: [facility] (4TH FLR);  Service: Oncology;  Laterality: Left;    COLONOSCOPY N/A 1/25/2021    Procedure: COLONOSCOPY;  Surgeon: Braxton Lees MD;  Location: [facility] (4TH FLR);  Service: Endoscopy;  Laterality: N/A;  1/22-covid kristopher-inst mailed-tb  1/20/21-pt to remain on schedule with Dr. Lees, and confirmed updated arrival time of 0835-BB    COLONOSCOPY N/A 2/1/2021    Procedure: COLONOSCOPY;  Surgeon: Jo Ann Robledo MD;  Location: [facility] (4TH FLR);  Service: Endoscopy;  Laterality: N/A;  rescheduled due to poor bowel prep, to be rescheduled with first available provider-BB  covid-1/29/21-pcw-BB    CREATION OF MUSCLE ROTATIONAL FLAP N/A 9/23/2020    Procedure: CREATION, FLAP, MUSCLE ROTATION;  Surgeon: Kamlesh Bellamy MD;  Location: [facility] OR 2ND FLR;  Service: Plastics;  Laterality: N/A;    KNEE SURGERY Left 06/2019    LUMBAR FUSION N/A 9/23/2020    Procedure: FUSION, SPINE, LUMBAR L2-pelvis. Depuy. Plastic surgery closure w/ Dr. Bellamy;  Surgeon: Nick Coyle MD;  [facility];  Service: Neurosurgery;  Laterality: N/A;     Metastatic neoplasum removed from spine       History reviewed. No pertinent family history.  Social History     Tobacco Use    Smoking status: Former     Current packs/day: 0.00     Average packs/day: 0.3 packs/day for 40.0 years (10.0 ttl pk-yrs)     Types: Cigarettes     Start date:      Quit date: 2017     Years since quittin.7    Smokeless tobacco: Never   Substance Use Topics    Alcohol use: Not Currently    Drug use: Never     Review of Systems   Constitutional:  Negative for fever.   HENT:  Negative for sore throat.    Respiratory:  Negative for shortness of breath and stridor.    Cardiovascular:  Negative for chest pain.   Gastrointestinal:  Positive for abdominal pain (currently resolved). Negative for nausea.   Genitourinary:  Negative for dysuria.   Musculoskeletal:  Negative for back pain and myalgias.   Skin:  Negative for rash.   Neurological:  Negative for weakness.   Hematological:  Does not bruise/bleed easily.   All other systems reviewed and are negative.    Physical Exam     Initial Vitals [10/15/23 0943]   BP Pulse Resp Temp SpO2   107/73 (!) 133 18 (!) 102 °F (38.9 °C) (!) 94 %      MAP       --         Physical Exam    Nursing note and vitals reviewed.  Constitutional: He appears well-developed and well-nourished. He is cooperative.  Non-toxic appearance.   HENT:   Head: Normocephalic and atraumatic.   Mouth/Throat: Uvula is midline and mucous membranes are normal.   Eyes: Conjunctivae and EOM are normal. Pupils are equal, round, and reactive to light.   Neck:   Normal range of motion.  Cardiovascular:  Normal rate, regular rhythm, S1 normal, S2 normal, normal heart sounds, intact distal pulses and normal pulses.     Exam reveals no gallop and no friction rub.       No murmur heard.  Pulmonary/Chest: Breath sounds normal. No stridor. No respiratory distress. He has no wheezes. He has no rhonchi. He has no rales. He exhibits no tenderness.   Abdominal: Abdomen is soft. Bowel sounds are  normal. He exhibits no distension and no mass. There is no abdominal tenderness. There is no rebound and no guarding.   Musculoskeletal:         General: Normal range of motion.      Cervical back: Normal range of motion.     Lymphadenopathy:     He has no cervical adenopathy.   Neurological: He is alert and oriented to person, place, and time. He has normal strength. GCS score is 15. GCS eye subscore is 4. GCS verbal subscore is 5. GCS motor subscore is 6.   Skin: Skin is warm and intact. Capillary refill takes less than 2 seconds. No rash noted.   Psychiatric: He has a normal mood and affect.       ED Course   Critical Care    Date/Time: 10/15/2023 3:58 PM    Performed by: Karon Taylor FNP  Authorized by: El Boucher MD  Direct patient critical care time: 15 minutes  Additional history critical care time: 10 minutes  Documentation critical care time: 5 minutes  Consulting other physicians critical care time: 5 minutes  Total critical care time (exclusive of procedural time) : 35 minutes  Critical care was necessary to treat or prevent imminent or life-threatening deterioration of the following conditions: sepsis.  Critical care was time spent personally by me on the following activities: discussions with consultants, evaluation of patient's response to treatment, examination of patient, obtaining history from patient or surrogate, ordering and performing treatments and interventions, ordering and review of laboratory studies, ordering and review of radiographic studies, pulse oximetry, re-evaluation of patient's condition and review of old charts.        Labs Reviewed   CBC W/ AUTO DIFFERENTIAL - Abnormal; Notable for the following components:       Result Value    RBC 2.78 (*)     Hemoglobin 8.7 (*)     Hematocrit 26.0 (*)     MCH 31.3 (*)     RDW 17.5 (*)     Platelets 76 (*)     Immature Granulocytes 2.0 (*)     Gran # (ANC) 8.1 (*)     Immature Grans (Abs) 0.19 (*)     Lymph # 0.8 (*)     nRBC 1  (*)     Gran % 86.0 (*)     Lymph % 8.7 (*)     Mono % 3.1 (*)     All other components within normal limits   COMPREHENSIVE METABOLIC PANEL - Abnormal; Notable for the following components:    Sodium 131 (*)     CO2 21 (*)     Glucose 277 (*)     BUN 31 (*)     Albumin 3.4 (*)     Total Bilirubin 1.6 (*)     All other components within normal limits   URINALYSIS, REFLEX TO URINE CULTURE - Abnormal; Notable for the following components:    Protein, UA 1+ (*)     Glucose, UA Trace (*)     Bilirubin (UA) 1+ (*)     Occult Blood UA 2+ (*)     Leukocytes, UA Trace (*)     All other components within normal limits    Narrative:     Preferred Collection Type->Urine, Clean Catch  Specimen Source->Urine   URINALYSIS MICROSCOPIC - Abnormal; Notable for the following components:    RBC, UA 10 (*)     Bacteria Few (*)     All other components within normal limits    Narrative:     Preferred Collection Type->Urine, Clean Catch  Specimen Source->Urine   INFLUENZA A & B BY MOLECULAR   CULTURE, BLOOD   CULTURE, BLOOD   LACTIC ACID, PLASMA   PROCALCITONIN   SARS-COV-2 RNA AMPLIFICATION, QUAL    Narrative:     Is the patient symptomatic?->No   PROCALCITONIN          Imaging Results              CT Abdomen Pelvis With Contrast (Final result)  Result time 10/15/23 14:20:53      Final result by KATJA Celaya Sr., MD (10/15/23 14:20:53)                   Impression:      1. There are lytic areas scattered throughout the visualized portion of the skeletal system.  There are large lytic lesions in the L4, L5, and S1 vertebral bodies.  There is poor visualization of the amount of extension of the neoplastic disease into the spinal canal at this level.  This is consistent with the patient's history of metastatic disease.  2. There are several possible wedge-shaped areas of hypodensity in the left kidney.  Pyelonephritis cannot be excluded.  3. There are several oval-shaped areas of hypodensity scattered throughout the liver.  One of the  larger ones measures 6 mm and is located in the dome of the liver.  4. The above findings and impressions were discussed with JUNIE Gallo via the telephone at 14:20 on 10/15/2023.  All CT scans at this facility use dose modulation, iterative reconstruction, and/or weight base dosing when appropriate to reduce radiation dose when appropriate to reduce radiation dose to as low as reasonably achievable.      Electronically signed by: Kj Celaya MD  Date:    10/15/2023  Time:    14:20               Narrative:    EXAMINATION:  CT ABDOMEN PELVIS WITH CONTRAST    CLINICAL HISTORY:  Abdominal abscess/infection suspected; metastatic disease involving the spine    TECHNIQUE:  Standard abdomen and pelvis CT protocol with IV contrast was performed.  There was no oral contrast administered.    COMPARISON:  Comparison was made to a CT examination of the lumbar spine performed on 12/17/2020.    FINDINGS:  Finding: The size of the heart is within normal limits. The lungs are clear. There is no pneumothorax or pleural effusion.    There are several oval-shaped areas of hypodensity scattered throughout the liver.  One of the larger ones measures 6 mm and is located in the dome of the liver.  The gallbladder, pancreas, spleen, and adrenals are normal in appearance.  There are several possible wedge-shaped areas of hypodensity in the left kidney.  There are several oval-shaped areas of hypodensity in the right kidney.  One of the larger ones measures 8 mm and is located in the inferior pole of the right kidney.  The ureters and the urinary bladder are normal in appearance.  There is a moderate amount of calcification within the prostate.  The appendix and the rest of the gastrointestinal system are normal in appearance. There is no free fluid within the abdomen or pelvis. There is no pneumoperitoneum.  There is no abscess visualized within the abdomen or the pelvis.  There are lytic areas scattered throughout the  visualized portion of the skeletal system.  There are large lytic lesions in the L4, L5, and S1 vertebral bodies. There is poor visualization of the amount of extension of the neoplastic disease into the spinal canal at this level.  There is posterior spinal fusion hardware between L2 and the iliac bones bilaterally.                                       X-Ray Chest AP Portable (Final result)  Result time 10/15/23 11:01:00      Final result by Earnest Lawrence MD (10/15/23 11:01:00)                   Impression:      No acute findings.      Electronically signed by: Earnest Lawrence MD  Date:    10/15/2023  Time:    11:01               Narrative:    EXAMINATION:  XR CHEST AP PORTABLE    CLINICAL HISTORY:  Tachycardia, Sepsis;    COMPARISON:  07/28/2023 x-ray.    FINDINGS:  Heart size is normal.  Mild aortic atherosclerosis.    Clear lungs.    Old posterior right rib fracture.  Mild thoracic spondylosis.                                       Medications   sodium chloride 0.9% bolus 1,000 mL 1,000 mL (1,000 mLs Intravenous New Bag 10/15/23 1551)   acetaminophen tablet 1,000 mg (1,000 mg Oral Given 10/15/23 1042)   levoFLOXacin 750 mg/150 mL IVPB 750 mg (0 mg Intravenous Stopped 10/15/23 1329)   iohexoL (OMNIPAQUE 350) injection 100 mL (100 mLs Intravenous Given 10/15/23 1352)     Medical Decision Making  62 y/o male which presents with RUQ/epigastric pain intermittently for the past week. Not currently having pain. Pt noted to be febrile on presentation and he was not aware he had fever. Sepsis work up completed. Discussed case with hem/onc and Dr. Hernandez advised to give levaquin and notify him if the CT or labs indicated infection. CT abdomen/pelvis shows concerns for pyelonephritis which is consistent with his UTI. Dr. Hernandez notified of pyelonephritis and the patient will be admitted to [facility] hem/onc service. Pt and wife updated on the plan of care and agree to admission.     Problems Addressed:  Fever,  unspecified fever cause: acute illness or injury  Pyelonephritis: acute illness or injury  Tachycardia: acute illness or injury    Amount and/or Complexity of Data Reviewed  Labs: ordered. Decision-making details documented in ED Course.  Radiology: ordered.    Risk  OTC drugs.  Prescription drug management.    This patient does not have evidence of infective focus  My overall impression is  SIRS .  Source: Urinary Tract  Antibiotics given- Levaquin 750 mg IV  Antibiotics (72h ago, onward)      None          Latest lactate reviewed-  Recent Labs   Lab 10/15/23  1023   LACTATE 2.0     Organ dysfunction indicated by  n/a    Fluid challenge Not needed - patient is not hypotensive      Post- resuscitation assessment No - Post resuscitation assessment not needed       Will Not start Pressors- Levophed for MAP of 65  Source control achieved by: Levaquin             ED Course as of 10/15/23 1559   Sun Oct 15, 2023   1008 BP: 107/73 [AT]   1008 Temp(!): 102 °F (38.9 °C) [AT]   1008 Temp Source: Oral [AT]   1008 Pulse(!): 133 [AT]   1008 Resp: 18 [AT]   1008 SpO2(!): 94 % [AT]   1117 Influenza A, Molecular: Negative [AT]   1117 Influenza B, Molecular: Negative [AT]   1117 Lactate, Kris: 2.0 [AT]   1228 Temp: 99.3 °F (37.4 °C) [AT]   1228 Pulse: 88 [AT]   1228 Resp(!): 24 [AT]   1228 SpO2: 100 % [AT]   1228 BP: 108/67 [AT]   1347 Bacteria, UA(!): Few [AT]   1504 SARS-CoV-2 RNA, Amplification, Qual: Negative [AT]      ED Course User Index  [AT] Karon Taylor FNP                    Clinical Impression:   Final diagnoses:  [R00.0] Tachycardia  [N12] Pyelonephritis (Primary)  [R50.9] Fever, unspecified fever cause        ED Disposition Condition    Transfer to Another Facility Stable                Karon Taylor FNP  10/15/23 1951

## 2023-10-16 PROBLEM — R78.81 SALMONELLA BACTEREMIA: Status: ACTIVE | Noted: 2023-10-16

## 2023-10-16 PROBLEM — R50.9 FEVER AND CHILLS: Status: ACTIVE | Noted: 2023-10-16

## 2023-10-16 LAB
ACINETOBACTER CALCOACETICUS/BAUMANNII COMPLEX: NOT DETECTED
ALBUMIN SERPL BCP-MCNC: 2.3 G/DL (ref 3.5–5.2)
ALP SERPL-CCNC: 95 U/L (ref 55–135)
ALT SERPL W/O P-5'-P-CCNC: 19 U/L (ref 10–44)
ANION GAP SERPL CALC-SCNC: 10 MMOL/L (ref 8–16)
ANISOCYTOSIS BLD QL SMEAR: SLIGHT
ANISOCYTOSIS BLD QL SMEAR: SLIGHT
ASCENDING AORTA: 3.5 CM
AST SERPL-CCNC: 11 U/L (ref 10–40)
AV INDEX (PROSTH): 1
AV MEAN GRADIENT: 5 MMHG
AV PEAK GRADIENT: 11 MMHG
AV VALVE AREA BY VELOCITY RATIO: 5.36 CM²
AV VALVE AREA: 5.59 CM²
AV VELOCITY RATIO: 0.96
BACTEROIDES FRAGILIS: NOT DETECTED
BASOPHILS # BLD AUTO: 0 K/UL (ref 0–0.2)
BASOPHILS # BLD AUTO: 0 K/UL (ref 0–0.2)
BASOPHILS NFR BLD: 0 % (ref 0–1.9)
BASOPHILS NFR BLD: 0 % (ref 0–1.9)
BILIRUB SERPL-MCNC: 0.6 MG/DL (ref 0.1–1)
BSA FOR ECHO PROCEDURE: 2.16 M2
BUN SERPL-MCNC: 25 MG/DL (ref 8–23)
BURR CELLS BLD QL SMEAR: ABNORMAL
BURR CELLS BLD QL SMEAR: ABNORMAL
CALCIUM SERPL-MCNC: 8.7 MG/DL (ref 8.7–10.5)
CANDIDA ALBICANS: NOT DETECTED
CANDIDA AURIS: NOT DETECTED
CANDIDA GLABRATA: NOT DETECTED
CANDIDA KRUSEI: NOT DETECTED
CANDIDA PARAPSILOSIS: NOT DETECTED
CANDIDA TROPICALIS: NOT DETECTED
CHLORIDE SERPL-SCNC: 102 MMOL/L (ref 95–110)
CO2 SERPL-SCNC: 22 MMOL/L (ref 23–29)
CREAT SERPL-MCNC: 1.3 MG/DL (ref 0.5–1.4)
CRYPTOCOCCUS NEOFORMANS/GATTII: NOT DETECTED
CTX-M GENE: NOT DETECTED
CV ECHO LV RWT: 0.47 CM
D DIMER PPP IA.FEU-MCNC: 0.75 MG/L FEU
DACRYOCYTES BLD QL SMEAR: ABNORMAL
DACRYOCYTES BLD QL SMEAR: ABNORMAL
DAT IGG-SP REAG RBC-IMP: NORMAL
DIFFERENTIAL METHOD: ABNORMAL
DIFFERENTIAL METHOD: ABNORMAL
DOP CALC AO PEAK VEL: 1.63 M/S
DOP CALC AO VTI: 22.98 CM
DOP CALC LVOT AREA: 5.6 CM2
DOP CALC LVOT DIAMETER: 2.67 CM
DOP CALC LVOT PEAK VEL: 1.56 M/S
DOP CALC LVOT STROKE VOLUME: 128.49 CM3
DOP CALCLVOT PEAK VEL VTI: 22.96 CM
E WAVE DECELERATION TIME: 284.02 MSEC
E/A RATIO: 0.55
E/E' RATIO: 6.11 M/S
ECHO LV POSTERIOR WALL: 0.88 CM (ref 0.6–1.1)
ENTEROBACTER CLOACAE COMPLEX: NOT DETECTED
ENTEROBACTERALES: ABNORMAL
ENTEROCOCCUS FAECALIS: NOT DETECTED
ENTEROCOCCUS FAECIUM: NOT DETECTED
EOSINOPHIL # BLD AUTO: 0 K/UL (ref 0–0.5)
EOSINOPHIL # BLD AUTO: 0 K/UL (ref 0–0.5)
EOSINOPHIL NFR BLD: 0 % (ref 0–8)
EOSINOPHIL NFR BLD: 0 % (ref 0–8)
ERYTHROCYTE [DISTWIDTH] IN BLOOD BY AUTOMATED COUNT: 18.3 % (ref 11.5–14.5)
ERYTHROCYTE [DISTWIDTH] IN BLOOD BY AUTOMATED COUNT: 18.3 % (ref 11.5–14.5)
ESCHERICHIA COLI: NOT DETECTED
EST. GFR  (NO RACE VARIABLE): >60 ML/MIN/1.73 M^2
ESTIMATED AVG GLUCOSE: 217 MG/DL (ref 68–131)
FIBRINOGEN PPP-MCNC: 607 MG/DL (ref 182–400)
FRACTIONAL SHORTENING: 30 % (ref 28–44)
GLOBAL LONGITUIDAL STRAIN: 17.5 %
GLUCOSE SERPL-MCNC: 260 MG/DL (ref 70–110)
HAEMOPHILUS INFLUENZAE: NOT DETECTED
HAPTOGLOB SERPL-MCNC: 244 MG/DL (ref 30–250)
HBA1C MFR BLD: 9.2 % (ref 4–5.6)
HCT VFR BLD AUTO: 22.9 % (ref 40–54)
HCT VFR BLD AUTO: 23 % (ref 40–54)
HGB BLD-MCNC: 7.4 G/DL (ref 14–18)
HGB BLD-MCNC: 7.5 G/DL (ref 14–18)
HYPOCHROMIA BLD QL SMEAR: ABNORMAL
HYPOCHROMIA BLD QL SMEAR: ABNORMAL
IMM GRANULOCYTES # BLD AUTO: 0.07 K/UL (ref 0–0.04)
IMM GRANULOCYTES # BLD AUTO: 0.11 K/UL (ref 0–0.04)
IMM GRANULOCYTES NFR BLD AUTO: 1 % (ref 0–0.5)
IMM GRANULOCYTES NFR BLD AUTO: 1.6 % (ref 0–0.5)
IMP GENE: NOT DETECTED
INTERVENTRICULAR SEPTUM: 0.58 CM (ref 0.6–1.1)
KLEBSIELLA AEROGENES: NOT DETECTED
KLEBSIELLA OXYTOCA: NOT DETECTED
KLEBSIELLA PNEUMONIAE GROUP: NOT DETECTED
KPC: NOT DETECTED
LA MAJOR: 4.68 CM
LA MINOR: 5.05 CM
LA WIDTH: 3.67 CM
LDH SERPL L TO P-CCNC: 185 U/L (ref 110–260)
LEFT ATRIUM SIZE: 3.93 CM
LEFT ATRIUM VOLUME INDEX MOD: 20.3 ML/M2
LEFT ATRIUM VOLUME INDEX: 28.5 ML/M2
LEFT ATRIUM VOLUME MOD: 42.46 CM3
LEFT ATRIUM VOLUME: 59.56 CM3
LEFT INTERNAL DIMENSION IN SYSTOLE: 2.63 CM (ref 2.1–4)
LEFT VENTRICLE DIASTOLIC VOLUME INDEX: 28.85 ML/M2
LEFT VENTRICLE DIASTOLIC VOLUME: 60.29 ML
LEFT VENTRICLE MASS INDEX: 36 G/M2
LEFT VENTRICLE SYSTOLIC VOLUME INDEX: 12.2 ML/M2
LEFT VENTRICLE SYSTOLIC VOLUME: 25.41 ML
LEFT VENTRICULAR INTERNAL DIMENSION IN DIASTOLE: 3.76 CM (ref 3.5–6)
LEFT VENTRICULAR MASS: 74.71 G
LISTERIA MONOCYTOGENES: NOT DETECTED
LV LATERAL E/E' RATIO: 6.11 M/S
LV SEPTAL E/E' RATIO: 6.11 M/S
LYMPHOCYTES # BLD AUTO: 0.8 K/UL (ref 1–4.8)
LYMPHOCYTES # BLD AUTO: 0.8 K/UL (ref 1–4.8)
LYMPHOCYTES NFR BLD: 11.4 % (ref 18–48)
LYMPHOCYTES NFR BLD: 12 % (ref 18–48)
MAGNESIUM SERPL-MCNC: 1.7 MG/DL (ref 1.6–2.6)
MCH RBC QN AUTO: 30.7 PG (ref 27–31)
MCH RBC QN AUTO: 31.3 PG (ref 27–31)
MCHC RBC AUTO-ENTMCNC: 32.2 G/DL (ref 32–36)
MCHC RBC AUTO-ENTMCNC: 32.8 G/DL (ref 32–36)
MCR-1: NOT DETECTED
MCV RBC AUTO: 95 FL (ref 82–98)
MCV RBC AUTO: 95 FL (ref 82–98)
MEC A/C AND MREJ (MRSA): ABNORMAL
MEC A/C: ABNORMAL
MONOCYTES # BLD AUTO: 0.2 K/UL (ref 0.3–1)
MONOCYTES # BLD AUTO: 0.3 K/UL (ref 0.3–1)
MONOCYTES NFR BLD: 3.5 % (ref 4–15)
MONOCYTES NFR BLD: 4 % (ref 4–15)
MV A" WAVE DURATION": 12.56 MSEC
MV PEAK A VEL: 1 M/S
MV PEAK E VEL: 0.55 M/S
MV STENOSIS PRESSURE HALF TIME: 82.37 MS
MV VALVE AREA P 1/2 METHOD: 2.67 CM2
NDM GENE: NOT DETECTED
NEISSERIA MENINGITIDIS: NOT DETECTED
NEUTROPHILS # BLD AUTO: 5.8 K/UL (ref 1.8–7.7)
NEUTROPHILS # BLD AUTO: 5.8 K/UL (ref 1.8–7.7)
NEUTROPHILS NFR BLD: 83 % (ref 38–73)
NEUTROPHILS NFR BLD: 83.5 % (ref 38–73)
NRBC BLD-RTO: 0 /100 WBC
NRBC BLD-RTO: 1 /100 WBC
OVALOCYTES BLD QL SMEAR: ABNORMAL
OVALOCYTES BLD QL SMEAR: ABNORMAL
OXA-48-LIKE: NOT DETECTED
PATH REV BLD -IMP: NORMAL
PATH REV BLD -IMP: NORMAL
PHOSPHATE SERPL-MCNC: 2.4 MG/DL (ref 2.7–4.5)
PISA TR MAX VEL: 2.47 M/S
PLATELET # BLD AUTO: 77 K/UL (ref 150–450)
PLATELET # BLD AUTO: 79 K/UL (ref 150–450)
PLATELET BLD QL SMEAR: ABNORMAL
PLATELET BLD QL SMEAR: ABNORMAL
PMV BLD AUTO: 13 FL (ref 9.2–12.9)
PMV BLD AUTO: ABNORMAL FL (ref 9.2–12.9)
POCT GLUCOSE: 223 MG/DL (ref 70–110)
POCT GLUCOSE: 239 MG/DL (ref 70–110)
POCT GLUCOSE: 246 MG/DL (ref 70–110)
POIKILOCYTOSIS BLD QL SMEAR: SLIGHT
POIKILOCYTOSIS BLD QL SMEAR: SLIGHT
POLYCHROMASIA BLD QL SMEAR: ABNORMAL
POLYCHROMASIA BLD QL SMEAR: ABNORMAL
POTASSIUM SERPL-SCNC: 3.6 MMOL/L (ref 3.5–5.1)
PROT SERPL-MCNC: 6.7 G/DL (ref 6–8.4)
PROTEUS SPECIES: NOT DETECTED
PSEUDOMONAS AERUGINOSA: NOT DETECTED
PULM VEIN S/D RATIO: 2.29
PV PEAK D VEL: 0.31 M/S
PV PEAK S VEL: 0.71 M/S
RA MAJOR: 4.11 CM
RA PRESSURE ESTIMATED: 3 MMHG
RA WIDTH: 3.64 CM
RBC # BLD AUTO: 2.4 M/UL (ref 4.6–6.2)
RBC # BLD AUTO: 2.41 M/UL (ref 4.6–6.2)
RETICS/RBC NFR AUTO: 2.7 % (ref 0.4–2)
RIGHT VENTRICULAR END-DIASTOLIC DIMENSION: 2.87 CM
RV TB RVSP: 5 MMHG
SALMONELLA SP: DETECTED
SERRATIA MARCESCENS: NOT DETECTED
SINUS: 3.18 CM
SODIUM SERPL-SCNC: 134 MMOL/L (ref 136–145)
STAPHYLOCOCCUS AUREUS: NOT DETECTED
STAPHYLOCOCCUS EPIDERMIDIS: NOT DETECTED
STAPHYLOCOCCUS LUGDUNESIS: NOT DETECTED
STAPHYLOCOCCUS SPECIES: NOT DETECTED
STENOTROPHOMONAS MALTOPHILIA: NOT DETECTED
STJ: 2.78 CM
STREPTOCOCCUS AGALACTIAE: NOT DETECTED
STREPTOCOCCUS PNEUMONIAE: NOT DETECTED
STREPTOCOCCUS PYOGENES: NOT DETECTED
STREPTOCOCCUS SPECIES: NOT DETECTED
TDI LATERAL: 0.09 M/S
TDI SEPTAL: 0.09 M/S
TDI: 0.09 M/S
TR MAX PG: 24 MMHG
TRICUSPID ANNULAR PLANE SYSTOLIC EXCURSION: 2.1 CM
TV REST PULMONARY ARTERY PRESSURE: 27 MMHG
VAN A/B: ABNORMAL
VIM GENE: NOT DETECTED
WBC # BLD AUTO: 6.92 K/UL (ref 3.9–12.7)
WBC # BLD AUTO: 7 K/UL (ref 3.9–12.7)
Z-SCORE OF LEFT VENTRICULAR DIMENSION IN END DIASTOLE: -5.39
Z-SCORE OF LEFT VENTRICULAR DIMENSION IN END SYSTOLE: -3.2

## 2023-10-16 PROCEDURE — 99223 1ST HOSP IP/OBS HIGH 75: CPT | Mod: ,,, | Performed by: INTERNAL MEDICINE

## 2023-10-16 PROCEDURE — 85384 FIBRINOGEN ACTIVITY: CPT | Performed by: STUDENT IN AN ORGANIZED HEALTH CARE EDUCATION/TRAINING PROGRAM

## 2023-10-16 PROCEDURE — 63600175 PHARM REV CODE 636 W HCPCS: Performed by: STUDENT IN AN ORGANIZED HEALTH CARE EDUCATION/TRAINING PROGRAM

## 2023-10-16 PROCEDURE — 83735 ASSAY OF MAGNESIUM: CPT | Performed by: STUDENT IN AN ORGANIZED HEALTH CARE EDUCATION/TRAINING PROGRAM

## 2023-10-16 PROCEDURE — 20600001 HC STEP DOWN PRIVATE ROOM

## 2023-10-16 PROCEDURE — 83036 HEMOGLOBIN GLYCOSYLATED A1C: CPT | Performed by: STUDENT IN AN ORGANIZED HEALTH CARE EDUCATION/TRAINING PROGRAM

## 2023-10-16 PROCEDURE — 83615 LACTATE (LD) (LDH) ENZYME: CPT | Performed by: STUDENT IN AN ORGANIZED HEALTH CARE EDUCATION/TRAINING PROGRAM

## 2023-10-16 PROCEDURE — 85060 PATHOLOGIST REVIEW: ICD-10-PCS | Mod: ,,, | Performed by: PATHOLOGY

## 2023-10-16 PROCEDURE — 99223 PR INITIAL HOSPITAL CARE,LEVL III: ICD-10-PCS | Mod: ,,, | Performed by: INTERNAL MEDICINE

## 2023-10-16 PROCEDURE — 83010 ASSAY OF HAPTOGLOBIN QUANT: CPT | Performed by: STUDENT IN AN ORGANIZED HEALTH CARE EDUCATION/TRAINING PROGRAM

## 2023-10-16 PROCEDURE — 85045 AUTOMATED RETICULOCYTE COUNT: CPT | Performed by: STUDENT IN AN ORGANIZED HEALTH CARE EDUCATION/TRAINING PROGRAM

## 2023-10-16 PROCEDURE — 99223 PR INITIAL HOSPITAL CARE,LEVL III: ICD-10-PCS | Mod: AI,,, | Performed by: INTERNAL MEDICINE

## 2023-10-16 PROCEDURE — 85025 COMPLETE CBC W/AUTO DIFF WBC: CPT | Mod: 91 | Performed by: STUDENT IN AN ORGANIZED HEALTH CARE EDUCATION/TRAINING PROGRAM

## 2023-10-16 PROCEDURE — 85025 COMPLETE CBC W/AUTO DIFF WBC: CPT | Performed by: STUDENT IN AN ORGANIZED HEALTH CARE EDUCATION/TRAINING PROGRAM

## 2023-10-16 PROCEDURE — 25000003 PHARM REV CODE 250: Performed by: STUDENT IN AN ORGANIZED HEALTH CARE EDUCATION/TRAINING PROGRAM

## 2023-10-16 PROCEDURE — 85060 BLOOD SMEAR INTERPRETATION: CPT | Mod: ,,, | Performed by: PATHOLOGY

## 2023-10-16 PROCEDURE — 80053 COMPREHEN METABOLIC PANEL: CPT | Performed by: STUDENT IN AN ORGANIZED HEALTH CARE EDUCATION/TRAINING PROGRAM

## 2023-10-16 PROCEDURE — 86880 COOMBS TEST DIRECT: CPT | Performed by: STUDENT IN AN ORGANIZED HEALTH CARE EDUCATION/TRAINING PROGRAM

## 2023-10-16 PROCEDURE — 85379 FIBRIN DEGRADATION QUANT: CPT | Performed by: STUDENT IN AN ORGANIZED HEALTH CARE EDUCATION/TRAINING PROGRAM

## 2023-10-16 PROCEDURE — 87040 BLOOD CULTURE FOR BACTERIA: CPT | Performed by: INTERNAL MEDICINE

## 2023-10-16 PROCEDURE — 99223 1ST HOSP IP/OBS HIGH 75: CPT | Mod: AI,,, | Performed by: INTERNAL MEDICINE

## 2023-10-16 PROCEDURE — 84100 ASSAY OF PHOSPHORUS: CPT | Performed by: STUDENT IN AN ORGANIZED HEALTH CARE EDUCATION/TRAINING PROGRAM

## 2023-10-16 RX ORDER — INSULIN ASPART 100 [IU]/ML
0-5 INJECTION, SOLUTION INTRAVENOUS; SUBCUTANEOUS
Status: DISCONTINUED | OUTPATIENT
Start: 2023-10-16 | End: 2023-10-18 | Stop reason: HOSPADM

## 2023-10-16 RX ORDER — METRONIDAZOLE 500 MG/100ML
500 INJECTION, SOLUTION INTRAVENOUS
Status: DISCONTINUED | OUTPATIENT
Start: 2023-10-16 | End: 2023-10-16

## 2023-10-16 RX ORDER — IBUPROFEN 200 MG
16 TABLET ORAL
Status: DISCONTINUED | OUTPATIENT
Start: 2023-10-16 | End: 2023-10-16

## 2023-10-16 RX ORDER — SODIUM CHLORIDE 9 MG/ML
INJECTION, SOLUTION INTRAVENOUS CONTINUOUS
Status: DISCONTINUED | OUTPATIENT
Start: 2023-10-16 | End: 2023-10-18

## 2023-10-16 RX ORDER — DEXTROSE 40 %
16 GEL (GRAM) ORAL
Status: DISCONTINUED | OUTPATIENT
Start: 2023-10-16 | End: 2023-10-18 | Stop reason: HOSPADM

## 2023-10-16 RX ORDER — GLUCAGON 1 MG
1 KIT INJECTION
Status: DISCONTINUED | OUTPATIENT
Start: 2023-10-16 | End: 2023-10-18 | Stop reason: HOSPADM

## 2023-10-16 RX ORDER — IBUPROFEN 200 MG
24 TABLET ORAL
Status: DISCONTINUED | OUTPATIENT
Start: 2023-10-16 | End: 2023-10-16

## 2023-10-16 RX ORDER — DEXTROSE 40 %
24 GEL (GRAM) ORAL
Status: DISCONTINUED | OUTPATIENT
Start: 2023-10-16 | End: 2023-10-18 | Stop reason: HOSPADM

## 2023-10-16 RX ADMIN — GABAPENTIN 300 MG: 300 CAPSULE ORAL at 08:10

## 2023-10-16 RX ADMIN — INSULIN ASPART 2 UNITS: 100 INJECTION, SOLUTION INTRAVENOUS; SUBCUTANEOUS at 01:10

## 2023-10-16 RX ADMIN — CEFEPIME 2 G: 2 INJECTION, POWDER, FOR SOLUTION INTRAVENOUS at 03:10

## 2023-10-16 RX ADMIN — ACETAMINOPHEN 650 MG: 325 TABLET ORAL at 06:10

## 2023-10-16 RX ADMIN — INSULIN ASPART 2 UNITS: 100 INJECTION, SOLUTION INTRAVENOUS; SUBCUTANEOUS at 05:10

## 2023-10-16 RX ADMIN — INSULIN ASPART 1 UNITS: 100 INJECTION, SOLUTION INTRAVENOUS; SUBCUTANEOUS at 08:10

## 2023-10-16 RX ADMIN — ACYCLOVIR 400 MG: 200 CAPSULE ORAL at 09:10

## 2023-10-16 RX ADMIN — SODIUM CHLORIDE: 9 INJECTION, SOLUTION INTRAVENOUS at 11:10

## 2023-10-16 RX ADMIN — ENOXAPARIN SODIUM 40 MG: 40 INJECTION SUBCUTANEOUS at 05:10

## 2023-10-16 RX ADMIN — CEFTRIAXONE 2 G: 2 INJECTION, POWDER, FOR SOLUTION INTRAMUSCULAR; INTRAVENOUS at 05:10

## 2023-10-16 RX ADMIN — CLONIDINE HYDROCHLORIDE 0.3 MG: 0.1 TABLET ORAL at 09:10

## 2023-10-16 RX ADMIN — CEFEPIME 2 G: 2 INJECTION, POWDER, FOR SOLUTION INTRAVENOUS at 11:10

## 2023-10-16 RX ADMIN — METRONIDAZOLE 500 MG: 5 INJECTION, SOLUTION INTRAVENOUS at 09:10

## 2023-10-16 RX ADMIN — GABAPENTIN 300 MG: 300 CAPSULE ORAL at 09:10

## 2023-10-16 RX ADMIN — ACYCLOVIR 400 MG: 200 CAPSULE ORAL at 08:10

## 2023-10-16 NOTE — H&P
[facility] ([facility])  Hematology  Bone Marrow Transplant  H&P    Subjective:     Principal Problem: Fever and chills    HPI: Patient is a 61-year-old male with past medical history significant for IgG kappa multiple myeloma treated by Dr. 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  He is presenting with a 3 day history of nocturnal fevers, chills, and rigors associated with abdominal pain.  Patient states that no sick contacts, no shortness of breath, no tachycardia or chestpain. He denies productive or nonproductive cough.  He denies any dysuria or back pain.  He denies any major changes to medications.     In the ED, the patient was initiated on broad-spectrum antibiotics sepsis workup was initiated.  Patient was not hypotensive.  CBC was notable for slightly worsened anemia (10 to 8.5) and low platelets but white count was normal with a normal ANC.  The patient's metabolic panel was notable for mild hyponatremia of 131, a bicarb of 21 and a BUN/creatinine of 1.27 and 31 respectively.  Bilirubin was noted to be mildly elevated at 1.6 with normal AST/ALT.     Oncology History (from Dr. Cornejo's most recent clinic note):   A. 9/14 - 9/17/2020 : Admitted to [facility] for evaluation of lytic lesions. Large soft tissue mass encompassing L4 - S1 vertebral bodies seen. FLC ratio 171, involved light chains 104. SPEP and SIFE found 2.86g/dL, IgG kappa para protein band. Underwent bone marrow biopsy and discharged with dexamethasone 40mg x 4 day burst.     B. 9/23/20 : Underwent debulking of spinal mass.  C. 10/1/20 - 4/14/21 : C1 - C8D1 VRd. Revlimid delivered in third week of cycle.  D. 2/25/21 : Presented for consent signing for autoSCT but his insurance had lapsed. Plan for transplant pushed back 1-2 months while he applys for medicaid.   E. 4/22/21 : C8D8 VRd planned (last treatment before mobilization).   F. 5/17/21 : Alea autoSCT. 3 bags and a CD34 dose of 3.35 x 10^6. He  engrafted on Day +13 (5/30/21) with an ANC of 2607. discharged home on Day +14 (5/31/21).      Patient information was obtained from patient, spouse/SO, past medical records, and ER records.     Oncology History: See HPI     Medications Prior to Admission   Medication Sig Dispense Refill Last Dose    acyclovir (ZOVIRAX) 400 MG tablet Take 1 tablet (400 mg total) by mouth 2 (two) times daily. 60 tablet 11 10/15/2023 at 0800    amLODIPine (NORVASC) 10 MG tablet Take 1 tablet (10 mg total) by mouth once daily. 90 tablet 1 10/15/2023 at 0800    cloNIDine (CATAPRES) 0.3 MG tablet Take 0.3 mg by mouth once daily.   10/15/2023 at 0800    dexAMETHasone (DECADRON) 4 MG Tab Take 10 tablets (40 mg total) by mouth every 7 days. Take with food before chemotherapy appointments 40 tablet 11 10/15/2023 at 0800    gabapentin (NEURONTIN) 300 MG capsule TAKE 1 CAPSULE(300 MG) BY MOUTH TWICE DAILY 60 capsule 3 10/15/2023 at 0800    hydroCHLOROthiazide (HYDRODIURIL) 25 MG tablet Take 1 tablet (25 mg total) by mouth once daily. 90 tablet 1 10/15/2023 at 0800    potassium chloride SA (K-DUR,KLOR-CON M) 10 MEQ tablet Take 1 tablet (10 mEq total) by mouth once daily. 90 tablet 1 10/15/2023 at 0800    calcium-vitamin D3 (OYSTER SHELL CALCIUM-VIT D3) 500 mg-5 mcg (200 unit) per tablet Take 1 tablet by mouth once daily. 30 tablet 0     ergocalciferol (ERGOCALCIFEROL) 50,000 unit Cap Take 1 capsule (50,000 Units total) by mouth every 7 days. (Patient not taking: Reported on 10/15/2023) 4 capsule 1 Not Taking    [DISCONTINUED] ferrous gluconate (FERGON) 324 MG tablet Take 1 tablet (324 mg total) by mouth daily with breakfast. 30 tablet 3        Patient has no known allergies.     Past Medical History:   Diagnosis Date    Anxiety     Arthritis     Cancer     Hypertension      Past Surgical History:   Procedure Laterality Date    BACK SURGERY  01/01/2021    BONE MARROW BIOPSY Left 10/20/2021    Procedure: Biopsy-bone marrow;   Surgeon: Summer Cartwright MD;  Location: [facility] ENDO (4TH FLR);  Service: Oncology;  Laterality: Left;    COLONOSCOPY N/A 2021    Procedure: COLONOSCOPY;  Surgeon: Braxton Lees MD;  Location: [facility] ENDO (4TH FLR);  Service: Endoscopy;  Laterality: N/A;  -covid kristopher-inst mailed-tb  21-pt to remain on schedule with Dr. Lees, and confirmed updated arrival time of 0835-BB    COLONOSCOPY N/A 2021    Procedure: COLONOSCOPY;  Surgeon: Jo Ann Robledo MD;  Location: [facility] ENDO (4TH FLR);  Service: Endoscopy;  Laterality: N/A;  rescheduled due to poor bowel prep, to be rescheduled with first available provider-BB  covid-21-pcw-BB    CREATION OF MUSCLE ROTATIONAL FLAP N/A 2020    Procedure: CREATION, FLAP, MUSCLE ROTATION;  Surgeon: Kamlesh Bellamy MD;  [facility];  Service: Plastics;  Laterality: N/A;    KNEE SURGERY Left 2019    LUMBAR FUSION N/A 2020    Procedure: FUSION, SPINE, LUMBAR L2-pelvis. Depuy. Plastic surgery closure w/ Dr. Bellamy;  Surgeon: Nick Coyle MD;  [facility];  Service: Neurosurgery;  Laterality: N/A;    Metastatic neoplasum removed from spine       Family History       Problem Relation (Age of Onset)    Cancer Father          Tobacco Use    Smoking status: Former     Current packs/day: 0.00     Average packs/day: 0.3 packs/day for 40.0 years (10.0 ttl pk-yrs)     Types: Cigarettes     Start date:      Quit date: 2017     Years since quittin.7    Smokeless tobacco: Never   Substance and Sexual Activity    Alcohol use: Not on file    Drug use: Not on file    Sexual activity: Not on file       Review of Systems   Constitutional:  Positive for chills and fever. Negative for fatigue.   HENT:  Negative for sinus pressure and sinus pain.    Eyes:  Negative for visual disturbance.   Respiratory:  Negative for cough, chest tightness and shortness of breath.    Cardiovascular:  Negative for chest pain, palpitations  and leg swelling.   Gastrointestinal:  Positive for abdominal pain. Negative for abdominal distention, constipation, diarrhea, nausea and vomiting.   Musculoskeletal:  Negative for back pain.   Skin:  Negative for rash.   Neurological:  Negative for light-headedness and headaches.   Hematological:  Does not bruise/bleed easily.     Objective:     Vital Signs (Most Recent):  Temp: 98.8 °F (37.1 °C) (10/15/23 2254)  Pulse: 94 (10/15/23 2254)  Resp: 18 (10/15/23 2254)  BP: 110/74 (10/15/23 2254)  SpO2: 96 % (10/15/23 2254) Vital Signs (24h Range):  Temp:  [98.3 °F (36.8 °C)-102 °F (38.9 °C)] 98.8 °F (37.1 °C)  Pulse:  [] 94  Resp:  [17-24] 18  SpO2:  [94 %-100 %] 96 %  BP: (107-115)/(60-83) 110/74     Weight: 98.4 kg (217 lb)  Body mass index is 33.99 kg/m².  Body surface area is 2.16 meters squared.    ECOG SCORE           [unfilled]    Lines/Drains/Airways       Drain  Duration                  Closed/Suction Drain 09/23/20 1758 Back Bulb 15 Fr. 1117 days         Closed/Suction Drain 09/23/20 1758 Back Bulb 15 Fr. 1117 days              Peripheral Intravenous Line  Duration                  Peripheral IV - Single Lumen 10/15/23 1944 20 G Anterior;Right Wrist <1 day                     Physical Exam  Vitals and nursing note reviewed.   Constitutional:       Appearance: Normal appearance.   HENT:      Head: Normocephalic and atraumatic.   Eyes:      General: No scleral icterus.     Pupils: Pupils are equal, round, and reactive to light.   Cardiovascular:      Rate and Rhythm: Regular rhythm. Tachycardia present.      Pulses: Normal pulses.      Heart sounds: Normal heart sounds.   Pulmonary:      Effort: Pulmonary effort is normal.      Breath sounds: Normal breath sounds.   Abdominal:      General: Abdomen is flat. There is no distension.      Palpations: Abdomen is soft.      Tenderness: There is no abdominal tenderness.   Musculoskeletal:      Right lower leg: No edema.      Left lower leg: No edema.  "  Lymphadenopathy:      Cervical: No cervical adenopathy.   Skin:     General: Skin is warm and dry.      Capillary Refill: Capillary refill takes less than 2 seconds.      Comments: Goose bumps and rigors present. Temperature retaken and fever of 102 present.   Neurological:      General: No focal deficit present.      Mental Status: He is alert and oriented to person, place, and time.            Significant Labs:   All pertinent labs from the last 24 hours have been reviewed.    Diagnostic Results:  I have reviewed all pertinent imaging results/findings within the past 24 hours.    Assessment/Plan:     Cardiac/Vascular  Essential hypertension  Continue home meds with target BP < 160 SBP while inpatient. Continue to monitor for adjustment.    Oncology  History of auto stem cell transplant  See oncologic hx    Multiple myeloma  From 9/5/23 ([place]): "Transplant Course  Patient with IgG Kappa MM and pmh HTN, lytic bone lesion, arthritis and vitamin D deficiency. Admitted 5/15/21 for planned Alea auto SCT for MM. He received 3 bags and a CD34 dose of 3.35 x 10^6 on 5/17/21. Tolerated chemo and transplant well. Admission complicated by n/v controlled with scheduled anti-emetics and c-diff neg diarrhea controlled with prn imodium. He engrafted on Day +13 (5/30/21) with an ANC of 2607. He was discharged home on Day +14 (5/31/21).      Day +100 restaging marrow indicated that he was in [place].   Interval History:      Found to be in relapse biochemically (June/July 2023 ) and with new lytic disease at 2 years post SCT.  Initiated DKd salvage 8/4/34.  He has been tolerating with no significant AE's.  Accompanied by wife today prior to C1D22; of note due to scheduling errors he missed 2 weeks of cycle 1. "    Other  * Fever and chills  Patient is a 63-year-old male with past medical history significant for multiple myeloma currently presenting with nocturnal fevers up to 102.  Overall workup done in the emergency department " and outside facility are largely unremarkable however imaging does demonstrate some wedge like hypoattenuation of the kidney as well as some circular hypoattenuation of the liver which could represent infection or solid organ infarction from embolism.  The patient appears clinically well and the cardiopulmonary system is without signs and symptoms suggestive of infection.  Abdominal exam is largely unremarkable the patient is not neutropenic.  Regardless of this will treat empirically for pyelonephritis given worsening thrombocytopenia which could suggest sepsis.  Currently low threshold to deescalate antibiotics.    This patient does have evidence of infective focus  My overall impression is sepsis.  Source: Urinary Tract  Antibiotics given-   Antibiotics (72h ago, onward)    Start     Stop Route Frequency Ordered    10/15/23 1945  ceFEPIme (MAXIPIME) 2 g in dextrose 5 % in water (D5W) 100 mL IVPB (MB+)         -- IV Every 8 hours (non-standard times) 10/15/23 1839        Latest lactate reviewed-  Recent Labs   Lab 10/15/23  1023   LACTATE 2.0     Organ dysfunction indicated by Thrombocytopenia     Fluid challenge Not needed - patient is not hypotensive      Post- resuscitation assessment No - Post resuscitation assessment not needed       Will Not start Pressors- Levophed for MAP of 65  Source control achieved by: Abx  Complicated urinary tract infection suggested by fever or chills    Risk factors for MDR GNR UTI include any of the following within the last 3 months: None    Continued hospitalization is warranted due to Fever > 100.4 or other serious systemic findings    Plan:  - f/u cultures  - cefepime 2g TID  - Consideration to non-infectious etiology should be considered as UA is bland and CT is questionable for pyelonephritis.   - Ddx include thrombus and/or embolic infarctions of solid organs (obtaining LE US), developing TTP (obtaining hemolysis labs), and tumor related fever          VTE Risk Mitigation  (From admission, onward)         Ordered     enoxaparin injection 40 mg  Daily         10/15/23 1824     IP VTE HIGH RISK PATIENT  Once         10/15/23 1824     Place sequential compression device  Until discontinued         10/15/23 1824                Disposition: home    Neol Amador MD  Bone Marrow Transplant  Hematology  [facility] - On[facility] (Hospital)

## 2023-10-16 NOTE — CONSULTS
[facility] - Oncology ([facility])  Infectious Disease  Consult Note    Patient Name: Jaspreet Walsh Jr.  MRN: [id number]  Admission Date: 10/15/2023  Hospital Length of Stay: 1 days  Attending Physician: Jose De Jesus Hernandez MD  Primary Care Provider: Con Patel Jr., MD     Isolation Status: No active isolations    Patient information was obtained from patient and ER records.      Inpatient consult to Infectious Diseases  Consult performed by: Kj Manzo MD  Consult ordered by: Nick Millan DO        Assessment/Plan:     ID  Salmonella bacteremia  61 y.o. male with a PMHx significant for IgG kappa multiple myeloma s/p auto HSC (5/2021) currently on outpatient Melinda-KD (last infusion 10/11), osteolytic lesions, and HTN. He presents for a 3 days of nocturnal fevers/chills and rigors with associated epigastric abdominal pain. He denies respiratory symptoms, sick contacts, dysuria, flank pain, recent dietary changes/consumption of raw/undercooked meats. ID was consulted for salmonella bacteremia.     At the OSH ED, he was febrile (102) and tachycardic with no leukocytosis. He was not hypotensive. Full septic w/u initiated. UA concerning for UTI and CT abdomen/pelvis concerning for pyelonephritis. He was given IV Levaquin and [place] [facility] for further management. He was started on cefepime and flagyl at [facility]. Rapid Organism ID by PCR from [facility] detected Salmonella. ID and susceptibility pending.    Symptoms at this time likely due to salmonella bacteremia rather than pyelonephritis given lack of urinary symptoms and flank pain.    Recommendations:  - Start ceftriaxone 2g [id number]  - Discontinue cefepime and flagyl  - F/u repeat blood cultures (ordered)        Thank you for your consult. I will follow-up with patient. Please contact us if you have any additional questions.    Kj Manzo MD   Internal Medicine, PGY1  Infectious Disease  [facility] - Oncology ([facility])    Subjective:     Principal  Problem: Fever and chills    HPI: Jaspreet Walsh is a 61 y.o. male with a PMHx significant for IgG kappa multiple myeloma s/p auto HSC (5/2021) currently on outpatient Melinda-KD (last infusion 10/11), osteolytic lesions, and HTN. He presents for a 3 days of nocturnal fevers/chills and rigors with associated epigastric abdominal pain. He denies respiratory symptoms, sick contacts, dysuria, flank pain, recent dietary changes/consumption of raw/undercooked meats. ID was consulted for salmonella bacteremia.    At the OSH ED, he was febrile (102) and tachycardic with no leukocytosis. He was not hypotensive. Full septic w/u initiated. UA concerning for UTI and CT abdomen/pelvis concerning for pyelonephritis. He was given IV Levaquin and admitted to [facility] service for further management. He was started on cefepime and flagyl at [facility]. Rapid Organism ID by PCR from [facility] detected Salmonella. ID and susceptibility pending.    At the time of my evaluation, the patient was not having subjective fevers/chills and only complained of abdominal pain. Of note, home medications include acyclovir 400mg BID.       Past Medical History:   Diagnosis Date    Anxiety     Arthritis     Cancer     Hypertension        Past Surgical History:   Procedure Laterality Date    BACK SURGERY  01/01/2021    BONE MARROW BIOPSY Left 10/20/2021    Procedure: Biopsy-bone marrow;  Surgeon: Summer Cartwright MD;  Location: [address]);  Service: Oncology;  Laterality: Left;    COLONOSCOPY N/A 01/25/2021    Procedure: COLONOSCOPY;  Surgeon: Braxton Lees MD;  [facility]  Service: Endoscopy;  Laterality: N/A;  1/22-covid [facility] mailed-tb  1/20/21-pt to remain on schedule with Dr. Lees, and confirmed updated arrival time of 0835-BB    COLONOSCOPY N/A 02/01/2021    Procedure: COLONOSCOPY;  Surgeon: Jo Ann Robledo MD;  [address]  Service: Endoscopy;  Laterality: N/A;  rescheduled due to poor bowel  prep, to be rescheduled with first available provider-BB  covid-1/29/21-pcw-BB    CREATION OF MUSCLE ROTATIONAL FLAP N/A 09/23/2020    Procedure: CREATION, FLAP, MUSCLE ROTATION;  Surgeon: Kamlesh Bellamy MD;  Location: [facility] OR [address] [place] [address];  Service: Plastics;  Laterality: N/A;    KNEE SURGERY Left 06/2019    LUMBAR FUSION N/A 09/23/2020    Procedure: FUSION, SPINE, LUMBAR L2-pelvis. Depuy. Plastic surgery closure w/ Dr. Bellamy;  Surgeon: Nick Coyle MD;  Location: [facility] OR [address] [place] [address];  Service: Neurosurgery;  Laterality: N/A;    Metastatic neoplasum removed from spine         Review of patient's allergies indicates:  No Known Allergies    Medications:  Medications Prior to Admission   Medication Sig    acyclovir (ZOVIRAX) 400 MG tablet Take 1 tablet (400 mg total) by mouth 2 (two) times daily.    amLODIPine (NORVASC) 10 MG tablet Take 1 tablet (10 mg total) by mouth once daily.    cloNIDine (CATAPRES) 0.3 MG tablet Take 0.3 mg by mouth once daily.    dexAMETHasone (DECADRON) 4 MG Tab Take 10 tablets (40 mg total) by mouth every 7 days. Take with food before chemotherapy appointments    gabapentin (NEURONTIN) 300 MG capsule TAKE 1 CAPSULE(300 MG) BY MOUTH TWICE DAILY    hydroCHLOROthiazide (HYDRODIURIL) 25 MG tablet Take 1 tablet (25 mg total) by mouth once daily.    potassium chloride SA (K-DUR,KLOR-CON M) 10 MEQ tablet Take 1 tablet (10 mEq total) by mouth once daily.    calcium-vitamin D3 (OYSTER SHELL CALCIUM-VIT D3) 500 mg-5 mcg (200 unit) per tablet Take 1 tablet by mouth once daily.    ergocalciferol (ERGOCALCIFEROL) 50,000 unit Cap Take 1 capsule (50,000 Units total) by mouth every 7 days. (Patient not taking: Reported on 10/15/2023)    [DISCONTINUED] ferrous gluconate (FERGON) 324 MG tablet Take 1 tablet (324 mg total) by mouth daily with breakfast.     Antibiotics (From admission, onward)      Start     Stop Route Frequency Ordered    10/16/23 0830  metronidazole IVPB 500 mg         -- IV Every  8 hours (non-standard times) 10/16/23 0719    10/15/23 1945  ceFEPIme (MAXIPIME) 2 g in dextrose 5 % in water (D5W) 100 mL IVPB (MB+)         -- IV Every 8 hours (non-standard times) 10/15/23 183          Antifungals (From admission, onward)      None          Antivirals (From admission, onward)          Stop Route Frequency     acyclovir         -- Oral 2 times daily             Immunization History   Administered Date(s) Administered    COVID-19, MRNA, LN-S, PF (Pfizer) (Purple Cap) 2021, 10/04/2021    COVID-19, mRNA, LNP-S, bivalent booster, PF (Moderna Omicron)12 + YEARS 2023, 2023    DTaP 02/10/2022, 2022    DTaP (5 Pertussis Antigens) 2021    Hepatitis A, Adult 2021, 2022    Hepatitis B (recombinant) Adjuvanted, 2 dose 2021, 2022    HiB PRP-T 2021, 2022, 2022    IPV 2021, 02/10/2022, 2022    Influenza (FLUAD) - Quadrivalent - Adjuvanted - PF *Preferred* (65+) 2021    MMR 2023    Pneumococcal Conjugate - 13 Valent 2021, 2022, 2022    Pneumococcal Polysaccharide - 23 Valent 2022    Zoster Recombinant 01/10/2022, 03/10/2022    meningococcal Conjugate (MCV4O) 2021, 2022       Family History       Problem Relation (Age of Onset)    Cancer Father          Social History     Socioeconomic History    Marital status: Significant Other    Number of children: 3   Tobacco Use    Smoking status: Former     Current packs/day: 0.00     Average packs/day: 0.3 packs/day for 40.0 years (10.0 ttl pk-yrs)     Types: Cigarettes     Start date:      Quit date: 2017     Years since quittin.7    Smokeless tobacco: Never   Social History Narrative    Patient is intimate relationship with longtime partner (Catie)    Has 3 children, 1 live in [facility], 1 in [place], 1 in [place]; 4 grandchildren    2 brothers, 1 sister    Mother still living     Review of Systems   Constitutional:  Negative  for chills and fever.   Respiratory:  Negative for cough and shortness of breath.    Cardiovascular:  Negative for chest pain.   Gastrointestinal:  Positive for abdominal pain. Negative for nausea and vomiting.   Genitourinary:  Negative for difficulty urinating, dysuria and flank pain.     Objective:     Vital Signs (Most Recent):  Temp: (!) 100.6 °F (38.1 °C) (10/16/23 0613)  Pulse: 97 (10/16/23 0406)  Resp: 18 (10/16/23 0406)  BP: 115/70 (10/16/23 0406)  SpO2: 98 % (10/16/23 0406) Vital Signs (24h Range):  Temp:  [98.3 °F (36.8 °C)-100.7 °F (38.2 °C)] 100.6 °F (38.1 °C)  Pulse:  [] 97  Resp:  [17-24] 18  SpO2:  [96 %-100 %] 98 %  BP: (108-115)/(60-83) 115/70     Weight: 98.4 kg (217 lb)  Body mass index is 33.99 kg/m².    Estimated Creatinine Clearance: 64.1 mL/min (based on SCr of 1.3 mg/dL).     Physical Exam  Vitals and nursing note reviewed.   Constitutional:       General: He is not in acute distress.     Appearance: He is not ill-appearing, toxic-appearing or diaphoretic.   HENT:      Head: Normocephalic and atraumatic.      Right Ear: External ear normal.      Left Ear: External ear normal.      Mouth/Throat:      Mouth: Mucous membranes are moist.   Eyes:      General: No scleral icterus.        Right eye: No discharge.         Left eye: No discharge.   Cardiovascular:      Rate and Rhythm: Normal rate and regular rhythm.      Heart sounds: Normal heart sounds. No murmur heard.  Pulmonary:      Effort: Pulmonary effort is normal. No respiratory distress.      Breath sounds: No wheezing or rales.   Abdominal:      General: Bowel sounds are normal. There is no distension.      Palpations: Abdomen is soft.      Tenderness: There is no abdominal tenderness. There is no guarding.   Musculoskeletal:         General: No deformity.      Cervical back: No rigidity.      Right lower leg: No edema.      Left lower leg: No edema.   Skin:     General: Skin is warm.      Coloration: Skin is not jaundiced.    Neurological:      Mental Status: He is alert and oriented to person, place, and time. Mental status is at baseline.   Psychiatric:         Mood and Affect: Mood normal.         Behavior: Behavior normal.          Significant Labs: CBC:   Recent Labs   Lab 10/15/23  1023 10/16/23  0312   WBC 9.36 7.00  6.92   HGB 8.7* 7.5*  7.4*   HCT 26.0* 22.9*  23.0*   PLT 76* 79*  77*     CMP:   Recent Labs   Lab 10/15/23  1023 10/16/23  0311   * 134*   K 4.1 3.6   CL 99 102   CO2 21* 22*   * 260*   BUN 31* 25*   CREATININE 1.27 1.3   CALCIUM 8.8 8.7   PROT 7.8 6.7   ALBUMIN 3.4* 2.3*   BILITOT 1.6* 0.6   ALKPHOS 104 95   AST 28 11   ALT 29 19   ANIONGAP 11 10     All pertinent labs within the past 24 hours have been reviewed.    Significant Imaging: I have reviewed all pertinent imaging results/findings within the past 24 hours.

## 2023-10-16 NOTE — HPI
Patient is a 61-year-old male with past medical history significant for IgG kappa multiple myeloma treated by Dr. 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  He is presenting with a 3 day history of nocturnal fevers, chills, and rigors associated with abdominal pain.  Patient states that no sick contacts, no shortness of breath, no tachycardia or chestpain. He denies productive or nonproductive cough.  He denies any dysuria or back pain.  He denies any major changes to medications.     In the ED, the patient was initiated on broad-spectrum antibiotics sepsis workup was initiated.  Patient was not hypotensive.  CBC was notable for slightly worsened anemia (10 to 8.5) and low platelets but white count was normal with a normal ANC.  The patient's metabolic panel was notable for mild hyponatremia of 131, a bicarb of 21 and a BUN/creatinine of 1.27 and 31 respectively.  Bilirubin was noted to be mildly elevated at 1.6 with normal AST/ALT.     Oncology History (from Dr. Cornejo's most recent clinic note):   A. 9/14 - 9/17/2020 : [place] [facility] [place] of lytic lesions. Large soft tissue mass encompassing L4 - S1 vertebral bodies seen. FLC ratio 171, involved light chains 104. SPEP and SIFE found 2.86g/dL, IgG kappa para protein band. Underwent bone marrow biopsy and discharged with dexamethasone 40mg x 4 day burst.     B. 9/23/20 : Underwent debulking of spinal mass.  C. 10/1/20 - 4/14/21 : C1 - C8D1 VRd. Revlimid delivered in third week of cycle.  D. 2/25/21 : Presented for consent signing for autoSCT but his insurance had lapsed. Plan for transplant pushed back 1-2 months while he applys for medicaid.   E. 4/22/21 : C8D8 VRd planned (last treatment before mobilization).   F. 5/17/21 : Alea autoSCT. 3 bags and a CD34 dose of 3.35 x 10^6. He engrafted on Day +13 (5/30/21) with an ANC of 2607. discharged home on Day +14 (5/31/21).

## 2023-10-16 NOTE — ASSESSMENT & PLAN NOTE
Patient is a 63-year-old male with past medical history significant for multiple myeloma currently presenting with nocturnal fevers up to 102.  Overall workup done in the emergency department and outside facility are largely unremarkable however imaging does demonstrate some wedge like hypoattenuation of the kidney as well as some circular hypoattenuation of the liver which could represent infection or solid organ infarction from embolism.  The patient appears clinically well and the cardiopulmonary system is without signs and symptoms suggestive of infection.  Abdominal exam is largely unremarkable the patient is not neutropenic.  Regardless of this will treat empirically for pyelonephritis given worsening thrombocytopenia which could suggest sepsis.  Currently low threshold to deescalate antibiotics.    This patient does have evidence of infective focus  My overall impression is sepsis.  Source: Urinary Tract  Antibiotics given-   Antibiotics (72h ago, onward)    Start     Stop Route Frequency Ordered    10/15/23 1945  ceFEPIme (MAXIPIME) 2 g in dextrose 5 % in water (D5W) 100 mL IVPB (MB+)         -- IV Every 8 hours (non-standard times) 10/15/23 1839        Latest lactate reviewed-  Recent Labs   Lab 10/15/23  1023   LACTATE 2.0     Organ dysfunction indicated by Thrombocytopenia     Fluid challenge Not needed - patient is not hypotensive      Post- resuscitation assessment No - Post resuscitation assessment not needed       Will Not start Pressors- Levophed for MAP of 65  Source control achieved by: Abx  Complicated urinary tract infection suggested by fever or chills    Risk factors for MDR GNR UTI include any of the following within the last 3 months: None    Continued hospitalization is warranted due to Fever > 100.4 or other serious systemic findings    Plan:  - f/u cultures  - cefepime 2g TID  - Consideration to non-infectious etiology should be considered as UA is bland and CT is questionable for  pyelonephritis.   - Ddx include thrombus and/or embolic infarctions of solid organs (obtaining LE US), developing TTP (obtaining hemolysis labs), and tumor related fever

## 2023-10-16 NOTE — SUBJECTIVE & OBJECTIVE
Patient information was obtained from patient, spouse/SO, past medical records, and ER records.     Oncology History: See HPI     Medications Prior to Admission   Medication Sig Dispense Refill Last Dose    acyclovir (ZOVIRAX) 400 MG tablet Take 1 tablet (400 mg total) by mouth 2 (two) times daily. 60 tablet 11 10/15/2023 at 0800    amLODIPine (NORVASC) 10 MG tablet Take 1 tablet (10 mg total) by mouth once daily. 90 tablet 1 10/15/2023 at 0800    cloNIDine (CATAPRES) 0.3 MG tablet Take 0.3 mg by mouth once daily.   10/15/2023 at 0800    dexAMETHasone (DECADRON) 4 MG Tab Take 10 tablets (40 mg total) by mouth every 7 days. Take with food before chemotherapy appointments 40 tablet 11 10/15/2023 at 0800    gabapentin (NEURONTIN) 300 MG capsule TAKE 1 CAPSULE(300 MG) BY MOUTH TWICE DAILY 60 capsule 3 10/15/2023 at 0800    hydroCHLOROthiazide (HYDRODIURIL) 25 MG tablet Take 1 tablet (25 mg total) by mouth once daily. 90 tablet 1 10/15/2023 at 0800    potassium chloride SA (K-DUR,KLOR-CON M) 10 MEQ tablet Take 1 tablet (10 mEq total) by mouth once daily. 90 tablet 1 10/15/2023 at 0800    calcium-vitamin D3 (OYSTER SHELL CALCIUM-VIT D3) 500 mg-5 mcg (200 unit) per tablet Take 1 tablet by mouth once daily. 30 tablet 0     ergocalciferol (ERGOCALCIFEROL) 50,000 unit Cap Take 1 capsule (50,000 Units total) by mouth every 7 days. (Patient not taking: Reported on 10/15/2023) 4 capsule 1 Not Taking    [DISCONTINUED] ferrous gluconate (FERGON) 324 MG tablet Take 1 tablet (324 mg total) by mouth daily with breakfast. 30 tablet 3        Patient has no known allergies.     Past Medical History:   Diagnosis Date    Anxiety     Arthritis     Cancer     Hypertension      Past Surgical History:   Procedure Laterality Date    BACK SURGERY  01/01/2021    BONE MARROW BIOPSY Left 10/20/2021    Procedure: Biopsy-bone marrow;  Surgeon: Summer Cartwright MD;  Location: [address]);  Service: Oncology;  Laterality: Left;    COLONOSCOPY N/A  2021    Procedure: COLONOSCOPY;  Surgeon: Braxton Lees MD;  Location: [facility] ENDO (4TH FLR);  Service: Endoscopy;  Laterality: N/A;  -covid kristopher-inst mailed-tb  21-pt to remain on schedule with Dr. Lees, and confirmed updated arrival time of 0835-BB    COLONOSCOPY N/A 2021    Procedure: COLONOSCOPY;  Surgeon: Jo Ann Robledo MD;  Location: [facility] ENDO (4TH FLR);  Service: Endoscopy;  Laterality: N/A;  rescheduled due to poor bowel prep, to be rescheduled with first available provider-BB  [id number]-BB    CREATION OF MUSCLE ROTATIONAL FLAP N/A 2020    Procedure: CREATION, FLAP, MUSCLE ROTATION;  Surgeon: Kamlesh Bellamy MD;  Location: [facility] OR 2ND FLR;  Service: Plastics;  Laterality: N/A;    KNEE SURGERY Left 2019    LUMBAR FUSION N/A 2020    Procedure: FUSION, SPINE, LUMBAR L2-pelvis. Depuy. Plastic surgery closure w/ Dr. 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 Negative for fatigue.   HENT:  Negative for sinus pressure and sinus pain.    Eyes:  Negative for visual disturbance.   Respiratory:  Negative for cough, chest tightness and shortness of breath.    Cardiovascular:  Negative for chest pain, palpitations and leg swelling.   Gastrointestinal:  Positive for abdominal pain. Negative for abdominal distention, constipation,  diarrhea, nausea and vomiting.   Musculoskeletal:  Negative for back pain.   Skin:  Negative for rash.   Neurological:  Negative for light-headedness and headaches.   Hematological:  Does not bruise/bleed easily.     Objective:     Vital Signs (Most Recent):  Temp: 98.8 °F (37.1 °C) (10/15/23 2254)  Pulse: 94 (10/15/23 2254)  Resp: 18 (10/15/23 2254)  BP: 110/74 (10/15/23 2254)  SpO2: 96 % (10/15/23 2254) Vital Signs (24h Range):  Temp:  [98.3 °F (36.8 °C)-102 °F (38.9 °C)] 98.8 °F (37.1 °C)  Pulse:  [] 94  Resp:  [17-24] 18  SpO2:  [94 %-100 %] 96 %  BP: (107-115)/(60-83) 110/74     Weight: 98.4 kg (217 lb)  Body mass index is 33.99 kg/m².  Body surface area is 2.16 meters squared.    ECOG SCORE           [unfilled]    Lines/Drains/Airways       Drain  Duration                  Closed/Suction Drain 09/23/20 1758 Back Bulb 15 Fr. 1117 days         Closed/Suction Drain 09/23/20 1758 Back Bulb 15 Fr. 1117 days              Peripheral Intravenous Line  Duration                  Peripheral IV - Single Lumen 10/15/23 1944 20 G Anterior;Right Wrist <1 day                     Physical Exam  Vitals and nursing note reviewed.   Constitutional:       Appearance: Normal appearance.   HENT:      Head: Normocephalic and atraumatic.   Eyes:      General: No scleral icterus.     Pupils: Pupils are equal, round, and reactive to light.   Cardiovascular:      Rate and Rhythm: Regular rhythm. Tachycardia present.      Pulses: Normal pulses.      Heart sounds: Normal heart sounds.   Pulmonary:      Effort: Pulmonary effort is normal.      Breath sounds: Normal breath sounds.   Abdominal:      General: Abdomen is flat. There is no distension.      Palpations: Abdomen is soft.      Tenderness: There is no abdominal tenderness.   Musculoskeletal:      Right lower leg: No edema.      Left lower leg: No edema.   Lymphadenopathy:      Cervical: No cervical adenopathy.   Skin:     General: Skin is warm and dry.      Capillary Refill:  Capillary refill takes less than 2 seconds.      Comments: Goose bumps and rigors present. Temperature retaken and fever of 102 present.   Neurological:      General: No focal deficit present.      Mental Status: He is alert and oriented to person, place, and time.            Significant Labs:   All pertinent labs from the last 24 hours have been reviewed.    Diagnostic Results:  I have reviewed all pertinent imaging results/findings within the past 24 hours.

## 2023-10-16 NOTE — ASSESSMENT & PLAN NOTE
61 y.o. male with a PMHx significant for IgG kappa multiple myeloma s/p auto HSC (5/2021) currently on outpatient Melinda-KD (last infusion 10/11), osteolytic lesions, and HTN. He presents for a 3 days of nocturnal fevers/chills and rigors with associated epigastric abdominal pain. He denies respiratory symptoms, sick contacts, dysuria, flank pain, recent dietary changes/consumption of raw/undercooked meats. ID was consulted for salmonella bacteremia.     At the OSH ED, he was febrile (102) and tachycardic with no leukocytosis. He was not hypotensive. Full septic w/u initiated. UA concerning for UTI and CT abdomen/pelvis concerning for pyelonephritis. He was given IV Levaquin and admitted to [facility] BMT service for further management. He was started on cefepime and flagyl at [facility]. Rapid Organism ID by PCR from [facility] detected Salmonella. ID and susceptibility pending.    Symptoms at this time likely due to salmonella bacteremia rather than pyelonephritis given lack of urinary symptoms and flank pain.    Recommendations:  - Start ceftriaxone 2g [id number]  - Discontinue cefepime and flagyl  - F/u repeat blood cultures (ordered)

## 2023-10-16 NOTE — SUBJECTIVE & OBJECTIVE
Past Medical History:   Diagnosis Date    Anxiety     Arthritis     Cancer     Hypertension        Past Surgical History:   Procedure Laterality Date    BACK SURGERY  01/01/2021    BONE MARROW BIOPSY Left 10/20/2021    Procedure: Biopsy-bone marrow;  Surgeon: Summer Cartwright MD;  Location: [facility] ENDO (4TH FLR);  Service: Oncology;  Laterality: Left;    COLONOSCOPY N/A 01/25/2021    Procedure: COLONOSCOPY;  Surgeon: Braxton Lees MD;  Location: [facility] ENDO (4TH FLR);  Service: Endoscopy;  Laterality: N/A;  1/22-covid kristopher-inst mailed-tb  1/20/21-pt to remain on schedule with Dr. Lees, and confirmed updated arrival time of 0835-BB    COLONOSCOPY N/A 02/01/2021    Procedure: COLONOSCOPY;  Surgeon: Jo Ann Robledo MD;  Location: [facility] ENDO (4TH FLR);  Service: Endoscopy;  Laterality: N/A;  rescheduled due to poor bowel prep, to be rescheduled with first available provider-BB  covid-1/29/21-pcw-BB    CREATION OF MUSCLE ROTATIONAL FLAP N/A 09/23/2020    Procedure: CREATION, FLAP, MUSCLE ROTATION;  Surgeon: Kamlesh Bellamy MD;  [facility];  Service: Plastics;  Laterality: N/A;    KNEE SURGERY Left 06/2019    LUMBAR FUSION N/A 09/23/2020    Procedure: FUSION, SPINE, LUMBAR L2-pelvis. Depuy. Plastic surgery closure w/ Dr. Bellamy;  Surgeon: Nick Coyle MD;  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    amLODIPine (NORVASC) 10 MG tablet Take 1 tablet (10 mg total) by mouth once daily.    cloNIDine (CATAPRES) 0.3 MG tablet Take 0.3 mg by mouth once daily.    dexAMETHasone (DECADRON) 4 MG Tab Take 10 tablets (40 mg total) by mouth every 7 days. Take with food before chemotherapy appointments    gabapentin  (NEURONTIN) 300 MG capsule TAKE 1 CAPSULE(300 MG) BY MOUTH TWICE DAILY    hydroCHLOROthiazide (HYDRODIURIL) 25 MG tablet Take 1 tablet (25 mg total) by mouth once daily.    potassium chloride SA (K-DUR,KLOR-CON M) 10 MEQ tablet Take 1 tablet (10 mEq total) by mouth once daily.    calcium-vitamin D3 (OYSTER SHELL CALCIUM-VIT D3) 500 mg-5 mcg (200 unit) per tablet Take 1 tablet by mouth once daily.    ergocalciferol (ERGOCALCIFEROL) 50,000 unit Cap Take 1 capsule (50,000 Units total) by mouth every 7 days. (Patient not taking: Reported on 10/15/2023)    [DISCONTINUED] ferrous gluconate (FERGON) 324 MG tablet Take 1 tablet (324 mg total) by mouth daily with breakfast.     Antibiotics (From admission, onward)      Start     Stop Route Frequency Ordered    10/16/23 0830  metronidazole IVPB 500 mg         -- IV Every 8 hours (non-standard times) 10/16/23 0719    10/15/23 1945  ceFEPIme (MAXIPIME) 2 g in dextrose 5 % in water (D5W) 100 mL IVPB (MB+)         -- IV Every 8 hours (non-standard times) 10/15/23 1839          Antifungals (From admission, onward)      None          Antivirals (From admission, onward)          Stop Route Frequency     acyclovir         -- Oral 2 times daily             Immunization History   Administered Date(s) Administered    COVID-19, MRNA, LN-S, PF (Pfizer) (Purple Cap) 09/13/2021, 10/04/2021    COVID-19, mRNA, LNP-S, bivalent booster, PF (Moderna Omicron)12 + YEARS 06/16/2023, 08/30/2023    DTaP 02/10/2022, 04/14/2022    DTaP (5 Pertussis Antigens) 12/23/2021    Hepatitis A, Adult 12/16/2021, 06/02/2022    Hepatitis B (recombinant) Adjuvanted, 2 dose 12/30/2021, 02/17/2022    HiB PRP-T 12/16/2021, 02/03/2022, 04/07/2022    IPV 12/23/2021, 02/10/2022, 04/14/2022    Influenza (FLUAD) - Quadrivalent - Adjuvanted - PF *Preferred* (65+) 12/09/2021    MMR 06/02/2023    Pneumococcal Conjugate - 13 Valent 12/09/2021, 02/03/2022, 04/07/2022    Pneumococcal Polysaccharide - 23 Valent 06/02/2022     Zoster Recombinant 01/10/2022, 03/10/2022    meningococcal Conjugate (MCV4O) 2021, 2022       Family History       Problem Relation (Age of Onset)    Cancer Father          Social History     Socioeconomic History    Marital status: Significant Other    Number of children: 3   Tobacco Use    Smoking status: Former     Current packs/day: 0.00     Average packs/day: 0.3 packs/day for 40.0 years (10.0 ttl pk-yrs)     Types: Cigarettes     Start date:      Quit date:      Years since quittin.7    Smokeless tobacco: Never   Social History Narrative    Patient is intimate relationship with longtime partner (Catie)    Has 3 children, 1 live in [facility] [facility], 1 in [place], 1 in [place]; 4 grandchildren    2 brothers, 1 sister    Mother still living     Review of Systems   Constitutional:  Negative for chills and fever.   Respiratory:  Negative for cough and shortness of breath.    Cardiovascular:  Negative for chest pain.   Gastrointestinal:  Positive for abdominal pain. Negative for nausea and vomiting.   Genitourinary:  Negative for difficulty urinating, dysuria and flank pain.     Objective:     Vital Signs (Most Recent):  Temp: (!) 100.6 °F (38.1 °C) (10/16/23 06)  Pulse: 97 (10/16/23 040)  Resp: 18 (10/16/23 040)  BP: 115/70 (10/16/23 040)  SpO2: 98 % (10/16/23 0406) Vital Signs (24h Range):  Temp:  [98.3 °F (36.8 °C)-100.7 °F (38.2 °C)] 100.6 °F (38.1 °C)  Pulse:  [] 97  Resp:  [17-24] 18  SpO2:  [96 %-100 %] 98 %  BP: (108-115)/(60-83) 115/70     Weight: 98.4 kg (217 lb)  Body mass index is 33.99 kg/m².    Estimated Creatinine Clearance: 64.1 mL/min (based on SCr of 1.3 mg/dL).     Physical Exam  Vitals and nursing note reviewed.   Constitutional:       General: He is not in acute distress.     Appearance: He is not ill-appearing, toxic-appearing or diaphoretic.   HENT:      Head: Normocephalic and atraumatic.      Right Ear: External ear normal.      Left Ear: External ear normal.       Mouth/Throat:      Mouth: Mucous membranes are moist.   Eyes:      General: No scleral icterus.        Right eye: No discharge.         Left eye: No discharge.   Cardiovascular:      Rate and Rhythm: Normal rate and regular rhythm.      Heart sounds: Normal heart sounds. No murmur heard.  Pulmonary:      Effort: Pulmonary effort is normal. No respiratory distress.      Breath sounds: No wheezing or rales.   Abdominal:      General: Bowel sounds are normal. There is no distension.      Palpations: Abdomen is soft.      Tenderness: There is no abdominal tenderness. There is no guarding.   Musculoskeletal:         General: No deformity.      Cervical back: No rigidity.      Right lower leg: No edema.      Left lower leg: No edema.   Skin:     General: Skin is warm.      Coloration: Skin is not jaundiced.   Neurological:      Mental Status: He is alert and oriented to person, place, and time. Mental status is at baseline.   Psychiatric:         Mood and Affect: Mood normal.         Behavior: Behavior normal.          Significant Labs: CBC:   Recent Labs   Lab 10/15/23  1023 10/16/23  0312   WBC 9.36 7.00  6.92   HGB 8.7* 7.5*  7.4*   HCT 26.0* 22.9*  23.0*   PLT 76* 79*  77*     CMP:   Recent Labs   Lab 10/15/23  1023 10/16/23  0311   * 134*   K 4.1 3.6   CL 99 102   CO2 21* 22*   * 260*   BUN 31* 25*   CREATININE 1.27 1.3   CALCIUM 8.8 8.7   PROT 7.8 6.7   ALBUMIN 3.4* 2.3*   BILITOT 1.6* 0.6   ALKPHOS 104 95   AST 28 11   ALT 29 19   ANIONGAP 11 10     All pertinent labs within the past 24 hours have been reviewed.    Significant Imaging: I have reviewed all pertinent imaging results/findings within the past 24 hours.

## 2023-10-16 NOTE — CARE UPDATE
"RAPID RESPONSE NURSE CHART REVIEW        Chart Reviewed: 10/16/2023, 2:08 AM    MRN: [id number]  Bed: 811/811 A    Dx: Fever and chills    Jaspreet Walsh Jr. has a past medical history of Anxiety, Arthritis, Cancer, and Hypertension.    Last VS: /74 (BP Location: Right arm, Patient Position: Lying)   Pulse 94   Temp 98.8 °F (37.1 °C) (Oral)   Resp 18   Ht 5' 7" (1.702 m)   Wt 98.4 kg (217 lb)   SpO2 96%   BMI 33.99 kg/m²     24H Vital Sign Range:  Temp:  [98.3 °F (36.8 °C)-102 °F (38.9 °C)]   Pulse:  []   Resp:  [17-24]   BP: (107-115)/(60-83)   SpO2:  [94 %-100 %]     Level of Consciousness (AVPU): alert    Recent Labs     10/15/23  1023   WBC 9.36   HGB 8.7*   HCT 26.0*   PLT 76*       Recent Labs     10/15/23  1023   *   K 4.1   CL 99   CO2 21*   BUN 31*   CREATININE 1.27   *        No results for input(s): "PH", "PCO2", "PO2", "HCO3", "POCSATURATED", "BE" in the last 72 hours.     OXYGEN:  MEWS score: 1    Rounding completed with charge MINGO Ross. contacted for tachycardia. Reports Pt is a 3 day history of nocturnal fevers, chills, and rigors associated with abdominal pain. Pt stable at this time, no additional concerns verbalized at this time. Instructed to call [phone] for further concerns or assistance.    Payam Pitts RN        "

## 2023-10-16 NOTE — PLAN OF CARE
0700 - 1900    A/Ox4. Calm, cooperative. Denies CP/palpitations. Non-monitored. Reports bilateral lower extremity numbness and tingling. Denies SOB. Reports intermittent epigastric pain - none reported during care. Skin Intact. Independent in room.  Started IVF. Infectious disease consult. Antibiotics changed.   Echo completed. BLE doppler completed.   Elevated A1c. Diabetes education complete with patient and family. Blood glucose monitored prior to meals and bedtime. Sliding scale documented per MAR.       Safety measures maintained. Hourly rounding complete, personal belongings/call light within reach. Patient able to make needs known.        Problem: Adult Inpatient Plan of Care  Goal: Plan of Care Review  Outcome: Ongoing, Progressing  Goal: Patient-Specific Goal (Individualized)  Outcome: Ongoing, Progressing  Goal: Absence of Hospital-Acquired Illness or Injury  Outcome: Ongoing, Progressing  Goal: Optimal Comfort and Wellbeing  Outcome: Ongoing, Progressing  Goal: Readiness for Transition of Care  Outcome: Ongoing, Progressing     Problem: Infection  Goal: Absence of Infection Signs and Symptoms  Outcome: Ongoing, Progressing     Problem: Fall Injury Risk  Goal: Absence of Fall and Fall-Related Injury  Outcome: Ongoing, Progressing     Problem: Diabetes Comorbidity  Goal: Blood Glucose Level Within Targeted Range  Outcome: Ongoing, Progressing     Problem: Hyperglycemia  Goal: Blood Glucose Level Within Targeted Range  Outcome: Ongoing, Progressing

## 2023-10-16 NOTE — HPI
Jaspreet Walsh is a 61 y.o. male with a PMHx significant for IgG kappa multiple myeloma s/p auto HSC (5/2021) currently on outpatient Melinda-KD (last infusion 10/11), osteolytic lesions, and HTN. He presents for a 3 days of nocturnal fevers/chills and rigors with associated epigastric abdominal pain. He denies respiratory symptoms, sick contacts, dysuria, flank pain, recent dietary changes/consumption of raw/undercooked meats. ID was consulted for salmonella bacteremia.    At the OSH ED, he was febrile (102) and tachycardic with no leukocytosis. He was not hypotensive. Full septic w/u initiated. UA concerning for UTI and CT abdomen/pelvis concerning for pyelonephritis. He was given IV Levaquin and admitted to [facility] BMT service for further management. He was started on cefepime and flagyl at [facility]. Rapid Organism ID by PCR from [facility] detected Salmonella. ID and susceptibility pending.    At the time of my evaluation, the patient was not having subjective fevers/chills and only complained of abdominal pain. Of note, home medications include acyclovir 400mg BID.

## 2023-10-16 NOTE — ASSESSMENT & PLAN NOTE
"From 9/5/23 (Olivia): "Transplant Course  Patient with IgG Kappa MM and pmh HTN, lytic bone lesion, arthritis and vitamin D deficiency. Admitted 5/15/21 for planned Alea auto SCT for MM. He received 3 bags and a CD34 dose of 3.35 x 10^6 on 5/17/21. Tolerated chemo and transplant well. Admission complicated by n/v controlled with scheduled anti-emetics and c-diff neg diarrhea controlled with prn imodium. He engrafted on Day +13 (5/30/21) with an ANC of 2607. He was discharged home on Day +14 (5/31/21).      Day +100 restaging marrow indicated that he was in [place].   Interval History:      Found to be in relapse biochemically (June/July 2023 ) and with new lytic disease at 2 years post SCT.  Initiated DKd salvage 8/4/34.  He has been tolerating with no significant AE's.  Accompanied by wife today prior to C1D22; of note due to scheduling errors he missed 2 weeks of cycle 1. "  "

## 2023-10-17 LAB
ABO + RH BLD: ABNORMAL
ALBUMIN SERPL BCP-MCNC: 2.2 G/DL (ref 3.5–5.2)
ALP SERPL-CCNC: 73 U/L (ref 55–135)
ALT SERPL W/O P-5'-P-CCNC: 16 U/L (ref 10–44)
ANION GAP SERPL CALC-SCNC: 6 MMOL/L (ref 8–16)
AST SERPL-CCNC: 10 U/L (ref 10–40)
BASOPHILS # BLD AUTO: 0 K/UL (ref 0–0.2)
BASOPHILS NFR BLD: 0 % (ref 0–1.9)
BILIRUB SERPL-MCNC: 0.5 MG/DL (ref 0.1–1)
BLD GP AB SCN CELLS X3 SERPL QL: ABNORMAL
BLD PROD TYP BPU: NORMAL
BLOOD GROUP ANTIBODIES SERPL: NORMAL
BLOOD UNIT EXPIRATION DATE: NORMAL
BLOOD UNIT TYPE CODE: 600
BLOOD UNIT TYPE: NORMAL
BUN SERPL-MCNC: 20 MG/DL (ref 8–23)
CALCIUM SERPL-MCNC: 8.3 MG/DL (ref 8.7–10.5)
CHLORIDE SERPL-SCNC: 109 MMOL/L (ref 95–110)
CO2 SERPL-SCNC: 22 MMOL/L (ref 23–29)
CODING SYSTEM: NORMAL
CREAT SERPL-MCNC: 1 MG/DL (ref 0.5–1.4)
CROSSMATCH INTERPRETATION: NORMAL
DAT IGG-SP REAG RBC-IMP: NORMAL
DIFFERENTIAL METHOD: ABNORMAL
DISPENSE STATUS: NORMAL
EOSINOPHIL # BLD AUTO: 0 K/UL (ref 0–0.5)
EOSINOPHIL NFR BLD: 0 % (ref 0–8)
ERYTHROCYTE [DISTWIDTH] IN BLOOD BY AUTOMATED COUNT: 18.3 % (ref 11.5–14.5)
EST. GFR  (NO RACE VARIABLE): >60 ML/MIN/1.73 M^2
ESTIMATED AVG GLUCOSE: 217 MG/DL (ref 68–131)
GLUCOSE SERPL-MCNC: 187 MG/DL (ref 70–110)
HBA1C MFR BLD: 9.2 % (ref 4–5.6)
HCT VFR BLD AUTO: 20.9 % (ref 40–54)
HGB BLD-MCNC: 6.7 G/DL (ref 14–18)
IMM GRANULOCYTES # BLD AUTO: 0.03 K/UL (ref 0–0.04)
IMM GRANULOCYTES NFR BLD AUTO: 0.7 % (ref 0–0.5)
LYMPHOCYTES # BLD AUTO: 1 K/UL (ref 1–4.8)
LYMPHOCYTES NFR BLD: 20.6 % (ref 18–48)
MAGNESIUM SERPL-MCNC: 1.7 MG/DL (ref 1.6–2.6)
MCH RBC QN AUTO: 30.6 PG (ref 27–31)
MCHC RBC AUTO-ENTMCNC: 32.1 G/DL (ref 32–36)
MCV RBC AUTO: 95 FL (ref 82–98)
MONOCYTES # BLD AUTO: 0.2 K/UL (ref 0.3–1)
MONOCYTES NFR BLD: 5 % (ref 4–15)
NEUTROPHILS # BLD AUTO: 3.4 K/UL (ref 1.8–7.7)
NEUTROPHILS NFR BLD: 73.7 % (ref 38–73)
NRBC BLD-RTO: 0 /100 WBC
NUM UNITS TRANS PACKED RBC: NORMAL
PHOSPHATE SERPL-MCNC: 2.8 MG/DL (ref 2.7–4.5)
PLATELET # BLD AUTO: 79 K/UL (ref 150–450)
PMV BLD AUTO: 12.4 FL (ref 9.2–12.9)
POCT GLUCOSE: 159 MG/DL (ref 70–110)
POCT GLUCOSE: 186 MG/DL (ref 70–110)
POCT GLUCOSE: 228 MG/DL (ref 70–110)
POCT GLUCOSE: 232 MG/DL (ref 70–110)
POTASSIUM SERPL-SCNC: 4.1 MMOL/L (ref 3.5–5.1)
PROT SERPL-MCNC: 6.2 G/DL (ref 6–8.4)
RBC # BLD AUTO: 2.19 M/UL (ref 4.6–6.2)
SODIUM SERPL-SCNC: 137 MMOL/L (ref 136–145)
SPECIMEN OUTDATE: ABNORMAL
WBC # BLD AUTO: 4.61 K/UL (ref 3.9–12.7)

## 2023-10-17 PROCEDURE — 99233 PR SUBSEQUENT HOSPITAL CARE,LEVL III: ICD-10-PCS | Mod: ,,, | Performed by: INTERNAL MEDICINE

## 2023-10-17 PROCEDURE — 86870 RBC ANTIBODY IDENTIFICATION: CPT

## 2023-10-17 PROCEDURE — 36430 TRANSFUSION BLD/BLD COMPNT: CPT

## 2023-10-17 PROCEDURE — P9040 RBC LEUKOREDUCED IRRADIATED: HCPCS | Performed by: STUDENT IN AN ORGANIZED HEALTH CARE EDUCATION/TRAINING PROGRAM

## 2023-10-17 PROCEDURE — 63600175 PHARM REV CODE 636 W HCPCS: Performed by: STUDENT IN AN ORGANIZED HEALTH CARE EDUCATION/TRAINING PROGRAM

## 2023-10-17 PROCEDURE — 86905 BLOOD TYPING RBC ANTIGENS: CPT

## 2023-10-17 PROCEDURE — 25000003 PHARM REV CODE 250: Performed by: STUDENT IN AN ORGANIZED HEALTH CARE EDUCATION/TRAINING PROGRAM

## 2023-10-17 PROCEDURE — 20600001 HC STEP DOWN PRIVATE ROOM

## 2023-10-17 PROCEDURE — 86880 COOMBS TEST DIRECT: CPT

## 2023-10-17 PROCEDURE — 36415 COLL VENOUS BLD VENIPUNCTURE: CPT | Performed by: STUDENT IN AN ORGANIZED HEALTH CARE EDUCATION/TRAINING PROGRAM

## 2023-10-17 PROCEDURE — 85025 COMPLETE CBC W/AUTO DIFF WBC: CPT | Performed by: STUDENT IN AN ORGANIZED HEALTH CARE EDUCATION/TRAINING PROGRAM

## 2023-10-17 PROCEDURE — 99233 SBSQ HOSP IP/OBS HIGH 50: CPT | Mod: ,,, | Performed by: INTERNAL MEDICINE

## 2023-10-17 PROCEDURE — 80053 COMPREHEN METABOLIC PANEL: CPT | Performed by: STUDENT IN AN ORGANIZED HEALTH CARE EDUCATION/TRAINING PROGRAM

## 2023-10-17 PROCEDURE — 86900 BLOOD TYPING SEROLOGIC ABO: CPT

## 2023-10-17 PROCEDURE — 86922 COMPATIBILITY TEST ANTIGLOB: CPT | Performed by: STUDENT IN AN ORGANIZED HEALTH CARE EDUCATION/TRAINING PROGRAM

## 2023-10-17 PROCEDURE — 36415 COLL VENOUS BLD VENIPUNCTURE: CPT

## 2023-10-17 PROCEDURE — 84100 ASSAY OF PHOSPHORUS: CPT | Performed by: STUDENT IN AN ORGANIZED HEALTH CARE EDUCATION/TRAINING PROGRAM

## 2023-10-17 PROCEDURE — 83735 ASSAY OF MAGNESIUM: CPT | Performed by: STUDENT IN AN ORGANIZED HEALTH CARE EDUCATION/TRAINING PROGRAM

## 2023-10-17 PROCEDURE — 83036 HEMOGLOBIN GLYCOSYLATED A1C: CPT | Performed by: STUDENT IN AN ORGANIZED HEALTH CARE EDUCATION/TRAINING PROGRAM

## 2023-10-17 RX ORDER — HYDROCODONE BITARTRATE AND ACETAMINOPHEN 500; 5 MG/1; MG/1
TABLET ORAL
Status: DISCONTINUED | OUTPATIENT
Start: 2023-10-17 | End: 2023-10-18 | Stop reason: HOSPADM

## 2023-10-17 RX ADMIN — ENOXAPARIN SODIUM 40 MG: 40 INJECTION SUBCUTANEOUS at 05:10

## 2023-10-17 RX ADMIN — GABAPENTIN 300 MG: 300 CAPSULE ORAL at 08:10

## 2023-10-17 RX ADMIN — ACYCLOVIR 400 MG: 200 CAPSULE ORAL at 08:10

## 2023-10-17 RX ADMIN — CEFTRIAXONE 2 G: 2 INJECTION, POWDER, FOR SOLUTION INTRAMUSCULAR; INTRAVENOUS at 03:10

## 2023-10-17 RX ADMIN — SODIUM CHLORIDE: 9 INJECTION, SOLUTION INTRAVENOUS at 03:10

## 2023-10-17 RX ADMIN — INSULIN ASPART 2 UNITS: 100 INJECTION, SOLUTION INTRAVENOUS; SUBCUTANEOUS at 08:10

## 2023-10-17 RX ADMIN — CLONIDINE HYDROCHLORIDE 0.3 MG: 0.1 TABLET ORAL at 08:10

## 2023-10-17 RX ADMIN — INSULIN ASPART 2 UNITS: 100 INJECTION, SOLUTION INTRAVENOUS; SUBCUTANEOUS at 12:10

## 2023-10-17 NOTE — PLAN OF CARE
Problem: Adult Inpatient Plan of Care  Goal: Plan of Care Review  Outcome: Ongoing, Progressing  Goal: Optimal Comfort and Wellbeing  Outcome: Ongoing, Progressing     Problem: Infection  Goal: Absence of Infection Signs and Symptoms  Outcome: Ongoing, Progressing     Problem: Diabetes Comorbidity  Goal: Blood Glucose Level Within Targeted Range  Outcome: Ongoing, Progressing     Problem: Hyperglycemia  Goal: Blood Glucose Level Within Targeted Range  Outcome: Ongoing, Progressing

## 2023-10-17 NOTE — PROGRESS NOTES
[facility])  Infectious Disease  Progress Note    Patient Name: Jaspreet Walsh Jr.  MRN: [id number]  Admission Date: 10/15/2023  Length of Stay: 2 days  Attending Physician: Jose De Jesus Hernandez MD  Primary Care Provider: Con Patel Jr., MD    Isolation Status: No active isolations  Assessment/Plan:      ID  Salmonella bacteremia  61 y.o. male with a PMHx significant for IgG kappa multiple myeloma s/p auto HSC (5/2021) currently on outpatient Melinda-KD (last infusion 10/11), osteolytic lesions, and HTN. He presents for a 3 days of nocturnal fevers/chills and rigors with associated epigastric abdominal pain. He denies respiratory symptoms, sick contacts, dysuria, flank pain, recent dietary changes/consumption of raw/undercooked meats. ID was consulted for salmonella bacteremia.     At the OSH ED, he was febrile (102) and tachycardic with no leukocytosis. He was not hypotensive. Full septic w/u initiated. UA concerning for UTI and CT abdomen/pelvis concerning for pyelonephritis. He was given IV Levaquin and [place] [facility] [place] management. He was [place] cefepime and [place]yl [place] [facility] Rapid Organism ID by PCR from BC detected Salmonella. ID and susceptibility pending.    Symptoms at this time likely due to salmonella bacteremia rather than pyelonephritis given lack of urinary symptoms and flank pain. Noted symptom improvement and patient remains afebrile.    Recommendations:  - Continue ceftriaxone  - F/u BC susceptibility prior to switching to PO antibiotics.        Thank you for your consult. I will follow-up with patient. Please contact us if you have any additional questions.    Kj Manzo MD   Internal Medicine, PGY1  Infectious Disease  [facility])    Subjective:     Principal Problem:Fever and chills    HPI: Jaspreet Walsh is a 61 y.o. male with a PMHx significant for IgG kappa multiple myeloma s/p auto HSC (5/2021) currently on outpatient Melinda-KD  (last infusion 10/11), osteolytic lesions, and HTN. He presents for a 3 days of nocturnal fevers/chills and rigors with associated epigastric abdominal pain. He denies respiratory symptoms, sick contacts, dysuria, flank pain, recent dietary changes/consumption of raw/undercooked meats. ID was consulted for salmonella bacteremia.    At the OSH ED, he was febrile (102) and tachycardic with no leukocytosis. He was not hypotensive. Full septic w/u initiated. UA concerning for UTI and CT abdomen/pelvis concerning for pyelonephritis. He was given IV Levaquin and admitted to [facility] BMT service for further management. He was started on cefepime and flagyl at [facility]. Rapid Organism ID by PCR from [facility] detected Salmonella. ID and susceptibility pending.    At the time of my evaluation, the patient was not having subjective fevers/chills and only complained of abdominal pain. Of note, home medications include acyclovir 400mg BID.     Interval History: Afebrile and hemodynamically stable overnight. No complaints this morning. Pt reports no abdominal pain this morning.    Review of Systems   Constitutional:  Negative for chills and fever.   Respiratory:  Negative for cough and shortness of breath.    Cardiovascular:  Negative for chest pain.   Gastrointestinal:  Negative for abdominal pain, nausea and vomiting.   Genitourinary:  Negative for difficulty urinating, dysuria and flank pain.     Objective:     Vital Signs (Most Recent):  Temp: 98.3 °F (36.8 °C) (10/17/23 1030)  Pulse: 81 (10/17/23 1030)  Resp: 18 (10/17/23 1030)  BP: 109/66 (10/17/23 1030)  SpO2: 96 % (10/17/23 1030) Vital Signs (24h Range):  Temp:  [98.3 °F (36.8 °C)-99.2 °F (37.3 °C)] 98.3 °F (36.8 °C)  Pulse:  [81-98] 81  Resp:  [18-20] 18  SpO2:  [96 %-99 %] 96 %  BP: (109-137)/(65-79) 109/66     Weight: 98.4 kg (217 lb)  Body mass index is 33.98 kg/m².    Estimated Creatinine Clearance: 83.4 mL/min (based on SCr of 1 mg/dL).     Physical Exam  Vitals and nursing note  reviewed.   Constitutional:       General: He is not in acute distress.     Appearance: He is not ill-appearing, toxic-appearing or diaphoretic.   HENT:      Head: Normocephalic and atraumatic.      Right Ear: External ear normal.      Left Ear: External ear normal.      Mouth/Throat:      Mouth: Mucous membranes are moist.   Eyes:      General: No scleral icterus.        Right eye: No discharge.         Left eye: No discharge.   Cardiovascular:      Rate and Rhythm: Normal rate and regular rhythm.      Heart sounds: Normal heart sounds. No murmur heard.  Pulmonary:      Effort: Pulmonary effort is normal. No respiratory distress.      Breath sounds: No wheezing or rales.   Abdominal:      General: Bowel sounds are normal. There is no distension.      Palpations: Abdomen is soft.      Tenderness: There is no abdominal tenderness. There is no guarding.   Musculoskeletal:         General: No deformity.      Cervical back: No rigidity.      Right lower leg: No edema.      Left lower leg: No edema.   Skin:     General: Skin is warm.      Coloration: Skin is not jaundiced.   Neurological:      Mental Status: He is alert and oriented to person, place, and time. Mental status is at baseline.   Psychiatric:         Mood and Affect: Mood normal.         Behavior: Behavior normal.          Significant Labs: CBC:   Recent Labs   Lab 10/16/23  0312 10/17/23  0302   WBC 7.00  6.92 4.61   HGB 7.5*  7.4* 6.7*   HCT 22.9*  23.0* 20.9*   PLT 79*  77* 79*     CMP:   Recent Labs   Lab 10/16/23  0311 10/17/23  0302   * 137   K 3.6 4.1    109   CO2 22* 22*   * 187*   BUN 25* 20   CREATININE 1.3 1.0   CALCIUM 8.7 8.3*   PROT 6.7 6.2   ALBUMIN 2.3* 2.2*   BILITOT 0.6 0.5   ALKPHOS 95 73   AST 11 10   ALT 19 16   ANIONGAP 10 6*       Significant Imaging: I have reviewed all pertinent imaging results/findings within the past 24 hours.

## 2023-10-17 NOTE — SUBJECTIVE & OBJECTIVE
Interval History: Afebrile and hemodynamically stable overnight. No complaints this morning. Pt reports no abdominal pain this morning.    Review of Systems   Constitutional:  Negative for chills and fever.   Respiratory:  Negative for cough and shortness of breath.    Cardiovascular:  Negative for chest pain.   Gastrointestinal:  Negative for abdominal pain, nausea and vomiting.   Genitourinary:  Negative for difficulty urinating, dysuria and flank pain.     Objective:     Vital Signs (Most Recent):  Temp: 98.3 °F (36.8 °C) (10/17/23 1030)  Pulse: 81 (10/17/23 1030)  Resp: 18 (10/17/23 1030)  BP: 109/66 (10/17/23 1030)  SpO2: 96 % (10/17/23 1030) Vital Signs (24h Range):  Temp:  [98.3 °F (36.8 °C)-99.2 °F (37.3 °C)] 98.3 °F (36.8 °C)  Pulse:  [81-98] 81  Resp:  [18-20] 18  SpO2:  [96 %-99 %] 96 %  BP: (109-137)/(65-79) 109/66     Weight: 98.4 kg (217 lb)  Body mass index is 33.98 kg/m².    Estimated Creatinine Clearance: 83.4 mL/min (based on SCr of 1 mg/dL).     Physical Exam  Vitals and nursing note reviewed.   Constitutional:       General: He is not in acute distress.     Appearance: He is not ill-appearing, toxic-appearing or diaphoretic.   HENT:      Head: Normocephalic and atraumatic.      Right Ear: External ear normal.      Left Ear: External ear normal.      Mouth/Throat:      Mouth: Mucous membranes are moist.   Eyes:      General: No scleral icterus.        Right eye: No discharge.         Left eye: No discharge.   Cardiovascular:      Rate and Rhythm: Normal rate and regular rhythm.      Heart sounds: Normal heart sounds. No murmur heard.  Pulmonary:      Effort: Pulmonary effort is normal. No respiratory distress.      Breath sounds: No wheezing or rales.   Abdominal:      General: Bowel sounds are normal. There is no distension.      Palpations: Abdomen is soft.      Tenderness: There is no abdominal tenderness. There is no guarding.   Musculoskeletal:         General: No deformity.      Cervical  back: No rigidity.      Right lower leg: No edema.      Left lower leg: No edema.   Skin:     General: Skin is warm.      Coloration: Skin is not jaundiced.   Neurological:      Mental Status: He is alert and oriented to person, place, and time. Mental status is at baseline.   Psychiatric:         Mood and Affect: Mood normal.         Behavior: Behavior normal.          Significant Labs: CBC:   Recent Labs   Lab 10/16/23  0312 10/17/23  0302   WBC 7.00  6.92 4.61   HGB 7.5*  7.4* 6.7*   HCT 22.9*  23.0* 20.9*   PLT 79*  77* 79*     CMP:   Recent Labs   Lab 10/16/23  0311 10/17/23  0302   * 137   K 3.6 4.1    109   CO2 22* 22*   * 187*   BUN 25* 20   CREATININE 1.3 1.0   CALCIUM 8.7 8.3*   PROT 6.7 6.2   ALBUMIN 2.3* 2.2*   BILITOT 0.6 0.5   ALKPHOS 95 73   AST 11 10   ALT 19 16   ANIONGAP 10 6*       Significant Imaging: I have reviewed all pertinent imaging results/findings within the past 24 hours.

## 2023-10-17 NOTE — PROGRESS NOTES
Food & Nutrition  Education    Diet Education: A1C  Time Spent: 20 minutes  Learners: Patient and Family Member      Nutrition Education provided with handouts: Meal Planning Using the Plate Method, CHO Counting for People with Diabetes      Comments: RD spoke with patient regarding CHO meal planning for people with diabetes. RD explained carbohydrates are grains, starchy vegetables, milk, yogurt, fruit, and sweets.  Foods with carbohydrates cause your blood sugar to rise, it is important to choose healthy carbohydrates and to control the amount of carbohydrates eaten at each meal for blood sugar management. Don't skip meals. Choose fresh, unprocessed foods as often as possible. Drink water. Limit sugary beverages such as jazmine, juice and punch. Fill half your plate with non-starchy vegetables. Choose lean proteins (seafood, beef/pork loin, chicken/turkey, eggs.) Eat more whole grain breads and cereals. Choose 4-5 servings of carbohydrates per meal, 1-2 servings with snacks. Appropriate portion sizes. RD discussed importance of eating a balanced diet with whole grains, fruits and vegetables, and lean protein sources. Recommendations provided below. RD recommends outpatient diabetic management clinic referral for further education.      All questions and concerns answered. Dietitian's contact information provided.       Follow-Up: Yes    Please Re-consult as needed    Recommendations    1. Continue Regular Diet, encourage High-Calorie, High-Protein food sources. (~43 g CHO per meal/ snack)   2. If PO intake declines <50% consumed at meals, recommend Boost Plus/ Boost GC (flavor per patient preference) TID.   3. If more aggressive nutrition intervention warranted, please re-consult.   4. Recommend continuing daily weights.   5. RD to monitor and follow-up.    Goals: Meet % EEN/EPN by follow-up date.  Nutrition Goal Status: new  Communication of RD Recs: other (comment) (POC)    Thanks!    George Carter  Registration Eligible, Provisional LDN

## 2023-10-17 NOTE — ASSESSMENT & PLAN NOTE
Patient is a 63-year-old male with past medical history significant for multiple myeloma currently presenting with nocturnal fevers up to 102.  Overall workup done in the emergency department and outside facility are largely unremarkable however imaging does demonstrate some wedge like hypoattenuation of the kidney as well as some circular hypoattenuation of the liver which could represent infection or solid organ infarction from embolism.  The patient appears clinically well and the cardiopulmonary system is without signs and symptoms suggestive of infection.  Abdominal exam is largely unremarkable the patient is not neutropenic.  Regardless of this will treat empirically for pyelonephritis given worsening thrombocytopenia which could suggest sepsis.  Currently low threshold to deescalate antibiotics.    This patient does have evidence of infective focus  My overall impression is sepsis.  Source: Urinary Tract  Antibiotics given-   Antibiotics (72h ago, onward)    Start     Stop Route Frequency Ordered    10/16/23 1630  cefTRIAXone (ROCEPHIN) 2 g in dextrose 5 % in water (D5W) 100 mL IVPB (MB+)         -- IV Every 24 hours (non-standard times) 10/16/23 1526        Latest lactate reviewed-  Recent Labs   Lab 10/15/23  1023   LACTATE 2.0     Organ dysfunction indicated by Thrombocytopenia     Fluid challenge Not needed - patient is not hypotensive      Post- resuscitation assessment No - Post resuscitation assessment not needed       Will Not start Pressors- Levophed for MAP of 65  Source control achieved by: Abx  Complicated urinary tract infection suggested by fever or chills    Risk factors for MDR GNR UTI include any of the following within the last 3 months: None    Continued hospitalization is warranted due to Fever > 100.4 or other serious systemic findings    Plan:  - See bacteremia

## 2023-10-17 NOTE — PROGRESS NOTES
Hgb 6.7 reported to on-call resident. Order placed for type and screen; transfusion consent obtained. Resident to notify fellow at shift change to place appropriate transfusion orders. VSS; pt does not appear in any distress at this time.

## 2023-10-17 NOTE — ASSESSMENT & PLAN NOTE
Salmonella on blood cultures with ID consulted      Plan:  -Rocephin 2 grams daily, follow-up final ID recs  -DC after afebrile for 48 hours  -TTE with no vegetations,

## 2023-10-17 NOTE — NURSING
Assumed care for patient at 0700. AOX4, VSS. Denies pain. HGB 6.8 today- 1unit of RBC given. Medications given as scheduled. IV fluids running. Glucose checked as scheduled and insulin given as needed. Wife is at the bedside and patient is ambulating in the room independently. Call bell in reach.

## 2023-10-17 NOTE — SUBJECTIVE & OBJECTIVE
Subjective:     Interval History: Patient vitally stable overnight with no acute issues or concerns and was afebrile. Blood cultures showing growth of Salmonella with ID consulted and placed on Rocephin 2 grams daily. CT chest obtained showing no acute findings along with TTE showing no vegetations. Labs significant for a H/H of 6.7/20.9 requiring transfusion. Patient updated regarding overall plan of care and was agreeable and understanding.     Objective:     Vital Signs (Most Recent):  Temp: 98.8 °F (37.1 °C) (10/17/23 0602)  Pulse: 94 (10/17/23 0602)  Resp: 20 (10/17/23 0602)  BP: 136/79 (10/17/23 0602)  SpO2: 97 % (10/17/23 0602) Vital Signs (24h Range):  Temp:  [98.1 °F (36.7 °C)-99.2 °F (37.3 °C)] 98.8 °F (37.1 °C)  Pulse:  [] 94  Resp:  [16-20] 20  SpO2:  [96 %-99 %] 97 %  BP: (111-150)/(65-79) 136/79     Weight: 98.4 kg (217 lb)  Body mass index is 33.98 kg/m².  Body surface area is 2.16 meters squared.    ECOG SCORE           [unfilled]    Intake/Output - Last 3 Shifts         10/15 0700  10/16 0659 10/16 0700  10/17 0659 10/17 0700  10/18 0659    Urine (mL/kg/hr) 450      Stool 1      Total Output 451      Net -451                      Physical Exam  Vitals and nursing note reviewed.   Constitutional:       Appearance: Normal appearance.   Cardiovascular:      Rate and Rhythm: Normal rate and regular rhythm.   Abdominal:      General: Abdomen is flat.   Musculoskeletal:         General: Normal range of motion.   Skin:     General: Skin is warm.   Neurological:      General: No focal deficit present.      Mental Status: He is alert.   Psychiatric:         Mood and Affect: Mood normal.            Significant Labs:   All pertinent labs from the last 24 hours have been reviewed.    Diagnostic Results:  All pertinent imaging from the last 24 hours have been reviewed.

## 2023-10-17 NOTE — PLAN OF CARE
Problem: Adult Inpatient Plan of Care  Goal: Plan of Care Review  Outcome: Met  Flowsheets (Taken 10/17/2023 8644)  Plan of Care Reviewed With: patient  Goal: Readiness for Transition of Care  Outcome: Met

## 2023-10-17 NOTE — ASSESSMENT & PLAN NOTE
"From 9/5/23 (Olivia): "Transplant Course  Patient with IgG Kappa MM and pmh HTN, lytic bone lesion, arthritis and vitamin D deficiency. Admitted 5/15/21 for planned Alea auto SCT for MM. He received 3 bags and a CD34 dose of 3.35 x 10^6 on 5/17/21. Tolerated chemo and transplant well. Admission complicated by n/v controlled with scheduled anti-emetics and c-diff neg diarrhea controlled with prn imodium. He engrafted on Day +13 (5/30/21) with an ANC of 2607. He was discharged home on Day +14 (5/31/21).      Day +100 restaging marrow indicated that he was in [place].   Interval History:      Found to be in relapse biochemically (June/July 2023 ) and with new lytic disease at 2 years post SCT.  Initiated DKd salvage 8/4/34.  He has been tolerating with no significant AE's.  Accompanied by wife today prior to C1D22; of note due to scheduling errors he missed 2 weeks of cycle 1. "  "

## 2023-10-17 NOTE — ASSESSMENT & PLAN NOTE
61 y.o. male with a PMHx significant for IgG kappa multiple myeloma s/p auto HSC (5/2021) currently on outpatient Melinda-KD (last infusion 10/11), osteolytic lesions, and HTN. He presents for a 3 days of nocturnal fevers/chills and rigors with associated epigastric abdominal pain. He denies respiratory symptoms, sick contacts, dysuria, flank pain, recent dietary changes/consumption of raw/undercooked meats. ID was consulted for salmonella bacteremia.     At the OSH ED, he was febrile (102) and tachycardic with no leukocytosis. He was not hypotensive. Full septic w/u initiated. UA concerning for UTI and CT abdomen/pelvis concerning for pyelonephritis. He was given IV Levaquin and admitted to [facility] BMT service for further management. He was started on cefepime and flagyl at [facility]. Rapid Organism ID by PCR from [facility] detected Salmonella. ID and susceptibility pending.    Symptoms at this time likely due to salmonella bacteremia rather than pyelonephritis given lack of urinary symptoms and flank pain. Noted symptom improvement and patient remains afebrile.    Recommendations:  - Continue ceftriaxone  - F/u BC susceptibility prior to switching to PO antibiotics.

## 2023-10-17 NOTE — PROGRESS NOTES
[facility] - Oncology ([facility])  Hematology  Bone Marrow Transplant  Progress Note    Patient Name: Jaspreet Walsh Jr.  Admission Date: 10/15/2023  Hospital Length of Stay: 2 days  Code Status: Full Code    Subjective:     Interval History: Patient vitally stable overnight with no acute issues or concerns and was afebrile. Blood cultures showing growth of Salmonella with ID consulted and placed on Rocephin 2 grams daily. CT chest obtained showing no acute findings along with TTE showing no vegetations. Labs significant for a H/H of 6.7/20.9 requiring transfusion. Patient updated regarding overall plan of care and was agreeable and understanding.     Objective:     Vital Signs (Most Recent):  Temp: 98.8 °F (37.1 °C) (10/17/23 0602)  Pulse: 94 (10/17/23 0602)  Resp: 20 (10/17/23 0602)  BP: 136/79 (10/17/23 0602)  SpO2: 97 % (10/17/23 0602) Vital Signs (24h Range):  Temp:  [98.1 °F (36.7 °C)-99.2 °F (37.3 °C)] 98.8 °F (37.1 °C)  Pulse:  [] 94  Resp:  [16-20] 20  SpO2:  [96 %-99 %] 97 %  BP: (111-150)/(65-79) 136/79     Weight: 98.4 kg (217 lb)  Body mass index is 33.98 kg/m².  Body surface area is 2.16 meters squared.    ECOG SCORE           [unfilled]    Intake/Output - Last 3 Shifts         10/15 0700  10/16 0659 10/16 0700  10/17 0659 10/17 0700  10/18 0659    Urine (mL/kg/hr) 450      Stool 1      Total Output 451      Net -451                      Physical Exam  Vitals and nursing note reviewed.   Constitutional:       Appearance: Normal appearance.   Cardiovascular:      Rate and Rhythm: Normal rate and regular rhythm.   Abdominal:      General: Abdomen is flat.   Musculoskeletal:         General: Normal range of motion.   Skin:     General: Skin is warm.   Neurological:      General: No focal deficit present.      Mental Status: He is alert.   Psychiatric:         Mood and Affect: Mood normal.            Significant Labs:   All pertinent labs from the last 24 hours have been reviewed.    Diagnostic  Results:  All pertinent imaging from the last 24 hours have been reviewed.    Assessment/Plan:     * Fever and chills  Patient is a 63-year-old male with past medical history significant for multiple myeloma currently presenting with nocturnal fevers up to 102.  Overall workup done in the emergency department and outside facility are largely unremarkable however imaging does demonstrate some wedge like hypoattenuation of the kidney as well as some circular hypoattenuation of the liver which could represent infection or solid organ infarction from embolism.  The patient appears clinically well and the cardiopulmonary system is without signs and symptoms suggestive of infection.  Abdominal exam is largely unremarkable the patient is not neutropenic.  Regardless of this will treat empirically for pyelonephritis given worsening thrombocytopenia which could suggest sepsis.  Currently low threshold to deescalate antibiotics.    This patient does have evidence of infective focus  My overall impression is sepsis.  Source: Urinary Tract  Antibiotics given-   Antibiotics (72h ago, onward)    Start     Stop Route Frequency Ordered    10/16/23 1630  cefTRIAXone (ROCEPHIN) 2 g in dextrose 5 % in water (D5W) 100 mL IVPB (MB+)         -- IV Every 24 hours (non-standard times) 10/16/23 1526        Latest lactate reviewed-  Recent Labs   Lab 10/15/23  1023   LACTATE 2.0     Organ dysfunction indicated by Thrombocytopenia     Fluid challenge Not needed - patient is not hypotensive      Post- resuscitation assessment No - Post resuscitation assessment not needed       Will Not start Pressors- Levophed for MAP of 65  Source control achieved by: Abx  Complicated urinary tract infection suggested by fever or chills    Risk factors for MDR GNR UTI include any of the following within the last 3 months: None    Continued hospitalization is warranted due to Fever > 100.4 or other serious systemic findings    Plan:  - See  "bacteremia        Salmonella bacteremia  Salmonella on blood cultures with ID consulted      Plan:  -Rocephin 2 grams daily, follow-up final ID recs  -DC after afebrile for 48 hours  -TTE with no vegetations,     History of auto stem cell transplant  See oncologic hx    Multiple myeloma  From 9/5/23 ([facility]): "Transplant Course  Patient with IgG Kappa MM and pmh HTN, lytic bone lesion, arthritis and vitamin D deficiency. Admitted 5/15/21 for planned Alea auto SCT for MM. He received 3 bags and a CD34 dose of 3.35 x 10^6 on 5/17/21. Tolerated chemo and transplant well. Admission complicated by n/v controlled with scheduled anti-emetics and c-diff neg diarrhea controlled with prn imodium. He engrafted on Day +13 (5/30/21) with an ANC of 2607. He was discharged home on Day +14 (5/31/21).      Day +100 restaging marrow indicated that he was in [place].   Interval History:      Found to be in relapse biochemically (June/July 2023 ) and with new lytic disease at 2 years post SCT.  Initiated DKd salvage 8/4/34.  He has been tolerating with no significant AE's.  Accompanied by wife today prior to C1D22; of note due to scheduling errors he missed 2 weeks of cycle 1. "    Essential hypertension  Resume home meds on DC as able          VTE Risk Mitigation (From admission, onward)         Ordered     enoxaparin injection 40 mg  Daily         10/15/23 1824     IP VTE HIGH RISK PATIENT  Once         10/15/23 1824     Place sequential compression device  Until discontinued         10/15/23 1824                Disposition: Remain oon BMT Service.     Nick Millan, DO  Bone Marrow Transplant  [facility] ([facility])      "

## 2023-10-17 NOTE — HOSPITAL COURSE
10/17/2023 Patient vitally stable overnight with no acute issues or concerns and was afebrile. Blood cultures showing growth of Salmonella with ID consulted and placed on Rocephin 2 grams daily. CT chest obtained showing no acute findings along with TTE showing no vegetations. Labs significant for a H/H of 6.7/20.9 requiring transfusion. Patient updated regarding overall plan of care and was agreeable and understanding.   10/18/2023 Patient vitally stable overnight with some hypertension noted and was afebrile. Blood cultures confirmed Salmonella. Labs significant for an H/H pf 8.6/27 and phosp of 1.8. He was deemed stable for discharge home and will complete anti-biotics with. Of note, he was found to be diabetic with an A1C of 9.2 on admit and was started on Metformin BID and RD consulted. He will follow-up on 10/25/23 with BMT clinic. ID recs for Cipro for 12 days and out-patient follow-up. He was instructed regarding any new or worsening symptoms and the need to present to the ED including recurrent fever.

## 2023-10-17 NOTE — PROGRESS NOTES
Assumed care of pt from 1845-0715.     - A/Ox4  - VSS  - Denies pain  - HS glucose 239  - PIV flushes w/o difficulty; NS @ 100  - Ambulating to BR independently w/ steady gait  - Voiding clear/yellow urine  - Wife at the bedside  - Call bell within reach    Frequent rounding performed.    No acute events overnight. POC discussed - pt verbalizes understanding and denies questions/concerns at this time.     Pt currently resting comfortably w/ unlabored breathing and exhibits no s/s of distress. Bed is in lowest position and locked. POC followed.

## 2023-10-18 VITALS
HEART RATE: 98 BPM | RESPIRATION RATE: 18 BRPM | TEMPERATURE: 99 F | BODY MASS INDEX: 34.06 KG/M2 | SYSTOLIC BLOOD PRESSURE: 146 MMHG | OXYGEN SATURATION: 98 % | WEIGHT: 217 LBS | HEIGHT: 67 IN | DIASTOLIC BLOOD PRESSURE: 81 MMHG

## 2023-10-18 PROBLEM — E11.9 DIABETES MELLITUS: Status: ACTIVE | Noted: 2023-10-18

## 2023-10-18 LAB
ALBUMIN SERPL BCP-MCNC: 2.3 G/DL (ref 3.5–5.2)
ALP SERPL-CCNC: 76 U/L (ref 55–135)
ALT SERPL W/O P-5'-P-CCNC: 14 U/L (ref 10–44)
ANION GAP SERPL CALC-SCNC: 8 MMOL/L (ref 8–16)
AST SERPL-CCNC: 13 U/L (ref 10–40)
BASOPHILS # BLD AUTO: 0.01 K/UL (ref 0–0.2)
BASOPHILS NFR BLD: 0.3 % (ref 0–1.9)
BILIRUB SERPL-MCNC: 0.6 MG/DL (ref 0.1–1)
BUN SERPL-MCNC: 10 MG/DL (ref 8–23)
CALCIUM SERPL-MCNC: 8.2 MG/DL (ref 8.7–10.5)
CHLORIDE SERPL-SCNC: 110 MMOL/L (ref 95–110)
CO2 SERPL-SCNC: 20 MMOL/L (ref 23–29)
CREAT SERPL-MCNC: 0.9 MG/DL (ref 0.5–1.4)
DIFFERENTIAL METHOD: ABNORMAL
EOSINOPHIL # BLD AUTO: 0 K/UL (ref 0–0.5)
EOSINOPHIL NFR BLD: 0 % (ref 0–8)
ERYTHROCYTE [DISTWIDTH] IN BLOOD BY AUTOMATED COUNT: 19.8 % (ref 11.5–14.5)
EST. GFR  (NO RACE VARIABLE): >60 ML/MIN/1.73 M^2
GLUCOSE SERPL-MCNC: 158 MG/DL (ref 70–110)
HCT VFR BLD AUTO: 27 % (ref 40–54)
HGB BLD-MCNC: 8.6 G/DL (ref 14–18)
IMM GRANULOCYTES # BLD AUTO: 0.02 K/UL (ref 0–0.04)
IMM GRANULOCYTES NFR BLD AUTO: 0.5 % (ref 0–0.5)
LYMPHOCYTES # BLD AUTO: 0.9 K/UL (ref 1–4.8)
LYMPHOCYTES NFR BLD: 24.4 % (ref 18–48)
MAGNESIUM SERPL-MCNC: 1.6 MG/DL (ref 1.6–2.6)
MCH RBC QN AUTO: 30.1 PG (ref 27–31)
MCHC RBC AUTO-ENTMCNC: 31.9 G/DL (ref 32–36)
MCV RBC AUTO: 94 FL (ref 82–98)
MONOCYTES # BLD AUTO: 0.2 K/UL (ref 0.3–1)
MONOCYTES NFR BLD: 5.2 % (ref 4–15)
NEUTROPHILS # BLD AUTO: 2.7 K/UL (ref 1.8–7.7)
NEUTROPHILS NFR BLD: 69.6 % (ref 38–73)
NRBC BLD-RTO: 0 /100 WBC
PHOSPHATE SERPL-MCNC: 1.8 MG/DL (ref 2.7–4.5)
PLATELET # BLD AUTO: 92 K/UL (ref 150–450)
PMV BLD AUTO: 12.5 FL (ref 9.2–12.9)
POTASSIUM SERPL-SCNC: 3.7 MMOL/L (ref 3.5–5.1)
PROT SERPL-MCNC: 6.6 G/DL (ref 6–8.4)
RBC # BLD AUTO: 2.86 M/UL (ref 4.6–6.2)
SODIUM SERPL-SCNC: 138 MMOL/L (ref 136–145)
WBC # BLD AUTO: 3.86 K/UL (ref 3.9–12.7)

## 2023-10-18 PROCEDURE — 99232 SBSQ HOSP IP/OBS MODERATE 35: CPT | Mod: ,,, | Performed by: INTERNAL MEDICINE

## 2023-10-18 PROCEDURE — 99238 HOSP IP/OBS DSCHRG MGMT 30/<: CPT | Mod: ,,, | Performed by: INTERNAL MEDICINE

## 2023-10-18 PROCEDURE — 83735 ASSAY OF MAGNESIUM: CPT | Performed by: STUDENT IN AN ORGANIZED HEALTH CARE EDUCATION/TRAINING PROGRAM

## 2023-10-18 PROCEDURE — 80053 COMPREHEN METABOLIC PANEL: CPT | Performed by: STUDENT IN AN ORGANIZED HEALTH CARE EDUCATION/TRAINING PROGRAM

## 2023-10-18 PROCEDURE — 99238 PR HOSPITAL DISCHARGE DAY,<30 MIN: ICD-10-PCS | Mod: ,,, | Performed by: INTERNAL MEDICINE

## 2023-10-18 PROCEDURE — 1111F PR DISCHARGE MEDS RECONCILED W/ CURRENT OUTPATIENT MED LIST: ICD-10-PCS | Mod: CPTII,,, | Performed by: INTERNAL MEDICINE

## 2023-10-18 PROCEDURE — 99232 PR SUBSEQUENT HOSPITAL CARE,LEVL II: ICD-10-PCS | Mod: ,,, | Performed by: INTERNAL MEDICINE

## 2023-10-18 PROCEDURE — 25000003 PHARM REV CODE 250: Performed by: STUDENT IN AN ORGANIZED HEALTH CARE EDUCATION/TRAINING PROGRAM

## 2023-10-18 PROCEDURE — 1111F DSCHRG MED/CURRENT MED MERGE: CPT | Mod: CPTII,,, | Performed by: INTERNAL MEDICINE

## 2023-10-18 PROCEDURE — 36415 COLL VENOUS BLD VENIPUNCTURE: CPT | Performed by: STUDENT IN AN ORGANIZED HEALTH CARE EDUCATION/TRAINING PROGRAM

## 2023-10-18 PROCEDURE — 85025 COMPLETE CBC W/AUTO DIFF WBC: CPT | Performed by: STUDENT IN AN ORGANIZED HEALTH CARE EDUCATION/TRAINING PROGRAM

## 2023-10-18 PROCEDURE — 84100 ASSAY OF PHOSPHORUS: CPT | Performed by: STUDENT IN AN ORGANIZED HEALTH CARE EDUCATION/TRAINING PROGRAM

## 2023-10-18 RX ORDER — METFORMIN HYDROCHLORIDE 1000 MG/1
500 TABLET ORAL 2 TIMES DAILY WITH MEALS
Qty: 90 TABLET | Refills: 3 | Status: SHIPPED | OUTPATIENT
Start: 2023-10-18 | End: 2024-10-17

## 2023-10-18 RX ORDER — CIPROFLOXACIN 750 MG/1
750 TABLET, FILM COATED ORAL EVERY 12 HOURS
Qty: 24 TABLET | Refills: 0 | Status: CANCELLED | OUTPATIENT
Start: 2023-10-18

## 2023-10-18 RX ORDER — CIPROFLOXACIN 750 MG/1
750 TABLET, FILM COATED ORAL EVERY 12 HOURS
Qty: 24 TABLET | Refills: 0 | Status: SHIPPED | OUTPATIENT
Start: 2023-10-18 | End: 2023-10-30

## 2023-10-18 RX ORDER — SODIUM,POTASSIUM PHOSPHATES 280-250MG
2 POWDER IN PACKET (EA) ORAL ONCE
Status: COMPLETED | OUTPATIENT
Start: 2023-10-18 | End: 2023-10-18

## 2023-10-18 RX ORDER — METFORMIN HYDROCHLORIDE 1000 MG/1
1000 TABLET ORAL 2 TIMES DAILY WITH MEALS
Qty: 180 TABLET | Refills: 3 | Status: SHIPPED | OUTPATIENT
Start: 2023-10-18 | End: 2023-10-18 | Stop reason: SDUPTHER

## 2023-10-18 RX ADMIN — POTASSIUM & SODIUM PHOSPHATES POWDER PACK 280-160-250 MG 2 PACKET: 280-160-250 PACK at 08:10

## 2023-10-18 RX ADMIN — ACYCLOVIR 400 MG: 200 CAPSULE ORAL at 08:10

## 2023-10-18 RX ADMIN — GABAPENTIN 300 MG: 300 CAPSULE ORAL at 08:10

## 2023-10-18 RX ADMIN — CLONIDINE HYDROCHLORIDE 0.3 MG: 0.1 TABLET ORAL at 08:10

## 2023-10-18 NOTE — PROGRESS NOTES
Admit Assessment    Patient Identification  Jaspreet Walsh Jr.   :  1960  Admit Date:  10/15/2023  Attending Provider:  Jose De Jesus Hernandez MD              Referral:   Pt was admitted to  with a diagnosis of Fever and chills, and was admitted this hospital stay due to Pyelonephritis [N12].   is involved was referred to the Social Work Department via routine referral.  Patient presents as a 63 y.o. year old single male.    Persons interviewed: patient.    Living Situation:  Lives with partner Catie ([phone]) in own single-story home.  Independent with ADLs.        Resides at [address] [address]   phone: [phone] (home).      (RETIRED) Functional Status Prior  Ambulation Prior: 0-->independent  Transferrin-->independent  Toiletin-->independent  Bathin-->independent  Dressin-->independent  Eatin-->independent  Communication: understands/communicates without difficulty  Swallowing: swallows foods/liquids without difficulty    Current or Past Agencies and Description of Services/Supplies    DME  Agency Name: none  Equipment Currently Used at Home: none, past use of walker    Home Health  Agency Name: none     IV Infusion  Agency Name: none     Nutrition: Oral, diabetic diet.      Outpatient Pharmacy:     [id number] - [facility] [address] [facility] [address]  [address] [facility] [address]  Phone: [phone] Fax: [phone]    [facility] [address] [place] [address] - [address] [place], [place] [address] [facility] [id number]  Phone: [phone] Fax: [phone]    [facility] - [place], [place] - [address]  [address] [facility] [place] [address]  Phone: [phone] Fax: [phone]      Patient Preference of agencies include: none expressed.      Patient/Caregiver informed of right to choose providers or agencies.  Patient provides permission to  release any necessary information to [facility] and to Non-[facility] agencies as needed to facilitate patient care, treatment planning, and patient discharge planning.  Written and verbal resources provided.      Coping   Coping well with support of family.Anxious to return home.         Adjustment to Diagnosis and Treatment  Adequate.      Emotional/Behavioral/Cognitive Issues   Hx of anxiety.  No current medications.           History/Current Symptoms of Anxiety/Depression: Yes  History/Current Substance Use:   Social History     Tobacco Use    Smoking status: Former     Current packs/day: 0.00     Average packs/day: 0.3 packs/day for 40.0 years (10.0 ttl pk-yrs)     Types: Cigarettes     Start date:      Quit date:      Years since quittin.7    Smokeless tobacco: Never   Substance and Sexual Activity    Alcohol use: Not on file    Drug use: Not on file    Sexual activity: Not on file       Indications of Abuse/Neglect: No:   Abuse Screen (yes response referral indicated)  Feels Unsafe at Home or Work/School: no  Feels Threatened by Someone: no  Does anyone try to keep you from having contact with others or doing things outside your home?: no  Physical Signs of Abuse Present: no    Financial:  Payer/Plan Subscr  Sex Relation Sub. Ins. ID Effective Group Num   1. [employer] MANAGE* EUNICE SALAS * 1960 Male Self [id number] 23 [id number]                                   [address] [address]   2. [place]* [place] * 1960 Male Self [id number]* 20                                    [address] [address]        Other identified concerns/needs: Awaiting antibiotics recommendations from ID.    Plan: return home to care of family.      Interventions/Referrals: TBD.    Patient/caregiver engaged in treatment planning process.     providing psychosocial and supportive counseling, resources, education, assistance and discharge planning as appropriate.  Patient/caregiver state understanding of   available resources,  following, remains available.  Given SWer contact info and encouraged to call with any needs or concerns.

## 2023-10-18 NOTE — CONSULTS
RD consulted for DM education. A1C triggered in daily reports RD provided DM education 10/17. RD recommends outpatient RD for diabetes management for further education.    Thanks!    Please re-consult as needed.    George Carter Registration Eligible, Provisional LDN

## 2023-10-18 NOTE — PROGRESS NOTES
[facility] - Oncology [facility])  Infectious Disease  Progress Note    Patient Name: Jaspreet Walsh Jr.  MRN: [id number]  Admission Date: 10/15/2023  Length of Stay: 3 days  Attending Physician: Jose De Jesus Hernandez MD  Primary Care Provider: Con Patel Jr., MD    Isolation Status: No active isolations  Assessment/Plan:      ID  Salmonella bacteremia  61 y.o. male with a PMHx significant for IgG kappa multiple myeloma s/p auto HSC (5/2021) currently on outpatient Melinda-KD (last infusion 10/11), osteolytic lesions, and HTN. He presents for a 3 days of nocturnal fevers/chills and rigors with associated epigastric abdominal pain. He denies respiratory symptoms, sick contacts, dysuria, flank pain, recent dietary changes/consumption of raw/undercooked meats. ID was consulted for salmonella bacteremia.     At the OSH ED, he was febrile (102) and tachycardic with no leukocytosis. He was not hypotensive. Full septic w/u initiated. UA concerning for UTI and CT abdomen/pelvis concerning for pyelonephritis. He was given IV Levaquin and admitted to [facility] BMT service for further management. He was started on cefepime and flagyl at [facility]. Rapid Organism ID by PCR from [facility] detected Salmonella. ID and susceptibility pending.    Symptoms at this time likely due to salmonella bacteremia rather than pyelonephritis given lack of urinary symptoms and flank pain. Noted symptom improvement and patient remains afebrile.    Recommendations:  - Transition to PO ciprofloxacin 750 BID for 12 days (end date 10/30)  - F/u susceptibilities and make outpatient adjustments as necessary          Thank you for your consult. I will follow-up with patient. Please contact us if you have any additional questions.    Kj Manzo MD   Internal Medicine, PGY1  Infectious Disease  [facility] - Oncology ([facility])    Subjective:     Principal Problem:Fever and chills    HPI: Jaspreet Walsh is a 61 y.o. male with a PMHx significant for IgG kappa multiple  myeloma s/p auto HSC (5/2021) currently on outpatient Melinda-KD (last infusion 10/11), osteolytic lesions, and HTN. He presents for a 3 days of nocturnal fevers/chills and rigors with associated epigastric abdominal pain. He denies respiratory symptoms, sick contacts, dysuria, flank pain, recent dietary changes/consumption of raw/undercooked meats. ID was consulted for salmonella bacteremia.    At the OSH ED, he was febrile (102) and tachycardic with no leukocytosis. He was not hypotensive. Full septic w/u initiated. UA concerning for UTI and CT abdomen/pelvis concerning for pyelonephritis. He was given IV Levaquin and admitted to [facility] BMT service for further management. He was started on cefepime and flagyl at [facility]. Rapid Organism ID by PCR from [facility] detected Salmonella. ID and susceptibility pending.    At the time of my evaluation, the patient was not having subjective fevers/chills and only complained of abdominal pain. Of note, home medications include acyclovir 400mg BID.     Interval History: No acute events overnight, afebrile, hemodynamically stable. Pt reports no chills or abdominal pain.      Review of Systems   Constitutional:  Negative for chills and fever.   Respiratory:  Negative for cough and shortness of breath.    Cardiovascular:  Negative for chest pain.   Gastrointestinal:  Negative for abdominal pain, nausea and vomiting.   Genitourinary:  Negative for difficulty urinating, dysuria and flank pain.     Objective:     Vital Signs (Most Recent):  Temp: 98.9 °F (37.2 °C) (10/18/23 0806)  Pulse: 105 (10/18/23 0806)  Resp: 16 (10/18/23 0806)  BP: (!) 161/93 (10/18/23 0806)  SpO2: 97 % (10/18/23 0806) Vital Signs (24h Range):  Temp:  [98 °F (36.7 °C)-99.6 °F (37.6 °C)] 98.9 °F (37.2 °C)  Pulse:  [] 105  Resp:  [16-20] 16  SpO2:  [96 %-100 %] 97 %  BP: (109-161)/(61-93) 161/93     Weight: 98.4 kg (217 lb)  Body mass index is 33.98 kg/m².    Estimated Creatinine Clearance: 92.7 mL/min (based on SCr of  0.9 mg/dL).     Physical Exam  Vitals and nursing note reviewed.   Constitutional:       General: He is not in acute distress.     Appearance: He is not ill-appearing, toxic-appearing or diaphoretic.   HENT:      Head: Normocephalic and atraumatic.      Right Ear: External ear normal.      Left Ear: External ear normal.      Mouth/Throat:      Mouth: Mucous membranes are moist.   Eyes:      General: No scleral icterus.        Right eye: No discharge.         Left eye: No discharge.   Cardiovascular:      Rate and Rhythm: Normal rate and regular rhythm.      Heart sounds: Normal heart sounds. No murmur heard.  Pulmonary:      Effort: Pulmonary effort is normal. No respiratory distress.      Breath sounds: No wheezing or rales.   Abdominal:      General: Bowel sounds are normal. There is no distension.      Palpations: Abdomen is soft.      Tenderness: There is no abdominal tenderness. There is no guarding.   Musculoskeletal:         General: No deformity.      Cervical back: No rigidity.      Right lower leg: No edema.      Left lower leg: No edema.   Skin:     General: Skin is warm.      Coloration: Skin is not jaundiced.   Neurological:      Mental Status: He is alert and oriented to person, place, and time. Mental status is at baseline.   Psychiatric:         Mood and Affect: Mood normal.         Behavior: Behavior normal.          Significant Labs: CBC:   Recent Labs   Lab 10/17/23  0302 10/18/23  0559   WBC 4.61 3.86*   HGB 6.7* 8.6*   HCT 20.9* 27.0*   PLT 79* 92*     CMP:   Recent Labs   Lab 10/17/23  0302 10/18/23  0559    138   K 4.1 3.7    110   CO2 22* 20*   * 158*   BUN 20 10   CREATININE 1.0 0.9   CALCIUM 8.3* 8.2*   PROT 6.2 6.6   ALBUMIN 2.2* 2.3*   BILITOT 0.5 0.6   ALKPHOS 73 76   AST 10 13   ALT 16 14   ANIONGAP 6* 8       Significant Imaging: I have reviewed all pertinent imaging results/findings within the past 24 hours.

## 2023-10-18 NOTE — SUBJECTIVE & OBJECTIVE
Interval History: No acute events overnight, afebrile, hemodynamically stable. Pt reports no chills or abdominal pain.      Review of Systems   Constitutional:  Negative for chills and fever.   Respiratory:  Negative for cough and shortness of breath.    Cardiovascular:  Negative for chest pain.   Gastrointestinal:  Negative for abdominal pain, nausea and vomiting.   Genitourinary:  Negative for difficulty urinating, dysuria and flank pain.     Objective:     Vital Signs (Most Recent):  Temp: 98.9 °F (37.2 °C) (10/18/23 0806)  Pulse: 105 (10/18/23 0806)  Resp: 16 (10/18/23 0806)  BP: (!) 161/93 (10/18/23 0806)  SpO2: 97 % (10/18/23 0806) Vital Signs (24h Range):  Temp:  [98 °F (36.7 °C)-99.6 °F (37.6 °C)] 98.9 °F (37.2 °C)  Pulse:  [] 105  Resp:  [16-20] 16  SpO2:  [96 %-100 %] 97 %  BP: (109-161)/(61-93) 161/93     Weight: 98.4 kg (217 lb)  Body mass index is 33.98 kg/m².    Estimated Creatinine Clearance: 92.7 mL/min (based on SCr of 0.9 mg/dL).     Physical Exam  Vitals and nursing note reviewed.   Constitutional:       General: He is not in acute distress.     Appearance: He is not ill-appearing, toxic-appearing or diaphoretic.   HENT:      Head: Normocephalic and atraumatic.      Right Ear: External ear normal.      Left Ear: External ear normal.      Mouth/Throat:      Mouth: Mucous membranes are moist.   Eyes:      General: No scleral icterus.        Right eye: No discharge.         Left eye: No discharge.   Cardiovascular:      Rate and Rhythm: Normal rate and regular rhythm.      Heart sounds: Normal heart sounds. No murmur heard.  Pulmonary:      Effort: Pulmonary effort is normal. No respiratory distress.      Breath sounds: No wheezing or rales.   Abdominal:      General: Bowel sounds are normal. There is no distension.      Palpations: Abdomen is soft.      Tenderness: There is no abdominal tenderness. There is no guarding.   Musculoskeletal:         General: No deformity.      Cervical back: No  rigidity.      Right lower leg: No edema.      Left lower leg: No edema.   Skin:     General: Skin is warm.      Coloration: Skin is not jaundiced.   Neurological:      Mental Status: He is alert and oriented to person, place, and time. Mental status is at baseline.   Psychiatric:         Mood and Affect: Mood normal.         Behavior: Behavior normal.          Significant Labs: CBC:   Recent Labs   Lab 10/17/23  0302 10/18/23  0559   WBC 4.61 3.86*   HGB 6.7* 8.6*   HCT 20.9* 27.0*   PLT 79* 92*     CMP:   Recent Labs   Lab 10/17/23  0302 10/18/23  0559    138   K 4.1 3.7    110   CO2 22* 20*   * 158*   BUN 20 10   CREATININE 1.0 0.9   CALCIUM 8.3* 8.2*   PROT 6.2 6.6   ALBUMIN 2.2* 2.3*   BILITOT 0.5 0.6   ALKPHOS 73 76   AST 10 13   ALT 16 14   ANIONGAP 6* 8       Significant Imaging: I have reviewed all pertinent imaging results/findings within the past 24 hours.

## 2023-10-18 NOTE — DISCHARGE SUMMARY
[facility] - Oncology ([facility])  Hematology  Bone Marrow Transplant  Discharge Summary      Patient Name: Jaspreet Walsh Jr.  MRN: [id number]  Admission Date: 10/15/2023  Hospital Length of Stay: 3 days  Discharge Date and Time:  10/18/2023 1:22 PM  Attending Physician: Jose De Jesus Hernandez MD   Discharging Provider: Nick Millan DO  Primary Care Provider: Con Patel Jr., MD    HPI: Patient is a 61-year-old male with past medical history significant for IgG kappa multiple myeloma treated by Dr. 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  He is presenting with a 3 day history of nocturnal fevers, chills, and rigors associated with abdominal pain.  Patient states that no sick contacts, no shortness of breath, no tachycardia or chestpain. He denies productive or nonproductive cough.  He denies any dysuria or back pain.  He denies any major changes to medications.     In the ED, the patient was initiated on broad-spectrum antibiotics sepsis workup was initiated.  Patient was not hypotensive.  CBC was notable for slightly worsened anemia (10 to 8.5) and low platelets but white count was normal with a normal ANC.  The patient's metabolic panel was notable for mild hyponatremia of 131, a bicarb of 21 and a BUN/creatinine of 1.27 and 31 respectively.  Bilirubin was noted to be mildly elevated at 1.6 with normal AST/ALT.     Oncology History (from Dr. Cornejo's most recent clinic note):   A. 9/14 - 9/17/2020 : Admitted to [facility] for evaluation of lytic lesions. Large soft tissue mass encompassing L4 - S1 vertebral bodies seen. FLC ratio 171, involved light chains 104. SPEP and SIFE found 2.86g/dL, IgG kappa para protein band. Underwent bone marrow biopsy and discharged with dexamethasone 40mg x 4 day burst.     B. 9/23/20 : Underwent debulking of spinal mass.  C. 10/1/20 - 4/14/21 : C1 - C8D1 VRd. Revlimid delivered in third week of cycle.  D. 2/25/21 : Presented for consent signing  for autoSCT but his insurance had lapsed. Plan for transplant pushed back 1-2 months while he applys for medicaid.   E. 4/22/21 : C8D8 VRd planned (last treatment before mobilization).   F. 5/17/21 : Alea autoSCT. 3 bags and a CD34 dose of 3.35 x 10^6. He engrafted on Day +13 (5/30/21) with an ANC of 2607. discharged home on Day +14 (5/31/21).      * No surgery found *     Hospital Course: 10/17/2023 Patient vitally stable overnight with no acute issues or concerns and was afebrile. Blood cultures showing growth of Salmonella with ID consulted and placed on Rocephin 2 grams daily. CT chest obtained showing no acute findings along with TTE showing no vegetations. Labs significant for a H/H of 6.7/20.9 requiring transfusion. Patient updated regarding overall plan of care and was agreeable and understanding.   10/18/2023 Patient vitally stable overnight with some hypertension noted and was afebrile. Blood cultures confirmed Salmonella. Labs significant for an H/H pf 8.6/27 and phosp of 1.8. He was deemed stable for discharge home and will complete anti-biotics with. Of note, he was found to be diabetic with an A1C of 9.2 on admit and was started on Metformin BID and RD consulted. He will follow-up on 10/25/23 with BMT clinic. ID recs for Cipro for 12 days and out-patient follow-up. He was instructed regarding any new or worsening symptoms and the need to present to the ED including recurrent fever.       Goals of Care Treatment Preferences:  Code Status: Full Code      Consults (From admission, onward)        Status Ordering Provider     Inpatient consult to Registered Dietitian/Nutritionist  Once        Provider:  (Not yet assigned)    Completed KEYON ALEJANDRE     Inpatient consult to Infectious Diseases  Once        Provider:  (Not yet assigned)    Completed KEYON ALEJANDRE          Significant Diagnostic Studies: Labs: All labs within the past 24 hours have been reviewed    Pending Diagnostic Studies:     None         Final Active Diagnoses:    Diagnosis Date Noted POA    PRINCIPAL PROBLEM:  Fever and chills [R50.9] 10/16/2023 Yes    Diabetes mellitus [E11.9] 10/18/2023 Unknown    Salmonella bacteremia [R78.81] 10/16/2023 Yes    History of auto stem cell transplant [Z94.84]  Not Applicable    Multiple myeloma [C90.00]  Yes    Essential hypertension [I10] 09/14/2020 Yes      Problems Resolved During this Admission:      Discharged Condition: good    Disposition: Home or Self Care    Follow Up:    Patient Instructions:      Diet diabetic     Notify your health care provider if you experience any of the following:  temperature >100.4     Notify your health care provider if you experience any of the following:  persistent nausea and vomiting or diarrhea     Notify your health care provider if you experience any of the following:  severe uncontrolled pain     Notify your health care provider if you experience any of the following:  redness, tenderness, or signs of infection (pain, swelling, redness, odor or green/yellow discharge around incision site)     Notify your health care provider if you experience any of the following:  difficulty breathing or increased cough     Notify your health care provider if you experience any of the following:  severe persistent headache     Notify your health care provider if you experience any of the following:  worsening rash     Notify your health care provider if you experience any of the following:  persistent dizziness, light-headedness, or visual disturbances     Notify your health care provider if you experience any of the following:  increased confusion or weakness     Activity as tolerated     Medications:  Reconciled Home Medications:      Medication List      START taking these medications    ciprofloxacin HCl 500 MG tablet  Commonly known as: CIPRO  Take 1.5 tablets (750 mg total) by mouth every 12 (twelve) hours. for 12 days     metFORMIN 1000 MG tablet  Commonly known as:  GLUCOPHAGE  Take 1 tablet (1,000 mg total) by mouth 2 (two) times daily with meals.        CONTINUE taking these medications    acyclovir 400 MG tablet  Commonly known as: ZOVIRAX  Take 1 tablet (400 mg total) by mouth 2 (two) times daily.     amLODIPine 10 MG tablet  Commonly known as: NORVASC  Take 1 tablet (10 mg total) by mouth once daily.     calcium-vitamin D3 500 mg-5 mcg (200 unit) per tablet  Commonly known as: OYSTER SHELL CALCIUM-VIT D3  Take 1 tablet by mouth once daily.     cloNIDine 0.3 MG tablet  Commonly known as: CATAPRES  Take 0.3 mg by mouth once daily.     dexAMETHasone 4 MG Tab  Commonly known as: DECADRON  Take 10 tablets (40 mg total) by mouth every 7 days. Take with food before chemotherapy appointments     gabapentin 300 MG capsule  Commonly known as: NEURONTIN  TAKE 1 CAPSULE(300 MG) BY MOUTH TWICE DAILY     hydroCHLOROthiazide 25 MG tablet  Commonly known as: HYDRODIURIL  Take 1 tablet (25 mg total) by mouth once daily.     potassium chloride SA 10 MEQ tablet  Commonly known as: K-DUR,KLOR-CON M  Take 1 tablet (10 mEq total) by mouth once daily.        ASK your doctor about these medications    ergocalciferol 50,000 unit Cap  Commonly known as: ERGOCALCIFEROL  Take 1 capsule (50,000 Units total) by mouth every 7 days.            Nick Millan, DO  Bone Marrow Transplant  [facility] - Oncology ([facility])

## 2023-10-18 NOTE — PLAN OF CARE
POC reviewed w patient and spouse at beginning of shift and PRN. No acute events overnight. Plan to DC home today if no complications. Bed locked in low position call light in reach. Will CTM.

## 2023-10-18 NOTE — ASSESSMENT & PLAN NOTE
61 y.o. male with a PMHx significant for IgG kappa multiple myeloma s/p auto HSC (5/2021) currently on outpatient Melinda-KD (last infusion 10/11), osteolytic lesions, and HTN. He presents for a 3 days of nocturnal fevers/chills and rigors with associated epigastric abdominal pain. He denies respiratory symptoms, sick contacts, dysuria, flank pain, recent dietary changes/consumption of raw/undercooked meats. ID was consulted for salmonella bacteremia.     At the OSH ED, he was febrile (102) and tachycardic with no leukocytosis. He was not hypotensive. Full septic w/u initiated. UA concerning for UTI and CT abdomen/pelvis concerning for pyelonephritis. He was given IV Levaquin and admitted to [facility] BMT service for further management. He was started on cefepime and flagyl at [facility]. Rapid Organism ID by PCR from [facility] detected Salmonella. ID and susceptibility pending.    Symptoms at this time likely due to salmonella bacteremia rather than pyelonephritis given lack of urinary symptoms and flank pain. Noted symptom improvement and patient remains afebrile.    Recommendations:  - Transition to PO ciprofloxacin 750 BID for 12 days (end date 10/30)  - F/u susceptibilities and make outpatient adjustments as necessary

## 2023-10-19 LAB
BACTERIA BLD CULT: ABNORMAL

## 2023-10-21 LAB
BACTERIA BLD CULT: NORMAL
BACTERIA BLD CULT: NORMAL

## 2023-10-25 ENCOUNTER — OFFICE VISIT (OUTPATIENT)
Dept: HEMATOLOGY/ONCOLOGY | Facility: CLINIC | Age: 63
End: 2023-10-25
Payer: MEDICARE

## 2023-10-25 ENCOUNTER — INFUSION (OUTPATIENT)
Dept: INFUSION THERAPY | Facility: HOSPITAL | Age: 63
End: 2023-10-25
Payer: MEDICARE

## 2023-10-25 VITALS
HEIGHT: 67 IN | BODY MASS INDEX: 33.57 KG/M2 | TEMPERATURE: 99 F | DIASTOLIC BLOOD PRESSURE: 71 MMHG | WEIGHT: 213.88 LBS | HEART RATE: 88 BPM | RESPIRATION RATE: 17 BRPM | SYSTOLIC BLOOD PRESSURE: 126 MMHG | OXYGEN SATURATION: 100 %

## 2023-10-25 VITALS — BODY MASS INDEX: 33.57 KG/M2 | WEIGHT: 213.88 LBS | HEIGHT: 67 IN

## 2023-10-25 DIAGNOSIS — Z94.84 HISTORY OF AUTO STEM CELL TRANSPLANT: Primary | ICD-10-CM

## 2023-10-25 DIAGNOSIS — C90.00 MULTIPLE MYELOMA, REMISSION STATUS UNSPECIFIED: ICD-10-CM

## 2023-10-25 DIAGNOSIS — I10 ESSENTIAL HYPERTENSION: ICD-10-CM

## 2023-10-25 DIAGNOSIS — Z79.899 IMMUNODEFICIENCY DUE TO DRUGS: ICD-10-CM

## 2023-10-25 DIAGNOSIS — D84.821 IMMUNODEFICIENCY DUE TO DRUGS: ICD-10-CM

## 2023-10-25 DIAGNOSIS — C90.00 MULTIPLE MYELOMA, REMISSION STATUS UNSPECIFIED: Primary | ICD-10-CM

## 2023-10-25 PROCEDURE — 99999 PR PBB SHADOW E&M-EST. PATIENT-LVL IV: CPT | Mod: PBBFAC,,, | Performed by: NURSE PRACTITIONER

## 2023-10-25 PROCEDURE — 96413 CHEMO IV INFUSION 1 HR: CPT

## 2023-10-25 PROCEDURE — 1159F PR MEDICATION LIST DOCUMENTED IN MEDICAL RECORD: ICD-10-PCS | Mod: CPTII,S$GLB,, | Performed by: NURSE PRACTITIONER

## 2023-10-25 PROCEDURE — 1111F PR DISCHARGE MEDS RECONCILED W/ CURRENT OUTPATIENT MED LIST: ICD-10-PCS | Mod: CPTII,S$GLB,, | Performed by: NURSE PRACTITIONER

## 2023-10-25 PROCEDURE — 3008F BODY MASS INDEX DOCD: CPT | Mod: CPTII,S$GLB,, | Performed by: NURSE PRACTITIONER

## 2023-10-25 PROCEDURE — 1160F RVW MEDS BY RX/DR IN RCRD: CPT | Mod: CPTII,S$GLB,, | Performed by: NURSE PRACTITIONER

## 2023-10-25 PROCEDURE — 3046F PR MOST RECENT HEMOGLOBIN A1C LEVEL > 9.0%: ICD-10-PCS | Mod: CPTII,S$GLB,, | Performed by: NURSE PRACTITIONER

## 2023-10-25 PROCEDURE — 3046F HEMOGLOBIN A1C LEVEL >9.0%: CPT | Mod: CPTII,S$GLB,, | Performed by: NURSE PRACTITIONER

## 2023-10-25 PROCEDURE — 25000003 PHARM REV CODE 250: Performed by: NURSE PRACTITIONER

## 2023-10-25 PROCEDURE — 99999 PR PBB SHADOW E&M-EST. PATIENT-LVL IV: ICD-10-PCS | Mod: PBBFAC,,, | Performed by: NURSE PRACTITIONER

## 2023-10-25 PROCEDURE — 99215 OFFICE O/P EST HI 40 MIN: CPT | Mod: S$GLB,,, | Performed by: NURSE PRACTITIONER

## 2023-10-25 PROCEDURE — 1111F DSCHRG MED/CURRENT MED MERGE: CPT | Mod: CPTII,S$GLB,, | Performed by: NURSE PRACTITIONER

## 2023-10-25 PROCEDURE — 63600175 PHARM REV CODE 636 W HCPCS: Mod: JZ,JG | Performed by: NURSE PRACTITIONER

## 2023-10-25 PROCEDURE — 1159F MED LIST DOCD IN RCRD: CPT | Mod: CPTII,S$GLB,, | Performed by: NURSE PRACTITIONER

## 2023-10-25 PROCEDURE — 1160F PR REVIEW ALL MEDS BY PRESCRIBER/CLIN PHARMACIST DOCUMENTED: ICD-10-PCS | Mod: CPTII,S$GLB,, | Performed by: NURSE PRACTITIONER

## 2023-10-25 PROCEDURE — 99215 PR OFFICE/OUTPT VISIT, EST, LEVL V, 40-54 MIN: ICD-10-PCS | Mod: S$GLB,,, | Performed by: NURSE PRACTITIONER

## 2023-10-25 PROCEDURE — 3008F PR BODY MASS INDEX (BMI) DOCUMENTED: ICD-10-PCS | Mod: CPTII,S$GLB,, | Performed by: NURSE PRACTITIONER

## 2023-10-25 RX ORDER — SODIUM CHLORIDE 0.9 % (FLUSH) 0.9 %
10 SYRINGE (ML) INJECTION
Status: CANCELLED | OUTPATIENT
Start: 2023-10-25

## 2023-10-25 RX ORDER — HEPARIN 100 UNIT/ML
500 SYRINGE INTRAVENOUS
Status: CANCELLED | OUTPATIENT
Start: 2023-10-25

## 2023-10-25 RX ORDER — HEPARIN 100 UNIT/ML
500 SYRINGE INTRAVENOUS
Status: DISCONTINUED | OUTPATIENT
Start: 2023-10-25 | End: 2023-10-25 | Stop reason: HOSPADM

## 2023-10-25 RX ORDER — SODIUM CHLORIDE 0.9 % (FLUSH) 0.9 %
10 SYRINGE (ML) INJECTION
Status: DISCONTINUED | OUTPATIENT
Start: 2023-10-25 | End: 2023-10-25 | Stop reason: HOSPADM

## 2023-10-25 RX ADMIN — CARFILZOMIB 150 MG: 30 INJECTION, POWDER, LYOPHILIZED, FOR SOLUTION INTRAVENOUS at 10:10

## 2023-10-25 RX ADMIN — SODIUM CHLORIDE: 9 INJECTION, SOLUTION INTRAVENOUS at 08:10

## 2023-10-25 NOTE — PROGRESS NOTES
SECTION OF HEMATOLOGY AND BONE MARROW TRANSPLANT  Return Patient Visit   10/25/2023    CHIEF COMPLAINT:   Multiple Myeloma    HISTORY OF PRESENT ILLNESS: Patient of /  63 y.o.male; pmh of IgG kappa multiple myeloma (standard risk CG per msmart criteria, r-iss stage II); diagnosed September 2019 after presenting with symptomatic perispinal plasmacytoma;He has subsequently received  8 cycles of VRd therapy. Was in midst or pre transplant evaluation but due to insurance coverage issues transplant was delayed so was maintained on therapy and those issues have been resolved.    Transplant Course  Patient with IgG Kappa MM and pmh HTN, lytic bone lesion, arthritis and vitamin D deficiency. Admitted 5/15/21 for planned Alea auto SCT for MM. He received 3 bags and a CD34 dose of 3.35 x 10^6 on 5/17/21. Tolerated chemo and transplant well. Admission complicated by n/v controlled with scheduled anti-emetics and c-diff neg diarrhea controlled with prn imodium. He engrafted on Day +13 (5/30/21) with an ANC of 2607. He was discharged home on Day +14 (5/31/21).     Day +100 restaging marrow indicated that he was in OR.   Interval History:    Found to be in relapse biochemically (June/July 2023 ) and with new lytic disease at 2 years post SCT.  Initiated DKd salvage 8/4/23.  He has been tolerating with no significant AE's.  Short course of hospital stay for salmonella bacteremia on last week, treated with antibiotics and he is on po antibiotics now, EOT on 10/28/23. No complaints today during visit. Accompanied by wife today prior to C3D8; labs scheduled following visit.     PAST MEDICAL HISTORY:   Past Medical History:   Diagnosis Date    Anxiety     Arthritis     Cancer     Hypertension        PAST SURGICAL HISTORY:   Past Surgical History:   Procedure Laterality Date    BACK SURGERY  01/01/2021    BONE MARROW BIOPSY Left 10/20/2021    Procedure: Biopsy-bone marrow;  Surgeon: Summer Cartwright MD;  Location: [facility]  ENDO (4TH FLR);  Service: Oncology;  Laterality: Left;    COLONOSCOPY N/A 01/25/2021    Procedure: COLONOSCOPY;  Surgeon: Braxton Lees MD;  Location: [facility] ENDO (4TH FLR);  Service: Endoscopy;  Laterality: N/A;  1/22-covid kristopher-inst mailed-tb  1/20/21-pt to remain on schedule with Dr. Lees, and confirmed updated arrival time of 0835-BB    COLONOSCOPY N/A 02/01/2021    Procedure: COLONOSCOPY;  Surgeon: Jo Ann Robledo MD;  Location: [facility] ENDO (4TH FLR);  Service: Endoscopy;  Laterality: N/A;  rescheduled due to poor bowel prep, to be rescheduled with first available provider-BB  covid-1/29/21-pcw-BB    CREATION OF MUSCLE ROTATIONAL FLAP N/A 09/23/2020    Procedure: CREATION, FLAP, MUSCLE ROTATION;  Surgeon: Kamlesh Bellamy MD;  [facility];  Service: Plastics;  Laterality: N/A;    KNEE SURGERY Left 06/2019    LUMBAR FUSION N/A 09/23/2020    Procedure: FUSION, SPINE, LUMBAR L2-pelvis. Depuy. Plastic surgery closure w/ Dr. Bellamy;  Surgeon: Nick Coyle MD;  [facility];  Service: Neurosurgery;  Laterality: N/A;    Metastatic neoplasum removed from spine         PAST SOCIAL HISTORY:   reports that he quit smoking about 6 years ago. His smoking use included cigarettes. He started smoking about 46 years ago. He has a 10.0 pack-year smoking history. He has never used smokeless tobacco.    FAMILY HISTORY:  Family History   Problem Relation Age of Onset    Cancer Father        CURRENT MEDICATIONS:   Current Outpatient Medications   Medication Sig    acyclovir (ZOVIRAX) 400 MG tablet Take 1 tablet (400 mg total) by mouth 2 (two) times daily.    amLODIPine (NORVASC) 10 MG tablet Take 1 tablet (10 mg total) by mouth once daily.    ciprofloxacin HCl (CIPRO) 750 MG tablet Take 1 tablet (750 mg total) by mouth every 12 (twelve) hours. for 12 days    cloNIDine (CATAPRES) 0.3 MG tablet Take 0.3 mg by mouth once daily.    dexAMETHasone (DECADRON) 4 MG Tab Take 10 tablets (40 mg total) by  "mouth every 7 days. Take with food before chemotherapy appointments    gabapentin (NEURONTIN) 300 MG capsule TAKE 1 CAPSULE(300 MG) BY MOUTH TWICE DAILY    hydroCHLOROthiazide (HYDRODIURIL) 25 MG tablet Take 1 tablet (25 mg total) by mouth once daily.    metFORMIN (GLUCOPHAGE) 1000 MG tablet Take 0.5 tablets (500 mg total) by mouth 2 (two) times daily with meals.    potassium chloride SA (K-DUR,KLOR-CON M) 10 MEQ tablet Take 1 tablet (10 mEq total) by mouth once daily.    calcium-vitamin D3 (OYSTER SHELL CALCIUM-VIT D3) 500 mg-5 mcg (200 unit) per tablet Take 1 tablet by mouth once daily.     No current facility-administered medications for this visit.     Facility-Administered Medications Ordered in Other Visits   Medication    alteplase injection 2 mg    heparin, porcine (PF) 100 unit/mL injection flush 500 Units    sodium chloride 0.9% flush 10 mL     ALLERGIES:   Review of patient's allergies indicates:  No Known Allergies  Review of Systems   Constitutional: Negative for fever, malaise/fatigue and weight loss.   Respiratory: Negative for cough and shortness of breath.    Cardiovascular: Negative for chest pain and leg swelling.   Gastrointestinal: Negative for constipation, diarrhea and vomiting.   Skin: Negative for rash.   Neurological: Negative for seizures and weakness.   Psychiatric/Behavioral: The patient is not nervous/anxious.      PHYSICAL EXAM:   Vitals:    10/25/23 0709   BP: (P) 128/76   Pulse: (P) 100   Resp: (P) 18   Temp: (P) 98.7 °F (37.1 °C)   TempSrc: (P) Oral   SpO2: (P) 100%   Weight: 97 kg (213 lb 13.5 oz)   Height: 5' 6.5" (1.689 m)   PainSc: 0-No pain     General - well developed, well nourished, no apparent distress  HEENT - oropharynx clear  Chest and Lung - clear to auscultation bilaterally   Cardiovascular - RRR with no MGR, normal S1 and S2  Abdomen-  soft, nontender, no palpable hepatomegaly or splenomegaly  Lymph - no palpable lymphadenopathy  Heme - no bruising, petechiae, " pallor  Skin - no rashes or lesions  Psych - appropriate mood and affect        ECOG Performance Status: (foot note - ECOG PS provided by Eastern Cooperative [employer]cology Group) 1 - Symptomatic but completely ambulatory    Karnofsky Performance Score:  90%- Able to Carry on Normal Activity: Minor Symptoms of Disease  DATA:   Lab Results   Component Value Date    WBC 4.54 10/25/2023    HGB 8.1 (L) 10/25/2023    HCT 26.0 (L) 10/25/2023    MCV 97 10/25/2023     10/25/2023       Gran # (ANC)   Date Value Ref Range Status   10/25/2023 2.8 1.8 - 7.7 K/uL Final     Gran %   Date Value Ref Range Status   10/25/2023 62.2 38.0 - 73.0 % Final     CMP  Sodium   Date Value Ref Range Status   10/25/2023 138 136 - 145 mmol/L Final     Potassium   Date Value Ref Range Status   10/25/2023 4.6 3.5 - 5.1 mmol/L Final     Chloride   Date Value Ref Range Status   10/25/2023 105 95 - 110 mmol/L Final     CO2   Date Value Ref Range Status   10/25/2023 25 23 - 29 mmol/L Final     Glucose   Date Value Ref Range Status   10/25/2023 193 (H) 70 - 110 mg/dL Final     BUN   Date Value Ref Range Status   10/25/2023 19 8 - 23 mg/dL Final     Creatinine   Date Value Ref Range Status   10/25/2023 1.6 (H) 0.5 - 1.4 mg/dL Final     Calcium   Date Value Ref Range Status   10/25/2023 9.6 8.7 - 10.5 mg/dL Final     Total Protein   Date Value Ref Range Status   10/25/2023 7.0 6.0 - 8.4 g/dL Final     Albumin   Date Value Ref Range Status   10/25/2023 2.8 (L) 3.5 - 5.2 g/dL Final     Total Bilirubin   Date Value Ref Range Status   10/25/2023 0.4 0.1 - 1.0 mg/dL Final     Comment:     For infants and newborns, interpretation of results should be based  on gestational age, weight and in agreement with clinical  observations.    Premature Infant recommended reference ranges:  Up to 24 hours.............<8.0 mg/dL  Up to 48 hours............<12.0 mg/dL  3-5 days..................<15.0 mg/dL  6-29 days.................<15.0 mg/dL       Alkaline Phosphatase    Date Value Ref Range Status   10/25/2023 62 55 - 135 U/L Final     AST   Date Value Ref Range Status   10/25/2023 12 10 - 40 U/L Final     ALT   Date Value Ref Range Status   10/25/2023 12 10 - 44 U/L Final     Anion Gap   Date Value Ref Range Status   10/25/2023 8 8 - 16 mmol/L Final     eGFR   Date Value Ref Range Status   10/25/2023 47.8 (A) >60 mL/min/1.73 m^2 Final     IgG   Date Value Ref Range Status   10/25/2023 2110 (H) 650 - 1600 mg/dL Final     Comment:     IgG Cord Blood Reference Range: 650-1600 mg/dL.     IgA   Date Value Ref Range Status   10/25/2023 7 (L) 40 - 350 mg/dL Final     Comment:     IgA Cord Blood Reference Range: <5 mg/dL.     IgM   Date Value Ref Range Status   10/25/2023 18 (L) 50 - 300 mg/dL Final     Comment:     IgM Cord Blood Reference Range: <25 mg/dL.     Kappa Free Light Chains   Date Value Ref Range Status   09/26/2023 50.38 (H) 0.33 - 1.94 mg/dL Final   09/01/2023 37.60 (H) 0.33 - 1.94 mg/dL Final   08/02/2023 92.40 (H) 0.33 - 1.94 mg/dL Final     Lambda Free Light Chains   Date Value Ref Range Status   09/26/2023 0.30 (L) 0.57 - 2.63 mg/dL Final   09/01/2023 0.18 (L) 0.57 - 2.63 mg/dL Final   08/02/2023 1.54 0.57 - 2.63 mg/dL Final     Kappa/Lambda FLC Ratio   Date Value Ref Range Status   09/26/2023 167.90 (H) 0.26 - 1.65 Final     Comment:     Undetected antigen excess is a rare event but cannot   be excluded. If these free light chain results do not   agree with other clinical or laboratory findings or   if the sample is from a patient that has previously   demonstrated antigen excess, discuss with the testing   laboratory.   Results should always be interpreted in conjunction   with other laboratory tests and clinical evidence.     09/01/2023 208.90 (H) 0.26 - 1.65 Final     Comment:     Undetected antigen excess is a rare event but cannot   be excluded. If these free light chain results do not   agree with other clinical or laboratory findings or   if the sample is from  a patient that has previously   demonstrated antigen excess, discuss with the testing   laboratory.   Results should always be interpreted in conjunction   with other laboratory tests and clinical evidence.     08/02/2023 60.00 (H) 0.26 - 1.65 Final     Comment:     Undetected antigen excess is a rare event but cannot   be excluded. If these free light chain results do not   agree with other clinical or laboratory findings or   if the sample is from a patient that has previously   demonstrated antigen excess, discuss with the testing   laboratory.   Results should always be interpreted in conjunction   with other laboratory tests and clinical evidence.       Pathologist Interpretation SPE   Date Value Ref Range Status   09/26/2023 REVIEWED  Final     Comment:       Electronically reviewed and signed by:  Tasha Mills MD  Signed on 09/28/23 at 07:27  Normal total protein. Normal gamma globulins are decreased.  Paraprotein peak in gamma = 1.60 g/dL (previously 2.03 g/dL).      09/01/2023 REVIEWED  Final     Comment:       Electronically reviewed and signed by:  Tasha Mills MD  Signed on 09/05/23 at 14:41  Normal total protein.   Normal gamma globulins are decreased.Paraprotein peak in gamma = 2.03   g/dL (previously 2.25 g/dL).      08/02/2023 REVIEWED  Final     Comment:       Electronically reviewed and signed by:  Angelita Begum D.O.  Signed on 08/06/23 at 21:51  Increased total protein.  Paraprotein peak in gamma = 2.25 g/dL (previously, 1.85 g/dL).       Pathologist Interpretation SADAF   Date Value Ref Range Status   09/26/2023 REVIEWED  Final     Comment:       Electronically reviewed and signed by:  Tasha Mills MD  Signed on 09/28/23 at 07:26  IgG kappa specific monoclonal band present.              1. History of auto stem cell transplant  CBC Auto Differential    Comprehensive Metabolic Panel    Protein Electrophoresis, Serum    Immunoglobulins (IgG, IgA, IgM) Quantitative    Immunoglobulin  Free LT Chains Blood    Immunofixation Electrophoresis      2. Multiple myeloma, remission status unspecified  CBC Auto Differential    Comprehensive Metabolic Panel    Protein Electrophoresis, Serum    Immunoglobulins (IgG, IgA, IgM) Quantitative    Immunoglobulin Free LT Chains Blood    Immunofixation Electrophoresis    DISCONTINUED: carfilzomib (KYPROLIS) 152 mg in dextrose 5 % (D5W) 176 mL IVPB    DISCONTINUED: sodium chloride 0.9% 100 mL flush bag    DISCONTINUED: sodium chloride 0.9% flush 10 mL    DISCONTINUED: heparin, porcine (PF) 100 unit/mL injection flush 500 Units    DISCONTINUED: alteplase injection 2 mg      3. Immunodeficiency due to drugs        4. Essential hypertension                Multiple myeloma/sp autoSCT  - standard risk MM diagnosed sept 2020 after presenting with symptomatic lytic disease  -sp VRd induction followed by yajaira 200 autoSCT on 5/17/23 achieving/mainting OR post SCT; was on revlimid maintenance but relapsed biochemically and with new lytic disease approximately 2 years post SCT (June/July 2023)  -initiated Melinda-Kd salvage on 8/4/23; tolerating will no significant AE's; had had OR by [facility] burden to just 2 doses. Mm labs pending now.   -melinda subq weekly 8>EOW x 8 doses> q 4 week; kyprolis 70mg/m2 day 1, 8, 15 of 28 day cycle; dex 40mg po weekly   -plan to continue until intolerance or progression   -supportive meds: acyclovir, ca +vit d, zometa (q 3 month zometa through December 2023). Next due on 12/2023  -can transition to q 4 weeks lab monitoring and provider appt  - Delayed C3D8 due to salmonella bacteremia. admitted inpatient and received IV antibiotics, now on po cipro- EOT on 10/28/23. Symptoms resolved, counts recovered. Okayed to proceed with next dose chemo today.   Neutropenic fever  - Inpatient stay due to salmonella bacteremia on 10/15/23. Treated with IV antibiotics  - Now on po Cipro, EOT on 10/28  - Afebrile, counts recovered    Neuropathy  Controlled with  gabapentin    ID  Completed post SCT vaccines  Acyclovir as above while in PI       Essential hypertension  -well controled      FU:    -cbc, cmp, serum free light chains, quantitative immunoglobulins, serum electropheresis, serum immunofixation labs are linked to this lab draw on 11/29  -weekly wed treatments  jame/kyprolis  on 11/01,11/15,11/29,12/13  jame -11/22  -add zometa infusion to treatment on 10/18  - MD appt prior to treatment on 11/29; same day     BMT Chart Routing      Follow up with physician .  visit in 4 weeks   Follow up with [facility]    Provider visit type    Infusion scheduling note   Need chemo days adjustment. Next chemo days as below - jame, kyprolis on 11/01,11/15,11/29,12/13 jame on 11/22   Injection scheduling note    Labs   Scheduling:  Preferred lab:  Lab interval:  Cbc, cmp, light chains, immunoglobulins, SPEP, SADAF in 4 weeks   Imaging   Add cbc, cmp on 11/15   Pharmacy appointment    Other referrals              Advance Care Planning   10/25/2023     Patient did not wish to name a surrogate decision maker or provide an Advance Care Plan.    Ashanti Quiñones NP  Hematology/Oncology/BMT

## 2023-10-25 NOTE — NURSING
Pt tolerated C3D8 Kyprolis without complication. VSS. Pt aware of next appointment. Pt d/c from unit ambulatory and in stable condition.

## 2023-11-01 ENCOUNTER — INFUSION (OUTPATIENT)
Dept: INFUSION THERAPY | Facility: HOSPITAL | Age: 63
End: 2023-11-01
Payer: MEDICARE

## 2023-11-01 VITALS
RESPIRATION RATE: 18 BRPM | WEIGHT: 211.88 LBS | HEART RATE: 69 BPM | TEMPERATURE: 98 F | HEIGHT: 67 IN | DIASTOLIC BLOOD PRESSURE: 77 MMHG | BODY MASS INDEX: 33.26 KG/M2 | SYSTOLIC BLOOD PRESSURE: 115 MMHG

## 2023-11-01 DIAGNOSIS — G62.9 NEUROPATHY: ICD-10-CM

## 2023-11-01 DIAGNOSIS — C90.00 MULTIPLE MYELOMA, REMISSION STATUS UNSPECIFIED: Primary | ICD-10-CM

## 2023-11-01 PROCEDURE — 96401 CHEMO ANTI-NEOPL SQ/IM: CPT

## 2023-11-01 PROCEDURE — 96361 HYDRATE IV INFUSION ADD-ON: CPT

## 2023-11-01 PROCEDURE — 96413 CHEMO IV INFUSION 1 HR: CPT

## 2023-11-01 PROCEDURE — 25000003 PHARM REV CODE 250: Performed by: NURSE PRACTITIONER

## 2023-11-01 PROCEDURE — 63600175 PHARM REV CODE 636 W HCPCS: Mod: JZ,JG | Performed by: NURSE PRACTITIONER

## 2023-11-01 RX ORDER — GABAPENTIN 300 MG/1
300 CAPSULE ORAL 2 TIMES DAILY
Qty: 60 CAPSULE | Refills: 3 | Status: SHIPPED | OUTPATIENT
Start: 2023-11-01 | End: 2024-03-15 | Stop reason: SDUPTHER

## 2023-11-01 RX ORDER — SODIUM CHLORIDE 9 MG/ML
INJECTION, SOLUTION INTRAVENOUS
Status: COMPLETED | OUTPATIENT
Start: 2023-11-01 | End: 2023-11-01

## 2023-11-01 RX ORDER — DIPHENHYDRAMINE HYDROCHLORIDE 50 MG/ML
50 INJECTION INTRAMUSCULAR; INTRAVENOUS ONCE AS NEEDED
Status: CANCELLED | OUTPATIENT
Start: 2023-11-01

## 2023-11-01 RX ORDER — EPINEPHRINE 0.3 MG/.3ML
0.3 INJECTION SUBCUTANEOUS ONCE AS NEEDED
Status: CANCELLED | OUTPATIENT
Start: 2023-11-01

## 2023-11-01 RX ORDER — HEPARIN 100 UNIT/ML
500 SYRINGE INTRAVENOUS
Status: CANCELLED | OUTPATIENT
Start: 2023-11-01

## 2023-11-01 RX ORDER — DIPHENHYDRAMINE HYDROCHLORIDE 50 MG/ML
50 INJECTION INTRAMUSCULAR; INTRAVENOUS ONCE AS NEEDED
Status: DISCONTINUED | OUTPATIENT
Start: 2023-11-01 | End: 2023-11-01 | Stop reason: HOSPADM

## 2023-11-01 RX ORDER — SODIUM CHLORIDE 9 MG/ML
INJECTION, SOLUTION INTRAVENOUS
Status: CANCELLED
Start: 2023-11-01

## 2023-11-01 RX ORDER — SODIUM CHLORIDE 0.9 % (FLUSH) 0.9 %
10 SYRINGE (ML) INJECTION
Status: DISCONTINUED | OUTPATIENT
Start: 2023-11-01 | End: 2023-11-01 | Stop reason: HOSPADM

## 2023-11-01 RX ORDER — HEPARIN 100 UNIT/ML
500 SYRINGE INTRAVENOUS
Status: DISCONTINUED | OUTPATIENT
Start: 2023-11-01 | End: 2023-11-01 | Stop reason: HOSPADM

## 2023-11-01 RX ORDER — SODIUM CHLORIDE 0.9 % (FLUSH) 0.9 %
10 SYRINGE (ML) INJECTION
Status: CANCELLED | OUTPATIENT
Start: 2023-11-01

## 2023-11-01 RX ORDER — EPINEPHRINE 0.3 MG/.3ML
0.3 INJECTION SUBCUTANEOUS ONCE AS NEEDED
Status: DISCONTINUED | OUTPATIENT
Start: 2023-11-01 | End: 2023-11-01 | Stop reason: HOSPADM

## 2023-11-01 RX ADMIN — SODIUM CHLORIDE 1000 ML: 9 INJECTION, SOLUTION INTRAVENOUS at 11:11

## 2023-11-01 RX ADMIN — DARATUMUMAB AND HYALURONIDASE-FIHJ (HUMAN RECOMBINANT) 1800 MG: 1800; 30000 INJECTION SUBCUTANEOUS at 12:11

## 2023-11-01 RX ADMIN — CARFILZOMIB 150 MG: 30 INJECTION, POWDER, LYOPHILIZED, FOR SOLUTION INTRAVENOUS at 12:11

## 2023-11-01 NOTE — TELEPHONE ENCOUNTER
----- Message from Ade Santos sent at 11/1/2023  2:18 PM CDT -----  Type:  RX Refill Request    Who Called: pt     RX Name and Strength:  gabapentin (NEURONTIN) 300 MG capsule        How is the patient currently taking it? (ex. 1XDay): 2x a day     Is this a 30 day or 90 day RX: 60 pills     Preferred Pharmacy with phone number: [facility] #[id number] - [place], [place] - [address] [facility] AT [facility]   Phone:  [phone]  Fax:  [phone]          Local or Mail Order: local    Ordering Provider:   [facility] #[id number] - [place], [place] - [address] [facility] AT [facility]   Phone:  [phone]  Fax:  [phone]      Mino     Would the patient rather a call back or a response via [facility]?  Call     Best Call Back Number:  [phone]     Additional Information:  refill

## 2023-11-01 NOTE — PLAN OF CARE
1259-Pt tolerated Melinda, IVFs, and Kyprolis well today, no complaints or complications. VSS. Pt aware to call provider with any questions or concerns and is aware of upcoming appts. Pt ambulatory from clinic with steady gait, no distress noted.

## 2023-11-03 ENCOUNTER — OFFICE VISIT (OUTPATIENT)
Dept: INFECTIOUS DISEASES | Facility: CLINIC | Age: 63
End: 2023-11-03
Payer: MEDICARE

## 2023-11-03 VITALS
WEIGHT: 212.06 LBS | TEMPERATURE: 98 F | SYSTOLIC BLOOD PRESSURE: 166 MMHG | HEIGHT: 67 IN | BODY MASS INDEX: 33.28 KG/M2 | HEART RATE: 105 BPM | DIASTOLIC BLOOD PRESSURE: 96 MMHG

## 2023-11-03 DIAGNOSIS — Z94.84 HISTORY OF AUTOLOGOUS STEM CELL TRANSPLANT: ICD-10-CM

## 2023-11-03 DIAGNOSIS — R78.81 SALMONELLA BACTEREMIA: Primary | ICD-10-CM

## 2023-11-03 PROCEDURE — 1111F DSCHRG MED/CURRENT MED MERGE: CPT | Mod: CPTII,S$GLB,, | Performed by: INTERNAL MEDICINE

## 2023-11-03 PROCEDURE — 99999 PR PBB SHADOW E&M-EST. PATIENT-LVL III: ICD-10-PCS | Mod: PBBFAC,,, | Performed by: INTERNAL MEDICINE

## 2023-11-03 PROCEDURE — 3046F HEMOGLOBIN A1C LEVEL >9.0%: CPT | Mod: CPTII,S$GLB,, | Performed by: INTERNAL MEDICINE

## 2023-11-03 PROCEDURE — 1159F MED LIST DOCD IN RCRD: CPT | Mod: CPTII,S$GLB,, | Performed by: INTERNAL MEDICINE

## 2023-11-03 PROCEDURE — 99214 PR OFFICE/OUTPT VISIT, EST, LEVL IV, 30-39 MIN: ICD-10-PCS | Mod: S$GLB,,, | Performed by: INTERNAL MEDICINE

## 2023-11-03 PROCEDURE — 3008F PR BODY MASS INDEX (BMI) DOCUMENTED: ICD-10-PCS | Mod: CPTII,S$GLB,, | Performed by: INTERNAL MEDICINE

## 2023-11-03 PROCEDURE — 3046F PR MOST RECENT HEMOGLOBIN A1C LEVEL > 9.0%: ICD-10-PCS | Mod: CPTII,S$GLB,, | Performed by: INTERNAL MEDICINE

## 2023-11-03 PROCEDURE — 99999 PR PBB SHADOW E&M-EST. PATIENT-LVL III: CPT | Mod: PBBFAC,,, | Performed by: INTERNAL MEDICINE

## 2023-11-03 PROCEDURE — 3077F SYST BP >= 140 MM HG: CPT | Mod: CPTII,S$GLB,, | Performed by: INTERNAL MEDICINE

## 2023-11-03 PROCEDURE — 3080F DIAST BP >= 90 MM HG: CPT | Mod: CPTII,S$GLB,, | Performed by: INTERNAL MEDICINE

## 2023-11-03 PROCEDURE — 3008F BODY MASS INDEX DOCD: CPT | Mod: CPTII,S$GLB,, | Performed by: INTERNAL MEDICINE

## 2023-11-03 PROCEDURE — 1111F PR DISCHARGE MEDS RECONCILED W/ CURRENT OUTPATIENT MED LIST: ICD-10-PCS | Mod: CPTII,S$GLB,, | Performed by: INTERNAL MEDICINE

## 2023-11-03 PROCEDURE — 99214 OFFICE O/P EST MOD 30 MIN: CPT | Mod: S$GLB,,, | Performed by: INTERNAL MEDICINE

## 2023-11-03 PROCEDURE — 3077F PR MOST RECENT SYSTOLIC BLOOD PRESSURE >= 140 MM HG: ICD-10-PCS | Mod: CPTII,S$GLB,, | Performed by: INTERNAL MEDICINE

## 2023-11-03 PROCEDURE — 3080F PR MOST RECENT DIASTOLIC BLOOD PRESSURE >= 90 MM HG: ICD-10-PCS | Mod: CPTII,S$GLB,, | Performed by: INTERNAL MEDICINE

## 2023-11-03 PROCEDURE — 1159F PR MEDICATION LIST DOCUMENTED IN MEDICAL RECORD: ICD-10-PCS | Mod: CPTII,S$GLB,, | Performed by: INTERNAL MEDICINE

## 2023-11-03 NOTE — PROGRESS NOTES
Subjective:     Patient ID: Jaspreet Walsh Jr. is a 63 y.o. male    Chief Complaint: salmonella bacteremia    HPI: 61M with IgG kappa multiple myeloma s/p auto HSC (5/2021) currently on outpatient Melinda-KD (last infusion 10/11), osteolytic lesions, and HTN who was recently admitted w/ 3 days of nocturnal fevers/chills and rigors with associated epigastric abdominal pain. Blood cx + salmonella. GI symptoms resolved quickly and Blood cx cleared. CT A/P negative for intra-abdominal pathology, normal gallbladder. He was discharged to complete a course of cipro 750mg BID x 14 days.     Seen in clinic today for follow up. He is doing well. No recurrence of symptoms or fevers since hospital discharge. Completed cipro on 10/30. Denies any other problems.     Immunization History   Administered Date(s) Administered    COVID-19, MRNA, LN-S, PF (Pfizer) (Purple Cap) 09/13/2021, 10/04/2021    COVID-19, mRNA, LNP-S, bivalent booster, PF (Moderna Omicron)12 + YEARS 06/16/2023, 08/30/2023    DTaP 02/10/2022, 04/14/2022    DTaP (5 Pertussis Antigens) 12/23/2021    Hepatitis A, Adult 12/16/2021, 06/02/2022    Hepatitis B (recombinant) Adjuvanted, 2 dose 12/30/2021, 02/17/2022    HiB PRP-T 12/16/2021, 02/03/2022, 04/07/2022    IPV 12/23/2021, 02/10/2022, 04/14/2022    Influenza (FLUAD) - Quadrivalent - Adjuvanted - PF *Preferred* (65+) 12/09/2021    MMR 06/02/2023    Pneumococcal Conjugate - 13 Valent 12/09/2021, 02/03/2022, 04/07/2022    Pneumococcal Polysaccharide - 23 Valent 06/02/2022    Zoster Recombinant 01/10/2022, 03/10/2022    meningococcal Conjugate (MCV4O) 12/30/2021, 02/17/2022        Review of Systems   All other systems reviewed and are negative.       Past Medical History:   Diagnosis Date    Anxiety     Arthritis     Cancer     Hypertension      Past Surgical History:   Procedure Laterality Date    BACK SURGERY  01/01/2021    BONE MARROW BIOPSY Left 10/20/2021    Procedure: Biopsy-bone marrow;  Surgeon: Summer Cartwright,  MD;  Location: [facility] END[facility] (4TH FLR);  Service: Oncology;  Laterality: Left;    COLONOSCOPY N/A 2021    Procedure: COLONOSCOPY;  Surgeon: Braxton Lees MD;  Location: [facility] END[facility] (4TH FLR);  Service: Endoscopy;  Laterality: N/A;  -covid kristopher-inst mailed-tb  21-pt to remain on schedule with Dr. Lees, and confirmed updated arrival time of 0835-BB    COLONOSCOPY N/A 2021    Procedure: COLONOSCOPY;  Surgeon: Jo Ann Robledo MD;  Location: [facility] ENDO (4TH FLR);  Service: Endoscopy;  Laterality: N/A;  rescheduled due to poor bowel prep, to be rescheduled with first available provider-BB  covid-21-pcw-BB    CREATION OF MUSCLE ROTATIONAL FLAP N/A 2020    Procedure: CREATION, FLAP, MUSCLE ROTATION;  Surgeon: Kamlesh Bellamy MD;  [facility];  Service: Plastics;  Laterality: N/A;    KNEE SURGERY Left 2019    LUMBAR FUSION N/A 2020    Procedure: FUSION, SPINE, LUMBAR L2-pelvis. Depuy. Plastic surgery closure w/ Dr. Bellamy;  Surgeon: Nick Coyle MD;  [facility];  Service: Neurosurgery;  Laterality: N/A;    Metastatic neoplasum removed from spine       Family History   Problem Relation Age of Onset    Cancer Father      Social History     Tobacco Use    Smoking status: Former     Current packs/day: 0.00     Average packs/day: 0.3 packs/day for 40.0 years (10.0 ttl pk-yrs)     Types: Cigarettes     Start date:      Quit date: 2017     Years since quittin.8    Smokeless tobacco: Never       Objective:     Physical Exam  Constitutional:       General: He is not in acute distress.     Appearance: Normal appearance. He is well-developed. He is not ill-appearing or diaphoretic.   HENT:      Head: Normocephalic and atraumatic.      Right Ear: External ear normal.      Left Ear: External ear normal.      Nose: Nose normal.   Eyes:      General: No scleral icterus.        Right eye: No discharge.         Left eye: No discharge.      Extraocular  Movements: Extraocular movements intact.      Conjunctiva/sclera: Conjunctivae normal.   Pulmonary:      Effort: Pulmonary effort is normal. No respiratory distress.      Breath sounds: No stridor.   Skin:     General: Skin is dry.      Coloration: Skin is not jaundiced or pale.      Findings: No erythema.   Neurological:      General: No focal deficit present.      Mental Status: He is alert and oriented to person, place, and time. Mental status is at baseline.   Psychiatric:         Mood and Affect: Mood normal.         Behavior: Behavior normal.         Thought Content: Thought content normal.         Judgment: Judgment normal.         Data:    All data, including recent labs, radiology, and pathology, has been independently reviewed.    Labs:    Recent Labs   Lab Result Units 10/18/23  0559 10/25/23  0737 11/01/23  0935   WBC K/uL 3.86* 4.54 5.47   Hemoglobin g/dL 8.6* 8.1* 8.6*   Hematocrit % 27.0* 26.0* 27.4*   Sodium mmol/L 138 138 139   Potassium mmol/L 3.7 4.6 4.1   Chloride mmol/L 110 105 107   BUN mg/dL 10 19 26*   Creatinine mg/dL 0.9 1.6* 1.8*   AST U/L 13 12 13   ALT U/L 14 12 11   Alkaline Phosphatase U/L 76 62 54*   Total Bilirubin mg/dL 0.6 0.4 0.6        Radiology:    No results found in the last 24 hours.     Assessment:    1. Salmonella bacteremia  Completed 14d course of PO cipro without recurrence of symptoms      2. History of autologous stem cell transplant  Influenza (FLUAD) - Quadrivalent (Adjuvanted) *Preferred* (65+) (PF)  Discussed COVID booster and RSV vaccine - will obtain at local pharmacy         Follow up as needed    The total time for evaluation and management services performed on 11/3/23 was greater than 30 minutes.     Sailaja Chisholm DO  Transplant Infectious Disease

## 2023-11-15 ENCOUNTER — INFUSION (OUTPATIENT)
Dept: INFUSION THERAPY | Facility: HOSPITAL | Age: 63
End: 2023-11-15
Payer: MEDICARE

## 2023-11-15 VITALS
OXYGEN SATURATION: 100 % | DIASTOLIC BLOOD PRESSURE: 83 MMHG | WEIGHT: 213.88 LBS | RESPIRATION RATE: 18 BRPM | BODY MASS INDEX: 33.57 KG/M2 | SYSTOLIC BLOOD PRESSURE: 146 MMHG | HEIGHT: 67 IN | TEMPERATURE: 99 F | HEART RATE: 67 BPM

## 2023-11-15 DIAGNOSIS — C90.00 MULTIPLE MYELOMA, REMISSION STATUS UNSPECIFIED: Primary | ICD-10-CM

## 2023-11-15 PROCEDURE — 96401 CHEMO ANTI-NEOPL SQ/IM: CPT

## 2023-11-15 PROCEDURE — 63600175 PHARM REV CODE 636 W HCPCS: Mod: JZ,JG | Performed by: NURSE PRACTITIONER

## 2023-11-15 PROCEDURE — 25000003 PHARM REV CODE 250: Performed by: NURSE PRACTITIONER

## 2023-11-15 PROCEDURE — 96413 CHEMO IV INFUSION 1 HR: CPT

## 2023-11-15 RX ORDER — HEPARIN 100 UNIT/ML
500 SYRINGE INTRAVENOUS
Status: CANCELLED | OUTPATIENT
Start: 2023-11-22

## 2023-11-15 RX ORDER — HEPARIN 100 UNIT/ML
500 SYRINGE INTRAVENOUS
Status: CANCELLED | OUTPATIENT
Start: 2023-11-29

## 2023-11-15 RX ORDER — SODIUM CHLORIDE 0.9 % (FLUSH) 0.9 %
10 SYRINGE (ML) INJECTION
Status: CANCELLED | OUTPATIENT
Start: 2023-11-15

## 2023-11-15 RX ORDER — HEPARIN 100 UNIT/ML
500 SYRINGE INTRAVENOUS
Status: DISCONTINUED | OUTPATIENT
Start: 2023-11-15 | End: 2023-11-15 | Stop reason: HOSPADM

## 2023-11-15 RX ORDER — SODIUM CHLORIDE 0.9 % (FLUSH) 0.9 %
10 SYRINGE (ML) INJECTION
Status: CANCELLED | OUTPATIENT
Start: 2023-11-29

## 2023-11-15 RX ORDER — SODIUM CHLORIDE 0.9 % (FLUSH) 0.9 %
10 SYRINGE (ML) INJECTION
Status: CANCELLED | OUTPATIENT
Start: 2023-11-22

## 2023-11-15 RX ORDER — DIPHENHYDRAMINE HYDROCHLORIDE 50 MG/ML
50 INJECTION INTRAMUSCULAR; INTRAVENOUS ONCE AS NEEDED
Status: CANCELLED | OUTPATIENT
Start: 2023-11-29

## 2023-11-15 RX ORDER — SODIUM CHLORIDE 0.9 % (FLUSH) 0.9 %
10 SYRINGE (ML) INJECTION
Status: DISCONTINUED | OUTPATIENT
Start: 2023-11-15 | End: 2023-11-15 | Stop reason: HOSPADM

## 2023-11-15 RX ORDER — HEPARIN 100 UNIT/ML
500 SYRINGE INTRAVENOUS
Status: CANCELLED | OUTPATIENT
Start: 2023-11-15

## 2023-11-15 RX ORDER — DIPHENHYDRAMINE HYDROCHLORIDE 50 MG/ML
50 INJECTION INTRAMUSCULAR; INTRAVENOUS ONCE AS NEEDED
Status: CANCELLED | OUTPATIENT
Start: 2023-11-15

## 2023-11-15 RX ORDER — EPINEPHRINE 0.3 MG/.3ML
0.3 INJECTION SUBCUTANEOUS ONCE AS NEEDED
Status: CANCELLED | OUTPATIENT
Start: 2023-11-15

## 2023-11-15 RX ORDER — EPINEPHRINE 0.3 MG/.3ML
0.3 INJECTION SUBCUTANEOUS ONCE AS NEEDED
Status: DISCONTINUED | OUTPATIENT
Start: 2023-11-15 | End: 2023-11-15 | Stop reason: HOSPADM

## 2023-11-15 RX ORDER — EPINEPHRINE 0.3 MG/.3ML
0.3 INJECTION SUBCUTANEOUS ONCE AS NEEDED
Status: CANCELLED | OUTPATIENT
Start: 2023-11-29

## 2023-11-15 RX ORDER — DIPHENHYDRAMINE HYDROCHLORIDE 50 MG/ML
50 INJECTION INTRAMUSCULAR; INTRAVENOUS ONCE AS NEEDED
Status: DISCONTINUED | OUTPATIENT
Start: 2023-11-15 | End: 2023-11-15 | Stop reason: HOSPADM

## 2023-11-15 RX ADMIN — SODIUM CHLORIDE: 9 INJECTION, SOLUTION INTRAVENOUS at 09:11

## 2023-11-15 RX ADMIN — CARFILZOMIB 150 MG: 30 INJECTION, POWDER, LYOPHILIZED, FOR SOLUTION INTRAVENOUS at 11:11

## 2023-11-15 RX ADMIN — DARATUMUMAB AND HYALURONIDASE-FIHJ (HUMAN RECOMBINANT) 1800 MG: 1800; 30000 INJECTION SUBCUTANEOUS at 10:11

## 2023-11-15 NOTE — PLAN OF CARE
1214  Patient completed Kyprolis infusion and subsequent flush as well as Darzalex SQ injection , tolerated well.  PIV discontinued without issue. RTC 11/22/23 Patient ambulated off floor accompanied by wife.

## 2023-11-15 NOTE — PLAN OF CARE
0900  Patient seated in chair accompanied by wife.  VSS< assessment done. PIV inserted, flushed, blood return noted, started NS @ 25 cc/hr KVO while waiting for labs to result, dr. Baker to sign orders and for pharmacy to make Darzalex faspro SQ, and Kyprolis .  [facility] for safety

## 2023-11-22 ENCOUNTER — INFUSION (OUTPATIENT)
Dept: INFUSION THERAPY | Facility: HOSPITAL | Age: 63
End: 2023-11-22
Payer: MEDICARE

## 2023-11-22 VITALS
SYSTOLIC BLOOD PRESSURE: 140 MMHG | HEIGHT: 67 IN | WEIGHT: 216.69 LBS | HEART RATE: 66 BPM | RESPIRATION RATE: 18 BRPM | DIASTOLIC BLOOD PRESSURE: 77 MMHG | BODY MASS INDEX: 34.01 KG/M2 | TEMPERATURE: 98 F

## 2023-11-22 DIAGNOSIS — C90.00 MULTIPLE MYELOMA, REMISSION STATUS UNSPECIFIED: Primary | ICD-10-CM

## 2023-11-22 PROCEDURE — 96413 CHEMO IV INFUSION 1 HR: CPT

## 2023-11-22 PROCEDURE — 25000003 PHARM REV CODE 250: Performed by: INTERNAL MEDICINE

## 2023-11-22 PROCEDURE — 63600175 PHARM REV CODE 636 W HCPCS: Mod: JZ,JG | Performed by: INTERNAL MEDICINE

## 2023-11-22 RX ADMIN — CARFILZOMIB 150 MG: 30 INJECTION, POWDER, LYOPHILIZED, FOR SOLUTION INTRAVENOUS at 10:11

## 2023-11-22 NOTE — PLAN OF CARE
1049-Patient tolerated treatment well. PIV removed and site dressed. Discharged without complaints or S/S of adverse event.  Instructed to call provider for any questions or concerns.

## 2023-11-22 NOTE — PLAN OF CARE
0923-Labs , hx, and medications reviewed. Assessment completed. Discussed plan of care with patient. Patient in agreement. Chair reclined and warm blanket and snack offered.

## 2023-11-29 ENCOUNTER — INFUSION (OUTPATIENT)
Dept: INFUSION THERAPY | Facility: HOSPITAL | Age: 63
End: 2023-11-29
Payer: MEDICARE

## 2023-11-29 VITALS
TEMPERATURE: 98 F | HEART RATE: 78 BPM | WEIGHT: 216.63 LBS | HEIGHT: 67 IN | OXYGEN SATURATION: 99 % | SYSTOLIC BLOOD PRESSURE: 130 MMHG | BODY MASS INDEX: 34 KG/M2 | RESPIRATION RATE: 20 BRPM | DIASTOLIC BLOOD PRESSURE: 78 MMHG

## 2023-11-29 DIAGNOSIS — C90.00 MULTIPLE MYELOMA, REMISSION STATUS UNSPECIFIED: Primary | ICD-10-CM

## 2023-11-29 PROCEDURE — 25000003 PHARM REV CODE 250: Performed by: INTERNAL MEDICINE

## 2023-11-29 PROCEDURE — 96401 CHEMO ANTI-NEOPL SQ/IM: CPT

## 2023-11-29 PROCEDURE — 63600175 PHARM REV CODE 636 W HCPCS: Mod: JZ,JG | Performed by: INTERNAL MEDICINE

## 2023-11-29 PROCEDURE — A4216 STERILE WATER/SALINE, 10 ML: HCPCS | Performed by: INTERNAL MEDICINE

## 2023-11-29 PROCEDURE — 96413 CHEMO IV INFUSION 1 HR: CPT

## 2023-11-29 RX ORDER — SODIUM CHLORIDE 0.9 % (FLUSH) 0.9 %
10 SYRINGE (ML) INJECTION
Status: CANCELLED | OUTPATIENT
Start: 2023-11-29

## 2023-11-29 RX ORDER — EPINEPHRINE 0.3 MG/.3ML
0.3 INJECTION SUBCUTANEOUS ONCE AS NEEDED
Status: DISCONTINUED | OUTPATIENT
Start: 2023-11-29 | End: 2023-11-29 | Stop reason: HOSPADM

## 2023-11-29 RX ORDER — DIPHENHYDRAMINE HYDROCHLORIDE 50 MG/ML
50 INJECTION INTRAMUSCULAR; INTRAVENOUS ONCE AS NEEDED
Status: DISCONTINUED | OUTPATIENT
Start: 2023-11-29 | End: 2023-11-29 | Stop reason: HOSPADM

## 2023-11-29 RX ORDER — HEPARIN 100 UNIT/ML
500 SYRINGE INTRAVENOUS
Status: DISCONTINUED | OUTPATIENT
Start: 2023-11-29 | End: 2023-11-29 | Stop reason: HOSPADM

## 2023-11-29 RX ORDER — SODIUM CHLORIDE 0.9 % (FLUSH) 0.9 %
10 SYRINGE (ML) INJECTION
Status: DISCONTINUED | OUTPATIENT
Start: 2023-11-29 | End: 2023-11-29 | Stop reason: HOSPADM

## 2023-11-29 RX ORDER — EPINEPHRINE 0.3 MG/.3ML
0.3 INJECTION SUBCUTANEOUS ONCE AS NEEDED
Status: CANCELLED | OUTPATIENT
Start: 2023-11-29

## 2023-11-29 RX ORDER — HEPARIN 100 UNIT/ML
500 SYRINGE INTRAVENOUS
Status: CANCELLED | OUTPATIENT
Start: 2023-11-29

## 2023-11-29 RX ORDER — DIPHENHYDRAMINE HYDROCHLORIDE 50 MG/ML
50 INJECTION INTRAMUSCULAR; INTRAVENOUS ONCE AS NEEDED
Status: CANCELLED | OUTPATIENT
Start: 2023-11-29

## 2023-11-29 RX ADMIN — CARFILZOMIB 150 MG: 30 INJECTION, POWDER, LYOPHILIZED, FOR SOLUTION INTRAVENOUS at 11:11

## 2023-11-29 RX ADMIN — DARATUMUMAB AND HYALURONIDASE-FIHJ (HUMAN RECOMBINANT) 1800 MG: 1800; 30000 INJECTION SUBCUTANEOUS at 11:11

## 2023-11-29 RX ADMIN — SODIUM CHLORIDE: 9 INJECTION, SOLUTION INTRAVENOUS at 09:11

## 2023-11-29 RX ADMIN — Medication 10 ML: at 12:11

## 2023-11-29 NOTE — NURSING
PT tolerated Kyprolis and Melinda SQ well. No adverse reaction noted. Pt education reinforced on side effects, what to expect and when to contact the doctor. Pt verbalized understanding. PIV d/c per protocol, pt tolerated well.

## 2023-12-05 ENCOUNTER — LAB VISIT (OUTPATIENT)
Dept: LAB | Facility: HOSPITAL | Age: 63
End: 2023-12-05
Payer: MEDICARE

## 2023-12-05 ENCOUNTER — OFFICE VISIT (OUTPATIENT)
Dept: HEMATOLOGY/ONCOLOGY | Facility: CLINIC | Age: 63
End: 2023-12-05
Payer: MEDICARE

## 2023-12-05 VITALS
OXYGEN SATURATION: 100 % | SYSTOLIC BLOOD PRESSURE: 142 MMHG | BODY MASS INDEX: 34.41 KG/M2 | RESPIRATION RATE: 17 BRPM | WEIGHT: 219.25 LBS | HEART RATE: 89 BPM | TEMPERATURE: 99 F | HEIGHT: 67 IN | DIASTOLIC BLOOD PRESSURE: 82 MMHG

## 2023-12-05 DIAGNOSIS — C90.00 MULTIPLE MYELOMA, REMISSION STATUS UNSPECIFIED: Primary | ICD-10-CM

## 2023-12-05 DIAGNOSIS — C90.00 MULTIPLE MYELOMA, REMISSION STATUS UNSPECIFIED: ICD-10-CM

## 2023-12-05 DIAGNOSIS — Z79.899 IMMUNODEFICIENCY DUE TO DRUGS: ICD-10-CM

## 2023-12-05 DIAGNOSIS — D84.821 IMMUNODEFICIENCY DUE TO DRUGS: ICD-10-CM

## 2023-12-05 DIAGNOSIS — Z94.84 HISTORY OF AUTO STEM CELL TRANSPLANT: ICD-10-CM

## 2023-12-05 LAB
ALBUMIN SERPL BCP-MCNC: 3.4 G/DL (ref 3.5–5.2)
ALP SERPL-CCNC: 53 U/L (ref 55–135)
ALT SERPL W/O P-5'-P-CCNC: 8 U/L (ref 10–44)
ANION GAP SERPL CALC-SCNC: 11 MMOL/L (ref 8–16)
AST SERPL-CCNC: 10 U/L (ref 10–40)
BASOPHILS # BLD AUTO: 0 K/UL (ref 0–0.2)
BASOPHILS NFR BLD: 0 % (ref 0–1.9)
BILIRUB SERPL-MCNC: 0.5 MG/DL (ref 0.1–1)
BUN SERPL-MCNC: 18 MG/DL (ref 8–23)
CALCIUM SERPL-MCNC: 9.5 MG/DL (ref 8.7–10.5)
CHLORIDE SERPL-SCNC: 104 MMOL/L (ref 95–110)
CO2 SERPL-SCNC: 25 MMOL/L (ref 23–29)
CREAT SERPL-MCNC: 1.3 MG/DL (ref 0.5–1.4)
DIFFERENTIAL METHOD: ABNORMAL
EOSINOPHIL # BLD AUTO: 0 K/UL (ref 0–0.5)
EOSINOPHIL NFR BLD: 0.2 % (ref 0–8)
ERYTHROCYTE [DISTWIDTH] IN BLOOD BY AUTOMATED COUNT: 18.1 % (ref 11.5–14.5)
EST. GFR  (NO RACE VARIABLE): >60 ML/MIN/1.73 M^2
GLUCOSE SERPL-MCNC: 233 MG/DL (ref 70–110)
HCT VFR BLD AUTO: 32.5 % (ref 40–54)
HGB BLD-MCNC: 10.7 G/DL (ref 14–18)
IMM GRANULOCYTES # BLD AUTO: 0.02 K/UL (ref 0–0.04)
IMM GRANULOCYTES NFR BLD AUTO: 0.5 % (ref 0–0.5)
LYMPHOCYTES # BLD AUTO: 1.3 K/UL (ref 1–4.8)
LYMPHOCYTES NFR BLD: 31.5 % (ref 18–48)
MCH RBC QN AUTO: 31.2 PG (ref 27–31)
MCHC RBC AUTO-ENTMCNC: 32.9 G/DL (ref 32–36)
MCV RBC AUTO: 95 FL (ref 82–98)
MONOCYTES # BLD AUTO: 0.3 K/UL (ref 0.3–1)
MONOCYTES NFR BLD: 7.7 % (ref 4–15)
NEUTROPHILS # BLD AUTO: 2.6 K/UL (ref 1.8–7.7)
NEUTROPHILS NFR BLD: 60.1 % (ref 38–73)
NRBC BLD-RTO: 0 /100 WBC
PLATELET # BLD AUTO: 114 K/UL (ref 150–450)
PMV BLD AUTO: 10.9 FL (ref 9.2–12.9)
POTASSIUM SERPL-SCNC: 4.7 MMOL/L (ref 3.5–5.1)
PROT SERPL-MCNC: 7.3 G/DL (ref 6–8.4)
RBC # BLD AUTO: 3.43 M/UL (ref 4.6–6.2)
SODIUM SERPL-SCNC: 140 MMOL/L (ref 136–145)
WBC # BLD AUTO: 4.26 K/UL (ref 3.9–12.7)

## 2023-12-05 PROCEDURE — 3079F DIAST BP 80-89 MM HG: CPT | Mod: CPTII,S$GLB,, | Performed by: INTERNAL MEDICINE

## 2023-12-05 PROCEDURE — 3008F PR BODY MASS INDEX (BMI) DOCUMENTED: ICD-10-PCS | Mod: CPTII,S$GLB,, | Performed by: INTERNAL MEDICINE

## 2023-12-05 PROCEDURE — 3077F SYST BP >= 140 MM HG: CPT | Mod: CPTII,S$GLB,, | Performed by: INTERNAL MEDICINE

## 2023-12-05 PROCEDURE — 3077F PR MOST RECENT SYSTOLIC BLOOD PRESSURE >= 140 MM HG: ICD-10-PCS | Mod: CPTII,S$GLB,, | Performed by: INTERNAL MEDICINE

## 2023-12-05 PROCEDURE — 99215 PR OFFICE/OUTPT VISIT, EST, LEVL V, 40-54 MIN: ICD-10-PCS | Mod: S$GLB,,, | Performed by: INTERNAL MEDICINE

## 2023-12-05 PROCEDURE — 3046F HEMOGLOBIN A1C LEVEL >9.0%: CPT | Mod: CPTII,S$GLB,, | Performed by: INTERNAL MEDICINE

## 2023-12-05 PROCEDURE — 99999 PR PBB SHADOW E&M-EST. PATIENT-LVL III: CPT | Mod: PBBFAC,,, | Performed by: INTERNAL MEDICINE

## 2023-12-05 PROCEDURE — 36415 COLL VENOUS BLD VENIPUNCTURE: CPT | Performed by: INTERNAL MEDICINE

## 2023-12-05 PROCEDURE — 3046F PR MOST RECENT HEMOGLOBIN A1C LEVEL > 9.0%: ICD-10-PCS | Mod: CPTII,S$GLB,, | Performed by: INTERNAL MEDICINE

## 2023-12-05 PROCEDURE — 99999 PR PBB SHADOW E&M-EST. PATIENT-LVL III: ICD-10-PCS | Mod: PBBFAC,,, | Performed by: INTERNAL MEDICINE

## 2023-12-05 PROCEDURE — 3008F BODY MASS INDEX DOCD: CPT | Mod: CPTII,S$GLB,, | Performed by: INTERNAL MEDICINE

## 2023-12-05 PROCEDURE — 99215 OFFICE O/P EST HI 40 MIN: CPT | Mod: S$GLB,,, | Performed by: INTERNAL MEDICINE

## 2023-12-05 PROCEDURE — 3079F PR MOST RECENT DIASTOLIC BLOOD PRESSURE 80-89 MM HG: ICD-10-PCS | Mod: CPTII,S$GLB,, | Performed by: INTERNAL MEDICINE

## 2023-12-05 PROCEDURE — 80053 COMPREHEN METABOLIC PANEL: CPT | Performed by: INTERNAL MEDICINE

## 2023-12-05 PROCEDURE — 85025 COMPLETE CBC W/AUTO DIFF WBC: CPT | Performed by: INTERNAL MEDICINE

## 2023-12-05 NOTE — PROGRESS NOTES
SECTION OF HEMATOLOGY AND BONE MARROW TRANSPLANT  Return Patient Visit   12/08/2023    CHIEF COMPLAINT:   Multiple Myeloma    HISTORY OF PRESENT ILLNESS: Patient of /  63 y.o.male; pmh of IgG kappa multiple myeloma (standard risk CG per msmart criteria, r-iss stage II); diagnosed September 2019 after presenting with symptomatic perispinal plasmacytoma;He has subsequently received  8 cycles of VRd therapy. Was in midst or pre transplant evaluation but due to insurance coverage issues transplant was delayed so was maintained on therapy and those issues have been resolved.    Transplant Course  Patient with IgG Kappa MM and pmh HTN, lytic bone lesion, arthritis and vitamin D deficiency. Admitted 5/15/21 for planned Alea auto SCT for MM. He received 3 bags and a CD34 dose of 3.35 x 10^6 on 5/17/21. Tolerated chemo and transplant well. Admission complicated by n/v controlled with scheduled anti-emetics and c-diff neg diarrhea controlled with prn imodium. He engrafted on Day +13 (5/30/21) with an ANC of 2607. He was discharged home on Day +14 (5/31/21).     Day +100 restaging marrow indicated that he was in DE.   Interval History:    Found to be in relapse biochemically (June/July 2023 ) and with new lytic disease at 2 years post SCT.  Initiated DKd salvage 8/4/23.  He has been tolerating well. Did have admit for salmonella bacteremia but resolved with abx. Doing well.     PAST MEDICAL HISTORY:   Past Medical History:   Diagnosis Date    Anxiety     Arthritis     Cancer     Hypertension        PAST SURGICAL HISTORY:   Past Surgical History:   Procedure Laterality Date    BACK SURGERY  01/01/2021    BONE MARROW BIOPSY Left 10/20/2021    Procedure: Biopsy-bone marrow;  Surgeon: Summer Cartwright MD;  Location: [facility] (4TH FLR);  Service: Oncology;  Laterality: Left;    COLONOSCOPY N/A 01/25/2021    Procedure: COLONOSCOPY;  Surgeon: Braxton Lees MD;  Location: [facility] (4TH FLR);  Service: Endoscopy;   Laterality: N/A;  1/22-covid kristopher-inst mailed-tb  1/20/21-pt to remain on schedule with Dr. Lees, and confirmed updated arrival time of 0835-BB    COLONOSCOPY N/A 02/01/2021    Procedure: COLONOSCOPY;  Surgeon: Jo Ann Robledo MD;  Location: [facility] (4TH FLR);  Service: Endoscopy;  Laterality: N/A;  rescheduled due to poor bowel prep, to be rescheduled with first available provider-BB  covid-1/29/21-pcw-BB    CREATION OF MUSCLE ROTATIONAL FLAP N/A 09/23/2020    Procedure: CREATION, FLAP, MUSCLE ROTATION;  Surgeon: Kamlesh Bellamy MD;  Location: [facility] OR 2ND FLR;  Service: Plastics;  Laterality: N/A;    KNEE SURGERY Left 06/2019    LUMBAR FUSION N/A 09/23/2020    Procedure: FUSION, SPINE, LUMBAR L2-pelvis. Depuy. Plastic surgery closure w/ Dr. Bellamy;  Surgeon: Nick Coyle MD;  Location: [facility] OR 2ND FLR;  Service: Neurosurgery;  Laterality: N/A;    Metastatic neoplasum removed from spine         PAST SOCIAL HISTORY:   reports that he quit smoking about 6 years ago. His smoking use included cigarettes. He started smoking about 46 years ago. He has a 10.0 pack-year smoking history. He has never used smokeless tobacco.    FAMILY HISTORY:  Family History   Problem Relation Age of Onset    Cancer Father        CURRENT MEDICATIONS:   Current Outpatient Medications   Medication Sig    acyclovir (ZOVIRAX) 400 MG tablet Take 1 tablet (400 mg total) by mouth 2 (two) times daily.    amLODIPine (NORVASC) 10 MG tablet Take 1 tablet (10 mg total) by mouth once daily.    cloNIDine (CATAPRES) 0.3 MG tablet Take 0.3 mg by mouth once daily.    dexAMETHasone (DECADRON) 4 MG Tab Take 10 tablets (40 mg total) by mouth every 7 days. Take with food before chemotherapy appointments    gabapentin (NEURONTIN) 300 MG capsule Take 1 capsule (300 mg total) by mouth 2 (two) times daily.    hydroCHLOROthiazide (HYDRODIURIL) 25 MG tablet Take 1 tablet (25 mg total) by mouth once daily.    metFORMIN (GLUCOPHAGE) 1000 MG tablet Take 0.5  "tablets (500 mg total) by mouth 2 (two) times daily with meals.    potassium chloride SA (K-DUR,KLOR-CON M) 10 MEQ tablet Take 1 tablet (10 mEq total) by mouth once daily.    calcium-vitamin D3 (OYSTER SHELL CALCIUM-VIT D3) 500 mg-5 mcg (200 unit) per tablet Take 1 tablet by mouth once daily.     No current facility-administered medications for this visit.     ALLERGIES:   Review of patient's allergies indicates:  No Known Allergies  Review of Systems   Constitutional: Negative for fever, malaise/fatigue and weight loss.   Respiratory: Negative for cough and shortness of breath.    Cardiovascular: Negative for chest pain and leg swelling.   Gastrointestinal: Negative for constipation, diarrhea and vomiting.   Skin: Negative for rash.   Neurological: Negative for seizures and weakness.   Psychiatric/Behavioral: The patient is not nervous/anxious.      PHYSICAL EXAM:   Vitals:    12/05/23 1028   BP: (!) 142/82   Pulse: 89   Resp: 17   Temp: 98.8 °F (37.1 °C)   TempSrc: Oral   SpO2: 100%   Weight: 99.5 kg (219 lb 4 oz)   Height: 5' 6.5" (1.689 m)   PainSc: 0-No pain     General - well developed, well nourished, no apparent distress  HEENT - oropharynx clear  Chest and Lung - clear to auscultation bilaterally   Cardiovascular - RRR with no MGR, normal S1 and S2  Abdomen-  soft, nontender, no palpable hepatomegaly or splenomegaly  Lymph - no palpable lymphadenopathy  Heme - no bruising, petechiae, pallor  Skin - no rashes or lesions  Psych - appropriate mood and affect        ECOG Performance Status: (foot note - ECOG PS provided by Eastern Cooperative Oncology Group) 1 - Symptomatic but completely ambulatory    Karnofsky Performance Score:  90%- Able to Carry on Normal Activity: Minor Symptoms of Disease  DATA:   Lab Results   Component Value Date    WBC 4.26 12/05/2023    HGB 10.7 (L) 12/05/2023    HCT 32.5 (L) 12/05/2023    MCV 95 12/05/2023     (L) 12/05/2023       Gran # (ANC)   Date Value Ref Range Status "   12/05/2023 2.6 1.8 - 7.7 K/uL Final     Gran %   Date Value Ref Range Status   12/05/2023 60.1 38.0 - 73.0 % Final     CMP  Sodium   Date Value Ref Range Status   12/05/2023 140 136 - 145 mmol/L Final     Potassium   Date Value Ref Range Status   12/05/2023 4.7 3.5 - 5.1 mmol/L Final     Chloride   Date Value Ref Range Status   12/05/2023 104 95 - 110 mmol/L Final     CO2   Date Value Ref Range Status   12/05/2023 25 23 - 29 mmol/L Final     Glucose   Date Value Ref Range Status   12/05/2023 233 (H) 70 - 110 mg/dL Final     BUN   Date Value Ref Range Status   12/05/2023 18 8 - 23 mg/dL Final     Creatinine   Date Value Ref Range Status   12/05/2023 1.3 0.5 - 1.4 mg/dL Final     Calcium   Date Value Ref Range Status   12/05/2023 9.5 8.7 - 10.5 mg/dL Final     Total Protein   Date Value Ref Range Status   12/05/2023 7.3 6.0 - 8.4 g/dL Final     Albumin   Date Value Ref Range Status   12/05/2023 3.4 (L) 3.5 - 5.2 g/dL Final     Total Bilirubin   Date Value Ref Range Status   12/05/2023 0.5 0.1 - 1.0 mg/dL Final     Comment:     For infants and newborns, interpretation of results should be based  on gestational age, weight and in agreement with clinical  observations.    Premature Infant recommended reference ranges:  Up to 24 hours.............<8.0 mg/dL  Up to 48 hours............<12.0 mg/dL  3-5 days..................<15.0 mg/dL  6-29 days.................<15.0 mg/dL       Alkaline Phosphatase   Date Value Ref Range Status   12/05/2023 53 (L) 55 - 135 U/L Final     AST   Date Value Ref Range Status   12/05/2023 10 10 - 40 U/L Final     ALT   Date Value Ref Range Status   12/05/2023 8 (L) 10 - 44 U/L Final     Anion Gap   Date Value Ref Range Status   12/05/2023 11 8 - 16 mmol/L Final     eGFR   Date Value Ref Range Status   12/05/2023 >60.0 >60 mL/min/1.73 m^2 Final     IgG   Date Value Ref Range Status   11/22/2023 1926 (H) 650 - 1600 mg/dL Final     Comment:     IgG Cord Blood Reference Range: 650-1600 mg/dL.      IgA   Date Value Ref Range Status   11/22/2023 5 (L) 40 - 350 mg/dL Final     Comment:     IgA Cord Blood Reference Range: <5 mg/dL.     IgM   Date Value Ref Range Status   11/22/2023 18 (L) 50 - 300 mg/dL Final     Comment:     IgM Cord Blood Reference Range: <25 mg/dL.     Kappa Free Light Chains   Date Value Ref Range Status   11/22/2023 58.66 (H) 0.33 - 1.94 mg/dL Final   10/25/2023 68.05 (H) 0.33 - 1.94 mg/dL Final   09/26/2023 50.38 (H) 0.33 - 1.94 mg/dL Final     Lambda Free Light Chains   Date Value Ref Range Status   11/22/2023 0.15 (L) 0.57 - 2.63 mg/dL Final   10/25/2023 0.26 (L) 0.57 - 2.63 mg/dL Final   09/26/2023 0.30 (L) 0.57 - 2.63 mg/dL Final     Kappa/Lambda FLC Ratio   Date Value Ref Range Status   11/22/2023 391.10 (H) 0.26 - 1.65 Final     Comment:     Undetected antigen excess is a rare event but cannot   be excluded. If these free light chain results do not   agree with other clinical or laboratory findings or   if the sample is from a patient that has previously   demonstrated antigen excess, discuss with the testing   laboratory.   Results should always be interpreted in conjunction   with other laboratory tests and clinical evidence.     10/25/2023 261.70 (H) 0.26 - 1.65 Final     Comment:     Undetected antigen excess is a rare event but cannot   be excluded. If these free light chain results do not   agree with other clinical or laboratory findings or   if the sample is from a patient that has previously   demonstrated antigen excess, discuss with the testing   laboratory.   Results should always be interpreted in conjunction   with other laboratory tests and clinical evidence.     09/26/2023 167.90 (H) 0.26 - 1.65 Final     Comment:     Undetected antigen excess is a rare event but cannot   be excluded. If these free light chain results do not   agree with other clinical or laboratory findings or   if the sample is from a patient that has previously   demonstrated antigen excess,  discuss with the testing   laboratory.   Results should always be interpreted in conjunction   with other laboratory tests and clinical evidence.       Pathologist Interpretation SPE   Date Value Ref Range Status   11/22/2023 REVIEWED  Final     Comment:       Electronically reviewed and signed by:  Joaquin Sabillon MD  Signed on 11/27/23 at 14:12  Normal total protein.   Normal gamma globulins are decreased. Paraprotein peak in gamma =   1.47 g/dL (previously 1.49 g/dL).      10/25/2023 REVIEWED  Final     Comment:       Electronically reviewed and signed by:  Tasha Mills MD  Signed on 10/26/23 at 14:52  Normal total protein. Normal gamma globulins are decreased.   Paraprotein peak in gamma = 1.49 g/dL (previously 1.60 g/dL).      09/26/2023 REVIEWED  Final     Comment:       Electronically reviewed and signed by:  Tasha Mills MD  Signed on 09/28/23 at 07:27  Normal total protein. Normal gamma globulins are decreased.  Paraprotein peak in gamma = 1.60 g/dL (previously 2.03 g/dL).        Pathologist Interpretation SADAF   Date Value Ref Range Status   11/22/2023 REVIEWED  Final     Comment:       Electronically reviewed and signed by:  Joaquin Sabillon MD  Signed on 11/27/23 at 14:12  IgG kappa specific monoclonal band present.               1. Multiple myeloma, remission status unspecified  NM PET CT FDG Whole Body (Melanoma)      2. History of auto stem cell transplant        3. Immunodeficiency due to drugs                  Multiple myeloma/sp autoSCT  - standard risk MM diagnosed sept 2020 after presenting with symptomatic lytic disease  -sp VRd induction followed by yajaira 200 autoSCT on 5/17/23 achieving/mainting MI post SCT; was on revlimid maintenance but relapsed biochemically and with new lytic disease approximately 2 years post SCT (June/July 2023)  -initiated Melinda-Kd salvage on 8/4/23; tolerating will no significant AE's; had had MI by SFLC burden to just 2 doses. Mm labs pending now.    -jame subq weekly 8>EOW x 8 doses> q 4 week; kyprolis 70mg/m2 day 1, 8, 15 of 28 day cycle; dex 40mg po weekly   -plan to continue until intolerance or progression   -supportive meds: acyclovir, ca +vit d, zometa (q 3 month zometa through December 2023). Next due on 12/2023  -can transition to q 4 weeks lab monitoring and provider appt  - Delayed therapy due to salmonella bacteremia. admitted inpatient and received IV antibiotics, now on po cipro- EOT on 10/28/23. Symptoms resolved, counts recovered and resumed   -his 11/22/23 biochemical studies cw with stable borderline AK so plan to continue  until intolerance or progression; restaging PET scan    -Tolerating well ; proceed with next 4 weeks of therapy     Neutropenic fever  - Inpatient stay due to salmonella bacteremia on 10/15/23. Treated with IV antibiotics  - Now on po Cipro, EOT on 10/28  - Afebrile, counts recovered      Neuropathy  Controlled with gabapentin    ID  Completed post SCT vaccines  Acyclovir as above while in PI       Essential hypertension  -well controled      FU:         -infusion treatments on 12/13, 12/20, 12/27, 1/10  -add zometa infusion to treatment on 12/20/23  -schedule PET scan first week of jan 2024  -cbc, cmp, serum free light chains, quantitative immunoglobulins, serum electropheresis, serum immunofixation and MD appt on 1/8 at [facility] [place]

## 2023-12-05 NOTE — Clinical Note
-infusion treatments on 12/13, 12/20, 12/27, 1/10 -add zometa infusion to treatment on 12/20/23 -schedule PET scan first week of jan 2024 -cbc, cmp, serum free light chains, quantitative immunoglobulins, serum electropheresis, serum immunofixation and [place] app[place] 1[place]8 [place] [facility] [place]

## 2023-12-13 ENCOUNTER — INFUSION (OUTPATIENT)
Dept: INFUSION THERAPY | Facility: HOSPITAL | Age: 63
End: 2023-12-13
Payer: MEDICARE

## 2023-12-13 VITALS
OXYGEN SATURATION: 100 % | BODY MASS INDEX: 34.84 KG/M2 | HEART RATE: 73 BPM | WEIGHT: 222 LBS | SYSTOLIC BLOOD PRESSURE: 142 MMHG | DIASTOLIC BLOOD PRESSURE: 83 MMHG | HEIGHT: 67 IN | RESPIRATION RATE: 18 BRPM

## 2023-12-13 DIAGNOSIS — C90.00 MULTIPLE MYELOMA, REMISSION STATUS UNSPECIFIED: Primary | ICD-10-CM

## 2023-12-13 PROCEDURE — 96413 CHEMO IV INFUSION 1 HR: CPT

## 2023-12-13 PROCEDURE — 25000003 PHARM REV CODE 250: Performed by: INTERNAL MEDICINE

## 2023-12-13 PROCEDURE — 63600175 PHARM REV CODE 636 W HCPCS: Mod: JZ,JG | Performed by: INTERNAL MEDICINE

## 2023-12-13 PROCEDURE — 96401 CHEMO ANTI-NEOPL SQ/IM: CPT

## 2023-12-13 RX ORDER — EPINEPHRINE 0.3 MG/.3ML
0.3 INJECTION SUBCUTANEOUS ONCE AS NEEDED
Status: CANCELLED | OUTPATIENT
Start: 2023-12-13

## 2023-12-13 RX ORDER — EPINEPHRINE 0.3 MG/.3ML
0.3 INJECTION SUBCUTANEOUS ONCE AS NEEDED
Status: CANCELLED | OUTPATIENT
Start: 2023-12-27

## 2023-12-13 RX ORDER — HEPARIN 100 UNIT/ML
500 SYRINGE INTRAVENOUS
Status: CANCELLED | OUTPATIENT
Start: 2023-12-13

## 2023-12-13 RX ORDER — HEPARIN 100 UNIT/ML
500 SYRINGE INTRAVENOUS
Status: DISCONTINUED | OUTPATIENT
Start: 2023-12-13 | End: 2023-12-13 | Stop reason: HOSPADM

## 2023-12-13 RX ORDER — HEPARIN 100 UNIT/ML
500 SYRINGE INTRAVENOUS
Status: CANCELLED | OUTPATIENT
Start: 2023-12-20

## 2023-12-13 RX ORDER — SODIUM CHLORIDE 0.9 % (FLUSH) 0.9 %
10 SYRINGE (ML) INJECTION
Status: DISCONTINUED | OUTPATIENT
Start: 2023-12-13 | End: 2023-12-13 | Stop reason: HOSPADM

## 2023-12-13 RX ORDER — HEPARIN 100 UNIT/ML
500 SYRINGE INTRAVENOUS
Status: CANCELLED | OUTPATIENT
Start: 2023-12-27

## 2023-12-13 RX ORDER — DIPHENHYDRAMINE HYDROCHLORIDE 50 MG/ML
50 INJECTION INTRAMUSCULAR; INTRAVENOUS ONCE AS NEEDED
Status: CANCELLED | OUTPATIENT
Start: 2023-12-27

## 2023-12-13 RX ORDER — SODIUM CHLORIDE 0.9 % (FLUSH) 0.9 %
10 SYRINGE (ML) INJECTION
Status: CANCELLED | OUTPATIENT
Start: 2023-12-20

## 2023-12-13 RX ORDER — EPINEPHRINE 0.3 MG/.3ML
0.3 INJECTION SUBCUTANEOUS ONCE AS NEEDED
Status: DISCONTINUED | OUTPATIENT
Start: 2023-12-13 | End: 2023-12-13 | Stop reason: HOSPADM

## 2023-12-13 RX ORDER — DIPHENHYDRAMINE HYDROCHLORIDE 50 MG/ML
50 INJECTION INTRAMUSCULAR; INTRAVENOUS ONCE AS NEEDED
Status: CANCELLED | OUTPATIENT
Start: 2023-12-13

## 2023-12-13 RX ORDER — SODIUM CHLORIDE 0.9 % (FLUSH) 0.9 %
10 SYRINGE (ML) INJECTION
Status: CANCELLED | OUTPATIENT
Start: 2023-12-27

## 2023-12-13 RX ORDER — SODIUM CHLORIDE 0.9 % (FLUSH) 0.9 %
10 SYRINGE (ML) INJECTION
Status: CANCELLED | OUTPATIENT
Start: 2023-12-13

## 2023-12-13 RX ORDER — DIPHENHYDRAMINE HYDROCHLORIDE 50 MG/ML
50 INJECTION INTRAMUSCULAR; INTRAVENOUS ONCE AS NEEDED
Status: DISCONTINUED | OUTPATIENT
Start: 2023-12-13 | End: 2023-12-13 | Stop reason: HOSPADM

## 2023-12-13 RX ADMIN — DARATUMUMAB AND HYALURONIDASE-FIHJ (HUMAN RECOMBINANT) 1800 MG: 1800; 30000 INJECTION SUBCUTANEOUS at 10:12

## 2023-12-13 RX ADMIN — CARFILZOMIB 150 MG: 30 INJECTION, POWDER, LYOPHILIZED, FOR SOLUTION INTRAVENOUS at 10:12

## 2023-12-13 NOTE — PLAN OF CARE
1145 Pt tolerated Kyprolis/Melinda well today, no complaints or complications,. VSS through duration of treatment. Pt aware to call provider with any questions or concerns and is aware of upcoming appts. Pt ambulatory from clinic with steady gait, no distress noted.

## 2023-12-13 NOTE — PLAN OF CARE
0900 Labs, hx, and medications reviewed, pt meets parameters for treatment today. Assessment completed and plan of care reviewed. Pt verbalized understanding. PIV accessed with no complications. Pt voices no new complaints or concerns, will continue to monitor for safety.

## 2023-12-19 ENCOUNTER — OFFICE VISIT (OUTPATIENT)
Dept: CARDIOLOGY | Facility: CLINIC | Age: 63
End: 2023-12-19
Payer: MEDICARE

## 2023-12-19 VITALS
BODY MASS INDEX: 36.09 KG/M2 | HEIGHT: 66 IN | WEIGHT: 224.56 LBS | SYSTOLIC BLOOD PRESSURE: 158 MMHG | DIASTOLIC BLOOD PRESSURE: 94 MMHG | HEART RATE: 91 BPM | OXYGEN SATURATION: 98 %

## 2023-12-19 DIAGNOSIS — I50.32 CHRONIC DIASTOLIC HEART FAILURE: Primary | ICD-10-CM

## 2023-12-19 DIAGNOSIS — I10 ESSENTIAL HYPERTENSION: ICD-10-CM

## 2023-12-19 PROCEDURE — 3077F PR MOST RECENT SYSTOLIC BLOOD PRESSURE >= 140 MM HG: ICD-10-PCS | Mod: CPTII,S$GLB,, | Performed by: INTERNAL MEDICINE

## 2023-12-19 PROCEDURE — 99213 PR OFFICE/OUTPT VISIT, EST, LEVL III, 20-29 MIN: ICD-10-PCS | Mod: S$GLB,,, | Performed by: INTERNAL MEDICINE

## 2023-12-19 PROCEDURE — 1159F MED LIST DOCD IN RCRD: CPT | Mod: CPTII,S$GLB,, | Performed by: INTERNAL MEDICINE

## 2023-12-19 PROCEDURE — 1159F PR MEDICATION LIST DOCUMENTED IN MEDICAL RECORD: ICD-10-PCS | Mod: CPTII,S$GLB,, | Performed by: INTERNAL MEDICINE

## 2023-12-19 PROCEDURE — 99213 OFFICE O/P EST LOW 20 MIN: CPT | Mod: S$GLB,,, | Performed by: INTERNAL MEDICINE

## 2023-12-19 PROCEDURE — 3046F PR MOST RECENT HEMOGLOBIN A1C LEVEL > 9.0%: ICD-10-PCS | Mod: CPTII,S$GLB,, | Performed by: INTERNAL MEDICINE

## 2023-12-19 PROCEDURE — 3080F DIAST BP >= 90 MM HG: CPT | Mod: CPTII,S$GLB,, | Performed by: INTERNAL MEDICINE

## 2023-12-19 PROCEDURE — 1160F PR REVIEW ALL MEDS BY PRESCRIBER/CLIN PHARMACIST DOCUMENTED: ICD-10-PCS | Mod: CPTII,S$GLB,, | Performed by: INTERNAL MEDICINE

## 2023-12-19 PROCEDURE — 3008F BODY MASS INDEX DOCD: CPT | Mod: CPTII,S$GLB,, | Performed by: INTERNAL MEDICINE

## 2023-12-19 PROCEDURE — 99999 PR PBB SHADOW E&M-EST. PATIENT-LVL III: CPT | Mod: PBBFAC,,, | Performed by: INTERNAL MEDICINE

## 2023-12-19 PROCEDURE — 1160F RVW MEDS BY RX/DR IN RCRD: CPT | Mod: CPTII,S$GLB,, | Performed by: INTERNAL MEDICINE

## 2023-12-19 PROCEDURE — 3008F PR BODY MASS INDEX (BMI) DOCUMENTED: ICD-10-PCS | Mod: CPTII,S$GLB,, | Performed by: INTERNAL MEDICINE

## 2023-12-19 PROCEDURE — 99999 PR PBB SHADOW E&M-EST. PATIENT-LVL III: ICD-10-PCS | Mod: PBBFAC,,, | Performed by: INTERNAL MEDICINE

## 2023-12-19 PROCEDURE — 3077F SYST BP >= 140 MM HG: CPT | Mod: CPTII,S$GLB,, | Performed by: INTERNAL MEDICINE

## 2023-12-19 PROCEDURE — 3046F HEMOGLOBIN A1C LEVEL >9.0%: CPT | Mod: CPTII,S$GLB,, | Performed by: INTERNAL MEDICINE

## 2023-12-19 PROCEDURE — 3080F PR MOST RECENT DIASTOLIC BLOOD PRESSURE >= 90 MM HG: ICD-10-PCS | Mod: CPTII,S$GLB,, | Performed by: INTERNAL MEDICINE

## 2023-12-19 NOTE — PROGRESS NOTES
[facility] Cardiology 701     SUBJECTIVE:     History of Present Illness:  Patient is a 63 y.o. male presents with hypertension and here for this per consult..     Primary Diagnosis:   Hypertension: positive  DM: positive  Nonsmoker  Family history of early CAD; none  Heart disease: none   Multiple myeloma diagnosed 2021   ROS  Since last visit 6/23:   No chest pains  No shortness of breath; no PND or orthopnea  No palpitations  No syncope  Blood pressures at home:perfect in the 110 to 120 range   Review of patient's allergies indicates:  No Known Allergies    Past Medical History:   Diagnosis Date    Anxiety     Arthritis     Cancer     Hypertension        Past Surgical History:   Procedure Laterality Date    BACK SURGERY  01/01/2021    BONE MARROW BIOPSY Left 10/20/2021    Procedure: Biopsy-bone marrow;  Surgeon: Summer Cartwright MD;  [facility]  Service: Oncology;  Laterality: Left;    COLONOSCOPY N/A 01/25/2021    Procedure: COLONOSCOPY;  Surgeon: Braxton Lees MD;  [facility]  Service: Endoscopy;  Laterality: N/A;  1/22-covid kristopher-inst mailed-tb  1/20/21-pt to remain on schedule with Dr. Lees, and confirmed updated arrival time of 0835-BB    COLONOSCOPY N/A 02/01/2021    Procedure: COLONOSCOPY;  Surgeon: Jo Ann Robledo MD;  [facility]  Service: Endoscopy;  Laterality: N/A;  rescheduled due to poor bowel prep, to be rescheduled with first available provider-BB  covid-1/29/21-pcw-BB    CREATION OF MUSCLE ROTATIONAL FLAP N/A 09/23/2020    Procedure: CREATION, FLAP, MUSCLE ROTATION;  Surgeon: Kamlesh Bellamy MD;  [facility];  Service: Plastics;  Laterality: N/A;    KNEE SURGERY Left 06/2019    LUMBAR FUSION N/A 09/23/2020    Procedure: FUSION, SPINE, LUMBAR L2-pelvis. Depuy. Plastic surgery closure w/ Dr. Bellamy;  Surgeon: Nick Coyle MD;  [facility];  Service: Neurosurgery;  Laterality: N/A;    Metastatic neoplasum  "removed from spine         Family History   Problem Relation Age of Onset    Cancer Father        Social History     Tobacco Use    Smoking status: Former     Current packs/day: 0.00     Average packs/day: 0.3 packs/day for 40.0 years (10.0 ttl pk-yrs)     Types: Cigarettes     Start date:      Quit date:      Years since quittin.9    Smokeless tobacco: Never        Past Hospitalization:     Home meds:  Current Outpatient Medications on File Prior to Visit   Medication Sig Dispense Refill    acyclovir (ZOVIRAX) 400 MG tablet Take 1 tablet (400 mg total) by mouth 2 (two) times daily. 60 tablet 11    amLODIPine (NORVASC) 10 MG tablet Take 1 tablet (10 mg total) by mouth once daily. 90 tablet 1    calcium-vitamin D3 (OYSTER SHELL CALCIUM-VIT D3) 500 mg-5 mcg (200 unit) per tablet Take 1 tablet by mouth once daily. 30 tablet 0    cloNIDine (CATAPRES) 0.3 MG tablet Take 0.3 mg by mouth once daily.      dexAMETHasone (DECADRON) 4 MG Tab Take 10 tablets (40 mg total) by mouth every 7 days. Take with food before chemotherapy appointments 40 tablet 11    gabapentin (NEURONTIN) 300 MG capsule Take 1 capsule (300 mg total) by mouth 2 (two) times daily. 60 capsule 3    hydroCHLOROthiazide (HYDRODIURIL) 25 MG tablet Take 1 tablet (25 mg total) by mouth once daily. 90 tablet 1    metFORMIN (GLUCOPHAGE) 1000 MG tablet Take 0.5 tablets (500 mg total) by mouth 2 (two) times daily with meals. 90 tablet 3    potassium chloride SA (K-DUR,KLOR-CON M) 10 MEQ tablet Take 1 tablet (10 mEq total) by mouth once daily. 90 tablet 1     No current facility-administered medications on file prior to visit.       Cardiac meds:  ASA 81 mg  Clonidine 0.3 mg daily  Amlodipine 10 mg   HCTZ 25 mg   K 10 meq    OBJECTIVE:     Vital Signs (Most Recent)  Vitals:    23 1404   BP: (!) 158/94   Pulse: 91   SpO2: 98%   Weight: 101.9 kg (224 lb 8.6 oz)   Height: 5' 6" (1.676 m)             Physical Exam:  Neck: normal carotids, no bruits; " normal JVP  Lungs :clear  Heart: RR, normal S1,S2, systolic ejection  murmurs, no gallops  Abd: no masses; no bruits;   Exts: normal DP and PT pulses bilaterally, no edema noted           LABS    3/23: normal GFR   6/23: normal GFR  12/23: Hgb 10.7, GFR normal   Chart review:    Echo: 2021: normal EF; diastolic dysfunction;     EKGs: 5/22: sinus bradycardia     ASSESSMENT/PLAN:     Hypertension: controlled  No symptoms of angina or failure   3. DM needs better control   Plan: 1. Continue the same medications  2. Return as needed   3. Followup with primary - needs lipids     Gaurang Johnson MD

## 2023-12-20 ENCOUNTER — INFUSION (OUTPATIENT)
Dept: INFUSION THERAPY | Facility: HOSPITAL | Age: 63
End: 2023-12-20
Payer: MEDICARE

## 2023-12-20 VITALS
RESPIRATION RATE: 18 BRPM | BODY MASS INDEX: 35.82 KG/M2 | HEART RATE: 75 BPM | OXYGEN SATURATION: 100 % | DIASTOLIC BLOOD PRESSURE: 78 MMHG | SYSTOLIC BLOOD PRESSURE: 137 MMHG | HEIGHT: 66 IN | WEIGHT: 222.88 LBS | TEMPERATURE: 98 F

## 2023-12-20 DIAGNOSIS — Z94.84 HISTORY OF AUTO STEM CELL TRANSPLANT: ICD-10-CM

## 2023-12-20 DIAGNOSIS — C90.01 MULTIPLE MYELOMA IN REMISSION: ICD-10-CM

## 2023-12-20 DIAGNOSIS — C90.00 MULTIPLE MYELOMA, REMISSION STATUS UNSPECIFIED: Primary | ICD-10-CM

## 2023-12-20 PROCEDURE — 96413 CHEMO IV INFUSION 1 HR: CPT

## 2023-12-20 PROCEDURE — 25000003 PHARM REV CODE 250: Performed by: INTERNAL MEDICINE

## 2023-12-20 PROCEDURE — 63600175 PHARM REV CODE 636 W HCPCS: Performed by: INTERNAL MEDICINE

## 2023-12-20 PROCEDURE — 96367 TX/PROPH/DG ADDL SEQ IV INF: CPT

## 2023-12-20 RX ORDER — HEPARIN 100 UNIT/ML
500 SYRINGE INTRAVENOUS
Status: DISCONTINUED | OUTPATIENT
Start: 2023-12-20 | End: 2023-12-20 | Stop reason: HOSPADM

## 2023-12-20 RX ORDER — HEPARIN 100 UNIT/ML
500 SYRINGE INTRAVENOUS
Status: CANCELLED | OUTPATIENT
Start: 2023-12-20

## 2023-12-20 RX ORDER — ZOLEDRONIC ACID 0.04 MG/ML
4 INJECTION, SOLUTION INTRAVENOUS
Status: CANCELLED | OUTPATIENT
Start: 2023-12-20

## 2023-12-20 RX ORDER — ZOLEDRONIC ACID 0.04 MG/ML
4 INJECTION, SOLUTION INTRAVENOUS
Status: COMPLETED | OUTPATIENT
Start: 2023-12-20 | End: 2023-12-20

## 2023-12-20 RX ORDER — SODIUM CHLORIDE 0.9 % (FLUSH) 0.9 %
10 SYRINGE (ML) INJECTION
Status: CANCELLED | OUTPATIENT
Start: 2023-12-20

## 2023-12-20 RX ORDER — SODIUM CHLORIDE 0.9 % (FLUSH) 0.9 %
10 SYRINGE (ML) INJECTION
Status: DISCONTINUED | OUTPATIENT
Start: 2023-12-20 | End: 2023-12-20 | Stop reason: HOSPADM

## 2023-12-20 RX ADMIN — SODIUM CHLORIDE: 9 INJECTION, SOLUTION INTRAVENOUS at 08:12

## 2023-12-20 RX ADMIN — ZOLEDRONIC ACID 4 MG: 0.04 INJECTION, SOLUTION INTRAVENOUS at 09:12

## 2023-12-20 RX ADMIN — CARFILZOMIB 150 MG: 30 INJECTION, POWDER, LYOPHILIZED, FOR SOLUTION INTRAVENOUS at 10:12

## 2023-12-20 NOTE — PLAN OF CARE
1120 Patient tolerated C5D8 Kyprolis + Zometa without incident. Labs from 12/5 reviewed and within parameters for treatment (patient does labs monthly). Vitals stable before and after infusion.  PIV flushed without resistance and positive for blood return before, during and after infusion. Patient denied recent or upcoming dental procedures and any jaw/ mouth pain. Verbalized he is taking his Vitamin D and calcium supplements. Creatinine clearance > 60 so patient able to receive 4mg dose of Zometa. Patient aware of his next appointment date/time. To contact provider with questions or concerns. D/C ambulatory and stable with his wife.

## 2023-12-22 RX ORDER — POTASSIUM CHLORIDE 750 MG/1
10 TABLET, EXTENDED RELEASE ORAL
Qty: 90 TABLET | Refills: 1 | Status: SHIPPED | OUTPATIENT
Start: 2023-12-22

## 2023-12-27 ENCOUNTER — INFUSION (OUTPATIENT)
Dept: INFUSION THERAPY | Facility: HOSPITAL | Age: 63
End: 2023-12-27
Payer: MEDICARE

## 2023-12-27 VITALS
WEIGHT: 222 LBS | OXYGEN SATURATION: 99 % | HEART RATE: 76 BPM | BODY MASS INDEX: 35.68 KG/M2 | SYSTOLIC BLOOD PRESSURE: 165 MMHG | RESPIRATION RATE: 18 BRPM | HEIGHT: 66 IN | DIASTOLIC BLOOD PRESSURE: 82 MMHG

## 2023-12-27 DIAGNOSIS — C90.00 MULTIPLE MYELOMA, REMISSION STATUS UNSPECIFIED: Primary | ICD-10-CM

## 2023-12-27 PROCEDURE — 63600175 PHARM REV CODE 636 W HCPCS: Mod: JZ,JG | Performed by: INTERNAL MEDICINE

## 2023-12-27 PROCEDURE — 25000003 PHARM REV CODE 250: Performed by: INTERNAL MEDICINE

## 2023-12-27 PROCEDURE — 96413 CHEMO IV INFUSION 1 HR: CPT

## 2023-12-27 PROCEDURE — 96401 CHEMO ANTI-NEOPL SQ/IM: CPT

## 2023-12-27 RX ORDER — SODIUM CHLORIDE 0.9 % (FLUSH) 0.9 %
10 SYRINGE (ML) INJECTION
Status: DISCONTINUED | OUTPATIENT
Start: 2023-12-27 | End: 2023-12-27 | Stop reason: HOSPADM

## 2023-12-27 RX ORDER — HEPARIN 100 UNIT/ML
500 SYRINGE INTRAVENOUS
Status: DISCONTINUED | OUTPATIENT
Start: 2023-12-27 | End: 2023-12-27 | Stop reason: HOSPADM

## 2023-12-27 RX ORDER — EPINEPHRINE 0.3 MG/.3ML
0.3 INJECTION SUBCUTANEOUS ONCE AS NEEDED
Status: DISCONTINUED | OUTPATIENT
Start: 2023-12-27 | End: 2023-12-27 | Stop reason: HOSPADM

## 2023-12-27 RX ORDER — DIPHENHYDRAMINE HYDROCHLORIDE 50 MG/ML
50 INJECTION INTRAMUSCULAR; INTRAVENOUS ONCE AS NEEDED
Status: DISCONTINUED | OUTPATIENT
Start: 2023-12-27 | End: 2023-12-27 | Stop reason: HOSPADM

## 2023-12-27 RX ADMIN — CARFILZOMIB 150 MG: 30 INJECTION, POWDER, LYOPHILIZED, FOR SOLUTION INTRAVENOUS at 10:12

## 2023-12-27 RX ADMIN — DARATUMUMAB AND HYALURONIDASE-FIHJ (HUMAN RECOMBINANT) 1800 MG: 1800; 30000 INJECTION SUBCUTANEOUS at 10:12

## 2023-12-27 NOTE — PLAN OF CARE
1050 Pt tolerated Kyprolis / jame subq well today, no complaints or complications,. VSS. Pt aware to call provider with any questions or concerns and is aware of upcoming appts. Pt ambulatory from clinic with steady gait, no distress noted.

## 2024-01-05 ENCOUNTER — HOSPITAL ENCOUNTER (OUTPATIENT)
Dept: RADIOLOGY | Facility: HOSPITAL | Age: 64
Discharge: HOME | End: 2024-01-05
Attending: INTERNAL MEDICINE
Payer: MEDICARE

## 2024-01-05 DIAGNOSIS — C90.00 MULTIPLE MYELOMA, REMISSION STATUS UNSPECIFIED: ICD-10-CM

## 2024-01-05 LAB — POCT GLUCOSE: 170 MG/DL (ref 70–110)

## 2024-01-05 PROCEDURE — 78816 PET IMAGE W/CT FULL BODY: CPT | Mod: 26,PS,, | Performed by: STUDENT IN AN ORGANIZED HEALTH CARE EDUCATION/TRAINING PROGRAM

## 2024-01-05 PROCEDURE — 78816 PET IMAGE W/CT FULL BODY: CPT | Mod: TC

## 2024-01-08 ENCOUNTER — LAB VISIT (OUTPATIENT)
Dept: LAB | Facility: HOSPITAL | Age: 64
End: 2024-01-08
Payer: MEDICARE

## 2024-01-08 ENCOUNTER — OFFICE VISIT (OUTPATIENT)
Dept: HEMATOLOGY/ONCOLOGY | Facility: CLINIC | Age: 64
End: 2024-01-08
Payer: MEDICARE

## 2024-01-08 VITALS
OXYGEN SATURATION: 98 % | RESPIRATION RATE: 16 BRPM | BODY MASS INDEX: 35.43 KG/M2 | TEMPERATURE: 98 F | HEIGHT: 66 IN | WEIGHT: 220.44 LBS | HEART RATE: 82 BPM | SYSTOLIC BLOOD PRESSURE: 153 MMHG | DIASTOLIC BLOOD PRESSURE: 92 MMHG

## 2024-01-08 DIAGNOSIS — Z79.899 IMMUNODEFICIENCY DUE TO DRUGS: ICD-10-CM

## 2024-01-08 DIAGNOSIS — C90.00 MULTIPLE MYELOMA, REMISSION STATUS UNSPECIFIED: ICD-10-CM

## 2024-01-08 DIAGNOSIS — Z94.84 HISTORY OF AUTO STEM CELL TRANSPLANT: ICD-10-CM

## 2024-01-08 DIAGNOSIS — D84.821 IMMUNODEFICIENCY DUE TO DRUGS: ICD-10-CM

## 2024-01-08 DIAGNOSIS — C90.00 MULTIPLE MYELOMA, REMISSION STATUS UNSPECIFIED: Primary | ICD-10-CM

## 2024-01-08 LAB
ALBUMIN SERPL BCP-MCNC: 3.7 G/DL (ref 3.5–5.2)
ALP SERPL-CCNC: 53 U/L (ref 55–135)
ALT SERPL W/O P-5'-P-CCNC: 10 U/L (ref 10–44)
ANION GAP SERPL CALC-SCNC: 9 MMOL/L (ref 8–16)
ANISOCYTOSIS BLD QL SMEAR: SLIGHT
AST SERPL-CCNC: 14 U/L (ref 10–40)
BASOPHILS # BLD AUTO: 0.01 K/UL (ref 0–0.2)
BASOPHILS NFR BLD: 0.2 % (ref 0–1.9)
BILIRUB SERPL-MCNC: 0.8 MG/DL (ref 0.1–1)
BUN SERPL-MCNC: 16 MG/DL (ref 8–23)
CALCIUM SERPL-MCNC: 10 MG/DL (ref 8.7–10.5)
CHLORIDE SERPL-SCNC: 102 MMOL/L (ref 95–110)
CO2 SERPL-SCNC: 26 MMOL/L (ref 23–29)
CREAT SERPL-MCNC: 1.1 MG/DL (ref 0.5–1.4)
DIFFERENTIAL METHOD BLD: ABNORMAL
EOSINOPHIL # BLD AUTO: 0 K/UL (ref 0–0.5)
EOSINOPHIL NFR BLD: 0.2 % (ref 0–8)
ERYTHROCYTE [DISTWIDTH] IN BLOOD BY AUTOMATED COUNT: 15.8 % (ref 11.5–14.5)
EST. GFR  (NO RACE VARIABLE): >60 ML/MIN/1.73 M^2
GLUCOSE SERPL-MCNC: 158 MG/DL (ref 70–110)
HCT VFR BLD AUTO: 33.8 % (ref 40–54)
HGB BLD-MCNC: 11.6 G/DL (ref 14–18)
IGA SERPL-MCNC: 5 MG/DL (ref 40–350)
IGG SERPL-MCNC: 2167 MG/DL (ref 650–1600)
IGM SERPL-MCNC: 14 MG/DL (ref 50–300)
IMM GRANULOCYTES # BLD AUTO: 0.02 K/UL (ref 0–0.04)
IMM GRANULOCYTES NFR BLD AUTO: 0.4 % (ref 0–0.5)
LYMPHOCYTES # BLD AUTO: 1 K/UL (ref 1–4.8)
LYMPHOCYTES NFR BLD: 19.3 % (ref 18–48)
MCH RBC QN AUTO: 31.6 PG (ref 27–31)
MCHC RBC AUTO-ENTMCNC: 34.3 G/DL (ref 32–36)
MCV RBC AUTO: 92 FL (ref 82–98)
MONOCYTES # BLD AUTO: 0.2 K/UL (ref 0.3–1)
MONOCYTES NFR BLD: 4.2 % (ref 4–15)
NEUTROPHILS # BLD AUTO: 4 K/UL (ref 1.8–7.7)
NEUTROPHILS NFR BLD: 75.7 % (ref 38–73)
NRBC BLD-RTO: 0 /100 WBC
PLATELET # BLD AUTO: 164 K/UL (ref 150–450)
PLATELET BLD QL SMEAR: ABNORMAL
PMV BLD AUTO: 9.5 FL (ref 9.2–12.9)
POTASSIUM SERPL-SCNC: 3.8 MMOL/L (ref 3.5–5.1)
PROT SERPL-MCNC: 8.1 G/DL (ref 6–8.4)
RBC # BLD AUTO: 3.67 M/UL (ref 4.6–6.2)
SODIUM SERPL-SCNC: 137 MMOL/L (ref 136–145)
WBC # BLD AUTO: 5.24 K/UL (ref 3.9–12.7)

## 2024-01-08 PROCEDURE — 99215 OFFICE O/P EST HI 40 MIN: CPT | Mod: S$GLB,,, | Performed by: INTERNAL MEDICINE

## 2024-01-08 PROCEDURE — 3008F BODY MASS INDEX DOCD: CPT | Mod: CPTII,S$GLB,, | Performed by: INTERNAL MEDICINE

## 2024-01-08 PROCEDURE — 85025 COMPLETE CBC W/AUTO DIFF WBC: CPT | Performed by: NURSE PRACTITIONER

## 2024-01-08 PROCEDURE — 36415 COLL VENOUS BLD VENIPUNCTURE: CPT | Performed by: NURSE PRACTITIONER

## 2024-01-08 PROCEDURE — 84165 PROTEIN E-PHORESIS SERUM: CPT | Performed by: NURSE PRACTITIONER

## 2024-01-08 PROCEDURE — 82784 ASSAY IGA/IGD/IGG/IGM EACH: CPT | Mod: 59 | Performed by: NURSE PRACTITIONER

## 2024-01-08 PROCEDURE — 99999 PR PBB SHADOW E&M-EST. PATIENT-LVL III: CPT | Mod: PBBFAC,,, | Performed by: INTERNAL MEDICINE

## 2024-01-08 PROCEDURE — 86334 IMMUNOFIX E-PHORESIS SERUM: CPT | Performed by: NURSE PRACTITIONER

## 2024-01-08 PROCEDURE — 3080F DIAST BP >= 90 MM HG: CPT | Mod: CPTII,S$GLB,, | Performed by: INTERNAL MEDICINE

## 2024-01-08 PROCEDURE — 80053 COMPREHEN METABOLIC PANEL: CPT | Performed by: NURSE PRACTITIONER

## 2024-01-08 PROCEDURE — 83521 IG LIGHT CHAINS FREE EACH: CPT | Performed by: NURSE PRACTITIONER

## 2024-01-08 PROCEDURE — 3077F SYST BP >= 140 MM HG: CPT | Mod: CPTII,S$GLB,, | Performed by: INTERNAL MEDICINE

## 2024-01-08 PROCEDURE — 86334 IMMUNOFIX E-PHORESIS SERUM: CPT | Mod: 26,,, | Performed by: PATHOLOGY

## 2024-01-08 PROCEDURE — 3288F FALL RISK ASSESSMENT DOCD: CPT | Mod: CPTII,S$GLB,, | Performed by: INTERNAL MEDICINE

## 2024-01-08 PROCEDURE — 1101F PT FALLS ASSESS-DOCD LE1/YR: CPT | Mod: CPTII,S$GLB,, | Performed by: INTERNAL MEDICINE

## 2024-01-08 PROCEDURE — 84165 PROTEIN E-PHORESIS SERUM: CPT | Mod: 26,,, | Performed by: PATHOLOGY

## 2024-01-08 RX ORDER — DIPHENHYDRAMINE HYDROCHLORIDE 50 MG/ML
50 INJECTION, SOLUTION INTRAMUSCULAR; INTRAVENOUS ONCE AS NEEDED
Status: CANCELLED | OUTPATIENT
Start: 2024-01-10

## 2024-01-08 RX ORDER — EPINEPHRINE 0.3 MG/.3ML
0.3 INJECTION SUBCUTANEOUS ONCE AS NEEDED
Status: CANCELLED | OUTPATIENT
Start: 2024-01-24

## 2024-01-08 RX ORDER — SODIUM CHLORIDE 0.9 % (FLUSH) 0.9 %
10 SYRINGE (ML) INJECTION
Status: CANCELLED | OUTPATIENT
Start: 2024-01-24

## 2024-01-08 RX ORDER — HEPARIN 100 UNIT/ML
500 SYRINGE INTRAVENOUS
Status: CANCELLED | OUTPATIENT
Start: 2024-01-17

## 2024-01-08 RX ORDER — SODIUM CHLORIDE 0.9 % (FLUSH) 0.9 %
10 SYRINGE (ML) INJECTION
Status: CANCELLED | OUTPATIENT
Start: 2024-01-17

## 2024-01-08 RX ORDER — HEPARIN 100 UNIT/ML
500 SYRINGE INTRAVENOUS
Status: CANCELLED | OUTPATIENT
Start: 2024-01-24

## 2024-01-08 RX ORDER — SODIUM CHLORIDE 0.9 % (FLUSH) 0.9 %
10 SYRINGE (ML) INJECTION
Status: CANCELLED | OUTPATIENT
Start: 2024-01-10

## 2024-01-08 RX ORDER — DIPHENHYDRAMINE HYDROCHLORIDE 50 MG/ML
50 INJECTION, SOLUTION INTRAMUSCULAR; INTRAVENOUS ONCE AS NEEDED
Status: CANCELLED | OUTPATIENT
Start: 2024-01-24

## 2024-01-08 RX ORDER — HEPARIN 100 UNIT/ML
500 SYRINGE INTRAVENOUS
Status: CANCELLED | OUTPATIENT
Start: 2024-01-10

## 2024-01-08 RX ORDER — EPINEPHRINE 0.3 MG/.3ML
0.3 INJECTION SUBCUTANEOUS ONCE AS NEEDED
Status: CANCELLED | OUTPATIENT
Start: 2024-01-10

## 2024-01-08 NOTE — PROGRESS NOTES
SECTION OF HEMATOLOGY AND BONE MARROW TRANSPLANT  Return Patient Visit   01/10/2024    CHIEF COMPLAINT:   Multiple Myeloma    HISTORY OF PRESENT ILLNESS: Patient of /  64 y.o.male; pmh of IgG kappa multiple myeloma (standard risk CG per msmart criteria, r-iss stage II); diagnosed September 2019 after presenting with symptomatic perispinal plasmacytoma;He has subsequently received  8 cycles of VRd therapy. Was in midst or pre transplant evaluation but due to insurance coverage issues transplant was delayed so was maintained on therapy and those issues have been resolved.    Transplant Course  Patient with IgG Kappa MM and pmh HTN, lytic bone lesion, arthritis and vitamin D deficiency. Admitted 5/15/21 for planned Alea auto SCT for MM. He received 3 bags and a CD34 dose of 3.35 x 10^6 on 5/17/21. Tolerated chemo and transplant well. Admission complicated by n/v controlled with scheduled anti-emetics and c-diff neg diarrhea controlled with prn imodium. He engrafted on Day +13 (5/30/21) with an ANC of 2607. He was discharged home on Day +14 (5/31/21).     Day +100 restaging marrow indicated that he was in IA.   Interval History - 1/8/24   Found to be in relapse biochemically (June/July 2023 ) and with new lytic disease at 2 years post SCT.  Initiated DKd salvage 8/4/23.  He has been tolerating well.  Doing well.   Presents for surveillance visit with wife.   PAST MEDICAL HISTORY:   Past Medical History:   Diagnosis Date    Anxiety     Arthritis     Cancer     Hypertension        PAST SURGICAL HISTORY:   Past Surgical History:   Procedure Laterality Date    BACK SURGERY  01/01/2021    BONE MARROW BIOPSY Left 10/20/2021    Procedure: Biopsy-bone marrow;  Surgeon: Summer Cartwright MD;  Location: [facility] ENDO (4TH FLR);  Service: Oncology;  Laterality: Left;    COLONOSCOPY N/A 01/25/2021    Procedure: COLONOSCOPY;  Surgeon: Braxton Lees MD;  Location: [facility] (4TH FLR);  Service: Endoscopy;  Laterality:  N/A;  1/22-covid kristopher-inst mailed-tb  1/20/21-pt to remain on schedule with Dr. Lees, and confirmed updated arrival time of 0835-BB    COLONOSCOPY N/A 02/01/2021    Procedure: COLONOSCOPY;  Surgeon: Jo Ann Robledo MD;  Location: [facility] (4TH FLR);  Service: Endoscopy;  Laterality: N/A;  rescheduled due to poor bowel prep, to be rescheduled with first available provider-BB  covid-1/29/21-pcw-BB    CREATION OF MUSCLE ROTATIONAL FLAP N/A 09/23/2020    Procedure: CREATION, FLAP, MUSCLE ROTATION;  Surgeon: Kamlesh Bellamy MD;  Location: [facility] OR 2ND FLR;  Service: Plastics;  Laterality: N/A;    KNEE SURGERY Left 06/2019    LUMBAR FUSION N/A 09/23/2020    Procedure: FUSION, SPINE, LUMBAR L2-pelvis. Depuy. Plastic surgery closure w/ Dr. Bellamy;  Surgeon: Nick Coyle MD;  Location: [facility] OR 2ND FLR;  Service: Neurosurgery;  Laterality: N/A;    Metastatic neoplasum removed from spine         PAST SOCIAL HISTORY:   reports that he quit smoking about 7 years ago. His smoking use included cigarettes. He started smoking about 47 years ago. He has a 10.0 pack-year smoking history. He has never used smokeless tobacco.    FAMILY HISTORY:  Family History   Problem Relation Age of Onset    Cancer Father        CURRENT MEDICATIONS:   Current Outpatient Medications   Medication Sig    acyclovir (ZOVIRAX) 400 MG tablet Take 1 tablet (400 mg total) by mouth 2 (two) times daily.    amLODIPine (NORVASC) 10 MG tablet Take 1 tablet (10 mg total) by mouth once daily.    cloNIDine (CATAPRES) 0.3 MG tablet Take 0.3 mg by mouth once daily.    dexAMETHasone (DECADRON) 4 MG Tab Take 10 tablets (40 mg total) by mouth every 7 days. Take with food before chemotherapy appointments    gabapentin (NEURONTIN) 300 MG capsule Take 1 capsule (300 mg total) by mouth 2 (two) times daily.    hydroCHLOROthiazide (HYDRODIURIL) 25 MG tablet Take 1 tablet (25 mg total) by mouth once daily.    metFORMIN (GLUCOPHAGE) 1000 MG tablet Take 0.5 tablets  "(500 mg total) by mouth 2 (two) times daily with meals.    potassium chloride SA (K-DUR,KLOR-CON M) 10 MEQ tablet TAKE 1 TABLET(10 MEQ) BY MOUTH EVERY DAY    calcium-vitamin D3 (OYSTER SHELL CALCIUM-VIT D3) 500 mg-5 mcg (200 unit) per tablet Take 1 tablet by mouth once daily.     No current facility-administered medications for this visit.     ALLERGIES:   Review of patient's allergies indicates:  No Known Allergies  Review of Systems   Constitutional: Negative for fever, malaise/fatigue and weight loss.   Respiratory: Negative for cough and shortness of breath.    Cardiovascular: Negative for chest pain and leg swelling.   Gastrointestinal: Negative for constipation, diarrhea and vomiting.   Skin: Negative for rash.   Neurological: Negative for seizures and weakness.   Psychiatric/Behavioral: The patient is not nervous/anxious.      PHYSICAL EXAM:   Vitals:    01/08/24 1004   BP: (!) 153/92   Pulse: 82   Resp: 16   Temp: 98.4 °F (36.9 °C)   SpO2: 98%   Weight: 100 kg (220 lb 7.4 oz)   Height: 5' 6" (1.676 m)   PainSc: 0-No pain     General - well developed, well nourished, no apparent distress  HEENT - oropharynx clear  Chest and Lung - clear to auscultation bilaterally   Cardiovascular - RRR with no MGR, normal S1 and S2  Abdomen-  soft, nontender, no palpable hepatomegaly or splenomegaly  Lymph - no palpable lymphadenopathy  Heme - no bruising, petechiae, pallor  Skin - no rashes or lesions  Psych - appropriate mood and affect        ECOG Performance Status: (foot note - ECOG PS provided by Eastern Cooperative Oncology Group) 1 - Symptomatic but completely ambulatory    Karnofsky Performance Score:  90%- Able to Carry on Normal Activity: Minor Symptoms of Disease  DATA:   Lab Results   Component Value Date    WBC 5.24 01/08/2024    HGB 11.6 (L) 01/08/2024    HCT 33.8 (L) 01/08/2024    MCV 92 01/08/2024     01/08/2024       Gran # (ANC)   Date Value Ref Range Status   01/08/2024 4.0 1.8 - 7.7 K/uL Final "     Gran %   Date Value Ref Range Status   01/08/2024 75.7 (H) 38.0 - 73.0 % Final     CMP  Sodium   Date Value Ref Range Status   01/08/2024 137 136 - 145 mmol/L Final     Potassium   Date Value Ref Range Status   01/08/2024 3.8 3.5 - 5.1 mmol/L Final     Chloride   Date Value Ref Range Status   01/08/2024 102 95 - 110 mmol/L Final     CO2   Date Value Ref Range Status   01/08/2024 26 23 - 29 mmol/L Final     Glucose   Date Value Ref Range Status   01/08/2024 158 (H) 70 - 110 mg/dL Final     BUN   Date Value Ref Range Status   01/08/2024 16 8 - 23 mg/dL Final     Creatinine   Date Value Ref Range Status   01/08/2024 1.1 0.5 - 1.4 mg/dL Final     Calcium   Date Value Ref Range Status   01/08/2024 10.0 8.7 - 10.5 mg/dL Final     Total Protein   Date Value Ref Range Status   01/08/2024 8.1 6.0 - 8.4 g/dL Final     Albumin   Date Value Ref Range Status   01/08/2024 3.7 3.5 - 5.2 g/dL Final     Total Bilirubin   Date Value Ref Range Status   01/08/2024 0.8 0.1 - 1.0 mg/dL Final     Comment:     For infants and newborns, interpretation of results should be based  on gestational age, weight and in agreement with clinical  observations.    Premature Infant recommended reference ranges:  Up to 24 hours.............<8.0 mg/dL  Up to 48 hours............<12.0 mg/dL  3-5 days..................<15.0 mg/dL  6-29 days.................<15.0 mg/dL       Alkaline Phosphatase   Date Value Ref Range Status   01/08/2024 53 (L) 55 - 135 U/L Final     AST   Date Value Ref Range Status   01/08/2024 14 10 - 40 U/L Final     ALT   Date Value Ref Range Status   01/08/2024 10 10 - 44 U/L Final     Anion Gap   Date Value Ref Range Status   01/08/2024 9 8 - 16 mmol/L Final     eGFR   Date Value Ref Range Status   01/08/2024 >60.0 >60 mL/min/1.73 m^2 Final     IgG   Date Value Ref Range Status   01/08/2024 2167 (H) 650 - 1600 mg/dL Final     Comment:     IgG Cord Blood Reference Range: 650-1600 mg/dL.     IgA   Date Value Ref Range Status    01/08/2024 5 (L) 40 - 350 mg/dL Final     Comment:     IgA Cord Blood Reference Range: <5 mg/dL.     IgM   Date Value Ref Range Status   01/08/2024 14 (L) 50 - 300 mg/dL Final     Comment:     IgM Cord Blood Reference Range: <25 mg/dL.     Kappa Free Light Chains   Date Value Ref Range Status   01/08/2024 60.23 (H) 0.33 - 1.94 mg/dL Final   11/22/2023 58.66 (H) 0.33 - 1.94 mg/dL Final   10/25/2023 68.05 (H) 0.33 - 1.94 mg/dL Final     Lambda Free Light Chains   Date Value Ref Range Status   01/08/2024 0.19 (L) 0.57 - 2.63 mg/dL Final   11/22/2023 0.15 (L) 0.57 - 2.63 mg/dL Final   10/25/2023 0.26 (L) 0.57 - 2.63 mg/dL Final     Kappa/Lambda FLC Ratio   Date Value Ref Range Status   01/08/2024 317.00 (H) 0.26 - 1.65 Final     Comment:     Undetected antigen excess is a rare event but cannot   be excluded. If these free light chain results do not   agree with other clinical or laboratory findings or   if the sample is from a patient that has previously   demonstrated antigen excess, discuss with the testing   laboratory.   Results should always be interpreted in conjunction   with other laboratory tests and clinical evidence.     11/22/2023 391.10 (H) 0.26 - 1.65 Final     Comment:     Undetected antigen excess is a rare event but cannot   be excluded. If these free light chain results do not   agree with other clinical or laboratory findings or   if the sample is from a patient that has previously   demonstrated antigen excess, discuss with the testing   laboratory.   Results should always be interpreted in conjunction   with other laboratory tests and clinical evidence.     10/25/2023 261.70 (H) 0.26 - 1.65 Final     Comment:     Undetected antigen excess is a rare event but cannot   be excluded. If these free light chain results do not   agree with other clinical or laboratory findings or   if the sample is from a patient that has previously   demonstrated antigen excess, discuss with the testing   laboratory.    Results should always be interpreted in conjunction   with other laboratory tests and clinical evidence.       Pathologist Interpretation SPE   Date Value Ref Range Status   01/08/2024 REVIEWED  Final     Comment:       Electronically reviewed and signed by:  Tasha Mills MD  Signed on 01/09/24 at 12:53  Normal total protein. Normal gamma globulins are decreased.   Paraprotein peak in gamma = 1.55 g/dL (previously 1.47 g/dL).      11/22/2023 REVIEWED  Final     Comment:       Electronically reviewed and signed by:  Joaquin Sabillon MD  Signed on 11/27/23 at 14:12  Normal total protein.   Normal gamma globulins are decreased. Paraprotein peak in gamma =   1.47 g/dL (previously 1.49 g/dL).      10/25/2023 REVIEWED  Final     Comment:       Electronically reviewed and signed by:  Tahsa Mills MD  Signed on 10/26/23 at 14:52  Normal total protein. Normal gamma globulins are decreased.   Paraprotein peak in gamma = 1.49 g/dL (previously 1.60 g/dL).        Pathologist Interpretation SADAF   Date Value Ref Range Status   01/08/2024 REVIEWED  Final     Comment:       Electronically reviewed and signed by:  Tasha Mills MD  Signed on 01/09/24 at 12:52  IgG kappa specific monoclonal band present.          Results for orders placed or performed during the hospital encounter of 01/05/24 (from the past 2160 hour(s))   NM PET CT FDG Whole Body (Melanoma)    Impression    In this patient with multiple myeloma, there are findings compatible with treatment response.  Interval decreased number of hypermetabolic osseous lesions compared to prior.  Residual hypermetabolic index lesions as above.    Focal tracer avidity in the periportal region and new/more prominent tracer avid focus left hepatic lobe, without abnormal CT correlate.  Remaining hepatic and splenic hypermetabolic foci noted on prior are intervally normalized.    Right knee joint effusion with low-level tracer uptake, may reflect nonspecific  synovitis.  Recommend clinical correlation with follow-up imaging as indicated.    Electronically signed by resident: Robbie Quinones  Date:    01/05/2024  Time:    11:43    Electronically signed by: Humberto Ordoñez  Date:    01/05/2024  Time:    14:53     *Note: Due to a large number of results and/or encounters for the requested time period, some results have not been displayed. A complete set of results can be found in Results Review.         1. Multiple myeloma, remission status unspecified        2. History of auto stem cell transplant        3. Immunodeficiency due to drugs                    Multiple myeloma/sp autoSCT  - standard risk MM diagnosed sept 2020 after presenting with symptomatic lytic disease  -sp VRd induction followed by yajaira 200 autoSCT on 5/17/23 achieving/mainting KS post SCT; was on revlimid maintenance but relapsed biochemically and with new lytic disease approximately 2 years post SCT (June/July 2023)  -initiated Jame-Kd salvage on 8/4/23; tolerating will no significant AE's; had had KS by SFLC burden to just 2 doses. Mm labs pending now.   -jame subq weekly 8>EOW x 8 doses> q 4 week; kyprolis 70mg/m2 day 1, 8, 15 of 28 day cycle; dex 40mg po weekly   -plan to continue until intolerance or progression   -supportive meds: acyclovir, ca +vit d, zometa (q 3 month zometa through December 2023). Next due on 12/2023  -can transition to q 4 weeks lab monitoring and provider appt  - Delayed therapy due to salmonella bacteremia. admitted inpatient and received IV antibiotics, now on po cipro- EOT on 10/28/2/3. Symptoms resolved, counts recovered and resumed   -his 1/8/24  biochemical studies cw with stable borderline KS  and his 1/5/24 PET scan show improved osseous disease so plan to continue therapy; plan for repeat PET June 2024 or earlier if clinically indicated   -Tolerating well ; proceed with next 4 weeks of therapy     Neutropenic fever  - Inpatient stay due to salmonella bacteremia on  10/15/23. Treated with IV antibiotics  - Now on po Cipro, EOT on 10/28  - Afebrile, counts recovered      Neuropathy  Controlled with gabapentin    ID  Completed post SCT vaccines  Acyclovir as above while in PI       Essential hypertension  -well controled      FU:    -proceed with cycle 6   -cbc, cmp, serum free light chains, quantitative immunoglobulins, serum electropheresis, serum immunofixation  and MD appt in 4 weeks

## 2024-01-09 LAB
ALBUMIN SERPL ELPH-MCNC: 4 G/DL (ref 3.35–5.55)
ALPHA1 GLOB SERPL ELPH-MCNC: 0.4 G/DL (ref 0.17–0.41)
ALPHA2 GLOB SERPL ELPH-MCNC: 0.71 G/DL (ref 0.43–0.99)
B-GLOBULIN SERPL ELPH-MCNC: 0.65 G/DL (ref 0.5–1.1)
GAMMA GLOB SERPL ELPH-MCNC: 1.74 G/DL (ref 0.67–1.58)
INTERPRETATION SERPL IFE-IMP: NORMAL
KAPPA LC SER QL IA: 60.23 MG/DL (ref 0.33–1.94)
KAPPA LC/LAMBDA SER IA: 317 (ref 0.26–1.65)
LAMBDA LC SER QL IA: 0.19 MG/DL (ref 0.57–2.63)
PATHOLOGIST INTERPRETATION IFE: NORMAL
PATHOLOGIST INTERPRETATION SPE: NORMAL
PROT SERPL-MCNC: 7.5 G/DL (ref 6–8.4)

## 2024-01-10 ENCOUNTER — INFUSION (OUTPATIENT)
Dept: INFUSION THERAPY | Facility: HOSPITAL | Age: 64
End: 2024-01-10
Payer: MEDICARE

## 2024-01-10 VITALS
BODY MASS INDEX: 35.16 KG/M2 | DIASTOLIC BLOOD PRESSURE: 79 MMHG | RESPIRATION RATE: 18 BRPM | SYSTOLIC BLOOD PRESSURE: 121 MMHG | TEMPERATURE: 99 F | HEART RATE: 66 BPM | WEIGHT: 217.81 LBS

## 2024-01-10 DIAGNOSIS — C90.00 MULTIPLE MYELOMA, REMISSION STATUS UNSPECIFIED: Primary | ICD-10-CM

## 2024-01-10 PROCEDURE — 96413 CHEMO IV INFUSION 1 HR: CPT

## 2024-01-10 PROCEDURE — 96411 CHEMO IV PUSH ADDL DRUG: CPT

## 2024-01-10 PROCEDURE — 63600175 PHARM REV CODE 636 W HCPCS: Mod: JZ,JG | Performed by: INTERNAL MEDICINE

## 2024-01-10 PROCEDURE — 96401 CHEMO ANTI-NEOPL SQ/IM: CPT

## 2024-01-10 PROCEDURE — 25000003 PHARM REV CODE 250: Performed by: INTERNAL MEDICINE

## 2024-01-10 RX ORDER — EPINEPHRINE 0.3 MG/.3ML
0.3 INJECTION SUBCUTANEOUS ONCE AS NEEDED
Status: DISCONTINUED | OUTPATIENT
Start: 2024-01-10 | End: 2024-01-10 | Stop reason: HOSPADM

## 2024-01-10 RX ORDER — HEPARIN 100 UNIT/ML
500 SYRINGE INTRAVENOUS
Status: DISCONTINUED | OUTPATIENT
Start: 2024-01-10 | End: 2024-01-10 | Stop reason: HOSPADM

## 2024-01-10 RX ORDER — DIPHENHYDRAMINE HYDROCHLORIDE 50 MG/ML
50 INJECTION, SOLUTION INTRAMUSCULAR; INTRAVENOUS ONCE AS NEEDED
Status: DISCONTINUED | OUTPATIENT
Start: 2024-01-10 | End: 2024-01-10 | Stop reason: HOSPADM

## 2024-01-10 RX ORDER — SODIUM CHLORIDE 0.9 % (FLUSH) 0.9 %
10 SYRINGE (ML) INJECTION
Status: DISCONTINUED | OUTPATIENT
Start: 2024-01-10 | End: 2024-01-10 | Stop reason: HOSPADM

## 2024-01-10 RX ADMIN — DARATUMUMAB AND HYALURONIDASE-FIHJ (HUMAN RECOMBINANT) 1800 MG: 1800; 30000 INJECTION SUBCUTANEOUS at 10:01

## 2024-01-10 RX ADMIN — CARFILZOMIB 150 MG: 30 INJECTION, POWDER, LYOPHILIZED, FOR SOLUTION INTRAVENOUS at 11:01

## 2024-01-10 RX ADMIN — SODIUM CHLORIDE: 9 INJECTION, SOLUTION INTRAVENOUS at 09:01

## 2024-01-10 NOTE — PLAN OF CARE
Pt received jame via SQ and kyprolis via PIV without adverse effects. VS remained stable throughout tx. Pt discharged AAOx4 and ambulatory

## 2024-01-17 ENCOUNTER — INFUSION (OUTPATIENT)
Dept: INFUSION THERAPY | Facility: HOSPITAL | Age: 64
End: 2024-01-17
Payer: MEDICARE

## 2024-01-17 VITALS
DIASTOLIC BLOOD PRESSURE: 79 MMHG | HEART RATE: 62 BPM | TEMPERATURE: 98 F | WEIGHT: 223.56 LBS | BODY MASS INDEX: 35.93 KG/M2 | SYSTOLIC BLOOD PRESSURE: 142 MMHG | OXYGEN SATURATION: 100 % | RESPIRATION RATE: 18 BRPM | HEIGHT: 66 IN

## 2024-01-17 DIAGNOSIS — C90.00 MULTIPLE MYELOMA, REMISSION STATUS UNSPECIFIED: Primary | ICD-10-CM

## 2024-01-17 PROCEDURE — 25000003 PHARM REV CODE 250: Performed by: INTERNAL MEDICINE

## 2024-01-17 PROCEDURE — 63600175 PHARM REV CODE 636 W HCPCS: Mod: JZ,JG | Performed by: INTERNAL MEDICINE

## 2024-01-17 PROCEDURE — 96413 CHEMO IV INFUSION 1 HR: CPT

## 2024-01-17 RX ORDER — HEPARIN 100 UNIT/ML
500 SYRINGE INTRAVENOUS
Status: DISCONTINUED | OUTPATIENT
Start: 2024-01-17 | End: 2024-01-17 | Stop reason: HOSPADM

## 2024-01-17 RX ORDER — SODIUM CHLORIDE 0.9 % (FLUSH) 0.9 %
10 SYRINGE (ML) INJECTION
Status: DISCONTINUED | OUTPATIENT
Start: 2024-01-17 | End: 2024-01-17 | Stop reason: HOSPADM

## 2024-01-17 RX ADMIN — CARFILZOMIB 150 MG: 30 INJECTION, POWDER, LYOPHILIZED, FOR SOLUTION INTRAVENOUS at 10:01

## 2024-01-17 RX ADMIN — SODIUM CHLORIDE: 9 INJECTION, SOLUTION INTRAVENOUS at 09:01

## 2024-01-17 NOTE — PLAN OF CARE
1135  Patient completed Kyprolis infusion, tolerated well.  PIV discontinued without issue.  RTC 2/8/24  Patient ambulated off floor accompanied by wife.

## 2024-01-17 NOTE — PLAN OF CARE
0930  Patient seated in chair, VSS, assessment done.  PIV inserted, flushed, blood return noted, Started NS @ 50 cc/hr KVO while waiting for Kyprolis from pharmacy.  WCTM for safety

## 2024-01-24 ENCOUNTER — INFUSION (OUTPATIENT)
Dept: INFUSION THERAPY | Facility: HOSPITAL | Age: 64
End: 2024-01-24
Payer: MEDICARE

## 2024-01-24 VITALS
WEIGHT: 222.44 LBS | TEMPERATURE: 99 F | BODY MASS INDEX: 35.75 KG/M2 | DIASTOLIC BLOOD PRESSURE: 73 MMHG | HEART RATE: 75 BPM | RESPIRATION RATE: 18 BRPM | SYSTOLIC BLOOD PRESSURE: 125 MMHG | HEIGHT: 66 IN

## 2024-01-24 DIAGNOSIS — C90.00 MULTIPLE MYELOMA, REMISSION STATUS UNSPECIFIED: Primary | ICD-10-CM

## 2024-01-24 PROCEDURE — 96401 CHEMO ANTI-NEOPL SQ/IM: CPT

## 2024-01-24 PROCEDURE — 63600175 PHARM REV CODE 636 W HCPCS: Mod: JZ,JG | Performed by: INTERNAL MEDICINE

## 2024-01-24 PROCEDURE — 25000003 PHARM REV CODE 250: Performed by: INTERNAL MEDICINE

## 2024-01-24 PROCEDURE — 96413 CHEMO IV INFUSION 1 HR: CPT

## 2024-01-24 RX ORDER — EPINEPHRINE 0.3 MG/.3ML
0.3 INJECTION SUBCUTANEOUS ONCE AS NEEDED
Status: DISCONTINUED | OUTPATIENT
Start: 2024-01-24 | End: 2024-01-24 | Stop reason: HOSPADM

## 2024-01-24 RX ORDER — SODIUM CHLORIDE 0.9 % (FLUSH) 0.9 %
10 SYRINGE (ML) INJECTION
Status: DISCONTINUED | OUTPATIENT
Start: 2024-01-24 | End: 2024-01-24 | Stop reason: HOSPADM

## 2024-01-24 RX ORDER — DIPHENHYDRAMINE HYDROCHLORIDE 50 MG/ML
50 INJECTION INTRAMUSCULAR; INTRAVENOUS ONCE AS NEEDED
Status: DISCONTINUED | OUTPATIENT
Start: 2024-01-24 | End: 2024-01-24 | Stop reason: HOSPADM

## 2024-01-24 RX ORDER — DEXTROSE MONOHYDRATE 50 MG/ML
INJECTION, SOLUTION INTRAVENOUS CONTINUOUS
Status: DISCONTINUED | OUTPATIENT
Start: 2024-01-24 | End: 2024-01-24 | Stop reason: HOSPADM

## 2024-01-24 RX ADMIN — DEXTROSE MONOHYDRATE: 50 INJECTION, SOLUTION INTRAVENOUS at 08:01

## 2024-01-24 RX ADMIN — CARFILZOMIB 150 MG: 30 INJECTION, POWDER, LYOPHILIZED, FOR SOLUTION INTRAVENOUS at 09:01

## 2024-01-24 RX ADMIN — DARATUMUMAB AND HYALURONIDASE-FIHJ (HUMAN RECOMBINANT) 1800 MG: 1800; 30000 INJECTION SUBCUTANEOUS at 09:01

## 2024-01-24 NOTE — NURSING
0834  Pt here for Melinda SQ, Kyprolis infusion, accompanied by spouse, no complaints or concerns at present and report tolerating treatment; pt reports taking oral home steroid prior to arrival; discussed treatment plan for today, all questions answered and pt agrees to proceed

## 2024-01-24 NOTE — PLAN OF CARE
1036  Injection/infusion administered, pt tolerated; pt instructed to remain well hydrated; discussed when to contact MD, when to report to ED; pt and spouse verbalized understanding of all discussed and when to report next (IB message to MD/ regarding change of appts to Wed 2/7/24)

## 2024-02-08 ENCOUNTER — INFUSION (OUTPATIENT)
Dept: INFUSION THERAPY | Facility: HOSPITAL | Age: 64
End: 2024-02-08
Payer: MEDICARE

## 2024-02-08 ENCOUNTER — OFFICE VISIT (OUTPATIENT)
Dept: HEMATOLOGY/ONCOLOGY | Facility: CLINIC | Age: 64
End: 2024-02-08
Payer: MEDICARE

## 2024-02-08 VITALS
OXYGEN SATURATION: 99 % | TEMPERATURE: 98 F | HEIGHT: 66 IN | DIASTOLIC BLOOD PRESSURE: 89 MMHG | RESPIRATION RATE: 18 BRPM | HEART RATE: 81 BPM | BODY MASS INDEX: 35.36 KG/M2 | WEIGHT: 220 LBS | SYSTOLIC BLOOD PRESSURE: 135 MMHG

## 2024-02-08 VITALS
TEMPERATURE: 99 F | HEIGHT: 66 IN | SYSTOLIC BLOOD PRESSURE: 144 MMHG | HEART RATE: 68 BPM | WEIGHT: 220 LBS | BODY MASS INDEX: 35.36 KG/M2 | RESPIRATION RATE: 18 BRPM | DIASTOLIC BLOOD PRESSURE: 76 MMHG

## 2024-02-08 DIAGNOSIS — D84.821 IMMUNODEFICIENCY SECONDARY TO CHEMOTHERAPY: ICD-10-CM

## 2024-02-08 DIAGNOSIS — C90.00 MULTIPLE MYELOMA, REMISSION STATUS UNSPECIFIED: Primary | ICD-10-CM

## 2024-02-08 DIAGNOSIS — T45.1X5A IMMUNODEFICIENCY SECONDARY TO CHEMOTHERAPY: ICD-10-CM

## 2024-02-08 DIAGNOSIS — Z94.84 HISTORY OF AUTO STEM CELL TRANSPLANT: ICD-10-CM

## 2024-02-08 DIAGNOSIS — I10 ESSENTIAL HYPERTENSION: ICD-10-CM

## 2024-02-08 DIAGNOSIS — G62.9 NEUROPATHY: ICD-10-CM

## 2024-02-08 DIAGNOSIS — Z79.899 IMMUNODEFICIENCY SECONDARY TO CHEMOTHERAPY: ICD-10-CM

## 2024-02-08 PROCEDURE — 3008F BODY MASS INDEX DOCD: CPT | Mod: CPTII,S$GLB,, | Performed by: INTERNAL MEDICINE

## 2024-02-08 PROCEDURE — 3075F SYST BP GE 130 - 139MM HG: CPT | Mod: CPTII,S$GLB,, | Performed by: INTERNAL MEDICINE

## 2024-02-08 PROCEDURE — 63600175 PHARM REV CODE 636 W HCPCS: Mod: JZ,JG | Performed by: INTERNAL MEDICINE

## 2024-02-08 PROCEDURE — 96401 CHEMO ANTI-NEOPL SQ/IM: CPT

## 2024-02-08 PROCEDURE — 99999 PR PBB SHADOW E&M-EST. PATIENT-LVL III: CPT | Mod: PBBFAC,,, | Performed by: INTERNAL MEDICINE

## 2024-02-08 PROCEDURE — 25000003 PHARM REV CODE 250: Performed by: INTERNAL MEDICINE

## 2024-02-08 PROCEDURE — 96413 CHEMO IV INFUSION 1 HR: CPT

## 2024-02-08 PROCEDURE — 1101F PT FALLS ASSESS-DOCD LE1/YR: CPT | Mod: CPTII,S$GLB,, | Performed by: INTERNAL MEDICINE

## 2024-02-08 PROCEDURE — 99214 OFFICE O/P EST MOD 30 MIN: CPT | Mod: GC,S$GLB,, | Performed by: INTERNAL MEDICINE

## 2024-02-08 PROCEDURE — 3079F DIAST BP 80-89 MM HG: CPT | Mod: CPTII,S$GLB,, | Performed by: INTERNAL MEDICINE

## 2024-02-08 PROCEDURE — 1159F MED LIST DOCD IN RCRD: CPT | Mod: CPTII,S$GLB,, | Performed by: INTERNAL MEDICINE

## 2024-02-08 PROCEDURE — 3288F FALL RISK ASSESSMENT DOCD: CPT | Mod: CPTII,S$GLB,, | Performed by: INTERNAL MEDICINE

## 2024-02-08 RX ORDER — HEPARIN 100 UNIT/ML
500 SYRINGE INTRAVENOUS
Status: CANCELLED | OUTPATIENT
Start: 2024-02-15

## 2024-02-08 RX ORDER — EPINEPHRINE 0.3 MG/.3ML
0.3 INJECTION SUBCUTANEOUS ONCE AS NEEDED
Status: DISCONTINUED | OUTPATIENT
Start: 2024-02-08 | End: 2024-02-08 | Stop reason: HOSPADM

## 2024-02-08 RX ORDER — HEPARIN 100 UNIT/ML
500 SYRINGE INTRAVENOUS
Status: CANCELLED | OUTPATIENT
Start: 2024-02-22

## 2024-02-08 RX ORDER — HEPARIN 100 UNIT/ML
500 SYRINGE INTRAVENOUS
Status: DISCONTINUED | OUTPATIENT
Start: 2024-02-08 | End: 2024-02-08 | Stop reason: HOSPADM

## 2024-02-08 RX ORDER — DIPHENHYDRAMINE HYDROCHLORIDE 50 MG/ML
50 INJECTION INTRAMUSCULAR; INTRAVENOUS ONCE AS NEEDED
Status: CANCELLED | OUTPATIENT
Start: 2024-02-08

## 2024-02-08 RX ORDER — SODIUM CHLORIDE 0.9 % (FLUSH) 0.9 %
10 SYRINGE (ML) INJECTION
Status: DISCONTINUED | OUTPATIENT
Start: 2024-02-08 | End: 2024-02-08 | Stop reason: HOSPADM

## 2024-02-08 RX ORDER — DIPHENHYDRAMINE HYDROCHLORIDE 50 MG/ML
50 INJECTION INTRAMUSCULAR; INTRAVENOUS ONCE AS NEEDED
Status: DISCONTINUED | OUTPATIENT
Start: 2024-02-08 | End: 2024-02-08 | Stop reason: HOSPADM

## 2024-02-08 RX ORDER — SODIUM CHLORIDE 0.9 % (FLUSH) 0.9 %
10 SYRINGE (ML) INJECTION
Status: CANCELLED | OUTPATIENT
Start: 2024-02-15

## 2024-02-08 RX ORDER — EPINEPHRINE 0.3 MG/.3ML
0.3 INJECTION SUBCUTANEOUS ONCE AS NEEDED
Status: CANCELLED | OUTPATIENT
Start: 2024-02-08

## 2024-02-08 RX ORDER — SODIUM CHLORIDE 0.9 % (FLUSH) 0.9 %
10 SYRINGE (ML) INJECTION
Status: CANCELLED | OUTPATIENT
Start: 2024-02-22

## 2024-02-08 RX ORDER — HEPARIN 100 UNIT/ML
500 SYRINGE INTRAVENOUS
Status: CANCELLED | OUTPATIENT
Start: 2024-02-08

## 2024-02-08 RX ORDER — SODIUM CHLORIDE 0.9 % (FLUSH) 0.9 %
10 SYRINGE (ML) INJECTION
Status: CANCELLED | OUTPATIENT
Start: 2024-02-08

## 2024-02-08 RX ADMIN — SODIUM CHLORIDE: 9 INJECTION, SOLUTION INTRAVENOUS at 09:02

## 2024-02-08 RX ADMIN — DARATUMUMAB AND HYALURONIDASE-FIHJ (HUMAN RECOMBINANT) 1800 MG: 1800; 30000 INJECTION SUBCUTANEOUS at 10:02

## 2024-02-08 RX ADMIN — CARFILZOMIB 150 MG: 30 INJECTION, POWDER, LYOPHILIZED, FOR SOLUTION INTRAVENOUS at 10:02

## 2024-02-08 NOTE — PROGRESS NOTES
SECTION OF HEMATOLOGY AND BONE MARROW TRANSPLANT  Return Patient Visit   02/08/2024    CHIEF COMPLAINT:   Multiple Myeloma    HISTORY OF PRESENT ILLNESS: Patient of /  64 y.o.male; pmh of IgG kappa multiple myeloma (standard risk CG per msmart criteria, r-iss stage II); diagnosed September 2019 after presenting with symptomatic perispinal plasmacytoma;He has subsequently received  8 cycles of VRd therapy. Was in midst or pre transplant evaluation but due to insurance coverage issues transplant was delayed so was maintained on therapy and those issues have been resolved.    Transplant Course  Patient with IgG Kappa MM and pmh HTN, lytic bone lesion, arthritis and vitamin D deficiency. Admitted 5/15/21 for planned Alea auto SCT for MM. He received 3 bags and a CD34 dose of 3.35 x 10^6 on 5/17/21. Tolerated chemo and transplant well. Admission complicated by n/v controlled with scheduled anti-emetics and c-diff neg diarrhea controlled with prn imodium. He engrafted on Day +13 (5/30/21) with an ANC of 2607. He was discharged home on Day +14 (5/31/21).     Day +100 restaging marrow indicated that he was in MO.     Interval History - 1/8/24  64 M with IgG Kappa MM standard risk s/p 8 cycles Vrd followed by Alea Auto 5/17/2021 with disease relapse and Dkd salvage therapy 8/4/2023.     Here for follow up prior to C7. Here with his wife. Doing well and tolerating treatment without issues.     Was diagnosed with diabetes with A1C 9.2 in October and was on metformin but currently off per his PCP in [facility].       CURRENT MEDICATIONS:   Current Outpatient Medications   Medication Sig    acyclovir (ZOVIRAX) 400 MG tablet Take 1 tablet (400 mg total) by mouth 2 (two) times daily.    amLODIPine (NORVASC) 10 MG tablet Take 1 tablet (10 mg total) by mouth once daily.    calcium-vitamin D3 (OYSTER SHELL CALCIUM-VIT D3) 500 mg-5 mcg (200 unit) per tablet Take 1 tablet by mouth once daily.    cloNIDine (CATAPRES) 0.3  MG tablet Take 0.3 mg by mouth once daily.    dexAMETHasone (DECADRON) 4 MG Tab Take 10 tablets (40 mg total) by mouth every 7 days. Take with food before chemotherapy appointments    gabapentin (NEURONTIN) 300 MG capsule Take 1 capsule (300 mg total) by mouth 2 (two) times daily.    hydroCHLOROthiazide (HYDRODIURIL) 25 MG tablet Take 1 tablet (25 mg total) by mouth once daily.    metFORMIN (GLUCOPHAGE) 1000 MG tablet Take 0.5 tablets (500 mg total) by mouth 2 (two) times daily with meals.    potassium chloride SA (K-DUR,KLOR-CON M) 10 MEQ tablet TAKE 1 TABLET(10 MEQ) BY MOUTH EVERY DAY     No current facility-administered medications for this visit.     ALLERGIES:   Review of patient's allergies indicates:  No Known Allergies    Review of Systems   Constitutional: Negative for fever, malaise/fatigue and weight loss.   Respiratory: Negative for cough and shortness of breath.    Cardiovascular: Negative for chest pain and leg swelling.   Gastrointestinal: Negative for constipation, diarrhea and vomiting.   Skin: Negative for rash.   Neurological: Negative for seizures and weakness.   Psychiatric/Behavioral: The patient is not nervous/anxious.      PHYSICAL EXAM:   There were no vitals filed for this visit.    General - well developed, well nourished, no apparent distress  HEENT - oropharynx clear  Chest and Lung - clear to auscultation bilaterally   Cardiovascular - RRR with no MGR, normal S1 and S2  Abdomen-  soft, nontender, no palpable hepatomegaly or splenomegaly  Lymph - no palpable lymphadenopathy  Heme - no bruising, petechiae, pallor  Skin - no rashes or lesions  Psych - appropriate mood and affect      ECOG Performance Status: (foot note - ECOG PS provided by Eastern Cooperative Oncology Group) 1 - Symptomatic but completely ambulatory    Karnofsky Performance Score:  90%- Able to Carry on Normal Activity: Minor Symptoms of Disease  DATA:   Lab Results   Component Value Date    WBC 5.24 01/08/2024    HGB  11.6 (L) 01/08/2024    HCT 33.8 (L) 01/08/2024    MCV 92 01/08/2024     01/08/2024       Gran # (ANC)   Date Value Ref Range Status   01/08/2024 4.0 1.8 - 7.7 K/uL Final     Gran %   Date Value Ref Range Status   01/08/2024 75.7 (H) 38.0 - 73.0 % Final     CMP  Sodium   Date Value Ref Range Status   01/08/2024 137 136 - 145 mmol/L Final     Potassium   Date Value Ref Range Status   01/08/2024 3.8 3.5 - 5.1 mmol/L Final     Chloride   Date Value Ref Range Status   01/08/2024 102 95 - 110 mmol/L Final     CO2   Date Value Ref Range Status   01/08/2024 26 23 - 29 mmol/L Final     Glucose   Date Value Ref Range Status   01/08/2024 158 (H) 70 - 110 mg/dL Final     BUN   Date Value Ref Range Status   01/08/2024 16 8 - 23 mg/dL Final     Creatinine   Date Value Ref Range Status   01/08/2024 1.1 0.5 - 1.4 mg/dL Final     Calcium   Date Value Ref Range Status   01/08/2024 10.0 8.7 - 10.5 mg/dL Final     Total Protein   Date Value Ref Range Status   01/08/2024 8.1 6.0 - 8.4 g/dL Final     Albumin   Date Value Ref Range Status   01/08/2024 3.7 3.5 - 5.2 g/dL Final     Total Bilirubin   Date Value Ref Range Status   01/08/2024 0.8 0.1 - 1.0 mg/dL Final     Comment:     For infants and newborns, interpretation of results should be based  on gestational age, weight and in agreement with clinical  observations.    Premature Infant recommended reference ranges:  Up to 24 hours.............<8.0 mg/dL  Up to 48 hours............<12.0 mg/dL  3-5 days..................<15.0 mg/dL  6-29 days.................<15.0 mg/dL       Alkaline Phosphatase   Date Value Ref Range Status   01/08/2024 53 (L) 55 - 135 U/L Final     AST   Date Value Ref Range Status   01/08/2024 14 10 - 40 U/L Final     ALT   Date Value Ref Range Status   01/08/2024 10 10 - 44 U/L Final     Anion Gap   Date Value Ref Range Status   01/08/2024 9 8 - 16 mmol/L Final     eGFR   Date Value Ref Range Status   01/08/2024 >60.0 >60 mL/min/1.73 m^2 Final     IgG   Date  Value Ref Range Status   01/08/2024 2167 (H) 650 - 1600 mg/dL Final     Comment:     IgG Cord Blood Reference Range: 650-1600 mg/dL.     IgA   Date Value Ref Range Status   01/08/2024 5 (L) 40 - 350 mg/dL Final     Comment:     IgA Cord Blood Reference Range: <5 mg/dL.     IgM   Date Value Ref Range Status   01/08/2024 14 (L) 50 - 300 mg/dL Final     Comment:     IgM Cord Blood Reference Range: <25 mg/dL.     Kappa Free Light Chains   Date Value Ref Range Status   01/08/2024 60.23 (H) 0.33 - 1.94 mg/dL Final   11/22/2023 58.66 (H) 0.33 - 1.94 mg/dL Final   10/25/2023 68.05 (H) 0.33 - 1.94 mg/dL Final     Lambda Free Light Chains   Date Value Ref Range Status   01/08/2024 0.19 (L) 0.57 - 2.63 mg/dL Final   11/22/2023 0.15 (L) 0.57 - 2.63 mg/dL Final   10/25/2023 0.26 (L) 0.57 - 2.63 mg/dL Final     Kappa/Lambda FLC Ratio   Date Value Ref Range Status   01/08/2024 317.00 (H) 0.26 - 1.65 Final     Comment:     Undetected antigen excess is a rare event but cannot   be excluded. If these free light chain results do not   agree with other clinical or laboratory findings or   if the sample is from a patient that has previously   demonstrated antigen excess, discuss with the testing   laboratory.   Results should always be interpreted in conjunction   with other laboratory tests and clinical evidence.     11/22/2023 391.10 (H) 0.26 - 1.65 Final     Comment:     Undetected antigen excess is a rare event but cannot   be excluded. If these free light chain results do not   agree with other clinical or laboratory findings or   if the sample is from a patient that has previously   demonstrated antigen excess, discuss with the testing   laboratory.   Results should always be interpreted in conjunction   with other laboratory tests and clinical evidence.     10/25/2023 261.70 (H) 0.26 - 1.65 Final     Comment:     Undetected antigen excess is a rare event but cannot   be excluded. If these free light chain results do not   agree  with other clinical or laboratory findings or   if the sample is from a patient that has previously   demonstrated antigen excess, discuss with the testing   laboratory.   Results should always be interpreted in conjunction   with other laboratory tests and clinical evidence.       Pathologist Interpretation SPE   Date Value Ref Range Status   01/08/2024 REVIEWED  Final     Comment:       Electronically reviewed and signed by:  Tasha Mills MD  Signed on 01/09/24 at 12:53  Normal total protein. Normal gamma globulins are decreased.   Paraprotein peak in gamma = 1.55 g/dL (previously 1.47 g/dL).      11/22/2023 REVIEWED  Final     Comment:       Electronically reviewed and signed by:  Joaquin Sabillon MD  Signed on 11/27/23 at 14:12  Normal total protein.   Normal gamma globulins are decreased. Paraprotein peak in gamma =   1.47 g/dL (previously 1.49 g/dL).      10/25/2023 REVIEWED  Final     Comment:       Electronically reviewed and signed by:  Tasha Mills MD  Signed on 10/26/23 at 14:52  Normal total protein. Normal gamma globulins are decreased.   Paraprotein peak in gamma = 1.49 g/dL (previously 1.60 g/dL).        Pathologist Interpretation SADAF   Date Value Ref Range Status   01/08/2024 REVIEWED  Final     Comment:       Electronically reviewed and signed by:  Tasha Mills MD  Signed on 01/09/24 at 12:52  IgG kappa specific monoclonal band present.          Results for orders placed or performed during the hospital encounter of 01/05/24 (from the past 2160 hour(s))   NM PET CT FDG Whole Body (Melanoma)    Impression    In this patient with multiple myeloma, there are findings compatible with treatment response.  Interval decreased number of hypermetabolic osseous lesions compared to prior.  Residual hypermetabolic index lesions as above.    Focal tracer avidity in the periportal region and new/more prominent tracer avid focus left hepatic lobe, without abnormal CT correlate.  Remaining  hepatic and splenic hypermetabolic foci noted on prior are intervally normalized.    Right knee joint effusion with low-level tracer uptake, may reflect nonspecific synovitis.  Recommend clinical correlation with follow-up imaging as indicated.    Electronically signed by resident: Robbie Quinones  Date:    01/05/2024  Time:    11:43    Electronically signed by: Humberto Ordoñez  Date:    01/05/2024  Time:    14:53     *Note: Due to a large number of results and/or encounters for the requested time period, some results have not been displayed. A complete set of results can be found in Results Review.     Assessment and Plan:    1. Multiple myeloma, remission status unspecified        2. History of auto stem cell transplant        3. 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 Essential hypertension          IgG Kappa MM standard risk s/p 8 cycles Vrd followed by Yajaira Auto 5/17/2021 with disease relapse and Dkd salvage therapy 8/4/2023  - standard risk MM diagnosed sept 2020 after presenting with symptomatic lytic disease  -sp VRd induction followed by yajaira 200 autoSCT on 5/17/23 achieving/mainting GA post SCT; was on revlimid maintenance but relapsed biochemically and with new lytic disease approximately 2 years post SCT (June/July 2023)  -initiated Jame-Kd salvage on 8/4/23; tolerating will no significant AE's; had had GA by [facility] burden to just 2 doses. Mm labs pending now.   -jame subq weekly 8>EOW x 8 doses> q 4 week; kyprolis 70mg/m2 day 1, 8, 15 of 28 day cycle; dex 40mg po weekly   -plan to continue until intolerance or progression   -supportive meds: acyclovir, ca +vit d, zometa (received total of 2 years since disease progression)  -his 1/8/24  biochemical studies cw with stable borderline GA  and his 1/5/24 PET scan show improved osseous disease so plan to continue therapy; plan for repeat PET June 2024 or earlier if clinically indicated   -Tolerating well; proceed with next 4 weeks  of therapy.     Type 2 DM  -will decrease dex to 20 mg   -advised patient to follow up with his PCP    Neuropathy  - Controlled with gabapentin    ID  - Completed post SCT vaccines  - Acyclovir as above while in PI     Essential hypertension  -well controled     D/W Dr. Olivia Pierce MD  Hematology and Medical Oncology fellow

## 2024-02-08 NOTE — PLAN OF CARE
Problem: Adult Inpatient Plan of Care  Goal: Optimal Comfort and Wellbeing  Intervention: Provide Person-Centered Care  Flowsheets (Taken 2/8/2024 5379)  Trust Relationship/Rapport:   reassurance provided   care explained   choices provided   emotional support provided   thoughts/feelings acknowledged   empathic listening provided   questions answered   questions encouraged

## 2024-02-08 NOTE — PROGRESS NOTES
-leave treatments as is  -cbc, cmp, serum free light chains, quantitative immunoglobulins, serum electropheresis, serum immunofixation prior to treatment on 3/6  -MD appt after treatment on 3/13; ok to book into service time early afternoon

## 2024-02-08 NOTE — PLAN OF CARE
Patient tolerated Melinda Faspro and Kyprolis well today. PIV removed, catheter tip intact. NAD noted upon discharge. Discharged home, ambulated independently with family by side.

## 2024-02-14 ENCOUNTER — INFUSION (OUTPATIENT)
Dept: INFUSION THERAPY | Facility: HOSPITAL | Age: 64
End: 2024-02-14
Payer: MEDICARE

## 2024-02-14 VITALS
DIASTOLIC BLOOD PRESSURE: 77 MMHG | RESPIRATION RATE: 18 BRPM | HEIGHT: 66 IN | TEMPERATURE: 98 F | HEART RATE: 73 BPM | BODY MASS INDEX: 35.43 KG/M2 | WEIGHT: 220.44 LBS | OXYGEN SATURATION: 98 % | SYSTOLIC BLOOD PRESSURE: 122 MMHG

## 2024-02-14 DIAGNOSIS — C90.00 MULTIPLE MYELOMA, REMISSION STATUS UNSPECIFIED: Primary | ICD-10-CM

## 2024-02-14 PROCEDURE — 25000003 PHARM REV CODE 250: Performed by: INTERNAL MEDICINE

## 2024-02-14 PROCEDURE — 96413 CHEMO IV INFUSION 1 HR: CPT

## 2024-02-14 PROCEDURE — 63600175 PHARM REV CODE 636 W HCPCS: Mod: JZ,JG | Performed by: INTERNAL MEDICINE

## 2024-02-14 RX ORDER — SODIUM CHLORIDE 0.9 % (FLUSH) 0.9 %
10 SYRINGE (ML) INJECTION
Status: DISCONTINUED | OUTPATIENT
Start: 2024-02-14 | End: 2024-02-14 | Stop reason: HOSPADM

## 2024-02-14 RX ORDER — HEPARIN 100 UNIT/ML
500 SYRINGE INTRAVENOUS
Status: DISCONTINUED | OUTPATIENT
Start: 2024-02-14 | End: 2024-02-14 | Stop reason: HOSPADM

## 2024-02-14 RX ADMIN — CARFILZOMIB 150 MG: 30 INJECTION, POWDER, LYOPHILIZED, FOR SOLUTION INTRAVENOUS at 10:02

## 2024-02-14 RX ADMIN — SODIUM CHLORIDE: 9 INJECTION, SOLUTION INTRAVENOUS at 09:02

## 2024-02-14 NOTE — PLAN OF CARE
0910 Patient here for C7D8 kyprolis. Labs, meds and hx reviewed. Patient appropriate for treatment today. PIV inserted and NS started at 25ml/hr. Snack and blanket provided.

## 2024-02-14 NOTE — PLAN OF CARE
1105 Patient tolerated kyprolis with no s/s of a reaction and no complaints. PIV removed and catheter tip intact. Patient declined the AVS and ambulated out.

## 2024-02-21 ENCOUNTER — INFUSION (OUTPATIENT)
Dept: INFUSION THERAPY | Facility: HOSPITAL | Age: 64
End: 2024-02-21
Payer: MEDICARE

## 2024-02-21 VITALS
SYSTOLIC BLOOD PRESSURE: 140 MMHG | DIASTOLIC BLOOD PRESSURE: 82 MMHG | BODY MASS INDEX: 35.72 KG/M2 | HEIGHT: 66 IN | OXYGEN SATURATION: 100 % | HEART RATE: 67 BPM | RESPIRATION RATE: 16 BRPM | WEIGHT: 222.25 LBS

## 2024-02-21 DIAGNOSIS — C90.00 MULTIPLE MYELOMA, REMISSION STATUS UNSPECIFIED: Primary | ICD-10-CM

## 2024-02-21 PROCEDURE — 96413 CHEMO IV INFUSION 1 HR: CPT

## 2024-02-21 PROCEDURE — 63600175 PHARM REV CODE 636 W HCPCS: Mod: JZ,JG | Performed by: INTERNAL MEDICINE

## 2024-02-21 PROCEDURE — 25000003 PHARM REV CODE 250: Performed by: INTERNAL MEDICINE

## 2024-02-21 RX ORDER — HEPARIN 100 UNIT/ML
500 SYRINGE INTRAVENOUS
Status: DISCONTINUED | OUTPATIENT
Start: 2024-02-21 | End: 2024-02-21 | Stop reason: HOSPADM

## 2024-02-21 RX ORDER — SODIUM CHLORIDE 0.9 % (FLUSH) 0.9 %
10 SYRINGE (ML) INJECTION
Status: DISCONTINUED | OUTPATIENT
Start: 2024-02-21 | End: 2024-02-21 | Stop reason: HOSPADM

## 2024-02-21 RX ADMIN — CARFILZOMIB 150 MG: 30 INJECTION, POWDER, LYOPHILIZED, FOR SOLUTION INTRAVENOUS at 10:02

## 2024-02-21 RX ADMIN — SODIUM CHLORIDE: 9 INJECTION, SOLUTION INTRAVENOUS at 09:02

## 2024-02-21 NOTE — PLAN OF CARE
0915  Patient seated in chair, VSS, assessment done.  PIV inserted, flushed, blood return noted, started NS @ 50 cc/hr KVO while waiting for Kyprolis from pharmacy.  WCTM for safety

## 2024-02-21 NOTE — PLAN OF CARE
1124  Patient completed Kyprolis infusion, tolerated well.  PIV discontinued without issue.  RTC 3/6/24  Patient ambulated independently, NAD.

## 2024-02-28 ENCOUNTER — TELEPHONE (OUTPATIENT)
Dept: HEMATOLOGY/ONCOLOGY | Facility: CLINIC | Age: 64
End: 2024-02-28
Payer: MEDICARE

## 2024-02-28 NOTE — TELEPHONE ENCOUNTER
Greeted  in the pt lobby and advised that his appointments were being amended by a team member in December. Pt was provided a printed copy of upcoming appointments and advised that he did not have any appointments scheduled for today. Pt and spouse both verbalized understanding and agreement. Pt verbalized that he was informed of changes to his appointments. I apologized for the misunderstanding. Pt stated that it was okay and walked out of the clinic  in an agreeable manner.

## 2024-02-28 NOTE — TELEPHONE ENCOUNTER
----- Message from Radha Vasquez sent at 2/28/2024  8:43 AM CST -----  Regarding: Consult/Advisory  Contact: Troy  5th floor of [facility]  Consult/Advisory    Name Of Caller: Troy  5th floor of [place]        Contact Preference: [phone]    Nature of call:  Troy  5th floor of [facility] calling to speak to Lc -, who is not on today's schedule, regarding a patient who is at the labs now and was rescheduled without notice and is there to get labs now. Patient usually is scheduled for every Wednesday for labs. Requesting a call back ASAP.

## 2024-03-05 RX ORDER — HEPARIN 100 UNIT/ML
500 SYRINGE INTRAVENOUS
Status: CANCELLED | OUTPATIENT
Start: 2024-03-06

## 2024-03-05 RX ORDER — SODIUM CHLORIDE 0.9 % (FLUSH) 0.9 %
10 SYRINGE (ML) INJECTION
Status: CANCELLED | OUTPATIENT
Start: 2024-03-06

## 2024-03-05 RX ORDER — DIPHENHYDRAMINE HYDROCHLORIDE 50 MG/ML
50 INJECTION INTRAMUSCULAR; INTRAVENOUS ONCE AS NEEDED
Status: CANCELLED | OUTPATIENT
Start: 2024-03-06

## 2024-03-05 RX ORDER — EPINEPHRINE 0.3 MG/.3ML
0.3 INJECTION SUBCUTANEOUS ONCE AS NEEDED
Status: CANCELLED | OUTPATIENT
Start: 2024-03-06

## 2024-03-06 ENCOUNTER — INFUSION (OUTPATIENT)
Dept: INFUSION THERAPY | Facility: HOSPITAL | Age: 64
End: 2024-03-06
Payer: MEDICARE

## 2024-03-06 VITALS
SYSTOLIC BLOOD PRESSURE: 134 MMHG | OXYGEN SATURATION: 99 % | WEIGHT: 213.63 LBS | DIASTOLIC BLOOD PRESSURE: 74 MMHG | BODY MASS INDEX: 34.33 KG/M2 | HEIGHT: 66 IN | RESPIRATION RATE: 18 BRPM | HEART RATE: 90 BPM

## 2024-03-06 DIAGNOSIS — C90.00 MULTIPLE MYELOMA, REMISSION STATUS UNSPECIFIED: Primary | ICD-10-CM

## 2024-03-06 PROCEDURE — A4216 STERILE WATER/SALINE, 10 ML: HCPCS | Performed by: INTERNAL MEDICINE

## 2024-03-06 PROCEDURE — 96413 CHEMO IV INFUSION 1 HR: CPT

## 2024-03-06 PROCEDURE — 25000003 PHARM REV CODE 250: Performed by: INTERNAL MEDICINE

## 2024-03-06 PROCEDURE — 63600175 PHARM REV CODE 636 W HCPCS: Mod: JZ,JG | Performed by: INTERNAL MEDICINE

## 2024-03-06 PROCEDURE — 96417 CHEMO IV INFUS EACH ADDL SEQ: CPT

## 2024-03-06 RX ORDER — SODIUM CHLORIDE 0.9 % (FLUSH) 0.9 %
10 SYRINGE (ML) INJECTION
Status: DISCONTINUED | OUTPATIENT
Start: 2024-03-06 | End: 2024-03-06 | Stop reason: HOSPADM

## 2024-03-06 RX ORDER — DIPHENHYDRAMINE HYDROCHLORIDE 50 MG/ML
50 INJECTION INTRAMUSCULAR; INTRAVENOUS ONCE AS NEEDED
Status: DISCONTINUED | OUTPATIENT
Start: 2024-03-06 | End: 2024-03-06 | Stop reason: HOSPADM

## 2024-03-06 RX ORDER — EPINEPHRINE 0.3 MG/.3ML
0.3 INJECTION SUBCUTANEOUS ONCE AS NEEDED
Status: DISCONTINUED | OUTPATIENT
Start: 2024-03-06 | End: 2024-03-06 | Stop reason: HOSPADM

## 2024-03-06 RX ADMIN — SODIUM CHLORIDE: 9 INJECTION, SOLUTION INTRAVENOUS at 09:03

## 2024-03-06 RX ADMIN — DARATUMUMAB AND HYALURONIDASE-FIHJ (HUMAN RECOMBINANT) 1800 MG: 1800; 30000 INJECTION SUBCUTANEOUS at 10:03

## 2024-03-06 RX ADMIN — Medication 10 ML: at 11:03

## 2024-03-06 RX ADMIN — CARFILZOMIB 150 MG: 30 INJECTION, POWDER, LYOPHILIZED, FOR SOLUTION INTRAVENOUS at 10:03

## 2024-03-06 NOTE — NURSING
PT tolerated Darzalex SQ and Kyprolis infusion well. No adverse reaction noted. Pt education reinforced on side effects, what to expect and when to contact the doctor. Pt verbalized understanding. PIV d/c per protocol, pt tolerated well.

## 2024-03-13 ENCOUNTER — INFUSION (OUTPATIENT)
Dept: INFUSION THERAPY | Facility: HOSPITAL | Age: 64
End: 2024-03-13
Attending: INTERNAL MEDICINE
Payer: MEDICARE

## 2024-03-13 VITALS
RESPIRATION RATE: 18 BRPM | OXYGEN SATURATION: 99 % | HEART RATE: 68 BPM | SYSTOLIC BLOOD PRESSURE: 128 MMHG | DIASTOLIC BLOOD PRESSURE: 70 MMHG | BODY MASS INDEX: 34.93 KG/M2 | TEMPERATURE: 98 F | HEIGHT: 66 IN | WEIGHT: 217.38 LBS

## 2024-03-13 DIAGNOSIS — C90.00 MULTIPLE MYELOMA, REMISSION STATUS UNSPECIFIED: Primary | ICD-10-CM

## 2024-03-13 PROCEDURE — 25000003 PHARM REV CODE 250: Performed by: INTERNAL MEDICINE

## 2024-03-13 PROCEDURE — 96413 CHEMO IV INFUSION 1 HR: CPT

## 2024-03-13 PROCEDURE — 63600175 PHARM REV CODE 636 W HCPCS: Mod: JZ,JG | Performed by: INTERNAL MEDICINE

## 2024-03-13 RX ORDER — SODIUM CHLORIDE 0.9 % (FLUSH) 0.9 %
10 SYRINGE (ML) INJECTION
Status: CANCELLED | OUTPATIENT
Start: 2024-03-13

## 2024-03-13 RX ORDER — HEPARIN 100 UNIT/ML
500 SYRINGE INTRAVENOUS
Status: CANCELLED | OUTPATIENT
Start: 2024-03-13

## 2024-03-13 RX ORDER — HEPARIN 100 UNIT/ML
500 SYRINGE INTRAVENOUS
Status: DISCONTINUED | OUTPATIENT
Start: 2024-03-13 | End: 2024-03-13 | Stop reason: HOSPADM

## 2024-03-13 RX ORDER — SODIUM CHLORIDE 0.9 % (FLUSH) 0.9 %
10 SYRINGE (ML) INJECTION
Status: DISCONTINUED | OUTPATIENT
Start: 2024-03-13 | End: 2024-03-13 | Stop reason: HOSPADM

## 2024-03-13 RX ORDER — SODIUM CHLORIDE 0.9 % (FLUSH) 0.9 %
10 SYRINGE (ML) INJECTION
Status: CANCELLED | OUTPATIENT
Start: 2024-03-20

## 2024-03-13 RX ORDER — HEPARIN 100 UNIT/ML
500 SYRINGE INTRAVENOUS
Status: CANCELLED | OUTPATIENT
Start: 2024-03-20

## 2024-03-13 RX ADMIN — CARFILZOMIB 150 MG: 30 INJECTION, POWDER, LYOPHILIZED, FOR SOLUTION INTRAVENOUS at 11:03

## 2024-03-13 RX ADMIN — DEXTROSE MONOHYDRATE: 50 INJECTION, SOLUTION INTRAVENOUS at 10:03

## 2024-03-13 NOTE — PLAN OF CARE
Pt admitted for C8 D8 Kyprolis. Labs from 3/06 reviewed and assessment performed, Pt offered no complaints. Pt tolerated treatment well. PIV removed and Pt discharged home.

## 2024-03-15 DIAGNOSIS — G62.9 NEUROPATHY: ICD-10-CM

## 2024-03-15 NOTE — TELEPHONE ENCOUNTER
Spoke with pt in regards to refill request. Let pt know medication was sent over to Dr. Baker to sign and send over to his pharmacy. Pt is aware of process    -DENITA Maynard

## 2024-03-15 NOTE — TELEPHONE ENCOUNTER
----- Message from Tasha Santana sent at 3/15/2024  4:29 PM CDT -----  Regarding: Refill  Contact: Pt  [phone]            Rx Name: gabapentin (NEURONTIN) 300 MG capsule    Prefered Pharmacy with number:    [id number] - [place], [place] - [address] [facility] AT [facility] [address]  [address] [facility] [address]  Phone: [phone] Fax: [phone]      Ordering Provider:Ashanti Quiñones NP    Contact preference:[phone]    Comments/Notes:  Requesting refill

## 2024-03-17 RX ORDER — GABAPENTIN 300 MG/1
300 CAPSULE ORAL 2 TIMES DAILY
Qty: 60 CAPSULE | Refills: 3 | Status: SHIPPED | OUTPATIENT
Start: 2024-03-17

## 2024-03-20 ENCOUNTER — INFUSION (OUTPATIENT)
Dept: INFUSION THERAPY | Facility: HOSPITAL | Age: 64
End: 2024-03-20
Payer: MEDICARE

## 2024-03-20 VITALS
WEIGHT: 220.44 LBS | TEMPERATURE: 98 F | HEART RATE: 70 BPM | SYSTOLIC BLOOD PRESSURE: 140 MMHG | OXYGEN SATURATION: 100 % | BODY MASS INDEX: 35.43 KG/M2 | RESPIRATION RATE: 18 BRPM | HEIGHT: 66 IN | DIASTOLIC BLOOD PRESSURE: 59 MMHG

## 2024-03-20 DIAGNOSIS — C90.00 MULTIPLE MYELOMA, REMISSION STATUS UNSPECIFIED: Primary | ICD-10-CM

## 2024-03-20 PROCEDURE — 25000003 PHARM REV CODE 250: Performed by: INTERNAL MEDICINE

## 2024-03-20 PROCEDURE — 96413 CHEMO IV INFUSION 1 HR: CPT

## 2024-03-20 PROCEDURE — 63600175 PHARM REV CODE 636 W HCPCS: Mod: JZ,JG | Performed by: INTERNAL MEDICINE

## 2024-03-20 RX ORDER — SODIUM CHLORIDE 0.9 % (FLUSH) 0.9 %
10 SYRINGE (ML) INJECTION
Status: DISCONTINUED | OUTPATIENT
Start: 2024-03-20 | End: 2024-03-20 | Stop reason: HOSPADM

## 2024-03-20 RX ORDER — HEPARIN 100 UNIT/ML
500 SYRINGE INTRAVENOUS
Status: DISCONTINUED | OUTPATIENT
Start: 2024-03-20 | End: 2024-03-20 | Stop reason: HOSPADM

## 2024-03-20 RX ADMIN — CARFILZOMIB 150 MG: 30 INJECTION, POWDER, LYOPHILIZED, FOR SOLUTION INTRAVENOUS at 12:03

## 2024-03-20 NOTE — PLAN OF CARE
Pt received Kyprolis today and tolerated well, without complications. VSS throughout infusion. Educated patient about Kyprolis (indications, side effects, possible reactions, precautions) and verbalized understanding. PIV positive for blood return, saline locked and removed prior to DC, catheter tip intact. Pt DC with no distress noted, ambulated off of unit, via self, w/o event, pleased.

## 2024-04-03 ENCOUNTER — INFUSION (OUTPATIENT)
Dept: INFUSION THERAPY | Facility: HOSPITAL | Age: 64
End: 2024-04-03
Payer: MEDICARE

## 2024-04-03 ENCOUNTER — OFFICE VISIT (OUTPATIENT)
Dept: HEMATOLOGY/ONCOLOGY | Facility: CLINIC | Age: 64
End: 2024-04-03
Payer: MEDICARE

## 2024-04-03 VITALS — BODY MASS INDEX: 34.9 KG/M2 | HEIGHT: 66 IN | WEIGHT: 217.13 LBS

## 2024-04-03 VITALS
RESPIRATION RATE: 18 BRPM | OXYGEN SATURATION: 100 % | WEIGHT: 216.06 LBS | BODY MASS INDEX: 34.72 KG/M2 | SYSTOLIC BLOOD PRESSURE: 143 MMHG | HEIGHT: 66 IN | HEART RATE: 73 BPM | DIASTOLIC BLOOD PRESSURE: 77 MMHG

## 2024-04-03 DIAGNOSIS — C90.00 MULTIPLE MYELOMA, REMISSION STATUS UNSPECIFIED: Primary | ICD-10-CM

## 2024-04-03 DIAGNOSIS — Z94.84 HISTORY OF AUTO STEM CELL TRANSPLANT: ICD-10-CM

## 2024-04-03 DIAGNOSIS — D84.821 IMMUNODEFICIENCY DUE TO DRUGS: ICD-10-CM

## 2024-04-03 DIAGNOSIS — Z79.899 IMMUNODEFICIENCY DUE TO DRUGS: ICD-10-CM

## 2024-04-03 PROCEDURE — 99215 OFFICE O/P EST HI 40 MIN: CPT | Mod: S$GLB,,, | Performed by: INTERNAL MEDICINE

## 2024-04-03 PROCEDURE — 1159F MED LIST DOCD IN RCRD: CPT | Mod: CPTII,S$GLB,, | Performed by: INTERNAL MEDICINE

## 2024-04-03 PROCEDURE — 25000003 PHARM REV CODE 250: Performed by: INTERNAL MEDICINE

## 2024-04-03 PROCEDURE — 63600175 PHARM REV CODE 636 W HCPCS: Mod: JZ,JG | Performed by: INTERNAL MEDICINE

## 2024-04-03 PROCEDURE — 3008F BODY MASS INDEX DOCD: CPT | Mod: CPTII,S$GLB,, | Performed by: INTERNAL MEDICINE

## 2024-04-03 PROCEDURE — 96413 CHEMO IV INFUSION 1 HR: CPT

## 2024-04-03 PROCEDURE — 96401 CHEMO ANTI-NEOPL SQ/IM: CPT

## 2024-04-03 PROCEDURE — 1101F PT FALLS ASSESS-DOCD LE1/YR: CPT | Mod: CPTII,S$GLB,, | Performed by: INTERNAL MEDICINE

## 2024-04-03 PROCEDURE — 3288F FALL RISK ASSESSMENT DOCD: CPT | Mod: CPTII,S$GLB,, | Performed by: INTERNAL MEDICINE

## 2024-04-03 PROCEDURE — 99999 PR PBB SHADOW E&M-EST. PATIENT-LVL III: CPT | Mod: PBBFAC,,, | Performed by: INTERNAL MEDICINE

## 2024-04-03 RX ORDER — ASPIRIN 81 MG/1
81 TABLET ORAL DAILY
COMMUNITY
Start: 2024-03-15

## 2024-04-03 RX ORDER — ATORVASTATIN CALCIUM 20 MG/1
20 TABLET, FILM COATED ORAL DAILY
COMMUNITY
Start: 2024-03-15

## 2024-04-03 RX ORDER — DIPHENHYDRAMINE HYDROCHLORIDE 50 MG/ML
50 INJECTION INTRAMUSCULAR; INTRAVENOUS ONCE AS NEEDED
Status: DISCONTINUED | OUTPATIENT
Start: 2024-04-03 | End: 2024-04-03 | Stop reason: HOSPADM

## 2024-04-03 RX ORDER — SODIUM CHLORIDE 0.9 % (FLUSH) 0.9 %
10 SYRINGE (ML) INJECTION
Status: CANCELLED | OUTPATIENT
Start: 2024-04-03

## 2024-04-03 RX ORDER — DIPHENHYDRAMINE HYDROCHLORIDE 50 MG/ML
50 INJECTION INTRAMUSCULAR; INTRAVENOUS ONCE AS NEEDED
Status: CANCELLED | OUTPATIENT
Start: 2024-04-03

## 2024-04-03 RX ORDER — HEPARIN 100 UNIT/ML
500 SYRINGE INTRAVENOUS
Status: CANCELLED | OUTPATIENT
Start: 2024-04-03

## 2024-04-03 RX ORDER — HEPARIN 100 UNIT/ML
500 SYRINGE INTRAVENOUS
Status: DISCONTINUED | OUTPATIENT
Start: 2024-04-03 | End: 2024-04-03 | Stop reason: HOSPADM

## 2024-04-03 RX ORDER — EPINEPHRINE 0.3 MG/.3ML
0.3 INJECTION SUBCUTANEOUS ONCE AS NEEDED
Status: DISCONTINUED | OUTPATIENT
Start: 2024-04-03 | End: 2024-04-03 | Stop reason: HOSPADM

## 2024-04-03 RX ORDER — SODIUM CHLORIDE 0.9 % (FLUSH) 0.9 %
10 SYRINGE (ML) INJECTION
Status: DISCONTINUED | OUTPATIENT
Start: 2024-04-03 | End: 2024-04-03 | Stop reason: HOSPADM

## 2024-04-03 RX ORDER — EPINEPHRINE 0.3 MG/.3ML
0.3 INJECTION SUBCUTANEOUS ONCE AS NEEDED
Status: CANCELLED | OUTPATIENT
Start: 2024-04-03

## 2024-04-03 RX ADMIN — DARATUMUMAB AND HYALURONIDASE-FIHJ (HUMAN RECOMBINANT) 1800 MG: 1800; 30000 INJECTION SUBCUTANEOUS at 10:04

## 2024-04-03 RX ADMIN — SODIUM CHLORIDE: 9 INJECTION, SOLUTION INTRAVENOUS at 10:04

## 2024-04-03 RX ADMIN — CARFILZOMIB 150 MG: 30 INJECTION, POWDER, LYOPHILIZED, FOR SOLUTION INTRAVENOUS at 11:04

## 2024-04-03 NOTE — PLAN OF CARE
1150  Patient completed Melinda SQ injection to lower rt abdomen as well as Kyprolis infusion. PIV discontinued without issue.  RTC 4/10/24  Patient ambulated off floor accompanied by wife.

## 2024-04-03 NOTE — PLAN OF CARE
Patient seated in chair accompanied by wife.  VSS, assessment done.  PIV inserted, flushed, blood return noted, started NS @ 25 cc/hr KVO while waiting for Kyprolis and jame SQ from pharmacy.  WCTM for safety

## 2024-04-03 NOTE — PROGRESS NOTES
SECTION OF HEMATOLOGY AND BONE MARROW TRANSPLANT  Return Patient Visit   04/04/2024    CHIEF COMPLAINT:   Multiple Myeloma    HISTORY OF PRESENT ILLNESS: Patient of /  64 y.o.male; pmh of IgG kappa multiple myeloma (standard risk CG per msmart criteria, r-iss stage II); diagnosed September 2019 after presenting with symptomatic perispinal plasmacytoma;He has subsequently received  8 cycles of VRd therapy. Was in midst or pre transplant evaluation but due to insurance coverage issues transplant was delayed so was maintained on therapy and those issues have been resolved.    Transplant Course  Patient with IgG Kappa MM and pmh HTN, lytic bone lesion, arthritis and vitamin D deficiency. Admitted 5/15/21 for planned Alea auto SCT for MM. He received 3 bags and a CD34 dose of 3.35 x 10^6 on 5/17/21. Tolerated chemo and transplant well. Admission complicated by n/v controlled with scheduled anti-emetics and c-diff neg diarrhea controlled with prn imodium. He engrafted on Day +13 (5/30/21) with an ANC of 2607. He was discharged home on Day +14 (5/31/21).     Day +100 restaging marrow indicated that he was in MD.     Interval History - 4/3/24   64 y.o.  M with IgG Kappa MM standard risk s/p 8 cycles Vrd followed by Alea Auto 5/17/2021 with disease relapse and Dkd salvage therapy 8/4/2023.     Here for follow up prior to C9. Here with his wife. Doing well and tolerating treatment without issues.     Was diagnosed with diabetes with A1C 9.2 in October and was on metformin but currently off per his PCP in [facility].       CURRENT MEDICATIONS:   Current Outpatient Medications   Medication Sig    acyclovir (ZOVIRAX) 400 MG tablet Take 1 tablet (400 mg total) by mouth 2 (two) times daily.    amLODIPine (NORVASC) 10 MG tablet Take 1 tablet (10 mg total) by mouth once daily.    aspirin (ECOTRIN) 81 MG EC tablet     atorvastatin (LIPITOR) 20 MG tablet     calcium-vitamin D3 (OYSTER SHELL CALCIUM-VIT D3) 500 mg-5 mcg  "(200 unit) per tablet Take 1 tablet by mouth once daily.    cloNIDine (CATAPRES) 0.3 MG tablet Take 0.3 mg by mouth once daily.    dexAMETHasone (DECADRON) 4 MG Tab Take 10 tablets (40 mg total) by mouth every 7 days. Take with food before chemotherapy appointments    gabapentin (NEURONTIN) 300 MG capsule Take 1 capsule (300 mg total) by mouth 2 (two) times daily.    hydroCHLOROthiazide (HYDRODIURIL) 25 MG tablet Take 1 tablet (25 mg total) by mouth once daily.    JARDIANCE 10 mg tablet Take 10 mg by mouth.    metFORMIN (GLUCOPHAGE) 1000 MG tablet Take 0.5 tablets (500 mg total) by mouth 2 (two) times daily with meals.    potassium chloride SA (K-DUR,KLOR-CON M) 10 MEQ tablet TAKE 1 TABLET(10 MEQ) BY MOUTH EVERY DAY     No current facility-administered medications for this visit.     ALLERGIES:   Review of patient's allergies indicates:  No Known Allergies    Review of Systems   Constitutional: Negative for fever, malaise/fatigue and weight loss.   Respiratory: Negative for cough and shortness of breath.    Cardiovascular: Negative for chest pain and leg swelling.   Gastrointestinal: Negative for constipation, diarrhea and vomiting.   Skin: Negative for rash.   Neurological: Negative for seizures and weakness.   Psychiatric/Behavioral: The patient is not nervous/anxious.      PHYSICAL EXAM:   Vitals:    04/03/24 0929   Pulse: (P) 67   Temp: (P) 98.3 °F (36.8 °C)   TempSrc: (P) Oral   SpO2: (P) 100%   Weight: 98.5 kg (217 lb 2.5 oz)   Height: 5' 6" (1.676 m)   PainSc: 0-No pain       General - well developed, well nourished, no apparent distress  HEENT - oropharynx clear  Chest and Lung - clear to auscultation bilaterally   Cardiovascular - RRR with no MGR, normal S1 and S2  Abdomen-  soft, nontender, no palpable hepatomegaly or splenomegaly  Lymph - no palpable lymphadenopathy  Heme - no bruising, petechiae, pallor  Skin - no rashes or lesions  Psych - appropriate mood and affect      ECOG Performance Status: (foot " note - ECOG PS provided by Eastern Cooperative Oncology Group) 1 - Symptomatic but completely ambulatory    Karnofsky Performance Score:  90%- Able to Carry on Normal Activity: Minor Symptoms of Disease  DATA:   Lab Results   Component Value Date    WBC 6.14 04/03/2024    HGB 9.1 (L) 04/03/2024    HCT 28.6 (L) 04/03/2024    MCV 99 (H) 04/03/2024     04/03/2024       Gran # (ANC)   Date Value Ref Range Status   04/03/2024 3.8 1.8 - 7.7 K/uL Final     Gran %   Date Value Ref Range Status   04/03/2024 61.8 38.0 - 73.0 % Final     CMP  Sodium   Date Value Ref Range Status   04/03/2024 137 136 - 145 mmol/L Final     Potassium   Date Value Ref Range Status   04/03/2024 4.2 3.5 - 5.1 mmol/L Final     Chloride   Date Value Ref Range Status   04/03/2024 105 95 - 110 mmol/L Final     CO2   Date Value Ref Range Status   04/03/2024 25 23 - 29 mmol/L Final     Glucose   Date Value Ref Range Status   04/03/2024 165 (H) 70 - 110 mg/dL Final     BUN   Date Value Ref Range Status   04/03/2024 21 8 - 23 mg/dL Final     Creatinine   Date Value Ref Range Status   04/03/2024 1.5 (H) 0.5 - 1.4 mg/dL Final     Calcium   Date Value Ref Range Status   04/03/2024 9.8 8.7 - 10.5 mg/dL Final     Total Protein   Date Value Ref Range Status   04/03/2024 7.7 6.0 - 8.4 g/dL Final     Albumin   Date Value Ref Range Status   04/03/2024 3.4 (L) 3.5 - 5.2 g/dL Final     Total Bilirubin   Date Value Ref Range Status   04/03/2024 0.5 0.1 - 1.0 mg/dL Final     Comment:     For infants and newborns, interpretation of results should be based  on gestational age, weight and in agreement with clinical  observations.    Premature Infant recommended reference ranges:  Up to 24 hours.............<8.0 mg/dL  Up to 48 hours............<12.0 mg/dL  3-5 days..................<15.0 mg/dL  6-29 days.................<15.0 mg/dL       Alkaline Phosphatase   Date Value Ref Range Status   04/03/2024 49 (L) 55 - 135 U/L Final     AST   Date Value Ref Range Status    04/03/2024 15 10 - 40 U/L Final     ALT   Date Value Ref Range Status   04/03/2024 13 10 - 44 U/L Final     Anion Gap   Date Value Ref Range Status   04/03/2024 7 (L) 8 - 16 mmol/L Final     eGFR   Date Value Ref Range Status   04/03/2024 51.3 (A) >60 mL/min/1.73 m^2 Final     IgG   Date Value Ref Range Status   04/03/2024 2419 (H) 650 - 1600 mg/dL Final     Comment:     IgG Cord Blood Reference Range: 650-1600 mg/dL.     IgA   Date Value Ref Range Status   04/03/2024 5 (L) 40 - 350 mg/dL Final     Comment:     IgA Cord Blood Reference Range: <5 mg/dL.     IgM   Date Value Ref Range Status   04/03/2024 11 (L) 50 - 300 mg/dL Final     Comment:     IgM Cord Blood Reference Range: <25 mg/dL.     Kappa Free Light Chains   Date Value Ref Range Status   04/03/2024 102.30 (H) 0.33 - 1.94 mg/dL Final   03/06/2024 74.11 (H) 0.33 - 1.94 mg/dL Final   02/08/2024 69.66 (H) 0.33 - 1.94 mg/dL Final     Lambda Free Light Chains   Date Value Ref Range Status   04/03/2024 0.30 (L) 0.57 - 2.63 mg/dL Final   03/06/2024 0.31 (L) 0.57 - 2.63 mg/dL Final   02/08/2024 0.27 (L) 0.57 - 2.63 mg/dL Final     Kappa/Lambda FLC Ratio   Date Value Ref Range Status   04/03/2024 341.00 (H) 0.26 - 1.65 Final     Comment:     Undetected antigen excess is a rare event but cannot   be excluded. If these free light chain results do not   agree with other clinical or laboratory findings or   if the sample is from a patient that has previously   demonstrated antigen excess, discuss with the testing   laboratory.   Results should always be interpreted in conjunction   with other laboratory tests and clinical evidence.     03/06/2024 239.10 (H) 0.26 - 1.65 Final     Comment:     Undetected antigen excess is a rare event but cannot   be excluded. If these free light chain results do not   agree with other clinical or laboratory findings or   if the sample is from a patient that has previously   demonstrated antigen excess, discuss with the testing    laboratory.   Results should always be interpreted in conjunction   with other laboratory tests and clinical evidence.     02/08/2024 258.00 (H) 0.26 - 1.65 Final     Comment:     Undetected antigen excess is a rare event but cannot   be excluded. If these free light chain results do not   agree with other clinical or laboratory findings or   if the sample is from a patient that has previously   demonstrated antigen excess, discuss with the testing   laboratory.   Results should always be interpreted in conjunction   with other laboratory tests and clinical evidence.       Pathologist Interpretation SPE   Date Value Ref Range Status   03/06/2024 REVIEWED  Final     Comment:       Electronically reviewed and signed by:  Alycia Schwartz MD  Signed on 03/07/24 at 14:08  Normal total protein. Normal gamma globulins are decreased.   Paraprotein peak in gamma = 1.73 g/dL (previously 1.66 g/dL).      02/08/2024 REVIEWED  Final     Comment:       Electronically reviewed and signed by:  Tasha Mills MD  Signed on 02/09/24 at 14:19  Normal total protein. Normal gamma globulins are decreased.   Paraprotein peak in gamma = 1.66 g/dL (previously 1.55 g/dL).      01/08/2024 REVIEWED  Final     Comment:       Electronically reviewed and signed by:  Tasha Mills MD  Signed on 01/09/24 at 12:53  Normal total protein. Normal gamma globulins are decreased.   Paraprotein peak in gamma = 1.55 g/dL (previously 1.47 g/dL).        Pathologist Interpretation SADAF   Date Value Ref Range Status   03/06/2024 REVIEWED  Final     Comment:       Electronically reviewed and signed by:  Alycia Schwartz MD  Signed on 03/07/24 at 14:08  IgG kappa specific monoclonal band present.           Results for orders placed or performed during the hospital encounter of 01/05/24 (from the past 2160 hour(s))   NM PET CT FDG Whole Body (Melanoma)    Impression    In this patient with multiple myeloma, there are findings compatible with treatment response.   Interval decreased number of hypermetabolic osseous lesions compared to prior.  Residual hypermetabolic index lesions as above.    Focal tracer avidity in the periportal region and new/more prominent tracer avid focus left hepatic lobe, without abnormal CT correlate.  Remaining hepatic and splenic hypermetabolic foci noted on prior are intervally normalized.    Right knee joint effusion with low-level tracer uptake, may reflect nonspecific synovitis.  Recommend clinical correlation with follow-up imaging as indicated.    Electronically signed by resident: Robbie Quinones  Date:    01/05/2024  Time:    11:43    Electronically signed by: Humberto Ordoñez  Date:    01/05/2024  Time:    14:53     *Note: Due to a large number of results and/or encounters for the requested time period, some results have not been displayed. A complete set of results can be found in Results Review.     Assessment and Plan:    1. Multiple myeloma, remission status unspecified        2. Immunodeficiency due to drugs        3. History of auto stem cell transplant            IgG Kappa MM standard risk s/p 8 cycles Vrd followed by Yajaira Auto 5/17/2021 with disease relapse and Dkd salvage therapy 8/4/2023  - standard risk MM diagnosed sept 2020 after presenting with symptomatic lytic disease  -sp VRd induction followed by yajaira 200 autoSCT on 5/17/23 achieving/mainting NH post SCT; was on revlimid maintenance but relapsed biochemically and with new lytic disease approximately 2 years post SCT (June/July 2023)  -initiated Jame-Kd salvage on 8/4/23; tolerating will no significant AE's   -jame subq weekly 8>EOW x 8 doses> q 4 week; kyprolis 70mg/m2 day 1, 8, 15 of 28 day cycle; dex 40mg po weekly   -patient with initial good response to therapy but now with serial biochemical progression confirmed on 4/3/24 repeat serum studies;  mild anemia and jan 2024 PET with ENDY but no other overt symptoms or CRAB and he feels well  -have recommended we transition DKd  to Kyprolis/pomalyst/dex as bridge to hopeful CART pending early line approval  -pomalyst 4mg daily day 1-21 of 28 cycle; [facility] and pt shad start once he receives  -will reiniitate asa 81mg daily with ImID  -leave current kyprolis infusions as is  -reviewed progression labs/plan with pt over phone on 4/4/24 and he expressed understanding   -supportive meds: acyclovir, ca +vit d, zometa (received total of 2 years since disease progression)         Type 2 DM   - decreased dex to 20 mg   -advised patient to follow up with his PCP    Neuropathy  - Controlled with gabapentin    ID  - Completed post SCT vaccines  - Acyclovir as above while in PI     Essential hypertension  -well controled      FU:  -kyprolis infusion on 4/10, 4/17, 5/1   -cbc, cmp, serum free light chains, quantitative immunoglobulins, serum electropheresis, serum immunofixation lab and MD appt prior to treatment on 5/1; ok to book during admin time in morning

## 2024-04-03 NOTE — Clinical Note
-kyprolis infusion on 4/10, 4/17, 5/1  -cbc, cmp, serum free light chains, quantitative immunoglobulins, serum electropheresis, serum immunofixation lab and MD appt prior to treatment on 5/1; ok to book during admin time in morning

## 2024-04-04 RX ORDER — POMALIDOMIDE 4 MG/1
4 CAPSULE ORAL DAILY
Qty: 21 CAPSULE | Refills: 0 | Status: SHIPPED | OUTPATIENT
Start: 2024-04-04 | End: 2024-04-10 | Stop reason: SDUPTHER

## 2024-04-09 ENCOUNTER — TELEPHONE (OUTPATIENT)
Dept: HEMATOLOGY/ONCOLOGY | Facility: CLINIC | Age: 64
End: 2024-04-09
Payer: MEDICARE

## 2024-04-09 NOTE — TELEPHONE ENCOUNTER
----- Message from Vivien Lew sent at 4/9/2024  8:26 AM CDT -----  Regarding: Consult/Advisory:      Name Of Caller:    [facility]      Contact Preference:   [phone]      Nature of call:   Pharmacy is requesting the [employer] authorization number for the pt's pomalidomide (POMALYST).

## 2024-04-09 NOTE — TELEPHONE ENCOUNTER
Spoke with  and advised that once  is enrolled in the [facility] portal for his Pomalyst then medication will be filled and shipped to him. Pt advised that we will be working on enrolling him into the portal today. Pt verbalized agreement and understanding.

## 2024-04-09 NOTE — TELEPHONE ENCOUNTER
----- Message from Kalina Irby sent at 4/9/2024 10:09 AM CDT -----  Type: Needs Medical Advice  Who Called:  pt  Symptoms (please be specific):  pt said he need to speak to the nurse--said his mail order was canceled b/c they could not get in touch with the dr for approval on his meds--please call and advise    Pharmacy name and phone #:    [facility] - [address] [place] [address]  Phone: [phone] Fax: [phone]      Best Call Back Number: [phone] or [phone]    Additional Information: thank you

## 2024-04-10 DIAGNOSIS — C90.00 MULTIPLE MYELOMA, REMISSION STATUS UNSPECIFIED: ICD-10-CM

## 2024-04-10 RX ORDER — HEPARIN 100 UNIT/ML
500 SYRINGE INTRAVENOUS
Status: CANCELLED | OUTPATIENT
Start: 2024-04-10

## 2024-04-10 RX ORDER — POMALIDOMIDE 4 MG/1
4 CAPSULE ORAL DAILY
Qty: 21 CAPSULE | Refills: 0 | Status: SHIPPED | OUTPATIENT
Start: 2024-04-10 | End: 2024-05-08

## 2024-04-10 RX ORDER — SODIUM CHLORIDE 0.9 % (FLUSH) 0.9 %
10 SYRINGE (ML) INJECTION
Status: CANCELLED | OUTPATIENT
Start: 2024-04-10

## 2024-04-10 RX ORDER — HEPARIN 100 UNIT/ML
500 SYRINGE INTRAVENOUS
Status: CANCELLED | OUTPATIENT
Start: 2024-04-17

## 2024-04-10 RX ORDER — SODIUM CHLORIDE 0.9 % (FLUSH) 0.9 %
10 SYRINGE (ML) INJECTION
Status: CANCELLED | OUTPATIENT
Start: 2024-04-17

## 2024-04-11 ENCOUNTER — TELEPHONE (OUTPATIENT)
Dept: HEMATOLOGY/ONCOLOGY | Facility: CLINIC | Age: 64
End: 2024-04-11
Payer: MEDICARE

## 2024-04-11 NOTE — TELEPHONE ENCOUNTER
----- Message from Galo Khan RN sent at 4/11/2024 12:44 PM CDT -----  Regarding: FW: r/s infusions  Contact: Pt @ [phone]    ----- Message -----  From: Dana Ramos  Sent: 4/10/2024   8:03 AM CDT  To: [facility]  Pool  Subject: r/s infusions                                    Pt is calling to speak to someone in the office to r/s his appt that he is currently scheduled for; no available appts in Epic. Please call to advise. Thanks.

## 2024-04-12 ENCOUNTER — INFUSION (OUTPATIENT)
Dept: INFUSION THERAPY | Facility: HOSPITAL | Age: 64
End: 2024-04-12
Payer: MEDICARE

## 2024-04-12 VITALS
BODY MASS INDEX: 34.51 KG/M2 | OXYGEN SATURATION: 97 % | WEIGHT: 214.75 LBS | HEART RATE: 65 BPM | RESPIRATION RATE: 16 BRPM | DIASTOLIC BLOOD PRESSURE: 72 MMHG | SYSTOLIC BLOOD PRESSURE: 127 MMHG | TEMPERATURE: 98 F | HEIGHT: 66 IN

## 2024-04-12 DIAGNOSIS — C90.00 MULTIPLE MYELOMA, REMISSION STATUS UNSPECIFIED: Primary | ICD-10-CM

## 2024-04-12 PROCEDURE — 63600175 PHARM REV CODE 636 W HCPCS: Mod: JZ,JG | Performed by: INTERNAL MEDICINE

## 2024-04-12 PROCEDURE — 96413 CHEMO IV INFUSION 1 HR: CPT

## 2024-04-12 PROCEDURE — 25000003 PHARM REV CODE 250: Performed by: INTERNAL MEDICINE

## 2024-04-12 RX ORDER — HEPARIN 100 UNIT/ML
500 SYRINGE INTRAVENOUS
Status: DISCONTINUED | OUTPATIENT
Start: 2024-04-12 | End: 2024-04-12 | Stop reason: HOSPADM

## 2024-04-12 RX ORDER — SODIUM CHLORIDE 0.9 % (FLUSH) 0.9 %
10 SYRINGE (ML) INJECTION
Status: DISCONTINUED | OUTPATIENT
Start: 2024-04-12 | End: 2024-04-12 | Stop reason: HOSPADM

## 2024-04-12 RX ADMIN — CARFILZOMIB 150 MG: 30 INJECTION, POWDER, LYOPHILIZED, FOR SOLUTION INTRAVENOUS at 02:04

## 2024-04-12 RX ADMIN — SODIUM CHLORIDE: 9 INJECTION, SOLUTION INTRAVENOUS at 01:04

## 2024-04-12 NOTE — PLAN OF CARE
1514 Patient tolerated C9D8 Kyprolis without incident. Vitals stable before and after.  PIV flushed without resistance and positive for blood return before, during and after infusion. Patient aware of his next appointment date/time. To contact provider with questions or concerns. D/C ambulatory and stable.

## 2024-04-17 ENCOUNTER — INFUSION (OUTPATIENT)
Dept: INFUSION THERAPY | Facility: HOSPITAL | Age: 64
End: 2024-04-17
Attending: FAMILY MEDICINE
Payer: MEDICARE

## 2024-04-17 VITALS
TEMPERATURE: 98 F | HEART RATE: 72 BPM | WEIGHT: 214.94 LBS | OXYGEN SATURATION: 98 % | HEIGHT: 66 IN | DIASTOLIC BLOOD PRESSURE: 76 MMHG | RESPIRATION RATE: 16 BRPM | BODY MASS INDEX: 34.55 KG/M2 | SYSTOLIC BLOOD PRESSURE: 121 MMHG

## 2024-04-17 DIAGNOSIS — C90.00 MULTIPLE MYELOMA, REMISSION STATUS UNSPECIFIED: Primary | ICD-10-CM

## 2024-04-17 PROCEDURE — 25000003 PHARM REV CODE 250: Performed by: INTERNAL MEDICINE

## 2024-04-17 PROCEDURE — 96413 CHEMO IV INFUSION 1 HR: CPT

## 2024-04-17 PROCEDURE — 63600175 PHARM REV CODE 636 W HCPCS: Mod: JZ,JG | Performed by: INTERNAL MEDICINE

## 2024-04-17 RX ORDER — HEPARIN 100 UNIT/ML
500 SYRINGE INTRAVENOUS
Status: DISCONTINUED | OUTPATIENT
Start: 2024-04-17 | End: 2024-04-17 | Stop reason: HOSPADM

## 2024-04-17 RX ORDER — SODIUM CHLORIDE 0.9 % (FLUSH) 0.9 %
10 SYRINGE (ML) INJECTION
Status: DISCONTINUED | OUTPATIENT
Start: 2024-04-17 | End: 2024-04-17 | Stop reason: HOSPADM

## 2024-04-17 RX ADMIN — CARFILZOMIB 150 MG: 30 INJECTION, POWDER, LYOPHILIZED, FOR SOLUTION INTRAVENOUS at 11:04

## 2024-04-17 NOTE — PLAN OF CARE
11:41- pt completed Kyprolis infusion. Pt tolerated well. Ambulated off floor independently, NAD.

## 2024-04-18 ENCOUNTER — TELEPHONE (OUTPATIENT)
Dept: HEMATOLOGY/ONCOLOGY | Facility: CLINIC | Age: 64
End: 2024-04-18
Payer: MEDICARE

## 2024-04-18 NOTE — TELEPHONE ENCOUNTER
Left message and advised that pt is to start Revlimid medication as soon as he received it (per  Last note) as well as the clinic callback number.

## 2024-04-18 NOTE — TELEPHONE ENCOUNTER
----- Message from Piper Montes sent at 4/18/2024 11:39 AM CDT -----  Regarding: Consult/Advisory  Contact: Jaspreet Walsh Jr.     Consult/Advisory     Name Of Caller:Jaspreet Walsh Jr.         Contact Preference:[phone] (Mobile     Nature of call:Patient is calling to let staff know he just received medicine in mail and wants to know when should he start it. Requesting  a call back.

## 2024-05-01 ENCOUNTER — INFUSION (OUTPATIENT)
Dept: INFUSION THERAPY | Facility: HOSPITAL | Age: 64
End: 2024-05-01
Payer: MEDICARE

## 2024-05-01 ENCOUNTER — OFFICE VISIT (OUTPATIENT)
Dept: HEMATOLOGY/ONCOLOGY | Facility: CLINIC | Age: 64
End: 2024-05-01
Payer: MEDICARE

## 2024-05-01 VITALS
WEIGHT: 208.88 LBS | TEMPERATURE: 98 F | BODY MASS INDEX: 33.57 KG/M2 | HEIGHT: 66 IN | RESPIRATION RATE: 18 BRPM | OXYGEN SATURATION: 97 % | SYSTOLIC BLOOD PRESSURE: 111 MMHG | DIASTOLIC BLOOD PRESSURE: 66 MMHG | HEART RATE: 77 BPM

## 2024-05-01 VITALS
HEIGHT: 66 IN | DIASTOLIC BLOOD PRESSURE: 62 MMHG | HEART RATE: 62 BPM | OXYGEN SATURATION: 98 % | SYSTOLIC BLOOD PRESSURE: 97 MMHG | BODY MASS INDEX: 33.57 KG/M2 | RESPIRATION RATE: 18 BRPM | WEIGHT: 208.88 LBS

## 2024-05-01 DIAGNOSIS — Z94.84 HISTORY OF AUTO STEM CELL TRANSPLANT: ICD-10-CM

## 2024-05-01 DIAGNOSIS — C90.01 MULTIPLE MYELOMA IN REMISSION: Primary | ICD-10-CM

## 2024-05-01 DIAGNOSIS — C90.00 MULTIPLE MYELOMA, REMISSION STATUS UNSPECIFIED: ICD-10-CM

## 2024-05-01 DIAGNOSIS — C90.00 MULTIPLE MYELOMA, REMISSION STATUS UNSPECIFIED: Primary | ICD-10-CM

## 2024-05-01 PROCEDURE — 99999 PR PBB SHADOW E&M-EST. PATIENT-LVL III: CPT | Mod: PBBFAC,,, | Performed by: INTERNAL MEDICINE

## 2024-05-01 PROCEDURE — 1101F PT FALLS ASSESS-DOCD LE1/YR: CPT | Mod: CPTII,S$GLB,, | Performed by: INTERNAL MEDICINE

## 2024-05-01 PROCEDURE — 3288F FALL RISK ASSESSMENT DOCD: CPT | Mod: CPTII,S$GLB,, | Performed by: INTERNAL MEDICINE

## 2024-05-01 PROCEDURE — 1159F MED LIST DOCD IN RCRD: CPT | Mod: CPTII,S$GLB,, | Performed by: INTERNAL MEDICINE

## 2024-05-01 PROCEDURE — 25000003 PHARM REV CODE 250: Performed by: INTERNAL MEDICINE

## 2024-05-01 PROCEDURE — 3074F SYST BP LT 130 MM HG: CPT | Mod: CPTII,S$GLB,, | Performed by: INTERNAL MEDICINE

## 2024-05-01 PROCEDURE — 99215 OFFICE O/P EST HI 40 MIN: CPT | Mod: S$GLB,,, | Performed by: INTERNAL MEDICINE

## 2024-05-01 PROCEDURE — 3008F BODY MASS INDEX DOCD: CPT | Mod: CPTII,S$GLB,, | Performed by: INTERNAL MEDICINE

## 2024-05-01 PROCEDURE — 63600175 PHARM REV CODE 636 W HCPCS: Mod: JZ,JG | Performed by: INTERNAL MEDICINE

## 2024-05-01 PROCEDURE — 96413 CHEMO IV INFUSION 1 HR: CPT

## 2024-05-01 PROCEDURE — 96361 HYDRATE IV INFUSION ADD-ON: CPT

## 2024-05-01 PROCEDURE — 3078F DIAST BP <80 MM HG: CPT | Mod: CPTII,S$GLB,, | Performed by: INTERNAL MEDICINE

## 2024-05-01 PROCEDURE — 4010F ACE/ARB THERAPY RXD/TAKEN: CPT | Mod: CPTII,S$GLB,, | Performed by: INTERNAL MEDICINE

## 2024-05-01 RX ORDER — DIPHENHYDRAMINE HYDROCHLORIDE 50 MG/ML
50 INJECTION INTRAMUSCULAR; INTRAVENOUS ONCE AS NEEDED
Status: CANCELLED | OUTPATIENT
Start: 2024-05-01

## 2024-05-01 RX ORDER — SODIUM CHLORIDE 0.9 % (FLUSH) 0.9 %
10 SYRINGE (ML) INJECTION
Status: DISCONTINUED | OUTPATIENT
Start: 2024-05-01 | End: 2024-05-01 | Stop reason: HOSPADM

## 2024-05-01 RX ORDER — SODIUM CHLORIDE 0.9 % (FLUSH) 0.9 %
10 SYRINGE (ML) INJECTION
Status: CANCELLED | OUTPATIENT
Start: 2024-05-01

## 2024-05-01 RX ORDER — SODIUM CHLORIDE 0.9 % (FLUSH) 0.9 %
10 SYRINGE (ML) INJECTION
Status: CANCELLED | OUTPATIENT
Start: 2024-05-08

## 2024-05-01 RX ORDER — HEPARIN 100 UNIT/ML
500 SYRINGE INTRAVENOUS
Status: CANCELLED | OUTPATIENT
Start: 2024-05-01

## 2024-05-01 RX ORDER — SODIUM CHLORIDE 9 MG/ML
INJECTION, SOLUTION INTRAVENOUS CONTINUOUS
Status: CANCELLED
Start: 2024-05-01

## 2024-05-01 RX ORDER — EPINEPHRINE 0.3 MG/.3ML
0.3 INJECTION SUBCUTANEOUS ONCE AS NEEDED
Status: DISCONTINUED | OUTPATIENT
Start: 2024-05-01 | End: 2024-05-01 | Stop reason: HOSPADM

## 2024-05-01 RX ORDER — SODIUM CHLORIDE 0.9 % (FLUSH) 0.9 %
10 SYRINGE (ML) INJECTION
Status: CANCELLED | OUTPATIENT
Start: 2024-05-15

## 2024-05-01 RX ORDER — DIPHENHYDRAMINE HYDROCHLORIDE 50 MG/ML
50 INJECTION INTRAMUSCULAR; INTRAVENOUS ONCE AS NEEDED
Status: DISCONTINUED | OUTPATIENT
Start: 2024-05-01 | End: 2024-05-01 | Stop reason: HOSPADM

## 2024-05-01 RX ORDER — HEPARIN 100 UNIT/ML
500 SYRINGE INTRAVENOUS
Status: CANCELLED | OUTPATIENT
Start: 2024-05-08

## 2024-05-01 RX ORDER — HEPARIN 100 UNIT/ML
500 SYRINGE INTRAVENOUS
Status: DISCONTINUED | OUTPATIENT
Start: 2024-05-01 | End: 2024-05-01 | Stop reason: HOSPADM

## 2024-05-01 RX ORDER — SODIUM CHLORIDE 9 MG/ML
INJECTION, SOLUTION INTRAVENOUS CONTINUOUS
Status: ACTIVE | OUTPATIENT
Start: 2024-05-01 | End: 2024-05-01

## 2024-05-01 RX ORDER — HEPARIN 100 UNIT/ML
500 SYRINGE INTRAVENOUS
Status: CANCELLED | OUTPATIENT
Start: 2024-05-15

## 2024-05-01 RX ORDER — EPINEPHRINE 0.3 MG/.3ML
0.3 INJECTION SUBCUTANEOUS ONCE AS NEEDED
Status: CANCELLED | OUTPATIENT
Start: 2024-05-01

## 2024-05-01 RX ADMIN — SODIUM CHLORIDE: 9 INJECTION, SOLUTION INTRAVENOUS at 09:05

## 2024-05-01 RX ADMIN — CARFILZOMIB 150 MG: 30 INJECTION, POWDER, LYOPHILIZED, FOR SOLUTION INTRAVENOUS at 11:05

## 2024-05-01 NOTE — Clinical Note
-kyprolis infusion on   5/8, 5/15, 5/31 -cbc, cmp prior to treatment on 5/15 -cbc, cmp, serum free light chains, quantitative immunoglobulins, serum electropheresis, serum immunofixation lab and MD appt prior to treatment on 5/31; [facility]

## 2024-05-01 NOTE — PLAN OF CARE
Patient seated in chair, VSS, assessment done.  PIV inserted, flushed, blood return noted, started NS @ 50 cc/hr KVO while waiting for Kyprolis from pharmacy.  Started NS 1 liter bolus as ordered by MD.  CHELLY for safety

## 2024-05-01 NOTE — PROGRESS NOTES
SECTION OF HEMATOLOGY AND BONE MARROW TRANSPLANT  Return Patient Visit   05/06/2024    CHIEF COMPLAINT:   Multiple Myeloma    HISTORY OF PRESENT ILLNESS: Patient of /  64 y.o.male; pmh of IgG kappa multiple myeloma (standard risk CG per msmart criteria, r-iss stage II); diagnosed September 2019 after presenting with symptomatic perispinal plasmacytoma;He has subsequently received  8 cycles of VRd therapy. Was in midst or pre transplant evaluation but due to insurance coverage issues transplant was delayed so was maintained on therapy and those issues have been resolved.    Transplant Course  Patient with IgG Kappa MM and pmh HTN, lytic bone lesion, arthritis and vitamin D deficiency. Admitted 5/15/21 for planned Alea auto SCT for MM. He received 3 bags and a CD34 dose of 3.35 x 10^6 on 5/17/21. Tolerated chemo and transplant well. Admission complicated by n/v controlled with scheduled anti-emetics and c-diff neg diarrhea controlled with prn imodium. He engrafted on Day +13 (5/30/21) with an ANC of 2607. He was discharged home on Day +14 (5/31/21).     Day +100 restaging marrow indicated that he was in DE.     Interval History -05/06/2024     64 y.o.  M with IgG Kappa MM standard risk s/p 8 cycles Vrd followed by Alea Auto 5/17/2021 with disease relapse and Dkd salvage therapy 8/4/2023.     Due to biochemical progression on DKd we transition to KPd. He is on cycle 2 of this. Tolerating well.   SFLC downtrending.       CURRENT MEDICATIONS:   Current Outpatient Medications   Medication Sig Dispense Refill    acyclovir (ZOVIRAX) 400 MG tablet Take 1 tablet (400 mg total) by mouth 2 (two) times daily. 60 tablet 11    amLODIPine (NORVASC) 10 MG tablet Take 1 tablet (10 mg total) by mouth once daily. 90 tablet 1    aspirin (ECOTRIN) 81 MG EC tablet       atorvastatin (LIPITOR) 20 MG tablet       cloNIDine (CATAPRES) 0.3 MG tablet Take 0.3 mg by mouth once daily.      dexAMETHasone (DECADRON) 4 MG Tab Take  "10 tablets (40 mg total) by mouth every 7 days. Take with food before chemotherapy appointments 40 tablet 11    gabapentin (NEURONTIN) 300 MG capsule Take 1 capsule (300 mg total) by mouth 2 (two) times daily. 60 capsule 3    hydroCHLOROthiazide (HYDRODIURIL) 25 MG tablet Take 1 tablet (25 mg total) by mouth once daily. 90 tablet 1    JARDIANCE 10 mg tablet Take 10 mg by mouth.      metFORMIN (GLUCOPHAGE) 1000 MG tablet Take 0.5 tablets (500 mg total) by mouth 2 (two) times daily with meals. 90 tablet 3    pomalidomide (POMALYST) 4 mg Cap Take 1 capsule (4 mg total) by mouth once daily Day 1-21 of 28 cycle. Auth [id number] 4/10/24. 21 capsule 0    potassium chloride SA (K-DUR,KLOR-CON M) 10 MEQ tablet TAKE 1 TABLET(10 MEQ) BY MOUTH EVERY DAY 90 tablet 1    calcium-vitamin D3 (OYSTER SHELL CALCIUM-VIT D3) 500 mg-5 mcg (200 unit) per tablet Take 1 tablet by mouth once daily. 30 tablet 0     No current facility-administered medications for this visit.     ALLERGIES:   Review of patient's allergies indicates:  No Known Allergies    Review of Systems   Constitutional: Negative for fever, malaise/fatigue and weight loss.   Respiratory: Negative for cough and shortness of breath.    Cardiovascular: Negative for chest pain and leg swelling.   Gastrointestinal: Negative for constipation, diarrhea and vomiting.   Skin: Negative for rash.   Neurological: Negative for seizures and weakness.   Psychiatric/Behavioral: The patient is not nervous/anxious.      PHYSICAL EXAM:   Vitals:    05/01/24 0852   BP: 111/66   Pulse: 77   Resp: 18   Temp: 98.1 °F (36.7 °C)   TempSrc: Oral   SpO2: 97%   Weight: 94.7 kg (208 lb 14.2 oz)   Height: 5' 6" (1.676 m)   PainSc: 0-No pain       General - well developed, well nourished, no apparent distress  HEENT - oropharynx clear  Chest and Lung - clear to auscultation bilaterally   Cardiovascular - RRR with no MGR, normal S1 and S2  Abdomen-  soft, nontender, no palpable hepatomegaly or " splenomegaly  Lymph - no palpable lymphadenopathy  Heme - no bruising, petechiae, pallor  Skin - no rashes or lesions  Psych - appropriate mood and affect      ECOG Performance Status: (foot note - ECOG PS provided by [employer] Cooperative [employer]cology Group) 1 - Symptomatic but completely ambulatory    Karnofsky Performance Score:  90%- Able to Carry on Normal Activity: Minor Symptoms of Disease  DATA:   Lab Results   Component Value Date    WBC 2.72 (L) 05/01/2024    HGB 9.5 (L) 05/01/2024    HCT 30.3 (L) 05/01/2024    MCV 97 05/01/2024     05/01/2024       Gran # (ANC)   Date Value Ref Range Status   05/01/2024 0.9 (L) 1.8 - 7.7 K/uL Final     Gran %   Date Value Ref Range Status   05/01/2024 33.2 (L) 38.0 - 73.0 % Final     CMP  Sodium   Date Value Ref Range Status   05/01/2024 137 136 - 145 mmol/L Final     Potassium   Date Value Ref Range Status   05/01/2024 3.8 3.5 - 5.1 mmol/L Final     Chloride   Date Value Ref Range Status   05/01/2024 103 95 - 110 mmol/L Final     CO2   Date Value Ref Range Status   05/01/2024 24 23 - 29 mmol/L Final     Glucose   Date Value Ref Range Status   05/01/2024 193 (H) 70 - 110 mg/dL Final     BUN   Date Value Ref Range Status   05/01/2024 27 (H) 8 - 23 mg/dL Final     Creatinine   Date Value Ref Range Status   05/01/2024 1.9 (H) 0.5 - 1.4 mg/dL Final     Calcium   Date Value Ref Range Status   05/01/2024 11.9 (H) 8.7 - 10.5 mg/dL Final     Total Protein   Date Value Ref Range Status   05/01/2024 8.2 6.0 - 8.4 g/dL Final     Albumin   Date Value Ref Range Status   05/01/2024 3.1 (L) 3.5 - 5.2 g/dL Final     Total Bilirubin   Date Value Ref Range Status   05/01/2024 0.6 0.1 - 1.0 mg/dL Final     Comment:     For infants and newborns, interpretation of results should be based  on gestational age, weight and in agreement with clinical  observations.    Premature Infant recommended reference ranges:  Up to 24 hours.............<8.0 mg/dL  Up to 48 hours............<12.0 mg/dL  3-5  days..................<15.0 mg/dL  6-29 days.................<15.0 mg/dL       Alkaline Phosphatase   Date Value Ref Range Status   05/01/2024 57 55 - 135 U/L Final     AST   Date Value Ref Range Status   05/01/2024 20 10 - 40 U/L Final     ALT   Date Value Ref Range Status   05/01/2024 33 10 - 44 U/L Final     Anion Gap   Date Value Ref Range Status   05/01/2024 10 8 - 16 mmol/L Final     eGFR   Date Value Ref Range Status   05/01/2024 38.7 (A) >60 mL/min/1.73 m^2 Final     IgG   Date Value Ref Range Status   05/01/2024 1987 (H) 650 - 1600 mg/dL Final     Comment:     IgG Cord Blood Reference Range: 650-1600 mg/dL.     IgA   Date Value Ref Range Status   05/01/2024 15 (L) 40 - 350 mg/dL Final     Comment:     IgA Cord Blood Reference Range: <5 mg/dL.     IgM   Date Value Ref Range Status   05/01/2024 23 (L) 50 - 300 mg/dL Final     Comment:     IgM Cord Blood Reference Range: <25 mg/dL.     Kappa Free Light Chains   Date Value Ref Range Status   05/01/2024 74.29 (H) 0.33 - 1.94 mg/dL Final   04/03/2024 102.30 (H) 0.33 - 1.94 mg/dL Final   03/06/2024 74.11 (H) 0.33 - 1.94 mg/dL Final     Lambda Free Light Chains   Date Value Ref Range Status   05/01/2024 2.02 0.57 - 2.63 mg/dL Final   04/03/2024 0.30 (L) 0.57 - 2.63 mg/dL Final   03/06/2024 0.31 (L) 0.57 - 2.63 mg/dL Final     Kappa/Lambda FLC Ratio   Date Value Ref Range Status   05/01/2024 36.78 (H) 0.26 - 1.65 Final     Comment:     Undetected antigen excess is a rare event but cannot   be excluded. If these free light chain results do not   agree with other clinical or laboratory findings or   if the sample is from a patient that has previously   demonstrated antigen excess, discuss with the testing   laboratory.   Results should always be interpreted in conjunction   with other laboratory tests and clinical evidence.     04/03/2024 341.00 (H) 0.26 - 1.65 Final     Comment:     Undetected antigen excess is a rare event but cannot   be excluded. If these free  light chain results do not   agree with other clinical or laboratory findings or   if the sample is from a patient that has previously   demonstrated antigen excess, discuss with the testing   laboratory.   Results should always be interpreted in conjunction   with other laboratory tests and clinical evidence.     03/06/2024 239.10 (H) 0.26 - 1.65 Final     Comment:     Undetected antigen excess is a rare event but cannot   be excluded. If these free light chain results do not   agree with other clinical or laboratory findings or   if the sample is from a patient that has previously   demonstrated antigen excess, discuss with the testing   laboratory.   Results should always be interpreted in conjunction   with other laboratory tests and clinical evidence.       Pathologist Interpretation SPE   Date Value Ref Range Status   05/01/2024 REVIEWED  Final     Comment:       Electronically reviewed and signed by:  Alycia Schwartz MD  Signed on 05/02/24 at 13:46  Normal total protein. Normal gamma globulins are decreased.   Paraprotein peak in gamma = 1.4 g/dL (previously, 1.84 g/dL).      04/03/2024 REVIEWED  Final     Comment:       Electronically reviewed and signed by:  Angelita Begum D.O.  Signed on 04/07/24 at 23:40  Normal total protein. Normal gamma globulins are decreased.  Paraprotein peak in gamma = 1.84 g/dL (previously, 1.73 g/dL).     03/06/2024 REVIEWED  Final     Comment:       Electronically reviewed and signed by:  Alycia Schwartz MD  Signed on 03/07/24 at 14:08  Normal total protein. Normal gamma globulins are decreased.   Paraprotein peak in gamma = 1.73 g/dL (previously 1.66 g/dL).        Pathologist Interpretation SADAF   Date Value Ref Range Status   05/01/2024 REVIEWED  Final     Comment:       Electronically reviewed and signed by:  Alycia Schwartz MD  Signed on 05/02/24 at 13:46  IgG kappa specific monoclonal protein band in gamma identified.           No results found. However, due to the size of the patient record,  not all encounters were searched. Please check Results Review for a complete set of results.    Assessment and Plan:    1. Multiple myeloma, remission status unspecified        2. History of auto stem cell transplant              IgG Kappa MM standard risk s/p 8 cycles Vrd followed by Yajaira Auto 5/17/2021 with disease relapse and Dkd salvage therapy 8/4/2023  - standard risk MM diagnosed sept 2020 after presenting with symptomatic lytic disease  -sp VRd induction followed by yajaira 200 autoSCT on 5/17/23 achieving/mainting NM post SCT; was on revlimid maintenance but relapsed biochemically and with new lytic disease approximately 2 years post SCT (June/July 2023)  -initiated Melinda-Kd salvage on 8/4/23; tolerating will no significant AE's   -melinda subq weekly 8>EOW x 8 doses> q 4 week; kyprolis 70mg/m2 day 1, 8, 15 of 28 day cycle; dex 40mg po weekly   -patient with initial good response to therapy but now with serial biochemical progression confirmed on 4/3/24 repeat serum studies;  mild anemia and jan 2024 PET with ENDY but no other overt symptoms or CRAB and he feels well  -have recommended we transition DKd to Kyprolis/pomalyst/dex as likely bridge to CART (cilta-zohra)  -pomalyst 4mg daily day 1-21 of 28 cycle ; KPD initiated April 2024  -tolerating well with mild cytopenias and now downtrending SFLC so responding well  -will present to multi d group for cilta zohra approval and timing at meeting on 5/8/24  -will reiniitate asa 81mg daily with ImID  -leave current kyprolis infusions as is  -supportive meds: acyclovir, ca +vit d, zometa (received total of 2 years since disease progression)         Type 2 DM   - decreased dex to 20 mg   -advised patient to follow up with his PCP    Neuropathy  - Controlled with gabapentin    ID  - Completed post SCT vaccines  - Acyclovir as above while in PI     Essential hypertension  -well controled       Hyper Ca  -myeloma improving so dont believe cause  -recommended he stop Ca+vit d  supplement and will give IVF with kyprolis today and recheck Ca in 1 week       FU:     -kyprolis infusion on   5/8, 5/15, 5/31  -cbc, cmp prior to treatment on 5/15  -cbc, cmp, serum free light chains, quantitative immunoglobulins, serum electropheresis, serum immunofixation lab and MD appt prior to treatment on 5/31; dont [facility] labs/md appt/treatment due to travelling

## 2024-05-01 NOTE — PLAN OF CARE
1211 Patient completed 1 liter NS as well as Kyprolis infusion, tolerated well.  PIV discontinued without issue.  Patient ambulated off floor accompanied by wife.

## 2024-05-08 ENCOUNTER — INFUSION (OUTPATIENT)
Dept: INFUSION THERAPY | Facility: HOSPITAL | Age: 64
End: 2024-05-08
Payer: MEDICARE

## 2024-05-08 VITALS
HEIGHT: 66 IN | OXYGEN SATURATION: 99 % | TEMPERATURE: 99 F | WEIGHT: 208.75 LBS | SYSTOLIC BLOOD PRESSURE: 124 MMHG | DIASTOLIC BLOOD PRESSURE: 70 MMHG | RESPIRATION RATE: 18 BRPM | BODY MASS INDEX: 33.55 KG/M2 | HEART RATE: 84 BPM

## 2024-05-08 DIAGNOSIS — C90.00 MULTIPLE MYELOMA, REMISSION STATUS UNSPECIFIED: Primary | ICD-10-CM

## 2024-05-08 PROCEDURE — 96413 CHEMO IV INFUSION 1 HR: CPT

## 2024-05-08 PROCEDURE — 25000003 PHARM REV CODE 250: Performed by: INTERNAL MEDICINE

## 2024-05-08 PROCEDURE — 63600175 PHARM REV CODE 636 W HCPCS: Mod: JZ,JG | Performed by: INTERNAL MEDICINE

## 2024-05-08 RX ORDER — SODIUM CHLORIDE 0.9 % (FLUSH) 0.9 %
10 SYRINGE (ML) INJECTION
Status: DISCONTINUED | OUTPATIENT
Start: 2024-05-08 | End: 2024-05-08 | Stop reason: HOSPADM

## 2024-05-08 RX ORDER — HEPARIN 100 UNIT/ML
500 SYRINGE INTRAVENOUS
Status: DISCONTINUED | OUTPATIENT
Start: 2024-05-08 | End: 2024-05-08 | Stop reason: HOSPADM

## 2024-05-08 RX ADMIN — SODIUM CHLORIDE: 9 INJECTION, SOLUTION INTRAVENOUS at 10:05

## 2024-05-08 RX ADMIN — CARFILZOMIB 150 MG: 30 INJECTION, POWDER, LYOPHILIZED, FOR SOLUTION INTRAVENOUS at 10:05

## 2024-05-08 NOTE — PLAN OF CARE
1122   Patient completed Kyprolis infusion and subsequent flush, tolerated well.  PIV discontinued without issue.  Patient given appt calendar & ambulated off floor accompanied by wife.

## 2024-05-08 NOTE — PLAN OF CARE
Patient seated in chair accompanied by wife.  VSS, assessment done.  PIV inserted, flushed, blood return noted, started NS @ 25 cc/hr KVO while waiting for Kyprolis from pharmacy.  WCTM for safety

## 2024-05-15 ENCOUNTER — INFUSION (OUTPATIENT)
Dept: INFUSION THERAPY | Facility: HOSPITAL | Age: 64
End: 2024-05-15
Payer: MEDICARE

## 2024-05-15 VITALS
SYSTOLIC BLOOD PRESSURE: 153 MMHG | TEMPERATURE: 98 F | DIASTOLIC BLOOD PRESSURE: 91 MMHG | BODY MASS INDEX: 32.53 KG/M2 | WEIGHT: 202.38 LBS | HEIGHT: 66 IN | HEART RATE: 107 BPM | RESPIRATION RATE: 18 BRPM

## 2024-05-15 DIAGNOSIS — C90.00 MULTIPLE MYELOMA, REMISSION STATUS UNSPECIFIED: Primary | ICD-10-CM

## 2024-05-15 PROCEDURE — A4216 STERILE WATER/SALINE, 10 ML: HCPCS | Performed by: INTERNAL MEDICINE

## 2024-05-15 PROCEDURE — 96413 CHEMO IV INFUSION 1 HR: CPT

## 2024-05-15 PROCEDURE — 25000003 PHARM REV CODE 250: Performed by: INTERNAL MEDICINE

## 2024-05-15 PROCEDURE — 63600175 PHARM REV CODE 636 W HCPCS: Mod: JZ,JG | Performed by: INTERNAL MEDICINE

## 2024-05-15 RX ORDER — SODIUM CHLORIDE 0.9 % (FLUSH) 0.9 %
10 SYRINGE (ML) INJECTION
Status: DISCONTINUED | OUTPATIENT
Start: 2024-05-15 | End: 2024-05-15 | Stop reason: HOSPADM

## 2024-05-15 RX ORDER — HEPARIN 100 UNIT/ML
500 SYRINGE INTRAVENOUS
Status: DISCONTINUED | OUTPATIENT
Start: 2024-05-15 | End: 2024-05-15 | Stop reason: HOSPADM

## 2024-05-15 RX ADMIN — CARFILZOMIB 150 MG: 30 INJECTION, POWDER, LYOPHILIZED, FOR SOLUTION INTRAVENOUS at 11:05

## 2024-05-15 RX ADMIN — Medication 10 ML: at 12:05

## 2024-05-15 RX ADMIN — SODIUM CHLORIDE: 9 INJECTION, SOLUTION INTRAVENOUS at 10:05

## 2024-05-15 NOTE — PLAN OF CARE
1208-Pt tolerated Kyprolis well today, no complaints or complications. VSS. Pt aware to call provider with any questions or concerns and is aware of upcoming appts. Pt ambulatory from clinic with steady gait, no distress noted.

## 2024-05-16 ENCOUNTER — TELEPHONE (OUTPATIENT)
Dept: HEMATOLOGY/ONCOLOGY | Facility: CLINIC | Age: 64
End: 2024-05-16
Payer: MEDICARE

## 2024-05-26 ENCOUNTER — HOSPITAL ENCOUNTER (EMERGENCY)
Facility: HOSPITAL | Age: 64
Discharge: ANOTHER HOSPITAL | End: 2024-05-26
Attending: FAMILY MEDICINE
Payer: MEDICARE

## 2024-05-26 ENCOUNTER — HOSPITAL ENCOUNTER (INPATIENT)
Facility: HOSPITAL | Age: 64
LOS: 3 days | Discharge: HOME | DRG: 872 | End: 2024-05-29
Attending: INTERNAL MEDICINE | Admitting: INTERNAL MEDICINE
Payer: MEDICARE

## 2024-05-26 VITALS
SYSTOLIC BLOOD PRESSURE: 124 MMHG | OXYGEN SATURATION: 100 % | DIASTOLIC BLOOD PRESSURE: 76 MMHG | HEIGHT: 67 IN | HEART RATE: 89 BPM | TEMPERATURE: 98 F | WEIGHT: 195.56 LBS | RESPIRATION RATE: 15 BRPM | BODY MASS INDEX: 30.69 KG/M2

## 2024-05-26 DIAGNOSIS — A41.9 SEPSIS: ICD-10-CM

## 2024-05-26 DIAGNOSIS — R78.81 SALMONELLA BACTEREMIA: Primary | ICD-10-CM

## 2024-05-26 DIAGNOSIS — E11.65 TYPE 2 DIABETES MELLITUS WITH HYPERGLYCEMIA, WITHOUT LONG-TERM CURRENT USE OF INSULIN: ICD-10-CM

## 2024-05-26 DIAGNOSIS — C90.02 MULTIPLE MYELOMA IN RELAPSE: ICD-10-CM

## 2024-05-26 DIAGNOSIS — R07.9 CHEST PAIN: ICD-10-CM

## 2024-05-26 DIAGNOSIS — N30.00 ACUTE CYSTITIS WITHOUT HEMATURIA: ICD-10-CM

## 2024-05-26 DIAGNOSIS — Z79.899 IMMUNODEFICIENCY SECONDARY TO CHEMOTHERAPY: ICD-10-CM

## 2024-05-26 DIAGNOSIS — R00.0 TACHYCARDIA: ICD-10-CM

## 2024-05-26 DIAGNOSIS — A41.9 SEPSIS, DUE TO UNSPECIFIED ORGANISM, UNSPECIFIED WHETHER ACUTE ORGAN DYSFUNCTION PRESENT: Primary | ICD-10-CM

## 2024-05-26 DIAGNOSIS — D84.821 IMMUNODEFICIENCY SECONDARY TO CHEMOTHERAPY: ICD-10-CM

## 2024-05-26 DIAGNOSIS — T45.1X5A IMMUNODEFICIENCY SECONDARY TO CHEMOTHERAPY: ICD-10-CM

## 2024-05-26 PROBLEM — G62.9 NEUROPATHY: Status: ACTIVE | Noted: 2024-05-26

## 2024-05-26 PROBLEM — M48.061 LUMBAR STENOSIS: Status: RESOLVED | Noted: 2020-09-14 | Resolved: 2024-05-26

## 2024-05-26 PROBLEM — D64.9 ANEMIA: Status: ACTIVE | Noted: 2024-05-26

## 2024-05-26 PROBLEM — N17.9 AKI (ACUTE KIDNEY INJURY): Status: ACTIVE | Noted: 2024-05-26

## 2024-05-26 LAB
ALBUMIN SERPL BCP-MCNC: 3.7 G/DL (ref 3.5–5.2)
ALP SERPL-CCNC: 83 U/L (ref 38–126)
ALT SERPL W/O P-5'-P-CCNC: 20 U/L (ref 10–44)
ANION GAP SERPL CALC-SCNC: 16 MMOL/L (ref 8–16)
APTT PPP: 23.2 SEC (ref 21–32)
AST SERPL-CCNC: 17 U/L (ref 15–46)
BACTERIA #/AREA URNS AUTO: ABNORMAL /HPF
BASOPHILS # BLD AUTO: 0.02 K/UL (ref 0–0.2)
BASOPHILS NFR BLD: 0.7 % (ref 0–1.9)
BILIRUB SERPL-MCNC: 1.4 MG/DL (ref 0.1–1)
BILIRUB UR QL STRIP: NEGATIVE
CALCIUM SERPL-MCNC: 9.3 MG/DL (ref 8.7–10.5)
CHLORIDE SERPL-SCNC: 105 MMOL/L (ref 95–110)
CLARITY UR REFRACT.AUTO: ABNORMAL
CO2 SERPL-SCNC: 17 MMOL/L (ref 23–29)
COLOR UR AUTO: YELLOW
CREAT SERPL-MCNC: 2.25 MG/DL (ref 0.5–1.4)
DIFFERENTIAL METHOD BLD: ABNORMAL
EOSINOPHIL # BLD AUTO: 0 K/UL (ref 0–0.5)
EOSINOPHIL NFR BLD: 1.3 % (ref 0–8)
ERYTHROCYTE [DISTWIDTH] IN BLOOD BY AUTOMATED COUNT: 18 % (ref 11.5–14.5)
EST. GFR  (NO RACE VARIABLE): 31.6 ML/MIN/1.73 M^2
GIANT PLATELETS BLD QL SMEAR: PRESENT
GLUCOSE SERPL-MCNC: 405 MG/DL (ref 70–110)
GLUCOSE UR QL STRIP: ABNORMAL
HCT VFR BLD AUTO: 30.6 % (ref 40–54)
HGB BLD-MCNC: 9.6 G/DL (ref 14–18)
HGB UR QL STRIP: ABNORMAL
IMM GRANULOCYTES # BLD AUTO: 0.05 K/UL (ref 0–0.04)
IMM GRANULOCYTES NFR BLD AUTO: 1.7 % (ref 0–0.5)
INFLUENZA A, MOLECULAR: NEGATIVE
INFLUENZA B, MOLECULAR: NEGATIVE
INR PPP: 1.2 (ref 0.8–1.2)
KETONES UR QL STRIP: ABNORMAL
LACTATE SERPL-SCNC: 1.8 MMOL/L (ref 0.5–2.2)
LACTATE SERPL-SCNC: 2.4 MMOL/L (ref 0.5–2.2)
LEUKOCYTE ESTERASE UR QL STRIP: ABNORMAL
LYMPHOCYTES # BLD AUTO: 0.8 K/UL (ref 1–4.8)
LYMPHOCYTES NFR BLD: 26.8 % (ref 18–48)
MAGNESIUM SERPL-MCNC: 2.5 MG/DL (ref 1.6–2.6)
MCH RBC QN AUTO: 30 PG (ref 27–31)
MCHC RBC AUTO-ENTMCNC: 31.4 G/DL (ref 32–36)
MCV RBC AUTO: 96 FL (ref 82–98)
MICROSCOPIC COMMENT: ABNORMAL
MONOCYTES # BLD AUTO: 0.1 K/UL (ref 0.3–1)
MONOCYTES NFR BLD: 3.7 % (ref 4–15)
NEUTROPHILS # BLD AUTO: 2 K/UL (ref 1.8–7.7)
NEUTROPHILS NFR BLD: 65.8 % (ref 38–73)
NITRITE UR QL STRIP: NEGATIVE
NRBC BLD-RTO: 0 /100 WBC
PH UR STRIP: 6 [PH] (ref 5–8)
PHOSPHATE SERPL-MCNC: 5 MG/DL (ref 2.7–4.5)
PLATELET # BLD AUTO: 285 K/UL (ref 150–450)
PLATELET BLD QL SMEAR: ABNORMAL
PMV BLD AUTO: 11.1 FL (ref 9.2–12.9)
POCT GLUCOSE: 303 MG/DL (ref 70–110)
POCT GLUCOSE: 360 MG/DL (ref 70–110)
POCT GLUCOSE: 468 MG/DL (ref 70–110)
POTASSIUM SERPL-SCNC: 4.4 MMOL/L (ref 3.5–5.1)
PROCALCITONIN SERPL IA-MCNC: 1.3 NG/ML
PROT SERPL-MCNC: 7.1 G/DL (ref 6–8.4)
PROT UR QL STRIP: ABNORMAL
PROTHROMBIN TIME: 13.1 SEC (ref 9–12.5)
RBC # BLD AUTO: 3.2 M/UL (ref 4.6–6.2)
RBC #/AREA URNS AUTO: 5 /HPF (ref 0–4)
SARS-COV-2 RDRP RESP QL NAA+PROBE: NEGATIVE
SODIUM SERPL-SCNC: 138 MMOL/L (ref 136–145)
SP GR UR STRIP: 1.01 (ref 1–1.03)
SPECIMEN SOURCE: NORMAL
URN SPEC COLLECT METH UR: ABNORMAL
UROBILINOGEN UR STRIP-ACNC: NEGATIVE EU/DL
UUN UR-MCNC: 44 MG/DL (ref 2–20)
WBC # BLD AUTO: 2.98 K/UL (ref 3.9–12.7)
WBC #/AREA URNS AUTO: >100 /HPF (ref 0–5)
WBC CLUMPS UR QL AUTO: ABNORMAL
YEAST UR QL AUTO: ABNORMAL

## 2024-05-26 PROCEDURE — 83605 ASSAY OF LACTIC ACID: CPT | Mod: ER | Performed by: FAMILY MEDICINE

## 2024-05-26 PROCEDURE — 84100 ASSAY OF PHOSPHORUS: CPT | Mod: ER | Performed by: FAMILY MEDICINE

## 2024-05-26 PROCEDURE — 96365 THER/PROPH/DIAG IV INF INIT: CPT | Mod: ER

## 2024-05-26 PROCEDURE — 25000003 PHARM REV CODE 250: Performed by: INTERNAL MEDICINE

## 2024-05-26 PROCEDURE — 83735 ASSAY OF MAGNESIUM: CPT | Mod: ER | Performed by: FAMILY MEDICINE

## 2024-05-26 PROCEDURE — 87502 INFLUENZA DNA AMP PROBE: CPT | Mod: ER | Performed by: FAMILY MEDICINE

## 2024-05-26 PROCEDURE — 87186 SC STD MICRODIL/AGAR DIL: CPT | Mod: ER | Performed by: FAMILY MEDICINE

## 2024-05-26 PROCEDURE — 96375 TX/PRO/DX INJ NEW DRUG ADDON: CPT | Mod: ER

## 2024-05-26 PROCEDURE — U0002 COVID-19 LAB TEST NON-CDC: HCPCS | Mod: ER | Performed by: FAMILY MEDICINE

## 2024-05-26 PROCEDURE — 82962 GLUCOSE BLOOD TEST: CPT | Mod: ER

## 2024-05-26 PROCEDURE — 99285 EMERGENCY DEPT VISIT HI MDM: CPT | Mod: 25,ER

## 2024-05-26 PROCEDURE — 87154 CUL TYP ID BLD PTHGN 6+ TRGT: CPT | Mod: 59,ER | Performed by: FAMILY MEDICINE

## 2024-05-26 PROCEDURE — 87088 URINE BACTERIA CULTURE: CPT | Mod: ER | Performed by: FAMILY MEDICINE

## 2024-05-26 PROCEDURE — 84145 PROCALCITONIN (PCT): CPT | Performed by: FAMILY MEDICINE

## 2024-05-26 PROCEDURE — 87077 CULTURE AEROBIC IDENTIFY: CPT | Mod: 59,ER | Performed by: FAMILY MEDICINE

## 2024-05-26 PROCEDURE — 87086 URINE CULTURE/COLONY COUNT: CPT | Mod: ER | Performed by: FAMILY MEDICINE

## 2024-05-26 PROCEDURE — 81000 URINALYSIS NONAUTO W/SCOPE: CPT | Mod: ER | Performed by: FAMILY MEDICINE

## 2024-05-26 PROCEDURE — 25000003 PHARM REV CODE 250: Performed by: STUDENT IN AN ORGANIZED HEALTH CARE EDUCATION/TRAINING PROGRAM

## 2024-05-26 PROCEDURE — 93010 ELECTROCARDIOGRAM REPORT: CPT | Mod: ,,, | Performed by: INTERNAL MEDICINE

## 2024-05-26 PROCEDURE — 25000003 PHARM REV CODE 250: Mod: ER | Performed by: FAMILY MEDICINE

## 2024-05-26 PROCEDURE — 20600001 HC STEP DOWN PRIVATE ROOM

## 2024-05-26 PROCEDURE — 85730 THROMBOPLASTIN TIME PARTIAL: CPT | Mod: ER | Performed by: FAMILY MEDICINE

## 2024-05-26 PROCEDURE — 87040 BLOOD CULTURE FOR BACTERIA: CPT | Mod: ER | Performed by: FAMILY MEDICINE

## 2024-05-26 PROCEDURE — 85025 COMPLETE CBC W/AUTO DIFF WBC: CPT | Mod: ER | Performed by: FAMILY MEDICINE

## 2024-05-26 PROCEDURE — 85610 PROTHROMBIN TIME: CPT | Mod: ER | Performed by: FAMILY MEDICINE

## 2024-05-26 PROCEDURE — 63600175 PHARM REV CODE 636 W HCPCS: Performed by: STUDENT IN AN ORGANIZED HEALTH CARE EDUCATION/TRAINING PROGRAM

## 2024-05-26 PROCEDURE — 99223 1ST HOSP IP/OBS HIGH 75: CPT | Mod: AI,,, | Performed by: INTERNAL MEDICINE

## 2024-05-26 PROCEDURE — 80053 COMPREHEN METABOLIC PANEL: CPT | Mod: ER | Performed by: FAMILY MEDICINE

## 2024-05-26 PROCEDURE — 63600175 PHARM REV CODE 636 W HCPCS: Mod: ER | Performed by: FAMILY MEDICINE

## 2024-05-26 PROCEDURE — 96367 TX/PROPH/DG ADDL SEQ IV INF: CPT | Mod: ER

## 2024-05-26 PROCEDURE — 93005 ELECTROCARDIOGRAM TRACING: CPT | Mod: ER

## 2024-05-26 RX ORDER — INSULIN GLARGINE 100 [IU]/ML
10 INJECTION, SOLUTION SUBCUTANEOUS NIGHTLY
Status: DISCONTINUED | OUTPATIENT
Start: 2024-05-26 | End: 2024-05-28

## 2024-05-26 RX ORDER — NALOXONE HCL 0.4 MG/ML
0.02 VIAL (ML) INJECTION
Status: DISCONTINUED | OUTPATIENT
Start: 2024-05-26 | End: 2024-05-29 | Stop reason: HOSPADM

## 2024-05-26 RX ORDER — INSULIN ASPART 100 [IU]/ML
5 INJECTION, SOLUTION INTRAVENOUS; SUBCUTANEOUS ONCE
Status: COMPLETED | OUTPATIENT
Start: 2024-05-26 | End: 2024-05-26

## 2024-05-26 RX ORDER — ASPIRIN 81 MG/1
81 TABLET ORAL DAILY
Status: DISCONTINUED | OUTPATIENT
Start: 2024-05-27 | End: 2024-05-29 | Stop reason: HOSPADM

## 2024-05-26 RX ORDER — INSULIN ASPART 100 [IU]/ML
0-5 INJECTION, SOLUTION INTRAVENOUS; SUBCUTANEOUS
Status: DISCONTINUED | OUTPATIENT
Start: 2024-05-26 | End: 2024-05-28

## 2024-05-26 RX ORDER — ACYCLOVIR 200 MG/1
400 CAPSULE ORAL 2 TIMES DAILY
Status: DISCONTINUED | OUTPATIENT
Start: 2024-05-26 | End: 2024-05-29 | Stop reason: HOSPADM

## 2024-05-26 RX ORDER — GLUCAGON 1 MG
1 KIT INJECTION
Status: DISCONTINUED | OUTPATIENT
Start: 2024-05-26 | End: 2024-05-29 | Stop reason: HOSPADM

## 2024-05-26 RX ORDER — IBUPROFEN 200 MG
24 TABLET ORAL
Status: DISCONTINUED | OUTPATIENT
Start: 2024-05-26 | End: 2024-05-29 | Stop reason: HOSPADM

## 2024-05-26 RX ORDER — AZITHROMYCIN 250 MG/1
TABLET, FILM COATED ORAL
COMMUNITY
Start: 2024-04-30 | End: 2024-05-26

## 2024-05-26 RX ORDER — GABAPENTIN 300 MG/1
300 CAPSULE ORAL 2 TIMES DAILY
Status: DISCONTINUED | OUTPATIENT
Start: 2024-05-26 | End: 2024-05-29 | Stop reason: HOSPADM

## 2024-05-26 RX ORDER — ONDANSETRON HYDROCHLORIDE 2 MG/ML
4 INJECTION, SOLUTION INTRAVENOUS EVERY 8 HOURS PRN
Status: DISCONTINUED | OUTPATIENT
Start: 2024-05-26 | End: 2024-05-29 | Stop reason: HOSPADM

## 2024-05-26 RX ORDER — SODIUM CHLORIDE 9 MG/ML
1000 INJECTION, SOLUTION INTRAVENOUS
Status: COMPLETED | OUTPATIENT
Start: 2024-05-26 | End: 2024-05-26

## 2024-05-26 RX ORDER — IBUPROFEN 200 MG
16 TABLET ORAL
Status: DISCONTINUED | OUTPATIENT
Start: 2024-05-26 | End: 2024-05-29 | Stop reason: HOSPADM

## 2024-05-26 RX ORDER — ACETAMINOPHEN 325 MG/1
650 TABLET ORAL
Status: COMPLETED | OUTPATIENT
Start: 2024-05-26 | End: 2024-05-26

## 2024-05-26 RX ORDER — HEPARIN SODIUM 5000 [USP'U]/ML
5000 INJECTION, SOLUTION INTRAVENOUS; SUBCUTANEOUS EVERY 8 HOURS
Status: DISCONTINUED | OUTPATIENT
Start: 2024-05-27 | End: 2024-05-29 | Stop reason: HOSPADM

## 2024-05-26 RX ORDER — TALC
6 POWDER (GRAM) TOPICAL NIGHTLY PRN
Status: DISCONTINUED | OUTPATIENT
Start: 2024-05-26 | End: 2024-05-29 | Stop reason: HOSPADM

## 2024-05-26 RX ORDER — MUPIROCIN 20 MG/G
OINTMENT TOPICAL 2 TIMES DAILY
Status: DISCONTINUED | OUTPATIENT
Start: 2024-05-26 | End: 2024-05-29 | Stop reason: HOSPADM

## 2024-05-26 RX ORDER — SODIUM CHLORIDE 0.9 % (FLUSH) 0.9 %
10 SYRINGE (ML) INJECTION EVERY 12 HOURS PRN
Status: DISCONTINUED | OUTPATIENT
Start: 2024-05-26 | End: 2024-05-29 | Stop reason: HOSPADM

## 2024-05-26 RX ADMIN — VANCOMYCIN HYDROCHLORIDE 1750 MG: 1 INJECTION, POWDER, LYOPHILIZED, FOR SOLUTION INTRAVENOUS at 08:05

## 2024-05-26 RX ADMIN — GABAPENTIN 300 MG: 300 CAPSULE ORAL at 08:05

## 2024-05-26 RX ADMIN — CEFEPIME 2 G: 2 INJECTION, POWDER, FOR SOLUTION INTRAVENOUS at 08:05

## 2024-05-26 RX ADMIN — HUMAN INSULIN 6 UNITS: 100 INJECTION, SOLUTION SUBCUTANEOUS at 10:05

## 2024-05-26 RX ADMIN — MUPIROCIN: 20 OINTMENT TOPICAL at 08:05

## 2024-05-26 RX ADMIN — SODIUM CHLORIDE, POTASSIUM CHLORIDE, SODIUM LACTATE AND CALCIUM CHLORIDE 2661 ML: 600; 310; 30; 20 INJECTION, SOLUTION INTRAVENOUS at 07:05

## 2024-05-26 RX ADMIN — INSULIN ASPART 5 UNITS: 100 INJECTION, SOLUTION INTRAVENOUS; SUBCUTANEOUS at 08:05

## 2024-05-26 RX ADMIN — INSULIN ASPART 2 UNITS: 100 INJECTION, SOLUTION INTRAVENOUS; SUBCUTANEOUS at 11:05

## 2024-05-26 RX ADMIN — PIPERACILLIN AND TAZOBACTAM 4.5 G: 4; .5 INJECTION, POWDER, LYOPHILIZED, FOR SOLUTION INTRAVENOUS; PARENTERAL at 08:05

## 2024-05-26 RX ADMIN — ACYCLOVIR 400 MG: 200 CAPSULE ORAL at 08:05

## 2024-05-26 RX ADMIN — SODIUM CHLORIDE 1000 ML: 9 INJECTION, SOLUTION INTRAVENOUS at 11:05

## 2024-05-26 RX ADMIN — INSULIN GLARGINE 10 UNITS: 100 INJECTION, SOLUTION SUBCUTANEOUS at 08:05

## 2024-05-26 RX ADMIN — ACETAMINOPHEN 650 MG: 325 TABLET ORAL at 08:05

## 2024-05-26 NOTE — ASSESSMENT & PLAN NOTE
Cr 2.2 on presentation, baseline has been between 1.5-2 since March 2024   Less likely from MM given response to treatment on recent myeloma labs   Likely pre-renal in setting of infection and he is on anti-htn meds at home     - Check kidney function daily   - If no improvement with fluids will order more extensive workup   - Treat UTI

## 2024-05-26 NOTE — ED NOTES
Updated patient and S/O on bed status. Awaiting room number and plan of care; patient and S/O verbalized understanding.

## 2024-05-26 NOTE — HPI
Mr. Jsapreet Walsh Jr. is a 64 y.o. male with multiple myeloma (patient of Dr. Baker) who underwent stem cell transplant in May 2021, now with relapse on KPD (last received Carfilzomibon on 5/15) who was transferred for management of sepsis. He presented to the outside ED with complains of weakness for last 2 weeks since starting chemotherapy. He denies cough, congestion, shortness of breath, or dysuria. In the ED he was febrile to 101. ANC was 2K, Hg 9.6, platelets 285. Coags were normal. CMP significant for a Cr of 2.2 and Tbili 1.8. Lactate was normal. U/A consistent with UTI (>100 WBC's). Covid and flu negative. CXR with no acute findings.

## 2024-05-26 NOTE — ASSESSMENT & PLAN NOTE
Patient with MM currently KPD (last received Carfilzomib on 5/15) present to an OSH ED with weakness for two weeks. Was febrile in the ED and U/A was concerning for UTI (>100 WBC's). CXR with no acute findings. Vitals have been stable. Lactate normal. ANC 2K. Covid and flu negative. He was given IVF and started on vanc/zosyn. [place] [facility] [place]     - Cefepime for likely urinary source of infection   - If patient continues to be febrile or becomes unstable will add vanc   - Will follow-up cultures   - CBC daily   - Can give additional IVF if needed (TTE in October 2023 with normal EF)

## 2024-05-26 NOTE — SUBJECTIVE & OBJECTIVE
Patient information was obtained from patient, past medical records, and ER records.     Oncology History:   IgG Kappa MM standard risk s/p 8 cycles Vrd followed by Yajaira Auto 5/17/2021 with disease relapse and Dkd salvage therapy 8/4/2023  - standard risk MM diagnosed sept 2020 after presenting with symptomatic lytic disease  -sp VRd induction followed by yajaira 200 autoSCT on 5/17/23 achieving/mainting TX post SCT; was on revlimid maintenance but relapsed biochemically and with new lytic disease approximately 2 years post SCT (June/July 2023)  -initiated Melinda-Kd salvage on 8/4/23; tolerating will no significant AE's   -melinda subq weekly 8>EOW x 8 doses> q 4 week; kyprolis 70mg/m2 day 1, 8, 15 of 28 day cycle; dex 40mg po weekly   -patient with initial good response to therapy but now with serial biochemical progression confirmed on 4/3/24 repeat serum studies;  mild anemia and jan 2024 PET with ENDY but no other overt symptoms or CRAB and he feels well  -have recommended we transition DKd to Kyprolis/pomalyst/dex as likely bridge to CART (cilta-zohra)  -pomalyst 4mg daily day 1-21 of 28 cycle ; KPD initiated April 2024  -tolerating well with mild cytopenias and now downtrending SFLC so responding well  -will present to multi d group for cilta zohra approval and timing at meeting on 5/8/24  -will reiniitate asa 81mg daily with ImID  -leave current kyprolis infusions as is  -supportive meds: acyclovir, ca +vit d, zometa (received total of 2 years since disease progression)     Medications Prior to Admission   Medication Sig Dispense Refill Last Dose    acyclovir (ZOVIRAX) 400 MG tablet Take 1 tablet (400 mg total) by mouth 2 (two) times daily. 60 tablet 11     amLODIPine (NORVASC) 10 MG tablet Take 1 tablet (10 mg total) by mouth once daily. 90 tablet 1     aspirin (ECOTRIN) 81 MG EC tablet Take 81 mg by mouth once daily.       atorvastatin (LIPITOR) 20 MG tablet Take 20 mg by mouth once daily.       cloNIDine (CATAPRES) 0.3  MG tablet Take 0.3 mg by mouth once daily.       dexAMETHasone (DECADRON) 4 MG Tab Take 10 tablets (40 mg total) by mouth every 7 days. Take with food before chemotherapy appointments 40 tablet 11     empagliflozin (JARDIANCE) 10 mg tablet Take 10 mg by mouth once daily.       gabapentin (NEURONTIN) 300 MG capsule Take 1 capsule (300 mg total) by mouth 2 (two) times daily. 60 capsule 3     hydroCHLOROthiazide (HYDRODIURIL) 25 MG tablet Take 1 tablet (25 mg total) by mouth once daily. 90 tablet 1     metFORMIN (GLUCOPHAGE) 1000 MG tablet Take 0.5 tablets (500 mg total) by mouth 2 (two) times daily with meals. 90 tablet 3     POMALYST 4 mg Cap TAKE 1 CAPSULE BY MOUTH ONCE  DAILY FOR 21 DAYS ON THEN 7 DAYS OFF 21 capsule 0     potassium chloride SA (K-DUR,KLOR-CON M) 10 MEQ tablet TAKE 1 TABLET(10 MEQ) BY MOUTH EVERY DAY 90 tablet 1        Patient has no known allergies.     Past Medical History:   Diagnosis Date    Anxiety     Arthritis     Cancer     Hypertension      Past Surgical History:   Procedure Laterality Date    BACK SURGERY  01/01/2021    BONE MARROW BIOPSY Left 10/20/2021    Procedure: Biopsy-bone marrow;  Surgeon: Summer Cartwright MD;  [facility]  Service: Oncology;  Laterality: Left;    COLONOSCOPY N/A 01/25/2021    Procedure: COLONOSCOPY;  Surgeon: Braxton Lees MD;  [facility]  Service: Endoscopy;  Laterality: N/A;  1/22-covid [facility] mailed-tb  1/20/21-pt to remain on schedule with Dr. Lees, and confirmed updated arrival time of 0835-BB    COLONOSCOPY N/A 02/01/2021    Procedure: COLONOSCOPY;  Surgeon: Jo Ann Robledo MD;  Location: [facility] (4TH FLR);  Service: Endoscopy;  Laterality: N/A;  rescheduled due to poor bowel prep, to be rescheduled with first available provider-BB  covid-1/29/21-pcw-BB    CREATION OF MUSCLE ROTATIONAL FLAP N/A 09/23/2020    Procedure: CREATION, FLAP, MUSCLE ROTATION;  Surgeon: Kamlesh Bellamy MD;  Location: [address]  FLR;  Service: Plastics;  Laterality: N/A;    KNEE SURGERY Left 2019    LUMBAR FUSION N/A 2020    Procedure: FUSION, SPINE, LUMBAR L2-pelvis. Depuy. Plastic surgery closure w/ Dr. Bellamy;  Surgeon: Nick Coyle MD;  [facility];  Service: Neurosurgery;  Laterality: N/A;    Metastatic neoplasum removed from spine       Family History       Problem Relation (Age of Onset)    Cancer Father          Tobacco Use    Smoking status: Former     Current packs/day: 0.00     Average packs/day: 0.3 packs/day for 40.0 years (10.0 ttl pk-yrs)     Types: Cigarettes     Start date:      Quit date:      Years since quittin.4    Smokeless tobacco: Never   Substance and Sexual Activity    Alcohol use: Not on file    Drug use: Not on file    Sexual activity: Not on file       Review of Systems   Constitutional:  Positive for fatigue. Negative for chills and fever.   HENT:  Negative for congestion and sore throat.    Eyes:  Negative for pain.   Respiratory:  Negative for cough and shortness of breath.    Cardiovascular:  Negative for chest pain, palpitations and leg swelling.   Gastrointestinal:  Negative for abdominal pain, constipation, diarrhea, nausea and vomiting.   Genitourinary:  Positive for dysuria. Negative for difficulty urinating and hematuria.   Musculoskeletal:  Negative for back pain.   Skin:  Negative for rash.   Neurological:  Negative for light-headedness and headaches.   Hematological:  Does not bruise/bleed easily.   Psychiatric/Behavioral:  Negative for agitation.      Objective:     Vital Signs (Most Recent):    Vital Signs (24h Range):  Temp:  [98.4 °F (36.9 °C)-101 °F (38.3 °C)] 98.4 °F (36.9 °C)  Pulse:  [] 89  Resp:  [15-23] 15  SpO2:  [97 %-100 %] 100 %  BP: (109-130)/(66-80) 124/76        There is no height or weight on file to calculate BMI.  There is no height or weight on file to calculate BSA.    ECOG SCORE           [unfilled]    Lines/Drains/Airways       Drain   Duration                  Closed/Suction Drain 09/23/20 1758 Back Bulb 15 Fr. 1341 days         Closed/Suction Drain 09/23/20 1758 Back Bulb 15 Fr. 1341 days                     Physical Exam  Constitutional:       Appearance: Normal appearance.   HENT:      Head: Normocephalic and atraumatic.      Mouth/Throat:      Mouth: Mucous membranes are moist.      Pharynx: Oropharynx is clear.   Eyes:      Extraocular Movements: Extraocular movements intact.      Pupils: Pupils are equal, round, and reactive to light.   Cardiovascular:      Rate and Rhythm: Normal rate and regular rhythm.      Pulses: Normal pulses.      Heart sounds: Normal heart sounds.   Pulmonary:      Effort: Pulmonary effort is normal.      Breath sounds: Normal breath sounds.   Abdominal:      General: Abdomen is flat.      Palpations: Abdomen is soft.      Tenderness: There is no abdominal tenderness.   Musculoskeletal:         General: Normal range of motion.      Cervical back: Normal range of motion and neck supple.      Right lower leg: No edema.      Left lower leg: No edema.   Skin:     General: Skin is warm and dry.   Neurological:      General: No focal deficit present.      Mental Status: He is alert and oriented to person, place, and time.   Psychiatric:         Mood and Affect: Mood normal.         Behavior: Behavior normal.            Significant Labs:   All pertinent labs from the last 24 hours have been reviewed.    Diagnostic Results:  I have reviewed all pertinent imaging results/findings within the past 24 hours.

## 2024-05-26 NOTE — ASSESSMENT & PLAN NOTE
Patient of Dr. Baker with IgG kappa multiple myeloma s/p auto SCT in May 2021. Patient relapsed in July 2023. He was treated with Melinda-Kd salvage therapy, was then on Dkd and had progression, so was recently started on KPD (April 2024). Last received Carfilzomib on 5/15. MM labs earlier this month with response to treatment.     - Follow-up with Dr. Baker on 5/31   - Continue ASA while on ImiD's

## 2024-05-26 NOTE — ASSESSMENT & PLAN NOTE
Patient with MM currently KPD (last received Carfilzomib on 5/15) present to an OSH ED with weakness for two weeks. Was febrile in the ED and U/A was concerning for UTI (>100 WBC's). CXR with no acute findings. Vitals have been stable. Lactate normal. ANC 2K. Covid and flu negative. He was given IVF and started on vanc/zosyn. Patient was then transferred [place] [facility] [place].     - Cefepime for likely urinary source of infection   - If patient continues to be febrile or becomes unstable will add vanc   - Will follow-up cultures   - CBC daily   - Can give additional IVF if needed (TTE in October 2023 with normal EF)

## 2024-05-26 NOTE — ASSESSMENT & PLAN NOTE
Home meds: Metformin 500mg BID and Jardiance   Last HbA1c 9.2 on October 2023     - Diabetic diet   - LDSSI   - Repeat HbA1c

## 2024-05-26 NOTE — ED NOTES
Per Nando with [facility] patient has bed at [facility] [address] report to be called [phone].

## 2024-05-26 NOTE — PHARMACY MED REC
"Admission Medication History     The home medication history was taken by Caridad Vaca CPhT.    Medication history obtained from, Patient Verified    You may go to "Admission" then "Reconcile Home Medications" tabs to review and/or act upon these items.     The home medication list has been updated by the Pharmacy department.   Please read ALL comments highlighted in yellow.   Please address this information as you see fit.    Feel free to contact us if you have any questions or require assistance.      The medications listed below were removed from the home medication list.  Please reorder if appropriate:  Patient reports no longer taking the following medication(s):  Azithromycin 250 mg  Calcium-Vitamin D3 500 mg-200 unit      Caridad Vaca CPhT.  Ext [phone]               .          "

## 2024-05-26 NOTE — ED NOTES
Patient and S/O updated with room number at [facility] at this time.

## 2024-05-26 NOTE — ED PROVIDER NOTES
Encounter Date: 5/26/2024       History     Chief Complaint   Patient presents with    Weakness     Pt C/O generalized weakness X 2 weeks. Pt is currently receiving chemotherapy infusions.      64-year-old male with history of multiple myeloma who underwent stem cell transplant 2 years ago, now with relapse. on active chemotherapy.  complains of weakness for last 2 weeks since starting chemotherapy.  He denies cough congestion or shortness of breath.  No dysuria.  On arrival to ED his temperature is 101°.  Pulse 118.    The history is provided by the patient.     Review of patient's allergies indicates:  No Known Allergies  Past Medical History:   Diagnosis Date    Anxiety     Arthritis     Cancer     Hypertension      Past Surgical History:   Procedure Laterality Date    BACK SURGERY  01/01/2021    BONE MARROW BIOPSY Left 10/20/2021    Procedure: Biopsy-bone marrow;  Surgeon: Summer Cartwright MD;  Location: [facility]  Service: Oncology;  Laterality: Left;    COLONOSCOPY N/A 01/25/2021    Procedure: COLONOSCOPY;  Surgeon: Braxton Lees MD;  Location: [facility]  Service: Endoscopy;  Laterality: N/A;  1/22-covid kristopher-inst mailed-tb  1/20/21-pt to remain on schedule with Dr. Lees, and confirmed updated arrival time of 0835-BB    COLONOSCOPY N/A 02/01/2021    Procedure: COLONOSCOPY;  Surgeon: Jo Ann Robledo MD;  Location: [facility]  Service: Endoscopy;  Laterality: N/A;  rescheduled due to poor bowel prep, to be rescheduled with first available provider-BB  covid-1/29/21-pcw-BB    CREATION OF MUSCLE ROTATIONAL FLAP N/A 09/23/2020    Procedure: CREATION, FLAP, MUSCLE ROTATION;  Surgeon: Kamlesh Bellamy MD;  Location: [facility] OR [address] [place] [address];  Service: Plastics;  Laterality: N/A;    KNEE SURGERY Left 06/2019    LUMBAR FUSION N/A 09/23/2020    Procedure: FUSION, SPINE, LUMBAR L2-pelvis. Depuy. Plastic surgery closure w/ Dr. Bellamy;  Surgeon: Nick Coyle MD;  Location: 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   Neurological:  Positive for weakness.   All other systems reviewed and are negative.      Physical Exam     Initial Vitals [24 0709]   BP Pulse Resp Temp SpO2   115/67 (!) 118 19 99.9 °F (37.7 °C) 97 %      MAP       --         Physical Exam    Nursing note and vitals reviewed.  Constitutional: Vital signs are normal. He appears well-developed and well-nourished. He is active. No distress.   HENT:   Head: Normocephalic.   Nose: Nose normal.   Mouth/Throat: Oropharynx is clear and moist and mucous membranes are normal.   Eyes: Conjunctivae, EOM and lids are normal.   Neck: Neck supple.   Normal range of motion.  Cardiovascular:  Normal rate, regular rhythm, S1 normal, S2 normal and normal heart sounds.           Pulmonary/Chest: Breath sounds normal. No respiratory distress. He has no wheezes. He has no rhonchi. He has no rales.   Abdominal: Abdomen is soft. Bowel sounds are normal. He exhibits no distension. There is no abdominal tenderness. There is no rebound and no guarding.   Musculoskeletal:         General: Normal range of motion.      Right upper arm: Normal.      Left upper arm: Normal.      Cervical back: Normal range of motion and neck supple.      Right lower leg: Normal.      Left lower leg: Normal.     Neurological: He is alert and oriented to person, place, and time. He has normal strength. GCS score is 15. GCS eye subscore is 4. GCS verbal subscore is 5. GCS motor subscore is 6.    Skin: Skin is warm. Capillary refill takes less than 2 seconds.   Psychiatric: He has a normal mood and affect. His speech is normal and behavior is normal. Thought content normal. Cognition and memory are normal.         ED Course   Critical Care    Date/Time: 5/26/2024 9:35 AM    Performed by: El Boucher MD  Authorized by: El Boucher MD  Direct patient critical care time: 10 minutes  Additional history critical care time: 10 minutes  Ordering / reviewing critical care time: 10 minutes  Documentation critical care time: 10 minutes  Consulting other physicians critical care time: 10 minutes  Consult with family critical care time: 10 minutes  Other critical care time: 10 minutes  Total critical care time (exclusive of procedural time) : 70 minutes  Critical care was necessary to treat or prevent imminent or life-threatening deterioration of the following conditions: sepsis.  Critical care was time spent personally by me on the following activities: review of old charts, re-evaluation of patient's condition, pulse oximetry, ordering and review of radiographic studies, ordering and review of laboratory studies, ordering and performing treatments and interventions, obtaining history from patient or surrogate, examination of patient, evaluation of patient's response to treatment and discussions with consultants.        Labs Reviewed   CBC W/ AUTO DIFFERENTIAL - Abnormal; Notable for the following components:       Result Value    WBC 2.98 (*)     RBC 3.20 (*)     Hemoglobin 9.6 (*)     Hematocrit 30.6 (*)     MCHC 31.4 (*)     RDW 18.0 (*)     Immature Granulocytes 1.7 (*)     Immature Grans (Abs) 0.05 (*)     Lymph # 0.8 (*)     Mono # 0.1 (*)     Mono % 3.7 (*)     All other components within normal limits   COMPREHENSIVE METABOLIC PANEL - Abnormal; Notable for the following components:    CO2 17 (*)     Glucose 405 (*)     BUN 44 (*)     Creatinine 2.25 (*)     Total Bilirubin 1.4 (*)     eGFR  31.6 (*)     All other components within normal limits   LACTIC ACID, PLASMA - Abnormal; Notable for the following components:    Lactate (Lactic Acid) 2.4 (*)     All other components within normal limits   URINALYSIS, REFLEX TO URINE CULTURE - Abnormal; Notable for the following components:    Appearance, UA Cloudy (*)     Protein, UA Trace (*)     Glucose, UA 3+ (*)     Ketones, UA Trace (*)     Occult Blood UA 2+ (*)     Leukocytes, UA Trace (*)     All other components within normal limits    Narrative:     Preferred Collection Type->Urine, Clean Catch  Specimen Source->Urine   PHOSPHORUS - Abnormal; Notable for the following components:    Phosphorus 5.0 (*)     All other components within normal limits   PROTIME-INR - Abnormal; Notable for the following components:    Prothrombin Time 13.1 (*)     All other components within normal limits   URINALYSIS MICROSCOPIC - Abnormal; Notable for the following components:    RBC, UA 5 (*)     WBC, UA >100 (*)     WBC Clumps, UA Many (*)     Bacteria Few (*)     All other components within normal limits    Narrative:     Preferred Collection Type->Urine, Clean Catch  Specimen Source->Urine   POCT GLUCOSE - Abnormal; Notable for the following components:    POCT Glucose 468 (*)     All other components within normal limits   POCT GLUCOSE - Abnormal; Notable for the following components:    POCT Glucose 303 (*)     All other components within normal limits   INFLUENZA A & B BY MOLECULAR   CULTURE, BLOOD   CULTURE, URINE   CULTURE, BLOOD   CULTURE, URINE   MAGNESIUM   APTT   SARS-COV-2 RNA AMPLIFICATION, QUAL   LACTIC ACID, PLASMA   PROCALCITONIN   POCT GLUCOSE MONITORING CONTINUOUS          Imaging Results              X-Ray Chest AP Portable (Final result)  Result time 05/26/24 07:40:26      Final result by Brigido Rodriges MD (05/26/24 07:40:26)                   Impression:      No acute abnormality.      Electronically signed by: Brigido  Zahira  Date:    05/26/2024  Time:    07:40               Narrative:    EXAMINATION:  XR CHEST AP PORTABLE    CLINICAL HISTORY:  Sepsis;    TECHNIQUE:  Single frontal view of the chest was performed.    COMPARISON:  Multiple priors.    FINDINGS:  The lungs are clear, with normal appearance of pulmonary vasculature and no pleural effusion or pneumothorax.    The cardiac silhouette is normal in size. The hilar and mediastinal contours are unremarkable.    Bones are intact.                                       Medications   lactated ringers bolus 2,661 mL (0 mLs Intravenous Stopped 5/26/24 1105)   piperacillin-tazobactam (ZOSYN) 4.5 g in dextrose 5 % in water (D5W) 100 mL IVPB (MB+) (0 g Intravenous Stopped 5/26/24 0843)   vancomycin (VANCOCIN) 1,750 mg in dextrose 5 % (D5W) 500 mL IVPB (0 mg Intravenous Stopped 5/26/24 1105)   acetaminophen tablet 650 mg (650 mg Oral Given 5/26/24 0825)   insulin regular injection 6 Units 0.06 mL (6 Units Intravenous Given 5/26/24 1055)   0.9%  NaCl infusion (1,000 mLs Intravenous New Bag 5/26/24 1104)     Medical Decision Making  Active chemotherapy with fever.  Unknown source.  Septic workup initiated.  Differential diagnosis include not limited to= unknown origin of fever, sepsis, neutropenia, immunocompromised, pneumonia, UTI, URI, COVID, influenza,    Septic workup with IV fluids and antibiotics Zosyn and vancomycin.  Tylenol for fever.  Labs reviewed with WBC 2.9, hemoglobin 9.6 lactic acid 2.4, influenza negative, COVID negative, glucose 405, BUN 44, creatinine 2.5, total bili 1.4.  Anion gap 16.  Chest x-ray no acute findings,  Pending urine analysis.  On review of urinalysis consistent with UTI.  Could be the reason for his sepsis.  WBC more than 100, clumps and bacteria.      Amount and/or Complexity of Data Reviewed  External Data Reviewed: notes.     Details: Multiple myeloma with relapse.  On current chemotherapy  Labs: ordered. Decision-making details documented in ED  Course.  Radiology: ordered and independent interpretation performed. Decision-making details documented in ED Course.     Details: No acute findings  Discussion of management or test interpretation with external provider(s): Discuss with Dr. Cartwright regarding patient MM recurrence on active chemotherapy associated with 101 fever with no source identified activated septic workup and treatment.  Accepted patient transfer to [facility]    Risk  OTC drugs.  Prescription drug management.  Decision regarding hospitalization.  Risk Details: This patient does have evidence of infective focus  My overall impression is sepsis.  Source: Urinary Tract  Antibiotics given- Antibiotics (72h ago, onward)    None      Latest lactate reviewed-  Lab             05/26/24                       1149          LACTATE      1.8           Organ dysfunction indicated by Acute kidney injury    Fluid challenge Actual Body weight- Patient will receive 30ml/kg actual body weight to calculate fluid bolus for treatment of septic shock.     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

## 2024-05-26 NOTE — ASSESSMENT & PLAN NOTE
Patient of Dr. Bkaer with IgG kappa multiple myeloma s/p auto SCT in May 2021. Patient relapsed in July 2023. He was treated with Melinda-Kd salvage therapy, was then on Dkd and had progression, so was recently started on KPD (April 2024). Last received Carfilzomib on 5/15. MM labs earlier this month with response to treatment.     - Follow-up with Dr. Baker on 5/31   - Continue ASA while on ImiD's

## 2024-05-27 LAB
ACINETOBACTER CALCOACETICUS/BAUMANNII COMPLEX: NOT DETECTED
ALBUMIN SERPL BCP-MCNC: 2.2 G/DL (ref 3.5–5.2)
ALP SERPL-CCNC: 56 U/L (ref 55–135)
ALT SERPL W/O P-5'-P-CCNC: 16 U/L (ref 10–44)
ANION GAP SERPL CALC-SCNC: 8 MMOL/L (ref 8–16)
ANISOCYTOSIS BLD QL SMEAR: SLIGHT
AST SERPL-CCNC: 17 U/L (ref 10–40)
BACTEROIDES FRAGILIS: NOT DETECTED
BASOPHILS # BLD AUTO: 0.02 K/UL (ref 0–0.2)
BASOPHILS NFR BLD: 1 % (ref 0–1.9)
BILIRUB SERPL-MCNC: 0.6 MG/DL (ref 0.1–1)
BUN SERPL-MCNC: 27 MG/DL (ref 8–23)
CALCIUM SERPL-MCNC: 8.9 MG/DL (ref 8.7–10.5)
CANDIDA ALBICANS: NOT DETECTED
CANDIDA AURIS: NOT DETECTED
CANDIDA GLABRATA: NOT DETECTED
CANDIDA KRUSEI: NOT DETECTED
CANDIDA PARAPSILOSIS: NOT DETECTED
CANDIDA TROPICALIS: NOT DETECTED
CHLORIDE SERPL-SCNC: 110 MMOL/L (ref 95–110)
CO2 SERPL-SCNC: 20 MMOL/L (ref 23–29)
CREAT SERPL-MCNC: 1.6 MG/DL (ref 0.5–1.4)
CRYPTOCOCCUS NEOFORMANS/GATTII: NOT DETECTED
CTX-M GENE (ESBL PRODUCER): NOT DETECTED
DIFFERENTIAL METHOD BLD: ABNORMAL
DOHLE BOD BLD QL SMEAR: PRESENT
ENTEROBACTER CLOACAE COMPLEX: NOT DETECTED
ENTEROBACTERALES: ABNORMAL
ENTEROCOCCUS FAECALIS: NOT DETECTED
ENTEROCOCCUS FAECIUM: NOT DETECTED
EOSINOPHIL # BLD AUTO: 0.2 K/UL (ref 0–0.5)
EOSINOPHIL NFR BLD: 7.7 % (ref 0–8)
ERYTHROCYTE [DISTWIDTH] IN BLOOD BY AUTOMATED COUNT: 18.3 % (ref 11.5–14.5)
ESCHERICHIA COLI: NOT DETECTED
EST. GFR  (NO RACE VARIABLE): 47.5 ML/MIN/1.73 M^2
GLUCOSE SERPL-MCNC: 265 MG/DL (ref 70–110)
HAEMOPHILUS INFLUENZAE: NOT DETECTED
HCT VFR BLD AUTO: 23.6 % (ref 40–54)
HGB BLD-MCNC: 7.4 G/DL (ref 14–18)
HYPOCHROMIA BLD QL SMEAR: ABNORMAL
IMM GRANULOCYTES # BLD AUTO: 0.02 K/UL (ref 0–0.04)
IMM GRANULOCYTES NFR BLD AUTO: 1 % (ref 0–0.5)
IMP GENE (CARBAPENEM RESISTANT): NOT DETECTED
KLEBSIELLA AEROGENES: NOT DETECTED
KLEBSIELLA OXYTOCA: NOT DETECTED
KLEBSIELLA PNEUMONIAE GROUP: NOT DETECTED
KPC RESISTANCE GENE (CARBAPENEM): NOT DETECTED
LISTERIA MONOCYTOGENES: NOT DETECTED
LYMPHOCYTES # BLD AUTO: 0.8 K/UL (ref 1–4.8)
LYMPHOCYTES NFR BLD: 35.9 % (ref 18–48)
MAGNESIUM SERPL-MCNC: 2 MG/DL (ref 1.6–2.6)
MCH RBC QN AUTO: 30 PG (ref 27–31)
MCHC RBC AUTO-ENTMCNC: 31.4 G/DL (ref 32–36)
MCR-1: NOT DETECTED
MCV RBC AUTO: 96 FL (ref 82–98)
MEC A/C AND MREJ (MRSA): ABNORMAL
MEC A/C: ABNORMAL
MONOCYTES # BLD AUTO: 0.1 K/UL (ref 0.3–1)
MONOCYTES NFR BLD: 5.3 % (ref 4–15)
NDM GENE (CARBAPENEM RESISTANT): NOT DETECTED
NEISSERIA MENINGITIDIS: NOT DETECTED
NEUTROPHILS # BLD AUTO: 1 K/UL (ref 1.8–7.7)
NEUTROPHILS NFR BLD: 49.1 % (ref 38–73)
NRBC BLD-RTO: 0 /100 WBC
OHS QRS DURATION: 90 MS
OHS QTC CALCULATION: 451 MS
OVALOCYTES BLD QL SMEAR: ABNORMAL
OXA-48-LIKE (CARBAPENEM RESISTANT): NOT DETECTED
PHOSPHATE SERPL-MCNC: 3.8 MG/DL (ref 2.7–4.5)
PLATELET # BLD AUTO: 234 K/UL (ref 150–450)
PMV BLD AUTO: 11 FL (ref 9.2–12.9)
POCT GLUCOSE: 269 MG/DL (ref 70–110)
POCT GLUCOSE: 281 MG/DL (ref 70–110)
POCT GLUCOSE: 307 MG/DL (ref 70–110)
POCT GLUCOSE: 320 MG/DL (ref 70–110)
POCT GLUCOSE: 418 MG/DL (ref 70–110)
POIKILOCYTOSIS BLD QL SMEAR: SLIGHT
POLYCHROMASIA BLD QL SMEAR: ABNORMAL
POTASSIUM SERPL-SCNC: 3.8 MMOL/L (ref 3.5–5.1)
PROT SERPL-MCNC: 5.8 G/DL (ref 6–8.4)
PROTEUS SPECIES: NOT DETECTED
PSEUDOMONAS AERUGINOSA: NOT DETECTED
RBC # BLD AUTO: 2.47 M/UL (ref 4.6–6.2)
SALMONELLA SP: DETECTED
SERRATIA MARCESCENS: NOT DETECTED
SODIUM SERPL-SCNC: 138 MMOL/L (ref 136–145)
STAPHYLOCOCCUS AUREUS: NOT DETECTED
STAPHYLOCOCCUS EPIDERMIDIS: NOT DETECTED
STAPHYLOCOCCUS LUGDUNESIS: NOT DETECTED
STAPHYLOCOCCUS SPECIES: NOT DETECTED
STENOTROPHOMONAS MALTOPHILIA: NOT DETECTED
STREPTOCOCCUS AGALACTIAE: NOT DETECTED
STREPTOCOCCUS PNEUMONIAE: NOT DETECTED
STREPTOCOCCUS PYOGENES: NOT DETECTED
STREPTOCOCCUS SPECIES: NOT DETECTED
VAN A/B (VRE GENE): ABNORMAL
VIM GENE (CARBAPENEM RESISTANT): NOT DETECTED
WBC # BLD AUTO: 2.09 K/UL (ref 3.9–12.7)

## 2024-05-27 PROCEDURE — 36415 COLL VENOUS BLD VENIPUNCTURE: CPT | Performed by: STUDENT IN AN ORGANIZED HEALTH CARE EDUCATION/TRAINING PROGRAM

## 2024-05-27 PROCEDURE — 87040 BLOOD CULTURE FOR BACTERIA: CPT | Performed by: STUDENT IN AN ORGANIZED HEALTH CARE EDUCATION/TRAINING PROGRAM

## 2024-05-27 PROCEDURE — 85025 COMPLETE CBC W/AUTO DIFF WBC: CPT | Performed by: STUDENT IN AN ORGANIZED HEALTH CARE EDUCATION/TRAINING PROGRAM

## 2024-05-27 PROCEDURE — 25000003 PHARM REV CODE 250: Performed by: STUDENT IN AN ORGANIZED HEALTH CARE EDUCATION/TRAINING PROGRAM

## 2024-05-27 PROCEDURE — 20600001 HC STEP DOWN PRIVATE ROOM

## 2024-05-27 PROCEDURE — 80053 COMPREHEN METABOLIC PANEL: CPT | Performed by: STUDENT IN AN ORGANIZED HEALTH CARE EDUCATION/TRAINING PROGRAM

## 2024-05-27 PROCEDURE — 83735 ASSAY OF MAGNESIUM: CPT | Performed by: STUDENT IN AN ORGANIZED HEALTH CARE EDUCATION/TRAINING PROGRAM

## 2024-05-27 PROCEDURE — 84100 ASSAY OF PHOSPHORUS: CPT | Performed by: STUDENT IN AN ORGANIZED HEALTH CARE EDUCATION/TRAINING PROGRAM

## 2024-05-27 PROCEDURE — 63600175 PHARM REV CODE 636 W HCPCS: Performed by: STUDENT IN AN ORGANIZED HEALTH CARE EDUCATION/TRAINING PROGRAM

## 2024-05-27 RX ADMIN — INSULIN ASPART 4 UNITS: 100 INJECTION, SOLUTION INTRAVENOUS; SUBCUTANEOUS at 12:05

## 2024-05-27 RX ADMIN — GABAPENTIN 300 MG: 300 CAPSULE ORAL at 08:05

## 2024-05-27 RX ADMIN — CEFEPIME 2 G: 2 INJECTION, POWDER, FOR SOLUTION INTRAVENOUS at 08:05

## 2024-05-27 RX ADMIN — HEPARIN SODIUM 5000 UNITS: 5000 INJECTION INTRAVENOUS; SUBCUTANEOUS at 02:05

## 2024-05-27 RX ADMIN — MUPIROCIN: 20 OINTMENT TOPICAL at 09:05

## 2024-05-27 RX ADMIN — INSULIN ASPART 1 UNITS: 100 INJECTION, SOLUTION INTRAVENOUS; SUBCUTANEOUS at 09:05

## 2024-05-27 RX ADMIN — INSULIN GLARGINE 10 UNITS: 100 INJECTION, SOLUTION SUBCUTANEOUS at 09:05

## 2024-05-27 RX ADMIN — HEPARIN SODIUM 5000 UNITS: 5000 INJECTION INTRAVENOUS; SUBCUTANEOUS at 05:05

## 2024-05-27 RX ADMIN — ACYCLOVIR 400 MG: 200 CAPSULE ORAL at 08:05

## 2024-05-27 RX ADMIN — ASPIRIN 81 MG: 81 TABLET, COATED ORAL at 08:05

## 2024-05-27 RX ADMIN — ACYCLOVIR 400 MG: 200 CAPSULE ORAL at 09:05

## 2024-05-27 RX ADMIN — MUPIROCIN: 20 OINTMENT TOPICAL at 08:05

## 2024-05-27 RX ADMIN — INSULIN ASPART 4 UNITS: 100 INJECTION, SOLUTION INTRAVENOUS; SUBCUTANEOUS at 05:05

## 2024-05-27 RX ADMIN — GABAPENTIN 300 MG: 300 CAPSULE ORAL at 09:05

## 2024-05-27 RX ADMIN — HEPARIN SODIUM 5000 UNITS: 5000 INJECTION INTRAVENOUS; SUBCUTANEOUS at 09:05

## 2024-05-27 RX ADMIN — INSULIN ASPART 3 UNITS: 100 INJECTION, SOLUTION INTRAVENOUS; SUBCUTANEOUS at 08:05

## 2024-05-27 RX ADMIN — CEFEPIME 2 G: 2 INJECTION, POWDER, FOR SOLUTION INTRAVENOUS at 06:05

## 2024-05-27 NOTE — ASSESSMENT & PLAN NOTE
Cr 2.2 on presentation, baseline has been between 1.5-2 since March 2024   Less likely from MM given response to treatment on recent myeloma labs   Likely pre-renal in setting of infection, he is on anti-htn meds at home, decreased PO intake     - Check kidney function daily, improved with IVF  - Treat UTI

## 2024-05-27 NOTE — HPI
Mr. Jaspreet Walsh Jr. is a 64 y.o. male with multiple myeloma (patient of Dr. Baker) who underwent stem cell transplant in May 2021, now with relapse on KPD (last received Carfilzomibon on 5/15) who was transferred for management of sepsis. He presented to the outside ED with complains of weakness for last 2 weeks since starting chemotherapy. He denies cough, congestion, or shortness of breath. He does report dysuria for the last two days. In the ED he was febrile to 101. ANC was 2K, Hg 9.6, platelets 285. Coags were normal. CMP significant for a Cr of 2.2 and Tbili 1.8. Lactate was normal. U/A consistent with UTI (>100 WBC's). Covid and flu negative. CXR with no acute findings.

## 2024-05-27 NOTE — PLAN OF CARE
Problem: Adult Inpatient Plan of Care  Goal: Plan of Care Review  Outcome: Progressing  Goal: Patient-Specific Goal (Individualized)  Outcome: Progressing  Goal: Absence of Hospital-Acquired Illness or Injury  Outcome: Progressing  Goal: Optimal Comfort and Wellbeing  Outcome: Progressing  Goal: Readiness for Transition of Care  Outcome: Progressing     Problem: Diabetes Comorbidity  Goal: Blood Glucose Level Within Targeted Range  Outcome: Progressing     Problem: Sepsis/Septic Shock  Goal: Optimal Coping  Outcome: Progressing  Goal: Absence of Bleeding  Outcome: Progressing  Goal: Blood Glucose Level Within Targeted Range  Outcome: Progressing  Goal: Absence of Infection Signs and Symptoms  Outcome: Progressing  Goal: Optimal Nutrition Intake  Outcome: Progressing     Problem: Acute Kidney Injury/Impairment  Goal: Fluid and Electrolyte Balance  Outcome: Progressing  Goal: Improved Oral Intake  Outcome: Progressing  Goal: Effective Renal Function  Outcome: Progressing

## 2024-05-27 NOTE — ASSESSMENT & PLAN NOTE
Patient with MM currently KPD (last received Carfilzomib on 5/15) present to an OSH ED with weakness for two weeks. Was febrile in the ED and U/A consistent with UTI (>100 WBC's) and he has been having dysuria. CXR with no acute findings. Vitals have been stable. Lactate normal. ANC 2K. Covid and flu negative. He was given IVF and started on vanc/zosyn. [place] [facility]     - Cefepime for UTI   - If patient continues to be febrile or becomes unstable will add vanc   - Will follow-up cultures   - CBC daily   - Can give additional IVF if needed (TTE in October 2023 with normal EF)

## 2024-05-27 NOTE — PLAN OF CARE
Patient feeling well, denies weakness. Blood culture gram negative rods, MD notified, discharge cancelled. No acute events during shift. New blood culture collected. Plan of care ongoing.     Problem: Adult Inpatient Plan of Care  Goal: Plan of Care Review  Outcome: Progressing  Goal: Patient-Specific Goal (Individualized)  Outcome: Progressing  Goal: Absence of Hospital-Acquired Illness or Injury  Outcome: Progressing  Goal: Optimal Comfort and Wellbeing  Outcome: Progressing  Goal: Readiness for Transition of Care  Outcome: Progressing     Problem: Diabetes Comorbidity  Goal: Blood Glucose Level Within Targeted Range  Outcome: Progressing     Problem: Sepsis/Septic Shock  Goal: Optimal Coping  Outcome: Progressing  Goal: Absence of Bleeding  Outcome: Progressing  Goal: Blood Glucose Level Within Targeted Range  Outcome: Progressing  Goal: Absence of Infection Signs and Symptoms  Outcome: Progressing  Goal: Optimal Nutrition Intake  Outcome: Progressing     Problem: Acute Kidney Injury/Impairment  Goal: Fluid and Electrolyte Balance  Outcome: Progressing  Goal: Improved Oral Intake  Outcome: Progressing  Goal: Effective Renal Function  Outcome: Progressing

## 2024-05-27 NOTE — ASSESSMENT & PLAN NOTE
Cr 2.2 on presentation, baseline has been between 1.5-2 since March 2024   Less likely from MM given response to treatment on recent myeloma labs   Likely pre-renal in setting of infection, he is on anti-htn meds at home, decreased PO intake     - Check kidney function daily   - If no improvement with fluids will order more extensive workup   - Treat UTI

## 2024-05-27 NOTE — PLAN OF CARE
[facility] was unable to schedule a follow up appointment for pt. PCP office scheduling team will contact the pt to schedule pt for a appointment sooner.

## 2024-05-27 NOTE — ASSESSMENT & PLAN NOTE
Patient with MM currently KPD (last received Carfilzomib on 5/15) present to an OSH ED with weakness for two weeks. Was febrile in the ED and U/A consistent with UTI (>100 WBC's) and he has been having dysuria. CXR with no acute findings. Vitals have been stable. Lactate normal. ANC 2K. Covid and flu negative. He was given IVF and started on vanc/zosyn. Patient was then transferred to [facility].     - Cefepime for UTI   - If patient continues to be febrile or becomes unstable will add vanc   - blood culture with GNR growth, will repeat blood cultures on 5/27. Await sensitivities to tailor antibiotics  - CBC daily   - Can give additional IVF if needed (TTE in October 2023 with normal EF)

## 2024-05-27 NOTE — SUBJECTIVE & OBJECTIVE
Subjective:     Interval History: No acute events overnight, patient remains afebrile, hemodynamically stable. He feels well and denies any new systemic symptoms. Initially planned to discharge patient however his blood cultures resulted with GNR. Will cancel discharge and wait for susceptibilities. Will likely treat for 2 week course once antibiotic regimen is decided. Will keep on IV antibiotics for now.     Objective:     Vital Signs (Most Recent):  Temp: 98.3 °F (36.8 °C) (05/27/24 1200)  Pulse: 83 (05/27/24 1200)  Resp: 18 (05/27/24 1200)  BP: 101/69 (05/27/24 1200)  SpO2: 99 % (05/27/24 1200) Vital Signs (24h Range):  Temp:  [98 °F (36.7 °C)-99.3 °F (37.4 °C)] 98.3 °F (36.8 °C)  Pulse:  [78-90] 83  Resp:  [14-23] 18  SpO2:  [97 %-100 %] 99 %  BP: (101-128)/(68-80) 101/69     Weight: 88.7 kg (195 lb 8.8 oz)  Body mass index is 30.63 kg/m².  Body surface area is 2.05 meters squared.      Intake/Output - Last 3 Shifts       None             Physical Exam  Constitutional:       Appearance: Normal appearance.   HENT:      Head: Normocephalic and atraumatic.      Mouth/Throat:      Mouth: Mucous membranes are moist.      Pharynx: Oropharynx is clear.   Eyes:      Extraocular Movements: Extraocular movements intact.      Pupils: Pupils are equal, round, and reactive to light.   Cardiovascular:      Rate and Rhythm: Normal rate and regular rhythm.      Pulses: Normal pulses.      Heart sounds: Normal heart sounds.   Pulmonary:      Effort: Pulmonary effort is normal.      Breath sounds: Normal breath sounds.   Abdominal:      General: Abdomen is flat.      Palpations: Abdomen is soft.      Tenderness: There is no abdominal tenderness.   Musculoskeletal:         General: Normal range of motion.      Cervical back: Normal range of motion and neck supple.      Right lower leg: No edema.      Left lower leg: No edema.   Skin:     General: Skin is warm and dry.   Neurological:      General: No focal deficit present.       Mental Status: He is alert and oriented to person, place, and time.   Psychiatric:         Mood and Affect: Mood normal.         Behavior: Behavior normal.            Significant Labs:   All pertinent labs from the last 24 hours have been reviewed.    Diagnostic Results:  I have reviewed all pertinent imaging results/findings within the past 24 hours.

## 2024-05-27 NOTE — ASSESSMENT & PLAN NOTE
Home meds: Amlodipine 10mg, HCTZ 25mg, Clonidine 0.3mg TID     - Hold home meds in setting of infection

## 2024-05-27 NOTE — H&P
[facility] ([place] [id number])  Hematology  Bone Marrow Transplant  H&P    Subjective:     Principal Problem: Sepsis    HPI: Mr. Jaspreet Walsh Jr. is a 64 y.o. male with multiple myeloma (patient of Dr. Baker) who underwent stem cell transplant in May 2021, now with relapse on KPD (last received Carfilzomibon on 5/15) who was transferred for management of sepsis. He presented to the outside ED with complains of weakness for last 2 weeks since starting chemotherapy. He denies cough, congestion, or shortness of breath. He does report dysuria for the last two days. In the ED he was febrile to 101. ANC was 2K, Hg 9.6, platelets 285. Coags were normal. CMP significant for a Cr of 2.2 and Tbili 1.8. Lactate was normal. U/A consistent with UTI (>100 WBC's). Covid and flu negative. CXR with no acute findings.    Patient information was obtained from patient, past medical records, and ER records.     Oncology History:   IgG Kappa MM standard risk s/p 8 cycles Vrd followed by Yajaira Auto 5/17/2021 with disease relapse and Dkd salvage therapy 8/4/2023  - standard risk MM diagnosed sept 2020 after presenting with symptomatic lytic disease  -sp VRd induction followed by yajaira 200 autoSCT on 5/17/23 achieving/mainting MA post SCT; was on revlimid maintenance but relapsed biochemically and with new lytic disease approximately 2 years post SCT (June/July 2023)  -initiated Melinda-Kd salvage on 8/4/23; tolerating will no significant AE's   -melinda subq weekly 8>EOW x 8 doses> q 4 week; kyprolis 70mg/m2 day 1, 8, 15 of 28 day cycle; dex 40mg po weekly   -patient with initial good response to therapy but now with serial biochemical progression confirmed on 4/3/24 repeat serum studies;  mild anemia and jan 2024 PET with ENDY but no other overt symptoms or CRAB and he feels well  -have recommended we transition DKd to Kyprolis/pomalyst/dex as likely bridge to CART (cilta-zohra)  -pomalyst 4mg daily day 1-21 of 28 cycle ; [facility]  initiated April 2024  -tolerating well with mild cytopenias and now downtrending SFLC so responding well  -will present to multi d group for cilta zohra approval and timing at meeting on 5/8/24  -will reiniitate asa 81mg daily with ImID  -leave current kyprolis infusions as is  -supportive meds: acyclovir, ca +vit d, zometa (received total of 2 years since disease progression)     Medications Prior to Admission   Medication Sig Dispense Refill Last Dose    acyclovir (ZOVIRAX) 400 MG tablet Take 1 tablet (400 mg total) by mouth 2 (two) times daily. 60 tablet 11     amLODIPine (NORVASC) 10 MG tablet Take 1 tablet (10 mg total) by mouth once daily. 90 tablet 1     aspirin (ECOTRIN) 81 MG EC tablet Take 81 mg by mouth once daily.       atorvastatin (LIPITOR) 20 MG tablet Take 20 mg by mouth once daily.       cloNIDine (CATAPRES) 0.3 MG tablet Take 0.3 mg by mouth once daily.       dexAMETHasone (DECADRON) 4 MG Tab Take 10 tablets (40 mg total) by mouth every 7 days. Take with food before chemotherapy appointments 40 tablet 11     empagliflozin (JARDIANCE) 10 mg tablet Take 10 mg by mouth once daily.       gabapentin (NEURONTIN) 300 MG capsule Take 1 capsule (300 mg total) by mouth 2 (two) times daily. 60 capsule 3     hydroCHLOROthiazide (HYDRODIURIL) 25 MG tablet Take 1 tablet (25 mg total) by mouth once daily. 90 tablet 1     metFORMIN (GLUCOPHAGE) 1000 MG tablet Take 0.5 tablets (500 mg total) by mouth 2 (two) times daily with meals. 90 tablet 3     POMALYST 4 mg Cap TAKE 1 CAPSULE BY MOUTH ONCE  DAILY FOR 21 DAYS ON THEN 7 DAYS OFF 21 capsule 0     potassium chloride SA (K-DUR,KLOR-CON M) 10 MEQ tablet TAKE 1 TABLET(10 MEQ) BY MOUTH EVERY DAY 90 tablet 1        Patient has no known allergies.     Past Medical History:   Diagnosis Date    Anxiety     Arthritis     Cancer     Hypertension      Past Surgical History:   Procedure Laterality Date    BACK SURGERY  01/01/2021    BONE MARROW BIOPSY Left 10/20/2021     Procedure: Biopsy-bone marrow;  Surgeon: Summer Cartwright MD;  Location: [facility] ENDO (4TH FLR);  Service: Oncology;  Laterality: Left;    COLONOSCOPY N/A 2021    Procedure: COLONOSCOPY;  Surgeon: Braxton Lees MD;  Location: [facility] ENDO (4TH FLR);  Service: Endoscopy;  Laterality: N/A;  -covid kristopher-inst mailed-tb  21-pt to remain on schedule with Dr. Lees, and confirmed updated arrival time of 0835-BB    COLONOSCOPY N/A 2021    Procedure: COLONOSCOPY;  Surgeon: Jo Ann Robledo MD;  Location: [facility] ENDO (4TH FLR);  Service: Endoscopy;  Laterality: N/A;  rescheduled due to poor bowel prep, to be rescheduled with first available provider-BB  [id number]    CREATION OF MUSCLE ROTATIONAL FLAP N/A 2020    Procedure: CREATION, FLAP, MUSCLE ROTATION;  Surgeon: Kamlesh Bellamy MD;  [facility];  Service: Plastics;  Laterality: N/A;    KNEE SURGERY Left 2019    LUMBAR FUSION N/A 2020    Procedure: FUSION, SPINE, LUMBAR L2-pelvis. Depuy. Plastic surgery closure w/ Dr. Bellamy;  Surgeon: Nick Coyle MD;  [facility];  Service: Neurosurgery;  Laterality: N/A;    Metastatic neoplasum removed from spine       Family History       Problem Relation (Age of Onset)    Cancer Father          Tobacco Use    Smoking status: Former     Current packs/day: 0.00     Average packs/day: 0.3 packs/day for 40.0 years (10.0 ttl pk-yrs)     Types: Cigarettes     Start date:      Quit date: 2017     Years since quittin.4    Smokeless tobacco: Never   Substance and Sexual Activity    Alcohol use: Not on file    Drug use: Not on file    Sexual activity: Not on file       Review of Systems   Constitutional:  Positive for fatigue. Negative for chills and fever.   HENT:  Negative for congestion and sore throat.    Eyes:  Negative for pain.   Respiratory:  Negative for cough and shortness of breath.    Cardiovascular:  Negative for chest pain, palpitations and leg  swelling.   Gastrointestinal:  Negative for abdominal pain, constipation, diarrhea, nausea and vomiting.   Genitourinary:  Positive for dysuria. Negative for difficulty urinating and hematuria.   Musculoskeletal:  Negative for back pain.   Skin:  Negative for rash.   Neurological:  Negative for light-headedness and headaches.   Hematological:  Does not bruise/bleed easily.   Psychiatric/Behavioral:  Negative for agitation.      Objective:     Vital Signs (Most Recent):    Vital Signs (24h Range):  Temp:  [98.4 °F (36.9 °C)-101 °F (38.3 °C)] 98.4 °F (36.9 °C)  Pulse:  [] 89  Resp:  [15-23] 15  SpO2:  [97 %-100 %] 100 %  BP: (109-130)/(66-80) 124/76        There is no height or weight on file to calculate BMI.  There is no height or weight on file to calculate BSA.    ECOG SCORE           [unfilled]    Lines/Drains/Airways       Drain  Duration                  Closed/Suction Drain 09/23/20 1758 Back Bulb 15 Fr. 1341 days         Closed/Suction Drain 09/23/20 1758 Back Bulb 15 Fr. 1341 days                     Physical Exam  Constitutional:       Appearance: Normal appearance.   HENT:      Head: Normocephalic and atraumatic.      Mouth/Throat:      Mouth: Mucous membranes are moist.      Pharynx: Oropharynx is clear.   Eyes:      Extraocular Movements: Extraocular movements intact.      Pupils: Pupils are equal, round, and reactive to light.   Cardiovascular:      Rate and Rhythm: Normal rate and regular rhythm.      Pulses: Normal pulses.      Heart sounds: Normal heart sounds.   Pulmonary:      Effort: Pulmonary effort is normal.      Breath sounds: Normal breath sounds.   Abdominal:      General: Abdomen is flat.      Palpations: Abdomen is soft.      Tenderness: There is no abdominal tenderness.   Musculoskeletal:         General: Normal range of motion.      Cervical back: Normal range of motion and neck supple.      Right lower leg: No edema.      Left lower leg: No edema.   Skin:     General: Skin is warm  and dry.   Neurological:      General: No focal deficit present.      Mental Status: He is alert and oriented to person, place, and time.   Psychiatric:         Mood and Affect: Mood normal.         Behavior: Behavior normal.            Significant Labs:   All pertinent labs from the last 24 hours have been reviewed.    Diagnostic Results:  I have reviewed all pertinent imaging results/findings within the past 24 hours.  Assessment/Plan:     Cardiac/Vascular  Essential hypertension  Home meds: Amlodipine 10mg, HCTZ 25mg, Clonidine 0.3mg TID     - Hold home meds in setting of infection     Renal/  LAN (acute kidney injury)  Cr 2.2 on presentation, baseline has been between 1.5-2 since March 2024   Less likely from MM given response to treatment on recent myeloma labs   Likely pre-renal in setting of infection, he is on anti-htn meds at home, decreased PO intake     - Check kidney function daily   - If no improvement with fluids will order more extensive workup   - Treat UTI     ID  * Sepsis  Patient with MM currently KPD (last received Carfilzomib on 5/15) present to an OSH ED with weakness for two weeks. Was febrile in the ED and U/A consistent with UTI (>100 WBC's) and he has been having dysuria. CXR with no acute findings. Vitals have been stable. Lactate normal. ANC 2K. Covid and flu negative. He was given IVF and started on vanc/zosyn. Patient was then transferred to [facility].     - Cefepime for UTI   - If patient continues to be febrile or becomes unstable will add vanc   - Will follow-up cultures   - CBC daily   - Can give additional IVF if needed (TTE in October 2023 with normal EF)     Oncology  Anemia  Secondary to chemo     - CBC daily   - Transfuse if Hg <7    History of auto stem cell transplant  S/P transplant in May 2021 for multiple myeloma, has since relapsed     Multiple myeloma  Patient of Dr. Baker with IgG kappa multiple myeloma s/p auto SCT in May 2021. Patient relapsed in July 2023. He was  treated with Melinda-Kd salvage therapy, was then on Dkd and had progression, so was recently started on KPD (April 2024). Last received Carfilzomib on 5/15. MM labs earlier this month with response to treatment.     - Follow-up with Dr. Baker on 5/31   - Continue ASA while on ImiD's     Endocrine  Diabetes mellitus  Home meds: Metformin 500mg BID and Jardiance   Last HbA1c 9.2 on October 2023     - Diabetic diet   - LDSSI   - Repeat HbA1c         VTE Risk Mitigation (From admission, onward)           Ordered     heparin (porcine) injection 5,000 Units  Every 8 hours         05/26/24 1857     IP VTE HIGH RISK PATIENT  Once         05/26/24 1857     Place sequential compression device  Until discontinued         05/26/24 1857                      Spike Sharp MD  Bone Marrow Transplant  Hematology  [facility] - Stepdown Flex ([place]-14)

## 2024-05-27 NOTE — PLAN OF CARE
CHW met with patient/family at bedside. Patient experience rounding completed and reviewed the following.     Do you know your discharge plan? Yes,Home     Have you discussed your needs and preferences with your SW/CM? Yes     If you are discharging home, do you have help at home? Yes     Do you think you will need help additional at home at discharge?  No     Do you currently have difficulty keeping up with bills, affording medicine or buying food? No    Assigned SW/CM notified of any patient/family needs or concerns. Appropriate resources provided to address patient's needs    Camacho MOSES  Case Management

## 2024-05-27 NOTE — PROGRESS NOTES
[facility] ([place] [address])  Hematology  Bone Marrow Transplant  Progress Note    Patient Name: Jaspreet Walsh Jr.  Admission Date: 5/26/2024  Hospital Length of Stay: 1 days  Code Status: Full Code    Subjective:     Interval History: No acute events overnight, patient remains afebrile, hemodynamically stable. He feels well and denies any new systemic symptoms. Initially planned to discharge patient however his blood cultures resulted with GNR. Will cancel discharge and wait for susceptibilities. Will likely treat for 2 week course once antibiotic regimen is decided. Will keep on IV antibiotics for now.     Objective:     Vital Signs (Most Recent):  Temp: 98.3 °F (36.8 °C) (05/27/24 1200)  Pulse: 83 (05/27/24 1200)  Resp: 18 (05/27/24 1200)  BP: 101/69 (05/27/24 1200)  SpO2: 99 % (05/27/24 1200) Vital Signs (24h Range):  Temp:  [98 °F (36.7 °C)-99.3 °F (37.4 °C)] 98.3 °F (36.8 °C)  Pulse:  [78-90] 83  Resp:  [14-23] 18  SpO2:  [97 %-100 %] 99 %  BP: (101-128)/(68-80) 101/69     Weight: 88.7 kg (195 lb 8.8 oz)  Body mass index is 30.63 kg/m².  Body surface area is 2.05 meters squared.      Intake/Output - Last 3 Shifts       None             Physical Exam  Constitutional:       Appearance: Normal appearance.   HENT:      Head: Normocephalic and atraumatic.      Mouth/Throat:      Mouth: Mucous membranes are moist.      Pharynx: Oropharynx is clear.   Eyes:      Extraocular Movements: Extraocular movements intact.      Pupils: Pupils are equal, round, and reactive to light.   Cardiovascular:      Rate and Rhythm: Normal rate and regular rhythm.      Pulses: Normal pulses.      Heart sounds: Normal heart sounds.   Pulmonary:      Effort: Pulmonary effort is normal.      Breath sounds: Normal breath sounds.   Abdominal:      General: Abdomen is flat.      Palpations: Abdomen is soft.      Tenderness: There is no abdominal tenderness.   Musculoskeletal:         General: Normal range of motion.       Cervical back: Normal range of motion and neck supple.      Right lower leg: No edema.      Left lower leg: No edema.   Skin:     General: Skin is warm and dry.   Neurological:      General: No focal deficit present.      Mental Status: He is alert and oriented to person, place, and time.   Psychiatric:         Mood and Affect: Mood normal.         Behavior: Behavior normal.            Significant Labs:   All pertinent labs from the last 24 hours have been reviewed.    Diagnostic Results:  I have reviewed all pertinent imaging results/findings within the past 24 hours.  Assessment/Plan:     * Sepsis  Patient with MM currently KPD (last received Carfilzomib on 5/15) present to an OSH ED with weakness for two weeks. Was febrile in the ED and U/A consistent with UTI (>100 WBC's) and he has been having dysuria. CXR with no acute findings. Vitals have been stable. Lactate normal. ANC 2K. Covid and flu negative. He was given IVF and started on vanc/zosyn. Patient was then transferred to [facility].     - Cefepime for UTI   - If patient continues to be febrile or becomes unstable will add vanc   - blood culture with GNR growth, will repeat blood cultures on 5/27. Await sensitivities to tailor antibiotics  - CBC daily   - Can give additional IVF if needed (TTE in October 2023 with normal EF)     LAN (acute kidney injury)  Cr 2.2 on presentation, baseline has been between 1.5-2 since March 2024   Less likely from MM given response to treatment on recent myeloma labs   Likely pre-renal in setting of infection, he is on anti-htn meds at home, decreased PO intake     - Check kidney function daily, improved with IVF  - Treat UTI     Anemia  Secondary to chemo     - CBC daily   - Transfuse if Hg <7    Diabetes mellitus  Home meds: Metformin 500mg BID and Jardiance   Last HbA1c 9.2 on October 2023     - Diabetic diet   - LDSSI   - Repeat HbA1c     History of auto stem cell transplant  S/P transplant in May 2021 for multiple myeloma, has  since relapsed     Multiple myeloma  Patient of Dr. Baker with IgG kappa multiple myeloma s/p auto SCT in May 2021. Patient relapsed in July 2023. He was treated with Melinda-Kd salvage therapy, was then on Dkd and had progression, so was recently started on KPD (April 2024). Last received Carfilzomib on 5/15. MM labs earlier this month with response to treatment.     - Follow-up with Dr. Baker on 5/31   - Continue ASA while on ImiD's     Essential hypertension  Home meds: Amlodipine 10mg, HCTZ 25mg, Clonidine 0.3mg TID     - Hold home meds in setting of infection         VTE Risk Mitigation (From admission, onward)           Ordered     heparin (porcine) injection 5,000 Units  Every 8 hours         05/26/24 1857     IP VTE HIGH RISK PATIENT  Once         05/26/24 1857     Place sequential compression device  Until discontinued         05/26/24 1857                    Disposition: Continue inpatient care    Luther Jimenez MD  Bone Marrow Transplant  [facility] ([place]-[facility])

## 2024-05-27 NOTE — HOSPITAL COURSE
05/27/2024 No acute events overnight, patient remains afebrile, hemodynamically stable. He feels well and denies any new systemic symptoms. Initially planned to discharge patient however his blood cultures resulted with GNR. Will cancel discharge and wait for susceptibilities. Will likely treat for 2 week course once antibiotic regimen is decided. Will keep on IV antibiotics for now.   05/28/2024 No acute events overnight, patient remains afebrile, hemodynamically stable. He feels well and denies any new systemic symptoms. Discussed with microlab. Likely will have updates on 5/29.   05/29/2024 No acute events overnight, patient remains afebrile, hemodynamically stable. He feels well and denies any new systemic symptoms. Will have updates from micro lab today. Plan to discharge with oral antibiotics for 2 week course.

## 2024-05-28 LAB
ALBUMIN SERPL BCP-MCNC: 2 G/DL (ref 3.5–5.2)
ALP SERPL-CCNC: 51 U/L (ref 55–135)
ALT SERPL W/O P-5'-P-CCNC: 18 U/L (ref 10–44)
ANION GAP SERPL CALC-SCNC: 8 MMOL/L (ref 8–16)
ANISOCYTOSIS BLD QL SMEAR: SLIGHT
AST SERPL-CCNC: 17 U/L (ref 10–40)
BASOPHILS # BLD AUTO: ABNORMAL K/UL (ref 0–0.2)
BASOPHILS NFR BLD: 0 % (ref 0–1.9)
BILIRUB SERPL-MCNC: 0.5 MG/DL (ref 0.1–1)
BUN SERPL-MCNC: 21 MG/DL (ref 8–23)
BURR CELLS BLD QL SMEAR: ABNORMAL
CALCIUM SERPL-MCNC: 8.5 MG/DL (ref 8.7–10.5)
CHLORIDE SERPL-SCNC: 111 MMOL/L (ref 95–110)
CO2 SERPL-SCNC: 19 MMOL/L (ref 23–29)
CREAT SERPL-MCNC: 1.2 MG/DL (ref 0.5–1.4)
DIFFERENTIAL METHOD BLD: ABNORMAL
EOSINOPHIL # BLD AUTO: ABNORMAL K/UL (ref 0–0.5)
EOSINOPHIL NFR BLD: 9 % (ref 0–8)
ERYTHROCYTE [DISTWIDTH] IN BLOOD BY AUTOMATED COUNT: 18.1 % (ref 11.5–14.5)
EST. GFR  (NO RACE VARIABLE): >60 ML/MIN/1.73 M^2
GLUCOSE SERPL-MCNC: 142 MG/DL (ref 70–110)
HCT VFR BLD AUTO: 24.2 % (ref 40–54)
HGB BLD-MCNC: 7.6 G/DL (ref 14–18)
HYPOCHROMIA BLD QL SMEAR: ABNORMAL
IMM GRANULOCYTES # BLD AUTO: ABNORMAL K/UL (ref 0–0.04)
IMM GRANULOCYTES NFR BLD AUTO: ABNORMAL % (ref 0–0.5)
LYMPHOCYTES # BLD AUTO: ABNORMAL K/UL (ref 1–4.8)
LYMPHOCYTES NFR BLD: 48 % (ref 18–48)
MAGNESIUM SERPL-MCNC: 1.8 MG/DL (ref 1.6–2.6)
MCH RBC QN AUTO: 31 PG (ref 27–31)
MCHC RBC AUTO-ENTMCNC: 31.4 G/DL (ref 32–36)
MCV RBC AUTO: 99 FL (ref 82–98)
MONOCYTES # BLD AUTO: ABNORMAL K/UL (ref 0.3–1)
MONOCYTES NFR BLD: 3 % (ref 4–15)
NEUTROPHILS NFR BLD: 38 % (ref 38–73)
NEUTS BAND NFR BLD MANUAL: 2 %
NRBC BLD-RTO: 0 /100 WBC
OVALOCYTES BLD QL SMEAR: ABNORMAL
PHOSPHATE SERPL-MCNC: 4 MG/DL (ref 2.7–4.5)
PLATELET # BLD AUTO: 222 K/UL (ref 150–450)
PMV BLD AUTO: 11.4 FL (ref 9.2–12.9)
POCT GLUCOSE: 158 MG/DL (ref 70–110)
POCT GLUCOSE: 211 MG/DL (ref 70–110)
POCT GLUCOSE: 222 MG/DL (ref 70–110)
POCT GLUCOSE: 316 MG/DL (ref 70–110)
POIKILOCYTOSIS BLD QL SMEAR: SLIGHT
POLYCHROMASIA BLD QL SMEAR: ABNORMAL
POTASSIUM SERPL-SCNC: 3.6 MMOL/L (ref 3.5–5.1)
PROT SERPL-MCNC: 5.5 G/DL (ref 6–8.4)
RBC # BLD AUTO: 2.45 M/UL (ref 4.6–6.2)
SCHISTOCYTES BLD QL SMEAR: ABNORMAL
SODIUM SERPL-SCNC: 138 MMOL/L (ref 136–145)
WBC # BLD AUTO: 1.85 K/UL (ref 3.9–12.7)

## 2024-05-28 PROCEDURE — 85007 BL SMEAR W/DIFF WBC COUNT: CPT | Performed by: STUDENT IN AN ORGANIZED HEALTH CARE EDUCATION/TRAINING PROGRAM

## 2024-05-28 PROCEDURE — 36415 COLL VENOUS BLD VENIPUNCTURE: CPT | Performed by: STUDENT IN AN ORGANIZED HEALTH CARE EDUCATION/TRAINING PROGRAM

## 2024-05-28 PROCEDURE — 25000003 PHARM REV CODE 250: Performed by: INTERNAL MEDICINE

## 2024-05-28 PROCEDURE — 85027 COMPLETE CBC AUTOMATED: CPT | Performed by: STUDENT IN AN ORGANIZED HEALTH CARE EDUCATION/TRAINING PROGRAM

## 2024-05-28 PROCEDURE — 63600175 PHARM REV CODE 636 W HCPCS: Performed by: INTERNAL MEDICINE

## 2024-05-28 PROCEDURE — 63600175 PHARM REV CODE 636 W HCPCS: Performed by: STUDENT IN AN ORGANIZED HEALTH CARE EDUCATION/TRAINING PROGRAM

## 2024-05-28 PROCEDURE — 99232 SBSQ HOSP IP/OBS MODERATE 35: CPT | Mod: ,,, | Performed by: INTERNAL MEDICINE

## 2024-05-28 PROCEDURE — 20600001 HC STEP DOWN PRIVATE ROOM

## 2024-05-28 PROCEDURE — 84100 ASSAY OF PHOSPHORUS: CPT | Performed by: STUDENT IN AN ORGANIZED HEALTH CARE EDUCATION/TRAINING PROGRAM

## 2024-05-28 PROCEDURE — 25000003 PHARM REV CODE 250: Performed by: STUDENT IN AN ORGANIZED HEALTH CARE EDUCATION/TRAINING PROGRAM

## 2024-05-28 PROCEDURE — 83735 ASSAY OF MAGNESIUM: CPT | Performed by: STUDENT IN AN ORGANIZED HEALTH CARE EDUCATION/TRAINING PROGRAM

## 2024-05-28 PROCEDURE — 80053 COMPREHEN METABOLIC PANEL: CPT | Performed by: STUDENT IN AN ORGANIZED HEALTH CARE EDUCATION/TRAINING PROGRAM

## 2024-05-28 RX ORDER — INSULIN GLARGINE 100 [IU]/ML
14 INJECTION, SOLUTION SUBCUTANEOUS NIGHTLY
Status: DISCONTINUED | OUTPATIENT
Start: 2024-05-28 | End: 2024-05-29

## 2024-05-28 RX ORDER — INSULIN ASPART 100 [IU]/ML
0-10 INJECTION, SOLUTION INTRAVENOUS; SUBCUTANEOUS
Status: DISCONTINUED | OUTPATIENT
Start: 2024-05-28 | End: 2024-05-29 | Stop reason: HOSPADM

## 2024-05-28 RX ADMIN — MUPIROCIN: 20 OINTMENT TOPICAL at 09:05

## 2024-05-28 RX ADMIN — CEFEPIME 2 G: 2 INJECTION, POWDER, FOR SOLUTION INTRAVENOUS at 06:05

## 2024-05-28 RX ADMIN — INSULIN ASPART 4 UNITS: 100 INJECTION, SOLUTION INTRAVENOUS; SUBCUTANEOUS at 09:05

## 2024-05-28 RX ADMIN — HEPARIN SODIUM 5000 UNITS: 5000 INJECTION INTRAVENOUS; SUBCUTANEOUS at 05:05

## 2024-05-28 RX ADMIN — INSULIN ASPART 2 UNITS: 100 INJECTION, SOLUTION INTRAVENOUS; SUBCUTANEOUS at 06:05

## 2024-05-28 RX ADMIN — CEFEPIME 2 G: 2 INJECTION, POWDER, FOR SOLUTION INTRAVENOUS at 11:05

## 2024-05-28 RX ADMIN — ASPIRIN 81 MG: 81 TABLET, COATED ORAL at 09:05

## 2024-05-28 RX ADMIN — INSULIN ASPART 4 UNITS: 100 INJECTION, SOLUTION INTRAVENOUS; SUBCUTANEOUS at 12:05

## 2024-05-28 RX ADMIN — ACYCLOVIR 400 MG: 200 CAPSULE ORAL at 09:05

## 2024-05-28 RX ADMIN — HEPARIN SODIUM 5000 UNITS: 5000 INJECTION INTRAVENOUS; SUBCUTANEOUS at 09:05

## 2024-05-28 RX ADMIN — HEPARIN SODIUM 5000 UNITS: 5000 INJECTION INTRAVENOUS; SUBCUTANEOUS at 02:05

## 2024-05-28 RX ADMIN — INSULIN GLARGINE 14 UNITS: 100 INJECTION, SOLUTION SUBCUTANEOUS at 09:05

## 2024-05-28 RX ADMIN — GABAPENTIN 300 MG: 300 CAPSULE ORAL at 09:05

## 2024-05-28 RX ADMIN — CEFEPIME 2 G: 2 INJECTION, POWDER, FOR SOLUTION INTRAVENOUS at 03:05

## 2024-05-28 NOTE — PLAN OF CARE
No acute events during shift. Plan of care ongoing.    Problem: Adult Inpatient Plan of Care  Goal: Plan of Care Review  Outcome: Progressing  Goal: Patient-Specific Goal (Individualized)  Outcome: Progressing  Goal: Absence of Hospital-Acquired Illness or Injury  Outcome: Progressing  Goal: Optimal Comfort and Wellbeing  Outcome: Progressing  Goal: Readiness for Transition of Care  Outcome: Progressing     Problem: Diabetes Comorbidity  Goal: Blood Glucose Level Within Targeted Range  Outcome: Progressing     Problem: Sepsis/Septic Shock  Goal: Optimal Coping  Outcome: Progressing  Goal: Absence of Bleeding  Outcome: Progressing  Goal: Blood Glucose Level Within Targeted Range  Outcome: Progressing  Goal: Absence of Infection Signs and Symptoms  Outcome: Progressing  Goal: Optimal Nutrition Intake  Outcome: Progressing     Problem: Acute Kidney Injury/Impairment  Goal: Fluid and Electrolyte Balance  Outcome: Progressing  Goal: Improved Oral Intake  Outcome: Progressing  Goal: Effective Renal Function  Outcome: Progressing

## 2024-05-28 NOTE — ASSESSMENT & PLAN NOTE
Patient with MM currently KPD (last received Carfilzomib on 5/15) present to an OSH ED with weakness for two weeks. Was febrile in the ED and U/A consistent with UTI (>100 WBC's) and he has been having dysuria. CXR with no acute findings. Vitals have been stable. Lactate normal. ANC 2K. Covid and flu negative. He was given IVF and started on vanc/zosyn. Patient was then transferred to [facility].     - Cefepime for UTI   - If patient continues to be febrile or becomes unstable will add vanc   - blood culture with GNR growth, will repeat blood cultures on 5/27. Await sensitivities to tailor antibiotics  - CBC daily   - Can give additional IVF if needed (TTE in October 2023 with normal EF)

## 2024-05-28 NOTE — PROGRESS NOTES
Pharmacist Renal Dose Adjustment Note    Jaspreet Walsh Jr. is a 64 y.o. male being treated with the medication Cefepime     Patient Data:    Vital Signs (Most Recent):  Temp: 98.4 °F (36.9 °C) (05/28/24 1139)  Pulse: 100 (05/28/24 1139)  Resp: 17 (05/28/24 1139)  BP: 118/79 (05/28/24 1139)  SpO2: 99 % (05/28/24 1139) Vital Signs (72h Range):  Temp:  [98 °F (36.7 °C)-101 °F (38.3 °C)]   Pulse:  []   Resp:  [14-23]   BP: (101-130)/(66-83)   SpO2:  [97 %-100 %]      Recent Labs   Lab 05/26/24  0749 05/27/24  0510 05/28/24  0514   CREATININE 2.25* 1.6* 1.2     Serum creatinine: 1.2 mg/dL 05/28/24 0514  Estimated creatinine clearance: 65.2 mL/min    Medication:Cefepime 2g q12h was renally adjusted to 2g q8h based on CrCl > 60 ml/min.      Brandy López, PharmD  Clinical Pharmacist-BMT/Hematology  [facility]

## 2024-05-28 NOTE — ASSESSMENT & PLAN NOTE
Home meds: Metformin 500mg BID and Jardiance   Last HbA1c 9.2 on October 2023     - Diabetic diet   - LDSSI   - Repeat HbA1c    (3) occasionally moist

## 2024-05-28 NOTE — SUBJECTIVE & OBJECTIVE
Subjective:     Interval History: No acute events overnight, patient remains afebrile, hemodynamically stable. He feels well and denies any new systemic symptoms. Discussed with microlab. Likely will have updates on 5/29.     Objective:     Vital Signs (Most Recent):  Temp: 98.5 °F (36.9 °C) (05/28/24 0730)  Pulse: 87 (05/28/24 0730)  Resp: 16 (05/28/24 0730)  BP: 118/79 (05/28/24 0730)  SpO2: 99 % (05/28/24 0730) Vital Signs (24h Range):  Temp:  [98.2 °F (36.8 °C)-99.3 °F (37.4 °C)] 98.5 °F (36.9 °C)  Pulse:  [83-89] 87  Resp:  [16-18] 16  SpO2:  [97 %-99 %] 99 %  BP: (101-121)/(68-83) 118/79     Weight: 88.7 kg (195 lb 8.8 oz)  Body mass index is 30.63 kg/m².  Body surface area is 2.05 meters squared.      Intake/Output - Last 3 Shifts       None             Physical Exam  Constitutional:       Appearance: Normal appearance.   HENT:      Head: Normocephalic and atraumatic.      Mouth/Throat:      Mouth: Mucous membranes are moist.      Pharynx: Oropharynx is clear.   Eyes:      Extraocular Movements: Extraocular movements intact.      Pupils: Pupils are equal, round, and reactive to light.   Cardiovascular:      Rate and Rhythm: Normal rate and regular rhythm.      Pulses: Normal pulses.      Heart sounds: Normal heart sounds.   Pulmonary:      Effort: Pulmonary effort is normal.      Breath sounds: Normal breath sounds.   Abdominal:      General: Abdomen is flat.      Palpations: Abdomen is soft.      Tenderness: There is no abdominal tenderness.   Musculoskeletal:         General: Normal range of motion.      Cervical back: Normal range of motion and neck supple.      Right lower leg: No edema.      Left lower leg: No edema.   Skin:     General: Skin is warm and dry.   Neurological:      General: No focal deficit present.      Mental Status: He is alert and oriented to person, place, and time.   Psychiatric:         Mood and Affect: Mood normal.         Behavior: Behavior normal.            Significant Labs:    All pertinent labs from the last 24 hours have been reviewed.    Diagnostic Results:  I have reviewed all pertinent imaging results/findings within the past 24 hours.

## 2024-05-28 NOTE — PROGRESS NOTES
[facility] ([place] [address])  Hematology  Bone Marrow Transplant  Progress Note    Patient Name: Jaspreet Walsh Jr.  Admission Date: 5/26/2024  Hospital Length of Stay: 2 days  Code Status: Full Code    Subjective:     Interval History: No acute events overnight, patient remains afebrile, hemodynamically stable. He feels well and denies any new systemic symptoms. Discussed with microlab. Likely will have updates on 5/29.     Objective:     Vital Signs (Most Recent):  Temp: 98.5 °F (36.9 °C) (05/28/24 0730)  Pulse: 87 (05/28/24 0730)  Resp: 16 (05/28/24 0730)  BP: 118/79 (05/28/24 0730)  SpO2: 99 % (05/28/24 0730) Vital Signs (24h Range):  Temp:  [98.2 °F (36.8 °C)-99.3 °F (37.4 °C)] 98.5 °F (36.9 °C)  Pulse:  [83-89] 87  Resp:  [16-18] 16  SpO2:  [97 %-99 %] 99 %  BP: (101-121)/(68-83) 118/79     Weight: 88.7 kg (195 lb 8.8 oz)  Body mass index is 30.63 kg/m².  Body surface area is 2.05 meters squared.      Intake/Output - Last 3 Shifts       None             Physical Exam  Constitutional:       Appearance: Normal appearance.   HENT:      Head: Normocephalic and atraumatic.      Mouth/Throat:      Mouth: Mucous membranes are moist.      Pharynx: Oropharynx is clear.   Eyes:      Extraocular Movements: Extraocular movements intact.      Pupils: Pupils are equal, round, and reactive to light.   Cardiovascular:      Rate and Rhythm: Normal rate and regular rhythm.      Pulses: Normal pulses.      Heart sounds: Normal heart sounds.   Pulmonary:      Effort: Pulmonary effort is normal.      Breath sounds: Normal breath sounds.   Abdominal:      General: Abdomen is flat.      Palpations: Abdomen is soft.      Tenderness: There is no abdominal tenderness.   Musculoskeletal:         General: Normal range of motion.      Cervical back: Normal range of motion and neck supple.      Right lower leg: No edema.      Left lower leg: No edema.   Skin:     General: Skin is warm and dry.   Neurological:      General: No  focal deficit present.      Mental Status: He is alert and oriented to person, place, and time.   Psychiatric:         Mood and Affect: Mood normal.         Behavior: Behavior normal.            Significant Labs:   All pertinent labs from the last 24 hours have been reviewed.    Diagnostic Results:  I have reviewed all pertinent imaging results/findings within the past 24 hours.  Assessment/Plan:     * Sepsis  Patient with MM currently KPD (last received Carfilzomib on 5/15) present to an OSH ED with weakness for two weeks. Was febrile in the ED and U/A consistent with UTI (>100 WBC's) and he has been having dysuria. CXR with no acute findings. Vitals have been stable. Lactate normal. ANC 2K. Covid and flu negative. He was given IVF and started on vanc/zosyn. [place] [facility]     - Cefepime for UTI   - If patient continues to be febrile or becomes unstable will add vanc   - blood culture with GNR growth, will repeat blood cultures on 5/27. Await sensitivities to tailor antibiotics  - CBC daily   - Can give additional IVF if needed (TTE in October 2023 with normal EF)     LAN (acute kidney injury)  Cr 2.2 on presentation, baseline has been between 1.5-2 since March 2024   Less likely from MM given response to treatment on recent myeloma labs   Likely pre-renal in setting of infection, he is on anti-htn meds at home, decreased PO intake     - Check kidney function daily, improved with IVF  - Treat UTI     Anemia  Secondary to chemo     - CBC daily   - Transfuse if Hg <7    Diabetes mellitus  Home meds: Metformin 500mg BID and Jardiance   Last HbA1c 9.2 on October 2023     - Diabetic diet   - LDSSI   - Repeat HbA1c     History of auto stem cell transplant  S/P transplant in May 2021 for multiple myeloma, has since relapsed     Multiple myeloma  Patient of Dr. Baker with IgG kappa multiple myeloma s/p auto SCT in May 2021. Patient relapsed in July 2023. He was treated with Melinda-Kd salvage  therapy, was then on Dkd and had progression, so was recently started on KPD (April 2024). Last received Carfilzomib on 5/15. MM labs earlier this month with response to treatment.     - Follow-up with Dr. Baker on 5/31   - Continue ASA while on ImiD's     Essential hypertension  Home meds: Amlodipine 10mg, HCTZ 25mg, Clonidine 0.3mg TID     - Hold home meds in setting of infection   - Will place internal medicine referral at discharge for this        VTE Risk Mitigation (From admission, onward)           Ordered     heparin (porcine) injection 5,000 Units  Every 8 hours         05/26/24 1857     IP VTE HIGH RISK PATIENT  Once         05/26/24 1857     Place sequential compression device  Until discontinued         05/26/24 1857                    Disposition: Continue inpatient care    Luther Jimenez MD  Bone Marrow Transplant  [facility] ([place])

## 2024-05-29 VITALS
OXYGEN SATURATION: 100 % | TEMPERATURE: 98 F | HEART RATE: 85 BPM | SYSTOLIC BLOOD PRESSURE: 102 MMHG | DIASTOLIC BLOOD PRESSURE: 70 MMHG | BODY MASS INDEX: 30.63 KG/M2 | WEIGHT: 195.56 LBS | RESPIRATION RATE: 16 BRPM

## 2024-05-29 LAB
ALBUMIN SERPL BCP-MCNC: 2.1 G/DL (ref 3.5–5.2)
ALP SERPL-CCNC: 50 U/L (ref 55–135)
ALT SERPL W/O P-5'-P-CCNC: 15 U/L (ref 10–44)
ANION GAP SERPL CALC-SCNC: 10 MMOL/L (ref 8–16)
AST SERPL-CCNC: 13 U/L (ref 10–40)
BACTERIA BLD CULT: ABNORMAL
BASOPHILS # BLD AUTO: 0.03 K/UL (ref 0–0.2)
BASOPHILS NFR BLD: 1.5 % (ref 0–1.9)
BILIRUB SERPL-MCNC: 0.5 MG/DL (ref 0.1–1)
BUN SERPL-MCNC: 16 MG/DL (ref 8–23)
CALCIUM SERPL-MCNC: 9.1 MG/DL (ref 8.7–10.5)
CHLORIDE SERPL-SCNC: 111 MMOL/L (ref 95–110)
CO2 SERPL-SCNC: 19 MMOL/L (ref 23–29)
CREAT SERPL-MCNC: 1 MG/DL (ref 0.5–1.4)
DIFFERENTIAL METHOD BLD: ABNORMAL
EOSINOPHIL # BLD AUTO: 0.2 K/UL (ref 0–0.5)
EOSINOPHIL NFR BLD: 10 % (ref 0–8)
ERYTHROCYTE [DISTWIDTH] IN BLOOD BY AUTOMATED COUNT: 17.8 % (ref 11.5–14.5)
EST. GFR  (NO RACE VARIABLE): >60 ML/MIN/1.73 M^2
GLUCOSE SERPL-MCNC: 135 MG/DL (ref 70–110)
HCT VFR BLD AUTO: 27.3 % (ref 40–54)
HGB BLD-MCNC: 8.4 G/DL (ref 14–18)
IMM GRANULOCYTES # BLD AUTO: 0.01 K/UL (ref 0–0.04)
IMM GRANULOCYTES NFR BLD AUTO: 0.5 % (ref 0–0.5)
LYMPHOCYTES # BLD AUTO: 1.2 K/UL (ref 1–4.8)
LYMPHOCYTES NFR BLD: 58 % (ref 18–48)
MAGNESIUM SERPL-MCNC: 1.7 MG/DL (ref 1.6–2.6)
MCH RBC QN AUTO: 29.9 PG (ref 27–31)
MCHC RBC AUTO-ENTMCNC: 30.8 G/DL (ref 32–36)
MCV RBC AUTO: 97 FL (ref 82–98)
MONOCYTES # BLD AUTO: 0.1 K/UL (ref 0.3–1)
MONOCYTES NFR BLD: 6 % (ref 4–15)
NEUTROPHILS # BLD AUTO: 0.5 K/UL (ref 1.8–7.7)
NEUTROPHILS NFR BLD: 24 % (ref 38–73)
NRBC BLD-RTO: 0 /100 WBC
PHOSPHATE SERPL-MCNC: 3.4 MG/DL (ref 2.7–4.5)
PLATELET # BLD AUTO: 231 K/UL (ref 150–450)
PMV BLD AUTO: 11 FL (ref 9.2–12.9)
POCT GLUCOSE: 226 MG/DL (ref 70–110)
POTASSIUM SERPL-SCNC: 3.7 MMOL/L (ref 3.5–5.1)
PROT SERPL-MCNC: 5.8 G/DL (ref 6–8.4)
RBC # BLD AUTO: 2.81 M/UL (ref 4.6–6.2)
SODIUM SERPL-SCNC: 140 MMOL/L (ref 136–145)
WBC # BLD AUTO: 2 K/UL (ref 3.9–12.7)

## 2024-05-29 PROCEDURE — 36415 COLL VENOUS BLD VENIPUNCTURE: CPT | Performed by: STUDENT IN AN ORGANIZED HEALTH CARE EDUCATION/TRAINING PROGRAM

## 2024-05-29 PROCEDURE — 84100 ASSAY OF PHOSPHORUS: CPT | Performed by: STUDENT IN AN ORGANIZED HEALTH CARE EDUCATION/TRAINING PROGRAM

## 2024-05-29 PROCEDURE — 63600175 PHARM REV CODE 636 W HCPCS: Performed by: STUDENT IN AN ORGANIZED HEALTH CARE EDUCATION/TRAINING PROGRAM

## 2024-05-29 PROCEDURE — 85025 COMPLETE CBC W/AUTO DIFF WBC: CPT | Performed by: STUDENT IN AN ORGANIZED HEALTH CARE EDUCATION/TRAINING PROGRAM

## 2024-05-29 PROCEDURE — 63600175 PHARM REV CODE 636 W HCPCS: Performed by: INTERNAL MEDICINE

## 2024-05-29 PROCEDURE — 83735 ASSAY OF MAGNESIUM: CPT | Performed by: STUDENT IN AN ORGANIZED HEALTH CARE EDUCATION/TRAINING PROGRAM

## 2024-05-29 PROCEDURE — 25000003 PHARM REV CODE 250: Performed by: STUDENT IN AN ORGANIZED HEALTH CARE EDUCATION/TRAINING PROGRAM

## 2024-05-29 PROCEDURE — 25000003 PHARM REV CODE 250: Performed by: INTERNAL MEDICINE

## 2024-05-29 PROCEDURE — 99239 HOSP IP/OBS DSCHRG MGMT >30: CPT | Mod: ,,, | Performed by: INTERNAL MEDICINE

## 2024-05-29 PROCEDURE — 80053 COMPREHEN METABOLIC PANEL: CPT | Performed by: STUDENT IN AN ORGANIZED HEALTH CARE EDUCATION/TRAINING PROGRAM

## 2024-05-29 RX ORDER — CEFPODOXIME PROXETIL 100 MG/1
200 TABLET, FILM COATED ORAL EVERY 12 HOURS
Qty: 40 TABLET | Refills: 0 | Status: SHIPPED | OUTPATIENT
Start: 2024-05-29 | End: 2024-06-08

## 2024-05-29 RX ADMIN — CEFEPIME 2 G: 2 INJECTION, POWDER, FOR SOLUTION INTRAVENOUS at 06:05

## 2024-05-29 RX ADMIN — HEPARIN SODIUM 5000 UNITS: 5000 INJECTION INTRAVENOUS; SUBCUTANEOUS at 06:05

## 2024-05-29 RX ADMIN — ACYCLOVIR 400 MG: 200 CAPSULE ORAL at 08:05

## 2024-05-29 RX ADMIN — MUPIROCIN: 20 OINTMENT TOPICAL at 08:05

## 2024-05-29 RX ADMIN — ASPIRIN 81 MG: 81 TABLET, COATED ORAL at 08:05

## 2024-05-29 RX ADMIN — GABAPENTIN 300 MG: 300 CAPSULE ORAL at 08:05

## 2024-05-29 NOTE — ASSESSMENT & PLAN NOTE
Home meds: Amlodipine 10mg, HCTZ 25mg, Clonidine 0.3mg TID     - Hold home meds in setting of infection  - Patient has remained normotensive during hospital stay, will hold medications for now  - Will place internal medicine referral for pcp as outpatient

## 2024-05-29 NOTE — NURSING
Pt being d/c'ed home today, he was wheeled down to the pharmacy to  his meds with his wife, she said she wanted to puch him to the car when they got done. PIV d/c'ed cath intact, pt wendy well, took all belongings with him, v/s stable, no distress noted.

## 2024-05-29 NOTE — SUBJECTIVE & OBJECTIVE
Subjective:     Interval History: No acute events overnight, patient remains afebrile, hemodynamically stable. He feels well and denies any new systemic symptoms. Will have updates from micro lab today. Plan to discharge with oral antibiotics for 2 week course.     Objective:     Vital Signs (Most Recent):  Temp: 98.2 °F (36.8 °C) (05/29/24 1202)  Pulse: 85 (05/29/24 1202)  Resp: 16 (05/29/24 1202)  BP: 102/70 (05/29/24 1202)  SpO2: 100 % (05/29/24 1202) Vital Signs (24h Range):  Temp:  [97.8 °F (36.6 °C)-98.7 °F (37.1 °C)] 98.2 °F (36.8 °C)  Pulse:  [] 85  Resp:  [16-20] 16  SpO2:  [98 %-100 %] 100 %  BP: ()/(60-84) 102/70     Weight: 88.7 kg (195 lb 8.8 oz)  Body mass index is 30.63 kg/m².  Body surface area is 2.05 meters squared.      Intake/Output - Last 3 Shifts       None             Physical Exam  Constitutional:       Appearance: Normal appearance.   HENT:      Head: Normocephalic and atraumatic.      Mouth/Throat:      Mouth: Mucous membranes are moist.      Pharynx: Oropharynx is clear.   Eyes:      Extraocular Movements: Extraocular movements intact.      Pupils: Pupils are equal, round, and reactive to light.   Cardiovascular:      Rate and Rhythm: Normal rate and regular rhythm.      Pulses: Normal pulses.      Heart sounds: Normal heart sounds.   Pulmonary:      Effort: Pulmonary effort is normal.      Breath sounds: Normal breath sounds.   Abdominal:      General: Abdomen is flat.      Palpations: Abdomen is soft.      Tenderness: There is no abdominal tenderness.   Musculoskeletal:         General: Normal range of motion.      Cervical back: Normal range of motion and neck supple.      Right lower leg: No edema.      Left lower leg: No edema.   Skin:     General: Skin is warm and dry.   Neurological:      General: No focal deficit present.      Mental Status: He is alert and oriented to person, place, and time.   Psychiatric:         Mood and Affect: Mood normal.         Behavior:  Behavior normal.            Significant Labs:   All pertinent labs from the last 24 hours have been reviewed.    Diagnostic Results:  I have reviewed all pertinent imaging results/findings within the past 24 hours.

## 2024-05-29 NOTE — PROGRESS NOTES
Admit Assessment    Patient Identification  Jaspreet Walsh Jr.   :  1960  Admit Date:  2024  Attending Provider:  Summer Cartwright MD              Referral:   Pt was admitted to  with a diagnosis of Sepsis, and was admitted this hospital stay due to Sepsis [A41.9].   is involved was referred to the Social Work Department via routine referral.  Patient presents as a 64 y.o. year old single male.    Persons interviewed: patient and SO Catie.    Living Situation:  Lives with partner Catie ([phone]) in own single-story home.  Independent with ADLs.         Resides at [address] [address],   phone: [phone] (home).      (RETIRED) Functional Status Prior  Ambulation Prior: 0-->independent  Transferrin-->independent  Toiletin-->independent    Current or Past Agencies and Description of Services/Supplies    DME  Agency Name: none  Equipment Currently Used at Home: none, past use of walker     Home Health  Agency Name: none     IV Infusion  Agency Name: none     Nutrition: Oral, diabetic diet.      Outpatient Pharmacy:     [id number] - [place], [place] - [address] [facility] AT [facility] [address]  [address] [facility] [address]  Phone: [phone] Fax: [phone]    [facility] [address] [place] [address] - [facility] [place] [address] [facility] [address]  Phone: [phone] Fax: [phone]    [facility] - [place], [place] - [address] [facility]  [address] [facility] [place] [address]  Phone: [phone] Fax: [phone]      Patient Preference of agencies include: none expressed.       Patient/Caregiver informed of right to choose providers or agencies.  Patient provides permission to release any necessary information to [facility] and to Non-[facility] agencies as needed to facilitate patient care, treatment planning, and patient discharge planning.  Written and verbal resources  provided.      Coping   Coping well with support of family. In good spirits today.         Adjustment to Diagnosis and Treatment  Adequate.      Emotional/Behavioral/Cognitive Issues   Hx of anxiety.  No current medications.           History/Current Symptoms of Anxiety/Depression: Yes  History/Current Substance Use:   Social History     Tobacco Use    Smoking status: Former     Current packs/day: 0.00     Average packs/day: 0.3 packs/day for 40.0 years (10.0 ttl pk-yrs)     Types: Cigarettes     Start date:      Quit date:      Years since quittin.4    Smokeless tobacco: Never   Substance and Sexual Activity    Alcohol use: Not on file    Drug use: Not on file    Sexual activity: Not on file       Indications of Abuse/Neglect: No:        Financial:  Payer/Plan Subscr  Sex Relation Sub. Ins. ID Effective Group Num   1. [employer] MANAGE* EUNICE SALAS * 1960 Male Self [id number] 23 [id number]                                   [address] [address]   2. [place]* [place] SAL[place] * 1960 Male Self [id number]* 24                                    [address] [address]                            Other identified concerns/needs:  Awaiting antibiotics recommendations from ID. PO anticipated.      Plan: return home to care of family.      Interventions/Referrals: TBD.    Patient/caregiver engaged in treatment planning process.     providing psychosocial and supportive counseling, resources, education, assistance and discharge planning as appropriate.  Patient/caregiver state understanding of  available resources,  following, remains available.  Given SWer contact info and encouraged to call with any needs or concerns.

## 2024-05-29 NOTE — ASSESSMENT & PLAN NOTE
Patient with MM currently KPD (last received Carfilzomib on 5/15) present to an OSH ED with weakness for two weeks. Was febrile in the ED and U/A consistent with UTI (>100 WBC's) and he has been having dysuria. CXR with no acute findings. Vitals have been stable. Lactate normal. ANC 2K. Covid and flu negative. He was given IVF and started on vanc/zosyn. [place] [facility]     - Cefepime for UTI   - If patient continues to be febrile or becomes unstable will add vanc   - blood culture with GNR growth, will repeat blood cultures on 5/27. Await sensitivities to tailor antibiotics  - CBC daily   - Can give additional IVF if needed (TTE in October 2023 with normal EF)

## 2024-05-29 NOTE — PLAN OF CARE
Pt being d/c'ed home today, verbalized understanding of all d/c teaching done, no distress noted.      Problem: Adult Inpatient Plan of Care  Goal: Plan of Care Review  Outcome: Met  Goal: Patient-Specific Goal (Individualized)  Outcome: Met  Goal: Absence of Hospital-Acquired Illness or Injury  Outcome: Met  Goal: Optimal Comfort and Wellbeing  Outcome: Met  Goal: Readiness for Transition of Care  Outcome: Met

## 2024-05-29 NOTE — PROGRESS NOTES
[facility] ([place] [address])  Hematology  Bone Marrow Transplant  Progress Note    Patient Name: Jaspreet Walsh Jr.  Admission Date: 5/26/2024  Hospital Length of Stay: 3 days  Code Status: Full Code    Subjective:     Interval History: No acute events overnight, patient remains afebrile, hemodynamically stable. He feels well and denies any new systemic symptoms. Will have updates from micro lab today. Plan to discharge with oral antibiotics for 2 week course.     Objective:     Vital Signs (Most Recent):  Temp: 98.2 °F (36.8 °C) (05/29/24 1202)  Pulse: 85 (05/29/24 1202)  Resp: 16 (05/29/24 1202)  BP: 102/70 (05/29/24 1202)  SpO2: 100 % (05/29/24 1202) Vital Signs (24h Range):  Temp:  [97.8 °F (36.6 °C)-98.7 °F (37.1 °C)] 98.2 °F (36.8 °C)  Pulse:  [] 85  Resp:  [16-20] 16  SpO2:  [98 %-100 %] 100 %  BP: ()/(60-84) 102/70     Weight: 88.7 kg (195 lb 8.8 oz)  Body mass index is 30.63 kg/m².  Body surface area is 2.05 meters squared.      Intake/Output - Last 3 Shifts       None             Physical Exam  Constitutional:       Appearance: Normal appearance.   HENT:      Head: Normocephalic and atraumatic.      Mouth/Throat:      Mouth: Mucous membranes are moist.      Pharynx: Oropharynx is clear.   Eyes:      Extraocular Movements: Extraocular movements intact.      Pupils: Pupils are equal, round, and reactive to light.   Cardiovascular:      Rate and Rhythm: Normal rate and regular rhythm.      Pulses: Normal pulses.      Heart sounds: Normal heart sounds.   Pulmonary:      Effort: Pulmonary effort is normal.      Breath sounds: Normal breath sounds.   Abdominal:      General: Abdomen is flat.      Palpations: Abdomen is soft.      Tenderness: There is no abdominal tenderness.   Musculoskeletal:         General: Normal range of motion.      Cervical back: Normal range of motion and neck supple.      Right lower leg: No edema.      Left lower leg: No edema.   Skin:     General: Skin is warm  and dry.   Neurological:      General: No focal deficit present.      Mental Status: He is alert and oriented to person, place, and time.   Psychiatric:         Mood and Affect: Mood normal.         Behavior: Behavior normal.            Significant Labs:   All pertinent labs from the last 24 hours have been reviewed.    Diagnostic Results:  I have reviewed all pertinent imaging results/findings within the past 24 hours.  Assessment/Plan:     * Sepsis  Patient with MM currently KPD (last received Carfilzomib on 5/15) present to an OSH ED with weakness for two weeks. Was febrile in the ED and U/A consistent with UTI (>100 WBC's) and he has been having dysuria. CXR with no acute findings. Vitals have been stable. Lactate normal. ANC 2K. Covid and flu negative. He was given IVF and started on vanc/zosyn. [place] [facility]     - Cefepime for UTI   - If patient continues to be febrile or becomes unstable will add vanc   - blood culture with GNR growth, will repeat blood cultures on 5/27. Await sensitivities to tailor antibiotics  - CBC daily   - Can give additional IVF if needed (TTE in October 2023 with normal EF)     LAN (acute kidney injury)  Cr 2.2 on presentation, baseline has been between 1.5-2 since March 2024   Less likely from MM given response to treatment on recent myeloma labs   Likely pre-renal in setting of infection, he is on anti-htn meds at home, decreased PO intake     - Check kidney function daily, improved with IVF  - Treat UTI     Anemia  Secondary to chemo     - CBC daily   - Transfuse if Hg <7    Diabetes mellitus  Home meds: Metformin 500mg BID and Jardiance   Last HbA1c 9.2 on October 2023     - Diabetic diet   - LDSSI   - Repeat HbA1c     History of auto stem cell transplant  S/P transplant in May 2021 for multiple myeloma, has since relapsed     Multiple myeloma  Patient of Dr. Baker with IgG kappa multiple myeloma s/p auto SCT in May 2021. Patient relapsed in July 2023.  He was treated with Melinda-Kd salvage therapy, was then on Dkd and had progression, so was recently started on KPD (April 2024). Last received Carfilzomib on 5/15. MM labs earlier this month with response to treatment.     - Follow-up with Dr. Baker on 5/31   - Continue ASA while on ImiD's     Essential hypertension  Home meds: Amlodipine 10mg, HCTZ 25mg, Clonidine 0.3mg TID     - Hold home meds in setting of infection  - Patient has remained normotensive during hospital stay, will hold medications for now  - Will place internal medicine referral for pcp as outpatient        VTE Risk Mitigation (From admission, onward)           Ordered     heparin (porcine) injection 5,000 Units  Every 8 hours         05/26/24 1857     IP VTE HIGH RISK PATIENT  Once         05/26/24 1857     Place sequential compression device  Until discontinued         05/26/24 1857                    Disposition: Continue inpatient care. Plan to discharge with PO antibiotics based on sensitivities    Luther Jimenez MD  Bone Marrow Transplant  [facility] ([place])

## 2024-05-29 NOTE — DISCHARGE SUMMARY
[facility] ([place] [address])  Hematology  Bone Marrow Transplant  Discharge Summary      Patient Name: Jaspreet Walsh Jr.  MRN: [id number]  Admission Date: 5/26/2024  Hospital Length of Stay: 3 days  Discharge Date and Time:  05/29/2024 5:43 PM  Attending Physician: Hollie att. providers found   Discharging Provider: Luther Jimenez MD  Primary Care Provider: Hollie, Primary Doctor    HPI: Mr. Jaspreet Walsh Jr. is a 64 y.o. male with multiple myeloma (patient of Dr. Baker) who underwent stem cell transplant in May 2021, now with relapse on KPD (last received Carfilzomibon on 5/15) who was transferred for management of sepsis. He presented to the outside ED with complains of weakness for last 2 weeks since starting chemotherapy. He denies cough, congestion, or shortness of breath. He does report dysuria for the last two days. In the ED he was febrile to 101. ANC was 2K, Hg 9.6, platelets 285. Coags were normal. CMP significant for a Cr of 2.2 and Tbili 1.8. Lactate was normal. U/A consistent with UTI (>100 WBC's). Covid and flu negative. CXR with no acute findings.    * No surgery found *     Hospital Course: 05/27/2024 No acute events overnight, patient remains afebrile, hemodynamically stable. He feels well and denies any new systemic symptoms. Initially planned to discharge patient however his blood cultures resulted with GNR. Will cancel discharge and wait for susceptibilities. Will likely treat for 2 week course once antibiotic regimen is decided. Will keep on IV antibiotics for now.   05/28/2024 No acute events overnight, patient remains afebrile, hemodynamically stable. He feels well and denies any new systemic symptoms. Discussed with microlab. Likely will have updates on 5/29.   05/29/2024 No acute events overnight, patient remains afebrile, hemodynamically stable. He feels well and denies any new systemic symptoms. Will have updates from micro lab today. Plan to discharge with oral antibiotics  for 2 week course.       Goals of Care Treatment Preferences:  Code Status: Full Code          Significant Diagnostic Studies: Labs: CMP   Recent Labs   Lab 05/28/24  0514 05/29/24  0615    140   K 3.6 3.7   * 111*   CO2 19* 19*   * 135*   BUN 21 16   CREATININE 1.2 1.0   CALCIUM 8.5* 9.1   PROT 5.5* 5.8*   ALBUMIN 2.0* 2.1*   BILITOT 0.5 0.5   ALKPHOS 51* 50*   AST 17 13   ALT 18 15   ANIONGAP 8 10    and CBC   Recent Labs   Lab 05/28/24  0514 05/29/24  0615   WBC 1.85* 2.00*   HGB 7.6* 8.4*   HCT 24.2* 27.3*    231       Pending Diagnostic Studies:       None          Final Active Diagnoses:    Diagnosis Date Noted POA    PRINCIPAL PROBLEM:  Sepsis [A41.9] 05/26/2024 Yes    Anemia [D64.9] 05/26/2024 Yes    LAN (acute kidney injury) [N17.9] 05/26/2024 Yes    Diabetes mellitus [E11.9] 10/18/2023 Yes    History of auto stem cell transplant [Z94.84]  Not Applicable    Multiple myeloma [C90.00]  Yes    Essential hypertension [I10] 09/14/2020 Yes      Problems Resolved During this Admission:      Discharged Condition: stable    Disposition: Home or Self Care    Follow Up:   Follow-up Information       No, Primary Doctor Follow up.                           Patient Instructions:   No discharge procedures on file.  Medications:  Reconciled Home Medications:      Medication List        START taking these medications      cefpodoxime 100 MG tablet  Commonly known as: VANTIN  Take 2 tablets (200 mg total) by mouth every 12 (twelve) hours. for 10 days            CONTINUE taking these medications      acyclovir 400 MG tablet  Commonly known as: ZOVIRAX  Take 1 tablet (400 mg total) by mouth 2 (two) times daily.     aspirin 81 MG EC tablet  Commonly known as: ECOTRIN  Take 81 mg by mouth once daily.     atorvastatin 20 MG tablet  Commonly known as: LIPITOR  Take 20 mg by mouth once daily.     dexAMETHasone 4 MG Tab  Commonly known as: DECADRON  Take 10 tablets (40 mg total) by mouth every 7 days. Take  with food before chemotherapy appointments     empagliflozin 10 mg tablet  Commonly known as: Jardiance  Take 10 mg by mouth once daily.     gabapentin 300 MG capsule  Commonly known as: NEURONTIN  Take 1 capsule (300 mg total) by mouth 2 (two) times daily.     metFORMIN 1000 MG tablet  Commonly known as: GLUCOPHAGE  Take 0.5 tablets (500 mg total) by mouth 2 (two) times daily with meals.     POMALYST 4 mg Cap  Generic drug: pomalidomide  TAKE 1 CAPSULE BY MOUTH ONCE  DAILY FOR 21 DAYS ON THEN 7 DAYS OFF     potassium chloride SA 10 MEQ tablet  Commonly known as: K-DUR,KLOR-CON M  TAKE 1 TABLET(10 MEQ) BY MOUTH EVERY DAY            STOP taking these medications      amLODIPine 10 MG tablet  Commonly known as: NORVASC     cloNIDine 0.3 MG tablet  Commonly known as: CATAPRES     hydroCHLOROthiazide 25 MG tablet  Commonly known as: SUSHAM Jimenez MD  Bone Marrow Transplant  [facility] - Stepdown Flex ([place]

## 2024-05-30 ENCOUNTER — PATIENT OUTREACH (OUTPATIENT)
Dept: ADMINISTRATIVE | Facility: CLINIC | Age: 64
End: 2024-05-30
Payer: MEDICARE

## 2024-05-31 ENCOUNTER — OFFICE VISIT (OUTPATIENT)
Dept: HEMATOLOGY/ONCOLOGY | Facility: CLINIC | Age: 64
End: 2024-05-31
Payer: MEDICARE

## 2024-05-31 VITALS
HEART RATE: 101 BPM | OXYGEN SATURATION: 98 % | HEIGHT: 67 IN | WEIGHT: 198.94 LBS | SYSTOLIC BLOOD PRESSURE: 144 MMHG | BODY MASS INDEX: 31.22 KG/M2 | TEMPERATURE: 98 F | RESPIRATION RATE: 17 BRPM | DIASTOLIC BLOOD PRESSURE: 90 MMHG

## 2024-05-31 DIAGNOSIS — D84.821 IMMUNODEFICIENCY DUE TO DRUGS: ICD-10-CM

## 2024-05-31 DIAGNOSIS — Z79.899 IMMUNODEFICIENCY DUE TO DRUGS: ICD-10-CM

## 2024-05-31 DIAGNOSIS — Z94.84 HISTORY OF AUTO STEM CELL TRANSPLANT: ICD-10-CM

## 2024-05-31 DIAGNOSIS — C79.49 METASTASIS OF NEOPLASM TO SPINAL CANAL: Primary | ICD-10-CM

## 2024-05-31 DIAGNOSIS — C90.00 MULTIPLE MYELOMA, REMISSION STATUS UNSPECIFIED: ICD-10-CM

## 2024-05-31 DIAGNOSIS — C90.02 MULTIPLE MYELOMA IN RELAPSE: Primary | ICD-10-CM

## 2024-05-31 LAB — BACTERIA UR CULT: ABNORMAL

## 2024-05-31 PROCEDURE — G2211 COMPLEX E/M VISIT ADD ON: HCPCS | Mod: S$GLB,,, | Performed by: INTERNAL MEDICINE

## 2024-05-31 PROCEDURE — 1101F PT FALLS ASSESS-DOCD LE1/YR: CPT | Mod: CPTII,S$GLB,, | Performed by: INTERNAL MEDICINE

## 2024-05-31 PROCEDURE — 1111F DSCHRG MED/CURRENT MED MERGE: CPT | Mod: CPTII,S$GLB,, | Performed by: INTERNAL MEDICINE

## 2024-05-31 PROCEDURE — 3080F DIAST BP >= 90 MM HG: CPT | Mod: CPTII,S$GLB,, | Performed by: INTERNAL MEDICINE

## 2024-05-31 PROCEDURE — 99999 PR PBB SHADOW E&M-EST. PATIENT-LVL III: CPT | Mod: PBBFAC,,, | Performed by: INTERNAL MEDICINE

## 2024-05-31 PROCEDURE — 4010F ACE/ARB THERAPY RXD/TAKEN: CPT | Mod: CPTII,S$GLB,, | Performed by: INTERNAL MEDICINE

## 2024-05-31 PROCEDURE — 99215 OFFICE O/P EST HI 40 MIN: CPT | Mod: S$GLB,,, | Performed by: INTERNAL MEDICINE

## 2024-05-31 PROCEDURE — 3077F SYST BP >= 140 MM HG: CPT | Mod: CPTII,S$GLB,, | Performed by: INTERNAL MEDICINE

## 2024-05-31 PROCEDURE — 3008F BODY MASS INDEX DOCD: CPT | Mod: CPTII,S$GLB,, | Performed by: INTERNAL MEDICINE

## 2024-05-31 PROCEDURE — 3288F FALL RISK ASSESSMENT DOCD: CPT | Mod: CPTII,S$GLB,, | Performed by: INTERNAL MEDICINE

## 2024-05-31 RX ORDER — EPINEPHRINE 0.3 MG/.3ML
0.3 INJECTION SUBCUTANEOUS ONCE AS NEEDED
Status: CANCELLED | OUTPATIENT
Start: 2024-06-07

## 2024-05-31 RX ORDER — HEPARIN 100 UNIT/ML
500 SYRINGE INTRAVENOUS
Status: CANCELLED | OUTPATIENT
Start: 2024-06-07

## 2024-05-31 RX ORDER — SODIUM CHLORIDE 0.9 % (FLUSH) 0.9 %
10 SYRINGE (ML) INJECTION
OUTPATIENT
Start: 2024-06-21

## 2024-05-31 RX ORDER — HEPARIN 100 UNIT/ML
500 SYRINGE INTRAVENOUS
Status: CANCELLED | OUTPATIENT
Start: 2024-06-14

## 2024-05-31 RX ORDER — HEPARIN 100 UNIT/ML
500 SYRINGE INTRAVENOUS
OUTPATIENT
Start: 2024-06-21

## 2024-05-31 RX ORDER — DIPHENHYDRAMINE HYDROCHLORIDE 50 MG/ML
50 INJECTION INTRAMUSCULAR; INTRAVENOUS ONCE AS NEEDED
Status: CANCELLED | OUTPATIENT
Start: 2024-06-07

## 2024-05-31 RX ORDER — SODIUM CHLORIDE 0.9 % (FLUSH) 0.9 %
10 SYRINGE (ML) INJECTION
Status: CANCELLED | OUTPATIENT
Start: 2024-06-14

## 2024-05-31 RX ORDER — SODIUM CHLORIDE 0.9 % (FLUSH) 0.9 %
10 SYRINGE (ML) INJECTION
Status: CANCELLED | OUTPATIENT
Start: 2024-06-07

## 2024-05-31 NOTE — PROGRESS NOTES
Hold treatment one week   Bacteremic  Complete additional wek of abx and neutropenai   -cbc, cmp kyprolis infusion on 6/7, 6/14, 6/21, 7/5  -add  serum free light chains, quantitative immunoglobulins, serum electropheresis, serum immunofixation for one time draw to labs on 6/21  -MD appt on 6/28; please reschedule pt kiana rodriguez to next available fu slot on 6/28 to make room for mr. dela cruz    Restart clonidine given bp ok  Discuss restartggin norvasc, hctz

## 2024-05-31 NOTE — PROGRESS NOTES
SECTION OF HEMATOLOGY AND BONE MARROW TRANSPLANT  Return Patient Visit   06/02/2024    CHIEF COMPLAINT:   Multiple Myeloma    HISTORY OF PRESENT ILLNESS: Patient of /  64 y.o.male; pmh of IgG kappa multiple myeloma (standard risk CG per msmart criteria, r-iss stage II); diagnosed September 2019 after presenting with symptomatic perispinal plasmacytoma;He has subsequently received  8 cycles of VRd therapy. Was in midst or pre transplant evaluation but due to insurance coverage issues transplant was delayed so was maintained on therapy and those issues have been resolved.    Transplant Course  Patient with IgG Kappa MM and pmh HTN, lytic bone lesion, arthritis and vitamin D deficiency. Admitted 5/15/21 for planned Alea auto SCT for MM. He received 3 bags and a CD34 dose of 3.35 x 10^6 on 5/17/21. Tolerated chemo and transplant well. Admission complicated by n/v controlled with scheduled anti-emetics and c-diff neg diarrhea controlled with prn imodium. He engrafted on Day +13 (5/30/21) with an ANC of 2607. He was discharged home on Day +14 (5/31/21).     Day +100 restaging marrow indicated that he was in PA.     Interval History -06/02/2024     64 y.o.  M with IgG Kappa MM standard risk s/p 8 cycles Vrd followed by Alea Auto 5/17/2021 with disease relapse and Dkd salvage therapy 8/4/2023.     Due to biochemical progression on DKd we transition to KPd bridging therapy. He is on cycle 3 of this. Tolerating well.   SFLC downtrending.     Tentative cilt cell pheresis date early July 2024.        CURRENT MEDICATIONS:   Current Outpatient Medications   Medication Sig    acyclovir (ZOVIRAX) 400 MG tablet Take 1 tablet (400 mg total) by mouth 2 (two) times daily.    aspirin (ECOTRIN) 81 MG EC tablet Take 81 mg by mouth once daily.    atorvastatin (LIPITOR) 20 MG tablet Take 20 mg by mouth once daily.    cefpodoxime (VANTIN) 100 MG tablet Take 2 tablets (200 mg total) by mouth every 12 (twelve) hours. for 10  "days    dexAMETHasone (DECADRON) 4 MG Tab Take 10 tablets (40 mg total) by mouth every 7 days. Take with food before chemotherapy appointments    empagliflozin (JARDIANCE) 10 mg tablet Take 10 mg by mouth once daily.    gabapentin (NEURONTIN) 300 MG capsule Take 1 capsule (300 mg total) by mouth 2 (two) times daily.    metFORMIN (GLUCOPHAGE) 1000 MG tablet Take 0.5 tablets (500 mg total) by mouth 2 (two) times daily with meals.    pomalidomide (POMALYST) 4 mg Cap Take 1 capsule (4 mg total) by mouth once daily TAKE 1 CAPSULE BY MOUTH ONCE  DAILY FOR 21 DAYS ON THEN 7 DAYS OFF on a 28 day cycle. Auth [id number] 5/31/24.    potassium chloride SA (K-DUR,KLOR-CON M) 10 MEQ tablet TAKE 1 TABLET(10 MEQ) BY MOUTH EVERY DAY     No current facility-administered medications for this visit.     ALLERGIES:   Review of patient's allergies indicates:  No Known Allergies    Review of Systems   Constitutional: Negative for fever, malaise/fatigue and weight loss.   Respiratory: Negative for cough and shortness of breath.    Cardiovascular: Negative for chest pain and leg swelling.   Gastrointestinal: Negative for constipation, diarrhea and vomiting.   Skin: Negative for rash.   Neurological: Negative for seizures and weakness.   Psychiatric/Behavioral: The patient is not nervous/anxious.      PHYSICAL EXAM:   Vitals:    05/31/24 0850   BP: (!) 144/90   Pulse: 101   Resp: 17   Temp: 98.1 °F (36.7 °C)   TempSrc: Oral   SpO2: 98%   Weight: 90.2 kg (198 lb 15.4 oz)   Height: 5' 7" (1.702 m)   PainSc: 0-No pain       General - well developed, well nourished, no apparent distress  HEENT - oropharynx clear  Chest and Lung - clear to auscultation bilaterally   Cardiovascular - RRR with no MGR, normal S1 and S2  Abdomen-  soft, nontender, no palpable hepatomegaly or splenomegaly  Lymph - no palpable lymphadenopathy  Heme - no bruising, petechiae, pallor  Skin - no rashes or lesions  Psych - appropriate mood and affect      ECOG Performance " Status: (foot note - ECOG PS provided by Eastern Cooperative Oncology Group) 1 - Symptomatic but completely ambulatory    Karnofsky Performance Score:  90%- Able to Carry on Normal Activity: Minor Symptoms of Disease  DATA:   Lab Results   Component Value Date    WBC 2.82 (L) 05/31/2024    HGB 8.7 (L) 05/31/2024    HCT 28.8 (L) 05/31/2024     (H) 05/31/2024     05/31/2024       Gran # (ANC)   Date Value Ref Range Status   05/31/2024 0.5 (L) 1.8 - 7.7 K/uL Final     Gran %   Date Value Ref Range Status   05/31/2024 18.8 (L) 38.0 - 73.0 % Final     CMP  Sodium   Date Value Ref Range Status   05/31/2024 137 136 - 145 mmol/L Final     Potassium   Date Value Ref Range Status   05/31/2024 4.1 3.5 - 5.1 mmol/L Final     Chloride   Date Value Ref Range Status   05/31/2024 108 95 - 110 mmol/L Final     CO2   Date Value Ref Range Status   05/31/2024 17 (L) 23 - 29 mmol/L Final     Glucose   Date Value Ref Range Status   05/31/2024 196 (H) 70 - 110 mg/dL Final     BUN   Date Value Ref Range Status   05/31/2024 15 8 - 23 mg/dL Final     Creatinine   Date Value Ref Range Status   05/31/2024 1.5 (H) 0.5 - 1.4 mg/dL Final     Calcium   Date Value Ref Range Status   05/31/2024 10.3 8.7 - 10.5 mg/dL Final     Total Protein   Date Value Ref Range Status   05/31/2024 7.0 6.0 - 8.4 g/dL Final     Albumin   Date Value Ref Range Status   05/31/2024 2.7 (L) 3.5 - 5.2 g/dL Final     Total Bilirubin   Date Value Ref Range Status   05/31/2024 0.6 0.1 - 1.0 mg/dL Final     Comment:     For infants and newborns, interpretation of results should be based  on gestational age, weight and in agreement with clinical  observations.    Premature Infant recommended reference ranges:  Up to 24 hours.............<8.0 mg/dL  Up to 48 hours............<12.0 mg/dL  3-5 days..................<15.0 mg/dL  6-29 days.................<15.0 mg/dL       Alkaline Phosphatase   Date Value Ref Range Status   05/31/2024 61 55 - 135 U/L Final     AST    Date Value Ref Range Status   05/31/2024 20 10 - 40 U/L Final     ALT   Date Value Ref Range Status   05/31/2024 18 10 - 44 U/L Final     Anion Gap   Date Value Ref Range Status   05/31/2024 12 8 - 16 mmol/L Final     eGFR   Date Value Ref Range Status   05/31/2024 51.3 (A) >60 mL/min/1.73 m^2 Final     IgG   Date Value Ref Range Status   05/31/2024 1409 650 - 1600 mg/dL Final     Comment:     IgG Cord Blood Reference Range: 650-1600 mg/dL.     IgA   Date Value Ref Range Status   05/31/2024 66 40 - 350 mg/dL Final     Comment:     IgA Cord Blood Reference Range: <5 mg/dL.     IgM   Date Value Ref Range Status   05/31/2024 41 (L) 50 - 300 mg/dL Final     Comment:     IgM Cord Blood Reference Range: <25 mg/dL.     Kappa Free Light Chains   Date Value Ref Range Status   05/01/2024 74.29 (H) 0.33 - 1.94 mg/dL Final   04/03/2024 102.30 (H) 0.33 - 1.94 mg/dL Final   03/06/2024 74.11 (H) 0.33 - 1.94 mg/dL Final     Lambda Free Light Chains   Date Value Ref Range Status   05/01/2024 2.02 0.57 - 2.63 mg/dL Final   04/03/2024 0.30 (L) 0.57 - 2.63 mg/dL Final   03/06/2024 0.31 (L) 0.57 - 2.63 mg/dL Final     Kappa/Lambda FLC Ratio   Date Value Ref Range Status   05/01/2024 36.78 (H) 0.26 - 1.65 Final     Comment:     Undetected antigen excess is a rare event but cannot   be excluded. If these free light chain results do not   agree with other clinical or laboratory findings or   if the sample is from a patient that has previously   demonstrated antigen excess, discuss with the testing   laboratory.   Results should always be interpreted in conjunction   with other laboratory tests and clinical evidence.     04/03/2024 341.00 (H) 0.26 - 1.65 Final     Comment:     Undetected antigen excess is a rare event but cannot   be excluded. If these free light chain results do not   agree with other clinical or laboratory findings or   if the sample is from a patient that has previously   demonstrated antigen excess, discuss with the  testing   laboratory.   Results should always be interpreted in conjunction   with other laboratory tests and clinical evidence.     03/06/2024 239.10 (H) 0.26 - 1.65 Final     Comment:     Undetected antigen excess is a rare event but cannot   be excluded. If these free light chain results do not   agree with other clinical or laboratory findings or   if the sample is from a patient that has previously   demonstrated antigen excess, discuss with the testing   laboratory.   Results should always be interpreted in conjunction   with other laboratory tests and clinical evidence.       Pathologist Interpretation SPE   Date Value Ref Range Status   05/01/2024 REVIEWED  Final     Comment:       Electronically reviewed and signed by:  Alycia Schwartz MD  Signed on 05/02/24 at 13:46  Normal total protein. Normal gamma globulins are decreased.   Paraprotein peak in gamma = 1.4 g/dL (previously, 1.84 g/dL).      04/03/2024 REVIEWED  Final     Comment:       Electronically reviewed and signed by:  Angelita Begum D.O.  Signed on 04/07/24 at 23:40  Normal total protein. Normal gamma globulins are decreased.  Paraprotein peak in gamma = 1.84 g/dL (previously, 1.73 g/dL).     03/06/2024 REVIEWED  Final     Comment:       Electronically reviewed and signed by:  Alycia Schwartz MD  Signed on 03/07/24 at 14:08  Normal total protein. Normal gamma globulins are decreased.   Paraprotein peak in gamma = 1.73 g/dL (previously 1.66 g/dL).        Pathologist Interpretation SADAF   Date Value Ref Range Status   05/01/2024 REVIEWED  Final     Comment:       Electronically reviewed and signed by:  Alycia Schwartz MD  Signed on 05/02/24 at 13:46  IgG kappa specific monoclonal protein band in gamma identified.           No results found. However, due to the size of the patient record, not all encounters were searched. Please check Results Review for a complete set of results.    Assessment and Plan:    1. Multiple myeloma in relapse        2. History of auto stem  cell transplant        3. Immunodeficiency due to drugs                IgG Kappa MM standard risk s/p 8 cycles Vrd followed by Yajaira Auto 5/17/2021 with disease relapse and Dkd salvage therapy 8/4/2023  - standard risk MM diagnosed sept 2020 after presenting with symptomatic lytic disease  -sp VRd induction followed by yajaira 200 autoSCT on 5/17/23 achieving/mainting MA post SCT; was on revlimid maintenance but relapsed biochemically and with new lytic disease approximately 2 years post SCT (June/July 2023)  -initiated Melinda-Kd salvage on 8/4/23; tolerating will no significant AE's   -melinda subq weekly 8>EOW x 8 doses> q 4 week; kyprolis 70mg/m2 day 1, 8, 15 of 28 day cycle; dex 40mg po weekly   -patient with initial good response to therapy but  noted serial biochemical progression confirmed on 4/3/24 repeat serum studies;  mild anemia and jan 2024 PET with ENDY but no other overt symptoms or CRAB and he feels well  - recommended we transition DKd to Kyprolis/pomalyst/dex as likely bridge to CART (cilta-zohra)  -pomalyst 4mg daily day 1-21 of 28 cycle ; KPD initiated April 2024  -tolerating well with mild cytopenias and now downtrending SFLC so responding well  -tentative apheresis date for cilta zohra is early July 2024  - reiniitated asa 81mg daily with ImID  -supportive meds: acyclovir, ca +vit d, zometa (received total of 2 years since disease progression)         Type 2 DM   - decreased dex to 20 mg   -advised patient to follow up with his PCP    Neuropathy  - Controlled with gabapentin    ID  - Completed post SCT vaccines  - Acyclovir as above while in PI     Essential hypertension  -well controled       Hyper Ca  -resolved    -myeloma improving so dont believe cause  -recommended he stop Ca+vit d supplement       FU:     -kyprolis infusion on    6/7, 6/14, 6/28, 7/5, 7/12  -cbc, cmp prior to treatment on  6/14  -cbc, cmp, serum free light chains, quantitative immunoglobulins, serum electropheresis, serum immunofixation  lab and MD appt prior to treatment on 6/28; please reschedule kiana rodriguez to next available clinic slot to make room for mr. dela cruz

## 2024-05-31 NOTE — Clinical Note
-cbc, cmp kyprolis infusion on 6/7, 6/14, 6/21, 7/5 -add  serum free light chains, quantitative immunoglobulins, serum electropheresis, serum immunofixation for one time draw to labs on 6/21 -MD appt on 6/28; please reschedule pt kiana rodriguez to next available fu slot on 6/28 to make room for  perjazmin

## 2024-06-01 LAB
BACTERIA BLD CULT: NORMAL
BACTERIA BLD CULT: NORMAL

## 2024-06-02 RX ORDER — POMALIDOMIDE 4 MG/1
4 CAPSULE ORAL DAILY
Qty: 21 CAPSULE | Refills: 0 | Status: SHIPPED | OUTPATIENT
Start: 2024-06-02

## 2024-06-03 RX ORDER — POTASSIUM CHLORIDE 750 MG/1
10 TABLET, EXTENDED RELEASE ORAL
Qty: 90 TABLET | Refills: 3 | Status: SHIPPED | OUTPATIENT
Start: 2024-06-03

## 2024-06-07 ENCOUNTER — INFUSION (OUTPATIENT)
Dept: INFUSION THERAPY | Facility: HOSPITAL | Age: 64
End: 2024-06-07
Payer: MEDICARE

## 2024-06-07 ENCOUNTER — LAB VISIT (OUTPATIENT)
Dept: LAB | Facility: HOSPITAL | Age: 64
End: 2024-06-07
Payer: MEDICARE

## 2024-06-07 ENCOUNTER — DOCUMENTATION ONLY (OUTPATIENT)
Dept: HEMATOLOGY/ONCOLOGY | Facility: CLINIC | Age: 64
End: 2024-06-07
Payer: MEDICARE

## 2024-06-07 VITALS
BODY MASS INDEX: 32.46 KG/M2 | RESPIRATION RATE: 18 BRPM | WEIGHT: 207.25 LBS | SYSTOLIC BLOOD PRESSURE: 130 MMHG | DIASTOLIC BLOOD PRESSURE: 67 MMHG | TEMPERATURE: 98 F | HEART RATE: 73 BPM

## 2024-06-07 DIAGNOSIS — C90.00 MULTIPLE MYELOMA, REMISSION STATUS UNSPECIFIED: Primary | ICD-10-CM

## 2024-06-07 DIAGNOSIS — C79.49 METASTASIS OF NEOPLASM TO SPINAL CANAL: ICD-10-CM

## 2024-06-07 LAB
ALBUMIN SERPL BCP-MCNC: 2.9 G/DL (ref 3.5–5.2)
ALP SERPL-CCNC: 56 U/L (ref 55–135)
ALT SERPL W/O P-5'-P-CCNC: 10 U/L (ref 10–44)
ANION GAP SERPL CALC-SCNC: 5 MMOL/L (ref 8–16)
ANISOCYTOSIS BLD QL SMEAR: SLIGHT
AST SERPL-CCNC: 13 U/L (ref 10–40)
BASOPHILS # BLD AUTO: 0.03 K/UL (ref 0–0.2)
BASOPHILS NFR BLD: 0.9 % (ref 0–1.9)
BILIRUB SERPL-MCNC: 0.5 MG/DL (ref 0.1–1)
BUN SERPL-MCNC: 11 MG/DL (ref 8–23)
CALCIUM SERPL-MCNC: 8.5 MG/DL (ref 8.7–10.5)
CHLORIDE SERPL-SCNC: 115 MMOL/L (ref 95–110)
CO2 SERPL-SCNC: 20 MMOL/L (ref 23–29)
CREAT SERPL-MCNC: 1 MG/DL (ref 0.5–1.4)
DIFFERENTIAL METHOD BLD: ABNORMAL
EOSINOPHIL # BLD AUTO: 0.1 K/UL (ref 0–0.5)
EOSINOPHIL NFR BLD: 2.1 % (ref 0–8)
ERYTHROCYTE [DISTWIDTH] IN BLOOD BY AUTOMATED COUNT: 20.2 % (ref 11.5–14.5)
EST. GFR  (NO RACE VARIABLE): >60 ML/MIN/1.73 M^2
GLUCOSE SERPL-MCNC: 169 MG/DL (ref 70–110)
HCT VFR BLD AUTO: 30 % (ref 40–54)
HGB BLD-MCNC: 8.8 G/DL (ref 14–18)
IMM GRANULOCYTES # BLD AUTO: 0.02 K/UL (ref 0–0.04)
IMM GRANULOCYTES NFR BLD AUTO: 0.6 % (ref 0–0.5)
LYMPHOCYTES # BLD AUTO: 1 K/UL (ref 1–4.8)
LYMPHOCYTES NFR BLD: 30.8 % (ref 18–48)
MCH RBC QN AUTO: 30.9 PG (ref 27–31)
MCHC RBC AUTO-ENTMCNC: 29.3 G/DL (ref 32–36)
MCV RBC AUTO: 105 FL (ref 82–98)
MONOCYTES # BLD AUTO: 0.2 K/UL (ref 0.3–1)
MONOCYTES NFR BLD: 5.1 % (ref 4–15)
NEUTROPHILS # BLD AUTO: 2 K/UL (ref 1.8–7.7)
NEUTROPHILS NFR BLD: 60.5 % (ref 38–73)
NRBC BLD-RTO: 1 /100 WBC
PLATELET # BLD AUTO: 293 K/UL (ref 150–450)
PLATELET BLD QL SMEAR: ABNORMAL
PMV BLD AUTO: 10.1 FL (ref 9.2–12.9)
POIKILOCYTOSIS BLD QL SMEAR: SLIGHT
POTASSIUM SERPL-SCNC: 4.2 MMOL/L (ref 3.5–5.1)
PROT SERPL-MCNC: 6 G/DL (ref 6–8.4)
RBC # BLD AUTO: 2.85 M/UL (ref 4.6–6.2)
SODIUM SERPL-SCNC: 140 MMOL/L (ref 136–145)
WBC # BLD AUTO: 3.31 K/UL (ref 3.9–12.7)

## 2024-06-07 PROCEDURE — 80053 COMPREHEN METABOLIC PANEL: CPT | Performed by: INTERNAL MEDICINE

## 2024-06-07 PROCEDURE — 25000003 PHARM REV CODE 250: Performed by: INTERNAL MEDICINE

## 2024-06-07 PROCEDURE — 85025 COMPLETE CBC W/AUTO DIFF WBC: CPT | Performed by: INTERNAL MEDICINE

## 2024-06-07 PROCEDURE — 36415 COLL VENOUS BLD VENIPUNCTURE: CPT | Performed by: INTERNAL MEDICINE

## 2024-06-07 PROCEDURE — 96413 CHEMO IV INFUSION 1 HR: CPT

## 2024-06-07 PROCEDURE — 63600175 PHARM REV CODE 636 W HCPCS: Mod: JZ,JG | Performed by: INTERNAL MEDICINE

## 2024-06-07 RX ORDER — SODIUM CHLORIDE 0.9 % (FLUSH) 0.9 %
10 SYRINGE (ML) INJECTION
Status: DISCONTINUED | OUTPATIENT
Start: 2024-06-07 | End: 2024-06-07 | Stop reason: HOSPADM

## 2024-06-07 RX ORDER — EPINEPHRINE 0.3 MG/.3ML
0.3 INJECTION SUBCUTANEOUS ONCE AS NEEDED
Status: DISCONTINUED | OUTPATIENT
Start: 2024-06-07 | End: 2024-06-07 | Stop reason: HOSPADM

## 2024-06-07 RX ORDER — HEPARIN 100 UNIT/ML
500 SYRINGE INTRAVENOUS
Status: DISCONTINUED | OUTPATIENT
Start: 2024-06-07 | End: 2024-06-07 | Stop reason: HOSPADM

## 2024-06-07 RX ORDER — DIPHENHYDRAMINE HYDROCHLORIDE 50 MG/ML
50 INJECTION INTRAMUSCULAR; INTRAVENOUS ONCE AS NEEDED
Status: DISCONTINUED | OUTPATIENT
Start: 2024-06-07 | End: 2024-06-07 | Stop reason: HOSPADM

## 2024-06-07 RX ADMIN — SODIUM CHLORIDE: 9 INJECTION, SOLUTION INTRAVENOUS at 08:06

## 2024-06-07 RX ADMIN — CARFILZOMIB 150 MG: 30 INJECTION, POWDER, LYOPHILIZED, FOR SOLUTION INTRAVENOUS at 09:06

## 2024-06-07 NOTE — PLAN OF CARE
Problem: Chemotherapy Effects  Goal: Anemia Symptom Improvement  Outcome: Progressing  Goal: Safety Maintained  Outcome: Progressing  Goal: Absence of Hematuria  Outcome: Progressing  Goal: Nausea and Vomiting Relief  Outcome: Progressing  Goal: Neurotoxicity Symptom Control  Outcome: Progressing  Goal: Absence of Infection  Outcome: Progressing  Goal: Absence of Bleeding  Outcome: Progressing     Problem: Fatigue  Goal: Improved Activity Tolerance  Outcome: Progressing     Problem: Anemia  Goal: Anemia Symptom Improvement  Outcome: Progressing     Problem: Infection  Goal: Absence of Infection Signs and Symptoms  Outcome: Progressing   Pt completed C 11 Kyprolis via PICV without adverse effects VS WNL. Discharged AAOX4 and ambualtory

## 2024-06-10 NOTE — PROGRESS NOTES
Spoke to wife. States they are not sure about CarT therapy.  She wants to meet with DR Baker prior to planning out.  Confirmed appt on 6/28.  She did not want to move appt.  Will notify Dr Baker

## 2024-06-11 DIAGNOSIS — C90.00 MULTIPLE MYELOMA, REMISSION STATUS UNSPECIFIED: Primary | ICD-10-CM

## 2024-06-14 ENCOUNTER — INFUSION (OUTPATIENT)
Dept: INFUSION THERAPY | Facility: HOSPITAL | Age: 64
End: 2024-06-14
Payer: MEDICARE

## 2024-06-14 ENCOUNTER — LAB VISIT (OUTPATIENT)
Dept: LAB | Facility: HOSPITAL | Age: 64
End: 2024-06-14
Payer: MEDICARE

## 2024-06-14 VITALS
BODY MASS INDEX: 32.53 KG/M2 | WEIGHT: 207.25 LBS | HEART RATE: 58 BPM | HEIGHT: 67 IN | TEMPERATURE: 98 F | DIASTOLIC BLOOD PRESSURE: 73 MMHG | SYSTOLIC BLOOD PRESSURE: 114 MMHG | RESPIRATION RATE: 18 BRPM

## 2024-06-14 DIAGNOSIS — C79.49 METASTASIS OF NEOPLASM TO SPINAL CANAL: ICD-10-CM

## 2024-06-14 DIAGNOSIS — C90.00 MULTIPLE MYELOMA, REMISSION STATUS UNSPECIFIED: Primary | ICD-10-CM

## 2024-06-14 LAB
ALBUMIN SERPL BCP-MCNC: 3.1 G/DL (ref 3.5–5.2)
ALP SERPL-CCNC: 55 U/L (ref 55–135)
ALT SERPL W/O P-5'-P-CCNC: 9 U/L (ref 10–44)
ANION GAP SERPL CALC-SCNC: 6 MMOL/L (ref 8–16)
ANISOCYTOSIS BLD QL SMEAR: SLIGHT
AST SERPL-CCNC: 10 U/L (ref 10–40)
BASOPHILS # BLD AUTO: 0.03 K/UL (ref 0–0.2)
BASOPHILS NFR BLD: 0.9 % (ref 0–1.9)
BILIRUB SERPL-MCNC: 0.6 MG/DL (ref 0.1–1)
BUN SERPL-MCNC: 8 MG/DL (ref 8–23)
BURR CELLS BLD QL SMEAR: ABNORMAL
CALCIUM SERPL-MCNC: 8.2 MG/DL (ref 8.7–10.5)
CHLORIDE SERPL-SCNC: 113 MMOL/L (ref 95–110)
CO2 SERPL-SCNC: 20 MMOL/L (ref 23–29)
CREAT SERPL-MCNC: 0.9 MG/DL (ref 0.5–1.4)
DIFFERENTIAL METHOD BLD: ABNORMAL
EOSINOPHIL # BLD AUTO: 0.1 K/UL (ref 0–0.5)
EOSINOPHIL NFR BLD: 1.4 % (ref 0–8)
ERYTHROCYTE [DISTWIDTH] IN BLOOD BY AUTOMATED COUNT: 21.2 % (ref 11.5–14.5)
EST. GFR  (NO RACE VARIABLE): >60 ML/MIN/1.73 M^2
GLUCOSE SERPL-MCNC: 151 MG/DL (ref 70–110)
HCT VFR BLD AUTO: 30.4 % (ref 40–54)
HGB BLD-MCNC: 9.3 G/DL (ref 14–18)
IMM GRANULOCYTES # BLD AUTO: 0.04 K/UL (ref 0–0.04)
IMM GRANULOCYTES NFR BLD AUTO: 1.2 % (ref 0–0.5)
LYMPHOCYTES # BLD AUTO: 1.4 K/UL (ref 1–4.8)
LYMPHOCYTES NFR BLD: 39.7 % (ref 18–48)
MCH RBC QN AUTO: 31.8 PG (ref 27–31)
MCHC RBC AUTO-ENTMCNC: 30.6 G/DL (ref 32–36)
MCV RBC AUTO: 104 FL (ref 82–98)
MONOCYTES # BLD AUTO: 0.1 K/UL (ref 0.3–1)
MONOCYTES NFR BLD: 3.2 % (ref 4–15)
NEUTROPHILS # BLD AUTO: 1.9 K/UL (ref 1.8–7.7)
NEUTROPHILS NFR BLD: 53.6 % (ref 38–73)
NRBC BLD-RTO: 1 /100 WBC
OVALOCYTES BLD QL SMEAR: ABNORMAL
PLATELET # BLD AUTO: 132 K/UL (ref 150–450)
PLATELET BLD QL SMEAR: ABNORMAL
PMV BLD AUTO: 12.9 FL (ref 9.2–12.9)
POIKILOCYTOSIS BLD QL SMEAR: SLIGHT
POLYCHROMASIA BLD QL SMEAR: ABNORMAL
POTASSIUM SERPL-SCNC: 3.8 MMOL/L (ref 3.5–5.1)
PROT SERPL-MCNC: 6 G/DL (ref 6–8.4)
RBC # BLD AUTO: 2.92 M/UL (ref 4.6–6.2)
SODIUM SERPL-SCNC: 139 MMOL/L (ref 136–145)
WBC # BLD AUTO: 3.45 K/UL (ref 3.9–12.7)

## 2024-06-14 PROCEDURE — 80053 COMPREHEN METABOLIC PANEL: CPT | Performed by: INTERNAL MEDICINE

## 2024-06-14 PROCEDURE — 36415 COLL VENOUS BLD VENIPUNCTURE: CPT | Performed by: INTERNAL MEDICINE

## 2024-06-14 PROCEDURE — 96413 CHEMO IV INFUSION 1 HR: CPT

## 2024-06-14 PROCEDURE — 63600175 PHARM REV CODE 636 W HCPCS: Mod: JZ,JG | Performed by: INTERNAL MEDICINE

## 2024-06-14 PROCEDURE — 25000003 PHARM REV CODE 250: Performed by: INTERNAL MEDICINE

## 2024-06-14 PROCEDURE — 85025 COMPLETE CBC W/AUTO DIFF WBC: CPT | Performed by: INTERNAL MEDICINE

## 2024-06-14 RX ORDER — SODIUM CHLORIDE 0.9 % (FLUSH) 0.9 %
10 SYRINGE (ML) INJECTION
Status: DISCONTINUED | OUTPATIENT
Start: 2024-06-14 | End: 2024-06-14 | Stop reason: HOSPADM

## 2024-06-14 RX ORDER — HEPARIN 100 UNIT/ML
500 SYRINGE INTRAVENOUS
Status: DISCONTINUED | OUTPATIENT
Start: 2024-06-14 | End: 2024-06-14 | Stop reason: HOSPADM

## 2024-06-14 RX ADMIN — SODIUM CHLORIDE: 9 INJECTION, SOLUTION INTRAVENOUS at 09:06

## 2024-06-14 RX ADMIN — CARFILZOMIB 150 MG: 30 INJECTION, POWDER, LYOPHILIZED, FOR SOLUTION INTRAVENOUS at 10:06

## 2024-06-21 ENCOUNTER — INFUSION (OUTPATIENT)
Dept: INFUSION THERAPY | Facility: HOSPITAL | Age: 64
End: 2024-06-21
Payer: MEDICARE

## 2024-06-21 VITALS
TEMPERATURE: 98 F | SYSTOLIC BLOOD PRESSURE: 122 MMHG | RESPIRATION RATE: 18 BRPM | DIASTOLIC BLOOD PRESSURE: 74 MMHG | HEART RATE: 64 BPM | WEIGHT: 206.81 LBS | HEIGHT: 67 IN | BODY MASS INDEX: 32.46 KG/M2

## 2024-06-21 DIAGNOSIS — C90.00 MULTIPLE MYELOMA, REMISSION STATUS UNSPECIFIED: Primary | ICD-10-CM

## 2024-06-21 PROCEDURE — 63600175 PHARM REV CODE 636 W HCPCS: Mod: JZ,JG | Performed by: INTERNAL MEDICINE

## 2024-06-21 PROCEDURE — 25000003 PHARM REV CODE 250: Performed by: INTERNAL MEDICINE

## 2024-06-21 PROCEDURE — 96413 CHEMO IV INFUSION 1 HR: CPT

## 2024-06-21 RX ORDER — HEPARIN 100 UNIT/ML
500 SYRINGE INTRAVENOUS
Status: DISCONTINUED | OUTPATIENT
Start: 2024-06-21 | End: 2024-06-21 | Stop reason: HOSPADM

## 2024-06-21 RX ORDER — SODIUM CHLORIDE 0.9 % (FLUSH) 0.9 %
10 SYRINGE (ML) INJECTION
Status: DISCONTINUED | OUTPATIENT
Start: 2024-06-21 | End: 2024-06-21 | Stop reason: HOSPADM

## 2024-06-21 RX ADMIN — CARFILZOMIB 150 MG: 30 INJECTION, POWDER, LYOPHILIZED, FOR SOLUTION INTRAVENOUS at 12:06

## 2024-06-21 NOTE — PLAN OF CARE
1130 pt here for [id number] Kyprolis infusion, labs, hx, meds, allergies reviewed, pt with no new complaints at this time, reclined in chair, continue to monitor

## 2024-06-21 NOTE — PLAN OF CARE
1300 pt tolerated Kyprolis infusion without issue, pt to rtc 6/28/24, no distress noted upon d/c to home with wife

## 2024-06-28 ENCOUNTER — OFFICE VISIT (OUTPATIENT)
Dept: HEMATOLOGY/ONCOLOGY | Facility: CLINIC | Age: 64
End: 2024-06-28
Payer: MEDICARE

## 2024-06-28 VITALS
HEIGHT: 67 IN | WEIGHT: 210.56 LBS | HEART RATE: 74 BPM | DIASTOLIC BLOOD PRESSURE: 69 MMHG | OXYGEN SATURATION: 100 % | BODY MASS INDEX: 33.05 KG/M2 | SYSTOLIC BLOOD PRESSURE: 136 MMHG | RESPIRATION RATE: 17 BRPM

## 2024-06-28 DIAGNOSIS — Z79.899 IMMUNODEFICIENCY DUE TO DRUGS: ICD-10-CM

## 2024-06-28 DIAGNOSIS — C90.00 MULTIPLE MYELOMA, REMISSION STATUS UNSPECIFIED: Primary | ICD-10-CM

## 2024-06-28 DIAGNOSIS — D84.821 IMMUNODEFICIENCY DUE TO DRUGS: ICD-10-CM

## 2024-06-28 DIAGNOSIS — Z94.84 HISTORY OF AUTO STEM CELL TRANSPLANT: ICD-10-CM

## 2024-06-28 PROCEDURE — 3008F BODY MASS INDEX DOCD: CPT | Mod: CPTII,S$GLB,, | Performed by: INTERNAL MEDICINE

## 2024-06-28 PROCEDURE — 3075F SYST BP GE 130 - 139MM HG: CPT | Mod: CPTII,S$GLB,, | Performed by: INTERNAL MEDICINE

## 2024-06-28 PROCEDURE — G2211 COMPLEX E/M VISIT ADD ON: HCPCS | Mod: S$GLB,,, | Performed by: INTERNAL MEDICINE

## 2024-06-28 PROCEDURE — 4010F ACE/ARB THERAPY RXD/TAKEN: CPT | Mod: CPTII,S$GLB,, | Performed by: INTERNAL MEDICINE

## 2024-06-28 PROCEDURE — 99999 PR PBB SHADOW E&M-EST. PATIENT-LVL II: CPT | Mod: PBBFAC,,, | Performed by: INTERNAL MEDICINE

## 2024-06-28 PROCEDURE — 3078F DIAST BP <80 MM HG: CPT | Mod: CPTII,S$GLB,, | Performed by: INTERNAL MEDICINE

## 2024-06-28 PROCEDURE — 1111F DSCHRG MED/CURRENT MED MERGE: CPT | Mod: CPTII,S$GLB,, | Performed by: INTERNAL MEDICINE

## 2024-06-28 PROCEDURE — 99215 OFFICE O/P EST HI 40 MIN: CPT | Mod: S$GLB,,, | Performed by: INTERNAL MEDICINE

## 2024-06-28 RX ORDER — SODIUM CHLORIDE 0.9 % (FLUSH) 0.9 %
10 SYRINGE (ML) INJECTION
Status: CANCELLED | OUTPATIENT
Start: 2024-07-05

## 2024-06-28 RX ORDER — EPINEPHRINE 0.3 MG/.3ML
0.3 INJECTION SUBCUTANEOUS ONCE AS NEEDED
Status: CANCELLED | OUTPATIENT
Start: 2024-07-05

## 2024-06-28 RX ORDER — HEPARIN 100 UNIT/ML
500 SYRINGE INTRAVENOUS
Status: CANCELLED | OUTPATIENT
Start: 2024-07-05

## 2024-06-28 RX ORDER — HEPARIN 100 UNIT/ML
500 SYRINGE INTRAVENOUS
OUTPATIENT
Start: 2024-07-19

## 2024-06-28 RX ORDER — HEPARIN 100 UNIT/ML
500 SYRINGE INTRAVENOUS
Status: CANCELLED | OUTPATIENT
Start: 2024-07-12

## 2024-06-28 RX ORDER — DIPHENHYDRAMINE HYDROCHLORIDE 50 MG/ML
50 INJECTION INTRAMUSCULAR; INTRAVENOUS ONCE AS NEEDED
Status: CANCELLED | OUTPATIENT
Start: 2024-07-05

## 2024-06-28 RX ORDER — SODIUM CHLORIDE 0.9 % (FLUSH) 0.9 %
10 SYRINGE (ML) INJECTION
Status: CANCELLED | OUTPATIENT
Start: 2024-07-12

## 2024-06-28 RX ORDER — SODIUM CHLORIDE 0.9 % (FLUSH) 0.9 %
10 SYRINGE (ML) INJECTION
OUTPATIENT
Start: 2024-07-19

## 2024-06-28 RX ORDER — EMPAGLIFLOZIN 25 MG/1
25 TABLET, FILM COATED ORAL
COMMUNITY
Start: 2024-06-03

## 2024-06-28 NOTE — Clinical Note
-leave all current cart associated appts  -kyprolis infusion on 7/12, 7/19, 8/2 -cbc, cmp, serum free light chains, quantitative immunoglobulins, serum electropheresis, serum immunofixation lab and MD appt prior to treatment on 8/2  -debra, I can consent him for CART on 8/2; veronique and maggy we will need to move pts bush, babita, and etelvina to np or next open slot and consolidate those 3 appts to make an hour long appt for CART consent on 8/2

## 2024-06-28 NOTE — PROGRESS NOTES
SECTION OF HEMATOLOGY AND BONE MARROW TRANSPLANT  Return Patient Visit   07/09/2024    CHIEF COMPLAINT:   Multiple Myeloma    HISTORY OF PRESENT ILLNESS: Patient of /  64 y.o.male; pmh of IgG kappa multiple myeloma (standard risk CG per msmart criteria, r-iss stage II); diagnosed September 2019 after presenting with symptomatic perispinal plasmacytoma;He has subsequently received  8 cycles of VRd therapy. Was in midst or pre transplant evaluation but due to insurance coverage issues transplant was delayed so was maintained on therapy and those issues have been resolved.    Transplant Course  Patient with IgG Kappa MM and pmh HTN, lytic bone lesion, arthritis and vitamin D deficiency. Admitted 5/15/21 for planned Alea auto SCT for MM. He received 3 bags and a CD34 dose of 3.35 x 10^6 on 5/17/21. Tolerated chemo and transplant well. Admission complicated by n/v controlled with scheduled anti-emetics and c-diff neg diarrhea controlled with prn imodium. He engrafted on Day +13 (5/30/21) with an ANC of 2607. He was discharged home on Day +14 (5/31/21).     Day +100 restaging marrow indicated that he was in [place].     Interval History -07/09/2024     64 y.o.  M with IgG Kappa MM standard risk s/p 8 cycles Vrd followed by Alea Auto 5/17/2021 with disease relapse and Dkd salvage therapy 8/4/2023.     Due to biochemical progression on DKd we transition to KPd bridging therapy.   He is responding and tolerating well.   Wants to pursue Cilta-Kaitlyn.           CURRENT MEDICATIONS:   Current Outpatient Medications   Medication Sig    acyclovir (ZOVIRAX) 400 MG tablet Take 1 tablet (400 mg total) by mouth 2 (two) times daily.    aspirin (ECOTRIN) 81 MG EC tablet Take 81 mg by mouth once daily.    atorvastatin (LIPITOR) 20 MG tablet Take 20 mg by mouth once daily.    dexAMETHasone (DECADRON) 4 MG Tab Take 10 tablets (40 mg total) by mouth every 7 days. Take with food before chemotherapy appointments    gabapentin  "(NEURONTIN) 300 MG capsule Take 1 capsule (300 mg total) by mouth 2 (two) times daily.    JARDIANCE 25 mg tablet 25 mg.    metFORMIN (GLUCOPHAGE) 1000 MG tablet Take 0.5 tablets (500 mg total) by mouth 2 (two) times daily with meals.    pomalidomide (POMALYST) 4 mg Cap Take 1 capsule (4 mg total) by mouth once daily TAKE 1 CAPSULE BY MOUTH ONCE  DAILY FOR 21 DAYS ON THEN 7 DAYS OFF on a 28 day cycle. [facility] [id number] 5/31/24.    potassium chloride SA (K-DUR,KLOR-CON M) 10 MEQ tablet TAKE 1 TABLET(10 MEQ) BY MOUTH EVERY DAY     No current facility-administered medications for this visit.     ALLERGIES:   Review of patient's allergies indicates:  No Known Allergies    Review of Systems   Constitutional: Negative for fever, malaise/fatigue and weight loss.   Respiratory: Negative for cough and shortness of breath.    Cardiovascular: Negative for chest pain and leg swelling.   Gastrointestinal: Negative for constipation, diarrhea and vomiting.   Skin: Negative for rash.   Neurological: Negative for seizures and weakness.   Psychiatric/Behavioral: The patient is not nervous/anxious.      PHYSICAL EXAM:   Vitals:    06/28/24 0938   BP: 136/69   Pulse: 74   Resp: 17   SpO2: 100%   Weight: 95.5 kg (210 lb 8.6 oz)   Height: 5' 7" (1.702 m)   PainSc: 0-No pain       General - well developed, well nourished, no apparent distress  HEENT - oropharynx clear  Chest and Lung - clear to auscultation bilaterally   Cardiovascular - RRR with no MGR, normal S1 and S2  Abdomen-  soft, nontender, no palpable hepatomegaly or splenomegaly  Lymph - no palpable lymphadenopathy  Heme - no bruising, petechiae, pallor  Skin - no rashes or lesions  Psych - appropriate mood and affect      ECOG Performance Status: (foot note - ECOG PS provided by Eastern Cooperative Oncology Group) 1 - Symptomatic but completely ambulatory    Karnofsky Performance Score:  90%- Able to Carry on Normal Activity: Minor Symptoms of Disease  DATA:   Lab Results "   Component Value Date    WBC 3.54 (L) 07/05/2024    HGB 10.8 (L) 07/05/2024    HCT 34.9 (L) 07/05/2024     (H) 07/05/2024     07/05/2024       Gran # (ANC)   Date Value Ref Range Status   07/05/2024 1.7 (L) 1.8 - 7.7 K/uL Final     Gran %   Date Value Ref Range Status   07/05/2024 48.6 38.0 - 73.0 % Final     CMP  Sodium   Date Value Ref Range Status   07/05/2024 140 136 - 145 mmol/L Final     Potassium   Date Value Ref Range Status   07/05/2024 4.0 3.5 - 5.1 mmol/L Final     Chloride   Date Value Ref Range Status   07/05/2024 112 (H) 95 - 110 mmol/L Final     CO2   Date Value Ref Range Status   07/05/2024 20 (L) 23 - 29 mmol/L Final     Glucose   Date Value Ref Range Status   07/05/2024 155 (H) 70 - 110 mg/dL Final     BUN   Date Value Ref Range Status   07/05/2024 14 8 - 23 mg/dL Final     Creatinine   Date Value Ref Range Status   07/05/2024 1.0 0.5 - 1.4 mg/dL Final     Calcium   Date Value Ref Range Status   07/05/2024 9.2 8.7 - 10.5 mg/dL Final     Total Protein   Date Value Ref Range Status   07/05/2024 6.3 6.0 - 8.4 g/dL Final     Albumin   Date Value Ref Range Status   07/05/2024 3.4 (L) 3.5 - 5.2 g/dL Final     Total Bilirubin   Date Value Ref Range Status   07/05/2024 0.5 0.1 - 1.0 mg/dL Final     Comment:     For infants and newborns, interpretation of results should be based  on gestational age, weight and in agreement with clinical  observations.    Premature Infant recommended reference ranges:  Up to 24 hours.............<8.0 mg/dL  Up to 48 hours............<12.0 mg/dL  3-5 days..................<15.0 mg/dL  6-29 days.................<15.0 mg/dL       Alkaline Phosphatase   Date Value Ref Range Status   07/05/2024 55 55 - 135 U/L Final     AST   Date Value Ref Range Status   07/05/2024 10 10 - 40 U/L Final     ALT   Date Value Ref Range Status   07/05/2024 8 (L) 10 - 44 U/L Final     Anion Gap   Date Value Ref Range Status   07/05/2024 8 8 - 16 mmol/L Final     eGFR   Date Value Ref  Range Status   07/05/2024 >60.0 >60 mL/min/1.73 m^2 Final     IgG   Date Value Ref Range Status   06/21/2024 882 650 - 1600 mg/dL Final     Comment:     IgG Cord Blood Reference Range: 650-1600 mg/dL.     IgA   Date Value Ref Range Status   06/21/2024 31 (L) 40 - 350 mg/dL Final     Comment:     IgA Cord Blood Reference Range: <5 mg/dL.     IgM   Date Value Ref Range Status   06/21/2024 25 (L) 50 - 300 mg/dL Final     Comment:     IgM Cord Blood Reference Range: <25 mg/dL.     Kappa Free Light Chains   Date Value Ref Range Status   06/21/2024 26.02 (H) 0.33 - 1.94 mg/dL Final   05/31/2024 48.24 (H) 0.33 - 1.94 mg/dL Final   05/01/2024 74.29 (H) 0.33 - 1.94 mg/dL Final     Lambda Free Light Chains   Date Value Ref Range Status   06/21/2024 0.89 0.57 - 2.63 mg/dL Final   05/31/2024 3.96 (H) 0.57 - 2.63 mg/dL Final   05/01/2024 2.02 0.57 - 2.63 mg/dL Final     Kappa/Lambda FLC Ratio   Date Value Ref Range Status   06/21/2024 29.24 (H) 0.26 - 1.65 Final     Comment:     Undetected antigen excess is a rare event but cannot   be excluded. If these free light chain results do not   agree with other clinical or laboratory findings or   if the sample is from a patient that has previously   demonstrated antigen excess, discuss with the testing   laboratory.   Results should always be interpreted in conjunction   with other laboratory tests and clinical evidence.     05/31/2024 12.18 (H) 0.26 - 1.65 Final     Comment:     Undetected antigen excess is a rare event but cannot   be excluded. If these free light chain results do not   agree with other clinical or laboratory findings or   if the sample is from a patient that has previously   demonstrated antigen excess, discuss with the testing   laboratory.   Results should always be interpreted in conjunction   with other laboratory tests and clinical evidence.     05/01/2024 36.78 (H) 0.26 - 1.65 Final     Comment:     Undetected antigen excess is a rare event but cannot   be  excluded. If these free light chain results do not   agree with other clinical or laboratory findings or   if the sample is from a patient that has previously   demonstrated antigen excess, discuss with the testing   laboratory.   Results should always be interpreted in conjunction   with other laboratory tests and clinical evidence.       Pathologist Interpretation SPE   Date Value Ref Range Status   06/21/2024 REVIEWED  Final     Comment:       Electronically reviewed and signed by:  Leslie Sorenson M.D.  Signed on 06/25/24 at 12:35  Decreased total protein.Normal gamma globulins are decreased.   Paraprotein peak in gamma = 0.53 g/dL (previously 0.79 g/dL).      05/31/2024 REVIEWED  Final     Comment:       Electronically reviewed and signed by:  Leslie Sorenson M.D.  Signed on 06/04/24 at 14:56  Normal total protein.   Normal gamma globulins are decreased. Paraprotein peak in gamma =   0.79 g/dL (previously, 1.4 g/dL).      05/01/2024 REVIEWED  Final     Comment:       Electronically reviewed and signed by:  Alycia Schwartz MD  Signed on 05/02/24 at 13:46  Normal total protein. Normal gamma globulins are decreased.   Paraprotein peak in gamma = 1.4 g/dL (previously, 1.84 g/dL).        Pathologist Interpretation SADAF   Date Value Ref Range Status   06/21/2024 REVIEWED  Final     Comment:       Electronically reviewed and signed by:  Leslie Sorenson M.D.  Signed on 06/25/24 at 12:36  An IgG kappa specific monoclonal protein is present.           No results found. However, due to the size of the patient record, not all encounters were searched. Please check Results Review for a complete set of results.    Assessment and Plan:    1. Multiple myeloma, remission status unspecified        2. History of auto stem cell transplant        3. Immunodeficiency due to drugs            IgG Kappa MM standard risk s/p 8 cycles Vrd followed by Alea Auto 5/17/2021 with disease relapse and Dkd salvage therapy 8/4/2023  - standard risk  MM diagnosed sept 2020 after presenting with symptomatic lytic disease  -sp VRd induction followed by yajaira 200 autoSCT on 5/17/23 achieving/mainting IL post SCT; was on revlimid maintenance but relapsed biochemically and with new lytic disease approximately 2 years post SCT (June/July 2023)  -initiated Melinda-Kd salvage on 8/4/23; tolerating will no significant AE's   -melinda subq weekly 8>EOW x 8 doses> q 4 week; kyprolis 70mg/m2 day 1, 8, 15 of 28 day cycle; dex 40mg po weekly   -patient with initial good response to therapy but  noted serial biochemical progression confirmed on 4/3/24 repeat serum studies;  mild anemia and jan 2024 PET with ENDY but no other overt symptoms or CRAB and he feels well  - recommended we transition DKd to Kyprolis/pomalyst/dex as likely bridge to CART (cilta-zohra); KPd median pfs  26 months; vs cart 3 years with 20% >5 years   -pomalyst 4mg daily day 1-21 of 28 cycle ; KPD initiated April 2024  -tolerating well with mild cytopenias and now downtrending SFLC so responding well  -after some hesitation and discussion again with pt today he wants to proceed with with cilta-zohra eval/collection; I have notified coordinators who will schedule  -plan to continue KPd for now   -tentative apheresis date for cilta zohra is early July 2024  - reiniitated asa 81mg daily with ImID  -supportive meds: acyclovir, ca +vit d, zometa (received total of 2 years since disease progression)         Type 2 DM   - decreased dex to 20 mg   -advised patient to follow up with his PCP    Neuropathy  - Controlled with gabapentin    ID  - Completed post SCT vaccines  - Acyclovir as above while in PI     Essential hypertension  -well controled       Hyper Ca  -resolved    -myeloma improving so dont believe cause  -recommended he stop Ca+vit d supplement       FU:  -leave all current cart associated appts   -kyprolis infusion on 7/12, 7/19, 8/2  -cbc, cmp, serum free light chains, quantitative immunoglobulins, serum  electropheresis, serum immunofixation lab and MD mccoy prior to treatment on 8/2   -

## 2024-07-05 ENCOUNTER — INFUSION (OUTPATIENT)
Dept: INFUSION THERAPY | Facility: HOSPITAL | Age: 64
End: 2024-07-05
Payer: MEDICARE

## 2024-07-05 VITALS
HEART RATE: 43 BPM | WEIGHT: 211.44 LBS | SYSTOLIC BLOOD PRESSURE: 161 MMHG | RESPIRATION RATE: 18 BRPM | TEMPERATURE: 99 F | DIASTOLIC BLOOD PRESSURE: 78 MMHG | BODY MASS INDEX: 33.11 KG/M2

## 2024-07-05 DIAGNOSIS — C90.02 MULTIPLE MYELOMA IN RELAPSE: Primary | ICD-10-CM

## 2024-07-05 DIAGNOSIS — C90.00 MULTIPLE MYELOMA, REMISSION STATUS UNSPECIFIED: Primary | ICD-10-CM

## 2024-07-05 PROCEDURE — 96413 CHEMO IV INFUSION 1 HR: CPT

## 2024-07-05 PROCEDURE — 63600175 PHARM REV CODE 636 W HCPCS: Mod: JZ,JG | Performed by: INTERNAL MEDICINE

## 2024-07-05 PROCEDURE — 25000003 PHARM REV CODE 250: Performed by: INTERNAL MEDICINE

## 2024-07-05 RX ORDER — HEPARIN 100 UNIT/ML
500 SYRINGE INTRAVENOUS
Status: DISCONTINUED | OUTPATIENT
Start: 2024-07-05 | End: 2024-07-05 | Stop reason: HOSPADM

## 2024-07-05 RX ORDER — SODIUM CHLORIDE 0.9 % (FLUSH) 0.9 %
10 SYRINGE (ML) INJECTION
Status: DISCONTINUED | OUTPATIENT
Start: 2024-07-05 | End: 2024-07-05 | Stop reason: HOSPADM

## 2024-07-05 RX ORDER — EPINEPHRINE 0.3 MG/.3ML
0.3 INJECTION SUBCUTANEOUS ONCE AS NEEDED
Status: DISCONTINUED | OUTPATIENT
Start: 2024-07-05 | End: 2024-07-05 | Stop reason: HOSPADM

## 2024-07-05 RX ORDER — DIPHENHYDRAMINE HYDROCHLORIDE 50 MG/ML
50 INJECTION INTRAMUSCULAR; INTRAVENOUS ONCE AS NEEDED
Status: DISCONTINUED | OUTPATIENT
Start: 2024-07-05 | End: 2024-07-05 | Stop reason: HOSPADM

## 2024-07-05 RX ADMIN — CARFILZOMIB 150 MG: 30 INJECTION, POWDER, LYOPHILIZED, FOR SOLUTION INTRAVENOUS at 09:07

## 2024-07-05 RX ADMIN — SODIUM CHLORIDE: 9 INJECTION, SOLUTION INTRAVENOUS at 08:07

## 2024-07-05 NOTE — PLAN OF CARE
Problem: Chemotherapy Effects  Goal: Anemia Symptom Improvement  Outcome: Progressing  Goal: Safety Maintained  Outcome: Progressing  Goal: Absence of Hematuria  Outcome: Progressing  Goal: Nausea and Vomiting Relief  Outcome: Progressing  Goal: Neurotoxicity Symptom Control  Outcome: Progressing  Goal: Absence of Infection  Outcome: Progressing  Goal: Absence of Bleeding  Outcome: Progressing     Problem: Fatigue  Goal: Improved Activity Tolerance  Outcome: Progressing     Problem: Anemia  Goal: Anemia Symptom Improvement  Outcome: Progressing     Problem: Infection  Goal: Absence of Infection Signs and Symptoms  Outcome: Progressing   Pt completed C12 Kyprolis via PIV without adverse effects. HR low BP elevated post tx, discharged AAOX4 and ambulatory.

## 2024-07-08 ENCOUNTER — TELEPHONE (OUTPATIENT)
Dept: INTERVENTIONAL RADIOLOGY/VASCULAR | Facility: CLINIC | Age: 64
End: 2024-07-08
Payer: MEDICARE

## 2024-07-08 DIAGNOSIS — C90.02 MULTIPLE MYELOMA IN RELAPSE: Primary | ICD-10-CM

## 2024-07-11 ENCOUNTER — TELEPHONE (OUTPATIENT)
Dept: INFUSION THERAPY | Facility: HOSPITAL | Age: 64
End: 2024-07-11
Payer: MEDICARE

## 2024-07-11 DIAGNOSIS — C90.00 MULTIPLE MYELOMA, REMISSION STATUS UNSPECIFIED: ICD-10-CM

## 2024-07-12 ENCOUNTER — INFUSION (OUTPATIENT)
Dept: INFUSION THERAPY | Facility: HOSPITAL | Age: 64
End: 2024-07-12
Payer: MEDICARE

## 2024-07-12 VITALS
SYSTOLIC BLOOD PRESSURE: 165 MMHG | DIASTOLIC BLOOD PRESSURE: 79 MMHG | BODY MASS INDEX: 33.24 KG/M2 | RESPIRATION RATE: 18 BRPM | TEMPERATURE: 99 F | WEIGHT: 211.75 LBS | HEART RATE: 53 BPM | HEIGHT: 67 IN

## 2024-07-12 DIAGNOSIS — C90.00 MULTIPLE MYELOMA, REMISSION STATUS UNSPECIFIED: Primary | ICD-10-CM

## 2024-07-12 PROCEDURE — 25000003 PHARM REV CODE 250: Performed by: INTERNAL MEDICINE

## 2024-07-12 PROCEDURE — 96413 CHEMO IV INFUSION 1 HR: CPT

## 2024-07-12 RX ORDER — HEPARIN 100 UNIT/ML
500 SYRINGE INTRAVENOUS
Status: DISCONTINUED | OUTPATIENT
Start: 2024-07-12 | End: 2024-07-12 | Stop reason: HOSPADM

## 2024-07-12 RX ORDER — SODIUM CHLORIDE 0.9 % (FLUSH) 0.9 %
10 SYRINGE (ML) INJECTION
Status: DISCONTINUED | OUTPATIENT
Start: 2024-07-12 | End: 2024-07-12 | Stop reason: HOSPADM

## 2024-07-12 RX ADMIN — DEXTROSE 150 MG: 50 INJECTION, SOLUTION INTRAVENOUS at 04:07

## 2024-07-12 RX ADMIN — SODIUM CHLORIDE: 9 INJECTION, SOLUTION INTRAVENOUS at 04:07

## 2024-07-12 NOTE — PLAN OF CARE
1650-Pt tolerated Kyprolis well today, no complaints or complications. VSS. Pt aware to call provider with any questions or concerns and is aware of upcoming appts. Pt ambulatory from clinic with steady gait, no distress noted.

## 2024-07-13 RX ORDER — POMALIDOMIDE 4 MG/1
CAPSULE ORAL
Qty: 21 CAPSULE | Refills: 0 | Status: SHIPPED | OUTPATIENT
Start: 2024-07-13

## 2024-07-15 NOTE — PROGRESS NOTES
"Oncology Nutrition Assessment   Evaluation and Education Note    Jaspreet Walsh Jr.  1960    Transplant Info:   Primary oncologist: Olivia  Primary oncologic diagnosis: multiple myeloma  Transplant type and date: CarT Therapy candidate  Conditioning Regimen: N/A  Immediate post-transplant complications: N/A  Caregiver: family  Post-transplant discharge plans: home    Past Medical History:   Diagnosis Date    Anxiety     Arthritis     Cancer     Hypertension        Nutrition Assessment    This is a 64 y.o.male being seen for nutrition evaluation and education prior to CarT Therapy. Noted patient with history of auto SCT 5/2021. He reports appetite is good, eating well. He states he has protein with most meals. He reports doing very well with water intake.    Weight: 96.05 kg (211 lb 12 oz) - 7/12 clinic weight  Height: 5' 7" (170.2 cm)  BMI: 33.2    Usual BW: 215-220lb  Weight Change: 20-25lb loss but since regained ~15lb    Allergies: Patient has no known allergies.    Current Medications:  Current Outpatient Medications:     acyclovir (ZOVIRAX) 400 MG tablet, Take 1 tablet (400 mg total) by mouth 2 (two) times daily., Disp: 60 tablet, Rfl: 11    aspirin (ECOTRIN) 81 MG EC tablet, Take 81 mg by mouth once daily., Disp: , Rfl:     atorvastatin (LIPITOR) 20 MG tablet, Take 20 mg by mouth once daily., Disp: , Rfl:     dexAMETHasone (DECADRON) 4 MG Tab, Take 10 tablets (40 mg total) by mouth every 7 days. Take with food before chemotherapy appointments, Disp: 40 tablet, Rfl: 11    gabapentin (NEURONTIN) 300 MG capsule, Take 1 capsule (300 mg total) by mouth 2 (two) times daily., Disp: 60 capsule, Rfl: 3    JARDIANCE 25 mg tablet, 25 mg., Disp: , Rfl:     metFORMIN (GLUCOPHAGE) 1000 MG tablet, Take 0.5 tablets (500 mg total) by mouth 2 (two) times daily with meals., Disp: 90 tablet, Rfl: 3    POMALYST 4 mg Cap, TAKE 1 CAPSULE BY MOUTH DAILY  FOR 21 DAYS, THEN 7 DAYS OFF, Disp: 21 capsule, Rfl: 0    potassium " chloride SA (K-DUR,KLOR-CON M) 10 MEQ tablet, TAKE 1 TABLET(10 MEQ) BY MOUTH EVERY DAY, Disp: 90 tablet, Rfl: 3    Food/medication interactions noted: avoid grapefruit    Vitamins/Supplements: none reported    Labs: Reviewed    Nutrition Impact Symptoms    [x] No nutritional concerns at current  [] Poor Appetite   [] Weight loss   [] Nausea   [] Vomiting   [] Diarrhea   [] Constipation   [] Early Satiety [] Change in smell   [] Change in taste   [] Dry Mouth   [] Thick saliva   [] Dysphagia   [] Difficulty chewing  [] Mucositis  [] Indigestion  [] Gas/Bloating  [] Reflux      [] Other -      Nutrition Diagnosis    Problem: nutrition-related knowledge deficit  Etiology: lack of prior need for nutrition education  Signs/Symptoms: referral for CAR T therapy evaluation    Nutrition Intervention    Nutrition Prescription   2401 kcals (25kcal/kg)  96g protein (1g/kg)   2401mL fluid (25mL/kg)    Recommendations:   Include protein at all meals/snacks  Drink at least 64oz fluid/day  Educated patient on food safety and healthy diet post-transplant    Materials Provided/Reviewed: Cell Therapy Nutrition Guide booklet    Nutrition Monitoring and Evaluation    Monitor: diet education needs, energy intake, and weight status    Follow up: upon RTC post CarT Therapy    Communication to referring provider/care team: note available in chart    Counseling time: 15 minutes      Gianna Rosado, MS, RD, LDN  Senior Clinical Dietitian  [facility]

## 2024-07-16 ENCOUNTER — HOSPITAL ENCOUNTER (OUTPATIENT)
Dept: RADIOLOGY | Facility: HOSPITAL | Age: 64
Discharge: HOME | End: 2024-07-16
Attending: INTERNAL MEDICINE
Payer: MEDICARE

## 2024-07-16 ENCOUNTER — DOCUMENTATION ONLY (OUTPATIENT)
Dept: HEMATOLOGY/ONCOLOGY | Facility: CLINIC | Age: 64
End: 2024-07-16
Payer: MEDICARE

## 2024-07-16 ENCOUNTER — HOSPITAL ENCOUNTER (OUTPATIENT)
Dept: PULMONOLOGY | Facility: CLINIC | Age: 64
Discharge: HOME | End: 2024-07-16
Payer: MEDICARE

## 2024-07-16 ENCOUNTER — HOSPITAL ENCOUNTER (OUTPATIENT)
Dept: CARDIOLOGY | Facility: HOSPITAL | Age: 64
Discharge: HOME | End: 2024-07-16
Attending: INTERNAL MEDICINE
Payer: MEDICARE

## 2024-07-16 ENCOUNTER — CLINICAL SUPPORT (OUTPATIENT)
Dept: HEMATOLOGY/ONCOLOGY | Facility: CLINIC | Age: 64
End: 2024-07-16
Payer: MEDICARE

## 2024-07-16 VITALS
HEIGHT: 67 IN | SYSTOLIC BLOOD PRESSURE: 165 MMHG | WEIGHT: 211 LBS | HEART RATE: 55 BPM | BODY MASS INDEX: 33.12 KG/M2 | DIASTOLIC BLOOD PRESSURE: 79 MMHG

## 2024-07-16 DIAGNOSIS — C90.00 MULTIPLE MYELOMA, REMISSION STATUS UNSPECIFIED: ICD-10-CM

## 2024-07-16 DIAGNOSIS — Z71.3 NUTRITIONAL COUNSELING: Primary | ICD-10-CM

## 2024-07-16 DIAGNOSIS — Z94.84 HISTORY OF AUTO STEM CELL TRANSPLANT: ICD-10-CM

## 2024-07-16 LAB
ASCENDING AORTA: 3.31 CM
AV INDEX (PROSTH): 0.74
AV MEAN GRADIENT: 7 MMHG
AV PEAK GRADIENT: 15 MMHG
AV VALVE AREA BY VELOCITY RATIO: 3.08 CM²
AV VALVE AREA: 3.21 CM²
AV VELOCITY RATIO: 0.71
BSA FOR ECHO PROCEDURE: 2.13 M2
CV ECHO LV RWT: 0.37 CM
DLCO ADJ PRE: 19.6 ML/(MIN*MMHG) (ref 17.87–31.73)
DLCO SINGLE BREATH LLN: 17.87
DLCO SINGLE BREATH PRE REF: 72 %
DLCO SINGLE BREATH REF: 24.8
DLCOC SBVA LLN: 2.61
DLCOC SBVA PRE REF: 109.1 %
DLCOC SBVA REF: 3.93
DLCOC SINGLE BREATH LLN: 17.87
DLCOC SINGLE BREATH PRE REF: 79 %
DLCOC SINGLE BREATH REF: 24.8
DLCOCSBVAULN: 5.24
DLCOCSINGLEBREATHULN: 31.73
DLCOCSINGLEBREATHZSCORE: -1.23
DLCOSINGLEBREATHULN: 31.73
DLCOSINGLEBREATHZSCORE: -1.65
DLCOVA LLN: 2.61
DLCOVA PRE REF: 99.4 %
DLCOVA PRE: 3.9 ML/(MIN*MMHG*L) (ref 2.61–5.24)
DLCOVA REF: 3.93
DLCOVAULN: 5.24
DLVAADJ PRE: 4.28 ML/(MIN*MMHG*L) (ref 2.61–5.24)
DOP CALC AO PEAK VEL: 1.93 M/S
DOP CALC AO VTI: 36.69 CM
DOP CALC LVOT AREA: 4.3 CM2
DOP CALC LVOT DIAMETER: 2.35 CM
DOP CALC LVOT PEAK VEL: 1.37 M/S
DOP CALC LVOT STROKE VOLUME: 117.83 CM3
DOP CALCLVOT PEAK VEL VTI: 27.18 CM
E WAVE DECELERATION TIME: 324.78 MSEC
E/A RATIO: 0.82
E/E' RATIO: 11.23 M/S
ECHO LV POSTERIOR WALL: 0.95 CM (ref 0.6–1.1)
FEF 25 75 LLN: 1.45
FEF 25 75 PRE REF: 51.4 %
FEF 25 75 REF: 3.16
FEV05 LLN: 1.22
FEV05 REF: 2.35
FEV1 FVC LLN: 66
FEV1 FVC PRE REF: 95.9 %
FEV1 FVC REF: 78
FEV1 LLN: 1.78
FEV1 PRE REF: 87.6 %
FEV1 REF: 2.53
FEV1FVCZSCORE: -0.44
FEV1ZSCORE: -0.71
FRACTIONAL SHORTENING: 52 % (ref 28–44)
FVC LLN: 2.37
FVC PRE REF: 91.3 %
FVC REF: 3.24
FVCZSCORE: -0.53
INTERVENTRICULAR SEPTUM: 0.86 CM (ref 0.6–1.1)
IVC PRE: 2.77 L (ref 2.37–4.13)
IVC SINGLE BREATH LLN: 2.37
IVC SINGLE BREATH PRE REF: 85.4 %
IVC SINGLE BREATH REF: 3.24
IVCSINGLEBREATHULN: 4.13
IVRT: 106.57 MSEC
LA MAJOR: 5.31 CM
LA MINOR: 5.58 CM
LA WIDTH: 4.13 CM
LEFT ATRIUM SIZE: 4.57 CM
LEFT ATRIUM VOLUME INDEX MOD: 27.6 ML/M2
LEFT ATRIUM VOLUME INDEX: 42.2 ML/M2
LEFT ATRIUM VOLUME MOD: 57.17 CM3
LEFT ATRIUM VOLUME: 87.3 CM3
LEFT INTERNAL DIMENSION IN SYSTOLE: 2.48 CM (ref 2.1–4)
LEFT VENTRICLE DIASTOLIC VOLUME INDEX: 60.76 ML/M2
LEFT VENTRICLE DIASTOLIC VOLUME: 125.78 ML
LEFT VENTRICLE MASS INDEX: 80 G/M2
LEFT VENTRICLE SYSTOLIC VOLUME INDEX: 10.6 ML/M2
LEFT VENTRICLE SYSTOLIC VOLUME: 21.88 ML
LEFT VENTRICULAR INTERNAL DIMENSION IN DIASTOLE: 5.13 CM (ref 3.5–6)
LEFT VENTRICULAR MASS: 166.38 G
LV LATERAL E/E' RATIO: 14.6 M/S
LV SEPTAL E/E' RATIO: 9.13 M/S
MV A" WAVE DURATION": 15.22 MSEC
MV PEAK A VEL: 0.89 M/S
MV PEAK E VEL: 0.73 M/S
PEF LLN: 4.89
PEF PRE REF: 108.8 %
PEF REF: 7.25
PHYSICIAN COMMENT: ABNORMAL
PISA TR MAX VEL: 2.7 M/S
PRE DLCO: 17.86 ML/(MIN*MMHG) (ref 17.87–31.73)
PRE FEF 25 75: 1.62 L/S (ref 1.45–4.87)
PRE FET 100: 6.92 SEC
PRE FEV05 REF: 77.2 %
PRE FEV1 FVC: 74.8 % (ref 65.52–88.96)
PRE FEV1: 2.22 L (ref 1.78–3.23)
PRE FEV5: 1.81 L (ref 1.22–3.49)
PRE FVC: 2.96 L (ref 2.37–4.13)
PRE PEF: 7.89 L/S (ref 4.89–9.61)
PULM VEIN S/D RATIO: 2.06
PV PEAK D VEL: 0.32 M/S
PV PEAK S VEL: 0.66 M/S
RA MAJOR: 4.49 CM
RA PRESSURE ESTIMATED: 3 MMHG
RA WIDTH: 3.21 CM
RIGHT VENTRICLE DIASTOLIC BASEL DIMENSION: 4.3 CM
RV TB RVSP: 6 MMHG
SINUS: 3.4 CM
STJ: 2.7 CM
TDI LATERAL: 0.05 M/S
TDI SEPTAL: 0.08 M/S
TDI: 0.07 M/S
TR MAX PG: 29 MMHG
TRICUSPID ANNULAR PLANE SYSTOLIC EXCURSION: 2.34 CM
TV REST PULMONARY ARTERY PRESSURE: 32 MMHG
VA PRE: 4.58 L (ref 6.16–6.16)
VA SINGLE BREATH LLN: 6.16
VA SINGLE BREATH PRE REF: 74.2 %
VA SINGLE BREATH REF: 6.16
VASINGLEBREATHULN: 6.16
Z-SCORE OF LEFT VENTRICULAR DIMENSION IN END DIASTOLE: -2.04
Z-SCORE OF LEFT VENTRICULAR DIMENSION IN END SYSTOLE: -3.48

## 2024-07-16 PROCEDURE — 71046 X-RAY EXAM CHEST 2 VIEWS: CPT | Mod: 26,,, | Performed by: RADIOLOGY

## 2024-07-16 PROCEDURE — 97802 MEDICAL NUTRITION INDIV IN: CPT | Mod: S$GLB,,, | Performed by: DIETITIAN, REGISTERED

## 2024-07-16 PROCEDURE — 93306 TTE W/DOPPLER COMPLETE: CPT

## 2024-07-16 PROCEDURE — 94010 BREATHING CAPACITY TEST: CPT | Mod: S$GLB,,, | Performed by: INTERNAL MEDICINE

## 2024-07-16 PROCEDURE — 94729 DIFFUSING CAPACITY: CPT | Mod: S$GLB,,, | Performed by: INTERNAL MEDICINE

## 2024-07-16 PROCEDURE — 71046 X-RAY EXAM CHEST 2 VIEWS: CPT | Mod: TC

## 2024-07-16 PROCEDURE — 93306 TTE W/DOPPLER COMPLETE: CPT | Mod: 26,,, | Performed by: INTERNAL MEDICINE

## 2024-07-16 NOTE — PROGRESS NOTES
[facility]   Bone Marrow Transplant Psychosocial Assessment   Date: 2024       Demographic Information     Name: Jaspreet Walsh    : 1960    Age: 64 y.o.    Sex: male    Race: Black or     Marital Status: Significant Other   SS #:     Phone Number(s): [phone] (home)     Home Address: [place] [address]    Mailing Address: [place] [address]    Are you a [place] Citizen? Yes   If no, please explain: N/A       Contact Information     Next of Kin: Catie Vasquez   Relationship: Significant Other   Phone Number(s): [phone]   Emergency Contact: Extended Emergency Contact Information  Primary Emergency Contact: Nicole Watkins  Home Phone: [phone]  Relation: Mother  Preferred language: English   needed? No  Secondary Emergency Contact: Catie Vasquez  Mobile Phone: [phone]  Relation: Significant other        Living Arrangements   Household Composition:  Patient currently resides with: Other   If patient resides with spouse, please explain the marital relationship: Significant Other   Does the patient currently own his/her own home? Yes   Does the patient currently rent home/apartment: No   Are current living arrangements permanent? Yes   If no, please explain: N/A       Children's Names     Name Sex Age   1. Saritha Perrilloux  Female 44   2. Shane Perrilloux  Male 30   3. Yvonne Perrilloux  Female 28   4.     5.       Who will be the primary caregiver for the children when patient is admitted to the hospital? N/A  All of the patient's children are independent adults.   Name:    Phone Number:         Support System   Primary Caregiver:     Name: Catie Pedro   Relationship: Significant other   Cell #: [phone]   Address: [address] [place] [address]   Home #: [phone]   City: [facility]: [place]   ZIP: [id number]       Secondary Caregiver:     Name: N/A   Relationship: N/A   Cell #: N/A   Address: N/A   Home #: N/A   City:  N/A   Street: N/A   ZIP: N/A     Will patient's caregiver be available full-time? Yes   What is the patient's Restoration? Pentecostalism   Is patient currently practicing or non-practicing? Yes      Does patient have any other sources for support? Yes      If yes, please explain: Family       Post BMT Plans      Does patient have full understanding of recovery from BMT? Yes   Does patient understand risk associated with BMT? Yes   What are patient's housing plans post BMT? Own home   Does patient have a Living Will? No   Does patient have a Power of ?  No   If yes, please give name of POA:  Name: N/A    Phone #: N/A       Employment Information     Is patient currently employed? No   Employer: N/A   Phone #: N/A   Position: N/A   Full Time/Part Time? N/A   YRS: N/A       Secondary Employment Information     Employer: N/A   Phone #: N/A   Position: N/A   Full Time/Part Time? N/A   YRS: N/A       Significant Other Employment Information     Employer: N/A   Phone #: N/A   Position: N/A   Full Time/Part Time? N/A   YRS: N/A         Financial Information     Monthly Income: $1000.00   Yearly Income: $87535.00   Source of Income:  Social Security   Do you have any financial concerns? No   If yes, please explain:  N/A       Insurance Information      Do you have health insurance?  Yes   Insurance [employer] Managed [employer]   Policy #: [id number]   Group #: [id number]   Policy Thompson: EUNICE SALAS JR.   Medicare: Yes   Medicare Part D: No   Medicaid: No   Do you have Disability Insurance? No      Do you have a Cancer Policy? No   Do you have medication/prescription coverage? Yes   Are you a ? No       Medical Information     Diagnosis: MULTIPLE MYELOMA   Date of Diagnosis: 05/29/2024   Is this a new diagnosis? Yes         If no, please explain: N/A   Past Medical History: Past Medical History:   Diagnosis Date    Anxiety     Arthritis     Cancer     Hypertension       Infusion Services: None   Home  Health: None   Durable Medical Equipment: None   Activities of Daily Living: Patient is independent with his ADL's   Patient's Family Cancer History: Cancer-related family history includes Cancer in his father.       Cognitive Functioning     Cognitive State: Alert   Does patient have any concerns that may affect medical follow up and full understanding of treatment? No   Does patient have any concerns that may impact medication compliance? No   Education Level: high school diploma/GED   Does the patient have any learning disabilities? No   If yes, please explain: N/A   Can the patient read English? Yes   Can the patient write in English? Yes      Is the patient Literate? Yes   What is the patient's primary language? English   Does the patient need interpretation services? No        Psychosocial History     Does patient have any emotional issues? No   If, yes please explain:  N/A   Does patient have a psychiatric history? No   If, yes please explain:  N/A   Is patient currently taking any psychiatric medication? No   If yes, please list medications: N/A   Is patient currently in therapy or attending support groups? No   Where is the patient currently in therapy? N/A   Therapist/Counselor Name: N/A   Therapist/Counselor Phone #: N/A       Alcohol/Drug Use/Abuse History     Alcohol Use: Social History     Substance and Sexual Activity   Alcohol Use None      Tobacco Use: Social History     Tobacco Use   Smoking Status Former    Current packs/day: 0.00    Average packs/day: 0.3 packs/day for 40.0 years (10.0 ttl pk-yrs)    Types: Cigarettes    Start date:     Quit date: 2017    Years since quittin.5   Smokeless Tobacco Never      Drug Use: Social History     Substance and Sexual Activity   Drug Use Not on file          Coping Skills     How is the patient currently coping with their diagnosis? The patient stated that he is coping well. He stated that he continues to do most of his activities, and he stays busy.  He stated that he doesn't have any thoughts of his diagnosis until he has an appointment to come to the hospital.   Is the patient open/receptive to psychosocial intervention? Yes   Has the patient experienced any significant losses in his/her life? Yes      If yes, please explain: The patient stated that his brother passed 3 years ago, and his grandfather passed 28 years ago.   What are the patient's identified needs? The patient has no identified needs at this time.   Goals: The patient stated that he expects this CART treatment is successful.   Interview Behavior: The patient's interview behavior was appropriate.   Suitability for Transplant: The patient is suitable for treatment due to the fact that he has been treated in the past with a different treatment regimen, and he cooperated fully.   Additional Comments: The patient and his caregiver are prepared for the time and commitment of CART therapy. After inpatient treatment the patient will reside in his own home, which is a short distance away from [facility]

## 2024-07-17 ENCOUNTER — TELEPHONE (OUTPATIENT)
Dept: HEMATOLOGY/ONCOLOGY | Facility: CLINIC | Age: 64
End: 2024-07-17
Payer: MEDICARE

## 2024-07-17 NOTE — TELEPHONE ENCOUNTER
Spoke with  and advised that he was scheduled to receive his Kyprolis on 7/19/24 at 11 am. Pt verbalized agreement and understanding.

## 2024-07-18 DIAGNOSIS — C90.00 MULTIPLE MYELOMA, REMISSION STATUS UNSPECIFIED: Primary | ICD-10-CM

## 2024-07-19 ENCOUNTER — INFUSION (OUTPATIENT)
Dept: INFUSION THERAPY | Facility: HOSPITAL | Age: 64
End: 2024-07-19
Payer: MEDICARE

## 2024-07-19 ENCOUNTER — TELEPHONE (OUTPATIENT)
Dept: INFUSION THERAPY | Facility: HOSPITAL | Age: 64
End: 2024-07-19
Payer: MEDICARE

## 2024-07-19 VITALS
TEMPERATURE: 99 F | DIASTOLIC BLOOD PRESSURE: 67 MMHG | OXYGEN SATURATION: 99 % | RESPIRATION RATE: 18 BRPM | SYSTOLIC BLOOD PRESSURE: 144 MMHG | HEART RATE: 62 BPM

## 2024-07-19 DIAGNOSIS — G62.9 NEUROPATHY: ICD-10-CM

## 2024-07-19 DIAGNOSIS — C90.00 MULTIPLE MYELOMA, REMISSION STATUS UNSPECIFIED: Primary | ICD-10-CM

## 2024-07-19 PROCEDURE — 25000003 PHARM REV CODE 250: Performed by: INTERNAL MEDICINE

## 2024-07-19 PROCEDURE — 96413 CHEMO IV INFUSION 1 HR: CPT

## 2024-07-19 PROCEDURE — 63600175 PHARM REV CODE 636 W HCPCS: Mod: JZ,JG | Performed by: INTERNAL MEDICINE

## 2024-07-19 RX ORDER — GABAPENTIN 300 MG/1
300 CAPSULE ORAL 2 TIMES DAILY
Qty: 60 CAPSULE | Refills: 3 | Status: SHIPPED | OUTPATIENT
Start: 2024-07-19

## 2024-07-19 RX ORDER — SODIUM CHLORIDE 0.9 % (FLUSH) 0.9 %
10 SYRINGE (ML) INJECTION
Status: DISCONTINUED | OUTPATIENT
Start: 2024-07-19 | End: 2024-07-19 | Stop reason: HOSPADM

## 2024-07-19 RX ORDER — HEPARIN 100 UNIT/ML
500 SYRINGE INTRAVENOUS
Status: DISCONTINUED | OUTPATIENT
Start: 2024-07-19 | End: 2024-07-19 | Stop reason: HOSPADM

## 2024-07-19 RX ADMIN — CARFILZOMIB 150 MG: 30 INJECTION, POWDER, LYOPHILIZED, FOR SOLUTION INTRAVENOUS at 02:07

## 2024-07-19 NOTE — PLAN OF CARE
1300 Labs, hx, and medications reviewed, pt meets parameters for treatment today. Assessment completed and plan of care reviewed. Pt verbalized understanding. PIV accessed with no complications. Pt voices no new complaints or concerns, will continue to monitor for safety.

## 2024-07-19 NOTE — PLAN OF CARE
1529 Pt tolerated Kyprolis infusion well today, no complaints or complications,. VSS through duration of treatment. Pt aware to call provider with any questions or concerns and is aware of upcoming appts. Pt ambulatory from clinic with steady gait, no distress noted.

## 2024-07-23 ENCOUNTER — HOSPITAL ENCOUNTER (OUTPATIENT)
Dept: CARDIOLOGY | Facility: CLINIC | Age: 64
Discharge: HOME | End: 2024-07-23
Payer: MEDICARE

## 2024-07-23 ENCOUNTER — PROCEDURE VISIT (OUTPATIENT)
Dept: HEMATOLOGY/ONCOLOGY | Facility: CLINIC | Age: 64
End: 2024-07-23
Payer: MEDICARE

## 2024-07-23 ENCOUNTER — HOSPITAL ENCOUNTER (OUTPATIENT)
Dept: RADIOLOGY | Facility: HOSPITAL | Age: 64
Discharge: HOME | End: 2024-07-23
Attending: INTERNAL MEDICINE
Payer: MEDICARE

## 2024-07-23 VITALS
HEART RATE: 73 BPM | RESPIRATION RATE: 20 BRPM | DIASTOLIC BLOOD PRESSURE: 86 MMHG | SYSTOLIC BLOOD PRESSURE: 137 MMHG | TEMPERATURE: 99 F

## 2024-07-23 DIAGNOSIS — D46.4 REFRACTORY ANEMIA, UNSPECIFIED: ICD-10-CM

## 2024-07-23 DIAGNOSIS — Z94.84 HISTORY OF AUTO STEM CELL TRANSPLANT: ICD-10-CM

## 2024-07-23 DIAGNOSIS — C90.00 MULTIPLE MYELOMA, REMISSION STATUS UNSPECIFIED: ICD-10-CM

## 2024-07-23 DIAGNOSIS — C90.02 MULTIPLE MYELOMA IN RELAPSE: Primary | ICD-10-CM

## 2024-07-23 DIAGNOSIS — C90.02 MULTIPLE MYELOMA IN RELAPSE: ICD-10-CM

## 2024-07-23 LAB
BODY SITE - BONE MARROW: NORMAL
CLINICAL DIAGNOSIS - BONE MARROW: NORMAL
OHS QRS DURATION: 98 MS
OHS QTC CALCULATION: 418 MS
POCT GLUCOSE: 123 MG/DL (ref 70–110)
REASON FOR REFERRAL, PLASMA CELL PROLIF (PCPD), FISH: NORMAL

## 2024-07-23 PROCEDURE — 88184 FLOWCYTOMETRY/ TC 1 MARKER: CPT | Performed by: NURSE PRACTITIONER

## 2024-07-23 PROCEDURE — 93005 ELECTROCARDIOGRAM TRACING: CPT | Mod: S$GLB,,, | Performed by: INTERNAL MEDICINE

## 2024-07-23 PROCEDURE — 93010 ELECTROCARDIOGRAM REPORT: CPT | Mod: S$GLB,,, | Performed by: INTERNAL MEDICINE

## 2024-07-23 PROCEDURE — A9552 F18 FDG: HCPCS | Performed by: INTERNAL MEDICINE

## 2024-07-23 PROCEDURE — 88271 CYTOGENETICS DNA PROBE: CPT | Mod: 59 | Performed by: NURSE PRACTITIONER

## 2024-07-23 PROCEDURE — 88185 FLOWCYTOMETRY/TC ADD-ON: CPT | Mod: 59 | Performed by: NURSE PRACTITIONER

## 2024-07-23 PROCEDURE — 78816 PET IMAGE W/CT FULL BODY: CPT | Mod: TC

## 2024-07-23 PROCEDURE — 88185 FLOWCYTOMETRY/TC ADD-ON: CPT | Mod: 91 | Performed by: NURSE PRACTITIONER

## 2024-07-23 PROCEDURE — 38222 DX BONE MARROW BX & ASPIR: CPT | Mod: RT,S$GLB,, | Performed by: NURSE PRACTITIONER

## 2024-07-23 PROCEDURE — 78816 PET IMAGE W/CT FULL BODY: CPT | Mod: 26,PS,, | Performed by: NUCLEAR MEDICINE

## 2024-07-23 PROCEDURE — 88299 UNLISTED CYTOGENETIC STUDY: CPT | Performed by: NURSE PRACTITIONER

## 2024-07-23 PROCEDURE — 88274 CYTOGENETICS 25-99: CPT | Performed by: NURSE PRACTITIONER

## 2024-07-23 RX ORDER — LIDOCAINE HYDROCHLORIDE 20 MG/ML
8 INJECTION, SOLUTION INFILTRATION; PERINEURAL
Status: COMPLETED | OUTPATIENT
Start: 2024-07-23 | End: 2024-07-23

## 2024-07-23 RX ORDER — FLUDEOXYGLUCOSE F18 500 MCI/ML
10 INJECTION INTRAVENOUS
Status: COMPLETED | OUTPATIENT
Start: 2024-07-23 | End: 2024-07-23

## 2024-07-23 RX ADMIN — LIDOCAINE HYDROCHLORIDE 8 ML: 20 INJECTION, SOLUTION INFILTRATION; PERINEURAL at 08:07

## 2024-07-23 RX ADMIN — FLUDEOXYGLUCOSE F-18 10.33 MILLICURIE: 500 INJECTION INTRAVENOUS at 10:07

## 2024-07-23 NOTE — PROCEDURES
Bone marrow    Date/Time: 7/23/2024 8:15 AM    Performed by: Karon Rojas NP  Authorized by: Karon Rojas NP    Consent Done?: Yes (Written)   Immediately prior to procedure a time out was called to verify the correct patient, procedure, equipment, support staff and site/side marked as required. .      Position: prone  Aspiration?: Yes   Biopsy?: Yes        PROCEDURE NOTE:  Date of Procedure: 07/23/2024  Bone Marrow Biopsy and Aspiration  Indication: relapsed MM; Car-T evaluation  Consent: Informed consent was obtained from patient.  Timeout: Done and documented. Allergies reviewed.  Position: prone  Site: Right posterior illiac crest.  Prep: Betadine.  Needle used: 11 gauge Jamshidi needle.  Anesthetic: 2% lidocaine 8 cc.  Biopsy: The biopsy needle was introduced into the marrow cavity and an aspirate was obtained without complications and sent for flow cytometry, PCPD fish, MRDMM, DNA/RNA hold, and cytogenetics. Core biopsy obtained without difficulty and sent for routine histologic examination.  Complications: None.  Disposition: The patient was placed supine for 15min following procedure. MA to assess bandaid for bleeding prior to discharge home. Patient aware to keep bandaid dry and intact for 24hrs and to call clinic for any bleeding, fevers, pain, or signs of infection.  Blood loss: Minimal.     Karon Rojas NP  Hematology/Oncology/BMT

## 2024-07-25 LAB
DNA/RNA EXTRACT AND HOLD RESULT: NORMAL
DNA/RNA EXTRACTION: NORMAL
EXHR SPECIMEN TYPE: NORMAL
PCPDS FINAL DIAGNOSIS: NORMAL
PCPDS PRE-ANALYSIS PRE-SORT: NORMAL

## 2024-07-26 LAB
% B-CELL PRECURSORS: 0 %
% MAST CELLS: 0.09 %
ABNORMAL PLASMA CELL EVENTS: NORMAL
LOWER LIMIT OF QUANTITATION (LLOQ): 1 X10(-5)
MINIMAL RESIDUAL DISEASE DETECTION (MRD), BONE MARROW: 0.75 %
PATIENT / SAMPLE THEORETICAL LOQ: 2 X10(-6)
PERCENT NORMAL PLASMA CELLS (OF TOTAL PC): 7.5 %
PLASMA CELLS ASSESS MAR: NORMAL
POLY PLASMA CELL EVENTS: 4892
TOTAL CELLS COUNTED MAR: NORMAL
TOTAL PLASMA CELL EVENTS: NORMAL
VALIDATED ASSAY SENSITIVITY: 2.48 X10(-6)

## 2024-08-02 ENCOUNTER — OFFICE VISIT (OUTPATIENT)
Dept: PSYCHIATRY | Facility: CLINIC | Age: 64
End: 2024-08-02
Payer: MEDICARE

## 2024-08-02 ENCOUNTER — CLINICAL SUPPORT (OUTPATIENT)
Dept: HEMATOLOGY/ONCOLOGY | Facility: CLINIC | Age: 64
End: 2024-08-02
Payer: MEDICARE

## 2024-08-02 ENCOUNTER — DOCUMENTATION ONLY (OUTPATIENT)
Dept: HEMATOLOGY/ONCOLOGY | Facility: CLINIC | Age: 64
End: 2024-08-02
Payer: MEDICARE

## 2024-08-02 ENCOUNTER — LAB VISIT (OUTPATIENT)
Dept: LAB | Facility: HOSPITAL | Age: 64
End: 2024-08-02
Attending: INTERNAL MEDICINE
Payer: MEDICARE

## 2024-08-02 ENCOUNTER — OFFICE VISIT (OUTPATIENT)
Dept: HEMATOLOGY/ONCOLOGY | Facility: CLINIC | Age: 64
End: 2024-08-02
Payer: MEDICARE

## 2024-08-02 VITALS
TEMPERATURE: 98 F | WEIGHT: 203.69 LBS | HEART RATE: 71 BPM | SYSTOLIC BLOOD PRESSURE: 133 MMHG | HEIGHT: 67 IN | RESPIRATION RATE: 17 BRPM | DIASTOLIC BLOOD PRESSURE: 81 MMHG | BODY MASS INDEX: 31.97 KG/M2 | OXYGEN SATURATION: 98 %

## 2024-08-02 DIAGNOSIS — C90.00 MULTIPLE MYELOMA, REMISSION STATUS UNSPECIFIED: Primary | ICD-10-CM

## 2024-08-02 DIAGNOSIS — C90.02 MULTIPLE MYELOMA IN RELAPSE: Primary | ICD-10-CM

## 2024-08-02 DIAGNOSIS — C90.00 MULTIPLE MYELOMA, REMISSION STATUS UNSPECIFIED: ICD-10-CM

## 2024-08-02 DIAGNOSIS — Z94.84 HISTORY OF AUTO STEM CELL TRANSPLANT: ICD-10-CM

## 2024-08-02 DIAGNOSIS — Z00.8 ENCOUNTER FOR PSYCHOLOGICAL EVALUATION: Primary | ICD-10-CM

## 2024-08-02 LAB
ALBUMIN SERPL BCP-MCNC: 3.2 G/DL (ref 3.5–5.2)
ALP SERPL-CCNC: 53 U/L (ref 55–135)
ALT SERPL W/O P-5'-P-CCNC: 10 U/L (ref 10–44)
ANION GAP SERPL CALC-SCNC: 14 MMOL/L (ref 8–16)
ANISOCYTOSIS BLD QL SMEAR: SLIGHT
AST SERPL-CCNC: 10 U/L (ref 10–40)
BASOPHILS # BLD AUTO: 0.08 K/UL (ref 0–0.2)
BASOPHILS NFR BLD: 3.1 % (ref 0–1.9)
BILIRUB SERPL-MCNC: 0.6 MG/DL (ref 0.1–1)
BUN SERPL-MCNC: 11 MG/DL (ref 8–23)
BURR CELLS BLD QL SMEAR: ABNORMAL
CALCIUM SERPL-MCNC: 8.5 MG/DL (ref 8.7–10.5)
CHLORIDE SERPL-SCNC: 109 MMOL/L (ref 95–110)
CO2 SERPL-SCNC: 16 MMOL/L (ref 23–29)
CREAT SERPL-MCNC: 1.2 MG/DL (ref 0.5–1.4)
DIFFERENTIAL METHOD BLD: ABNORMAL
EOSINOPHIL # BLD AUTO: 0.1 K/UL (ref 0–0.5)
EOSINOPHIL NFR BLD: 4.3 % (ref 0–8)
ERYTHROCYTE [DISTWIDTH] IN BLOOD BY AUTOMATED COUNT: 16.5 % (ref 11.5–14.5)
EST. GFR  (NO RACE VARIABLE): >60 ML/MIN/1.73 M^2
GLUCOSE SERPL-MCNC: 152 MG/DL (ref 70–110)
HCT VFR BLD AUTO: 34.1 % (ref 40–54)
HGB BLD-MCNC: 10.9 G/DL (ref 14–18)
IGA SERPL-MCNC: 64 MG/DL (ref 40–350)
IGG SERPL-MCNC: 1116 MG/DL (ref 650–1600)
IGM SERPL-MCNC: 32 MG/DL (ref 50–300)
IMM GRANULOCYTES # BLD AUTO: 0.01 K/UL (ref 0–0.04)
IMM GRANULOCYTES NFR BLD AUTO: 0.4 % (ref 0–0.5)
LYMPHOCYTES # BLD AUTO: 1.2 K/UL (ref 1–4.8)
LYMPHOCYTES NFR BLD: 46.1 % (ref 18–48)
MCH RBC QN AUTO: 32.4 PG (ref 27–31)
MCHC RBC AUTO-ENTMCNC: 32 G/DL (ref 32–36)
MCV RBC AUTO: 102 FL (ref 82–98)
MONOCYTES # BLD AUTO: 0.2 K/UL (ref 0.3–1)
MONOCYTES NFR BLD: 5.8 % (ref 4–15)
NEUTROPHILS # BLD AUTO: 1 K/UL (ref 1.8–7.7)
NEUTROPHILS NFR BLD: 40.3 % (ref 38–73)
NRBC BLD-RTO: 0 /100 WBC
OVALOCYTES BLD QL SMEAR: ABNORMAL
PLATELET # BLD AUTO: 244 K/UL (ref 150–450)
PMV BLD AUTO: 9.3 FL (ref 9.2–12.9)
POIKILOCYTOSIS BLD QL SMEAR: SLIGHT
POLYCHROMASIA BLD QL SMEAR: ABNORMAL
POTASSIUM SERPL-SCNC: 4 MMOL/L (ref 3.5–5.1)
PROT SERPL-MCNC: 6.7 G/DL (ref 6–8.4)
RBC # BLD AUTO: 3.36 M/UL (ref 4.6–6.2)
SODIUM SERPL-SCNC: 139 MMOL/L (ref 136–145)
WBC # BLD AUTO: 2.58 K/UL (ref 3.9–12.7)

## 2024-08-02 PROCEDURE — 99999 PR PBB SHADOW E&M-EST. PATIENT-LVL III: CPT | Mod: PBBFAC,,, | Performed by: INTERNAL MEDICINE

## 2024-08-02 PROCEDURE — 82784 ASSAY IGA/IGD/IGG/IGM EACH: CPT | Mod: 59 | Performed by: INTERNAL MEDICINE

## 2024-08-02 PROCEDURE — 3008F BODY MASS INDEX DOCD: CPT | Mod: CPTII,S$GLB,, | Performed by: INTERNAL MEDICINE

## 2024-08-02 PROCEDURE — 99999 PR PBB SHADOW E&M-EST. PATIENT-LVL II: CPT | Mod: PBBFAC,,,

## 2024-08-02 PROCEDURE — 3079F DIAST BP 80-89 MM HG: CPT | Mod: CPTII,S$GLB,, | Performed by: INTERNAL MEDICINE

## 2024-08-02 PROCEDURE — 90791 PSYCH DIAGNOSTIC EVALUATION: CPT | Mod: S$GLB,,, | Performed by: PSYCHOLOGIST

## 2024-08-02 PROCEDURE — 84165 PROTEIN E-PHORESIS SERUM: CPT | Performed by: INTERNAL MEDICINE

## 2024-08-02 PROCEDURE — 83521 IG LIGHT CHAINS FREE EACH: CPT | Mod: 59 | Performed by: INTERNAL MEDICINE

## 2024-08-02 PROCEDURE — 1101F PT FALLS ASSESS-DOCD LE1/YR: CPT | Mod: CPTII,S$GLB,, | Performed by: INTERNAL MEDICINE

## 2024-08-02 PROCEDURE — 85025 COMPLETE CBC W/AUTO DIFF WBC: CPT | Performed by: INTERNAL MEDICINE

## 2024-08-02 PROCEDURE — 99999 PR PBB SHADOW E&M-EST. PATIENT-LVL II: CPT | Mod: PBBFAC,,, | Performed by: PSYCHOLOGIST

## 2024-08-02 PROCEDURE — 3288F FALL RISK ASSESSMENT DOCD: CPT | Mod: CPTII,S$GLB,, | Performed by: INTERNAL MEDICINE

## 2024-08-02 PROCEDURE — 80053 COMPREHEN METABOLIC PANEL: CPT | Performed by: INTERNAL MEDICINE

## 2024-08-02 PROCEDURE — G2211 COMPLEX E/M VISIT ADD ON: HCPCS | Mod: S$GLB,,, | Performed by: INTERNAL MEDICINE

## 2024-08-02 PROCEDURE — 84165 PROTEIN E-PHORESIS SERUM: CPT | Mod: 26,,, | Performed by: PATHOLOGY

## 2024-08-02 PROCEDURE — 4010F ACE/ARB THERAPY RXD/TAKEN: CPT | Mod: CPTII,S$GLB,, | Performed by: INTERNAL MEDICINE

## 2024-08-02 PROCEDURE — 3075F SYST BP GE 130 - 139MM HG: CPT | Mod: CPTII,S$GLB,, | Performed by: INTERNAL MEDICINE

## 2024-08-02 PROCEDURE — 99215 OFFICE O/P EST HI 40 MIN: CPT | Mod: S$GLB,,, | Performed by: INTERNAL MEDICINE

## 2024-08-02 PROCEDURE — 86334 IMMUNOFIX E-PHORESIS SERUM: CPT | Performed by: INTERNAL MEDICINE

## 2024-08-02 PROCEDURE — 36415 COLL VENOUS BLD VENIPUNCTURE: CPT | Performed by: INTERNAL MEDICINE

## 2024-08-02 PROCEDURE — 4010F ACE/ARB THERAPY RXD/TAKEN: CPT | Mod: CPTII,S$GLB,, | Performed by: PSYCHOLOGIST

## 2024-08-02 PROCEDURE — 86334 IMMUNOFIX E-PHORESIS SERUM: CPT | Mod: 26,,, | Performed by: PATHOLOGY

## 2024-08-02 PROCEDURE — 99417 PROLNG OP E/M EACH 15 MIN: CPT | Mod: S$GLB,,, | Performed by: INTERNAL MEDICINE

## 2024-08-02 RX ORDER — DIPHENHYDRAMINE HYDROCHLORIDE 50 MG/ML
50 INJECTION INTRAMUSCULAR; INTRAVENOUS ONCE AS NEEDED
Status: CANCELLED | OUTPATIENT
Start: 2024-08-02

## 2024-08-02 RX ORDER — HEPARIN 100 UNIT/ML
500 SYRINGE INTRAVENOUS
OUTPATIENT
Start: 2024-08-17

## 2024-08-02 RX ORDER — HEPARIN 100 UNIT/ML
500 SYRINGE INTRAVENOUS
Status: CANCELLED | OUTPATIENT
Start: 2024-08-02

## 2024-08-02 RX ORDER — AMLODIPINE BESYLATE 10 MG/1
10 TABLET ORAL
COMMUNITY
Start: 2024-07-30

## 2024-08-02 RX ORDER — EPINEPHRINE 0.3 MG/.3ML
0.3 INJECTION SUBCUTANEOUS ONCE AS NEEDED
Status: CANCELLED | OUTPATIENT
Start: 2024-08-02

## 2024-08-02 RX ORDER — SODIUM CHLORIDE 0.9 % (FLUSH) 0.9 %
10 SYRINGE (ML) INJECTION
OUTPATIENT
Start: 2024-08-17

## 2024-08-02 RX ORDER — SODIUM CHLORIDE 0.9 % (FLUSH) 0.9 %
10 SYRINGE (ML) INJECTION
Status: CANCELLED | OUTPATIENT
Start: 2024-08-02

## 2024-08-02 RX ORDER — LOSARTAN POTASSIUM 25 MG/1
25 TABLET ORAL DAILY
COMMUNITY
Start: 2024-07-21

## 2024-08-02 RX ORDER — CLONIDINE HYDROCHLORIDE 0.3 MG/1
0.3 TABLET ORAL DAILY
COMMUNITY
Start: 2024-07-30

## 2024-08-02 NOTE — PROGRESS NOTES
Consent status for submitting research data to [facility]:    Patient declines    [facility]

## 2024-08-02 NOTE — PROGRESS NOTES
BMT Pharmacist Evaluation      Current Outpatient Medications:     acyclovir (ZOVIRAX) 400 MG tablet, Take 1 tablet (400 mg total) by mouth 2 (two) times daily., Disp: 60 tablet, Rfl: 11    aspirin (ECOTRIN) 81 MG EC tablet, Take 81 mg by mouth once daily., Disp: , Rfl:     atorvastatin (LIPITOR) 20 MG tablet, Take 20 mg by mouth once daily., Disp: , Rfl:     cloNIDine (CATAPRES) 0.3 MG tablet, Take 0.3 mg by mouth once daily., Disp: , Rfl:     dexAMETHasone (DECADRON) 4 MG Tab, Take 10 tablets (40 mg total) by mouth every 7 days. Take with food before chemotherapy appointments, Disp: 40 tablet, Rfl: 11    gabapentin (NEURONTIN) 300 MG capsule, Take 1 capsule (300 mg total) by mouth 2 (two) times daily., Disp: 60 capsule, Rfl: 3    JARDIANCE 25 mg tablet, Take 25 mg by mouth once daily., Disp: , Rfl:     losartan (COZAAR) 25 MG tablet, Take 25 mg by mouth once daily., Disp: , Rfl:     metFORMIN (GLUCOPHAGE) 1000 MG tablet, Take 0.5 tablets (500 mg total) by mouth 2 (two) times daily with meals., Disp: 90 tablet, Rfl: 3    POMALYST 4 mg Cap, TAKE 1 CAPSULE BY MOUTH DAILY  FOR 21 DAYS, THEN 7 DAYS OFF, Disp: 21 capsule, Rfl: 0    potassium chloride SA (K-DUR,KLOR-CON M) 10 MEQ tablet, TAKE 1 TABLET(10 MEQ) BY MOUTH EVERY DAY, Disp: 90 tablet, Rfl: 3    amLODIPine (NORVASC) 10 MG tablet, Take 10 mg by mouth. (Patient not taking: Reported on 8/2/2024), Disp: , Rfl:       Review of patient's allergies indicates:  No Known Allergies      Estimated Creatinine Clearance: 66.5 mL/min (based on SCr of 1.2 mg/dL).       Medication adherence: good  Medication-related problems: none    Planned Lymphodepletion Regimen:  Cyclophosphamide 300mg/m2 on Day -5, -4, and -3  Fludarabine 30mg/m2 on Day -5, -4, and -3    Pre-Medications:  Acetaminophen on Day 0  Diphenhydramine on Day 0    Immune Effector Cell Therapy:  Ciltacabtagene autoleucel on Day 0    Antimicrobial Prophylaxis:  Acyclovir starting on Day -5  Levofloxacin starting on  Day -5  Fluconazole starting on Day -5  Pentamidine on day -5  Sulfamethoxazole-trimethoprim starting on Day +30    Seizure Prophylaxis:  Levetiracetam on Day 0 through Day +30       Caregiver: Catie (friend)  Post-transplant discharge plans: home     Notes:  Reviewed and reconciled the medication list with the patient and friend. Patient was able to confirm all medications he currently takes including schedule and indication. Patient demonstrates good medication adherence and understands the importance of this through the immune-effector cell therapy process.     Reviewed the planned lymphodepletion chemotherapy regimen, including schedule and possible side effects. Provided the patient with drug information handouts for chemotherapy and reviewed possible side effects including: neutropenia, thrombocytopenia, anemia, infection, infusion reactions, rash, nausea/vomiting, mucositis, loss of appetite, diarrhea, neuropathy, hair loss, cough, bladder irritation, liver and/or renal dysfunction. Also discussed the rare side effects of neurotoxicity and hemolytic anemia.     Provided the patient with drug information handouts for immune effector cell therapy and reviewed possible side effects including: neutropenia, thrombocytopenia, anemia, infection, nausea/vomiting, loss of appetite, diarrhea, headache, bone and muscle pains. Also discussed serious adverse effects of cytokine release syndrome (CRS) [fever, low blood pressure, and low oxygen levels] immune effector cell-associated neurotoxicity syndrome (ICANS) [agitation, confusion, hallucinations, difficulty speaking, vision changes, lack of strength in arms, legs or body, seizures, cerebral edema, increased intracranial pressure, and coma].    Reviewed prophylactic antimicrobials, antiseizure, and antiemetics. Encouraged the patient to report all possible side effects/new symptoms and to ask for supportive care medications if needed. Patient verbalized understanding  and all questions were answered.    Pharmacy Notes/Proposed recommendations:  - The patient stated that he is transitioning from Metformin to Jardiance. He will finish metformin soon. Therefore, after discharge can continue Jardiance instead of metformin. The patient is aware that we would use insulin while inpatient. He stated understanding.     - The patient stated that he has been off amlodipine, per MD instructions, due to low blood pressure values. He states he is only taking clonidine and losartan. Confirmed with the patient he is taking clonidine daily.     -Based on the chart review, the patient is currently taking aspirin as part of his chemotherapy treatment, but no other reason or diagnosis for this medication has been found. If no other reasons for its use are identified, it is recommended that the aspirin be discontinued upon admission.     - No history of shingles, invasive fungal infections, or CDiff.      - Patient understands that he will need to take a higher dose of acyclovir for one year post-CART and start taking Bactrim at day +30 and continue until one year post-CART.    Drug Interactions: No interactions between new oncology treatment plan and home medication list requiring intervention           The patient demonstrates good medication adherence and understanding of the chemotherapy and immune effector cell therapy plan. BMT/Hematology Oncology PharmD will continue to follow the patient while admitted to the inpatient unit.      Esperanza Vance, PharmD  Clinical Pharmacy Specialist, Bone Marrow Transplant/Hematology  Spectra link: [phone]

## 2024-08-02 NOTE — PROGRESS NOTES
INFORMED CONSENT/ LIMITS of CONFIDENTIALITY: Prior to beginning the interview, the patient's identification was confirmed using two identifiers. Jaspreet Walsh Jr.  was informed of the possible risks and benefits of psychological interventions (e.g., counseling, psychotherapy, testing) and provided information regarding the handling of protected health records and   the limits of confidentiality, including the importance of reporting any suicidal or homicidal ideation to ensure safety of all parties. This provider explained the purpose of today's appointment and the patient was provided with time to ask questions regarding this information.  Acceptance and understanding of these conditions was expressed, and Jaspreet Walsh Jr. freely consented to this evaluation.       Psycho-Oncology Pre-CAR-T Evaluation  Psychiatry Initial Visit (PhD)  Psychological Intake and Assessment    Date:  8/2/2024     CPT Code:   74264 (1hr) , Psychiatric Diagnostic Evaluation    Evaluation Length (direct face-to-face time):  1 hour   Total Time including report writing, test scoring, chart review, integration of data and feedback: 1 hour       Referred by:  Oncologist: PAMELA Baker MD.     Chief complaint/reason for encounter:  Psychological Evaluation prior to chimeric antigen receptor (CAR) T-cell therapy    Clinical status of patient: Outpatient    Jaspreet Walsh Jr., a 64 y.o. male, was seen for initial evaluation visit.  Met with patient and partner. His primary care physician is No, Primary Doctor.      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    Encounter for screening colonoscopy    FARFAN (dyspnea on exertion)    History of auto stem cell transplant    Immunodeficiency secondary to neoplasm    Immunodeficiency  secondary to chemotherapy    Antineoplastic chemotherapy induced pancytopenia    Chemotherapy-induced nausea and vomiting    Chemotherapy-induced diarrhea    History of autologous stem cell transplant    COVID    Fever and chills    Salmonella bacteremia    Diabetes mellitus    Sepsis    Anemia    LAN (acute kidney injury)    Neuropathy        Health Behaviors:       ETOH Use: No (rare)      Tobacco Use: No   THC use: No   Non-prescribed/Illicit Drug Use:  No     Prescription Misuse:No   Caffeine: minimal   Exercise:The patient engages in environmental activity only.   Firearms:  Yes, stored safley   Advanced directives:No     Family History:   Psychiatric illness: No     Alcohol/Drug Abuse: No     Suicide: No      Past Psychiatric History:   Inpatient treatment: No     Outpatient treatment: No     Prior substance abuse treatment: No     Suicide Attempts: No      Psychotropic Medications:  Current: none       Past: none    Current medications as per below, allergies reviewed in chart.  Current Outpatient Medications   Medication    acyclovir (ZOVIRAX) 400 MG tablet    aspirin (ECOTRIN) 81 MG EC tablet    atorvastatin (LIPITOR) 20 MG tablet    dexAMETHasone (DECADRON) 4 MG Tab    gabapentin (NEURONTIN) 300 MG capsule    JARDIANCE 25 mg tablet    metFORMIN (GLUCOPHAGE) 1000 MG tablet    POMALYST 4 mg Cap    potassium chloride SA (K-DUR,KLOR-CON M) 10 MEQ tablet     No current facility-administered medications for this visit.         Social situation/Stressors:Jaspreet Walsh Jr. is an 64 y.o. male referred by PAMELA Baker MD for pre-CAR-T evaluation.  Jaspreet Walsh Jr. lives with [facility] in [place], [place]. He was a former maintenance working, ending in 2021..  He has been in his job for 10 years.  His prior work history is stable.  The patient reports that he does have adequate availability of time off for the procedure and recovery.  Jaspreet Walsh Jr. has been  once in divorce and has 3 adult  children, Oldest daughter in local..  His partner is retired  The patient reports good social support.  Valorie will be present and available to assist the patient during his recovery period.   Jaspreet Walsh Jr. is an active member of the [employer]. Jaspreet Walsh Jr.'s hobbies include busy with activities.   The patient has no  history.      Additional stressors:  None   Strengths: Housing stability, Able to vocalize needs, Interpersonal relationships and supports available - family, relatives, friends, and Cultural/spiritual/Lutheran and community involvement   Liabilities: Complicated medical illness    History of present illness:   Oncology History   Multiple myeloma   9/16/2020 Initial Diagnosis    Multiple myeloma     5/15/2021 - 5/29/2021 Chemotherapy    Treatment Summary   Plan Name: BMT MA AUTOLOGOUS BMT MELPHALAN 200 (SINGLE-AGENT)  Treatment Goal: Control  Status: Inactive  Start Date: 5/15/2021  End Date: 5/15/2021  Provider: Dameon Baker MD  Chemotherapy: dexAMETHasone tablet 12 mg, 12 mg, Oral, Every 24 hours (non-standard times), 1 of 1 cycle  Administration: 12 mg (5/16/2021)  acyclovir capsule 800 mg, 800 mg, Oral, 2 times daily, 1 of 1 cycle  Administration: 800 mg (5/16/2021), 800 mg (5/16/2021), 800 mg (5/17/2021), 800 mg (5/17/2021), 800 mg (5/18/2021), 800 mg (5/18/2021), 800 mg (5/19/2021), 800 mg (5/19/2021), 800 mg (5/20/2021), 800 mg (5/20/2021), 800 mg (5/21/2021), 800 mg (5/21/2021), 800 mg (5/22/2021), 800 mg (5/22/2021), 800 mg (5/23/2021), 800 mg (5/23/2021), 800 mg (5/24/2021), 800 mg (5/24/2021), 800 mg (5/25/2021), 800 mg (5/25/2021), 800 mg (5/26/2021), 800 mg (5/26/2021), 800 mg (5/27/2021), 800 mg (5/27/2021), 800 mg (5/28/2021), 800 mg (5/28/2021), 800 mg (5/29/2021), 800 mg (5/29/2021), 800 mg (5/30/2021), 800 mg (5/30/2021), 800 mg (5/31/2021)  levoFLOXacin tablet 500 mg, 500 mg, Oral, Daily, 1 of 1 cycle  Administration: 500 mg (5/16/2021), 500  mg (5/17/2021), 500 mg (5/18/2021), 500 mg (5/19/2021), 500 mg (5/20/2021), 500 mg (5/21/2021), 500 mg (5/22/2021), 500 mg (5/23/2021), 500 mg (5/24/2021), 500 mg (5/25/2021), 500 mg (5/26/2021), 500 mg (5/27/2021), 500 mg (5/28/2021), 500 mg (5/29/2021)     8/4/2023 -  Chemotherapy    Treatment Summary   Plan Name: OP MEAGHAN-KD DARATUMUMAB CARFILZOMIB DEXAMETHASONE Q4W  Treatment Goal: Maintenance  Status: Active  Start Date: 8/4/2023  End Date: 8/16/2024 (Planned)  Provider: Dameon Baker MD  Chemotherapy: daratumumab-hyaluronidase-[facility] subcutaneous injection 1,800 mg, 1,800 mg (100 % of original dose 1,800 mg), Subcutaneous, Clinic[facility] 1 time, 9 of 9 cycles  Dose modification: 1,800 mg (original dose 1,800 mg, Cycle 1)  Administration: 1,800 mg (8/4/2023), 1,800 mg (8/23/2023), 1,800 mg (8/31/2023), 1,800 mg (9/6/2023), 1,800 mg (9/13/2023), 1,800 mg (9/20/2023), 1,800 mg (9/27/2023), 1,800 mg (10/4/2023), 1,800 mg (10/11/2023), 1,800 mg (11/1/2023), 1,800 mg (2/8/2024), 1,800 mg (11/15/2023), 1,800 mg (12/13/2023), 1,800 mg (12/27/2023), 1,800 mg (11/29/2023), 1,800 mg (1/10/2024), 1,800 mg (1/24/2024), 1,800 mg (3/6/2024), 1,800 mg (4/3/2024)  carfilzomib (KYPROLIS) 44 mg in dextrose 5 % (D5W) 85 mL IVPB, 20 mg/m2 = 44 mg, Intravenous, Clinic[facility] 1 time, 12 of 13 cycles  Administration: 44 mg (8/4/2023), 150 mg (8/23/2023), 150 mg (8/31/2023), 150 mg (9/13/2023), 150 mg (9/20/2023), 150 mg (9/27/2023), 150 mg (10/11/2023), 150 mg (10/25/2023), 150 mg (11/1/2023), 150 mg (2/8/2024), 150 mg (2/14/2024), 150 mg (2/21/2024), 150 mg (11/15/2023), 150 mg (11/22/2023), 150 mg (12/13/2023), 150 mg (12/20/2023), 150 mg (12/27/2023), 150 mg (11/29/2023), 150 mg (1/10/2024), 150 mg (1/17/2024), 150 mg (1/24/2024), 150 mg (3/6/2024), 150 mg (3/13/2024), 150 mg (3/20/2024), 150 mg (4/3/2024), 150 mg (4/12/2024), 150 mg (4/17/2024), 150 mg (5/1/2024), 150 mg (5/8/2024), 150 mg (5/15/2024), 150 mg (6/7/2024), 150 mg  (6/14/2024), 150 mg (6/21/2024), 150 mg (7/5/2024), 150 mg (7/12/2024), 150 mg (7/19/2024)       Patient with MM with plans for CAR-T. Patient previously underwent Auto SCT without notable concerns from a psych perspective.     Jaspreet Walsh Jr. has adjusted to illness well primarily through active coping strategies. He has engaged in appropriate information gathering.  The patient has good family support.  His family is coping well with the diagnosis/treatment/prognosis.    Jaspreet Walsh Jr. reports using  activities around the house.  as his primary methods of coping with general stressors.  Illness-related psychosocial stressors include changes in ability to engage in leisure activities. These stressors will not prevent patient from adhering to post-transplant requirements.  The patient has an good partnership with his [facility] oncology treatment team. The patient reports the following barriers to cancer care:none.    Chimeric antigen receptor (CAR) T-cell therapy:  Jaspreet Walsh Jr. possesses an rudimentary level of knowledge about CAR-T cell therapy gleaned from materials provided by his clinicians and discussions with his clinical team. Patient   Jaspreet Walsh Jr. is knowledgeable about the possible costs, risks, and complications of the procedure and the behavioral changes which will be required of him.  He has anticipated his recovery needs and has planned adequate time away from work, assistance from fiancee, and lodging at home to facilitate healing. Jaspreet Walsh Jr. is aware of the requirement that CAR-T cell patients must stay within 1 hour of the hospital for their first 30 days post-transplant and are unable to drive for 8 weeks after the procedure.  Jaspreet Walsh Jr. knows he must commit to careful monitoring of symptoms, the possibility of a complex long-term multiple drug treatment regimen, and long term follow-up visits with his oncologist (as required) following the procedure.  He is aware  he may need repeated transfusions post-therapy and he may have prolonged low blood count. He is aware of the following necessary behavioral changes:changes in food selection, preparation, and storage, increased vigilance with home cleanliness , careful personal and dental hygiene , and rapid return to physical activity. The patient reports good adherence to guidelines/requirements/treatment in the past, which is supported by review of his medical chart.  Jaspreet Headdandre Micah has realistic expectations of health and illness possibilities following CAR-T cell therapy. He is aware of possible medical side effects including fever, decreased appetite, and fatigue during/following treatment. He is aware of the risk of mortality.  He also anticipates social strains including decreased ability to participate in some leisure and social activities for some time and the strains of care-giving demands on friends/family.      Collateral Information: Not notable.    Current Symptoms:  Mood: denied;  no prior and no SI/HI;   Sandra: Denies   Psychosis: Denies  Anxiety: denied; no prior;   Generalized anxiety: Denies       Panic Disorder: Denies  Social/specific phobia: Denies   OCD: Denies  Substance abuse: denied  Is the patient willing to submit to a random drug screen?  Yes (Drugs of abuse screen NEG)  Cognitive functioning:  Memory  Health behaviors: noncontributory  Can the patient identify own medications and describe purpose and proper dosing? Yes  Does the patient appropriately manage chronic conditions which need close monitoring (such as diabetes)? Yes  Does the patient use complementary or alternative medications, remedies, procedures, or interventions? No   Sleep:  The patient reports approximately 6 hours of uninterrupted sleep per night. restful sleep , no sleep onset difficulty  and no sleep maintenance difficulty, no EDS  and no reported apneic events, no sleep hygiene considerations ; no use of  OTC/melatonin/hypnotics/benzodiazepines    Pain: Mr. Walsh reports neuropathy pain.    Symptoms do interfere with daily activity, sleeping and work.   Trauma: Denies  Sexual Dysfunction:  Denies  Head Injury History: Denies  Personality Functioning: The patient does not display any personality characteristics which would be an impediment to receiving BMT.    Patient Reported Cancer Treatment Symptoms:  neuropathies - bilateral feet    Psychological Assessment/Testing:     Distress thermometer:       5/31/2024     9:09 AM 5/1/2024     8:53 AM 2/8/2024     8:36 AM 1/8/2024    10:06 AM 1/8/2024    10:04 AM 10/25/2023     7:10 AM 9/27/2023     8:08 AM   DISTRESS SCREENING   Distress Score 0 - No Distress 0 - No Distress 0 - No Distress 0 - No Distress 0 - No Distress 0 - No Distress 0 - No Distress   Practical Concerns None of these None of these None of these None of these None of these  None of these   Social Concerns None of these None of these None of these None of these None of these  None of these   Emotional Concerns None of these None of these None of these None of these None of these  None of these   Retire Spiritual or Roman Catholic Concerns       No   Spiritual or Roman Catholic Concerns None of these None of these None of these None of these None of these     Physical Concerns None of these None of these None of these None of these None of these  None of these            PHQ-9    The patient completed the Patient Health Questionnaire-9 item (PHQ-9) Depression Screen and received a score of 0, indicating no depression symptoms presently impacting current functioning.      JULY-7    The patient completed the General Anxiety Disorder, 7 Item Questionnaire (GAD7) and received a score of 0, indicating no anxiety symptoms presently impacting current functioning.    PCS: Pain catastrophizing: There were no elevations on PCS total score (0; 2nd%ile), helplessness (0; 6th%ile), magnification (0;  14th%ile), or rumination  (0; 3rd%ile) which suggests adequate pain coping.        MOCA  The Xander Cognitive Assessment (MOCA), a cognitive screener, was administered to assess the patient's current mental status and cognitive capacity. Patient obtained a score of 18/30. This score does not fall within normal limits as compared to those within similar age and level of education, and suggests moderate impairments in neurocognitive functioning.  He is currently undergoing cancer treatment, which is associated with temporary decline in cognitive functioning.  However, these deficits are  not disproportionate to his current cancer diagnosis and treatment. Score is stable from cognitive screening score completed on 2/10/2021. ADLs are not impacted.    REALM-R:   The Rapid Estimate of Adult Literacy in Medicine, Revised (REALM-R) is a brief screening instrument used to assess an adult patient's ability to read common medical words. It is designed to assist medical professionals in identifying patients at risk for poor understanding of written healthcare communication.  Results:  Insufficient health literacy      [facility] (MBMD)                                    Patient did not complete      Mental Status Exam:    General appearance:  appears stated age, neatly dressed, well groomed  Level of cooperation:  cooperative  Thought processes:  logical, goal-directed   Speech: normal in rate, volume, and tone    Mood: steady  Affect: mood congruent  Thought content:  no illusions, no visual hallucinations, no auditory hallucinations, no delusions, no active or passive homicidal thoughts, no active or passive suicidal ideation, no obsessions, no compulsions, no violence  Orientation:  oriented to person, place, and time  Memory:  Recent memory:  2 of 5 objects after brief delay.    Remote memory - intact  Attention span and concentration:  spelled WORLD forwards and backwards; SAVEAHAART without difficulty  Abstract  reasoning:    Similarities: abstract.    Proverbs: abstract.  Judgment and insight: good   Language:  Intact      SUMMARY:  Jaspreet Walsh Jr. is a  64 y.o. male referred by Dr. Baker for psychological evaluation prior to stem cell transplantation.  The patient appears absent of disabling psychopathology or disabilities which would prevent understanding and compliance with medical treatment.  There is no evidence of suicidality.   The patient has good knowledge about CAR-T cell therapy, appropriate expectations for health and illness following transplantation, adequate understanding of the possible risks and complications of this treatment option, and a high willingness to sustain effort for lifestyle changes and health adaptations which will be required of him. He is aware of the 30 day local lodging requirement.  He reports adequate compliance with previous medical treatment.  Jaspreet Walsh Jr. has good social support from familt. Caregivers are engaged and aware of post-CAR-T demands.  The patient exhibits a medium degree of social stability.  The patient has experience using coping mechanisms to deal with stress. and The patient acknowledges no stressors expected to limit his ability to cope with the demands of CAR-T cell therapy and recovery.  The patient reports no tobacco use, no alcohol use, and no illicit drug use  He demonstrates inadequate health literacy.   He does demonstrate some impairment in cognitive functioning, but retains the ability to complete all ADLs and has the ability to consent to CAR-T cell therapy based upon knowledge and understanding of the procedure.This patient does not demonstrate any impairment in cognitive functioning, retains the ability to complete all ADLs, and has the ability to consent to CAR-T cell therapy based upon knowledge and understanding of the procedure.      IMPRESSIONS AND RECOMMENDATIONS  Jaspreet Walsh Jr. is an  acceptable CAR-T candidate from a  psychological perspective, with mild risk. However, portions of the evaluation were not completed by the [facility] (after several reminders) so it is difficulty to ascertain health and compliance dynamics objectively.   There are no overt psychological contraindications for proceeding with the procedure.He has no significant mental health history, and reports no current psychiatric problems or major adjustment issues. The patient has reasonable expectations for the procedure, good social support, and has already begun making appropriate life plans in anticipation of the procedure. The patient has verbalized appropriate awareness and commitment to the necessary behavioral changes associated with CAR-T cell therapy and appears willing to adjust to long-term lifestyle challenges and medical follow-up. Given literacy limitations, this patient should be provided all vital information for consent, decision-making, and treatment both verbally and in writing.    ICD-10-CM ICD-9-CM   1. Encounter for psychological evaluation  Z00.8 V72.85   2. Multiple myeloma, remission status unspecified  C90.00 203.00          Roxie Lobo, PhD  Clinical Psychologist  [place] License #[id number]  [place] License #[id number]

## 2024-08-05 ENCOUNTER — HOSPITAL ENCOUNTER (OUTPATIENT)
Dept: INTERVENTIONAL RADIOLOGY/VASCULAR | Facility: HOSPITAL | Age: 64
Discharge: HOME | End: 2024-08-05
Attending: INTERNAL MEDICINE
Payer: MEDICARE

## 2024-08-05 ENCOUNTER — CLINICAL SUPPORT (OUTPATIENT)
Dept: HEMATOLOGY/ONCOLOGY | Facility: CLINIC | Age: 64
End: 2024-08-05
Payer: MEDICARE

## 2024-08-05 ENCOUNTER — HOSPITAL ENCOUNTER (OUTPATIENT)
Dept: TRANSFUSION MEDICINE | Facility: HOSPITAL | Age: 64
Discharge: HOME | End: 2024-08-05
Payer: MEDICARE

## 2024-08-05 VITALS
SYSTOLIC BLOOD PRESSURE: 150 MMHG | TEMPERATURE: 99 F | RESPIRATION RATE: 14 BRPM | DIASTOLIC BLOOD PRESSURE: 83 MMHG | HEART RATE: 46 BPM

## 2024-08-05 VITALS
DIASTOLIC BLOOD PRESSURE: 71 MMHG | SYSTOLIC BLOOD PRESSURE: 125 MMHG | OXYGEN SATURATION: 100 % | RESPIRATION RATE: 14 BRPM | HEART RATE: 56 BPM

## 2024-08-05 VITALS — BODY MASS INDEX: 31.9 KG/M2 | HEIGHT: 67 IN

## 2024-08-05 DIAGNOSIS — C90.00 MULTIPLE MYELOMA: Primary | ICD-10-CM

## 2024-08-05 DIAGNOSIS — C90.02 MULTIPLE MYELOMA IN RELAPSE: ICD-10-CM

## 2024-08-05 DIAGNOSIS — C90.00 MULTIPLE MYELOMA, REMISSION STATUS UNSPECIFIED: ICD-10-CM

## 2024-08-05 DIAGNOSIS — C90.02 MULTIPLE MYELOMA IN RELAPSE: Primary | ICD-10-CM

## 2024-08-05 LAB
ALBUMIN SERPL BCP-MCNC: 2.6 G/DL (ref 3.5–5.2)
ALBUMIN SERPL ELPH-MCNC: 3.26 G/DL (ref 3.35–5.55)
ALP SERPL-CCNC: 47 U/L (ref 55–135)
ALPHA1 GLOB SERPL ELPH-MCNC: 0.47 G/DL (ref 0.17–0.41)
ALPHA2 GLOB SERPL ELPH-MCNC: 0.78 G/DL (ref 0.43–0.99)
ALT SERPL W/O P-5'-P-CCNC: 8 U/L (ref 10–44)
ANION GAP SERPL CALC-SCNC: 6 MMOL/L (ref 8–16)
ANISOCYTOSIS BLD QL SMEAR: ABNORMAL
AST SERPL-CCNC: 8 U/L (ref 10–40)
B-GLOBULIN SERPL ELPH-MCNC: 0.6 G/DL (ref 0.5–1.1)
BASOPHILS # BLD AUTO: 0.04 K/UL (ref 0–0.2)
BASOPHILS NFR BLD: 1.7 % (ref 0–1.9)
BILIRUB SERPL-MCNC: 0.3 MG/DL (ref 0.1–1)
BUN SERPL-MCNC: 13 MG/DL (ref 8–23)
BURR CELLS BLD QL SMEAR: ABNORMAL
CA-I BLDV-SCNC: 1.13 MMOL/L (ref 1.06–1.42)
CALCIUM SERPL-MCNC: 9.5 MG/DL (ref 8.7–10.5)
CHLORIDE SERPL-SCNC: 112 MMOL/L (ref 95–110)
CO2 SERPL-SCNC: 27 MMOL/L (ref 23–29)
CREAT SERPL-MCNC: 1 MG/DL (ref 0.5–1.4)
DACRYOCYTES BLD QL SMEAR: ABNORMAL
DIFFERENTIAL METHOD BLD: ABNORMAL
EOSINOPHIL # BLD AUTO: 0.3 K/UL (ref 0–0.5)
EOSINOPHIL NFR BLD: 14.1 % (ref 0–8)
ERYTHROCYTE [DISTWIDTH] IN BLOOD BY AUTOMATED COUNT: 16.5 % (ref 11.5–14.5)
EST. GFR  (NO RACE VARIABLE): >60 ML/MIN/1.73 M^2
GAMMA GLOB SERPL ELPH-MCNC: 0.89 G/DL (ref 0.67–1.58)
GLUCOSE SERPL-MCNC: 162 MG/DL (ref 70–110)
HCT VFR BLD AUTO: 29.2 % (ref 40–54)
HGB BLD-MCNC: 9.3 G/DL (ref 14–18)
HYPOCHROMIA BLD QL SMEAR: ABNORMAL
IMM GRANULOCYTES # BLD AUTO: 0.01 K/UL (ref 0–0.04)
IMM GRANULOCYTES NFR BLD AUTO: 0.4 % (ref 0–0.5)
INTERPRETATION SERPL IFE-IMP: NORMAL
KAPPA LC SER QL IA: 25.66 MG/DL (ref 0.33–1.94)
KAPPA LC/LAMBDA SER IA: 10.83 (ref 0.26–1.65)
LAMBDA LC SER QL IA: 2.37 MG/DL (ref 0.57–2.63)
LYMPHOCYTES # BLD AUTO: 0.9 K/UL (ref 1–4.8)
LYMPHOCYTES NFR BLD: 40.2 % (ref 18–48)
MAGNESIUM SERPL-MCNC: 2 MG/DL (ref 1.6–2.6)
MCH RBC QN AUTO: 31.8 PG (ref 27–31)
MCHC RBC AUTO-ENTMCNC: 31.8 G/DL (ref 32–36)
MCV RBC AUTO: 100 FL (ref 82–98)
MONOCYTES # BLD AUTO: 0.3 K/UL (ref 0.3–1)
MONOCYTES NFR BLD: 11.5 % (ref 4–15)
NEUTROPHILS # BLD AUTO: 0.8 K/UL (ref 1.8–7.7)
NEUTROPHILS NFR BLD: 32.1 % (ref 38–73)
NRBC BLD-RTO: 0 /100 WBC
PHOSPHATE SERPL-MCNC: 2.8 MG/DL (ref 2.7–4.5)
PLATELET # BLD AUTO: 162 K/UL (ref 150–450)
PLATELET BLD QL SMEAR: ABNORMAL
PMV BLD AUTO: 10.8 FL (ref 9.2–12.9)
POIKILOCYTOSIS BLD QL SMEAR: SLIGHT
POLYCHROMASIA BLD QL SMEAR: ABNORMAL
POTASSIUM SERPL-SCNC: 3.3 MMOL/L (ref 3.5–5.1)
PROT SERPL-MCNC: 5.4 G/DL (ref 6–8.4)
PROT SERPL-MCNC: 6 G/DL (ref 6–8.4)
RBC # BLD AUTO: 2.92 M/UL (ref 4.6–6.2)
SARS-COV-2 RDRP RESP QL NAA+PROBE: NEGATIVE
SCHISTOCYTES BLD QL SMEAR: ABNORMAL
SCHISTOCYTES BLD QL SMEAR: PRESENT
SODIUM SERPL-SCNC: 145 MMOL/L (ref 136–145)
WBC # BLD AUTO: 2.34 K/UL (ref 3.9–12.7)

## 2024-08-05 PROCEDURE — 25000003 PHARM REV CODE 250: Performed by: INTERNAL MEDICINE

## 2024-08-05 PROCEDURE — 82330 ASSAY OF CALCIUM: CPT | Performed by: INTERNAL MEDICINE

## 2024-08-05 PROCEDURE — U0002 COVID-19 LAB TEST NON-CDC: HCPCS | Performed by: INTERNAL MEDICINE

## 2024-08-05 PROCEDURE — 76937 US GUIDE VASCULAR ACCESS: CPT | Mod: TC | Performed by: RADIOLOGY

## 2024-08-05 PROCEDURE — C1894 INTRO/SHEATH, NON-LASER: HCPCS

## 2024-08-05 PROCEDURE — 63600175 PHARM REV CODE 636 W HCPCS: Mod: JZ,JG | Performed by: PATHOLOGY

## 2024-08-05 PROCEDURE — 84100 ASSAY OF PHOSPHORUS: CPT | Performed by: INTERNAL MEDICINE

## 2024-08-05 PROCEDURE — 25000003 PHARM REV CODE 250: Performed by: RADIOLOGY

## 2024-08-05 PROCEDURE — 80053 COMPREHEN METABOLIC PANEL: CPT | Performed by: INTERNAL MEDICINE

## 2024-08-05 PROCEDURE — 0537T HC CAR-T THERAPY, HARVEST, T LYMPHCYT, PER DAY: CPT

## 2024-08-05 PROCEDURE — 36556 INSERT NON-TUNNEL CV CATH: CPT | Mod: RT | Performed by: RADIOLOGY

## 2024-08-05 PROCEDURE — 0538T HC CAR-T THERAPY, PREP FOR TRANSP, T LYMPHCYT: CPT

## 2024-08-05 PROCEDURE — 77001 FLUOROGUIDE FOR VEIN DEVICE: CPT | Mod: TC | Performed by: RADIOLOGY

## 2024-08-05 PROCEDURE — C1752 CATH,HEMODIALYSIS,SHORT-TERM: HCPCS

## 2024-08-05 PROCEDURE — 83735 ASSAY OF MAGNESIUM: CPT | Performed by: INTERNAL MEDICINE

## 2024-08-05 PROCEDURE — 25000003 PHARM REV CODE 250: Performed by: PATHOLOGY

## 2024-08-05 PROCEDURE — 85025 COMPLETE CBC W/AUTO DIFF WBC: CPT | Performed by: INTERNAL MEDICINE

## 2024-08-05 PROCEDURE — 77001 FLUOROGUIDE FOR VEIN DEVICE: CPT | Mod: 26,,, | Performed by: RADIOLOGY

## 2024-08-05 PROCEDURE — 76937 US GUIDE VASCULAR ACCESS: CPT | Mod: 26,,, | Performed by: RADIOLOGY

## 2024-08-05 PROCEDURE — 63600175 PHARM REV CODE 636 W HCPCS: Performed by: RADIOLOGY

## 2024-08-05 RX ORDER — POTASSIUM CHLORIDE 20 MEQ/1
40 TABLET, EXTENDED RELEASE ORAL ONCE
Status: COMPLETED | OUTPATIENT
Start: 2024-08-05 | End: 2024-08-05

## 2024-08-05 RX ORDER — LIDOCAINE HYDROCHLORIDE 10 MG/ML
INJECTION, SOLUTION INFILTRATION; PERINEURAL
Status: COMPLETED | OUTPATIENT
Start: 2024-08-05 | End: 2024-08-05

## 2024-08-05 RX ORDER — HEPARIN SODIUM 1000 [USP'U]/ML
INJECTION, SOLUTION INTRAVENOUS; SUBCUTANEOUS
Status: COMPLETED | OUTPATIENT
Start: 2024-08-05 | End: 2024-08-05

## 2024-08-05 RX ADMIN — HEPARIN SODIUM 1200 UNITS: 1000 INJECTION, SOLUTION INTRAVENOUS; SUBCUTANEOUS at 09:08

## 2024-08-05 RX ADMIN — POTASSIUM CHLORIDE 40 MEQ: 1500 TABLET, EXTENDED RELEASE ORAL at 03:08

## 2024-08-05 RX ADMIN — SODIUM CHLORIDE 4 G: 0.9 INJECTION, SOLUTION INTRAVENOUS at 09:08

## 2024-08-05 RX ADMIN — LIDOCAINE HYDROCHLORIDE 2 ML: 10 INJECTION, SOLUTION INFILTRATION; PERINEURAL at 09:08

## 2024-08-06 LAB
PATHOLOGIST INTERPRETATION IFE: NORMAL
PATHOLOGIST INTERPRETATION SPE: NORMAL

## 2024-08-09 ENCOUNTER — INFUSION (OUTPATIENT)
Dept: INFUSION THERAPY | Facility: HOSPITAL | Age: 64
End: 2024-08-09
Payer: MEDICARE

## 2024-08-09 VITALS
WEIGHT: 208 LBS | HEART RATE: 46 BPM | SYSTOLIC BLOOD PRESSURE: 184 MMHG | RESPIRATION RATE: 18 BRPM | BODY MASS INDEX: 32.58 KG/M2 | DIASTOLIC BLOOD PRESSURE: 81 MMHG | TEMPERATURE: 99 F

## 2024-08-09 DIAGNOSIS — C90.00 MULTIPLE MYELOMA, REMISSION STATUS UNSPECIFIED: Primary | ICD-10-CM

## 2024-08-09 PROCEDURE — 25000003 PHARM REV CODE 250: Performed by: INTERNAL MEDICINE

## 2024-08-09 PROCEDURE — 63600175 PHARM REV CODE 636 W HCPCS: Mod: JZ,JG | Performed by: INTERNAL MEDICINE

## 2024-08-09 PROCEDURE — 96413 CHEMO IV INFUSION 1 HR: CPT

## 2024-08-09 RX ORDER — DIPHENHYDRAMINE HYDROCHLORIDE 50 MG/ML
50 INJECTION INTRAMUSCULAR; INTRAVENOUS ONCE AS NEEDED
Status: DISCONTINUED | OUTPATIENT
Start: 2024-08-09 | End: 2024-08-09 | Stop reason: HOSPADM

## 2024-08-09 RX ORDER — SODIUM CHLORIDE 0.9 % (FLUSH) 0.9 %
10 SYRINGE (ML) INJECTION
Status: DISCONTINUED | OUTPATIENT
Start: 2024-08-09 | End: 2024-08-09 | Stop reason: HOSPADM

## 2024-08-09 RX ORDER — EPINEPHRINE 0.3 MG/.3ML
0.3 INJECTION SUBCUTANEOUS ONCE AS NEEDED
Status: DISCONTINUED | OUTPATIENT
Start: 2024-08-09 | End: 2024-08-09 | Stop reason: HOSPADM

## 2024-08-09 RX ORDER — HEPARIN 100 UNIT/ML
500 SYRINGE INTRAVENOUS
Status: DISCONTINUED | OUTPATIENT
Start: 2024-08-09 | End: 2024-08-09 | Stop reason: HOSPADM

## 2024-08-09 RX ADMIN — SODIUM CHLORIDE: 9 INJECTION, SOLUTION INTRAVENOUS at 02:08

## 2024-08-09 RX ADMIN — CARFILZOMIB 150 MG: 30 INJECTION, POWDER, LYOPHILIZED, FOR SOLUTION INTRAVENOUS at 03:08

## 2024-08-13 DIAGNOSIS — C90.00 MULTIPLE MYELOMA, REMISSION STATUS UNSPECIFIED: ICD-10-CM

## 2024-08-13 RX ORDER — POMALIDOMIDE 4 MG/1
1 CAPSULE ORAL DAILY
Qty: 21 CAPSULE | Refills: 0 | Status: SHIPPED | OUTPATIENT
Start: 2024-08-13

## 2024-08-13 NOTE — TELEPHONE ENCOUNTER
----- Message from Jen Eldon sent at 8/13/2024  8:38 AM CDT -----  Regarding: Consult/Advisory  Contact: [phone]  Consult/Advisory     Name Of Caller: pt         Contact Preference:  [phone]      Nature of call: would like to know if a refill is needed on RX, POMALYST 4 mg Cap or if he should stop taking it ,   if he should stay on Rx , pt would like to place a refill in pharmacy please call to advise       [facility] - [place], [place] - [address] [facility]  [address] [place] [address] [place] [address]  Phone: [phone] Fax: [phone]

## 2024-08-15 RX ORDER — SODIUM CHLORIDE 0.9 % (FLUSH) 0.9 %
10 SYRINGE (ML) INJECTION
Status: CANCELLED | OUTPATIENT
Start: 2024-08-16

## 2024-08-15 RX ORDER — HEPARIN 100 UNIT/ML
500 SYRINGE INTRAVENOUS
Status: CANCELLED | OUTPATIENT
Start: 2024-08-16

## 2024-08-16 ENCOUNTER — INFUSION (OUTPATIENT)
Dept: INFUSION THERAPY | Facility: HOSPITAL | Age: 64
End: 2024-08-16
Payer: MEDICARE

## 2024-08-16 VITALS
WEIGHT: 207.88 LBS | HEIGHT: 67 IN | BODY MASS INDEX: 32.63 KG/M2 | HEART RATE: 56 BPM | DIASTOLIC BLOOD PRESSURE: 71 MMHG | SYSTOLIC BLOOD PRESSURE: 135 MMHG | RESPIRATION RATE: 18 BRPM | TEMPERATURE: 98 F

## 2024-08-16 DIAGNOSIS — C90.00 MULTIPLE MYELOMA, REMISSION STATUS UNSPECIFIED: Primary | ICD-10-CM

## 2024-08-16 PROCEDURE — 96413 CHEMO IV INFUSION 1 HR: CPT

## 2024-08-16 PROCEDURE — 25000003 PHARM REV CODE 250: Performed by: INTERNAL MEDICINE

## 2024-08-16 PROCEDURE — 63600175 PHARM REV CODE 636 W HCPCS: Mod: JZ,JG | Performed by: INTERNAL MEDICINE

## 2024-08-16 RX ORDER — SODIUM CHLORIDE 0.9 % (FLUSH) 0.9 %
10 SYRINGE (ML) INJECTION
Status: DISCONTINUED | OUTPATIENT
Start: 2024-08-16 | End: 2024-08-16 | Stop reason: HOSPADM

## 2024-08-16 RX ORDER — HEPARIN 100 UNIT/ML
500 SYRINGE INTRAVENOUS
Status: DISCONTINUED | OUTPATIENT
Start: 2024-08-16 | End: 2024-08-16 | Stop reason: HOSPADM

## 2024-08-16 RX ADMIN — CARFILZOMIB 150 MG: 30 INJECTION, POWDER, LYOPHILIZED, FOR SOLUTION INTRAVENOUS at 02:08

## 2024-08-26 ENCOUNTER — TELEPHONE (OUTPATIENT)
Dept: HEMATOLOGY/ONCOLOGY | Facility: CLINIC | Age: 64
End: 2024-08-26
Payer: MEDICARE

## 2024-08-26 DIAGNOSIS — Z94.84 HISTORY OF AUTO STEM CELL TRANSPLANT: ICD-10-CM

## 2024-08-26 DIAGNOSIS — C90.00 MULTIPLE MYELOMA, REMISSION STATUS UNSPECIFIED: ICD-10-CM

## 2024-08-26 PROBLEM — A41.9 SEPSIS: Status: RESOLVED | Noted: 2024-05-26 | Resolved: 2024-08-26

## 2024-08-26 PROBLEM — N17.9 AKI (ACUTE KIDNEY INJURY): Status: RESOLVED | Noted: 2024-05-26 | Resolved: 2024-08-26

## 2024-08-26 RX ORDER — ACYCLOVIR 400 MG/1
400 TABLET ORAL 2 TIMES DAILY
Qty: 30 TABLET | Refills: 0 | Status: SHIPPED | OUTPATIENT
Start: 2024-08-26 | End: 2025-08-26

## 2024-08-26 NOTE — TELEPHONE ENCOUNTER
Spoke with pt via phone in regards to rx refill request. Informed pt that rx request was sent over to the pharmacy. Pt verbalized understanding

## 2024-08-26 NOTE — TELEPHONE ENCOUNTER
----- Message from Tasha Santana sent at 8/26/2024  8:02 AM CDT -----  Regarding: Refill  Contact: Pt   [phone]          Rx Name:  acyclovir (ZOVIRAX) 400 MG tablet      Preferred Pharmacy with number:     [id number] - [place], [place] - [address] [facility] AT [facility] [address]  [address] [facility] [address]  Phone: [phone] Fax: [phone]      Ordering Provider:Dameon Baker MD    Contact preference:[phone]    Comments/Notes:   Requesting refill

## 2024-09-06 DIAGNOSIS — C90.00 MULTIPLE MYELOMA, REMISSION STATUS UNSPECIFIED: ICD-10-CM

## 2024-09-06 RX ORDER — POMALIDOMIDE 4 MG/1
1 CAPSULE ORAL DAILY
Qty: 21 CAPSULE | Refills: 0 | Status: SHIPPED | OUTPATIENT
Start: 2024-09-06

## 2024-09-06 NOTE — TELEPHONE ENCOUNTER
Spoke with MicahSonidodorisha and advised him that he needed to complete the patient survey required for his Pomalyst prescription. Pt stated that he received a letter in the mail regarding the survey and that he would take the survey. Pt ended the call.

## 2024-09-09 DIAGNOSIS — Z94.84 HISTORY OF AUTO STEM CELL TRANSPLANT: ICD-10-CM

## 2024-09-09 DIAGNOSIS — C90.00 MULTIPLE MYELOMA, REMISSION STATUS UNSPECIFIED: ICD-10-CM

## 2024-09-09 RX ORDER — ACYCLOVIR 400 MG/1
400 TABLET ORAL 2 TIMES DAILY
Qty: 60 TABLET | Refills: 1 | Status: SHIPPED | OUTPATIENT
Start: 2024-09-09 | End: 2024-11-08

## 2024-09-09 NOTE — TELEPHONE ENCOUNTER
----- Message from Dana Ramos sent at 9/9/2024  8:13 AM CDT -----  Regarding: Refill Request  Contact: Pt @[phone]               Is this a Refill or New Rx: refill         Rx Name and Strength:acyclovir (ZOVIRAX) 400 MG tablet         Preferred Pharmacy with phone number:  [id number] - [facility] [address] [facility] AT [facility] [address]  [address] [facility] [address]  Phone: [phone] Fax: [phone]

## 2024-09-13 DIAGNOSIS — C90.00 MULTIPLE MYELOMA: Primary | ICD-10-CM

## 2024-09-20 DIAGNOSIS — C90.00 MULTIPLE MYELOMA, REMISSION STATUS UNSPECIFIED: ICD-10-CM

## 2024-09-20 RX ORDER — DEXAMETHASONE 2 MG/1
20 TABLET ORAL
Qty: 20 TABLET | Refills: 2 | Status: SHIPPED | OUTPATIENT
Start: 2024-09-20

## 2024-09-20 NOTE — TELEPHONE ENCOUNTER
----- Message from Mahad Keenan sent at 9/20/2024  8:52 AM CDT -----  Regarding: RX Refill  Contact: [phone]  RX Name:dexAMETHasone (DECADRON) 4 MG Tab     How is it taken: Take 10 tablets (40 mg total) by mouth every 7 days. Take with food before chemotherapy appointments - Oral     Quantity: 40 tablet       Preferred Pharmacy with phone number:    [id number] [address] [facility]   Phone: [phone]  Fax: [phone]         Ordering Provider: Dr. Baker       Contact Preference:  [phone]

## 2024-09-23 ENCOUNTER — LAB VISIT (OUTPATIENT)
Dept: LAB | Facility: HOSPITAL | Age: 64
End: 2024-09-23
Attending: INTERNAL MEDICINE
Payer: MEDICARE

## 2024-09-23 ENCOUNTER — TELEPHONE (OUTPATIENT)
Dept: SURGERY | Facility: CLINIC | Age: 64
End: 2024-09-23
Payer: MEDICARE

## 2024-09-23 ENCOUNTER — CLINICAL SUPPORT (OUTPATIENT)
Dept: HEMATOLOGY/ONCOLOGY | Facility: CLINIC | Age: 64
End: 2024-09-23
Payer: MEDICARE

## 2024-09-23 ENCOUNTER — OFFICE VISIT (OUTPATIENT)
Dept: HEMATOLOGY/ONCOLOGY | Facility: CLINIC | Age: 64
End: 2024-09-23
Payer: MEDICARE

## 2024-09-23 VITALS
OXYGEN SATURATION: 97 % | WEIGHT: 207.44 LBS | HEART RATE: 55 BPM | SYSTOLIC BLOOD PRESSURE: 132 MMHG | RESPIRATION RATE: 18 BRPM | DIASTOLIC BLOOD PRESSURE: 67 MMHG | BODY MASS INDEX: 32.56 KG/M2 | TEMPERATURE: 99 F | HEIGHT: 67 IN

## 2024-09-23 DIAGNOSIS — D84.821 IMMUNODEFICIENCY DUE TO DRUGS: ICD-10-CM

## 2024-09-23 DIAGNOSIS — Z94.84 HISTORY OF AUTO STEM CELL TRANSPLANT: ICD-10-CM

## 2024-09-23 DIAGNOSIS — C90.00 MULTIPLE MYELOMA, REMISSION STATUS UNSPECIFIED: Primary | ICD-10-CM

## 2024-09-23 DIAGNOSIS — Z79.899 IMMUNODEFICIENCY DUE TO DRUGS: ICD-10-CM

## 2024-09-23 DIAGNOSIS — C90.00 MULTIPLE MYELOMA, REMISSION STATUS UNSPECIFIED: ICD-10-CM

## 2024-09-23 LAB
ABO + RH BLD: NORMAL
ALBUMIN SERPL BCP-MCNC: 3.4 G/DL (ref 3.5–5.2)
ALP SERPL-CCNC: 55 U/L (ref 55–135)
ALT SERPL W/O P-5'-P-CCNC: 6 U/L (ref 10–44)
ANION GAP SERPL CALC-SCNC: 6 MMOL/L (ref 8–16)
ANISOCYTOSIS BLD QL SMEAR: SLIGHT
AST SERPL-CCNC: 10 U/L (ref 10–40)
BASOPHILS # BLD AUTO: 0.1 K/UL (ref 0–0.2)
BASOPHILS NFR BLD: 2.7 % (ref 0–1.9)
BILIRUB SERPL-MCNC: 0.5 MG/DL (ref 0.1–1)
BLD GP AB SCN CELLS X3 SERPL QL: NORMAL
BUN SERPL-MCNC: 11 MG/DL (ref 8–23)
BURR CELLS BLD QL SMEAR: ABNORMAL
CALCIUM SERPL-MCNC: 9.1 MG/DL (ref 8.7–10.5)
CHLORIDE SERPL-SCNC: 108 MMOL/L (ref 95–110)
CO2 SERPL-SCNC: 24 MMOL/L (ref 23–29)
CREAT SERPL-MCNC: 1.1 MG/DL (ref 0.5–1.4)
CRP SERPL-MCNC: 16.3 MG/L (ref 0–8.2)
DIFFERENTIAL METHOD BLD: ABNORMAL
EOSINOPHIL # BLD AUTO: 0.1 K/UL (ref 0–0.5)
EOSINOPHIL NFR BLD: 2.2 % (ref 0–8)
ERYTHROCYTE [DISTWIDTH] IN BLOOD BY AUTOMATED COUNT: 16.7 % (ref 11.5–14.5)
EST. GFR  (NO RACE VARIABLE): >60 ML/MIN/1.73 M^2
FERRITIN SERPL-MCNC: 382 NG/ML (ref 20–300)
GLUCOSE SERPL-MCNC: 124 MG/DL (ref 70–110)
HCT VFR BLD AUTO: 38.5 % (ref 40–54)
HGB BLD-MCNC: 12.3 G/DL (ref 14–18)
IMM GRANULOCYTES # BLD AUTO: 0.01 K/UL (ref 0–0.04)
IMM GRANULOCYTES NFR BLD AUTO: 0.3 % (ref 0–0.5)
LYMPHOCYTES # BLD AUTO: 1.1 K/UL (ref 1–4.8)
LYMPHOCYTES NFR BLD: 30.6 % (ref 18–48)
MCH RBC QN AUTO: 31.9 PG (ref 27–31)
MCHC RBC AUTO-ENTMCNC: 31.9 G/DL (ref 32–36)
MCV RBC AUTO: 100 FL (ref 82–98)
MONOCYTES # BLD AUTO: 0.1 K/UL (ref 0.3–1)
MONOCYTES NFR BLD: 3 % (ref 4–15)
NEUTROPHILS # BLD AUTO: 2.3 K/UL (ref 1.8–7.7)
NEUTROPHILS NFR BLD: 61.2 % (ref 38–73)
NRBC BLD-RTO: 0 /100 WBC
OVALOCYTES BLD QL SMEAR: ABNORMAL
PLATELET # BLD AUTO: 198 K/UL (ref 150–450)
PMV BLD AUTO: 10.7 FL (ref 9.2–12.9)
POIKILOCYTOSIS BLD QL SMEAR: SLIGHT
POTASSIUM SERPL-SCNC: 3.8 MMOL/L (ref 3.5–5.1)
PROT SERPL-MCNC: 6.5 G/DL (ref 6–8.4)
RBC # BLD AUTO: 3.85 M/UL (ref 4.6–6.2)
SARS-COV-2 RDRP RESP QL NAA+PROBE: NEGATIVE
SODIUM SERPL-SCNC: 138 MMOL/L (ref 136–145)
SPECIMEN OUTDATE: NORMAL
SPHEROCYTES BLD QL SMEAR: ABNORMAL
WBC # BLD AUTO: 3.69 K/UL (ref 3.9–12.7)

## 2024-09-23 PROCEDURE — 86592 SYPHILIS TEST NON-TREP QUAL: CPT | Performed by: INTERNAL MEDICINE

## 2024-09-23 PROCEDURE — 86703 HIV-1/HIV-2 1 RESULT ANTBDY: CPT | Performed by: INTERNAL MEDICINE

## 2024-09-23 PROCEDURE — 87340 HEPATITIS B SURFACE AG IA: CPT | Performed by: INTERNAL MEDICINE

## 2024-09-23 PROCEDURE — 86687 HTLV-I ANTIBODY: CPT | Performed by: INTERNAL MEDICINE

## 2024-09-23 PROCEDURE — 86850 RBC ANTIBODY SCREEN: CPT | Performed by: INTERNAL MEDICINE

## 2024-09-23 PROCEDURE — 82728 ASSAY OF FERRITIN: CPT | Performed by: INTERNAL MEDICINE

## 2024-09-23 PROCEDURE — 36415 COLL VENOUS BLD VENIPUNCTURE: CPT | Performed by: INTERNAL MEDICINE

## 2024-09-23 PROCEDURE — 86644 CMV ANTIBODY: CPT | Performed by: INTERNAL MEDICINE

## 2024-09-23 PROCEDURE — 4010F ACE/ARB THERAPY RXD/TAKEN: CPT | Mod: CPTII,S$GLB,, | Performed by: INTERNAL MEDICINE

## 2024-09-23 PROCEDURE — 86803 HEPATITIS C AB TEST: CPT | Performed by: INTERNAL MEDICINE

## 2024-09-23 PROCEDURE — G2211 COMPLEX E/M VISIT ADD ON: HCPCS | Mod: S$GLB,,, | Performed by: INTERNAL MEDICINE

## 2024-09-23 PROCEDURE — 1159F MED LIST DOCD IN RCRD: CPT | Mod: CPTII,S$GLB,, | Performed by: INTERNAL MEDICINE

## 2024-09-23 PROCEDURE — 80053 COMPREHEN METABOLIC PANEL: CPT | Performed by: INTERNAL MEDICINE

## 2024-09-23 PROCEDURE — U0002 COVID-19 LAB TEST NON-CDC: HCPCS | Performed by: INTERNAL MEDICINE

## 2024-09-23 PROCEDURE — 86704 HEP B CORE ANTIBODY TOTAL: CPT | Performed by: INTERNAL MEDICINE

## 2024-09-23 PROCEDURE — 86695 HERPES SIMPLEX TYPE 1 TEST: CPT | Performed by: INTERNAL MEDICINE

## 2024-09-23 PROCEDURE — 86901 BLOOD TYPING SEROLOGIC RH(D): CPT | Performed by: INTERNAL MEDICINE

## 2024-09-23 PROCEDURE — 87521 HEPATITIS C PROBE&RVRS TRNSC: CPT | Performed by: INTERNAL MEDICINE

## 2024-09-23 PROCEDURE — 99999 PR PBB SHADOW E&M-EST. PATIENT-LVL III: CPT | Mod: PBBFAC,,, | Performed by: INTERNAL MEDICINE

## 2024-09-23 PROCEDURE — 3078F DIAST BP <80 MM HG: CPT | Mod: CPTII,S$GLB,, | Performed by: INTERNAL MEDICINE

## 2024-09-23 PROCEDURE — 86753 PROTOZOA ANTIBODY NOS: CPT | Performed by: INTERNAL MEDICINE

## 2024-09-23 PROCEDURE — 86900 BLOOD TYPING SEROLOGIC ABO: CPT | Performed by: INTERNAL MEDICINE

## 2024-09-23 PROCEDURE — 86140 C-REACTIVE PROTEIN: CPT | Performed by: INTERNAL MEDICINE

## 2024-09-23 PROCEDURE — 86787 VARICELLA-ZOSTER ANTIBODY: CPT | Performed by: INTERNAL MEDICINE

## 2024-09-23 PROCEDURE — 3288F FALL RISK ASSESSMENT DOCD: CPT | Mod: CPTII,S$GLB,, | Performed by: INTERNAL MEDICINE

## 2024-09-23 PROCEDURE — 87535 HIV-1 PROBE&REVERSE TRNSCRPJ: CPT | Performed by: INTERNAL MEDICINE

## 2024-09-23 PROCEDURE — 3075F SYST BP GE 130 - 139MM HG: CPT | Mod: CPTII,S$GLB,, | Performed by: INTERNAL MEDICINE

## 2024-09-23 PROCEDURE — 85025 COMPLETE CBC W/AUTO DIFF WBC: CPT | Performed by: INTERNAL MEDICINE

## 2024-09-23 PROCEDURE — 1101F PT FALLS ASSESS-DOCD LE1/YR: CPT | Mod: CPTII,S$GLB,, | Performed by: INTERNAL MEDICINE

## 2024-09-23 PROCEDURE — 99214 OFFICE O/P EST MOD 30 MIN: CPT | Mod: S$GLB,,, | Performed by: INTERNAL MEDICINE

## 2024-09-23 PROCEDURE — 87798 DETECT AGENT NOS DNA AMP: CPT | Performed by: INTERNAL MEDICINE

## 2024-09-23 PROCEDURE — 3008F BODY MASS INDEX DOCD: CPT | Mod: CPTII,S$GLB,, | Performed by: INTERNAL MEDICINE

## 2024-09-23 RX ORDER — SODIUM CHLORIDE 0.9 % (FLUSH) 0.9 %
10 SYRINGE (ML) INJECTION
Status: CANCELLED | OUTPATIENT
Start: 2024-09-26

## 2024-09-23 RX ORDER — EPINEPHRINE 0.3 MG/.3ML
0.3 INJECTION SUBCUTANEOUS ONCE AS NEEDED
Status: CANCELLED | OUTPATIENT
Start: 2024-09-26

## 2024-09-23 RX ORDER — PROCHLORPERAZINE EDISYLATE 5 MG/ML
10 INJECTION INTRAMUSCULAR; INTRAVENOUS
Status: CANCELLED | OUTPATIENT
Start: 2024-09-26

## 2024-09-23 RX ORDER — SODIUM CHLORIDE 9 MG/ML
75 INJECTION, SOLUTION INTRAVENOUS CONTINUOUS
OUTPATIENT
Start: 2024-09-29

## 2024-09-23 RX ORDER — DIPHENHYDRAMINE HYDROCHLORIDE 50 MG/ML
50 INJECTION INTRAMUSCULAR; INTRAVENOUS ONCE AS NEEDED
Status: CANCELLED | OUTPATIENT
Start: 2024-09-27

## 2024-09-23 RX ORDER — SODIUM CHLORIDE 0.9 % (FLUSH) 0.9 %
10 SYRINGE (ML) INJECTION
OUTPATIENT
Start: 2024-09-30

## 2024-09-23 RX ORDER — HEPARIN 100 UNIT/ML
500 SYRINGE INTRAVENOUS
Status: CANCELLED | OUTPATIENT
Start: 2024-09-25

## 2024-09-23 RX ORDER — EPINEPHRINE 0.3 MG/.3ML
0.3 INJECTION SUBCUTANEOUS ONCE AS NEEDED
OUTPATIENT
Start: 2024-09-30

## 2024-09-23 RX ORDER — SODIUM CHLORIDE 9 MG/ML
125 INJECTION, SOLUTION INTRAVENOUS CONTINUOUS
OUTPATIENT
Start: 2024-09-30

## 2024-09-23 RX ORDER — PALONOSETRON 0.05 MG/ML
0.25 INJECTION, SOLUTION INTRAVENOUS
Status: CANCELLED | OUTPATIENT
Start: 2024-09-25

## 2024-09-23 RX ORDER — SODIUM CHLORIDE 0.9 % (FLUSH) 0.9 %
10 SYRINGE (ML) INJECTION
Status: CANCELLED | OUTPATIENT
Start: 2024-09-25

## 2024-09-23 RX ORDER — ACETAMINOPHEN 325 MG/1
650 TABLET ORAL ONCE
OUTPATIENT
Start: 2024-09-30 | End: 2024-09-30

## 2024-09-23 RX ORDER — DIPHENHYDRAMINE HYDROCHLORIDE 50 MG/ML
25 INJECTION INTRAMUSCULAR; INTRAVENOUS ONCE
OUTPATIENT
Start: 2024-09-30 | End: 2024-09-30

## 2024-09-23 RX ORDER — PROCHLORPERAZINE EDISYLATE 5 MG/ML
10 INJECTION INTRAMUSCULAR; INTRAVENOUS
Status: CANCELLED | OUTPATIENT
Start: 2024-09-25

## 2024-09-23 RX ORDER — LEVETIRACETAM 750 MG/1
750 TABLET ORAL 2 TIMES DAILY
OUTPATIENT
Start: 2024-09-30

## 2024-09-23 RX ORDER — EPINEPHRINE 0.3 MG/.3ML
0.3 INJECTION SUBCUTANEOUS ONCE AS NEEDED
Status: CANCELLED | OUTPATIENT
Start: 2024-09-25

## 2024-09-23 RX ORDER — ACYCLOVIR 200 MG/1
800 CAPSULE ORAL 2 TIMES DAILY
OUTPATIENT
Start: 2024-09-30

## 2024-09-23 RX ORDER — LEVOFLOXACIN 500 MG/1
500 TABLET, FILM COATED ORAL DAILY
OUTPATIENT
Start: 2024-09-30

## 2024-09-23 RX ORDER — HEPARIN SODIUM 1000 [USP'U]/ML
1600 INJECTION, SOLUTION INTRAVENOUS; SUBCUTANEOUS
OUTPATIENT
Start: 2024-09-30

## 2024-09-23 RX ORDER — DIPHENHYDRAMINE HYDROCHLORIDE 50 MG/ML
50 INJECTION INTRAMUSCULAR; INTRAVENOUS ONCE AS NEEDED
Status: CANCELLED | OUTPATIENT
Start: 2024-09-26

## 2024-09-23 RX ORDER — EPINEPHRINE 0.3 MG/.3ML
0.3 INJECTION SUBCUTANEOUS ONCE AS NEEDED
Status: CANCELLED | OUTPATIENT
Start: 2024-09-27

## 2024-09-23 RX ORDER — SULFAMETHOXAZOLE AND TRIMETHOPRIM 800; 160 MG/1; MG/1
1 TABLET ORAL
OUTPATIENT
Start: 2024-10-30

## 2024-09-23 RX ORDER — DIPHENHYDRAMINE HYDROCHLORIDE 50 MG/ML
50 INJECTION INTRAMUSCULAR; INTRAVENOUS ONCE AS NEEDED
OUTPATIENT
Start: 2024-09-30

## 2024-09-23 RX ORDER — FLUCONAZOLE 200 MG/1
400 TABLET ORAL DAILY
OUTPATIENT
Start: 2024-09-30

## 2024-09-23 RX ORDER — PROCHLORPERAZINE EDISYLATE 5 MG/ML
10 INJECTION INTRAMUSCULAR; INTRAVENOUS
Status: CANCELLED | OUTPATIENT
Start: 2024-09-27

## 2024-09-23 RX ORDER — SODIUM CHLORIDE 0.9 % (FLUSH) 0.9 %
10 SYRINGE (ML) INJECTION
Status: CANCELLED | OUTPATIENT
Start: 2024-09-27

## 2024-09-23 RX ORDER — HYDROCHLOROTHIAZIDE 25 MG/1
25 TABLET ORAL DAILY
COMMUNITY
Start: 2024-09-05

## 2024-09-23 RX ORDER — DIPHENHYDRAMINE HYDROCHLORIDE 50 MG/ML
50 INJECTION INTRAMUSCULAR; INTRAVENOUS ONCE AS NEEDED
Status: CANCELLED | OUTPATIENT
Start: 2024-09-25

## 2024-09-23 RX ORDER — HEPARIN 100 UNIT/ML
300 SYRINGE INTRAVENOUS
Status: CANCELLED | OUTPATIENT
Start: 2024-09-26

## 2024-09-23 RX ORDER — HEPARIN 100 UNIT/ML
300 SYRINGE INTRAVENOUS
Status: CANCELLED | OUTPATIENT
Start: 2024-09-27

## 2024-09-23 NOTE — PROGRESS NOTES
BMT Pharmacist Treatment Plan Note:     Patient starting lymphodepleting chemotherapy for CAR-T.      The following patient parameters from 9/23/24 have been used in the treatment plan (within 7 days from start of chemotherapy) to calculate doses per ASBMT recommendations:     Actual body weight is being used to calculate Fludarabine   - Height = 170.2 cm   - Weight = 94.1 kg   - BSA = 2.11 m2    Ideal body weight is being used to calculate Cyclosphosphamide   - Height = 170.2 cm   - Weight = 94.1 kg   - Ideal body weight = 66.1 kg      Since patient's actual weight > 120% of ideal body weight, adjusted body weight with a 25% correction factor is being used to calculate Cyclophosphamide   - Height = 170.2 cm   - Weight = 94.1 kg   - Ideal body weight = 66.1 kg   - Adjusted (correction factor 25%) = 73.1 kg   - Adjusted BSA = 1.86 m2          Please note that adjusted body weight in Epic side bar is based off a correction factor of 40%.      Calculations:     Ideal body weight   - Males: 50 kg + 2.3 kg for each inch over 5 feet   - Females: 45.5 kg + 2.3 kg for each inch over 5 feet     Adjusted body weight   - AjBW25 (correction factor 25%) = [(Actual body weight - Ideal body weight) x 0.25] + IBW   - AjBW40 (correction factor 40%) = [(Actual body weight - Ideal body weight) x 0.40] + IBW     BSA (Mosteller) = SQR RT ([Height(cm) x Weight(kg)] / 3600)        Esperanza Vance, PharmD  Clinical Pharmacy Specialist, Bone Marrow Transplant/Hematology  Spectra link: [phone]

## 2024-09-23 NOTE — PROGRESS NOTES
SECTION OF HEMATOLOGY AND BONE MARROW TRANSPLANT  Return Patient Visit   09/23/2024    CHIEF COMPLAINT:   Multiple Myeloma    HISTORY OF PRESENT ILLNESS: Patient of /  64 y.o.male; pmh of IgG kappa multiple myeloma (standard risk CG per msmart criteria, r-iss stage II); diagnosed September 2019 after presenting with symptomatic perispinal plasmacytoma;He has subsequently received  8 cycles of VRd therapy. Was in midst or pre transplant evaluation but due to insurance coverage issues transplant was delayed so was maintained on therapy and those issues have been resolved.    Transplant Course  Patient with IgG Kappa MM and pmh HTN, lytic bone lesion, arthritis and vitamin D deficiency. Admitted 5/15/21 for planned Alea auto SCT for MM. He received 3 bags and a CD34 dose of 3.35 x 10^6 on 5/17/21. Tolerated chemo and transplant well. Admission complicated by n/v controlled with scheduled anti-emetics and c-diff neg diarrhea controlled with prn imodium. He engrafted on Day +13 (5/30/21) with an ANC of 2607. He was discharged home on Day +14 (5/31/21).     Day +100 restaging marrow indicated that he was in ME.     Interval History -09/23/2024     64 y.o.  M with IgG Kappa MM standard risk s/p 8 cycles Vrd followed by Alea Auto 5/17/2021 with disease relapse and Dkd salvage therapy 8/4/2023.     Due to biochemical progression on DKd we transition to KPd bridging therapy.    He responded well to this but decided to proceed with cilta zohra.  We have collected and received his CART cells.   He presents for clearance prior to LDC schedule to begin on 9/25/24.     He feels well.    Accompanied by wife.       CURRENT MEDICATIONS:   Current Outpatient Medications   Medication Sig    acyclovir (ZOVIRAX) 400 MG tablet Take 1 tablet (400 mg total) by mouth 2 (two) times daily.    amLODIPine (NORVASC) 10 MG tablet Take 10 mg by mouth once daily.    aspirin (ECOTRIN) 81 MG EC tablet Take 81 mg by mouth once daily.     "cloNIDine (CATAPRES) 0.3 MG tablet Take 0.3 mg by mouth once daily.    dexAMETHasone (DECADRON) 2 MG tablet Take 10 tablets (20 mg total) by mouth every 7 days. Take with food before chemotherapy appointments    gabapentin (NEURONTIN) 300 MG capsule Take 1 capsule (300 mg total) by mouth 2 (two) times daily.    hydroCHLOROthiazide (HYDRODIURIL) 25 MG tablet Take 25 mg by mouth once daily.    JARDIANCE 25 mg tablet Take 25 mg by mouth once daily.    potassium chloride SA (K-DUR,KLOR-CON M) 10 MEQ tablet TAKE 1 TABLET(10 MEQ) BY MOUTH EVERY DAY    atorvastatin (LIPITOR) 20 MG tablet Take 20 mg by mouth once daily. (Patient not taking: Reported on 9/23/2024)    losartan (COZAAR) 25 MG tablet Take 25 mg by mouth once daily. (Patient not taking: Reported on 9/23/2024)    metFORMIN (GLUCOPHAGE) 1000 MG tablet Take 0.5 tablets (500 mg total) by mouth 2 (two) times daily with meals. (Patient not taking: Reported on 9/23/2024)    pomalidomide (POMALYST) 4 mg Cap Take 1 capsule (4 mg total) by mouth once daily On days 1-21 of a 28 day cycle. Auth [id number] 9/6/24. (Patient not taking: Reported on 9/23/2024)     No current facility-administered medications for this visit.     ALLERGIES:   Review of patient's allergies indicates:  No Known Allergies    Review of Systems   Constitutional: Negative for fever, malaise/fatigue and weight loss.   Respiratory: Negative for cough and shortness of breath.    Cardiovascular: Negative for chest pain and leg swelling.   Gastrointestinal: Negative for constipation, diarrhea and vomiting.   Skin: Negative for rash.   Neurological: Negative for seizures and weakness.   Psychiatric/Behavioral: The patient is not nervous/anxious.      PHYSICAL EXAM:   Vitals:    09/23/24 1218   BP: 132/67   Pulse: (!) 55   Resp: 18   Temp: 98.5 °F (36.9 °C)   TempSrc: Oral   SpO2: 97%   Weight: 94.1 kg (207 lb 7.3 oz)   Height: 5' 7" (1.702 m)   PainSc: 0-No pain       General - well developed, well nourished, " no apparent distress  HEENT - oropharynx clear  Chest and Lung - clear to auscultation bilaterally   Cardiovascular - RRR with no MGR, normal S1 and S2  Abdomen-  soft, nontender, no palpable hepatomegaly or splenomegaly  Lymph - no palpable lymphadenopathy  Heme - no bruising, petechiae, pallor  Skin - no rashes or lesions  Psych - appropriate mood and affect      ECOG Performance Status: (foot note - ECOG PS provided by Eastern Cooperative Oncology Group) 1 - Symptomatic but completely ambulatory    Karnofsky Performance Score:  90%- Able to Carry on Normal Activity: Minor Symptoms of Disease  DATA:   Lab Results   Component Value Date    WBC 3.69 (L) 09/23/2024    HGB 12.3 (L) 09/23/2024    HCT 38.5 (L) 09/23/2024     (H) 09/23/2024     09/23/2024       Gran # (ANC)   Date Value Ref Range Status   08/05/2024 0.8 (L) 1.8 - 7.7 K/uL Final     Gran %   Date Value Ref Range Status   08/05/2024 32.1 (L) 38.0 - 73.0 % Final     CMP  Sodium   Date Value Ref Range Status   08/05/2024 145 136 - 145 mmol/L Final     Potassium   Date Value Ref Range Status   08/05/2024 3.3 (L) 3.5 - 5.1 mmol/L Final     Chloride   Date Value Ref Range Status   08/05/2024 112 (H) 95 - 110 mmol/L Final     CO2   Date Value Ref Range Status   08/05/2024 27 23 - 29 mmol/L Final     Glucose   Date Value Ref Range Status   08/05/2024 162 (H) 70 - 110 mg/dL Final     BUN   Date Value Ref Range Status   08/05/2024 13 8 - 23 mg/dL Final     Creatinine   Date Value Ref Range Status   08/05/2024 1.0 0.5 - 1.4 mg/dL Final     Calcium   Date Value Ref Range Status   08/05/2024 9.5 8.7 - 10.5 mg/dL Final     Total Protein   Date Value Ref Range Status   08/05/2024 5.4 (L) 6.0 - 8.4 g/dL Final     Albumin   Date Value Ref Range Status   08/05/2024 2.6 (L) 3.5 - 5.2 g/dL Final     Total Bilirubin   Date Value Ref Range Status   08/05/2024 0.3 0.1 - 1.0 mg/dL Final     Comment:     For infants and newborns, interpretation of results should be  based  on gestational age, weight and in agreement with clinical  observations.    Premature Infant recommended reference ranges:  Up to 24 hours.............<8.0 mg/dL  Up to 48 hours............<12.0 mg/dL  3-5 days..................<15.0 mg/dL  6-29 days.................<15.0 mg/dL       Alkaline Phosphatase   Date Value Ref Range Status   08/05/2024 47 (L) 55 - 135 U/L Final     AST   Date Value Ref Range Status   08/05/2024 8 (L) 10 - 40 U/L Final     ALT   Date Value Ref Range Status   08/05/2024 8 (L) 10 - 44 U/L Final     Anion Gap   Date Value Ref Range Status   08/05/2024 6 (L) 8 - 16 mmol/L Final     eGFR   Date Value Ref Range Status   08/05/2024 >60.0 >60 mL/min/1.73 m^2 Final     IgG   Date Value Ref Range Status   08/02/2024 1116 650 - 1600 mg/dL Final     Comment:     IgG Cord Blood Reference Range: 650-1600 mg/dL.     IgA   Date Value Ref Range Status   08/02/2024 64 40 - 350 mg/dL Final     Comment:     IgA Cord Blood Reference Range: <5 mg/dL.     IgM   Date Value Ref Range Status   08/02/2024 32 (L) 50 - 300 mg/dL Final     Comment:     IgM Cord Blood Reference Range: <25 mg/dL.     Kappa Free Light Chains   Date Value Ref Range Status   08/02/2024 25.66 (H) 0.33 - 1.94 mg/dL Final   07/16/2024 15.39 (H) 0.33 - 1.94 mg/dL Final   06/21/2024 26.02 (H) 0.33 - 1.94 mg/dL Final     Lambda Free Light Chains   Date Value Ref Range Status   08/02/2024 2.37 0.57 - 2.63 mg/dL Final   07/16/2024 0.40 (L) 0.57 - 2.63 mg/dL Final   06/21/2024 0.89 0.57 - 2.63 mg/dL Final     Kappa/Lambda FLC Ratio   Date Value Ref Range Status   08/02/2024 10.83 (H) 0.26 - 1.65 Final     Comment:     Undetected antigen excess is a rare event but cannot   be excluded. If these free light chain results do not   agree with other clinical or laboratory findings or   if the sample is from a patient that has previously   demonstrated antigen excess, discuss with the testing   laboratory.   Results should always be interpreted in  conjunction   with other laboratory tests and clinical evidence.     07/16/2024 38.48 (H) 0.26 - 1.65 Final     Comment:     Undetected antigen excess is a rare event but cannot   be excluded. If these free light chain results do not   agree with other clinical or laboratory findings or   if the sample is from a patient that has previously   demonstrated antigen excess, discuss with the testing   laboratory.   Results should always be interpreted in conjunction   with other laboratory tests and clinical evidence.     06/21/2024 29.24 (H) 0.26 - 1.65 Final     Comment:     Undetected antigen excess is a rare event but cannot   be excluded. If these free light chain results do not   agree with other clinical or laboratory findings or   if the sample is from a patient that has previously   demonstrated antigen excess, discuss with the testing   laboratory.   Results should always be interpreted in conjunction   with other laboratory tests and clinical evidence.       Pathologist Interpretation SPE   Date Value Ref Range Status   08/02/2024 REVIEWED  Final     Comment:       Electronically reviewed and signed by:  Leslie Sroenson M.D.  Signed on 08/06/24 at 15:25  Normal total protein.   Normal gamma globulins are decreased. Paraprotein peak in gamma =   0.58 g/dL (previously 0.51 g/dL).      07/16/2024 REVIEWED  Final     Comment:       Electronically reviewed and signed by:  Tasha Mills MD  Signed on 07/17/24 at 12:55  Normal total protein. Normal gamma globulins are decreased.   Paraprotein peak in gamma = 0.51 g/dL (previously 0.53 g/dL).      06/21/2024 REVIEWED  Final     Comment:       Electronically reviewed and signed by:  Leslie Sorenson M.D.  Signed on 06/25/24 at 12:35  Decreased total protein.Normal gamma globulins are decreased.   Paraprotein peak in gamma = 0.53 g/dL (previously 0.79 g/dL).        Pathologist Interpretation SADAF   Date Value Ref Range Status   08/02/2024 REVIEWED  Final      Comment:       Electronically reviewed and signed by:  Leslie Sorenson M.D.  Signed on 08/06/24 at 15:26  An IgG kappa specific monoclonal protein is present.           Results for orders placed or performed during the hospital encounter of 07/23/24 (from the past 2160 hour(s))   NM PET CT FDG Whole Body    Impression    In this patient with multiple myeloma, there is heterogeneous tracer uptake throughout the bone marrow with interval normalization of prior hypermetabolic osseous lesions, compatible with favorable response to therapy.    Persistent focal tracer uptake in the periportal region, improved from prior, without abnormal CT correlate.    Additional findings as above.    Electronically signed by resident: Daisy Villareal  Date:    07/23/2024  Time:    13:16    Electronically signed by: Shraddha Mesa  Date:    07/23/2024  Time:    15:08     *Note: Due to a large number of results and/or encounters for the requested time period, some results have not been displayed. A complete set of results can be found in Results Review.       Assessment and Plan:    1. Multiple myeloma, remission status unspecified        2. History of auto stem cell transplant        3. Immunodeficiency due to drugs              IgG Kappa MM standard risk s/p 8 cycles Vrd followed by Yajaira Auto 5/17/2021 with disease relapse and Dkd salvage therapy 8/4/2023  - standard risk MM diagnosed sept 2020 after presenting with symptomatic lytic disease  -sp VRd induction followed by yajaira 200 autoSCT on 5/17/23 achieving/mainting LA post SCT; was on revlimid maintenance but relapsed biochemically and with new lytic disease approximately 2 years post SCT (June/July 2023)  -initiated Melinda-Kd salvage on 8/4/23; tolerating will no significant AE's   -melinda subq weekly 8>EOW x 8 doses> q 4 week; kyprolis 70mg/m2 day 1, 8, 15 of 28 day cycle; dex 40mg po weekly   -patient with initial good response to therapy but  noted serial biochemical  progression confirmed on 4/3/24 repeat serum studies;  mild anemia and jan 2024 PET with ENDY but no other overt symptoms or CRAB and he feels well  - recommended we transition DKd to Kyprolis/pomalyst/dex as likely bridge to CART (cilta-zohra); KPd median pfs  26 months; vs cart 3 years with 20% >5 years   -pomalyst 4mg daily day 1-21 of 28 cycle ; KPD initiated April 2024  -he has had WA to KPd  -Shared Decision  with patient  made to pursue consolidation with ciltacabtagene autoleucel   -he completed Cilt-zorha consents on 8/5/24  -we received adequate CART cells back at [facility] and are in house  -he is cleared to initiated LDC flu/cy on 9/25/24  -CVC and MRI brain scheduled for tomorrow   -rtc for clinic appt on 9/26 and admit on 9/28  -supportive meds: acyclovir, ca +vit d, zometa (received total of 2 years since disease progression)         Type 2 DM   - decreased dex to 20 mg   -advised patient to follow up with his PCP    Neuropathy  - Controlled with gabapentin    ID  - Completed post SCT vaccines  - Acyclovir as above while in PI     Essential hypertension  -well controled             FU:  -he is cleared to initiated LDC flu/cy on 9/25/24  -CVC and MRI brain scheduled for tomorrow   -rtc for clinic appt on 9/26 and admit on 9/28

## 2024-09-23 NOTE — PRE-PROCEDURE INSTRUCTIONS
PreOp Instructions given:   - Verbal medication information (what to hold and what to take)   - NPO guidelines 2400  - Arrival place directions given; time to be given the day before procedure by the   Surgeon's Office 0730 DOSC  - Bathing with antibacterial soap   - Don't wear any jewelry or bring any valuables AM of surgery   - No makeup or moisturizer to face   - No perfume/cologne, powder, lotions or aftershave   Pt. verbalized understanding.   Pt denies any h/o Anesthesia/Sedation complications or side effects.   House staff

## 2024-09-24 ENCOUNTER — HOSPITAL ENCOUNTER (OUTPATIENT)
Facility: HOSPITAL | Age: 64
Discharge: HOME | End: 2024-09-24
Attending: SURGERY | Admitting: SURGERY
Payer: MEDICARE

## 2024-09-24 ENCOUNTER — ANESTHESIA EVENT (OUTPATIENT)
Dept: SURGERY | Facility: HOSPITAL | Age: 64
End: 2024-09-24
Payer: MEDICARE

## 2024-09-24 ENCOUNTER — HOSPITAL ENCOUNTER (OUTPATIENT)
Dept: RADIOLOGY | Facility: HOSPITAL | Age: 64
Discharge: HOME | End: 2024-09-24
Attending: INTERNAL MEDICINE
Payer: MEDICARE

## 2024-09-24 ENCOUNTER — ANESTHESIA (OUTPATIENT)
Dept: SURGERY | Facility: HOSPITAL | Age: 64
End: 2024-09-24
Payer: MEDICARE

## 2024-09-24 VITALS
TEMPERATURE: 97 F | HEART RATE: 44 BPM | SYSTOLIC BLOOD PRESSURE: 116 MMHG | OXYGEN SATURATION: 100 % | DIASTOLIC BLOOD PRESSURE: 56 MMHG | BODY MASS INDEX: 32.42 KG/M2 | WEIGHT: 207 LBS | RESPIRATION RATE: 17 BRPM

## 2024-09-24 DIAGNOSIS — C90.00 MULTIPLE MYELOMA: ICD-10-CM

## 2024-09-24 DIAGNOSIS — C90.00 MULTIPLE MYELOMA, REMISSION STATUS UNSPECIFIED: ICD-10-CM

## 2024-09-24 LAB
HSV1 IGG SERPL QL IA: POSITIVE
HSV2 IGG SERPL QL IA: NEGATIVE
VARICELLA INTERPRETATION: POSITIVE
VARICELLA ZOSTER IGG: 176

## 2024-09-24 PROCEDURE — 71000044 HC DOSC ROUTINE RECOVERY FIRST HOUR: Performed by: SURGERY

## 2024-09-24 PROCEDURE — 36000707: Performed by: SURGERY

## 2024-09-24 PROCEDURE — 63600175 PHARM REV CODE 636 W HCPCS: Mod: JZ,JG | Performed by: SURGERY

## 2024-09-24 PROCEDURE — 63600175 PHARM REV CODE 636 W HCPCS: Performed by: NURSE ANESTHETIST, CERTIFIED REGISTERED

## 2024-09-24 PROCEDURE — 36000706: Performed by: SURGERY

## 2024-09-24 PROCEDURE — 25500020 PHARM REV CODE 255: Performed by: INTERNAL MEDICINE

## 2024-09-24 PROCEDURE — A9585 GADOBUTROL INJECTION: HCPCS | Performed by: INTERNAL MEDICINE

## 2024-09-24 PROCEDURE — 71000015 HC POSTOP RECOV 1ST HR: Performed by: SURGERY

## 2024-09-24 PROCEDURE — 77001 FLUOROGUIDE FOR VEIN DEVICE: CPT | Mod: 26,,, | Performed by: SURGERY

## 2024-09-24 PROCEDURE — 70553 MRI BRAIN STEM W/O & W/DYE: CPT | Mod: TC

## 2024-09-24 PROCEDURE — 25000003 PHARM REV CODE 250: Performed by: NURSE ANESTHETIST, CERTIFIED REGISTERED

## 2024-09-24 PROCEDURE — 37000008 HC ANESTHESIA 1ST 15 MINUTES: Performed by: SURGERY

## 2024-09-24 PROCEDURE — C1750 CATH, HEMODIALYSIS,LONG-TERM: HCPCS | Performed by: SURGERY

## 2024-09-24 PROCEDURE — 70553 MRI BRAIN STEM W/O & W/DYE: CPT | Mod: 26,,, | Performed by: RADIOLOGY

## 2024-09-24 PROCEDURE — 36558 INSERT TUNNELED CV CATH: CPT | Mod: LT,,, | Performed by: SURGERY

## 2024-09-24 PROCEDURE — 37000009 HC ANESTHESIA EA ADD 15 MINS: Performed by: SURGERY

## 2024-09-24 DEVICE — CATH HEMOSPLIT DL 14.5FR 24CM: Type: IMPLANTABLE DEVICE | Site: NECK | Status: FUNCTIONAL

## 2024-09-24 RX ORDER — MIDAZOLAM HYDROCHLORIDE 1 MG/ML
INJECTION INTRAMUSCULAR; INTRAVENOUS
Status: DISCONTINUED | OUTPATIENT
Start: 2024-09-24 | End: 2024-09-24

## 2024-09-24 RX ORDER — CEFAZOLIN SODIUM 1 G/3ML
INJECTION, POWDER, FOR SOLUTION INTRAMUSCULAR; INTRAVENOUS
Status: DISCONTINUED | OUTPATIENT
Start: 2024-09-24 | End: 2024-09-24

## 2024-09-24 RX ORDER — BUPIVACAINE HYDROCHLORIDE 2.5 MG/ML
INJECTION, SOLUTION EPIDURAL; INFILTRATION; INTRACAUDAL
Status: DISCONTINUED | OUTPATIENT
Start: 2024-09-24 | End: 2024-09-24 | Stop reason: HOSPADM

## 2024-09-24 RX ORDER — PROPOFOL 10 MG/ML
VIAL (ML) INTRAVENOUS CONTINUOUS PRN
Status: DISCONTINUED | OUTPATIENT
Start: 2024-09-24 | End: 2024-09-24

## 2024-09-24 RX ORDER — ONDANSETRON HYDROCHLORIDE 2 MG/ML
INJECTION, SOLUTION INTRAMUSCULAR; INTRAVENOUS
Status: DISCONTINUED | OUTPATIENT
Start: 2024-09-24 | End: 2024-09-24

## 2024-09-24 RX ORDER — FENTANYL CITRATE 50 UG/ML
INJECTION, SOLUTION INTRAMUSCULAR; INTRAVENOUS
Status: DISCONTINUED | OUTPATIENT
Start: 2024-09-24 | End: 2024-09-24

## 2024-09-24 RX ORDER — LIDOCAINE HYDROCHLORIDE 20 MG/ML
INJECTION INTRAVENOUS
Status: DISCONTINUED | OUTPATIENT
Start: 2024-09-24 | End: 2024-09-24

## 2024-09-24 RX ORDER — GADOBUTROL 604.72 MG/ML
10 INJECTION INTRAVENOUS
Status: COMPLETED | OUTPATIENT
Start: 2024-09-24 | End: 2024-09-24

## 2024-09-24 RX ORDER — HYDROMORPHONE HYDROCHLORIDE 1 MG/ML
0.2 INJECTION, SOLUTION INTRAMUSCULAR; INTRAVENOUS; SUBCUTANEOUS EVERY 5 MIN PRN
Status: DISCONTINUED | OUTPATIENT
Start: 2024-09-24 | End: 2024-09-24 | Stop reason: HOSPADM

## 2024-09-24 RX ORDER — METOCLOPRAMIDE HYDROCHLORIDE 5 MG/ML
10 INJECTION INTRAMUSCULAR; INTRAVENOUS EVERY 10 MIN PRN
Status: DISCONTINUED | OUTPATIENT
Start: 2024-09-24 | End: 2024-09-24 | Stop reason: HOSPADM

## 2024-09-24 RX ORDER — GLUCAGON 1 MG
1 KIT INJECTION
Status: DISCONTINUED | OUTPATIENT
Start: 2024-09-24 | End: 2024-09-24 | Stop reason: HOSPADM

## 2024-09-24 RX ORDER — PROPOFOL 10 MG/ML
VIAL (ML) INTRAVENOUS
Status: DISCONTINUED | OUTPATIENT
Start: 2024-09-24 | End: 2024-09-24

## 2024-09-24 RX ORDER — SODIUM CHLORIDE 9 MG/ML
INJECTION, SOLUTION INTRAVENOUS CONTINUOUS
Status: DISCONTINUED | OUTPATIENT
Start: 2024-09-24 | End: 2024-09-24 | Stop reason: HOSPADM

## 2024-09-24 RX ORDER — DEXAMETHASONE SODIUM PHOSPHATE 4 MG/ML
INJECTION, SOLUTION INTRA-ARTICULAR; INTRALESIONAL; INTRAMUSCULAR; INTRAVENOUS; SOFT TISSUE
Status: DISCONTINUED | OUTPATIENT
Start: 2024-09-24 | End: 2024-09-24

## 2024-09-24 RX ADMIN — PROPOFOL 20 MG: 10 INJECTION, EMULSION INTRAVENOUS at 09:09

## 2024-09-24 RX ADMIN — PROPOFOL 80 MG: 10 INJECTION, EMULSION INTRAVENOUS at 09:09

## 2024-09-24 RX ADMIN — PROPOFOL 150 MCG/KG/MIN: 10 INJECTION, EMULSION INTRAVENOUS at 09:09

## 2024-09-24 RX ADMIN — LIDOCAINE HYDROCHLORIDE 100 MG: 20 INJECTION INTRAVENOUS at 09:09

## 2024-09-24 RX ADMIN — MIDAZOLAM HYDROCHLORIDE 2 MG: 1 INJECTION, SOLUTION INTRAMUSCULAR; INTRAVENOUS at 09:09

## 2024-09-24 RX ADMIN — CEFAZOLIN 2 G: 330 INJECTION, POWDER, FOR SOLUTION INTRAMUSCULAR; INTRAVENOUS at 09:09

## 2024-09-24 RX ADMIN — GADOBUTROL 10 ML: 604.72 INJECTION INTRAVENOUS at 02:09

## 2024-09-24 RX ADMIN — FENTANYL CITRATE 25 MCG: 0.05 INJECTION, SOLUTION INTRAMUSCULAR; INTRAVENOUS at 09:09

## 2024-09-24 RX ADMIN — ONDANSETRON 4 MG: 2 INJECTION INTRAMUSCULAR; INTRAVENOUS at 10:09

## 2024-09-24 RX ADMIN — SODIUM CHLORIDE: 0.9 INJECTION, SOLUTION INTRAVENOUS at 08:09

## 2024-09-24 RX ADMIN — DEXAMETHASONE SODIUM PHOSPHATE 4 MG: 4 INJECTION, SOLUTION INTRAMUSCULAR; INTRAVENOUS at 10:09

## 2024-09-24 NOTE — TRANSFER OF CARE
Anesthesia Transfer of Care Note    Patient: Jaspreet Walsh Jr.    Procedure(s) Performed: Procedure(s) (LRB):  INSERTION-CATHETER-LENORA, double lumen, left poss right, Bard 14.5 fr hemosplit catheter, model 0272742 (Left)    Patient location: [facility]    Anesthesia Type: general    Transport from OR: Transported from OR on 6-10 L/min O2 by face mask with adequate spontaneous ventilation    Post pain: adequate analgesia    Post assessment: no apparent anesthetic complications    Post vital signs: stable    Level of consciousness: responds to stimulation    Complications: none    Transfer of care protocol was followed    Last vitals: Visit Vitals  BP (!) 166/79 (BP Location: Left arm, Patient Position: Lying)   Pulse (!) 56   Temp 36.5 °C (97.7 °F) (Temporal)   Resp 18   Wt 93.9 kg (207 lb)   SpO2 100%   BMI 32.42 kg/m²

## 2024-09-24 NOTE — ANESTHESIA POSTPROCEDURE EVALUATION
Anesthesia Post Evaluation    Patient: Jaspreet Walsh Jr.    Procedure(s) Performed: Procedure(s) (LRB):  INSERTION-CATHETER-LENORA, double lumen, left poss right, Bard 14.5 fr hemosplit catheter, model 8294728 (Left)    Final Anesthesia Type: general      Patient location during evaluation: PACU  Patient participation: Yes- Able to Participate  Level of consciousness: awake and alert  Post-procedure vital signs: reviewed and stable  Pain management: adequate  Airway patency: patent    PONV status at discharge: No PONV  Anesthetic complications: no      Cardiovascular status: hemodynamically stable  Respiratory status: spontaneous ventilation  Hydration status: euvolemic  Follow-up not needed.              Vitals Value Taken Time   /56 09/24/24 1103   Temp 36.2 °C (97.2 °F) 09/24/24 1040   Pulse 44 09/24/24 1112   Resp 14 09/24/24 1112   SpO2 100 % 09/24/24 1112   Vitals shown include unfiled device data.      No case tracking events are documented in the log.      Pain/Caro Score: Caro Score: 10 (9/24/2024 11:00 AM)

## 2024-09-24 NOTE — H&P
FOCUSED SURGICAL H&P    Jaspreet Walsh Jr. is a 64 y.o. male. MRN is [id number].    CC: Here today for the following surgical procedure(s):  INSERTION-CATHETER-LENORA, double lumen, left poss right, Bard 14.5 fr hemosplit catheter, model 0005455 (Left: Chest)    HPI: For a detailed history of the patients history of present illness please refer to the last progress note. In brief, this is a 64 y.o. male with a known history of multiple myeloma, here today for placement of tunneled central line. He had a previous tunneled line in the right chest that was removed 3-4 years ago. There has been no recent changes in the patients health, including fevers, chest pain, or shortness of breath, and no new medications have been started. The patient has not had anything to eat or drink for the last 8 hours. The patient is not taking any blood thinners.       Past Medical History:   Past Medical History:   Diagnosis Date    Anxiety     Arthritis     Cancer     Hypertension        Past Surgical History:   Past Surgical History:   Procedure Laterality Date    BACK SURGERY  01/01/2021    BONE MARROW BIOPSY Left 10/20/2021    Procedure: Biopsy-bone marrow;  Surgeon: Summer Cartwright MD;  [facility]  Service: Oncology;  Laterality: Left;    COLONOSCOPY N/A 01/25/2021    Procedure: COLONOSCOPY;  Surgeon: Braxton Lees MD;  Location: [facility] (4TH FLR);  Service: Endoscopy;  Laterality: N/A;  1/22-covid [facility] mailed-tb  1/20/21-pt to remain on schedule with Dr. Lees, and confirmed updated arrival time of 0835-BB    COLONOSCOPY N/A 02/01/2021    Procedure: COLONOSCOPY;  Surgeon: Jo Ann Robledo MD;  [facility]  Service: Endoscopy;  Laterality: N/A;  rescheduled due to poor bowel prep, to be rescheduled with first available provider-BB  covid-1/29/21-pcw-BB    CREATION OF MUSCLE ROTATIONAL FLAP N/A 09/23/2020    Procedure: CREATION, FLAP, MUSCLE ROTATION;  Surgeon: Kamlesh Bellamy MD;   [facility];  Service: Plastics;  Laterality: N/A;    KNEE SURGERY Left 2019    LUMBAR FUSION N/A 2020    Procedure: FUSION, SPINE, LUMBAR L2-pelvis. Depuy. Plastic surgery closure w/ Dr. Bellamy;  Surgeon: Nick Coyle MD;  Location: [facility] OR 2ND FLR;  Service: Neurosurgery;  Laterality: N/A;    Metastatic neoplasum removed from spine         Social History:   Social History     Socioeconomic History    Marital status: Significant Other     Spouse name: Catie    Number of children: 3   Tobacco Use    Smoking status: Former     Current packs/day: 0.00     Average packs/day: 0.3 packs/day for 40.0 years (10.0 ttl pk-yrs)     Types: Cigarettes     Start date:      Quit date:      Years since quittin.7    Smokeless tobacco: Never   Social History Narrative    Patient is intimate relationship with longtime partner (Catie)    Has 3 children, 1 live in [facility], 1 in [place], 1 in [place]; 5 grandchildren    2 brothers, 1 sister    Mother still living     Social Determinants of Health     Financial Resource Strain: Low Risk  (2024)    Overall Financial Resource Strain (CARDIA)     Difficulty of Paying Living Expenses: Not hard at all   Food Insecurity: No Food Insecurity (2024)    Hunger Vital Sign     Worried About Running Out of Food in the Last Year: Never true     Ran Out of Food in the Last Year: Never true   Transportation Needs: No Transportation Needs (2024)    TRANSPORTATION NEEDS     Transportation : No   Stress: No Stress Concern Present (2024)    [employer] - Occupational Stress Questionnaire     Feeling of Stress : Not at all   Housing Stability: High Risk (2024)    Housing Stability Vital Sign     Unable to Pay for Housing in the Last Year: Yes       Family History:   Family History   Problem Relation Name Age of Onset    Cancer Father Jaspreet Walsh Sr           Allergies:  Review of patient's allergies indicates:  No Known  "Allergies      Medications:    Current Facility-Administered Medications:     0.9%  NaCl infusion, , Intravenous, Continuous, Nando Singh PA-C    ceFAZolin 2 g in D5W 50 mL IVPB (MB+), 2 g, Intravenous, On Call Procedure, Nando Singh PA-C                  Vital Signs:  There were no vitals filed for this visit.      Physical Exam:  Neuro: awake, alert, no acute distress.  HEENT: PERRLA, neck supple, no lymphadenopathy.  Heart: regular rate/rhythm  Lungs: equal chest expansion bilaterally, no increased work of breathing on RA  Abdomen: soft, non-distended, non-tender to palpation.  Extremities: warm, well-perfused       Labs:  Lab Results   Component Value Date/Time    WBC 3.69 (L) 09/23/2024 12:52 PM    HGB 12.3 (L) 09/23/2024 12:52 PM    HCT 38.5 (L) 09/23/2024 12:52 PM     09/23/2024 12:52 PM    BAND 2.0 05/28/2024 05:14 AM     (H) 09/23/2024 12:52 PM     Lab Results   Component Value Date/Time     09/23/2024 12:52 PM    K 3.8 09/23/2024 12:52 PM     09/23/2024 12:52 PM    CO2 24 09/23/2024 12:52 PM    BUN 11 09/23/2024 12:52 PM     (H) 09/23/2024 12:52 PM    MG 2.0 08/05/2024 02:04 PM    PHOS 2.8 08/05/2024 02:04 PM     Lab Results   Component Value Date/Time    INR 1.0 08/05/2024 07:56 AM     No components found for: "TROPI"  Lab Results   Component Value Date/Time    ALT 6 (L) 09/23/2024 12:52 PM    AST 10 09/23/2024 12:52 PM          Assessment/Plan:  64 y.o. male here today for the following surgical procedure:    INSERTION-CATHETER-LENORA, double lumen, left poss right, Bard 14.5 fr hemosplit catheter, model [id number] (Left: Chest)       The indications for surgery, highlighting the risks and benefits of the procedure were discussed with the patient. These included but are not limited to swelling, bleeding, pain, infection, and adverse anesthesia-related event. I also discussed risk of injury to nearby structures. The patient seems to understand the risks, as " well as the alternatives including nonoperative observation/survellience and wishes to proceed with the surgical intervention.    Patient has been examined and consented.      Plan discussed with and agreed by Dr. Fay Anderson MD  General Surgery, PGY-3

## 2024-09-24 NOTE — ANESTHESIA PREPROCEDURE EVALUATION
09/24/2024  Jaspreet Walsh Jr. is a 64 y.o., male.  Pre-operative evaluation for Procedure(s) (LRB):  INSERTION-CATHETER-LENORA, double lumen, left poss right, Bard 14.5 fr hemosplit catheter, model 4237663 (Left)    Jaspreet Walsh Jr. is a 64 y.o. 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    Encounter for screening colonoscopy    FARFAN (dyspnea on exertion)    History of auto stem cell transplant    Immunodeficiency secondary to neoplasm    Immunodeficiency secondary to chemotherapy    Antineoplastic chemotherapy induced pancytopenia    Chemotherapy-induced nausea and vomiting    Chemotherapy-induced diarrhea    History of autologous stem cell transplant    COVID    Fever and chills    Salmonella bacteremia    Diabetes mellitus    Anemia    Neuropathy       Review of patient's allergies indicates:  No Known Allergies    No current facility-administered medications on file prior to encounter.     Current Outpatient Medications on File Prior to Encounter   Medication Sig Dispense Refill    hydroCHLOROthiazide (HYDRODIURIL) 25 MG tablet Take 25 mg by mouth once daily.      acyclovir (ZOVIRAX) 400 MG tablet Take 1 tablet (400 mg total) by mouth 2 (two) times daily. 60 tablet 1    amLODIPine (NORVASC) 10 MG tablet Take 10 mg by mouth once daily.      aspirin (ECOTRIN) 81 MG EC tablet Take 81 mg by mouth once daily.      atorvastatin (LIPITOR) 20 MG tablet Take 20 mg by mouth once daily. (Patient not taking: Reported on 9/23/2024)      cloNIDine (CATAPRES) 0.3 MG tablet Take 0.3 mg by mouth once daily.      gabapentin (NEURONTIN) 300 MG capsule Take 1 capsule (300 mg total) by mouth 2 (two) times daily. 60 capsule 3    JARDIANCE 25 mg  tablet Take 25 mg by mouth once daily.      losartan (COZAAR) 25 MG tablet Take 25 mg by mouth once daily. (Patient not taking: Reported on 9/23/2024)      metFORMIN (GLUCOPHAGE) 1000 MG tablet Take 0.5 tablets (500 mg total) by mouth 2 (two) times daily with meals. (Patient not taking: Reported on 9/23/2024) 90 tablet 3    pomalidomide (POMALYST) 4 mg Cap Take 1 capsule (4 mg total) by mouth once daily On days 1-21 of a 28 day cycle. [facility] [id number] 9/6/24. (Patient not taking: Reported on 9/23/2024) 21 capsule 0    potassium chloride SA (K-DUR,KLOR-CON M) 10 MEQ tablet TAKE 1 TABLET(10 MEQ) BY MOUTH EVERY DAY 90 tablet 3       Past Surgical History:   Procedure Laterality Date    BACK SURGERY  01/01/2021    BONE MARROW BIOPSY Left 10/20/2021    Procedure: Biopsy-bone marrow;  Surgeon: Summer Cartwright MD;  [facility]  Service: Oncology;  Laterality: Left;    COLONOSCOPY N/A 01/25/2021    Procedure: COLONOSCOPY;  Surgeon: Braxton Lees MD;  Location: [facility] (4TH FLR);  Service: Endoscopy;  Laterality: N/A;  1/22-covid [facility] mailed-tb  1/20/21-pt to remain on schedule with Dr. Lees, and confirmed updated arrival time of 0835-BB    COLONOSCOPY N/A 02/01/2021    Procedure: COLONOSCOPY;  Surgeon: Jo Ann Robledo MD;  [facility]  Service: Endoscopy;  Laterality: N/A;  rescheduled due to poor bowel prep, to be rescheduled with first available provider-BB  covid-1/29/21-pcw-BB    CREATION OF MUSCLE ROTATIONAL FLAP N/A 09/23/2020    Procedure: CREATION, FLAP, MUSCLE ROTATION;  Surgeon: Kamlesh Bellamy MD;  Location: [facility] OR 2ND FLR;  Service: Plastics;  Laterality: N/A;    KNEE SURGERY Left 06/2019    LUMBAR FUSION N/A 09/23/2020    Procedure: FUSION, SPINE, LUMBAR L2-pelvis. Depuy. Plastic surgery closure w/ Dr. Bellamy;  Surgeon: Nick Coyle MD;  Location: [facility] OR 2ND FLR;  Service: Neurosurgery;  Laterality: N/A;    Metastatic neoplasum removed from spine    "      Social History     Socioeconomic History    Marital status: Significant Other     Spouse name: Catie    Number of children: 3   Tobacco Use    Smoking status: Former     Current packs/day: 0.00     Average packs/day: 0.3 packs/day for 40.0 years (10.0 ttl pk-yrs)     Types: Cigarettes     Start date:      Quit date:      Years since quittin.7    Smokeless tobacco: Never   Social History Narrative    Patient is intimate relationship with longtime partner (Catie)    Has 3 children, 1 live in [facility], 1 in [place], 1 in [place]; 5 grandchildren    2 brothers, 1 sister    Mother still living     Social Determinants of Health     Financial Resource Strain: Low Risk  (2024)    Overall Financial Resource Strain (CARDIA)     Difficulty of Paying Living Expenses: Not hard at all   Food Insecurity: No Food Insecurity (2024)    Hunger Vital Sign     Worried About Running Out of Food in the Last Year: Never true     Ran Out of Food in the Last Year: Never true   Transportation Needs: No Transportation Needs (2024)    TRANSPORTATION NEEDS     Transportation : No   Stress: No Stress Concern Present (2024)    [employer] - Occupational Stress Questionnaire     Feeling of Stress : Not at all   Housing Stability: High Risk (2024)    Housing Stability Vital Sign     Unable to Pay for Housing in the Last Year: Yes         CBC:   Recent Labs     24  1252   WBC 3.69*   RBC 3.85*   HGB 12.3*   HCT 38.5*      *   MCH 31.9*   MCHC 31.9*       CMP:   Recent Labs     24  1252      K 3.8      CO2 24   BUN 11   CREATININE 1.1   *   CALCIUM 9.1   ALBUMIN 3.4*   PROT 6.5   ALKPHOS 55   ALT 6*   AST 10   BILITOT 0.5       INR  No results for input(s): "PT", "INR", "PROTIME", "APTT" in the last 72 hours.        Diagnostic Studies:      EKD Echo:  No results found. However, due to the size of the patient record, not all encounters were " searched. Please check Results Review for a complete set of results.      Pre-op Assessment    I have reviewed the Patient Summary Reports.     I have reviewed the Nursing Notes. I have reviewed the NPO Status.   I have reviewed the Medications.     Review of Systems  Anesthesia Hx:  No problems with previous Anesthesia   History of prior surgery of interest to airway management or planning:          Denies Family Hx of Anesthesia complications.    Denies Personal Hx of Anesthesia complications.                    Social:  Former Smoker       Hematology/Oncology:                      Current/Recent Cancer.  Other (see Oncology comments)          Oncology Comments: Multiple myeloma     Metastatic neoplasum removed from spine    History of stem cell transplant      EENT/Dental:  EENT/Dental Normal           Cardiovascular:     Hypertension    Denies CABG/stent.  Denies Dysrhythmias.   Denies Angina.      FARFAN         Shortness of Breath Dyspnea On Extertion                   Hypertension         Pulmonary:      Shortness of breath                  Musculoskeletal:  Arthritis   History of back surgery and metastatic disease to spine        Spine Disorders: lumbar            Endocrine:  Diabetes    Diabetes                      Psych:   anxiety                 Physical Exam  General: Cooperative, Alert and Oriented    Airway:  Mallampati: II   Mouth Opening: Normal  TM Distance: Normal  Tongue: Normal  Neck ROM: Normal ROM    Chest/Lungs:  Normal Respiratory Rate    Heart:  Rate: Normal  Rhythm: Regular Rhythm        Anesthesia Plan  Type of Anesthesia, risks & benefits discussed:    Anesthesia Type: Gen Natural Airway, MAC, Gen Supraglottic Airway, Gen ETT  Intra-op Monitoring Plan: Standard ASA Monitors  Post Op Pain Control Plan: IV/PO Opioids PRN  Induction:  IV  Airway Plan: Direct  Informed Consent: Informed consent signed with the Patient and all parties understand the risks and agree with anesthesia plan.  All  questions answered.   ASA Score: 3  Day of Surgery Review of History & Physical: H&P Update referred to the surgeon/provider.    Ready For Surgery From Anesthesia Perspective.     .

## 2024-09-24 NOTE — BRIEF OP NOTE
[facility] - Surgery (2nd Fl)  Brief Operative Note    Surgery Date: 9/24/2024     Surgeons and Role:     * Nelson Aponte MD - Primary     * My Anderson MD - Resident - Assisting     * Soraya Kahn MD - Resident - Assisting        Pre-op Diagnosis:  Multiple myeloma [C90.00]    Post-op Diagnosis:  Post-Op Diagnosis Codes:     * Multiple myeloma [C90.00]    Procedure(s) (LRB):  INSERTION-CATHETER-LENORA, double lumen, left poss right, Bard 14.5 fr hemosplit catheter, model 3977092 (Left)    Anesthesia: Local MAC    Operative Findings: Left IJ tunneled central line placement. Position confirmed with fluoro. Post op CXR pending.    Estimated Blood Loss: < 10cc         Specimens:   Specimen (24h ago, onward)      None              Discharge Note    OUTCOME: Patient tolerated treatment/procedure well without complication and is now ready for discharge.    DISPOSITION: Home or Self Care    FINAL DIAGNOSIS: Multiple myeloma [C90.00]    FOLLOWUP: In clinic    DISCHARGE INSTRUCTIONS:    Discharge Procedure Orders   Diet Adult Regular     Notify your health care provider if you experience any of the following:  temperature >100.4     Notify your health care provider if you experience any of the following:  persistent nausea and vomiting or diarrhea     Notify your health care provider if you experience any of the following:  severe uncontrolled pain     Notify your health care provider if you experience any of the following:  redness, tenderness, or signs of infection (pain, swelling, redness, odor or green/yellow discharge around incision site)     Notify your health care provider if you experience any of the following:  difficulty breathing or increased cough     Leave dressing on - Keep it clean, dry, and intact until clinic visit     Weight bearing restrictions (specify):   Order Comments: Do not lift greater than 10 lbs for 4 weeks

## 2024-09-25 ENCOUNTER — INFUSION (OUTPATIENT)
Dept: INFUSION THERAPY | Facility: HOSPITAL | Age: 64
End: 2024-09-25
Payer: MEDICARE

## 2024-09-25 VITALS
SYSTOLIC BLOOD PRESSURE: 145 MMHG | HEART RATE: 61 BPM | OXYGEN SATURATION: 96 % | HEIGHT: 67 IN | BODY MASS INDEX: 32.49 KG/M2 | WEIGHT: 207 LBS | RESPIRATION RATE: 18 BRPM | TEMPERATURE: 98 F | DIASTOLIC BLOOD PRESSURE: 69 MMHG

## 2024-09-25 DIAGNOSIS — C90.00 MULTIPLE MYELOMA, REMISSION STATUS UNSPECIFIED: Primary | ICD-10-CM

## 2024-09-25 PROCEDURE — 63600175 PHARM REV CODE 636 W HCPCS: Performed by: INTERNAL MEDICINE

## 2024-09-25 PROCEDURE — 96375 TX/PRO/DX INJ NEW DRUG ADDON: CPT

## 2024-09-25 PROCEDURE — 25000003 PHARM REV CODE 250: Performed by: INTERNAL MEDICINE

## 2024-09-25 PROCEDURE — 96361 HYDRATE IV INFUSION ADD-ON: CPT

## 2024-09-25 PROCEDURE — 96413 CHEMO IV INFUSION 1 HR: CPT

## 2024-09-25 PROCEDURE — S0080 INJECTION, PENTAMIDINE ISETH: HCPCS | Performed by: INTERNAL MEDICINE

## 2024-09-25 PROCEDURE — 96417 CHEMO IV INFUS EACH ADDL SEQ: CPT

## 2024-09-25 RX ORDER — PROCHLORPERAZINE EDISYLATE 5 MG/ML
10 INJECTION INTRAMUSCULAR; INTRAVENOUS
Status: COMPLETED | OUTPATIENT
Start: 2024-09-25 | End: 2024-09-25

## 2024-09-25 RX ORDER — HEPARIN 100 UNIT/ML
500 SYRINGE INTRAVENOUS
Status: DISCONTINUED | OUTPATIENT
Start: 2024-09-25 | End: 2024-09-25 | Stop reason: HOSPADM

## 2024-09-25 RX ORDER — SODIUM CHLORIDE 0.9 % (FLUSH) 0.9 %
10 SYRINGE (ML) INJECTION
Status: DISCONTINUED | OUTPATIENT
Start: 2024-09-25 | End: 2024-09-25 | Stop reason: HOSPADM

## 2024-09-25 RX ORDER — EPINEPHRINE 0.3 MG/.3ML
0.3 INJECTION SUBCUTANEOUS ONCE AS NEEDED
Status: DISCONTINUED | OUTPATIENT
Start: 2024-09-25 | End: 2024-09-25 | Stop reason: HOSPADM

## 2024-09-25 RX ORDER — PALONOSETRON 0.05 MG/ML
0.25 INJECTION, SOLUTION INTRAVENOUS
Status: COMPLETED | OUTPATIENT
Start: 2024-09-25 | End: 2024-09-25

## 2024-09-25 RX ORDER — DIPHENHYDRAMINE HYDROCHLORIDE 50 MG/ML
50 INJECTION INTRAMUSCULAR; INTRAVENOUS ONCE AS NEEDED
Status: DISCONTINUED | OUTPATIENT
Start: 2024-09-25 | End: 2024-09-25 | Stop reason: HOSPADM

## 2024-09-25 RX ORDER — HEPARIN SODIUM 1000 [USP'U]/ML
1600 INJECTION, SOLUTION INTRAVENOUS; SUBCUTANEOUS ONCE
Status: COMPLETED | OUTPATIENT
Start: 2024-09-25 | End: 2024-09-25

## 2024-09-25 RX ADMIN — PROCHLORPERAZINE EDISYLATE 10 MG: 5 INJECTION INTRAMUSCULAR; INTRAVENOUS at 08:09

## 2024-09-25 RX ADMIN — HEPARIN SODIUM 1600 UNITS: 1000 INJECTION, SOLUTION INTRAVENOUS; SUBCUTANEOUS at 09:09

## 2024-09-25 RX ADMIN — SODIUM CHLORIDE 500 ML: 9 INJECTION, SOLUTION INTRAVENOUS at 08:09

## 2024-09-25 RX ADMIN — CYCLOPHOSPHAMIDE 560 MG: 200 INJECTION, SOLUTION INTRAVENOUS at 10:09

## 2024-09-25 RX ADMIN — PENTAMIDINE ISETHIONATE 300 MG: 300 INJECTION, POWDER, LYOPHILIZED, FOR SOLUTION INTRAMUSCULAR; INTRAVENOUS at 11:09

## 2024-09-25 RX ADMIN — SODIUM CHLORIDE: 9 INJECTION, SOLUTION INTRAVENOUS at 09:09

## 2024-09-25 RX ADMIN — PALONOSETRON 0.25 MG: 0.05 INJECTION, SOLUTION INTRAVENOUS at 08:09

## 2024-09-25 RX ADMIN — FLUDARABINE PHOSPHATE 62.5 MG: 25 INJECTION, SOLUTION INTRAVENOUS at 09:09

## 2024-09-25 NOTE — OP NOTE
[facility]  General Surgery  Operative Note    DATE: 9/24/2024    PREOPERATIVE DIAGNOSIS: Multiple myeloma [C90.00]     POSTOPERATIVE DIAGNOSIS: Multiple myeloma [C90.00]     Procedure(s):  INSERTION-CATHETER-LENORA, double lumen, left poss right, Bard 14.5 fr hemosplit catheter, model [id number]     Surgeons and Role:     * Nelson Aponte MD - Primary     * My Anderson MD - Resident - Assisting     * Soraya Kahn MD - Resident - Assisting    ANESTHESIA: Local/MAC    FINDINGS: Appropriate sized left IJ. Catheter placed under ultrasound guidance. Position confirmed with fluoroscopy.    INDICATION: Mr. Walsh is a 64 y.o. male with muliple myeloma that needs central access for treatment.    PROCEDURE IN DETAIL: The patient was taken to the operating room and  placed supine on the operating table and padded appropriately. Monitors were applied. Monitored anesthesia care was initiated. The right neck and chest were prepped and draped in the standard sterile surgical fashion. A timeout was performed and all team members present agreed this was the correct procedure on the correct patient. We also confirmed administration of appropriate preoperative antibiotics.     Using ultrasound we visualized the left internal jugular vein. After administering local anesthesia, the vein was cannulated with a hollow bore needle. A guidewire was placed and confirmed to be in correct position using fluoroscopy. A small skin incision was made around the guidewire. An appropriately sized catheter was chosen. A counter incision just caudal to the left clavicle was made after administering additional lidocaine. A subcutaneous tunnel between the chest incision and the neck incision was made with a hemostat after administration of additional local. The catheter was loaded onto the tunneling device and tunneled from the chest incision to the neck incision. Under fluoroscopic guidance, the split sheath and  dilator were placed over the guidewire, and the guidewire and dilator were then removed. The catheter was placed down the split sheath and the sheath was split and peeled away. Fluoroscopy confirmed appropriate position of the catheter, which aspirated and flushed easily. Heparinized saline was instilled in the catheter. The catheter was secured at it's exit site using 2-0 Prolene suture. The skin incision on the neck was closed with subcuticular 4-0 Monocryl. A sterile dressing was applied. The patient was transported to the PACU in stable condition. All sponge, instrument and needle counts were correct at the end of the procedure. I was present and scrubbed for the entire case.      EBL: <10 mL    COMPLICATIONS: none apparent.    DISPOSITION: PACU.    ATTESTATION:  I was present and available for the procedure including all critical portions of the procedure.    Nelson Aponte MD, [facility], [facility]  Acute Care Surgery and Surgical Critical Care  [facility]  9/24/2024

## 2024-09-25 NOTE — PROGRESS NOTES
Section of Hematology and Stem Cell Transplantation  Follow-Up Note     09/26/2024  Primary Oncologic Diagnosis: Multiple myeloma, remission status unspecified [C90.00]    History of Present Ilness:   Jaspreet Walsh Jr. (Jaspreet) is a pleasant 64 y.o.male from [place], [place], here for follow up of his IgG kappa multiple myeloma; s/p autologous stem cell transplant, now day +1228 (3 years, 4 months).    Patient of /  64 y.o.male; pmh of IgG kappa multiple myeloma (standard risk CG per msmart criteria, r-iss stage II); diagnosed September 2019 after presenting with symptomatic perispinal plasmacytoma;He has subsequently received  8 cycles of VRd therapy. Was in midst or pre transplant evaluation but due to insurance coverage issues transplant was delayed so was maintained on therapy and those issues have been resolved.     Transplant Course  Patient with IgG Kappa MM and pmh HTN, lytic bone lesion, arthritis and vitamin D deficiency. Admitted 5/15/21 for planned Alea auto SCT for MM. He received 3 bags and a CD34 dose of 3.35 x 10^6 on 5/17/21. Tolerated chemo and transplant well. Admission complicated by n/v controlled with scheduled anti-emetics and c-diff neg diarrhea controlled with prn imodium. He engrafted on Day +13 (5/30/21) with an ANC of 2607. He was discharged home on Day +14 (5/31/21).  Day +100 restaging marrow indicated that he was in MO.      Interval History -09/23/2024  64 y.o.  M with IgG Kappa MM standard risk s/p 8 cycles Vrd followed by Alea Auto 5/17/2021 with disease relapse and Dkd salvage therapy 8/4/2023.   Due to biochemical progression on DKd we transition to KPd bridging therapy.   He responded well to this but decided to proceed with cilta zohra.  We have collected and received his CART cells.  He presents for clearance prior to LDC schedule to begin on 9/25/24.   He feels well.    Accompanied by wife.    Interval History 9/25/24:  Patient is here for follow up of day 2 of LDC,  "he still has an additional cycle tomorrow and covid swab prior to admit. His wife is with him today. No reported issues related to the chemotherapy. He does endorse intermittent neuropathy in the form of "tingling" to his bilaterally feet. Denies fever, chills, cough, sore throat, dysuria, runny nose, congestion, chest pain, shortness, pain.    Past Medical History, Social History, and Past Family History are unchanged since last evaluation except for HPI.    CURRENT MEDICATIONS:   Current Outpatient Medications   Medication Sig    acyclovir (ZOVIRAX) 400 MG tablet Take 1 tablet (400 mg total) by mouth 2 (two) times daily.    amLODIPine (NORVASC) 10 MG tablet Take 10 mg by mouth once daily.    aspirin (ECOTRIN) 81 MG EC tablet Take 81 mg by mouth once daily.    cloNIDine (CATAPRES) 0.3 MG tablet Take 0.3 mg by mouth once daily.    dexAMETHasone (DECADRON) 2 MG tablet Take 10 tablets (20 mg total) by mouth every 7 days. Take with food before chemotherapy appointments    gabapentin (NEURONTIN) 300 MG capsule Take 1 capsule (300 mg total) by mouth 2 (two) times daily.    hydroCHLOROthiazide (HYDRODIURIL) 25 MG tablet Take 25 mg by mouth once daily.    JARDIANCE 25 mg tablet Take 25 mg by mouth once daily.    pomalidomide (POMALYST) 4 mg Cap Take 1 capsule (4 mg total) by mouth once daily On days 1-21 of a 28 day cycle. Auth [id number] 9/6/24.    potassium chloride SA (K-DUR,KLOR-CON M) 10 MEQ tablet TAKE 1 TABLET(10 MEQ) BY MOUTH EVERY DAY    atorvastatin (LIPITOR) 20 MG tablet Take 20 mg by mouth once daily. (Patient not taking: Reported on 9/23/2024)    levETIRAcetam (KEPPRA) 500 MG Tab Take 1.5 tablets (750 mg total) by mouth 2 (two) times daily.    losartan (COZAAR) 25 MG tablet Take 25 mg by mouth once daily. (Patient not taking: Reported on 9/23/2024)    metFORMIN (GLUCOPHAGE) 1000 MG tablet Take 0.5 tablets (500 mg total) by mouth 2 (two) times daily with meals. (Patient not taking: Reported on 9/23/2024)     No " current facility-administered medications for this visit.     ALLERGIES:   Review of patient's allergies indicates:  No Known Allergies    Review of Systems:     Review of Systems   Constitutional:  Negative for chills, fever, malaise/fatigue and weight loss.   HENT:  Negative for congestion, nosebleeds, sinus pain and sore throat.    Eyes:  Negative for blurred vision, double vision, photophobia and pain.   Respiratory:  Negative for cough and shortness of breath.    Cardiovascular:  Negative for chest pain, palpitations and leg swelling.   Gastrointestinal:  Negative for constipation, diarrhea, heartburn, nausea and vomiting.   Genitourinary:  Negative for dysuria and hematuria.   Musculoskeletal:  Negative for back pain and falls.   Skin:  Negative for rash.   Neurological:  Positive for sensory change. Negative for dizziness, weakness and headaches.   Endo/Heme/Allergies:  Negative for environmental allergies.   Psychiatric/Behavioral:  The patient does not have insomnia.        Physical Exam:     Vitals:    09/26/24 1045   BP: (!) 152/79   Pulse: (!) 50   Resp: 16   Temp: 98.1 °F (36.7 °C)       Physical Exam  Vitals reviewed.   Constitutional:       General: He is not in acute distress.     Appearance: Normal appearance. He is obese. He is not ill-appearing.   HENT:      Head: Normocephalic and atraumatic.      Nose: Nose normal. No congestion.      Mouth/Throat:      Mouth: Mucous membranes are moist.      Pharynx: Oropharynx is clear. No oropharyngeal exudate.   Eyes:      Pupils: Pupils are equal, round, and reactive to light.   Cardiovascular:      Rate and Rhythm: Normal rate and regular rhythm.      Pulses: Normal pulses.      Heart sounds: Normal heart sounds.   Pulmonary:      Effort: Pulmonary effort is normal.      Breath sounds: Normal breath sounds. No wheezing.   Chest:      Comments: Left central line, CDI, without signs/symptoms of infection  Abdominal:      General: Bowel sounds are normal.  There is no distension.      Palpations: Abdomen is soft.      Tenderness: There is no abdominal tenderness.   Musculoskeletal:         General: Normal range of motion.      Cervical back: Normal range of motion and neck supple.      Right lower leg: No edema.      Left lower leg: No edema.   Skin:     General: Skin is warm and dry.   Neurological:      Mental Status: He is alert and oriented to person, place, and time.      Motor: No weakness.   Psychiatric:         Mood and Affect: Mood normal.         Thought Content: Thought content normal.          ECOG Performance Status: (foot note - ECOG PS provided by Eastern Cooperative Oncology Group) 1 - Symptomatic but completely ambulatory    Karnofsky Performance Score:  80%- Normal Activity with Effort: Some Symptoms of Disease    Labs:   Lab Results   Component Value Date    WBC 5.89 2024    HGB 11.6 (L) 2024    HCT 35.8 (L) 2024    MCV 96 2024     2024       Lab Results   Component Value Date     2024    K 3.7 2024     (H) 2024    CO2 22 (L) 2024    BUN 16 2024    CREATININE 1.2 2024    ALBUMIN 3.5 2024    BILITOT 0.6 2024    ALKPHOS 58 2024    AST 11 2024    ALT 7 (L) 2024     Imagin24 MRI Brain  Impression:  Mild cerebral volume loss with few scattered small to punctate foci of T2 FLAIR signal hyperintensity supratentorial white matter which are nonspecific and may represent mild chronic ischemic change otherwise unremarkable motion distorted MRI brain specifically without evidence for acute infarction or intracranial enhancing mass lesion.     Please note there is heterogeneous marrow signal and enhancement within the clivus component of marrow infiltration of neoplasm not excluded in light of history.  Clinical correlation and further evaluation with MRI cervical spine advised.    24 WB PET CT  Impression:  In this patient with  multiple myeloma, there is heterogeneous tracer uptake throughout the bone marrow with interval normalization of prior hypermetabolic osseous lesions, compatible with favorable response to therapy.     Persistent focal tracer uptake in the periportal region, improved from prior, without abnormal CT correlate.     Additional findings as above.    Pathology:  7/23/24 Bone Marrow Biopsy  BONE MARROW, RIGHT ILIAC CREST (ASPIRATE SMEAR, TOUCH IMPRINT, CLOT SECTION, AND CORE BIOPSY):   -- RESIDUAL PLASMA CELL MYELOMA (5-10 % OF MARROW CELLULARITY).   -- NORMOCELLULAR MARROW (50%) WITH TRILINEAGE HEMATOPOIESIS AND DYSERYTHROPOIESIS.   -- INCREASED STORAGE IRON.      Assessment and Plan:     Problem List Items Addressed This Visit       Multiple myeloma/History of auto stem cell transplant  - IgG Kappa MM standard risk, diagnosed Sept 2020, after presenting w/ lytic lesions  - s/p 8 cycles Vrd followed by Alea Auto 5/17/2021 with disease relapse and Dkd salvage therapy 8/4/2023; today is day +1228 (3 years, 4 months) post-transplant.  - Achieving/mainting MI post SCT; was on revlimid maintenance but relapsed biochemically and with new lytic disease approximately 2 years post SCT (June/July 2023)  - Initiated Melinda-Kd salvage on 8/4/23; tolerating will no significant AE's   - Melinda subq weekly 8>EOW x 8 doses> q 4 week; kyprolis 70mg/m2 day 1, 8, 15 of 28 day cycle; dex 40mg po weekly   - Patient with initial good response but noted serial biochemical progression confirmed on 4/3/24 repeat serum studies; mild anemia and Jan 2024 PET with ENDY but no other overt symptoms or CRAB and he feels well  - Transitioned DKd to Kyprolis/pomalyst/dex as bridge to CART (cilta-zohra); KPd median pfs 26 months; vs cart 3 years with 20% >5 years   - Pomalyst 4mg daily day 1-21 of 28 cycle ; KPD initiated April 2024  - He has had MI to KPd  - Shared Decision with patient made to pursue consolidation with ciltacabtagene autoleucel   - He completed  Cilt-zohra consents on 8/5/24 and [place] [facility] [place]  - Initiated LDC flu/cy on 9/25/24, central line placement and brain MRI completed on 9/24   - COVID swab tomorrow   - Planned admission for cilta-zohra CAR-T on 9/29/24   - Start keppra 750 mg two times/day tonight until admission.  - Supportive meds: acyclovir, ca +vit d, zometa (received total of 2 years since disease progression)      Anemia  - Secondary to LDC chemo and previous treatment regimens  - Stable and asymptomatic  - CBC daily while inpatient      Immunodeficiency due to drugs  Completed post-transplant vaccines, will require revaccination post-CART  Continue acyclovir two times/daily  Plan for IgG monitoring monthly post-CART infusion  Plan to administer IVIG for IgG <400      Chemotherapy-induced neuropathy   Secondary to previous chemotherapy treatments  Controlled with gabapentin, continue 300 mg two times daily      Type 2 DM  Well controlled  [facility]      Hypertension  Continue amlodipine 10 mg daily  Continue HCTZ 25 mg daily  Follow[facility] with Dr. [facility]y [facility]        BMT Chart Routing      Follow up with physician . Dr. Baker or Nellie Oct 16-18   Follow up with [facility]    Provider visit type    Infusion scheduling note    Injection scheduling note    Labs CBC, CMP, magnesium, phosphorus, type and screen, immunoglobulins and CMV   Scheduling:  Preferred lab:  Lab interval:  Prior to follow up   Imaging    Pharmacy appointment    Other referrals                   Orders Placed:      Orders Placed This Encounter    CBC W/ AUTO DIFFERENTIAL    CMP    Immunoglobulins (IgG, IgA, IgM) Quantitative    CMV DNA, QUANTITATIVE, PCR    Magnesium    PHOSPHORUS    Type & Screen    levETIRAcetam (KEPPRA) 500 MG Tab       Total time of this visit was 38 minutes, including time spent face to face with patient and/or via video/audio, and also in preparing for today's visit for MDM and  documentation. (Medical Decision Making, including consideration of possible diagnoses, management options, complex medical record review, review of diagnostic tests and information, consideration and discussion of significant complications based on comorbidities, and discussion with providers involved with the care of the patient). Greater than 50% was spent face to face with the patient counseling and coordinating care.    Thank you for allowing me to partake in the care of this patient. If there are any questions, please do not hesitate to reach out.    Nellie Rivera, FNP-C  Hematologic Malignancies, Stem Cell Transplantation, and Cellular Therapy  [facility]

## 2024-09-25 NOTE — PLAN OF CARE
Pt tolerated Fludara, Cyclophosphamide and IVF's well. No adverse reaction noted. Pt education reinforced on chemo regimen, side effects, what to expect, and when to call Dr. Baker. Pt verbalized understanding. I reviewed pt calendar w/ pt and understanding verbalized.

## 2024-09-26 ENCOUNTER — OFFICE VISIT (OUTPATIENT)
Dept: HEMATOLOGY/ONCOLOGY | Facility: CLINIC | Age: 64
End: 2024-09-26
Payer: MEDICARE

## 2024-09-26 ENCOUNTER — INFUSION (OUTPATIENT)
Dept: INFUSION THERAPY | Facility: HOSPITAL | Age: 64
End: 2024-09-26
Payer: MEDICARE

## 2024-09-26 VITALS
TEMPERATURE: 98 F | RESPIRATION RATE: 16 BRPM | HEART RATE: 50 BPM | DIASTOLIC BLOOD PRESSURE: 79 MMHG | DIASTOLIC BLOOD PRESSURE: 79 MMHG | HEIGHT: 67 IN | OXYGEN SATURATION: 97 % | WEIGHT: 207.81 LBS | HEIGHT: 67 IN | TEMPERATURE: 98 F | WEIGHT: 207.25 LBS | SYSTOLIC BLOOD PRESSURE: 152 MMHG | HEART RATE: 50 BPM | SYSTOLIC BLOOD PRESSURE: 152 MMHG | BODY MASS INDEX: 32.62 KG/M2 | RESPIRATION RATE: 16 BRPM | OXYGEN SATURATION: 97 % | BODY MASS INDEX: 32.53 KG/M2

## 2024-09-26 DIAGNOSIS — T45.1X5A ANTINEOPLASTIC CHEMOTHERAPY INDUCED ANEMIA: ICD-10-CM

## 2024-09-26 DIAGNOSIS — C90.00 MULTIPLE MYELOMA, REMISSION STATUS UNSPECIFIED: Primary | ICD-10-CM

## 2024-09-26 DIAGNOSIS — G62.0 CHEMOTHERAPY-INDUCED NEUROPATHY: ICD-10-CM

## 2024-09-26 DIAGNOSIS — T45.1X5A CHEMOTHERAPY-INDUCED NEUROPATHY: ICD-10-CM

## 2024-09-26 DIAGNOSIS — Z29.89 SEIZURE PROPHYLAXIS: ICD-10-CM

## 2024-09-26 DIAGNOSIS — Z79.899 IMMUNODEFICIENCY DUE TO DRUGS: ICD-10-CM

## 2024-09-26 DIAGNOSIS — D84.821 IMMUNODEFICIENCY DUE TO DRUGS: ICD-10-CM

## 2024-09-26 DIAGNOSIS — D64.81 ANTINEOPLASTIC CHEMOTHERAPY INDUCED ANEMIA: ICD-10-CM

## 2024-09-26 DIAGNOSIS — Z94.84 HISTORY OF AUTO STEM CELL TRANSPLANT: ICD-10-CM

## 2024-09-26 LAB
ABO + RH BLD: NORMAL
ALBUMIN SERPL BCP-MCNC: 3.5 G/DL (ref 3.5–5.2)
ALP SERPL-CCNC: 58 U/L (ref 55–135)
ALT SERPL W/O P-5'-P-CCNC: 7 U/L (ref 10–44)
ANION GAP SERPL CALC-SCNC: 8 MMOL/L (ref 8–16)
AST SERPL-CCNC: 11 U/L (ref 10–40)
BASOPHILS # BLD AUTO: 0.01 K/UL (ref 0–0.2)
BASOPHILS NFR BLD: 0.2 % (ref 0–1.9)
BILIRUB SERPL-MCNC: 0.6 MG/DL (ref 0.1–1)
BLD GP AB SCN CELLS X3 SERPL QL: NORMAL
BUN SERPL-MCNC: 16 MG/DL (ref 8–23)
CALCIUM SERPL-MCNC: 8.7 MG/DL (ref 8.7–10.5)
CHAGAS AB, DONOR EVAL (BLOOD CENTER): NORMAL
CHLORIDE SERPL-SCNC: 111 MMOL/L (ref 95–110)
CMV AB TOTAL, DONOR EVAL (BLOOD CENTER): REACTIVE
CO2 SERPL-SCNC: 22 MMOL/L (ref 23–29)
CREAT SERPL-MCNC: 1.2 MG/DL (ref 0.5–1.4)
DIFFERENTIAL METHOD BLD: ABNORMAL
EOSINOPHIL # BLD AUTO: 0 K/UL (ref 0–0.5)
EOSINOPHIL NFR BLD: 0 % (ref 0–8)
ERYTHROCYTE [DISTWIDTH] IN BLOOD BY AUTOMATED COUNT: 16.1 % (ref 11.5–14.5)
EST. GFR  (NO RACE VARIABLE): >60 ML/MIN/1.73 M^2
GLUCOSE SERPL-MCNC: 181 MG/DL (ref 70–110)
HCT VFR BLD AUTO: 35.8 % (ref 40–54)
HEPATITIS B CORE  AB, DONOR EVAL, (BLOOD CENTER): NORMAL
HEPATITIS B SURFACE AG, DONOR EVAL, (BLOOD CENTER): NORMAL
HEPATITIS C ANTIBODY,DONOR EVAL (BLOOD CENTER): NORMAL
HGB BLD-MCNC: 11.6 G/DL (ref 14–18)
HIV1/2 AG/AB, DONOR EVAL (BLOOD CENTER): NORMAL
HTLV I/II ANTIBODY, DONOR EVAL (BLOOD EVAL): NORMAL
IMM GRANULOCYTES # BLD AUTO: 0.02 K/UL (ref 0–0.04)
IMM GRANULOCYTES NFR BLD AUTO: 0.3 % (ref 0–0.5)
LYMPHOCYTES # BLD AUTO: 0.4 K/UL (ref 1–4.8)
LYMPHOCYTES NFR BLD: 6.6 % (ref 18–48)
MCH RBC QN AUTO: 31.2 PG (ref 27–31)
MCHC RBC AUTO-ENTMCNC: 32.4 G/DL (ref 32–36)
MCV RBC AUTO: 96 FL (ref 82–98)
MONOCYTES # BLD AUTO: 0.4 K/UL (ref 0.3–1)
MONOCYTES NFR BLD: 6.5 % (ref 4–15)
NAT PANEL, DONOR EVAL (BLOOD CENTER): NORMAL
NEUTROPHILS # BLD AUTO: 5.1 K/UL (ref 1.8–7.7)
NEUTROPHILS NFR BLD: 86.4 % (ref 38–73)
NRBC BLD-RTO: 0 /100 WBC
PLATELET # BLD AUTO: 175 K/UL (ref 150–450)
PMV BLD AUTO: 11 FL (ref 9.2–12.9)
POTASSIUM SERPL-SCNC: 3.7 MMOL/L (ref 3.5–5.1)
PROT SERPL-MCNC: 6.6 G/DL (ref 6–8.4)
RBC # BLD AUTO: 3.72 M/UL (ref 4.6–6.2)
SODIUM SERPL-SCNC: 141 MMOL/L (ref 136–145)
SPECIMEN OUTDATE: NORMAL
WBC # BLD AUTO: 5.89 K/UL (ref 3.9–12.7)

## 2024-09-26 PROCEDURE — 96417 CHEMO IV INFUS EACH ADDL SEQ: CPT

## 2024-09-26 PROCEDURE — 25000003 PHARM REV CODE 250: Performed by: INTERNAL MEDICINE

## 2024-09-26 PROCEDURE — 86901 BLOOD TYPING SEROLOGIC RH(D): CPT | Performed by: INTERNAL MEDICINE

## 2024-09-26 PROCEDURE — 86900 BLOOD TYPING SEROLOGIC ABO: CPT | Performed by: INTERNAL MEDICINE

## 2024-09-26 PROCEDURE — 80053 COMPREHEN METABOLIC PANEL: CPT | Performed by: INTERNAL MEDICINE

## 2024-09-26 PROCEDURE — 85025 COMPLETE CBC W/AUTO DIFF WBC: CPT | Performed by: INTERNAL MEDICINE

## 2024-09-26 PROCEDURE — 96375 TX/PRO/DX INJ NEW DRUG ADDON: CPT

## 2024-09-26 PROCEDURE — 99999 PR PBB SHADOW E&M-EST. PATIENT-LVL V: CPT | Mod: PBBFAC,,,

## 2024-09-26 PROCEDURE — 96413 CHEMO IV INFUSION 1 HR: CPT

## 2024-09-26 PROCEDURE — 63600175 PHARM REV CODE 636 W HCPCS: Mod: JZ,JG | Performed by: INTERNAL MEDICINE

## 2024-09-26 RX ORDER — EPINEPHRINE 0.3 MG/.3ML
0.3 INJECTION SUBCUTANEOUS ONCE AS NEEDED
Status: DISCONTINUED | OUTPATIENT
Start: 2024-09-26 | End: 2024-09-26 | Stop reason: HOSPADM

## 2024-09-26 RX ORDER — SODIUM CHLORIDE 0.9 % (FLUSH) 0.9 %
10 SYRINGE (ML) INJECTION
Status: DISCONTINUED | OUTPATIENT
Start: 2024-09-26 | End: 2024-09-26 | Stop reason: HOSPADM

## 2024-09-26 RX ORDER — HEPARIN 100 UNIT/ML
300 SYRINGE INTRAVENOUS
Status: DISCONTINUED | OUTPATIENT
Start: 2024-09-26 | End: 2024-09-26 | Stop reason: HOSPADM

## 2024-09-26 RX ORDER — HEPARIN SODIUM 1000 [USP'U]/ML
1600 INJECTION, SOLUTION INTRAVENOUS; SUBCUTANEOUS ONCE
Status: COMPLETED | OUTPATIENT
Start: 2024-09-26 | End: 2024-09-26

## 2024-09-26 RX ORDER — DIPHENHYDRAMINE HYDROCHLORIDE 50 MG/ML
50 INJECTION INTRAMUSCULAR; INTRAVENOUS ONCE AS NEEDED
Status: DISCONTINUED | OUTPATIENT
Start: 2024-09-26 | End: 2024-09-26 | Stop reason: HOSPADM

## 2024-09-26 RX ORDER — LEVETIRACETAM 500 MG/1
750 TABLET ORAL 2 TIMES DAILY
Qty: 90 TABLET | Refills: 0 | Status: SHIPPED | OUTPATIENT
Start: 2024-09-26 | End: 2024-10-26

## 2024-09-26 RX ORDER — PROCHLORPERAZINE EDISYLATE 5 MG/ML
10 INJECTION INTRAMUSCULAR; INTRAVENOUS
Status: COMPLETED | OUTPATIENT
Start: 2024-09-26 | End: 2024-09-26

## 2024-09-26 RX ADMIN — PROCHLORPERAZINE EDISYLATE 10 MG: 5 INJECTION INTRAMUSCULAR; INTRAVENOUS at 08:09

## 2024-09-26 RX ADMIN — CYCLOPHOSPHAMIDE 560 MG: 200 INJECTION, SOLUTION INTRAVENOUS at 09:09

## 2024-09-26 RX ADMIN — FLUDARABINE PHOSPHATE 62.5 MG: 25 INJECTION, SOLUTION INTRAVENOUS at 09:09

## 2024-09-26 RX ADMIN — SODIUM CHLORIDE 500 ML: 9 INJECTION, SOLUTION INTRAVENOUS at 08:09

## 2024-09-26 RX ADMIN — HEPARIN SODIUM 1600 UNITS: 1000 INJECTION, SOLUTION INTRAVENOUS; SUBCUTANEOUS at 10:09

## 2024-09-26 NOTE — PLAN OF CARE
8am- Pt here for Day -4 Fludarabine/cyclophosphamide. Labs drawn from [facility] with positive blood return and sent to lab.     10:30- Pt tolerated treatment without issues. RTC tomorrow, D/C in stable condition, ambulated independently.

## 2024-09-27 ENCOUNTER — CLINICAL SUPPORT (OUTPATIENT)
Dept: HEMATOLOGY/ONCOLOGY | Facility: CLINIC | Age: 64
End: 2024-09-27
Payer: MEDICARE

## 2024-09-27 ENCOUNTER — TELEPHONE (OUTPATIENT)
Dept: INFECTIOUS DISEASES | Facility: CLINIC | Age: 64
End: 2024-09-27
Payer: MEDICARE

## 2024-09-27 ENCOUNTER — TELEPHONE (OUTPATIENT)
Dept: INFECTIOUS DISEASES | Facility: HOSPITAL | Age: 64
End: 2024-09-27
Payer: MEDICARE

## 2024-09-27 ENCOUNTER — INFUSION (OUTPATIENT)
Dept: INFUSION THERAPY | Facility: HOSPITAL | Age: 64
End: 2024-09-27
Payer: MEDICARE

## 2024-09-27 VITALS
WEIGHT: 207.81 LBS | BODY MASS INDEX: 32.62 KG/M2 | SYSTOLIC BLOOD PRESSURE: 155 MMHG | RESPIRATION RATE: 18 BRPM | HEART RATE: 57 BPM | TEMPERATURE: 98 F | DIASTOLIC BLOOD PRESSURE: 75 MMHG | HEIGHT: 67 IN

## 2024-09-27 DIAGNOSIS — C90.00 MULTIPLE MYELOMA, REMISSION STATUS UNSPECIFIED: ICD-10-CM

## 2024-09-27 DIAGNOSIS — C90.00 MULTIPLE MYELOMA, REMISSION STATUS UNSPECIFIED: Primary | ICD-10-CM

## 2024-09-27 DIAGNOSIS — G92.00 IMMUNE EFFECTOR CELL-ASSOCIATED NEUROTOXICITY SYNDROME (ICANS): Primary | ICD-10-CM

## 2024-09-27 LAB
SARS-COV-2 RDRP RESP QL NAA+PROBE: POSITIVE
SARS-COV-2 RNA RESP QL NAA+PROBE: NOT DETECTED

## 2024-09-27 PROCEDURE — 96413 CHEMO IV INFUSION 1 HR: CPT

## 2024-09-27 PROCEDURE — A4216 STERILE WATER/SALINE, 10 ML: HCPCS | Performed by: INTERNAL MEDICINE

## 2024-09-27 PROCEDURE — 96375 TX/PRO/DX INJ NEW DRUG ADDON: CPT

## 2024-09-27 PROCEDURE — U0002 COVID-19 LAB TEST NON-CDC: HCPCS | Performed by: INTERNAL MEDICINE

## 2024-09-27 PROCEDURE — 63600175 PHARM REV CODE 636 W HCPCS: Mod: JZ,JG | Performed by: INTERNAL MEDICINE

## 2024-09-27 PROCEDURE — 96417 CHEMO IV INFUS EACH ADDL SEQ: CPT

## 2024-09-27 PROCEDURE — 87635 SARS-COV-2 COVID-19 AMP PRB: CPT | Mod: 91 | Performed by: INTERNAL MEDICINE

## 2024-09-27 PROCEDURE — 25000003 PHARM REV CODE 250: Performed by: INTERNAL MEDICINE

## 2024-09-27 RX ORDER — SODIUM CHLORIDE 0.9 % (FLUSH) 0.9 %
10 SYRINGE (ML) INJECTION
Status: DISCONTINUED | OUTPATIENT
Start: 2024-09-27 | End: 2024-09-27 | Stop reason: HOSPADM

## 2024-09-27 RX ORDER — EPINEPHRINE 0.3 MG/.3ML
0.3 INJECTION SUBCUTANEOUS ONCE AS NEEDED
Status: DISCONTINUED | OUTPATIENT
Start: 2024-09-27 | End: 2024-09-27 | Stop reason: HOSPADM

## 2024-09-27 RX ORDER — DIPHENHYDRAMINE HYDROCHLORIDE 50 MG/ML
50 INJECTION INTRAMUSCULAR; INTRAVENOUS ONCE AS NEEDED
Status: DISCONTINUED | OUTPATIENT
Start: 2024-09-27 | End: 2024-09-27 | Stop reason: HOSPADM

## 2024-09-27 RX ORDER — HEPARIN 100 UNIT/ML
300 SYRINGE INTRAVENOUS
Status: DISCONTINUED | OUTPATIENT
Start: 2024-09-27 | End: 2024-09-27 | Stop reason: HOSPADM

## 2024-09-27 RX ORDER — SODIUM CHLORIDE 9 MG/ML
INJECTION, SOLUTION INTRAVENOUS ONCE
Status: DISCONTINUED | OUTPATIENT
Start: 2024-09-27 | End: 2024-09-27 | Stop reason: HOSPADM

## 2024-09-27 RX ORDER — PROCHLORPERAZINE EDISYLATE 5 MG/ML
10 INJECTION INTRAMUSCULAR; INTRAVENOUS
Status: COMPLETED | OUTPATIENT
Start: 2024-09-27 | End: 2024-09-27

## 2024-09-27 RX ORDER — SODIUM CHLORIDE 9 MG/ML
INJECTION, SOLUTION INTRAVENOUS CONTINUOUS
Status: DISCONTINUED | OUTPATIENT
Start: 2024-09-27 | End: 2024-09-27

## 2024-09-27 RX ADMIN — Medication 10 ML: at 10:09

## 2024-09-27 RX ADMIN — PROCHLORPERAZINE EDISYLATE 10 MG: 5 INJECTION INTRAMUSCULAR; INTRAVENOUS at 08:09

## 2024-09-27 RX ADMIN — FLUDARABINE PHOSPHATE 62.5 MG: 25 INJECTION, SOLUTION INTRAVENOUS at 09:09

## 2024-09-27 RX ADMIN — SODIUM CHLORIDE: 9 INJECTION, SOLUTION INTRAVENOUS at 08:09

## 2024-09-27 RX ADMIN — SODIUM CHLORIDE 500 ML: 9 INJECTION, SOLUTION INTRAVENOUS at 08:09

## 2024-09-27 RX ADMIN — CYCLOPHOSPHAMIDE 560 MG: 200 INJECTION, SOLUTION INTRAVENOUS at 09:09

## 2024-09-27 NOTE — PLAN OF CARE
1043  Infusion completed, pt tolerated; pt instructed to remain well hydrated; discussed when to contact MD, when to report to ED; pt and spouse verbalized understanding of all discussed and when to report next (CAR-T admit on Sunday 9/29/24)

## 2024-09-27 NOTE — NURSING
0756  Pt here for Fludara, Cytoxan, accompanied by spouse, no new complaints or concerns, reports tolerating treatment; discussed treatment plan for today, all questions answered and pt agrees to proceed

## 2024-09-27 NOTE — TELEPHONE ENCOUNTER
Presented to chemo unit for routing pre-admission work-up  Rapid COVID-19 positive    Patient with no URI symptoms    Routine COVID-19 testing negative.    On discussion with Drs. Baumgarten and Fernanda, okay for patient to be on oncology floor, but not in the BMT unit (positive pressure).    No need to rescreen for COVID-19 on admission - only repeat testing if patient with URI symptoms.    Liliane Leonard MD MPH  Infectious Diseases - [facility]

## 2024-09-27 NOTE — PROGRESS NOTES
Pharmacist Documentation for CAR-T:    I have personally called the inpatient pharmacy and confirmed that this patient who is admitting for CAR-T on 9/29/24 will have at least two doses of tocilizumab for treatment of CRS.     I have added the tocilizumab supportive care plan in case of need.     The use of tocilizumab is approved for treatment of CRS by pharmacy leadership.       Giuseppe AndersenD., BCOP  Clinical Pharmacy Specialist, Bone Marrow Transplant/Cellular Therapy  [facility]  SpectraLink: [id number]

## 2024-09-29 ENCOUNTER — HOSPITAL ENCOUNTER (INPATIENT)
Facility: HOSPITAL | Age: 64
LOS: 11 days | Discharge: HOME | DRG: 018 | End: 2024-10-10
Attending: INTERNAL MEDICINE | Admitting: INTERNAL MEDICINE
Payer: MEDICARE

## 2024-09-29 DIAGNOSIS — D84.81 IMMUNODEFICIENCY SECONDARY TO NEOPLASM: ICD-10-CM

## 2024-09-29 DIAGNOSIS — G92.00 IMMUNE EFFECTOR CELL-ASSOCIATED NEUROTOXICITY SYNDROME (ICANS): ICD-10-CM

## 2024-09-29 DIAGNOSIS — R07.9 CHEST PAIN: ICD-10-CM

## 2024-09-29 DIAGNOSIS — R94.31 QT PROLONGATION: ICD-10-CM

## 2024-09-29 DIAGNOSIS — D49.9 IMMUNODEFICIENCY SECONDARY TO NEOPLASM: ICD-10-CM

## 2024-09-29 DIAGNOSIS — R00.0 TACHYCARDIA: ICD-10-CM

## 2024-09-29 DIAGNOSIS — Z94.84 HISTORY OF AUTOLOGOUS STEM CELL TRANSPLANT: Primary | ICD-10-CM

## 2024-09-29 DIAGNOSIS — Z92.850 S/P CHIMERIC ANTIGEN RECEPTOR T-CELL THERAPY: ICD-10-CM

## 2024-09-29 DIAGNOSIS — R11.2 CHEMOTHERAPY-INDUCED NAUSEA AND VOMITING: ICD-10-CM

## 2024-09-29 DIAGNOSIS — C90.00 MULTIPLE MYELOMA: ICD-10-CM

## 2024-09-29 DIAGNOSIS — R50.9 FEVER AND CHILLS: ICD-10-CM

## 2024-09-29 DIAGNOSIS — E88.09 PLASMA CELL DYSCRASIA: ICD-10-CM

## 2024-09-29 DIAGNOSIS — I24.9 ACS (ACUTE CORONARY SYNDROME): ICD-10-CM

## 2024-09-29 DIAGNOSIS — T45.1X5A CHEMOTHERAPY-INDUCED NAUSEA AND VOMITING: ICD-10-CM

## 2024-09-29 DIAGNOSIS — D68.69 SECONDARY HYPERCOAGULABLE STATE: ICD-10-CM

## 2024-09-29 DIAGNOSIS — C90.00 MULTIPLE MYELOMA, REMISSION STATUS UNSPECIFIED: ICD-10-CM

## 2024-09-29 LAB
ALBUMIN SERPL BCP-MCNC: 3.4 G/DL (ref 3.5–5.2)
ALP SERPL-CCNC: 55 U/L (ref 55–135)
ALT SERPL W/O P-5'-P-CCNC: 10 U/L (ref 10–44)
ANION GAP SERPL CALC-SCNC: 9 MMOL/L (ref 8–16)
ANISOCYTOSIS BLD QL SMEAR: SLIGHT
AST SERPL-CCNC: 13 U/L (ref 10–40)
BASOPHILS # BLD AUTO: ABNORMAL K/UL (ref 0–0.2)
BASOPHILS NFR BLD: 1 % (ref 0–1.9)
BILIRUB SERPL-MCNC: 0.8 MG/DL (ref 0.1–1)
BUN SERPL-MCNC: 23 MG/DL (ref 8–23)
BURR CELLS BLD QL SMEAR: ABNORMAL
CALCIUM SERPL-MCNC: 7.8 MG/DL (ref 8.7–10.5)
CHLORIDE SERPL-SCNC: 110 MMOL/L (ref 95–110)
CO2 SERPL-SCNC: 19 MMOL/L (ref 23–29)
CREAT SERPL-MCNC: 1.2 MG/DL (ref 0.5–1.4)
CRP SERPL-MCNC: 26.1 MG/L (ref 0–8.2)
DIFFERENTIAL METHOD BLD: ABNORMAL
EOSINOPHIL # BLD AUTO: ABNORMAL K/UL (ref 0–0.5)
EOSINOPHIL NFR BLD: 10 % (ref 0–8)
ERYTHROCYTE [DISTWIDTH] IN BLOOD BY AUTOMATED COUNT: 16.2 % (ref 11.5–14.5)
EST. GFR  (NO RACE VARIABLE): >60 ML/MIN/1.73 M^2
FERRITIN SERPL-MCNC: 511 NG/ML (ref 20–300)
GLUCOSE SERPL-MCNC: 126 MG/DL (ref 70–110)
HCT VFR BLD AUTO: 35 % (ref 40–54)
HGB BLD-MCNC: 11.6 G/DL (ref 14–18)
IMM GRANULOCYTES # BLD AUTO: ABNORMAL K/UL (ref 0–0.04)
IMM GRANULOCYTES NFR BLD AUTO: ABNORMAL % (ref 0–0.5)
LDH SERPL L TO P-CCNC: 217 U/L (ref 110–260)
LYMPHOCYTES # BLD AUTO: ABNORMAL K/UL (ref 1–4.8)
LYMPHOCYTES NFR BLD: 6 % (ref 18–48)
MAGNESIUM SERPL-MCNC: 2 MG/DL (ref 1.6–2.6)
MCH RBC QN AUTO: 31.9 PG (ref 27–31)
MCHC RBC AUTO-ENTMCNC: 33.1 G/DL (ref 32–36)
MCV RBC AUTO: 96 FL (ref 82–98)
MONOCYTES # BLD AUTO: ABNORMAL K/UL (ref 0.3–1)
MONOCYTES NFR BLD: 4 % (ref 4–15)
NEUTROPHILS NFR BLD: 78 % (ref 38–73)
NEUTS BAND NFR BLD MANUAL: 1 %
NRBC BLD-RTO: 0 /100 WBC
OVALOCYTES BLD QL SMEAR: ABNORMAL
PHOSPHATE SERPL-MCNC: 3 MG/DL (ref 2.7–4.5)
PLATELET # BLD AUTO: 120 K/UL (ref 150–450)
PLATELET BLD QL SMEAR: ABNORMAL
PMV BLD AUTO: 10.6 FL (ref 9.2–12.9)
POCT GLUCOSE: 104 MG/DL (ref 70–110)
POCT GLUCOSE: 98 MG/DL (ref 70–110)
POIKILOCYTOSIS BLD QL SMEAR: SLIGHT
POTASSIUM SERPL-SCNC: 3.5 MMOL/L (ref 3.5–5.1)
PROT SERPL-MCNC: 6.1 G/DL (ref 6–8.4)
RBC # BLD AUTO: 3.64 M/UL (ref 4.6–6.2)
SODIUM SERPL-SCNC: 138 MMOL/L (ref 136–145)
WBC # BLD AUTO: 0.8 K/UL (ref 3.9–12.7)

## 2024-09-29 PROCEDURE — 86901 BLOOD TYPING SEROLOGIC RH(D): CPT | Performed by: NURSE PRACTITIONER

## 2024-09-29 PROCEDURE — 83615 LACTATE (LD) (LDH) ENZYME: CPT | Performed by: NURSE PRACTITIONER

## 2024-09-29 PROCEDURE — 85027 COMPLETE CBC AUTOMATED: CPT | Performed by: NURSE PRACTITIONER

## 2024-09-29 PROCEDURE — 25000003 PHARM REV CODE 250: Performed by: NURSE PRACTITIONER

## 2024-09-29 PROCEDURE — 63600175 PHARM REV CODE 636 W HCPCS: Performed by: INTERNAL MEDICINE

## 2024-09-29 PROCEDURE — 85007 BL SMEAR W/DIFF WBC COUNT: CPT | Performed by: NURSE PRACTITIONER

## 2024-09-29 PROCEDURE — 20600001 HC STEP DOWN PRIVATE ROOM

## 2024-09-29 PROCEDURE — 86140 C-REACTIVE PROTEIN: CPT | Performed by: NURSE PRACTITIONER

## 2024-09-29 PROCEDURE — 82728 ASSAY OF FERRITIN: CPT | Performed by: NURSE PRACTITIONER

## 2024-09-29 PROCEDURE — 83735 ASSAY OF MAGNESIUM: CPT | Performed by: NURSE PRACTITIONER

## 2024-09-29 PROCEDURE — 99223 1ST HOSP IP/OBS HIGH 75: CPT | Mod: ,,, | Performed by: INTERNAL MEDICINE

## 2024-09-29 PROCEDURE — 86900 BLOOD TYPING SEROLOGIC ABO: CPT | Performed by: NURSE PRACTITIONER

## 2024-09-29 PROCEDURE — 84100 ASSAY OF PHOSPHORUS: CPT | Performed by: NURSE PRACTITIONER

## 2024-09-29 PROCEDURE — 86850 RBC ANTIBODY SCREEN: CPT | Performed by: NURSE PRACTITIONER

## 2024-09-29 PROCEDURE — 80053 COMPREHEN METABOLIC PANEL: CPT | Performed by: NURSE PRACTITIONER

## 2024-09-29 RX ORDER — LOSARTAN POTASSIUM 25 MG/1
25 TABLET ORAL DAILY
Status: DISCONTINUED | OUTPATIENT
Start: 2024-09-30 | End: 2024-09-30

## 2024-09-29 RX ORDER — IBUPROFEN 200 MG
16 TABLET ORAL
Status: DISCONTINUED | OUTPATIENT
Start: 2024-09-29 | End: 2024-10-06

## 2024-09-29 RX ORDER — HYDROCHLOROTHIAZIDE 25 MG/1
25 TABLET ORAL DAILY
Status: DISCONTINUED | OUTPATIENT
Start: 2024-09-30 | End: 2024-10-10 | Stop reason: HOSPADM

## 2024-09-29 RX ORDER — GLUCAGON 1 MG
1 KIT INJECTION
Status: DISCONTINUED | OUTPATIENT
Start: 2024-09-29 | End: 2024-10-06

## 2024-09-29 RX ORDER — ENOXAPARIN SODIUM 100 MG/ML
40 INJECTION SUBCUTANEOUS EVERY 24 HOURS
Status: DISCONTINUED | OUTPATIENT
Start: 2024-09-29 | End: 2024-10-10 | Stop reason: HOSPADM

## 2024-09-29 RX ORDER — ACYCLOVIR 200 MG/1
400 CAPSULE ORAL 2 TIMES DAILY
Status: DISCONTINUED | OUTPATIENT
Start: 2024-09-29 | End: 2024-09-30

## 2024-09-29 RX ORDER — CLONIDINE HYDROCHLORIDE 0.1 MG/1
0.3 TABLET ORAL DAILY
Status: DISCONTINUED | OUTPATIENT
Start: 2024-09-30 | End: 2024-10-07

## 2024-09-29 RX ORDER — GABAPENTIN 300 MG/1
300 CAPSULE ORAL 2 TIMES DAILY
Status: DISCONTINUED | OUTPATIENT
Start: 2024-09-29 | End: 2024-10-10 | Stop reason: HOSPADM

## 2024-09-29 RX ORDER — LEVETIRACETAM 750 MG/1
750 TABLET ORAL 2 TIMES DAILY
Status: DISCONTINUED | OUTPATIENT
Start: 2024-09-29 | End: 2024-10-10 | Stop reason: HOSPADM

## 2024-09-29 RX ORDER — SODIUM CHLORIDE 0.9 % (FLUSH) 0.9 %
10 SYRINGE (ML) INJECTION EVERY 12 HOURS PRN
Status: DISCONTINUED | OUTPATIENT
Start: 2024-09-29 | End: 2024-10-10 | Stop reason: HOSPADM

## 2024-09-29 RX ORDER — HEPARIN SODIUM 1000 [USP'U]/ML
1000 INJECTION, SOLUTION INTRAVENOUS; SUBCUTANEOUS
Status: DISCONTINUED | OUTPATIENT
Start: 2024-09-29 | End: 2024-10-10 | Stop reason: HOSPADM

## 2024-09-29 RX ORDER — IBUPROFEN 200 MG
24 TABLET ORAL
Status: DISCONTINUED | OUTPATIENT
Start: 2024-09-29 | End: 2024-10-06

## 2024-09-29 RX ORDER — NALOXONE HCL 0.4 MG/ML
0.02 VIAL (ML) INJECTION
Status: DISCONTINUED | OUTPATIENT
Start: 2024-09-29 | End: 2024-10-10 | Stop reason: HOSPADM

## 2024-09-29 RX ORDER — INSULIN ASPART 100 [IU]/ML
0-10 INJECTION, SOLUTION INTRAVENOUS; SUBCUTANEOUS
Status: DISCONTINUED | OUTPATIENT
Start: 2024-09-29 | End: 2024-10-06

## 2024-09-29 RX ORDER — AMLODIPINE BESYLATE 10 MG/1
10 TABLET ORAL DAILY
Status: DISCONTINUED | OUTPATIENT
Start: 2024-09-30 | End: 2024-10-10 | Stop reason: HOSPADM

## 2024-09-29 RX ADMIN — GABAPENTIN 300 MG: 300 CAPSULE ORAL at 08:09

## 2024-09-29 RX ADMIN — LEVETIRACETAM 750 MG: 750 TABLET, FILM COATED ORAL at 08:09

## 2024-09-29 RX ADMIN — HEPARIN SODIUM 1000 UNITS: 1000 INJECTION, SOLUTION INTRAVENOUS; SUBCUTANEOUS at 06:09

## 2024-09-29 RX ADMIN — HEPARIN SODIUM 1000 UNITS: 1000 INJECTION, SOLUTION INTRAVENOUS; SUBCUTANEOUS at 08:09

## 2024-09-29 RX ADMIN — ACYCLOVIR 400 MG: 200 CAPSULE ORAL at 08:09

## 2024-09-29 NOTE — ASSESSMENT & PLAN NOTE
Multiple myeloma/History of auto stem cell transplant  - IgG Kappa MM standard risk, diagnosed Sept 2020, after presenting w/ lytic lesions  - s/p 8 cycles Vrd followed by Alea Auto 5/17/2021 with disease relapse and Dkd salvage therapy 8/4/2023  - Achieving/mainting [place] post SCT; was on revlimid maintenance but relapsed biochemically and with new lytic disease approximately 2 years post SCT (June/July 2023)  - Initiated Melinda-Kd salvage on 8/4/23; tolerating will no significant AE's   - Melinda subq weekly 8>EOW x 8 doses> q 4 week; kyprolis 70mg/m2 day 1, 8, 15 of 28 day cycle; dex 40mg po weekly   - Patient with initial good response but noted serial biochemical progression confirmed on 4/3/24 repeat serum studies; mild anemia and Jan 2024 PET with ENDY but no other overt symptoms or CRAB and he feels well  - Transitioned DKd to Kyprolis/pomalyst/dex as bridge to CART (cilta-zohra); KPd median pfs 26 months; vs cart 3 years with 20% >5 years   - Pomalyst 4mg daily day 1-21 of 28 cycle ; KPD initiated April 2024  - He has had [place] to KPd  - Shared Decision with patient made to pursue consolidation with ciltacabtagene autoleucel   - He completed Cilt-zohra consents on 8/5/24 and adequate CAR[facility] and in-house  - Initiated LDC flu/cy on 9/25/24, central line placement and brain MRI completed on 9/24   - COVID positive 9/27/24   - Planned admission for cilta-zohra CAR-T on 9/29/24   - Started keppra 750 mg two times/day 9/27  - Supportive meds: acyclovir, ca +vit d, zometa (received total of 2 years since disease progression)

## 2024-09-29 NOTE — H&P
[facility] - Oncology ([facility])  Hematology  Bone Marrow Transplant  H&P    Subjective:     Principal Problem: Multiple myeloma    HPI: Jaspreet Walsh Jr. (Jaspreet) is a pleasant 64 y.o.male from [place], [place], here for Carvykti for IgG kappa multiple myeloma; s/p autologous stem cell transplant.     Patient of /  64 y.o.male; pmh of IgG kappa multiple myeloma (standard risk CG per msmart criteria, r-iss stage II); diagnosed September 2019 after presenting with symptomatic perispinal plasmacytoma;He has subsequently received  8 cycles of VRd therapy. Was in midst or pre transplant evaluation but due to insurance coverage issues transplant was delayed so was maintained on therapy and those issues have been resolved.     Transplant Course  Patient with IgG Kappa MM and pmh HTN, lytic bone lesion, arthritis and vitamin D deficiency. Admitted 5/15/21 for planned Alea auto SCT for MM. He received 3 bags and a CD34 dose of 3.35 x 10^6 on 5/17/21. Tolerated chemo and transplant well. Admission complicated by n/v controlled with scheduled anti-emetics and c-diff neg diarrhea controlled with prn imodium. He engrafted on Day +13 (5/30/21) with an ANC of 2607. He was discharged home on Day +14 (5/31/21).  Day +100 restaging marrow indicated that he was in [place].      Interval History -09/23/2024  64 y.o.  M with IgG Kappa MM standard risk s/p 8 cycles Vrd followed by Alea Auto 5/17/2021 with disease relapse and Dkd salvage therapy 8/4/2023.   Due to biochemical progression on DKd we transition to KPd bridging therapy.   He responded well to this but decided to proceed with cilta zohra.  We have collected and received his CART cells.  He presents for clearance prior to LDC schedule to begin on 9/25/24.   He feels well.    Accompanied by wife.     Interval History 9/25/24:  Patient is here for follow up of day 2 of LDC, he still has an additional cycle tomorrow and covid swab prior to admit. His wife is with him today.  "No reported issues related to the chemotherapy. He does endorse intermittent neuropathy in the form of "tingling" to his bilaterally feet. Denies fever, chills, cough, sore throat, dysuria, runny nose, congestion, chest pain, shortness, pain.    Patient information was obtained from patient.         Medications Prior to Admission   Medication Sig Dispense Refill Last Dose/Taking    acyclovir (ZOVIRAX) 400 MG tablet Take 1 tablet (400 mg total) by mouth 2 (two) times daily. 60 tablet 1     amLODIPine (NORVASC) 10 MG tablet Take 10 mg by mouth once daily.       aspirin (ECOTRIN) 81 MG EC tablet Take 81 mg by mouth once daily.       atorvastatin (LIPITOR) 20 MG tablet Take 20 mg by mouth once daily. (Patient not taking: Reported on 9/23/2024)       cloNIDine (CATAPRES) 0.3 MG tablet Take 0.3 mg by mouth once daily.       dexAMETHasone (DECADRON) 2 MG tablet Take 10 tablets (20 mg total) by mouth every 7 days. Take with food before chemotherapy appointments 20 tablet 2     gabapentin (NEURONTIN) 300 MG capsule Take 1 capsule (300 mg total) by mouth 2 (two) times daily. 60 capsule 3     hydroCHLOROthiazide (HYDRODIURIL) 25 MG tablet Take 25 mg by mouth once daily.       JARDIANCE 25 mg tablet Take 25 mg by mouth once daily.       levETIRAcetam (KEPPRA) 500 MG Tab Take 1.5 tablets (750 mg total) by mouth 2 (two) times daily. 90 tablet 0     losartan (COZAAR) 25 MG tablet Take 25 mg by mouth once daily. (Patient not taking: Reported on 9/23/2024)       metFORMIN (GLUCOPHAGE) 1000 MG tablet Take 0.5 tablets (500 mg total) by mouth 2 (two) times daily with meals. (Patient not taking: Reported on 9/23/2024) 90 tablet 3     pomalidomide (POMALYST) 4 mg Cap Take 1 capsule (4 mg total) by mouth once daily On days 1-21 of a 28 day cycle. Auth [id number] 9/6/24. 21 capsule 0     potassium chloride SA (K-DUR,KLOR-CON M) 10 MEQ tablet TAKE 1 TABLET(10 MEQ) BY MOUTH EVERY DAY 90 tablet 3        Patient has no known allergies.     Past " Medical History:   Diagnosis Date    Anxiety     Arthritis     Cancer     Diabetes mellitus     Hypertension      Past Surgical History:   Procedure Laterality Date    BACK SURGERY  01/01/2021    BONE MARROW BIOPSY Left 10/20/2021    Procedure: Biopsy-bone marrow;  Surgeon: Summer Cartwright MD;  Location: [facility] ENDO (4TH FLR);  Service: Oncology;  Laterality: Left;    COLONOSCOPY N/A 01/25/2021    Procedure: COLONOSCOPY;  Surgeon: Braxton Lees MD;  Location: [facility] ENDO (4TH FLR);  Service: Endoscopy;  Laterality: N/A;  1/22-covid kristopher-inst mailed-tb  1/20/21-pt to remain on schedule with Dr. Lees, and confirmed updated arrival time of 0835-BB    COLONOSCOPY N/A 02/01/2021    Procedure: COLONOSCOPY;  Surgeon: Jo Ann Robledo MD;  Location: [facility] ENDO (4TH FLR);  Service: Endoscopy;  Laterality: N/A;  rescheduled due to poor bowel prep, to be rescheduled with first available provider-BB  covid-1/29/21-pcw-BB    CREATION OF MUSCLE ROTATIONAL FLAP N/A 09/23/2020    Procedure: CREATION, FLAP, MUSCLE ROTATION;  Surgeon: Kamlesh Bellamy MD;  [facility];  Service: Plastics;  Laterality: N/A;    KNEE SURGERY Left 06/2019    LUMBAR FUSION N/A 09/23/2020    Procedure: FUSION, SPINE, LUMBAR L2-pelvis. Depuy. Plastic surgery closure w/ Dr. Bellamy;  Surgeon: Nick Coyle MD;  [facility];  Service: Neurosurgery;  Laterality: N/A;    Metastatic neoplasum removed from spine      PLACEMENT OF DUAL-LUMEN VASCULAR CATHETER Left 9/24/2024    Procedure: INSERTION-CATHETER-LENORA, double lumen, left poss right, Bard 14.5 fr hemosplit catheter, model [id number];  Surgeon: Nelosn Aponte MD;  [facility];  Service: General;  Laterality: Left;     Family History       Problem Relation (Age of Onset)    Cancer Father          Tobacco Use    Smoking status: Former     Current packs/day: 0.00     Average packs/day: 0.3 packs/day for 40.0 years (10.0 ttl pk-yrs)     Types: Cigarettes      Start date:      Quit date: 2017     Years since quittin.7    Smokeless tobacco: Never   Substance and Sexual Activity    Alcohol use: Not on file    Drug use: Not on file    Sexual activity: Not on file       Review of Systems   Constitutional: Negative.    HENT: Negative.     Eyes: Negative.    Respiratory: Negative.     Cardiovascular: Negative.    Gastrointestinal: Negative.    Genitourinary: Negative.    Musculoskeletal: Negative.    Neurological: Negative.    Psychiatric/Behavioral: Negative.       Objective:     Wt Readings from Last 3 Encounters:   24 94.3 kg (207 lb 12.5 oz)   24 94.3 kg (207 lb 12.5 oz)   24 94 kg (207 lb 3.7 oz)     Temp Readings from Last 3 Encounters:   24 98.3 °F (36.8 °C)   24 98.1 °F (36.7 °C) (Oral)   24 98.1 °F (36.7 °C)     BP Readings from Last 3 Encounters:   24 (!) 155/75   24 (!) 152/79   24 (!) 152/79     Pulse Readings from Last 3 Encounters:   24 (!) 57   24 (!) 50   24 (!) 50          There is no height or weight on file to calculate BMI.  There is no height or weight on file to calculate BSA.    ECOG SCORE           [unfilled]    Lines/Drains/Airways       Central Venous Catheter Line  Duration                  Hemodialysis Catheter 24 1016 left internal jugular 5 days                     Physical Exam  Constitutional:       Appearance: Normal appearance.   HENT:      Head: Normocephalic and atraumatic.   Eyes:      Extraocular Movements: Extraocular movements intact.      Pupils: Pupils are equal, round, and reactive to light.   Cardiovascular:      Rate and Rhythm: Normal rate and regular rhythm.   Pulmonary:      Effort: Pulmonary effort is normal.      Breath sounds: Normal breath sounds.   Abdominal:      General: Abdomen is flat.      Palpations: Abdomen is soft.   Musculoskeletal:         General: No swelling. Normal range of motion.      Cervical back: Normal range of motion and  neck supple.   Skin:     General: Skin is warm and dry.   Neurological:      General: No focal deficit present.      Mental Status: He is alert and oriented to person, place, and time.   Psychiatric:         Mood and Affect: Mood normal.         Behavior: Behavior normal.            Significant Labs:   Lab Results   Component Value Date    WBC 5.89 09/26/2024    HGB 11.6 (L) 09/26/2024    HCT 35.8 (L) 09/26/2024    MCV 96 09/26/2024     09/26/2024       CMP  Sodium   Date Value Ref Range Status   09/26/2024 141 136 - 145 mmol/L Final     Potassium   Date Value Ref Range Status   09/26/2024 3.7 3.5 - 5.1 mmol/L Final     Chloride   Date Value Ref Range Status   09/26/2024 111 (H) 95 - 110 mmol/L Final     CO2   Date Value Ref Range Status   09/26/2024 22 (L) 23 - 29 mmol/L Final     Glucose   Date Value Ref Range Status   09/26/2024 181 (H) 70 - 110 mg/dL Final     BUN   Date Value Ref Range Status   09/26/2024 16 8 - 23 mg/dL Final     Creatinine   Date Value Ref Range Status   09/26/2024 1.2 0.5 - 1.4 mg/dL Final     Calcium   Date Value Ref Range Status   09/26/2024 8.7 8.7 - 10.5 mg/dL Final     Total Protein   Date Value Ref Range Status   09/26/2024 6.6 6.0 - 8.4 g/dL Final     Albumin   Date Value Ref Range Status   09/26/2024 3.5 3.5 - 5.2 g/dL Final     Total Bilirubin   Date Value Ref Range Status   09/26/2024 0.6 0.1 - 1.0 mg/dL Final     Comment:     For infants and newborns, interpretation of results should be based  on gestational age, weight and in agreement with clinical  observations.    Premature Infant recommended reference ranges:  Up to 24 hours.............<8.0 mg/dL  Up to 48 hours............<12.0 mg/dL  3-5 days..................<15.0 mg/dL  6-29 days.................<15.0 mg/dL       Alkaline Phosphatase   Date Value Ref Range Status   09/26/2024 58 55 - 135 U/L Final     AST   Date Value Ref Range Status   09/26/2024 11 10 - 40 U/L Final     ALT   Date Value Ref Range Status    09/26/2024 7 (L) 10 - 44 U/L Final     Anion Gap   Date Value Ref Range Status   09/26/2024 8 8 - 16 mmol/L Final     eGFR   Date Value Ref Range Status   09/26/2024 >60.0 >60 mL/min/1.73 m^2 Final         Diagnostic Results:  None  Assessment/Plan:     Cardiac/Vascular  Essential hypertension  Continue amlodipine 10 mg daily  Continue HCTZ 25 mg daily  Continue clonidine 0.3 mg daily    Oncology  * Multiple myeloma  Multiple myeloma/History of auto stem cell transplant  - IgG Kappa MM standard risk, diagnosed Sept 2020, after presenting w/ lytic lesions  - s/p 8 cycles Vrd followed by Alea Auto 5/17/2021 with disease relapse and Dkd salvage therapy 8/4/2023  - Achieving/mainting [place] post SCT; was on revlimid maintenance but relapsed biochemically and with new lytic disease approximately 2 years post SCT (June/July 2023)  - Initiated Melinda-Kd salvage on 8/4/23; tolerating will no significant AE's   - Melinda subq weekly 8>EOW x 8 doses> q 4 week; kyprolis 70mg/m2 day 1, 8, 15 of 28 day cycle; dex 40mg po weekly   - Patient with initial good response but noted serial biochemical progression confirmed on 4/3/24 repeat serum studies; mild anemia and Jan 2024 PET with ENDY but no other overt symptoms or CRAB and he feels well  - Transitioned DKd to Kyprolis/pomalyst/dex as bridge to CART (cilta-zohra); KPd median pfs 26 months; vs cart 3 years with 20% >5 years   - Pomalyst 4mg daily day 1-21 of 28 cycle ; KPD initiated April 2024  - He has had [place] to KPd  - Shared Decision with patient made to pursue consolidation with ciltacabtagene autoleucel   - He completed Cilt-zohra consents on 8/5/24 and adequate CAR-T cell back at [facility] and in-house  - Initiated LDC flu/cy on 9/25/24, central line placement and brain MRI completed on 9/24   - COVID positive 9/27/24   - Planned admission for cilta-zohra CAR-T on 9/29/24   - Started keppra 750 mg two times/day 9/27  - Supportive meds: acyclovir, ca +vit d, zometa (received total of 2 years  since disease progression)    Endocrine  Diabetes mellitus  Type II, well-controlled  - LD SSI        VTE Risk Mitigation (From admission, onward)           Ordered     enoxaparin injection 40 mg  Daily         09/29/24 1700     IP VTE HIGH RISK PATIENT  Once         09/29/24 1700     Place sequential compression device  Until discontinued         09/29/24 1700                    Hospital Issues:  DVT: Lovenox  Diet: Diabetic  Lines: Port  Code: Full  Dispo: Pending      Josr Webster DO  Hematology/Oncology Fellow, PGY-IV  [facility]

## 2024-09-29 NOTE — HPI
"Jaspreet Walsh Jr. (Jaspreet) is a pleasant 64 y.o.male from [place], [place], here for Carvykti for IgG kappa multiple myeloma; s/p autologous stem cell transplant.     Patient of /  64 y.o.male; pmh of IgG kappa multiple myeloma (standard risk CG per msmart criteria, r-iss stage II); diagnosed September 2019 after presenting with symptomatic perispinal plasmacytoma;He has subsequently received  8 cycles of VRd therapy. Was in midst or pre transplant evaluation but due to insurance coverage issues transplant was delayed so was maintained on therapy and those issues have been resolved.     Transplant Course  Patient with IgG Kappa MM and pmh HTN, lytic bone lesion, arthritis and vitamin D deficiency. Admitted 5/15/21 for planned Alea auto SCT for MM. He received 3 bags and a CD34 dose of 3.35 x 10^6 on 5/17/21. Tolerated chemo and transplant well. Admission complicated by n/v controlled with scheduled anti-emetics and c-diff neg diarrhea controlled with prn imodium. He engrafted on Day +13 (5/30/21) with an ANC of 2607. He was discharged home on Day +14 (5/31/21).  Day +100 restaging marrow indicated that he was in [place].      Interval History -09/23/2024  64 y.o.  M with IgG Kappa MM standard risk s/p 8 cycles Vrd followed by Alea Auto 5/17/2021 with disease relapse and Dkd salvage therapy 8/4/2023.   Due to biochemical progression on DKd we transition to KPd bridging therapy.   He responded well to this but decided to proceed with cilta zohra.  We have collected and received his CART cells.  He presents for clearance prior to LDC schedule to begin on 9/25/24.   He feels well.    Accompanied by wife.     Interval History 9/25/24:  Patient is here for follow up of day 2 of LDC, he still has an additional cycle tomorrow and covid swab prior to admit. His wife is with him today. No reported issues related to the chemotherapy. He does endorse intermittent neuropathy in the form of "tingling" to his bilaterally " feet. Denies fever, chills, cough, sore throat, dysuria, runny nose, congestion, chest pain, shortness, pain.

## 2024-09-29 NOTE — SUBJECTIVE & OBJECTIVE
Patient information was obtained from patient.         Medications Prior to Admission   Medication Sig Dispense Refill Last Dose/Taking    acyclovir (ZOVIRAX) 400 MG tablet Take 1 tablet (400 mg total) by mouth 2 (two) times daily. 60 tablet 1     amLODIPine (NORVASC) 10 MG tablet Take 10 mg by mouth once daily.       aspirin (ECOTRIN) 81 MG EC tablet Take 81 mg by mouth once daily.       atorvastatin (LIPITOR) 20 MG tablet Take 20 mg by mouth once daily. (Patient not taking: Reported on 9/23/2024)       cloNIDine (CATAPRES) 0.3 MG tablet Take 0.3 mg by mouth once daily.       dexAMETHasone (DECADRON) 2 MG tablet Take 10 tablets (20 mg total) by mouth every 7 days. Take with food before chemotherapy appointments 20 tablet 2     gabapentin (NEURONTIN) 300 MG capsule Take 1 capsule (300 mg total) by mouth 2 (two) times daily. 60 capsule 3     hydroCHLOROthiazide (HYDRODIURIL) 25 MG tablet Take 25 mg by mouth once daily.       JARDIANCE 25 mg tablet Take 25 mg by mouth once daily.       levETIRAcetam (KEPPRA) 500 MG Tab Take 1.5 tablets (750 mg total) by mouth 2 (two) times daily. 90 tablet 0     losartan (COZAAR) 25 MG tablet Take 25 mg by mouth once daily. (Patient not taking: Reported on 9/23/2024)       metFORMIN (GLUCOPHAGE) 1000 MG tablet Take 0.5 tablets (500 mg total) by mouth 2 (two) times daily with meals. (Patient not taking: Reported on 9/23/2024) 90 tablet 3     pomalidomide (POMALYST) 4 mg Cap Take 1 capsule (4 mg total) by mouth once daily On days 1-21 of a 28 day cycle. Auth [id number] 9/6/24. 21 capsule 0     potassium chloride SA (K-DUR,KLOR-CON M) 10 MEQ tablet TAKE 1 TABLET(10 MEQ) BY MOUTH EVERY DAY 90 tablet 3        Patient has no known allergies.     Past Medical History:   Diagnosis Date    Anxiety     Arthritis     Cancer     Diabetes mellitus     Hypertension      Past Surgical History:   Procedure Laterality Date    BACK SURGERY  01/01/2021    BONE MARROW BIOPSY Left 10/20/2021    Procedure:  Biopsy-bone marrow;  Surgeon: Summer Cartwright MD;  Location: [facility] ENDO (4TH FLR);  Service: Oncology;  Laterality: Left;    COLONOSCOPY N/A 2021    Procedure: COLONOSCOPY;  Surgeon: Braxton Lees MD;  Location: [facility] ENDO (4TH FLR);  Service: Endoscopy;  Laterality: N/A;  -covid kristopher-inst mailed-tb  21-pt to remain on schedule with Dr. Lees, and confirmed updated arrival time of 0835-BB    COLONOSCOPY N/A 2021    Procedure: COLONOSCOPY;  Surgeon: Jo Ann Robledo MD;  Location: [facility] ENDO (4TH FLR);  Service: Endoscopy;  Laterality: N/A;  rescheduled due to poor bowel prep, to be rescheduled with first available provider-BB  covid-21-pcw-BB    CREATION OF MUSCLE ROTATIONAL FLAP N/A 2020    Procedure: CREATION, FLAP, MUSCLE ROTATION;  Surgeon: Kamlesh Bellamy MD;  [facility];  Service: Plastics;  Laterality: N/A;    KNEE SURGERY Left 2019    LUMBAR FUSION N/A 2020    Procedure: FUSION, SPINE, LUMBAR L2-pelvis. Depuy. Plastic surgery closure w/ Dr. Bellamy;  Surgeon: iNck Coyle MD;  [facility];  Service: Neurosurgery;  Laterality: N/A;    Metastatic neoplasum removed from spine      PLACEMENT OF DUAL-LUMEN VASCULAR CATHETER Left 2024    Procedure: INSERTION-CATHETER-LENORA, double lumen, left poss right, Bard 14.5 fr hemosplit catheter, model [id number];  Surgeon: Nelson Aponte MD;  [facility];  Service: General;  Laterality: Left;     Family History       Problem Relation (Age of Onset)    Cancer Father          Tobacco Use    Smoking status: Former     Current packs/day: 0.00     Average packs/day: 0.3 packs/day for 40.0 years (10.0 ttl pk-yrs)     Types: Cigarettes     Start date:      Quit date: 2017     Years since quittin.7    Smokeless tobacco: Never   Substance and Sexual Activity    Alcohol use: Not on file    Drug use: Not on file    Sexual activity: Not on file       Review of Systems    Constitutional: Negative.    HENT: Negative.     Eyes: Negative.    Respiratory: Negative.     Cardiovascular: Negative.    Gastrointestinal: Negative.    Genitourinary: Negative.    Musculoskeletal: Negative.    Neurological: Negative.    Psychiatric/Behavioral: Negative.       Objective:     Wt Readings from Last 3 Encounters:   09/27/24 94.3 kg (207 lb 12.5 oz)   09/26/24 94.3 kg (207 lb 12.5 oz)   09/26/24 94 kg (207 lb 3.7 oz)     Temp Readings from Last 3 Encounters:   09/27/24 98.3 °F (36.8 °C)   09/26/24 98.1 °F (36.7 °C) (Oral)   09/26/24 98.1 °F (36.7 °C)     BP Readings from Last 3 Encounters:   09/27/24 (!) 155/75   09/26/24 (!) 152/79   09/26/24 (!) 152/79     Pulse Readings from Last 3 Encounters:   09/27/24 (!) 57   09/26/24 (!) 50   09/26/24 (!) 50          There is no height or weight on file to calculate BMI.  There is no height or weight on file to calculate BSA.    ECOG SCORE           [unfilled]    Lines/Drains/Airways       Central Venous Catheter Line  Duration                  Hemodialysis Catheter 09/24/24 1016 left internal jugular 5 days                     Physical Exam  Constitutional:       Appearance: Normal appearance.   HENT:      Head: Normocephalic and atraumatic.   Eyes:      Extraocular Movements: Extraocular movements intact.      Pupils: Pupils are equal, round, and reactive to light.   Cardiovascular:      Rate and Rhythm: Normal rate and regular rhythm.   Pulmonary:      Effort: Pulmonary effort is normal.      Breath sounds: Normal breath sounds.   Abdominal:      General: Abdomen is flat.      Palpations: Abdomen is soft.   Musculoskeletal:         General: No swelling. Normal range of motion.      Cervical back: Normal range of motion and neck supple.   Skin:     General: Skin is warm and dry.   Neurological:      General: No focal deficit present.      Mental Status: He is alert and oriented to person, place, and time.   Psychiatric:         Mood and Affect: Mood normal.          Behavior: Behavior normal.            Significant Labs:   Lab Results   Component Value Date    WBC 5.89 09/26/2024    HGB 11.6 (L) 09/26/2024    HCT 35.8 (L) 09/26/2024    MCV 96 09/26/2024     09/26/2024       CMP  Sodium   Date Value Ref Range Status   09/26/2024 141 136 - 145 mmol/L Final     Potassium   Date Value Ref Range Status   09/26/2024 3.7 3.5 - 5.1 mmol/L Final     Chloride   Date Value Ref Range Status   09/26/2024 111 (H) 95 - 110 mmol/L Final     CO2   Date Value Ref Range Status   09/26/2024 22 (L) 23 - 29 mmol/L Final     Glucose   Date Value Ref Range Status   09/26/2024 181 (H) 70 - 110 mg/dL Final     BUN   Date Value Ref Range Status   09/26/2024 16 8 - 23 mg/dL Final     Creatinine   Date Value Ref Range Status   09/26/2024 1.2 0.5 - 1.4 mg/dL Final     Calcium   Date Value Ref Range Status   09/26/2024 8.7 8.7 - 10.5 mg/dL Final     Total Protein   Date Value Ref Range Status   09/26/2024 6.6 6.0 - 8.4 g/dL Final     Albumin   Date Value Ref Range Status   09/26/2024 3.5 3.5 - 5.2 g/dL Final     Total Bilirubin   Date Value Ref Range Status   09/26/2024 0.6 0.1 - 1.0 mg/dL Final     Comment:     For infants and newborns, interpretation of results should be based  on gestational age, weight and in agreement with clinical  observations.    Premature Infant recommended reference ranges:  Up to 24 hours.............<8.0 mg/dL  Up to 48 hours............<12.0 mg/dL  3-5 days..................<15.0 mg/dL  6-29 days.................<15.0 mg/dL       Alkaline Phosphatase   Date Value Ref Range Status   09/26/2024 58 55 - 135 U/L Final     AST   Date Value Ref Range Status   09/26/2024 11 10 - 40 U/L Final     ALT   Date Value Ref Range Status   09/26/2024 7 (L) 10 - 44 U/L Final     Anion Gap   Date Value Ref Range Status   09/26/2024 8 8 - 16 mmol/L Final     eGFR   Date Value Ref Range Status   09/26/2024 >60.0 >60 mL/min/1.73 m^2 Final         Diagnostic Results:  None

## 2024-09-30 PROBLEM — Z92.850 S/P CHIMERIC ANTIGEN RECEPTOR T-CELL THERAPY: Status: ACTIVE | Noted: 2024-09-30

## 2024-09-30 PROBLEM — D68.69 SECONDARY HYPERCOAGULABLE STATE: Status: ACTIVE | Noted: 2024-09-30

## 2024-09-30 LAB
ABO + RH BLD: NORMAL
ALBUMIN SERPL BCP-MCNC: 3 G/DL (ref 3.5–5.2)
ALP SERPL-CCNC: 49 U/L (ref 55–135)
ALT SERPL W/O P-5'-P-CCNC: 10 U/L (ref 10–44)
ANION GAP SERPL CALC-SCNC: 7 MMOL/L (ref 8–16)
ANISOCYTOSIS BLD QL SMEAR: SLIGHT
AST SERPL-CCNC: 11 U/L (ref 10–40)
BASOPHILS # BLD AUTO: 0.01 K/UL (ref 0–0.2)
BASOPHILS NFR BLD: 1.9 % (ref 0–1.9)
BILIRUB SERPL-MCNC: 0.8 MG/DL (ref 0.1–1)
BLD GP AB SCN CELLS X3 SERPL QL: NORMAL
BUN SERPL-MCNC: 19 MG/DL (ref 8–23)
BURR CELLS BLD QL SMEAR: ABNORMAL
CALCIUM SERPL-MCNC: 7.6 MG/DL (ref 8.7–10.5)
CHLORIDE SERPL-SCNC: 111 MMOL/L (ref 95–110)
CO2 SERPL-SCNC: 21 MMOL/L (ref 23–29)
CREAT SERPL-MCNC: 0.9 MG/DL (ref 0.5–1.4)
CRP SERPL-MCNC: 32.4 MG/L (ref 0–8.2)
DIFFERENTIAL METHOD BLD: ABNORMAL
EOSINOPHIL # BLD AUTO: 0.1 K/UL (ref 0–0.5)
EOSINOPHIL NFR BLD: 17.3 % (ref 0–8)
ERYTHROCYTE [DISTWIDTH] IN BLOOD BY AUTOMATED COUNT: 16 % (ref 11.5–14.5)
EST. GFR  (NO RACE VARIABLE): >60 ML/MIN/1.73 M^2
FERRITIN SERPL-MCNC: 467 NG/ML (ref 20–300)
GLUCOSE SERPL-MCNC: 98 MG/DL (ref 70–110)
HCT VFR BLD AUTO: 33.3 % (ref 40–54)
HGB BLD-MCNC: 10.8 G/DL (ref 14–18)
HYPOCHROMIA BLD QL SMEAR: ABNORMAL
IMM GRANULOCYTES # BLD AUTO: 0.01 K/UL (ref 0–0.04)
IMM GRANULOCYTES NFR BLD AUTO: 1.9 % (ref 0–0.5)
LDH SERPL L TO P-CCNC: 173 U/L (ref 110–260)
LYMPHOCYTES # BLD AUTO: 0.1 K/UL (ref 1–4.8)
LYMPHOCYTES NFR BLD: 19.2 % (ref 18–48)
MAGNESIUM SERPL-MCNC: 1.9 MG/DL (ref 1.6–2.6)
MCH RBC QN AUTO: 31 PG (ref 27–31)
MCHC RBC AUTO-ENTMCNC: 32.4 G/DL (ref 32–36)
MCV RBC AUTO: 96 FL (ref 82–98)
MONOCYTES # BLD AUTO: 0.1 K/UL (ref 0.3–1)
MONOCYTES NFR BLD: 9.6 % (ref 4–15)
NEUTROPHILS # BLD AUTO: 0.3 K/UL (ref 1.8–7.7)
NEUTROPHILS NFR BLD: 50.1 % (ref 38–73)
NRBC BLD-RTO: 0 /100 WBC
OVALOCYTES BLD QL SMEAR: ABNORMAL
PHOSPHATE SERPL-MCNC: 2.5 MG/DL (ref 2.7–4.5)
PLATELET # BLD AUTO: 119 K/UL (ref 150–450)
PLATELET BLD QL SMEAR: ABNORMAL
PMV BLD AUTO: 12.4 FL (ref 9.2–12.9)
POCT GLUCOSE: 139 MG/DL (ref 70–110)
POCT GLUCOSE: 139 MG/DL (ref 70–110)
POCT GLUCOSE: 160 MG/DL (ref 70–110)
POIKILOCYTOSIS BLD QL SMEAR: SLIGHT
POLYCHROMASIA BLD QL SMEAR: ABNORMAL
POTASSIUM SERPL-SCNC: 3.6 MMOL/L (ref 3.5–5.1)
PROT SERPL-MCNC: 5.3 G/DL (ref 6–8.4)
RBC # BLD AUTO: 3.48 M/UL (ref 4.6–6.2)
SODIUM SERPL-SCNC: 139 MMOL/L (ref 136–145)
SPECIMEN OUTDATE: NORMAL
WBC # BLD AUTO: 0.52 K/UL (ref 3.9–12.7)

## 2024-09-30 PROCEDURE — XW043A7 INTRODUCTION OF CILTACABTAGENE AUTOLEUCEL INTO CENTRAL VEIN, PERCUTANEOUS APPROACH, NEW TECHNOLOGY GROUP 7: ICD-10-PCS | Performed by: INTERNAL MEDICINE

## 2024-09-30 PROCEDURE — 63600175 PHARM REV CODE 636 W HCPCS: Performed by: NURSE PRACTITIONER

## 2024-09-30 PROCEDURE — 25000003 PHARM REV CODE 250: Performed by: NURSE PRACTITIONER

## 2024-09-30 PROCEDURE — 80053 COMPREHEN METABOLIC PANEL: CPT | Performed by: NURSE PRACTITIONER

## 2024-09-30 PROCEDURE — 89100000 CAR-T CELL THERAPY REV CODE 891: Mod: JZ,TB | Performed by: INTERNAL MEDICINE

## 2024-09-30 PROCEDURE — 99232 SBSQ HOSP IP/OBS MODERATE 35: CPT | Mod: FS,,, | Performed by: INTERNAL MEDICINE

## 2024-09-30 PROCEDURE — 84100 ASSAY OF PHOSPHORUS: CPT | Performed by: NURSE PRACTITIONER

## 2024-09-30 PROCEDURE — 63600175 PHARM REV CODE 636 W HCPCS: Performed by: INTERNAL MEDICINE

## 2024-09-30 PROCEDURE — 86140 C-REACTIVE PROTEIN: CPT | Performed by: NURSE PRACTITIONER

## 2024-09-30 PROCEDURE — 82728 ASSAY OF FERRITIN: CPT | Performed by: NURSE PRACTITIONER

## 2024-09-30 PROCEDURE — 83615 LACTATE (LD) (LDH) ENZYME: CPT | Performed by: NURSE PRACTITIONER

## 2024-09-30 PROCEDURE — 85025 COMPLETE CBC W/AUTO DIFF WBC: CPT | Performed by: NURSE PRACTITIONER

## 2024-09-30 PROCEDURE — 25000003 PHARM REV CODE 250: Mod: JZ,JG | Performed by: NURSE PRACTITIONER

## 2024-09-30 PROCEDURE — 25000003 PHARM REV CODE 250: Performed by: INTERNAL MEDICINE

## 2024-09-30 PROCEDURE — 86900 BLOOD TYPING SEROLOGIC ABO: CPT | Performed by: INTERNAL MEDICINE

## 2024-09-30 PROCEDURE — 20600001 HC STEP DOWN PRIVATE ROOM

## 2024-09-30 PROCEDURE — 86850 RBC ANTIBODY SCREEN: CPT | Performed by: INTERNAL MEDICINE

## 2024-09-30 PROCEDURE — 83735 ASSAY OF MAGNESIUM: CPT | Performed by: NURSE PRACTITIONER

## 2024-09-30 PROCEDURE — 86901 BLOOD TYPING SEROLOGIC RH(D): CPT | Performed by: INTERNAL MEDICINE

## 2024-09-30 PROCEDURE — 0539T HC CAR-T THERAPY, RECEIPT/PREP, CAR-T CELL: CPT

## 2024-09-30 PROCEDURE — 0540T HC CAR-T THERAPY, ADMIN, CAR-T CELL, AUTOLOGOUS: CPT

## 2024-09-30 RX ORDER — CALCIUM GLUCONATE 20 MG/ML
1 INJECTION, SOLUTION INTRAVENOUS
Status: COMPLETED | OUTPATIENT
Start: 2024-09-30 | End: 2024-09-30

## 2024-09-30 RX ORDER — EPINEPHRINE 0.3 MG/.3ML
0.3 INJECTION SUBCUTANEOUS ONCE AS NEEDED
Status: DISCONTINUED | OUTPATIENT
Start: 2024-09-30 | End: 2024-10-10 | Stop reason: HOSPADM

## 2024-09-30 RX ORDER — LANOLIN ALCOHOL/MO/W.PET/CERES
400 CREAM (GRAM) TOPICAL EVERY 4 HOURS PRN
Status: DISCONTINUED | OUTPATIENT
Start: 2024-09-30 | End: 2024-09-30

## 2024-09-30 RX ORDER — LEVETIRACETAM 750 MG/1
750 TABLET ORAL 2 TIMES DAILY
Status: DISCONTINUED | OUTPATIENT
Start: 2024-09-30 | End: 2024-09-30 | Stop reason: SDUPTHER

## 2024-09-30 RX ORDER — LANOLIN ALCOHOL/MO/W.PET/CERES
400 CREAM (GRAM) TOPICAL EVERY 4 HOURS PRN
Status: DISCONTINUED | OUTPATIENT
Start: 2024-09-30 | End: 2024-10-10 | Stop reason: HOSPADM

## 2024-09-30 RX ORDER — POMALIDOMIDE 4 MG/1
1 CAPSULE ORAL DAILY
Qty: 21 CAPSULE | Refills: 0 | OUTPATIENT
Start: 2024-09-30

## 2024-09-30 RX ORDER — POTASSIUM CHLORIDE 20 MEQ/1
20 TABLET, EXTENDED RELEASE ORAL
Status: DISCONTINUED | OUTPATIENT
Start: 2024-09-30 | End: 2024-10-10 | Stop reason: HOSPADM

## 2024-09-30 RX ORDER — LANOLIN ALCOHOL/MO/W.PET/CERES
800 CREAM (GRAM) TOPICAL EVERY 4 HOURS PRN
Status: DISCONTINUED | OUTPATIENT
Start: 2024-09-30 | End: 2024-09-30

## 2024-09-30 RX ORDER — ACETAMINOPHEN 325 MG/1
650 TABLET ORAL ONCE
Status: COMPLETED | OUTPATIENT
Start: 2024-09-30 | End: 2024-09-30

## 2024-09-30 RX ORDER — POTASSIUM CHLORIDE 20 MEQ/1
20 TABLET, EXTENDED RELEASE ORAL
Status: DISCONTINUED | OUTPATIENT
Start: 2024-09-30 | End: 2024-09-30

## 2024-09-30 RX ORDER — SODIUM CHLORIDE 9 MG/ML
75 INJECTION, SOLUTION INTRAVENOUS CONTINUOUS
Status: DISCONTINUED | OUTPATIENT
Start: 2024-09-30 | End: 2024-10-10

## 2024-09-30 RX ORDER — SULFAMETHOXAZOLE AND TRIMETHOPRIM 800; 160 MG/1; MG/1
1 TABLET ORAL
Status: DISCONTINUED | OUTPATIENT
Start: 2024-10-30 | End: 2024-10-10 | Stop reason: HOSPADM

## 2024-09-30 RX ORDER — ACYCLOVIR 200 MG/1
800 CAPSULE ORAL 2 TIMES DAILY
Status: DISCONTINUED | OUTPATIENT
Start: 2024-09-30 | End: 2024-10-10 | Stop reason: HOSPADM

## 2024-09-30 RX ORDER — HEPARIN SODIUM 1000 [USP'U]/ML
1600 INJECTION, SOLUTION INTRAVENOUS; SUBCUTANEOUS
Status: DISCONTINUED | OUTPATIENT
Start: 2024-09-30 | End: 2024-10-10 | Stop reason: HOSPADM

## 2024-09-30 RX ORDER — DIPHENHYDRAMINE HYDROCHLORIDE 50 MG/ML
25 INJECTION INTRAMUSCULAR; INTRAVENOUS ONCE
Status: COMPLETED | OUTPATIENT
Start: 2024-09-30 | End: 2024-09-30

## 2024-09-30 RX ORDER — DIPHENHYDRAMINE HYDROCHLORIDE 50 MG/ML
50 INJECTION INTRAMUSCULAR; INTRAVENOUS ONCE AS NEEDED
Status: DISCONTINUED | OUTPATIENT
Start: 2024-09-30 | End: 2024-10-10 | Stop reason: HOSPADM

## 2024-09-30 RX ORDER — LANOLIN ALCOHOL/MO/W.PET/CERES
800 CREAM (GRAM) TOPICAL EVERY 4 HOURS PRN
Status: DISCONTINUED | OUTPATIENT
Start: 2024-09-30 | End: 2024-10-10 | Stop reason: HOSPADM

## 2024-09-30 RX ORDER — SODIUM CHLORIDE 9 MG/ML
125 INJECTION, SOLUTION INTRAVENOUS CONTINUOUS
Status: ACTIVE | OUTPATIENT
Start: 2024-09-30 | End: 2024-09-30

## 2024-09-30 RX ORDER — FLUCONAZOLE 200 MG/1
400 TABLET ORAL DAILY
Status: DISCONTINUED | OUTPATIENT
Start: 2024-09-30 | End: 2024-10-10 | Stop reason: HOSPADM

## 2024-09-30 RX ORDER — LEVOFLOXACIN 500 MG/1
500 TABLET, FILM COATED ORAL DAILY
Status: DISCONTINUED | OUTPATIENT
Start: 2024-09-30 | End: 2024-10-06

## 2024-09-30 RX ORDER — SODIUM CHLORIDE 0.9 % (FLUSH) 0.9 %
10 SYRINGE (ML) INJECTION
Status: DISCONTINUED | OUTPATIENT
Start: 2024-09-30 | End: 2024-10-10 | Stop reason: HOSPADM

## 2024-09-30 RX ADMIN — CILTACABTAGENE AUTOLEUCEL 1 DOSE: 100000000 INJECTION, SUSPENSION INTRAVENOUS at 11:09

## 2024-09-30 RX ADMIN — CALCIUM GLUCONATE 1 G: 20 INJECTION, SOLUTION INTRAVENOUS at 11:09

## 2024-09-30 RX ADMIN — CALCIUM GLUCONATE 1 G: 20 INJECTION, SOLUTION INTRAVENOUS at 10:09

## 2024-09-30 RX ADMIN — DIPHENHYDRAMINE HYDROCHLORIDE 25 MG: 50 INJECTION, SOLUTION INTRAMUSCULAR; INTRAVENOUS at 10:09

## 2024-09-30 RX ADMIN — AMLODIPINE BESYLATE 10 MG: 10 TABLET ORAL at 04:09

## 2024-09-30 RX ADMIN — ACYCLOVIR 800 MG: 200 CAPSULE ORAL at 09:09

## 2024-09-30 RX ADMIN — SODIUM CHLORIDE 125 ML/HR: 9 INJECTION, SOLUTION INTRAVENOUS at 09:09

## 2024-09-30 RX ADMIN — LEVETIRACETAM 750 MG: 750 TABLET, FILM COATED ORAL at 09:09

## 2024-09-30 RX ADMIN — HYDROCHLOROTHIAZIDE 25 MG: 25 TABLET ORAL at 04:09

## 2024-09-30 RX ADMIN — GABAPENTIN 300 MG: 300 CAPSULE ORAL at 09:09

## 2024-09-30 RX ADMIN — SODIUM CHLORIDE 75 ML/HR: 9 INJECTION, SOLUTION INTRAVENOUS at 09:09

## 2024-09-30 RX ADMIN — ACETAMINOPHEN 650 MG: 325 TABLET ORAL at 10:09

## 2024-09-30 RX ADMIN — ENOXAPARIN SODIUM 40 MG: 40 INJECTION SUBCUTANEOUS at 04:09

## 2024-09-30 RX ADMIN — CLONIDINE HYDROCHLORIDE 0.3 MG: 0.1 TABLET ORAL at 04:09

## 2024-09-30 RX ADMIN — LEVOFLOXACIN 500 MG: 500 TABLET, FILM COATED ORAL at 10:09

## 2024-09-30 RX ADMIN — FLUCONAZOLE 400 MG: 200 TABLET ORAL at 10:09

## 2024-09-30 NOTE — NURSING
Pt completed CAR-T cell infusion. Cells infused to L chest vascular catheter; positive for blood return. Pt pre-medicated with PO tylenol and IV benadryl. Pre-hydration and post-hydration fluids given. Emergency medication at bedside. Frequent VS monitoring done throughout treatment and for 2 hrs post. Pt tolerated well with no issues. WCTM.

## 2024-09-30 NOTE — PLAN OF CARE
Plan of care reviewed with pt. Pt AAOX4. ICE score 10. BG monitored. Pt ambulates in room and to restroom independently. All VSS, afebrile, free from falls or injuries; no acute events so far this shift. Pt in bed with non-skid socks on, bed locked and in lowest position, side rails up x2, call light and personal items within reach. Pt instructed to call for assistance as needed.

## 2024-09-30 NOTE — HOSPITAL COURSE
09/30/2024 admitted for Carvykti for R/R Multiple Myeloma. Day 0 today, will receive cells at 1130 am, 0.50x10^6T cells. VSS. Feels well today without complaints. Replacing phos and calcium today.   10/01/2024 Day +1 Carvykti for R/R Multiple Myeloma. Tolerated cell infusion with issue. ICE 10. VSS. He has no complaints today.   10/02/2024 Day +2 Carvykti for R/R Multiple Myeloma. ICE 10, afebrile, inflammatory markers stable. Doing well.   10/03/2024 Day +3 Carvykti for R/R Multiple Myeloma. ICE 10, afebrile, inflammatory markers stable. No complaints   10/04/2024 - Day +4  Carvykti for R/R Multiple Myeloma. ICE 10, afebrile, VSS. No new complaints. Blood counts and inflammatory markers stable. Continue supportive care  10/05/2024 Day +5  Carvykti for R/R Multiple Myeloma. ICE 10, afebrile, VSS. Labs stable. No new complaints.   10/06/2024 Day +6  Carvykti for R/R Multiple Myeloma. Fever to 100.6 last night which was sustained, likely grade 1 CRS. Other vitals stable. Started cefepime and infectious workup ordered. ICE score remains a 10. Inflammatory markers increased today, will monitor closely. Mag and phos replaced.   10/07/2024 Day +7  Carvykti for R/R Multiple Myeloma. ICE 10. T.max 102.6 at 430am. Infectious work-up negative thusfar, Cefepime (D3). Soft BP this am. Inflammatory markers up-trending, bili and liver enzymes elevated. Grade II CRS. IVF bolus given and Toci given. EKG done this am, pre-confirmed read of STEMI. Patient asymptomatic with no complaints of pain or sob. Troponin done and wnl. Repeat EKG ordered and pending.   10/08/2024 - Day +8 of Carvykti for R/R Multiple Myeloma. Received Toci yesterday for grade 2 CRS. Afebrile this morning. Denies any chest pain or shortness of breath. No new complaints. ICE score of 10. Blood cultures shows NGTD. Cefepime discontinued. LFT and T bili trending down. Replacing electrolytes.  10/09/2024 - Day +9 of Carvykti for R/R Multiple Myeloma. Afebrile. VSS.  No new complaints. ICE score is 10. CRP and ferritin trending down. BP elevated, started home dose losartan. LFTs continues trend down.   10/10/2024 -  Day +10 of Carvykti for R/R Multiple Myeloma. Afebrile. VSS. No new complaints. ICE score is 10. Inflammatory markers trending down. Walking the hallway independently. Pt will be discharged home today. He has an outpatient follow up 10/15/24.

## 2024-09-30 NOTE — ASSESSMENT & PLAN NOTE
- daily CBC  - transfuse for hgb <7 and platelets <10k  - hold anticoagulation for platelets <50k  - ppx antimicrobials per protocol

## 2024-09-30 NOTE — ASSESSMENT & PLAN NOTE
Multiple myeloma/History of auto stem cell transplant  - IgG Kappa MM standard risk, diagnosed Sept 2020, after presenting w/ lytic lesions  - s/p 8 cycles Vrd followed by Alea Auto 5/17/2021 with disease relapse and Dkd salvage therapy 8/4/2023  - Achieving/mainting [place] post SCT; was on revlimid maintenance but relapsed biochemically and with new lytic disease approximately 2 years post SCT (June/July 2023)  - Initiated Melinda-Kd salvage on 8/4/23; tolerating will no significant AE's   - Melinda subq weekly 8>EOW x 8 doses> q 4 week; kyprolis 70mg/m2 day 1, 8, 15 of 28 day cycle; dex 40mg po weekly   - Patient with initial good response but noted serial biochemical progression confirmed on 4/3/24 repeat serum studies; mild anemia and Jan 2024 PET with ENDY but no other overt symptoms or CRAB and he feels well  - Transitioned DKd to Kyprolis/pomalyst/dex as bridge to CART (cilta-zohra); KPd median pfs 26 months; vs cart 3 years with 20% >5 years   - Pomalyst 4mg daily day 1-21 of 28 cycle ; KPD initiated April 2024  - He has had [place] to KPd  - Decision with patient made to pursue consolidation with ciltacabtagene autoleucel   See CarT

## 2024-09-30 NOTE — ASSESSMENT & PLAN NOTE
Planned Lymphodepletion Regimen:  Cyclophosphamide 300mg/m2 on Day -5, -4, and -3  Fludarabine 30mg/m2 on Day -5, -4, and -3     Pre-Medications:  Acetaminophen on Day 0  Diphenhydramine on Day 0     Immune Effector Cell Therapy:  Ciltacabtagene autoleucel on Day 0     Antimicrobial Prophylaxis:  Acyclovir starting on Day -5  Levofloxacin starting on Day -5  Fluconazole starting on Day -5  Pentamidine on day -5  Sulfamethoxazole-trimethoprim starting on Day +30     Seizure Prophylaxis:  Levetiracetam on Day 0 through Day +30        Caregiver: Catie (friend)  Post-transplant discharge plans: home     - Initiated LDC flu/cy on 9/25/24, central line placement and brain MRI completed on 9/24   - COVID positive 9/27/24, repeat negative. Per ID thought to be false +   - admitted for cilta-zohra CAR-T on 9/29/24. Day 0 today  -  will receive 0.50x10^6 CAR+ vT cells/kg today 9/30 at 1130am   - Started keppra 750 mg two times/day 9/27  - Supportive meds: acyclovir, ca +vit d, zometa (received total of 2 years since disease progression)

## 2024-09-30 NOTE — RESPIRATORY THERAPY
RAPID RESPONSE RESPIRATORY CHART CHECK       Chart check completed. Pt with HF order on RA. Order d/c. Please call [phone] for further concerns or assistance.

## 2024-09-30 NOTE — PLAN OF CARE
Pt is Day 0 of Carvikty. Pt tolerated infusion without issue. Ca gluc given IVPB x2. NS infusing @ 75. BP meds held this AM and given after car-T d/t hypertension. Pt with no c/o pain or discomfort. Baseline ICE score 10. Pt remains afebrile and VSS. WCTM.

## 2024-09-30 NOTE — SUBJECTIVE & OBJECTIVE
Subjective:     Interval History: admitted for [facility] for R/R Multiple Myeloma. Day 0 today, will receive cells at 1130 am, 0.50x10^6T cells. VSS. Feels well today without complaints. Replacing phos and calcium today.     Objective:     Vital Signs (Most Recent):  Temp: 98.8 °F (37.1 °C) (09/30/24 1130)  Pulse: (!) 52 (09/30/24 1130)  Resp: 18 (09/30/24 1130)  BP: (!) 150/70 (09/30/24 1130)  SpO2: 98 % (09/30/24 1130) Vital Signs (24h Range):  Temp:  [97.5 °F (36.4 °C)-98.8 °F (37.1 °C)] 98.8 °F (37.1 °C)  Pulse:  [52-71] 52  Resp:  [17-20] 18  SpO2:  [95 %-98 %] 98 %  BP: (119-150)/(69-84) 150/70     Weight: 91.9 kg (202 lb 9.6 oz)  Body mass index is 31.73 kg/m².  Body surface area is 2.08 meters squared.      Intake/Output - Last 3 Shifts         09/28 0700  09/29 0659 09/29 0700  09/30 0659 09/30 0700  10/01 0659    P.O.  720     Total Intake(mL/kg)  720 (7.8)     Urine (mL/kg/hr)  1000     Stool  0     Total Output  1000     Net  -280            Stool Occurrence  0 x              Physical Exam  Vitals reviewed.   Constitutional:       General: He is not in acute distress.     Appearance: Normal appearance. He is well-developed.   HENT:      Head: Normocephalic and atraumatic.      Mouth/Throat:      Pharynx: No oropharyngeal exudate.   Eyes:      General: No scleral icterus.     Extraocular Movements: Extraocular movements intact.      Conjunctiva/sclera: Conjunctivae normal.      Pupils: Pupils are equal, round, and reactive to light.   Neck:      Thyroid: No thyromegaly.   Cardiovascular:      Rate and Rhythm: Normal rate and regular rhythm.      Pulses: Normal pulses.      Heart sounds: Normal heart sounds.   Pulmonary:      Effort: Pulmonary effort is normal. No respiratory distress.      Breath sounds: Normal breath sounds.   Abdominal:      General: Abdomen is flat. Bowel sounds are normal. There is no distension.      Palpations: Abdomen is soft.      Tenderness: There is no abdominal tenderness.    Musculoskeletal:         General: No swelling or tenderness. Normal range of motion.      Cervical back: Normal range of motion and neck supple. Normal range of motion.   Skin:     General: Skin is warm and dry.      Coloration: Skin is not pale.      Findings: No petechiae or rash.      Comments: Vascath intact to LCW, no redness or drainage noted   Neurological:      General: No focal deficit present.      Mental Status: He is alert and oriented to person, place, and time.      Cranial Nerves: No cranial nerve deficit.      Coordination: Coordination normal.   Psychiatric:         Mood and Affect: Mood normal.         Behavior: Behavior normal.         Thought Content: Thought content normal.            Significant Labs:   CBC:   Recent Labs   Lab 09/29/24  1744 09/30/24  0406   WBC 0.80* 0.52*   HGB 11.6* 10.8*   HCT 35.0* 33.3*   * 119*    and CMP:   Recent Labs   Lab 09/29/24  1744 09/30/24  0406    139   K 3.5 3.6    111*   CO2 19* 21*   * 98   BUN 23 19   CREATININE 1.2 0.9   CALCIUM 7.8* 7.6*   PROT 6.1 5.3*   ALBUMIN 3.4* 3.0*   BILITOT 0.8 0.8   ALKPHOS 55 49*   AST 13 11   ALT 10 10   ANIONGAP 9 7*       Diagnostic Results:  None

## 2024-09-30 NOTE — CARE UPDATE
I have reviewed the chart of Jaspreet Walsh Jr. who is hospitalized for the following:    Active Hospital Problems    Diagnosis    *Multiple myeloma    Secondary hypercoagulable state     Multiple myeloma/History of auto stem cell transplant  Lovenox 40 mg SQ      Diabetes mellitus    Essential hypertension        BRIGIDA MUNOZ, LINDAP  Unit [facility] AXEL

## 2024-09-30 NOTE — PROGRESS NOTES
[facility] ([facility])  Hematology  Bone Marrow Transplant  Progress Note    Patient Name: Jaspreet Walsh Jr.  Admission Date: 9/29/2024  Hospital Length of Stay: 1 days  Code Status: Full Code    Subjective:     Interval History: admitted for [facility] for R/R Multiple Myeloma. Day 0 today, will receive cells at 1130 am, 0.50x10^6T cells. VSS. Feels well today without complaints. Replacing phos and calcium today.     Objective:     Vital Signs (Most Recent):  Temp: 98.8 °F (37.1 °C) (09/30/24 1130)  Pulse: (!) 52 (09/30/24 1130)  Resp: 18 (09/30/24 1130)  BP: (!) 150/70 (09/30/24 1130)  SpO2: 98 % (09/30/24 1130) Vital Signs (24h Range):  Temp:  [97.5 °F (36.4 °C)-98.8 °F (37.1 °C)] 98.8 °F (37.1 °C)  Pulse:  [52-71] 52  Resp:  [17-20] 18  SpO2:  [95 %-98 %] 98 %  BP: (119-150)/(69-84) 150/70     Weight: 91.9 kg (202 lb 9.6 oz)  Body mass index is 31.73 kg/m².  Body surface area is 2.08 meters squared.      Intake/Output - Last 3 Shifts         09/28 0700  09/29 0659 09/29 0700  09/30 0659 09/30 0700  10/01 0659    P.O.  720     Total Intake(mL/kg)  720 (7.8)     Urine (mL/kg/hr)  1000     Stool  0     Total Output  1000     Net  -280            Stool Occurrence  0 x              Physical Exam  Vitals reviewed.   Constitutional:       General: He is not in acute distress.     Appearance: Normal appearance. He is well-developed.   HENT:      Head: Normocephalic and atraumatic.      Mouth/Throat:      Pharynx: No oropharyngeal exudate.   Eyes:      General: No scleral icterus.     Extraocular Movements: Extraocular movements intact.      Conjunctiva/sclera: Conjunctivae normal.      Pupils: Pupils are equal, round, and reactive to light.   Neck:      Thyroid: No thyromegaly.   Cardiovascular:      Rate and Rhythm: Normal rate and regular rhythm.      Pulses: Normal pulses.      Heart sounds: Normal heart sounds.   Pulmonary:      Effort: Pulmonary effort is normal. No respiratory distress.      Breath sounds:  Normal breath sounds.   Abdominal:      General: Abdomen is flat. Bowel sounds are normal. There is no distension.      Palpations: Abdomen is soft.      Tenderness: There is no abdominal tenderness.   Musculoskeletal:         General: No swelling or tenderness. Normal range of motion.      Cervical back: Normal range of motion and neck supple. Normal range of motion.   Skin:     General: Skin is warm and dry.      Coloration: Skin is not pale.      Findings: No petechiae or rash.      Comments: Vascath intact to LCW, no redness or drainage noted   Neurological:      General: No focal deficit present.      Mental Status: He is alert and oriented to person, place, and time.      Cranial Nerves: No cranial nerve deficit.      Coordination: Coordination normal.   Psychiatric:         Mood and Affect: Mood normal.         Behavior: Behavior normal.         Thought Content: Thought content normal.            Significant Labs:   CBC:   Recent Labs   Lab 09/29/24  1744 09/30/24  0406   WBC 0.80* 0.52*   HGB 11.6* 10.8*   HCT 35.0* 33.3*   * 119*    and CMP:   Recent Labs   Lab 09/29/24 1744 09/30/24  0406    139   K 3.5 3.6    111*   CO2 19* 21*   * 98   BUN 23 19   CREATININE 1.2 0.9   CALCIUM 7.8* 7.6*   PROT 6.1 5.3*   ALBUMIN 3.4* 3.0*   BILITOT 0.8 0.8   ALKPHOS 55 49*   AST 13 11   ALT 10 10   ANIONGAP 9 7*       Diagnostic Results:  None  Assessment/Plan:     * S/P chimeric antigen receptor T-cell therapy  Planned Lymphodepletion Regimen:  Cyclophosphamide 300mg/m2 on Day -5, -4, and -3  Fludarabine 30mg/m2 on Day -5, -4, and -3     Pre-Medications:  Acetaminophen on Day 0  Diphenhydramine on Day 0     Immune Effector Cell Therapy:  Ciltacabtagene autoleucel on Day 0     Antimicrobial Prophylaxis:  Acyclovir starting on Day -5  Levofloxacin starting on Day -5  Fluconazole starting on Day -5  Pentamidine on day -5  Sulfamethoxazole-trimethoprim starting on Day +30     Seizure  Prophylaxis:  Levetiracetam on Day 0 through Day +30        Caregiver: Catie (friend)  Post-transplant discharge plans: home     - Initiated LDC flu/cy on 9/25/24, central line placement and brain MRI completed on 9/24   - COVID positive 9/27/24, repeat negative. Per ID thought to be false +   - admitted for cilta-zohra CAR-T on 9/29/24. Day 0 today  -  will receive 0.50x10^6 CAR+ vT cells/kg today 9/30 at 1130am   - Started keppra 750 mg two times/day 9/27  - Supportive meds: acyclovir, ca +vit d, zometa (received total of 2 years since disease progression)    Multiple myeloma  Multiple myeloma/History of auto stem cell transplant  - IgG Kappa MM standard risk, diagnosed Sept 2020, after presenting w/ lytic lesions  - s/p 8 cycles Vrd followed by Alea Auto 5/17/2021 with disease relapse and Dkd salvage therapy 8/4/2023  - Achieving/mainting [place] post SCT; was on revlimid maintenance but relapsed biochemically and with new lytic disease approximately 2 years post SCT (June/July 2023)  - Initiated Melinda-Kd salvage on 8/4/23; tolerating will no significant AE's   - Melinda subq weekly 8>EOW x 8 doses> q 4 week; kyprolis 70mg/m2 day 1, 8, 15 of 28 day cycle; dex 40mg po weekly   - Patient with initial good response but noted serial biochemical progression confirmed on 4/3/24 repeat serum studies; mild anemia and Jan 2024 PET with ENDY but no other overt symptoms or CRAB and he feels well  - Transitioned DKd to Kyprolis/pomalyst/dex as bridge to CART (cilta-zohra); KPd median pfs 26 months; vs cart 3 years with 20% >5 years   - Pomalyst 4mg daily day 1-21 of 28 cycle ; KPD initiated April 2024  - He has had [place] to KPd  - Decision with patient made to pursue consolidation with ciltacabtagene autoleucel   See CarT     History of autologous stem cell transplant  - see Multiple Myeloma    Antineoplastic chemotherapy induced pancytopenia  - daily CBC  - transfuse for hgb <7 and platelets <10k  - hold anticoagulation for platelets <50k  -  ppx antimicrobials per protocol    Secondary hypercoagulable state  Multiple myeloma/History of auto stem cell transplant  Ppx Lovenox 40 mg SQ    Essential hypertension  Continue amlodipine 10 mg daily  Continue HCTZ 25 mg daily  Continue clonidine 0.3 mg daily    Diabetes mellitus  Type II, well-controlled  - LD SSI        VTE Risk Mitigation (From admission, onward)           Ordered     heparin (porcine) injection 1,600 Units  As needed (PRN)         09/30/24 0824     heparin (porcine) injection 1,000 Units  As needed (PRN)         09/29/24 1744     enoxaparin injection 40 mg  Daily         09/29/24 1700     IP VTE HIGH RISK PATIENT  Once         09/29/24 1700     Place sequential compression device  Until discontinued         09/29/24 1700                    Disposition: inpatient     Carol Trujillo NP  Bone Marrow Transplant  [facility] - [facility] ([facility])

## 2024-10-01 PROBLEM — E83.39 HYPOPHOSPHATEMIA: Status: ACTIVE | Noted: 2024-10-01

## 2024-10-01 LAB
ALBUMIN SERPL BCP-MCNC: 2.9 G/DL (ref 3.5–5.2)
ALP SERPL-CCNC: 51 U/L (ref 55–135)
ALT SERPL W/O P-5'-P-CCNC: 8 U/L (ref 10–44)
ANION GAP SERPL CALC-SCNC: 3 MMOL/L (ref 8–16)
ANISOCYTOSIS BLD QL SMEAR: SLIGHT
AST SERPL-CCNC: 10 U/L (ref 10–40)
BASOPHILS # BLD AUTO: 0 K/UL (ref 0–0.2)
BASOPHILS NFR BLD: 0 % (ref 0–1.9)
BILIRUB SERPL-MCNC: 0.7 MG/DL (ref 0.1–1)
BUN SERPL-MCNC: 12 MG/DL (ref 8–23)
BURR CELLS BLD QL SMEAR: ABNORMAL
CALCIUM SERPL-MCNC: 8.4 MG/DL (ref 8.7–10.5)
CHLORIDE SERPL-SCNC: 111 MMOL/L (ref 95–110)
CO2 SERPL-SCNC: 23 MMOL/L (ref 23–29)
CREAT SERPL-MCNC: 0.9 MG/DL (ref 0.5–1.4)
CRP SERPL-MCNC: 32.3 MG/L (ref 0–8.2)
DIFFERENTIAL METHOD BLD: ABNORMAL
EOSINOPHIL # BLD AUTO: 0.2 K/UL (ref 0–0.5)
EOSINOPHIL NFR BLD: 36.7 % (ref 0–8)
ERYTHROCYTE [DISTWIDTH] IN BLOOD BY AUTOMATED COUNT: 16.3 % (ref 11.5–14.5)
EST. GFR  (NO RACE VARIABLE): >60 ML/MIN/1.73 M^2
FERRITIN SERPL-MCNC: 498 NG/ML (ref 20–300)
GLUCOSE SERPL-MCNC: 104 MG/DL (ref 70–110)
HCT VFR BLD AUTO: 34.6 % (ref 40–54)
HGB BLD-MCNC: 10.8 G/DL (ref 14–18)
HYPOCHROMIA BLD QL SMEAR: ABNORMAL
IMM GRANULOCYTES # BLD AUTO: 0.01 K/UL (ref 0–0.04)
IMM GRANULOCYTES NFR BLD AUTO: 2 % (ref 0–0.5)
LDH SERPL L TO P-CCNC: 194 U/L (ref 110–260)
LYMPHOCYTES # BLD AUTO: 0.1 K/UL (ref 1–4.8)
LYMPHOCYTES NFR BLD: 10.2 % (ref 18–48)
MAGNESIUM SERPL-MCNC: 1.7 MG/DL (ref 1.6–2.6)
MCH RBC QN AUTO: 30.9 PG (ref 27–31)
MCHC RBC AUTO-ENTMCNC: 31.2 G/DL (ref 32–36)
MCV RBC AUTO: 99 FL (ref 82–98)
MONOCYTES # BLD AUTO: 0 K/UL (ref 0.3–1)
MONOCYTES NFR BLD: 8.2 % (ref 4–15)
NEUTROPHILS # BLD AUTO: 0.2 K/UL (ref 1.8–7.7)
NEUTROPHILS NFR BLD: 42.9 % (ref 38–73)
NRBC BLD-RTO: 4 /100 WBC
OVALOCYTES BLD QL SMEAR: ABNORMAL
PHOSPHATE SERPL-MCNC: 2.6 MG/DL (ref 2.7–4.5)
PLATELET # BLD AUTO: 98 K/UL (ref 150–450)
PMV BLD AUTO: 11.5 FL (ref 9.2–12.9)
POCT GLUCOSE: 106 MG/DL (ref 70–110)
POCT GLUCOSE: 145 MG/DL (ref 70–110)
POCT GLUCOSE: 145 MG/DL (ref 70–110)
POCT GLUCOSE: 156 MG/DL (ref 70–110)
POCT GLUCOSE: 96 MG/DL (ref 70–110)
POIKILOCYTOSIS BLD QL SMEAR: SLIGHT
POLYCHROMASIA BLD QL SMEAR: ABNORMAL
POTASSIUM SERPL-SCNC: 3.8 MMOL/L (ref 3.5–5.1)
PROT SERPL-MCNC: 5.3 G/DL (ref 6–8.4)
RBC # BLD AUTO: 3.5 M/UL (ref 4.6–6.2)
SCHISTOCYTES BLD QL SMEAR: ABNORMAL
SODIUM SERPL-SCNC: 137 MMOL/L (ref 136–145)
SPHEROCYTES BLD QL SMEAR: ABNORMAL
WBC # BLD AUTO: 0.49 K/UL (ref 3.9–12.7)

## 2024-10-01 PROCEDURE — 63600175 PHARM REV CODE 636 W HCPCS: Performed by: NURSE PRACTITIONER

## 2024-10-01 PROCEDURE — 25000003 PHARM REV CODE 250: Performed by: INTERNAL MEDICINE

## 2024-10-01 PROCEDURE — 86140 C-REACTIVE PROTEIN: CPT | Performed by: NURSE PRACTITIONER

## 2024-10-01 PROCEDURE — 20600001 HC STEP DOWN PRIVATE ROOM

## 2024-10-01 PROCEDURE — 82728 ASSAY OF FERRITIN: CPT | Performed by: NURSE PRACTITIONER

## 2024-10-01 PROCEDURE — 83615 LACTATE (LD) (LDH) ENZYME: CPT | Performed by: NURSE PRACTITIONER

## 2024-10-01 PROCEDURE — 80053 COMPREHEN METABOLIC PANEL: CPT | Performed by: NURSE PRACTITIONER

## 2024-10-01 PROCEDURE — 84100 ASSAY OF PHOSPHORUS: CPT | Performed by: NURSE PRACTITIONER

## 2024-10-01 PROCEDURE — 99232 SBSQ HOSP IP/OBS MODERATE 35: CPT | Mod: FS,,, | Performed by: INTERNAL MEDICINE

## 2024-10-01 PROCEDURE — 85025 COMPLETE CBC W/AUTO DIFF WBC: CPT | Performed by: NURSE PRACTITIONER

## 2024-10-01 PROCEDURE — 25000003 PHARM REV CODE 250: Performed by: NURSE PRACTITIONER

## 2024-10-01 PROCEDURE — 83735 ASSAY OF MAGNESIUM: CPT | Performed by: NURSE PRACTITIONER

## 2024-10-01 RX ADMIN — CLONIDINE HYDROCHLORIDE 0.3 MG: 0.1 TABLET ORAL at 08:10

## 2024-10-01 RX ADMIN — GABAPENTIN 300 MG: 300 CAPSULE ORAL at 09:10

## 2024-10-01 RX ADMIN — FLUCONAZOLE 400 MG: 200 TABLET ORAL at 08:10

## 2024-10-01 RX ADMIN — ACYCLOVIR 800 MG: 200 CAPSULE ORAL at 08:10

## 2024-10-01 RX ADMIN — LEVETIRACETAM 750 MG: 750 TABLET, FILM COATED ORAL at 09:10

## 2024-10-01 RX ADMIN — DIBASIC SODIUM PHOSPHATE, MONOBASIC POTASSIUM PHOSPHATE AND MONOBASIC SODIUM PHOSPHATE 1 TABLET: 852; 155; 130 TABLET ORAL at 06:10

## 2024-10-01 RX ADMIN — POTASSIUM CHLORIDE 20 MEQ: 1500 TABLET, EXTENDED RELEASE ORAL at 06:10

## 2024-10-01 RX ADMIN — DIBASIC SODIUM PHOSPHATE, MONOBASIC POTASSIUM PHOSPHATE AND MONOBASIC SODIUM PHOSPHATE 1 TABLET: 852; 155; 130 TABLET ORAL at 10:10

## 2024-10-01 RX ADMIN — ACYCLOVIR 800 MG: 200 CAPSULE ORAL at 09:10

## 2024-10-01 RX ADMIN — SODIUM CHLORIDE 75 ML/HR: 9 INJECTION, SOLUTION INTRAVENOUS at 05:10

## 2024-10-01 RX ADMIN — DIBASIC SODIUM PHOSPHATE, MONOBASIC POTASSIUM PHOSPHATE AND MONOBASIC SODIUM PHOSPHATE 1 TABLET: 852; 155; 130 TABLET ORAL at 02:10

## 2024-10-01 RX ADMIN — ENOXAPARIN SODIUM 40 MG: 40 INJECTION SUBCUTANEOUS at 05:10

## 2024-10-01 RX ADMIN — Medication 400 MG: at 10:10

## 2024-10-01 RX ADMIN — AMLODIPINE BESYLATE 10 MG: 10 TABLET ORAL at 09:10

## 2024-10-01 RX ADMIN — SODIUM CHLORIDE 75 ML/HR: 9 INJECTION, SOLUTION INTRAVENOUS at 04:10

## 2024-10-01 RX ADMIN — HYDROCHLOROTHIAZIDE 25 MG: 25 TABLET ORAL at 09:10

## 2024-10-01 RX ADMIN — INSULIN ASPART 2 UNITS: 100 INJECTION, SOLUTION INTRAVENOUS; SUBCUTANEOUS at 12:10

## 2024-10-01 RX ADMIN — Medication 400 MG: at 06:10

## 2024-10-01 RX ADMIN — LEVOFLOXACIN 500 MG: 500 TABLET, FILM COATED ORAL at 09:10

## 2024-10-01 NOTE — PLAN OF CARE
Plan of care reviewed with pt. Day +1 CART. Pt AAOX4. ICE score 10. BG monitored. Pt ambulates in room and to restroom independently. All VSS, afebrile, free from falls or injuries; no acute events so far this shift. Pt in bed with non-skid socks on, bed locked and in lowest position, side rails up x2, call light and personal items within reach. Pt instructed to call for assistance as needed.

## 2024-10-01 NOTE — ASSESSMENT & PLAN NOTE
Patient of Dr. Dameon Baker  - Initiated LDC flu/cy on 9/25/24, central line placement and brain MRI completed on 9/24   - COVID positive 9/27/24, repeat negative. Per ID thought to be false +   - admitted for cilta-zohra CAR-T on 9/29/24. Day +1 today  -  received 0.50x10^6 CAR+ vT cells/kg 9/30 at 1130am, tolerated without issue  -  daily ferritin, LDH and CRP  - ICE 10 today  - keppra 750 mg two times/day for seizure ppx  - ppx antimicrobials    Planned Lymphodepletion Regimen:  Cyclophosphamide 300mg/m2 on Day -5, -4, and -3  Fludarabine 30mg/m2 on Day -5, -4, and -3     Pre-Medications:  Acetaminophen on Day 0  Diphenhydramine on Day 0     Immune Effector Cell Therapy:  Ciltacabtagene autoleucel on Day 0     Antimicrobial Prophylaxis:  Acyclovir starting on Day -5  Levofloxacin starting on Day -5  Fluconazole starting on Day -5  Pentamidine on day -5  Sulfamethoxazole-trimethoprim starting on Day +30     Seizure Prophylaxis:  Levetiracetam on Day 0 through Day +30        Caregiver: Catie (friend)  Post-transplant discharge plans: home

## 2024-10-01 NOTE — ASSESSMENT & PLAN NOTE
Multiple myeloma/History of auto stem cell transplant  - IgG Kappa MM standard risk, diagnosed Sept 2020, after presenting w/ lytic lesions  - s/p 8 cycles Vrd followed by Alea Auto 5/17/2021 with disease relapse and Dkd salvage therapy 8/4/2023  - Achieving/mainting [place] post SCT; was on revlimid maintenance but relapsed biochemically and with new lytic disease approximately 2 years post SCT (June/July 2023)  - Initiated Melinda-Kd salvage on 8/4/23; tolerating will no significant AE's   - Melinda subq weekly 8>EOW x 8 doses> q 4 week; kyprolis 70mg/m2 day 1, 8, 15 of 28 day cycle; dex 40mg po weekly   - Patient with initial good response but noted serial biochemical progression confirmed on 4/3/24 repeat serum studies; mild anemia and Jan 2024 PET with ENDY but no other overt symptoms or CRAB and he feels well  - Transitioned DKd to Kyprolis/pomalyst/dex as bridge to CART (cilta-zohra); KPd median pfs 26 months; vs cart 3 years with 20% >5 years   - Pomalyst 4mg daily day 1-21 of 28 cycle ; KPD initiated April 2024  - He has had [place] to KPd  - Decision with patient made to pursue consolidation with ciltacabtagene autoleucel   See CarT

## 2024-10-01 NOTE — NURSING
Patient refused bed alarm, educated about fall risk and importance of bed alarm. Call light and personal items within reach. Instructed to call for assistance.

## 2024-10-01 NOTE — SUBJECTIVE & OBJECTIVE
Subjective:     Interval History: Day +1 Carvykti for R/R Multiple Myeloma. Tolerated cell infusion with issue. ICE 10. VSS. He has no complaints today.     Objective:     Vital Signs (Most Recent):  Temp: 98.6 °F (37 °C) (10/01/24 1130)  Pulse: (!) 59 (10/01/24 1130)  Resp: 18 (10/01/24 1130)  BP: 97/63 (10/01/24 1130)  SpO2: 97 % (10/01/24 1130) Vital Signs (24h Range):  Temp:  [97.9 °F (36.6 °C)-99.2 °F (37.3 °C)] 98.6 °F (37 °C)  Pulse:  [47-68] 59  Resp:  [17-20] 18  SpO2:  [95 %-100 %] 97 %  BP: ()/(63-88) 97/63     Weight: 92.8 kg (204 lb 11.2 oz)  Body mass index is 32.06 kg/m².  Body surface area is 2.09 meters squared.      Intake/Output - Last 3 Shifts         09/29 0700  09/30 0659 09/30 0700  10/01 0659 10/01 0700  10/02 0659    P.O. 720 840     I.V. (mL/kg)  2016.9 (21.7)     Blood  70     IV Piggyback  170     Total Intake(mL/kg) 720 (7.8) 3096.9 (33.4)     Urine (mL/kg/hr) 1000 2000 (0.9)     Stool 0 0     Total Output 1000 2000     Net -280 +1096.9            Urine Occurrence  3 x     Stool Occurrence 0 x 2 x              Physical Exam  Vitals reviewed.   Constitutional:       General: He is not in acute distress.     Appearance: Normal appearance. He is well-developed.   HENT:      Head: Normocephalic and atraumatic.      Mouth/Throat:      Pharynx: No oropharyngeal exudate.   Eyes:      General: No scleral icterus.     Extraocular Movements: Extraocular movements intact.      Conjunctiva/sclera: Conjunctivae normal.      Pupils: Pupils are equal, round, and reactive to light.   Neck:      Thyroid: No thyromegaly.   Cardiovascular:      Rate and Rhythm: Normal rate and regular rhythm.      Pulses: Normal pulses.      Heart sounds: Normal heart sounds.   Pulmonary:      Effort: Pulmonary effort is normal. No respiratory distress.      Breath sounds: Normal breath sounds.   Abdominal:      General: Abdomen is flat. Bowel sounds are normal. There is no distension.      Palpations: Abdomen is  soft.      Tenderness: There is no abdominal tenderness.   Musculoskeletal:         General: No swelling or tenderness. Normal range of motion.      Cervical back: Normal range of motion and neck supple. Normal range of motion.   Skin:     General: Skin is warm and dry.      Coloration: Skin is not pale.      Findings: No petechiae or rash.      Comments: Vascath intact to LCW, no redness or drainage noted   Neurological:      General: No focal deficit present.      Mental Status: He is alert and oriented to person, place, and time.      Cranial Nerves: No cranial nerve deficit.      Coordination: Coordination normal.   Psychiatric:         Mood and Affect: Mood normal.         Behavior: Behavior normal.         Thought Content: Thought content normal.            Significant Labs:   CBC:   Recent Labs   Lab 09/29/24 1744 09/30/24 0406 10/01/24  0436   WBC 0.80* 0.52* 0.49*   HGB 11.6* 10.8* 10.8*   HCT 35.0* 33.3* 34.6*   * 119* 98*    and CMP:   Recent Labs   Lab 09/29/24 1744 09/30/24 0406 10/01/24  0436    139 137   K 3.5 3.6 3.8    111* 111*   CO2 19* 21* 23   * 98 104   BUN 23 19 12   CREATININE 1.2 0.9 0.9   CALCIUM 7.8* 7.6* 8.4*   PROT 6.1 5.3* 5.3*   ALBUMIN 3.4* 3.0* 2.9*   BILITOT 0.8 0.8 0.7   ALKPHOS 55 49* 51*   AST 13 11 10   ALT 10 10 8*   ANIONGAP 9 7* 3*       Diagnostic Results:  None

## 2024-10-01 NOTE — PLAN OF CARE
Plan of care reviewed with pt. Day +1 CART. Pt AAOX4. ICE score 10. BG monitored, prn insulin provided. Pt ambulates in room and to restroom independently. All VSS, afebrile, free from falls or injuries; no acute events so far this shift. Pt in bed with non-skid socks on, bed locked and in lowest position, side rails up x2, call light and personal items within reach. Pt instructed to call for assistance as needed. Iv drip continued at 75 ml/hr. PO electrolyte replaced. Patient stable at this time.

## 2024-10-01 NOTE — PROGRESS NOTES
Chavez Jansen - Oncology ([facility])  Hematology  Bone Marrow Transplant  Progress Note    Patient Name: Jaspreet Walsh Jr.  Admission Date: 9/29/2024  Hospital Length of Stay: 2 days  Code Status: Full Code    Subjective:     Interval History: Day +1 Carvykti for R/R Multiple Myeloma. Tolerated cell infusion with issue. ICE 10. VSS. He has no complaints today.     Objective:     Vital Signs (Most Recent):  Temp: 98.6 °F (37 °C) (10/01/24 1130)  Pulse: (!) 59 (10/01/24 1130)  Resp: 18 (10/01/24 1130)  BP: 97/63 (10/01/24 1130)  SpO2: 97 % (10/01/24 1130) Vital Signs (24h Range):  Temp:  [97.9 °F (36.6 °C)-99.2 °F (37.3 °C)] 98.6 °F (37 °C)  Pulse:  [47-68] 59  Resp:  [17-20] 18  SpO2:  [95 %-100 %] 97 %  BP: ()/(63-88) 97/63     Weight: 92.8 kg (204 lb 11.2 oz)  Body mass index is 32.06 kg/m².  Body surface area is 2.09 meters squared.      Intake/Output - Last 3 Shifts         09/29 0700  09/30 0659 09/30 0700  10/01 0659 10/01 0700  10/02 0659    P.O. 720 840     I.V. (mL/kg)  2016.9 (21.7)     Blood  70     IV Piggyback  170     Total Intake(mL/kg) 720 (7.8) 3096.9 (33.4)     Urine (mL/kg/hr) 1000 2000 (0.9)     Stool 0 0     Total Output 1000 2000     Net -280 +1096.9            Urine Occurrence  3 x     Stool Occurrence 0 x 2 x              Physical Exam  Vitals reviewed.   Constitutional:       General: He is not in acute distress.     Appearance: Normal appearance. He is well-developed.   HENT:      Head: Normocephalic and atraumatic.      Mouth/Throat:      Pharynx: No oropharyngeal exudate.   Eyes:      General: No scleral icterus.     Extraocular Movements: Extraocular movements intact.      Conjunctiva/sclera: Conjunctivae normal.      Pupils: Pupils are equal, round, and reactive to light.   Neck:      Thyroid: No thyromegaly.   Cardiovascular:      Rate and Rhythm: Normal rate and regular rhythm.      Pulses: Normal pulses.      Heart sounds: Normal heart sounds.   Pulmonary:      Effort: Pulmonary  effort is normal. No respiratory distress.      Breath sounds: Normal breath sounds.   Abdominal:      General: Abdomen is flat. Bowel sounds are normal. There is no distension.      Palpations: Abdomen is soft.      Tenderness: There is no abdominal tenderness.   Musculoskeletal:         General: No swelling or tenderness. Normal range of motion.      Cervical back: Normal range of motion and neck supple. Normal range of motion.   Skin:     General: Skin is warm and dry.      Coloration: Skin is not pale.      Findings: No petechiae or rash.      Comments: Vascath intact to LCW, no redness or drainage noted   Neurological:      General: No focal deficit present.      Mental Status: He is alert and oriented to person, place, and time.      Cranial Nerves: No cranial nerve deficit.      Coordination: Coordination normal.   Psychiatric:         Mood and Affect: Mood normal.         Behavior: Behavior normal.         Thought Content: Thought content normal.            Significant Labs:   CBC:   Recent Labs   Lab 09/29/24 1744 09/30/24  0406 10/01/24  0436   WBC 0.80* 0.52* 0.49*   HGB 11.6* 10.8* 10.8*   HCT 35.0* 33.3* 34.6*   * 119* 98*    and CMP:   Recent Labs   Lab 09/29/24 1744 09/30/24  0406 10/01/24  0436    139 137   K 3.5 3.6 3.8    111* 111*   CO2 19* 21* 23   * 98 104   BUN 23 19 12   CREATININE 1.2 0.9 0.9   CALCIUM 7.8* 7.6* 8.4*   PROT 6.1 5.3* 5.3*   ALBUMIN 3.4* 3.0* 2.9*   BILITOT 0.8 0.8 0.7   ALKPHOS 55 49* 51*   AST 13 11 10   ALT 10 10 8*   ANIONGAP 9 7* 3*       Diagnostic Results:  None  Assessment/Plan:     * S/P chimeric antigen receptor T-cell therapy  Patient of Dr. Dameon Baker  - Initiated LDC flu/cy on 9/25/24, central line placement and brain MRI completed on 9/24   - COVID positive 9/27/24, repeat negative. Per ID thought to be false +   - admitted for cilta-zohra CAR-T on 9/29/24. Day +1 today  -  received 0.50x10^6 CAR+ vT cells/kg 9/30 at 1130am,  tolerated without issue  -  daily ferritin, LDH and CRP  - ICE 10 today  - keppra 750 mg two times/day for seizure ppx  - ppx antimicrobials    Planned Lymphodepletion Regimen:  Cyclophosphamide 300mg/m2 on Day -5, -4, and -3  Fludarabine 30mg/m2 on Day -5, -4, and -3     Pre-Medications:  Acetaminophen on Day 0  Diphenhydramine on Day 0     Immune Effector Cell Therapy:  Ciltacabtagene autoleucel on Day 0     Antimicrobial Prophylaxis:  Acyclovir starting on Day -5  Levofloxacin starting on Day -5  Fluconazole starting on Day -5  Pentamidine on day -5  Sulfamethoxazole-trimethoprim starting on Day +30     Seizure Prophylaxis:  Levetiracetam on Day 0 through Day +30        Caregiver: Catie (friend)  Post-transplant discharge plans: home       Multiple myeloma  Multiple myeloma/History of auto stem cell transplant  - IgG Kappa MM standard risk, diagnosed Sept 2020, after presenting w/ lytic lesions  - s/p 8 cycles Vrd followed by Alea Auto 5/17/2021 with disease relapse and Dkd salvage therapy 8/4/2023  - Achieving/mainting MS post SCT; was on revlimid maintenance but relapsed biochemically and with new lytic disease approximately 2 years post SCT (June/July 2023)  - Initiated Melinda-Kd salvage on 8/4/23; tolerating will no significant AE's   - Melinda subq weekly 8>EOW x 8 doses> q 4 week; kyprolis 70mg/m2 day 1, 8, 15 of 28 day cycle; dex 40mg po weekly   - Patient with initial good response but noted serial biochemical progression confirmed on 4/3/24 repeat serum studies; mild anemia and Jan 2024 PET with ENDY but no other overt symptoms or CRAB and he feels well  - Transitioned DKd to Kyprolis/pomalyst/dex as bridge to CART (cilta-zohra); KPd median pfs 26 months; vs cart 3 years with 20% >5 years   - Pomalyst 4mg daily day 1-21 of 28 cycle ; KPD initiated April 2024  - He has had MS to KPd  - Decision with patient made to pursue consolidation with ciltacabtagene autoleucel   See CarT     History of autologous stem cell  transplant  - see Multiple Myeloma    Antineoplastic chemotherapy induced pancytopenia  - daily CBC  - transfuse for hgb <7 and platelets <10k  - hold anticoagulation for platelets <50k  - ppx antimicrobials per protocol    Secondary hypercoagulable state  Multiple myeloma/History of auto stem cell transplant  Ppx Lovenox 40 mg SQ    Essential hypertension  Continue amlodipine 10 mg daily  Continue HCTZ 25 mg daily  Continue clonidine 0.3 mg daily    Diabetes mellitus  Type II, well-controlled  - LD SSI    Hypophosphatemia  - daily phos level  - replace per BMT electrolyte protocol        VTE Risk Mitigation (From admission, onward)           Ordered     heparin (porcine) injection 1,600 Units  As needed (PRN)         09/30/24 0824     heparin (porcine) injection 1,000 Units  As needed (PRN)         09/29/24 1744     enoxaparin injection 40 mg  Daily         09/29/24 1700     IP VTE HIGH RISK PATIENT  Once         09/29/24 1700     Place sequential compression device  Until discontinued         09/29/24 1700                    Disposition: inpatient     Carol Trujillo NP  Bone Marrow Transplant  Chavez Jansen - Oncology ([facility])

## 2024-10-02 LAB
ALBUMIN SERPL BCP-MCNC: 2.9 G/DL (ref 3.5–5.2)
ALP SERPL-CCNC: 49 U/L (ref 55–135)
ALT SERPL W/O P-5'-P-CCNC: 9 U/L (ref 10–44)
ANION GAP SERPL CALC-SCNC: 6 MMOL/L (ref 8–16)
AST SERPL-CCNC: 11 U/L (ref 10–40)
BASOPHILS # BLD AUTO: ABNORMAL K/UL (ref 0–0.2)
BASOPHILS NFR BLD: 0 % (ref 0–1.9)
BILIRUB SERPL-MCNC: 0.6 MG/DL (ref 0.1–1)
BUN SERPL-MCNC: 16 MG/DL (ref 8–23)
BURR CELLS BLD QL SMEAR: ABNORMAL
CALCIUM SERPL-MCNC: 8.2 MG/DL (ref 8.7–10.5)
CHLORIDE SERPL-SCNC: 108 MMOL/L (ref 95–110)
CO2 SERPL-SCNC: 22 MMOL/L (ref 23–29)
CREAT SERPL-MCNC: 1 MG/DL (ref 0.5–1.4)
CRP SERPL-MCNC: 48 MG/L (ref 0–8.2)
DIFFERENTIAL METHOD BLD: ABNORMAL
EOSINOPHIL # BLD AUTO: ABNORMAL K/UL (ref 0–0.5)
EOSINOPHIL NFR BLD: 57.5 % (ref 0–8)
ERYTHROCYTE [DISTWIDTH] IN BLOOD BY AUTOMATED COUNT: 16.3 % (ref 11.5–14.5)
EST. GFR  (NO RACE VARIABLE): >60 ML/MIN/1.73 M^2
FERRITIN SERPL-MCNC: 484 NG/ML (ref 20–300)
GLUCOSE SERPL-MCNC: 116 MG/DL (ref 70–110)
HCT VFR BLD AUTO: 32.6 % (ref 40–54)
HGB BLD-MCNC: 10.4 G/DL (ref 14–18)
IMM GRANULOCYTES # BLD AUTO: ABNORMAL K/UL (ref 0–0.04)
IMM GRANULOCYTES NFR BLD AUTO: ABNORMAL % (ref 0–0.5)
LDH SERPL L TO P-CCNC: 202 U/L (ref 110–260)
LYMPHOCYTES # BLD AUTO: ABNORMAL K/UL (ref 1–4.8)
LYMPHOCYTES NFR BLD: 5 % (ref 18–48)
MAGNESIUM SERPL-MCNC: 1.7 MG/DL (ref 1.6–2.6)
MCH RBC QN AUTO: 30.8 PG (ref 27–31)
MCHC RBC AUTO-ENTMCNC: 31.9 G/DL (ref 32–36)
MCV RBC AUTO: 96 FL (ref 82–98)
MONOCYTES # BLD AUTO: ABNORMAL K/UL (ref 0.3–1)
MONOCYTES NFR BLD: 7.5 % (ref 4–15)
MYELOCYTES NFR BLD MANUAL: 2.5 %
NEUTROPHILS NFR BLD: 27.5 % (ref 38–73)
NRBC BLD-RTO: 3 /100 WBC
PHOSPHATE SERPL-MCNC: 3.3 MG/DL (ref 2.7–4.5)
PLATELET # BLD AUTO: 71 K/UL (ref 150–450)
PMV BLD AUTO: 9.4 FL (ref 9.2–12.9)
POCT GLUCOSE: 111 MG/DL (ref 70–110)
POCT GLUCOSE: 117 MG/DL (ref 70–110)
POCT GLUCOSE: 139 MG/DL (ref 70–110)
POCT GLUCOSE: 141 MG/DL (ref 70–110)
POCT GLUCOSE: 201 MG/DL (ref 70–110)
POIKILOCYTOSIS BLD QL SMEAR: SLIGHT
POTASSIUM SERPL-SCNC: 3.7 MMOL/L (ref 3.5–5.1)
PROT SERPL-MCNC: 5.2 G/DL (ref 6–8.4)
RBC # BLD AUTO: 3.38 M/UL (ref 4.6–6.2)
SCHISTOCYTES BLD QL SMEAR: ABNORMAL
SODIUM SERPL-SCNC: 136 MMOL/L (ref 136–145)
WBC # BLD AUTO: 0.9 K/UL (ref 3.9–12.7)

## 2024-10-02 PROCEDURE — 84100 ASSAY OF PHOSPHORUS: CPT | Performed by: NURSE PRACTITIONER

## 2024-10-02 PROCEDURE — 63600175 PHARM REV CODE 636 W HCPCS: Performed by: NURSE PRACTITIONER

## 2024-10-02 PROCEDURE — 85007 BL SMEAR W/DIFF WBC COUNT: CPT | Performed by: NURSE PRACTITIONER

## 2024-10-02 PROCEDURE — 25000003 PHARM REV CODE 250: Performed by: INTERNAL MEDICINE

## 2024-10-02 PROCEDURE — 25000003 PHARM REV CODE 250: Performed by: NURSE PRACTITIONER

## 2024-10-02 PROCEDURE — 20600001 HC STEP DOWN PRIVATE ROOM

## 2024-10-02 PROCEDURE — 80053 COMPREHEN METABOLIC PANEL: CPT | Performed by: NURSE PRACTITIONER

## 2024-10-02 PROCEDURE — 86140 C-REACTIVE PROTEIN: CPT | Performed by: NURSE PRACTITIONER

## 2024-10-02 PROCEDURE — 82728 ASSAY OF FERRITIN: CPT | Performed by: NURSE PRACTITIONER

## 2024-10-02 PROCEDURE — 99232 SBSQ HOSP IP/OBS MODERATE 35: CPT | Mod: FS,,, | Performed by: INTERNAL MEDICINE

## 2024-10-02 PROCEDURE — 83735 ASSAY OF MAGNESIUM: CPT | Performed by: NURSE PRACTITIONER

## 2024-10-02 PROCEDURE — 83615 LACTATE (LD) (LDH) ENZYME: CPT | Performed by: NURSE PRACTITIONER

## 2024-10-02 PROCEDURE — 85027 COMPLETE CBC AUTOMATED: CPT | Performed by: NURSE PRACTITIONER

## 2024-10-02 RX ADMIN — HYDROCHLOROTHIAZIDE 25 MG: 25 TABLET ORAL at 08:10

## 2024-10-02 RX ADMIN — ENOXAPARIN SODIUM 40 MG: 40 INJECTION SUBCUTANEOUS at 05:10

## 2024-10-02 RX ADMIN — SODIUM CHLORIDE 75 ML/HR: 9 INJECTION, SOLUTION INTRAVENOUS at 05:10

## 2024-10-02 RX ADMIN — LEVETIRACETAM 750 MG: 750 TABLET, FILM COATED ORAL at 08:10

## 2024-10-02 RX ADMIN — CLONIDINE HYDROCHLORIDE 0.3 MG: 0.1 TABLET ORAL at 08:10

## 2024-10-02 RX ADMIN — ACYCLOVIR 800 MG: 200 CAPSULE ORAL at 09:10

## 2024-10-02 RX ADMIN — Medication 400 MG: at 05:10

## 2024-10-02 RX ADMIN — FLUCONAZOLE 400 MG: 200 TABLET ORAL at 08:10

## 2024-10-02 RX ADMIN — Medication 400 MG: at 09:10

## 2024-10-02 RX ADMIN — POTASSIUM CHLORIDE 20 MEQ: 1500 TABLET, EXTENDED RELEASE ORAL at 05:10

## 2024-10-02 RX ADMIN — ACYCLOVIR 800 MG: 200 CAPSULE ORAL at 08:10

## 2024-10-02 RX ADMIN — GABAPENTIN 300 MG: 300 CAPSULE ORAL at 08:10

## 2024-10-02 RX ADMIN — INSULIN ASPART 2 UNITS: 100 INJECTION, SOLUTION INTRAVENOUS; SUBCUTANEOUS at 09:10

## 2024-10-02 RX ADMIN — LEVOFLOXACIN 500 MG: 500 TABLET, FILM COATED ORAL at 08:10

## 2024-10-02 RX ADMIN — AMLODIPINE BESYLATE 10 MG: 10 TABLET ORAL at 08:10

## 2024-10-02 RX ADMIN — LEVETIRACETAM 750 MG: 750 TABLET, FILM COATED ORAL at 09:10

## 2024-10-02 RX ADMIN — GABAPENTIN 300 MG: 300 CAPSULE ORAL at 09:10

## 2024-10-02 NOTE — PROGRESS NOTES
Admit Assessment    Patient Identification  Jaspreet Walsh Jr.   :  1960  Admit Date:  2024  Attending Provider:  Summer Cartwright MD              Referral:   Pt was admitted to  with a diagnosis of S/P chimeric antigen receptor T-cell therapy, and was admitted this hospital stay due to Multiple myeloma not having achieved remission [C90.00]  Multiple myeloma [C90.00].   is involved was referred to the Social Work Department via routine referral.  Patient presents as a 64 y.o. year old single male.    Persons interviewed: patient.      Living Situation:  Lives with partner Catie ([phone]) in own single-story home.  Independent with ADLs.         Resides at [facility] [address]  [facility] [address] [facility] [address], phone: [phone] (home).      (RETIRED) Functional Status Prior  Ambulation Prior: 0-->independent  Transferrin-->independent  Toiletin-->independent  Bathin-->independent  Dressin-->independent  Eatin-->independent  Communication: understands/communicates without difficulty  Swallowing: swallows foods/liquids without difficulty    Current or Past Agencies and Description of Services/Supplies    DME  Agency Name: none  Equipment Currently Used at Home: none, past use of walker     Home Health  Agency Name: none     IV Infusion  Agency Name: none     Nutrition: Oral, diabetic diet.         Outpatient Pharmacy:     [id number] - [facility] [address] [facility] [address]  [address] [address]  Phone: [phone] Fax: [phone]    [facility] [address] [place], [place] - [address] [facility] [address]  Phone: [phone] Fax: [phone]    [facility] - [place], [place] - [address]  [address] [facility] [place] [address]  Phone: [phone] Fax: [phone]      Patient Preference of agencies include: none expressed.       Patient/Caregiver informed of right to choose providers or agencies.  Patient provides permission to release any necessary information to [facility] and to Non-[facility] agencies as needed to facilitate patient care, treatment planning, and patient discharge planning.  Written and verbal resources provided.      Coping   Coping well with support of family. In good spirits today.  Pleased with his care thus far.       Adjustment to Diagnosis and Treatment  Adequate.      Emotional/Behavioral/Cognitive Issues   Hx of anxiety.          History/Current Symptoms of Anxiety/Depression: Yes  History/Current Substance Use:   Social History     Tobacco Use    Smoking status: Former     Current packs/day: 0.00     Average packs/day: 0.3 packs/day for 40.0 years (10.0 ttl pk-yrs)     Types: Cigarettes     Start date:      Quit date:      Years since quittin.7    Smokeless tobacco: Never   Substance and Sexual Activity    Alcohol use: Not on file    Drug use: Not on file    Sexual activity: Not on file       Indications of Abuse/Neglect: No:   Abuse Screen (yes response referral indicated)  Feels Unsafe at Home or Work/School: no  Physical Signs of Abuse Present: no    Financial:  Payer/Plan Subscr  Sex Relation Sub. Ins. ID Effective Group Num   1. [employer] MANAGE* EUNICE SALAS * 1960 Male Self [id number] 23 [id number]                                   [address] [address]   2. [place]* EUNICE SALAS * 1960 Male Self [id number]* 24                                    [address] [address]        Other identified concerns/needs: TBD.      Plan: return home to care of partner.      Interventions/Referrals: TBD.    Patient/caregiver engaged in treatment planning process.     providing psychosocial and supportive counseling, resources, education, assistance and discharge planning as appropriate.  Patient/caregiver state understanding of  available resources,  following,  remains available.Given SWer contact info and encouraged to call with any needs or concerns.

## 2024-10-02 NOTE — SUBJECTIVE & OBJECTIVE
Subjective:     Interval History: Day +2 Carvykti for R/R Multiple Myeloma. ICE 10, afebrile, inflammatory markers stable. Doing well.     Objective:     Vital Signs (Most Recent):  Temp: 98.6 °F (37 °C) (10/02/24 0809)  Pulse: 63 (10/02/24 0809)  Resp: 18 (10/02/24 0809)  BP: 111/75 (10/02/24 0809)  SpO2: 98 % (10/02/24 0809) Vital Signs (24h Range):  Temp:  [98 °F (36.7 °C)-99.6 °F (37.6 °C)] 98.6 °F (37 °C)  Pulse:  [63-66] 63  Resp:  [18] 18  SpO2:  [96 %-100 %] 98 %  BP: (102-120)/(64-75) 111/75     Weight: 92.8 kg (204 lb 11.2 oz)  Body mass index is 32.06 kg/m².  Body surface area is 2.09 meters squared.      Intake/Output - Last 3 Shifts         09/30 0700  10/01 0659 10/01 0700  10/02 0659 10/02 0700  10/03 0659    P.O. 840 1346     I.V. (mL/kg) 2016.9 (21.7) 1726.5 (18.6)     Blood 70      IV Piggyback 170      Total Intake(mL/kg) 3096.9 (33.4) 3072.5 (33.1)     Urine (mL/kg/hr) 2000 (0.9) 1800 (0.8)     Stool 0 0     Total Output 2000 1800     Net +1096.9 +1272.5            Urine Occurrence 3 x 6 x     Stool Occurrence 2 x 1 x              Physical Exam  Vitals reviewed.   Constitutional:       General: He is not in acute distress.     Appearance: Normal appearance. He is well-developed.   HENT:      Head: Normocephalic and atraumatic.      Mouth/Throat:      Pharynx: No oropharyngeal exudate.   Eyes:      General: No scleral icterus.     Extraocular Movements: Extraocular movements intact.      Conjunctiva/sclera: Conjunctivae normal.      Pupils: Pupils are equal, round, and reactive to light.   Neck:      Thyroid: No thyromegaly.   Cardiovascular:      Rate and Rhythm: Normal rate and regular rhythm.      Pulses: Normal pulses.      Heart sounds: Normal heart sounds.   Pulmonary:      Effort: Pulmonary effort is normal. No respiratory distress.      Breath sounds: Normal breath sounds.   Abdominal:      General: Abdomen is flat. Bowel sounds are normal. There is no distension.      Palpations:  Abdomen is soft.      Tenderness: There is no abdominal tenderness.   Musculoskeletal:         General: No swelling or tenderness. Normal range of motion.      Cervical back: Normal range of motion and neck supple. Normal range of motion.   Skin:     General: Skin is warm and dry.      Coloration: Skin is not pale.      Findings: No petechiae or rash.      Comments: Vascath intact to LCW, no redness or drainage noted   Neurological:      General: No focal deficit present.      Mental Status: He is alert and oriented to person, place, and time.      Cranial Nerves: No cranial nerve deficit.      Coordination: Coordination normal.   Psychiatric:         Mood and Affect: Mood normal.         Behavior: Behavior normal.         Thought Content: Thought content normal.            Significant Labs:   CBC:   Recent Labs   Lab 10/01/24  0436 10/02/24  0341   WBC 0.49* 0.90*   HGB 10.8* 10.4*   HCT 34.6* 32.6*   PLT 98* 71*    and CMP:   Recent Labs   Lab 10/01/24  0436 10/02/24  0341    136   K 3.8 3.7   * 108   CO2 23 22*    116*   BUN 12 16   CREATININE 0.9 1.0   CALCIUM 8.4* 8.2*   PROT 5.3* 5.2*   ALBUMIN 2.9* 2.9*   BILITOT 0.7 0.6   ALKPHOS 51* 49*   AST 10 11   ALT 8* 9*   ANIONGAP 3* 6*       Diagnostic Results:  None

## 2024-10-02 NOTE — PROGRESS NOTES
Chavez Jansen - Oncology ([facility])  Hematology  Bone Marrow Transplant  Progress Note    Patient Name: Jaspreet Walsh Jr.  Admission Date: 9/29/2024  Hospital Length of Stay: 3 days  Code Status: Full Code    Subjective:     Interval History: Day +2 Carvykti for R/R Multiple Myeloma. ICE 10, afebrile, inflammatory markers stable. Doing well.     Objective:     Vital Signs (Most Recent):  Temp: 98.6 °F (37 °C) (10/02/24 0809)  Pulse: 63 (10/02/24 0809)  Resp: 18 (10/02/24 0809)  BP: 111/75 (10/02/24 0809)  SpO2: 98 % (10/02/24 0809) Vital Signs (24h Range):  Temp:  [98 °F (36.7 °C)-99.6 °F (37.6 °C)] 98.6 °F (37 °C)  Pulse:  [63-66] 63  Resp:  [18] 18  SpO2:  [96 %-100 %] 98 %  BP: (102-120)/(64-75) 111/75     Weight: 92.8 kg (204 lb 11.2 oz)  Body mass index is 32.06 kg/m².  Body surface area is 2.09 meters squared.      Intake/Output - Last 3 Shifts         09/30 0700  10/01 0659 10/01 0700  10/02 0659 10/02 0700  10/03 0659    P.O. 840 1346     I.V. (mL/kg) 2016.9 (21.7) 1726.5 (18.6)     Blood 70      IV Piggyback 170      Total Intake(mL/kg) 3096.9 (33.4) 3072.5 (33.1)     Urine (mL/kg/hr) 2000 (0.9) 1800 (0.8)     Stool 0 0     Total Output 2000 1800     Net +1096.9 +1272.5            Urine Occurrence 3 x 6 x     Stool Occurrence 2 x 1 x              Physical Exam  Vitals reviewed.   Constitutional:       General: He is not in acute distress.     Appearance: Normal appearance. He is well-developed.   HENT:      Head: Normocephalic and atraumatic.      Mouth/Throat:      Pharynx: No oropharyngeal exudate.   Eyes:      General: No scleral icterus.     Extraocular Movements: Extraocular movements intact.      Conjunctiva/sclera: Conjunctivae normal.      Pupils: Pupils are equal, round, and reactive to light.   Neck:      Thyroid: No thyromegaly.   Cardiovascular:      Rate and Rhythm: Normal rate and regular rhythm.      Pulses: Normal pulses.      Heart sounds: Normal heart sounds.   Pulmonary:      Effort:  Pulmonary effort is normal. No respiratory distress.      Breath sounds: Normal breath sounds.   Abdominal:      General: Abdomen is flat. Bowel sounds are normal. There is no distension.      Palpations: Abdomen is soft.      Tenderness: There is no abdominal tenderness.   Musculoskeletal:         General: No swelling or tenderness. Normal range of motion.      Cervical back: Normal range of motion and neck supple. Normal range of motion.   Skin:     General: Skin is warm and dry.      Coloration: Skin is not pale.      Findings: No petechiae or rash.      Comments: Vascath intact to LCW, no redness or drainage noted   Neurological:      General: No focal deficit present.      Mental Status: He is alert and oriented to person, place, and time.      Cranial Nerves: No cranial nerve deficit.      Coordination: Coordination normal.   Psychiatric:         Mood and Affect: Mood normal.         Behavior: Behavior normal.         Thought Content: Thought content normal.            Significant Labs:   CBC:   Recent Labs   Lab 10/01/24  0436 10/02/24  0341   WBC 0.49* 0.90*   HGB 10.8* 10.4*   HCT 34.6* 32.6*   PLT 98* 71*    and CMP:   Recent Labs   Lab 10/01/24  0436 10/02/24  0341    136   K 3.8 3.7   * 108   CO2 23 22*    116*   BUN 12 16   CREATININE 0.9 1.0   CALCIUM 8.4* 8.2*   PROT 5.3* 5.2*   ALBUMIN 2.9* 2.9*   BILITOT 0.7 0.6   ALKPHOS 51* 49*   AST 10 11   ALT 8* 9*   ANIONGAP 3* 6*       Diagnostic Results:  None  Assessment/Plan:     * S/P chimeric antigen receptor T-cell therapy  Patient of Dr. Dameon Baker  - Initiated LDC flu/cy on 9/25/24, central line placement and brain MRI completed on 9/24   - COVID positive 9/27/24, repeat negative. Per ID thought to be false +   - admitted for cilta-zohra CAR-T on 9/29/24. Day +2 today  -  received 0.50x10^6 CAR+ vT cells/kg 9/30 at 1130am, tolerated without issue  -  daily ferritin, LDH and CRP  - ICE 10 today  - keppra 750 mg two times/day for  seizure ppx  - ppx antimicrobials    Planned Lymphodepletion Regimen:  Cyclophosphamide 300mg/m2 on Day -5, -4, and -3  Fludarabine 30mg/m2 on Day -5, -4, and -3     Pre-Medications:  Acetaminophen on Day 0  Diphenhydramine on Day 0     Immune Effector Cell Therapy:  Ciltacabtagene autoleucel on Day 0     Antimicrobial Prophylaxis:  Acyclovir starting on Day -5  Levofloxacin starting on Day -5  Fluconazole starting on Day -5  Pentamidine on day -5  Sulfamethoxazole-trimethoprim starting on Day +30     Seizure Prophylaxis:  Levetiracetam on Day 0 through Day +30        Caregiver: Catie (friend)  Post-transplant discharge plans: home       Multiple myeloma  Multiple myeloma/History of auto stem cell transplant  - IgG Kappa MM standard risk, diagnosed Sept 2020, after presenting w/ lytic lesions  - s/p 8 cycles Vrd followed by Alea Auto 5/17/2021 with disease relapse and Dkd salvage therapy 8/4/2023  - Achieving/mainting DC post SCT; was on revlimid maintenance but relapsed biochemically and with new lytic disease approximately 2 years post SCT (June/July 2023)  - Initiated Melinda-Kd salvage on 8/4/23; tolerating will no significant AE's   - Melinda subq weekly 8>EOW x 8 doses> q 4 week; kyprolis 70mg/m2 day 1, 8, 15 of 28 day cycle; dex 40mg po weekly   - Patient with initial good response but noted serial biochemical progression confirmed on 4/3/24 repeat serum studies; mild anemia and Jan 2024 PET with ENDY but no other overt symptoms or CRAB and he feels well  - Transitioned DKd to Kyprolis/pomalyst/dex as bridge to CART (cilta-zohra); KPd median pfs 26 months; vs cart 3 years with 20% >5 years   - Pomalyst 4mg daily day 1-21 of 28 cycle ; KPD initiated April 2024  - He has had DC to KPd  - Decision with patient made to pursue consolidation with ciltacabtagene autoleucel   See CarT     History of autologous stem cell transplant  - see Multiple Myeloma    Antineoplastic chemotherapy induced pancytopenia  - daily CBC  -  transfuse for hgb <7 and platelets <10k  - hold anticoagulation for platelets <50k  - ppx antimicrobials per protocol    Secondary hypercoagulable state  Multiple myeloma/History of auto stem cell transplant  Ppx Lovenox 40 mg SQ    Essential hypertension  Continue amlodipine 10 mg daily  Continue HCTZ 25 mg daily  Continue clonidine 0.3 mg daily    Diabetes mellitus  Type II, well-controlled  - LD SSI    Hypophosphatemia  - daily phos level  - replace per BMT electrolyte protocol        VTE Risk Mitigation (From admission, onward)           Ordered     heparin (porcine) injection 1,600 Units  As needed (PRN)         09/30/24 0824     heparin (porcine) injection 1,000 Units  As needed (PRN)         09/29/24 1744     enoxaparin injection 40 mg  Daily         09/29/24 1700     IP VTE HIGH RISK PATIENT  Once         09/29/24 1700     Place sequential compression device  Until discontinued         09/29/24 1700                    Disposition: inpatient     Carol Trujillo NP  Bone Marrow Transplant  Chavez matilde - Oncology ([facility])

## 2024-10-02 NOTE — PLAN OF CARE
Patient is day +2 car-t, ice score 10. Patient AAOX4, VSS, afebrile, on room air. Patient had no complaints over night. Patient up to the bathroom independently, free from falls and injuries. I&O's monitored as ordered. BG monitored as ordered, no insulin needed. Bed in lowest position, side rails up x2, non-skid socks on, call light in reach. Patient educated to use call light for any needs, patient verbalizes understanding. There are no concerns at this time. Plan of care on going.

## 2024-10-02 NOTE — ASSESSMENT & PLAN NOTE
Multiple myeloma/History of auto stem cell transplant  - IgG Kappa MM standard risk, diagnosed Sept 2020, after presenting w/ lytic lesions  - s/p 8 cycles Vrd followed by Alea Auto 5/17/2021 with disease relapse and Dkd salvage therapy 8/4/2023  - Achieving/mainting OR post SCT; was on revlimid maintenance but relapsed biochemically and with new lytic disease approximately 2 years post SCT (June/July 2023)  - Initiated Melinda-Kd salvage on 8/4/23; tolerating will no significant AE's   - Melinda subq weekly 8>EOW x 8 doses> q 4 week; kyprolis 70mg/m2 day 1, 8, 15 of 28 day cycle; dex 40mg po weekly   - Patient with initial good response but noted serial biochemical progression confirmed on 4/3/24 repeat serum studies; mild anemia and Jan 2024 PET with ENDY but no other overt symptoms or CRAB and he feels well  - Transitioned DKd to Kyprolis/pomalyst/dex as bridge to CART (cilta-zohra); KPd median pfs 26 months; vs cart 3 years with 20% >5 years   - Pomalyst 4mg daily day 1-21 of 28 cycle ; KPD initiated April 2024  - He has had OR to KPd  - Decision with patient made to pursue consolidation with ciltacabtagene autoleucel   See CarT

## 2024-10-02 NOTE — PLAN OF CARE
Plan of care reviewed with pt. Day +2 CART. Pt AAOX4. ICE score 10. BG monitored, prn insulin provided. Pt ambulates in room and to restroom independently. All VSS, afebrile, free from falls or injuries; no acute events so far this shift. Pt in bed with non-skid socks on, bed locked and in lowest position, side rails up x2, call light and personal items within reach. Pt instructed to call for assistance as needed. Iv drip continued at 75 ml/hr. PO electrolyte replaced. Patient stable at this time.

## 2024-10-02 NOTE — ASSESSMENT & PLAN NOTE
Patient of Dr. Dameon Baker  - Initiated LDC flu/cy on 9/25/24, central line placement and brain MRI completed on 9/24   - COVID positive 9/27/24, repeat negative. Per ID thought to be false +   - admitted for cilta-zohra CAR-T on 9/29/24. Day +2 today  -  received 0.50x10^6 CAR+ vT cells/kg 9/30 at 1130am, tolerated without issue  -  daily ferritin, LDH and CRP  - ICE 10 today  - keppra 750 mg two times/day for seizure ppx  - ppx antimicrobials    Planned Lymphodepletion Regimen:  Cyclophosphamide 300mg/m2 on Day -5, -4, and -3  Fludarabine 30mg/m2 on Day -5, -4, and -3     Pre-Medications:  Acetaminophen on Day 0  Diphenhydramine on Day 0     Immune Effector Cell Therapy:  Ciltacabtagene autoleucel on Day 0     Antimicrobial Prophylaxis:  Acyclovir starting on Day -5  Levofloxacin starting on Day -5  Fluconazole starting on Day -5  Pentamidine on day -5  Sulfamethoxazole-trimethoprim starting on Day +30     Seizure Prophylaxis:  Levetiracetam on Day 0 through Day +30        Caregiver: Catie (friend)  Post-transplant discharge plans: home

## 2024-10-03 LAB
ABO + RH BLD: NORMAL
ALBUMIN SERPL BCP-MCNC: 2.9 G/DL (ref 3.5–5.2)
ALP SERPL-CCNC: 53 U/L (ref 55–135)
ALT SERPL W/O P-5'-P-CCNC: 13 U/L (ref 10–44)
ANION GAP SERPL CALC-SCNC: 5 MMOL/L (ref 8–16)
ANISOCYTOSIS BLD QL SMEAR: SLIGHT
AST SERPL-CCNC: 14 U/L (ref 10–40)
BASOPHILS # BLD AUTO: 0 K/UL (ref 0–0.2)
BASOPHILS NFR BLD: 0 % (ref 0–1.9)
BILIRUB SERPL-MCNC: 0.5 MG/DL (ref 0.1–1)
BLD GP AB SCN CELLS X3 SERPL QL: NORMAL
BUN SERPL-MCNC: 15 MG/DL (ref 8–23)
BURR CELLS BLD QL SMEAR: ABNORMAL
CALCIUM SERPL-MCNC: 8.2 MG/DL (ref 8.7–10.5)
CHLORIDE SERPL-SCNC: 110 MMOL/L (ref 95–110)
CO2 SERPL-SCNC: 22 MMOL/L (ref 23–29)
CREAT SERPL-MCNC: 0.9 MG/DL (ref 0.5–1.4)
CRP SERPL-MCNC: 45.1 MG/L (ref 0–8.2)
DIFFERENTIAL METHOD BLD: ABNORMAL
EOSINOPHIL # BLD AUTO: 0.4 K/UL (ref 0–0.5)
EOSINOPHIL NFR BLD: 38.1 % (ref 0–8)
ERYTHROCYTE [DISTWIDTH] IN BLOOD BY AUTOMATED COUNT: 16.6 % (ref 11.5–14.5)
EST. GFR  (NO RACE VARIABLE): >60 ML/MIN/1.73 M^2
FERRITIN SERPL-MCNC: 476 NG/ML (ref 20–300)
GLUCOSE SERPL-MCNC: 109 MG/DL (ref 70–110)
HCT VFR BLD AUTO: 31.4 % (ref 40–54)
HGB BLD-MCNC: 10.4 G/DL (ref 14–18)
HYPOCHROMIA BLD QL SMEAR: ABNORMAL
IMM GRANULOCYTES # BLD AUTO: 0.02 K/UL (ref 0–0.04)
IMM GRANULOCYTES NFR BLD AUTO: 2.1 % (ref 0–0.5)
LDH SERPL L TO P-CCNC: 193 U/L (ref 110–260)
LYMPHOCYTES # BLD AUTO: 0.1 K/UL (ref 1–4.8)
LYMPHOCYTES NFR BLD: 13.4 % (ref 18–48)
MAGNESIUM SERPL-MCNC: 1.8 MG/DL (ref 1.6–2.6)
MCH RBC QN AUTO: 31.6 PG (ref 27–31)
MCHC RBC AUTO-ENTMCNC: 33.1 G/DL (ref 32–36)
MCV RBC AUTO: 95 FL (ref 82–98)
MONOCYTES # BLD AUTO: 0.2 K/UL (ref 0.3–1)
MONOCYTES NFR BLD: 18.6 % (ref 4–15)
NEUTROPHILS # BLD AUTO: 0.3 K/UL (ref 1.8–7.7)
NEUTROPHILS NFR BLD: 27.8 % (ref 38–73)
NRBC BLD-RTO: 2 /100 WBC
OVALOCYTES BLD QL SMEAR: ABNORMAL
PHOSPHATE SERPL-MCNC: 3.1 MG/DL (ref 2.7–4.5)
PLATELET # BLD AUTO: 95 K/UL (ref 150–450)
PLATELET BLD QL SMEAR: ABNORMAL
PMV BLD AUTO: 12.1 FL (ref 9.2–12.9)
POCT GLUCOSE: 109 MG/DL (ref 70–110)
POCT GLUCOSE: 114 MG/DL (ref 70–110)
POCT GLUCOSE: 164 MG/DL (ref 70–110)
POIKILOCYTOSIS BLD QL SMEAR: SLIGHT
POLYCHROMASIA BLD QL SMEAR: ABNORMAL
POTASSIUM SERPL-SCNC: 4 MMOL/L (ref 3.5–5.1)
PROT SERPL-MCNC: 5.3 G/DL (ref 6–8.4)
RBC # BLD AUTO: 3.29 M/UL (ref 4.6–6.2)
SCHISTOCYTES BLD QL SMEAR: ABNORMAL
SODIUM SERPL-SCNC: 137 MMOL/L (ref 136–145)
SPECIMEN OUTDATE: NORMAL
WBC # BLD AUTO: 0.97 K/UL (ref 3.9–12.7)

## 2024-10-03 PROCEDURE — 86900 BLOOD TYPING SEROLOGIC ABO: CPT | Performed by: INTERNAL MEDICINE

## 2024-10-03 PROCEDURE — 85025 COMPLETE CBC W/AUTO DIFF WBC: CPT | Performed by: NURSE PRACTITIONER

## 2024-10-03 PROCEDURE — 86140 C-REACTIVE PROTEIN: CPT | Performed by: NURSE PRACTITIONER

## 2024-10-03 PROCEDURE — 25000003 PHARM REV CODE 250: Performed by: NURSE PRACTITIONER

## 2024-10-03 PROCEDURE — 25000003 PHARM REV CODE 250: Performed by: INTERNAL MEDICINE

## 2024-10-03 PROCEDURE — 80053 COMPREHEN METABOLIC PANEL: CPT | Performed by: NURSE PRACTITIONER

## 2024-10-03 PROCEDURE — 84100 ASSAY OF PHOSPHORUS: CPT | Performed by: NURSE PRACTITIONER

## 2024-10-03 PROCEDURE — 20600001 HC STEP DOWN PRIVATE ROOM

## 2024-10-03 PROCEDURE — 86901 BLOOD TYPING SEROLOGIC RH(D): CPT | Performed by: INTERNAL MEDICINE

## 2024-10-03 PROCEDURE — 86850 RBC ANTIBODY SCREEN: CPT | Performed by: INTERNAL MEDICINE

## 2024-10-03 PROCEDURE — 99232 SBSQ HOSP IP/OBS MODERATE 35: CPT | Mod: FS,,, | Performed by: INTERNAL MEDICINE

## 2024-10-03 PROCEDURE — 83615 LACTATE (LD) (LDH) ENZYME: CPT | Performed by: NURSE PRACTITIONER

## 2024-10-03 PROCEDURE — 83735 ASSAY OF MAGNESIUM: CPT | Performed by: NURSE PRACTITIONER

## 2024-10-03 PROCEDURE — 82728 ASSAY OF FERRITIN: CPT | Performed by: NURSE PRACTITIONER

## 2024-10-03 PROCEDURE — 63600175 PHARM REV CODE 636 W HCPCS: Performed by: NURSE PRACTITIONER

## 2024-10-03 RX ADMIN — GABAPENTIN 300 MG: 300 CAPSULE ORAL at 09:10

## 2024-10-03 RX ADMIN — CLONIDINE HYDROCHLORIDE 0.3 MG: 0.1 TABLET ORAL at 09:10

## 2024-10-03 RX ADMIN — ACYCLOVIR 800 MG: 200 CAPSULE ORAL at 09:10

## 2024-10-03 RX ADMIN — LEVETIRACETAM 750 MG: 750 TABLET, FILM COATED ORAL at 09:10

## 2024-10-03 RX ADMIN — HYDROCHLOROTHIAZIDE 25 MG: 25 TABLET ORAL at 09:10

## 2024-10-03 RX ADMIN — LEVOFLOXACIN 500 MG: 500 TABLET, FILM COATED ORAL at 09:10

## 2024-10-03 RX ADMIN — Medication 400 MG: at 05:10

## 2024-10-03 RX ADMIN — Medication 400 MG: at 09:10

## 2024-10-03 RX ADMIN — ENOXAPARIN SODIUM 40 MG: 40 INJECTION SUBCUTANEOUS at 04:10

## 2024-10-03 RX ADMIN — AMLODIPINE BESYLATE 10 MG: 10 TABLET ORAL at 09:10

## 2024-10-03 RX ADMIN — FLUCONAZOLE 400 MG: 200 TABLET ORAL at 09:10

## 2024-10-03 RX ADMIN — INSULIN ASPART 1 UNITS: 100 INJECTION, SOLUTION INTRAVENOUS; SUBCUTANEOUS at 09:10

## 2024-10-03 RX ADMIN — SODIUM CHLORIDE 75 ML/HR: 9 INJECTION, SOLUTION INTRAVENOUS at 04:10

## 2024-10-03 NOTE — SUBJECTIVE & OBJECTIVE
Subjective:     Interval History: Day +3 Carvykti for R/R Multiple Myeloma. ICE 10, afebrile, inflammatory markers stable. No complaints     Objective:     Vital Signs (Most Recent):  Temp: 98.3 °F (36.8 °C) (10/03/24 0745)  Pulse: 62 (10/03/24 0745)  Resp: 18 (10/03/24 0745)  BP: 135/84 (10/03/24 0745)  SpO2: 97 % (10/03/24 0745) Vital Signs (24h Range):  Temp:  [97.6 °F (36.4 °C)-98.3 °F (36.8 °C)] 98.3 °F (36.8 °C)  Pulse:  [55-75] 62  Resp:  [17-18] 18  SpO2:  [96 %-99 %] 97 %  BP: (106-136)/(67-84) 135/84     Weight: 92.7 kg (204 lb 5.9 oz)  Body mass index is 32.01 kg/m².  Body surface area is 2.09 meters squared.      Intake/Output - Last 3 Shifts         10/01 0700  10/02 0659 10/02 0700  10/03 0659 10/03 0700  10/04 0659    P.O. 1346 1386     I.V. (mL/kg) 1726.5 (18.6) 1807.8 (19.5)     Blood       IV Piggyback       Total Intake(mL/kg) 3072.5 (33.1) 3193.8 (34.5)     Urine (mL/kg/hr) 1800 (0.8) 1400 (0.6)     Stool 0 0     Total Output 1800 1400     Net +1272.5 +1793.8            Urine Occurrence 6 x 3 x     Stool Occurrence 1 x 2 x              Physical Exam  Vitals reviewed.   Constitutional:       General: He is not in acute distress.     Appearance: Normal appearance. He is well-developed.   HENT:      Head: Normocephalic and atraumatic.      Mouth/Throat:      Pharynx: No oropharyngeal exudate.   Eyes:      General: No scleral icterus.     Extraocular Movements: Extraocular movements intact.      Conjunctiva/sclera: Conjunctivae normal.      Pupils: Pupils are equal, round, and reactive to light.   Neck:      Thyroid: No thyromegaly.   Cardiovascular:      Rate and Rhythm: Normal rate and regular rhythm.      Pulses: Normal pulses.      Heart sounds: Normal heart sounds.   Pulmonary:      Effort: Pulmonary effort is normal. No respiratory distress.      Breath sounds: Normal breath sounds.   Abdominal:      General: Abdomen is flat. Bowel sounds are normal. There is no distension.      Palpations:  Abdomen is soft.      Tenderness: There is no abdominal tenderness.   Musculoskeletal:         General: No swelling or tenderness. Normal range of motion.      Cervical back: Normal range of motion and neck supple. Normal range of motion.   Skin:     General: Skin is warm and dry.      Coloration: Skin is not pale.      Findings: No petechiae or rash.      Comments: Vascath intact to LCW, no redness or drainage noted   Neurological:      General: No focal deficit present.      Mental Status: He is alert and oriented to person, place, and time.      Cranial Nerves: No cranial nerve deficit.      Coordination: Coordination normal.   Psychiatric:         Mood and Affect: Mood normal.         Behavior: Behavior normal.         Thought Content: Thought content normal.            Significant Labs:   CBC:   Recent Labs   Lab 10/02/24  0341 10/03/24  0336   WBC 0.90* 0.97*   HGB 10.4* 10.4*   HCT 32.6* 31.4*   PLT 71* 95*    and CMP:   Recent Labs   Lab 10/02/24  0341 10/03/24  0336    137   K 3.7 4.0    110   CO2 22* 22*   * 109   BUN 16 15   CREATININE 1.0 0.9   CALCIUM 8.2* 8.2*   PROT 5.2* 5.3*   ALBUMIN 2.9* 2.9*   BILITOT 0.6 0.5   ALKPHOS 49* 53*   AST 11 14   ALT 9* 13   ANIONGAP 6* 5*       Diagnostic Results:  None

## 2024-10-03 NOTE — ASSESSMENT & PLAN NOTE
Multiple myeloma/History of auto stem cell transplant  - IgG Kappa MM standard risk, diagnosed Sept 2020, after presenting w/ lytic lesions  - s/p 8 cycles Vrd followed by Alea Auto 5/17/2021 with disease relapse and Dkd salvage therapy 8/4/2023  - Achieving/mainting DC post SCT; was on revlimid maintenance but relapsed biochemically and with new lytic disease approximately 2 years post SCT (June/July 2023)  - Initiated Melinda-Kd salvage on 8/4/23; tolerating will no significant AE's   - Melinda subq weekly 8>EOW x 8 doses> q 4 week; kyprolis 70mg/m2 day 1, 8, 15 of 28 day cycle; dex 40mg po weekly   - Patient with initial good response but noted serial biochemical progression confirmed on 4/3/24 repeat serum studies; mild anemia and Jan 2024 PET with ENDY but no other overt symptoms or CRAB and he feels well  - Transitioned DKd to Kyprolis/pomalyst/dex as bridge to CART (cilta-zohra); KPd median pfs 26 months; vs cart 3 years with 20% >5 years   - Pomalyst 4mg daily day 1-21 of 28 cycle ; KPD initiated April 2024  - He has had DC to KPd  - Decision with patient made to pursue consolidation with ciltacabtagene autoleucel   See CarT

## 2024-10-03 NOTE — PROGRESS NOTES
[facility] - Oncology ([facility])  Hematology  Bone Marrow Transplant  Progress Note    Patient Name: Jaspreet Walsh Jr.  Admission Date: 9/29/2024  Hospital Length of Stay: 4 days  Code Status: Full Code    Subjective:     Interval History: Day +3 Carvykti for R/R Multiple Myeloma. ICE 10, afebrile, inflammatory markers stable. No complaints     Objective:     Vital Signs (Most Recent):  Temp: 98.3 °F (36.8 °C) (10/03/24 0745)  Pulse: 62 (10/03/24 0745)  Resp: 18 (10/03/24 0745)  BP: 135/84 (10/03/24 0745)  SpO2: 97 % (10/03/24 0745) Vital Signs (24h Range):  Temp:  [97.6 °F (36.4 °C)-98.3 °F (36.8 °C)] 98.3 °F (36.8 °C)  Pulse:  [55-75] 62  Resp:  [17-18] 18  SpO2:  [96 %-99 %] 97 %  BP: (106-136)/(67-84) 135/84     Weight: 92.7 kg (204 lb 5.9 oz)  Body mass index is 32.01 kg/m².  Body surface area is 2.09 meters squared.      Intake/Output - Last 3 Shifts         10/01 0700  10/02 0659 10/02 0700  10/03 0659 10/03 0700  10/04 0659    P.O. 1346 1386     I.V. (mL/kg) 1726.5 (18.6) 1807.8 (19.5)     Blood       IV Piggyback       Total Intake(mL/kg) 3072.5 (33.1) 3193.8 (34.5)     Urine (mL/kg/hr) 1800 (0.8) 1400 (0.6)     Stool 0 0     Total Output 1800 1400     Net +1272.5 +1793.8            Urine Occurrence 6 x 3 x     Stool Occurrence 1 x 2 x              Physical Exam  Vitals reviewed.   Constitutional:       General: He is not in acute distress.     Appearance: Normal appearance. He is well-developed.   HENT:      Head: Normocephalic and atraumatic.      Mouth/Throat:      Pharynx: No oropharyngeal exudate.   Eyes:      General: No scleral icterus.     Extraocular Movements: Extraocular movements intact.      Conjunctiva/sclera: Conjunctivae normal.      Pupils: Pupils are equal, round, and reactive to light.   Neck:      Thyroid: No thyromegaly.   Cardiovascular:      Rate and Rhythm: Normal rate and regular rhythm.      Pulses: Normal pulses.      Heart sounds: Normal heart sounds.   Pulmonary:      Effort:  Pulmonary effort is normal. No respiratory distress.      Breath sounds: Normal breath sounds.   Abdominal:      General: Abdomen is flat. Bowel sounds are normal. There is no distension.      Palpations: Abdomen is soft.      Tenderness: There is no abdominal tenderness.   Musculoskeletal:         General: No swelling or tenderness. Normal range of motion.      Cervical back: Normal range of motion and neck supple. Normal range of motion.   Skin:     General: Skin is warm and dry.      Coloration: Skin is not pale.      Findings: No petechiae or rash.      Comments: Vascath intact to LCW, no redness or drainage noted   Neurological:      General: No focal deficit present.      Mental Status: He is alert and oriented to person, place, and time.      Cranial Nerves: No cranial nerve deficit.      Coordination: Coordination normal.   Psychiatric:         Mood and Affect: Mood normal.         Behavior: Behavior normal.         Thought Content: Thought content normal.            Significant Labs:   CBC:   Recent Labs   Lab 10/02/24  0341 10/03/24  0336   WBC 0.90* 0.97*   HGB 10.4* 10.4*   HCT 32.6* 31.4*   PLT 71* 95*    and CMP:   Recent Labs   Lab 10/02/24  0341 10/03/24  0336    137   K 3.7 4.0    110   CO2 22* 22*   * 109   BUN 16 15   CREATININE 1.0 0.9   CALCIUM 8.2* 8.2*   PROT 5.2* 5.3*   ALBUMIN 2.9* 2.9*   BILITOT 0.6 0.5   ALKPHOS 49* 53*   AST 11 14   ALT 9* 13   ANIONGAP 6* 5*       Diagnostic Results:  None  Assessment/Plan:     * S/P chimeric antigen receptor T-cell therapy  Patient of Dr. Dameon Baker  - Initiated LDC flu/cy on 9/25/24, central line placement and brain MRI completed on 9/24   - COVID positive 9/27/24, repeat negative. Per ID thought to be false +   - admitted for cilta-zohra CAR-T on 9/29/24. Day +3 today  -  received 0.50x10^6 CAR+ vT cells/kg 9/30 at 1130am, tolerated without issue  -  daily ferritin, LDH and CRP, stable   - ICE 10 today  - keppra 750 mg two  times/day for seizure ppx  - ppx antimicrobials    Planned Lymphodepletion Regimen:  Cyclophosphamide 300mg/m2 on Day -5, -4, and -3  Fludarabine 30mg/m2 on Day -5, -4, and -3     Pre-Medications:  Acetaminophen on Day 0  Diphenhydramine on Day 0     Immune Effector Cell Therapy:  Ciltacabtagene autoleucel on Day 0     Antimicrobial Prophylaxis:  Acyclovir starting on Day -5  Levofloxacin starting on Day -5  Fluconazole starting on Day -5  Pentamidine on day -5  Sulfamethoxazole-trimethoprim starting on Day +30     Seizure Prophylaxis:  Levetiracetam on Day 0 through Day +30        Caregiver: Catie (friend)  Post-transplant discharge plans: home       Multiple myeloma  Multiple myeloma/History of auto stem cell transplant  - IgG Kappa MM standard risk, diagnosed Sept 2020, after presenting w/ lytic lesions  - s/p 8 cycles Vrd followed by Alea Auto 5/17/2021 with disease relapse and Dkd salvage therapy 8/4/2023  - Achieving/mainting [place] post SCT; was on revlimid maintenance but relapsed biochemically and with new lytic disease approximately 2 years post SCT (June/July 2023)  - Initiated Melinda-Kd salvage on 8/4/23; tolerating will no significant AE's   - Melinda subq weekly 8>EOW x 8 doses> q 4 week; kyprolis 70mg/m2 day 1, 8, 15 of 28 day cycle; dex 40mg po weekly   - Patient with initial good response but noted serial biochemical progression confirmed on 4/3/24 repeat serum studies; mild anemia and Jan 2024 PET with ENDY but no other overt symptoms or CRAB and he feels well  - Transitioned DKd to Kyprolis/pomalyst/dex as bridge to CART (cilta-zohra); KPd median pfs 26 months; vs cart 3 years with 20% >5 years   - Pomalyst 4mg daily day 1-21 of 28 cycle ; KPD initiated April 2024  - He has had OR to KPd  - Decision with patient made to pursue consolidation with ciltacabtagene autoleucel   See CarT     History of autologous stem cell transplant  - see Multiple Myeloma    Antineoplastic chemotherapy induced pancytopenia  - daily  CBC  - transfuse for hgb <7 and platelets <10k  - hold anticoagulation for platelets <50k  - ppx antimicrobials per protocol    Secondary hypercoagulable state  Multiple myeloma/History of auto stem cell transplant  Ppx Lovenox 40 mg SQ    Essential hypertension  Continue amlodipine 10 mg daily  Continue HCTZ 25 mg daily  Continue clonidine 0.3 mg daily    Diabetes mellitus  Type II, well-controlled  - LD SSI    Hypophosphatemia  - daily phos level  - replace per BMT electrolyte protocol        VTE Risk Mitigation (From admission, onward)           Ordered     heparin (porcine) injection 1,600 Units  As needed (PRN)         09/30/24 0824     heparin (porcine) injection 1,000 Units  As needed (PRN)         09/29/24 1744     enoxaparin injection 40 mg  Daily         09/29/24 1700     IP VTE HIGH RISK PATIENT  Once         09/29/24 1700     Place sequential compression device  Until discontinued         09/29/24 1700                    Disposition: inpatient     Carol Trujillo NP  Bone Marrow Transplant  [facility] - Oncology ([facility])

## 2024-10-03 NOTE — ASSESSMENT & PLAN NOTE
Patient of Dr. Dameon Baker  - Initiated LDC flu/cy on 9/25/24, central line placement and brain MRI completed on 9/24   - COVID positive 9/27/24, repeat negative. Per ID thought to be false +   - admitted for cilta-zohra CAR-T on 9/29/24. Day +3 today  -  received 0.50x10^6 CAR+ vT cells/kg 9/30 at 1130am, tolerated without issue  -  daily ferritin, LDH and CRP, stable   - ICE 10 today  - keppra 750 mg two times/day for seizure ppx  - ppx antimicrobials    Planned Lymphodepletion Regimen:  Cyclophosphamide 300mg/m2 on Day -5, -4, and -3  Fludarabine 30mg/m2 on Day -5, -4, and -3     Pre-Medications:  Acetaminophen on Day 0  Diphenhydramine on Day 0     Immune Effector Cell Therapy:  Ciltacabtagene autoleucel on Day 0     Antimicrobial Prophylaxis:  Acyclovir starting on Day -5  Levofloxacin starting on Day -5  Fluconazole starting on Day -5  Pentamidine on day -5  Sulfamethoxazole-trimethoprim starting on Day +30     Seizure Prophylaxis:  Levetiracetam on Day 0 through Day +30        Caregiver: Catie (friend)  Post-transplant discharge plans: home

## 2024-10-03 NOTE — PLAN OF CARE
Day +3 Carvykti.  Patient with an ICE score of 10 this shift.  Afebrile today.  Some mild bradycardia noted.  Patient asymptomatic.  IVF infusing per MD order.  Magnesium replaced for MD order, tolerated well.  Patient with no complaints of pain or nausea this shift.  Good appetite and fluid intake.  Blood glucose monitored per order.  No sliding scale needed.  Patient ambulating frequently in room and performing ADLs independently.  No falls or injuries.  Bed in low position, rails up x 2, belongings and call light within reach.  No s/s of distress.  Will continue to monitor.

## 2024-10-03 NOTE — PLAN OF CARE
Patient is day +3 car-t, ice score 10. Patient AAOX4, VSS, afebrile, on room air. Patient had no complaints over night. Patient up to the bathroom independently, free from falls and injuries. I&O's monitored as ordered. BG monitored as ordered, 2 units needed. Bed in lowest position, side rails up x2, non-skid socks on, call light in reach. Patient educated to use call light for any needs, patient verbalizes understanding. There are no concerns at this time. Plan of care on going.

## 2024-10-04 LAB
ALBUMIN SERPL BCP-MCNC: 3.4 G/DL (ref 3.5–5.2)
ALP SERPL-CCNC: 73 U/L (ref 55–135)
ALT SERPL W/O P-5'-P-CCNC: 24 U/L (ref 10–44)
ANION GAP SERPL CALC-SCNC: 11 MMOL/L (ref 8–16)
ANISOCYTOSIS BLD QL SMEAR: SLIGHT
AST SERPL-CCNC: 23 U/L (ref 10–40)
BASOPHILS # BLD AUTO: 0 K/UL (ref 0–0.2)
BASOPHILS NFR BLD: 0 % (ref 0–1.9)
BILIRUB SERPL-MCNC: 0.8 MG/DL (ref 0.1–1)
BUN SERPL-MCNC: 14 MG/DL (ref 8–23)
BURR CELLS BLD QL SMEAR: ABNORMAL
CALCIUM SERPL-MCNC: 9 MG/DL (ref 8.7–10.5)
CHLORIDE SERPL-SCNC: 108 MMOL/L (ref 95–110)
CO2 SERPL-SCNC: 18 MMOL/L (ref 23–29)
CREAT SERPL-MCNC: 1 MG/DL (ref 0.5–1.4)
CRP SERPL-MCNC: 43.8 MG/L (ref 0–8.2)
DIFFERENTIAL METHOD BLD: ABNORMAL
EOSINOPHIL # BLD AUTO: 0.5 K/UL (ref 0–0.5)
EOSINOPHIL NFR BLD: 33.8 % (ref 0–8)
ERYTHROCYTE [DISTWIDTH] IN BLOOD BY AUTOMATED COUNT: 16.6 % (ref 11.5–14.5)
EST. GFR  (NO RACE VARIABLE): >60 ML/MIN/1.73 M^2
FERRITIN SERPL-MCNC: 704 NG/ML (ref 20–300)
GLUCOSE SERPL-MCNC: 115 MG/DL (ref 70–110)
HCT VFR BLD AUTO: 36 % (ref 40–54)
HGB BLD-MCNC: 11.8 G/DL (ref 14–18)
HYPOCHROMIA BLD QL SMEAR: ABNORMAL
IMM GRANULOCYTES # BLD AUTO: 0.02 K/UL (ref 0–0.04)
IMM GRANULOCYTES NFR BLD AUTO: 1.3 % (ref 0–0.5)
LDH SERPL L TO P-CCNC: 217 U/L (ref 110–260)
LYMPHOCYTES # BLD AUTO: 0.3 K/UL (ref 1–4.8)
LYMPHOCYTES NFR BLD: 20.5 % (ref 18–48)
MAGNESIUM SERPL-MCNC: 1.7 MG/DL (ref 1.6–2.6)
MCH RBC QN AUTO: 31.7 PG (ref 27–31)
MCHC RBC AUTO-ENTMCNC: 32.8 G/DL (ref 32–36)
MCV RBC AUTO: 97 FL (ref 82–98)
MONOCYTES # BLD AUTO: 0.4 K/UL (ref 0.3–1)
MONOCYTES NFR BLD: 23.8 % (ref 4–15)
NEUTROPHILS # BLD AUTO: 0.3 K/UL (ref 1.8–7.7)
NEUTROPHILS NFR BLD: 20.6 % (ref 38–73)
NRBC BLD-RTO: 0 /100 WBC
PHOSPHATE SERPL-MCNC: 2.6 MG/DL (ref 2.7–4.5)
PLATELET # BLD AUTO: 115 K/UL (ref 150–450)
PLATELET BLD QL SMEAR: ABNORMAL
PMV BLD AUTO: 13.1 FL (ref 9.2–12.9)
POCT GLUCOSE: 119 MG/DL (ref 70–110)
POCT GLUCOSE: 123 MG/DL (ref 70–110)
POCT GLUCOSE: 134 MG/DL (ref 70–110)
POCT GLUCOSE: 135 MG/DL (ref 70–110)
POIKILOCYTOSIS BLD QL SMEAR: SLIGHT
POLYCHROMASIA BLD QL SMEAR: ABNORMAL
POTASSIUM SERPL-SCNC: 4.1 MMOL/L (ref 3.5–5.1)
PROT SERPL-MCNC: 6.5 G/DL (ref 6–8.4)
RBC # BLD AUTO: 3.72 M/UL (ref 4.6–6.2)
SODIUM SERPL-SCNC: 137 MMOL/L (ref 136–145)
WBC # BLD AUTO: 1.51 K/UL (ref 3.9–12.7)

## 2024-10-04 PROCEDURE — 25000003 PHARM REV CODE 250: Performed by: NURSE PRACTITIONER

## 2024-10-04 PROCEDURE — 84100 ASSAY OF PHOSPHORUS: CPT | Performed by: NURSE PRACTITIONER

## 2024-10-04 PROCEDURE — 82728 ASSAY OF FERRITIN: CPT | Performed by: NURSE PRACTITIONER

## 2024-10-04 PROCEDURE — 85025 COMPLETE CBC W/AUTO DIFF WBC: CPT | Performed by: NURSE PRACTITIONER

## 2024-10-04 PROCEDURE — 99233 SBSQ HOSP IP/OBS HIGH 50: CPT | Mod: GC,,, | Performed by: INTERNAL MEDICINE

## 2024-10-04 PROCEDURE — 86140 C-REACTIVE PROTEIN: CPT | Performed by: NURSE PRACTITIONER

## 2024-10-04 PROCEDURE — 83615 LACTATE (LD) (LDH) ENZYME: CPT | Performed by: NURSE PRACTITIONER

## 2024-10-04 PROCEDURE — 63600175 PHARM REV CODE 636 W HCPCS: Performed by: NURSE PRACTITIONER

## 2024-10-04 PROCEDURE — 25000003 PHARM REV CODE 250: Performed by: INTERNAL MEDICINE

## 2024-10-04 PROCEDURE — 20600001 HC STEP DOWN PRIVATE ROOM

## 2024-10-04 PROCEDURE — 83735 ASSAY OF MAGNESIUM: CPT | Performed by: NURSE PRACTITIONER

## 2024-10-04 PROCEDURE — 80053 COMPREHEN METABOLIC PANEL: CPT | Performed by: NURSE PRACTITIONER

## 2024-10-04 RX ORDER — MUPIROCIN 20 MG/G
OINTMENT TOPICAL 2 TIMES DAILY
Status: COMPLETED | OUTPATIENT
Start: 2024-10-04 | End: 2024-10-09

## 2024-10-04 RX ADMIN — CLONIDINE HYDROCHLORIDE 0.3 MG: 0.1 TABLET ORAL at 09:10

## 2024-10-04 RX ADMIN — MUPIROCIN: 20 OINTMENT TOPICAL at 08:10

## 2024-10-04 RX ADMIN — LEVETIRACETAM 750 MG: 750 TABLET, FILM COATED ORAL at 09:10

## 2024-10-04 RX ADMIN — HYDROCHLOROTHIAZIDE 25 MG: 25 TABLET ORAL at 09:10

## 2024-10-04 RX ADMIN — GABAPENTIN 300 MG: 300 CAPSULE ORAL at 09:10

## 2024-10-04 RX ADMIN — SODIUM CHLORIDE 75 ML/HR: 9 INJECTION, SOLUTION INTRAVENOUS at 06:10

## 2024-10-04 RX ADMIN — DIBASIC SODIUM PHOSPHATE, MONOBASIC POTASSIUM PHOSPHATE AND MONOBASIC SODIUM PHOSPHATE 1 TABLET: 852; 155; 130 TABLET ORAL at 04:10

## 2024-10-04 RX ADMIN — DIBASIC SODIUM PHOSPHATE, MONOBASIC POTASSIUM PHOSPHATE AND MONOBASIC SODIUM PHOSPHATE 1 TABLET: 852; 155; 130 TABLET ORAL at 09:10

## 2024-10-04 RX ADMIN — Medication 400 MG: at 06:10

## 2024-10-04 RX ADMIN — DIBASIC SODIUM PHOSPHATE, MONOBASIC POTASSIUM PHOSPHATE AND MONOBASIC SODIUM PHOSPHATE 1 TABLET: 852; 155; 130 TABLET ORAL at 06:10

## 2024-10-04 RX ADMIN — GABAPENTIN 300 MG: 300 CAPSULE ORAL at 08:10

## 2024-10-04 RX ADMIN — FLUCONAZOLE 400 MG: 200 TABLET ORAL at 09:10

## 2024-10-04 RX ADMIN — ACYCLOVIR 800 MG: 200 CAPSULE ORAL at 09:10

## 2024-10-04 RX ADMIN — ENOXAPARIN SODIUM 40 MG: 40 INJECTION SUBCUTANEOUS at 04:10

## 2024-10-04 RX ADMIN — LEVETIRACETAM 750 MG: 750 TABLET, FILM COATED ORAL at 08:10

## 2024-10-04 RX ADMIN — Medication 400 MG: at 10:10

## 2024-10-04 RX ADMIN — DIBASIC SODIUM PHOSPHATE, MONOBASIC POTASSIUM PHOSPHATE AND MONOBASIC SODIUM PHOSPHATE 1 TABLET: 852; 155; 130 TABLET ORAL at 12:10

## 2024-10-04 RX ADMIN — LEVOFLOXACIN 500 MG: 500 TABLET, FILM COATED ORAL at 09:10

## 2024-10-04 RX ADMIN — ACYCLOVIR 800 MG: 200 CAPSULE ORAL at 08:10

## 2024-10-04 NOTE — PROGRESS NOTES
[facility] - Oncology ([facility])  Hematology  Bone Marrow Transplant  Progress Note    Patient Name: Jaspreet Walsh Jr.  Admission Date: 9/29/2024  Hospital Length of Stay: 5 days  Code Status: Full Code    Subjective:     Interval History:  Day +4  Carvykti for R/R Multiple Myeloma. ICE 10, afebrile, VSS. No new complaints. Blood counts and inflammatory markers stable. Continue supportive care    Objective:     Vital Signs (Most Recent):  Temp: 98.5 °F (36.9 °C) (10/04/24 0751)  Pulse: (!) 59 (10/04/24 0751)  Resp: 17 (10/04/24 0751)  BP: 103/71 (10/04/24 0751)  SpO2: 97 % (10/04/24 0751) Vital Signs (24h Range):  Temp:  [97.5 °F (36.4 °C)-98.7 °F (37.1 °C)] 98.5 °F (36.9 °C)  Pulse:  [56-62] 59  Resp:  [17-20] 17  SpO2:  [95 %-98 %] 97 %  BP: (103-124)/(64-81) 103/71     Weight: 92.7 kg (204 lb 5.9 oz)  Body mass index is 32.01 kg/m².  Body surface area is 2.09 meters squared.    ECOG SCORE           [unfilled]    Intake/Output - Last 3 Shifts         10/02 0700  10/03 0659 10/03 0700  10/04 0659 10/04 0700  10/05 0659    P.O. 1386 690     I.V. (mL/kg) 1807.8 (19.5) 1866.7 (20.1)     Total Intake(mL/kg) 3193.8 (34.5) 2556.7 (27.6)     Urine (mL/kg/hr) 1400 (0.6) 2300 (1)     Stool 0 0     Total Output 1400 2300     Net +1793.8 +256.7            Urine Occurrence 3 x      Stool Occurrence 2 x 1 x              Physical Exam  Vitals reviewed.   Constitutional:       General: He is not in acute distress.     Appearance: Normal appearance. He is well-developed.   HENT:      Head: Normocephalic and atraumatic.      Mouth/Throat:      Pharynx: No oropharyngeal exudate.   Eyes:      General: No scleral icterus.     Extraocular Movements: Extraocular movements intact.      Conjunctiva/sclera: Conjunctivae normal.      Pupils: Pupils are equal, round, and reactive to light.   Neck:      Thyroid: No thyromegaly.   Cardiovascular:      Rate and Rhythm: Normal rate and regular rhythm.      Pulses: Normal pulses.      Heart sounds:  Normal heart sounds.   Pulmonary:      Effort: Pulmonary effort is normal. No respiratory distress.      Breath sounds: Normal breath sounds.   Abdominal:      General: Abdomen is flat. Bowel sounds are normal. There is no distension.      Palpations: Abdomen is soft.      Tenderness: There is no abdominal tenderness.   Musculoskeletal:         General: No swelling or tenderness. Normal range of motion.      Cervical back: Normal range of motion and neck supple. Normal range of motion.   Skin:     General: Skin is warm and dry.      Coloration: Skin is not pale.      Findings: No petechiae or rash.      Comments: Vascath intact to LCW, no redness or drainage noted   Neurological:      General: No focal deficit present.      Mental Status: He is alert and oriented to person, place, and time.      Cranial Nerves: No cranial nerve deficit.      Coordination: Coordination normal.   Psychiatric:         Mood and Affect: Mood normal.         Behavior: Behavior normal.         Thought Content: Thought content normal.            Significant Labs:   All pertinent labs from the last 24 hours have been reviewed.    Diagnostic Results:  I have reviewed and interpreted all pertinent imaging results/findings within the past 24 hours.  Assessment/Plan:     * S/P chimeric antigen receptor T-cell therapy  Patient of Dr. Dameon Baker  - Initiated LDC flu/cy on 9/25/24, central line placement and brain MRI completed on 9/24   - COVID positive 9/27/24, repeat negative. Per ID thought to be false +   - admitted for cilta-zohra CAR-T on 9/29/24. Day +3 today  -  received 0.50x10^6 CAR+ vT cells/kg 9/30 at 1130am, tolerated without issue  -  daily ferritin, LDH and CRP, stable   - ICE 10 today  - keppra 750 mg two times/day for seizure ppx  - ppx antimicrobials    Planned Lymphodepletion Regimen:  Cyclophosphamide 300mg/m2 on Day -5, -4, and -3  Fludarabine 30mg/m2 on Day -5, -4, and -3     Pre-Medications:  Acetaminophen on Day  0  Diphenhydramine on Day 0     Immune Effector Cell Therapy:  Ciltacabtagene autoleucel on Day 0     Antimicrobial Prophylaxis:  Acyclovir starting on Day -5  Levofloxacin starting on Day -5  Fluconazole starting on Day -5  Pentamidine on day -5  Sulfamethoxazole-trimethoprim starting on Day +30     Seizure Prophylaxis:  Levetiracetam on Day 0 through Day +30        Caregiver: Catie (friend)  Post-transplant discharge plans: home       Hypophosphatemia  - daily phos level  - replace per BMT electrolyte protocol    Secondary hypercoagulable state  Multiple myeloma/History of auto stem cell transplant  Ppx Lovenox 40 mg SQ    Diabetes mellitus  Type II, well-controlled  - LD SSI    History of autologous stem cell transplant  - see Multiple Myeloma    Antineoplastic chemotherapy induced pancytopenia  - daily CBC  - transfuse for hgb <7 and platelets <10k  - hold anticoagulation for platelets <50k  - ppx antimicrobials per protocol    Multiple myeloma  Multiple myeloma/History of auto stem cell transplant  - IgG Kappa MM standard risk, diagnosed Sept 2020, after presenting w/ lytic lesions  - s/p 8 cycles Vrd followed by Alea Auto 5/17/2021 with disease relapse and Dkd salvage therapy 8/4/2023  - Achieving/mainting [place] post SCT; was on revlimid maintenance but relapsed biochemically and with new lytic disease approximately 2 years post SCT (June/July 2023)  - Initiated Melinda-Kd salvage on 8/4/23; tolerating will no significant AE's   - Melinda subq weekly 8>EOW x 8 doses> q 4 week; kyprolis 70mg/m2 day 1, 8, 15 of 28 day cycle; dex 40mg po weekly   - Patient with initial good response but noted serial biochemical progression confirmed on 4/3/24 repeat serum studies; mild anemia and Jan 2024 PET with ENDY but no other overt symptoms or CRAB and he feels well  - Transitioned DKd to Kyprolis/pomalyst/dex as bridge to CART (cilta-zohra); KPd median pfs 26 months; vs cart 3 years with 20% >5 years   - Pomalyst 4mg daily day 1-21 of  28 cycle ; KPD initiated April 2024  - He has had GA to KPd  - Decision with patient made to pursue consolidation with ciltacabtagene autoleucel   See CarT     Essential hypertension  Continue amlodipine 10 mg daily  Continue HCTZ 25 mg daily  Continue clonidine 0.3 mg daily        VTE Risk Mitigation (From admission, onward)           Ordered     heparin (porcine) injection 1,600 Units  As needed (PRN)         09/30/24 0824     heparin (porcine) injection 1,000 Units  As needed (PRN)         09/29/24 1744     enoxaparin injection 40 mg  Daily         09/29/24 1700     IP VTE HIGH RISK PATIENT  Once         09/29/24 1700     Place sequential compression device  Until discontinued         09/29/24 1700                    Disposition: Home    Hillary Vitale MD  Bone Marrow Transplant  [facility] - Oncology ([facility])

## 2024-10-04 NOTE — PLAN OF CARE
Pt involved in plan of care and communicating needs throughout shift. Up in room and to bathroom independently; voiding without difficulty into urinal. Good urine output. NS infusing at 75 ml/hr. BM x1 this shift. Tolerating diet. No c/o pain or nausea. ICE score 10. AAOx4. All VSS, afebrile. Pt remaining free from falls or injury throughout shift. Cbg 164; 1 unit sliding scale insulin given. Bed locked and in lowest position, personal belongings and call light within reach, non skid socks on when OOB. Pt instructed to call for assistance as needed. Q2H rounding done on pt.

## 2024-10-04 NOTE — NURSING
Pt is Day +4  Carvykti. Pt involved in plan of care and communicating needs throughout shift. ICE Score 10. Up in room and to bathroom independently; no c/o pain. Continues on NS @ 75mL/hr. ACHS, Insulin given per sliding scale. Tolerating diet, voiding without difficulty. Electrolytes replaced PRN as per protocol. All VSS; Afebrilie, AAOx4, no acute events so far this shift.  Pt remaining free from falls or injury throughout shift; bed locked and in lowest position; call light within reach.  Pt instructed to call for assistance as needed.  Q1H rounding done on pt.

## 2024-10-04 NOTE — SUBJECTIVE & OBJECTIVE
Subjective:     Interval History:  Day +4  Carvykti for R/R Multiple Myeloma. ICE 10, afebrile, VSS. No new complaints. Blood counts and inflammatory markers stable. Continue supportive care    Objective:     Vital Signs (Most Recent):  Temp: 98.5 °F (36.9 °C) (10/04/24 0751)  Pulse: (!) 59 (10/04/24 0751)  Resp: 17 (10/04/24 0751)  BP: 103/71 (10/04/24 0751)  SpO2: 97 % (10/04/24 0751) Vital Signs (24h Range):  Temp:  [97.5 °F (36.4 °C)-98.7 °F (37.1 °C)] 98.5 °F (36.9 °C)  Pulse:  [56-62] 59  Resp:  [17-20] 17  SpO2:  [95 %-98 %] 97 %  BP: (103-124)/(64-81) 103/71     Weight: 92.7 kg (204 lb 5.9 oz)  Body mass index is 32.01 kg/m².  Body surface area is 2.09 meters squared.    ECOG SCORE           [unfilled]    Intake/Output - Last 3 Shifts         10/02 0700  10/03 0659 10/03 0700  10/04 0659 10/04 0700  10/05 0659    P.O. 1386 690     I.V. (mL/kg) 1807.8 (19.5) 1866.7 (20.1)     Total Intake(mL/kg) 3193.8 (34.5) 2556.7 (27.6)     Urine (mL/kg/hr) 1400 (0.6) 2300 (1)     Stool 0 0     Total Output 1400 2300     Net +1793.8 +256.7            Urine Occurrence 3 x      Stool Occurrence 2 x 1 x              Physical Exam  Vitals reviewed.   Constitutional:       General: He is not in acute distress.     Appearance: Normal appearance. He is well-developed.   HENT:      Head: Normocephalic and atraumatic.      Mouth/Throat:      Pharynx: No oropharyngeal exudate.   Eyes:      General: No scleral icterus.     Extraocular Movements: Extraocular movements intact.      Conjunctiva/sclera: Conjunctivae normal.      Pupils: Pupils are equal, round, and reactive to light.   Neck:      Thyroid: No thyromegaly.   Cardiovascular:      Rate and Rhythm: Normal rate and regular rhythm.      Pulses: Normal pulses.      Heart sounds: Normal heart sounds.   Pulmonary:      Effort: Pulmonary effort is normal. No respiratory distress.      Breath sounds: Normal breath sounds.   Abdominal:      General: Abdomen is flat. Bowel sounds are  normal. There is no distension.      Palpations: Abdomen is soft.      Tenderness: There is no abdominal tenderness.   Musculoskeletal:         General: No swelling or tenderness. Normal range of motion.      Cervical back: Normal range of motion and neck supple. Normal range of motion.   Skin:     General: Skin is warm and dry.      Coloration: Skin is not pale.      Findings: No petechiae or rash.      Comments: Vascath intact to LCW, no redness or drainage noted   Neurological:      General: No focal deficit present.      Mental Status: He is alert and oriented to person, place, and time.      Cranial Nerves: No cranial nerve deficit.      Coordination: Coordination normal.   Psychiatric:         Mood and Affect: Mood normal.         Behavior: Behavior normal.         Thought Content: Thought content normal.            Significant Labs:   All pertinent labs from the last 24 hours have been reviewed.    Diagnostic Results:  I have reviewed and interpreted all pertinent imaging results/findings within the past 24 hours.

## 2024-10-05 LAB
ALBUMIN SERPL BCP-MCNC: 2.9 G/DL (ref 3.5–5.2)
ALP SERPL-CCNC: 70 U/L (ref 55–135)
ALT SERPL W/O P-5'-P-CCNC: 22 U/L (ref 10–44)
ANION GAP SERPL CALC-SCNC: 6 MMOL/L (ref 8–16)
AST SERPL-CCNC: 21 U/L (ref 10–40)
BASOPHILS # BLD AUTO: 0 K/UL (ref 0–0.2)
BASOPHILS NFR BLD: 0 % (ref 0–1.9)
BILIRUB SERPL-MCNC: 0.6 MG/DL (ref 0.1–1)
BUN SERPL-MCNC: 13 MG/DL (ref 8–23)
CALCIUM SERPL-MCNC: 8.5 MG/DL (ref 8.7–10.5)
CHLORIDE SERPL-SCNC: 109 MMOL/L (ref 95–110)
CO2 SERPL-SCNC: 22 MMOL/L (ref 23–29)
CREAT SERPL-MCNC: 0.9 MG/DL (ref 0.5–1.4)
CRP SERPL-MCNC: 41.3 MG/L (ref 0–8.2)
DIFFERENTIAL METHOD BLD: ABNORMAL
EOSINOPHIL # BLD AUTO: 0.4 K/UL (ref 0–0.5)
EOSINOPHIL NFR BLD: 29.6 % (ref 0–8)
ERYTHROCYTE [DISTWIDTH] IN BLOOD BY AUTOMATED COUNT: 16.9 % (ref 11.5–14.5)
EST. GFR  (NO RACE VARIABLE): >60 ML/MIN/1.73 M^2
FERRITIN SERPL-MCNC: 687 NG/ML (ref 20–300)
GLUCOSE SERPL-MCNC: 101 MG/DL (ref 70–110)
HCT VFR BLD AUTO: 29.8 % (ref 40–54)
HGB BLD-MCNC: 10 G/DL (ref 14–18)
IMM GRANULOCYTES # BLD AUTO: 0.01 K/UL (ref 0–0.04)
IMM GRANULOCYTES NFR BLD AUTO: 0.8 % (ref 0–0.5)
LDH SERPL L TO P-CCNC: 192 U/L (ref 110–260)
LYMPHOCYTES # BLD AUTO: 0.2 K/UL (ref 1–4.8)
LYMPHOCYTES NFR BLD: 15.2 % (ref 18–48)
MAGNESIUM SERPL-MCNC: 1.7 MG/DL (ref 1.6–2.6)
MCH RBC QN AUTO: 31.9 PG (ref 27–31)
MCHC RBC AUTO-ENTMCNC: 33.6 G/DL (ref 32–36)
MCV RBC AUTO: 95 FL (ref 82–98)
MONOCYTES # BLD AUTO: 0.4 K/UL (ref 0.3–1)
MONOCYTES NFR BLD: 31.2 % (ref 4–15)
NEUTROPHILS # BLD AUTO: 0.3 K/UL (ref 1.8–7.7)
NEUTROPHILS NFR BLD: 23.2 % (ref 38–73)
NRBC BLD-RTO: 0 /100 WBC
PHOSPHATE SERPL-MCNC: 3.6 MG/DL (ref 2.7–4.5)
PLATELET # BLD AUTO: 72 K/UL (ref 150–450)
PMV BLD AUTO: 10.9 FL (ref 9.2–12.9)
POCT GLUCOSE: 119 MG/DL (ref 70–110)
POCT GLUCOSE: 130 MG/DL (ref 70–110)
POCT GLUCOSE: 133 MG/DL (ref 70–110)
POTASSIUM SERPL-SCNC: 4 MMOL/L (ref 3.5–5.1)
PROT SERPL-MCNC: 5.4 G/DL (ref 6–8.4)
RBC # BLD AUTO: 3.13 M/UL (ref 4.6–6.2)
SODIUM SERPL-SCNC: 137 MMOL/L (ref 136–145)
WBC # BLD AUTO: 1.25 K/UL (ref 3.9–12.7)

## 2024-10-05 PROCEDURE — 20600001 HC STEP DOWN PRIVATE ROOM

## 2024-10-05 PROCEDURE — 82728 ASSAY OF FERRITIN: CPT | Performed by: NURSE PRACTITIONER

## 2024-10-05 PROCEDURE — 84100 ASSAY OF PHOSPHORUS: CPT | Performed by: NURSE PRACTITIONER

## 2024-10-05 PROCEDURE — 25000003 PHARM REV CODE 250: Performed by: NURSE PRACTITIONER

## 2024-10-05 PROCEDURE — 85025 COMPLETE CBC W/AUTO DIFF WBC: CPT | Performed by: NURSE PRACTITIONER

## 2024-10-05 PROCEDURE — 86140 C-REACTIVE PROTEIN: CPT | Performed by: NURSE PRACTITIONER

## 2024-10-05 PROCEDURE — 25000003 PHARM REV CODE 250: Performed by: INTERNAL MEDICINE

## 2024-10-05 PROCEDURE — 63600175 PHARM REV CODE 636 W HCPCS: Performed by: NURSE PRACTITIONER

## 2024-10-05 PROCEDURE — 99233 SBSQ HOSP IP/OBS HIGH 50: CPT | Mod: GC,,, | Performed by: INTERNAL MEDICINE

## 2024-10-05 PROCEDURE — 80053 COMPREHEN METABOLIC PANEL: CPT | Performed by: NURSE PRACTITIONER

## 2024-10-05 PROCEDURE — 83615 LACTATE (LD) (LDH) ENZYME: CPT | Performed by: NURSE PRACTITIONER

## 2024-10-05 PROCEDURE — 83735 ASSAY OF MAGNESIUM: CPT | Performed by: NURSE PRACTITIONER

## 2024-10-05 RX ORDER — ACETAMINOPHEN 325 MG/1
650 TABLET ORAL EVERY 6 HOURS PRN
Status: DISCONTINUED | OUTPATIENT
Start: 2024-10-05 | End: 2024-10-06

## 2024-10-05 RX ADMIN — HYDROCHLOROTHIAZIDE 25 MG: 25 TABLET ORAL at 09:10

## 2024-10-05 RX ADMIN — Medication 400 MG: at 05:10

## 2024-10-05 RX ADMIN — ENOXAPARIN SODIUM 40 MG: 40 INJECTION SUBCUTANEOUS at 05:10

## 2024-10-05 RX ADMIN — FLUCONAZOLE 400 MG: 200 TABLET ORAL at 09:10

## 2024-10-05 RX ADMIN — ACYCLOVIR 800 MG: 200 CAPSULE ORAL at 09:10

## 2024-10-05 RX ADMIN — CLONIDINE HYDROCHLORIDE 0.3 MG: 0.1 TABLET ORAL at 09:10

## 2024-10-05 RX ADMIN — AMLODIPINE BESYLATE 10 MG: 10 TABLET ORAL at 09:10

## 2024-10-05 RX ADMIN — SODIUM CHLORIDE 75 ML/HR: 9 INJECTION, SOLUTION INTRAVENOUS at 06:10

## 2024-10-05 RX ADMIN — MUPIROCIN: 20 OINTMENT TOPICAL at 09:10

## 2024-10-05 RX ADMIN — LEVOFLOXACIN 500 MG: 500 TABLET, FILM COATED ORAL at 09:10

## 2024-10-05 RX ADMIN — Medication 400 MG: at 09:10

## 2024-10-05 RX ADMIN — GABAPENTIN 300 MG: 300 CAPSULE ORAL at 09:10

## 2024-10-05 RX ADMIN — SODIUM CHLORIDE 75 ML/HR: 9 INJECTION, SOLUTION INTRAVENOUS at 05:10

## 2024-10-05 RX ADMIN — LEVETIRACETAM 750 MG: 750 TABLET, FILM COATED ORAL at 09:10

## 2024-10-05 NOTE — PROGRESS NOTES
[facility] ([facility])  Hematology  Bone Marrow Transplant  Progress Note    Patient Name: Jaspreet Walsh Jr.  Admission Date: 9/29/2024  Hospital Length of Stay: 6 days  Code Status: Full Code    Subjective:     Interval History: Day +5  Carvykti for R/R Multiple Myeloma. ICE 10, afebrile, VSS. Labs stable. No new complaints.     Objective:     Vital Signs (Most Recent):  Temp: 99.1 °F (37.3 °C) (10/05/24 0722)  Pulse: 62 (10/05/24 0722)  Resp: 18 (10/05/24 0722)  BP: 138/80 (10/05/24 0722)  SpO2: 97 % (10/05/24 0722) Vital Signs (24h Range):  Temp:  [97.9 °F (36.6 °C)-99.7 °F (37.6 °C)] 99.1 °F (37.3 °C)  Pulse:  [59-64] 62  Resp:  [17-20] 18  SpO2:  [95 %-100 %] 97 %  BP: ()/(60-80) 138/80     Weight: 93.4 kg (205 lb 14.6 oz)  Body mass index is 32.25 kg/m².  Body surface area is 2.1 meters squared.    ECOG SCORE           [unfilled]    Intake/Output - Last 3 Shifts         10/03 0700  10/04 0659 10/04 0700  10/05 0659 10/05 0700  10/06 0659    P.O. 690 1170 100    I.V. (mL/kg) 1866.7 (20.1) 1660.9 (17.8)     Total Intake(mL/kg) 2556.7 (27.6) 2830.9 (30.3) 100 (1.1)    Urine (mL/kg/hr) 2300 (1) 2350 (1) 200 (0.8)    Stool 0 0     Total Output 2300 2350 200    Net +256.7 +480.9 -100           Stool Occurrence 1 x 2 x              Physical Exam  Vitals reviewed.   Constitutional:       General: He is not in acute distress.     Appearance: Normal appearance. He is well-developed.   HENT:      Head: Normocephalic and atraumatic.      Mouth/Throat:      Pharynx: No oropharyngeal exudate.   Eyes:      General: No scleral icterus.     Extraocular Movements: Extraocular movements intact.      Conjunctiva/sclera: Conjunctivae normal.      Pupils: Pupils are equal, round, and reactive to light.   Neck:      Thyroid: No thyromegaly.   Cardiovascular:      Rate and Rhythm: Normal rate and regular rhythm.      Pulses: Normal pulses.      Heart sounds: Normal heart sounds.   Pulmonary:      Effort: Pulmonary  effort is normal. No respiratory distress.      Breath sounds: Normal breath sounds.   Abdominal:      General: Abdomen is flat. Bowel sounds are normal. There is no distension.      Palpations: Abdomen is soft.      Tenderness: There is no abdominal tenderness.   Musculoskeletal:         General: No swelling or tenderness. Normal range of motion.      Cervical back: Normal range of motion and neck supple. Normal range of motion.   Skin:     General: Skin is warm and dry.      Coloration: Skin is not pale.      Findings: No petechiae or rash.      Comments: Vascath intact to LCW, no redness or drainage noted   Neurological:      General: No focal deficit present.      Mental Status: He is alert and oriented to person, place, and time.      Cranial Nerves: No cranial nerve deficit.      Coordination: Coordination normal.   Psychiatric:         Mood and Affect: Mood normal.         Behavior: Behavior normal.         Thought Content: Thought content normal.            Significant Labs:   All pertinent labs from the last 24 hours have been reviewed.    Diagnostic Results:  I have reviewed all pertinent imaging results/findings within the past 24 hours.  Assessment/Plan:     * S/P chimeric antigen receptor T-cell therapy  Patient of Dr. Dameon Baker  - Initiated LDC flu/cy on 9/25/24, central line placement and brain MRI completed on 9/24   - COVID positive 9/27/24, repeat negative. Per ID thought to be false +   - admitted for cilta-zohra CAR-T on 9/29/24. Day +5 today  -  received 0.50x10^6 CAR+ vT cells/kg 9/30 at 1130am, tolerated without issue  -  daily ferritin, LDH and CRP, stable   - ICE 10 today  - keppra 750 mg two times/day for seizure ppx  - ppx antimicrobials    Planned Lymphodepletion Regimen:  Cyclophosphamide 300mg/m2 on Day -5, -4, and -3  Fludarabine 30mg/m2 on Day -5, -4, and -3     Pre-Medications:  Acetaminophen on Day 0  Diphenhydramine on Day 0     Immune Effector Cell Therapy:  Ciltacabtagene  autoleucel on Day 0     Antimicrobial Prophylaxis:  Acyclovir starting on Day -5  Levofloxacin starting on Day -5  Fluconazole starting on Day -5  Pentamidine on day -5  Sulfamethoxazole-trimethoprim starting on Day +30     Seizure Prophylaxis:  Levetiracetam on Day 0 through Day +30        Caregiver: aCtie (friend)  Post-transplant discharge plans: home       Hypophosphatemia  - daily phos level  - replace per BMT electrolyte protocol    Secondary hypercoagulable state  Multiple myeloma/History of auto stem cell transplant  Ppx Lovenox 40 mg SQ    Diabetes mellitus  Type II, well-controlled  - LD SSI    History of autologous stem cell transplant  - see Multiple Myeloma    Antineoplastic chemotherapy induced pancytopenia  - daily CBC  - transfuse for hgb <7 and platelets <10k  - hold anticoagulation for platelets <50k  - ppx antimicrobials per protocol    Multiple myeloma  Multiple myeloma/History of auto stem cell transplant  - IgG Kappa MM standard risk, diagnosed Sept 2020, after presenting w/ lytic lesions  - s/p 8 cycles Vrd followed by Alea Auto 5/17/2021 with disease relapse and Dkd salvage therapy 8/4/2023  - Achieving/mainting CO post SCT; was on revlimid maintenance but relapsed biochemically and with new lytic disease approximately 2 years post SCT (June/July 2023)  - Initiated Melinda-Kd salvage on 8/4/23; tolerating will no significant AE's   - Melinda subq weekly 8>EOW x 8 doses> q 4 week; kyprolis 70mg/m2 day 1, 8, 15 of 28 day cycle; dex 40mg po weekly   - Patient with initial good response but noted serial biochemical progression confirmed on 4/3/24 repeat serum studies; mild anemia and Jan 2024 PET with ENDY but no other overt symptoms or CRAB and he feels well  - Transitioned DKd to Kyprolis/pomalyst/dex as bridge to CART (cilta-zohra); KPd median pfs 26 months; vs cart 3 years with 20% >5 years   - Pomalyst 4mg daily day 1-21 of 28 cycle ; KPD initiated April 2024  - He has had CO to KPd  - Decision with  patient made to pursue consolidation with ciltacabtagene autoleucel   See CarT     Essential hypertension  Continue amlodipine 10 mg daily  Continue HCTZ 25 mg daily  Continue clonidine 0.3 mg daily        VTE Risk Mitigation (From admission, onward)           Ordered     heparin (porcine) injection 1,600 Units  As needed (PRN)         09/30/24 0824     heparin (porcine) injection 1,000 Units  As needed (PRN)         09/29/24 1744     enoxaparin injection 40 mg  Daily         09/29/24 1700     IP VTE HIGH RISK PATIENT  Once         09/29/24 1700     Place sequential compression device  Until discontinued         09/29/24 1700                    Spike Sharp MD  Bone Marrow Transplant  [address] [facility] On[facility] ([facility])

## 2024-10-05 NOTE — SUBJECTIVE & OBJECTIVE
Subjective:     Interval History: Day +5  Carvykti for R/R Multiple Myeloma. ICE 10, afebrile, VSS. Labs stable. No new complaints.     Objective:     Vital Signs (Most Recent):  Temp: 99.1 °F (37.3 °C) (10/05/24 0722)  Pulse: 62 (10/05/24 0722)  Resp: 18 (10/05/24 0722)  BP: 138/80 (10/05/24 0722)  SpO2: 97 % (10/05/24 0722) Vital Signs (24h Range):  Temp:  [97.9 °F (36.6 °C)-99.7 °F (37.6 °C)] 99.1 °F (37.3 °C)  Pulse:  [59-64] 62  Resp:  [17-20] 18  SpO2:  [95 %-100 %] 97 %  BP: ()/(60-80) 138/80     Weight: 93.4 kg (205 lb 14.6 oz)  Body mass index is 32.25 kg/m².  Body surface area is 2.1 meters squared.    ECOG SCORE           [unfilled]    Intake/Output - Last 3 Shifts         10/03 0700  10/04 0659 10/04 0700  10/05 0659 10/05 0700  10/06 0659    P.O. 690 1170 100    I.V. (mL/kg) 1866.7 (20.1) 1660.9 (17.8)     Total Intake(mL/kg) 2556.7 (27.6) 2830.9 (30.3) 100 (1.1)    Urine (mL/kg/hr) 2300 (1) 2350 (1) 200 (0.8)    Stool 0 0     Total Output 2300 2350 200    Net +256.7 +480.9 -100           Stool Occurrence 1 x 2 x              Physical Exam  Vitals reviewed.   Constitutional:       General: He is not in acute distress.     Appearance: Normal appearance. He is well-developed.   HENT:      Head: Normocephalic and atraumatic.      Mouth/Throat:      Pharynx: No oropharyngeal exudate.   Eyes:      General: No scleral icterus.     Extraocular Movements: Extraocular movements intact.      Conjunctiva/sclera: Conjunctivae normal.      Pupils: Pupils are equal, round, and reactive to light.   Neck:      Thyroid: No thyromegaly.   Cardiovascular:      Rate and Rhythm: Normal rate and regular rhythm.      Pulses: Normal pulses.      Heart sounds: Normal heart sounds.   Pulmonary:      Effort: Pulmonary effort is normal. No respiratory distress.      Breath sounds: Normal breath sounds.   Abdominal:      General: Abdomen is flat. Bowel sounds are normal. There is no distension.      Palpations: Abdomen is  soft.      Tenderness: There is no abdominal tenderness.   Musculoskeletal:         General: No swelling or tenderness. Normal range of motion.      Cervical back: Normal range of motion and neck supple. Normal range of motion.   Skin:     General: Skin is warm and dry.      Coloration: Skin is not pale.      Findings: No petechiae or rash.      Comments: Vascath intact to LCW, no redness or drainage noted   Neurological:      General: No focal deficit present.      Mental Status: He is alert and oriented to person, place, and time.      Cranial Nerves: No cranial nerve deficit.      Coordination: Coordination normal.   Psychiatric:         Mood and Affect: Mood normal.         Behavior: Behavior normal.         Thought Content: Thought content normal.            Significant Labs:   All pertinent labs from the last 24 hours have been reviewed.    Diagnostic Results:  I have reviewed all pertinent imaging results/findings within the past 24 hours.

## 2024-10-05 NOTE — PLAN OF CARE
Pt is Day +5 of Carvykti with an ICE score of 10. NS infusing at 75mL/hr. Electrolyte replacement initiated. POC reviewed with patient, understanding verbalized. Pt with nonskid footwear on, bed in lowest position, and locked with bed rails up x2. Pt instructed to call prior to getting OOB. Bed alarm refused. Pt has call light and personal items within reach. Pt ambulates in room independently. VSS and afebrile this shift. All questions and concerns addressed at this time.

## 2024-10-05 NOTE — ASSESSMENT & PLAN NOTE
Patient of Dr. Dameon Baker  - Initiated LDC flu/cy on 9/25/24, central line placement and brain MRI completed on 9/24   - COVID positive 9/27/24, repeat negative. Per ID thought to be false +   - admitted for cilta-zohra CAR-T on 9/29/24. Day +5 today  -  received 0.50x10^6 CAR+ vT cells/kg 9/30 at 1130am, tolerated without issue  -  daily ferritin, LDH and CRP, stable   - ICE 10 today  - keppra 750 mg two times/day for seizure ppx  - ppx antimicrobials    Planned Lymphodepletion Regimen:  Cyclophosphamide 300mg/m2 on Day -5, -4, and -3  Fludarabine 30mg/m2 on Day -5, -4, and -3     Pre-Medications:  Acetaminophen on Day 0  Diphenhydramine on Day 0     Immune Effector Cell Therapy:  Ciltacabtagene autoleucel on Day 0     Antimicrobial Prophylaxis:  Acyclovir starting on Day -5  Levofloxacin starting on Day -5  Fluconazole starting on Day -5  Pentamidine on day -5  Sulfamethoxazole-trimethoprim starting on Day +30     Seizure Prophylaxis:  Levetiracetam on Day 0 through Day +30        Caregiver: Catie (friend)  Post-transplant discharge plans: home

## 2024-10-05 NOTE — PLAN OF CARE
Day +5 Carvykti; no acute events this shift. ICE remains a 10. Afebrile & VSS on room air. No PRNs needed. Electrolytes closely monitored and magnesium replaced per PRN scale. BG monitored ACHS, no SSI needed. Ambulates independently to bathroom; educated on importance of I/O recording. CHG wipes provided and encouraged use. Tolerating diet, voiding without difficulty.  Pt remaining free from falls or injury throughout shift; bed locked and in lowest position; call light within reach. POC reviewed with patient. All needs addressed. Frequent rounding performed.

## 2024-10-06 LAB
ABO + RH BLD: NORMAL
ALBUMIN SERPL BCP-MCNC: 3 G/DL (ref 3.5–5.2)
ALP SERPL-CCNC: 126 U/L (ref 55–135)
ALT SERPL W/O P-5'-P-CCNC: 77 U/L (ref 10–44)
ANION GAP SERPL CALC-SCNC: 7 MMOL/L (ref 8–16)
ANISOCYTOSIS BLD QL SMEAR: SLIGHT
AST SERPL-CCNC: 84 U/L (ref 10–40)
BASOPHILS # BLD AUTO: 0 K/UL (ref 0–0.2)
BASOPHILS NFR BLD: 0 % (ref 0–1.9)
BILIRUB SERPL-MCNC: 1 MG/DL (ref 0.1–1)
BILIRUB UR QL STRIP: NEGATIVE
BLD GP AB SCN CELLS X3 SERPL QL: NORMAL
BUN SERPL-MCNC: 13 MG/DL (ref 8–23)
BURR CELLS BLD QL SMEAR: ABNORMAL
CALCIUM SERPL-MCNC: 8.8 MG/DL (ref 8.7–10.5)
CHLORIDE SERPL-SCNC: 102 MMOL/L (ref 95–110)
CLARITY UR REFRACT.AUTO: CLEAR
CO2 SERPL-SCNC: 20 MMOL/L (ref 23–29)
COLOR UR AUTO: YELLOW
CREAT SERPL-MCNC: 1 MG/DL (ref 0.5–1.4)
CRP SERPL-MCNC: 59.3 MG/L (ref 0–8.2)
DACRYOCYTES BLD QL SMEAR: ABNORMAL
DIFFERENTIAL METHOD BLD: ABNORMAL
EOSINOPHIL # BLD AUTO: 0.3 K/UL (ref 0–0.5)
EOSINOPHIL NFR BLD: 22.3 % (ref 0–8)
ERYTHROCYTE [DISTWIDTH] IN BLOOD BY AUTOMATED COUNT: 16.6 % (ref 11.5–14.5)
EST. GFR  (NO RACE VARIABLE): >60 ML/MIN/1.73 M^2
FERRITIN SERPL-MCNC: 1871 NG/ML (ref 20–300)
GLUCOSE SERPL-MCNC: 124 MG/DL (ref 70–110)
GLUCOSE UR QL STRIP: ABNORMAL
HCT VFR BLD AUTO: 30.7 % (ref 40–54)
HGB BLD-MCNC: 10.4 G/DL (ref 14–18)
HGB UR QL STRIP: NEGATIVE
IMM GRANULOCYTES # BLD AUTO: 0.01 K/UL (ref 0–0.04)
IMM GRANULOCYTES NFR BLD AUTO: 0.8 % (ref 0–0.5)
KETONES UR QL STRIP: NEGATIVE
LDH SERPL L TO P-CCNC: 313 U/L (ref 110–260)
LEUKOCYTE ESTERASE UR QL STRIP: NEGATIVE
LYMPHOCYTES # BLD AUTO: 0.2 K/UL (ref 1–4.8)
LYMPHOCYTES NFR BLD: 12.4 % (ref 18–48)
MAGNESIUM SERPL-MCNC: 1.4 MG/DL (ref 1.6–2.6)
MCH RBC QN AUTO: 32.2 PG (ref 27–31)
MCHC RBC AUTO-ENTMCNC: 33.9 G/DL (ref 32–36)
MCV RBC AUTO: 95 FL (ref 82–98)
MONOCYTES # BLD AUTO: 0.3 K/UL (ref 0.3–1)
MONOCYTES NFR BLD: 24 % (ref 4–15)
NEUTROPHILS # BLD AUTO: 0.5 K/UL (ref 1.8–7.7)
NEUTROPHILS NFR BLD: 40.5 % (ref 38–73)
NITRITE UR QL STRIP: NEGATIVE
NRBC BLD-RTO: 0 /100 WBC
OHS QRS DURATION: 98 MS
OHS QTC CALCULATION: 428 MS
OVALOCYTES BLD QL SMEAR: ABNORMAL
PH UR STRIP: 7 [PH] (ref 5–8)
PHOSPHATE SERPL-MCNC: 2.1 MG/DL (ref 2.7–4.5)
PLATELET # BLD AUTO: 83 K/UL (ref 150–450)
PLATELET BLD QL SMEAR: ABNORMAL
PMV BLD AUTO: 11.3 FL (ref 9.2–12.9)
POIKILOCYTOSIS BLD QL SMEAR: ABNORMAL
POLYCHROMASIA BLD QL SMEAR: ABNORMAL
POTASSIUM SERPL-SCNC: 3.9 MMOL/L (ref 3.5–5.1)
PROT SERPL-MCNC: 5.8 G/DL (ref 6–8.4)
PROT UR QL STRIP: NEGATIVE
RBC # BLD AUTO: 3.23 M/UL (ref 4.6–6.2)
SCHISTOCYTES BLD QL SMEAR: ABNORMAL
SCHISTOCYTES BLD QL SMEAR: PRESENT
SODIUM SERPL-SCNC: 129 MMOL/L (ref 136–145)
SP GR UR STRIP: 1.01 (ref 1–1.03)
SPECIMEN OUTDATE: NORMAL
URN SPEC COLLECT METH UR: ABNORMAL
WBC # BLD AUTO: 1.21 K/UL (ref 3.9–12.7)

## 2024-10-06 PROCEDURE — 36415 COLL VENOUS BLD VENIPUNCTURE: CPT | Performed by: STUDENT IN AN ORGANIZED HEALTH CARE EDUCATION/TRAINING PROGRAM

## 2024-10-06 PROCEDURE — 63600175 PHARM REV CODE 636 W HCPCS: Performed by: NURSE PRACTITIONER

## 2024-10-06 PROCEDURE — 63600175 PHARM REV CODE 636 W HCPCS: Performed by: STUDENT IN AN ORGANIZED HEALTH CARE EDUCATION/TRAINING PROGRAM

## 2024-10-06 PROCEDURE — 25000003 PHARM REV CODE 250

## 2024-10-06 PROCEDURE — 86901 BLOOD TYPING SEROLOGIC RH(D): CPT | Performed by: INTERNAL MEDICINE

## 2024-10-06 PROCEDURE — 83615 LACTATE (LD) (LDH) ENZYME: CPT | Performed by: NURSE PRACTITIONER

## 2024-10-06 PROCEDURE — 25000003 PHARM REV CODE 250: Performed by: NURSE PRACTITIONER

## 2024-10-06 PROCEDURE — 99233 SBSQ HOSP IP/OBS HIGH 50: CPT | Mod: GC,,, | Performed by: INTERNAL MEDICINE

## 2024-10-06 PROCEDURE — 85025 COMPLETE CBC W/AUTO DIFF WBC: CPT | Performed by: NURSE PRACTITIONER

## 2024-10-06 PROCEDURE — 81003 URINALYSIS AUTO W/O SCOPE: CPT | Performed by: STUDENT IN AN ORGANIZED HEALTH CARE EDUCATION/TRAINING PROGRAM

## 2024-10-06 PROCEDURE — 82728 ASSAY OF FERRITIN: CPT | Performed by: NURSE PRACTITIONER

## 2024-10-06 PROCEDURE — 83735 ASSAY OF MAGNESIUM: CPT | Performed by: NURSE PRACTITIONER

## 2024-10-06 PROCEDURE — 25000003 PHARM REV CODE 250: Performed by: INTERNAL MEDICINE

## 2024-10-06 PROCEDURE — 86900 BLOOD TYPING SEROLOGIC ABO: CPT | Performed by: INTERNAL MEDICINE

## 2024-10-06 PROCEDURE — 93005 ELECTROCARDIOGRAM TRACING: CPT

## 2024-10-06 PROCEDURE — 86140 C-REACTIVE PROTEIN: CPT | Performed by: NURSE PRACTITIONER

## 2024-10-06 PROCEDURE — 84100 ASSAY OF PHOSPHORUS: CPT | Performed by: NURSE PRACTITIONER

## 2024-10-06 PROCEDURE — 20600001 HC STEP DOWN PRIVATE ROOM

## 2024-10-06 PROCEDURE — 25000003 PHARM REV CODE 250: Performed by: STUDENT IN AN ORGANIZED HEALTH CARE EDUCATION/TRAINING PROGRAM

## 2024-10-06 PROCEDURE — 63600175 PHARM REV CODE 636 W HCPCS

## 2024-10-06 PROCEDURE — 86850 RBC ANTIBODY SCREEN: CPT | Performed by: INTERNAL MEDICINE

## 2024-10-06 PROCEDURE — 87040 BLOOD CULTURE FOR BACTERIA: CPT | Mod: 59 | Performed by: STUDENT IN AN ORGANIZED HEALTH CARE EDUCATION/TRAINING PROGRAM

## 2024-10-06 PROCEDURE — 80053 COMPREHEN METABOLIC PANEL: CPT | Performed by: NURSE PRACTITIONER

## 2024-10-06 PROCEDURE — 93010 ELECTROCARDIOGRAM REPORT: CPT | Mod: ,,, | Performed by: INTERNAL MEDICINE

## 2024-10-06 RX ORDER — ACETAMINOPHEN 325 MG/1
650 TABLET ORAL EVERY 6 HOURS PRN
Status: DISCONTINUED | OUTPATIENT
Start: 2024-10-06 | End: 2024-10-10 | Stop reason: HOSPADM

## 2024-10-06 RX ORDER — ONDANSETRON HYDROCHLORIDE 2 MG/ML
4 INJECTION, SOLUTION INTRAVENOUS EVERY 6 HOURS PRN
Status: DISCONTINUED | OUTPATIENT
Start: 2024-10-06 | End: 2024-10-10 | Stop reason: HOSPADM

## 2024-10-06 RX ORDER — ACETAMINOPHEN 325 MG/1
650 TABLET ORAL ONCE
Status: COMPLETED | OUTPATIENT
Start: 2024-10-06 | End: 2024-10-06

## 2024-10-06 RX ADMIN — MUPIROCIN: 20 OINTMENT TOPICAL at 08:10

## 2024-10-06 RX ADMIN — CEFEPIME 2 G: 2 INJECTION, POWDER, FOR SOLUTION INTRAVENOUS at 01:10

## 2024-10-06 RX ADMIN — ENOXAPARIN SODIUM 40 MG: 40 INJECTION SUBCUTANEOUS at 04:10

## 2024-10-06 RX ADMIN — SODIUM CHLORIDE 75 ML/HR: 9 INJECTION, SOLUTION INTRAVENOUS at 08:10

## 2024-10-06 RX ADMIN — ACYCLOVIR 800 MG: 200 CAPSULE ORAL at 08:10

## 2024-10-06 RX ADMIN — ACETAMINOPHEN 650 MG: 325 TABLET ORAL at 08:10

## 2024-10-06 RX ADMIN — CEFEPIME 2 G: 2 INJECTION, POWDER, FOR SOLUTION INTRAVENOUS at 06:10

## 2024-10-06 RX ADMIN — Medication 400 MG: at 07:10

## 2024-10-06 RX ADMIN — ACETAMINOPHEN 650 MG: 325 TABLET ORAL at 01:10

## 2024-10-06 RX ADMIN — GABAPENTIN 300 MG: 300 CAPSULE ORAL at 08:10

## 2024-10-06 RX ADMIN — LEVETIRACETAM 750 MG: 750 TABLET, FILM COATED ORAL at 08:10

## 2024-10-06 RX ADMIN — Medication 400 MG: at 02:10

## 2024-10-06 RX ADMIN — DIBASIC SODIUM PHOSPHATE, MONOBASIC POTASSIUM PHOSPHATE AND MONOBASIC SODIUM PHOSPHATE 1 TABLET: 852; 155; 130 TABLET ORAL at 07:10

## 2024-10-06 RX ADMIN — Medication 400 MG: at 09:10

## 2024-10-06 RX ADMIN — DIBASIC SODIUM PHOSPHATE, MONOBASIC POTASSIUM PHOSPHATE AND MONOBASIC SODIUM PHOSPHATE 1 TABLET: 852; 155; 130 TABLET ORAL at 06:10

## 2024-10-06 RX ADMIN — AMLODIPINE BESYLATE 10 MG: 10 TABLET ORAL at 08:10

## 2024-10-06 RX ADMIN — ONDANSETRON 4 MG: 2 INJECTION INTRAMUSCULAR; INTRAVENOUS at 07:10

## 2024-10-06 RX ADMIN — DIBASIC SODIUM PHOSPHATE, MONOBASIC POTASSIUM PHOSPHATE AND MONOBASIC SODIUM PHOSPHATE 1 TABLET: 852; 155; 130 TABLET ORAL at 09:10

## 2024-10-06 RX ADMIN — CLONIDINE HYDROCHLORIDE 0.3 MG: 0.1 TABLET ORAL at 08:10

## 2024-10-06 RX ADMIN — DIBASIC SODIUM PHOSPHATE, MONOBASIC POTASSIUM PHOSPHATE AND MONOBASIC SODIUM PHOSPHATE 1 TABLET: 852; 155; 130 TABLET ORAL at 02:10

## 2024-10-06 RX ADMIN — HYDROCHLOROTHIAZIDE 25 MG: 25 TABLET ORAL at 08:10

## 2024-10-06 RX ADMIN — SODIUM CHLORIDE 75 ML/HR: 9 INJECTION, SOLUTION INTRAVENOUS at 07:10

## 2024-10-06 RX ADMIN — FLUCONAZOLE 400 MG: 200 TABLET ORAL at 08:10

## 2024-10-06 RX ADMIN — CEFEPIME 2 G: 2 INJECTION, POWDER, FOR SOLUTION INTRAVENOUS at 09:10

## 2024-10-06 NOTE — PLAN OF CARE
No acute events this shift, pt is day 6 of CART, VSS, aaox4, pt sustained a tmax of 100.6, MD notified, neutropenic work up ordered and competed, UA sent to lab, cefepime administered, 650mg of tylenol given, of blood glucose monitored, ICE score recorded, pt ambulates independent, bed alarm refused, pt denies n&v, frequent rounds performed, I/O recorded, pt in bed resting, call light in reach, pt instructed to call for assistance, NAD, safety maintained

## 2024-10-06 NOTE — PROGRESS NOTES
[facility] ([facility])  Hematology  Bone Marrow Transplant  Progress Note    Patient Name: Jaspreet Walsh Jr.  Admission Date: 9/29/2024  Hospital Length of Stay: 7 days  Code Status: Full Code    Subjective:     Interval History: Day +6  Carvykti for R/R Multiple Myeloma. Fever to 100.6 last night which was sustained, likely grade 1 CRS. Other vitals stable. Started cefepime and infectious workup ordered. ICE score remains a 10. Inflammatory markers increased today, will monitor closely. Mag and phos replaced.     Objective:     Vital Signs (Most Recent):  Temp: 99.4 °F (37.4 °C) (10/06/24 0915)  Pulse: 97 (10/06/24 0806)  Resp: 18 (10/06/24 0806)  BP: 129/79 (10/06/24 0806)  SpO2: 97 % (10/06/24 0806) Vital Signs (24h Range):  Temp:  [98.1 °F (36.7 °C)-101.2 °F (38.4 °C)] 99.4 °F (37.4 °C)  Pulse:  [60-97] 97  Resp:  [17-18] 18  SpO2:  [96 %-99 %] 97 %  BP: (118-150)/(66-79) 129/79     Weight: 93.4 kg (205 lb 14.6 oz)  Body mass index is 32.25 kg/m².  Body surface area is 2.1 meters squared.    ECOG SCORE           [unfilled]    Intake/Output - Last 3 Shifts         10/04 0700  10/05 0659 10/05 0700  10/06 0659 10/06 0700  10/07 0659    P.O. 1170 1640     I.V. (mL/kg) 1660.9 (17.8)      Total Intake(mL/kg) 2830.9 (30.3) 1640 (17.6)     Urine (mL/kg/hr) 2350 (1) 3850 (1.7)     Stool 0 0     Total Output 2350 3850     Net +480.9 -2210            Urine Occurrence  1 x     Stool Occurrence 2 x 1 x              Physical Exam  Vitals reviewed.   Constitutional:       General: He is not in acute distress.     Appearance: Normal appearance. He is well-developed.   HENT:      Head: Normocephalic and atraumatic.      Mouth/Throat:      Pharynx: No oropharyngeal exudate.   Eyes:      General: No scleral icterus.     Extraocular Movements: Extraocular movements intact.      Conjunctiva/sclera: Conjunctivae normal.      Pupils: Pupils are equal, round, and reactive to light.   Neck:      Thyroid: No thyromegaly.    Cardiovascular:      Rate and Rhythm: Normal rate and regular rhythm.      Pulses: Normal pulses.      Heart sounds: Normal heart sounds.   Pulmonary:      Effort: Pulmonary effort is normal. No respiratory distress.      Breath sounds: Normal breath sounds.   Abdominal:      General: Abdomen is flat. Bowel sounds are normal. There is no distension.      Palpations: Abdomen is soft.      Tenderness: There is no abdominal tenderness.   Musculoskeletal:         General: No swelling or tenderness. Normal range of motion.      Cervical back: Normal range of motion and neck supple. Normal range of motion.   Skin:     General: Skin is warm and dry.      Coloration: Skin is not pale.      Findings: No petechiae or rash.      Comments: Vascath intact to LCW, no redness or drainage noted   Neurological:      General: No focal deficit present.      Mental Status: He is alert and oriented to person, place, and time.      Cranial Nerves: No cranial nerve deficit.      Coordination: Coordination normal.   Psychiatric:         Mood and Affect: Mood normal.         Behavior: Behavior normal.         Thought Content: Thought content normal.            Significant Labs:   All pertinent labs from the last 24 hours have been reviewed.    Diagnostic Results:  I have reviewed all pertinent imaging results/findings within the past 24 hours.  Assessment/Plan:     * S/P chimeric antigen receptor T-cell therapy  Patient of Dr. Dameon Baker  - Initiated LDC flu/cy on 9/25/24, central line placement and brain MRI completed on 9/24   - COVID positive 9/27/24, repeat negative. Per ID thought to be false +   - admitted for cilta-zohra CAR-T on 9/29/24. Day +6 today  -  received 0.50x10^6 CAR+ vT cells/kg 9/30 at 1130am, tolerated without issue  -  daily ferritin, LDH and CRP, increasing    - ICE 10 today  - Grade 1 CRS on 10/5, cefepime started, infectious workup negative so far   - keppra 750 mg two times/day for seizure ppx  - ppx  antimicrobials    Planned Lymphodepletion Regimen:  Cyclophosphamide 300mg/m2 on Day -5, -4, and -3  Fludarabine 30mg/m2 on Day -5, -4, and -3     Pre-Medications:  Acetaminophen on Day 0  Diphenhydramine on Day 0     Immune Effector Cell Therapy:  Ciltacabtagene autoleucel on Day 0     Antimicrobial Prophylaxis:  Acyclovir starting on Day -5  Levofloxacin starting on Day -5  Fluconazole starting on Day -5  Pentamidine on day -5  Sulfamethoxazole-trimethoprim starting on Day +30     Seizure Prophylaxis:  Levetiracetam on Day 0 through Day +30        Caregiver: Catie (friend)  Post-transplant discharge plans: home       Hypophosphatemia  - daily phos level  - replace per BMT electrolyte protocol    Secondary hypercoagulable state  Multiple myeloma/History of auto stem cell transplant  Ppx Lovenox 40 mg SQ    Diabetes mellitus  Type II, well-controlled  - LD SSI    History of autologous stem cell transplant  - see Multiple Myeloma    Antineoplastic chemotherapy induced pancytopenia  - daily CBC  - transfuse for hgb <7 and platelets <10k  - hold anticoagulation for platelets <50k  - ppx antimicrobials per protocol    Multiple myeloma  Multiple myeloma/History of auto stem cell transplant  - IgG Kappa MM standard risk, diagnosed Sept 2020, after presenting w/ lytic lesions  - s/p 8 cycles Vrd followed by Alea Auto 5/17/2021 with disease relapse and Dkd salvage therapy 8/4/2023  - Achieving/mainting ID post SCT; was on revlimid maintenance but relapsed biochemically and with new lytic disease approximately 2 years post SCT (June/July 2023)  - Initiated Melinda-Kd salvage on 8/4/23; tolerating will no significant AE's   - Melinda subq weekly 8>EOW x 8 doses> q 4 week; kyprolis 70mg/m2 day 1, 8, 15 of 28 day cycle; dex 40mg po weekly   - Patient with initial good response but noted serial biochemical progression confirmed on 4/3/24 repeat serum studies; mild anemia and Jan 2024 PET with ENDY but no other overt symptoms or CRAB and  he feels well  - Transitioned DKd to Kyprolis/pomalyst/dex as bridge to CART (cilta-zohra); KPd median pfs 26 months; vs cart 3 years with 20% >5 years   - Pomalyst 4mg daily day 1-21 of 28 cycle ; KPD initiated April 2024  - He has had OR to KPd  - Decision with patient made to pursue consolidation with ciltacabtagene autoleucel   See CarT     Essential hypertension  Continue amlodipine 10 mg daily  Continue HCTZ 25 mg daily  Continue clonidine 0.3 mg daily        VTE Risk Mitigation (From admission, onward)           Ordered     heparin (porcine) injection 1,600 Units  As needed (PRN)         09/30/24 0824     heparin (porcine) injection 1,000 Units  As needed (PRN)         09/29/24 1744     enoxaparin injection 40 mg  Daily         09/29/24 1700     IP VTE HIGH RISK PATIENT  Once         09/29/24 1700     Place sequential compression device  Until discontinued         09/29/24 1700                    Spike Sharp MD  Bone Marrow Transplant  [facility] beds at the [facility] - Oncology ([facility])

## 2024-10-06 NOTE — ASSESSMENT & PLAN NOTE
Patient of Dr. Dameon Baker  - Initiated LDC flu/cy on 9/25/24, central line placement and brain MRI completed on 9/24   - COVID positive 9/27/24, repeat negative. Per ID thought to be false +   - admitted for cilta-zohra CAR-T on 9/29/24. Day +6 today  -  received 0.50x10^6 CAR+ vT cells/kg 9/30 at 1130am, tolerated without issue  -  daily ferritin, LDH and CRP, increasing    - ICE 10 today  - Grade 1 CRS on 10/5, cefepime started, infectious workup negative so far   - keppra 750 mg two times/day for seizure ppx  - ppx antimicrobials    Planned Lymphodepletion Regimen:  Cyclophosphamide 300mg/m2 on Day -5, -4, and -3  Fludarabine 30mg/m2 on Day -5, -4, and -3     Pre-Medications:  Acetaminophen on Day 0  Diphenhydramine on Day 0     Immune Effector Cell Therapy:  Ciltacabtagene autoleucel on Day 0     Antimicrobial Prophylaxis:  Acyclovir starting on Day -5  Levofloxacin starting on Day -5  Fluconazole starting on Day -5  Pentamidine on day -5  Sulfamethoxazole-trimethoprim starting on Day +30     Seizure Prophylaxis:  Levetiracetam on Day 0 through Day +30        Caregiver: Catie (friend)  Post-transplant discharge plans: home

## 2024-10-06 NOTE — SUBJECTIVE & OBJECTIVE
Subjective:     Interval History: Day +6  Carvykti for R/R Multiple Myeloma. Fever to 100.6 last night which was sustained, likely grade 1 CRS. Other vitals stable. Started cefepime and infectious workup ordered. ICE score remains a 10. Inflammatory markers increased today, will monitor closely. Mag and phos replaced.     Objective:     Vital Signs (Most Recent):  Temp: 99.4 °F (37.4 °C) (10/06/24 0915)  Pulse: 97 (10/06/24 0806)  Resp: 18 (10/06/24 0806)  BP: 129/79 (10/06/24 0806)  SpO2: 97 % (10/06/24 0806) Vital Signs (24h Range):  Temp:  [98.1 °F (36.7 °C)-101.2 °F (38.4 °C)] 99.4 °F (37.4 °C)  Pulse:  [60-97] 97  Resp:  [17-18] 18  SpO2:  [96 %-99 %] 97 %  BP: (118-150)/(66-79) 129/79     Weight: 93.4 kg (205 lb 14.6 oz)  Body mass index is 32.25 kg/m².  Body surface area is 2.1 meters squared.    ECOG SCORE           [unfilled]    Intake/Output - Last 3 Shifts         10/04 0700  10/05 0659 10/05 0700  10/06 0659 10/06 0700  10/07 0659    P.O. 1170 1640     I.V. (mL/kg) 1660.9 (17.8)      Total Intake(mL/kg) 2830.9 (30.3) 1640 (17.6)     Urine (mL/kg/hr) 2350 (1) 3850 (1.7)     Stool 0 0     Total Output 2350 3850     Net +480.9 -2210            Urine Occurrence  1 x     Stool Occurrence 2 x 1 x              Physical Exam  Vitals reviewed.   Constitutional:       General: He is not in acute distress.     Appearance: Normal appearance. He is well-developed.   HENT:      Head: Normocephalic and atraumatic.      Mouth/Throat:      Pharynx: No oropharyngeal exudate.   Eyes:      General: No scleral icterus.     Extraocular Movements: Extraocular movements intact.      Conjunctiva/sclera: Conjunctivae normal.      Pupils: Pupils are equal, round, and reactive to light.   Neck:      Thyroid: No thyromegaly.   Cardiovascular:      Rate and Rhythm: Normal rate and regular rhythm.      Pulses: Normal pulses.      Heart sounds: Normal heart sounds.   Pulmonary:      Effort: Pulmonary effort is normal. No respiratory  distress.      Breath sounds: Normal breath sounds.   Abdominal:      General: Abdomen is flat. Bowel sounds are normal. There is no distension.      Palpations: Abdomen is soft.      Tenderness: There is no abdominal tenderness.   Musculoskeletal:         General: No swelling or tenderness. Normal range of motion.      Cervical back: Normal range of motion and neck supple. Normal range of motion.   Skin:     General: Skin is warm and dry.      Coloration: Skin is not pale.      Findings: No petechiae or rash.      Comments: Vascath intact to LCW, no redness or drainage noted   Neurological:      General: No focal deficit present.      Mental Status: He is alert and oriented to person, place, and time.      Cranial Nerves: No cranial nerve deficit.      Coordination: Coordination normal.   Psychiatric:         Mood and Affect: Mood normal.         Behavior: Behavior normal.         Thought Content: Thought content normal.            Significant Labs:   All pertinent labs from the last 24 hours have been reviewed.    Diagnostic Results:  I have reviewed all pertinent imaging results/findings within the past 24 hours.

## 2024-10-06 NOTE — PROGRESS NOTES
"   10/06/24 0806   Vital Signs   Temp (!) 101.2 °F (38.4 °C)   Temp Source Oral   Pulse 97   Resp 18   SpO2 97 %   /79   MAP (mmHg) 96     Aron ALCANTAR called at 0816 to report temp of 101.2. Pt warm to touch and pt stated "I don't feel very good this morning, I feel run down". Per MD okay to give PRN tylenol. Tylenol given at 0838. Will recheck temp 1 hour post tylenol.  "

## 2024-10-06 NOTE — PLAN OF CARE
Day +6 Carvykti; no acute events this shift. ICE remains a 10. T max of 101.2 this shift. Team aware and tylenol given as ordered. Pt with c/o nasuea this AM, controlled with PRN zofran x1. Electrolytes closely monitored and replaced per PRN scale. Ambulates independently to bathroom; educated on importance of I/O recording. CHG wipes provided and encouraged use. Tolerating diet, voiding without difficulty.  Pt remaining free from falls or injury throughout shift; bed locked and in lowest position; call light within reach. POC reviewed with patient. All needs addressed. Frequent rounding performed.

## 2024-10-07 PROBLEM — R94.31 ABNORMAL FINDING ON EKG: Status: ACTIVE | Noted: 2024-10-07

## 2024-10-07 LAB
ALBUMIN SERPL BCP-MCNC: 2.9 G/DL (ref 3.5–5.2)
ALP SERPL-CCNC: 143 U/L (ref 55–135)
ALT SERPL W/O P-5'-P-CCNC: 112 U/L (ref 10–44)
ANION GAP SERPL CALC-SCNC: 9 MMOL/L (ref 8–16)
ANISOCYTOSIS BLD QL SMEAR: SLIGHT
AST SERPL-CCNC: 77 U/L (ref 10–40)
BASOPHILS # BLD AUTO: 0.01 K/UL (ref 0–0.2)
BASOPHILS NFR BLD: 0.7 % (ref 0–1.9)
BILIRUB SERPL-MCNC: 1.9 MG/DL (ref 0.1–1)
BUN SERPL-MCNC: 12 MG/DL (ref 8–23)
BURR CELLS BLD QL SMEAR: ABNORMAL
CALCIUM SERPL-MCNC: 8.5 MG/DL (ref 8.7–10.5)
CHLORIDE SERPL-SCNC: 102 MMOL/L (ref 95–110)
CO2 SERPL-SCNC: 21 MMOL/L (ref 23–29)
CREAT SERPL-MCNC: 1 MG/DL (ref 0.5–1.4)
CRP SERPL-MCNC: 162 MG/L (ref 0–8.2)
DIFFERENTIAL METHOD BLD: ABNORMAL
EOSINOPHIL # BLD AUTO: 0.3 K/UL (ref 0–0.5)
EOSINOPHIL NFR BLD: 22.2 % (ref 0–8)
ERYTHROCYTE [DISTWIDTH] IN BLOOD BY AUTOMATED COUNT: 17 % (ref 11.5–14.5)
EST. GFR  (NO RACE VARIABLE): >60 ML/MIN/1.73 M^2
FERRITIN SERPL-MCNC: 5458 NG/ML (ref 20–300)
GLUCOSE SERPL-MCNC: 106 MG/DL (ref 70–110)
HCT VFR BLD AUTO: 30 % (ref 40–54)
HGB BLD-MCNC: 10.1 G/DL (ref 14–18)
HYPOCHROMIA BLD QL SMEAR: ABNORMAL
IMM GRANULOCYTES # BLD AUTO: 0.03 K/UL (ref 0–0.04)
IMM GRANULOCYTES NFR BLD AUTO: 2 % (ref 0–0.5)
LDH SERPL L TO P-CCNC: 334 U/L (ref 110–260)
LYMPHOCYTES # BLD AUTO: 0.1 K/UL (ref 1–4.8)
LYMPHOCYTES NFR BLD: 5.9 % (ref 18–48)
MAGNESIUM SERPL-MCNC: 1.6 MG/DL (ref 1.6–2.6)
MCH RBC QN AUTO: 31.6 PG (ref 27–31)
MCHC RBC AUTO-ENTMCNC: 33.7 G/DL (ref 32–36)
MCV RBC AUTO: 94 FL (ref 82–98)
MONOCYTES # BLD AUTO: 0.3 K/UL (ref 0.3–1)
MONOCYTES NFR BLD: 21.6 % (ref 4–15)
NEUTROPHILS # BLD AUTO: 0.7 K/UL (ref 1.8–7.7)
NEUTROPHILS NFR BLD: 47.6 % (ref 38–73)
NRBC BLD-RTO: 0 /100 WBC
OHS QRS DURATION: 94 MS
OHS QTC CALCULATION: 451 MS
OVALOCYTES BLD QL SMEAR: ABNORMAL
PHOSPHATE SERPL-MCNC: 1.8 MG/DL (ref 2.7–4.5)
PLATELET # BLD AUTO: 88 K/UL (ref 150–450)
PLATELET BLD QL SMEAR: ABNORMAL
PMV BLD AUTO: 12.2 FL (ref 9.2–12.9)
POCT GLUCOSE: 114 MG/DL (ref 70–110)
POIKILOCYTOSIS BLD QL SMEAR: SLIGHT
POLYCHROMASIA BLD QL SMEAR: ABNORMAL
POTASSIUM SERPL-SCNC: 3.5 MMOL/L (ref 3.5–5.1)
PROT SERPL-MCNC: 5.8 G/DL (ref 6–8.4)
RBC # BLD AUTO: 3.2 M/UL (ref 4.6–6.2)
SODIUM SERPL-SCNC: 132 MMOL/L (ref 136–145)
TROPONIN I SERPL DL<=0.01 NG/ML-MCNC: <0.006 NG/ML (ref 0–0.03)
WBC # BLD AUTO: 1.53 K/UL (ref 3.9–12.7)

## 2024-10-07 PROCEDURE — 86140 C-REACTIVE PROTEIN: CPT | Performed by: NURSE PRACTITIONER

## 2024-10-07 PROCEDURE — 25000003 PHARM REV CODE 250: Performed by: NURSE PRACTITIONER

## 2024-10-07 PROCEDURE — 84484 ASSAY OF TROPONIN QUANT: CPT | Performed by: NURSE PRACTITIONER

## 2024-10-07 PROCEDURE — 93005 ELECTROCARDIOGRAM TRACING: CPT

## 2024-10-07 PROCEDURE — 63600175 PHARM REV CODE 636 W HCPCS: Performed by: NURSE PRACTITIONER

## 2024-10-07 PROCEDURE — 25000003 PHARM REV CODE 250: Performed by: STUDENT IN AN ORGANIZED HEALTH CARE EDUCATION/TRAINING PROGRAM

## 2024-10-07 PROCEDURE — 85025 COMPLETE CBC W/AUTO DIFF WBC: CPT | Performed by: NURSE PRACTITIONER

## 2024-10-07 PROCEDURE — 99233 SBSQ HOSP IP/OBS HIGH 50: CPT | Mod: GC,,, | Performed by: INTERNAL MEDICINE

## 2024-10-07 PROCEDURE — 25000003 PHARM REV CODE 250: Performed by: INTERNAL MEDICINE

## 2024-10-07 PROCEDURE — 25000003 PHARM REV CODE 250

## 2024-10-07 PROCEDURE — 83735 ASSAY OF MAGNESIUM: CPT | Performed by: NURSE PRACTITIONER

## 2024-10-07 PROCEDURE — 93010 ELECTROCARDIOGRAM REPORT: CPT | Mod: ,,, | Performed by: INTERNAL MEDICINE

## 2024-10-07 PROCEDURE — 83615 LACTATE (LD) (LDH) ENZYME: CPT | Performed by: NURSE PRACTITIONER

## 2024-10-07 PROCEDURE — 80053 COMPREHEN METABOLIC PANEL: CPT | Performed by: NURSE PRACTITIONER

## 2024-10-07 PROCEDURE — 82728 ASSAY OF FERRITIN: CPT | Performed by: NURSE PRACTITIONER

## 2024-10-07 PROCEDURE — 20600001 HC STEP DOWN PRIVATE ROOM

## 2024-10-07 PROCEDURE — 63600175 PHARM REV CODE 636 W HCPCS: Performed by: STUDENT IN AN ORGANIZED HEALTH CARE EDUCATION/TRAINING PROGRAM

## 2024-10-07 PROCEDURE — 84100 ASSAY OF PHOSPHORUS: CPT | Performed by: NURSE PRACTITIONER

## 2024-10-07 RX ORDER — ACETAMINOPHEN 325 MG/1
650 TABLET ORAL
Status: COMPLETED | OUTPATIENT
Start: 2024-10-07 | End: 2024-10-07

## 2024-10-07 RX ORDER — SODIUM CHLORIDE 0.9 % (FLUSH) 0.9 %
10 SYRINGE (ML) INJECTION
OUTPATIENT
Start: 2024-10-08

## 2024-10-07 RX ORDER — HEPARIN 100 UNIT/ML
500 SYRINGE INTRAVENOUS
Status: DISCONTINUED | OUTPATIENT
Start: 2024-10-07 | End: 2024-10-10 | Stop reason: HOSPADM

## 2024-10-07 RX ORDER — SODIUM CHLORIDE 0.9 % (FLUSH) 0.9 %
10 SYRINGE (ML) INJECTION
Status: DISCONTINUED | OUTPATIENT
Start: 2024-10-07 | End: 2024-10-10 | Stop reason: HOSPADM

## 2024-10-07 RX ORDER — DEXTROMETHORPHAN HYDROBROMIDE, GUAIFENESIN 5; 100 MG/5ML; MG/5ML
650 LIQUID ORAL
OUTPATIENT
Start: 2024-10-08

## 2024-10-07 RX ORDER — DEXTROMETHORPHAN HYDROBROMIDE, GUAIFENESIN 5; 100 MG/5ML; MG/5ML
650 LIQUID ORAL
Status: DISCONTINUED | OUTPATIENT
Start: 2024-10-07 | End: 2024-10-07

## 2024-10-07 RX ORDER — HEPARIN 100 UNIT/ML
500 SYRINGE INTRAVENOUS
OUTPATIENT
Start: 2024-10-08

## 2024-10-07 RX ADMIN — LEVETIRACETAM 750 MG: 750 TABLET, FILM COATED ORAL at 09:10

## 2024-10-07 RX ADMIN — SODIUM CHLORIDE 1000 ML: 9 INJECTION, SOLUTION INTRAVENOUS at 11:10

## 2024-10-07 RX ADMIN — GABAPENTIN 300 MG: 300 CAPSULE ORAL at 09:10

## 2024-10-07 RX ADMIN — Medication 400 MG: at 06:10

## 2024-10-07 RX ADMIN — SODIUM CHLORIDE 75 ML/HR: 9 INJECTION, SOLUTION INTRAVENOUS at 09:10

## 2024-10-07 RX ADMIN — ACETAMINOPHEN 650 MG: 325 TABLET ORAL at 04:10

## 2024-10-07 RX ADMIN — FLUCONAZOLE 400 MG: 200 TABLET ORAL at 09:10

## 2024-10-07 RX ADMIN — MUPIROCIN: 20 OINTMENT TOPICAL at 09:10

## 2024-10-07 RX ADMIN — ACETAMINOPHEN 650 MG: 325 TABLET ORAL at 11:10

## 2024-10-07 RX ADMIN — CEFEPIME 2 G: 2 INJECTION, POWDER, FOR SOLUTION INTRAVENOUS at 05:10

## 2024-10-07 RX ADMIN — CEFEPIME 2 G: 2 INJECTION, POWDER, FOR SOLUTION INTRAVENOUS at 09:10

## 2024-10-07 RX ADMIN — Medication 400 MG: at 09:10

## 2024-10-07 RX ADMIN — Medication 400 MG: at 01:10

## 2024-10-07 RX ADMIN — POTASSIUM CHLORIDE 20 MEQ: 1500 TABLET, EXTENDED RELEASE ORAL at 07:10

## 2024-10-07 RX ADMIN — DIBASIC SODIUM PHOSPHATE, MONOBASIC POTASSIUM PHOSPHATE AND MONOBASIC SODIUM PHOSPHATE 2 TABLET: 852; 155; 130 TABLET ORAL at 01:10

## 2024-10-07 RX ADMIN — ACYCLOVIR 800 MG: 200 CAPSULE ORAL at 09:10

## 2024-10-07 RX ADMIN — POTASSIUM CHLORIDE 20 MEQ: 1500 TABLET, EXTENDED RELEASE ORAL at 09:10

## 2024-10-07 RX ADMIN — DIBASIC SODIUM PHOSPHATE, MONOBASIC POTASSIUM PHOSPHATE AND MONOBASIC SODIUM PHOSPHATE 2 TABLET: 852; 155; 130 TABLET ORAL at 06:10

## 2024-10-07 RX ADMIN — ENOXAPARIN SODIUM 40 MG: 40 INJECTION SUBCUTANEOUS at 05:10

## 2024-10-07 RX ADMIN — CEFEPIME 2 G: 2 INJECTION, POWDER, FOR SOLUTION INTRAVENOUS at 01:10

## 2024-10-07 RX ADMIN — SODIUM CHLORIDE 754 MG: 9 INJECTION, SOLUTION INTRAVENOUS at 12:10

## 2024-10-07 RX ADMIN — DIBASIC SODIUM PHOSPHATE, MONOBASIC POTASSIUM PHOSPHATE AND MONOBASIC SODIUM PHOSPHATE 2 TABLET: 852; 155; 130 TABLET ORAL at 09:10

## 2024-10-07 NOTE — CARE UPDATE
Unit AXEL Care Support Interaction      I have reviewed the chart of Jaspreet Headdandre Reardon who is hospitalized for S/P chimeric antigen receptor T-cell therapy. The patient is currently located in the following unit: ONC      I have seen and examined the patient and provided the following support:     Proactive rounds - Fall precautions in place, pt refused bed alarm, pt educated on fall precautions. Central line dressing clean, dry, intact, no s/s of infection.       BRIGIDA MUNOZ, JUNIE  Unit Based AXEL

## 2024-10-07 NOTE — ASSESSMENT & PLAN NOTE
Patient called to cancel appointment for today (1/10/17) @4:20pm due to weather.  Scheduled new appointment on 2/21/17 @4:10pm.     Patient of Dr. Dameon Baker  - Initiated LDC flu/cy on 9/25/24, central line placement and brain MRI completed on 9/24   - COVID positive 9/27/24, repeat negative. Per ID thought to be false +   - admitted for cilta-zohra CAR-T on 9/29/24. Day +7 today  -  received 0.50x10^6 CAR+ vT cells/kg 9/30 at 1130am, tolerated without issue  -  daily ferritin, LDH and CRP, increasing    - ICE 10 today  - Grade 1 CRS on 10/5, cefepime started, infectious workup negative so far   - Grade 2 CRS this am (10/7), toci x 1 given  - keppra 750 mg two times/day for seizure ppx  - ppx antimicrobials    Planned Lymphodepletion Regimen:  Cyclophosphamide 300mg/m2 on Day -5, -4, and -3  Fludarabine 30mg/m2 on Day -5, -4, and -3     Pre-Medications:  Acetaminophen on Day 0  Diphenhydramine on Day 0     Immune Effector Cell Therapy:  Ciltacabtagene autoleucel on Day 0     Antimicrobial Prophylaxis:  Acyclovir starting on Day -5  Levofloxacin starting on Day -5  Fluconazole starting on Day -5  Pentamidine on day -5  Sulfamethoxazole-trimethoprim starting on Day +30     Seizure Prophylaxis:  Levetiracetam on Day 0 through Day +30        Caregiver: Catie (friend)  Post-transplant discharge plans: home

## 2024-10-07 NOTE — PLAN OF CARE
Day +7 Carvkyti. ICE=10. Pt involved in plan of care and communicating needs throughout shift. Up in room and to bathroom independently; voiding without difficulty. Tolerating diet. No c/o pain. Pt febrile twice. Edgu=704.2F; Dr. Sharp contacted and tylenol administered x2. Other VSS. Pt remaining free from falls or injury throughout shift. Bed locked and in lowest position, personal belongings and call light within reach, non skid socks on when OOB. Pt instructed to call for assistance as needed. Q2H rounding done on pt.

## 2024-10-07 NOTE — ASSESSMENT & PLAN NOTE
Continue amlodipine 10 mg daily  Continue HCTZ 25 mg daily  Continue clonidine 0.3 mg daily  BP soft this am, BP meds held this am and bolus given. Will stop Clonidine    There are no Wet Read(s) to document.

## 2024-10-07 NOTE — PROGRESS NOTES
[facility] ([facility])  Hematology  Bone Marrow Transplant  Progress Note    Patient Name: Jaspreet Walsh Jr.  Admission Date: 9/29/2024  Hospital Length of Stay: 8 days  Code Status: Full Code    Subjective:     Interval History:  Day +7  Carvykti for R/R Multiple Myeloma. ICE 10. T.max 102.6 at 430am. Infectious work-up negative thusfar, Cefepime (D3). Soft BP this am. Inflammatory markers up-trending, bili and liver enzymes elevated. Grade II CRS. IVF bolus given and Toci given. EKG done this am, pre-confirmed read of STEMI. Patient asymptomatic with no complaints of pain or sob. Troponin done and wnl. Repeat EKG ordered and pending.     Objective:     Vital Signs (Most Recent):  Temp: 99 °F (37.2 °C) (10/07/24 1359)  Pulse: 78 (10/07/24 1359)  Resp: 20 (10/07/24 1359)  BP: 127/78 (10/07/24 1359)  SpO2: 96 % (10/07/24 1359) Vital Signs (24h Range):  Temp:  [98.3 °F (36.8 °C)-103.2 °F (39.6 °C)] 99 °F (37.2 °C)  Pulse:  [60-91] 78  Resp:  [15-20] 20  SpO2:  [94 %-99 %] 96 %  BP: ()/(64-78) 127/78     Weight: 91.3 kg (201 lb 2.7 oz)  Body mass index is 31.51 kg/m².  Body surface area is 2.08 meters squared.      Intake/Output - Last 3 Shifts         10/05 0700  10/06 0659 10/06 0700  10/07 0659 10/07 0700  10/08 0659    P.O. 1640 1100 596    I.V. (mL/kg)  2352.6 (25.8)     IV Piggyback  196.3     Total Intake(mL/kg) 1640 (17.6) 3648.9 (40) 596 (6.5)    Urine (mL/kg/hr) 3850 (1.7) 2700 (1.2) 350 (0.5)    Stool 0  0    Total Output 3850 2700 350    Net -2210 +948.9 +246           Urine Occurrence 1 x      Stool Occurrence 1 x  1 x             Physical Exam  Vitals reviewed.   Constitutional:       General: He is not in acute distress.     Appearance: Normal appearance. He is well-developed.   HENT:      Head: Normocephalic and atraumatic.      Mouth/Throat:      Pharynx: No oropharyngeal exudate.   Eyes:      General: No scleral icterus.     Extraocular Movements: Extraocular movements intact.       Conjunctiva/sclera: Conjunctivae normal.      Pupils: Pupils are equal, round, and reactive to light.   Neck:      Thyroid: No thyromegaly.   Cardiovascular:      Rate and Rhythm: Normal rate and regular rhythm.      Pulses: Normal pulses.      Heart sounds: Normal heart sounds.   Pulmonary:      Effort: Pulmonary effort is normal. No respiratory distress.      Breath sounds: Normal breath sounds.   Abdominal:      General: Abdomen is flat. Bowel sounds are normal. There is no distension.      Palpations: Abdomen is soft.      Tenderness: There is no abdominal tenderness.   Musculoskeletal:         General: No swelling or tenderness. Normal range of motion.      Cervical back: Normal range of motion and neck supple. Normal range of motion.   Skin:     General: Skin is warm and dry.      Coloration: Skin is not pale.      Findings: No petechiae or rash.      Comments: Vascath intact to LCW, no redness or drainage noted   Neurological:      General: No focal deficit present.      Mental Status: He is alert and oriented to person, place, and time.      Cranial Nerves: No cranial nerve deficit.      Coordination: Coordination normal.   Psychiatric:         Mood and Affect: Mood normal.         Behavior: Behavior normal.         Thought Content: Thought content normal.            Significant Labs:   CBC:   Recent Labs   Lab 10/06/24  0205 10/07/24  0432   WBC 1.21* 1.53*   HGB 10.4* 10.1*   HCT 30.7* 30.0*   PLT 83* 88*    and CMP:   Recent Labs   Lab 10/06/24  0205 10/07/24  0432   * 132*   K 3.9 3.5    102   CO2 20* 21*   * 106   BUN 13 12   CREATININE 1.0 1.0   CALCIUM 8.8 8.5*   PROT 5.8* 5.8*   ALBUMIN 3.0* 2.9*   BILITOT 1.0 1.9*   ALKPHOS 126 143*   AST 84* 77*   ALT 77* 112*   ANIONGAP 7* 9       Diagnostic Results:  I have reviewed all pertinent imaging results/findings within the past 24 hours.  Assessment/Plan:     * S/P chimeric antigen receptor T-cell therapy  Patient of Dr. Xiao  Dalovisio  - Initiated LDC flu/cy on 9/25/24, central line placement and brain MRI completed on 9/24   - COVID positive 9/27/24, repeat negative. Per ID thought to be false +   - admitted for cilta-zohra CAR-T on 9/29/24. Day +7 today  -  received 0.50x10^6 CAR+ vT cells/kg 9/30 at 1130am, tolerated without issue  -  daily ferritin, LDH and CRP, increasing    - ICE 10 today  - Grade 1 CRS on 10/5, cefepime started, infectious workup negative so far   - Grade 2 CRS this am (10/7), toci x 1 given  - keppra 750 mg two times/day for seizure ppx  - ppx antimicrobials    Planned Lymphodepletion Regimen:  Cyclophosphamide 300mg/m2 on Day -5, -4, and -3  Fludarabine 30mg/m2 on Day -5, -4, and -3     Pre-Medications:  Acetaminophen on Day 0  Diphenhydramine on Day 0     Immune Effector Cell Therapy:  Ciltacabtagene autoleucel on Day 0     Antimicrobial Prophylaxis:  Acyclovir starting on Day -5  Levofloxacin starting on Day -5  Fluconazole starting on Day -5  Pentamidine on day -5  Sulfamethoxazole-trimethoprim starting on Day +30     Seizure Prophylaxis:  Levetiracetam on Day 0 through Day +30        Caregiver: Catie (friend)  Post-transplant discharge plans: home       Multiple myeloma  Multiple myeloma/History of auto stem cell transplant  - IgG Kappa MM standard risk, diagnosed Sept 2020, after presenting w/ lytic lesions  - s/p 8 cycles Vrd followed by Alea Auto 5/17/2021 with disease relapse and Dkd salvage therapy 8/4/2023  - Achieving/mainting VT post SCT; was on revlimid maintenance but relapsed biochemically and with new lytic disease approximately 2 years post SCT (June/July 2023)  - Initiated Melinda-Kd salvage on 8/4/23; tolerating will no significant AE's   - Melinda subq weekly 8>EOW x 8 doses> q 4 week; kyprolis 70mg/m2 day 1, 8, 15 of 28 day cycle; dex 40mg po weekly   - Patient with initial good response but noted serial biochemical progression confirmed on 4/3/24 repeat serum studies; mild anemia and Jan 2024 PET  with ENDY but no other overt symptoms or CRAB and he feels well  - Transitioned DKd to Kyprolis/pomalyst/dex as bridge to CART (cilta-zohra);    - Pomalyst 4mg daily day 1-21 of 28 cycle ; KPD initiated April 2024  - He has had ID to KPd  - Decision with patient made to pursue consolidation with ciltacabtagene autoleucel   See CarT     History of autologous stem cell transplant  - see Multiple Myeloma    Antineoplastic chemotherapy induced pancytopenia  - daily CBC  - transfuse for hgb <7 and platelets <10k  - hold anticoagulation for platelets <50k  - ppx antimicrobials per protocol    Secondary hypercoagulable state  Multiple myeloma/History of auto stem cell transplant  Ppx Lovenox 40 mg SQ    Essential hypertension  Continue amlodipine 10 mg daily  Continue HCTZ 25 mg daily  Continue clonidine 0.3 mg daily  BP soft this am, BP meds held this am and bolus given. Will stop Clonidine     Diabetes mellitus  Type II, well-controlled  - LD SSI    Abnormal finding on EKG  - EKG completed this am  - showing ST elevation, possible STEMI, unconfirmed read  - asymptomatic  - troponin wnl  - repeat EKG ordered and pending    Hypophosphatemia  - daily phos level  - replace per BMT electrolyte protocol        VTE Risk Mitigation (From admission, onward)           Ordered     heparin, porcine (PF) 100 unit/mL injection flush 500 Units  As needed (PRN)         10/07/24 0959     heparin (porcine) injection 1,600 Units  As needed (PRN)         09/30/24 0824     heparin (porcine) injection 1,000 Units  As needed (PRN)         09/29/24 1744     enoxaparin injection 40 mg  Daily         09/29/24 1700     IP VTE HIGH RISK PATIENT  Once         09/29/24 1700     Place sequential compression device  Until discontinued         09/29/24 1700                    Disposition: inpatient     Carol Trujillo NP  Bone Marrow Transplant  [facility] ([facility])

## 2024-10-07 NOTE — PLAN OF CARE
Day +7 Carvykti; no acute events this shift. ICE remains a 10. Electrolytes closely monitored and replaced per PRN scale. BP at softer side, Dr Vitale aware, 1L NS bolus provided. EKG done, troponin sent. IV tocilizumab provided with premedication. IV antibiotic provided. Ambulates independently to bathroom; educated on importance of I/O recording. CHG wipes provided and encouraged use. Tolerating diet, voiding without difficulty.  Pt remaining free from falls or injury throughout shift; bed locked and in lowest position; call light within reach. POC reviewed with patient. All needs addressed. Frequent rounding performed.

## 2024-10-07 NOTE — SUBJECTIVE & OBJECTIVE
Subjective:     Interval History:  Day +7  Carvykti for R/R Multiple Myeloma. ICE 10. T.max 102.6 at 430am. Infectious work-up negative thusfar, Cefepime (D3). Soft BP this am. Inflammatory markers up-trending, bili and liver enzymes elevated. Grade II CRS. IVF bolus given and Toci given. EKG done this am, pre-confirmed read of STEMI. Patient asymptomatic with no complaints of pain or sob. Troponin done and wnl. Repeat EKG ordered and pending.     Objective:     Vital Signs (Most Recent):  Temp: 99 °F (37.2 °C) (10/07/24 1359)  Pulse: 78 (10/07/24 1359)  Resp: 20 (10/07/24 1359)  BP: 127/78 (10/07/24 1359)  SpO2: 96 % (10/07/24 1359) Vital Signs (24h Range):  Temp:  [98.3 °F (36.8 °C)-103.2 °F (39.6 °C)] 99 °F (37.2 °C)  Pulse:  [60-91] 78  Resp:  [15-20] 20  SpO2:  [94 %-99 %] 96 %  BP: ()/(64-78) 127/78     Weight: 91.3 kg (201 lb 2.7 oz)  Body mass index is 31.51 kg/m².  Body surface area is 2.08 meters squared.      Intake/Output - Last 3 Shifts         10/05 0700  10/06 0659 10/06 0700  10/07 0659 10/07 0700  10/08 0659    P.O. 1640 1100 596    I.V. (mL/kg)  2352.6 (25.8)     IV Piggyback  196.3     Total Intake(mL/kg) 1640 (17.6) 3648.9 (40) 596 (6.5)    Urine (mL/kg/hr) 3850 (1.7) 2700 (1.2) 350 (0.5)    Stool 0  0    Total Output 3850 2700 350    Net -2210 +948.9 +246           Urine Occurrence 1 x      Stool Occurrence 1 x  1 x             Physical Exam  Vitals reviewed.   Constitutional:       General: He is not in acute distress.     Appearance: Normal appearance. He is well-developed.   HENT:      Head: Normocephalic and atraumatic.      Mouth/Throat:      Pharynx: No oropharyngeal exudate.   Eyes:      General: No scleral icterus.     Extraocular Movements: Extraocular movements intact.      Conjunctiva/sclera: Conjunctivae normal.      Pupils: Pupils are equal, round, and reactive to light.   Neck:      Thyroid: No thyromegaly.   Cardiovascular:      Rate and Rhythm: Normal rate and regular  rhythm.      Pulses: Normal pulses.      Heart sounds: Normal heart sounds.   Pulmonary:      Effort: Pulmonary effort is normal. No respiratory distress.      Breath sounds: Normal breath sounds.   Abdominal:      General: Abdomen is flat. Bowel sounds are normal. There is no distension.      Palpations: Abdomen is soft.      Tenderness: There is no abdominal tenderness.   Musculoskeletal:         General: No swelling or tenderness. Normal range of motion.      Cervical back: Normal range of motion and neck supple. Normal range of motion.   Skin:     General: Skin is warm and dry.      Coloration: Skin is not pale.      Findings: No petechiae or rash.      Comments: Vascath intact to LCW, no redness or drainage noted   Neurological:      General: No focal deficit present.      Mental Status: He is alert and oriented to person, place, and time.      Cranial Nerves: No cranial nerve deficit.      Coordination: Coordination normal.   Psychiatric:         Mood and Affect: Mood normal.         Behavior: Behavior normal.         Thought Content: Thought content normal.            Significant Labs:   CBC:   Recent Labs   Lab 10/06/24  0205 10/07/24  0432   WBC 1.21* 1.53*   HGB 10.4* 10.1*   HCT 30.7* 30.0*   PLT 83* 88*    and CMP:   Recent Labs   Lab 10/06/24  0205 10/07/24  0432   * 132*   K 3.9 3.5    102   CO2 20* 21*   * 106   BUN 13 12   CREATININE 1.0 1.0   CALCIUM 8.8 8.5*   PROT 5.8* 5.8*   ALBUMIN 3.0* 2.9*   BILITOT 1.0 1.9*   ALKPHOS 126 143*   AST 84* 77*   ALT 77* 112*   ANIONGAP 7* 9       Diagnostic Results:  I have reviewed all pertinent imaging results/findings within the past 24 hours.

## 2024-10-07 NOTE — ASSESSMENT & PLAN NOTE
Multiple myeloma/History of auto stem cell transplant  - IgG Kappa MM standard risk, diagnosed Sept 2020, after presenting w/ lytic lesions  - s/p 8 cycles Vrd followed by Alea Auto 5/17/2021 with disease relapse and Dkd salvage therapy 8/4/2023  - Achieving/mainting MT post SCT; was on revlimid maintenance but relapsed biochemically and with new lytic disease approximately 2 years post SCT (June/July 2023)  - Initiated Melinda-Kd salvage on 8/4/23; tolerating will no significant AE's   - Melinda subq weekly 8>EOW x 8 doses> q 4 week; kyprolis 70mg/m2 day 1, 8, 15 of 28 day cycle; dex 40mg po weekly   - Patient with initial good response but noted serial biochemical progression confirmed on 4/3/24 repeat serum studies; mild anemia and Jan 2024 PET with ENDY but no other overt symptoms or CRAB and he feels well  - Transitioned DKd to Kyprolis/pomalyst/dex as bridge to CART (cilta-zohra);    - Pomalyst 4mg daily day 1-21 of 28 cycle ; KPD initiated April 2024  - He has had MT to KPd  - Decision with patient made to pursue consolidation with ciltacabtagene autoleucel   See CarT

## 2024-10-07 NOTE — PROGRESS NOTES
Dr. Sharp contacted about pt's temp. Approved PRN tylenol dose. Administered.    10/06/24 2057   Vital Signs   Temp (!) 103.2 °F (39.6 °C)

## 2024-10-08 LAB
ALBUMIN SERPL BCP-MCNC: 2.7 G/DL (ref 3.5–5.2)
ALP SERPL-CCNC: 172 U/L (ref 55–135)
ALT SERPL W/O P-5'-P-CCNC: 101 U/L (ref 10–44)
ANION GAP SERPL CALC-SCNC: 5 MMOL/L (ref 8–16)
ANISOCYTOSIS BLD QL SMEAR: SLIGHT
AST SERPL-CCNC: 54 U/L (ref 10–40)
BASOPHILS # BLD AUTO: 0.01 K/UL (ref 0–0.2)
BASOPHILS NFR BLD: 0.8 % (ref 0–1.9)
BILIRUB SERPL-MCNC: 0.9 MG/DL (ref 0.1–1)
BUN SERPL-MCNC: 9 MG/DL (ref 8–23)
BURR CELLS BLD QL SMEAR: ABNORMAL
CALCIUM SERPL-MCNC: 8.2 MG/DL (ref 8.7–10.5)
CHLORIDE SERPL-SCNC: 113 MMOL/L (ref 95–110)
CO2 SERPL-SCNC: 22 MMOL/L (ref 23–29)
CREAT SERPL-MCNC: 0.8 MG/DL (ref 0.5–1.4)
CRP SERPL-MCNC: 155.9 MG/L (ref 0–8.2)
DIFFERENTIAL METHOD BLD: ABNORMAL
EOSINOPHIL # BLD AUTO: 0.4 K/UL (ref 0–0.5)
EOSINOPHIL NFR BLD: 28.3 % (ref 0–8)
ERYTHROCYTE [DISTWIDTH] IN BLOOD BY AUTOMATED COUNT: 17.5 % (ref 11.5–14.5)
EST. GFR  (NO RACE VARIABLE): >60 ML/MIN/1.73 M^2
FERRITIN SERPL-MCNC: 5908 NG/ML (ref 20–300)
GLUCOSE SERPL-MCNC: 98 MG/DL (ref 70–110)
HCT VFR BLD AUTO: 29.4 % (ref 40–54)
HGB BLD-MCNC: 10 G/DL (ref 14–18)
HYPOCHROMIA BLD QL SMEAR: ABNORMAL
IMM GRANULOCYTES # BLD AUTO: 0 K/UL (ref 0–0.04)
IMM GRANULOCYTES NFR BLD AUTO: 0 % (ref 0–0.5)
LDH SERPL L TO P-CCNC: 306 U/L (ref 110–260)
LYMPHOCYTES # BLD AUTO: 0.5 K/UL (ref 1–4.8)
LYMPHOCYTES NFR BLD: 36.2 % (ref 18–48)
MAGNESIUM SERPL-MCNC: 1.9 MG/DL (ref 1.6–2.6)
MCH RBC QN AUTO: 32.4 PG (ref 27–31)
MCHC RBC AUTO-ENTMCNC: 34 G/DL (ref 32–36)
MCV RBC AUTO: 95 FL (ref 82–98)
MONOCYTES # BLD AUTO: 0.1 K/UL (ref 0.3–1)
MONOCYTES NFR BLD: 11 % (ref 4–15)
NEUTROPHILS # BLD AUTO: 0.3 K/UL (ref 1.8–7.7)
NEUTROPHILS NFR BLD: 23.7 % (ref 38–73)
NRBC BLD-RTO: 0 /100 WBC
OHS QRS DURATION: 96 MS
OHS QTC CALCULATION: 430 MS
OVALOCYTES BLD QL SMEAR: ABNORMAL
PHOSPHATE SERPL-MCNC: 2.3 MG/DL (ref 2.7–4.5)
PLATELET # BLD AUTO: 111 K/UL (ref 150–450)
PLATELET BLD QL SMEAR: ABNORMAL
PMV BLD AUTO: 11.7 FL (ref 9.2–12.9)
POIKILOCYTOSIS BLD QL SMEAR: SLIGHT
POLYCHROMASIA BLD QL SMEAR: ABNORMAL
POTASSIUM SERPL-SCNC: 3.4 MMOL/L (ref 3.5–5.1)
PROT SERPL-MCNC: 5.5 G/DL (ref 6–8.4)
RBC # BLD AUTO: 3.09 M/UL (ref 4.6–6.2)
SCHISTOCYTES BLD QL SMEAR: ABNORMAL
SODIUM SERPL-SCNC: 140 MMOL/L (ref 136–145)
WBC # BLD AUTO: 1.27 K/UL (ref 3.9–12.7)

## 2024-10-08 PROCEDURE — 86140 C-REACTIVE PROTEIN: CPT | Performed by: NURSE PRACTITIONER

## 2024-10-08 PROCEDURE — 84100 ASSAY OF PHOSPHORUS: CPT | Performed by: NURSE PRACTITIONER

## 2024-10-08 PROCEDURE — 63600175 PHARM REV CODE 636 W HCPCS: Performed by: NURSE PRACTITIONER

## 2024-10-08 PROCEDURE — 20600001 HC STEP DOWN PRIVATE ROOM

## 2024-10-08 PROCEDURE — 93010 ELECTROCARDIOGRAM REPORT: CPT | Mod: ,,, | Performed by: INTERNAL MEDICINE

## 2024-10-08 PROCEDURE — 99233 SBSQ HOSP IP/OBS HIGH 50: CPT | Mod: GC,,, | Performed by: INTERNAL MEDICINE

## 2024-10-08 PROCEDURE — 25000003 PHARM REV CODE 250: Performed by: NURSE PRACTITIONER

## 2024-10-08 PROCEDURE — 93005 ELECTROCARDIOGRAM TRACING: CPT

## 2024-10-08 PROCEDURE — 25000003 PHARM REV CODE 250: Performed by: STUDENT IN AN ORGANIZED HEALTH CARE EDUCATION/TRAINING PROGRAM

## 2024-10-08 PROCEDURE — 83615 LACTATE (LD) (LDH) ENZYME: CPT | Performed by: NURSE PRACTITIONER

## 2024-10-08 PROCEDURE — 83735 ASSAY OF MAGNESIUM: CPT | Performed by: NURSE PRACTITIONER

## 2024-10-08 PROCEDURE — 80053 COMPREHEN METABOLIC PANEL: CPT | Performed by: NURSE PRACTITIONER

## 2024-10-08 PROCEDURE — 85025 COMPLETE CBC W/AUTO DIFF WBC: CPT | Performed by: NURSE PRACTITIONER

## 2024-10-08 PROCEDURE — 63600175 PHARM REV CODE 636 W HCPCS: Performed by: STUDENT IN AN ORGANIZED HEALTH CARE EDUCATION/TRAINING PROGRAM

## 2024-10-08 PROCEDURE — 25000003 PHARM REV CODE 250: Performed by: INTERNAL MEDICINE

## 2024-10-08 PROCEDURE — 82728 ASSAY OF FERRITIN: CPT | Performed by: NURSE PRACTITIONER

## 2024-10-08 RX ADMIN — LEVETIRACETAM 750 MG: 750 TABLET, FILM COATED ORAL at 09:10

## 2024-10-08 RX ADMIN — SODIUM CHLORIDE 75 ML/HR: 9 INJECTION, SOLUTION INTRAVENOUS at 12:10

## 2024-10-08 RX ADMIN — ACYCLOVIR 800 MG: 200 CAPSULE ORAL at 08:10

## 2024-10-08 RX ADMIN — FLUCONAZOLE 400 MG: 200 TABLET ORAL at 09:10

## 2024-10-08 RX ADMIN — DIBASIC SODIUM PHOSPHATE, MONOBASIC POTASSIUM PHOSPHATE AND MONOBASIC SODIUM PHOSPHATE 1 TABLET: 852; 155; 130 TABLET ORAL at 05:10

## 2024-10-08 RX ADMIN — MUPIROCIN: 20 OINTMENT TOPICAL at 09:10

## 2024-10-08 RX ADMIN — ACYCLOVIR 800 MG: 200 CAPSULE ORAL at 09:10

## 2024-10-08 RX ADMIN — HYDROCHLOROTHIAZIDE 25 MG: 25 TABLET ORAL at 09:10

## 2024-10-08 RX ADMIN — CEFEPIME 2 G: 2 INJECTION, POWDER, FOR SOLUTION INTRAVENOUS at 02:10

## 2024-10-08 RX ADMIN — GABAPENTIN 300 MG: 300 CAPSULE ORAL at 08:10

## 2024-10-08 RX ADMIN — POTASSIUM PHOSPHATE, MONOBASIC POTASSIUM PHOSPHATE, DIBASIC 30 MMOL: 224; 236 INJECTION, SOLUTION, CONCENTRATE INTRAVENOUS at 09:10

## 2024-10-08 RX ADMIN — DIBASIC SODIUM PHOSPHATE, MONOBASIC POTASSIUM PHOSPHATE AND MONOBASIC SODIUM PHOSPHATE 1 TABLET: 852; 155; 130 TABLET ORAL at 09:10

## 2024-10-08 RX ADMIN — Medication 400 MG: at 09:10

## 2024-10-08 RX ADMIN — POTASSIUM CHLORIDE 20 MEQ: 1500 TABLET, EXTENDED RELEASE ORAL at 09:10

## 2024-10-08 RX ADMIN — GABAPENTIN 300 MG: 300 CAPSULE ORAL at 09:10

## 2024-10-08 RX ADMIN — DIBASIC SODIUM PHOSPHATE, MONOBASIC POTASSIUM PHOSPHATE AND MONOBASIC SODIUM PHOSPHATE 1 TABLET: 852; 155; 130 TABLET ORAL at 12:10

## 2024-10-08 RX ADMIN — MUPIROCIN: 20 OINTMENT TOPICAL at 08:10

## 2024-10-08 RX ADMIN — AMLODIPINE BESYLATE 10 MG: 10 TABLET ORAL at 09:10

## 2024-10-08 RX ADMIN — Medication 400 MG: at 12:10

## 2024-10-08 RX ADMIN — ENOXAPARIN SODIUM 40 MG: 40 INJECTION SUBCUTANEOUS at 05:10

## 2024-10-08 RX ADMIN — LEVETIRACETAM 750 MG: 750 TABLET, FILM COATED ORAL at 08:10

## 2024-10-08 RX ADMIN — DIBASIC SODIUM PHOSPHATE, MONOBASIC POTASSIUM PHOSPHATE AND MONOBASIC SODIUM PHOSPHATE 1 TABLET: 852; 155; 130 TABLET ORAL at 08:10

## 2024-10-08 NOTE — SUBJECTIVE & OBJECTIVE
Subjective:     Interval History: Day +8 of Carvykti for R/R Multiple Myeloma. Received Toci yesterday for grade 2 CRS. Afebrile this morning. Denies any chest pain or shortness of breath. No new complaints. ICE score of 10. Blood cultures shows NGTD. Cefepime discontinued. LFT and T bili trending down. Replacing electrolytes.  Objective:     Vital Signs (Most Recent):  Temp: 98.7 °F (37.1 °C) (10/08/24 0727)  Pulse: 69 (10/08/24 0727)  Resp: 18 (10/08/24 0727)  BP: (!) 150/81 (10/08/24 0727)  SpO2: 98 % (10/08/24 0727) Vital Signs (24h Range):  Temp:  [97.8 °F (36.6 °C)-99 °F (37.2 °C)] 98.7 °F (37.1 °C)  Pulse:  [69-80] 69  Resp:  [16-20] 18  SpO2:  [95 %-98 %] 98 %  BP: (127-157)/(75-84) 150/81     Weight: 90.9 kg (200 lb 6.4 oz)  Body mass index is 31.39 kg/m².  Body surface area is 2.07 meters squared.    ECOG SCORE           [unfilled]    Intake/Output - Last 3 Shifts         10/06 0700  10/07 0659 10/07 0700  10/08 0659 10/08 0700  10/09 0659    P.O. 1100 1076     I.V. (mL/kg) 2352.6 (25.8) 2469.6 (27.2) 202.6 (2.2)    IV Piggyback 196.3 444.6     Total Intake(mL/kg) 3648.9 (40) 3990.2 (43.9) 202.6 (2.2)    Urine (mL/kg/hr) 2700 (1.2) 2000 (0.9)     Stool  0     Total Output 2700 2000     Net +948.9 +1990.2 +202.6           Stool Occurrence  2 x              Physical Exam  Vitals reviewed.   Constitutional:       General: He is not in acute distress.     Appearance: Normal appearance. He is well-developed.   HENT:      Head: Normocephalic and atraumatic.      Mouth/Throat:      Pharynx: No oropharyngeal exudate.   Eyes:      General: No scleral icterus.     Extraocular Movements: Extraocular movements intact.      Conjunctiva/sclera: Conjunctivae normal.      Pupils: Pupils are equal, round, and reactive to light.   Neck:      Thyroid: No thyromegaly.   Cardiovascular:      Rate and Rhythm: Normal rate and regular rhythm.      Pulses: Normal pulses.      Heart sounds: Normal heart sounds.   Pulmonary:       Effort: Pulmonary effort is normal. No respiratory distress.      Breath sounds: Normal breath sounds.   Abdominal:      General: Abdomen is flat. Bowel sounds are normal. There is no distension.      Palpations: Abdomen is soft.      Tenderness: There is no abdominal tenderness.   Musculoskeletal:         General: No swelling or tenderness. Normal range of motion.      Cervical back: Normal range of motion and neck supple. Normal range of motion.   Skin:     General: Skin is warm and dry.      Coloration: Skin is not pale.      Findings: No petechiae or rash.      Comments: Vascath intact to LCW, no redness or drainage noted   Neurological:      General: No focal deficit present.      Mental Status: He is alert and oriented to person, place, and time.      Cranial Nerves: No cranial nerve deficit.      Coordination: Coordination normal.   Psychiatric:         Mood and Affect: Mood normal.         Behavior: Behavior normal.         Thought Content: Thought content normal.            Significant Labs:   All pertinent labs from the last 24 hours have been reviewed.    Diagnostic Results:  I have reviewed and interpreted all pertinent imaging results/findings within the past 24 hours.

## 2024-10-08 NOTE — ASSESSMENT & PLAN NOTE
Patient of Dr. Dameon Baker  - Initiated LDC flu/cy on 9/25/24, central line placement and brain MRI completed on 9/24   - COVID positive 9/27/24, repeat negative. Per ID thought to be false +   - admitted for cilta-zohra CAR-T on 9/29/24. Day +8 today  -  received 0.50x10^6 CAR+ vT cells/kg 9/30 at 1130am, tolerated without issue  -  daily ferritin, LDH and CRP, increasing    - ICE 10 today  - Grade 1 CRS on 10/5, cefepime started, infectious workup negative so far. Cefepime discontinued 10/08  - Grade 2 CRS this am (10/7), toci x 1 given  - keppra 750 mg two times/day for seizure ppx  - ppx antimicrobials    Planned Lymphodepletion Regimen:  Cyclophosphamide 300mg/m2 on Day -5, -4, and -3  Fludarabine 30mg/m2 on Day -5, -4, and -3     Pre-Medications:  Acetaminophen on Day 0  Diphenhydramine on Day 0     Immune Effector Cell Therapy:  Ciltacabtagene autoleucel on Day 0     Antimicrobial Prophylaxis:  Acyclovir starting on Day -5  Levofloxacin starting on Day -5  Fluconazole starting on Day -5  Pentamidine on day -5  Sulfamethoxazole-trimethoprim starting on Day +30     Seizure Prophylaxis:  Levetiracetam on Day 0 through Day +30        Caregiver: Catie (friend)  Post-transplant discharge plans: home

## 2024-10-08 NOTE — PROGRESS NOTES
[facility] ([facility])  Hematology  Bone Marrow Transplant  Progress Note    Patient Name: Jaspreet Walsh Jr.  Admission Date: 9/29/2024  Hospital Length of Stay: 9 days  Code Status: Full Code    Subjective:     Interval History: Day +8 of Carvykti for R/R Multiple Myeloma. Received Toci yesterday for grade 2 CRS. Afebrile this morning. Denies any chest pain or shortness of breath. No new complaints. ICE score of 10. Blood cultures shows NGTD. Cefepime discontinued. LFT and T bili trending down. Replacing electrolytes.  Objective:     Vital Signs (Most Recent):  Temp: 98.7 °F (37.1 °C) (10/08/24 0727)  Pulse: 69 (10/08/24 0727)  Resp: 18 (10/08/24 0727)  BP: (!) 150/81 (10/08/24 0727)  SpO2: 98 % (10/08/24 0727) Vital Signs (24h Range):  Temp:  [97.8 °F (36.6 °C)-99 °F (37.2 °C)] 98.7 °F (37.1 °C)  Pulse:  [69-80] 69  Resp:  [16-20] 18  SpO2:  [95 %-98 %] 98 %  BP: (127-157)/(75-84) 150/81     Weight: 90.9 kg (200 lb 6.4 oz)  Body mass index is 31.39 kg/m².  Body surface area is 2.07 meters squared.    ECOG SCORE           [unfilled]    Intake/Output - Last 3 Shifts         10/06 0700  10/07 0659 10/07 0700  10/08 0659 10/08 0700  10/09 0659    P.O. 1100 1076     I.V. (mL/kg) 2352.6 (25.8) 2469.6 (27.2) 202.6 (2.2)    IV Piggyback 196.3 444.6     Total Intake(mL/kg) 3648.9 (40) 3990.2 (43.9) 202.6 (2.2)    Urine (mL/kg/hr) 2700 (1.2) 2000 (0.9)     Stool  0     Total Output 2700 2000     Net +948.9 +1990.2 +202.6           Stool Occurrence  2 x              Physical Exam  Vitals reviewed.   Constitutional:       General: He is not in acute distress.     Appearance: Normal appearance. He is well-developed.   HENT:      Head: Normocephalic and atraumatic.      Mouth/Throat:      Pharynx: No oropharyngeal exudate.   Eyes:      General: No scleral icterus.     Extraocular Movements: Extraocular movements intact.      Conjunctiva/sclera: Conjunctivae normal.      Pupils: Pupils are equal, round, and reactive to  light.   Neck:      Thyroid: No thyromegaly.   Cardiovascular:      Rate and Rhythm: Normal rate and regular rhythm.      Pulses: Normal pulses.      Heart sounds: Normal heart sounds.   Pulmonary:      Effort: Pulmonary effort is normal. No respiratory distress.      Breath sounds: Normal breath sounds.   Abdominal:      General: Abdomen is flat. Bowel sounds are normal. There is no distension.      Palpations: Abdomen is soft.      Tenderness: There is no abdominal tenderness.   Musculoskeletal:         General: No swelling or tenderness. Normal range of motion.      Cervical back: Normal range of motion and neck supple. Normal range of motion.   Skin:     General: Skin is warm and dry.      Coloration: Skin is not pale.      Findings: No petechiae or rash.      Comments: Vascath intact to LCW, no redness or drainage noted   Neurological:      General: No focal deficit present.      Mental Status: He is alert and oriented to person, place, and time.      Cranial Nerves: No cranial nerve deficit.      Coordination: Coordination normal.   Psychiatric:         Mood and Affect: Mood normal.         Behavior: Behavior normal.         Thought Content: Thought content normal.            Significant Labs:   All pertinent labs from the last 24 hours have been reviewed.    Diagnostic Results:  I have reviewed and interpreted all pertinent imaging results/findings within the past 24 hours.  Assessment/Plan:     * S/P chimeric antigen receptor T-cell therapy  Patient of Dr. Dameon Baker  - Initiated LDC flu/cy on 9/25/24, central line placement and brain MRI completed on 9/24   - COVID positive 9/27/24, repeat negative. Per ID thought to be false +   - admitted for cilta-zohra CAR-T on 9/29/24. Day +8 today  -  received 0.50x10^6 CAR+ vT cells/kg 9/30 at 1130am, tolerated without issue  -  daily ferritin, LDH and CRP, increasing    - ICE 10 today  - Grade 1 CRS on 10/5, cefepime started, infectious workup negative so far.  Cefepime discontinued 10/08  - Grade 2 CRS this am (10/7), toci x 1 given  - keppra 750 mg two times/day for seizure ppx  - ppx antimicrobials    Planned Lymphodepletion Regimen:  Cyclophosphamide 300mg/m2 on Day -5, -4, and -3  Fludarabine 30mg/m2 on Day -5, -4, and -3     Pre-Medications:  Acetaminophen on Day 0  Diphenhydramine on Day 0     Immune Effector Cell Therapy:  Ciltacabtagene autoleucel on Day 0     Antimicrobial Prophylaxis:  Acyclovir starting on Day -5  Levofloxacin starting on Day -5  Fluconazole starting on Day -5  Pentamidine on day -5  Sulfamethoxazole-trimethoprim starting on Day +30     Seizure Prophylaxis:  Levetiracetam on Day 0 through Day +30        Caregiver: Catie (friend)  Post-transplant discharge plans: home       Abnormal finding on EKG  - EKG completed this am  - showing ST elevation, possible STEMI, unconfirmed read  - asymptomatic  - troponin wnl  - repeat EKG ordered and pending    Hypophosphatemia  - daily phos level  - replace per BMT electrolyte protocol    Secondary hypercoagulable state  Multiple myeloma/History of auto stem cell transplant  Ppx Lovenox 40 mg SQ    Diabetes mellitus  Type II, well-controlled  - LD SSI    History of autologous stem cell transplant  - see Multiple Myeloma    Antineoplastic chemotherapy induced pancytopenia  - daily CBC  - transfuse for hgb <7 and platelets <10k  - hold anticoagulation for platelets <50k  - ppx antimicrobials per protocol    Multiple myeloma  Multiple myeloma/History of auto stem cell transplant  - IgG Kappa MM standard risk, diagnosed Sept 2020, after presenting w/ lytic lesions  - s/p 8 cycles Vrd followed by [facility] Auto 5/17/2021 with disease relapse and Dkd salvage therapy 8/4/2023  - Achieving/mainting WA post SCT; was on revlimid maintenance but relapsed biochemically and with new lytic disease approximately 2 years post SCT (June/July 2023)  - Initiated Melinda-Kd salvage on 8/4/23; tolerating will no significant AE's   - Melinda  subq weekly 8>EOW x 8 doses> q 4 week; kyprolis 70mg/m2 day 1, 8, 15 of 28 day cycle; dex 40mg po weekly   - Patient with initial good response but noted serial biochemical progression confirmed on 4/3/24 repeat serum studies; mild anemia and Jan 2024 PET with ENDY but no other overt symptoms or CRAB and he feels well  - Transitioned DKd to Kyprolis/pomalyst/dex as bridge to CART (cilta-zohra);    - Pomalyst 4mg daily day 1-21 of 28 cycle ; KPD initiated April 2024  - He has had IL to KPd  - Decision with patient made to pursue consolidation with ciltacabtagene autoleucel   See CarT     Essential hypertension  Continue amlodipine 10 mg daily  Continue HCTZ 25 mg daily  Continue clonidine 0.3 mg daily  BP soft this am, BP meds held this am and bolus given. Will stop Clonidine         VTE Risk Mitigation (From admission, onward)           Ordered     heparin, porcine (PF) 100 unit/mL injection flush 500 Units  As needed (PRN)         10/07/24 0959     heparin (porcine) injection 1,600 Units  As needed (PRN)         09/30/24 0824     heparin (porcine) injection 1,000 Units  As needed (PRN)         09/29/24 1744     enoxaparin injection 40 mg  Daily         09/29/24 1700     IP VTE HIGH RISK PATIENT  Once         09/29/24 1700     Place sequential compression device  Until discontinued         09/29/24 1700                    Disposition: Home    Hillary Vitale MD  Bone Marrow Transplant  [facility] ([facility])

## 2024-10-08 NOTE — PLAN OF CARE
POC reviewed with patient; understanding verbalized. Day 8 of CARVYKTI. ICE remains 10. NS @ 75 cc/hr. PO and IV electrolyte replacement completed this shift. Pt voids independently via urinal. Bed alarm refused. Pt. with nonskid footwear on, bed in lowest position, and locked with bed rails up x2.  Pt. instructed to call prior to getting OOB.  Pt. has call light and personal items within reach. Patient ambulates in room independently. VSS and afebrile this shift. All questions and concerns addressed at this time.

## 2024-10-08 NOTE — PLAN OF CARE
Day +8 of CAR-T. No acute events this shift. Patient AAOx4. Afebrile and VSS. ICE score of 10. Ambulates independently and voids without difficulty. No complaints of pain or nausea. IV fluids continued as ordered. Family remains at bedside. Bed in lowest position and locked. Side rails up x2. All possessions and call light within reach. Non-skid socks worn. Instructed to call for assistance and voiced understanding. All needs of patient currently met. Will continue to monitor with frequent rounding.

## 2024-10-09 LAB
ABO + RH BLD: NORMAL
ALBUMIN SERPL BCP-MCNC: 2.9 G/DL (ref 3.5–5.2)
ALP SERPL-CCNC: 167 U/L (ref 55–135)
ALT SERPL W/O P-5'-P-CCNC: 84 U/L (ref 10–44)
ANION GAP SERPL CALC-SCNC: 8 MMOL/L (ref 8–16)
ANISOCYTOSIS BLD QL SMEAR: SLIGHT
AST SERPL-CCNC: 30 U/L (ref 10–40)
BASOPHILS # BLD AUTO: 0.05 K/UL (ref 0–0.2)
BASOPHILS NFR BLD: 2.3 % (ref 0–1.9)
BILIRUB SERPL-MCNC: 0.6 MG/DL (ref 0.1–1)
BLD GP AB SCN CELLS X3 SERPL QL: NORMAL
BUN SERPL-MCNC: 10 MG/DL (ref 8–23)
BURR CELLS BLD QL SMEAR: ABNORMAL
CALCIUM SERPL-MCNC: 8.1 MG/DL (ref 8.7–10.5)
CHLORIDE SERPL-SCNC: 111 MMOL/L (ref 95–110)
CO2 SERPL-SCNC: 22 MMOL/L (ref 23–29)
CREAT SERPL-MCNC: 0.8 MG/DL (ref 0.5–1.4)
CRP SERPL-MCNC: 84.6 MG/L (ref 0–8.2)
DIFFERENTIAL METHOD BLD: ABNORMAL
EOSINOPHIL # BLD AUTO: 0.2 K/UL (ref 0–0.5)
EOSINOPHIL NFR BLD: 6.8 % (ref 0–8)
ERYTHROCYTE [DISTWIDTH] IN BLOOD BY AUTOMATED COUNT: 17.3 % (ref 11.5–14.5)
EST. GFR  (NO RACE VARIABLE): >60 ML/MIN/1.73 M^2
FERRITIN SERPL-MCNC: 3394 NG/ML (ref 20–300)
GLUCOSE SERPL-MCNC: 92 MG/DL (ref 70–110)
HCT VFR BLD AUTO: 30.5 % (ref 40–54)
HGB BLD-MCNC: 10.3 G/DL (ref 14–18)
HYPOCHROMIA BLD QL SMEAR: ABNORMAL
IMM GRANULOCYTES # BLD AUTO: 0 K/UL (ref 0–0.04)
IMM GRANULOCYTES NFR BLD AUTO: 0 % (ref 0–0.5)
LDH SERPL L TO P-CCNC: 295 U/L (ref 110–260)
LYMPHOCYTES # BLD AUTO: 1.6 K/UL (ref 1–4.8)
LYMPHOCYTES NFR BLD: 70.7 % (ref 18–48)
MAGNESIUM SERPL-MCNC: 1.6 MG/DL (ref 1.6–2.6)
MCH RBC QN AUTO: 32.6 PG (ref 27–31)
MCHC RBC AUTO-ENTMCNC: 33.8 G/DL (ref 32–36)
MCV RBC AUTO: 97 FL (ref 82–98)
MONOCYTES # BLD AUTO: 0.2 K/UL (ref 0.3–1)
MONOCYTES NFR BLD: 10.8 % (ref 4–15)
NEUTROPHILS # BLD AUTO: 0.2 K/UL (ref 1.8–7.7)
NEUTROPHILS NFR BLD: 9.4 % (ref 38–73)
NRBC BLD-RTO: 0 /100 WBC
OVALOCYTES BLD QL SMEAR: ABNORMAL
PHOSPHATE SERPL-MCNC: 2.8 MG/DL (ref 2.7–4.5)
PLATELET # BLD AUTO: 150 K/UL (ref 150–450)
PMV BLD AUTO: 12.5 FL (ref 9.2–12.9)
POIKILOCYTOSIS BLD QL SMEAR: SLIGHT
POLYCHROMASIA BLD QL SMEAR: ABNORMAL
POTASSIUM SERPL-SCNC: 3.6 MMOL/L (ref 3.5–5.1)
PROT SERPL-MCNC: 5.6 G/DL (ref 6–8.4)
RBC # BLD AUTO: 3.16 M/UL (ref 4.6–6.2)
SCHISTOCYTES BLD QL SMEAR: ABNORMAL
SODIUM SERPL-SCNC: 141 MMOL/L (ref 136–145)
SPECIMEN OUTDATE: NORMAL
WBC # BLD AUTO: 2.22 K/UL (ref 3.9–12.7)

## 2024-10-09 PROCEDURE — 86850 RBC ANTIBODY SCREEN: CPT | Performed by: INTERNAL MEDICINE

## 2024-10-09 PROCEDURE — 86901 BLOOD TYPING SEROLOGIC RH(D): CPT | Performed by: INTERNAL MEDICINE

## 2024-10-09 PROCEDURE — 25000003 PHARM REV CODE 250: Performed by: NURSE PRACTITIONER

## 2024-10-09 PROCEDURE — 86900 BLOOD TYPING SEROLOGIC ABO: CPT | Performed by: INTERNAL MEDICINE

## 2024-10-09 PROCEDURE — 25000003 PHARM REV CODE 250: Performed by: INTERNAL MEDICINE

## 2024-10-09 PROCEDURE — 85025 COMPLETE CBC W/AUTO DIFF WBC: CPT | Performed by: NURSE PRACTITIONER

## 2024-10-09 PROCEDURE — 83735 ASSAY OF MAGNESIUM: CPT | Performed by: NURSE PRACTITIONER

## 2024-10-09 PROCEDURE — 20600001 HC STEP DOWN PRIVATE ROOM

## 2024-10-09 PROCEDURE — 99233 SBSQ HOSP IP/OBS HIGH 50: CPT | Mod: GC,,, | Performed by: INTERNAL MEDICINE

## 2024-10-09 PROCEDURE — 80053 COMPREHEN METABOLIC PANEL: CPT | Performed by: NURSE PRACTITIONER

## 2024-10-09 PROCEDURE — 83615 LACTATE (LD) (LDH) ENZYME: CPT | Performed by: NURSE PRACTITIONER

## 2024-10-09 PROCEDURE — 86140 C-REACTIVE PROTEIN: CPT | Performed by: NURSE PRACTITIONER

## 2024-10-09 PROCEDURE — 82728 ASSAY OF FERRITIN: CPT | Performed by: NURSE PRACTITIONER

## 2024-10-09 PROCEDURE — 84100 ASSAY OF PHOSPHORUS: CPT | Performed by: NURSE PRACTITIONER

## 2024-10-09 PROCEDURE — 63600175 PHARM REV CODE 636 W HCPCS: Performed by: NURSE PRACTITIONER

## 2024-10-09 PROCEDURE — 25000003 PHARM REV CODE 250: Performed by: STUDENT IN AN ORGANIZED HEALTH CARE EDUCATION/TRAINING PROGRAM

## 2024-10-09 RX ORDER — LEVOFLOXACIN 500 MG/1
500 TABLET, FILM COATED ORAL DAILY
Status: DISCONTINUED | OUTPATIENT
Start: 2024-10-09 | End: 2024-10-10 | Stop reason: HOSPADM

## 2024-10-09 RX ORDER — LOSARTAN POTASSIUM 25 MG/1
25 TABLET ORAL DAILY
Status: DISCONTINUED | OUTPATIENT
Start: 2024-10-09 | End: 2024-10-10

## 2024-10-09 RX ADMIN — Medication 400 MG: at 06:10

## 2024-10-09 RX ADMIN — LEVETIRACETAM 750 MG: 750 TABLET, FILM COATED ORAL at 09:10

## 2024-10-09 RX ADMIN — MUPIROCIN: 20 OINTMENT TOPICAL at 09:10

## 2024-10-09 RX ADMIN — ENOXAPARIN SODIUM 40 MG: 40 INJECTION SUBCUTANEOUS at 05:10

## 2024-10-09 RX ADMIN — LEVOFLOXACIN 500 MG: 500 TABLET, FILM COATED ORAL at 11:10

## 2024-10-09 RX ADMIN — ACYCLOVIR 800 MG: 200 CAPSULE ORAL at 09:10

## 2024-10-09 RX ADMIN — POTASSIUM CHLORIDE 20 MEQ: 1500 TABLET, EXTENDED RELEASE ORAL at 06:10

## 2024-10-09 RX ADMIN — Medication 400 MG: at 02:10

## 2024-10-09 RX ADMIN — LOSARTAN POTASSIUM 25 MG: 25 TABLET, FILM COATED ORAL at 09:10

## 2024-10-09 RX ADMIN — Medication 400 MG: at 11:10

## 2024-10-09 RX ADMIN — GABAPENTIN 300 MG: 300 CAPSULE ORAL at 09:10

## 2024-10-09 RX ADMIN — FLUCONAZOLE 400 MG: 200 TABLET ORAL at 09:10

## 2024-10-09 RX ADMIN — AMLODIPINE BESYLATE 10 MG: 10 TABLET ORAL at 09:10

## 2024-10-09 RX ADMIN — HYDROCHLOROTHIAZIDE 25 MG: 25 TABLET ORAL at 09:10

## 2024-10-09 NOTE — PLAN OF CARE
Day +9 of CAR-T. No acute events this shift. Patient AAOx4. Afebrile and VSS. ICE score of 10. Ambulates independently and voids without difficulty. No complaints of pain or nausea. IV fluids continued as ordered. Family remains at bedside. Bed in lowest position and locked. Side rails up x2. All possessions and call light within reach. Non-skid socks worn. Instructed to call for assistance and voiced understanding. All needs of patient currently met. Will continue to monitor with frequent rounding.

## 2024-10-09 NOTE — PLAN OF CARE
Recommendations  1.) Continue Diabetic diet   2.) RD following, consult if needed  Goals: 1.) Meet >75% EEN/EPN  Nutrition Goal Status: goal met  Communication of RD Recs: other (comment) (POC)

## 2024-10-09 NOTE — ASSESSMENT & PLAN NOTE
- EKG completed this am  - showing ST elevation, possible STEMI, unconfirmed read  - asymptomatic  - troponin wnl  - repeat EKG NSR and PACs

## 2024-10-09 NOTE — SUBJECTIVE & OBJECTIVE
Subjective:     Interval History:   Day +9 of Carvykti for R/R Multiple Myeloma. Afebrile. VSS. No new complaints. ICE score is 10. CRP and ferritin trending down. BP elevated, started home dose losartan. LFTs continues trend down.     Objective:     Vital Signs (Most Recent):  Temp: 98.7 °F (37.1 °C) (10/09/24 0728)  Pulse: 63 (10/09/24 0728)  Resp: 17 (10/09/24 0728)  BP: (!) 164/89 (10/09/24 0728)  SpO2: 99 % (10/09/24 0728) Vital Signs (24h Range):  Temp:  [98.1 °F (36.7 °C)-98.8 °F (37.1 °C)] 98.7 °F (37.1 °C)  Pulse:  [63-79] 63  Resp:  [16-20] 17  SpO2:  [97 %-99 %] 99 %  BP: (131-170)/(75-89) 164/89     Weight: 90.9 kg (200 lb 6.4 oz)  Body mass index is 31.39 kg/m².  Body surface area is 2.07 meters squared.    ECOG SCORE           [unfilled]    Intake/Output - Last 3 Shifts         10/07 0700  10/08 0659 10/08 0700  10/09 0659 10/09 0700  10/10 0659    P.O. 1076 700     I.V. (mL/kg) 2469.6 (27.2) 1537 (16.9)     IV Piggyback 444.6 499     Total Intake(mL/kg) 3990.2 (43.9) 2736 (30.1)     Urine (mL/kg/hr) 2000 (0.9) 2300 (1.1)     Stool 0 0     Total Output 2000 2300     Net +1990.2 +436            Urine Occurrence  1 x     Stool Occurrence 2 x 1 x              Physical Exam  Vitals reviewed.   Constitutional:       General: He is not in acute distress.     Appearance: Normal appearance. He is well-developed.   HENT:      Head: Normocephalic and atraumatic.      Mouth/Throat:      Pharynx: No oropharyngeal exudate.   Eyes:      General: No scleral icterus.     Extraocular Movements: Extraocular movements intact.      Conjunctiva/sclera: Conjunctivae normal.      Pupils: Pupils are equal, round, and reactive to light.   Neck:      Thyroid: No thyromegaly.   Cardiovascular:      Rate and Rhythm: Normal rate and regular rhythm.      Pulses: Normal pulses.      Heart sounds: Normal heart sounds.   Pulmonary:      Effort: Pulmonary effort is normal. No respiratory distress.      Breath sounds: Normal breath  sounds.   Abdominal:      General: Abdomen is flat. Bowel sounds are normal. There is no distension.      Palpations: Abdomen is soft.      Tenderness: There is no abdominal tenderness.   Musculoskeletal:         General: No swelling or tenderness. Normal range of motion.      Cervical back: Normal range of motion and neck supple. Normal range of motion.   Skin:     General: Skin is warm and dry.      Coloration: Skin is not pale.      Findings: No petechiae or rash.      Comments: Vascath intact to LCW, no redness or drainage noted   Neurological:      General: No focal deficit present.      Mental Status: He is alert and oriented to person, place, and time.      Cranial Nerves: No cranial nerve deficit.      Coordination: Coordination normal.   Psychiatric:         Mood and Affect: Mood normal.         Behavior: Behavior normal.         Thought Content: Thought content normal.            Significant Labs:   All pertinent labs from the last 24 hours have been reviewed.    Diagnostic Results:  I have reviewed and interpreted all pertinent imaging results/findings within the past 24 hours.

## 2024-10-09 NOTE — ASSESSMENT & PLAN NOTE
Patient of Dr. Dameon Baker  - Initiated LDC flu/cy on 9/25/24, central line placement and brain MRI completed on 9/24   - COVID positive 9/27/24, repeat negative. Per ID thought to be false +   - admitted for cilta-zohra CAR-T on 9/29/24. Day +9 today  -  received 0.50x10^6 CAR+ vT cells/kg 9/30 at 1130am, tolerated without issue  -  daily ferritin, LDH and CRP, increasing    - ICE 10 today  - Grade 1 CRS on 10/5, cefepime started, infectious workup negative so far. Cefepime discontinued 10/08  - Grade 2 CRS this am (10/7), toci x 1 given  - keppra 750 mg two times/day for seizure ppx  - ppx antimicrobials    Planned Lymphodepletion Regimen:  Cyclophosphamide 300mg/m2 on Day -5, -4, and -3  Fludarabine 30mg/m2 on Day -5, -4, and -3     Pre-Medications:  Acetaminophen on Day 0  Diphenhydramine on Day 0     Immune Effector Cell Therapy:  Ciltacabtagene autoleucel on Day 0     Antimicrobial Prophylaxis:  Acyclovir starting on Day -5  Levofloxacin starting on Day -5  Fluconazole starting on Day -5  Pentamidine on day -5  Sulfamethoxazole-trimethoprim starting on Day +30     Seizure Prophylaxis:  Levetiracetam on Day 0 through Day +30        Caregiver: Catie (friend)  Post-transplant discharge plans: home

## 2024-10-09 NOTE — PROGRESS NOTES
Pt seen for follow up. Feeling well and making an effort to stay active.  Requests a gas card from [facility] when available.  No other needs identified at this time. Will continue to follow and assist as needed.

## 2024-10-09 NOTE — PLAN OF CARE
POC reviewed with patient; understanding verbalized. Day 9 of CARLUHYKTI. ICE score remains 10; signature sheet at bedside. NS @ 75 cc/hr. Pt voids independently via urinal. Pt remained free of fall and injuries this shift. Bed alarm refused. Pt. with nonskid footwear on, bed in lowest position, and locked with bed rails up x2.  Pt. instructed to call prior to getting OOB.  Pt. has call light and personal items within reach. Patient ambulates in room independently. VSS and afebrile this shift. All questions and concerns addressed at this time.

## 2024-10-09 NOTE — ASSESSMENT & PLAN NOTE
Continue amlodipine 10 mg daily, losartan 25mg  Continue HCTZ 25 mg daily  Continue clonidine 0.3 mg daily  BP soft this am, BP meds held this am and bolus given. Will stop Clonidine

## 2024-10-09 NOTE — PROGRESS NOTES
"[facility] ([facility])  Adult Nutrition  Progress Note    SUMMARY       Recommendations  1.) Continue Diabetic diet   2.) RD following, consult if needed  Goals: 1.) Meet >75% EEN/EPN  Nutrition Goal Status: goal met  Communication of RD Recs: other (comment) (POC)    Assessment and Plan    Nutrition Problem  Increased nutrient needs - calories/protein     Related to (etiology):   Increased demand for calories/protein secondary to cancer     Signs and Symptoms (as evidenced by):   Diagnosis of multiple myeloma      Interventions/Recommendations (treatment strategy):  Collaboration of nutrition care with other providers     Nutrition Diagnosis Status:   New      Reason for Assessment    Reason For Assessment: length of stay (LOS x 10 days)  Diagnosis: cancer diagnosis/related complications (S/P chimeric antigen receptor T-cell therapy)  General Information Comments: 64 y.o.male here for Carvykti for IgG kappa multiple myeloma; s/p autologous stem cell transplant. LOS x 10 days. Spoke with pt at bedside. Pt reports good appetite - 3 meals/day @ 75% intake. Denies n/v/c/d. Noted 3.4% weight loss in 2 weeks - pt appears well-nourished. No concern for malnutrition at this time.    Nutrition Risk Screen    Nutrition Risk Screen: no indicators present    Nutrition/Diet History    Food Allergies: NKFA    Anthropometrics    Temp: 98.1 °F (36.7 °C)  Height Method: Stated  Height: 5' 7" (170.2 cm)  Height (inches): 67 in  Weight Method: Standard Scale  Weight: 90.9 kg (200 lb 6.4 oz)  Weight (lb): 200.4 lb  Ideal Body Weight (IBW), Male: 148 lb  % Ideal Body Weight, Male (lb): 135.41 %  BMI (Calculated): 31.4  BMI Grade: 30 - 34.9- obesity - grade I       Lab/Procedures/Meds    Pertinent Labs Reviewed: reviewed - hemoglobin 10.2, hematocrit 30.5, MCH 32.6, ferritin 3394, ALT 84, CRP 84.6     Pertinent Medications Reviewed: reviewed    Estimated/Assessed Needs    Weight Used For Calorie Calculations: 90.9 kg (200 lb 6.4 " oz)  Energy Calorie Requirements (kcal): 3752-4581 kcal/day (x 1.3 AF)  Energy Need Method: [place]-St [place]or  Protein Requirements:  g/day (1.0-1.2 g/kg)  Weight Used For Protein Calculations: 90.9 kg (200 lb 6.4 oz)     Estimated Fluid Requirement Method: RDA Method  RDA Method (mL): 2100         Nutrition Prescription Ordered    Current Diet Order: Diabetic    Evaluation of Received Nutrient/Fluid Intake    Energy Calories Required: meeting needs  Protein Required: meeting needs  Fluid Required: meeting needs  Tolerance: tolerating  % Intake of Estimated Energy Needs: 75 - 100 %  % Meal Intake: 75 - 100 %    Nutrition Risk    Level of Risk/Frequency of Follow-up: low     Monitor and Evaluation    Food and Nutrient Intake: energy intake, food and beverage intake  Food and Nutrient Adminstration: diet order  Knowledge/Beliefs/Attitudes: food and nutrition knowledge/skill  Anthropometric Measurements: weight, weight change, body mass index  Biochemical Data, Medical Tests and Procedures: electrolyte and renal panel, glucose/endocrine profile, gastrointestinal profile, inflammatory profile, lipid profile  Nutrition-Focused Physical Findings: overall appearance, extremities, muscles and bones, head and eyes, skin     Nutrition Follow-Up    RD Follow-up?: Yes    Carine Moser, Registration Eligible, Provisional LDN

## 2024-10-09 NOTE — PROGRESS NOTES
[facility] - Oncology ([facility])  Hematology  Bone Marrow Transplant  Progress Note    Patient Name: Jaspreet Walsh Jr.  Admission Date: 9/29/2024  Hospital Length of Stay: 10 days  Code Status: Full Code    Subjective:     Interval History:   Day +9 of Carvykti for R/R Multiple Myeloma. Afebrile. VSS. No new complaints. ICE score is 10. CRP and ferritin trending down. BP elevated, started home dose losartan. LFTs continues trend down.     Objective:     Vital Signs (Most Recent):  Temp: 98.7 °F (37.1 °C) (10/09/24 0728)  Pulse: 63 (10/09/24 0728)  Resp: 17 (10/09/24 0728)  BP: (!) 164/89 (10/09/24 0728)  SpO2: 99 % (10/09/24 0728) Vital Signs (24h Range):  Temp:  [98.1 °F (36.7 °C)-98.8 °F (37.1 °C)] 98.7 °F (37.1 °C)  Pulse:  [63-79] 63  Resp:  [16-20] 17  SpO2:  [97 %-99 %] 99 %  BP: (131-170)/(75-89) 164/89     Weight: 90.9 kg (200 lb 6.4 oz)  Body mass index is 31.39 kg/m².  Body surface area is 2.07 meters squared.    ECOG SCORE           [unfilled]    Intake/Output - Last 3 Shifts         10/07 0700  10/08 0659 10/08 0700  10/09 0659 10/09 0700  10/10 0659    P.O. 1076 700     I.V. (mL/kg) 2469.6 (27.2) 1537 (16.9)     IV Piggyback 444.6 499     Total Intake(mL/kg) 3990.2 (43.9) 2736 (30.1)     Urine (mL/kg/hr) 2000 (0.9) 2300 (1.1)     Stool 0 0     Total Output 2000 2300     Net +1990.2 +436            Urine Occurrence  1 x     Stool Occurrence 2 x 1 x              Physical Exam  Vitals reviewed.   Constitutional:       General: He is not in acute distress.     Appearance: Normal appearance. He is well-developed.   HENT:      Head: Normocephalic and atraumatic.      Mouth/Throat:      Pharynx: No oropharyngeal exudate.   Eyes:      General: No scleral icterus.     Extraocular Movements: Extraocular movements intact.      Conjunctiva/sclera: Conjunctivae normal.      Pupils: Pupils are equal, round, and reactive to light.   Neck:      Thyroid: No thyromegaly.   Cardiovascular:      Rate and Rhythm: Normal rate  and regular rhythm.      Pulses: Normal pulses.      Heart sounds: Normal heart sounds.   Pulmonary:      Effort: Pulmonary effort is normal. No respiratory distress.      Breath sounds: Normal breath sounds.   Abdominal:      General: Abdomen is flat. Bowel sounds are normal. There is no distension.      Palpations: Abdomen is soft.      Tenderness: There is no abdominal tenderness.   Musculoskeletal:         General: No swelling or tenderness. Normal range of motion.      Cervical back: Normal range of motion and neck supple. Normal range of motion.   Skin:     General: Skin is warm and dry.      Coloration: Skin is not pale.      Findings: No petechiae or rash.      Comments: Vascath intact to LCW, no redness or drainage noted   Neurological:      General: No focal deficit present.      Mental Status: He is alert and oriented to person, place, and time.      Cranial Nerves: No cranial nerve deficit.      Coordination: Coordination normal.   Psychiatric:         Mood and Affect: Mood normal.         Behavior: Behavior normal.         Thought Content: Thought content normal.            Significant Labs:   All pertinent labs from the last 24 hours have been reviewed.    Diagnostic Results:  I have reviewed and interpreted all pertinent imaging results/findings within the past 24 hours.  Assessment/Plan:     * S/P chimeric antigen receptor T-cell therapy  Patient of Dr. Dameon Baker  - Initiated LDC flu/cy on 9/25/24, central line placement and brain MRI completed on 9/24   - COVID positive 9/27/24, repeat negative. Per ID thought to be false +   - admitted for cilta-zohra CAR-T on 9/29/24. Day +9 today  -  received 0.50x10^6 CAR+ vT cells/kg 9/30 at 1130am, tolerated without issue  -  daily ferritin, LDH and CRP, increasing    - ICE 10 today  - Grade 1 CRS on 10/5, cefepime started, infectious workup negative so far. Cefepime discontinued 10/08  - Grade 2 CRS this am (10/7), toci x 1 given  - keppra 750 mg two  times/day for seizure ppx  - ppx antimicrobials    Planned Lymphodepletion Regimen:  Cyclophosphamide 300mg/m2 on Day -5, -4, and -3  Fludarabine 30mg/m2 on Day -5, -4, and -3     Pre-Medications:  Acetaminophen on Day 0  Diphenhydramine on Day 0     Immune Effector Cell Therapy:  Ciltacabtagene autoleucel on Day 0     Antimicrobial Prophylaxis:  Acyclovir starting on Day -5  Levofloxacin starting on Day -5  Fluconazole starting on Day -5  Pentamidine on day -5  Sulfamethoxazole-trimethoprim starting on Day +30     Seizure Prophylaxis:  Levetiracetam on Day 0 through Day +30        Caregiver: Catie (friend)  Post-transplant discharge plans: home       Abnormal finding on EKG  - EKG completed this am  - showing ST elevation, possible STEMI, unconfirmed read  - asymptomatic  - troponin wnl  - repeat EKG NSR and PACs    Hypophosphatemia  - daily phos level  - replace per BMT electrolyte protocol    Secondary hypercoagulable state  Multiple myeloma/History of auto stem cell transplant  Ppx Lovenox 40 mg SQ    Diabetes mellitus  Type II, well-controlled  - LD SSI    History of autologous stem cell transplant  - see Multiple Myeloma    Antineoplastic chemotherapy induced pancytopenia  - daily CBC  - transfuse for hgb <7 and platelets <10k  - hold anticoagulation for platelets <50k  - ppx antimicrobials per protocol    Multiple myeloma  Multiple myeloma/History of auto stem cell transplant  - IgG Kappa MM standard risk, diagnosed Sept 2020, after presenting w/ lytic lesions  - s/p 8 cycles Vrd followed by Alea Auto 5/17/2021 with disease relapse and Dkd salvage therapy 8/4/2023  - Achieving/mainting MO post SCT; was on revlimid maintenance but relapsed biochemically and with new lytic disease approximately 2 years post SCT (June/July 2023)  - Initiated Melinda-Kd salvage on 8/4/23; tolerating will no significant AE's   - Melinda subq weekly 8>EOW x 8 doses> q 4 week; kyprolis 70mg/m2 day 1, 8, 15 of 28 day cycle; dex 40mg po  weekly   - Patient with initial good response but noted serial biochemical progression confirmed on 4/3/24 repeat serum studies; mild anemia and Jan 2024 PET with ENDY but no other overt symptoms or CRAB and he feels well  - Transitioned DKd to Kyprolis/pomalyst/dex as bridge to CART (cilta-zohra);    - Pomalyst 4mg daily day 1-21 of 28 cycle ; KPD initiated April 2024  - He has had TX to KPd  - Decision with patient made to pursue consolidation with ciltacabtagene autoleucel   See CarT     Essential hypertension  Continue amlodipine 10 mg daily, losartan 25mg  Continue HCTZ 25 mg daily  Continue clonidine 0.3 mg daily  BP soft this am, BP meds held this am and bolus given. Will stop Clonidine         VTE Risk Mitigation (From admission, onward)           Ordered     heparin, porcine (PF) 100 unit/mL injection flush 500 Units  As needed (PRN)         10/07/24 0959     heparin (porcine) injection 1,600 Units  As needed (PRN)         09/30/24 0824     heparin (porcine) injection 1,000 Units  As needed (PRN)         09/29/24 1744     enoxaparin injection 40 mg  Daily         09/29/24 1700     IP VTE HIGH RISK PATIENT  Once         09/29/24 1700     Place sequential compression device  Until discontinued         09/29/24 1700                    Disposition: Home    Hillary Vitale MD  Bone Marrow Transplant  [facility] - [facility] (Hospital)

## 2024-10-10 VITALS
WEIGHT: 199.88 LBS | BODY MASS INDEX: 31.37 KG/M2 | HEART RATE: 74 BPM | HEIGHT: 67 IN | RESPIRATION RATE: 19 BRPM | SYSTOLIC BLOOD PRESSURE: 135 MMHG | TEMPERATURE: 98 F | DIASTOLIC BLOOD PRESSURE: 87 MMHG | OXYGEN SATURATION: 99 %

## 2024-10-10 LAB
ALBUMIN SERPL BCP-MCNC: 3 G/DL (ref 3.5–5.2)
ALP SERPL-CCNC: 149 U/L (ref 55–135)
ALT SERPL W/O P-5'-P-CCNC: 70 U/L (ref 10–44)
ANION GAP SERPL CALC-SCNC: 6 MMOL/L (ref 8–16)
AST SERPL-CCNC: 31 U/L (ref 10–40)
BASOPHILS # BLD AUTO: 0.06 K/UL (ref 0–0.2)
BASOPHILS NFR BLD: 1.9 % (ref 0–1.9)
BILIRUB SERPL-MCNC: 0.5 MG/DL (ref 0.1–1)
BUN SERPL-MCNC: 11 MG/DL (ref 8–23)
BURR CELLS BLD QL SMEAR: ABNORMAL
CALCIUM SERPL-MCNC: 8.7 MG/DL (ref 8.7–10.5)
CHLORIDE SERPL-SCNC: 111 MMOL/L (ref 95–110)
CO2 SERPL-SCNC: 23 MMOL/L (ref 23–29)
CREAT SERPL-MCNC: 0.8 MG/DL (ref 0.5–1.4)
CRP SERPL-MCNC: 41.5 MG/L (ref 0–8.2)
DIFFERENTIAL METHOD BLD: ABNORMAL
EOSINOPHIL # BLD AUTO: 0.2 K/UL (ref 0–0.5)
EOSINOPHIL NFR BLD: 5.3 % (ref 0–8)
ERYTHROCYTE [DISTWIDTH] IN BLOOD BY AUTOMATED COUNT: 17.4 % (ref 11.5–14.5)
EST. GFR  (NO RACE VARIABLE): >60 ML/MIN/1.73 M^2
FERRITIN SERPL-MCNC: 2568 NG/ML (ref 20–300)
GLUCOSE SERPL-MCNC: 94 MG/DL (ref 70–110)
HCT VFR BLD AUTO: 30.9 % (ref 40–54)
HGB BLD-MCNC: 10.3 G/DL (ref 14–18)
IMM GRANULOCYTES # BLD AUTO: 0 K/UL (ref 0–0.04)
IMM GRANULOCYTES NFR BLD AUTO: 0 % (ref 0–0.5)
LDH SERPL L TO P-CCNC: 283 U/L (ref 110–260)
LYMPHOCYTES # BLD AUTO: 2.3 K/UL (ref 1–4.8)
LYMPHOCYTES NFR BLD: 73.4 % (ref 18–48)
MAGNESIUM SERPL-MCNC: 1.7 MG/DL (ref 1.6–2.6)
MCH RBC QN AUTO: 31.7 PG (ref 27–31)
MCHC RBC AUTO-ENTMCNC: 33.3 G/DL (ref 32–36)
MCV RBC AUTO: 95 FL (ref 82–98)
MONOCYTES # BLD AUTO: 0.3 K/UL (ref 0.3–1)
MONOCYTES NFR BLD: 8.8 % (ref 4–15)
NEUTROPHILS # BLD AUTO: 0.3 K/UL (ref 1.8–7.7)
NEUTROPHILS NFR BLD: 10.6 % (ref 38–73)
NRBC BLD-RTO: 0 /100 WBC
OVALOCYTES BLD QL SMEAR: ABNORMAL
PHOSPHATE SERPL-MCNC: 2.6 MG/DL (ref 2.7–4.5)
PLATELET # BLD AUTO: 181 K/UL (ref 150–450)
PMV BLD AUTO: 11.6 FL (ref 9.2–12.9)
POIKILOCYTOSIS BLD QL SMEAR: SLIGHT
POTASSIUM SERPL-SCNC: 3.8 MMOL/L (ref 3.5–5.1)
PROT SERPL-MCNC: 5.5 G/DL (ref 6–8.4)
RBC # BLD AUTO: 3.25 M/UL (ref 4.6–6.2)
SODIUM SERPL-SCNC: 140 MMOL/L (ref 136–145)
SPHEROCYTES BLD QL SMEAR: ABNORMAL
WBC # BLD AUTO: 3.19 K/UL (ref 3.9–12.7)

## 2024-10-10 PROCEDURE — 85025 COMPLETE CBC W/AUTO DIFF WBC: CPT | Performed by: NURSE PRACTITIONER

## 2024-10-10 PROCEDURE — 25000003 PHARM REV CODE 250: Performed by: NURSE PRACTITIONER

## 2024-10-10 PROCEDURE — 84100 ASSAY OF PHOSPHORUS: CPT | Performed by: NURSE PRACTITIONER

## 2024-10-10 PROCEDURE — 83615 LACTATE (LD) (LDH) ENZYME: CPT | Performed by: NURSE PRACTITIONER

## 2024-10-10 PROCEDURE — 25000003 PHARM REV CODE 250: Performed by: STUDENT IN AN ORGANIZED HEALTH CARE EDUCATION/TRAINING PROGRAM

## 2024-10-10 PROCEDURE — 80053 COMPREHEN METABOLIC PANEL: CPT | Performed by: NURSE PRACTITIONER

## 2024-10-10 PROCEDURE — 25000003 PHARM REV CODE 250: Performed by: INTERNAL MEDICINE

## 2024-10-10 PROCEDURE — 86140 C-REACTIVE PROTEIN: CPT | Performed by: NURSE PRACTITIONER

## 2024-10-10 PROCEDURE — 82728 ASSAY OF FERRITIN: CPT | Performed by: NURSE PRACTITIONER

## 2024-10-10 PROCEDURE — 83735 ASSAY OF MAGNESIUM: CPT | Performed by: NURSE PRACTITIONER

## 2024-10-10 RX ORDER — LOSARTAN POTASSIUM 50 MG/1
50 TABLET ORAL DAILY
Status: DISCONTINUED | OUTPATIENT
Start: 2024-10-10 | End: 2024-10-10 | Stop reason: HOSPADM

## 2024-10-10 RX ORDER — LOSARTAN POTASSIUM 25 MG/1
25 TABLET ORAL DAILY
Qty: 30 TABLET | Refills: 2 | Status: SHIPPED | OUTPATIENT
Start: 2024-10-10

## 2024-10-10 RX ORDER — LEVETIRACETAM 750 MG/1
750 TABLET ORAL 2 TIMES DAILY
Qty: 40 TABLET | Refills: 0 | Status: SHIPPED | OUTPATIENT
Start: 2024-10-10 | End: 2024-10-30

## 2024-10-10 RX ORDER — LEVOFLOXACIN 500 MG/1
500 TABLET, FILM COATED ORAL DAILY
Qty: 30 TABLET | Refills: 0 | Status: SHIPPED | OUTPATIENT
Start: 2024-10-11 | End: 2024-11-10

## 2024-10-10 RX ORDER — LOPERAMIDE HYDROCHLORIDE 2 MG/1
2 CAPSULE ORAL 4 TIMES DAILY PRN
Qty: 30 CAPSULE | Refills: 0 | Status: SHIPPED | OUTPATIENT
Start: 2024-10-10 | End: 2024-10-20

## 2024-10-10 RX ORDER — ACYCLOVIR 800 MG/1
800 TABLET ORAL 2 TIMES DAILY
Qty: 60 TABLET | Refills: 11 | Status: SHIPPED | OUTPATIENT
Start: 2024-10-10 | End: 2025-10-10

## 2024-10-10 RX ORDER — SULFAMETHOXAZOLE AND TRIMETHOPRIM 800; 160 MG/1; MG/1
1 TABLET ORAL
Qty: 12 TABLET | Refills: 11 | Status: SHIPPED | OUTPATIENT
Start: 2024-10-30

## 2024-10-10 RX ORDER — FLUCONAZOLE 200 MG/1
400 TABLET ORAL DAILY
Qty: 60 TABLET | Refills: 0 | Status: SHIPPED | OUTPATIENT
Start: 2024-10-11 | End: 2024-11-10

## 2024-10-10 RX ORDER — ONDANSETRON 8 MG/1
8 TABLET, ORALLY DISINTEGRATING ORAL EVERY 8 HOURS PRN
Qty: 30 TABLET | Refills: 0 | Status: SHIPPED | OUTPATIENT
Start: 2024-10-10

## 2024-10-10 RX ADMIN — LOSARTAN POTASSIUM 50 MG: 50 TABLET, FILM COATED ORAL at 09:10

## 2024-10-10 RX ADMIN — DIBASIC SODIUM PHOSPHATE, MONOBASIC POTASSIUM PHOSPHATE AND MONOBASIC SODIUM PHOSPHATE 1 TABLET: 852; 155; 130 TABLET ORAL at 01:10

## 2024-10-10 RX ADMIN — HYDROCHLOROTHIAZIDE 25 MG: 25 TABLET ORAL at 09:10

## 2024-10-10 RX ADMIN — DIBASIC SODIUM PHOSPHATE, MONOBASIC POTASSIUM PHOSPHATE AND MONOBASIC SODIUM PHOSPHATE 1 TABLET: 852; 155; 130 TABLET ORAL at 06:10

## 2024-10-10 RX ADMIN — ACYCLOVIR 800 MG: 200 CAPSULE ORAL at 09:10

## 2024-10-10 RX ADMIN — LEVOFLOXACIN 500 MG: 500 TABLET, FILM COATED ORAL at 09:10

## 2024-10-10 RX ADMIN — GABAPENTIN 300 MG: 300 CAPSULE ORAL at 09:10

## 2024-10-10 RX ADMIN — DIBASIC SODIUM PHOSPHATE, MONOBASIC POTASSIUM PHOSPHATE AND MONOBASIC SODIUM PHOSPHATE 1 TABLET: 852; 155; 130 TABLET ORAL at 09:10

## 2024-10-10 RX ADMIN — AMLODIPINE BESYLATE 10 MG: 10 TABLET ORAL at 09:10

## 2024-10-10 RX ADMIN — Medication 400 MG: at 09:10

## 2024-10-10 RX ADMIN — FLUCONAZOLE 400 MG: 200 TABLET ORAL at 09:10

## 2024-10-10 RX ADMIN — Medication 400 MG: at 06:10

## 2024-10-10 RX ADMIN — SODIUM CHLORIDE 75 ML/HR: 9 INJECTION, SOLUTION INTRAVENOUS at 03:10

## 2024-10-10 RX ADMIN — POTASSIUM CHLORIDE 20 MEQ: 1500 TABLET, EXTENDED RELEASE ORAL at 06:10

## 2024-10-10 RX ADMIN — LEVETIRACETAM 750 MG: 750 TABLET, FILM COATED ORAL at 10:10

## 2024-10-10 NOTE — SUBJECTIVE & OBJECTIVE
No current facility-administered medications on file prior to encounter.     Current Outpatient Medications on File Prior to Encounter   Medication Sig    amLODIPine (NORVASC) 10 MG tablet Take 10 mg by mouth once daily.    atorvastatin (LIPITOR) 20 MG tablet Take 20 mg by mouth once daily.    gabapentin (NEURONTIN) 300 MG capsule Take 1 capsule (300 mg total) by mouth 2 (two) times daily.    JARDIANCE 25 mg tablet Take 25 mg by mouth once daily.    potassium chloride SA (K-DUR,KLOR-CON M) 10 MEQ tablet TAKE 1 TABLET(10 MEQ) BY MOUTH EVERY DAY    [DISCONTINUED] aspirin (ECOTRIN) 81 MG EC tablet Take 81 mg by mouth once daily.    [DISCONTINUED] cloNIDine (CATAPRES) 0.3 MG tablet Take 0.3 mg by mouth once daily.    [DISCONTINUED] metFORMIN (GLUCOPHAGE) 1000 MG tablet Take 0.5 tablets (500 mg total) by mouth 2 (two) times daily with meals.    [DISCONTINUED] losartan (COZAAR) 25 MG tablet Take 25 mg by mouth once daily.       Review of patient's allergies indicates:  No Known Allergies    Past Medical History:   Diagnosis Date    Anxiety     Arthritis     Cancer     Diabetes mellitus     Hypertension      Past Surgical History:   Procedure Laterality Date    BACK SURGERY  01/01/2021    BONE MARROW BIOPSY Left 10/20/2021    Procedure: Biopsy-bone marrow;  Surgeon: Summer Cartwright MD;  Location: [facility] ([address] [place] [address]);  Service: Oncology;  Laterality: Left;    COLONOSCOPY N/A 01/25/2021    Procedure: COLONOSCOPY;  Surgeon: Braxton Lees MD;  [address]  Service: Endoscopy;  Laterality: N/A;  1/22-covid kristopher-inst mailed-tb  1/20/21-pt to remain on schedule with Dr. Lees, and confirmed updated arrival time of 0835-BB    COLONOSCOPY N/A 02/01/2021    Procedure: COLONOSCOPY;  Surgeon: Jo Ann Robledo MD;  Location: [address]);  Service: Endoscopy;  Laterality: N/A;  rescheduled due to poor bowel prep, to be rescheduled with first available provider-BB  covid-1/29/21-pcw-BB    CREATION OF  MUSCLE ROTATIONAL FLAP N/A 2020    Procedure: CREATION, FLAP, MUSCLE ROTATION;  Surgeon: Kamlesh Bellamy MD;  Location: [facility] OR 2ND FLR;  Service: Plastics;  Laterality: N/A;    KNEE SURGERY Left 2019    LUMBAR FUSION N/A 2020    Procedure: FUSION, SPINE, LUMBAR L2-pelvis. Depuy. Plastic surgery closure w/ Dr. Bellamy;  Surgeon: Nick Coyle MD;  Location: [facility] OR 2ND FLR;  Service: Neurosurgery;  Laterality: N/A;    Metastatic neoplasum removed from spine      PLACEMENT OF DUAL-LUMEN VASCULAR CATHETER Left 2024    Procedure: INSERTION-CATHETER-LENORA, double lumen, left poss right, Bard 14.5 fr hemosplit catheter, model [id number];  Surgeon: Nelson Aponte MD;  Location: [facility] OR 2ND FLR;  Service: General;  Laterality: Left;     Family History       Problem Relation (Age of Onset)    Cancer Father          Tobacco Use    Smoking status: Former     Current packs/day: 0.00     Average packs/day: 0.3 packs/day for 40.0 years (10.0 ttl pk-yrs)     Types: Cigarettes     Start date:      Quit date: 2017     Years since quittin.7    Smokeless tobacco: Never   Substance and Sexual Activity    Alcohol use: Not on file    Drug use: Not on file    Sexual activity: Not on file     Review of Systems  Objective:     Vital Signs (Most Recent):  Temp: 98.1 °F (36.7 °C) (10/10/24 1112)  Pulse: 74 (10/10/24 1112)  Resp: 19 (10/10/24 111)  BP: 135/87 (10/10/24 1112)  SpO2: 99 % (10/10/24 1112) Vital Signs (24h Range):  Temp:  [98.1 °F (36.7 °C)-98.2 °F (36.8 °C)] 98.1 °F (36.7 °C)  Pulse:  [66-78] 74  Resp:  [16-19] 19  SpO2:  [96 %-100 %] 99 %  BP: (130-163)/(80-95) 135/87     Weight: 90.6 kg (199 lb 13.6 oz)  Body mass index is 31.3 kg/m².     Physical Exam  Constitutional:       Appearance: Normal appearance. He is normal weight.   HENT:      Head: Normocephalic and atraumatic.   Eyes:      Pupils: Pupils are equal, round, and reactive to light.   Neck:      Comments: LEFT chest tunneled double  lumen central venous catheter. No erythema or drainage. No hematoma or e/o bleeding around catheter.   Cardiovascular:      Rate and Rhythm: Normal rate.   Pulmonary:      Effort: Pulmonary effort is normal.   Abdominal:      General: Abdomen is flat.      Palpations: Abdomen is soft.   Musculoskeletal:         General: Normal range of motion.      Cervical back: Normal range of motion.   Skin:     General: Skin is warm and dry.   Neurological:      General: No focal deficit present.      Mental Status: He is alert.   Psychiatric:         Mood and Affect: Mood normal.            I have reviewed all pertinent lab results within the past 24 hours.    Significant Diagnostics:  I have reviewed all pertinent imaging results/findings within the past 24 hours.

## 2024-10-10 NOTE — ASSESSMENT & PLAN NOTE
Patient of Dr. Dameon Baker  - Initiated LDC flu/cy on 9/25/24, central line placement and brain MRI completed on 9/24   - COVID positive 9/27/24, repeat negative. Per ID thought to be false +   - admitted for cilta-zohra CAR-T on 9/29/24. Day +10 today  -  received 0.50x10^6 CAR+ vT cells/kg 9/30 at 1130am, tolerated without issue  -  daily ferritin, LDH and CRP, increasing    - ICE 10 today  - Grade 1 CRS on 10/5, cefepime started, infectious workup negative so far. Cefepime discontinued 10/08  - Grade 2 CRS this am (10/7), toci x 1 given  - keppra 750 mg two times/day for seizure ppx  - ppx antimicrobials    Planned Lymphodepletion Regimen:  Cyclophosphamide 300mg/m2 on Day -5, -4, and -3  Fludarabine 30mg/m2 on Day -5, -4, and -3     Pre-Medications:  Acetaminophen on Day 0  Diphenhydramine on Day 0     Immune Effector Cell Therapy:  Ciltacabtagene autoleucel on Day 0     Antimicrobial Prophylaxis:  Acyclovir starting on Day -5  Levofloxacin starting on Day -5  Fluconazole starting on Day -5  Pentamidine on day -5  Sulfamethoxazole-trimethoprim starting on Day +30     Seizure Prophylaxis:  Levetiracetam on Day 0 through Day +30        Caregiver: Catie (friend)  Post-transplant discharge plans: home

## 2024-10-10 NOTE — ASSESSMENT & PLAN NOTE
Jaspreet Jillian Reardon (Jaspreet) is a 64 y.o.male from [place], [place], here for Carvykti for IgG kappa multiple myeloma; s/p autologous stem cell transplant. Pt has now completed his chemotherapy regimen via a LEFT IJ tunneled catheter and is not appropriate for discharge. Tunneled line removed under sterile technique at bedside. Pressure was held for 5 minutes with no evidence of bleeding or hematoma. Insertion site dressed with gauze and Tegaderm.   -- dressing applied to line insertion site. Ok to remove tomorrow   -- monitor for evidence of hematoma formation at left neck   -- ok for discharge per primary team

## 2024-10-10 NOTE — PROGRESS NOTES
Pt seen for discharge planning.  Comfortable with plan for discharge home today.  Will deliver gas card to pt in clinic during follow-up when available vs mail to home.  No other needs noted; will follow.

## 2024-10-10 NOTE — CONSULTS
[facility] - Oncology ([facility])  General Surgery  Consult Note    Patient Name: Jaspreet Walsh Jr.  MRN: [id number]  Code Status: Full Code  Admission Date: 9/29/2024  Hospital Length of Stay: 11 days  Attending Physician: Gianna Zamudio MD  Primary Care Provider: Hollie Primary Doctor    Patient information was obtained from patient and primary team.     Inpatient consult to General Surgery  Consult performed by: Chepe Krishna MD  Consult ordered by: Hillary Vitale MD        Subjective:     Principal Problem: S/P chimeric antigen receptor T-cell therapy    History of Present Illness: Jaspreet Walsh Jr. (Jaspreet) is a 64 y.o.male from [place], [place], here for Carvykti for IgG kappa multiple myeloma; s/p autologous stem cell transplant. Pt has now completed his chemotherapy regimen via a LEFT IJ tunneled catheter and is not appropriate for discharge. General surgery has been consulted for line removal placed on 9/24.           Procedure:   Left chest line dressing was removed and the skin cleaned with chlorhexidine. Sterile gloves and drapes were applied. The 2x sutures were removed and the line pulled. Pressure was held for 5 minutes with adequate hemostasis. The insertion site was then dressed with gauze and a Tegaderm.       No current facility-administered medications on file prior to encounter.     Current Outpatient Medications on File Prior to Encounter   Medication Sig    amLODIPine (NORVASC) 10 MG tablet Take 10 mg by mouth once daily.    atorvastatin (LIPITOR) 20 MG tablet Take 20 mg by mouth once daily.    gabapentin (NEURONTIN) 300 MG capsule Take 1 capsule (300 mg total) by mouth 2 (two) times daily.    JARDIANCE 25 mg tablet Take 25 mg by mouth once daily.    potassium chloride SA (K-DUR,KLOR-CON M) 10 MEQ tablet TAKE 1 TABLET(10 MEQ) BY MOUTH EVERY DAY    [DISCONTINUED] aspirin (ECOTRIN) 81 MG EC tablet Take 81 mg by mouth once daily.    [DISCONTINUED] cloNIDine (CATAPRES) 0.3 MG tablet Take 0.3 mg by  mouth once daily.    [DISCONTINUED] metFORMIN (GLUCOPHAGE) 1000 MG tablet Take 0.5 tablets (500 mg total) by mouth 2 (two) times daily with meals.    [DISCONTINUED] losartan (COZAAR) 25 MG tablet Take 25 mg by mouth once daily.       Review of patient's allergies indicates:  No Known Allergies    Past Medical History:   Diagnosis Date    Anxiety     Arthritis     Cancer     Diabetes mellitus     Hypertension      Past Surgical History:   Procedure Laterality Date    BACK SURGERY  01/01/2021    BONE MARROW BIOPSY Left 10/20/2021    Procedure: Biopsy-bone marrow;  Surgeon: Summer Cartwright MD;  Location: [facility] ENDO (4TH FLR);  Service: Oncology;  Laterality: Left;    COLONOSCOPY N/A 01/25/2021    Procedure: COLONOSCOPY;  Surgeon: Braxton Lees MD;  Location: [facility] ENDO (4TH FLR);  Service: Endoscopy;  Laterality: N/A;  1/22-covid kristopher-inst mailed-tb  1/20/21-pt to remain on schedule with Dr. Lees, and confirmed updated arrival time of 0835-BB    COLONOSCOPY N/A 02/01/2021    Procedure: COLONOSCOPY;  Surgeon: Jo Ann Robledo MD;  Location: [facility] ENDO (4TH FLR);  Service: Endoscopy;  Laterality: N/A;  rescheduled due to poor bowel prep, to be rescheduled with first available provider-BB  covid-1/29/21-pcw-BB    CREATION OF MUSCLE ROTATIONAL FLAP N/A 09/23/2020    Procedure: CREATION, FLAP, MUSCLE ROTATION;  Surgeon: Kamlesh Bellamy MD;  Location: [facility] OR 2ND FLR;  Service: Plastics;  Laterality: N/A;    KNEE SURGERY Left 06/2019    LUMBAR FUSION N/A 09/23/2020    Procedure: FUSION, SPINE, LUMBAR L2-pelvis. Depuy. Plastic surgery closure w/ Dr. Bellamy;  Surgeon: Nick Coyle MD;  Location: [facility] OR 2ND FLR;  Service: Neurosurgery;  Laterality: N/A;    Metastatic neoplasum removed from spine      PLACEMENT OF DUAL-LUMEN VASCULAR CATHETER Left 9/24/2024    Procedure: INSERTION-CATHETER-LENORA, double lumen, left poss right, Bard 14.5 fr hemosplit catheter, model [id number];  Surgeon: Nelson Aponte MD;  Location:  [facility] OR 2ND FLR;  Service: General;  Laterality: Left;     Family History       Problem Relation (Age of Onset)    Cancer Father          Tobacco Use    Smoking status: Former     Current packs/day: 0.00     Average packs/day: 0.3 packs/day for 40.0 years (10.0 ttl pk-yrs)     Types: Cigarettes     Start date:      Quit date: 2017     Years since quittin.7    Smokeless tobacco: Never   Substance and Sexual Activity    Alcohol use: Not on file    Drug use: Not on file    Sexual activity: Not on file     Review of Systems  Objective:     Vital Signs (Most Recent):  Temp: 98.1 °F (36.7 °C) (10/10/24 1112)  Pulse: 74 (10/10/24 1112)  Resp: 19 (10/10/24 111)  BP: 135/87 (10/10/24 111)  SpO2: 99 % (10/10/24 111) Vital Signs (24h Range):  Temp:  [98.1 °F (36.7 °C)-98.2 °F (36.8 °C)] 98.1 °F (36.7 °C)  Pulse:  [66-78] 74  Resp:  [16-19] 19  SpO2:  [96 %-100 %] 99 %  BP: (130-163)/(80-95) 135/87     Weight: 90.6 kg (199 lb 13.6 oz)  Body mass index is 31.3 kg/m².     Physical Exam  Constitutional:       Appearance: Normal appearance. He is normal weight.   HENT:      Head: Normocephalic and atraumatic.   Eyes:      Pupils: Pupils are equal, round, and reactive to light.   Neck:      Comments: LEFT chest tunneled double lumen central venous catheter. No erythema or drainage. No hematoma or e/o bleeding around catheter.   Cardiovascular:      Rate and Rhythm: Normal rate.   Pulmonary:      Effort: Pulmonary effort is normal.   Abdominal:      General: Abdomen is flat.      Palpations: Abdomen is soft.   Musculoskeletal:         General: Normal range of motion.      Cervical back: Normal range of motion.   Skin:     General: Skin is warm and dry.   Neurological:      General: No focal deficit present.      Mental Status: He is alert.   Psychiatric:         Mood and Affect: Mood normal.            I have reviewed all pertinent lab results within the past 24 hours.    Significant Diagnostics:  I have reviewed all  pertinent imaging results/findings within the past 24 hours.    Assessment/Plan:     Multiple myeloma  Jaspreet Walsh Jr. (Jaspreet) is a 64 y.o.male from [place], [place], here for Carvykti for IgG kappa multiple myeloma; s/p autologous stem cell transplant. Pt has now completed his chemotherapy regimen via a LEFT IJ tunneled catheter and is not appropriate for discharge. Tunneled line removed under sterile technique at bedside. Pressure was held for 5 minutes with no evidence of bleeding or hematoma. Insertion site dressed with gauze and Tegaderm.   -- dressing applied to line insertion site. Ok to remove tomorrow   -- monitor for evidence of hematoma formation at left neck   -- ok for discharge per primary team         VTE Risk Mitigation (From admission, onward)           Ordered     heparin, porcine (PF) 100 unit/mL injection flush 500 Units  As needed (PRN)         10/07/24 0959     heparin (porcine) injection 1,600 Units  As needed (PRN)         09/30/24 0824     heparin (porcine) injection 1,000 Units  As needed (PRN)         09/29/24 1744     enoxaparin injection 40 mg  Daily         09/29/24 1700     IP VTE HIGH RISK PATIENT  Once         09/29/24 1700     Place sequential compression device  Until discontinued         09/29/24 1700                      Chepe Krishna MD  General Surgery

## 2024-10-10 NOTE — DISCHARGE SUMMARY
[facility] - Oncology ([facility])  Hematology  Bone Marrow Transplant  Discharge Summary      Patient Name: Jaspreet Walsh Jr.  MRN: [id number]  Admission Date: 9/29/2024  Hospital Length of Stay: 11 days  Discharge Date and Time:  10/10/2024 4:56 PM  Attending Physician: Hollie att. providers found   Discharging Provider: Hillary Vitale MD  Primary Care Provider: Hollie, Primary Doctor    HPI: Jaspreet Walsh Jr. (Jaspreet) is a pleasant 64 y.o.male from [place], [place], here for Carvykti for IgG kappa multiple myeloma; s/p autologous stem cell transplant.     Patient of /  64 y.o.male; pmh of IgG kappa multiple myeloma (standard risk CG per msmart criteria, r-iss stage II); diagnosed September 2019 after presenting with symptomatic perispinal plasmacytoma;He has subsequently received  8 cycles of VRd therapy. Was in midst or pre transplant evaluation but due to insurance coverage issues transplant was delayed so was maintained on therapy and those issues have been resolved.     Transplant Course  Patient with IgG Kappa MM and pmh HTN, lytic bone lesion, arthritis and vitamin D deficiency. Admitted 5/15/21 for planned Alea auto SCT for MM. He received 3 bags and a CD34 dose of 3.35 x 10^6 on 5/17/21. Tolerated chemo and transplant well. Admission complicated by n/v controlled with scheduled anti-emetics and c-diff neg diarrhea controlled with prn imodium. He engrafted on Day +13 (5/30/21) with an ANC of 2607. He was discharged home on Day +14 (5/31/21).  Day +100 restaging marrow indicated that he was in VT.      Interval History -09/23/2024  64 y.o.  M with IgG Kappa MM standard risk s/p 8 cycles Vrd followed by Alea Auto 5/17/2021 with disease relapse and Dkd salvage therapy 8/4/2023.   Due to biochemical progression on DKd we transition to KPd bridging therapy.   He responded well to this but decided to proceed with cilta zohra.  We have collected and received his CART cells.  He presents for clearance prior to  "LDC schedule to begin on 9/25/24.   He feels well.    Accompanied by wife.     Interval History 9/25/24:  Patient is here for follow up of day 2 of LDC, he still has an additional cycle tomorrow and covid swab prior to admit. His wife is with him today. No reported issues related to the chemotherapy. He does endorse intermittent neuropathy in the form of "tingling" to his bilaterally feet. Denies fever, chills, cough, sore throat, dysuria, runny nose, congestion, chest pain, shortness, pain.    * No surgery found *     Hospital Course: 09/30/2024 admitted for Carvykti for R/R Multiple Myeloma. Day 0 today, will receive cells at 1130 am, 0.50x10^6T cells. VSS. Feels well today without complaints. Replacing phos and calcium today.   10/01/2024 Day +1 Carvykti for R/R Multiple Myeloma. Tolerated cell infusion with issue. ICE 10. VSS. He has no complaints today.   10/02/2024 Day +2 Carvykti for R/R Multiple Myeloma. ICE 10, afebrile, inflammatory markers stable. Doing well.   10/03/2024 Day +3 Carvykti for R/R Multiple Myeloma. ICE 10, afebrile, inflammatory markers stable. No complaints   10/04/2024 - Day +4  Carvykti for R/R Multiple Myeloma. ICE 10, afebrile, VSS. No new complaints. Blood counts and inflammatory markers stable. Continue supportive care  10/05/2024 Day +5  Carvykti for R/R Multiple Myeloma. ICE 10, afebrile, VSS. Labs stable. No new complaints.   10/06/2024 Day +6  Carvykti for R/R Multiple Myeloma. Fever to 100.6 last night which was sustained, likely grade 1 CRS. Other vitals stable. Started cefepime and infectious workup ordered. ICE score remains a 10. Inflammatory markers increased today, will monitor closely. Mag and phos replaced.   10/07/2024 Day +7  Carvykti for R/R Multiple Myeloma. ICE 10. T.max 102.6 at 430am. Infectious work-up negative thusfar, Cefepime (D3). Soft BP this am. Inflammatory markers up-trending, bili and liver enzymes elevated. Grade II CRS. IVF bolus given and Toci given. " EKG done this am, pre-confirmed read of STEMI. Patient asymptomatic with no complaints of pain or sob. Troponin done and wnl. Repeat EKG ordered and pending.   10/08/2024 - Day +8 of Carvykti for R/R Multiple Myeloma. Received Toci yesterday for grade 2 CRS. Afebrile this morning. Denies any chest pain or shortness of breath. No new complaints. ICE score of 10. Blood cultures shows NGTD. Cefepime discontinued. LFT and T bili trending down. Replacing electrolytes.  10/09/2024 - Day +9 of Carvykti for R/R Multiple Myeloma. Afebrile. VSS. No new complaints. ICE score is 10. CRP and ferritin trending down. BP elevated, started home dose losartan. LFTs continues trend down.   10/10/2024 -  Day +10 of Carvykti for R/R Multiple Myeloma. Afebrile. VSS. No new complaints. ICE score is 10. Inflammatory markers trending down. Walking the hallway independently. Pt will be discharged home today. He has an outpatient follow up 10/15/24.    Goals of Care Treatment Preferences:  Code Status: Full Code      Consults (From admission, onward)          Status Ordering Provider     Inpatient consult to General Surgery  Once        Provider:  (Not yet assigned)    Completed ABE PURCELL            Significant Diagnostic Studies: Labs: All labs within the past 24 hours have been reviewed    Pending Diagnostic Studies:       None          Final Active Diagnoses:    Diagnosis Date Noted POA    PRINCIPAL PROBLEM:  S/P chimeric antigen receptor T-cell therapy [Z92.850] 09/30/2024 Not Applicable    Abnormal finding on EKG [R94.31] 10/07/2024 No    Hypophosphatemia [E83.39] 10/01/2024 Yes    Secondary hypercoagulable state [D68.69] 09/30/2024 Yes    Diabetes mellitus [E11.9] 10/18/2023 Yes    History of autologous stem cell transplant [Z94.84] 10/20/2021 Not Applicable    Antineoplastic chemotherapy induced pancytopenia [D61.810, T45.1X5A] 05/13/2021 Yes    Multiple myeloma [C90.00]  Yes    Essential hypertension [I10] 09/14/2020 Yes       Problems Resolved During this Admission:      Discharged Condition: good    Disposition: Home or Self Care    Follow Up:    Patient Instructions:      Diet Adult Regular     Notify your health care provider if you experience any of the following:  temperature >100.4     Activity as tolerated     Medications:  Reconciled Home Medications:      Medication List        START taking these medications      fluconazole 200 MG Tab  Commonly known as: DIFLUCAN  Take 2 tablets (400 mg total) by mouth once daily.  Start taking on: October 11, 2024     levoFLOXacin 500 MG tablet  Commonly known as: LEVAQUIN  Take 1 tablet (500 mg total) by mouth once daily.  Start taking on: October 11, 2024     loperamide 2 mg capsule  Commonly known as: IMODIUM  Take 1 capsule (2 mg total) by mouth 4 (four) times daily as needed for Diarrhea.     ondansetron 8 MG Tbdl  Commonly known as: ZOFRAN-ODT  Take 1 tablet (8 mg total) by mouth every 8 (eight) hours as needed (Nausea/Vomiting).     sulfamethoxazole-trimethoprim 800-160mg 800-160 mg Tab  Commonly known as: BACTRIM DS  Take 1 tablet by mouth every Mon, Wed, Fri. To start on 10/30/24  Start taking on: October 30, 2024            CHANGE how you take these medications      acyclovir 800 MG Tab  Commonly known as: ZOVIRAX  Take 1 tablet (800 mg total) by mouth 2 (two) times daily.  What changed:   medication strength  how much to take     levETIRAcetam 750 MG Tab  Commonly known as: KEPPRA  Take 1 tablet (750 mg total) by mouth 2 (two) times daily. for 20 days  What changed: medication strength            CONTINUE taking these medications      amLODIPine 10 MG tablet  Commonly known as: NORVASC  Take 10 mg by mouth once daily.     atorvastatin 20 MG tablet  Commonly known as: LIPITOR  Take 20 mg by mouth once daily.     gabapentin 300 MG capsule  Commonly known as: NEURONTIN  Take 1 capsule (300 mg total) by mouth 2 (two) times daily.     hydroCHLOROthiazide 25 MG tablet  Commonly known  as: HYDRODIURIL  Take 25 mg by mouth once daily.     JARDIANCE 25 mg tablet  Generic drug: empagliflozin  Take 25 mg by mouth once daily.     losartan 25 MG tablet  Commonly known as: COZAAR  Take 1 tablet (25 mg total) by mouth once daily.     potassium chloride SA 10 MEQ tablet  Commonly known as: K-DUR,KLOR-CON M  TAKE 1 TABLET(10 MEQ) BY MOUTH EVERY DAY            STOP taking these medications      aspirin 81 MG EC tablet  Commonly known as: ECOTRIN     cloNIDine 0.3 MG tablet  Commonly known as: CATAPRES     dexAMETHasone 2 MG tablet  Commonly known as: DECADRON     metFORMIN 1000 MG tablet  Commonly known as: GLUCOPHAGE     POMALYST 4 mg Cap  Generic drug: pomalidomide              Hillary Vitale MD  Bone Marrow Transplant  [facility] - Oncology ([facility])

## 2024-10-10 NOTE — PLAN OF CARE
Day +10 Car-T therapy. ICE score 10. Pt involved in plan of care and communicating needs throughout shift. Up in room and to bathroom independently; voiding without difficulty into urinal, good output. NS infusing at 75 ml/hr. Tolerating diet. No c/o pain or nausea. AAOx4. All VSS, afebrile. Pt remaining free from falls or injury throughout shift. Bed locked and in lowest position, personal belongings and call light within reach, non skid socks on when OOB. Pt instructed to call for assistance as needed. Q2H rounding done on pt.

## 2024-10-10 NOTE — PROGRESS NOTES
BMT Pharmacist Discharge Note     All discharge medications were reviewed with the patient and his caregiver. 8 medications were sent to the [facility] pharmacy for bedside delivery: Acyclovir, Bactrim DS, Keppra, Loperamide, Zofran, Losartan, Levofloxacin, and Fluconazole.     Medication  Indication  Morning  Afternoon  Evening/Night    **Acyclovir 800mg   Viral infection prevention   (for 1 year post-CAR-T)  1 tablet    1 tablet    **Levofloxacin 500 mg Bacterial infection prevention   (until ANC >500 for 3 days)    1 tablet     **Fluconazole 200 mg Fungal infection prevention   (until ANC >500 for 3 days)    2 tablets     **Sulfamethoxazole-trimethoprim (Bactrim®) 800-160mg  Fungal pneumonia infection prevention (start on 10/30/24)   (for 1 year post-CAR-T)  1 tablet on Mondays, Wednesdays, and Fridays        **Levetiracetam 750mg  Seizure prevention (stop on 10/30/24)   (for 30 days post-CAR-T)  1 tablet    1 tablet                Amlodipine (Norvasc) 10 mg Blood Pressure 1 tablet     Hydrochlorothiazide 25 mg  Blood Pressure 1 tablet     **Losartan (Cozaar) 25 mg Blood Pressure 1 tablet     Atorvastatin (Lipitor) 20 mg Cholesterol   1 tablet   Gabapentin (Neurontin) 300 mg  Nerve Pain 1 capsule  1 capsule   Potassium Chloride SA (K-Dur) 10 mEq Potassium Supplement 1 tablet      Empagliflozin (Jardiance) 25 mg  Diabetes 1 tablet       **new medication or change in medication      AS NEEDED MEDICATIONS:   **Loperamide (Imodium) 2mg four times a day as needed for diarrhea   **Ondansetron (Zofran) 8mg every 8 hours as needed for nausea      STOP UNTIL TOLD TO RESTART:          NO LONGER TAKE:    Aspirin  Dexamethasone  Pomalidomide (Pomalyst)  Clonidine (Catapres)   Metformin (Per provider's instructions to transition to Jardiance)        Notes:   I discussed the importance of taking levetiracetam until day +30 to prevent seizures and acyclovir until day +365 to prevent viral infections. Patient received pentamidine  infusion on day -5 and he will need to start taking Bactrim starting day +30 and continue until day +365 to prevent PCP infections. The patient is aware that he will need to continue taking levofloxacin and fluconazole on discharge to help prevent bacterial and fungal infections due to his ANC < 500. Also recommended he avoid over the counter pain medications, such as APAP/NSAIDS, due to increased risk of bleeding and fever masking. Reviewed the change in his blood pressure regimen on discharge. Recommended the patient check his blood pressure at home daily.         The patient had the opportunity to ask questions and express concerns. All questions were answered.      Brandy López, PharmD  Clinical Pharmacist-BMT/Hematology  [facility]

## 2024-10-10 NOTE — PLAN OF CARE
JASMINW met with patient/family at bedside. Patient experience rounding completed and reviewed the following.     Do you know your discharge plan? Yes Home Health       If yes, what is the plan? (Home, Home Health, Rehab, SNF, LTAC, or Other)     Have you discussed your needs and preferences with your SW/CM? Yes     If you are discharging home, do you have help at home? Yes   Patient has help at home.    Do you think you will need help additional at home at discharge?  No   Patient has no need for additional help.    Do you currently have difficulty keeping up with bills, affording medicine or buying food? No  Patient has help with bills, food, and medicine.    Assigned SW/CM notified of any patient/family needs or concerns. Appropriate resources provided to address patient's needs.  Vimal MOSES, RSW  Case Management  [phone]

## 2024-10-10 NOTE — HPI
Jaspreet Walsh Jr. (Jaspreet) is a 64 y.o.male from [place], [place], here for Carvykti for IgG kappa multiple myeloma; s/p autologous stem cell transplant. Pt has now completed his chemotherapy regimen via a LEFT IJ tunneled catheter and is not appropriate for discharge. General surgery has been consulted for line removal placed on 9/24.

## 2024-10-10 NOTE — SUBJECTIVE & OBJECTIVE
Subjective:     Interval History: Day +10 of Carvykti for R/R Multiple Myeloma. Afebrile. VSS. No new complaints. ICE score is 10. Inflammatory markers trending down. Walking the hallway independently. Pt will be discharged home today. He has an outpatient follow up 10/15/24.    Objective:     Vital Signs (Most Recent):  Temp: 98.1 °F (36.7 °C) (10/10/24 0751)  Pulse: 78 (10/10/24 0751)  Resp: 16 (10/10/24 0751)  BP: 130/88 (10/10/24 0751)  SpO2: 96 % (10/10/24 0751) Vital Signs (24h Range):  Temp:  [98.1 °F (36.7 °C)-98.6 °F (37 °C)] 98.1 °F (36.7 °C)  Pulse:  [66-78] 78  Resp:  [16-18] 16  SpO2:  [96 %-100 %] 96 %  BP: (130-163)/(80-95) 130/88     Weight: 90.6 kg (199 lb 13.6 oz)  Body mass index is 31.3 kg/m².  Body surface area is 2.07 meters squared.    ECOG SCORE           [unfilled]    Intake/Output - Last 3 Shifts         10/08 0700  10/09 0659 10/09 0700  10/10 0659 10/10 0700  10/11 0659    P.O. 700 1600     I.V. (mL/kg) 1537 (16.9) 2134.5 (23.6)     IV Piggyback 499      Total Intake(mL/kg) 2736 (30.1) 3734.5 (41.2)     Urine (mL/kg/hr) 2300 (1.1) 3050 (1.4)     Stool 0      Total Output 2300 3050     Net +436 +684.5            Urine Occurrence 1 x      Stool Occurrence 1 x               Physical Exam  Vitals reviewed.   Constitutional:       General: He is not in acute distress.     Appearance: Normal appearance. He is well-developed.   HENT:      Head: Normocephalic and atraumatic.      Mouth/Throat:      Pharynx: No oropharyngeal exudate.   Eyes:      General: No scleral icterus.     Extraocular Movements: Extraocular movements intact.      Conjunctiva/sclera: Conjunctivae normal.      Pupils: Pupils are equal, round, and reactive to light.   Neck:      Thyroid: No thyromegaly.   Cardiovascular:      Rate and Rhythm: Normal rate and regular rhythm.      Pulses: Normal pulses.      Heart sounds: Normal heart sounds.   Pulmonary:      Effort: Pulmonary effort is normal. No respiratory distress.       Breath sounds: Normal breath sounds.   Abdominal:      General: Abdomen is flat. Bowel sounds are normal. There is no distension.      Palpations: Abdomen is soft.      Tenderness: There is no abdominal tenderness.   Musculoskeletal:         General: No swelling or tenderness. Normal range of motion.      Cervical back: Normal range of motion and neck supple. Normal range of motion.   Skin:     General: Skin is warm and dry.      Coloration: Skin is not pale.      Findings: No petechiae or rash.      Comments: Vascath intact to LCW, no redness or drainage noted   Neurological:      General: No focal deficit present.      Mental Status: He is alert and oriented to person, place, and time.      Cranial Nerves: No cranial nerve deficit.      Coordination: Coordination normal.   Psychiatric:         Mood and Affect: Mood normal.         Behavior: Behavior normal.         Thought Content: Thought content normal.            Significant Labs:   All pertinent labs from the last 24 hours have been reviewed.    Diagnostic Results:  I have reviewed and interpreted all pertinent imaging results/findings within the past 24 hours.

## 2024-10-10 NOTE — PROGRESS NOTES
Discharge teaching done with patient and patient's caregiver on BMT unit.  Car-T wallet card provided. Approximately 30min spent on discussion of post-Car-T guidelines including:  Neurological toxicities/monitoring, low microbial diet, safe food handling, dining out, home sanitation, outpatient plan of care, progression of recovery of cells and immune system, ways to prevent infection, fatigue, ways to avoid bleeding, pet and plant exposure and care, returning to work, smoking and alcohol consumption, sexual activity, sexual desire and response, immunizations, traveling, nutrition, personal hygiene, hand washing, oral care, eye care, signs and symptoms to report immediately, and emotional changes post therapy.  Also discussed who to contact during business hours, after hours, and holidays.  Written materials supplied.  Patient and family given the opportunity to ask questions.  Verbalized understanding of teaching.  All questions answered to their satisfaction.  Patient and family instructed to call with any questions or concerns.  Patient and caregiver were given written handout which reiterated all the above teaching.      Karon Rojas NP  Hematology/Oncology/BMT

## 2024-10-10 NOTE — PROGRESS NOTES
[facility] - Oncology ([facility])  Hematology  Bone Marrow Transplant  Progress Note    Patient Name: Jaspreet Walsh Jr.  Admission Date: 9/29/2024  Hospital Length of Stay: 11 days  Code Status: Full Code    Subjective:     Interval History: Day +10 of Carvykti for R/R Multiple Myeloma. Afebrile. VSS. No new complaints. ICE score is 10. Inflammatory markers trending down. Walking the hallway independently. Pt will be discharged home today. He has an outpatient follow up 10/15/24.    Objective:     Vital Signs (Most Recent):  Temp: 98.1 °F (36.7 °C) (10/10/24 0751)  Pulse: 78 (10/10/24 0751)  Resp: 16 (10/10/24 0751)  BP: 130/88 (10/10/24 0751)  SpO2: 96 % (10/10/24 0751) Vital Signs (24h Range):  Temp:  [98.1 °F (36.7 °C)-98.6 °F (37 °C)] 98.1 °F (36.7 °C)  Pulse:  [66-78] 78  Resp:  [16-18] 16  SpO2:  [96 %-100 %] 96 %  BP: (130-163)/(80-95) 130/88     Weight: 90.6 kg (199 lb 13.6 oz)  Body mass index is 31.3 kg/m².  Body surface area is 2.07 meters squared.    ECOG SCORE           [unfilled]    Intake/Output - Last 3 Shifts         10/08 0700  10/09 0659 10/09 0700  10/10 0659 10/10 0700  10/11 0659    P.O. 700 1600     I.V. (mL/kg) 1537 (16.9) 2134.5 (23.6)     IV Piggyback 499      Total Intake(mL/kg) 2736 (30.1) 3734.5 (41.2)     Urine (mL/kg/hr) 2300 (1.1) 3050 (1.4)     Stool 0      Total Output 2300 3050     Net +436 +684.5            Urine Occurrence 1 x      Stool Occurrence 1 x               Physical Exam  Vitals reviewed.   Constitutional:       General: He is not in acute distress.     Appearance: Normal appearance. He is well-developed.   HENT:      Head: Normocephalic and atraumatic.      Mouth/Throat:      Pharynx: No oropharyngeal exudate.   Eyes:      General: No scleral icterus.     Extraocular Movements: Extraocular movements intact.      Conjunctiva/sclera: Conjunctivae normal.      Pupils: Pupils are equal, round, and reactive to light.   Neck:      Thyroid: No thyromegaly.   Cardiovascular:       Rate and Rhythm: Normal rate and regular rhythm.      Pulses: Normal pulses.      Heart sounds: Normal heart sounds.   Pulmonary:      Effort: Pulmonary effort is normal. No respiratory distress.      Breath sounds: Normal breath sounds.   Abdominal:      General: Abdomen is flat. Bowel sounds are normal. There is no distension.      Palpations: Abdomen is soft.      Tenderness: There is no abdominal tenderness.   Musculoskeletal:         General: No swelling or tenderness. Normal range of motion.      Cervical back: Normal range of motion and neck supple. Normal range of motion.   Skin:     General: Skin is warm and dry.      Coloration: Skin is not pale.      Findings: No petechiae or rash.      Comments: Vascath intact to LCW, no redness or drainage noted   Neurological:      General: No focal deficit present.      Mental Status: He is alert and oriented to person, place, and time.      Cranial Nerves: No cranial nerve deficit.      Coordination: Coordination normal.   Psychiatric:         Mood and Affect: Mood normal.         Behavior: Behavior normal.         Thought Content: Thought content normal.            Significant Labs:   All pertinent labs from the last 24 hours have been reviewed.    Diagnostic Results:  I have reviewed and interpreted all pertinent imaging results/findings within the past 24 hours.  Assessment/Plan:     * S/P chimeric antigen receptor T-cell therapy  Patient of Dr. Dameon Baker  - Initiated LDC flu/cy on 9/25/24, central line placement and brain MRI completed on 9/24   - COVID positive 9/27/24, repeat negative. Per ID thought to be false +   - admitted for cilta-zohra CAR-T on 9/29/24. Day +10 today  -  received 0.50x10^6 CAR+ vT cells/kg 9/30 at 1130am, tolerated without issue  -  daily ferritin, LDH and CRP, increasing    - ICE 10 today  - Grade 1 CRS on 10/5, cefepime started, infectious workup negative so far. Cefepime discontinued 10/08  - Grade 2 CRS this am (10/7), toci x 1  given  - keppra 750 mg two times/day for seizure ppx  - ppx antimicrobials    Planned Lymphodepletion Regimen:  Cyclophosphamide 300mg/m2 on Day -5, -4, and -3  Fludarabine 30mg/m2 on Day -5, -4, and -3     Pre-Medications:  Acetaminophen on Day 0  Diphenhydramine on Day 0     Immune Effector Cell Therapy:  Ciltacabtagene autoleucel on Day 0     Antimicrobial Prophylaxis:  Acyclovir starting on Day -5  Levofloxacin starting on Day -5  Fluconazole starting on Day -5  Pentamidine on day -5  Sulfamethoxazole-trimethoprim starting on Day +30     Seizure Prophylaxis:  Levetiracetam on Day 0 through Day +30        Caregiver: Catie (friend)  Post-transplant discharge plans: home       Abnormal finding on EKG  - EKG completed this am  - showing ST elevation, possible STEMI, unconfirmed read  - asymptomatic  - troponin wnl  - repeat EKG NSR and PACs    Hypophosphatemia  - daily phos level  - replace per BMT electrolyte protocol    Secondary hypercoagulable state  Multiple myeloma/History of auto stem cell transplant  Ppx Lovenox 40 mg SQ    Diabetes mellitus  Type II, well-controlled  - LD SSI    History of autologous stem cell transplant  - see Multiple Myeloma    Antineoplastic chemotherapy induced pancytopenia  - daily CBC  - transfuse for hgb <7 and platelets <10k  - hold anticoagulation for platelets <50k  - ppx antimicrobials per protocol    Multiple myeloma  Multiple myeloma/History of auto stem cell transplant  - IgG Kappa MM standard risk, diagnosed Sept 2020, after presenting w/ lytic lesions  - s/p 8 cycles Vrd followed by Alea Auto 5/17/2021 with disease relapse and Dkd salvage therapy 8/4/2023  - Achieving/mainting MI post SCT; was on revlimid maintenance but relapsed biochemically and with new lytic disease approximately 2 years post SCT (June/July 2023)  - Initiated Melinda-Kd salvage on 8/4/23; tolerating will no significant AE's   - Melinda subq weekly 8>EOW x 8 doses> q 4 week; kyprolis 70mg/m2 day 1, 8, 15 of 28  day cycle; dex 40mg po weekly   - Patient with initial good response but noted serial biochemical progression confirmed on 4/3/24 repeat serum studies; mild anemia and Jan 2024 PET with ENDY but no other overt symptoms or CRAB and he feels well  - Transitioned DKd to Kyprolis/pomalyst/dex as bridge to CART (cilta-zohra);    - Pomalyst 4mg daily day 1-21 of 28 cycle ; KPD initiated April 2024  - He has had RI to KPd  - Decision with patient made to pursue consolidation with ciltacabtagene autoleucel   See CarT     Essential hypertension  Continue amlodipine 10 mg daily, losartan 25mg  Continue HCTZ 25 mg daily  Continue clonidine 0.3 mg daily  BP soft this am, BP meds held this am and bolus given. Will stop Clonidine         VTE Risk Mitigation (From admission, onward)           Ordered     heparin, porcine (PF) 100 unit/mL injection flush 500 Units  As needed (PRN)         10/07/24 0959     heparin (porcine) injection 1,600 Units  As needed (PRN)         09/30/24 0824     heparin (porcine) injection 1,000 Units  As needed (PRN)         09/29/24 1744     enoxaparin injection 40 mg  Daily         09/29/24 1700     IP VTE HIGH RISK PATIENT  Once         09/29/24 1700     Place sequential compression device  Until discontinued         09/29/24 1700                    Disposition: Home    Hillary Vitale MD  Bone Marrow Transplant  [facility] (Hospital)

## 2024-10-11 LAB
BACTERIA BLD CULT: NORMAL
BACTERIA BLD CULT: NORMAL

## 2024-10-14 NOTE — PROGRESS NOTES
Section of Hematology and Stem Cell Transplantation    Post-Cellular Therapy Follow Up Visit     10/15/24    Cellular Therapy History:   Primary oncologist: Jesús Baker MD  Primary oncologic diagnosis: Multiple Myeloma  Cell infusion date: 09/30/24  Cell product: ciltacabtagene autoleucel (Carvykti)  Cell dose: 1 x 10^6 CAR T-cells/kg  Lymphodepletion chemotherapy: fludarabine 30 mg/m2 + cyclophosphamide 300 mg/m2 days -7, -6, -5  Prophylactic corticosteroid use: none  Immediate post-transplant complications:    History of Present Ilness:   Jaspreet Walsh Jr. (Jaspreet) is a pleasant 64 y.o.male with a multiple myeloma who presents for post-cellular therapy follow up. S/p Carvykti, today is day +15.    Hospital Course for Carvykti:  Admitted on 9/30/24 for relapsed/refractory multiple myeloma. Received 0.50x10^6T cells on 10/01/24. Sustained TMAX 100.6 on day +5/6, likely grade 1 CRS. Day +7, TMAX 102.6, soft BP, up-trending inflammatory markers, elevated liver enzymes/tbili-Grade 2 CRS. Received IVF and Toci. EKG w/ read of STEMI, normal cardiac markers. Infectious workups negative. Down-trending liver enzymes/tbili, ferritin.    Interval History 10/15/2024:  Patient reports he is doing well since discharge, they drove in this morning from [place]. He is eating and drinking well, no fevers, or changes to his mentation. He has chronic, unchanged peripheral neuropathy.    PAST MEDICAL HISTORY:   Past Medical History:   Diagnosis Date    Anxiety     Arthritis     Cancer     Diabetes mellitus     Hypertension        PAST SURGICAL HISTORY:   Past Surgical History:   Procedure Laterality Date    BACK SURGERY  01/01/2021    BONE MARROW BIOPSY Left 10/20/2021    Procedure: Biopsy-bone marrow;  Surgeon: Summer Cartwright MD;  Location: [facility] ([address] [place] [address]);  Service: Oncology;  Laterality: Left;    COLONOSCOPY N/A 01/25/2021    Procedure: COLONOSCOPY;  Surgeon: Braxton Lees MD;  Location: [facility] ([address] [place] [address]);   Service: Endoscopy;  Laterality: N/A;  -covid kristopher-inst mailed-tb  21-pt to remain on schedule with Dr. Lees, and confirmed updated arrival time of 0835-BB    COLONOSCOPY N/A 2021    Procedure: COLONOSCOPY;  Surgeon: Jo Ann Robledo MD;  Location: [facility] (4TH FLR);  Service: Endoscopy;  Laterality: N/A;  rescheduled due to poor bowel prep, to be rescheduled with first available provider-BB  [id number]-BB    CREATION OF MUSCLE ROTATIONAL FLAP N/A 2020    Procedure: CREATION, FLAP, MUSCLE ROTATION;  Surgeon: Kamlesh Bellamy MD;  Location: [facility] OR 2ND FLR;  Service: Plastics;  Laterality: N/A;    KNEE SURGERY Left 2019    LUMBAR FUSION N/A 2020    Procedure: FUSION, SPINE, LUMBAR L2-pelvis. Depuy. Plastic surgery closure w/ Dr. Bellamy;  Surgeon: Nick Coyle MD;  Location: [facility] OR 2ND FLR;  Service: Neurosurgery;  Laterality: N/A;    Metastatic neoplasum removed from spine      PLACEMENT OF DUAL-LUMEN VASCULAR CATHETER Left 2024    Procedure: INSERTION-CATHETER-LENORA, double lumen, left poss right, Bard 14.5 fr hemosplit catheter, model [id number];  Surgeon: Nelson Aponte MD;  [facility];  Service: General;  Laterality: Left;       PAST SOCIAL HISTORY:  Social History     Tobacco Use    Smoking status: Former     Current packs/day: 0.00     Average packs/day: 0.3 packs/day for 40.0 years (10.0 ttl pk-yrs)     Types: Cigarettes     Start date:      Quit date: 2017     Years since quittin.7    Smokeless tobacco: Never       FAMILY HISTORY:  Family History   Problem Relation Name Age of Onset    Cancer Father Jaspreet Walsh Sr        CURRENT MEDICATIONS:   Current Outpatient Medications   Medication Sig    acyclovir (ZOVIRAX) 800 MG Tab Take 1 tablet (800 mg total) by mouth 2 (two) times daily.    amLODIPine (NORVASC) 10 MG tablet Take 10 mg by mouth once daily.    fluconazole (DIFLUCAN) 200 MG Tab Take 2 tablets (400 mg total)  by mouth once daily.    gabapentin (NEURONTIN) 300 MG capsule Take 1 capsule (300 mg total) by mouth 2 (two) times daily.    hydroCHLOROthiazide (HYDRODIURIL) 25 MG tablet Take 25 mg by mouth once daily.    JARDIANCE 25 mg tablet Take 25 mg by mouth once daily.    levETIRAcetam (KEPPRA) 750 MG Tab Take 1 tablet (750 mg total) by mouth 2 (two) times daily. for 20 days    levoFLOXacin (LEVAQUIN) 500 MG tablet Take 1 tablet (500 mg total) by mouth once daily.    loperamide (IMODIUM) 2 mg capsule Take 1 capsule (2 mg total) by mouth 4 (four) times daily as needed for Diarrhea.    losartan (COZAAR) 25 MG tablet Take 1 tablet (25 mg total) by mouth once daily.    ondansetron (ZOFRAN-ODT) 8 MG TbDL Take 1 tablet (8 mg total) by mouth every 8 (eight) hours as needed (Nausea/Vomiting).    potassium chloride SA (K-DUR,KLOR-CON M) 10 MEQ tablet TAKE 1 TABLET(10 MEQ) BY MOUTH EVERY DAY    atorvastatin (LIPITOR) 20 MG tablet Take 20 mg by mouth once daily. (Patient not taking: Reported on 10/15/2024)    [START ON 10/30/2024] sulfamethoxazole-trimethoprim 800-160mg (BACTRIM DS) 800-160 mg Tab Take 1 tablet by mouth every Mon, Wed, Fri. To start on 10/30/24     No current facility-administered medications for this visit.       ALLERGIES:   Review of patient's allergies indicates:  No Known Allergies    GVHD Review of Systems:     Pertinent positives and negatives included in the HPI.     Physical Exam:     Vitals:    10/15/24 0910   BP: (!) 134/91   Pulse: 99   Resp: 20   Temp: 98.4 °F (36.9 °C)       Physical Exam  Vitals reviewed.   Constitutional:       General: He is not in acute distress.     Appearance: Normal appearance. He is obese. He is not ill-appearing.   HENT:      Head: Normocephalic and atraumatic.      Nose: Nose normal.      Mouth/Throat:      Mouth: Mucous membranes are moist.      Pharynx: Oropharynx is clear. No oropharyngeal exudate.   Eyes:      Pupils: Pupils are equal, round, and reactive to  light.   Cardiovascular:      Rate and Rhythm: Normal rate and regular rhythm.      Pulses: Normal pulses.      Heart sounds: Normal heart sounds. No murmur heard.  Pulmonary:      Effort: Pulmonary effort is normal.      Breath sounds: Normal breath sounds. No wheezing.   Abdominal:      General: Bowel sounds are normal. There is no distension.      Palpations: Abdomen is soft.      Tenderness: There is no abdominal tenderness.   Musculoskeletal:         General: Normal range of motion.      Cervical back: Normal range of motion and neck supple.      Right lower leg: No edema.      Left lower leg: No edema.   Skin:     General: Skin is warm and dry.      Capillary Refill: Capillary refill takes less than 2 seconds.      Findings: No lesion or rash.   Neurological:      Mental Status: He is alert and oriented to person, place, and time.   Psychiatric:         Mood and Affect: Mood normal.         Thought Content: Thought content normal.      ECOG Performance Status: (foot note - ECOG PS provided by Eastern Cooperative Oncology Group) 1 - Symptomatic but completely ambulatory    Karnofsky Performance Score:  90%- Able to Carry on Normal Activity: Minor Symptoms of Disease    Labs:   Lab Results   Component Value Date    WBC 2.50 (L) 10/15/2024    RBC 4.02 (L) 10/15/2024    HGB 12.6 (L) 10/15/2024    HCT 40.4 10/15/2024     (H) 10/15/2024    MCH 31.3 (H) 10/15/2024    MCHC 31.2 (L) 10/15/2024    RDW 18.1 (H) 10/15/2024     10/15/2024    MPV 11.3 10/15/2024    GRAN 1.1 (L) 10/15/2024    GRAN 43.6 10/15/2024    LYMPH 0.6 (L) 10/15/2024    LYMPH 25.2 10/15/2024    MONO 0.4 10/15/2024    MONO 14.8 10/15/2024    EOS 0.2 10/15/2024    BASO 0.20 10/15/2024    EOSINOPHIL 6.0 10/15/2024    BASOPHIL 8.0 (H) 10/15/2024       Sodium   Date Value Ref Range Status   10/15/2024 139 136 - 145 mmol/L Final     Potassium   Date Value Ref Range Status   10/15/2024 4.4 3.5 - 5.1 mmol/L Final     Chloride   Date Value Ref  Range Status   10/15/2024 106 95 - 110 mmol/L Final     CO2   Date Value Ref Range Status   10/15/2024 24 23 - 29 mmol/L Final     Glucose   Date Value Ref Range Status   10/15/2024 129 (H) 70 - 110 mg/dL Final     BUN   Date Value Ref Range Status   10/15/2024 17 8 - 23 mg/dL Final     Creatinine   Date Value Ref Range Status   10/15/2024 1.2 0.5 - 1.4 mg/dL Final     Calcium   Date Value Ref Range Status   10/15/2024 9.4 8.7 - 10.5 mg/dL Final     Total Protein   Date Value Ref Range Status   10/15/2024 6.7 6.0 - 8.4 g/dL Final     Albumin   Date Value Ref Range Status   10/15/2024 4.0 3.5 - 5.2 g/dL Final     Total Bilirubin   Date Value Ref Range Status   10/15/2024 0.6 0.1 - 1.0 mg/dL Final     Comment:     For infants and newborns, interpretation of results should be based  on gestational age, weight and in agreement with clinical  observations.    Premature Infant recommended reference ranges:  Up to 24 hours.............<8.0 mg/dL  Up to 48 hours............<12.0 mg/dL  3-5 days..................<15.0 mg/dL  6-29 days.................<15.0 mg/dL       Alkaline Phosphatase   Date Value Ref Range Status   10/15/2024 137 (H) 55 - 135 U/L Final     AST   Date Value Ref Range Status   10/15/2024 20 10 - 40 U/L Final     ALT   Date Value Ref Range Status   10/15/2024 40 10 - 44 U/L Final     Anion Gap   Date Value Ref Range Status   10/15/2024 9 8 - 16 mmol/L Final     eGFR if    Date Value Ref Range Status   07/01/2022 >60.0 >60 mL/min/1.73 m^2 Final     eGFR if non    Date Value Ref Range Status   07/01/2022 >60.0 >60 mL/min/1.73 m^2 Final     Comment:     Calculation used to obtain the estimated glomerular filtration  rate (eGFR) is the CKD-EPI equation.        Imaging:   None    Pathology:  None    Assessment and Plan:   Jaspreet Walsh Jr. (Jaspreet) is a pleasant 64 y.o.male with a past medical history of multiple myeloma s/p  Carvykti  who presents for post-CAR-T follow  up.    Multiple Myeloma: Status post treatment with Carvykti. Will restage at day 30, day 100, +/- day 180, 1 year, 2 years.  IgG kappa, standard-risk MM, diagnosed September 2020 after presentation w/ lytic lesions  S/p 8 cycles VRd, followed by NATHALIE autoSCT 5/17/2021 and revlimid maintenance  Relapsed June/July 2023, DKd salvage initiated 8/4/23  Good initial response; however, biochemical progression 4/3/24  Transitioned to [facility] as bridge to Carvykti  Carvykti 10/1/24; today is day +15     History of cellular therapy: As above, he received ciltacabtagene autoleucel (Carvykti) on 10/1/24. Complications include Grade II CRS, received 1 dose Tociluzimab.  Today is day + 15   Patient/family instructed to contact us with any fevers, mental status changes or unclear communication, or new/changed symptoms    Immunosuppression: Bactrim and acyclovir prophylaxis until 1 year post-CART (and CD4 >200). Continue fluconazole until resolution of neutropenia. Monitor immunoglobulins monthly. CMV monitoring weekly until day 30.  Last CMV: 10/15/24 pending; IgG: 10/15/24 normal  Active infections: none, infectious signs/symptoms today    Pancytopenia: Due to underlying disease +/- chemotherapy. Transfuse for Hgb <7 g/dL and platelets <10k.  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 Nellie on 10/28, okay to overbook 9:30 or 10:15   Provider visit type    Infusion scheduling note    Injection scheduling note    Labs CBC, CMP, type and screen, phosphorus, magnesium, CMV, free light chains, immunofixation, immunoglobulins and SPEP   Scheduling:  Preferred lab:  Lab interval:  Add labs on 10/28 prior   Imaging    Pharmacy appointment    Other referrals             Total time of this visit was 41 minutes, including time spent face to face with patient and/or via video/audio, and also in preparing for today's visit for MDM and documentation. (Medical Decision Making, including consideration of possible diagnoses, management options, complex medical record review, review of diagnostic tests and information, consideration and discussion of significant complications based on comorbidities, and discussion with providers involved with the care of the patient). Greater than 50% was spent face to face with the patient counseling and coordinating care.    LINDA GarciaP-MIKEY  Malignant Hematology, Stem Cell Transplant, and Cellular Therapy  [facility]  [facility] [place]

## 2024-10-15 ENCOUNTER — TELEPHONE (OUTPATIENT)
Dept: HEMATOLOGY/ONCOLOGY | Facility: CLINIC | Age: 64
End: 2024-10-15

## 2024-10-15 ENCOUNTER — OFFICE VISIT (OUTPATIENT)
Dept: HEMATOLOGY/ONCOLOGY | Facility: CLINIC | Age: 64
End: 2024-10-15
Payer: MEDICARE

## 2024-10-15 ENCOUNTER — LAB VISIT (OUTPATIENT)
Dept: LAB | Facility: HOSPITAL | Age: 64
End: 2024-10-15
Payer: MEDICARE

## 2024-10-15 VITALS
TEMPERATURE: 98 F | RESPIRATION RATE: 20 BRPM | SYSTOLIC BLOOD PRESSURE: 134 MMHG | HEART RATE: 99 BPM | OXYGEN SATURATION: 96 % | DIASTOLIC BLOOD PRESSURE: 91 MMHG | BODY MASS INDEX: 31.54 KG/M2 | HEIGHT: 67 IN | WEIGHT: 200.94 LBS

## 2024-10-15 DIAGNOSIS — D84.821 IMMUNODEFICIENCY DUE TO DRUGS: ICD-10-CM

## 2024-10-15 DIAGNOSIS — C90.00 MULTIPLE MYELOMA, REMISSION STATUS UNSPECIFIED: ICD-10-CM

## 2024-10-15 DIAGNOSIS — Z94.84 HISTORY OF AUTO STEM CELL TRANSPLANT: ICD-10-CM

## 2024-10-15 DIAGNOSIS — T45.1X5A CHEMOTHERAPY-INDUCED NEUROPATHY: ICD-10-CM

## 2024-10-15 DIAGNOSIS — Z79.899 IMMUNODEFICIENCY DUE TO DRUGS: ICD-10-CM

## 2024-10-15 DIAGNOSIS — G62.0 CHEMOTHERAPY-INDUCED NEUROPATHY: ICD-10-CM

## 2024-10-15 DIAGNOSIS — Z92.850 HISTORY OF CHIMERIC ANTIGEN RECEPTOR T-CELL IMMUNOTHERAPY: Primary | ICD-10-CM

## 2024-10-15 LAB
ABO + RH BLD: NORMAL
ALBUMIN SERPL BCP-MCNC: 4 G/DL (ref 3.5–5.2)
ALP SERPL-CCNC: 137 U/L (ref 55–135)
ALT SERPL W/O P-5'-P-CCNC: 40 U/L (ref 10–44)
ANION GAP SERPL CALC-SCNC: 9 MMOL/L (ref 8–16)
ANISOCYTOSIS BLD QL SMEAR: SLIGHT
AST SERPL-CCNC: 20 U/L (ref 10–40)
BASOPHILS # BLD AUTO: 0.2 K/UL (ref 0–0.2)
BASOPHILS NFR BLD: 8 % (ref 0–1.9)
BILIRUB SERPL-MCNC: 0.6 MG/DL (ref 0.1–1)
BLD GP AB SCN CELLS X3 SERPL QL: NORMAL
BUN SERPL-MCNC: 17 MG/DL (ref 8–23)
CALCIUM SERPL-MCNC: 9.4 MG/DL (ref 8.7–10.5)
CHLORIDE SERPL-SCNC: 106 MMOL/L (ref 95–110)
CMV DNA SPEC QL NAA+PROBE: ABNORMAL
CO2 SERPL-SCNC: 24 MMOL/L (ref 23–29)
CREAT SERPL-MCNC: 1.2 MG/DL (ref 0.5–1.4)
CYTOMEGALOVIRUS LOG (IU/ML): 2.28 LOGIU/ML
CYTOMEGALOVIRUS PCR, QUANT: 190 IU/ML
DIFFERENTIAL METHOD BLD: ABNORMAL
EOSINOPHIL # BLD AUTO: 0.2 K/UL (ref 0–0.5)
EOSINOPHIL NFR BLD: 6 % (ref 0–8)
ERYTHROCYTE [DISTWIDTH] IN BLOOD BY AUTOMATED COUNT: 18.1 % (ref 11.5–14.5)
EST. GFR  (NO RACE VARIABLE): >60 ML/MIN/1.73 M^2
GLUCOSE SERPL-MCNC: 129 MG/DL (ref 70–110)
HCT VFR BLD AUTO: 40.4 % (ref 40–54)
HGB BLD-MCNC: 12.6 G/DL (ref 14–18)
HYPOCHROMIA BLD QL SMEAR: ABNORMAL
IGA SERPL-MCNC: 18 MG/DL (ref 40–350)
IGG SERPL-MCNC: 832 MG/DL (ref 650–1600)
IGM SERPL-MCNC: 18 MG/DL (ref 50–300)
IMM GRANULOCYTES # BLD AUTO: 0.06 K/UL (ref 0–0.04)
IMM GRANULOCYTES NFR BLD AUTO: 2.4 % (ref 0–0.5)
LYMPHOCYTES # BLD AUTO: 0.6 K/UL (ref 1–4.8)
LYMPHOCYTES NFR BLD: 25.2 % (ref 18–48)
MAGNESIUM SERPL-MCNC: 1.8 MG/DL (ref 1.6–2.6)
MCH RBC QN AUTO: 31.3 PG (ref 27–31)
MCHC RBC AUTO-ENTMCNC: 31.2 G/DL (ref 32–36)
MCV RBC AUTO: 101 FL (ref 82–98)
MONOCYTES # BLD AUTO: 0.4 K/UL (ref 0.3–1)
MONOCYTES NFR BLD: 14.8 % (ref 4–15)
NEUTROPHILS # BLD AUTO: 1.1 K/UL (ref 1.8–7.7)
NEUTROPHILS NFR BLD: 43.6 % (ref 38–73)
NRBC BLD-RTO: 1 /100 WBC
OVALOCYTES BLD QL SMEAR: ABNORMAL
PHOSPHATE SERPL-MCNC: 2.8 MG/DL (ref 2.7–4.5)
PLATELET # BLD AUTO: 252 K/UL (ref 150–450)
PLATELET BLD QL SMEAR: ABNORMAL
PMV BLD AUTO: 11.3 FL (ref 9.2–12.9)
POIKILOCYTOSIS BLD QL SMEAR: SLIGHT
POLYCHROMASIA BLD QL SMEAR: ABNORMAL
POTASSIUM SERPL-SCNC: 4.4 MMOL/L (ref 3.5–5.1)
PROT SERPL-MCNC: 6.7 G/DL (ref 6–8.4)
RBC # BLD AUTO: 4.02 M/UL (ref 4.6–6.2)
SODIUM SERPL-SCNC: 139 MMOL/L (ref 136–145)
SPECIMEN OUTDATE: NORMAL
URATE SERPL-MCNC: 4.8 MG/DL (ref 3.4–7)
WBC # BLD AUTO: 2.5 K/UL (ref 3.9–12.7)

## 2024-10-15 PROCEDURE — 86900 BLOOD TYPING SEROLOGIC ABO: CPT | Performed by: INTERNAL MEDICINE

## 2024-10-15 PROCEDURE — 80053 COMPREHEN METABOLIC PANEL: CPT | Performed by: INTERNAL MEDICINE

## 2024-10-15 PROCEDURE — 3288F FALL RISK ASSESSMENT DOCD: CPT | Mod: CPTII,S$GLB,,

## 2024-10-15 PROCEDURE — 86901 BLOOD TYPING SEROLOGIC RH(D): CPT | Performed by: INTERNAL MEDICINE

## 2024-10-15 PROCEDURE — 3008F BODY MASS INDEX DOCD: CPT | Mod: CPTII,S$GLB,,

## 2024-10-15 PROCEDURE — 3075F SYST BP GE 130 - 139MM HG: CPT | Mod: CPTII,S$GLB,,

## 2024-10-15 PROCEDURE — 1160F RVW MEDS BY RX/DR IN RCRD: CPT | Mod: CPTII,S$GLB,,

## 2024-10-15 PROCEDURE — 86850 RBC ANTIBODY SCREEN: CPT | Performed by: INTERNAL MEDICINE

## 2024-10-15 PROCEDURE — 83735 ASSAY OF MAGNESIUM: CPT | Performed by: INTERNAL MEDICINE

## 2024-10-15 PROCEDURE — 4010F ACE/ARB THERAPY RXD/TAKEN: CPT | Mod: CPTII,S$GLB,,

## 2024-10-15 PROCEDURE — 84100 ASSAY OF PHOSPHORUS: CPT | Performed by: INTERNAL MEDICINE

## 2024-10-15 PROCEDURE — 85025 COMPLETE CBC W/AUTO DIFF WBC: CPT | Performed by: INTERNAL MEDICINE

## 2024-10-15 PROCEDURE — 99215 OFFICE O/P EST HI 40 MIN: CPT | Mod: S$GLB,,,

## 2024-10-15 PROCEDURE — 1101F PT FALLS ASSESS-DOCD LE1/YR: CPT | Mod: CPTII,S$GLB,,

## 2024-10-15 PROCEDURE — 1159F MED LIST DOCD IN RCRD: CPT | Mod: CPTII,S$GLB,,

## 2024-10-15 PROCEDURE — 99999 PR PBB SHADOW E&M-EST. PATIENT-LVL IV: CPT | Mod: PBBFAC,,,

## 2024-10-15 PROCEDURE — 82784 ASSAY IGA/IGD/IGG/IGM EACH: CPT | Mod: 59 | Performed by: INTERNAL MEDICINE

## 2024-10-15 PROCEDURE — 84550 ASSAY OF BLOOD/URIC ACID: CPT | Performed by: INTERNAL MEDICINE

## 2024-10-15 PROCEDURE — 3080F DIAST BP >= 90 MM HG: CPT | Mod: CPTII,S$GLB,,

## 2024-10-15 PROCEDURE — 1111F DSCHRG MED/CURRENT MED MERGE: CPT | Mod: CPTII,S$GLB,,

## 2024-10-15 NOTE — TELEPHONE ENCOUNTER
----- Message from Lisa sent at 10/15/2024 12:54 PM CDT -----  Regarding: Consult/Advisory  Contact: Eloisa  Consult/Advisory     Name Of Caller:Eloisa        Contact Preference:[phone] ( Direct  Ext [phone])     Nature of call: Nurse Eloisa ( HELENA) from [facility] is calling to get a f/u of the medications that the pt is taking, state there is discrepancies. Please advise. Requesting a call back.         levETIRAcetam (KEPPRA) 750 MG Tab            levoFLOXacin (LEVAQUIN) 500 MG tablet

## 2024-10-15 NOTE — TELEPHONE ENCOUNTER
Left message for Nurse Amin ( HELENA) regarding patient medication as well as the clinic callback number.

## 2024-10-21 ENCOUNTER — TELEPHONE (OUTPATIENT)
Dept: HEMATOLOGY/ONCOLOGY | Facility: CLINIC | Age: 64
End: 2024-10-21

## 2024-10-21 ENCOUNTER — OFFICE VISIT (OUTPATIENT)
Dept: HEMATOLOGY/ONCOLOGY | Facility: CLINIC | Age: 64
End: 2024-10-21
Payer: MEDICARE

## 2024-10-21 ENCOUNTER — LAB VISIT (OUTPATIENT)
Dept: LAB | Facility: HOSPITAL | Age: 64
End: 2024-10-21
Payer: MEDICARE

## 2024-10-21 VITALS
HEIGHT: 67 IN | RESPIRATION RATE: 24 BRPM | TEMPERATURE: 98 F | HEART RATE: 90 BPM | SYSTOLIC BLOOD PRESSURE: 117 MMHG | WEIGHT: 197 LBS | DIASTOLIC BLOOD PRESSURE: 78 MMHG | BODY MASS INDEX: 30.92 KG/M2 | OXYGEN SATURATION: 98 %

## 2024-10-21 DIAGNOSIS — E83.39 HYPOPHOSPHATEMIA: ICD-10-CM

## 2024-10-21 DIAGNOSIS — Z92.850 HISTORY OF CHIMERIC ANTIGEN RECEPTOR T-CELL IMMUNOTHERAPY: Primary | ICD-10-CM

## 2024-10-21 DIAGNOSIS — Z94.84 HISTORY OF AUTO STEM CELL TRANSPLANT: ICD-10-CM

## 2024-10-21 DIAGNOSIS — Z79.899 IMMUNODEFICIENCY DUE TO DRUGS: ICD-10-CM

## 2024-10-21 DIAGNOSIS — D84.821 IMMUNODEFICIENCY DUE TO DRUGS: ICD-10-CM

## 2024-10-21 DIAGNOSIS — C90.00 MULTIPLE MYELOMA, REMISSION STATUS UNSPECIFIED: ICD-10-CM

## 2024-10-21 LAB
ABO + RH BLD: NORMAL
ALBUMIN SERPL BCP-MCNC: 4.2 G/DL (ref 3.5–5.2)
ALP SERPL-CCNC: 134 U/L (ref 40–150)
ALT SERPL W/O P-5'-P-CCNC: 29 U/L (ref 10–44)
ANION GAP SERPL CALC-SCNC: 11 MMOL/L (ref 8–16)
AST SERPL-CCNC: 30 U/L (ref 10–40)
BILIRUB SERPL-MCNC: 0.6 MG/DL (ref 0.1–1)
BLD GP AB SCN CELLS X3 SERPL QL: NORMAL
BUN SERPL-MCNC: 23 MG/DL (ref 8–23)
CALCIUM SERPL-MCNC: 9.2 MG/DL (ref 8.7–10.5)
CHLORIDE SERPL-SCNC: 109 MMOL/L (ref 95–110)
CO2 SERPL-SCNC: 19 MMOL/L (ref 23–29)
CREAT SERPL-MCNC: 1.6 MG/DL (ref 0.5–1.4)
EST. GFR  (NO RACE VARIABLE): 47.5 ML/MIN/1.73 M^2
GLUCOSE SERPL-MCNC: 128 MG/DL (ref 70–110)
IGA SERPL-MCNC: 12 MG/DL (ref 40–350)
IGG SERPL-MCNC: 709 MG/DL (ref 650–1600)
IGM SERPL-MCNC: 15 MG/DL (ref 50–300)
MAGNESIUM SERPL-MCNC: 2 MG/DL (ref 1.6–2.6)
PHOSPHATE SERPL-MCNC: 2.5 MG/DL (ref 2.7–4.5)
POTASSIUM SERPL-SCNC: 4.6 MMOL/L (ref 3.5–5.1)
PROT SERPL-MCNC: 6.6 G/DL (ref 6–8.4)
SODIUM SERPL-SCNC: 139 MMOL/L (ref 136–145)
SPECIMEN OUTDATE: NORMAL

## 2024-10-21 PROCEDURE — 1101F PT FALLS ASSESS-DOCD LE1/YR: CPT | Mod: CPTII,S$GLB,,

## 2024-10-21 PROCEDURE — 3288F FALL RISK ASSESSMENT DOCD: CPT | Mod: CPTII,S$GLB,,

## 2024-10-21 PROCEDURE — 4010F ACE/ARB THERAPY RXD/TAKEN: CPT | Mod: CPTII,S$GLB,,

## 2024-10-21 PROCEDURE — 99999 PR PBB SHADOW E&M-EST. PATIENT-LVL IV: CPT | Mod: PBBFAC,,,

## 2024-10-21 PROCEDURE — 84100 ASSAY OF PHOSPHORUS: CPT

## 2024-10-21 PROCEDURE — 1111F DSCHRG MED/CURRENT MED MERGE: CPT | Mod: CPTII,S$GLB,,

## 2024-10-21 PROCEDURE — 3074F SYST BP LT 130 MM HG: CPT | Mod: CPTII,S$GLB,,

## 2024-10-21 PROCEDURE — 86850 RBC ANTIBODY SCREEN: CPT

## 2024-10-21 PROCEDURE — 86900 BLOOD TYPING SEROLOGIC ABO: CPT

## 2024-10-21 PROCEDURE — 83735 ASSAY OF MAGNESIUM: CPT

## 2024-10-21 PROCEDURE — 1159F MED LIST DOCD IN RCRD: CPT | Mod: CPTII,S$GLB,,

## 2024-10-21 PROCEDURE — 3008F BODY MASS INDEX DOCD: CPT | Mod: CPTII,S$GLB,,

## 2024-10-21 PROCEDURE — 80053 COMPREHEN METABOLIC PANEL: CPT

## 2024-10-21 PROCEDURE — 82784 ASSAY IGA/IGD/IGG/IGM EACH: CPT | Mod: 59

## 2024-10-21 PROCEDURE — 3078F DIAST BP <80 MM HG: CPT | Mod: CPTII,S$GLB,,

## 2024-10-21 PROCEDURE — 99215 OFFICE O/P EST HI 40 MIN: CPT | Mod: S$GLB,,,

## 2024-10-21 PROCEDURE — 86901 BLOOD TYPING SEROLOGIC RH(D): CPT

## 2024-10-21 NOTE — TELEPHONE ENCOUNTER
----- Message from Lisa sent at 10/21/2024 10:50 AM CDT -----  Regarding: Re- order  Contact: Aden  Consult/Advisory     Name Of Caller:Aden        Contact Preference: ext [phone]     Nature of call:Aden from the lab in regards to a reorder and recollection for the pt, pt lab clotted. Please advise.       LACHO fry dif

## 2024-10-21 NOTE — TELEPHONE ENCOUNTER
"----- Message from Ezio sent at 10/21/2024 10:45 AM CDT -----  Consult/Advisory:    Name Of Caller: Aden [[facility]]    Contact Preference?: [phone]    Provider Name: Miguel    What is the nature of the call?: Requesting recollection for today's Lab       Additional Notes: Call dropped before I could confirm proper call back #    "Thank you for all that you do for our patients"  "

## 2024-10-21 NOTE — TELEPHONE ENCOUNTER
Patient notified by telephone call of his stable CBC results from today's visit that had to be redrawn. I mentioned that his platelet count is lower than before but still way above the transfusion threshold.

## 2024-10-21 NOTE — PROGRESS NOTES
Section of Hematology and Stem Cell Transplantation    Post-Cellular Therapy Follow Up Visit     10/21/24    Cellular Therapy History:   Primary oncologist: Jesús Baker MD  Primary oncologic diagnosis: Multiple Myeloma  Cell infusion date: 09/30/24  Cell product: ciltacabtagene autoleucel (Carvykti)  Cell dose: 1 x 10^6 CAR T-cells/kg  Lymphodepletion chemotherapy: fludarabine 30 mg/m2 + cyclophosphamide 300 mg/m2 days -7, -6, -5  Prophylactic corticosteroid use: none  Immediate post-transplant complications:    History of Present Ilness:   Jaspreet Walsh  (Jaspreet) is a pleasant 64 y.o.male with a multiple myeloma who presents for post-cellular therapy follow up. S/p Carvykti, today is day +21.    Hospital Course for Carvykti:  Admitted on 9/30/24 for relapsed/refractory multiple myeloma. Received 0.50x10^6T cells on 10/01/24. Sustained TMAX 100.6 on day +5/6, likely grade 1 CRS. Day +7, TMAX 102.6, soft BP, up-trending inflammatory markers, elevated liver enzymes/tbili-Grade 2 CRS. Received IVF and Toci. EKG w/ read of STEMI, normal cardiac markers. Infectious workups negative. Down-trending liver enzymes/tbili, ferritin.    Interval History 10/15/2024:  Patient reports he is doing well since discharge, they drove in this morning from [place]. He is eating and drinking well, no fevers, or changes to his mentation. He has chronic, unchanged peripheral neuropathy.    Interval History 10/21/2024:  Patient reports he has altered taste and smell post-CAR-T treatment. He is still eating and drinking, just with altered taste. He denies fevers, mentation changes, sore throat, cough, dysuria, chest pain, shortness of breath, N/V/D/constipation.    PAST MEDICAL HISTORY:   Past Medical History:   Diagnosis Date    Anxiety     Arthritis     Cancer     Diabetes mellitus     Hypertension        PAST SURGICAL HISTORY:   Past Surgical History:   Procedure Laterality Date    BACK SURGERY  01/01/2021    BONE MARROW  BIOPSY Left 10/20/2021    Procedure: Biopsy-bone marrow;  Surgeon: Summer Cartwright MD;  Location: [facility] ENDO (4TH FLR);  Service: Oncology;  Laterality: Left;    COLONOSCOPY N/A 2021    Procedure: COLONOSCOPY;  Surgeon: Braxton Lees MD;  Location: [facility] ENDO (4TH FLR);  Service: Endoscopy;  Laterality: N/A;  -covid kristopher-inst mailed-tb  21-pt to remain on schedule with Dr. Lees, and confirmed updated arrival time of 0835-BB    COLONOSCOPY N/A 2021    Procedure: COLONOSCOPY;  Surgeon: Jo Ann Robledo MD;  Location: [facility] ENDO (4TH FLR);  Service: Endoscopy;  Laterality: N/A;  rescheduled due to poor bowel prep, to be rescheduled with first available provider-BB  [id number]    CREATION OF MUSCLE ROTATIONAL FLAP N/A 2020    Procedure: CREATION, FLAP, MUSCLE ROTATION;  Surgeon: Kamlesh Bellamy MD;  [facility];  Service: Plastics;  Laterality: N/A;    KNEE SURGERY Left 2019    LUMBAR FUSION N/A 2020    Procedure: FUSION, SPINE, LUMBAR L2-pelvis. Depuy. Plastic surgery closure w/ Dr. Bellamy;  Surgeon: Nick Coyle MD;  [facility];  Service: Neurosurgery;  Laterality: N/A;    Metastatic neoplasum removed from spine      PLACEMENT OF DUAL-LUMEN VASCULAR CATHETER Left 2024    Procedure: INSERTION-CATHETER-LENORA, double lumen, left poss right, Bard 14.5 fr hemosplit catheter, model [id number];  Surgeon: Nelson Aponte MD;  [facility];  Service: General;  Laterality: Left;       PAST SOCIAL HISTORY:  Social History     Tobacco Use    Smoking status: Former     Current packs/day: 0.00     Average packs/day: 0.3 packs/day for 40.0 years (10.0 ttl pk-yrs)     Types: Cigarettes     Start date:      Quit date:      Years since quittin.8    Smokeless tobacco: Never       FAMILY HISTORY:  Family History   Problem Relation Name Age of Onset    Cancer Father Jaspreet Walsh         CURRENT MEDICATIONS:    Current Outpatient Medications   Medication Sig    acyclovir (ZOVIRAX) 800 MG Tab Take 1 tablet (800 mg total) by mouth 2 (two) times daily.    amLODIPine (NORVASC) 10 MG tablet Take 10 mg by mouth once daily.    atorvastatin (LIPITOR) 20 MG tablet Take 20 mg by mouth once daily.    fluconazole (DIFLUCAN) 200 MG Tab Take 2 tablets (400 mg total) by mouth once daily.    gabapentin (NEURONTIN) 300 MG capsule Take 1 capsule (300 mg total) by mouth 2 (two) times daily.    hydroCHLOROthiazide (HYDRODIURIL) 25 MG tablet Take 25 mg by mouth once daily.    JARDIANCE 25 mg tablet Take 25 mg by mouth once daily.    levETIRAcetam (KEPPRA) 750 MG Tab Take 1 tablet (750 mg total) by mouth 2 (two) times daily. for 20 days    levoFLOXacin (LEVAQUIN) 500 MG tablet Take 1 tablet (500 mg total) by mouth once daily.    losartan (COZAAR) 25 MG tablet Take 1 tablet (25 mg total) by mouth once daily.    potassium chloride SA (K-DUR,KLOR-CON M) 10 MEQ tablet TAKE 1 TABLET(10 MEQ) BY MOUTH EVERY DAY    [START ON 10/30/2024] sulfamethoxazole-trimethoprim 800-160mg (BACTRIM DS) 800-160 mg Tab Take 1 tablet by mouth every Mon, Wed, Fri. To start on 10/30/24    k phos di & mono-sod phos mono (PHOSPHOROUS) 250 mg Tab Take 1 tablet by mouth 4 (four) times daily with meals and nightly. for 5 days    ondansetron (ZOFRAN-ODT) 8 MG TbDL Take 1 tablet (8 mg total) by mouth every 8 (eight) hours as needed (Nausea/Vomiting). (Patient not taking: Reported on 10/21/2024)     No current facility-administered medications for this visit.       ALLERGIES:   Review of patient's allergies indicates:  No Known Allergies    GVHD Review of Systems:     Pertinent positives and negatives included in the HPI.     Physical Exam:     Vitals:    10/21/24 0951   BP: 117/78   Pulse: 90   Resp: (!) 24   Temp: 98.4 °F (36.9 °C)       Physical Exam  Vitals reviewed.   Constitutional:       General: He is not in acute distress.     Appearance: Normal  appearance. He is obese. He is not ill-appearing.   HENT:      Head: Normocephalic and atraumatic.      Nose: Nose normal.      Mouth/Throat:      Mouth: Mucous membranes are moist.      Pharynx: Oropharynx is clear. No oropharyngeal exudate.   Eyes:      Pupils: Pupils are equal, round, and reactive to light.   Cardiovascular:      Rate and Rhythm: Normal rate and regular rhythm.      Pulses: Normal pulses.      Heart sounds: Normal heart sounds. No murmur heard.  Pulmonary:      Effort: Pulmonary effort is normal.      Breath sounds: Normal breath sounds. No wheezing.   Abdominal:      General: Bowel sounds are normal. There is no distension.      Palpations: Abdomen is soft.      Tenderness: There is no abdominal tenderness.   Musculoskeletal:         General: Normal range of motion.      Cervical back: Normal range of motion and neck supple.      Right lower leg: No edema.      Left lower leg: No edema.   Skin:     General: Skin is warm and dry.      Capillary Refill: Capillary refill takes less than 2 seconds.      Findings: No lesion or rash.   Neurological:      Mental Status: He is alert and oriented to person, place, and time.   Psychiatric:         Mood and Affect: Mood normal.         Thought Content: Thought content normal.      ECOG Performance Status: (foot note - ECOG PS provided by [employer] Co[employer]cology [employer]) 1 - Symptomatic but completely ambulatory    Karnofsky Performance Score:  90%- Able to Carry on Normal Activity: Minor Symptoms of Disease    Labs:   Lab Results   Component Value Date    WBC 2.50 (L) 10/15/2024    RBC 4.02 (L) 10/15/2024    HGB 12.6 (L) 10/15/2024    HCT 40.4 10/15/2024     (H) 10/15/2024    MCH 31.3 (H) 10/15/2024    MCHC 31.2 (L) 10/15/2024    RDW 18.1 (H) 10/15/2024     10/15/2024    MPV 11.3 10/15/2024    GRAN 1.1 (L) 10/15/2024    GRAN 43.6 10/15/2024    LYMPH 0.6 (L) 10/15/2024    LYMPH 25.2 10/15/2024    MONO 0.4 10/15/2024    MONO 14.8 10/15/2024     EOS 0.2 10/15/2024    BASO 0.20 10/15/2024    EOSINOPHIL 6.0 10/15/2024    BASOPHIL 8.0 (H) 10/15/2024       Sodium   Date Value Ref Range Status   10/21/2024 139 136 - 145 mmol/L Final     Potassium   Date Value Ref Range Status   10/21/2024 4.6 3.5 - 5.1 mmol/L Final     Chloride   Date Value Ref Range Status   10/21/2024 109 95 - 110 mmol/L Final     CO2   Date Value Ref Range Status   10/21/2024 19 (L) 23 - 29 mmol/L Final     Glucose   Date Value Ref Range Status   10/21/2024 128 (H) 70 - 110 mg/dL Final     BUN   Date Value Ref Range Status   10/21/2024 23 8 - 23 mg/dL Final     Creatinine   Date Value Ref Range Status   10/21/2024 1.6 (H) 0.5 - 1.4 mg/dL Final     Calcium   Date Value Ref Range Status   10/21/2024 9.2 8.7 - 10.5 mg/dL Final     Total Protein   Date Value Ref Range Status   10/21/2024 6.6 6.0 - 8.4 g/dL Final     Albumin   Date Value Ref Range Status   10/21/2024 4.2 3.5 - 5.2 g/dL Final     Total Bilirubin   Date Value Ref Range Status   10/21/2024 0.6 0.1 - 1.0 mg/dL Final     Comment:     For infants and newborns, interpretation of results should be based  on gestational age, weight and in agreement with clinical  observations.    Premature Infant recommended reference ranges:  Up to 24 hours.............<8.0 mg/dL  Up to 48 hours............<12.0 mg/dL  3-5 days..................<15.0 mg/dL  6-29 days.................<15.0 mg/dL       Alkaline Phosphatase   Date Value Ref Range Status   10/21/2024 134 40 - 150 U/L Final     AST   Date Value Ref Range Status   10/21/2024 30 10 - 40 U/L Final     ALT   Date Value Ref Range Status   10/21/2024 29 10 - 44 U/L Final     Anion Gap   Date Value Ref Range Status   10/21/2024 11 8 - 16 mmol/L Final     eGFR if    Date Value Ref Range Status   07/01/2022 >60.0 >60 mL/min/1.73 m^2 Final     eGFR if non    Date Value Ref Range Status   07/01/2022 >60.0 >60 mL/min/1.73 m^2 Final     Comment:     Calculation used to  obtain the estimated glomerular filtration  rate (eGFR) is the CKD-EPI equation.        Imaging:   None    Pathology:  None    Assessment and Plan:   Jaspreet Headdandre Reardon (Jaspreet) is a pleasant 64 y.o.male with a past medical history of multiple myeloma s/p  Carvykti  who presents for post-CAR-T follow up.    Multiple Myeloma: Status post treatment with Carvykti. Will restage at day 30, day 100, +/- day 180, 1 year, 2 years.  IgG kappa, standard-risk MM, diagnosed September 2020 after presentation w/ lytic lesions  S/p 8 cycles VRd, followed by NATHALIE autoSCT 5/17/2021 and revlimid maintenance  Relapsed June/July 2023, DKd salvage initiated 8/4/23  Good initial response; however, biochemical progression 4/3/24  Transitioned to KPd as bridge to Carvykti  Carvykti 10/1/24; today is day +21     History of cellular therapy: As above, he received ciltacabtagene autoleucel (Carvykti) on 10/1/24. Complications include Grade II CRS, received 1 dose Tociluzimab.  Today is day + 21   Patient/family instructed to contact us with any fevers, mental status changes or unclear communication, or new/changed symptoms    Immunosuppression: Bactrim and acyclovir prophylaxis until 1 year post-CART (and CD4 >200). Continue fluconazole until resolution of neutropenia. Monitor immunoglobulins monthly. CMV monitoring weekly until day 30.  Last CMV: 10/15/24 190; IgG: 10/15/24 normal  Active infections: none, infectious signs/symptoms today    Pancytopenia: Due to underlying disease +/- chemotherapy. Transfuse for Hgb <7 g/dL and platelets <10k.  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 Keep 10/28 saurabht; Nellie Woodard follow up   Provider visit type    Infusion scheduling note    Injection scheduling note    Labs CBC, CMP, magnesium and phosphorus   Scheduling:  Preferred lab:  Lab interval:  Keep 10/28 labs; [place]: 1 day prior to 3 week follow up   Imaging    Pharmacy appointment    Other referrals              Total time of this visit was 40 minutes, including time spent face to face with patient and/or via video/audio, and also in preparing for today's visit for MDM and documentation. (Medical Decision Making, including consideration of possible diagnoses, management options, complex medical record review, review of diagnostic tests and information, consideration and discussion of significant complications based on comorbidities, and discussion with providers involved with the care of the patient). Greater than 50% was spent face to face with the patient counseling and coordinating care.    Nellie Rivera, LINDAP-MIKEY  Malignant Hematology, Stem Cell Transplant, and Cellular Therapy  [facility]  [facility]

## 2024-10-22 LAB
CMV DNA SPEC QL NAA+PROBE: ABNORMAL
CYTOMEGALOVIRUS LOG (IU/ML): 2.45 LOGIU/ML
CYTOMEGALOVIRUS PCR, QUANT: 283 IU/ML

## 2024-10-28 ENCOUNTER — OFFICE VISIT (OUTPATIENT)
Dept: HEMATOLOGY/ONCOLOGY | Facility: CLINIC | Age: 64
End: 2024-10-28
Payer: MEDICARE

## 2024-10-28 ENCOUNTER — LAB VISIT (OUTPATIENT)
Dept: LAB | Facility: HOSPITAL | Age: 64
End: 2024-10-28
Payer: MEDICARE

## 2024-10-28 VITALS
SYSTOLIC BLOOD PRESSURE: 118 MMHG | HEIGHT: 67 IN | RESPIRATION RATE: 18 BRPM | TEMPERATURE: 98 F | WEIGHT: 200.75 LBS | HEART RATE: 86 BPM | OXYGEN SATURATION: 99 % | DIASTOLIC BLOOD PRESSURE: 76 MMHG | BODY MASS INDEX: 31.51 KG/M2

## 2024-10-28 DIAGNOSIS — D84.821 IMMUNODEFICIENCY DUE TO DRUGS: ICD-10-CM

## 2024-10-28 DIAGNOSIS — Z94.84 HISTORY OF AUTO STEM CELL TRANSPLANT: ICD-10-CM

## 2024-10-28 DIAGNOSIS — Q99.8 OTHER SPECIFIED CHROMOSOME ABNORMALITIES: ICD-10-CM

## 2024-10-28 DIAGNOSIS — Z92.850 HISTORY OF CHIMERIC ANTIGEN RECEPTOR T-CELL IMMUNOTHERAPY: Primary | ICD-10-CM

## 2024-10-28 DIAGNOSIS — Z79.899 IMMUNODEFICIENCY DUE TO DRUGS: ICD-10-CM

## 2024-10-28 DIAGNOSIS — C90.00 MULTIPLE MYELOMA, REMISSION STATUS UNSPECIFIED: ICD-10-CM

## 2024-10-28 LAB
ABO + RH BLD: NORMAL
ALBUMIN SERPL BCP-MCNC: 4.3 G/DL (ref 3.5–5.2)
ALP SERPL-CCNC: 109 U/L (ref 40–150)
ALT SERPL W/O P-5'-P-CCNC: 20 U/L (ref 10–44)
ANION GAP SERPL CALC-SCNC: 13 MMOL/L (ref 8–16)
ANISOCYTOSIS BLD QL SMEAR: SLIGHT
AST SERPL-CCNC: 21 U/L (ref 10–40)
BASOPHILS # BLD AUTO: 0.06 K/UL (ref 0–0.2)
BASOPHILS NFR BLD: 2.4 % (ref 0–1.9)
BILIRUB SERPL-MCNC: 0.6 MG/DL (ref 0.1–1)
BLD GP AB SCN CELLS X3 SERPL QL: NORMAL
BUN SERPL-MCNC: 18 MG/DL (ref 8–23)
BURR CELLS BLD QL SMEAR: ABNORMAL
CALCIUM SERPL-MCNC: 8.7 MG/DL (ref 8.7–10.5)
CHLORIDE SERPL-SCNC: 105 MMOL/L (ref 95–110)
CO2 SERPL-SCNC: 22 MMOL/L (ref 23–29)
CREAT SERPL-MCNC: 1.4 MG/DL (ref 0.5–1.4)
DIFFERENTIAL METHOD BLD: ABNORMAL
EOSINOPHIL # BLD AUTO: 0.3 K/UL (ref 0–0.5)
EOSINOPHIL NFR BLD: 11.4 % (ref 0–8)
ERYTHROCYTE [DISTWIDTH] IN BLOOD BY AUTOMATED COUNT: 17.2 % (ref 11.5–14.5)
EST. GFR  (NO RACE VARIABLE): 55.8 ML/MIN/1.73 M^2
GLUCOSE SERPL-MCNC: 109 MG/DL (ref 70–110)
HCT VFR BLD AUTO: 41.5 % (ref 40–54)
HGB BLD-MCNC: 13.6 G/DL (ref 14–18)
IGA SERPL-MCNC: 9 MG/DL (ref 40–350)
IGG SERPL-MCNC: 628 MG/DL (ref 650–1600)
IGM SERPL-MCNC: 11 MG/DL (ref 50–300)
IMM GRANULOCYTES # BLD AUTO: 0.01 K/UL (ref 0–0.04)
IMM GRANULOCYTES NFR BLD AUTO: 0.4 % (ref 0–0.5)
LYMPHOCYTES # BLD AUTO: 1.2 K/UL (ref 1–4.8)
LYMPHOCYTES NFR BLD: 46.7 % (ref 18–48)
MAGNESIUM SERPL-MCNC: 1.7 MG/DL (ref 1.6–2.6)
MCH RBC QN AUTO: 32.5 PG (ref 27–31)
MCHC RBC AUTO-ENTMCNC: 32.8 G/DL (ref 32–36)
MCV RBC AUTO: 99 FL (ref 82–98)
MONOCYTES # BLD AUTO: 0.3 K/UL (ref 0.3–1)
MONOCYTES NFR BLD: 13.8 % (ref 4–15)
NEUTROPHILS # BLD AUTO: 0.6 K/UL (ref 1.8–7.7)
NEUTROPHILS NFR BLD: 25.3 % (ref 38–73)
NRBC BLD-RTO: 0 /100 WBC
OVALOCYTES BLD QL SMEAR: ABNORMAL
PHOSPHATE SERPL-MCNC: 2.9 MG/DL (ref 2.7–4.5)
PLATELET # BLD AUTO: 59 K/UL (ref 150–450)
PMV BLD AUTO: ABNORMAL FL (ref 9.2–12.9)
POIKILOCYTOSIS BLD QL SMEAR: SLIGHT
POLYCHROMASIA BLD QL SMEAR: ABNORMAL
POTASSIUM SERPL-SCNC: 4.1 MMOL/L (ref 3.5–5.1)
PROT SERPL-MCNC: 6.5 G/DL (ref 6–8.4)
RBC # BLD AUTO: 4.19 M/UL (ref 4.6–6.2)
SCHISTOCYTES BLD QL SMEAR: ABNORMAL
SODIUM SERPL-SCNC: 140 MMOL/L (ref 136–145)
SPECIMEN OUTDATE: NORMAL
SPHEROCYTES BLD QL SMEAR: ABNORMAL
WBC # BLD AUTO: 2.46 K/UL (ref 3.9–12.7)

## 2024-10-28 PROCEDURE — 86334 IMMUNOFIX E-PHORESIS SERUM: CPT | Performed by: INTERNAL MEDICINE

## 2024-10-28 PROCEDURE — 84165 PROTEIN E-PHORESIS SERUM: CPT | Mod: 26,,, | Performed by: PATHOLOGY

## 2024-10-28 PROCEDURE — 1159F MED LIST DOCD IN RCRD: CPT | Mod: CPTII,S$GLB,,

## 2024-10-28 PROCEDURE — 82784 ASSAY IGA/IGD/IGG/IGM EACH: CPT | Mod: 59 | Performed by: INTERNAL MEDICINE

## 2024-10-28 PROCEDURE — 83735 ASSAY OF MAGNESIUM: CPT

## 2024-10-28 PROCEDURE — 85025 COMPLETE CBC W/AUTO DIFF WBC: CPT

## 2024-10-28 PROCEDURE — 1101F PT FALLS ASSESS-DOCD LE1/YR: CPT | Mod: CPTII,S$GLB,,

## 2024-10-28 PROCEDURE — 80053 COMPREHEN METABOLIC PANEL: CPT

## 2024-10-28 PROCEDURE — 86334 IMMUNOFIX E-PHORESIS SERUM: CPT | Mod: 26,,, | Performed by: PATHOLOGY

## 2024-10-28 PROCEDURE — 3288F FALL RISK ASSESSMENT DOCD: CPT | Mod: CPTII,S$GLB,,

## 2024-10-28 PROCEDURE — 4010F ACE/ARB THERAPY RXD/TAKEN: CPT | Mod: CPTII,S$GLB,,

## 2024-10-28 PROCEDURE — 1111F DSCHRG MED/CURRENT MED MERGE: CPT | Mod: CPTII,S$GLB,,

## 2024-10-28 PROCEDURE — 84100 ASSAY OF PHOSPHORUS: CPT

## 2024-10-28 PROCEDURE — 3008F BODY MASS INDEX DOCD: CPT | Mod: CPTII,S$GLB,,

## 2024-10-28 PROCEDURE — 86850 RBC ANTIBODY SCREEN: CPT

## 2024-10-28 PROCEDURE — 86901 BLOOD TYPING SEROLOGIC RH(D): CPT

## 2024-10-28 PROCEDURE — 99999 PR PBB SHADOW E&M-EST. PATIENT-LVL IV: CPT | Mod: PBBFAC,,,

## 2024-10-28 PROCEDURE — 36415 COLL VENOUS BLD VENIPUNCTURE: CPT

## 2024-10-28 PROCEDURE — 84165 PROTEIN E-PHORESIS SERUM: CPT | Performed by: INTERNAL MEDICINE

## 2024-10-28 PROCEDURE — 3074F SYST BP LT 130 MM HG: CPT | Mod: CPTII,S$GLB,,

## 2024-10-28 PROCEDURE — 86900 BLOOD TYPING SEROLOGIC ABO: CPT

## 2024-10-28 PROCEDURE — 83521 IG LIGHT CHAINS FREE EACH: CPT | Performed by: INTERNAL MEDICINE

## 2024-10-28 PROCEDURE — 99215 OFFICE O/P EST HI 40 MIN: CPT | Mod: S$GLB,,,

## 2024-10-28 PROCEDURE — 3078F DIAST BP <80 MM HG: CPT | Mod: CPTII,S$GLB,,

## 2024-10-29 LAB
ALBUMIN SERPL ELPH-MCNC: 4.52 G/DL (ref 3.35–5.55)
ALPHA1 GLOB SERPL ELPH-MCNC: 0.23 G/DL (ref 0.17–0.41)
ALPHA2 GLOB SERPL ELPH-MCNC: 0.5 G/DL (ref 0.43–0.99)
B-GLOBULIN SERPL ELPH-MCNC: 0.53 G/DL (ref 0.5–1.1)
CMV DNA SPEC QL NAA+PROBE: ABNORMAL
CYTOMEGALOVIRUS LOG (IU/ML): 2.51 LOGIU/ML
CYTOMEGALOVIRUS PCR, QUANT: 326 IU/ML
GAMMA GLOB SERPL ELPH-MCNC: 0.53 G/DL (ref 0.67–1.58)
INTERPRETATION SERPL IFE-IMP: NORMAL
KAPPA LC SER QL IA: 0.07 MG/DL (ref 0.33–1.94)
KAPPA LC/LAMBDA SER IA: ABNORMAL (ref 0.26–1.65)
LAMBDA LC SER QL IA: ABNORMAL MG/DL (ref 0.57–2.63)
PATHOLOGIST INTERPRETATION IFE: NORMAL
PATHOLOGIST INTERPRETATION SPE: NORMAL
PROT SERPL-MCNC: 6.3 G/DL (ref 6–8.4)

## 2024-11-08 ENCOUNTER — LAB VISIT (OUTPATIENT)
Dept: LAB | Facility: HOSPITAL | Age: 64
End: 2024-11-08
Payer: MEDICARE

## 2024-11-08 DIAGNOSIS — Z79.899 IMMUNODEFICIENCY DUE TO DRUGS: ICD-10-CM

## 2024-11-08 DIAGNOSIS — C90.00 MULTIPLE MYELOMA, REMISSION STATUS UNSPECIFIED: ICD-10-CM

## 2024-11-08 DIAGNOSIS — D84.821 IMMUNODEFICIENCY DUE TO DRUGS: ICD-10-CM

## 2024-11-08 DIAGNOSIS — Z94.84 HISTORY OF AUTO STEM CELL TRANSPLANT: ICD-10-CM

## 2024-11-08 LAB
ALBUMIN SERPL BCP-MCNC: 4.5 G/DL (ref 3.5–5.2)
ALP SERPL-CCNC: 79 U/L (ref 38–126)
ALT SERPL W/O P-5'-P-CCNC: 37 U/L (ref 10–44)
ANION GAP SERPL CALC-SCNC: 10 MMOL/L (ref 8–16)
AST SERPL-CCNC: 42 U/L (ref 15–46)
BASOPHILS # BLD AUTO: 0.04 K/UL (ref 0–0.2)
BASOPHILS NFR BLD: 1 % (ref 0–1.9)
BILIRUB SERPL-MCNC: 0.4 MG/DL (ref 0.1–1)
CALCIUM SERPL-MCNC: 9.1 MG/DL (ref 8.7–10.5)
CHLORIDE SERPL-SCNC: 108 MMOL/L (ref 95–110)
CO2 SERPL-SCNC: 20 MMOL/L (ref 23–29)
CREAT SERPL-MCNC: 2 MG/DL (ref 0.5–1.4)
DIFFERENTIAL METHOD BLD: ABNORMAL
EOSINOPHIL # BLD AUTO: 0.1 K/UL (ref 0–0.5)
EOSINOPHIL NFR BLD: 3.1 % (ref 0–8)
ERYTHROCYTE [DISTWIDTH] IN BLOOD BY AUTOMATED COUNT: 17.2 % (ref 11.5–14.5)
EST. GFR  (NO RACE VARIABLE): 36.4 ML/MIN/1.73 M^2
GLUCOSE SERPL-MCNC: 96 MG/DL (ref 70–110)
HCT VFR BLD AUTO: 40.4 % (ref 40–54)
HGB BLD-MCNC: 13.6 G/DL (ref 14–18)
IMM GRANULOCYTES # BLD AUTO: 0.01 K/UL (ref 0–0.04)
IMM GRANULOCYTES NFR BLD AUTO: 0.2 % (ref 0–0.5)
LYMPHOCYTES # BLD AUTO: 2.5 K/UL (ref 1–4.8)
LYMPHOCYTES NFR BLD: 58.9 % (ref 18–48)
MAGNESIUM SERPL-MCNC: 1.9 MG/DL (ref 1.6–2.6)
MCH RBC QN AUTO: 32.6 PG (ref 27–31)
MCHC RBC AUTO-ENTMCNC: 33.7 G/DL (ref 32–36)
MCV RBC AUTO: 97 FL (ref 82–98)
MONOCYTES # BLD AUTO: 0.6 K/UL (ref 0.3–1)
MONOCYTES NFR BLD: 15.2 % (ref 4–15)
NEUTROPHILS # BLD AUTO: 0.9 K/UL (ref 1.8–7.7)
NEUTROPHILS NFR BLD: 21.6 % (ref 38–73)
NRBC BLD-RTO: 0 /100 WBC
PHOSPHATE SERPL-MCNC: 3.4 MG/DL (ref 2.7–4.5)
PLATELET # BLD AUTO: 106 K/UL (ref 150–450)
PMV BLD AUTO: 12.1 FL (ref 9.2–12.9)
POTASSIUM SERPL-SCNC: 4.3 MMOL/L (ref 3.5–5.1)
PROT SERPL-MCNC: 6.5 G/DL (ref 6–8.4)
RBC # BLD AUTO: 4.17 M/UL (ref 4.6–6.2)
SODIUM SERPL-SCNC: 138 MMOL/L (ref 136–145)
UUN UR-MCNC: 25 MG/DL (ref 2–20)
WBC # BLD AUTO: 4.21 K/UL (ref 3.9–12.7)

## 2024-11-08 PROCEDURE — 80053 COMPREHEN METABOLIC PANEL: CPT | Mod: PN

## 2024-11-08 PROCEDURE — 83735 ASSAY OF MAGNESIUM: CPT | Mod: PN

## 2024-11-08 PROCEDURE — 36415 COLL VENOUS BLD VENIPUNCTURE: CPT | Mod: PN

## 2024-11-08 PROCEDURE — 84100 ASSAY OF PHOSPHORUS: CPT | Mod: PN

## 2024-11-08 PROCEDURE — 85025 COMPLETE CBC W/AUTO DIFF WBC: CPT | Mod: PN

## 2024-11-11 ENCOUNTER — LAB VISIT (OUTPATIENT)
Dept: LAB | Facility: HOSPITAL | Age: 64
End: 2024-11-11
Payer: MEDICARE

## 2024-11-11 ENCOUNTER — OFFICE VISIT (OUTPATIENT)
Dept: HEMATOLOGY/ONCOLOGY | Facility: CLINIC | Age: 64
End: 2024-11-11
Payer: MEDICARE

## 2024-11-11 ENCOUNTER — DOCUMENTATION ONLY (OUTPATIENT)
Dept: HEMATOLOGY/ONCOLOGY | Facility: CLINIC | Age: 64
End: 2024-11-11

## 2024-11-11 VITALS
SYSTOLIC BLOOD PRESSURE: 110 MMHG | BODY MASS INDEX: 31.22 KG/M2 | WEIGHT: 198.94 LBS | HEART RATE: 94 BPM | OXYGEN SATURATION: 98 % | TEMPERATURE: 99 F | DIASTOLIC BLOOD PRESSURE: 73 MMHG | HEIGHT: 67 IN | RESPIRATION RATE: 18 BRPM

## 2024-11-11 DIAGNOSIS — D69.6 THROMBOCYTOPENIA: ICD-10-CM

## 2024-11-11 DIAGNOSIS — D46.4 REFRACTORY ANEMIA, UNSPECIFIED: ICD-10-CM

## 2024-11-11 DIAGNOSIS — C90.00 MULTIPLE MYELOMA, REMISSION STATUS UNSPECIFIED: ICD-10-CM

## 2024-11-11 DIAGNOSIS — E86.0 DEHYDRATION: ICD-10-CM

## 2024-11-11 DIAGNOSIS — G62.0 CHEMOTHERAPY-INDUCED NEUROPATHY: ICD-10-CM

## 2024-11-11 DIAGNOSIS — D84.821 IMMUNODEFICIENCY DUE TO DRUGS: ICD-10-CM

## 2024-11-11 DIAGNOSIS — Z79.899 IMMUNODEFICIENCY DUE TO DRUGS: ICD-10-CM

## 2024-11-11 DIAGNOSIS — Z94.84 HISTORY OF AUTO STEM CELL TRANSPLANT: ICD-10-CM

## 2024-11-11 DIAGNOSIS — T45.1X5A CHEMOTHERAPY-INDUCED NEUROPATHY: ICD-10-CM

## 2024-11-11 DIAGNOSIS — Z92.850 S/P CHIMERIC ANTIGEN RECEPTOR T-CELL THERAPY: Primary | ICD-10-CM

## 2024-11-11 DIAGNOSIS — D84.81 IMMUNODEFICIENCY SECONDARY TO NEOPLASM: ICD-10-CM

## 2024-11-11 DIAGNOSIS — D49.9 IMMUNODEFICIENCY SECONDARY TO NEOPLASM: ICD-10-CM

## 2024-11-11 LAB
ALBUMIN SERPL BCP-MCNC: 4.3 G/DL (ref 3.5–5.2)
ALP SERPL-CCNC: 90 U/L (ref 40–150)
ALT SERPL W/O P-5'-P-CCNC: 32 U/L (ref 10–44)
ANION GAP SERPL CALC-SCNC: 9 MMOL/L (ref 8–16)
AST SERPL-CCNC: 33 U/L (ref 10–40)
BILIRUB SERPL-MCNC: 0.4 MG/DL (ref 0.1–1)
BUN SERPL-MCNC: 25 MG/DL (ref 8–23)
CALCIUM SERPL-MCNC: 9.2 MG/DL (ref 8.7–10.5)
CHLORIDE SERPL-SCNC: 110 MMOL/L (ref 95–110)
CMV DNA SPEC QL NAA+PROBE: ABNORMAL
CO2 SERPL-SCNC: 20 MMOL/L (ref 23–29)
CREAT SERPL-MCNC: 1.7 MG/DL (ref 0.5–1.4)
CYTOMEGALOVIRUS LOG (IU/ML): 2.12 LOGIU/ML
CYTOMEGALOVIRUS PCR, QUANT: 131 IU/ML
EST. GFR  (NO RACE VARIABLE): 44.2 ML/MIN/1.73 M^2
GLUCOSE SERPL-MCNC: 147 MG/DL (ref 70–110)
POTASSIUM SERPL-SCNC: 4 MMOL/L (ref 3.5–5.1)
PROT SERPL-MCNC: 6.2 G/DL (ref 6–8.4)
SODIUM SERPL-SCNC: 139 MMOL/L (ref 136–145)

## 2024-11-11 PROCEDURE — 3288F FALL RISK ASSESSMENT DOCD: CPT | Mod: CPTII,S$GLB,,

## 2024-11-11 PROCEDURE — 3074F SYST BP LT 130 MM HG: CPT | Mod: CPTII,S$GLB,,

## 2024-11-11 PROCEDURE — 99215 OFFICE O/P EST HI 40 MIN: CPT | Mod: S$GLB,,,

## 2024-11-11 PROCEDURE — 99999 PR PBB SHADOW E&M-EST. PATIENT-LVL III: CPT | Mod: PBBFAC,,,

## 2024-11-11 PROCEDURE — 1160F RVW MEDS BY RX/DR IN RCRD: CPT | Mod: CPTII,S$GLB,,

## 2024-11-11 PROCEDURE — 1159F MED LIST DOCD IN RCRD: CPT | Mod: CPTII,S$GLB,,

## 2024-11-11 PROCEDURE — 3078F DIAST BP <80 MM HG: CPT | Mod: CPTII,S$GLB,,

## 2024-11-11 PROCEDURE — 80053 COMPREHEN METABOLIC PANEL: CPT

## 2024-11-11 PROCEDURE — 4010F ACE/ARB THERAPY RXD/TAKEN: CPT | Mod: CPTII,S$GLB,,

## 2024-11-11 PROCEDURE — 3008F BODY MASS INDEX DOCD: CPT | Mod: CPTII,S$GLB,,

## 2024-11-11 PROCEDURE — 1101F PT FALLS ASSESS-DOCD LE1/YR: CPT | Mod: CPTII,S$GLB,,

## 2024-11-11 PROCEDURE — 36415 COLL VENOUS BLD VENIPUNCTURE: CPT

## 2024-11-11 RX ORDER — LEVOFLOXACIN 250 MG/1
250 TABLET ORAL DAILY
Qty: 30 TABLET | Refills: 0 | Status: SHIPPED | OUTPATIENT
Start: 2024-11-11 | End: 2024-12-11

## 2024-11-11 RX ORDER — ACYCLOVIR 800 MG/1
800 TABLET ORAL 2 TIMES DAILY
Qty: 60 TABLET | Refills: 10 | Status: SHIPPED | OUTPATIENT
Start: 2024-11-11 | End: 2025-11-11

## 2024-11-11 RX ORDER — FLUCONAZOLE 200 MG/1
200 TABLET ORAL DAILY
Qty: 30 TABLET | Refills: 0 | Status: SHIPPED | OUTPATIENT
Start: 2024-11-11 | End: 2024-12-11

## 2024-11-11 NOTE — PROGRESS NOTES
Section of Hematology and Stem Cell Transplantation    Post-Cellular Therapy Follow Up Visit     11/11/24    Cellular Therapy History:   Primary oncologist: Jesús Baker MD  Primary oncologic diagnosis: Multiple Myeloma  Cell infusion date: 09/30/24  Cell product: ciltacabtagene autoleucel (Carvykti)  Cell dose: 1 x 10^6 CAR T-cells/kg  Lymphodepletion chemotherapy: fludarabine 30 mg/m2 + cyclophosphamide 300 mg/m2 days -7, -6, -5  Prophylactic corticosteroid use: none  Immediate post-transplant complications: none    History of Present Ilness:   Jaspreet Walsh JrMicah (Jaspreet) is a pleasant 64 y.o.male with a multiple myeloma who presents for post-cellular therapy follow up. S/p Carvykti, today is day +42.    Hospital Course for Carvykti:  Admitted on 9/30/24 for relapsed/refractory multiple myeloma. Received 0.50x10^6T cells on 10/01/24. Sustained TMAX 100.6 on day +5/6, likely grade 1 CRS. Day +7, TMAX 102.6, soft BP, up-trending inflammatory markers, elevated liver enzymes/tbili-Grade 2 CRS. Received IVF and Toci. EKG w/ read of STEMI, normal cardiac markers. Infectious workups negative. Down-trending liver enzymes/tbili, ferritin.    Interval History 11/11/2024:  Patient is here for post-CAR-T follow up. He reports that he is recovery well and without any issues. His wife is present with him. He is eating well and reports drinking 3-4 bottles of water/day. Denies fever, chills, drenching night sweats, unexplained weight loss, early satiety, chest pain, shortness of breath, new/worsening bone pain, adenopathy, N/V/D.       PAST MEDICAL HISTORY:   Past Medical History:   Diagnosis Date    Anxiety     Arthritis     Cancer     Diabetes mellitus     Hypertension        PAST SURGICAL HISTORY:   Past Surgical History:   Procedure Laterality Date    BACK SURGERY  01/01/2021    BONE MARROW BIOPSY Left 10/20/2021    Procedure: Biopsy-bone marrow;  Surgeon: Summer Cartwright MD;  Location: [address]);  Service:  Oncology;  Laterality: Left;    COLONOSCOPY N/A 2021    Procedure: COLONOSCOPY;  Surgeon: Braxton Lees MD;  Location: [facility] ENDO (4TH FLR);  Service: Endoscopy;  Laterality: N/A;  -covid kristopher-inst mailed-tb  21-pt to remain on schedule with Dr. Lees, and confirmed updated arrival time of 0835-BB    COLONOSCOPY N/A 2021    Procedure: COLONOSCOPY;  Surgeon: Jo Ann Robledo MD;  Location: [facility] ENDO (4TH FLR);  Service: Endoscopy;  Laterality: N/A;  rescheduled due to poor bowel prep, to be rescheduled with first available provider-BB  [id number]    CREATION OF MUSCLE ROTATIONAL FLAP N/A 2020    Procedure: CREATION, FLAP, MUSCLE ROTATION;  Surgeon: Kamlesh Bellamy MD;  Location: [facility] OR 2ND FLR;  Service: Plastics;  Laterality: N/A;    KNEE SURGERY Left 2019    LUMBAR FUSION N/A 2020    Procedure: FUSION, SPINE, LUMBAR L2-pelvis. Depuy. Plastic surgery closure w/ Dr. Bellamy;  Surgeon: Nick Coyle MD;  Location: [facility] OR 2ND FLR;  Service: Neurosurgery;  Laterality: N/A;    Metastatic neoplasum removed from spine      PLACEMENT OF DUAL-LUMEN VASCULAR CATHETER Left 2024    Procedure: INSERTION-CATHETER-LENORA, double lumen, left poss right, Bard 14.5 fr hemosplit catheter, model [id number];  Surgeon: Nelson Aponte MD;  [facility];  Service: General;  Laterality: Left;       PAST SOCIAL HISTORY:  Social History     Tobacco Use    Smoking status: Former     Current packs/day: 0.00     Average packs/day: 0.3 packs/day for 40.0 years (10.0 ttl pk-yrs)     Types: Cigarettes     Start date:      Quit date: 2017     Years since quittin.8    Smokeless tobacco: Never       FAMILY HISTORY:  Family History   Problem Relation Name Age of Onset    Cancer Father Jaspreet Walsh Sr        CURRENT MEDICATIONS:   Current Outpatient Medications   Medication Sig    amLODIPine (NORVASC) 10 MG tablet Take 10 mg by mouth once daily.     atorvastatin (LIPITOR) 20 MG tablet Take 20 mg by mouth once daily.    gabapentin (NEURONTIN) 300 MG capsule Take 1 capsule (300 mg total) by mouth 2 (two) times daily.    hydroCHLOROthiazide (HYDRODIURIL) 25 MG tablet Take 25 mg by mouth once daily.    JARDIANCE 25 mg tablet Take 25 mg by mouth once daily.    losartan (COZAAR) 25 MG tablet Take 1 tablet (25 mg total) by mouth once daily.    potassium chloride SA (K-DUR,KLOR-CON M) 10 MEQ tablet TAKE 1 TABLET(10 MEQ) BY MOUTH EVERY DAY    sulfamethoxazole-trimethoprim 800-160mg (BACTRIM DS) 800-160 mg Tab Take 1 tablet by mouth every Mon, Wed, Fri. To start on 10/30/24    acyclovir (ZOVIRAX) 800 MG Tab Take 1 tablet (800 mg total) by mouth 2 (two) times daily.    fluconazole (DIFLUCAN) 200 MG Tab Take 1 tablet (200 mg total) by mouth once daily.    k phos di & mono-sod phos mono (PHOSPHOROUS) 250 mg Tab Take 1 tablet by mouth 4 (four) times daily with meals and nightly. for 5 days    levETIRAcetam (KEPPRA) 750 MG Tab Take 1 tablet (750 mg total) by mouth 2 (two) times daily. for 20 days    levoFLOXacin (LEVAQUIN) 250 MG tablet Take 1 tablet (250 mg total) by mouth once daily. for 30 doses    ondansetron (ZOFRAN-ODT) 8 MG TbDL Take 1 tablet (8 mg total) by mouth every 8 (eight) hours as needed (Nausea/Vomiting). (Patient not taking: Reported on 10/21/2024)     No current facility-administered medications for this visit.       ALLERGIES:   Review of patient's allergies indicates:  No Known Allergies    GVHD Review of Systems:     Pertinent positives and negatives included in the HPI.     Physical Exam:     Vitals:    11/11/24 0801   BP: 110/73   Pulse: 94   Resp: 18   Temp: 98.5 °F (36.9 °C)       Physical Exam  Vitals reviewed.   Constitutional:       General: He is not in acute distress.     Appearance: Normal appearance. He is obese. He is not ill-appearing.   HENT:      Head: Normocephalic and atraumatic.      Nose: Nose normal.      Mouth/Throat:       Mouth: Mucous membranes are moist.      Pharynx: Oropharynx is clear. No oropharyngeal exudate.   Eyes:      Pupils: Pupils are equal, round, and reactive to light.   Cardiovascular:      Rate and Rhythm: Normal rate and regular rhythm.      Pulses: Normal pulses.      Heart sounds: Normal heart sounds. No murmur heard.  Pulmonary:      Effort: Pulmonary effort is normal.      Breath sounds: Normal breath sounds. No wheezing.   Abdominal:      General: Bowel sounds are normal. There is no distension.      Palpations: Abdomen is soft.      Tenderness: There is no abdominal tenderness.   Musculoskeletal:         General: Normal range of motion.      Cervical back: Normal range of motion and neck supple.      Right lower leg: No edema.      Left lower leg: No edema.   Skin:     General: Skin is warm and dry.      Capillary Refill: Capillary refill takes less than 2 seconds.      Findings: No lesion or rash.   Neurological:      Mental Status: He is alert and oriented to person, place, and time.   Psychiatric:         Mood and Affect: Mood normal.         Thought Content: Thought content normal.      ECOG Performance Status: (foot note - ECOG PS provided by [employer]cology [employer]) 1 - Symptomatic but completely ambulatory    Karnofsky Performance Score:  90%- Able to Carry on Normal Activity: Minor Symptoms of Disease    Labs:   Lab Results   Component Value Date    WBC 4.21 11/08/2024    RBC 4.17 (L) 11/08/2024    HGB 13.6 (L) 11/08/2024    HCT 40.4 11/08/2024    MCV 97 11/08/2024    MCH 32.6 (H) 11/08/2024    MCHC 33.7 11/08/2024    RDW 17.2 (H) 11/08/2024     (L) 11/08/2024    MPV 12.1 11/08/2024    GRAN 0.9 (L) 11/08/2024    GRAN 21.6 (L) 11/08/2024    LYMPH 2.5 11/08/2024    LYMPH 58.9 (H) 11/08/2024    MONO 0.6 11/08/2024    MONO 15.2 (H) 11/08/2024    EOS 0.1 11/08/2024    BASO 0.04 11/08/2024    EOSINOPHIL 3.1 11/08/2024    BASOPHIL 1.0 11/08/2024       Sodium   Date Value Ref Range Status    11/11/2024 139 136 - 145 mmol/L Final     Potassium   Date Value Ref Range Status   11/11/2024 4.0 3.5 - 5.1 mmol/L Final     Chloride   Date Value Ref Range Status   11/11/2024 110 95 - 110 mmol/L Final     CO2   Date Value Ref Range Status   11/11/2024 20 (L) 23 - 29 mmol/L Final     Glucose   Date Value Ref Range Status   11/11/2024 147 (H) 70 - 110 mg/dL Final     BUN   Date Value Ref Range Status   11/11/2024 25 (H) 8 - 23 mg/dL Final     Creatinine   Date Value Ref Range Status   11/11/2024 1.7 (H) 0.5 - 1.4 mg/dL Final     Calcium   Date Value Ref Range Status   11/11/2024 9.2 8.7 - 10.5 mg/dL Final     Total Protein   Date Value Ref Range Status   11/11/2024 6.2 6.0 - 8.4 g/dL Final     Albumin   Date Value Ref Range Status   11/11/2024 4.3 3.5 - 5.2 g/dL Final     Total Bilirubin   Date Value Ref Range Status   11/11/2024 0.4 0.1 - 1.0 mg/dL Final     Comment:     For infants and newborns, interpretation of results should be based  on gestational age, weight and in agreement with clinical  observations.    Premature Infant recommended reference ranges:  Up to 24 hours.............<8.0 mg/dL  Up to 48 hours............<12.0 mg/dL  3-5 days..................<15.0 mg/dL  6-29 days.................<15.0 mg/dL       Alkaline Phosphatase   Date Value Ref Range Status   11/11/2024 90 40 - 150 U/L Final     AST   Date Value Ref Range Status   11/11/2024 33 10 - 40 U/L Final     ALT   Date Value Ref Range Status   11/11/2024 32 10 - 44 U/L Final     Anion Gap   Date Value Ref Range Status   11/11/2024 9 8 - 16 mmol/L Final     eGFR if    Date Value Ref Range Status   07/01/2022 >60.0 >60 mL/min/1.73 m^2 Final     eGFR if non    Date Value Ref Range Status   07/01/2022 >60.0 >60 mL/min/1.73 m^2 Final     Comment:     Calculation used to obtain the estimated glomerular filtration  rate (eGFR) is the CKD-EPI equation.        Imaging:   None    Pathology:  None    Assessment and Plan:    Jasprete Hernándezsha Reardon (Jaspreet) is a pleasant 64 y.o.male with a past medical history of multiple myeloma s/p  Carvykti  who presents for post-CAR-T follow up.    Multiple Myeloma: Status post treatment with Carvykti. Will restage at day 30, day 100, +/- day 180, 1 year, 2 years.  IgG kappa, standard-risk MM, diagnosed September 2020 after presentation w/ lytic lesions  S/p 8 cycles VRd, followed by NATHALIE autoSCT 5/17/2021 and revlimid maintenance  Relapsed June/July 2023, DKd salvage initiated 8/4/23  Good initial response; however, biochemical progression 4/3/24  Transitioned to KPd as bridge to Carvykti  Carvykti 10/1/24; today is day +42     History of cellular therapy: As above, he received ciltacabtagene autoleucel (Carvykti) on 10/1/24. Complications include Grade II CRS, received 1 dose Tociluzimab.  Today is day + 42   Patient/family instructed to contact us with any fevers, mental status changes or unclear communication, or new/changed symptoms  Instructed to start Bactrim on Wednesday and okay to stop keppra on day +30.  Plan for day +100 restaging, Jan 7, 2025 with labs, clinic BMBx w/ 1 anti-anxiety prior, and whole body PET scan    Immunosuppression: Bactrim and acyclovir prophylaxis until 1 year post-CART (and CD4 >200). Continue fluconazole (200 mg daily, dose reduced for renal function) and levaquin (250 mg daily) until resolution of neutropenia. Monitor immunoglobulins monthly. CMV monitoring weekly until day 30.  Last CMV: 10/28/24 326; IgG: 10/28/24 628, monitor monthly  Active infections: none, infectious signs/symptoms today    Pancytopenia: Due to underlying disease +/- chemotherapy. Transfuse for Hgb <7 g/dL and platelets <10k.  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    Dehydration   1. Creatinine up to 2  on recent labs, no overt diarrhea/vomiting   2. Patient reportedly drinking 3-4 bottles/water day; recommended increasing to 5 bottles/day     3. Repeat CMP today, creatinine improved to 1.7  4. Repeat CMP in 2 weeks      Orders Placed:      Orders Placed This Encounter    Immunofixation Electrophoresis    Immunoglobulins (IgG, IgA, IgM) Quantitative    Immunoglobulin Free LT Chains Blood    Protein Electrophoresis, Serum    acyclovir (ZOVIRAX) 800 MG Tab    levoFLOXacin (LEVAQUIN) 250 MG tablet    fluconazole (DIFLUCAN) 200 MG Tab     Route Chart for Scheduling    BMT Chart Routing      Follow up with physician    Follow up with [facility] 2 weeks. As scheduled   Provider visit type    Infusion scheduling note    Injection scheduling note    Labs CBC, CMP, phosphorus, magnesium, type and screen and immunoglobulins   Scheduling:  Preferred lab:  Lab interval:  As scheduled   Imaging    Pharmacy appointment    Other referrals              Total time of this visit was 40 minutes, including time spent face to face with patient and/or via video/audio, and also in preparing for today's visit for MDM and documentation. (Medical Decision Making, including consideration of possible diagnoses, management options, complex medical record review, review of diagnostic tests and information, consideration and discussion of significant complications based on comorbidities, and discussion with providers involved with the care of the patient). Greater than 50% was spent face to face with the patient counseling and coordinating care.    JUNIE Garcia-MIKEY  Malignant Hematology, Stem Cell Transplant, and Cellular Therapy  [facility]  [facility]

## 2024-11-21 NOTE — PROGRESS NOTES
Section of Hematology and Stem Cell Transplantation    Post-Cellular Therapy Follow Up Visit     11/25/24    Cellular Therapy History:   Primary oncologist: Jesús Baker MD  Primary oncologic diagnosis: Multiple Myeloma  Cell infusion date: 09/30/24  Cell product: ciltacabtagene autoleucel (Carvykti)  Cell dose: 1 x 10^6 CAR T-cells/kg  Lymphodepletion chemotherapy: fludarabine 30 mg/m2 + cyclophosphamide 300 mg/m2 days -7, -6, -5  Prophylactic corticosteroid use: none  Immediate post-transplant complications: none    History of Present Ilness:   Jaspreet Walsh Jr. (Jaspreet) is a pleasant 64 y.o.male with a multiple myeloma who presents for post-cellular therapy follow up. S/p Carvykti, today is day +56.    Hospital Course for Carvykti:  Admitted on 9/30/24 for relapsed/refractory multiple myeloma. Received 0.50x10^6T cells on 10/01/24. Sustained TMAX 100.6 on day +5/6, likely grade 1 CRS. Day +7, TMAX 102.6, soft BP, up-trending inflammatory markers, elevated liver enzymes/tbili-Grade 2 CRS. Received IVF and Toci. EKG w/ read of STEMI, normal cardiac markers. Infectious workups negative. Down-trending liver enzymes/tbili, ferritin.    Interval History 11/25/2024:  Patient is here with his wife for post-CAR-T follow up. He is doing well and no new issues since his last visit. He has been eating and drinking well. No new signs of infection, pain, or changes in mentation or muscle weakness.    PAST MEDICAL HISTORY:   Past Medical History:   Diagnosis Date    Anxiety     Arthritis     Cancer     Diabetes mellitus     Hypertension        PAST SURGICAL HISTORY:   Past Surgical History:   Procedure Laterality Date    BACK SURGERY  01/01/2021    BONE MARROW BIOPSY Left 10/20/2021    Procedure: Biopsy-bone marrow;  Surgeon: Summer Cartwright MD;  Location: [facility] ([address] [place] [address]);  Service: Oncology;  Laterality: Left;    COLONOSCOPY N/A 01/25/2021    Procedure: COLONOSCOPY;  Surgeon: Braxton Lees MD;  Location: [facility] ([facility]  FLR);  Service: Endoscopy;  Laterality: N/A;  -covid kristopher-inst mailed-tb  21-pt to remain on schedule with Dr. Lees, and confirmed updated arrival time of 0835-BB    COLONOSCOPY N/A 2021    Procedure: COLONOSCOPY;  Surgeon: Jo Ann Robledo MD;  Location: [facility] (4TH FLR);  Service: Endoscopy;  Laterality: N/A;  rescheduled due to poor bowel prep, to be rescheduled with first available provider-BB  covid-21-pcw-BB    CREATION OF MUSCLE ROTATIONAL FLAP N/A 2020    Procedure: CREATION, FLAP, MUSCLE ROTATION;  Surgeon: Kamlesh Bellamy MD;  [facility];  Service: Plastics;  Laterality: N/A;    KNEE SURGERY Left 2019    LUMBAR FUSION N/A 2020    Procedure: FUSION, SPINE, LUMBAR L2-pelvis. Depuy. Plastic surgery closure w/ Dr. Bellamy;  Surgeon: Nick Coyle MD;  [facility];  Service: Neurosurgery;  Laterality: N/A;    Metastatic neoplasum removed from spine      PLACEMENT OF DUAL-LUMEN VASCULAR CATHETER Left 2024    Procedure: INSERTION-CATHETER-LENORA, double lumen, left poss right, Bard 14.5 fr hemosplit catheter, model [id number];  Surgeon: Nelson Aponte MD;  [facility];  Service: General;  Laterality: Left;       PAST SOCIAL HISTORY:  Social History     Tobacco Use    Smoking status: Former     Current packs/day: 0.00     Average packs/day: 0.3 packs/day for 40.0 years (10.0 ttl pk-yrs)     Types: Cigarettes     Start date:      Quit date: 2017     Years since quittin.9    Smokeless tobacco: Never       FAMILY HISTORY:  Family History   Problem Relation Name Age of Onset    Cancer Father Jaspreet Walsh         CURRENT MEDICATIONS:   Current Outpatient Medications   Medication Sig    acyclovir (ZOVIRAX) 800 MG Tab Take 1 tablet (800 mg total) by mouth 2 (two) times daily.    amLODIPine (NORVASC) 10 MG tablet Take 10 mg by mouth once daily.    atorvastatin (LIPITOR) 20 MG tablet Take 20 mg by mouth once daily.     fluconazole (DIFLUCAN) 200 MG Tab Take 1 tablet (200 mg total) by mouth once daily.    gabapentin (NEURONTIN) 300 MG capsule Take 1 capsule (300 mg total) by mouth 2 (two) times daily.    hydroCHLOROthiazide (HYDRODIURIL) 25 MG tablet Take 25 mg by mouth once daily.    JARDIANCE 25 mg tablet Take 25 mg by mouth once daily.    levoFLOXacin (LEVAQUIN) 250 MG tablet Take 1 tablet (250 mg total) by mouth once daily. for 30 doses    losartan (COZAAR) 25 MG tablet Take 1 tablet (25 mg total) by mouth once daily.    potassium chloride SA (K-DUR,KLOR-CON M) 10 MEQ tablet TAKE 1 TABLET(10 MEQ) BY MOUTH EVERY DAY    sulfamethoxazole-trimethoprim 800-160mg (BACTRIM DS) 800-160 mg Tab Take 1 tablet by mouth every Mon, Wed, Fri. To start on 10/30/24    k phos di & mono-sod phos mono (PHOSPHOROUS) 250 mg Tab Take 1 tablet by mouth 4 (four) times daily with meals and nightly. for 5 days    levETIRAcetam (KEPPRA) 750 MG Tab Take 1 tablet (750 mg total) by mouth 2 (two) times daily. for 20 days    ondansetron (ZOFRAN-ODT) 8 MG TbDL Take 1 tablet (8 mg total) by mouth every 8 (eight) hours as needed (Nausea/Vomiting). (Patient not taking: Reported on 11/25/2024)     No current facility-administered medications for this visit.       ALLERGIES:   Review of patient's allergies indicates:  No Known Allergies    GVHD Review of Systems:     Pertinent positives and negatives included in the HPI.     Physical Exam:     Vitals:    11/25/24 0755   BP: 135/80   Pulse: 99   Resp: 18   Temp: 97.8 °F (36.6 °C)       Physical Exam  Vitals reviewed.   Constitutional:       General: He is not in acute distress.     Appearance: Normal appearance. He is obese. He is not ill-appearing.   HENT:      Head: Normocephalic and atraumatic.      Nose: Nose normal.      Mouth/Throat:      Mouth: Mucous membranes are moist.      Pharynx: Oropharynx is clear. No oropharyngeal exudate.   Eyes:      Pupils: Pupils are equal, round, and reactive to light.    Cardiovascular:      Rate and Rhythm: Normal rate and regular rhythm.      Pulses: Normal pulses.      Heart sounds: Normal heart sounds. No murmur heard.  Pulmonary:      Effort: Pulmonary effort is normal.      Breath sounds: Normal breath sounds. No wheezing.   Abdominal:      General: Bowel sounds are normal. There is no distension.      Palpations: Abdomen is soft. There is no mass.      Tenderness: There is no abdominal tenderness.   Musculoskeletal:         General: Normal range of motion.      Cervical back: Normal range of motion and neck supple.      Right lower leg: No edema.      Left lower leg: No edema.   Skin:     General: Skin is warm and dry.      Capillary Refill: Capillary refill takes less than 2 seconds.      Findings: No lesion or rash.   Neurological:      Mental Status: He is alert and oriented to person, place, and time.      Motor: No weakness.   Psychiatric:         Mood and Affect: Mood normal.         Thought Content: Thought content normal.      ECOG Performance Status: (foot note - ECOG PS provided by Eastern Cooperative Oncology Group) 1 - Symptomatic but completely ambulatory    Karnofsky Performance Score:  90%- Able to Carry on Normal Activity: Minor Symptoms of Disease    Labs:   Lab Results   Component Value Date    WBC 3.73 (L) 11/22/2024    RBC 4.23 (L) 11/22/2024    HGB 13.7 (L) 11/22/2024    HCT 41.1 11/22/2024    MCV 97 11/22/2024    MCH 32.4 (H) 11/22/2024    MCHC 33.3 11/22/2024    RDW 15.6 (H) 11/22/2024    PLT 90 (L) 11/22/2024    MPV 11.1 11/22/2024    GRAN 1.0 (L) 11/22/2024    GRAN 25.4 (L) 11/22/2024    LYMPH 2.3 11/22/2024    LYMPH 60.9 (H) 11/22/2024    MONO 0.4 11/22/2024    MONO 10.2 11/22/2024    EOS 0.1 11/22/2024    BASO 0.04 11/22/2024    EOSINOPHIL 2.1 11/22/2024    BASOPHIL 1.1 11/22/2024       Sodium   Date Value Ref Range Status   11/22/2024 136 136 - 145 mmol/L Final     Potassium   Date Value Ref Range Status   11/22/2024 4.2 3.5 - 5.1 mmol/L Final      Chloride   Date Value Ref Range Status   11/22/2024 101 95 - 110 mmol/L Final     CO2   Date Value Ref Range Status   11/22/2024 26 23 - 29 mmol/L Final     Glucose   Date Value Ref Range Status   11/22/2024 100 70 - 110 mg/dL Final     BUN   Date Value Ref Range Status   11/22/2024 20 2 - 20 mg/dL Final     Creatinine   Date Value Ref Range Status   11/22/2024 1.24 0.50 - 1.40 mg/dL Final     Calcium   Date Value Ref Range Status   11/22/2024 9.2 8.7 - 10.5 mg/dL Final     Total Protein   Date Value Ref Range Status   11/22/2024 6.5 6.0 - 8.4 g/dL Final     Albumin   Date Value Ref Range Status   11/22/2024 4.4 3.5 - 5.2 g/dL Final     Total Bilirubin   Date Value Ref Range Status   11/22/2024 0.6 0.1 - 1.0 mg/dL Final     Comment:     For infants and newborns, interpretation of results should be based  on gestational age, weight and in agreement with clinical  observations.    Premature Infant recommended reference ranges:  Up to 24 hours.............<8.0 mg/dL  Up to 48 hours............<12.0 mg/dL  3-5 days..................<15.0 mg/dL  6-29 days.................<15.0 mg/dL       Alkaline Phosphatase   Date Value Ref Range Status   11/22/2024 65 38 - 126 U/L Final     AST   Date Value Ref Range Status   11/22/2024 30 15 - 46 U/L Final     ALT   Date Value Ref Range Status   11/22/2024 26 10 - 44 U/L Final     Anion Gap   Date Value Ref Range Status   11/22/2024 9 8 - 16 mmol/L Final     eGFR if    Date Value Ref Range Status   07/01/2022 >60.0 >60 mL/min/1.73 m^2 Final     eGFR if non    Date Value Ref Range Status   07/01/2022 >60.0 >60 mL/min/1.73 m^2 Final     Comment:     Calculation used to obtain the estimated glomerular filtration  rate (eGFR) is the CKD-EPI equation.        Imaging:   None    Pathology:  None    Assessment and Plan:   Jaspreet Walsh Jr. (Jaspreet) is a pleasant 64 y.o.male with a past medical history of multiple myeloma s/p  Carvykti  who presents for  post-CAR-T follow up.    Multiple Myeloma: Status post treatment with Carvykti. Will restage at day 30, day 100, +/- day 180, 1 year, 2 years.  IgG kappa, standard-risk MM, diagnosed 2020 after presentation w/ lytic lesions  S/p 8 cycles VRd, followed by NATHALIE autoSCT 2021 and revlimid maintenance  Relapsed 2023, DKd salvage initiated 23  Good initial response; however, biochemical progression 4/3/24  Transitioned to [facility] as bridge to Carvykti  Carvykti 10/1/24; today is day +56     History of cellular therapy: As above, he received ciltacabtagene autoleucel (Carvykti) on 10/1/24. Complications include Grade II CRS, received 1 dose Tociluzimab.  Today is day + 56   Patient/family instructed to contact us with any fevers, mental status changes or unclear communication, or new/changed symptoms  Instructed to start Bactrim on Wednesday and okay to stop keppra on day +30.  Plan for day +100 restaging, 2025 with labs, clinic BMBx w/ 1 anti-anxiety prior, and whole body PET scan  Follow up with Dr. Almanzar 7-10 days after restaging    Immunosuppression: Bactrim and acyclovir prophylaxis until 1 year post-CART (and CD4 >200). Continue fluconazole (200 mg daily, dose reduced for renal function) and levaquin (250 mg daily) until resolution of neutropenia. Monitor immunoglobulins monthly. CMV monitoring weekly until day 30.  Last CMV: 24 down to 131; Ig24 433, monitor monthly  Active infections: no infectious signs/symptoms today    Pancytopenia: Due to underlying disease +/- chemotherapy. Transfuse for Hgb <7 g/dL and platelets <10k.  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    Dehydration   1. Creatinine up to 2 on recent labs, no overt  diarrhea/vomiting   2. Patient reportedly drinking 3-4 bottles/water day; recommended increasing to 5 bottles/day     3. Repeat CMP today, creatinine improved to 1.7  4. Repeat CMP in 2 weeks RESOLVED      Orders Placed:           Route Chart for Scheduling    BMT Chart Routing      Follow up with physician . Dr. Almanzar, week of Jan 14, CAR-T restaging   Follow up with AX[facility] 4 weeks. BEATRIZ Ballard f/u   Provider visit type    Infusion scheduling note    Injection scheduling note    Labs CBC, CMP, magnesium, phosphorus, type and screen and immunoglobulins   Scheduling:  Preferred lab:  Lab interval:  [facility]: 12/20   Imaging    Pharmacy appointment    Other referrals            Total time of this visit was 35 minutes, including time spent face to face with patient and/or via video/audio, and also in preparing for today's visit for MDM and documentation. (Medical Decision Making, including consideration of possible diagnoses, management options, complex medical record review, review of diagnostic tests and information, consideration and discussion of significant complications based on comorbidities, and discussion with providers involved with the care of the patient). Greater than 50% was spent face to face with the patient counseling and coordinating care.    Nellie Rivera, JUNIE-MIKEY  Malignant Hematology, Stem Cell Transplant, and Cellular Therapy  [facility]  [facility]

## 2024-11-22 ENCOUNTER — LAB VISIT (OUTPATIENT)
Dept: LAB | Facility: HOSPITAL | Age: 64
End: 2024-11-22
Payer: MEDICARE

## 2024-11-22 DIAGNOSIS — C90.00 MULTIPLE MYELOMA, REMISSION STATUS UNSPECIFIED: ICD-10-CM

## 2024-11-22 DIAGNOSIS — D84.821 IMMUNODEFICIENCY DUE TO DRUGS: ICD-10-CM

## 2024-11-22 DIAGNOSIS — Z79.899 IMMUNODEFICIENCY DUE TO DRUGS: ICD-10-CM

## 2024-11-22 DIAGNOSIS — Z94.84 HISTORY OF AUTO STEM CELL TRANSPLANT: ICD-10-CM

## 2024-11-22 LAB
ABO + RH BLD: NORMAL
ALBUMIN SERPL BCP-MCNC: 4.4 G/DL (ref 3.5–5.2)
ALP SERPL-CCNC: 65 U/L (ref 38–126)
ALT SERPL W/O P-5'-P-CCNC: 26 U/L (ref 10–44)
ANION GAP SERPL CALC-SCNC: 9 MMOL/L (ref 8–16)
AST SERPL-CCNC: 30 U/L (ref 15–46)
BASOPHILS # BLD AUTO: 0.04 K/UL (ref 0–0.2)
BASOPHILS NFR BLD: 1.1 % (ref 0–1.9)
BILIRUB SERPL-MCNC: 0.6 MG/DL (ref 0.1–1)
BLD GP AB SCN CELLS X3 SERPL QL: NORMAL
CALCIUM SERPL-MCNC: 9.2 MG/DL (ref 8.7–10.5)
CHLORIDE SERPL-SCNC: 101 MMOL/L (ref 95–110)
CO2 SERPL-SCNC: 26 MMOL/L (ref 23–29)
CREAT SERPL-MCNC: 1.24 MG/DL (ref 0.5–1.4)
DIFFERENTIAL METHOD BLD: ABNORMAL
EOSINOPHIL # BLD AUTO: 0.1 K/UL (ref 0–0.5)
EOSINOPHIL NFR BLD: 2.1 % (ref 0–8)
ERYTHROCYTE [DISTWIDTH] IN BLOOD BY AUTOMATED COUNT: 15.6 % (ref 11.5–14.5)
EST. GFR  (NO RACE VARIABLE): >60 ML/MIN/1.73 M^2
GLUCOSE SERPL-MCNC: 100 MG/DL (ref 70–110)
HCT VFR BLD AUTO: 41.1 % (ref 40–54)
HGB BLD-MCNC: 13.7 G/DL (ref 14–18)
IGA SERPL-MCNC: <5 MG/DL (ref 40–350)
IGG SERPL-MCNC: 433 MG/DL (ref 650–1600)
IGM SERPL-MCNC: 5 MG/DL (ref 50–300)
IMM GRANULOCYTES # BLD AUTO: 0.01 K/UL (ref 0–0.04)
IMM GRANULOCYTES NFR BLD AUTO: 0.3 % (ref 0–0.5)
LYMPHOCYTES # BLD AUTO: 2.3 K/UL (ref 1–4.8)
LYMPHOCYTES NFR BLD: 60.9 % (ref 18–48)
MAGNESIUM SERPL-MCNC: 1.9 MG/DL (ref 1.6–2.6)
MCH RBC QN AUTO: 32.4 PG (ref 27–31)
MCHC RBC AUTO-ENTMCNC: 33.3 G/DL (ref 32–36)
MCV RBC AUTO: 97 FL (ref 82–98)
MONOCYTES # BLD AUTO: 0.4 K/UL (ref 0.3–1)
MONOCYTES NFR BLD: 10.2 % (ref 4–15)
NEUTROPHILS # BLD AUTO: 1 K/UL (ref 1.8–7.7)
NEUTROPHILS NFR BLD: 25.4 % (ref 38–73)
NRBC BLD-RTO: 0 /100 WBC
PHOSPHATE SERPL-MCNC: 3.5 MG/DL (ref 2.7–4.5)
PLATELET # BLD AUTO: 90 K/UL (ref 150–450)
PMV BLD AUTO: 11.1 FL (ref 9.2–12.9)
POTASSIUM SERPL-SCNC: 4.2 MMOL/L (ref 3.5–5.1)
PROT SERPL-MCNC: 6.5 G/DL (ref 6–8.4)
RBC # BLD AUTO: 4.23 M/UL (ref 4.6–6.2)
SODIUM SERPL-SCNC: 136 MMOL/L (ref 136–145)
SPECIMEN OUTDATE: NORMAL
UUN UR-MCNC: 20 MG/DL (ref 2–20)
WBC # BLD AUTO: 3.73 K/UL (ref 3.9–12.7)

## 2024-11-22 PROCEDURE — 86901 BLOOD TYPING SEROLOGIC RH(D): CPT

## 2024-11-22 PROCEDURE — 84100 ASSAY OF PHOSPHORUS: CPT | Mod: PN

## 2024-11-22 PROCEDURE — 85025 COMPLETE CBC W/AUTO DIFF WBC: CPT | Mod: PN | Performed by: INTERNAL MEDICINE

## 2024-11-22 PROCEDURE — 36415 COLL VENOUS BLD VENIPUNCTURE: CPT | Mod: PN

## 2024-11-22 PROCEDURE — 83735 ASSAY OF MAGNESIUM: CPT | Mod: PN

## 2024-11-22 PROCEDURE — 80053 COMPREHEN METABOLIC PANEL: CPT | Mod: PN | Performed by: INTERNAL MEDICINE

## 2024-11-22 PROCEDURE — 82784 ASSAY IGA/IGD/IGG/IGM EACH: CPT | Mod: 59,PN | Performed by: INTERNAL MEDICINE

## 2024-11-25 ENCOUNTER — OFFICE VISIT (OUTPATIENT)
Dept: HEMATOLOGY/ONCOLOGY | Facility: CLINIC | Age: 64
End: 2024-11-25
Payer: MEDICARE

## 2024-11-25 VITALS
HEIGHT: 67 IN | TEMPERATURE: 98 F | DIASTOLIC BLOOD PRESSURE: 80 MMHG | OXYGEN SATURATION: 98 % | WEIGHT: 203.38 LBS | SYSTOLIC BLOOD PRESSURE: 135 MMHG | BODY MASS INDEX: 31.92 KG/M2 | RESPIRATION RATE: 18 BRPM | HEART RATE: 99 BPM

## 2024-11-25 DIAGNOSIS — T45.1X5A CHEMOTHERAPY-INDUCED NEUROPATHY: ICD-10-CM

## 2024-11-25 DIAGNOSIS — D84.821 IMMUNODEFICIENCY DUE TO DRUGS: ICD-10-CM

## 2024-11-25 DIAGNOSIS — Z79.899 IMMUNODEFICIENCY DUE TO DRUGS: ICD-10-CM

## 2024-11-25 DIAGNOSIS — G62.0 CHEMOTHERAPY-INDUCED NEUROPATHY: ICD-10-CM

## 2024-11-25 DIAGNOSIS — Z94.84 HISTORY OF AUTO STEM CELL TRANSPLANT: ICD-10-CM

## 2024-11-25 DIAGNOSIS — Z92.850 S/P CHIMERIC ANTIGEN RECEPTOR T-CELL THERAPY: ICD-10-CM

## 2024-11-25 DIAGNOSIS — C90.00 MULTIPLE MYELOMA, REMISSION STATUS UNSPECIFIED: Primary | ICD-10-CM

## 2024-11-25 DIAGNOSIS — D46.4 REFRACTORY ANEMIA, UNSPECIFIED: ICD-10-CM

## 2024-11-25 DIAGNOSIS — D69.6 THROMBOCYTOPENIA: ICD-10-CM

## 2024-11-25 PROCEDURE — 3008F BODY MASS INDEX DOCD: CPT | Mod: CPTII,S$GLB,,

## 2024-11-25 PROCEDURE — 3079F DIAST BP 80-89 MM HG: CPT | Mod: CPTII,S$GLB,,

## 2024-11-25 PROCEDURE — 1159F MED LIST DOCD IN RCRD: CPT | Mod: CPTII,S$GLB,,

## 2024-11-25 PROCEDURE — 4010F ACE/ARB THERAPY RXD/TAKEN: CPT | Mod: CPTII,S$GLB,,

## 2024-11-25 PROCEDURE — 99999 PR PBB SHADOW E&M-EST. PATIENT-LVL IV: CPT | Mod: PBBFAC,,,

## 2024-11-25 PROCEDURE — 1160F RVW MEDS BY RX/DR IN RCRD: CPT | Mod: CPTII,S$GLB,,

## 2024-11-25 PROCEDURE — 1101F PT FALLS ASSESS-DOCD LE1/YR: CPT | Mod: CPTII,S$GLB,,

## 2024-11-25 PROCEDURE — 99214 OFFICE O/P EST MOD 30 MIN: CPT | Mod: S$GLB,,,

## 2024-11-25 PROCEDURE — 3075F SYST BP GE 130 - 139MM HG: CPT | Mod: CPTII,S$GLB,,

## 2024-11-25 PROCEDURE — 3288F FALL RISK ASSESSMENT DOCD: CPT | Mod: CPTII,S$GLB,,

## 2024-11-25 RX ORDER — SULFAMETHOXAZOLE AND TRIMETHOPRIM 800; 160 MG/1; MG/1
1 TABLET ORAL
Qty: 36 TABLET | Refills: 1 | Status: SHIPPED | OUTPATIENT
Start: 2024-11-25
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
## 2024-12-02 DIAGNOSIS — G62.9 NEUROPATHY: ICD-10-CM

## 2024-12-02 RX ORDER — GABAPENTIN 300 MG/1
300 CAPSULE ORAL 2 TIMES DAILY
Qty: 60 CAPSULE | Refills: 3 | Status: SHIPPED | OUTPATIENT
Start: 2024-12-02

## 2024-12-02 NOTE — TELEPHONE ENCOUNTER
----- Message from Tasha sent at 12/2/2024  8:01 AM CST -----  Regarding: Refill  Contact: Pt  [phone]          Rx Name:gabapentin (NEURONTIN) 300 MG capsule      Preferred Pharmacy with number:   [facility] #[id number] - [place], [place] - [address] [facility] AT [facility] [address]  [address] [facility] [address]  Phone: [phone] Fax: [phone]    Ordering Provider: Dameon Baker MD     Contact preference: [phone]    Comments/Notes: Requesting refill

## 2024-12-06 DIAGNOSIS — C90.00 MULTIPLE MYELOMA, REMISSION STATUS UNSPECIFIED: ICD-10-CM

## 2024-12-06 DIAGNOSIS — Z92.850 S/P CHIMERIC ANTIGEN RECEPTOR T-CELL THERAPY: ICD-10-CM

## 2024-12-06 RX ORDER — FLUCONAZOLE 200 MG/1
200 TABLET ORAL DAILY
Qty: 30 TABLET | Refills: 0 | OUTPATIENT
Start: 2024-12-06 | End: 2025-01-05

## 2024-12-06 RX ORDER — LEVOFLOXACIN 250 MG/1
250 TABLET ORAL DAILY
Qty: 30 TABLET | Refills: 0 | OUTPATIENT
Start: 2024-12-06 | End: 2025-01-05

## 2024-12-06 RX ORDER — LEVOFLOXACIN 500 MG/1
500 TABLET, FILM COATED ORAL DAILY
Qty: 30 TABLET | Refills: 0 | Status: SHIPPED | OUTPATIENT
Start: 2024-12-06 | End: 2025-01-05

## 2024-12-06 RX ORDER — FLUCONAZOLE 200 MG/1
400 TABLET ORAL DAILY
Qty: 60 TABLET | Refills: 0 | Status: SHIPPED | OUTPATIENT
Start: 2024-12-06 | End: 2025-01-05

## 2024-12-06 NOTE — TELEPHONE ENCOUNTER
----- Message from Tasha sent at 12/6/2024  8:04 AM CST -----  Regarding: Refill  Contact: Pt  [phone]              Rx Name:     fluconazole (DIFLUCAN) 200 MG Tab                       levoFLOXacin (LEVAQUIN) 250 MG tablet        Preferred Pharmacy with number:     [facility] #[id number] - [place], [place] - [address] [facility] AT [facility] [address]  [address] [facility] [address]  Phone: [phone] Fax: [phone]    Ordering Provider:Nellie Rivera NP    Contact preference: [phone]    Comments/Notes  Requesting refill

## 2024-12-10 ENCOUNTER — TELEPHONE (OUTPATIENT)
Dept: HEMATOLOGY/ONCOLOGY | Facility: CLINIC | Age: 64
End: 2024-12-10
Payer: MEDICARE

## 2024-12-10 NOTE — TELEPHONE ENCOUNTER
Spoke with  and advised that he currently has 10 refills left on his current Acyclovir medication. Pt was advised to call [facility] and request a refill. Pt verbalized agreement and understanding.

## 2024-12-10 NOTE — TELEPHONE ENCOUNTER
"----- Message from Vivien sent at 12/10/2024  8:14 AM CST -----  Regarding: Rx  RX Name and Strength:   acyclovir (ZOVIRAX) 800 MG Tab        How is the patient currently taking it?    Take 1 tablet (800 mg total) by mouth 2 (two) times daily. - Oral      Is this a 30 day or 90 day Rx?    60 tablet      Preferred Pharmacy with phone number:  [facility] #[id number] - [facility] [address] [facility] AT [facility]  [address] [facility] [address]  Phone: [phone] Fax: [phone]      Local or Mail Order:   Local      Ordering Provider:    Miguel      Contact Preference:   Jaspreet Reardon @ [phone]       Additional Information:  Requesting new script for Acyclovir sent to [facility] since pt has three tablets left.      "Thank you for all that you do for our patients"  "

## 2024-12-10 NOTE — TELEPHONE ENCOUNTER
"----- Message from Vivien sent at 12/10/2024  8:14 AM CST -----  Regarding: Rx  RX Name and Strength:   acyclovir (ZOVIRAX) 800 MG Tab        How is the patient currently taking it?    Take 1 tablet (800 mg total) by mouth 2 (two) times daily. - Oral      Is this a 30 day or 90 day Rx?    60 tablet      Preferred Pharmacy with phone number:  [facility] #[id number] - [facility] [address] [facility] AT [facility]  [address] [address]  Phone: [phone] Fax: [phone]      Local or Mail Order:   Local      Ordering Provider:    Miguel      Contact Preference:   Jaspreet Reardon @ [phone]       Additional Information:  Requesting new script for [facility]cyclovir sent to [facility] since pt has three tablets left.      "Thank you for all that you do for our patients"  "

## 2024-12-16 ENCOUNTER — TELEPHONE (OUTPATIENT)
Dept: HEMATOLOGY/ONCOLOGY | Facility: CLINIC | Age: 64
End: 2024-12-16
Payer: MEDICARE

## 2024-12-16 NOTE — TELEPHONE ENCOUNTER
----- Message from Tasha sent at 12/16/2024  8:03 AM CST -----  Regarding: Appt  Contact: Pt  [phone]          Current Appt date:  12/23/24     Type of Appt: Est Pt      Physician:  Nellie Rivera NP    Reason for rescheduling:  Would like earlier appt time if possible if not pt will keep original appt date and time      Caller: Jaspreet      Contact Preference:  [phone]

## 2024-12-18 NOTE — PROGRESS NOTES
Section of Hematology and Stem Cell Transplantation    Post-Cellular Therapy Follow Up Visit     12/23/24    Cellular Therapy History:   Primary oncologist: Jesús Baker MD  Primary oncologic diagnosis: Multiple Myeloma  Cell infusion date: 09/30/24  Cell product: ciltacabtagene autoleucel (Carvykti)  Cell dose: 1 x 10^6 CAR T-cells/kg  Lymphodepletion chemotherapy: fludarabine 30 mg/m2 + cyclophosphamide 300 mg/m2 days -7, -6, -5  Prophylactic corticosteroid use: none  Immediate post-transplant complications: none    History of Present Ilness:   Jaspreet Walsh JrMicah (Jaspreet) is a pleasant 64 y.o.male with a multiple myeloma who presents for post-cellular therapy follow up. S/p Carvykti, today is day +84.    Hospital Course for Carvykti:  Admitted on 9/30/24 for relapsed/refractory multiple myeloma. Received 0.50x10^6T cells on 10/01/24. Sustained TMAX 100.6 on day +5/6, likely grade 1 CRS. Day +7, TMAX 102.6, soft BP, up-trending inflammatory markers, elevated liver enzymes/tbili-Grade 2 CRS. Received IVF and Toci. EKG w/ read of STEMI, normal cardiac markers. Infectious workups negative. Down-trending liver enzymes/tbili, ferritin.    Interval History 12/23/2024:  Patient here for CAR-T follow, day +84 today. He reports feeling well and even his neuropathy has started to improve. He denies fever, chills, chest pain, shortness of breath, mental status changes, or lower extremity weakness.    PAST MEDICAL HISTORY:   Past Medical History:   Diagnosis Date    Anxiety     Arthritis     Cancer     Diabetes mellitus     Hypertension        PAST SURGICAL HISTORY:   Past Surgical History:   Procedure Laterality Date    BACK SURGERY  01/01/2021    BONE MARROW BIOPSY Left 10/20/2021    Procedure: Biopsy-bone marrow;  Surgeon: Summer Cartwright MD;  Location: [facility] ([address] [place] [address]);  Service: Oncology;  Laterality: Left;    COLONOSCOPY N/A 01/25/2021    Procedure: COLONOSCOPY;  Surgeon: Braxton Lees MD;  Location: [facility] ([facility]  FLR);  Service: Endoscopy;  Laterality: N/A;  -covid kristopher-inst mailed-tb  21-pt to remain on schedule with Dr. Lees, and confirmed updated arrival time of 0835-BB    COLONOSCOPY N/A 2021    Procedure: COLONOSCOPY;  Surgeon: Jo Ann Robledo MD;  Location: [facility] (4TH FLR);  Service: Endoscopy;  Laterality: N/A;  rescheduled due to poor bowel prep, to be rescheduled with first available provider-BB  [id number]-BB    CREATION OF MUSCLE ROTATIONAL FLAP N/A 2020    Procedure: CREATION, FLAP, MUSCLE ROTATION;  Surgeon: Kamlesh Bellamy MD;  [facility];  Service: Plastics;  Laterality: N/A;    KNEE SURGERY Left 2019    LUMBAR FUSION N/A 2020    Procedure: FUSION, SPINE, LUMBAR L2-pelvis. Depuy. Plastic surgery closure w/ Dr. Bellamy;  Surgeon: Nick Coyle MD;  [facility];  Service: Neurosurgery;  Laterality: N/A;    Metastatic neoplasum removed from spine      PLACEMENT OF DUAL-LUMEN VASCULAR CATHETER Left 2024    Procedure: INSERTION-CATHETER-LENORA, double lumen, left poss right, Bard 14.5 fr hemosplit catheter, model [id number];  Surgeon: Nelson Aponte MD;  [facility];  Service: General;  Laterality: Left;       PAST SOCIAL HISTORY:  Social History     Tobacco Use    Smoking status: Former     Current packs/day: 0.00     Average packs/day: 0.3 packs/day for 40.0 years (10.0 ttl pk-yrs)     Types: Cigarettes     Start date:      Quit date: 2017     Years since quittin.9    Smokeless tobacco: Never       FAMILY HISTORY:  Family History   Problem Relation Name Age of Onset    Cancer Father Jaspreet Walsh         CURRENT MEDICATIONS:   Current Outpatient Medications   Medication Sig    acyclovir (ZOVIRAX) 800 MG Tab Take 1 tablet (800 mg total) by mouth 2 (two) times daily.    amLODIPine (NORVASC) 10 MG tablet Take 10 mg by mouth once daily.    atorvastatin (LIPITOR) 20 MG tablet Take 20 mg by mouth once daily.     fluconazole (DIFLUCAN) 200 MG Tab Take 2 tablets (400 mg total) by mouth once daily.    gabapentin (NEURONTIN) 300 MG capsule Take 1 capsule (300 mg total) by mouth 2 (two) times daily.    hydroCHLOROthiazide (HYDRODIURIL) 25 MG tablet Take 25 mg by mouth once daily.    JARDIANCE 25 mg tablet Take 25 mg by mouth once daily.    levoFLOXacin (LEVAQUIN) 500 MG tablet Take 1 tablet (500 mg total) by mouth once daily. for 30 doses    losartan (COZAAR) 25 MG tablet Take 1 tablet (25 mg total) by mouth once daily.    potassium chloride SA (K-DUR,KLOR-CON M) 10 MEQ tablet TAKE 1 TABLET(10 MEQ) BY MOUTH EVERY DAY    sulfamethoxazole-trimethoprim 800-160mg (BACTRIM DS) 800-160 mg Tab Take 1 tablet by mouth every Mon, Wed, Fri.    k phos di & mono-sod phos mono (PHOSPHOROUS) 250 mg Tab Take 1 tablet by mouth 4 (four) times daily with meals and nightly. for 5 days    levETIRAcetam (KEPPRA) 750 MG Tab Take 1 tablet (750 mg total) by mouth 2 (two) times daily. for 20 days    ondansetron (ZOFRAN-ODT) 8 MG TbDL Take 1 tablet (8 mg total) by mouth every 8 (eight) hours as needed (Nausea/Vomiting). (Patient not taking: Reported on 10/21/2024)     No current facility-administered medications for this visit.       ALLERGIES:   Review of patient's allergies indicates:  No Known Allergies    GVHD Review of Systems:     Pertinent positives and negatives included in the HPI.     Physical Exam:     Vitals:    12/23/24 1327   BP: 120/75   Pulse: 96   Resp: 18   Temp: 98.5 °F (36.9 °C)         Physical Exam  Vitals reviewed.   Constitutional:       General: He is not in acute distress.     Appearance: Normal appearance. He is obese. He is not ill-appearing.   HENT:      Head: Normocephalic and atraumatic.      Nose: Nose normal.      Mouth/Throat:      Mouth: Mucous membranes are moist.      Pharynx: Oropharynx is clear. No oropharyngeal exudate.   Eyes:      Pupils: Pupils are equal, round, and reactive to light.   Cardiovascular:       Rate and Rhythm: Normal rate and regular rhythm.      Pulses: Normal pulses.      Heart sounds: Normal heart sounds. No murmur heard.  Pulmonary:      Effort: Pulmonary effort is normal.      Breath sounds: Normal breath sounds. No wheezing.   Abdominal:      General: Bowel sounds are normal. There is no distension.      Palpations: Abdomen is soft. There is no mass.      Tenderness: There is no abdominal tenderness.   Musculoskeletal:         General: Normal range of motion.      Cervical back: Normal range of motion and neck supple.      Right lower leg: No edema.      Left lower leg: No edema.   Skin:     General: Skin is warm and dry.      Capillary Refill: Capillary refill takes less than 2 seconds.      Findings: No lesion or rash.   Neurological:      Mental Status: He is alert and oriented to person, place, and time.      Motor: No weakness.   Psychiatric:         Mood and Affect: Mood normal.         Thought Content: Thought content normal.      ECOG Performance Status: (foot note - ECOG PS provided by [employer] Co[employer]cology [employer]) 1 - Symptomatic but completely ambulatory    Karnofsky Performance Score:  90%- Able to Carry on Normal Activity: Minor Symptoms of Disease    Labs:   Lab Results   Component Value Date    WBC 3.05 (L) 12/20/2024    RBC 3.60 (L) 12/20/2024    HGB 11.5 (L) 12/20/2024    HCT 33.7 (L) 12/20/2024    MCV 94 12/20/2024    MCH 31.9 (H) 12/20/2024    MCHC 34.1 12/20/2024    RDW 13.8 12/20/2024     (L) 12/20/2024    MPV 11.1 12/20/2024    GRAN 0.8 (L) 12/20/2024    GRAN 27.2 (L) 12/20/2024    LYMPH 1.7 12/20/2024    LYMPH 55.1 (H) 12/20/2024    MONO 0.4 12/20/2024    MONO 13.4 12/20/2024    EOS 0.1 12/20/2024    BASO 0.04 12/20/2024    EOSINOPHIL 2.0 12/20/2024    BASOPHIL 1.3 12/20/2024       Sodium   Date Value Ref Range Status   12/20/2024 134 (L) 136 - 145 mmol/L Final     Potassium   Date Value Ref Range Status   12/20/2024 4.2 3.5 - 5.1 mmol/L Final     Chloride   Date  Value Ref Range Status   12/20/2024 101 95 - 110 mmol/L Final     CO2   Date Value Ref Range Status   12/20/2024 21 (L) 23 - 29 mmol/L Final     Glucose   Date Value Ref Range Status   12/20/2024 109 70 - 110 mg/dL Final     BUN   Date Value Ref Range Status   12/20/2024 25 (H) 2 - 20 mg/dL Final     Creatinine   Date Value Ref Range Status   12/20/2024 1.73 (H) 0.50 - 1.40 mg/dL Final     Calcium   Date Value Ref Range Status   12/20/2024 9.2 8.7 - 10.5 mg/dL Final     Total Protein   Date Value Ref Range Status   12/20/2024 6.5 6.0 - 8.4 g/dL Final     Albumin   Date Value Ref Range Status   12/20/2024 4.3 3.5 - 5.2 g/dL Final     Total Bilirubin   Date Value Ref Range Status   12/20/2024 0.4 0.1 - 1.0 mg/dL Final     Comment:     For infants and newborns, interpretation of results should be based  on gestational age, weight and in agreement with clinical  observations.    Premature Infant recommended reference ranges:  Up to 24 hours.............<8.0 mg/dL  Up to 48 hours............<12.0 mg/dL  3-5 days..................<15.0 mg/dL  6-29 days.................<15.0 mg/dL       Alkaline Phosphatase   Date Value Ref Range Status   12/20/2024 74 38 - 126 U/L Final     AST   Date Value Ref Range Status   12/20/2024 30 15 - 46 U/L Final     ALT   Date Value Ref Range Status   12/20/2024 26 10 - 44 U/L Final     Anion Gap   Date Value Ref Range Status   12/20/2024 12 8 - 16 mmol/L Final     eGFR if    Date Value Ref Range Status   07/01/2022 >60.0 >60 mL/min/1.73 m^2 Final     eGFR if non    Date Value Ref Range Status   07/01/2022 >60.0 >60 mL/min/1.73 m^2 Final     Comment:     Calculation used to obtain the estimated glomerular filtration  rate (eGFR) is the CKD-EPI equation.        Imaging:   None    Pathology:  None    Assessment and Plan:   Jaspreet Waslh Jr. (Jaspreet) is a pleasant 64 y.o.male with a past medical history of multiple myeloma s/p  Carvykti  who presents for post-CAR-T  follow up.    Multiple Myeloma: Status post treatment with Carvykti. Will restage at day 30, day 100, +/- day 180, 1 year, 2 years.  IgG kappa, standard-risk MM, diagnosed 2020 after presentation w/ lytic lesions  S/p 8 cycles VRd, followed by NATHALIE autoSCT 2021 and revlimid maintenance  Relapsed 2023, DKd salvage initiated 23  Good initial response; however, biochemical progression 4/3/24  [facility]ed to [facility] to Carvykti  Carvykti 10/1/24; today is day +84     History of cellular therapy: As above, he received ciltacabtagene autoleucel (Carvykti) on 10/1/24. Complications include Grade II CRS, received 1 dose Tociluzimab.  Today is day + 84   Patient/family instructed to contact us with any fevers, mental status changes or unclear communication, or new/changed symptoms  Instructed to start Bactrim on Wednesday and okay to stop keppra on day +30.  Plan for day +100 restaging, 2025 with labs, clinic BMBx w/ 1 anti-anxiety prior, and whole body PET scan  Follow up with Dr. Almanzar 25    Immunosuppression: Bactrim and acyclovir prophylaxis until 1 year post-CART (and CD4 >200). Continue fluconazole (200 mg daily, dose reduced for renal function) and levaquin (250 mg daily) until resolution of neutropenia. Monitor immunoglobulins monthly. CMV monitoring completed through day 30.  Last CMV: 24 down to 131; Ig24 342, initiate IVIG as below  Active infections: no infectious signs/symptoms today    Pancytopenia: Due to underlying disease +/- chemotherapy. Transfuse for Hgb <7 g/dL and platelets <10k.  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    Dehydration   1. Creatinine up to 2, no overt diarrhea/vomiting   2. Patient reportedly drinking 3-4 bottles/water day; recommended increasing to 5 bottles/day      Orders Placed:           Route Chart for Scheduling    BMT Chart Routing      Follow up with physician . As scheduled with Dr. Almanzar   Follow up with [facility]    Provider visit type    Infusion scheduling note New or changed treatment   IVIG, Jan 2025   Injection scheduling note    Labs   Scheduling:  Preferred lab:  Lab interval:  As scheduled   Imaging    Pharmacy appointment    Other referrals                  Total time of this visit was 31 minutes, including time spent face to face with patient and/or via video/audio, and also in preparing for today's visit for MDM and documentation. (Medical Decision Making, including consideration of possible diagnoses, management options, complex medical record review, review of diagnostic tests and information, consideration and discussion of significant complications based on comorbidities, and discussion with providers involved with the care of the patient). Greater than 50% was spent face to face with the patient counseling and coordinating care.    JUNIE Garcia-MIKEY  Malignant Hematology, Stem Cell Transplant, and Cellular Therapy  [facility]  [facility]

## 2024-12-20 ENCOUNTER — LAB VISIT (OUTPATIENT)
Dept: LAB | Facility: HOSPITAL | Age: 64
End: 2024-12-20
Payer: MEDICARE

## 2024-12-20 DIAGNOSIS — C90.00 MULTIPLE MYELOMA, REMISSION STATUS UNSPECIFIED: ICD-10-CM

## 2024-12-20 DIAGNOSIS — D49.9 IMMUNODEFICIENCY SECONDARY TO NEOPLASM: ICD-10-CM

## 2024-12-20 DIAGNOSIS — Z92.850 S/P CHIMERIC ANTIGEN RECEPTOR T-CELL THERAPY: ICD-10-CM

## 2024-12-20 DIAGNOSIS — D84.81 IMMUNODEFICIENCY SECONDARY TO NEOPLASM: ICD-10-CM

## 2024-12-20 LAB
ABO + RH BLD: NORMAL
ALBUMIN SERPL BCP-MCNC: 4.3 G/DL (ref 3.5–5.2)
ALP SERPL-CCNC: 74 U/L (ref 38–126)
ALT SERPL W/O P-5'-P-CCNC: 26 U/L (ref 10–44)
ANION GAP SERPL CALC-SCNC: 12 MMOL/L (ref 8–16)
ANISOCYTOSIS BLD QL SMEAR: SLIGHT
AST SERPL-CCNC: 30 U/L (ref 15–46)
BASOPHILS # BLD AUTO: 0.04 K/UL (ref 0–0.2)
BASOPHILS NFR BLD: 1.3 % (ref 0–1.9)
BILIRUB SERPL-MCNC: 0.4 MG/DL (ref 0.1–1)
BLD GP AB SCN CELLS X3 SERPL QL: NORMAL
CALCIUM SERPL-MCNC: 9.2 MG/DL (ref 8.7–10.5)
CHLORIDE SERPL-SCNC: 101 MMOL/L (ref 95–110)
CO2 SERPL-SCNC: 21 MMOL/L (ref 23–29)
CREAT SERPL-MCNC: 1.73 MG/DL (ref 0.5–1.4)
DIFFERENTIAL METHOD BLD: ABNORMAL
EOSINOPHIL # BLD AUTO: 0.1 K/UL (ref 0–0.5)
EOSINOPHIL NFR BLD: 2 % (ref 0–8)
ERYTHROCYTE [DISTWIDTH] IN BLOOD BY AUTOMATED COUNT: 13.8 % (ref 11.5–14.5)
EST. GFR  (NO RACE VARIABLE): 43.3 ML/MIN/1.73 M^2
GLUCOSE SERPL-MCNC: 109 MG/DL (ref 70–110)
HCT VFR BLD AUTO: 33.7 % (ref 40–54)
HGB BLD-MCNC: 11.5 G/DL (ref 14–18)
IGA SERPL-MCNC: <5 MG/DL (ref 40–350)
IGG SERPL-MCNC: 342 MG/DL (ref 650–1600)
IGM SERPL-MCNC: <5 MG/DL (ref 50–300)
IMM GRANULOCYTES # BLD AUTO: 0.03 K/UL (ref 0–0.04)
IMM GRANULOCYTES NFR BLD AUTO: 1 % (ref 0–0.5)
LYMPHOCYTES # BLD AUTO: 1.7 K/UL (ref 1–4.8)
LYMPHOCYTES NFR BLD: 55.1 % (ref 18–48)
MAGNESIUM SERPL-MCNC: 2 MG/DL (ref 1.6–2.6)
MCH RBC QN AUTO: 31.9 PG (ref 27–31)
MCHC RBC AUTO-ENTMCNC: 34.1 G/DL (ref 32–36)
MCV RBC AUTO: 94 FL (ref 82–98)
MONOCYTES # BLD AUTO: 0.4 K/UL (ref 0.3–1)
MONOCYTES NFR BLD: 13.4 % (ref 4–15)
NEUTROPHILS # BLD AUTO: 0.8 K/UL (ref 1.8–7.7)
NEUTROPHILS NFR BLD: 27.2 % (ref 38–73)
NRBC BLD-RTO: 0 /100 WBC
OVALOCYTES BLD QL SMEAR: ABNORMAL
PHOSPHATE SERPL-MCNC: 3.5 MG/DL (ref 2.7–4.5)
PLATELET # BLD AUTO: 137 K/UL (ref 150–450)
PLATELET BLD QL SMEAR: ABNORMAL
PMV BLD AUTO: 11.1 FL (ref 9.2–12.9)
POIKILOCYTOSIS BLD QL SMEAR: SLIGHT
POTASSIUM SERPL-SCNC: 4.2 MMOL/L (ref 3.5–5.1)
PROT SERPL-MCNC: 6.5 G/DL (ref 6–8.4)
RBC # BLD AUTO: 3.6 M/UL (ref 4.6–6.2)
SODIUM SERPL-SCNC: 134 MMOL/L (ref 136–145)
SPECIMEN OUTDATE: NORMAL
UUN UR-MCNC: 25 MG/DL (ref 2–20)
WBC # BLD AUTO: 3.05 K/UL (ref 3.9–12.7)

## 2024-12-20 PROCEDURE — 85025 COMPLETE CBC W/AUTO DIFF WBC: CPT | Mod: PN | Performed by: INTERNAL MEDICINE

## 2024-12-20 PROCEDURE — 83735 ASSAY OF MAGNESIUM: CPT | Mod: PN | Performed by: INTERNAL MEDICINE

## 2024-12-20 PROCEDURE — 86334 IMMUNOFIX E-PHORESIS SERUM: CPT | Mod: PN

## 2024-12-20 PROCEDURE — 84165 PROTEIN E-PHORESIS SERUM: CPT | Mod: 26,,, | Performed by: PATHOLOGY

## 2024-12-20 PROCEDURE — 36415 COLL VENOUS BLD VENIPUNCTURE: CPT | Mod: PN

## 2024-12-20 PROCEDURE — 84100 ASSAY OF PHOSPHORUS: CPT | Mod: PN | Performed by: INTERNAL MEDICINE

## 2024-12-20 PROCEDURE — 86334 IMMUNOFIX E-PHORESIS SERUM: CPT | Mod: 26,,, | Performed by: PATHOLOGY

## 2024-12-20 PROCEDURE — 83521 IG LIGHT CHAINS FREE EACH: CPT | Mod: PN

## 2024-12-20 PROCEDURE — 80053 COMPREHEN METABOLIC PANEL: CPT | Mod: PN | Performed by: INTERNAL MEDICINE

## 2024-12-20 PROCEDURE — 82784 ASSAY IGA/IGD/IGG/IGM EACH: CPT | Mod: PN

## 2024-12-20 PROCEDURE — 86850 RBC ANTIBODY SCREEN: CPT | Performed by: INTERNAL MEDICINE

## 2024-12-20 PROCEDURE — 36415 COLL VENOUS BLD VENIPUNCTURE: CPT | Mod: PN | Performed by: INTERNAL MEDICINE

## 2024-12-20 PROCEDURE — 84165 PROTEIN E-PHORESIS SERUM: CPT | Mod: PN

## 2024-12-23 ENCOUNTER — OFFICE VISIT (OUTPATIENT)
Dept: HEMATOLOGY/ONCOLOGY | Facility: CLINIC | Age: 64
End: 2024-12-23
Payer: MEDICARE

## 2024-12-23 VITALS
BODY MASS INDEX: 32.2 KG/M2 | OXYGEN SATURATION: 97 % | TEMPERATURE: 99 F | HEART RATE: 96 BPM | WEIGHT: 205.13 LBS | RESPIRATION RATE: 18 BRPM | SYSTOLIC BLOOD PRESSURE: 120 MMHG | HEIGHT: 67 IN | DIASTOLIC BLOOD PRESSURE: 75 MMHG

## 2024-12-23 DIAGNOSIS — D80.1 ACQUIRED HYPOGAMMAGLOBULINEMIA: Primary | ICD-10-CM

## 2024-12-23 DIAGNOSIS — D61.810 ANTINEOPLASTIC CHEMOTHERAPY INDUCED PANCYTOPENIA: ICD-10-CM

## 2024-12-23 DIAGNOSIS — Z79.899 IMMUNODEFICIENCY DUE TO DRUGS: ICD-10-CM

## 2024-12-23 DIAGNOSIS — T45.1X5A ANTINEOPLASTIC CHEMOTHERAPY INDUCED PANCYTOPENIA: ICD-10-CM

## 2024-12-23 DIAGNOSIS — C90.00 MULTIPLE MYELOMA, REMISSION STATUS UNSPECIFIED: ICD-10-CM

## 2024-12-23 DIAGNOSIS — Z92.850 S/P CHIMERIC ANTIGEN RECEPTOR T-CELL THERAPY: ICD-10-CM

## 2024-12-23 DIAGNOSIS — G62.9 NEUROPATHY: ICD-10-CM

## 2024-12-23 DIAGNOSIS — D84.821 IMMUNODEFICIENCY DUE TO DRUGS: ICD-10-CM

## 2024-12-23 LAB
ALBUMIN SERPL ELPH-MCNC: 4.27 G/DL (ref 3.35–5.55)
ALPHA1 GLOB SERPL ELPH-MCNC: 0.29 G/DL (ref 0.17–0.41)
ALPHA2 GLOB SERPL ELPH-MCNC: 0.6 G/DL (ref 0.43–0.99)
B-GLOBULIN SERPL ELPH-MCNC: 0.54 G/DL (ref 0.5–1.1)
GAMMA GLOB SERPL ELPH-MCNC: 0.31 G/DL (ref 0.67–1.58)
INTERPRETATION SERPL IFE-IMP: NORMAL
KAPPA LC SER QL IA: <0.06 MG/DL (ref 0.33–1.94)
KAPPA LC/LAMBDA SER IA: ABNORMAL (ref 0.26–1.65)
LAMBDA LC SER QL IA: ABNORMAL MG/DL (ref 0.57–2.63)
PATHOLOGIST INTERPRETATION IFE: NORMAL
PATHOLOGIST INTERPRETATION SPE: NORMAL
PROT SERPL-MCNC: 6 G/DL (ref 6–8.4)

## 2024-12-23 PROCEDURE — 1160F RVW MEDS BY RX/DR IN RCRD: CPT | Mod: CPTII,S$GLB,,

## 2024-12-23 PROCEDURE — 99999 PR PBB SHADOW E&M-EST. PATIENT-LVL IV: CPT | Mod: PBBFAC,,,

## 2024-12-23 PROCEDURE — 99214 OFFICE O/P EST MOD 30 MIN: CPT | Mod: S$GLB,,,

## 2024-12-23 PROCEDURE — 1101F PT FALLS ASSESS-DOCD LE1/YR: CPT | Mod: CPTII,S$GLB,,

## 2024-12-23 PROCEDURE — 4010F ACE/ARB THERAPY RXD/TAKEN: CPT | Mod: CPTII,S$GLB,,

## 2024-12-23 PROCEDURE — 3078F DIAST BP <80 MM HG: CPT | Mod: CPTII,S$GLB,,

## 2024-12-23 PROCEDURE — 3288F FALL RISK ASSESSMENT DOCD: CPT | Mod: CPTII,S$GLB,,

## 2024-12-23 PROCEDURE — 3074F SYST BP LT 130 MM HG: CPT | Mod: CPTII,S$GLB,,

## 2024-12-23 PROCEDURE — 3008F BODY MASS INDEX DOCD: CPT | Mod: CPTII,S$GLB,,

## 2024-12-23 PROCEDURE — 1159F MED LIST DOCD IN RCRD: CPT | Mod: CPTII,S$GLB,,

## 2024-12-23 RX ORDER — FAMOTIDINE 10 MG/ML
20 INJECTION INTRAVENOUS
OUTPATIENT
Start: 2025-01-22

## 2024-12-23 RX ORDER — HEPARIN 100 UNIT/ML
500 SYRINGE INTRAVENOUS
OUTPATIENT
Start: 2025-01-22

## 2024-12-23 RX ORDER — ACETAMINOPHEN 325 MG/1
650 TABLET ORAL
OUTPATIENT
Start: 2025-01-22

## 2024-12-23 RX ORDER — SODIUM CHLORIDE 0.9 % (FLUSH) 0.9 %
10 SYRINGE (ML) INJECTION
OUTPATIENT
Start: 2025-01-22

## 2025-01-06 ENCOUNTER — LAB VISIT (OUTPATIENT)
Dept: LAB | Facility: HOSPITAL | Age: 65
End: 2025-01-06
Payer: MEDICARE

## 2025-01-06 DIAGNOSIS — Z79.899 IMMUNODEFICIENCY DUE TO DRUGS: ICD-10-CM

## 2025-01-06 DIAGNOSIS — D84.821 IMMUNODEFICIENCY DUE TO DRUGS: ICD-10-CM

## 2025-01-06 DIAGNOSIS — Z92.850 S/P CHIMERIC ANTIGEN RECEPTOR T-CELL THERAPY: Primary | ICD-10-CM

## 2025-01-06 DIAGNOSIS — C90.00 MULTIPLE MYELOMA, REMISSION STATUS UNSPECIFIED: ICD-10-CM

## 2025-01-06 DIAGNOSIS — Z94.84 HISTORY OF AUTO STEM CELL TRANSPLANT: ICD-10-CM

## 2025-01-06 LAB
ABO + RH BLD: NORMAL
ALBUMIN SERPL BCP-MCNC: 4 G/DL (ref 3.5–5.2)
ALP SERPL-CCNC: 85 U/L (ref 38–126)
ALT SERPL W/O P-5'-P-CCNC: 34 U/L (ref 10–44)
ANION GAP SERPL CALC-SCNC: 9 MMOL/L (ref 8–16)
ANISOCYTOSIS BLD QL SMEAR: SLIGHT
AST SERPL-CCNC: 31 U/L (ref 15–46)
BASOPHILS # BLD AUTO: 0.01 K/UL (ref 0–0.2)
BASOPHILS NFR BLD: 0.3 % (ref 0–1.9)
BILIRUB SERPL-MCNC: 0.4 MG/DL (ref 0.1–1)
BLD GP AB SCN CELLS X3 SERPL QL: NORMAL
CALCIUM SERPL-MCNC: 8.9 MG/DL (ref 8.7–10.5)
CHLORIDE SERPL-SCNC: 106 MMOL/L (ref 95–110)
CO2 SERPL-SCNC: 21 MMOL/L (ref 23–29)
CREAT SERPL-MCNC: 1.4 MG/DL (ref 0.5–1.4)
DIFFERENTIAL METHOD BLD: ABNORMAL
EOSINOPHIL # BLD AUTO: 0 K/UL (ref 0–0.5)
EOSINOPHIL NFR BLD: 1.3 % (ref 0–8)
ERYTHROCYTE [DISTWIDTH] IN BLOOD BY AUTOMATED COUNT: 14.6 % (ref 11.5–14.5)
EST. GFR  (NO RACE VARIABLE): 55.4 ML/MIN/1.73 M^2
GLUCOSE SERPL-MCNC: 100 MG/DL (ref 70–110)
HCT VFR BLD AUTO: 28.5 % (ref 40–54)
HGB BLD-MCNC: 9.8 G/DL (ref 14–18)
IGA SERPL-MCNC: <5 MG/DL (ref 40–350)
IGG SERPL-MCNC: 305 MG/DL (ref 650–1600)
IGM SERPL-MCNC: 67 MG/DL (ref 50–300)
IMM GRANULOCYTES # BLD AUTO: 0.06 K/UL (ref 0–0.04)
IMM GRANULOCYTES NFR BLD AUTO: 1.9 % (ref 0–0.5)
LYMPHOCYTES # BLD AUTO: 2 K/UL (ref 1–4.8)
LYMPHOCYTES NFR BLD: 64.7 % (ref 18–48)
MAGNESIUM SERPL-MCNC: 2 MG/DL (ref 1.6–2.6)
MCH RBC QN AUTO: 32.1 PG (ref 27–31)
MCHC RBC AUTO-ENTMCNC: 34.4 G/DL (ref 32–36)
MCV RBC AUTO: 93 FL (ref 82–98)
MONOCYTES # BLD AUTO: 0.5 K/UL (ref 0.3–1)
MONOCYTES NFR BLD: 14.4 % (ref 4–15)
NEUTROPHILS # BLD AUTO: 0.5 K/UL (ref 1.8–7.7)
NEUTROPHILS NFR BLD: 17.4 % (ref 38–73)
NRBC BLD-RTO: 2 /100 WBC
OVALOCYTES BLD QL SMEAR: ABNORMAL
PHOSPHATE SERPL-MCNC: 3.3 MG/DL (ref 2.7–4.5)
PLATELET # BLD AUTO: 113 K/UL (ref 150–450)
PLATELET BLD QL SMEAR: ABNORMAL
PMV BLD AUTO: 10.8 FL (ref 9.2–12.9)
POIKILOCYTOSIS BLD QL SMEAR: SLIGHT
POLYCHROMASIA BLD QL SMEAR: ABNORMAL
POTASSIUM SERPL-SCNC: 4.4 MMOL/L (ref 3.5–5.1)
PROT SERPL-MCNC: 6 G/DL (ref 6–8.4)
RBC # BLD AUTO: 3.05 M/UL (ref 4.6–6.2)
SODIUM SERPL-SCNC: 136 MMOL/L (ref 136–145)
SPECIMEN OUTDATE: NORMAL
UUN UR-MCNC: 18 MG/DL (ref 2–20)
WBC # BLD AUTO: 3.12 K/UL (ref 3.9–12.7)

## 2025-01-06 PROCEDURE — 86901 BLOOD TYPING SEROLOGIC RH(D): CPT

## 2025-01-06 PROCEDURE — 84100 ASSAY OF PHOSPHORUS: CPT | Mod: PN

## 2025-01-06 PROCEDURE — 82784 ASSAY IGA/IGD/IGG/IGM EACH: CPT | Mod: 59,PN

## 2025-01-06 PROCEDURE — 85025 COMPLETE CBC W/AUTO DIFF WBC: CPT | Mod: PN

## 2025-01-06 PROCEDURE — 83735 ASSAY OF MAGNESIUM: CPT | Mod: PN

## 2025-01-06 PROCEDURE — 80053 COMPREHEN METABOLIC PANEL: CPT | Mod: PN

## 2025-01-06 PROCEDURE — 36415 COLL VENOUS BLD VENIPUNCTURE: CPT | Mod: PN

## 2025-01-07 ENCOUNTER — PROCEDURE VISIT (OUTPATIENT)
Dept: HEMATOLOGY/ONCOLOGY | Facility: CLINIC | Age: 65
End: 2025-01-07
Payer: MEDICARE

## 2025-01-07 ENCOUNTER — HOSPITAL ENCOUNTER (OUTPATIENT)
Dept: RADIOLOGY | Facility: HOSPITAL | Age: 65
Discharge: HOME | End: 2025-01-07
Attending: INTERNAL MEDICINE
Payer: MEDICARE

## 2025-01-07 VITALS
RESPIRATION RATE: 18 BRPM | DIASTOLIC BLOOD PRESSURE: 79 MMHG | OXYGEN SATURATION: 97 % | SYSTOLIC BLOOD PRESSURE: 144 MMHG | HEART RATE: 84 BPM | TEMPERATURE: 98 F

## 2025-01-07 DIAGNOSIS — Z94.84 HISTORY OF AUTO STEM CELL TRANSPLANT: ICD-10-CM

## 2025-01-07 DIAGNOSIS — C90.00 MULTIPLE MYELOMA, REMISSION STATUS UNSPECIFIED: ICD-10-CM

## 2025-01-07 DIAGNOSIS — Z79.899 IMMUNODEFICIENCY DUE TO DRUGS: ICD-10-CM

## 2025-01-07 DIAGNOSIS — Z92.850 HISTORY OF CHIMERIC ANTIGEN RECEPTOR T-CELL IMMUNOTHERAPY: ICD-10-CM

## 2025-01-07 DIAGNOSIS — Z92.850 S/P CHIMERIC ANTIGEN RECEPTOR T-CELL THERAPY: ICD-10-CM

## 2025-01-07 DIAGNOSIS — D84.821 IMMUNODEFICIENCY DUE TO DRUGS: ICD-10-CM

## 2025-01-07 DIAGNOSIS — Q99.8 OTHER SPECIFIED CHROMOSOME ABNORMALITIES: ICD-10-CM

## 2025-01-07 LAB
CMV DNA SPEC QL NAA+PROBE: NORMAL
CYTOMEGALOVIRUS PCR, QUANT: NOT DETECTED IU/ML
POCT GLUCOSE: 95 MG/DL (ref 70–110)
REASON FOR REFERRAL, PLASMA CELL PROLIF (PCPD), FISH: NORMAL

## 2025-01-07 PROCEDURE — 88299 UNLISTED CYTOGENETIC STUDY: CPT

## 2025-01-07 PROCEDURE — A9552 F18 FDG: HCPCS | Performed by: INTERNAL MEDICINE

## 2025-01-07 PROCEDURE — 88305 TISSUE EXAM BY PATHOLOGIST: CPT | Performed by: PATHOLOGY

## 2025-01-07 PROCEDURE — 88184 FLOWCYTOMETRY/ TC 1 MARKER: CPT | Mod: 91

## 2025-01-07 PROCEDURE — 88185 FLOWCYTOMETRY/TC ADD-ON: CPT

## 2025-01-07 PROCEDURE — 88271 CYTOGENETICS DNA PROBE: CPT | Mod: 59

## 2025-01-07 PROCEDURE — 78816 PET IMAGE W/CT FULL BODY: CPT | Mod: TC

## 2025-01-07 PROCEDURE — 88185 FLOWCYTOMETRY/TC ADD-ON: CPT | Mod: 59 | Performed by: PATHOLOGY

## 2025-01-07 PROCEDURE — 78816 PET IMAGE W/CT FULL BODY: CPT | Mod: 26,PS,, | Performed by: NUCLEAR MEDICINE

## 2025-01-07 PROCEDURE — 38222 DX BONE MARROW BX & ASPIR: CPT | Mod: LT,S$GLB,,

## 2025-01-07 PROCEDURE — 88184 FLOWCYTOMETRY/ TC 1 MARKER: CPT | Mod: 59 | Performed by: PATHOLOGY

## 2025-01-07 PROCEDURE — 88189 FLOWCYTOMETRY/READ 16 & >: CPT | Mod: ,,, | Performed by: PATHOLOGY

## 2025-01-07 PROCEDURE — 88313 SPECIAL STAINS GROUP 2: CPT | Mod: 59 | Performed by: PATHOLOGY

## 2025-01-07 PROCEDURE — 88311 DECALCIFY TISSUE: CPT | Performed by: PATHOLOGY

## 2025-01-07 PROCEDURE — 88342 IMHCHEM/IMCYTCHM 1ST ANTB: CPT | Performed by: PATHOLOGY

## 2025-01-07 RX ORDER — FLUCONAZOLE 200 MG/1
400 TABLET ORAL DAILY
Qty: 60 TABLET | Refills: 0 | Status: SHIPPED | OUTPATIENT
Start: 2025-01-07 | End: 2025-02-06

## 2025-01-07 RX ORDER — FLUDEOXYGLUCOSE F18 500 MCI/ML
12 INJECTION INTRAVENOUS
Status: COMPLETED | OUTPATIENT
Start: 2025-01-07 | End: 2025-01-07

## 2025-01-07 RX ORDER — LIDOCAINE HYDROCHLORIDE 20 MG/ML
10 INJECTION, SOLUTION EPIDURAL; INFILTRATION; INTRACAUDAL; PERINEURAL ONCE
Status: COMPLETED | OUTPATIENT
Start: 2025-01-07 | End: 2025-01-07

## 2025-01-07 RX ORDER — LEVOFLOXACIN 500 MG/1
500 TABLET, FILM COATED ORAL DAILY
Qty: 30 TABLET | Refills: 0 | Status: SHIPPED | OUTPATIENT
Start: 2025-01-07 | End: 2025-02-06

## 2025-01-07 RX ADMIN — LIDOCAINE HYDROCHLORIDE 200 MG: 20 INJECTION, SOLUTION EPIDURAL; INFILTRATION; INTRACAUDAL; PERINEURAL at 08:01

## 2025-01-07 RX ADMIN — FLUDEOXYGLUCOSE F-18 10.74 MILLICURIE: 500 INJECTION INTRAVENOUS at 10:01

## 2025-01-07 NOTE — PROCEDURES
Bone marrow    Date/Time: 1/7/2025 8:45 AM    Performed by: Nellie Rivera NP  Authorized by: Nellie Rivera NP    Aspiration?: Yes   Biopsy?: Yes      PROCEDURE NOTE:  Date of Procedure: 01/07/2025  Procedure: Bone Marrow Biopsy and Aspiration  Indication: Day +100 Restaging post-CAR-T  Consent: Informed consent was obtained from patient.  Timeout: Done and documented.  Position: Prone  Site: Left posterior illiac crest.  Prep: Betadine.  Needle used: 11 gauge Jamshidi needle.  Anesthetic: 2% lidocaine 10 mL.  Biopsy: The biopsy needle was introduced into the marrow cavity and 17 mL of aspirate was obtained without complications and sent for flow cytometry, DNA/RNA hold, PCPD Fish, MM MRD, and cytogenetics. Core biopsy obtained without difficulty and sent for routine histologic examination.  Complications: None.  Disposition: The patient was placed supine for 15 min following procedure. RN to assess bandaid for bleeding prior to discharge home.  Blood loss: Minimal.    Discharge instructions for having a Bone Marrow Aspiration / Biopsy:  Keep Bandage in place for 24 hours.  - Do not shower or take a tube bath during this time (you may sponge bathe).  - Call the nurse or physician for excessive bleeding or pain at biopsy site.  - You may take Tylenol, as needed for pain.    You can call [phone] for any problems during the hours of 8:00 AM - 5:00PM.    For an emergency after 5:00 PM you can call [phone] and have the  page the Hematologist / Oncologist on call.      MEGAN Garcia  Malignant Hematology & Bone Marrow Transplant

## 2025-01-09 LAB
% B-CELL PRECURSORS: 3.59 %
% MAST CELLS: 0.07 %
ABNORMAL PLASMA CELL EVENTS: 0
BODY SITE - BONE MARROW: NORMAL
CLINICAL DIAGNOSIS - BONE MARROW: NORMAL
COMMENT: NORMAL
DNA/RNA EXTRACT AND HOLD RESULT: NORMAL
DNA/RNA EXTRACTION: NORMAL
EXHR SPECIMEN TYPE: NORMAL
FINAL PATHOLOGIC DIAGNOSIS: NORMAL
FLOW CYTOMETRY ANTIBODIES ANALYZED - BONE MARROW: NORMAL
FLOW CYTOMETRY COMMENT - BONE MARROW: NORMAL
FLOW CYTOMETRY INTERPRETATION - BONE MARROW: NORMAL
GROSS: NORMAL
LOWER LIMIT OF QUANTITATION (LLOQ): 1 X10(-5)
Lab: NORMAL
MICROSCOPIC EXAM: NORMAL
MINIMAL RESIDUAL DISEASE DETECTION (MRD), BONE MARROW: 0 %
PATIENT / SAMPLE THEORETICAL LOQ: 2 X10(-6)
PCPDS FINAL DIAGNOSIS: NORMAL
PCPDS PRE-ANALYSIS PRE-SORT: NORMAL
PERCENT NORMAL PLASMA CELLS (OF TOTAL PC): 100 %
PLASMA CELLS ASSESS MAR: NORMAL
POLY PLASMA CELL EVENTS: 6776
TOTAL CELLS COUNTED MAR: NORMAL
TOTAL PLASMA CELL EVENTS: 6776
VALIDATED ASSAY SENSITIVITY: 3.52 X10(-6)

## 2025-01-12 NOTE — PROGRESS NOTES
Section of Hematology and Stem Cell Transplantation    Post-Cellular Therapy Follow Up Visit     1/13/25    Cellular Therapy History:   Primary oncologist: Jesús Baker MD  Primary oncologic diagnosis: Multiple Myeloma  Cell infusion date: 09/30/24  Cell product: ciltacabtagene autoleucel (Carvykti)  Cell dose: 1 x 10^6 CAR T-cells/kg  Lymphodepletion chemotherapy: fludarabine 30 mg/m2 + cyclophosphamide 300 mg/m2 days -7, -6, -5  Prophylactic corticosteroid use: none  Immediate post-transplant complications: none    History of Present Ilness:   Jaspreet Walsh JrMicah (Jaspreet) is a pleasant 65 y.o.male with a multiple myeloma who presents for post-cellular therapy follow up. S/p Carvykti, today is day +105.    Hospital Course for Carvykti:  Admitted on 9/30/24 for relapsed/refractory multiple myeloma. Received 0.50x10^6T cells on 10/01/24. Sustained TMAX 100.6 on day +5/6, likely grade 1 CRS. Day +7, TMAX 102.6, soft BP, up-trending inflammatory markers, elevated liver enzymes/tbili-Grade 2 CRS. Received IVF and Toci. EKG w/ read of STEMI, normal cardiac markers. Infectious workups negative. Down-trending liver enzymes/tbili, ferritin.    Interval History 12/23/2024:  Patient here for CAR-T follow, day +105 today. He is feeling great overall. He denies any bone pain or other complaints. His appetite and energy are good. He is now in complete remission.     PAST MEDICAL HISTORY:   Past Medical History:   Diagnosis Date    Anxiety     Arthritis     Cancer     Diabetes mellitus     Hypertension        PAST SURGICAL HISTORY:   Past Surgical History:   Procedure Laterality Date    BACK SURGERY  01/01/2021    BONE MARROW BIOPSY Left 10/20/2021    Procedure: Biopsy-bone marrow;  Surgeon: Summer Cartwright MD;  Location: [facility] ([address] [place] [address]);  Service: Oncology;  Laterality: Left;    COLONOSCOPY N/A 01/25/2021    Procedure: COLONOSCOPY;  Surgeon: Braxton Lees MD;  Location: [facility] ([address] [place] [address]);  Service: Endoscopy;  Laterality:  N/A;  -covid kristopher-inst mailed-tb  21-pt to remain on schedule with Dr. Lees, and confirmed updated arrival time of 0835-BB    COLONOSCOPY N/A 2021    Procedure: COLONOSCOPY;  Surgeon: Jo Ann Robledo MD;  Location: [facility] (4TH FLR);  Service: Endoscopy;  Laterality: N/A;  rescheduled due to poor bowel prep, to be rescheduled with first available provider-BB  [id number]-BB    CREATION OF MUSCLE ROTATIONAL FLAP N/A 2020    Procedure: CREATION, FLAP, MUSCLE ROTATION;  Surgeon: Kamlesh Bellamy MD;  Location: [facility] OR 2ND FLR;  Service: Plastics;  Laterality: N/A;    KNEE SURGERY Left 2019    LUMBAR FUSION N/A 2020    Procedure: FUSION, SPINE, LUMBAR L2-pelvis. Depuy. Plastic surgery closure w/ Dr. Bellamy;  Surgeon: Nick Coyle MD;  Location: [facility] OR 2ND FLR;  Service: Neurosurgery;  Laterality: N/A;    Metastatic neoplasum removed from spine      PLACEMENT OF DUAL-LUMEN VASCULAR CATHETER Left 2024    Procedure: INSERTION-CATHETER-LENORA, double lumen, left poss right, Bard 14.5 fr hemosplit catheter, model [id number];  Surgeon: Nelson Aponte MD;  Location: [facility] OR 2ND FLR;  Service: General;  Laterality: Left;       PAST SOCIAL HISTORY:  Social History     Tobacco Use    Smoking status: Former     Current packs/day: 0.00     Average packs/day: 0.3 packs/day for 40.0 years (10.0 ttl pk-yrs)     Types: Cigarettes     Start date:      Quit date: 2017     Years since quittin.0    Smokeless tobacco: Never       FAMILY HISTORY:  Family History   Problem Relation Name Age of Onset    Cancer Father Jaspreet Walsh Sr        CURRENT MEDICATIONS:   Current Outpatient Medications   Medication Sig    acyclovir (ZOVIRAX) 800 MG Tab Take 1 tablet (800 mg total) by mouth 2 (two) times daily.    amLODIPine (NORVASC) 10 MG tablet Take 10 mg by mouth once daily.    atorvastatin (LIPITOR) 20 MG tablet Take 20 mg by mouth once daily.    fluconazole (DIFLUCAN) 200 MG Tab  Take 2 tablets (400 mg total) by mouth once daily.    gabapentin (NEURONTIN) 300 MG capsule Take 1 capsule (300 mg total) by mouth 2 (two) times daily.    hydroCHLOROthiazide (HYDRODIURIL) 25 MG tablet Take 25 mg by mouth once daily.    JARDIANCE 25 mg tablet Take 25 mg by mouth once daily.    levoFLOXacin (LEVAQUIN) 500 MG tablet Take 1 tablet (500 mg total) by mouth once daily. for 30 doses    losartan (COZAAR) 25 MG tablet Take 1 tablet (25 mg total) by mouth once daily.    potassium chloride SA (K-DUR,KLOR-CON M) 10 MEQ tablet TAKE 1 TABLET(10 MEQ) BY MOUTH EVERY DAY    sulfamethoxazole-trimethoprim 800-160mg (BACTRIM DS) 800-160 mg Tab Take 1 tablet by mouth every Mon, Wed, Fri.    k phos di & mono-sod phos mono (PHOSPHOROUS) 250 mg Tab Take 1 tablet by mouth 4 (four) times daily with meals and nightly. for 5 days (Patient not taking: Reported on 1/13/2025)    levETIRAcetam (KEPPRA) 750 MG Tab Take 1 tablet (750 mg total) by mouth 2 (two) times daily. for 20 days (Patient not taking: Reported on 1/13/2025)    ondansetron (ZOFRAN-ODT) 8 MG TbDL Take 1 tablet (8 mg total) by mouth every 8 (eight) hours as needed (Nausea/Vomiting). (Patient not taking: Reported on 1/13/2025)     No current facility-administered medications for this visit.       ALLERGIES:   Review of patient's allergies indicates:  No Known Allergies    GVHD Review of Systems:     Pertinent positives and negatives included in the HPI.     Physical Exam:     Vitals:    01/13/25 1031   BP: 117/74   Pulse: 95   Temp: 98.8 °F (37.1 °C)           Physical Exam  Vitals reviewed.   Constitutional:       General: He is not in acute distress.     Appearance: Normal appearance. He is obese. He is not ill-appearing.   HENT:      Head: Normocephalic and atraumatic.      Nose: Nose normal.      Mouth/Throat:      Mouth: Mucous membranes are moist.      Pharynx: Oropharynx is clear. No oropharyngeal exudate.   Eyes:      Pupils: Pupils are equal, round, and  reactive to light.   Cardiovascular:      Rate and Rhythm: Normal rate and regular rhythm.      Heart sounds: Normal heart sounds. No murmur heard.  Pulmonary:      Effort: Pulmonary effort is normal.      Breath sounds: Normal breath sounds. No wheezing.   Abdominal:      Palpations: Abdomen is soft.      Tenderness: There is no abdominal tenderness.   Musculoskeletal:         General: Normal range of motion.      Cervical back: Normal range of motion and neck supple.      Right lower leg: No edema.      Left lower leg: No edema.   Skin:     General: Skin is warm and dry.      Findings: No lesion or rash.   Neurological:      Mental Status: He is alert and oriented to person, place, and time.      Motor: No weakness.   Psychiatric:         Mood and Affect: Mood normal.         Thought Content: Thought content normal.        ECOG Performance Status: (foot note - ECOG PS provided by Eastern Cooperative Oncology Group) 0 - Asymptomatic    Karnofsky Performance Score:  100%- Normal, No Complaints, No Evidence of Disease    Labs:   Lab Results   Component Value Date    WBC 3.12 (L) 01/06/2025    RBC 3.05 (L) 01/06/2025    HGB 9.8 (L) 01/06/2025    HCT 28.5 (L) 01/06/2025    MCV 93 01/06/2025    MCH 32.1 (H) 01/06/2025    MCHC 34.4 01/06/2025    RDW 14.6 (H) 01/06/2025     (L) 01/06/2025    MPV 10.8 01/06/2025    GRAN 0.5 (L) 01/06/2025    GRAN 17.4 (L) 01/06/2025    LYMPH 2.0 01/06/2025    LYMPH 64.7 (H) 01/06/2025    MONO 0.5 01/06/2025    MONO 14.4 01/06/2025    EOS 0.0 01/06/2025    BASO 0.01 01/06/2025    EOSINOPHIL 1.3 01/06/2025    BASOPHIL 0.3 01/06/2025       Sodium   Date Value Ref Range Status   01/06/2025 136 136 - 145 mmol/L Final     Potassium   Date Value Ref Range Status   01/06/2025 4.4 3.5 - 5.1 mmol/L Final     Chloride   Date Value Ref Range Status   01/06/2025 106 95 - 110 mmol/L Final     CO2   Date Value Ref Range Status   01/06/2025 21 (L) 23 - 29 mmol/L Final     Glucose   Date Value Ref  Range Status   01/06/2025 100 70 - 110 mg/dL Final     BUN   Date Value Ref Range Status   01/06/2025 18 2 - 20 mg/dL Final     Creatinine   Date Value Ref Range Status   01/06/2025 1.40 0.50 - 1.40 mg/dL Final     Calcium   Date Value Ref Range Status   01/06/2025 8.9 8.7 - 10.5 mg/dL Final     Total Protein   Date Value Ref Range Status   01/06/2025 6.0 6.0 - 8.4 g/dL Final     Albumin   Date Value Ref Range Status   01/06/2025 4.0 3.5 - 5.2 g/dL Final     Total Bilirubin   Date Value Ref Range Status   01/06/2025 0.4 0.1 - 1.0 mg/dL Final     Comment:     For infants and newborns, interpretation of results should be based  on gestational age, weight and in agreement with clinical  observations.    Premature Infant recommended reference ranges:  Up to 24 hours.............<8.0 mg/dL  Up to 48 hours............<12.0 mg/dL  3-5 days..................<15.0 mg/dL  6-29 days.................<15.0 mg/dL       Alkaline Phosphatase   Date Value Ref Range Status   01/06/2025 85 38 - 126 U/L Final     AST   Date Value Ref Range Status   01/06/2025 31 15 - 46 U/L Final     ALT   Date Value Ref Range Status   01/06/2025 34 10 - 44 U/L Final     Anion Gap   Date Value Ref Range Status   01/06/2025 9 8 - 16 mmol/L Final     eGFR if    Date Value Ref Range Status   07/01/2022 >60.0 >60 mL/min/1.73 m^2 Final     eGFR if non    Date Value Ref Range Status   07/01/2022 >60.0 >60 mL/min/1.73 m^2 Final     Comment:     Calculation used to obtain the estimated glomerular filtration  rate (eGFR) is the CKD-EPI equation.        Imaging:   None    Pathology:  None    Assessment and Plan:   Jaspreet Walsh Jr. (Jaspreet) is a pleasant 65 y.o.male with a past medical history of multiple myeloma s/p  Carvykti  who presents for post-CAR-T follow up.    Multiple Myeloma: Status post treatment with Carvykti. Will restage at day 30, day 100, +/- day 180, 1 year, 2 years.  IgG kappa, standard-risk MM, diagnosed  2020 after presentation w/ lytic lesions  S/p 8 cycles VRd, followed by NATHALIE autoSCT 2021 and revlimid maintenance  Relapsed 2023, DKd salvage initiated 23  Good initial response; however, biochemical progression 4/3/24  Transitioned to KPd as bridge to Carvykti  Carvykti 10/1/24; today is day +105     History of cellular therapy: As above, he received ciltacabtagene autoleucel (Carvykti) on 10/1/24. Complications include Grade II CRS, received 1 dose Tociluzimab.  Today is day + 105   Patient/family instructed to contact us with any fevers, mental status changes or unclear communication, or new/changed symptoms  Instructed to start Bactrim on Wednesday and okay to stop keppra on day +30.  Day +100 restaging  Bone marrow biopsy with normocellular marrow (30%) with marked myeloid hypoplasia. No morphologic or immunophenotypic evidence of residual/relapsed plasma cell myeloma. No MRD.  PET: In this patient with multiple myeloma, there is mildly heterogeneous tracer uptake throughout the bone marrow, improved from prior with post treatment response. No new osseous lesions.     Immunosuppression: Bactrim and acyclovir prophylaxis until 1 year post-CART (and CD4 >200). Continue fluconazole (200 mg daily, dose reduced for renal function) and levaquin until resolution of neutropenia. Monitor immunoglobulins monthly.   Last CMV: 24 down to 131; Ig24 342, initiate IVIG as below  Active infections: no infectious signs/symptoms today    Pancytopenia: Due to underlying disease +/- chemotherapy. Transfuse for Hgb <7 g/dL and platelets <10k.  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 follow up 2 months with Christopher beth call patient to tell him   Follow up with [facility]    Provider visit type    Infusion scheduling note    Injection scheduling note    Labs CMP, CBC, immunoglobulins, phosphorus, magnesium, free light chains and SPEP   Scheduling:  Preferred lab:  Lab interval: every 2 weeks  labs q2 weeks in [place] (CBC CMP); once per month please include immunoglobulins, SPEP, FLC   Imaging    Pharmacy appointment    Other referrals                  Total time of this visit was 31 minutes, including time spent face to face with patient and/or via video/audio, and also in preparing for today's visit for MDM and documentation. (Medical Decision Making, including consideration of possible diagnoses, management options, complex medical record review, review of diagnostic tests and information, consideration and discussion of significant complications based on comorbidities, and discussion with providers involved with the care of the patient). Greater than 50% was spent face to face with the patient counseling and coordinating care.    Joana Nicholson PA-C  Malignant Hematology, Stem Cell Transplant, and Cellular Therapy  [facility]  [facility] [place]

## 2025-01-13 ENCOUNTER — OFFICE VISIT (OUTPATIENT)
Dept: HEMATOLOGY/ONCOLOGY | Facility: CLINIC | Age: 65
End: 2025-01-13
Payer: MEDICARE

## 2025-01-13 VITALS
TEMPERATURE: 99 F | HEIGHT: 67 IN | BODY MASS INDEX: 31.97 KG/M2 | SYSTOLIC BLOOD PRESSURE: 117 MMHG | OXYGEN SATURATION: 99 % | WEIGHT: 203.69 LBS | DIASTOLIC BLOOD PRESSURE: 74 MMHG | HEART RATE: 95 BPM

## 2025-01-13 DIAGNOSIS — C90.00 MULTIPLE MYELOMA, REMISSION STATUS UNSPECIFIED: ICD-10-CM

## 2025-01-13 DIAGNOSIS — Z92.850 HISTORY OF CHIMERIC ANTIGEN RECEPTOR T-CELL IMMUNOTHERAPY: ICD-10-CM

## 2025-01-13 DIAGNOSIS — C90.01 MULTIPLE MYELOMA IN REMISSION: ICD-10-CM

## 2025-01-13 DIAGNOSIS — T45.1X5A CHEMOTHERAPY-INDUCED NEUROPATHY: ICD-10-CM

## 2025-01-13 DIAGNOSIS — D61.818 PANCYTOPENIA: ICD-10-CM

## 2025-01-13 DIAGNOSIS — E86.0 DEHYDRATION: ICD-10-CM

## 2025-01-13 DIAGNOSIS — C90.01 MULTIPLE MYELOMA IN REMISSION: Primary | ICD-10-CM

## 2025-01-13 DIAGNOSIS — Z94.84 HISTORY OF AUTO STEM CELL TRANSPLANT: ICD-10-CM

## 2025-01-13 DIAGNOSIS — Z92.850 S/P CHIMERIC ANTIGEN RECEPTOR T-CELL THERAPY: ICD-10-CM

## 2025-01-13 DIAGNOSIS — G62.0 CHEMOTHERAPY-INDUCED NEUROPATHY: ICD-10-CM

## 2025-01-13 DIAGNOSIS — C79.49 METASTASIS OF NEOPLASM TO SPINAL CANAL: ICD-10-CM

## 2025-01-13 DIAGNOSIS — Z92.850 HISTORY OF CHIMERIC ANTIGEN RECEPTOR T-CELL IMMUNOTHERAPY: Primary | ICD-10-CM

## 2025-01-13 DIAGNOSIS — D84.9 IMMUNOSUPPRESSION: ICD-10-CM

## 2025-01-13 DIAGNOSIS — D80.1 ACQUIRED HYPOGAMMAGLOBULINEMIA: ICD-10-CM

## 2025-01-13 PROCEDURE — 99215 OFFICE O/P EST HI 40 MIN: CPT | Mod: S$GLB,,,

## 2025-01-13 PROCEDURE — 99999 PR PBB SHADOW E&M-EST. PATIENT-LVL III: CPT | Mod: PBBFAC,,,

## 2025-01-13 PROCEDURE — 3074F SYST BP LT 130 MM HG: CPT | Mod: CPTII,S$GLB,,

## 2025-01-13 PROCEDURE — 3078F DIAST BP <80 MM HG: CPT | Mod: CPTII,S$GLB,,

## 2025-01-13 PROCEDURE — 1159F MED LIST DOCD IN RCRD: CPT | Mod: CPTII,S$GLB,,

## 2025-01-13 PROCEDURE — 1160F RVW MEDS BY RX/DR IN RCRD: CPT | Mod: CPTII,S$GLB,,

## 2025-01-13 PROCEDURE — 3008F BODY MASS INDEX DOCD: CPT | Mod: CPTII,S$GLB,,

## 2025-01-13 PROCEDURE — 3288F FALL RISK ASSESSMENT DOCD: CPT | Mod: CPTII,S$GLB,,

## 2025-01-13 PROCEDURE — 1101F PT FALLS ASSESS-DOCD LE1/YR: CPT | Mod: CPTII,S$GLB,,

## 2025-01-13 PROCEDURE — 1126F AMNT PAIN NOTED NONE PRSNT: CPT | Mod: CPTII,S$GLB,,

## 2025-01-13 RX ORDER — FLUCONAZOLE 200 MG/1
400 TABLET ORAL DAILY
Qty: 60 TABLET | Refills: 3 | Status: SHIPPED | OUTPATIENT
Start: 2025-01-13

## 2025-01-13 RX ORDER — LEVOFLOXACIN 500 MG/1
500 TABLET, FILM COATED ORAL DAILY
Qty: 30 TABLET | Refills: 3 | Status: SHIPPED | OUTPATIENT
Start: 2025-01-13

## 2025-01-15 ENCOUNTER — INFUSION (OUTPATIENT)
Dept: INFUSION THERAPY | Facility: HOSPITAL | Age: 65
End: 2025-01-15
Payer: MEDICARE

## 2025-01-15 VITALS
HEART RATE: 72 BPM | SYSTOLIC BLOOD PRESSURE: 112 MMHG | RESPIRATION RATE: 18 BRPM | HEIGHT: 67 IN | TEMPERATURE: 98 F | DIASTOLIC BLOOD PRESSURE: 69 MMHG | OXYGEN SATURATION: 95 % | BODY MASS INDEX: 31.97 KG/M2 | WEIGHT: 203.69 LBS

## 2025-01-15 DIAGNOSIS — D80.1 ACQUIRED HYPOGAMMAGLOBULINEMIA: Primary | ICD-10-CM

## 2025-01-15 DIAGNOSIS — G92.00 IMMUNE EFFECTOR CELL-ASSOCIATED NEUROTOXICITY SYNDROME (ICANS): ICD-10-CM

## 2025-01-15 PROCEDURE — 96366 THER/PROPH/DIAG IV INF ADDON: CPT

## 2025-01-15 PROCEDURE — 96365 THER/PROPH/DIAG IV INF INIT: CPT

## 2025-01-15 PROCEDURE — 25000003 PHARM REV CODE 250

## 2025-01-15 PROCEDURE — A4216 STERILE WATER/SALINE, 10 ML: HCPCS

## 2025-01-15 PROCEDURE — 96375 TX/PRO/DX INJ NEW DRUG ADDON: CPT

## 2025-01-15 PROCEDURE — 96367 TX/PROPH/DG ADDL SEQ IV INF: CPT

## 2025-01-15 PROCEDURE — 63600175 PHARM REV CODE 636 W HCPCS: Mod: JZ,TB

## 2025-01-15 RX ORDER — SODIUM CHLORIDE 0.9 % (FLUSH) 0.9 %
10 SYRINGE (ML) INJECTION
Status: DISCONTINUED | OUTPATIENT
Start: 2025-01-15 | End: 2025-01-15 | Stop reason: HOSPADM

## 2025-01-15 RX ORDER — FAMOTIDINE 10 MG/ML
20 INJECTION INTRAVENOUS
Status: COMPLETED | OUTPATIENT
Start: 2025-01-15 | End: 2025-01-15

## 2025-01-15 RX ORDER — HEPARIN 100 UNIT/ML
500 SYRINGE INTRAVENOUS
Status: DISCONTINUED | OUTPATIENT
Start: 2025-01-15 | End: 2025-01-15 | Stop reason: HOSPADM

## 2025-01-15 RX ORDER — ACETAMINOPHEN 325 MG/1
650 TABLET ORAL
Status: COMPLETED | OUTPATIENT
Start: 2025-01-15 | End: 2025-01-15

## 2025-01-15 RX ADMIN — Medication 10 ML: at 01:01

## 2025-01-15 RX ADMIN — HUMAN IMMUNOGLOBULIN G 5 G: 5 LIQUID INTRAVENOUS at 11:01

## 2025-01-15 RX ADMIN — SODIUM CHLORIDE: 9 INJECTION, SOLUTION INTRAVENOUS at 10:01

## 2025-01-15 RX ADMIN — ACETAMINOPHEN 650 MG: 325 TABLET ORAL at 11:01

## 2025-01-15 RX ADMIN — FAMOTIDINE 20 MG: 10 INJECTION INTRAVENOUS at 11:01

## 2025-01-15 RX ADMIN — DIPHENHYDRAMINE HYDROCHLORIDE 50 MG: 50 INJECTION, SOLUTION INTRAMUSCULAR; INTRAVENOUS at 11:01

## 2025-01-15 NOTE — PLAN OF CARE
Pt tolerated IVIG well. No adverse reaction noted. Pt education reinforced on chemo regimen, side effects, what to expect, and when to call Dr. Baker. Pt verbalized understanding. I reviewed pt calendar w/ pt and understanding verbalized.

## 2025-02-04 ENCOUNTER — LAB VISIT (OUTPATIENT)
Dept: LAB | Facility: HOSPITAL | Age: 65
End: 2025-02-04
Payer: MEDICARE

## 2025-02-04 ENCOUNTER — TELEPHONE (OUTPATIENT)
Dept: HEMATOLOGY/ONCOLOGY | Facility: CLINIC | Age: 65
End: 2025-02-04
Payer: MEDICARE

## 2025-02-04 DIAGNOSIS — Z92.850 HISTORY OF CHIMERIC ANTIGEN RECEPTOR T-CELL IMMUNOTHERAPY: ICD-10-CM

## 2025-02-04 LAB
ALBUMIN SERPL BCP-MCNC: 4.3 G/DL (ref 3.5–5.2)
ALP SERPL-CCNC: 86 U/L (ref 38–126)
ALT SERPL W/O P-5'-P-CCNC: 21 U/L (ref 10–44)
ANION GAP SERPL CALC-SCNC: 10 MMOL/L (ref 8–16)
AST SERPL-CCNC: 30 U/L (ref 15–46)
BASOPHILS # BLD AUTO: 0.01 K/UL (ref 0–0.2)
BASOPHILS NFR BLD: 0.2 % (ref 0–1.9)
BILIRUB SERPL-MCNC: 0.8 MG/DL (ref 0.1–1)
CALCIUM SERPL-MCNC: 9.8 MG/DL (ref 8.7–10.5)
CHLORIDE SERPL-SCNC: 106 MMOL/L (ref 95–110)
CO2 SERPL-SCNC: 22 MMOL/L (ref 23–29)
CREAT SERPL-MCNC: 1.36 MG/DL (ref 0.5–1.4)
DIFFERENTIAL METHOD BLD: ABNORMAL
EOSINOPHIL # BLD AUTO: 0.1 K/UL (ref 0–0.5)
EOSINOPHIL NFR BLD: 1.7 % (ref 0–8)
ERYTHROCYTE [DISTWIDTH] IN BLOOD BY AUTOMATED COUNT: 15.5 % (ref 11.5–14.5)
EST. GFR  (NO RACE VARIABLE): 57.4 ML/MIN/1.73 M^2
GLUCOSE SERPL-MCNC: 99 MG/DL (ref 70–110)
HCT VFR BLD AUTO: 34 % (ref 40–54)
HGB BLD-MCNC: 11.1 G/DL (ref 14–18)
IMM GRANULOCYTES # BLD AUTO: 0.01 K/UL (ref 0–0.04)
IMM GRANULOCYTES NFR BLD AUTO: 0.2 % (ref 0–0.5)
LYMPHOCYTES # BLD AUTO: 2 K/UL (ref 1–4.8)
LYMPHOCYTES NFR BLD: 47 % (ref 18–48)
MCH RBC QN AUTO: 32.1 PG (ref 27–31)
MCHC RBC AUTO-ENTMCNC: 32.6 G/DL (ref 32–36)
MCV RBC AUTO: 98 FL (ref 82–98)
MONOCYTES # BLD AUTO: 0.4 K/UL (ref 0.3–1)
MONOCYTES NFR BLD: 9.3 % (ref 4–15)
NEUTROPHILS # BLD AUTO: 1.8 K/UL (ref 1.8–7.7)
NEUTROPHILS NFR BLD: 41.6 % (ref 38–73)
NRBC BLD-RTO: 0 /100 WBC
PLATELET # BLD AUTO: 112 K/UL (ref 150–450)
PMV BLD AUTO: 10.7 FL (ref 9.2–12.9)
POTASSIUM SERPL-SCNC: 4 MMOL/L (ref 3.5–5.1)
PROT SERPL-MCNC: 7 G/DL (ref 6–8.4)
RBC # BLD AUTO: 3.46 M/UL (ref 4.6–6.2)
SODIUM SERPL-SCNC: 138 MMOL/L (ref 136–145)
UUN UR-MCNC: 16 MG/DL (ref 2–20)
WBC # BLD AUTO: 4.21 K/UL (ref 3.9–12.7)

## 2025-02-04 PROCEDURE — 85025 COMPLETE CBC W/AUTO DIFF WBC: CPT | Mod: PN

## 2025-02-04 PROCEDURE — 36415 COLL VENOUS BLD VENIPUNCTURE: CPT | Mod: PN

## 2025-02-04 PROCEDURE — 80053 COMPREHEN METABOLIC PANEL: CPT | Mod: PN

## 2025-02-04 NOTE — TELEPHONE ENCOUNTER
Reached out to pt via phone and had to leave a VM asking pt to return call. Per Nellie Rivera NP, labs are stable.

## 2025-02-04 NOTE — TELEPHONE ENCOUNTER
----- Message from Nellie Rivera NP sent at 2/4/2025  2:36 PM CST -----  At your earliest convenience, can you just call him to tell him his labs were all stable.    Nellie  ----- Message -----  From: [employer] [facility]  Sent: 2/4/2025   9:35 AM CST  To: Nellie Rivera NP

## 2025-02-04 NOTE — TELEPHONE ENCOUNTER
----- Message from Dana sent at 2/4/2025  4:45 PM CST -----  Regarding: returning missed call  Contact: Pt @[phone]  Pt is returning a missed call from someone in the office and is asking for a return call back soon. Thanks.

## 2025-02-07 ENCOUNTER — TELEPHONE (OUTPATIENT)
Dept: HEMATOLOGY/ONCOLOGY | Facility: CLINIC | Age: 65
End: 2025-02-07
Payer: MEDICARE

## 2025-02-07 NOTE — TELEPHONE ENCOUNTER
----- Message from Rahel sent at 2/7/2025  8:05 AM CST -----  Regarding: Refill Request  Type:  RX Refill Request    Who Called: Jaspreet Walsh Jr.        Refill or New Rx:Refill     RX Name and Strength:levoFLOXacin (LEVAQUIN) 500 MG tablet and fluconazole (DIFLUCAN) 200 MG Tab    How is the patient currently taking it? (ex. 1XDay):    Is this a 30 day or 90 day RX:      Preferred Pharmacy with phone number:[facility] #[id number] - [place] [id number] [facility]      Local or Mail Order:Local     Ordering Provider: Nellie Rivera NP    Would the patient rather a call back or a response via [facility]? Call back      Best Call Back Number:[phone]    Additional Information:

## 2025-02-07 NOTE — TELEPHONE ENCOUNTER
Spoke with  and advised that he has three refills on the requested prescription for Levaquin dated 1/13/25. Pt was advised to call [facility] in [place] for a refill. Pt verbalized agreement and understanding.

## 2025-02-18 ENCOUNTER — LAB VISIT (OUTPATIENT)
Dept: LAB | Facility: HOSPITAL | Age: 65
End: 2025-02-18
Payer: MEDICARE

## 2025-02-18 DIAGNOSIS — Z92.850 HISTORY OF CHIMERIC ANTIGEN RECEPTOR T-CELL IMMUNOTHERAPY: ICD-10-CM

## 2025-02-18 DIAGNOSIS — C90.01 MULTIPLE MYELOMA IN REMISSION: ICD-10-CM

## 2025-02-18 LAB
ALBUMIN SERPL BCP-MCNC: 4.3 G/DL (ref 3.5–5.2)
ALP SERPL-CCNC: 81 U/L (ref 38–126)
ALT SERPL W/O P-5'-P-CCNC: 18 U/L (ref 10–44)
ANION GAP SERPL CALC-SCNC: 8 MMOL/L (ref 8–16)
AST SERPL-CCNC: 33 U/L (ref 15–46)
BASOPHILS # BLD AUTO: 0.02 K/UL (ref 0–0.2)
BASOPHILS NFR BLD: 0.5 % (ref 0–1.9)
BILIRUB SERPL-MCNC: 0.7 MG/DL (ref 0.1–1)
CALCIUM SERPL-MCNC: 9.3 MG/DL (ref 8.7–10.5)
CHLORIDE SERPL-SCNC: 104 MMOL/L (ref 95–110)
CO2 SERPL-SCNC: 25 MMOL/L (ref 23–29)
CREAT SERPL-MCNC: 1.33 MG/DL (ref 0.5–1.4)
DIFFERENTIAL METHOD BLD: ABNORMAL
EOSINOPHIL # BLD AUTO: 0.1 K/UL (ref 0–0.5)
EOSINOPHIL NFR BLD: 1.4 % (ref 0–8)
ERYTHROCYTE [DISTWIDTH] IN BLOOD BY AUTOMATED COUNT: 14.5 % (ref 11.5–14.5)
EST. GFR  (NO RACE VARIABLE): 59 ML/MIN/1.73 M^2
GLUCOSE SERPL-MCNC: 97 MG/DL (ref 70–110)
HCT VFR BLD AUTO: 35 % (ref 40–54)
HGB BLD-MCNC: 11.5 G/DL (ref 14–18)
IGA SERPL-MCNC: 5 MG/DL (ref 40–350)
IGG SERPL-MCNC: 552 MG/DL (ref 650–1600)
IGM SERPL-MCNC: 93 MG/DL (ref 50–300)
IMM GRANULOCYTES # BLD AUTO: 0.02 K/UL (ref 0–0.04)
IMM GRANULOCYTES NFR BLD AUTO: 0.5 % (ref 0–0.5)
LYMPHOCYTES # BLD AUTO: 1.9 K/UL (ref 1–4.8)
LYMPHOCYTES NFR BLD: 44.6 % (ref 18–48)
MCH RBC QN AUTO: 32.2 PG (ref 27–31)
MCHC RBC AUTO-ENTMCNC: 32.9 G/DL (ref 32–36)
MCV RBC AUTO: 98 FL (ref 82–98)
MONOCYTES # BLD AUTO: 0.4 K/UL (ref 0.3–1)
MONOCYTES NFR BLD: 9.7 % (ref 4–15)
NEUTROPHILS # BLD AUTO: 1.8 K/UL (ref 1.8–7.7)
NEUTROPHILS NFR BLD: 43.3 % (ref 38–73)
NRBC BLD-RTO: 0 /100 WBC
PLATELET # BLD AUTO: 118 K/UL (ref 150–450)
PMV BLD AUTO: 10.7 FL (ref 9.2–12.9)
POTASSIUM SERPL-SCNC: 4.2 MMOL/L (ref 3.5–5.1)
PROT SERPL-MCNC: 6.9 G/DL (ref 6–8.4)
RBC # BLD AUTO: 3.57 M/UL (ref 4.6–6.2)
SODIUM SERPL-SCNC: 137 MMOL/L (ref 136–145)
UUN UR-MCNC: 18 MG/DL (ref 2–20)
WBC # BLD AUTO: 4.24 K/UL (ref 3.9–12.7)

## 2025-02-18 PROCEDURE — 86334 IMMUNOFIX E-PHORESIS SERUM: CPT | Mod: 26,,, | Performed by: PATHOLOGY

## 2025-02-18 PROCEDURE — 86334 IMMUNOFIX E-PHORESIS SERUM: CPT | Mod: PN

## 2025-02-18 PROCEDURE — 85025 COMPLETE CBC W/AUTO DIFF WBC: CPT | Mod: PN

## 2025-02-18 PROCEDURE — 80053 COMPREHEN METABOLIC PANEL: CPT | Mod: PN

## 2025-02-18 PROCEDURE — 36415 COLL VENOUS BLD VENIPUNCTURE: CPT | Mod: PN

## 2025-02-18 PROCEDURE — 84165 PROTEIN E-PHORESIS SERUM: CPT | Mod: PN

## 2025-02-18 PROCEDURE — 83521 IG LIGHT CHAINS FREE EACH: CPT | Mod: 59,PN

## 2025-02-18 PROCEDURE — 82784 ASSAY IGA/IGD/IGG/IGM EACH: CPT | Mod: 59,PN

## 2025-02-18 PROCEDURE — 84165 PROTEIN E-PHORESIS SERUM: CPT | Mod: 26,,, | Performed by: PATHOLOGY

## 2025-02-19 LAB
ALBUMIN SERPL ELPH-MCNC: 4.33 G/DL (ref 3.35–5.55)
ALPHA1 GLOB SERPL ELPH-MCNC: 0.29 G/DL (ref 0.17–0.41)
ALPHA2 GLOB SERPL ELPH-MCNC: 0.56 G/DL (ref 0.43–0.99)
B-GLOBULIN SERPL ELPH-MCNC: 0.57 G/DL (ref 0.5–1.1)
GAMMA GLOB SERPL ELPH-MCNC: 0.55 G/DL (ref 0.67–1.58)
INTERPRETATION SERPL IFE-IMP: NORMAL
KAPPA LC SER QL IA: 0.35 MG/DL (ref 0.33–1.94)
KAPPA LC/LAMBDA SER IA: 1.4 (ref 0.26–1.65)
LAMBDA LC SER QL IA: 0.25 MG/DL (ref 0.57–2.63)
PROT SERPL-MCNC: 6.3 G/DL (ref 6–8.4)

## 2025-02-23 LAB
PATHOLOGIST INTERPRETATION IFE: NORMAL
PATHOLOGIST INTERPRETATION SPE: NORMAL

## 2025-02-24 ENCOUNTER — LAB VISIT (OUTPATIENT)
Dept: LAB | Facility: HOSPITAL | Age: 65
End: 2025-02-24
Attending: INTERNAL MEDICINE
Payer: MEDICARE

## 2025-02-24 DIAGNOSIS — E11.9 TYPE 2 DIABETES MELLITUS WITHOUT COMPLICATIONS: Primary | ICD-10-CM

## 2025-02-24 LAB
ALBUMIN SERPL BCP-MCNC: 4.6 G/DL (ref 3.5–5.2)
ALP SERPL-CCNC: 83 U/L (ref 38–126)
ALT SERPL W/O P-5'-P-CCNC: 22 U/L (ref 10–44)
ANION GAP SERPL CALC-SCNC: 11 MMOL/L (ref 8–16)
AST SERPL-CCNC: 38 U/L (ref 15–46)
BASOPHILS # BLD AUTO: 0.02 K/UL (ref 0–0.2)
BASOPHILS NFR BLD: 0.5 % (ref 0–1.9)
BILIRUB SERPL-MCNC: 0.6 MG/DL (ref 0.1–1)
CALCIUM SERPL-MCNC: 9.9 MG/DL (ref 8.7–10.5)
CHLORIDE SERPL-SCNC: 108 MMOL/L (ref 95–110)
CHOLEST SERPL-MCNC: 151 MG/DL (ref 120–199)
CHOLEST/HDLC SERPL: 2.7 {RATIO} (ref 2–5)
CO2 SERPL-SCNC: 23 MMOL/L (ref 23–29)
CREAT SERPL-MCNC: 1.27 MG/DL (ref 0.5–1.4)
DIFFERENTIAL METHOD BLD: ABNORMAL
EOSINOPHIL # BLD AUTO: 0.1 K/UL (ref 0–0.5)
EOSINOPHIL NFR BLD: 1.2 % (ref 0–8)
ERYTHROCYTE [DISTWIDTH] IN BLOOD BY AUTOMATED COUNT: 14.6 % (ref 11.5–14.5)
EST. GFR  (NO RACE VARIABLE): >60 ML/MIN/1.73 M^2
ESTIMATED AVG GLUCOSE: 94 MG/DL (ref 68–131)
GLUCOSE SERPL-MCNC: 95 MG/DL (ref 70–110)
HBA1C MFR BLD: 4.9 % (ref 4–5.6)
HCT VFR BLD AUTO: 37.3 % (ref 40–54)
HDLC SERPL-MCNC: 55 MG/DL (ref 40–75)
HDLC SERPL: 36.4 % (ref 20–50)
HGB BLD-MCNC: 12 G/DL (ref 14–18)
IMM GRANULOCYTES # BLD AUTO: 0 K/UL (ref 0–0.04)
IMM GRANULOCYTES NFR BLD AUTO: 0 % (ref 0–0.5)
LDLC SERPL CALC-MCNC: 83.2 MG/DL (ref 63–159)
LYMPHOCYTES # BLD AUTO: 1.7 K/UL (ref 1–4.8)
LYMPHOCYTES NFR BLD: 42.4 % (ref 18–48)
MCH RBC QN AUTO: 32.1 PG (ref 27–31)
MCHC RBC AUTO-ENTMCNC: 32.2 G/DL (ref 32–36)
MCV RBC AUTO: 100 FL (ref 82–98)
MONOCYTES # BLD AUTO: 0.4 K/UL (ref 0.3–1)
MONOCYTES NFR BLD: 9.2 % (ref 4–15)
NEUTROPHILS # BLD AUTO: 1.9 K/UL (ref 1.8–7.7)
NEUTROPHILS NFR BLD: 46.7 % (ref 38–73)
NONHDLC SERPL-MCNC: 96 MG/DL
NRBC BLD-RTO: 0 /100 WBC
PLATELET # BLD AUTO: 118 K/UL (ref 150–450)
PMV BLD AUTO: 10.9 FL (ref 9.2–12.9)
POTASSIUM SERPL-SCNC: 4.5 MMOL/L (ref 3.5–5.1)
PROT SERPL-MCNC: 7.2 G/DL (ref 6–8.4)
RBC # BLD AUTO: 3.74 M/UL (ref 4.6–6.2)
SODIUM SERPL-SCNC: 142 MMOL/L (ref 136–145)
TRIGL SERPL-MCNC: 64 MG/DL (ref 30–150)
TSH SERPL DL<=0.005 MIU/L-ACNC: 1.32 UIU/ML (ref 0.4–4)
UUN UR-MCNC: 15 MG/DL (ref 2–20)
WBC # BLD AUTO: 4.03 K/UL (ref 3.9–12.7)

## 2025-02-24 PROCEDURE — 85025 COMPLETE CBC W/AUTO DIFF WBC: CPT | Mod: PN | Performed by: INTERNAL MEDICINE

## 2025-02-24 PROCEDURE — 83036 HEMOGLOBIN GLYCOSYLATED A1C: CPT | Performed by: INTERNAL MEDICINE

## 2025-02-24 PROCEDURE — 80053 COMPREHEN METABOLIC PANEL: CPT | Mod: PN | Performed by: INTERNAL MEDICINE

## 2025-02-24 PROCEDURE — 84443 ASSAY THYROID STIM HORMONE: CPT | Mod: PN | Performed by: INTERNAL MEDICINE

## 2025-02-24 PROCEDURE — 80061 LIPID PANEL: CPT | Performed by: INTERNAL MEDICINE

## 2025-03-03 ENCOUNTER — LAB VISIT (OUTPATIENT)
Dept: LAB | Facility: HOSPITAL | Age: 65
End: 2025-03-03
Payer: MEDICARE

## 2025-03-03 DIAGNOSIS — Z92.850 HISTORY OF CHIMERIC ANTIGEN RECEPTOR T-CELL IMMUNOTHERAPY: ICD-10-CM

## 2025-03-03 LAB
ALBUMIN SERPL BCP-MCNC: 4.3 G/DL (ref 3.5–5.2)
ALP SERPL-CCNC: 75 U/L (ref 38–126)
ALT SERPL W/O P-5'-P-CCNC: 19 U/L (ref 10–44)
ANION GAP SERPL CALC-SCNC: 11 MMOL/L (ref 8–16)
AST SERPL-CCNC: 30 U/L (ref 15–46)
BASOPHILS # BLD AUTO: 0.01 K/UL (ref 0–0.2)
BASOPHILS NFR BLD: 0.3 % (ref 0–1.9)
BILIRUB SERPL-MCNC: 0.6 MG/DL (ref 0.1–1)
CALCIUM SERPL-MCNC: 9.4 MG/DL (ref 8.7–10.5)
CHLORIDE SERPL-SCNC: 106 MMOL/L (ref 95–110)
CO2 SERPL-SCNC: 22 MMOL/L (ref 23–29)
CREAT SERPL-MCNC: 1.35 MG/DL (ref 0.5–1.4)
DIFFERENTIAL METHOD BLD: ABNORMAL
EOSINOPHIL # BLD AUTO: 0 K/UL (ref 0–0.5)
EOSINOPHIL NFR BLD: 1.1 % (ref 0–8)
ERYTHROCYTE [DISTWIDTH] IN BLOOD BY AUTOMATED COUNT: 13.9 % (ref 11.5–14.5)
EST. GFR  (NO RACE VARIABLE): 57.9 ML/MIN/1.73 M^2
GLUCOSE SERPL-MCNC: 100 MG/DL (ref 70–110)
HCT VFR BLD AUTO: 34.6 % (ref 40–54)
HGB BLD-MCNC: 11.5 G/DL (ref 14–18)
IMM GRANULOCYTES # BLD AUTO: 0.01 K/UL (ref 0–0.04)
IMM GRANULOCYTES NFR BLD AUTO: 0.3 % (ref 0–0.5)
LYMPHOCYTES # BLD AUTO: 1.8 K/UL (ref 1–4.8)
LYMPHOCYTES NFR BLD: 52 % (ref 18–48)
MCH RBC QN AUTO: 32.4 PG (ref 27–31)
MCHC RBC AUTO-ENTMCNC: 33.2 G/DL (ref 32–36)
MCV RBC AUTO: 98 FL (ref 82–98)
MONOCYTES # BLD AUTO: 0.3 K/UL (ref 0.3–1)
MONOCYTES NFR BLD: 9.3 % (ref 4–15)
NEUTROPHILS # BLD AUTO: 1.3 K/UL (ref 1.8–7.7)
NEUTROPHILS NFR BLD: 37 % (ref 38–73)
NRBC BLD-RTO: 0 /100 WBC
PLATELET # BLD AUTO: 109 K/UL (ref 150–450)
PMV BLD AUTO: 11.2 FL (ref 9.2–12.9)
POTASSIUM SERPL-SCNC: 4.4 MMOL/L (ref 3.5–5.1)
PROT SERPL-MCNC: 6.9 G/DL (ref 6–8.4)
RBC # BLD AUTO: 3.55 M/UL (ref 4.6–6.2)
SODIUM SERPL-SCNC: 139 MMOL/L (ref 136–145)
UUN UR-MCNC: 21 MG/DL (ref 2–20)
WBC # BLD AUTO: 3.54 K/UL (ref 3.9–12.7)

## 2025-03-03 PROCEDURE — 36415 COLL VENOUS BLD VENIPUNCTURE: CPT | Mod: PN

## 2025-03-03 PROCEDURE — 85025 COMPLETE CBC W/AUTO DIFF WBC: CPT | Mod: PN

## 2025-03-03 PROCEDURE — 80053 COMPREHEN METABOLIC PANEL: CPT | Mod: PN

## 2025-03-13 NOTE — PROGRESS NOTES
Section of Hematology and Stem Cell Transplantation    Post-Cellular Therapy Follow Up Visit     3/17/25    Cellular Therapy History:   Primary oncologist: Jesús Baker MD  Primary oncologic diagnosis: Multiple Myeloma  Cell infusion date: 09/30/24  Cell product: ciltacabtagene autoleucel (Carvykti)  Cell dose: 1 x 10^6 CAR T-cells/kg  Lymphodepletion chemotherapy: fludarabine 30 mg/m2 + cyclophosphamide 300 mg/m2 days -7, -6, -5  Prophylactic corticosteroid use: none  Immediate post-transplant complications: none    History of Present Ilness:   Jaspreet Walsh  (Jaspreet) is a pleasant 65 y.o.male with a multiple myeloma who presents for post-cellular therapy follow up. S/p Carvykti, today is day +168.    Hospital Course for Carvykti:  Admitted on 9/30/24 for relapsed/refractory multiple myeloma. Received 0.50x10^6T cells on 10/01/24. Sustained TMAX 100.6 on day +5/6, likely grade 1 CRS. Day +7, TMAX 102.6, soft BP, up-trending inflammatory markers, elevated liver enzymes/tbili-Grade 2 CRS. Received IVF and Toci. EKG w/ read of STEMI, normal cardiac markers. Infectious workups negative. Down-trending liver enzymes/tbili, ferritin.    Interval History 03/17/2025:   Patient here for CAR-T follow up, day +168 now. He is still feeling great overall. His is eating/drinking well with no appreciable side effects. His previous leg pain is mostly resolved. Denies fever, chills, drenching night sweats, unexplained weight loss, early satiety, chest pain, shortness of breath, new/worsening bone pain, adenopathy, N/V/D.     PAST MEDICAL HISTORY:   Past Medical History:   Diagnosis Date    Anxiety     Arthritis     Cancer     Diabetes mellitus     Hypertension        PAST SURGICAL HISTORY:   Past Surgical History:   Procedure Laterality Date    BACK SURGERY  01/01/2021    BONE MARROW BIOPSY Left 10/20/2021    Procedure: Biopsy-bone marrow;  Surgeon: Summer Cartwright MD;  Location: [address]);  Service: Oncology;   Laterality: Left;    COLONOSCOPY N/A 2021    Procedure: COLONOSCOPY;  Surgeon: Braxton Lees MD;  Location: [facility] ENDO (4TH FLR);  Service: Endoscopy;  Laterality: N/A;  -covid kristopher-inst mailed-tb  21-pt to remain on schedule with Dr. Lees, and confirmed updated arrival time of 0835-BB    COLONOSCOPY N/A 2021    Procedure: COLONOSCOPY;  Surgeon: Jo Ann Robledo MD;  Location: [facility] ENDO (4TH FLR);  Service: Endoscopy;  Laterality: N/A;  rescheduled due to poor bowel prep, to be rescheduled with first available provider-BB  [id number]-BB    CREATION OF MUSCLE ROTATIONAL FLAP N/A 2020    Procedure: CREATION, FLAP, MUSCLE ROTATION;  Surgeon: Kamlesh Bellamy MD;  Location: [facility] OR 2ND FLR;  Service: Plastics;  Laterality: N/A;    KNEE SURGERY Left 2019    LUMBAR FUSION N/A 2020    Procedure: FUSION, SPINE, LUMBAR L2-pelvis. Depuy. Plastic surgery closure w/ Dr. Bellamy;  Surgeon: Nick Coyle MD;  Location: [facility] OR 2ND FLR;  Service: Neurosurgery;  Laterality: N/A;    Metastatic neoplasum removed from spine      PLACEMENT OF DUAL-LUMEN VASCULAR CATHETER Left 2024    Procedure: INSERTION-CATHETER-LENORA, double lumen, left poss right, Bard 14.5 fr hemosplit catheter, model [id number];  Surgeon: Nelson Aponte MD;  [facility];  Service: General;  Laterality: Left;       PAST SOCIAL HISTORY:  Social History     Tobacco Use    Smoking status: Former     Current packs/day: 0.00     Average packs/day: 0.3 packs/day for 40.0 years (10.0 ttl pk-yrs)     Types: Cigarettes     Start date:      Quit date: 2017     Years since quittin.2    Smokeless tobacco: Never       FAMILY HISTORY:  Family History   Problem Relation Name Age of Onset    Cancer Father Jaspreet Walsh Sr        CURRENT MEDICATIONS:   Current Outpatient Medications   Medication Sig    acyclovir (ZOVIRAX) 800 MG Tab Take 1 tablet (800 mg total) by mouth 2 (two) times daily.     amLODIPine (NORVASC) 10 MG tablet Take 10 mg by mouth once daily.    atorvastatin (LIPITOR) 20 MG tablet Take 20 mg by mouth once daily.    fluconazole (DIFLUCAN) 200 MG Tab Take 2 tablets (400 mg total) by mouth once daily.    gabapentin (NEURONTIN) 300 MG capsule Take 1 capsule (300 mg total) by mouth 2 (two) times daily.    hydroCHLOROthiazide (HYDRODIURIL) 25 MG tablet Take 25 mg by mouth once daily.    JARDIANCE 10 mg tablet Take 10 mg by mouth.    JARDIANCE 25 mg tablet Take 25 mg by mouth once daily.    levoFLOXacin (LEVAQUIN) 500 MG tablet Take 1 tablet (500 mg total) by mouth once daily.    losartan (COZAAR) 25 MG tablet Take 1 tablet (25 mg total) by mouth once daily.    potassium chloride SA (K-DUR,KLOR-CON M) 10 MEQ tablet TAKE 1 TABLET(10 MEQ) BY MOUTH EVERY DAY    sulfamethoxazole-trimethoprim 800-160mg (BACTRIM DS) 800-160 mg Tab Take 1 tablet by mouth every Mon, Wed, Fri.    ondansetron (ZOFRAN-ODT) 8 MG TbDL Take 1 tablet (8 mg total) by mouth every 8 (eight) hours as needed (Nausea/Vomiting). (Patient not taking: Reported on 10/21/2024)     No current facility-administered medications for this visit.       ALLERGIES:   Review of patient's allergies indicates:  No Known Allergies    GVHD Review of Systems:     Pertinent positives and negatives included in the HPI.     Physical Exam:     Vitals:    03/17/25 0917   BP: (!) 142/86   Pulse: 82   Resp: 16   Temp: 98.4 °F (36.9 °C)       Physical Exam  Vitals reviewed.   Constitutional:       General: He is not in acute distress.     Appearance: Normal appearance. He is obese. He is not ill-appearing.   HENT:      Head: Normocephalic and atraumatic.      Nose: Nose normal.      Mouth/Throat:      Mouth: Mucous membranes are moist.      Pharynx: Oropharynx is clear. No oropharyngeal exudate.   Eyes:      Pupils: Pupils are equal, round, and reactive to light.   Cardiovascular:      Rate and Rhythm: Normal rate and regular rhythm.      Heart sounds: Normal  heart sounds. No murmur heard.  Pulmonary:      Effort: Pulmonary effort is normal.      Breath sounds: Normal breath sounds. No wheezing.   Abdominal:      Palpations: Abdomen is soft.      Tenderness: There is no abdominal tenderness.   Musculoskeletal:         General: Normal range of motion.      Cervical back: Normal range of motion and neck supple.      Right lower leg: No edema.      Left lower leg: No edema.   Skin:     General: Skin is warm and dry.      Findings: No lesion or rash.   Neurological:      Mental Status: He is alert and oriented to person, place, and time.      Motor: No weakness.   Psychiatric:         Mood and Affect: Mood normal.         Thought Content: Thought content normal.        ECOG Performance Status: (foot note - ECOG PS provided by Eastern Cooperative Oncology Group) 0 - Asymptomatic    Karnofsky Performance Score:  100%- Normal, No Complaints, No Evidence of Disease    Labs:   Lab Results   Component Value Date    WBC 5.21 03/14/2025    RBC 3.78 (L) 03/14/2025    HGB 12.1 (L) 03/14/2025    HCT 35.9 (L) 03/14/2025    MCV 95 03/14/2025    MCH 32.0 (H) 03/14/2025    MCHC 33.7 03/14/2025    RDW 13.3 03/14/2025     (L) 03/14/2025    MPV 11.2 03/14/2025    GRAN 1.6 (L) 03/14/2025    GRAN 31.2 (L) 03/14/2025    LYMPH 3.1 03/14/2025    LYMPH 59.5 (H) 03/14/2025    MONO 0.4 03/14/2025    MONO 7.5 03/14/2025    EOS 0.1 03/14/2025    BASO 0.02 03/14/2025    EOSINOPHIL 1.2 03/14/2025    BASOPHIL 0.4 03/14/2025       Sodium   Date Value Ref Range Status   03/14/2025 138 136 - 145 mmol/L Final     Potassium   Date Value Ref Range Status   03/14/2025 4.0 3.5 - 5.1 mmol/L Final     Chloride   Date Value Ref Range Status   03/14/2025 106 95 - 110 mmol/L Final     CO2   Date Value Ref Range Status   03/14/2025 22 (L) 23 - 29 mmol/L Final     Glucose   Date Value Ref Range Status   03/14/2025 102 70 - 110 mg/dL Final     BUN   Date Value Ref Range Status   03/14/2025 21 (H) 2 - 20 mg/dL  Final     Creatinine   Date Value Ref Range Status   03/14/2025 1.37 0.50 - 1.40 mg/dL Final     Calcium   Date Value Ref Range Status   03/14/2025 9.2 8.7 - 10.5 mg/dL Final     Total Protein   Date Value Ref Range Status   03/14/2025 7.1 6.0 - 8.4 g/dL Final     Albumin   Date Value Ref Range Status   03/14/2025 4.6 3.5 - 5.2 g/dL Final     Total Bilirubin   Date Value Ref Range Status   03/14/2025 0.6 0.1 - 1.0 mg/dL Final     Comment:     For infants and newborns, interpretation of results should be based  on gestational age, weight and in agreement with clinical  observations.    Premature Infant recommended reference ranges:  Up to 24 hours.............<8.0 mg/dL  Up to 48 hours............<12.0 mg/dL  3-5 days..................<15.0 mg/dL  6-29 days.................<15.0 mg/dL       Alkaline Phosphatase   Date Value Ref Range Status   03/14/2025 81 38 - 126 U/L Final     AST   Date Value Ref Range Status   03/14/2025 32 15 - 46 U/L Final     ALT   Date Value Ref Range Status   03/14/2025 20 10 - 44 U/L Final     Anion Gap   Date Value Ref Range Status   03/14/2025 10 8 - 16 mmol/L Final     eGFR if    Date Value Ref Range Status   07/01/2022 >60.0 >60 mL/min/1.73 m^2 Final     eGFR if non    Date Value Ref Range Status   07/01/2022 >60.0 >60 mL/min/1.73 m^2 Final     Comment:     Calculation used to obtain the estimated glomerular filtration  rate (eGFR) is the CKD-EPI equation.        Imaging:   None    Pathology:  None    Assessment and Plan:   Jaspreet Walsh Jr. (Jaspreet) is a pleasant 65 y.o.male with a past medical history of multiple myeloma s/p  Carvykti  who presents for post-CAR-T follow up.    Multiple Myeloma: Status post treatment with Carvykti. Will restage at day 30, day 100, +/- day 180, 1 year, 2 years.  IgG kappa, standard-risk MM, diagnosed September 2020 after presentation w/ lytic lesions  S/p 8 cycles VRd, followed by NATHALIE autoSCT 5/17/2021 and revlimid  maintenance  Relapsed 2023, DKd salvage initiated 23  Good initial response; however, biochemical progression 4/3/24  Transitioned to [facility] as bridge to Carvykti  Carvykti 10/1/24; today is day +168     History of cellular therapy: As above, he received ciltacabtagene autoleucel (Carvykti) on 10/1/24. Complications include Grade II CRS, received 1 dose Tociluzimab.  Today is day + 168   Patient/family instructed to contact us with any fevers, mental status changes or unclear communication, or new/changed symptoms  Instructed to start Bactrim on Wednesday and okay to stop keppra on day +30.  Day +100 restaging  Bone marrow biopsy with normocellular marrow (30%) with marked myeloid hypoplasia. No morphologic or immunophenotypic evidence of residual/relapsed plasma cell myeloma. No MRD.  PET: In this patient with multiple myeloma, there is mildly heterogeneous tracer uptake throughout the bone marrow, improved from prior with post treatment response. No new osseous lesions.     Immunosuppression: Bactrim and acyclovir prophylaxis until 1 year post-CART (and CD4 >200). Stop fluconazole (200 mg daily, dose reduced for renal function) and levaquin until with resolution of severe neutropenia. Monitor immunoglobulins every 8 weeks.   Last CMV: 24 down to 131; Ig24 342, initiate IVIG as below  Active infections: no infectious signs/symptoms today    Pancytopenia: Due to underlying disease +/- chemotherapy. Transfuse for Hgb <7 g/dL and platelets <10k.  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 Nellie: CAR-T follow up   Provider visit type    Infusion scheduling note    Injection scheduling note    Labs CBC, CMP, free light chains, immunofixation, immunoglobulins and SPEP   Scheduling:  Preferred lab:  Lab interval: every 8 weeks  [facility]: in 8 weeks, 4-5 days prior to follow up   Imaging    Pharmacy appointment    Other referrals       Additional referrals needed  ID referral             Total time of this visit was 30 minutes, including time spent face to face with patient and/or via video/audio, and also in preparing for today's visit for MDM and documentation. (Medical Decision Making, including consideration of possible diagnoses, management options, complex medical record review, review of diagnostic tests and information, consideration and discussion of significant complications based on comorbidities, and discussion with providers involved with the care of the patient). Greater than 50% was spent face to face with the patient counseling and coordinating care.    Visit today included increased complexity associated with the care of the episodic problem lab review addressed and managing the longitudinal care of the patient due to the serious and/or complex managed problem(s) history of chimeric antigen t-cell therapy/multiple myeloma.     Nellie Rivera, LINDAP-C  Hematologic Malignancies, Stem Cell Transplantation, and Cellular Therapy  [facility] and [facility]

## 2025-03-14 ENCOUNTER — LAB VISIT (OUTPATIENT)
Dept: LAB | Facility: HOSPITAL | Age: 65
End: 2025-03-14
Payer: MEDICARE

## 2025-03-14 DIAGNOSIS — Z92.850 HISTORY OF CHIMERIC ANTIGEN RECEPTOR T-CELL IMMUNOTHERAPY: ICD-10-CM

## 2025-03-14 DIAGNOSIS — C90.01 MULTIPLE MYELOMA IN REMISSION: ICD-10-CM

## 2025-03-14 LAB
ALBUMIN SERPL BCP-MCNC: 4.6 G/DL (ref 3.5–5.2)
ALP SERPL-CCNC: 81 U/L (ref 38–126)
ALT SERPL W/O P-5'-P-CCNC: 20 U/L (ref 10–44)
ANION GAP SERPL CALC-SCNC: 10 MMOL/L (ref 8–16)
AST SERPL-CCNC: 32 U/L (ref 15–46)
BASOPHILS # BLD AUTO: 0.02 K/UL (ref 0–0.2)
BASOPHILS NFR BLD: 0.4 % (ref 0–1.9)
BILIRUB SERPL-MCNC: 0.6 MG/DL (ref 0.1–1)
CALCIUM SERPL-MCNC: 9.2 MG/DL (ref 8.7–10.5)
CHLORIDE SERPL-SCNC: 106 MMOL/L (ref 95–110)
CO2 SERPL-SCNC: 22 MMOL/L (ref 23–29)
CREAT SERPL-MCNC: 1.37 MG/DL (ref 0.5–1.4)
DIFFERENTIAL METHOD BLD: ABNORMAL
EOSINOPHIL # BLD AUTO: 0.1 K/UL (ref 0–0.5)
EOSINOPHIL NFR BLD: 1.2 % (ref 0–8)
ERYTHROCYTE [DISTWIDTH] IN BLOOD BY AUTOMATED COUNT: 13.3 % (ref 11.5–14.5)
EST. GFR  (NO RACE VARIABLE): 56.9 ML/MIN/1.73 M^2
GLUCOSE SERPL-MCNC: 102 MG/DL (ref 70–110)
HCT VFR BLD AUTO: 35.9 % (ref 40–54)
HGB BLD-MCNC: 12.1 G/DL (ref 14–18)
IGA SERPL-MCNC: 5 MG/DL (ref 40–350)
IGG SERPL-MCNC: 551 MG/DL (ref 650–1600)
IGM SERPL-MCNC: 60 MG/DL (ref 50–300)
IMM GRANULOCYTES # BLD AUTO: 0.01 K/UL (ref 0–0.04)
IMM GRANULOCYTES NFR BLD AUTO: 0.2 % (ref 0–0.5)
LYMPHOCYTES # BLD AUTO: 3.1 K/UL (ref 1–4.8)
LYMPHOCYTES NFR BLD: 59.5 % (ref 18–48)
MCH RBC QN AUTO: 32 PG (ref 27–31)
MCHC RBC AUTO-ENTMCNC: 33.7 G/DL (ref 32–36)
MCV RBC AUTO: 95 FL (ref 82–98)
MONOCYTES # BLD AUTO: 0.4 K/UL (ref 0.3–1)
MONOCYTES NFR BLD: 7.5 % (ref 4–15)
NEUTROPHILS # BLD AUTO: 1.6 K/UL (ref 1.8–7.7)
NEUTROPHILS NFR BLD: 31.2 % (ref 38–73)
NRBC BLD-RTO: 0 /100 WBC
PLATELET # BLD AUTO: 117 K/UL (ref 150–450)
PMV BLD AUTO: 11.2 FL (ref 9.2–12.9)
POTASSIUM SERPL-SCNC: 4 MMOL/L (ref 3.5–5.1)
PROT SERPL-MCNC: 7.1 G/DL (ref 6–8.4)
RBC # BLD AUTO: 3.78 M/UL (ref 4.6–6.2)
SODIUM SERPL-SCNC: 138 MMOL/L (ref 136–145)
UUN UR-MCNC: 21 MG/DL (ref 2–20)
WBC # BLD AUTO: 5.21 K/UL (ref 3.9–12.7)

## 2025-03-14 PROCEDURE — 82784 ASSAY IGA/IGD/IGG/IGM EACH: CPT | Mod: 59,PN

## 2025-03-14 PROCEDURE — 36415 COLL VENOUS BLD VENIPUNCTURE: CPT | Mod: PN

## 2025-03-14 PROCEDURE — 84165 PROTEIN E-PHORESIS SERUM: CPT | Mod: 26,,, | Performed by: PATHOLOGY

## 2025-03-14 PROCEDURE — 86334 IMMUNOFIX E-PHORESIS SERUM: CPT | Mod: 26,,, | Performed by: PATHOLOGY

## 2025-03-14 PROCEDURE — 86334 IMMUNOFIX E-PHORESIS SERUM: CPT | Mod: PN

## 2025-03-14 PROCEDURE — 80053 COMPREHEN METABOLIC PANEL: CPT | Mod: PN

## 2025-03-14 PROCEDURE — 85025 COMPLETE CBC W/AUTO DIFF WBC: CPT | Mod: PN

## 2025-03-14 PROCEDURE — 84165 PROTEIN E-PHORESIS SERUM: CPT | Mod: PN

## 2025-03-14 PROCEDURE — 83521 IG LIGHT CHAINS FREE EACH: CPT | Mod: PN

## 2025-03-17 ENCOUNTER — OFFICE VISIT (OUTPATIENT)
Dept: HEMATOLOGY/ONCOLOGY | Facility: CLINIC | Age: 65
End: 2025-03-17
Payer: MEDICARE

## 2025-03-17 VITALS
SYSTOLIC BLOOD PRESSURE: 142 MMHG | HEIGHT: 67 IN | WEIGHT: 201.63 LBS | DIASTOLIC BLOOD PRESSURE: 86 MMHG | HEART RATE: 82 BPM | BODY MASS INDEX: 31.65 KG/M2 | OXYGEN SATURATION: 100 % | RESPIRATION RATE: 16 BRPM | TEMPERATURE: 98 F

## 2025-03-17 DIAGNOSIS — G62.0 CHEMOTHERAPY-INDUCED NEUROPATHY: ICD-10-CM

## 2025-03-17 DIAGNOSIS — D80.1 ACQUIRED HYPOGAMMAGLOBULINEMIA: ICD-10-CM

## 2025-03-17 DIAGNOSIS — Z92.850 HISTORY OF CHIMERIC ANTIGEN RECEPTOR T-CELL IMMUNOTHERAPY: Primary | ICD-10-CM

## 2025-03-17 DIAGNOSIS — C90.01 MULTIPLE MYELOMA IN REMISSION: ICD-10-CM

## 2025-03-17 DIAGNOSIS — D61.818 PANCYTOPENIA: ICD-10-CM

## 2025-03-17 DIAGNOSIS — T45.1X5A CHEMOTHERAPY-INDUCED NEUROPATHY: ICD-10-CM

## 2025-03-17 DIAGNOSIS — D84.9 IMMUNOSUPPRESSION: ICD-10-CM

## 2025-03-17 LAB
ALBUMIN SERPL ELPH-MCNC: 4.36 G/DL (ref 3.35–5.55)
ALPHA1 GLOB SERPL ELPH-MCNC: 0.28 G/DL (ref 0.17–0.41)
ALPHA2 GLOB SERPL ELPH-MCNC: 0.6 G/DL (ref 0.43–0.99)
B-GLOBULIN SERPL ELPH-MCNC: 0.56 G/DL (ref 0.5–1.1)
GAMMA GLOB SERPL ELPH-MCNC: 0.49 G/DL (ref 0.67–1.58)
INTERPRETATION SERPL IFE-IMP: NORMAL
KAPPA LC SER QL IA: 0.47 MG/DL (ref 0.33–1.94)
KAPPA LC/LAMBDA SER IA: 1.52 (ref 0.26–1.65)
LAMBDA LC SER QL IA: 0.31 MG/DL (ref 0.57–2.63)
PROT SERPL-MCNC: 6.3 G/DL (ref 6–8.4)

## 2025-03-17 PROCEDURE — 99999 PR PBB SHADOW E&M-EST. PATIENT-LVL IV: CPT | Mod: PBBFAC,,,

## 2025-03-17 RX ORDER — EMPAGLIFLOZIN 10 MG/1
10 TABLET, FILM COATED ORAL
COMMUNITY
Start: 2025-03-07

## 2025-03-18 LAB
PATHOLOGIST INTERPRETATION IFE: NORMAL
PATHOLOGIST INTERPRETATION SPE: NORMAL

## 2025-03-31 DIAGNOSIS — G62.9 NEUROPATHY: ICD-10-CM

## 2025-03-31 RX ORDER — GABAPENTIN 300 MG/1
300 CAPSULE ORAL 2 TIMES DAILY
Qty: 60 CAPSULE | Refills: 3 | Status: SHIPPED | OUTPATIENT
Start: 2025-03-31

## 2025-03-31 NOTE — TELEPHONE ENCOUNTER
----- Message from Tahsa sent at 3/31/2025  8:11 AM CDT -----  Regarding: Refill  Contact: Pt  [phone]  Rx Name:  gabapentin (NEURONTIN) 300 MG capsulePreferred Pharmacy with number:  [id number] - [place], [place] - [address] [facility] AT [facility] & [id number] [facility] [address]Irakf: [phone] Fax: [phone] Provider:Nellie Rivera NP Contact preference:  [phone]/Notes:  Requesting refill

## 2025-05-01 DIAGNOSIS — C90.00 MULTIPLE MYELOMA, REMISSION STATUS UNSPECIFIED: ICD-10-CM

## 2025-05-01 DIAGNOSIS — Z79.899 IMMUNODEFICIENCY DUE TO DRUGS: ICD-10-CM

## 2025-05-01 DIAGNOSIS — Z92.850 S/P CHIMERIC ANTIGEN RECEPTOR T-CELL THERAPY: ICD-10-CM

## 2025-05-01 DIAGNOSIS — D84.821 IMMUNODEFICIENCY DUE TO DRUGS: ICD-10-CM

## 2025-05-01 RX ORDER — SULFAMETHOXAZOLE AND TRIMETHOPRIM 800; 160 MG/1; MG/1
1 TABLET ORAL
Qty: 36 TABLET | Refills: 1 | Status: SHIPPED | OUTPATIENT
Start: 2025-05-02

## 2025-05-01 NOTE — TELEPHONE ENCOUNTER
"----- Message from Ezio sent at 5/1/2025  8:48 AM CDT -----  RX Name and Strength: sulfamethoxazole-trimethoprim 800-160mg (BACTRIM DS) 800-160 mg TabHow is the patient currently taking it?  Take 1 tablet by mouth every Mon, Wed, Fri. - OralIs this a 30 day or 90 day Rx? 36 tabletPreferred Pharmacy with phone number: [id number] - [facility] [address] [facility] AT [facility] & [id number] [facility] [address]Xtamt: [phone] Fax: [phone] or Mail Order: LocalOrdering Provider: Benjamin Preference: [phone] Information:"Thank you for all that you do for our patients"  "

## 2025-05-12 ENCOUNTER — LAB VISIT (OUTPATIENT)
Dept: LAB | Facility: HOSPITAL | Age: 65
End: 2025-05-12
Attending: INTERNAL MEDICINE
Payer: MEDICARE

## 2025-05-12 DIAGNOSIS — C90.01 MULTIPLE MYELOMA IN REMISSION: ICD-10-CM

## 2025-05-12 DIAGNOSIS — Z92.850 HISTORY OF CHIMERIC ANTIGEN RECEPTOR T-CELL IMMUNOTHERAPY: ICD-10-CM

## 2025-05-12 LAB
ABSOLUTE EOSINOPHIL (OHS): 0.04 K/UL
ABSOLUTE MONOCYTE (OHS): 0.43 K/UL (ref 0.3–1)
ABSOLUTE NEUTROPHIL COUNT (OHS): 1.93 K/UL (ref 1.8–7.7)
ALBUMIN SERPL BCP-MCNC: 4.3 G/DL (ref 3.5–5.2)
ALP SERPL-CCNC: 73 UNIT/L (ref 38–126)
ALT SERPL W/O P-5'-P-CCNC: 21 UNIT/L (ref 10–44)
ANION GAP (OHS): 11 MMOL/L (ref 8–16)
AST SERPL-CCNC: 31 UNIT/L (ref 15–46)
BASOPHILS # BLD AUTO: 0.01 K/UL
BASOPHILS NFR BLD AUTO: 0.2 %
BILIRUB SERPL-MCNC: 0.4 MG/DL (ref 0.1–1)
BUN SERPL-MCNC: 18 MG/DL (ref 2–20)
CALCIUM SERPL-MCNC: 9.2 MG/DL (ref 8.7–10.5)
CHLORIDE SERPL-SCNC: 105 MMOL/L (ref 95–110)
CO2 SERPL-SCNC: 22 MMOL/L (ref 23–29)
CREAT SERPL-MCNC: 1.4 MG/DL (ref 0.5–1.4)
ERYTHROCYTE [DISTWIDTH] IN BLOOD BY AUTOMATED COUNT: 13.9 % (ref 11.5–14.5)
GFR SERPLBLD CREATININE-BSD FMLA CKD-EPI: 55 ML/MIN/1.73/M2
GLUCOSE SERPL-MCNC: 90 MG/DL (ref 70–110)
HCT VFR BLD AUTO: 37.3 % (ref 40–54)
HGB BLD-MCNC: 12.5 GM/DL (ref 14–18)
IGA SERPL-MCNC: 8 MG/DL (ref 40–350)
IGG SERPL-MCNC: 562 MG/DL (ref 650–1600)
IGM SERPL-MCNC: 47 MG/DL (ref 50–300)
IMM GRANULOCYTES # BLD AUTO: 0.03 K/UL (ref 0–0.04)
IMM GRANULOCYTES NFR BLD AUTO: 0.6 % (ref 0–0.5)
LYMPHOCYTES # BLD AUTO: 2.23 K/UL (ref 1–4.8)
MCH RBC QN AUTO: 30.5 PG (ref 27–31)
MCHC RBC AUTO-ENTMCNC: 33.5 G/DL (ref 32–36)
MCV RBC AUTO: 91 FL (ref 82–98)
NUCLEATED RBC (/100WBC) (OHS): 0 /100 WBC
PLATELET # BLD AUTO: 96 K/UL (ref 150–450)
PMV BLD AUTO: 9.1 FL (ref 9.2–12.9)
POTASSIUM SERPL-SCNC: 4.1 MMOL/L (ref 3.5–5.1)
PROT SERPL-MCNC: 6.7 GM/DL (ref 6–8.4)
RBC # BLD AUTO: 4.1 M/UL (ref 4.6–6.2)
RELATIVE EOSINOPHIL (OHS): 0.9 %
RELATIVE LYMPHOCYTE (OHS): 47.8 % (ref 18–48)
RELATIVE MONOCYTE (OHS): 9.2 % (ref 4–15)
RELATIVE NEUTROPHIL (OHS): 41.3 % (ref 38–73)
SODIUM SERPL-SCNC: 138 MMOL/L (ref 136–145)
WBC # BLD AUTO: 4.67 K/UL (ref 3.9–12.7)

## 2025-05-12 PROCEDURE — 36415 COLL VENOUS BLD VENIPUNCTURE: CPT | Mod: PN

## 2025-05-12 PROCEDURE — 85025 COMPLETE CBC W/AUTO DIFF WBC: CPT | Mod: PN

## 2025-05-12 PROCEDURE — 86334 IMMUNOFIX E-PHORESIS SERUM: CPT | Mod: 26,,, | Performed by: PATHOLOGY

## 2025-05-12 PROCEDURE — 86334 IMMUNOFIX E-PHORESIS SERUM: CPT | Mod: PN

## 2025-05-12 PROCEDURE — 84165 PROTEIN E-PHORESIS SERUM: CPT | Mod: PN

## 2025-05-12 PROCEDURE — 82040 ASSAY OF SERUM ALBUMIN: CPT | Mod: PN

## 2025-05-12 PROCEDURE — 84165 PROTEIN E-PHORESIS SERUM: CPT | Mod: 26,,, | Performed by: PATHOLOGY

## 2025-05-12 PROCEDURE — 83521 IG LIGHT CHAINS FREE EACH: CPT | Mod: PN

## 2025-05-12 PROCEDURE — 82784 ASSAY IGA/IGD/IGG/IGM EACH: CPT | Mod: 59,PN

## 2025-05-13 LAB
ALBUMIN, SPE (OHS): 4.23 G/DL (ref 3.35–5.55)
ALPHA 1 GLOB (OHS): 0.29 GM/DL (ref 0.17–0.41)
ALPHA 2 GLOB (OHS): 0.59 GM/DL (ref 0.43–0.99)
BETA GLOB (OHS): 0.52 GM/DL (ref 0.5–1.1)
GAMMA GLOBULIN (OHS): 0.47 GM/DL (ref 0.67–1.58)
KAPPA LC FREE SER-MCNC: 2.13 MG/L (ref 0.26–1.65)
KAPPA LC FREE/LAMBDA FREE SER: 0.68 MG/DL (ref 0.33–1.94)
LAMBDA LC FREE SERPL-MCNC: 0.32 MG/DL (ref 0.57–2.63)
PATHOLOGIST INTERPRETATION - IFE SERUM (OHS): NORMAL
PATHOLOGIST REVIEW - SPE (OHS): NORMAL
PROT SERPL-MCNC: 6.1 GM/DL (ref 6–8.4)

## 2025-05-13 NOTE — PROGRESS NOTES
Section of Hematology and Stem Cell Transplantation    Post-Cellular Therapy Follow Up Visit     5/16/25    Cellular Therapy History:   Primary oncologist: Jesús Baker MD  Primary oncologic diagnosis: Multiple Myeloma  Cell infusion date: 09/30/24  Cell product: ciltacabtagene autoleucel (Carvykti)  Cell dose: 1 x 10^6 CAR T-cells/kg  Lymphodepletion chemotherapy: fludarabine 30 mg/m2 + cyclophosphamide 300 mg/m2 days -7, -6, -5  Prophylactic corticosteroid use: none  Immediate post-transplant complications: none    History of Present Ilness:   Jaspreet Walsh  (Jaspreet) is a pleasant 65 y.o.male with a multiple myeloma who presents for post-cellular therapy follow up. S/p Carvykti, today is day +228.    Hospital Course for Carvykti:  Admitted on 9/30/24 for relapsed/refractory multiple myeloma. Received 0.50x10^6T cells on 10/01/24. Sustained TMAX 100.6 on day +5/6, likely grade 1 CRS. Day +7, TMAX 102.6, soft BP, up-trending inflammatory markers, elevated liver enzymes/tbili-Grade 2 CRS. Received IVF and Toci. EKG w/ read of STEMI, normal cardiac markers. Infectious workups negative. Down-trending liver enzymes/tbili, ferritin.    Interval History 05/16/2025:  He is doing well and has not seen infectious disease yet for post-CAR-T vaccines, now day +228. We discussed that he no longer needs levaquin and fluconazole, as his neutrophil count is improved. He is feeling well and no major problems and doing his regular ADLs/activities. He still has chronic neuropathy that is improved, still using gabapentin. Denies fever, chills, drenching night sweats, unexplained weight loss, early satiety, chest pain, shortness of breath, new/worsening bone pain, adenopathy, N/V/D.     PAST MEDICAL HISTORY:   Past Medical History:   Diagnosis Date    Anxiety     Arthritis     Cancer     Diabetes mellitus     Hypertension        PAST SURGICAL HISTORY:   Past Surgical History:   Procedure Laterality Date    BACK SURGERY   2021    BONE MARROW BIOPSY Left 10/20/2021    Procedure: Biopsy-bone marrow;  Surgeon: Summer Cartwright MD;  Location: [facility] ENDO (4TH FLR);  Service: Oncology;  Laterality: Left;    COLONOSCOPY N/A 2021    Procedure: COLONOSCOPY;  Surgeon: Braxton Lees MD;  Location: [facility] ENDO (4TH FLR);  Service: Endoscopy;  Laterality: N/A;  -covid kristopher-inst mailed-tb  21-pt to remain on schedule with Dr. Lees, and confirmed updated arrival time of 0835-BB    COLONOSCOPY N/A 2021    Procedure: COLONOSCOPY;  Surgeon: Jo Ann Robledo MD;  Location: [facility] ENDO (4TH FLR);  Service: Endoscopy;  Laterality: N/A;  rescheduled due to poor bowel prep, to be rescheduled with first available provider-BB  [id number]    CREATION OF MUSCLE ROTATIONAL FLAP N/A 2020    Procedure: CREATION, FLAP, MUSCLE ROTATION;  Surgeon: Kamlesh Bellamy MD;  [facility];  Service: Plastics;  Laterality: N/A;    KNEE SURGERY Left 2019    LUMBAR FUSION N/A 2020    Procedure: FUSION, SPINE, LUMBAR L2-pelvis. Depuy. Plastic surgery closure w/ Dr. Bellamy;  Surgeon: Nick Coyle MD;  [facility];  Service: Neurosurgery;  Laterality: N/A;    Metastatic neoplasum removed from spine      PLACEMENT OF DUAL-LUMEN VASCULAR CATHETER Left 2024    Procedure: INSERTION-CATHETER-LENORA, double lumen, left poss right, Bard 14.5 fr hemosplit catheter, model [id number];  Surgeon: Nelson Aponte MD;  [facility];  Service: General;  Laterality: Left;       PAST SOCIAL HISTORY:  Social History     Tobacco Use    Smoking status: Former     Current packs/day: 0.00     Average packs/day: 0.3 packs/day for 40.0 years (10.0 ttl pk-yrs)     Types: Cigarettes     Start date:      Quit date: 2017     Years since quittin.3    Smokeless tobacco: Never       FAMILY HISTORY:  Family History   Problem Relation Name Age of Onset    Cancer Father Jaspreet Walsh Sr        CURRENT  MEDICATIONS:   Current Outpatient Medications   Medication Sig    acyclovir (ZOVIRAX) 800 MG Tab Take 1 tablet (800 mg total) by mouth 2 (two) times daily.    amLODIPine (NORVASC) 10 MG tablet Take 10 mg by mouth once daily.    atorvastatin (LIPITOR) 20 MG tablet Take 20 mg by mouth once daily.    gabapentin (NEURONTIN) 300 MG capsule Take 1 capsule (300 mg total) by mouth 2 (two) times daily.    JARDIANCE 10 mg tablet Take 10 mg by mouth.    losartan (COZAAR) 25 MG tablet Take 1 tablet (25 mg total) by mouth once daily.    potassium chloride SA (K-DUR,KLOR-CON M) 10 MEQ tablet TAKE 1 TABLET(10 MEQ) BY MOUTH EVERY DAY    sulfamethoxazole-trimethoprim 800-160mg (BACTRIM DS) 800-160 mg Tab Take 1 tablet by mouth every Mon, Wed, Fri.     No current facility-administered medications for this visit.       ALLERGIES:   Review of patient's allergies indicates:  No Known Allergies    GVHD Review of Systems:     Pertinent positives and negatives included in the HPI.     Physical Exam:     Vitals:    05/16/25 0909   BP: 129/70   Pulse: 85   Resp: 16   Temp: 98.6 °F (37 °C)         Physical Exam  Vitals reviewed.   Constitutional:       General: He is not in acute distress.     Appearance: Normal appearance. He is obese. He is not ill-appearing.   HENT:      Head: Normocephalic and atraumatic.      Nose: Nose normal.      Mouth/Throat:      Mouth: Mucous membranes are moist.      Pharynx: Oropharynx is clear. No oropharyngeal exudate.   Eyes:      Pupils: Pupils are equal, round, and reactive to light.   Cardiovascular:      Rate and Rhythm: Normal rate and regular rhythm.      Heart sounds: Normal heart sounds. No murmur heard.  Pulmonary:      Effort: Pulmonary effort is normal.      Breath sounds: Normal breath sounds. No wheezing.   Abdominal:      Palpations: Abdomen is soft.      Tenderness: There is no abdominal tenderness.   Musculoskeletal:         General: Normal range of motion.      Cervical back: Normal range of  motion and neck supple.      Right lower leg: No edema.      Left lower leg: No edema.   Skin:     General: Skin is warm and dry.      Findings: No lesion or rash.   Neurological:      Mental Status: He is alert and oriented to person, place, and time.      Motor: No weakness.   Psychiatric:         Mood and Affect: Mood normal.         Thought Content: Thought content normal.        ECOG Performance Status: (foot note - ECOG PS provided by Eastern Cooperative Oncology Group) 0 - Asymptomatic    Karnofsky Performance Score:  100%- Normal, No Complaints, No Evidence of Disease    Labs:   Lab Results   Component Value Date    WBC 4.67 05/12/2025    RBC 4.10 (L) 05/12/2025    HGB 12.5 (L) 05/12/2025    HCT 37.3 (L) 05/12/2025    MCV 91 05/12/2025    MCH 30.5 05/12/2025    MCHC 33.5 05/12/2025    RDW 13.9 05/12/2025    PLT 96 (L) 05/12/2025    MPV 9.1 (L) 05/12/2025    GRAN 1.6 (L) 03/14/2025    GRAN 31.2 (L) 03/14/2025    LYMPH 47.8 05/12/2025    LYMPH 2.23 05/12/2025    MONO 9.2 05/12/2025    MONO 0.43 05/12/2025    EOS 0.9 05/12/2025    EOS 0.04 05/12/2025    BASO 0.02 03/14/2025    EOSINOPHIL 1.2 03/14/2025    BASOPHIL 0.2 05/12/2025    BASOPHIL 0.01 05/12/2025       Sodium   Date Value Ref Range Status   05/12/2025 138 136 - 145 mmol/L Final   03/14/2025 138 136 - 145 mmol/L Final     Potassium   Date Value Ref Range Status   05/12/2025 4.1 3.5 - 5.1 mmol/L Final   03/14/2025 4.0 3.5 - 5.1 mmol/L Final     Chloride   Date Value Ref Range Status   05/12/2025 105 95 - 110 mmol/L Final   03/14/2025 106 95 - 110 mmol/L Final     CO2   Date Value Ref Range Status   05/12/2025 22 (L) 23 - 29 mmol/L Final   03/14/2025 22 (L) 23 - 29 mmol/L Final     Glucose   Date Value Ref Range Status   05/12/2025 90 70 - 110 mg/dL Final   03/14/2025 102 70 - 110 mg/dL Final     BUN   Date Value Ref Range Status   05/12/2025 18 2 - 20 mg/dL Final     Creatinine   Date Value Ref Range Status   05/12/2025 1.4 0.5 - 1.4 mg/dL Final      Calcium   Date Value Ref Range Status   05/12/2025 9.2 8.7 - 10.5 mg/dL Final   03/14/2025 9.2 8.7 - 10.5 mg/dL Final     Protein Total   Date Value Ref Range Status   05/12/2025 6.7 6.0 - 8.4 gm/dL Final   05/12/2025 6.1 6.0 - 8.4 gm/dL Final     Comment:     Serum protein electrophoresis and immunofixation results should be interpreted in clinical context in that some therapeutic agents can result in false positive results (example, daratumumab). Correlation with the patient's therapeutic regimen is required.     Total Protein   Date Value Ref Range Status   03/14/2025 7.1 6.0 - 8.4 g/dL Final     Albumin   Date Value Ref Range Status   05/12/2025 4.3 3.5 - 5.2 g/dL Final   03/14/2025 4.6 3.5 - 5.2 g/dL Final     Total Bilirubin   Date Value Ref Range Status   03/14/2025 0.6 0.1 - 1.0 mg/dL Final     Comment:     For infants and newborns, interpretation of results should be based  on gestational age, weight and in agreement with clinical  observations.    Premature Infant recommended reference ranges:  Up to 24 hours.............<8.0 mg/dL  Up to 48 hours............<12.0 mg/dL  3-5 days..................<15.0 mg/dL  6-29 days.................<15.0 mg/dL       Bilirubin Total   Date Value Ref Range Status   05/12/2025 0.4 0.1 - 1.0 mg/dL Final     Comment:     For infants and newborns, interpretation of results should be based   on gestational age, weight and in agreement with clinical   observations.    Premature Infant recommended reference ranges:   0-24 hours:  <8.0 mg/dL   24-48 hours: <12.0 mg/dL   3-5 days:    <15.0 mg/dL   6-29 days:   <15.0 mg/dL     Alkaline Phosphatase   Date Value Ref Range Status   03/14/2025 81 38 - 126 U/L Final     ALP   Date Value Ref Range Status   05/12/2025 73 38 - 126 unit/L Final     AST   Date Value Ref Range Status   05/12/2025 31 15 - 46 unit/L Final   03/14/2025 32 15 - 46 U/L Final     ALT   Date Value Ref Range Status   05/12/2025 21 10 - 44 unit/L Final    03/14/2025 20 10 - 44 U/L Final     Anion Gap   Date Value Ref Range Status   05/12/2025 11 8 - 16 mmol/L Final     eGFR if    Date Value Ref Range Status   07/01/2022 >60.0 >60 mL/min/1.73 m^2 Final     eGFR if non    Date Value Ref Range Status   07/01/2022 >60.0 >60 mL/min/1.73 m^2 Final     Comment:     Calculation used to obtain the estimated glomerular filtration  rate (eGFR) is the CKD-EPI equation.        Imaging:   None    Pathology:  None    Assessment and Plan:   Jaspreet Jillian Reardon (Jaspreet) is a pleasant 65 y.o.male with a past medical history of multiple myeloma s/p Carvykti who presents for post-CAR-T follow up.    Multiple Myeloma: Status post treatment with Carvykti. Will restage at day 30, day 100, +/- day 180, 1 year, 2 years.  IgG kappa, standard-risk MM, diagnosed September 2020 after presentation w/ lytic lesions  S/p 8 cycles VRd, followed by NATHALIE autoSCT 5/17/2021 and revlimid maintenance  Relapsed June/July 2023, DKd salvage initiated 8/4/23  Good initial response; however, biochemical progression 4/3/24  Transitioned to KPd as bridge to Carvykti  Carvykti 10/1/24; today is day +228     History of cellular therapy: As above, he received ciltacabtagene autoleucel (Carvykti) on 10/1/24. Complications include Grade II CRS, received 1 dose Tociluzimab.  Today is day + 228   Patient/family instructed to contact us with any fevers, mental status changes or unclear communication, or new/changed symptoms  Instructed to start Bactrim on Wednesday and okay to stop keppra on day +30.  Day +100 restaging  Bone marrow biopsy with normocellular marrow (30%) with marked myeloid hypoplasia. No morphologic or immunophenotypic evidence of residual/relapsed plasma cell myeloma. No MRD.  PET: In this patient with multiple myeloma, there is mildly heterogeneous tracer uptake throughout the bone marrow, improved from prior with post treatment response. No new osseous lesions.    Biochemical studies with stable, low-level paraprotein that is improving  Plan for 1 year restaging at next appointment    Immunosuppression: Bactrim and acyclovir prophylaxis until 1 year post-CART (and CD4 >200). Stop infectious prophylaxis. Monitor immunoglobulins every 8 weeks.   IgG: now above 400 consistently  Active infections: no infectious signs/symptoms today  Still due to see infectious disease, referral placed in March 2025, will follow up on this today.    Pancytopenia: Due to underlying disease +/- chemotherapy. Transfuse for Hgb <7 g/dL and platelets <10k.  Anemia improved, stable, asymptomatic  Thrombocytopenia, stable, above transfusion threshold, no overt bleeding  Neutropenia resolved.    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  Continue gabapentin 300 mg two times daily    Acquired hypogammaglobulinemia: secondary to CAR-T therapy  IgG >400 on recent labs, will re-initiated if labs fall below goal  Initiated IVIG received 1/6 for IgG <400 to prevent infection        Orders Placed:      Orders Placed This Encounter    CBC Auto Differential    Comprehensive Metabolic Panel    Immunofixation, Serum    Immunoglobulin Free LT Chains Blood    Immunoglobulins (IgG, IgA, IgM) Quantitative    Protein Electrophoresis, Serum         Route Chart for Scheduling    BMT Chart Routing      Follow up with physician    Follow up with [facility] 2 months. Nellie: CAR-T follow up   Provider visit type    Infusion scheduling note    Injection scheduling note    Labs CBC, CMP, free light chains, immunofixation, immunoglobulins and SPEP   Scheduling:  Preferred lab:  Lab interval:  [place]: in 8 weeks, 4-5 days prior to follow up   Imaging    Pharmacy appointment    Other referrals         Additional referrals needed  Infectious Disease appt for vaccines           Total time of this visit was 30 minutes, including time spent face to face with patient and/or via video/audio, and also in preparing for  today's visit for MDM and documentation. (Medical Decision Making, including consideration of possible diagnoses, management options, complex medical record review, review of diagnostic tests and information, consideration and discussion of significant complications based on comorbidities, and discussion with providers involved with the care of the patient). Greater than 50% was spent face to face with the patient counseling and coordinating care.    Visit today included increased complexity associated with the care of the episodic problem lab review addressed and managing the longitudinal care of the patient due to the serious and/or complex managed problem(s) history of chimeric antigen t-cell therapy/multiple myeloma.     Nellie Rivera, FNP-C  Hematologic Malignancies, Stem Cell Transplantation, and Cellular Therapy  [facility]

## 2025-05-16 ENCOUNTER — OFFICE VISIT (OUTPATIENT)
Dept: HEMATOLOGY/ONCOLOGY | Facility: CLINIC | Age: 65
End: 2025-05-16
Payer: MEDICARE

## 2025-05-16 VITALS
WEIGHT: 197 LBS | TEMPERATURE: 99 F | OXYGEN SATURATION: 97 % | BODY MASS INDEX: 30.92 KG/M2 | DIASTOLIC BLOOD PRESSURE: 70 MMHG | RESPIRATION RATE: 16 BRPM | HEART RATE: 85 BPM | HEIGHT: 67 IN | SYSTOLIC BLOOD PRESSURE: 129 MMHG

## 2025-05-16 DIAGNOSIS — C90.01 MULTIPLE MYELOMA IN REMISSION: ICD-10-CM

## 2025-05-16 DIAGNOSIS — Z92.850 S/P CHIMERIC ANTIGEN RECEPTOR T-CELL THERAPY: Primary | ICD-10-CM

## 2025-05-16 PROCEDURE — 99999 PR PBB SHADOW E&M-EST. PATIENT-LVL III: CPT | Mod: PBBFAC,,,

## 2025-05-16 RX ORDER — LEVOFLOXACIN 500 MG/1
500 TABLET, FILM COATED ORAL
COMMUNITY
Start: 2025-05-15 | End: 2025-05-16

## 2025-05-21 ENCOUNTER — OFFICE VISIT (OUTPATIENT)
Dept: INFECTIOUS DISEASES | Facility: CLINIC | Age: 65
End: 2025-05-21
Payer: MEDICARE

## 2025-05-21 ENCOUNTER — CLINICAL SUPPORT (OUTPATIENT)
Dept: INFECTIOUS DISEASES | Facility: CLINIC | Age: 65
End: 2025-05-21
Payer: MEDICARE

## 2025-05-21 VITALS
HEIGHT: 67 IN | BODY MASS INDEX: 30.52 KG/M2 | DIASTOLIC BLOOD PRESSURE: 84 MMHG | TEMPERATURE: 99 F | SYSTOLIC BLOOD PRESSURE: 134 MMHG | WEIGHT: 194.44 LBS | HEART RATE: 55 BPM

## 2025-05-21 DIAGNOSIS — C90.01 MULTIPLE MYELOMA IN REMISSION: ICD-10-CM

## 2025-05-21 DIAGNOSIS — Z92.850 HISTORY OF CHIMERIC ANTIGEN RECEPTOR T-CELL IMMUNOTHERAPY: Primary | ICD-10-CM

## 2025-05-21 DIAGNOSIS — Z71.85 VACCINE COUNSELING: ICD-10-CM

## 2025-05-21 DIAGNOSIS — Z92.850 HISTORY OF CHIMERIC ANTIGEN RECEPTOR T-CELL IMMUNOTHERAPY: ICD-10-CM

## 2025-05-21 PROCEDURE — 99999 PR PBB SHADOW E&M-EST. PATIENT-LVL III: CPT | Mod: PBBFAC,,, | Performed by: INTERNAL MEDICINE

## 2025-05-21 RX ORDER — RESPIRATORY SYNCYTIAL VISUS VACCINE RECOMBINANT, ADJUVANTED 120MCG/0.5
0.5 KIT INTRAMUSCULAR ONCE
Qty: 0.5 ML | Refills: 0 | Status: SHIPPED | OUTPATIENT
Start: 2025-05-21 | End: 2025-05-21

## 2025-05-21 NOTE — PROGRESS NOTES
Subjective:      Chief Complaint: Vaccines after CAR-T therapy    History of Present Illness  65 y.o. male with a past medical history of relapsed/refractor MM s/p autoSCT 2021, CAR-T carvykti 9/30/2024 presents for vaccine counseling.    Patient completed all of his vaccines post autoSCT.      Patient remains on acyclovir and TMP-SMX prophylaxis until 1 year post CAR-T.    Review of Systems   Constitutional:  Negative for chills, diaphoresis, fever and weight loss.   HENT:  Negative for congestion, sinus pain and sore throat.    Eyes:  Negative for photophobia and pain.   Respiratory:  Negative for cough, sputum production and shortness of breath.    Cardiovascular:  Negative for chest pain and leg swelling.   Gastrointestinal:  Negative for abdominal pain, diarrhea, nausea and vomiting.   Genitourinary:  Negative for dysuria and hematuria.   Musculoskeletal:  Negative for joint pain.   Skin:  Negative for rash.   Neurological:  Negative for focal weakness and headaches.   Psychiatric/Behavioral:  Negative for depression. The patient is not nervous/anxious.          Objective:   Physical Exam  Vitals reviewed.   Constitutional:       General: He is not in acute distress.     Appearance: He is well-developed. He is not diaphoretic.   HENT:      Head: Normocephalic and atraumatic.      Nose: Nose normal.   Eyes:      Conjunctiva/sclera: Conjunctivae normal.   Pulmonary:      Effort: Pulmonary effort is normal. No respiratory distress.   Abdominal:      General: Abdomen is flat. There is no distension.   Musculoskeletal:      Cervical back: Normal range of motion.      Right lower leg: No edema.      Left lower leg: No edema.   Skin:     General: Skin is warm and dry.      Findings: No erythema or rash.   Neurological:      Mental Status: He is alert.   Psychiatric:         Behavior: Behavior normal.           Significant results reviewed:    Sodium   Date Value Ref Range Status   05/12/2025 138 136 - 145 mmol/L Final    03/14/2025 138 136 - 145 mmol/L Final   03/03/2025 139 136 - 145 mmol/L Final   02/24/2025 142 136 - 145 mmol/L Final      Potassium   Date Value Ref Range Status   05/12/2025 4.1 3.5 - 5.1 mmol/L Final   03/14/2025 4.0 3.5 - 5.1 mmol/L Final   03/03/2025 4.4 3.5 - 5.1 mmol/L Final   02/24/2025 4.5 3.5 - 5.1 mmol/L Final      Chloride   Date Value Ref Range Status   05/12/2025 105 95 - 110 mmol/L Final   03/14/2025 106 95 - 110 mmol/L Final   03/03/2025 106 95 - 110 mmol/L Final   02/24/2025 108 95 - 110 mmol/L Final      CO2   Date Value Ref Range Status   05/12/2025 22 (L) 23 - 29 mmol/L Final   03/14/2025 22 (L) 23 - 29 mmol/L Final   03/03/2025 22 (L) 23 - 29 mmol/L Final   02/24/2025 23 23 - 29 mmol/L Final      BUN   Date Value Ref Range Status   05/12/2025 18 2 - 20 mg/dL Final   03/14/2025 21 (H) 2 - 20 mg/dL Final   03/03/2025 21 (H) 2 - 20 mg/dL Final   02/24/2025 15 2 - 20 mg/dL Final      Creatinine   Date Value Ref Range Status   05/12/2025 1.4 0.5 - 1.4 mg/dL Final   03/14/2025 1.37 0.50 - 1.40 mg/dL Final   03/03/2025 1.35 0.50 - 1.40 mg/dL Final   02/24/2025 1.27 0.50 - 1.40 mg/dL Final      Glucose   Date Value Ref Range Status   05/12/2025 90 70 - 110 mg/dL Final   03/14/2025 102 70 - 110 mg/dL Final   03/03/2025 100 70 - 110 mg/dL Final   02/24/2025 95 70 - 110 mg/dL Final       ALT   Date Value Ref Range Status   05/12/2025 21 10 - 44 unit/L Final   03/14/2025 20 10 - 44 U/L Final   03/03/2025 19 10 - 44 U/L Final   02/24/2025 22 10 - 44 U/L Final      AST   Date Value Ref Range Status   05/12/2025 31 15 - 46 unit/L Final   03/14/2025 32 15 - 46 U/L Final   03/03/2025 30 15 - 46 U/L Final   02/24/2025 38 15 - 46 U/L Final      Total Bilirubin   Date Value Ref Range Status   03/14/2025 0.6 0.1 - 1.0 mg/dL Final     Comment:     For infants and newborns, interpretation of results should be based  on gestational age, weight and in agreement with clinical  observations.    Premature Infant  recommended reference ranges:  Up to 24 hours.............<8.0 mg/dL  Up to 48 hours............<12.0 mg/dL  3-5 days..................<15.0 mg/dL  6-29 days.................<15.0 mg/dL     03/03/2025 0.6 0.1 - 1.0 mg/dL Final     Comment:     For infants and newborns, interpretation of results should be based  on gestational age, weight and in agreement with clinical  observations.    Premature Infant recommended reference ranges:  Up to 24 hours.............<8.0 mg/dL  Up to 48 hours............<12.0 mg/dL  3-5 days..................<15.0 mg/dL  6-29 days.................<15.0 mg/dL     02/24/2025 0.6 0.1 - 1.0 mg/dL Final     Comment:     For infants and newborns, interpretation of results should be based  on gestational age, weight and in agreement with clinical  observations.    Premature Infant recommended reference ranges:  Up to 24 hours.............<8.0 mg/dL  Up to 48 hours............<12.0 mg/dL  3-5 days..................<15.0 mg/dL  6-29 days.................<15.0 mg/dL       Bilirubin Total   Date Value Ref Range Status   05/12/2025 0.4 0.1 - 1.0 mg/dL Final     Comment:     For infants and newborns, interpretation of results should be based   on gestational age, weight and in agreement with clinical   observations.    Premature Infant recommended reference ranges:   0-24 hours:  <8.0 mg/dL   24-48 hours: <12.0 mg/dL   3-5 days:    <15.0 mg/dL   6-29 days:   <15.0 mg/dL      Albumin   Date Value Ref Range Status   05/12/2025 4.3 3.5 - 5.2 g/dL Final   03/14/2025 4.6 3.5 - 5.2 g/dL Final   03/03/2025 4.3 3.5 - 5.2 g/dL Final   02/24/2025 4.6 3.5 - 5.2 g/dL Final      Protein Total   Date Value Ref Range Status   05/12/2025 6.7 6.0 - 8.4 gm/dL Final   05/12/2025 6.1 6.0 - 8.4 gm/dL Final     Comment:     Serum protein electrophoresis and immunofixation results should be interpreted in clinical context in that some therapeutic agents can result in false positive results (example, daratumumab). Correlation  with the patient's therapeutic regimen is required.     Total Protein   Date Value Ref Range Status   03/14/2025 7.1 6.0 - 8.4 g/dL Final   03/03/2025 6.9 6.0 - 8.4 g/dL Final   02/24/2025 7.2 6.0 - 8.4 g/dL Final      Alkaline Phosphatase   Date Value Ref Range Status   03/14/2025 81 38 - 126 U/L Final   03/03/2025 75 38 - 126 U/L Final   02/24/2025 83 38 - 126 U/L Final     ALP   Date Value Ref Range Status   05/12/2025 73 38 - 126 unit/L Final        WBC   Date Value Ref Range Status   05/12/2025 4.67 3.90 - 12.70 K/uL Final   03/14/2025 5.21 3.90 - 12.70 K/uL Final   03/03/2025 3.54 (L) 3.90 - 12.70 K/uL Final   02/24/2025 4.03 3.90 - 12.70 K/uL Final      Hemoglobin   Date Value Ref Range Status   03/14/2025 12.1 (L) 14.0 - 18.0 g/dL Final   03/03/2025 11.5 (L) 14.0 - 18.0 g/dL Final   02/24/2025 12.0 (L) 14.0 - 18.0 g/dL Final     HGB   Date Value Ref Range Status   05/12/2025 12.5 (L) 14.0 - 18.0 gm/dL Final      Hematocrit   Date Value Ref Range Status   03/14/2025 35.9 (L) 40.0 - 54.0 % Final   03/03/2025 34.6 (L) 40.0 - 54.0 % Final   02/24/2025 37.3 (L) 40.0 - 54.0 % Final     HCT   Date Value Ref Range Status   05/12/2025 37.3 (L) 40.0 - 54.0 % Final      Platelet Count   Date Value Ref Range Status   05/12/2025 96 (L) 150 - 450 K/uL Final     Platelets   Date Value Ref Range Status   03/14/2025 117 (L) 150 - 450 K/uL Final   03/03/2025 109 (L) 150 - 450 K/uL Final   02/24/2025 118 (L) 150 - 450 K/uL Final            Assessment:   65 y.o. male with a past medical history of relapsed/refractor MM s/p autoSCT 2021, CAR-T carvykti 9/30/2024 presents for vaccine counseling.      Plan:     CAR-T Therapy: Carvykti    Date of CAR-T Therapy: 9/30/2024      Recommended Respiratory Vaccines Time Post Immune Effector Cell Therapy    >=3-6 months   Estimated Date Due    Seasonal High-Dose Influenza   Sept - March  Continue yearly for life 9/2025   COVID-19 mRNA Vaccine   Moderna or Pfizer-BioNTech  Frequency based  on updated CDC Guidelines Now   Respiratory Syncytial Virus  Arexvy®  At retail pharmacy, may only be covered if age >= 60 Now           Recommended Inactivated Vaccines Time Post Immune Effector Cell Therapy    >= 6 months >= 7 months >= 8 months >= 12 months >= 14 months   Estimated Date Due 5/2025 6/2025 7/2025 9/2025 11/2025   Pneumococcal Conjugate  Znnfxag71® 1 of 4 2 of 4 3 of 4  4 of 4           Haemophilus influenzae Type B  HiB 1 of 3 2 of 3 3 of 3               Diphtheria-Tetanus-acellular Pertussis     DTaP or TDaP 1 of 3 2 of 3  3 of 3              Zoster, recombinant    Shingrix® 1 of 2  2 of 2               Hepatitis A1    Havrix® or Vaqta® 1 of 2   2 of 2              Hepatitis B1    Engerix-B®or Recombivax HB® 1 of 3 2 of 3  3 of 3                        30 minutes of total time spent on the encounter, which includes face to face time and non-face to face time preparing to see the patient (eg, review of tests), Obtaining and/or reviewing separately obtained history, Documenting clinical information in the electronic or other health record, Independently interpreting results (not separately reported) and communicating results to the patient/family/caregiver, or Care coordination (not separately reported).     This patient's visit complexity is inherent to evaluation and management associated with medical care services that are part of ongoing care related to this patient's single, serious condition or complex condition.       Liliane Leonard MD MPH  [facility] - Infectious Disease

## 2025-05-21 NOTE — PROGRESS NOTES
Pt received 3 vaccines IM to right deltoid, Dyngoeu02 anterior, Adacel posterior, & Twinrix inferior. Pt also received 3 vaccines IM to left deltoid Comirnaty anterior, HIB posterior, & Shingrix inferior. pt tolerated injections well and departed from clinic in [facility].

## 2025-06-04 RX ORDER — POTASSIUM CHLORIDE 750 MG/1
10 TABLET, EXTENDED RELEASE ORAL DAILY
Qty: 90 TABLET | Refills: 1 | Status: SHIPPED | OUTPATIENT
Start: 2025-06-04

## 2025-06-26 ENCOUNTER — PATIENT MESSAGE (OUTPATIENT)
Dept: INFECTIOUS DISEASES | Facility: CLINIC | Age: 65
End: 2025-06-26
Payer: MEDICARE

## 2025-07-14 ENCOUNTER — LAB VISIT (OUTPATIENT)
Dept: LAB | Facility: HOSPITAL | Age: 65
End: 2025-07-14
Payer: MEDICARE

## 2025-07-14 DIAGNOSIS — C90.01 MULTIPLE MYELOMA IN REMISSION: ICD-10-CM

## 2025-07-14 DIAGNOSIS — Z92.850 S/P CHIMERIC ANTIGEN RECEPTOR T-CELL THERAPY: ICD-10-CM

## 2025-07-14 LAB
ABSOLUTE EOSINOPHIL (OHS): 0.11 K/UL
ABSOLUTE MONOCYTE (OHS): 0.36 K/UL (ref 0.3–1)
ABSOLUTE NEUTROPHIL COUNT (OHS): 1.24 K/UL (ref 1.8–7.7)
ALBUMIN SERPL BCP-MCNC: 4.4 G/DL (ref 3.5–5.2)
ALP SERPL-CCNC: 68 UNIT/L (ref 38–126)
ALT SERPL W/O P-5'-P-CCNC: 17 UNIT/L (ref 10–44)
ANION GAP (OHS): 9 MMOL/L (ref 8–16)
AST SERPL-CCNC: 26 UNIT/L (ref 15–46)
BASOPHILS # BLD AUTO: 0.03 K/UL
BASOPHILS NFR BLD AUTO: 0.7 %
BILIRUB SERPL-MCNC: 0.8 MG/DL (ref 0.1–1)
BUN SERPL-MCNC: 21 MG/DL (ref 2–20)
CALCIUM SERPL-MCNC: 8.9 MG/DL (ref 8.7–10.5)
CHLORIDE SERPL-SCNC: 107 MMOL/L (ref 95–110)
CO2 SERPL-SCNC: 23 MMOL/L (ref 23–29)
CREAT SERPL-MCNC: 1 MG/DL (ref 0.5–1.4)
ERYTHROCYTE [DISTWIDTH] IN BLOOD BY AUTOMATED COUNT: 16.2 % (ref 11.5–14.5)
GFR SERPLBLD CREATININE-BSD FMLA CKD-EPI: >60 ML/MIN/1.73/M2
GLUCOSE SERPL-MCNC: 95 MG/DL (ref 70–110)
HCT VFR BLD AUTO: 35.7 % (ref 40–54)
HGB BLD-MCNC: 11.3 GM/DL (ref 14–18)
IGA SERPL-MCNC: 26 MG/DL (ref 40–350)
IGG SERPL-MCNC: 572 MG/DL (ref 650–1600)
IGM SERPL-MCNC: 62 MG/DL (ref 50–300)
IMM GRANULOCYTES # BLD AUTO: 0.01 K/UL (ref 0–0.04)
IMM GRANULOCYTES NFR BLD AUTO: 0.2 % (ref 0–0.5)
LYMPHOCYTES # BLD AUTO: 2.42 K/UL (ref 1–4.8)
MCH RBC QN AUTO: 30.5 PG (ref 27–31)
MCHC RBC AUTO-ENTMCNC: 31.7 G/DL (ref 32–36)
MCV RBC AUTO: 97 FL (ref 82–98)
NUCLEATED RBC (/100WBC) (OHS): 0 /100 WBC
PLATELET # BLD AUTO: 124 K/UL (ref 150–450)
PMV BLD AUTO: 10.3 FL (ref 9.2–12.9)
POTASSIUM SERPL-SCNC: 4 MMOL/L (ref 3.5–5.1)
PROT SERPL-MCNC: 6.8 GM/DL (ref 6–8.4)
RBC # BLD AUTO: 3.7 M/UL (ref 4.6–6.2)
RELATIVE EOSINOPHIL (OHS): 2.6 %
RELATIVE LYMPHOCYTE (OHS): 58 % (ref 18–48)
RELATIVE MONOCYTE (OHS): 8.6 % (ref 4–15)
RELATIVE NEUTROPHIL (OHS): 29.9 % (ref 38–73)
SODIUM SERPL-SCNC: 139 MMOL/L (ref 136–145)
WBC # BLD AUTO: 4.17 K/UL (ref 3.9–12.7)

## 2025-07-14 PROCEDURE — 83521 IG LIGHT CHAINS FREE EACH: CPT | Mod: PN

## 2025-07-14 PROCEDURE — 84165 PROTEIN E-PHORESIS SERUM: CPT | Mod: PN

## 2025-07-14 PROCEDURE — 86334 IMMUNOFIX E-PHORESIS SERUM: CPT | Mod: PN

## 2025-07-14 PROCEDURE — 82565 ASSAY OF CREATININE: CPT | Mod: PN

## 2025-07-14 PROCEDURE — 84165 PROTEIN E-PHORESIS SERUM: CPT | Mod: 26,,, | Performed by: PATHOLOGY

## 2025-07-14 PROCEDURE — 86334 IMMUNOFIX E-PHORESIS SERUM: CPT | Mod: 26,,, | Performed by: PATHOLOGY

## 2025-07-14 PROCEDURE — 82784 ASSAY IGA/IGD/IGG/IGM EACH: CPT | Mod: PN

## 2025-07-14 PROCEDURE — 85025 COMPLETE CBC W/AUTO DIFF WBC: CPT | Mod: PN

## 2025-07-14 PROCEDURE — 36415 COLL VENOUS BLD VENIPUNCTURE: CPT | Mod: PN

## 2025-07-15 LAB
ALBUMIN, SPE (OHS): 4.23 G/DL (ref 3.35–5.55)
ALPHA 1 GLOB (OHS): 0.28 GM/DL (ref 0.17–0.41)
ALPHA 2 GLOB (OHS): 0.58 GM/DL (ref 0.43–0.99)
BETA GLOB (OHS): 0.69 GM/DL (ref 0.5–1.1)
GAMMA GLOBULIN (OHS): 0.42 GM/DL (ref 0.67–1.58)
KAPPA LC FREE SER-MCNC: 1.54 MG/L (ref 0.26–1.65)
KAPPA LC FREE/LAMBDA FREE SER: 0.57 MG/DL (ref 0.33–1.94)
LAMBDA LC FREE SERPL-MCNC: 0.37 MG/DL (ref 0.57–2.63)
PROT SERPL-MCNC: 6.2 GM/DL (ref 6–8.4)

## 2025-07-16 LAB
PATHOLOGIST INTERPRETATION - IFE SERUM (OHS): NORMAL
PATHOLOGIST REVIEW - SPE (OHS): NORMAL

## 2025-07-18 ENCOUNTER — OFFICE VISIT (OUTPATIENT)
Dept: HEMATOLOGY/ONCOLOGY | Facility: CLINIC | Age: 65
End: 2025-07-18
Payer: MEDICARE

## 2025-07-18 DIAGNOSIS — D72.0: ICD-10-CM

## 2025-07-18 DIAGNOSIS — D64.81 ANTINEOPLASTIC CHEMOTHERAPY INDUCED ANEMIA: ICD-10-CM

## 2025-07-18 DIAGNOSIS — Z79.899 IMMUNODEFICIENCY DUE TO DRUGS: ICD-10-CM

## 2025-07-18 DIAGNOSIS — G62.9 NEUROPATHY: ICD-10-CM

## 2025-07-18 DIAGNOSIS — T45.1X5A ANTINEOPLASTIC CHEMOTHERAPY INDUCED ANEMIA: ICD-10-CM

## 2025-07-18 DIAGNOSIS — C90.01 MULTIPLE MYELOMA IN REMISSION: ICD-10-CM

## 2025-07-18 DIAGNOSIS — Z92.850 S/P CHIMERIC ANTIGEN RECEPTOR T-CELL THERAPY: Primary | ICD-10-CM

## 2025-07-18 DIAGNOSIS — Z94.84 HISTORY OF AUTO STEM CELL TRANSPLANT: ICD-10-CM

## 2025-07-18 DIAGNOSIS — D84.821 IMMUNODEFICIENCY DUE TO DRUGS: ICD-10-CM

## 2025-07-18 DIAGNOSIS — D69.6 THROMBOCYTOPENIA: ICD-10-CM

## 2025-07-18 NOTE — PROGRESS NOTES
Audio Only Telehealth Visit     The patient location is: Home  The chief complaint leading to consultation is: CAR-T follow up  Visit type: Virtual visit with audio only (telephone)  Total time spent in medical discussion with patient: 10 minutes  Total time spent on date of the encounter:40 minutes    The reason for the audio only service rather than synchronous audio and video virtual visit was related to technical difficulties or patient preference/necessity.    Each patient to whom I provide medical services by telemedicine is:  (1) informed of the relationship between the physician and patient and the respective role of any other health care provider with respect to management of the patient; and (2) notified that they may decline to receive medical services by telemedicine and may withdraw from such care at any time. Patient verbally consented to receive this service via voice-only telephone call.      Section of Hematology and Stem Cell Transplantation    Post-Cellular Therapy Follow Up Visit     7/18/25    Cellular Therapy History:   Primary oncologist: Jesús Baker MD  Primary oncologic diagnosis: Multiple Myeloma  Cell infusion date: 09/30/24  Cell product: ciltacabtagene autoleucel (Carvykti)  Cell dose: 1 x 10^6 CAR T-cells/kg  Lymphodepletion chemotherapy: fludarabine 30 mg/m2 + cyclophosphamide 300 mg/m2 days -7, -6, -5  Prophylactic corticosteroid use: none  Immediate post-transplant complications: none    History of Present Ilness:   Jaspreet Jillian Reardon (Jaspreet) is a pleasant 65 y.o.male with a multiple myeloma who presents for post-cellular therapy follow up. S/p Carvykti, today is day +291.    Oncologic History  IgG kappa multiple myeloma (standard risk CG per msmart criteria, R-ISS stage II); diagnosed September 2019 after presenting with symptomatic perispinal plasmacytoma  Subsequently received  8 cycles of VRd therapy. Was in midst or pre transplant evaluation but due to insurance coverage  issues transplant was delayed so was maintained on therapy and those issues have been resolved.  Transplant Course:   Admitted 5/15/21 for planned Alea auto SCT for MM. He received 3 bags and a CD34 dose of 3.35 x 10^6 on 5/17/21. Tolerated chemo and transplant well. Admission complicated by n/v controlled with scheduled anti-emetics and c-diff neg diarrhea controlled with prn imodium. He engrafted on Day +13 (5/30/21) with an ANC of 2607. He was discharged home on Day +14 (5/31/21).  Day +100 restaging marrow indicated that he was in [place].   8/4/2023: disease relapse and jame-Kd salvage therapy; due to biochemical progression on DKd, transitioned to KPd bridging therapy. Responded well but decided to proceed to cilta-zohra.  Hospital Course for Carvykti:  Admitted on 9/30/24 for relapsed/refractory multiple myeloma. Received 0.50x10^6T cells on 10/01/24. Sustained TMAX 100.6 on day +5/6, likely grade 1 CRS. Day +7, TMAX 102.6, soft BP, up-trending inflammatory markers, elevated liver enzymes/tbili-Grade 2 CRS. Received IVF and Toci. EKG w/ read of STEMI, normal cardiac markers. Infectious workups negative. Down-trending liver enzymes/tbili, ferritin.    Interval History 07/15/2025:  Mr Walsh is here for post-CAR-T follow up, now day +291. He is doing well and without any issues. He saw ID and he started his post-CAR-T vaccines. He had another appointment today that the nurse is rescheduling. Will plan for his 1 year restaging before his next follow up; he reports being okay with the clinic biopsy. Denies fever, chills, facial muscle change, chest pain, shortness of breath, difficulty communicating/thinking, lower extremity weakness, N/V/D. He reports still taking bactrim and acyclovir.      PAST MEDICAL HISTORY:   Past Medical History:   Diagnosis Date    Anxiety     Arthritis     Cancer     Diabetes mellitus     Hypertension        PAST SURGICAL HISTORY:   Past Surgical History:   Procedure Laterality Date     BACK SURGERY  2021    BONE MARROW BIOPSY Left 10/20/2021    Procedure: Biopsy-bone marrow;  Surgeon: Summer Cartwright MD;  Location: [facility] ENDO (4TH FLR);  Service: Oncology;  Laterality: Left;    COLONOSCOPY N/A 2021    Procedure: COLONOSCOPY;  Surgeon: Braxton Lees MD;  Location: [facility] ENDO (4TH FLR);  Service: Endoscopy;  Laterality: N/A;  -covid kristopher-inst mailed-tb  21-pt to remain on schedule with Dr. Lees, and confirmed updated arrival time of 0835-BB    COLONOSCOPY N/A 2021    Procedure: COLONOSCOPY;  Surgeon: Jo Ann Robledo MD;  Location: [facility] ENDO (4TH FLR);  Service: Endoscopy;  Laterality: N/A;  rescheduled due to poor bowel prep, to be rescheduled with first available provider-BB  [id number]-BB    CREATION OF MUSCLE ROTATIONAL FLAP N/A 2020    Procedure: CREATION, FLAP, MUSCLE ROTATION;  Surgeon: Kamlesh Bellamy MD;  [facility];  Service: Plastics;  Laterality: N/A;    KNEE SURGERY Left 2019    LUMBAR FUSION N/A 2020    Procedure: FUSION, SPINE, LUMBAR L2-pelvis. Depuy. Plastic surgery closure w/ Dr. Bellamy;  Surgeon: Nick Coyle MD;  [facility];  Service: Neurosurgery;  Laterality: N/A;    Metastatic neoplasum removed from spine      PLACEMENT OF DUAL-LUMEN VASCULAR CATHETER Left 2024    Procedure: INSERTION-CATHETER-LENORA, double lumen, left poss right, Bard 14.5 fr hemosplit catheter, model [id number];  Surgeon: Nelson Aponte MD;  [facility];  Service: General;  Laterality: Left;       PAST SOCIAL HISTORY:  Social History     Tobacco Use    Smoking status: Former     Current packs/day: 0.00     Average packs/day: 0.3 packs/day for 40.0 years (10.0 ttl pk-yrs)     Types: Cigarettes     Start date:      Quit date: 2017     Years since quittin.5    Smokeless tobacco: Never       FAMILY HISTORY:  Family History   Problem Relation Name Age of Onset    Cancer Father Jaspreet  Perrilloux Sr        CURRENT MEDICATIONS:   Current Outpatient Medications   Medication Sig    acyclovir (ZOVIRAX) 800 MG Tab Take 1 tablet (800 mg total) by mouth 2 (two) times daily.    amLODIPine (NORVASC) 10 MG tablet Take 10 mg by mouth once daily.    atorvastatin (LIPITOR) 20 MG tablet Take 20 mg by mouth once daily.    gabapentin (NEURONTIN) 300 MG capsule Take 1 capsule (300 mg total) by mouth 2 (two) times daily.    JARDIANCE 10 mg tablet Take 10 mg by mouth.    losartan (COZAAR) 25 MG tablet Take 1 tablet (25 mg total) by mouth once daily.    potassium chloride SA (K-DUR,KLOR-CON M) 10 MEQ tablet Take 1 tablet (10 mEq total) by mouth once daily.    sulfamethoxazole-trimethoprim 800-160mg (BACTRIM DS) 800-160 mg Tab Take 1 tablet by mouth every Mon, Wed, Fri.     Current Facility-Administered Medications   Medication    diph,pertus(acel),tet ped (PF) 0.5 mL    diph,pertus(acel),tet ped (PF) 0.5 mL    [START ON 11/17/2025] diph,pertus(acel),tet ped (PF) 0.5 mL    haemophilus B polysac-tetanus toxoid injection 0.5 mL    [START ON 7/20/2025] haemophilus B polysac-tetanus toxoid injection 0.5 mL    [START ON 7/20/2025] hepatitis A and B vaccine (PF) 720 LYNNE unit- 20 mcg/mL suspension 720 Units    [START ON 11/17/2025] hepatitis A and B vaccine (PF) 720 LYNNE unit- 20 mcg/mL suspension 720 Units    [START ON 7/20/2025] pneumoc 20-michelle conj-dip cr(PF) (PREVNAR-20 (PF)) injection Syrg 0.5 mL    [START ON 11/17/2025] pneumoc 20-michelle conj-dip cr(PF) (PREVNAR-20 (PF)) injection Syrg 0.5 mL    [START ON 5/16/2026] pneumoc 20-michelle conj-dip cr(PF) (PREVNAR-20 (PF)) injection Syrg 0.5 mL    [START ON 7/20/2025] varicella-zoster gE vac,2 of 2 SusR 0.5 mL       ALLERGIES:   Review of patient's allergies indicates:  No Known Allergies    GVHD Review of Systems:     Pertinent positives and negatives included in the HPI.     Physical Exam:     There were no vitals filed for this visit.    Vitals and physical  exam deferred for telemedicine visit.    Physical Exam  Vitals reviewed.   Constitutional:       General: He is not in acute distress.     Appearance: Normal appearance. He is obese. He is not ill-appearing.   HENT:      Head: Normocephalic and atraumatic.      Nose: Nose normal.      Mouth/Throat:      Mouth: Mucous membranes are moist.      Pharynx: Oropharynx is clear. No oropharyngeal exudate.   Eyes:      Pupils: Pupils are equal, round, and reactive to light.   Cardiovascular:      Rate and Rhythm: Normal rate and regular rhythm.      Heart sounds: Normal heart sounds. No murmur heard.  Pulmonary:      Effort: Pulmonary effort is normal.      Breath sounds: Normal breath sounds. No wheezing.   Abdominal:      Palpations: Abdomen is soft.      Tenderness: There is no abdominal tenderness.   Musculoskeletal:         General: Normal range of motion.      Cervical back: Normal range of motion and neck supple.      Right lower leg: No edema.      Left lower leg: No edema.   Skin:     General: Skin is warm and dry.      Findings: No lesion or rash.   Neurological:      Mental Status: He is alert and oriented to person, place, and time.      Motor: No weakness.   Psychiatric:         Mood and Affect: Mood normal.         Thought Content: Thought content normal.      ECOG Performance Status: (foot note - ECOG PS provided by Eastern Cooperative Oncology Group) 0 - Asymptomatic    Karnofsky Performance Score:  100%- Normal, No Complaints, No Evidence of Disease    Labs:   Lab Results   Component Value Date    WBC 4.17 07/14/2025    RBC 3.70 (L) 07/14/2025    HGB 11.3 (L) 07/14/2025    HCT 35.7 (L) 07/14/2025    MCV 97 07/14/2025    MCH 30.5 07/14/2025    MCHC 31.7 (L) 07/14/2025    RDW 16.2 (H) 07/14/2025     (L) 07/14/2025    MPV 10.3 07/14/2025    GRAN 1.6 (L) 03/14/2025    GRAN 31.2 (L) 03/14/2025    LYMPH 58.0 (H) 07/14/2025    LYMPH 2.42 07/14/2025    MONO 8.6 07/14/2025    MONO 0.36 07/14/2025    EOS 2.6  07/14/2025    EOS 0.11 07/14/2025    BASO 0.02 03/14/2025    EOSINOPHIL 1.2 03/14/2025    BASOPHIL 0.7 07/14/2025    BASOPHIL 0.03 07/14/2025       Sodium   Date Value Ref Range Status   07/14/2025 139 136 - 145 mmol/L Final   03/14/2025 138 136 - 145 mmol/L Final     Potassium   Date Value Ref Range Status   07/14/2025 4.0 3.5 - 5.1 mmol/L Final   03/14/2025 4.0 3.5 - 5.1 mmol/L Final     Chloride   Date Value Ref Range Status   07/14/2025 107 95 - 110 mmol/L Final   03/14/2025 106 95 - 110 mmol/L Final     CO2   Date Value Ref Range Status   07/14/2025 23 23 - 29 mmol/L Final   03/14/2025 22 (L) 23 - 29 mmol/L Final     Glucose   Date Value Ref Range Status   07/14/2025 95 70 - 110 mg/dL Final   03/14/2025 102 70 - 110 mg/dL Final     BUN   Date Value Ref Range Status   07/14/2025 21 (H) 2 - 20 mg/dL Final     Creatinine   Date Value Ref Range Status   07/14/2025 1.0 0.5 - 1.4 mg/dL Final     Calcium   Date Value Ref Range Status   07/14/2025 8.9 8.7 - 10.5 mg/dL Final   03/14/2025 9.2 8.7 - 10.5 mg/dL Final     Protein Total   Date Value Ref Range Status   07/14/2025 6.8 6.0 - 8.4 gm/dL Final   07/14/2025 6.2 6.0 - 8.4 gm/dL Final     Comment:     Serum protein electrophoresis and immunofixation results should be interpreted in clinical context in that some therapeutic agents can result in false positive results (example, daratumumab). Correlation with the patient's therapeutic regimen is required.     Total Protein   Date Value Ref Range Status   03/14/2025 7.1 6.0 - 8.4 g/dL Final     Albumin   Date Value Ref Range Status   07/14/2025 4.4 3.5 - 5.2 g/dL Final   03/14/2025 4.6 3.5 - 5.2 g/dL Final     Total Bilirubin   Date Value Ref Range Status   03/14/2025 0.6 0.1 - 1.0 mg/dL Final     Comment:     For infants and newborns, interpretation of results should be based  on gestational age, weight and in agreement with clinical  observations.    Premature Infant recommended reference ranges:  Up to 24  hours.............<8.0 mg/dL  Up to 48 hours............<12.0 mg/dL  3-5 days..................<15.0 mg/dL  6-29 days.................<15.0 mg/dL       Bilirubin Total   Date Value Ref Range Status   07/14/2025 0.8 0.1 - 1.0 mg/dL Final     Comment:     For infants and newborns, interpretation of results should be based   on gestational age, weight and in agreement with clinical   observations.    Premature Infant recommended reference ranges:   0-24 hours:  <8.0 mg/dL   24-48 hours: <12.0 mg/dL   3-5 days:    <15.0 mg/dL   6-29 days:   <15.0 mg/dL     Alkaline Phosphatase   Date Value Ref Range Status   03/14/2025 81 38 - 126 U/L Final     ALP   Date Value Ref Range Status   07/14/2025 68 38 - 126 unit/L Final     AST   Date Value Ref Range Status   07/14/2025 26 15 - 46 unit/L Final   03/14/2025 32 15 - 46 U/L Final     ALT   Date Value Ref Range Status   07/14/2025 17 10 - 44 unit/L Final   03/14/2025 20 10 - 44 U/L Final     Anion Gap   Date Value Ref Range Status   07/14/2025 9 8 - 16 mmol/L Final     eGFR if    Date Value Ref Range Status   07/01/2022 >60.0 >60 mL/min/1.73 m^2 Final     eGFR if non    Date Value Ref Range Status   07/01/2022 >60.0 >60 mL/min/1.73 m^2 Final     Comment:     Calculation used to obtain the estimated glomerular filtration  rate (eGFR) is the CKD-EPI equation.        Imaging:   None    Pathology:  None    Assessment and Plan:   Jaspreet Walsh Jr. (Jaspreet) is a pleasant 65 y.o.male with a past medical history of multiple myeloma s/p Carvykti who presents for post-CAR-T follow up.    Multiple Myeloma: Status post treatment with Carvykti. Will restage at day 30, day 100, +/- day 180, 1 year, 2 years.  - IgG kappa, standard-risk MM, diagnosed September 2020 after presentation w/ lytic lesions  - S/p 8 cycles VRd, followed by NATHALIE autoSCT 5/17/2021 and revlimid maintenance  - Relapsed June/July 2023, DKd salvage initiated 8/4/23  - Good initial response;  however, biochemical progression 4/3/24  - Transitioned to [facility] as bridge to Carvykti  - Carvykti 10/1/24; today is day +291     History of cellular therapy: As above, he received ciltacabtagene autoleucel (Carvykti) on 10/1/24. Complications include Grade II CRS, received 1 dose Tociluzimab.  - Today is day + 291   - Day +100 restaging  - Bone marrow biopsy with normocellular marrow (30%) with marked myeloid hypoplasia. No morphologic or immunophenotypic evidence of residual/relapsed plasma cell myeloma. No MRD.  - PET: In this patient with multiple myeloma, there is mildly heterogeneous tracer uptake throughout the bone marrow, improved from prior with post treatment response. No new osseous lesions.   - Biochemical studies with stable, low-level paraprotein that is improving  - Plan for 1 year restaging with WB PET CT and clinic bone marrow biopsy.    Immunosuppression: Bactrim and acyclovir prophylaxis until 1 year post-CART (and CD4 >200). Stop infectious prophylaxis. Monitor immunoglobulins every 8 weeks.   - IgG: now above 400 consistently  - Established with infectious disease and initiated post-CAR-T vaccines  - Plan for a CD4 t-cell helper count with 1 year restaging.    Anemia/Thrombocytopenia: Due to underlying disease +/- chemotherapy. Transfuse for Hgb <7 g/dL and platelets <10k.  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  - Continue gabapentin 300 mg two times daily    Acquired hypogammaglobulinemia: secondary to CAR-T therapy  - IgG >400 on recent labs, will re-initiated if labs fall below goal, received one dose  - IgG stable and above goal consistently      Orders Placed:      Orders Placed This Encounter    Bone marrow    NM PET CT FDG Whole Body    CD4 T-[place] Cells    Leukemia/Lymphoma Screen - Bone Marrow Left Posterior Iliac Crest    Plasma Cell  Proliferative Disorder (PCPD), FISH    Multiple Myeloma MRD by Flow, BM    CBC Auto Differential    Comprehensive Metabolic Panel    Immunofixation, Serum    Immunoglobulin Free LT Chains Blood    Immunoglobulins (IgG, IgA, IgM) Quantitative    Protein Electrophoresis, Serum    Magnesium    Phosphorus    Specimen to Pathology, Bone Marrow Aspiration/Biopsy         Route Chart for Scheduling    BMT Chart Routing      Follow up with physician . Dr. Farrell, CAR-T restaging 10/10/25   Follow up with AXEL 4 months. Nellie: CAR-T follow up   Provider visit type    Infusion scheduling note    Injection scheduling note    Labs CBC, CMP, free light chains, immunofixation, immunoglobulins, SPEP, magnesium and phosphorus   Scheduling:  Preferred lab:  Lab interval:  [facility]: 9/30 all labs above + CD4 t helper cell; [facility]: then labs 4-5 days prior to 4 month follow up   Imaging PET scan   PET scan and clinic [facility] (Nellie) 9/30/25   Pharmacy appointment    Other referrals       Schedule bone marrow biopsy                 Visit today included increased complexity associated with the care of the episodic problem lab review addressed and managing the longitudinal care of the patient due to the serious and/or complex managed problem(s) history of chimeric antigen t-cell therapy/multiple myeloma.     Nellie Rivera, FNP-C  Hematologic Malignancies, Stem Cell Transplantation, and Cellular Therapy  [facility]       This service was not originating from a related E/M service provided within the previous 7 days nor will  to an E/M service or procedure within the next 24 hours or my soonest available appointment.  Prevailing standard of care was able to be met in this audio-only visit.

## 2025-07-21 ENCOUNTER — LAB VISIT (OUTPATIENT)
Dept: LAB | Facility: HOSPITAL | Age: 65
End: 2025-07-21
Attending: INTERNAL MEDICINE
Payer: MEDICARE

## 2025-07-21 DIAGNOSIS — E11.9 TYPE 2 DIABETES MELLITUS WITHOUT COMPLICATIONS: Primary | ICD-10-CM

## 2025-07-21 LAB
ALBUMIN SERPL BCP-MCNC: 4.2 G/DL (ref 3.5–5.2)
ANION GAP (OHS): 8 MMOL/L (ref 8–16)
BUN SERPL-MCNC: 16 MG/DL (ref 2–20)
CALCIUM SERPL-MCNC: 8.7 MG/DL (ref 8.7–10.5)
CHLORIDE SERPL-SCNC: 105 MMOL/L (ref 95–110)
CHOLEST SERPL-MCNC: 128 MG/DL (ref 120–199)
CHOLEST/HDLC SERPL: 2 {RATIO} (ref 2–5)
CO2 SERPL-SCNC: 26 MMOL/L (ref 23–29)
CREAT SERPL-MCNC: 1.1 MG/DL (ref 0.5–1.4)
CREAT UR-MCNC: 111.2 MG/DL (ref 23–375)
EAG (OHS): 105 MG/DL (ref 68–131)
GFR SERPLBLD CREATININE-BSD FMLA CKD-EPI: >60 ML/MIN/1.73/M2
GLUCOSE SERPL-MCNC: 83 MG/DL (ref 70–110)
HBA1C MFR BLD: 5.3 % (ref 4–5.6)
HDLC SERPL-MCNC: 63 MG/DL (ref 40–75)
HDLC SERPL: 49.2 % (ref 20–50)
LDLC SERPL CALC-MCNC: 57.2 MG/DL (ref 63–159)
NONHDLC SERPL-MCNC: 65 MG/DL
PHOSPHATE SERPL-MCNC: 3.1 MG/DL (ref 2.7–4.5)
POTASSIUM SERPL-SCNC: 4 MMOL/L (ref 3.5–5.1)
SODIUM SERPL-SCNC: 139 MMOL/L (ref 136–145)
TRIGL SERPL-MCNC: 39 MG/DL (ref 30–150)

## 2025-07-21 PROCEDURE — 36415 COLL VENOUS BLD VENIPUNCTURE: CPT | Mod: PN

## 2025-07-21 PROCEDURE — 83036 HEMOGLOBIN GLYCOSYLATED A1C: CPT | Mod: PN

## 2025-07-21 PROCEDURE — 82570 ASSAY OF URINE CREATININE: CPT | Mod: PN

## 2025-07-21 PROCEDURE — 80061 LIPID PANEL: CPT | Mod: PN

## 2025-07-21 PROCEDURE — 80069 RENAL FUNCTION PANEL: CPT | Mod: PN

## 2025-07-22 ENCOUNTER — CLINICAL SUPPORT (OUTPATIENT)
Dept: INFECTIOUS DISEASES | Facility: CLINIC | Age: 65
End: 2025-07-22
Payer: MEDICARE

## 2025-07-22 DIAGNOSIS — Z92.850 HISTORY OF CHIMERIC ANTIGEN RECEPTOR T-CELL IMMUNOTHERAPY: Primary | ICD-10-CM

## 2025-07-22 PROCEDURE — 90636 HEP A/HEP B VACC ADULT IM: CPT | Mod: S$GLB,,, | Performed by: INTERNAL MEDICINE

## 2025-07-22 PROCEDURE — G0009 ADMIN PNEUMOCOCCAL VACCINE: HCPCS | Mod: S$GLB,,, | Performed by: INTERNAL MEDICINE

## 2025-07-22 PROCEDURE — 90715 TDAP VACCINE 7 YRS/> IM: CPT | Mod: S$GLB,,, | Performed by: INTERNAL MEDICINE

## 2025-07-22 PROCEDURE — 90677 PCV20 VACCINE IM: CPT | Mod: S$GLB,,, | Performed by: INTERNAL MEDICINE

## 2025-07-22 PROCEDURE — 90472 IMMUNIZATION ADMIN EACH ADD: CPT | Mod: S$GLB,,, | Performed by: INTERNAL MEDICINE

## 2025-07-22 PROCEDURE — 90648 HIB PRP-T VACCINE 4 DOSE IM: CPT | Mod: S$GLB,,, | Performed by: INTERNAL MEDICINE

## 2025-07-22 NOTE — PROGRESS NOTES
Pt received 2 vaccines IM to right deltoid, [id number] to superior site & Twinrix to inferior site. Pt also received 2 vaccines IM to left deltoid, Dtap to superior site & HIB to inferior site. Pt tolerated injections well and departed from clinic in [facility].

## 2025-07-30 DIAGNOSIS — G62.9 NEUROPATHY: ICD-10-CM

## 2025-07-30 NOTE — TELEPHONE ENCOUNTER
Copied from CRM #[id number]. Topic: Medications - Medication Refill  >> Jul 30, 2025  4:21 PM Mahad wrote:  RX Name:gabapentin (NEURONTIN) 300 MG capsule     How is it taken:Take 1 capsule (300 mg total) by mouth 2 (two) times daily. - Oral     Quantity:60 capsule       Preferred Pharmacy with phone number:[id number] - [address] [facility]   Phone: [phone]  Fax: [phone]        Ordering Provider: Nhi Rivera       Contact Preference:[phone]     Addl info:

## 2025-07-31 RX ORDER — GABAPENTIN 300 MG/1
300 CAPSULE ORAL 2 TIMES DAILY
Qty: 60 CAPSULE | Refills: 3 | Status: SHIPPED | OUTPATIENT
Start: 2025-07-31

## 2025-08-18 ENCOUNTER — CLINICAL SUPPORT (OUTPATIENT)
Dept: INFECTIOUS DISEASES | Facility: CLINIC | Age: 65
End: 2025-08-18
Payer: MEDICARE

## 2025-08-18 DIAGNOSIS — Z92.850 HISTORY OF CHIMERIC ANTIGEN RECEPTOR T-CELL IMMUNOTHERAPY: Primary | ICD-10-CM

## 2025-08-18 PROCEDURE — 90750 HZV VACC RECOMBINANT IM: CPT | Mod: S$GLB,,, | Performed by: INTERNAL MEDICINE

## 2025-08-18 PROCEDURE — 90472 IMMUNIZATION ADMIN EACH ADD: CPT | Mod: S$GLB,,, | Performed by: INTERNAL MEDICINE

## 2025-08-18 PROCEDURE — 90677 PCV20 VACCINE IM: CPT | Mod: S$GLB,,, | Performed by: INTERNAL MEDICINE

## 2025-08-18 PROCEDURE — 90648 HIB PRP-T VACCINE 4 DOSE IM: CPT | Mod: S$GLB,,, | Performed by: INTERNAL MEDICINE

## 2025-08-18 PROCEDURE — G0009 ADMIN PNEUMOCOCCAL VACCINE: HCPCS | Mod: S$GLB,,, | Performed by: INTERNAL MEDICINE

## (undated) DEVICE — DRAPE T THYROID STERILE

## (undated) DEVICE — BLADE SURG CARBON STEEL SZ11

## (undated) DEVICE — SUT 2-0 VICRYL / CT-1

## (undated) DEVICE — TAP 8MM SPINAL POWER

## (undated) DEVICE — DRAPE STERI-DRAPE 1000 17X11IN

## (undated) DEVICE — DRAPE C ARM 42 X 120 10/BX

## (undated) DEVICE — MANIFOLD 4 PORT

## (undated) DEVICE — BLADE 4IN EDGE INSULATED

## (undated) DEVICE — CORD BIPOLAR 12 FOOT

## (undated) DEVICE — STAPLER SKIN PROXIMATE WIDE

## (undated) DEVICE — DRESSING AQUACEL FOAM 3 X 3

## (undated) DEVICE — KIT PREVENA INCISION MGMT 13CM

## (undated) DEVICE — SEE MEDLINE ITEM 146347

## (undated) DEVICE — SOL NACL 0.9% INJ PF/50151

## (undated) DEVICE — CONTAINER SPECIMEN OR STER 4OZ

## (undated) DEVICE — Device

## (undated) DEVICE — BLADE SURGICAL 15C

## (undated) DEVICE — DRAPE ABDOMINAL TIBURON 14X11

## (undated) DEVICE — DRESSING TRANS 4X4 TEGADERM

## (undated) DEVICE — KIT SPINAL PATIENT CARE JACK

## (undated) DEVICE — SUT STRATAFIX PDS PLUS VIO

## (undated) DEVICE — GOWN SURGICAL X-LARGE

## (undated) DEVICE — DRESSING SURGICAL 1/2X1/2

## (undated) DEVICE — SKINMARKER & RULER REGULAR X-F

## (undated) DEVICE — SEALER AQUAMANTYS 2.3 BIPOLAR

## (undated) DEVICE — SEE MEDLINE ITEM 156905

## (undated) DEVICE — DRAPE STERI INSTRUMENT 1018

## (undated) DEVICE — DRESSING MEPILEX BORDER 4 X 4

## (undated) DEVICE — SPONGE LAP 18X18 PREWASHED

## (undated) DEVICE — EVACUATOR WOUND BULB 100CC

## (undated) DEVICE — SUT VICRYL+ 1 CT1 18IN

## (undated) DEVICE — DRAPE INCISE IOBAN 2 23X17IN

## (undated) DEVICE — BUR BONE CUT MICRO TPS 3X3.8MM

## (undated) DEVICE — DRESSING AQUACEL SACRAL 9 X 9

## (undated) DEVICE — APPLICATOR CHLORAPREP ORN 26ML

## (undated) DEVICE — DECANTER FLUID TRNSF WHITE 9IN

## (undated) DEVICE — DRESSING INFOVAC LARGE BLK

## (undated) DEVICE — SPONGE LAP 4X18 PREWASHED

## (undated) DEVICE — ROUTER TAPERED 2.3MM

## (undated) DEVICE — NDL HYPO REG 25G X 1 1/2

## (undated) DEVICE — KIT EVACUATOR 3-SPRING 1/8 DRN

## (undated) DEVICE — SEE MEDLINE ITEM 152512

## (undated) DEVICE — SUT PROLENE 2-0 30 SH

## (undated) DEVICE — BLADE SURG #15 CARBON STEEL

## (undated) DEVICE — TUBE FRAZIER 5MM 2FT SOFT TIP

## (undated) DEVICE — TRAY MINOR GEN SURG OMC

## (undated) DEVICE — BLADE SURG CARBON STEEL #10

## (undated) DEVICE — SEE MEDLINE ITEM 157131

## (undated) DEVICE — STAPLER SKIN ROTATING HEAD

## (undated) DEVICE — DRAPE C-ARMOR EQUIPMENT COVER

## (undated) DEVICE — SUT CTD VICRYL 2-0 CR/CT-2

## (undated) DEVICE — SEE MEDLINE ITEM 157150

## (undated) DEVICE — SUT 1 48IN PDS II VIO MONO

## (undated) DEVICE — TRAY FOLEY 16FR INFECTION CONT

## (undated) DEVICE — DRAPE C-ARM ELAS CLIP 42X120IN

## (undated) DEVICE — SEE MEDLINE ITEM 146417

## (undated) DEVICE — PACK UNIVERSAL SPLIT II

## (undated) DEVICE — SUT VICRYL PLUS 0 CT1 18IN

## (undated) DEVICE — SYR DISP LL 5CC

## (undated) DEVICE — DRESSING AQUACEL FOAM 5 X 5

## (undated) DEVICE — SUT SILK 3-0 BLK BR SH 30IN

## (undated) DEVICE — TRAY MINOR GEN SURG

## (undated) DEVICE — ELECTRODE REM PLYHSV RETURN 9

## (undated) DEVICE — OPEN CANNULA

## (undated) DEVICE — COVER BACK TABLE 72X21

## (undated) DEVICE — GAUZE SPONGE 4X4 12PLY

## (undated) DEVICE — NDL SPINAL 18GX3.5 SPINOCAN

## (undated) DEVICE — MARKER SKIN STND TIP BLUE BARR

## (undated) DEVICE — SUT MONOCRYL 3-0 PS-2 UND

## (undated) DEVICE — BURR ROUND PRECISION 7.5MM

## (undated) DEVICE — KIT SURGIFLO HEMOSTATIC MATRIX

## (undated) DEVICE — GOWN AERO CHROME W/ TOWEL XL

## (undated) DEVICE — SPONGE GAUZE 16PLY 4X4

## (undated) DEVICE — CHLORAPREP W TINT 26ML APPL

## (undated) DEVICE — TIP YANKAUERS BULB NO VENT

## (undated) DEVICE — SEE MEDLINE ITEM 154981

## (undated) DEVICE — DRESSING AQUACEL FOAM 4 X 12

## (undated) DEVICE — ADHESIVE DERMABOND ADVANCED

## (undated) DEVICE — SUT ETHILON 3-0 PS2 18 BLK

## (undated) DEVICE — SYR ONLY LUER LOCK 20CC

## (undated) DEVICE — CAUTERY BOVIE PENCIL

## (undated) DEVICE — SEE MEDLINE ITEM 157128

## (undated) DEVICE — HEMOSTAT SURGICEL 4X8IN

## (undated) DEVICE — SUT SILK 2-0 PS 18IN BLACK

## (undated) DEVICE — SUT MCRYL PLUS 4-0 PS2 27IN

## (undated) DEVICE — KIT CONFIDENCE W/O NEEDLES

## (undated) DEVICE — DRAIN CHANNEL ROUND 15FR

## (undated) DEVICE — FRAZIER 18FR

## (undated) DEVICE — DRESSING ANTIMICROBIAL 1 INCH